# Patient Record
Sex: MALE | Race: WHITE | NOT HISPANIC OR LATINO | Employment: UNEMPLOYED | URBAN - METROPOLITAN AREA
[De-identification: names, ages, dates, MRNs, and addresses within clinical notes are randomized per-mention and may not be internally consistent; named-entity substitution may affect disease eponyms.]

---

## 2021-01-06 ENCOUNTER — TRANSFERRED RECORDS (OUTPATIENT)
Dept: HEALTH INFORMATION MANAGEMENT | Facility: CLINIC | Age: 46
End: 2021-01-06

## 2021-01-12 ENCOUNTER — TRANSFERRED RECORDS (OUTPATIENT)
Dept: HEALTH INFORMATION MANAGEMENT | Facility: CLINIC | Age: 46
End: 2021-01-12

## 2021-01-19 ENCOUNTER — TRANSFERRED RECORDS (OUTPATIENT)
Dept: HEALTH INFORMATION MANAGEMENT | Facility: CLINIC | Age: 46
End: 2021-01-19

## 2021-01-20 ENCOUNTER — TRANSFERRED RECORDS (OUTPATIENT)
Dept: HEALTH INFORMATION MANAGEMENT | Facility: CLINIC | Age: 46
End: 2021-01-20

## 2021-02-01 ENCOUNTER — TRANSFERRED RECORDS (OUTPATIENT)
Dept: HEALTH INFORMATION MANAGEMENT | Facility: CLINIC | Age: 46
End: 2021-02-01

## 2021-05-18 ENCOUNTER — TRANSFERRED RECORDS (OUTPATIENT)
Dept: HEALTH INFORMATION MANAGEMENT | Facility: CLINIC | Age: 46
End: 2021-05-18

## 2021-06-16 ENCOUNTER — TRANSFERRED RECORDS (OUTPATIENT)
Dept: HEALTH INFORMATION MANAGEMENT | Facility: CLINIC | Age: 46
End: 2021-06-16

## 2021-10-04 ENCOUNTER — TRANSFERRED RECORDS (OUTPATIENT)
Dept: HEALTH INFORMATION MANAGEMENT | Facility: CLINIC | Age: 46
End: 2021-10-04

## 2021-11-05 ENCOUNTER — TRANSCRIBE ORDERS (OUTPATIENT)
Dept: OTHER | Age: 46
End: 2021-11-05

## 2021-11-05 ENCOUNTER — PRE VISIT (OUTPATIENT)
Dept: ONCOLOGY | Facility: CLINIC | Age: 46
End: 2021-11-05

## 2021-11-05 DIAGNOSIS — C20 RECTAL CANCER (H): Primary | ICD-10-CM

## 2021-11-05 NOTE — TELEPHONE ENCOUNTER
Action 11/05/21 MMT   Action Taken  1:00PM CSS faxed record requests to [facility] recs and film room.   2:37PM  CSS called patient and obtained verbal consent to received records from [facility].   2:35PM spoke with Mitch at [facility] who will fax path and imaging reports and push images to PACS.   2:45PM CSS resolved all images in PACS.

## 2021-11-23 NOTE — TELEPHONE ENCOUNTER
RECORDS STATUS - ALL OTHER DIAGNOSIS      RECORDS RECEIVED FROM: [facility]   DATE RECEIVED: 11/30   NOTES STATUS DETAILS   OFFICE NOTE from referring provider PAULINO Alcantar   OFFICE NOTE from medical oncologist CE - [facility] 11/2/21   DISCHARGE SUMMARY from hospital CE - [facility] 6/16/21, 2/1/21   DISCHARGE REPORT from the ER NA    OPERATIVE REPORT CE - [facility] 2/1/21: Colostomy  1/19/21: CT Lung Bx Right  1/12/21: Colonoscopy   MEDICATION LIST [facility]   CLINICAL TRIAL TREATMENTS TO DATE     LABS     PATHOLOGY REPORTS [facility], Reports in CE, Slides received 11/30 1/19/21: [id number]   ANYTHING RELATED TO DIAGNOSIS Epic/CE 11/2/21   GENONOMIC TESTING     TYPE:     IMAGING (NEED IMAGES & REPORT)     XR PACS 6/23/21: [facility]   CT SCANS PACS 10/4/21, 5/18/21, 1/19/21, 1/12/21, 4/8/21: [facility]   MRI PACS 1/20/21: [facility]   MAMMO     ULTRASOUND PACS 6/17/21, 6/16/21: [facility]   PET

## 2021-11-29 ENCOUNTER — ONCOLOGY VISIT (OUTPATIENT)
Dept: ONCOLOGY | Facility: CLINIC | Age: 46
End: 2021-11-29
Attending: INTERNAL MEDICINE
Payer: COMMERCIAL

## 2021-11-29 ENCOUNTER — PRE VISIT (OUTPATIENT)
Dept: ONCOLOGY | Facility: CLINIC | Age: 46
End: 2021-11-29
Payer: COMMERCIAL

## 2021-11-29 VITALS
SYSTOLIC BLOOD PRESSURE: 115 MMHG | RESPIRATION RATE: 18 BRPM | TEMPERATURE: 98.2 F | OXYGEN SATURATION: 97 % | HEART RATE: 89 BPM | HEIGHT: 71 IN | DIASTOLIC BLOOD PRESSURE: 74 MMHG | WEIGHT: 212.9 LBS | BODY MASS INDEX: 29.81 KG/M2

## 2021-11-29 DIAGNOSIS — C18.9 COLON CANCER (H): Primary | ICD-10-CM

## 2021-11-29 PROCEDURE — 99205 OFFICE O/P NEW HI 60 MIN: CPT | Performed by: INTERNAL MEDICINE

## 2021-11-29 PROCEDURE — 99417 PROLNG OP E/M EACH 15 MIN: CPT | Performed by: INTERNAL MEDICINE

## 2021-11-29 PROCEDURE — G0463 HOSPITAL OUTPT CLINIC VISIT: HCPCS

## 2021-11-29 RX ORDER — LIDOCAINE/PRILOCAINE 2.5 %-2.5%
CREAM (GRAM) TOPICAL
COMMUNITY
Start: 2021-02-23 | End: 2023-02-03

## 2021-11-29 RX ORDER — ONDANSETRON 4 MG/1
TABLET, ORALLY DISINTEGRATING ORAL
COMMUNITY
Start: 2021-01-21 | End: 2023-02-03 | Stop reason: DRUGHIGH

## 2021-11-29 RX ORDER — NEOMYCIN SULFATE 500 MG/1
TABLET ORAL
COMMUNITY
Start: 2021-01-21 | End: 2023-02-03

## 2021-11-29 ASSESSMENT — MIFFLIN-ST. JEOR: SCORE: 1861.96

## 2021-11-29 ASSESSMENT — PAIN SCALES - GENERAL: PAINLEVEL: NO PAIN (0)
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
## 2021-12-02 ENCOUNTER — LAB (OUTPATIENT)
Dept: LAB | Facility: CLINIC | Age: 46
End: 2021-12-02
Payer: COMMERCIAL

## 2021-12-02 DIAGNOSIS — C34.11 PRIMARY ADENOCARCINOMA OF UPPER LOBE OF RIGHT LUNG (H): Primary | ICD-10-CM

## 2021-12-02 DIAGNOSIS — C34.11 PRIMARY ADENOCARCINOMA OF UPPER LOBE OF RIGHT LUNG (H): ICD-10-CM

## 2021-12-02 PROCEDURE — 88321 CONSLTJ&REPRT SLD PREP ELSWR: CPT | Performed by: PATHOLOGY

## 2021-12-05 LAB
PATH REPORT.COMMENTS IMP SPEC: NORMAL
PATH REPORT.FINAL DX SPEC: NORMAL
PATH REPORT.GROSS SPEC: NORMAL
PATH REPORT.MICROSCOPIC SPEC OTHER STN: NORMAL
PATH REPORT.RELEVANT HX SPEC: NORMAL
PATH REPORT.RELEVANT HX SPEC: NORMAL
PATH REPORT.SITE OF ORIGIN SPEC: NORMAL

## 2022-01-02 ENCOUNTER — HEALTH MAINTENANCE LETTER (OUTPATIENT)
Age: 47
End: 2022-01-02

## 2022-06-08 ENCOUNTER — TELEPHONE (OUTPATIENT)
Dept: INFECTIOUS DISEASES | Facility: CLINIC | Age: 47
End: 2022-06-08
Payer: COMMERCIAL

## 2022-06-08 NOTE — TELEPHONE ENCOUNTER
"Called patient back to let him know that we need a referral prior to scheduling for this concern. Patient became very upset and said he has \"fucking things crawling out of his legs and foot\". Patient reports his primary care provider unable to provide referral as they \"dont see what he sees\" and are not concerned for parasites. Patient going to [facility] tomorrow to see dermatology and informed him he can request an infectious disease referral from them. Patient stated this was a waste of time and he needed to be seen here sooner. Informed patient that we do not have appointments available til July so we would not be able to see him this week even if he gets a referral. Patient ended call by saying \"When I fucking show up there with things crawling out of me, someone is gonna see me\" and then hung up.  "

## 2022-06-08 NOTE — TELEPHONE ENCOUNTER
[facility] Call Center    Phone Message    May a detailed message be left on voicemail: yes     Reason for Call: Other: Patient called stating he believes he has parasite as something came out in between his toes. Per pt did reach out to primary care and was told to go to dermatology. Patient wanting to know if he can send a picture to infectious disease department to confirm it is a parasite and then set up an appointment. Please advise. Thank you     Action Taken: Message routed to:  Other: ID    Travel Screening: Not Applicable

## 2022-06-12 VITALS
WEIGHT: 185 LBS | BODY MASS INDEX: 25.61 KG/M2 | OXYGEN SATURATION: 98 % | TEMPERATURE: 98.3 F | SYSTOLIC BLOOD PRESSURE: 114 MMHG | HEART RATE: 95 BPM | DIASTOLIC BLOOD PRESSURE: 81 MMHG | RESPIRATION RATE: 16 BRPM

## 2022-06-12 PROCEDURE — 99282 EMERGENCY DEPT VISIT SF MDM: CPT | Performed by: EMERGENCY MEDICINE

## 2022-06-12 PROCEDURE — 99281 EMR DPT VST MAYX REQ PHY/QHP: CPT

## 2022-06-13 ENCOUNTER — HOSPITAL ENCOUNTER (EMERGENCY)
Facility: CLINIC | Age: 47
Discharge: HOME | End: 2022-06-13
Attending: EMERGENCY MEDICINE | Admitting: EMERGENCY MEDICINE
Payer: COMMERCIAL

## 2022-06-13 ENCOUNTER — HOSPITAL ENCOUNTER (EMERGENCY)
Facility: CLINIC | Age: 47
End: 2022-06-13
Payer: COMMERCIAL

## 2022-06-13 ENCOUNTER — HOSPITAL ENCOUNTER (EMERGENCY)
Facility: CLINIC | Age: 47
Discharge: LEFT BEFORE DISCHARGE | End: 2022-06-13
Attending: EMERGENCY MEDICINE | Admitting: EMERGENCY MEDICINE
Payer: COMMERCIAL

## 2022-06-13 VITALS
HEART RATE: 87 BPM | DIASTOLIC BLOOD PRESSURE: 89 MMHG | HEIGHT: 71 IN | OXYGEN SATURATION: 100 % | WEIGHT: 185 LBS | SYSTOLIC BLOOD PRESSURE: 134 MMHG | RESPIRATION RATE: 16 BRPM | BODY MASS INDEX: 25.9 KG/M2

## 2022-06-13 DIAGNOSIS — Z03.89 ENCOUNTER FOR OBSERVATION FOR OTHER SUSPECTED DISEASES AND CONDITIONS RULED OUT: ICD-10-CM

## 2022-06-13 DIAGNOSIS — F40.218: ICD-10-CM

## 2022-06-13 LAB
ANION GAP SERPL CALCULATED.3IONS-SCNC: 5 MMOL/L (ref 3–14)
BASOPHILS # BLD AUTO: 0 10E3/UL (ref 0–0.2)
BASOPHILS NFR BLD AUTO: 1 %
BUN SERPL-MCNC: 14 MG/DL (ref 7–30)
CALCIUM SERPL-MCNC: 8.8 MG/DL (ref 8.5–10.1)
CHLORIDE BLD-SCNC: 107 MMOL/L (ref 94–109)
CO2 SERPL-SCNC: 27 MMOL/L (ref 20–32)
CREAT SERPL-MCNC: 0.96 MG/DL (ref 0.66–1.25)
EOSINOPHIL # BLD AUTO: 0.2 10E3/UL (ref 0–0.7)
EOSINOPHIL NFR BLD AUTO: 4 %
ERYTHROCYTE [DISTWIDTH] IN BLOOD BY AUTOMATED COUNT: 15.7 % (ref 10–15)
GFR SERPL CREATININE-BSD FRML MDRD: >90 ML/MIN/1.73M2
GLUCOSE BLD-MCNC: 161 MG/DL (ref 70–99)
HCT VFR BLD AUTO: 41.6 % (ref 40–53)
HGB BLD-MCNC: 13.8 G/DL (ref 13.3–17.7)
HOLD SPECIMEN: NORMAL
HOLD SPECIMEN: NORMAL
IMM GRANULOCYTES # BLD: 0 10E3/UL
IMM GRANULOCYTES NFR BLD: 0 %
LYMPHOCYTES # BLD AUTO: 1.8 10E3/UL (ref 0.8–5.3)
LYMPHOCYTES NFR BLD AUTO: 30 %
MCH RBC QN AUTO: 29.4 PG (ref 26.5–33)
MCHC RBC AUTO-ENTMCNC: 33.2 G/DL (ref 31.5–36.5)
MCV RBC AUTO: 89 FL (ref 78–100)
MONOCYTES # BLD AUTO: 1 10E3/UL (ref 0–1.3)
MONOCYTES NFR BLD AUTO: 17 %
NEUTROPHILS # BLD AUTO: 2.9 10E3/UL (ref 1.6–8.3)
NEUTROPHILS NFR BLD AUTO: 48 %
NRBC # BLD AUTO: 0 10E3/UL
NRBC BLD AUTO-RTO: 0 /100
PLATELET # BLD AUTO: 263 10E3/UL (ref 150–450)
POTASSIUM BLD-SCNC: 3.6 MMOL/L (ref 3.4–5.3)
RBC # BLD AUTO: 4.69 10E6/UL (ref 4.4–5.9)
SODIUM SERPL-SCNC: 139 MMOL/L (ref 133–144)
WBC # BLD AUTO: 5.9 10E3/UL (ref 4–11)

## 2022-06-13 PROCEDURE — 85014 HEMATOCRIT: CPT | Performed by: EMERGENCY MEDICINE

## 2022-06-13 PROCEDURE — 99282 EMERGENCY DEPT VISIT SF MDM: CPT | Performed by: EMERGENCY MEDICINE

## 2022-06-13 PROCEDURE — 80048 BASIC METABOLIC PNL TOTAL CA: CPT | Performed by: EMERGENCY MEDICINE

## 2022-06-13 PROCEDURE — 999N000104 HC STATISTIC NO CHARGE

## 2022-06-13 PROCEDURE — 99283 EMERGENCY DEPT VISIT LOW MDM: CPT | Performed by: EMERGENCY MEDICINE

## 2022-06-13 PROCEDURE — 36415 COLL VENOUS BLD VENIPUNCTURE: CPT | Performed by: EMERGENCY MEDICINE

## 2022-06-13 NOTE — ED TRIAGE NOTES
"Pt reports that for the last 2 years he's felt \"something is living inside of him\" Has been dismissed per report from other hospitals. But states in April a worm came out of his leg wound, and he's \"shooting worms out of his stomach\" He then states the [facility] called him to treat him for a food borne illness. Comes in tonight as there are worms and \"wiggly\" things living in him.       "

## 2022-06-13 NOTE — ED PROVIDER NOTES
"ED Provider Note  [facility]      History     Chief Complaint   Patient presents with     GI Problem     Patient reports he has stage 4 colorectal cancer, reports he has coughed up worms and seen worms coming out of stoma, has mentioned issues to other doctors who have not taken him seriously, admits to meth use     HPI  Roman Escalante is a 47 year old male with a PMH notable for stage IV rectal cancer with metastases to lung and liver and methamphetamine abuse who presents to the ED for evaluation of a parasite concern.  Patient states for the past 2 years and 2 months he has had several parasites coming out all parts of his body.  He states that in the last month he has had \"stuff\" coming out of his ears.  He states on Wednesday he had a 3 to 4 inch long green worm come out of his nose and on Monday he had a worm come out of his leg.  Patient denies any recent travel.  States that he last used meth \"weeks ago.\"  Denies SI.  Patient states he was recently given a prescription of albendazole by [facility].    Per chart review patient was seen by GI at [facility] clinic on 6/2/2022 and due to the patient's multiple stool tests and labs that have come back negative for parasites patient was referred to dermatology and infectious disease in the next couple weeks.    Past Medical History  History reviewed. No pertinent past medical history.  History reviewed. No pertinent surgical history.  lidocaine-prilocaine (EMLA) 2.5-2.5 % external cream  neomycin (MYCIFRADIN) 500 MG tablet  ondansetron (ZOFRAN-ODT) 4 MG ODT tab      Allergies   Allergen Reactions     Oxaliplatin Shortness Of Breath, Nausea and Vomiting and Other (See Comments)     Patient had HSR to Oxaliplatin on cycle 8 of FOLFOX chemotherapy. Sent to ER for continued monitoring.  Patient had HSR to Oxaliplatin on cycle 8 of FOLFOX chemotherapy. Sent to ER for continued monitoring.       Blood-Group Specific Substance Other (See Comments)    "  Patient has reactivity Suggestive of a Warm auto antibody. Blood products may be delayed. Draw patient 24 hours prior to transfusion. Draw one red top and two purple top tubes for all type and screen orders.  Patient has reactivity Suggestive of a Warm auto antibody. Blood products may be delayed. Draw patient 24 hours prior to transfusion. Draw one red top and two purple top tubes for all type and screen orders.       Family History  History reviewed. No pertinent family history.  Social History   Social History     Tobacco Use     Smoking status: Current Every Day Smoker     Packs/day: 0.50     Types: Cigarettes     Smokeless tobacco: Never Used   Substance Use Topics     Alcohol use: Not Currently     Drug use: Yes     Types: Methamphetamines      Past medical history, past surgical history, medications, allergies, family history, and social history were reviewed with the patient. No additional pertinent items.       Review of Systems  A complete review of systems was performed with pertinent positives and negatives noted in the HPI, and all other systems negative.    Physical Exam   BP: 114/81  Pulse: 95  Temp: 98.3  F (36.8  C)  Resp: 16  Weight: 83.9 kg (185 lb)  SpO2: 98 %  Physical Exam  Physical Exam   Constitutional: oriented to person, place, and time. appears well-developed and well-nourished.   HENT:   Head: Normocephalic and atraumatic. tympanic membrane's normal bilaterally  Neck: Normal range of motion.   Pulmonary/Chest: Effort normal. No respiratory distress.   Cardiac: No murmurs, rubs, gallops. RRR.  Abdominal: Abdomen soft, nontender, nondistended. No rebound tenderness.  MSK: Long bones without deformity or evidence of trauma.  Varicose veins and chronic stasis changes his lower extremities.  Neurological: alert and oriented to person, place, and time.   Skin: Skin is warm and dry.   Psychiatric:  normal mood and affect.  behavior is normal. Thought content normal.     ED Course       Procedures            No results found for any visits on 06/13/22.  Medications - No data to display     Assessments & Plan (with Medical Decision Making)   MDM  Patient presenting with concerns for worms coming out of his legs.  There is no evidence of any worms on examination today.  He does have varicose veins and chronic stasis changes of his legs which he is pointing to.  He has pictures of green specks from his Vomit in addition to other pictures that I do not see evidence of parasites or worms.  I cannot find any physical evidence of any complaints he is having at this point.  He does have infectious disease appointment in 2 weeks.  His symptoms been going on for over 2 years.  I do not feel an urgent need to run any test today as I do not have any specific samples and he otherwise is having nonspecific symptoms and he is got stable vital signs.  Patient is becoming quite upset and verbally aggressive, discussed he will need to follow-up with infectious disease.  Patient reluctantly understands.  Did discuss getting a mental health evaluation for possible placement in an emergency shelter as he does not feel comfortable going home however he denied this.  Denies thoughts of harm himself or others.  Denies recent drug use or other issues.  He has had extensive work-up at outside hospitals with negative labs, recent dermatology visit without any results and upcoming infectious disease appointment.    I have reviewed the nursing notes. I have reviewed the findings, diagnosis, plan and need for follow up with the patient.    New Prescriptions    No medications on file       Final diagnoses:   Parasitophobia       --  I, Usha Tavarez, am serving as a trained medical scribe to document services personally performed by Walt Villalta MD, based on the provider's statements to me.     IWalt MD, was physically present and have reviewed and verified the accuracy of this note documented by Usha  Corby.    Walt Villalta MD  [facility] EMERGENCY DEPARTMENT  6/12/2022     Walt Villalta MD  06/13/22 0137

## 2022-06-13 NOTE — ED PROVIDER NOTES
ED Provider Note  [facility]      History     Chief Complaint   Patient presents with     Other     HPI  Roman Escalante is a 47 year old male with a PMH notable for stage IV rectal cancer with metastases to lung and liver and methamphetamine abuse who presents to the ED for evaluation of a parasite concern. I evaluated the patient in triage. Patient reports today concerned that there are worms coming out of his legs as well as his nose, his ear, and in his stool. Patient reports symptoms have been ongoing for 2 years.  Patient follows with infectious disease, GI, and dermatology.  Patient is upset because he was at [facility] prior to events and they did not do any testing. Patient denies any recent travel, fever, chills, no other complaints.  Patient last used methamphetamine a few days ago.  Patient denies any suicidal ideation homicidal ideation, or intent to self-harm.    Per chart review patient was [place] [facility] [place] and due to the patient's multiple stool tests and labs that have come back negative for parasites patient was referred to dermatology and infectious disease in the next couple weeks    Past Medical History  No past medical history on file.  No past surgical history on file.  lidocaine-prilocaine (EMLA) 2.5-2.5 % external cream  neomycin (MYCIFRADIN) 500 MG tablet  ondansetron (ZOFRAN-ODT) 4 MG ODT tab      Allergies   Allergen Reactions     Oxaliplatin Shortness Of Breath, Nausea and Vomiting and Other (See Comments)     Patient had HSR to Oxaliplatin on cycle 8 of FOLFOX chemotherapy. Sent to ER for continued monitoring.  Patient had HSR to Oxaliplatin on cycle 8 of FOLFOX chemotherapy. Sent to ER for continued monitoring.       Blood-Group Specific Substance Other (See Comments)     Patient has reactivity Suggestive of a Warm auto antibody. Blood products may be delayed. Draw patient 24 hours prior to transfusion. Draw one red top and two purple  "top tubes for all type and screen orders.  Patient has reactivity Suggestive of a Warm auto antibody. Blood products may be delayed. Draw patient 24 hours prior to transfusion. Draw one red top and two purple top tubes for all type and screen orders.       Family History  No family history on file.  Social History   Social History     Tobacco Use     Smoking status: Current Every Day Smoker     Packs/day: 0.50     Types: Cigarettes     Smokeless tobacco: Never Used   Substance Use Topics     Alcohol use: Not Currently     Drug use: Yes     Types: Methamphetamines      Past medical history, past surgical history, medications, allergies, family history, and social history were reviewed with the patient. No additional pertinent items.       Review of Systems  A complete review of systems was performed with pertinent positives and negatives noted in the HPI, and all other systems negative.    Physical Exam   BP: 134/89  Pulse: 87  Resp: 16  Height: 180.3 cm (5' 11\")  Weight: 83.9 kg (185 lb)  SpO2: 100 %  Physical Exam  .General: Afebrile, no acute distress   HEENT: Normocephalic, atraumatic, conjunctivae normal. MMM  Neck: non-tender, supple  Cardio: regular rate. regular rhythm   Resp: Normal work of breathing, no respiratory distress, lungs clear bilaterally, no wheezing, rhonchi, rales  Chest/Back: no visual signs of trauma, no CVA tenderness   Abdomen: soft, non distension, no tenderness, no peritoneal signs   Neuro: alert and fully oriented. CN II-XII grossly intact. Grossly normal strength and sensation in all extremities.   MSK: no deformities. Normal range of motion  Integumentary/Skin:  no evidence of worms on his body or coming out of skin/body, no rash visualized, normal color  Psych: anxious, paranoid     ED Course      Procedures       Results for orders placed or performed during the hospital encounter of 06/13/22   [place] Draw     Status: None    Narrative    The following orders were created for panel " order [place] Draw.  Procedure                               Abnormality         Status                     ---------                               -----------         ------                     Extra Green Top (Lithium...[[id number]]                      Final result               Extra Purple Top Tube[[id number]]                            Final result                 Please view results for these tests on the individual orders.   Extra Green Top (Lithium Heparin) Tube     Status: None   Result Value Ref Range    Hold Specimen Done    Extra Purple Top Tube     Status: None   Result Value Ref Range    Hold Specimen [facility]    Basic metabolic panel     Status: Abnormal   Result Value Ref Range    Sodium 139 133 - 144 mmol/L    Potassium 3.6 3.4 - 5.3 mmol/L    Chloride 107 94 - 109 mmol/L    Carbon Dioxide (CO2) 27 20 - 32 mmol/L    Anion Gap 5 3 - 14 mmol/L    Urea Nitrogen 14 7 - 30 mg/dL    Creatinine 0.96 0.66 - 1.25 mg/dL    Calcium 8.8 8.5 - 10.1 mg/dL    Glucose 161 (H) 70 - 99 mg/dL    GFR Estimate >90 >60 mL/min/1.73m2   CBC with platelets and differential     Status: Abnormal   Result Value Ref Range    WBC Count 5.9 4.0 - 11.0 10e3/uL    RBC Count 4.69 4.40 - 5.90 10e6/uL    Hemoglobin 13.8 13.3 - 17.7 g/dL    Hematocrit 41.6 40.0 - 53.0 %    MCV 89 78 - 100 fL    MCH 29.4 26.5 - 33.0 pg    MCHC 33.2 31.5 - 36.5 g/dL    RDW 15.7 (H) 10.0 - 15.0 %    Platelet Count 263 150 - 450 10e3/uL    % Neutrophils 48 %    % Lymphocytes 30 %    % Monocytes 17 %    % Eosinophils 4 %    % Basophils 1 %    % Immature Granulocytes 0 %    NRBCs per 100 WBC 0 <1 /100    Absolute Neutrophils 2.9 1.6 - 8.3 10e3/uL    Absolute Lymphocytes 1.8 0.8 - 5.3 10e3/uL    Absolute Monocytes 1.0 0.0 - 1.3 10e3/uL    Absolute Eosinophils 0.2 0.0 - 0.7 10e3/uL    Absolute Basophils 0.0 0.0 - 0.2 10e3/uL    Absolute Immature Granulocytes 0.0 <=0.4 10e3/uL    Absolute NRBCs 0.0 10e3/uL   CBC with platelets differential     Status: Abnormal     Narrative    The following orders were created for panel order CBC with platelets differential.  Procedure                               Abnormality         Status                     ---------                               -----------         ------                     CBC with platelets and d...[[id number]]  Abnormal            Final result                 Please view results for these tests on the individual orders.     Medications - No data to display     Assessments & Plan (with Medical Decision Making)   Roman Escalante is a 47 year old male with a PMH notable for stage IV rectal cancer with metastases to lung and liver and methamphetamine abuse who presents to the ED for evaluation of a parasite concern and concern that worms are coming out of his legs, his nose, and in his stool.  Patient reports he last used methamphetamine a few days ago. On examination there are no evidence of worms coming out of his legs, nose, ears, etc.  Patient was seen at [facility] ED earlier this evening  for similar symptoms.   Patient is upset that he did not have any testing at [facility] so we discussed that we will do laboratory testing however would have him follow-up closely with his outpatient providers (dermatology, gastroenterology, primary care provider, and infectious disease).  Patient has follow-up with infectious disease in 2 weeks and recently had a dermatology visit on 6/9/22 (for delusions of parasitosis). Patient had labs drawn in triage and was placed back into the waiting room until bed is available.    Unfortunately patient left the emergency department after triage and prior to my reevaluation and disposition. (LWBS after triage/eloped).    I have reviewed the nursing notes. I have reviewed the findings, diagnosis, plan and need for follow up with the patient.    Discharge Medication List as of 6/13/2022  5:52 AM          Final diagnoses:   None       Magaly Burns MD  [facility] EMERGENCY  DEPARTMENT  6/13/2022          Magaly Burns MD  06/13/22 [id number]

## 2022-06-13 NOTE — DISCHARGE INSTRUCTIONS
Follow-up with infectious disease as you have planned.       no bruit/no masses palpable/no distention/soft

## 2022-06-13 NOTE — ED TRIAGE NOTES
"Pt was here this morning and then left, now is sitting in the lobby stating, \"I have been here since 2:15am, is anybody going to see me?\" Charge RN states pt was seen this AM, talked to Dr. Bursn, and left. Pt tells triage nurse at this time, \"I blew a 6-inch worm out of my nose yesterday and so did my cat.\"       "

## 2022-06-13 NOTE — ED TRIAGE NOTES
Triage Assessment     Row Name 06/12/22 5828       Triage Assessment (Adult)    Airway WDL WDL       Respiratory WDL    Respiratory WDL WDL       Cardiac WDL    Cardiac WDL WDL

## 2022-06-13 NOTE — ED NOTES
"Pt left ED WR and states per witness \"I'm leaving and drinking bleach until these worms come out of my butt so someone takes me seriously.\"  made aware  "

## 2022-11-19 ENCOUNTER — HEALTH MAINTENANCE LETTER (OUTPATIENT)
Age: 47
End: 2022-11-19

## 2022-11-29 ENCOUNTER — TRANSFERRED RECORDS (OUTPATIENT)
Dept: HEALTH INFORMATION MANAGEMENT | Facility: CLINIC | Age: 47
End: 2022-11-29

## 2023-01-30 ENCOUNTER — TRANSCRIBE ORDERS (OUTPATIENT)
Dept: OTHER | Age: 48
End: 2023-01-30

## 2023-01-30 DIAGNOSIS — C20 RECTAL ADENOCARCINOMA METASTATIC TO LUNG (H): Primary | ICD-10-CM

## 2023-01-30 DIAGNOSIS — C78.00 RECTAL ADENOCARCINOMA METASTATIC TO LUNG (H): Primary | ICD-10-CM

## 2023-02-03 ENCOUNTER — TELEPHONE (OUTPATIENT)
Dept: SURGERY | Facility: CLINIC | Age: 48
End: 2023-02-03

## 2023-02-03 ENCOUNTER — ONCOLOGY VISIT (OUTPATIENT)
Dept: ONCOLOGY | Facility: CLINIC | Age: 48
End: 2023-02-03
Attending: INTERNAL MEDICINE
Payer: COMMERCIAL

## 2023-02-03 ENCOUNTER — PATIENT OUTREACH (OUTPATIENT)
Dept: ONCOLOGY | Facility: CLINIC | Age: 48
End: 2023-02-03

## 2023-02-03 VITALS
TEMPERATURE: 98 F | RESPIRATION RATE: 16 BRPM | WEIGHT: 210 LBS | SYSTOLIC BLOOD PRESSURE: 131 MMHG | HEART RATE: 71 BPM | DIASTOLIC BLOOD PRESSURE: 81 MMHG | OXYGEN SATURATION: 98 % | BODY MASS INDEX: 29.29 KG/M2

## 2023-02-03 DIAGNOSIS — L08.9 SOFT TISSUE INFECTION: Primary | ICD-10-CM

## 2023-02-03 DIAGNOSIS — C78.00 RECTAL ADENOCARCINOMA METASTATIC TO LUNG (H): ICD-10-CM

## 2023-02-03 DIAGNOSIS — C20 RECTAL ADENOCARCINOMA METASTATIC TO LUNG (H): ICD-10-CM

## 2023-02-03 LAB
ALBUMIN SERPL BCG-MCNC: 4.5 G/DL (ref 3.5–5.2)
ALP SERPL-CCNC: 83 U/L (ref 40–129)
ALT SERPL W P-5'-P-CCNC: 14 U/L (ref 10–50)
ANION GAP SERPL CALCULATED.3IONS-SCNC: 10 MMOL/L (ref 7–15)
AST SERPL W P-5'-P-CCNC: 20 U/L (ref 10–50)
BASOPHILS # BLD AUTO: 0 10E3/UL (ref 0–0.2)
BASOPHILS NFR BLD AUTO: 0 %
BILIRUB SERPL-MCNC: 0.4 MG/DL
BUN SERPL-MCNC: 13.9 MG/DL (ref 6–20)
CALCIUM SERPL-MCNC: 9.3 MG/DL (ref 8.6–10)
CEA SERPL-MCNC: 121 NG/ML
CHLORIDE SERPL-SCNC: 104 MMOL/L (ref 98–107)
CREAT SERPL-MCNC: 0.9 MG/DL (ref 0.67–1.17)
DEPRECATED HCO3 PLAS-SCNC: 24 MMOL/L (ref 22–29)
EOSINOPHIL # BLD AUTO: 0.2 10E3/UL (ref 0–0.7)
EOSINOPHIL NFR BLD AUTO: 4 %
ERYTHROCYTE [DISTWIDTH] IN BLOOD BY AUTOMATED COUNT: 17.7 % (ref 10–15)
GFR SERPL CREATININE-BSD FRML MDRD: >90 ML/MIN/1.73M2
GLUCOSE SERPL-MCNC: 102 MG/DL (ref 70–99)
HCT VFR BLD AUTO: 40.2 % (ref 40–53)
HGB BLD-MCNC: 13.2 G/DL (ref 13.3–17.7)
IMM GRANULOCYTES # BLD: 0 10E3/UL
IMM GRANULOCYTES NFR BLD: 0 %
INR PPP: 0.99 (ref 0.85–1.15)
LYMPHOCYTES # BLD AUTO: 1.2 10E3/UL (ref 0.8–5.3)
LYMPHOCYTES NFR BLD AUTO: 29 %
MCH RBC QN AUTO: 28.5 PG (ref 26.5–33)
MCHC RBC AUTO-ENTMCNC: 32.8 G/DL (ref 31.5–36.5)
MCV RBC AUTO: 87 FL (ref 78–100)
MONOCYTES # BLD AUTO: 0.5 10E3/UL (ref 0–1.3)
MONOCYTES NFR BLD AUTO: 12 %
NEUTROPHILS # BLD AUTO: 2.3 10E3/UL (ref 1.6–8.3)
NEUTROPHILS NFR BLD AUTO: 55 %
NRBC # BLD AUTO: 0 10E3/UL
NRBC BLD AUTO-RTO: 0 /100
PLATELET # BLD AUTO: 251 10E3/UL (ref 150–450)
POTASSIUM SERPL-SCNC: 4.4 MMOL/L (ref 3.4–5.3)
PROT SERPL-MCNC: 7.4 G/DL (ref 6.4–8.3)
RBC # BLD AUTO: 4.63 10E6/UL (ref 4.4–5.9)
SODIUM SERPL-SCNC: 138 MMOL/L (ref 136–145)
WBC # BLD AUTO: 4.2 10E3/UL (ref 4–11)

## 2023-02-03 PROCEDURE — 80053 COMPREHEN METABOLIC PANEL: CPT | Performed by: STUDENT IN AN ORGANIZED HEALTH CARE EDUCATION/TRAINING PROGRAM

## 2023-02-03 PROCEDURE — G0463 HOSPITAL OUTPT CLINIC VISIT: HCPCS

## 2023-02-03 PROCEDURE — 85025 COMPLETE CBC W/AUTO DIFF WBC: CPT | Performed by: STUDENT IN AN ORGANIZED HEALTH CARE EDUCATION/TRAINING PROGRAM

## 2023-02-03 PROCEDURE — 36415 COLL VENOUS BLD VENIPUNCTURE: CPT | Performed by: STUDENT IN AN ORGANIZED HEALTH CARE EDUCATION/TRAINING PROGRAM

## 2023-02-03 PROCEDURE — 85610 PROTHROMBIN TIME: CPT | Performed by: STUDENT IN AN ORGANIZED HEALTH CARE EDUCATION/TRAINING PROGRAM

## 2023-02-03 PROCEDURE — 99205 OFFICE O/P NEW HI 60 MIN: CPT | Performed by: STUDENT IN AN ORGANIZED HEALTH CARE EDUCATION/TRAINING PROGRAM

## 2023-02-03 PROCEDURE — 82378 CARCINOEMBRYONIC ANTIGEN: CPT | Performed by: STUDENT IN AN ORGANIZED HEALTH CARE EDUCATION/TRAINING PROGRAM

## 2023-02-03 PROCEDURE — G0463 HOSPITAL OUTPT CLINIC VISIT: HCPCS | Performed by: STUDENT IN AN ORGANIZED HEALTH CARE EDUCATION/TRAINING PROGRAM

## 2023-02-03 ASSESSMENT — PAIN SCALES - GENERAL: PAINLEVEL: NO PAIN (0)

## 2023-02-03 NOTE — PROGRESS NOTES
[facility]: Cancer Care                                                                                          RN met with pt, alex Owens. Introduced self and role of RN Care Coordinator at [facility]. Provided my contact information, [facility] phone number (which has options to talk with a Nurse available 24/7 - triage and RNCC via this option during business hours).     Discussed use of MyChart including to not use it for symptoms unless is an update on status from working with another provider about symptoms. Informed pt Mycharts will typically be answered same day, voicemail messages within 4-6 hours or the next business day if calling after 3 pm. Anything requiring urgent call back pt should ask to speak with triage to leave a message so provider and or care coordinator can be paged.    Pt signed Auth to Share Protected Health Information allowing cousin Tanya Benítez and girlfriend Chelsy Uriostegui access to all information, sent to scanning.    Discussed that the oral chemo team will each out to do chemo teach along with information on where to fill medication and how to get refills. Will schedule labs per protocol. [facility] Oncology chemo education handout given to pt and reviewed possible side effects of high blood pressure, hand foot syndrome, liver function abnormalities. Advised calling triage with any symptoms while on treatment, will try to have pt treated as needed in clinic (labs, imaging, infusions) and seen by provider if appropriate. May send to closest ER for emergent symptoms (sob, chest pain, bleeding, acute pain not manageable at home). Pt confirmed not taking any medications at this time.    Pt interested in  assistance for disability paperwork, stated he has been unemployed and not receiving any benefits for the past 2 years. In basket sent to .    Pt verbalized understanding.    Signature:  Virgen Nieto RN

## 2023-02-03 NOTE — NURSING NOTE
"Oncology Rooming Note    February 3, 2023 10:08 AM   Roman Escalante is a 47 year old male who presents for:    Chief Complaint   Patient presents with     Oncology Clinic Visit     Metastatic rectal cancer     Initial Vitals: /81   Pulse 71   Temp 98  F (36.7  C) (Oral)   Resp 16   Wt 95.3 kg (210 lb)   SpO2 98%   BMI 29.29 kg/m   Estimated body mass index is 29.29 kg/m  as calculated from the following:    Height as of 6/13/22: 1.803 m (5' 11\").    Weight as of this encounter: 95.3 kg (210 lb). Body surface area is 2.18 meters squared.  No Pain (0) Comment: Data Unavailable   No LMP for male patient.  Allergies reviewed: Yes  Medications reviewed: Yes    Medications: Medication refills not needed today.  Pharmacy name entered into [employer] [facility] [address]    Clinical concerns: rectal bleeding past 8 months       Danae Jackson CMA            "

## 2023-02-03 NOTE — LETTER
2/3/2023         RE: Roman Escalante  [address]  [place] [address]        Dear Colleague,    Thank you for referring your patient, Roman Escalante, to the [facility]. Please see a copy of my visit note below.    [facility] - Medical Oncology New Outpatient Consult Note  2/3/2023    Patient Identifiers     Name: Roman Escalante  Preferred Address: [place]  Preferred Language: English  : 1975  Gender: male    Assessment and Plan     Mr. Roman Escalante is a 47 year old regular smoker (3-5cigs/day) male with prior history of potential chronic parasitic infection who presents with extensively treated metastatic rectal cancer s/p progression on TAS-102.    Unclear history of potential parasitic disease and no clear evidence of treatment. Minimally concerned at this time. However, will have ID weigh in to determine whether additional workup is indicated, especially given ongoing chemotherapeutic treatment.    We will plan for initiation of regorafenib therapy per standard of care. We have placed DTC referral, with consideration of potential cellular therapy trial if possible. Continue monthly clinical monitoring and review trial options throughout regorafenib therapy    Plan:  Rectal CA  - START Stivarga treatment; HOLD immunotherapy given consideration of clinical trial  - labs today  - monitor fatigue, cytopenias, neuropathy  - clinical monitoring monthly during treatment  - Referral to Colorectal Surgery to discuss pouch bleeding  - ID referral to consider further management of prior potential parasitic infection    The patient and family asked numerous excellent questions which I answered to the best of my abilities. At the completion of our consultation, the patient and accompanying caregivers had a strong understanding of the plan. They have our contact information for any further questions or concerns, and know to reach out for any urgent matters.  Patient and family aware that they should call 911 for emergencies.       60 minutes spent on the date of the encounter doing chart review, review of test results, interpretation of tests, patient visit and documentation       Polo Snow MD, PhD   of Medicine  Division of Hematology, Oncology and Transplantation  [facility]    -----------------------------------    Oncology Summary and HPI      Cancer Staging   No matching staging information was found for the patient.    Oncology History    No history exists.       Subjective/Interval Events     - pt had been discussing rectal bleeding for 8 months, with pt finally receiving approval in November.   - 8 months prior to cancer diagnosis, pt presented to ER w/ CT scans. Pt scheduled to follow-up with [facility], but delayed due to COVID-19 infection    - no family history of cancer    Review of Systems: 14 point ROS negative other than the symptoms noted above in the HPI.    Physical Exam     Vital signs: /81   Pulse 71   Temp 98  F (36.7  C) (Oral)   Resp 16   Wt 95.3 kg (210 lb)   SpO2 98%   BMI 29.29 kg/m      ECOG performance status:  0  Vascular access:  R chest port    GENERAL: Well-nourished healthy-appearing man, sitting in chair, no acute distress.   HEENT: No icterus, no pallor. Moist mucous membranes.   LUNGS: Clear to ausculation bilaterally, normal work of breathing.   CARDIOVASCULAR: Regular rate and rhythm, no murmurs, gallops or rubs.   ABDOMEN: Soft, nontender and nondistended.  EXTREMITIES: No cyanosis, no clubbing, no edema.   NEUROLOGIC: No focal deficits, alert/ oriented  LYMPH NODE EXAM: No palpable adenopathy.    Objective Data     Lab data:  I have personally reviewed the lab data, notable for:    - no notables    Radiology data:  I have personally reviewed the radiology data, notable for:  01/23/2023 CT C/A/P  IMPRESSION:   1.  New and enlarging metastatic pulmonary nodules.   2.  Enlarging segment 8/4a  hepatic metastasis.   3.  Slightly perirectal nodularity compared to October 2022.   4.  Similar rectal wall thickening.   5.  Stable subcentimeter thickening along the lateral limb of the   left adrenal gland, nonspecific.   6.  No new metastases in the abdomen and pelvis.      Pathology and other data:  I have personally reviewed and interpreted the pathology data, notable for:    - no new data    Medical/Surgical History     Past medical history:  Active Ambulatory Problems     Diagnosis Date Noted     No Active Ambulatory Problems     Resolved Ambulatory Problems     Diagnosis Date Noted     No Resolved Ambulatory Problems     No Additional Past Medical History       Past surgical history:  No past surgical history on file.     Social history:   Social History     Tobacco Use     Smoking status: Some Days     Packs/day: 0.50     Types: Cigarettes     Smokeless tobacco: Never   Substance Use Topics     Alcohol use: Yes     Comment: social     Drug use: Yes     Types: Methamphetamines       Family history:  No family history on file.    Allergies:   Allergies   Allergen Reactions     Oxaliplatin Shortness Of Breath, Nausea and Vomiting and Other (See Comments)     Patient had HSR to Oxaliplatin on cycle 8 of FOLFOX chemotherapy. Sent to ER for continued monitoring.  Patient had HSR to Oxaliplatin on cycle 8 of FOLFOX chemotherapy. Sent to ER for continued monitoring.       Blood-Group Specific Substance Other (See Comments)     Patient has reactivity Suggestive of a Warm auto antibody. Blood products may be delayed. Draw patient 24 hours prior to transfusion. Draw one red top and two purple top tubes for all type and screen orders.  Patient has reactivity Suggestive of a Warm auto antibody. Blood products may be delayed. Draw patient 24 hours prior to transfusion. Draw one red top and two purple top tubes for all type and screen orders.         Outpatient medications:   No current outpatient medications on  file.    Sincerely,        Polo Snow MD

## 2023-02-03 NOTE — TELEPHONE ENCOUNTER
[facility] Call Center    Phone Message    May a detailed message be left on voicemail: yes     Reason for Call: Appointment Intake      Referring Provider Name: Polo Snow MD in  ONCOLOGY ADULT    Diagnosis and/or Symptoms: Rectal bleeding - patient currently has stage 4 rectal cancer - being followed at [facility], records are in [place]     Sending for review - thank you!     Action Taken: Message routed to:  [facility] ([facility] Colorectal     Travel Screening: Not Applicable

## 2023-02-03 NOTE — PROGRESS NOTES
[facility] - Medical Oncology New Outpatient Consult Note  2/3/2023    Patient Identifiers     Name: Roman Escalante  Preferred Address: [place]  Preferred Language: English  : 1975  Gender: male    Assessment and Plan     Mr. [place] Escalante is a 47 year old regular smoker (3-5cigs/day) male with prior history of potential chronic parasitic infection who presents with extensively treated metastatic rectal cancer s/p progression on TAS-102.    Unclear history of potential parasitic disease and no clear evidence of treatment. Minimally concerned at this time. However, will have ID weigh in to determine whether additional workup is indicated, especially given ongoing chemotherapeutic treatment.    We will plan for initiation of regorafenib therapy per standard of care. We have placed DTC referral, with consideration of potential cellular therapy trial if possible. Continue monthly clinical monitoring and review trial options throughout regorafenib therapy    Plan:  Rectal CA  - START Stivarga treatment; HOLD immunotherapy given consideration of clinical trial  - labs today  - monitor fatigue, cytopenias, neuropathy  - clinical monitoring monthly during treatment  - Referral to Colorectal Surgery to discuss pouch bleeding  - ID referral to consider further management of prior potential parasitic infection    The patient and family asked numerous excellent questions which I answered to the best of my abilities. At the completion of our consultation, the patient and accompanying caregivers had a strong understanding of the plan. They have our contact information for any further questions or concerns, and know to reach out for any urgent matters. Patient and family aware that they should call 911 for emergencies.       60 minutes spent on the date of the encounter doing chart review, review of test results, interpretation of tests, patient visit and documentation       Polo Snow MD, PhD  Assistant  Professor of Medicine  Division of Hematology, Oncology and Transplantation  [facility]    -----------------------------------    Oncology Summary and HPI      Cancer Staging   No matching staging information was found for the patient.    Oncology History    No history exists.       Subjective/Interval Events     - pt had been discussing rectal bleeding for 8 months, with pt finally receiving approval in November.   - 8 months prior to cancer diagnosis, pt presented to ER w/ CT scans. Pt scheduled to follow-up with [facility], but delayed due to COVID-19 infection    - no family history of cancer    Review of Systems: 14 point ROS negative other than the symptoms noted above in the HPI.    Physical Exam     Vital signs: /81   Pulse 71   Temp 98  F (36.7  C) (Oral)   Resp 16   Wt 95.3 kg (210 lb)   SpO2 98%   BMI 29.29 kg/m      ECOG performance status:  0  Vascular access:  R chest port    GENERAL: Well-nourished healthy-appearing man, sitting in chair, no acute distress.   HEENT: No icterus, no pallor. Moist mucous membranes.   LUNGS: Clear to ausculation bilaterally, normal work of breathing.   CARDIOVASCULAR: Regular rate and rhythm, no murmurs, gallops or rubs.   ABDOMEN: Soft, nontender and nondistended.  EXTREMITIES: No cyanosis, no clubbing, no edema.   NEUROLOGIC: No focal deficits, alert/ oriented  LYMPH NODE EXAM: No palpable adenopathy.    Objective Data     Lab data:  I have personally reviewed the lab data, notable for:    - no notables    Radiology data:  I have personally reviewed the radiology data, notable for:  01/23/2023 CT C/A/P  IMPRESSION:   1.  New and enlarging metastatic pulmonary nodules.   2.  Enlarging segment 8/4a hepatic metastasis.   3.  Slightly perirectal nodularity compared to October 2022.   4.  Similar rectal wall thickening.   5.  Stable subcentimeter thickening along the lateral limb of the   left adrenal gland, nonspecific.   6.  No new metastases in the  abdomen and pelvis.      Pathology and other data:  I have personally reviewed and interpreted the pathology data, notable for:    - no new data    Medical/Surgical History     Past medical history:  Active Ambulatory Problems     Diagnosis Date Noted     No Active Ambulatory Problems     Resolved Ambulatory Problems     Diagnosis Date Noted     No Resolved Ambulatory Problems     No Additional Past Medical History       Past surgical history:  No past surgical history on file.     Social history:   Social History     Tobacco Use     Smoking status: Some Days     Packs/day: 0.50     Types: Cigarettes     Smokeless tobacco: Never   Substance Use Topics     Alcohol use: Yes     Comment: social     Drug use: Yes     Types: Methamphetamines       Family history:  No family history on file.    Allergies:   Allergies   Allergen Reactions     Oxaliplatin Shortness Of Breath, Nausea and Vomiting and Other (See Comments)     Patient had HSR to Oxaliplatin on cycle 8 of FOLFOX chemotherapy. Sent to ER for continued monitoring.  Patient had HSR to Oxaliplatin on cycle 8 of FOLFOX chemotherapy. Sent to ER for continued monitoring.       Blood-Group Specific Substance Other (See Comments)     Patient has reactivity Suggestive of a Warm auto antibody. Blood products may be delayed. Draw patient 24 hours prior to transfusion. Draw one red top and two purple top tubes for all type and screen orders.  Patient has reactivity Suggestive of a Warm auto antibody. Blood products may be delayed. Draw patient 24 hours prior to transfusion. Draw one red top and two purple top tubes for all type and screen orders.         Outpatient medications:   No current outpatient medications on file.

## 2023-02-03 NOTE — NURSING NOTE
Patient labs drawn in clinic via venipuncture. Patient tolerated well and was discharged. Specimen(s) sent to first floor lab for processing. See flowsheet for details.      Rebeca Tate CMA on 2/3/2023 at 11:15 AM

## 2023-02-04 NOTE — TELEPHONE ENCOUNTER
RECORDS RECEIVED FROM: Internal   DATE RECEIVED: 2.27.23   NOTES (Gather within 2 years) STATUS DETAILS   OFFICE NOTE from referring provider   Internal 2.3.23  [facility]  Oncology   LABS (any labs) Internal    MEDICATION LIST Internal

## 2023-02-08 NOTE — TELEPHONE ENCOUNTER
Roman has stage 4 rectal cancer. He is diverted with a colostomy. He has been having blood clots, mucous, and pain per rectum which have been getting progressively worse. The pain is worse when laying down. There is no blood in his colostomy but he notes his stool has been darker. He says he has been eating more oreos. Denies RADIATION.     Diversion colitis?    Discussed with  and Alana Ames NP. Plan for patient to see . Scheduled appt.

## 2023-02-08 NOTE — TELEPHONE ENCOUNTER
Diagnosis, Referred by & from: Possible Diverticulitis Colitis   Appt date: 2/27/2023   NOTES STATUS DETAILS   OFFICE NOTE from referring provider N/A    OFFICE NOTE from other specialist Received / Care Everywhere / Internal [facility]:  2/3/23 - ONC OV with Dr. Snow  11/29/21 - ONC OV with Dr. Norman    [facility]:  1/3/23 - ONC OV with Dr. Haile  12/12/22 - Infusion    [facility]:  6/16/22 - GI OV with YADIEL Davis    [facility]:  11/29/22 - GI OV with Cathy Sánchez NP  1/6/21 - GI OV with Dr. Moya   DISCHARGE SUMMARY from hospital Care Everywhere [facility]:  6/16/21 - Admission with Dr. Pace  2/1/21 - Admission with Dr. Sorto   DISCHARGE REPORT from the ER Care Everywhere [facility]:  4/15/20 - ED OV with YADIEL Kerr  4/8/20 - ED OV with Dr. Rivera   OPERATIVE REPORT Care Everywhere [facility]:  2/1/21 - OP Note for LAPAROSCOPIC DIVERTING COLOSTOMY with Dr. Sorto   MEDICATION LIST Internal    LABS     ANAL PAP/CEA Internal [facility]:  2/3/23 - CEA   BIOPSIES/PATHOLOGY RELATED TO DIAGNOSIS Received [facility]:  1/19/21 - Liver Biopsy (Case: [id number]) - Consulted by us 12/2/21    [facility]:  1/12/21 - Colon Biopsy (Case: [id number]-)   DIAGNOSTIC PROCEDURES     COLONOSCOPY Received [facility]:  112/21 - Colonoscopy   IMAGING (DISC & REPORT)      CT Received [facility]:  1/23/23 - CT Chest/Abd/Pelvis  10/6/22 - CT Chest/Abd/Pelvis  7/13/22 - CT Chest/Abd/Pelvis  5/2/22 - CT Chest/Abd/Pelvis  3/9/22 - CT Chest/Abd/Pelvis  12/13/21 - CT Chest/Abd/Pelvis  10/4/21 - CT Chest/Abd/Pelvis  5/18/21 - CT Chest/Abd/Pelvis  1/19/21 - CT Lung  1/12/21 - CT Chest/Abd/Pelvis   ULTRASOUND  (ENDOANAL/ENDORECTAL) Received [facility]:  6/17/21 - US Renal/Bladder  6/16/21 - US Abdomen     Records Requested  02/08/23    Facility  [facility]  Fax: [phone]   Outcome * 2/8/23 2:22 PM Faxed urg req to [facility] for recs to be faxed to the clinic. - Sarah    * 2/13/23 1:28 PM Records received from [facility] and sent to HIM to be scanned into the chart. - Sarah

## 2023-02-13 ENCOUNTER — PATIENT OUTREACH (OUTPATIENT)
Dept: CARE COORDINATION | Facility: CLINIC | Age: 48
End: 2023-02-13
Payer: COMMERCIAL

## 2023-02-13 NOTE — PROGRESS NOTES
Social Work Intervention  [facility]    Data/Intervention:    Patient Name:  Roman Escalante  /Age:  1975 (47 year old)    Visit Type: telephone  Referral Source: [facility]  Reason for Referral:  Disability Resources    Collaborated With:    -Patient    Psychosocial Information/Concerns:  Patient is interested in disability resources.     Intervention/Education/Resources Provided:  SW called patient, introduced self and explained the reason for calling. Per patient they would like to apply for disability. SW and patient talked briefly about disability benefits and the different ways to apply. Patient voiced that his sister will be able to help in completing an application.    Assessment/Plan:  Patient was provided with SW contact information for additional questions or concerns. SW will continue to remain available. Provided patient/family with contact information and availability.    GUILHERME Yepez,DREA  Hematology/Oncology Social Worker  Phone:[phone] Pager: [phone]

## 2023-02-22 ENCOUNTER — TELEPHONE (OUTPATIENT)
Dept: ONCOLOGY | Facility: CLINIC | Age: 48
End: 2023-02-22
Payer: COMMERCIAL

## 2023-02-22 ENCOUNTER — PATIENT OUTREACH (OUTPATIENT)
Dept: ONCOLOGY | Facility: CLINIC | Age: 48
End: 2023-02-22
Payer: COMMERCIAL

## 2023-02-22 ENCOUNTER — TELEPHONE (OUTPATIENT)
Dept: SURGERY | Facility: CLINIC | Age: 48
End: 2023-02-22
Payer: COMMERCIAL

## 2023-02-22 DIAGNOSIS — C78.00 RECTAL ADENOCARCINOMA METASTATIC TO LUNG (H): Primary | ICD-10-CM

## 2023-02-22 DIAGNOSIS — G47.00 INSOMNIA: ICD-10-CM

## 2023-02-22 DIAGNOSIS — C20 RECTAL ADENOCARCINOMA METASTATIC TO LUNG (H): Primary | ICD-10-CM

## 2023-02-22 RX ORDER — IBUPROFEN 200 MG
600 TABLET ORAL EVERY 8 HOURS PRN
Status: ON HOLD | COMMUNITY
Start: 2023-02-22 | End: 2023-07-30

## 2023-02-22 RX ORDER — OXYCODONE HYDROCHLORIDE 5 MG/1
5 TABLET ORAL EVERY 6 HOURS PRN
Qty: 30 TABLET | Refills: 0 | Status: SHIPPED | OUTPATIENT
Start: 2023-02-22 | End: 2023-07-14

## 2023-02-22 RX ORDER — DIAZEPAM 5 MG
5 TABLET ORAL
COMMUNITY
Start: 2023-02-22 | End: 2023-06-07

## 2023-02-22 NOTE — TELEPHONE ENCOUNTER
Per Dr. Snow: will prescribe Oxy to local [facility] and see pt in clinic Monday 2/27 when he is here seeing ID as well. Informed pt he will be hearing from oral chemo team regarding Stivarga teach and where to  Rx. Pt scheduled for 2:45 pm in person with Dr. Snow 2/27. Informed him to read his mycharts as well, writer's direct extension is always on signature and he can reach team by mychart or phone, pt verbalized understanding to call triage with worsening symptoms, sob, fatigue, etc.

## 2023-02-22 NOTE — TELEPHONE ENCOUNTER
PA Initiation    Medication: Stivarga PA   Insurance Company: [employer] - Phone [phone] Fax [phone]  Pharmacy Filling the Rx:    Filling Pharmacy Phone:    Filling Pharmacy Fax:    Start Date: 2/22/2023    [id number]    Thank you,    Katherine Sharma  Oncology Pharmacy Liaison JASON [email or web address]  Phone: [phone]  Fax: [phone]

## 2023-02-22 NOTE — TELEPHONE ENCOUNTER
Called pt regarding symptoms reported to [facility] this morning, referral for consult was declined due to pt's rectal bleeding from rectal tumor, he has never had radiation. He was treated with Lonsurf one month ago by [facility] and has been off therapy waiting to start on Stivarga. He has an appointment with Infectious disease on Monday 2/27 to manage prior parasitic infection.    Per pt he has been passing clots via rectum several times a day, as large as 1-2 inches in diameter. State he is unable to urinate until he has passed 2-3 clots then will have sudden urge and flow of urine then dribbling. He is on the toilet 7-15 minutes multiple times a day with this process and unable to sit comfortably at any length. He was last given Oxy 5 mgy by [facility] in January and has been trying to stretch this out, last dose was one week ago and has 1 tab left. Rates pain as 8-9 when passing clots. He does take OTC ibuprofen 600 mg twice a day and started taking valium 5 mg (not prescribed to him) in order to be able to sleep through the night.    He has not heard from oral chemo team regarding starting Stivarga and state he is willing to go anywhere to  prescription, unless insurance limits him to a mail order pharmacy he'd rather pick this up locally. Due to pain and continued bleeding, he is anxious to get better pain control and start oral chemo.

## 2023-02-22 NOTE — CONFIDENTIAL NOTE
Per Dr. Mendenhall and Alana Ames NP, patient's rectal bleeding is from his rectal tumor, he has never had radiation so this is not radiation proctitis.     Patient has a diverting colostomy. Rectal tumor still there. Only has 3 cm of rectum. Per Dr. Mendenhall and Alana Amse NP we will cancel his appointment on 2/27/23. Updated Dr. Snow .    Patient agrees with plan.     Rebeca Landin RN BSN  RNCC Colon Rectal Surgery  [phone]

## 2023-02-24 NOTE — TELEPHONE ENCOUNTER
Medication Appeal Initiation    We have initiated an appeal for the requested medication:  Medication: Stivarga PA Denied- Appeal pending  Appeal Start Date:  2/23/2023  Insurance Company: [facility] - Phone [phone] Fax [phone]  Comments:       Appeal faxed to [facility] [phone] 2/23    Thank you,    Katherine Sharma  Oncology Pharmacy Liaison II  [email or web address]  Phone: [phone]  Fax: [phone]

## 2023-02-24 NOTE — TELEPHONE ENCOUNTER
PRIOR AUTHORIZATION DENIED    Medication: Stivarga PA Denied    Denial Date: 2/24/2023    Denial Rational: criteria not met- tumor originated in rectum and has not tried Erbitux or Vecitibix        Appeal Information:         Thank you,    Katherine Sharma  Oncology Pharmacy Liaison JASON [email or web address]  Phone: [phone]  Fax: [phone]

## 2023-02-24 NOTE — TELEPHONE ENCOUNTER
Rossy from [facility] to verify the patient has not tried Erbitux or Vectibix. May take another day to review.     Thank you,    Ashley Sharma  Oncology Pharmacy Liaison II  [email or web address]  Phone: [phone]  Fax: [phone]

## 2023-02-27 ENCOUNTER — PRE VISIT (OUTPATIENT)
Dept: INFECTIOUS DISEASES | Facility: CLINIC | Age: 48
End: 2023-02-27
Payer: COMMERCIAL

## 2023-02-27 ENCOUNTER — PRE VISIT (OUTPATIENT)
Dept: SURGERY | Facility: CLINIC | Age: 48
End: 2023-02-27

## 2023-02-27 ENCOUNTER — ONCOLOGY VISIT (OUTPATIENT)
Dept: ONCOLOGY | Facility: CLINIC | Age: 48
End: 2023-02-27
Attending: STUDENT IN AN ORGANIZED HEALTH CARE EDUCATION/TRAINING PROGRAM
Payer: COMMERCIAL

## 2023-02-27 ENCOUNTER — OFFICE VISIT (OUTPATIENT)
Dept: INFECTIOUS DISEASES | Facility: CLINIC | Age: 48
End: 2023-02-27
Attending: STUDENT IN AN ORGANIZED HEALTH CARE EDUCATION/TRAINING PROGRAM
Payer: COMMERCIAL

## 2023-02-27 ENCOUNTER — TELEPHONE (OUTPATIENT)
Dept: ONCOLOGY | Facility: CLINIC | Age: 48
End: 2023-02-27

## 2023-02-27 VITALS
OXYGEN SATURATION: 97 % | TEMPERATURE: 97.8 F | DIASTOLIC BLOOD PRESSURE: 73 MMHG | HEART RATE: 75 BPM | WEIGHT: 222.8 LBS | BODY MASS INDEX: 31.07 KG/M2 | SYSTOLIC BLOOD PRESSURE: 114 MMHG | RESPIRATION RATE: 16 BRPM

## 2023-02-27 DIAGNOSIS — B89 PARASITOSIS: ICD-10-CM

## 2023-02-27 DIAGNOSIS — C78.00 RECTAL ADENOCARCINOMA METASTATIC TO LUNG (H): Primary | ICD-10-CM

## 2023-02-27 DIAGNOSIS — C20 RECTAL ADENOCARCINOMA METASTATIC TO LUNG (H): Primary | ICD-10-CM

## 2023-02-27 DIAGNOSIS — Z71.6 ENCOUNTER FOR SMOKING CESSATION COUNSELING: Primary | ICD-10-CM

## 2023-02-27 PROCEDURE — 99204 OFFICE O/P NEW MOD 45 MIN: CPT | Performed by: STUDENT IN AN ORGANIZED HEALTH CARE EDUCATION/TRAINING PROGRAM

## 2023-02-27 PROCEDURE — 99215 OFFICE O/P EST HI 40 MIN: CPT | Performed by: STUDENT IN AN ORGANIZED HEALTH CARE EDUCATION/TRAINING PROGRAM

## 2023-02-27 PROCEDURE — G0463 HOSPITAL OUTPT CLINIC VISIT: HCPCS | Performed by: STUDENT IN AN ORGANIZED HEALTH CARE EDUCATION/TRAINING PROGRAM

## 2023-02-27 RX ORDER — BUPROPION HYDROCHLORIDE 150 MG/1
150 TABLET ORAL EVERY MORNING
Qty: 30 TABLET | Refills: 3 | Status: SHIPPED | OUTPATIENT
Start: 2023-02-27 | End: 2023-03-08

## 2023-02-27 RX ORDER — NICOTINE 21 MG/24HR
1 PATCH, TRANSDERMAL 24 HOURS TRANSDERMAL EVERY 24 HOURS
Qty: 28 PATCH | Refills: 1 | Status: SHIPPED | OUTPATIENT
Start: 2023-02-27 | End: 2023-03-08

## 2023-02-27 ASSESSMENT — PAIN SCALES - GENERAL: PAINLEVEL: MILD PAIN (3)

## 2023-02-27 NOTE — LETTER
2023         RE: Roman Escalante  [address]  [place] [address]        Dear Colleague,    Thank you for referring your patient, Roman Escalante, to the [facility]. Please see a copy of my visit note below.    [facility] - Medical Oncology Follow-Up Consult Note  2023    Patient Identifiers     Name: Roman Escalante  Preferred Address: [place]  Preferred Language: English  : 1975  Gender: male    Assessment and Plan     Mr. Roman Escalante is a 47 year old male smoker (3-5 cigs/day) with a history of potential chronic parasitic infection who returns to clinic for metastatic rectal cancer beginning regorafenib therapy.    No significant changes in treatment plan. Starting regorafenib when pills are delivered, with close eye on ongoing cancer-related symptoms and neuropathy.     Will follow-up ID recommendations from clinic visit today.     Plan:  Metastatic rectal cancer  - START regorafenib treatment  - local labs if ID not drawing today; else labs today with ID  - RTC monthly for treatment monitoring with labs  - plan for imaging re-evaluation in 3mo  - f/u DTC referral for [id number] clinical trial    ?infection history  - f/u ID rec's from today    Smoking cessation  - START wellbutrin 150mgXL daily  - ADD nicotene patch; 14mg/day given < 1ppd smoking    The patient and family asked numerous excellent questions which I answered to the best of my abilities. At the completion of our consultation, the patient and accompanying caregivers had a strong understanding of the plan. They have our contact information for any further questions or concerns, and know to reach out for any urgent matters. Patient and family aware that they should call 911 for emergencies.       45 minutes spent on the date of the encounter doing chart review, review of test results, interpretation of tests, patient visit and documentation       Polo Snow MD,  PhD   of Medicine  Division of Hematology, Oncology and Transplantation  [facility]    -----------------------------------    Oncology Summary     Cancer Staging   No matching staging information was found for the patient.    Oncology History   Rectal adenocarcinoma metastatic to lung (H)   2/3/2023 Initial Diagnosis    Rectal adenocarcinoma metastatic to lung (H)     2/3/2023 -  Chemotherapy    Oral ONC Colorectal Cancer - Regorafenib  Plan Provider: Polo Snow MD  Treatment goal: Palliative  Line of treatment: [No plan line of treatment]         Subjective/Interval Events     - fighting to hold clot in during BM's. Notes improvement in urinatino followin clot passage  - no nausea    - LUQ pain, worst in the late afternoon or evening. Taking 1 oxycodone per day around that time to help with symptoms and sleep.    - no ileus style symptoms in the last  3 weeks  - some burning daniel in RLQ especially in early afternoon; drinking tomato juice, coffee, orange juice for breakfast    Review of Systems: 14 point ROS negative other than the symptoms noted above in the HPI.    Physical Exam     Vital signs: /73   Pulse 75   Temp 97.8  F (36.6  C) (Oral)   Resp 16   Wt 101.1 kg (222 lb 12.8 oz)   SpO2 97%   BMI 31.07 kg/m      ECOG performance status:  0  Vascular access:  none    GENERAL: Well-nourished healthy-appearing man, sitting in chair, no acute distress.   HEENT: No icterus, no pallor. Moist mucous membranes.   LUNGS: Clear to ausculation bilaterally, normal work of breathing.   CARDIOVASCULAR: Regular rate and rhythm, no murmurs, gallops or rubs.   ABDOMEN: Soft, nontender and nondistended.  EXTREMITIES: No cyanosis, no clubbing, no edema.   NEUROLOGIC: No focal deficits, alert/ oriented  LYMPH NODE EXAM: No palpable adenopathy.    Objective Data     Lab data:  I have personally reviewed the lab data, notable for:    - hgb 13.2    Radiology data:  I have personally  reviewed the radiology data, notable for:  - no new data    Pathology and other data:  I have personally reviewed and interpreted the pathology data, notable for:    - no new data        Polo Snow MD

## 2023-02-27 NOTE — TELEPHONE ENCOUNTER
MEDICATION APPEAL APPROVED    Medication: Stivarga PA Denied- Appeal Approved  Authorization Effective Date: 1/24/2023  Authorization Expiration Date: 5/24/2024  Approved Dose/Quantity:   Reference #: [id number]   Insurance Company: [employer] - Phone [phone] Fax [phone]  Expected CoPay:       CoPay Card Available:      Foundation Assistance Needed:    Which Pharmacy is filling the prescription (Not needed for infusion/clinic administered):            Thank you,    Katherine Sharma  Oncology Pharmacy Liaison II  [email or web address]  Phone: [phone]  Fax: [phone]

## 2023-02-27 NOTE — PROGRESS NOTES
[facility]  Infectious Disease Consultation note  Today's Date: 03/05/2023    Assessment and Recommendations:  Roman Escalante is a 47 year old with PMH of Encephalitis at 5 years of age, unknown etiology, rectal cancer diagnosed in Jan 2021 with mets to lung, s/p colostomy in feb 2021 and chemo. Recently chemo has been stopped due to poor response, plan is to switch to another chemo regimen.    Parasitosis: we discussed that most likely he does not have a parasitic infection. We discussed several reasons for it like symptomatology does not fit with known human pathogen in the [place] , pictures are not convincing.   We decided that if anything bothers him in the future, to submit a sample (sterile containers provided) within a few hours so we may look at it under the microscope. We discussed that in my experience, I see many cases like this but they dont turn out to be parasites/worms. He said he will stop thinking about it.     Thank you for involving Infectious Disease in the care of this patient.     Juliet Blanco  , [facility]  Pager - [phone]    Attestation: Face to face time 32 mins. Total time including chart review, care-coordination and documentation time on the date of encounter - 45 mins    -------------------------------------------------------------------------------------------------------------------    Reason for consult / Chief complaint:   Consulted by Dr. Snow for parasitosis    He presents today with Chelsy, girlfriend who is a nurse  History of presenting illness:  Roman Escalante is a 47 year old with PMH of Encephalitis at 5 years of age, unknown etiology, rectal cancer diagnosed in Jan 2021 with mets to lung, s/p colostomy in feb 2021 and chemo. Recently chemo has been stopped due to poor response, plan is to switch to another chemo regimen. He reports liver issues and Bacterial infections while on chemo.     April 2020 at  [facility], he reports shrimp appeared on steak all of a sudden   April 2022 he noticed a colostomy worm. He showed a video of this with a tiny worm like structure moving  In June 2022 - he reports being diagnosed with campylobacter at [facility], soon after that he developed a leg ulcer with something burrowing through it which healed in just minutes. He showed a picture of this with some tissue/mucus one side which he thinks is very weird like a parasite.   His right medial eye has a swelling that was diagnosed as ruptured lacrimal duct. He wonders if the duct can come out through his nose because he thinks he saw it.   He has not been able to see his back but he can feel it , something bizzare like a worm is poking out   His cat has episodes of vomitng - the vomitus looks like it has worms in it    He said that if I say that he does not have worms/parasites he will stop thinking about it.     He has been seen by Dr. Mora and Dr. 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     He has Not been using meth for many years     Microbiology:  6/6/22 stool GI panel - campylobacter+  6/30/22 ,, - negative     Ova and parasite stool 5/22/22, 6/6/22, 6/7/22, 6/30/22 - negative     Social Hx:  Social History     Tobacco Use     Smoking status: Some Days     Packs/day: 0.50     Types: Cigarettes     Smokeless tobacco: Never   Substance Use Topics     Alcohol use: Yes     Comment: social     Drug use: Yes     Types: Methamphetamines         Immunizations:  Immunization History   Administered Date(s) Administered     COVID-19 Vaccine 12+ (Pfizer) 04/22/2021, 05/13/2021         Allergies:   Allergies   Allergen Reactions     Oxaliplatin Shortness Of Breath, Nausea and Vomiting and Other (See Comments)     Patient had HSR to Oxaliplatin on cycle 8 of FOLFOX chemotherapy. Sent to ER for continued monitoring.  Patient had HSR to Oxaliplatin on cycle 8 of FOLFOX chemotherapy. Sent to ER for continued  monitoring.       Blood-Group Specific Substance Other (See Comments)     Patient has reactivity Suggestive of a Warm auto antibody. Blood products may be delayed. Draw patient 24 hours prior to transfusion. Draw one red top and two purple top tubes for all type and screen orders.  Patient has reactivity Suggestive of a Warm auto antibody. Blood products may be delayed. Draw patient 24 hours prior to transfusion. Draw one red top and two purple top tubes for all type and screen orders.           Medications:  Current Outpatient Medications   Medication Sig Dispense Refill     buPROPion (WELLBUTRIN XL) 150 MG 24 hr tablet Take 1 tablet (150 mg) by mouth every morning 30 tablet 3     diazepam (VALIUM) 5 MG tablet Take 1 tablet (5 mg) by mouth nightly as needed for anxiety       ibuprofen (ADVIL/MOTRIN) 200 MG tablet Take 3 tablets (600 mg) by mouth every 8 hours as needed for pain (Patient not taking: Reported on 2/27/2023)       nicotine (NICODERM CQ) 14 MG/24HR 24 hr patch Place 1 patch onto the skin every 24 hours 28 patch 1     oxyCODONE (ROXICODONE) 5 MG tablet Take 1 tablet (5 mg) by mouth every 6 hours as needed for pain 30 tablet 0     regorafenib (STIVARGA) 40 MG tablet Take 2 tablets (80 mg) by mouth daily for 7 days, THEN 3 tablets (120 mg) daily for 7 days, THEN 4 tablets (160 mg) daily for 7 days. Take on Days 1 thru 21 w/ low-fat bkfst. Store in orig bottle. Discard 7 weeks after opening. 63 tablet 0       Examination:  Vital signs:   There were no vitals taken for this visit.    Constitutional: Patient in no distress  Eyes: not pale, not jaundiced, right medial cathus soft swelling+  CVS: no added sounds  RS: clear  Abdomen: soft, BS+, colostomy+  Skin: acne on the back present  Bilateral feet   Extremities: bilateral varicose veins present with skin changes and hyperpigmented healed skin wounds  Psych: Alert and oriented x 3      Laboratory:  Hematology:  Recent Labs   Lab Test 03/03/23  1536  02/03/23  1105 06/13/22  0234   WBC 9.0 4.2 5.9   HGB 13.7 13.2* 13.8   HCT 40.4 40.2 41.6    251 263       Chemistry:  Recent Labs   Lab Test 03/03/23  1536 02/03/23  1105 06/13/22  0234    138 139   POTASSIUM 4.1 4.4 3.6   CHLORIDE 105 104 107   CO2 27 24 27   ANIONGAP 5 10 5   BUN 17 13.9 14   CR 0.96 0.90 0.96   GFRESTIMATED >90 >90 >90   * 102* 161*   JEFERSON 9.4 9.3 8.8       Liver Function Studies:  Recent Labs   Lab Test 03/03/23  1536 02/03/23  1105   BILITOTAL 0.6 0.4   ALKPHOS 86 83   ALBUMIN 4.0 4.5   AST 19 20   ALT 18 14

## 2023-02-27 NOTE — PROGRESS NOTES
[facility] - Medical Oncology Follow-Up Consult Note  2023    Patient Identifiers     Name: Roman Escalante  Preferred Address: [place]  Preferred Language: English  : 1975  Gender: male    Assessment and Plan     Mr. Roman Escalante is a 47 year old male smoker (3-5 cigs/day) with a history of potential chronic parasitic infection who returns to clinic for metastatic rectal cancer beginning regorafenib therapy.    No significant changes in treatment plan. Starting regorafenib when pills are delivered, with close eye on ongoing cancer-related symptoms and neuropathy.     Will follow-up ID recommendations from clinic visit today.     Plan:  Metastatic rectal cancer  - START regorafenib treatment  - local labs if ID not drawing today; else labs today with ID  - RTC monthly for treatment monitoring with labs  - plan for imaging re-evaluation in 3mo  - f/u DTC referral for [id number] clinical trial    ?infection history  - f/u ID rec's from today    Smoking cessation  - START wellbutrin 150mgXL daily  - ADD nicotene patch; 14mg/day given < 1ppd smoking    The patient and family asked numerous excellent questions which I answered to the best of my abilities. At the completion of our consultation, the patient and accompanying caregivers had a strong understanding of the plan. They have our contact information for any further questions or concerns, and know to reach out for any urgent matters. Patient and family aware that they should call 911 for emergencies.       45 minutes spent on the date of the encounter doing chart review, review of test results, interpretation of tests, patient visit and documentation       Polo Sonw MD, PhD   of Medicine  Division of Hematology, Oncology and Transplantation  [facility]    -----------------------------------    Oncology Summary     Cancer Staging   No matching staging information was found for the patient.    Oncology History    Rectal adenocarcinoma metastatic to lung (H)   2/3/2023 Initial Diagnosis    Rectal adenocarcinoma metastatic to lung (H)     2/3/2023 -  Chemotherapy    Oral ONC Colorectal Cancer - Regorafenib  Plan Provider: Polo Snow MD  Treatment goal: Palliative  Line of treatment: [No plan line of treatment]         Subjective/Interval Events     - fighting to hold clot in during BM's. Notes improvement in urinatino followin clot passage  - no nausea    - LUQ pain, worst in the late afternoon or evening. Taking 1 oxycodone per day around that time to help with symptoms and sleep.    - no ileus style symptoms in the last  3 weeks  - some burning daniel in RLQ especially in early afternoon; drinking tomato juice, coffee, orange juice for breakfast    Review of Systems: 14 point ROS negative other than the symptoms noted above in the HPI.    Physical Exam     Vital signs: /73   Pulse 75   Temp 97.8  F (36.6  C) (Oral)   Resp 16   Wt 101.1 kg (222 lb 12.8 oz)   SpO2 97%   BMI 31.07 kg/m      ECOG performance status:  0  Vascular access:  none    GENERAL: Well-nourished healthy-appearing man, sitting in chair, no acute distress.   HEENT: No icterus, no pallor. Moist mucous membranes.   LUNGS: Clear to ausculation bilaterally, normal work of breathing.   CARDIOVASCULAR: Regular rate and rhythm, no murmurs, gallops or rubs.   ABDOMEN: Soft, nontender and nondistended.  EXTREMITIES: No cyanosis, no clubbing, no edema.   NEUROLOGIC: No focal deficits, alert/ oriented  LYMPH NODE EXAM: No palpable adenopathy.    Objective Data     Lab data:  I have personally reviewed the lab data, notable for:    - hgb 13.2    Radiology data:  I have personally reviewed the radiology data, notable for:  - no new data    Pathology and other data:  I have personally reviewed and interpreted the pathology data, notable for:    - no new data

## 2023-02-27 NOTE — NURSING NOTE
"Oncology Rooming Note    February 27, 2023 2:57 PM   Roman Escalante is a 47 year old male who presents for:    Chief Complaint   Patient presents with     Oncology Clinic Visit     Colorectal cancer     Initial Vitals: /73   Pulse 75   Temp 97.8  F (36.6  C) (Oral)   Resp 16   Wt 101.1 kg (222 lb 12.8 oz)   SpO2 97%   BMI 31.07 kg/m   Estimated body mass index is 31.07 kg/m  as calculated from the following:    Height as of 6/13/22: 1.803 m (5' 11\").    Weight as of this encounter: 101.1 kg (222 lb 12.8 oz). Body surface area is 2.25 meters squared.  Mild Pain (3) Comment: Data Unavailable   No LMP for male patient.  Allergies reviewed: Yes  Medications reviewed: Yes    Medications: Medication refills not needed today.  Pharmacy name entered into EPIC:    [facility] - [place], [place] - [address] [place] [address] - [address] [place] [address] [phone]    Clinical concerns:No new clinical concerns other than reason for visit today.      Shahida Spivey, EMT            "

## 2023-02-27 NOTE — ORAL ONC MGMT
"Oral Chemotherapy Monitoring Program    Lab Monitoring Plan  CBC and CMP monthly  Subjective/Objective:  Roman Escalante is a 47 year old male contacted by phone for an initial visit for oral chemotherapy education.    ORAL CHEMOTHERAPY 2/22/2023 2/27/2023   Assessment Type Initial Work up New Teach;Refill   Diagnosis Code Colon Cancer Colon Cancer   Providers Dr. Edy Snow   [facility]   Drug Name Stivarga (regorafenib) Stivarga (regorafenib)   Dose 160 mg 80 mg   Current Schedule Daily Daily   Cycle Details 3 weeks on, 1 week off Other   Any new drug interactions? - No   Is the dose as ordered appropriate for the patient? - Yes       Last PHQ-2 Score on record: No flowsheet data found.    Vitals:  BP:   BP Readings from Last 1 Encounters:   02/03/23 131/81     Wt Readings from Last 1 Encounters:   02/03/23 95.3 kg (210 lb)     Estimated body surface area is 2.18 meters squared as calculated from the following:    Height as of 6/13/22: 1.803 m (5' 11\").    Weight as of 2/3/23: 95.3 kg (210 lb).    Labs:  _  Result Component Current Result Ref Range   Sodium 138 (2/3/2023) 136 - 145 mmol/L     _  Result Component Current Result Ref Range   Potassium 4.4 (2/3/2023) 3.4 - 5.3 mmol/L     _  Result Component Current Result Ref Range   Calcium 9.3 (2/3/2023) 8.6 - 10.0 mg/dL     No results found for Mag within last 30 days.     No results found for Phos within last 30 days.     _  Result Component Current Result Ref Range   Albumin 4.5 (2/3/2023) 3.5 - 5.2 g/dL     _  Result Component Current Result Ref Range   Urea Nitrogen 13.9 (2/3/2023) 6.0 - 20.0 mg/dL     _  Result Component Current Result Ref Range   Creatinine 0.90 (2/3/2023) 0.67 - 1.17 mg/dL     _  Result Component Current Result Ref Range   AST 20 (2/3/2023) 10 - 50 U/L     _  Result Component Current Result Ref Range   ALT 14 (2/3/2023) 10 - 50 U/L     _  Result Component Current Result Ref Range   Bilirubin Total 0.4 " (2/3/2023) <=1.2 mg/dL     _  Result Component Current Result Ref Range   WBC Count 4.2 (2/3/2023) 4.0 - 11.0 10e3/uL     _  Result Component Current Result Ref Range   Hemoglobin 13.2 (L) (2/3/2023) 13.3 - 17.7 g/dL     _  Result Component Current Result Ref Range   Platelet Count 251 (2/3/2023) 150 - 450 10e3/uL     No results found for ANC within last 30 days.     _  Result Component Current Result Ref Range   Absolute Neutrophils 2.3 (2/3/2023) 1.6 - 8.3 10e3/uL        Assessment:  Patient is appropriate to start therapy.    Plan:  Basic chemotherapy teaching was reviewed with the patient including indication, start date of therapy, dose, administration, adverse effects, missed doses, food and drug interactions, monitoring, side effect management, office contact information, and safe handling. Written materials were mailed and all questions answered to Roman's stated satisfaction.    Follow-Up:  About one week after start of Stivarga.     Ash Wyman PharmD  [facility]  [phone]  February 27, 2023

## 2023-02-27 NOTE — LETTER
2/27/2023       RE: Roman Escalante  [address]  [place] [facility] [address]     Dear Colleague,    Thank you for referring your patient, Roman Escalante, to the [facility] INFECTIOUS DISEASE CLINIC [place] at [facility]. Please see a copy of my visit note below.          [facility]  Infectious Disease Consultation note  Today's Date: 03/05/2023    Assessment and Recommendations:  Roman Escalante is a 47 year old with PMH of Encephalitis at 5 years of age, unknown etiology, rectal cancer diagnosed in Jan 2021 with mets to lung, s/p colostomy in feb 2021 and chemo. Recently chemo has been stopped due to poor response, plan is to switch to another chemo regimen.    Parasitosis: we discussed that most likely he does not have a parasitic infection. We discussed several reasons for it like symptomatology does not fit with known human pathogen in the [place] , pictures are not convincing.   We decided that if anything bothers him in the future, to submit a sample (sterile containers provided) within a few hours so we may look at it under the microscope. We discussed that in my experience, I see many cases like this but they dont turn out to be parasites/worms. He said he will stop thinking about it.     Thank you for involving Infectious Disease in the care of this patient.     Juliet Blanco  , [facility]  Pager - [phone]    Attestation: Face to face time 32 mins. Total time including chart review, care-coordination and documentation time on the date of encounter - 45 mins    -------------------------------------------------------------------------------------------------------------------    Reason for consult / Chief complaint:   Consulted by Dr. Snow for parasitosis    He presents today with Chelsy, girlfriend who is a nurse  History of presenting illness:  Roman Escalante is a 47 year old  with PMH of Encephalitis at 5 years of age, unknown etiology, rectal cancer diagnosed in Jan 2021 with mets to lung, s/p colostomy in feb 2021 and chemo. Recently chemo has been stopped due to poor response, plan is to switch to another chemo regimen. He reports liver issues and Bacterial infections while on chemo.     April 2020 at [facility], he reports shrimp appeared on steak all of a sudden   April 2022 he noticed a colostomy worm. He showed a video of this with a tiny worm like structure moving  In June 2022 - he reports being diagnosed with campylobacter at [facility], soon after that he developed a leg ulcer with something burrowing through it which healed in just minutes. He showed a picture of this with some tissue/mucus one side which he thinks is very weird like a parasite.   His right medial eye has a swelling that was diagnosed as ruptured lacrimal duct. He wonders if the duct can come out through his nose because he thinks he saw it.   He has not been able to see his back but he can feel it , something bizzare like a worm is poking out   His cat has episodes of vomitng - the vomitus looks like it has worms in it    He said that if I say that he does not have worms/parasites he will stop thinking about it.     He has been seen by Dr. Mora and Dr. 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     He has Not been using meth for many years     Microbiology:  6/6/22 stool GI panel - campylobacter+  6/30/22 ,, - negative     Ova and parasite stool 5/22/22, 6/6/22, 6/7/22, 6/30/22 - negative     Social Hx:  Social History     Tobacco Use     Smoking status: Some Days     Packs/day: 0.50     Types: Cigarettes     Smokeless tobacco: Never   Substance Use Topics     Alcohol use: Yes     Comment: social     Drug use: Yes     Types: Methamphetamines         Immunizations:  Immunization History   Administered Date(s) Administered     COVID-19 Vaccine 12+ (Pfizer) 04/22/2021, 05/13/2021          Allergies:   Allergies   Allergen Reactions     Oxaliplatin Shortness Of Breath, Nausea and Vomiting and Other (See Comments)     Patient had HSR to Oxaliplatin on cycle 8 of FOLFOX chemotherapy. Sent to ER for continued monitoring.  Patient had HSR to Oxaliplatin on cycle 8 of FOLFOX chemotherapy. Sent to ER for continued monitoring.       Blood-Group Specific Substance Other (See Comments)     Patient has reactivity Suggestive of a Warm auto antibody. Blood products may be delayed. Draw patient 24 hours prior to transfusion. Draw one red top and two purple top tubes for all type and screen orders.  Patient has reactivity Suggestive of a Warm auto antibody. Blood products may be delayed. Draw patient 24 hours prior to transfusion. Draw one red top and two purple top tubes for all type and screen orders.           Medications:  Current Outpatient Medications   Medication Sig Dispense Refill     buPROPion (WELLBUTRIN XL) 150 MG 24 hr tablet Take 1 tablet (150 mg) by mouth every morning 30 tablet 3     diazepam (VALIUM) 5 MG tablet Take 1 tablet (5 mg) by mouth nightly as needed for anxiety       ibuprofen (ADVIL/MOTRIN) 200 MG tablet Take 3 tablets (600 mg) by mouth every 8 hours as needed for pain (Patient not taking: Reported on 2/27/2023)       nicotine (NICODERM CQ) 14 MG/24HR 24 hr patch Place 1 patch onto the skin every 24 hours 28 patch 1     oxyCODONE (ROXICODONE) 5 MG tablet Take 1 tablet (5 mg) by mouth every 6 hours as needed for pain 30 tablet 0     regorafenib (STIVARGA) 40 MG tablet Take 2 tablets (80 mg) by mouth daily for 7 days, THEN 3 tablets (120 mg) daily for 7 days, THEN 4 tablets (160 mg) daily for 7 days. Take on Days 1 thru 21 w/ low-fat bkfst. Store in orig bottle. Discard 7 weeks after opening. 63 tablet 0       Examination:  Vital signs:   There were no vitals taken for this visit.    Constitutional: Patient in no distress  Eyes: not pale, not jaundiced, right medial cathus soft  swelling+  CVS: no added sounds  RS: clear  Abdomen: soft, BS+, colostomy+  Skin: acne on the back present  Bilateral feet   Extremities: bilateral varicose veins present with skin changes and hyperpigmented healed skin wounds  Psych: Alert and oriented x 3      Laboratory:  Hematology:  Recent Labs   Lab Test 03/03/23  1536 02/03/23  1105 06/13/22  0234   WBC 9.0 4.2 5.9   HGB 13.7 13.2* 13.8   HCT 40.4 40.2 41.6    251 263       Chemistry:  Recent Labs   Lab Test 03/03/23  1536 02/03/23  1105 06/13/22  0234    138 139   POTASSIUM 4.1 4.4 3.6   CHLORIDE 105 104 107   CO2 27 24 27   ANIONGAP 5 10 5   BUN 17 13.9 14   CR 0.96 0.90 0.96   GFRESTIMATED >90 >90 >90   * 102* 161*   JEFERSON 9.4 9.3 8.8       Liver Function Studies:  Recent Labs   Lab Test 03/03/23  1536 02/03/23  1105   BILITOTAL 0.6 0.4   ALKPHOS 86 83   ALBUMIN 4.0 4.5   AST 19 20   ALT 18 14         Juliet Blanco MD

## 2023-03-03 ENCOUNTER — LAB (OUTPATIENT)
Dept: LAB | Facility: CLINIC | Age: 48
End: 2023-03-03
Payer: COMMERCIAL

## 2023-03-03 DIAGNOSIS — C78.00 RECTAL ADENOCARCINOMA METASTATIC TO LUNG (H): ICD-10-CM

## 2023-03-03 DIAGNOSIS — C20 RECTAL ADENOCARCINOMA METASTATIC TO LUNG (H): ICD-10-CM

## 2023-03-03 LAB
ALBUMIN SERPL-MCNC: 4 G/DL (ref 3.4–5)
ALP SERPL-CCNC: 86 U/L (ref 40–150)
ALT SERPL W P-5'-P-CCNC: 18 U/L (ref 0–70)
ANION GAP SERPL CALCULATED.3IONS-SCNC: 5 MMOL/L (ref 3–14)
AST SERPL W P-5'-P-CCNC: 19 U/L (ref 0–45)
BASOPHILS # BLD AUTO: 0 10E3/UL (ref 0–0.2)
BASOPHILS NFR BLD AUTO: 0 %
BILIRUB SERPL-MCNC: 0.6 MG/DL (ref 0.2–1.3)
BUN SERPL-MCNC: 17 MG/DL (ref 7–30)
CALCIUM SERPL-MCNC: 9.4 MG/DL (ref 8.5–10.1)
CEA SERPL-MCNC: 107 NG/ML
CHLORIDE BLD-SCNC: 105 MMOL/L (ref 94–109)
CO2 SERPL-SCNC: 27 MMOL/L (ref 20–32)
CREAT SERPL-MCNC: 0.96 MG/DL (ref 0.66–1.25)
EOSINOPHIL # BLD AUTO: 0.3 10E3/UL (ref 0–0.7)
EOSINOPHIL NFR BLD AUTO: 4 %
ERYTHROCYTE [DISTWIDTH] IN BLOOD BY AUTOMATED COUNT: 17.3 % (ref 10–15)
GFR SERPL CREATININE-BSD FRML MDRD: >90 ML/MIN/1.73M2
GLUCOSE BLD-MCNC: 104 MG/DL (ref 70–99)
HCT VFR BLD AUTO: 40.4 % (ref 40–53)
HGB BLD-MCNC: 13.7 G/DL (ref 13.3–17.7)
LYMPHOCYTES # BLD AUTO: 1.3 10E3/UL (ref 0.8–5.3)
LYMPHOCYTES NFR BLD AUTO: 14 %
MCH RBC QN AUTO: 29.9 PG (ref 26.5–33)
MCHC RBC AUTO-ENTMCNC: 33.9 G/DL (ref 31.5–36.5)
MCV RBC AUTO: 88 FL (ref 78–100)
MONOCYTES # BLD AUTO: 0.8 10E3/UL (ref 0–1.3)
MONOCYTES NFR BLD AUTO: 9 %
NEUTROPHILS # BLD AUTO: 6.5 10E3/UL (ref 1.6–8.3)
NEUTROPHILS NFR BLD AUTO: 73 %
PLATELET # BLD AUTO: 286 10E3/UL (ref 150–450)
POTASSIUM BLD-SCNC: 4.1 MMOL/L (ref 3.4–5.3)
PROT SERPL-MCNC: 7.5 G/DL (ref 6.8–8.8)
RBC # BLD AUTO: 4.58 10E6/UL (ref 4.4–5.9)
SODIUM SERPL-SCNC: 137 MMOL/L (ref 133–144)
WBC # BLD AUTO: 9 10E3/UL (ref 4–11)

## 2023-03-03 PROCEDURE — 36415 COLL VENOUS BLD VENIPUNCTURE: CPT

## 2023-03-03 PROCEDURE — 82378 CARCINOEMBRYONIC ANTIGEN: CPT

## 2023-03-03 PROCEDURE — 80053 COMPREHEN METABOLIC PANEL: CPT

## 2023-03-03 PROCEDURE — 85025 COMPLETE CBC W/AUTO DIFF WBC: CPT

## 2023-03-07 ENCOUNTER — PATIENT OUTREACH (OUTPATIENT)
Dept: ONCOLOGY | Facility: CLINIC | Age: 48
End: 2023-03-07
Payer: COMMERCIAL

## 2023-03-07 ASSESSMENT — ANXIETY QUESTIONNAIRES
GAD7 TOTAL SCORE: 12
7. FEELING AFRAID AS IF SOMETHING AWFUL MIGHT HAPPEN: NOT AT ALL
2. NOT BEING ABLE TO STOP OR CONTROL WORRYING: NEARLY EVERY DAY
3. WORRYING TOO MUCH ABOUT DIFFERENT THINGS: NEARLY EVERY DAY
GAD7 TOTAL SCORE: 12
IF YOU CHECKED OFF ANY PROBLEMS ON THIS QUESTIONNAIRE, HOW DIFFICULT HAVE THESE PROBLEMS MADE IT FOR YOU TO DO YOUR WORK, TAKE CARE OF THINGS AT HOME, OR GET ALONG WITH OTHER PEOPLE: NOT DIFFICULT AT ALL
5. BEING SO RESTLESS THAT IT IS HARD TO SIT STILL: NOT AT ALL
1. FEELING NERVOUS, ANXIOUS, OR ON EDGE: NEARLY EVERY DAY
6. BECOMING EASILY ANNOYED OR IRRITABLE: NEARLY EVERY DAY

## 2023-03-07 ASSESSMENT — PATIENT HEALTH QUESTIONNAIRE - PHQ9
5. POOR APPETITE OR OVEREATING: NOT AT ALL
SUM OF ALL RESPONSES TO PHQ QUESTIONS 1-9: 2

## 2023-03-07 NOTE — TELEPHONE ENCOUNTER
"Pt no showed 3/6 appointment with Tanya Denton LILLY, stated he did not know about appointment and does not regularly check Mychart. Rescheduled to 3/8. Confirmed with pt he picked up and started his Stivarga 3/2, he stated he had a good chemo teach phone conversation with oral pharmacist, denied any questions at this time. Has only had to take nausea medication once but did notice constipation right away. Taking dulcolax 2-3 tabs twice a day. Tried just once a day with miralax without results.     Main concerns continue to be financial, food and housing stability. Stated he has not any income for the past 2 years \"no pension, food stamps, anything from the government\". Stated he did not qualify for covid house payment assistance last year so he sold his house to a friend and is living there for free, also friend is helping out with food and gas.Received one bbo from [employer] last year for $700 which only paid his electric and a small part of his mortgage. His truck broke down yesterday and he won't have money to fix it for awhile but his friend will give him rides to the clinic. He does have [employer] [facility] and writer informed him [employer] provides transportation to medical appointments but he needs to set these up ahead of time, otherwise if rides are needed urgently (such as same day appointments) he should call writer or SW at clinic to help arrange. Pt stated he doesn't even have his insurance card and will have to call for one. Stated he has gone through 3 caseworkers through the [facility] and declines to ask them for help anymore. Writer asked for a phone number to the last  he had and pt stated he would try to find the info to either call back with, or bring to his appt with Tanya tomorrow.     Writer did do a PHQ-9 and MIGUEL assessment on pt while he expressed extreme anger at the \"government\" and stated several times \"I'm going to die, since my diagnosis I'm just waiting to die\". He adamantly denied " any suicidal ideations or even depression. Offered palliative care and mental health referrals to which pt declined as well. Writer enforced to pt that his home security is very important for a successful journey at treating his cancer, will discuss with SW and the rest of care team how to assist pt with finances, grants and/or other resources.

## 2023-03-08 ENCOUNTER — ONCOLOGY VISIT (OUTPATIENT)
Dept: ONCOLOGY | Facility: CLINIC | Age: 48
End: 2023-03-08
Attending: PHYSICIAN ASSISTANT
Payer: COMMERCIAL

## 2023-03-08 VITALS
OXYGEN SATURATION: 98 % | HEART RATE: 67 BPM | WEIGHT: 210.6 LBS | BODY MASS INDEX: 29.37 KG/M2 | TEMPERATURE: 98.1 F | RESPIRATION RATE: 18 BRPM | SYSTOLIC BLOOD PRESSURE: 123 MMHG | DIASTOLIC BLOOD PRESSURE: 76 MMHG

## 2023-03-08 DIAGNOSIS — R35.0 URINARY FREQUENCY: Primary | ICD-10-CM

## 2023-03-08 PROCEDURE — G0463 HOSPITAL OUTPT CLINIC VISIT: HCPCS | Performed by: PHYSICIAN ASSISTANT

## 2023-03-08 PROCEDURE — 99213 OFFICE O/P EST LOW 20 MIN: CPT | Performed by: PHYSICIAN ASSISTANT

## 2023-03-08 RX ORDER — BISACODYL 5 MG/1
5 TABLET, DELAYED RELEASE ORAL DAILY PRN
Status: ON HOLD | COMMUNITY
End: 2023-08-29

## 2023-03-08 ASSESSMENT — PAIN SCALES - GENERAL: PAINLEVEL: NO PAIN (0)

## 2023-03-08 NOTE — PROGRESS NOTES
[facility] - Medical Oncology Follow Up Note  03/08/2023      HPI:   Patient developed rectal bleeding in 2020.  In January 2021 CT showed focal wall thickening in the upper rectum, multiple pulmonary nodules bilaterally suspicious for metastatic disease.  Underwent FNA of the right upper lobe lesion which was consistent with adenocarcinoma of the colon.  He was treated with FOLFOX from 2/20/2021 through 5/20/2021.  Avastin was added.  Had an infusion reaction to oxaliplatin 6/2021.  7/2021- 12/2021 was on 5-FU alone.  Developed progression.  12/2021- 9/2022 was on FOLFIRI.  11/2021 started Lonsurf. All of this was done with outside oncologist.     Met with Dr. Snow on 2/3/2022 to establish care. Recommended regorafenib.     There have been questions of a parasitic infection. [facility]. Was seen by ID at the  on 2/27, no concerns for parasitic infection    Started regorafenib on 3/2/2023      Interval History:   Today, he states that he has been tolerating the chemo very well. He has had no concerns. No mouth sores. No cracking, blistering redness of hands or feet. Denies any new bleeding. He has had rectal clot discharge for the last 12 weeks which is not changed. No fevers or chills. Has been having a weaker urinary stream for two months.  No burning with urination, blood in the urine, abdominal or back pain. He does have baseline rectal pain that is controlled with one oxycodone at night.       ROS  Patient denies the following except if noted above: fevers, body aches, chills, headaches, vision changes, dizziness, chest pain, shortness of breath, nausea, vomiting, diarrhea, constipation, abdominal pain, rashes, bruising/bleeding, mouth sores, swelling or pain in the legs.     No past medical history on file.         Allergies   Allergen Reactions     Oxaliplatin Shortness Of Breath, Nausea and Vomiting and Other (See Comments)     Patient  had HSR to Oxaliplatin on cycle 8 of FOLFOX chemotherapy. Sent to ER for continued monitoring.  Patient had HSR to Oxaliplatin on cycle 8 of FOLFOX chemotherapy. Sent to ER for continued monitoring.       Blood-Group Specific Substance Other (See Comments)     Patient has reactivity Suggestive of a Warm auto antibody. Blood products may be delayed. Draw patient 24 hours prior to transfusion. Draw one red top and two purple top tubes for all type and screen orders.  Patient has reactivity Suggestive of a Warm auto antibody. Blood products may be delayed. Draw patient 24 hours prior to transfusion. Draw one red top and two purple top tubes for all type and screen orders.             PHYSICAL EXAM   /76 (BP Location: Right arm, Patient Position: Sitting, Cuff Size: Adult Regular)   Pulse 67   Temp 98.1  F (36.7  C) (Oral)   Resp 18   Wt 95.5 kg (210 lb 9.6 oz)   SpO2 98%   BMI 29.37 kg/m      Wt Readings from Last 4 Encounters:   03/08/23 95.5 kg (210 lb 9.6 oz)   02/27/23 101.1 kg (222 lb 12.8 oz)   02/03/23 95.3 kg (210 lb)   06/13/22 83.9 kg (185 lb)         General: Resting comfortably in no acute distress  Head: Normocephalic, atraumatic   Heart: Regular rate and rhythm, no murmurs  Lung: Normal respiratory effort. Clear to auscultation bilaterally. No wheezes, rhonchi, or crackles   Neuro: AAO x3. Gait is steady. Moving all extremities   Extremities: No lower extremity edema  Skin: Warm, dry, intact. No rashes, no suspicious lesions. No petechia or bruising noted         LABS  Most Recent 3 CBC's:  Recent Labs   Lab Test 03/03/23  1536 02/03/23  1105 06/13/22  0234   WBC 9.0 4.2 5.9   HGB 13.7 13.2* 13.8   MCV 88 87 89    251 263    Most Recent 3 BMP's:  Recent Labs   Lab Test 03/03/23  1536 02/03/23  1105 06/13/22  0234    138 139   POTASSIUM 4.1 4.4 3.6   CHLORIDE 105 104 107   CO2 27 24 27   BUN 17 13.9 14   CR 0.96 0.90 0.96   ANIONGAP 5 10 5   JEFERSON 9.4 9.3 8.8   * 102* 161*     Most Recent 2 LFT's:  Recent Labs   Lab Test 03/03/23  1536 02/03/23  1105   AST 19 20   ALT 18 14   ALKPHOS 86 83   BILITOTAL 0.6 0.4      I reviewed the above labs today.      IMAGING  No new imaging     ASSESSMENT AND PLAN   Metastatic rectal cancer  - Continue regorafenib treatment, tolerating well  - RTC monthly for treatment monitoring with labs as scheduled   - plan for imaging re-evaluation in 3mo  - f/u DTC referral for [id number] clinical trial    Infectious disease  - Please see infectious disease notes. No concern for parasitosis     Smoking cessation  - Stopped cold turkey, congratulated him     Rectal pain  -- Takes oxycodone at night to help     Urinary symptoms  - UA reviewed with no concerns  - Renal US orders placed    Message to SW to follow up on social concerns     Patient will call in the interim with any questions or concerns. They voice understanding and agree with the above plan.       Tanya Denton PA-C

## 2023-03-13 ENCOUNTER — PATIENT OUTREACH (OUTPATIENT)
Dept: CARE COORDINATION | Facility: CLINIC | Age: 48
End: 2023-03-13
Payer: COMMERCIAL

## 2023-03-13 NOTE — PROGRESS NOTES
Social Work Intervention  [facility]    Data/Intervention:    Patient Name:  Roman Escalante  /Age:  1975 (47 year old)    Visit Type: telephone  Referral Source: [facility]  Reason for Referral:  Check In, Resources    Collaborated With:    -Patient    Psychosocial Information/Concerns:  Patient expressed to care team about financial barriers, requested SW to reach out.    Intervention/Education/Resources Provided:  SW called patient, introduced self and explained the reason for calling. Patient reported some concerns with finances, specifically with rent payment. SW also did discuss SSDI with patient who reported that they are looking into this. Patients rent payment is approximately $460/month.     Patient has already utilized [employer] [facility] grants in the past and would not be able to reapply at this time. Patient has not yet applied for [facility] bob and SW encouraged patient to apply. SW explained what this bob can assist with and how to start the application by calling the program ([phone]). SW also informed patient about another potential bob through [employer] that can assist with rent or utility bill payments.     Assessment/Plan:  Patient was receptive to the two additional programs through [employer] and [employer]. Additionally patient requested to meet with SW during next provider visit on 2023 to talk more about these programs and any additional resources. SW will continue to remain available as needed. Provided patient/family with contact information and availability.    GUILHERME Yepez,[facility]  Hematology/Oncology Social Worker  Phone:[phone] Pager: [phone]

## 2023-03-16 ENCOUNTER — LAB (OUTPATIENT)
Dept: LAB | Facility: CLINIC | Age: 48
End: 2023-03-16
Attending: STUDENT IN AN ORGANIZED HEALTH CARE EDUCATION/TRAINING PROGRAM
Payer: COMMERCIAL

## 2023-03-16 DIAGNOSIS — C78.00 RECTAL ADENOCARCINOMA METASTATIC TO LUNG (H): ICD-10-CM

## 2023-03-16 DIAGNOSIS — C20 RECTAL ADENOCARCINOMA METASTATIC TO LUNG (H): ICD-10-CM

## 2023-03-16 LAB
ALBUMIN SERPL-MCNC: 3.6 G/DL (ref 3.4–5)
ALP SERPL-CCNC: 104 U/L (ref 40–150)
ALT SERPL W P-5'-P-CCNC: 31 U/L (ref 0–70)
ANION GAP SERPL CALCULATED.3IONS-SCNC: 6 MMOL/L (ref 3–14)
AST SERPL W P-5'-P-CCNC: 26 U/L (ref 0–45)
BASOPHILS # BLD AUTO: 0 10E3/UL (ref 0–0.2)
BASOPHILS NFR BLD AUTO: 0 %
BILIRUB SERPL-MCNC: 0.8 MG/DL (ref 0.2–1.3)
BUN SERPL-MCNC: 13 MG/DL (ref 7–30)
CALCIUM SERPL-MCNC: 8.5 MG/DL (ref 8.5–10.1)
CEA SERPL-MCNC: 77.6 NG/ML
CHLORIDE BLD-SCNC: 100 MMOL/L (ref 94–109)
CO2 SERPL-SCNC: 27 MMOL/L (ref 20–32)
CREAT SERPL-MCNC: 0.88 MG/DL (ref 0.66–1.25)
EOSINOPHIL # BLD AUTO: 0.9 10E3/UL (ref 0–0.7)
EOSINOPHIL NFR BLD AUTO: 9 %
ERYTHROCYTE [DISTWIDTH] IN BLOOD BY AUTOMATED COUNT: 15.2 % (ref 10–15)
GFR SERPL CREATININE-BSD FRML MDRD: >90 ML/MIN/1.73M2
GLUCOSE BLD-MCNC: 151 MG/DL (ref 70–99)
HCT VFR BLD AUTO: 39.4 % (ref 40–53)
HGB BLD-MCNC: 13.3 G/DL (ref 13.3–17.7)
LYMPHOCYTES # BLD AUTO: 1.9 10E3/UL (ref 0.8–5.3)
LYMPHOCYTES NFR BLD AUTO: 20 %
MCH RBC QN AUTO: 29.2 PG (ref 26.5–33)
MCHC RBC AUTO-ENTMCNC: 33.8 G/DL (ref 31.5–36.5)
MCV RBC AUTO: 87 FL (ref 78–100)
MONOCYTES # BLD AUTO: 0.8 10E3/UL (ref 0–1.3)
MONOCYTES NFR BLD AUTO: 9 %
NEUTROPHILS # BLD AUTO: 5.6 10E3/UL (ref 1.6–8.3)
NEUTROPHILS NFR BLD AUTO: 61 %
PLATELET # BLD AUTO: 319 10E3/UL (ref 150–450)
POTASSIUM BLD-SCNC: 4 MMOL/L (ref 3.4–5.3)
PROT SERPL-MCNC: 7.4 G/DL (ref 6.8–8.8)
RBC # BLD AUTO: 4.55 10E6/UL (ref 4.4–5.9)
SODIUM SERPL-SCNC: 133 MMOL/L (ref 133–144)
WBC # BLD AUTO: 9.1 10E3/UL (ref 4–11)

## 2023-03-16 PROCEDURE — 85025 COMPLETE CBC W/AUTO DIFF WBC: CPT

## 2023-03-16 PROCEDURE — 36415 COLL VENOUS BLD VENIPUNCTURE: CPT

## 2023-03-16 PROCEDURE — 80053 COMPREHEN METABOLIC PANEL: CPT

## 2023-03-16 PROCEDURE — 82378 CARCINOEMBRYONIC ANTIGEN: CPT

## 2023-03-17 ENCOUNTER — MYC MEDICAL ADVICE (OUTPATIENT)
Dept: ONCOLOGY | Facility: CLINIC | Age: 48
End: 2023-03-17
Payer: COMMERCIAL

## 2023-03-17 ENCOUNTER — TELEPHONE (OUTPATIENT)
Dept: ONCOLOGY | Facility: CLINIC | Age: 48
End: 2023-03-17
Payer: COMMERCIAL

## 2023-03-17 DIAGNOSIS — R19.8 RECTAL DISCHARGE: Primary | ICD-10-CM

## 2023-03-17 NOTE — TELEPHONE ENCOUNTER
Oral Chemotherapy Monitoring Program  Lab Follow Up    Placed call to Roman Escalante in follow up of lab work completed on 3/16 for Stivarga oral chemotherapy. Roman confirms taking the appropriate dose of Stivarga 160mg (4x 40mg tablets) daily. He shares that he started at this increased dose on 3/16 and has the remaining 5 days to take this dose before he has 1 week off. His dose is to continue as Stivarga 160mg (4x 40mg tablet) daily for 3 weeks on, 1 week off. He shares that the main side effect he has noticed is nausea. He takes prochlorperazine and ondansetron as needed for this and finds that it is helpful. He denies any missed doses, medication changes or recent hospital or ED visits.     ORAL CHEMOTHERAPY 2/22/2023 2/27/2023 3/17/2023   Assessment Type Initial Work up New Teach;Refill Left Voicemail;Lab Monitoring   Diagnosis Code Colon Cancer Colon Cancer Colon Cancer   Providers Dr. Edy Snow   Clinic Name/Location [facility]   Drug Name Stivarga (regorafenib) Stivarga (regorafenib) Stivarga (regorafenib)   Dose 160 mg 80 mg 80 mg   Current Schedule Daily Daily Daily   Cycle Details 3 weeks on, 1 week off Other Other   Any new drug interactions? - No -   Is the dose as ordered appropriate for the patient? - Yes -       Labs:  _  Result Component Current Result Ref Range   Sodium 133 (3/16/2023) 133 - 144 mmol/L     _  Result Component Current Result Ref Range   Potassium 4.0 (3/16/2023) 3.4 - 5.3 mmol/L     _  Result Component Current Result Ref Range   Calcium 8.5 (3/16/2023) 8.5 - 10.1 mg/dL     No results found for Mag within last 30 days.     No results found for Phos within last 30 days.     _  Result Component Current Result Ref Range   Albumin 3.6 (3/16/2023) 3.4 - 5.0 g/dL     _  Result Component Current Result Ref Range   Urea Nitrogen 13 (3/16/2023) 7 - 30 mg/dL     _  Result Component Current Result Ref Range   Creatinine 0.88 (3/16/2023) 0.66 - 1.25 mg/dL      _  Result Component Current Result Ref Range   AST 26 (3/16/2023) 0 - 45 U/L     _  Result Component Current Result Ref Range   ALT 31 (3/16/2023) 0 - 70 U/L     _  Result Component Current Result Ref Range   Bilirubin Total 0.8 (3/16/2023) 0.2 - 1.3 mg/dL     _  Result Component Current Result Ref Range   WBC Count 9.1 (3/16/2023) 4.0 - 11.0 10e3/uL     _  Result Component Current Result Ref Range   Hemoglobin 13.3 (3/16/2023) 13.3 - 17.7 g/dL     _  Result Component Current Result Ref Range   Platelet Count 319 (3/16/2023) 150 - 450 10e3/uL     No results found for ANC within last 30 days.     _  Result Component Current Result Ref Range   Absolute Neutrophils 5.6 (3/16/2023) 1.6 - 8.3 10e3/uL        Assessment & Plan:  Results show no concerning abnormalities. [employer] message sent to the patient on the results.     Follow-Up:  3/24: lab review  3/27: appt with Dr. Edy Corrales, PharmD, BCACP  Hematology/Oncology Clinical Pharmacist  Oral Chemotherapy Monitoring Program  [facility]  [phone]

## 2023-03-20 ENCOUNTER — MYC MEDICAL ADVICE (OUTPATIENT)
Dept: ONCOLOGY | Facility: CLINIC | Age: 48
End: 2023-03-20
Payer: COMMERCIAL

## 2023-03-20 DIAGNOSIS — C20 RECTAL ADENOCARCINOMA METASTATIC TO LUNG (H): Primary | ICD-10-CM

## 2023-03-20 DIAGNOSIS — C78.00 RECTAL ADENOCARCINOMA METASTATIC TO LUNG (H): Primary | ICD-10-CM

## 2023-03-20 NOTE — TELEPHONE ENCOUNTER
"Bad odor coming from rectal area.   Patient states that per Dr Snow only 3.2 cm of rectum was left.     Has colostomy. Taking prune juice and taking miralax as needed. Stool from colostomy is semi formed, to formed, and can be brown and soft. On Saturday stool was liquid and then Sunday morning it was back to normal.   When gave himself enema per rectum in December, he had massive bleeding per rectum, since that day had large amounts of blood per rectum and then turned to clots.   Since that has happened he will have to start and stop urination stream and will have clots of brownish/maroon discharge per rectum, Odor smells like bad spoiled meat. Clots have gotten large like the size of a \"female thumb\".   Feels like he has flap of skin hanging from buttocks and can no longer wipe but has to dab after using bathroom.     Will also get episodes of groin pain/ rash in groin area that is using \"jock Itch\" cream on and wonders if he has developed a yeast infection. Area is bright red and bumpy in groin, it looks better since using cream.     Has US scheduled on Tuesday at 2:45 to check urethra per patient to check urination due to having stop/start stream.     Having neuropathy in feet started last Wednesday, Makes it hard to walk and feels like skin is being pulled off heels when he puts on his shoes.     Has stopped smoking 2 weeks ago. Not utilizing any stop smoking aids says he just quit.     Pharmacy is [facility] whatever is on file is [place]; [place].     Per Tanya [facility] order for CBC to make sure that his blood counts have not changed. Follow up with Dr Snow next week for neuropathy and rash.     Call rima Owens he would like to do lab when in for US tomorrow.Will have them schedule lab for after US on Tuesday 3/21/23.   "

## 2023-03-21 ENCOUNTER — ANCILLARY PROCEDURE (OUTPATIENT)
Dept: ULTRASOUND IMAGING | Facility: CLINIC | Age: 48
End: 2023-03-21
Attending: PHYSICIAN ASSISTANT
Payer: COMMERCIAL

## 2023-03-21 ENCOUNTER — LAB (OUTPATIENT)
Dept: LAB | Facility: CLINIC | Age: 48
End: 2023-03-21
Payer: COMMERCIAL

## 2023-03-21 DIAGNOSIS — R19.8 RECTAL DISCHARGE: ICD-10-CM

## 2023-03-21 DIAGNOSIS — R35.0 URINARY FREQUENCY: ICD-10-CM

## 2023-03-21 LAB
BASOPHILS # BLD AUTO: 0.1 10E3/UL (ref 0–0.2)
BASOPHILS NFR BLD AUTO: 1 %
EOSINOPHIL # BLD AUTO: 1.3 10E3/UL (ref 0–0.7)
EOSINOPHIL NFR BLD AUTO: 19 %
ERYTHROCYTE [DISTWIDTH] IN BLOOD BY AUTOMATED COUNT: 14.6 % (ref 10–15)
HCT VFR BLD AUTO: 39.5 % (ref 40–53)
HGB BLD-MCNC: 13.1 G/DL (ref 13.3–17.7)
IMM GRANULOCYTES # BLD: 0 10E3/UL
IMM GRANULOCYTES NFR BLD: 0 %
LYMPHOCYTES # BLD AUTO: 2 10E3/UL (ref 0.8–5.3)
LYMPHOCYTES NFR BLD AUTO: 28 %
MCH RBC QN AUTO: 28.8 PG (ref 26.5–33)
MCHC RBC AUTO-ENTMCNC: 33.2 G/DL (ref 31.5–36.5)
MCV RBC AUTO: 87 FL (ref 78–100)
MONOCYTES # BLD AUTO: 0.6 10E3/UL (ref 0–1.3)
MONOCYTES NFR BLD AUTO: 9 %
NEUTROPHILS # BLD AUTO: 3.1 10E3/UL (ref 1.6–8.3)
NEUTROPHILS NFR BLD AUTO: 43 %
NRBC # BLD AUTO: 0 10E3/UL
NRBC BLD AUTO-RTO: 0 /100
PLATELET # BLD AUTO: 287 10E3/UL (ref 150–450)
RBC # BLD AUTO: 4.55 10E6/UL (ref 4.4–5.9)
WBC # BLD AUTO: 7.1 10E3/UL (ref 4–11)

## 2023-03-21 PROCEDURE — 36415 COLL VENOUS BLD VENIPUNCTURE: CPT | Performed by: PATHOLOGY

## 2023-03-21 PROCEDURE — 76770 US EXAM ABDO BACK WALL COMP: CPT | Performed by: RADIOLOGY

## 2023-03-21 PROCEDURE — 85025 COMPLETE CBC W/AUTO DIFF WBC: CPT | Performed by: PATHOLOGY

## 2023-03-21 NOTE — TELEPHONE ENCOUNTER
"Oncology Nurse Triage  Situation:   Roman reporting the following symptoms: bleeding from skin next to stoma and when urinating    Background:   Treating Provider: Dr. Snow/Corrie    Date of last office visit: 3/8/23 w/ Tanya Denton    Recent Treatments:Stivarga    Assessment:     Onset: bleeding has been ongoing, reports it is worsening since December    Pt reports bleeding after every time he urinates. He reports a \"figure 8\" after the stoma was placed at [facility] a few years ago, reports area is indented and is harder to keep clean, said a few clots have come from this site and said area has opened in the past.     Pt reports he is showering q36h d/t odor from rectum (from previous note from 3/17/23- Has colostomy. Taking prune juice and taking miralax as needed. Stool from colostomy is semi formed, to formed, and can be brown and soft. On Saturday stool was liquid and then Sunday morning it was back to normal.   When gave himself enema per rectum in December, he had massive bleeding per rectum, since that day had large amounts of blood per rectum and then turned to clots.   Since that has happened he will have to start and stop urination stream and will have clots of brownish/maroon discharge per rectum, Odor smells like bad spoiled meat. Clots have gotten large like the size of a \"female thumb\".   Feels like he has flap of skin hanging from buttocks and can no longer wipe but has to dab after using bathroom.\"    Pt expressed frustration with previous doctors and medical system, hopes that providers will look at what is going on and hopefully give answers.     Recommendations:   Pt has ultrasound and labs this afternoon. Writer informed will reach out to Tanya Denton for recommendations, informed may want to wait to see what labs look like this afternoon. Pt voiced understanding.     Message sent to Tanya Denton at [phone], who will review pictures.   Per Tanya, referral for colorectal team placed to assess " the stoma and rectal discharge.

## 2023-03-23 ENCOUNTER — LAB (OUTPATIENT)
Dept: LAB | Facility: CLINIC | Age: 48
End: 2023-03-23
Payer: COMMERCIAL

## 2023-03-23 DIAGNOSIS — C20 RECTAL ADENOCARCINOMA METASTATIC TO LUNG (H): ICD-10-CM

## 2023-03-23 DIAGNOSIS — C78.00 RECTAL ADENOCARCINOMA METASTATIC TO LUNG (H): ICD-10-CM

## 2023-03-23 LAB
BASOPHILS # BLD AUTO: 0 10E3/UL (ref 0–0.2)
BASOPHILS NFR BLD AUTO: 0 %
EOSINOPHIL # BLD AUTO: 0.6 10E3/UL (ref 0–0.7)
EOSINOPHIL NFR BLD AUTO: 7 %
ERYTHROCYTE [DISTWIDTH] IN BLOOD BY AUTOMATED COUNT: 15.3 % (ref 10–15)
HCT VFR BLD AUTO: 42.5 % (ref 40–53)
HGB BLD-MCNC: 14.1 G/DL (ref 13.3–17.7)
LYMPHOCYTES # BLD AUTO: 1.7 10E3/UL (ref 0.8–5.3)
LYMPHOCYTES NFR BLD AUTO: 21 %
MCH RBC QN AUTO: 28.5 PG (ref 26.5–33)
MCHC RBC AUTO-ENTMCNC: 33.2 G/DL (ref 31.5–36.5)
MCV RBC AUTO: 86 FL (ref 78–100)
MONOCYTES # BLD AUTO: 0.9 10E3/UL (ref 0–1.3)
MONOCYTES NFR BLD AUTO: 11 %
NEUTROPHILS # BLD AUTO: 4.7 10E3/UL (ref 1.6–8.3)
NEUTROPHILS NFR BLD AUTO: 60 %
PLATELET # BLD AUTO: 304 10E3/UL (ref 150–450)
RBC # BLD AUTO: 4.94 10E6/UL (ref 4.4–5.9)
WBC # BLD AUTO: 7.8 10E3/UL (ref 4–11)

## 2023-03-23 PROCEDURE — 36415 COLL VENOUS BLD VENIPUNCTURE: CPT

## 2023-03-23 PROCEDURE — 85025 COMPLETE CBC W/AUTO DIFF WBC: CPT

## 2023-03-27 ENCOUNTER — ONCOLOGY VISIT (OUTPATIENT)
Dept: ONCOLOGY | Facility: CLINIC | Age: 48
End: 2023-03-27
Attending: STUDENT IN AN ORGANIZED HEALTH CARE EDUCATION/TRAINING PROGRAM
Payer: COMMERCIAL

## 2023-03-27 ENCOUNTER — PATIENT OUTREACH (OUTPATIENT)
Dept: CARE COORDINATION | Facility: CLINIC | Age: 48
End: 2023-03-27

## 2023-03-27 VITALS
DIASTOLIC BLOOD PRESSURE: 76 MMHG | HEART RATE: 83 BPM | TEMPERATURE: 98.4 F | SYSTOLIC BLOOD PRESSURE: 126 MMHG | BODY MASS INDEX: 29.29 KG/M2 | OXYGEN SATURATION: 99 % | WEIGHT: 210 LBS

## 2023-03-27 DIAGNOSIS — C20 RECTAL ADENOCARCINOMA METASTATIC TO LUNG (H): Primary | ICD-10-CM

## 2023-03-27 DIAGNOSIS — C78.00 RECTAL ADENOCARCINOMA METASTATIC TO LUNG (H): Primary | ICD-10-CM

## 2023-03-27 PROCEDURE — G0463 HOSPITAL OUTPT CLINIC VISIT: HCPCS | Performed by: STUDENT IN AN ORGANIZED HEALTH CARE EDUCATION/TRAINING PROGRAM

## 2023-03-27 PROCEDURE — 99215 OFFICE O/P EST HI 40 MIN: CPT | Performed by: STUDENT IN AN ORGANIZED HEALTH CARE EDUCATION/TRAINING PROGRAM

## 2023-03-27 ASSESSMENT — PAIN SCALES - GENERAL: PAINLEVEL: MILD PAIN (2)

## 2023-03-27 NOTE — PROGRESS NOTES
[facility] - Medical Oncology Follow-Up Consult Note  3/27/2023    Patient Identifiers     Name: Roman Escalante  Preferred Address: [place]  Preferred Language: English  : 1975  Gender: male    Assessment and Plan     Mr. Roman Escalante is a 47 year old male smoker (3-5 cigs/day) with a history of metastatic rectal cancer on regorafenib treatment who returns to clinic for new symptom evaluation.    Etiology of patient's symptoms are unclear.  Patient's reports of clot formation have no correlate within the labs, as his hemoglobin has been extremely stable.  This may represent mucoid excretion or infectious process.  In order to characterize both was occurring with in rectal pouch and around stoma site we are ordering new CT scans, followed by colorectal surgery evaluation.    Patient will continue follow-up with us as prior for treatment monitoring.  We will continue Regorafenib at current dosing of 160 mg for days 1 through 21 of 28-day cycle.    Plan:  Metastatic rectal CA  - cont Regorafenib 160mg days 1-21  - trend CBC, CMP, CEA  - monitor cytopenias, neuropathy  - RTC monthly for lab check and clinical evaluation  - CT Chest/Abd/Pelvis every 3 months for treatment evaluation    ?stoma hernia and rectal drainage  - CT abdomen/pelvis ASAP, followed by Colorectal Surgery evaluation    The patient and family asked numerous excellent questions which I answered to the best of my abilities. At the completion of our consultation, the patient and accompanying caregivers had a strong understanding of the plan. They have our contact information for any further questions or concerns, and know to reach out for any urgent matters. Patient and family aware that they should call 911 for emergencies.       45 minutes spent on the date of the encounter doing chart review, review of test results, interpretation of tests, patient visit and documentation       Polo Snow MD, PhD   of  Medicine  Division of Hematology, Oncology and Transplantation  [facility]    -----------------------------------    Oncology Summary      Cancer Staging   Rectal adenocarcinoma metastatic to lung (H)  Staging form: Colon and Rectum, AJCC 8th Edition  - Clinical: Stage IVB (cTX, cNX, pM1b) - Signed by Polo Snow MD on 3/30/2023      Oncology History   Rectal adenocarcinoma metastatic to lung (H)   2/3/2023 Initial Diagnosis    Rectal adenocarcinoma metastatic to lung (H)     2/3/2023 -  Chemotherapy    Oral ONC Colorectal Cancer - Regorafenib  Plan Provider: Polo Snow MD  Treatment goal: Palliative  Line of treatment: [No plan line of treatment]         Subjective/Interval Events     Addressing the following patient issues today:  - clots: no drop in hgb is reassuring; we can consider alternate etiologies   - stoma site: some herniation at the site; consider CRS evaluate  - foul smelling anal output: likely represents mucoid secretion; CT scans ASAP followed by CRS evaluation  - sores on hands/feet: begin ~1 week ago. Waxing and waning. Last noted 3 days ago, shortly after last dose of regorafenib.     Review of Systems: 14 point ROS negative other than the symptoms noted above in the HPI.    Physical Exam     Vital signs: /76 (BP Location: Right arm, Patient Position: Sitting, Cuff Size: Adult Regular)   Pulse 83   Temp 98.4  F (36.9  C) (Oral)   Wt 95.3 kg (210 lb)   SpO2 99%   BMI 29.29 kg/m      ECOG performance status:  1  Vascular access:  none    GENERAL: Well-nourished healthy-appearing man, sitting in chair, no acute distress.   HEENT: No icterus, no pallor. Moist mucous membranes.   LUNGS: Clear to ausculation bilaterally, normal work of breathing.   CARDIOVASCULAR: Regular rate and rhythm, no murmurs, gallops or rubs.   ABDOMEN: Soft, nontender and nondistended.  EXTREMITIES: No cyanosis, no clubbing, no edema.   NEUROLOGIC: No focal deficits, alert/ oriented  LYMPH NODE  EXAM: No palpable adenopathy.    Objective Data     Lab data:  I have personally reviewed the lab data, notable for:    - hgb 14.1      Radiology data:  I have personally reviewed the radiology data, notable for:  - pending    Pathology and other data:  I have personally reviewed and interpreted the pathology data, notable for:    - no new data

## 2023-03-27 NOTE — LETTER
3/27/2023         RE: Roman Escalante  [address]  [place] [address]        Dear Colleague,    Thank you for referring your patient, Roman Escalante, to the [facility]. Please see a copy of my visit note below.    [facility] - Medical Oncology Follow-Up Consult Note  3/27/2023    Patient Identifiers     Name: Roman Escalante  Preferred Address: [place]  Preferred Language: English  : 1975  Gender: male    Assessment and Plan     Mr. Roman Escalante is a 47 year old male smoker (3-5 cigs/day) with a history of metastatic rectal cancer on regorafenib treatment who returns to clinic for new symptom evaluation.    Etiology of patient's symptoms are unclear.  Patient's reports of clot formation have no correlate within the labs, as his hemoglobin has been extremely stable.  This may represent mucoid excretion or infectious process.  In order to characterize both was occurring with in rectal pouch and around stoma site we are ordering new CT scans, followed by colorectal surgery evaluation.    Patient will continue follow-up with us as prior for treatment monitoring.  We will continue Regorafenib at current dosing of 160 mg for days 1 through 21 of 28-day cycle.    Plan:  Metastatic rectal CA  - cont Regorafenib 160mg days 1-21  - trend CBC, CMP, CEA  - monitor cytopenias, neuropathy  - RTC monthly for lab check and clinical evaluation  - CT Chest/Abd/Pelvis every 3 months for treatment evaluation    ?stoma hernia and rectal drainage  - CT abdomen/pelvis ASAP, followed by Colorectal Surgery evaluation    The patient and family asked numerous excellent questions which I answered to the best of my abilities. At the completion of our consultation, the patient and accompanying caregivers had a strong understanding of the plan. They have our contact information for any further questions or concerns, and know to reach out for any urgent matters. Patient and  family aware that they should call 911 for emergencies.       45 minutes spent on the date of the encounter doing chart review, review of test results, interpretation of tests, patient visit and documentation       Polo Snow MD, PhD   of Medicine  Division of Hematology, Oncology and Transplantation  [facility]    -----------------------------------    Oncology Summary      Cancer Staging   Rectal adenocarcinoma metastatic to lung (H)  Staging form: Colon and Rectum, AJCC 8th Edition  - Clinical: Stage IVB (cTX, cNX, pM1b) - Signed by Polo Snow MD on 3/30/2023      Oncology History   Rectal adenocarcinoma metastatic to lung (H)   2/3/2023 Initial Diagnosis    Rectal adenocarcinoma metastatic to lung (H)     2/3/2023 -  Chemotherapy    Oral ONC Colorectal Cancer - Regorafenib  Plan Provider: Polo Snow MD  Treatment goal: Palliative  Line of treatment: [No plan line of treatment]         Subjective/Interval Events     Addressing the following patient issues today:  - clots: no drop in hgb is reassuring; we can consider alternate etiologies   - stoma site: some herniation at the site; consider CRS evaluate  - foul smelling anal output: likely represents mucoid secretion; CT scans ASAP followed by CRS evaluation  - sores on hands/feet: begin ~1 week ago. Waxing and waning. Last noted 3 days ago, shortly after last dose of regorafenib.     Review of Systems: 14 point ROS negative other than the symptoms noted above in the HPI.    Physical Exam     Vital signs: /76 (BP Location: Right arm, Patient Position: Sitting, Cuff Size: Adult Regular)   Pulse 83   Temp 98.4  F (36.9  C) (Oral)   Wt 95.3 kg (210 lb)   SpO2 99%   BMI 29.29 kg/m      ECOG performance status:  1  Vascular access:  none    GENERAL: Well-nourished healthy-appearing man, sitting in chair, no acute distress.   HEENT: No icterus, no pallor. Moist mucous membranes.   LUNGS: Clear to ausculation  bilaterally, normal work of breathing.   CARDIOVASCULAR: Regular rate and rhythm, no murmurs, gallops or rubs.   ABDOMEN: Soft, nontender and nondistended.  EXTREMITIES: No cyanosis, no clubbing, no edema.   NEUROLOGIC: No focal deficits, alert/ oriented  LYMPH NODE EXAM: No palpable adenopathy.    Objective Data     Lab data:  I have personally reviewed the lab data, notable for:    - hgb 14.1      Radiology data:  I have personally reviewed the radiology data, notable for:  - pending    Pathology and other data:  I have personally reviewed and interpreted the pathology data, notable for:    - no new data          Polo Snow MD

## 2023-03-27 NOTE — NURSING NOTE
"Oncology Rooming Note    March 27, 2023 3:21 PM   Roman Escalante is a 47 year old male who presents for:    Chief Complaint   Patient presents with     Oncology Clinic Visit     Rectal adenocarcinoma metastatic to lung      Initial Vitals: /76 (BP Location: Right arm, Patient Position: Sitting, Cuff Size: Adult Regular)   Pulse 83   Temp 98.4  F (36.9  C) (Oral)   Wt 95.3 kg (210 lb)   SpO2 99%   BMI 29.29 kg/m   Estimated body mass index is 29.29 kg/m  as calculated from the following:    Height as of 6/13/22: 1.803 m (5' 11\").    Weight as of this encounter: 95.3 kg (210 lb). Body surface area is 2.18 meters squared.  Mild Pain (2) Comment: Data Unavailable   No LMP for male patient.  Allergies reviewed: Yes  Medications reviewed: Yes    Medications: Medication refills not needed today.  Pharmacy name entered into EPIC:    [facility] - [place], [place] - [address] [place] [address] PHARMACY [place], [place] - [address]    Clinical concerns: none.       Michael Pulliam"

## 2023-03-28 ENCOUNTER — MYC REFILL (OUTPATIENT)
Dept: ONCOLOGY | Facility: CLINIC | Age: 48
End: 2023-03-28
Payer: COMMERCIAL

## 2023-03-28 DIAGNOSIS — C20 RECTAL ADENOCARCINOMA METASTATIC TO LUNG (H): ICD-10-CM

## 2023-03-28 DIAGNOSIS — C78.00 RECTAL ADENOCARCINOMA METASTATIC TO LUNG (H): ICD-10-CM

## 2023-03-29 DIAGNOSIS — C78.00 RECTAL ADENOCARCINOMA METASTATIC TO LUNG (H): Primary | ICD-10-CM

## 2023-03-29 DIAGNOSIS — C20 RECTAL ADENOCARCINOMA METASTATIC TO LUNG (H): Primary | ICD-10-CM

## 2023-03-29 NOTE — PROGRESS NOTES
Social Work Intervention  [facility] [[email or web address]]    Data/Intervention:    Patient Name:  Roman Escalante  /Age:  1975 (47 year old)    Visit Type: in person  Referral Source: N/A   Reason for Referral:  Financial Concerns    Collaborated With:    -Patient    Psychosocial Information/Concerns:  SW connected with patient on 23 and briefly discussed financial concerns and assistance available. Patient requested to meet with SW during their in person appointment on 23    Intervention/Education/Resources Provided:  SW met with patient after their appointment with Dr. Snow. Patient also had significant other present during visit. Patients biggest financial concern was rent payment. Patient reported to have been using pension to pay rent and is getting low on this. Patient also reported having an interview with Social Security for Disability on 23 and requesting a letter of support written by provider speaking on the medical diagnosis and how it limits patient from being able to work. SW and patient did discuss that after an application has been submitted it can take the Social Security Administration a couple months to make a decision.     SW talked with patient about potential assistance available in the mean time. [employer] as a bob ([employer]) that patients can apply to if they are not eligible for any other grants, must be in a financial crisis and there is typically a wait time. Patient will connect with Cancer [facility] to see what is available through their funding. SW also discussed the [facility] Fund which if approved patient can receive up to 3 months of rent assistance. And SW can also tap into Oncology grants for rent assistance. Patient verbalized understanding.    Assessment/Plan:  Patient will send SW information on where the check for rent should go to. SW will also request care team to draft supporting letter for patients Social Security application. SW will continue to  remain available as needed. Provided patient/family with contact information and availability.    GUILHERME Yepez,[facility]  Hematology/Oncology Social Worker  Phone:[phone] Pager: [phone]

## 2023-03-30 ENCOUNTER — TELEPHONE (OUTPATIENT)
Dept: ONCOLOGY | Facility: CLINIC | Age: 48
End: 2023-03-30
Payer: COMMERCIAL

## 2023-03-30 NOTE — TELEPHONE ENCOUNTER
Liaison called to confirm the dosage of Stivarga that Roman was taking with his new RX he just picked up 3/29. There were 2 RXs released to [facility] on 3/29- one with the ramp-up directions and one with the maintenance directions. His bottles have the correct directions of 4 tabs daily. He saw the ramp-up directions on MyChart and assumed the directions changed. He took 2 tabs this morning with breakfast. He was told to take 2 more tabs now (per Ash Wyman, [facility]) and starting tomorrow take 4 tabs daily nd thereafter.    Thank you,    Ashley Sharma  Oncology Pharmacy Liaison II  [email or web address]  Phone: [phone]  Fax: [phone]

## 2023-04-09 ENCOUNTER — HEALTH MAINTENANCE LETTER (OUTPATIENT)
Age: 48
End: 2023-04-09

## 2023-04-10 ENCOUNTER — OFFICE VISIT (OUTPATIENT)
Dept: WOUND CARE | Facility: CLINIC | Age: 48
End: 2023-04-10
Attending: STUDENT IN AN ORGANIZED HEALTH CARE EDUCATION/TRAINING PROGRAM
Payer: COMMERCIAL

## 2023-04-10 DIAGNOSIS — C20 RECTAL ADENOCARCINOMA METASTATIC TO LUNG (H): ICD-10-CM

## 2023-04-10 DIAGNOSIS — K43.5 PARASTOMAL HERNIA: Primary | ICD-10-CM

## 2023-04-10 DIAGNOSIS — C78.00 RECTAL ADENOCARCINOMA METASTATIC TO LUNG (H): ICD-10-CM

## 2023-04-10 DIAGNOSIS — Z43.3 ENCOUNTER FOR ATTENTION TO COLOSTOMY (H): ICD-10-CM

## 2023-04-10 PROCEDURE — 99024 POSTOP FOLLOW-UP VISIT: CPT

## 2023-04-10 NOTE — PROGRESS NOTES
"[facility] Ostomy Assessment  Patient comes to clinic for consultation regarding ostomy issues.    Procedure: Colostomy  Dx related to ostomy:rectal cancer  Consulted per Dr Florecita Snow    Subjective:Ostomy care is provided by self   Patient's reason for visit: \"worried about pain when removing pouch\"     The quantity of ostomy supplies needed by a beneficiary is determined primarily by the type of ostomy, its location, its construction, and the condition of the skin surface surrounding the stoma.    Objective:  Type: Colostomy since 02/01/2021  Stoma: 35 mm  loop viable, pink-red, round and slightly protruberant  Location: right upper quadrant     Complications: none   Peristomal skin: intact and slighterythema  and barrier is showing some signs of leakage 6-9 o'clock  Output: , semi-formed    Frequency of pouch change: twice weekly. This is  scheduled.   Hernia:  YES mass easily reducible.  Hernia Belt measurement done: Yes:   Objective: Girth standing position:  38  \"  Girth reclined position:  38  \"  Type of pouch: [place] [address]  Ring measurement:  V \"  Width of belt:  4 \"  Belt number: [id number]    Current pouch system/supplies: Emmy  one piece  32391, cut to fit, ostomy paste and skin prep     Assessment: Doing very well and unsure why it hurts to remove his pouch. Skin is completley intact and he seems to have a good fit with very minimal leaking underneatht the barrier since Saturday    Intervention/Plan: Measured for hernia belt which might add some comfort. He does describe his stool being firm and miralax was recommended. Patient  has consented to receive samples from the industry and will request Coloplast samples to try. Hernia belt order emailed to [facility]    Return to clinic prn .      Azalea COTTO was available for supervision of care if needed or if questions should arise and regarding plan of care.  Emmy Alfonso, RN  RN CWON      "

## 2023-04-19 ENCOUNTER — LAB (OUTPATIENT)
Dept: LAB | Facility: CLINIC | Age: 48
End: 2023-04-19
Payer: COMMERCIAL

## 2023-04-19 DIAGNOSIS — C20 RECTAL ADENOCARCINOMA METASTATIC TO LUNG (H): ICD-10-CM

## 2023-04-19 DIAGNOSIS — C78.00 RECTAL ADENOCARCINOMA METASTATIC TO LUNG (H): ICD-10-CM

## 2023-04-19 LAB
BASOPHILS # BLD AUTO: 0 10E3/UL (ref 0–0.2)
BASOPHILS NFR BLD AUTO: 0 %
CEA SERPL-MCNC: 30.7 NG/ML
EOSINOPHIL # BLD AUTO: 0.9 10E3/UL (ref 0–0.7)
EOSINOPHIL NFR BLD AUTO: 11 %
ERYTHROCYTE [DISTWIDTH] IN BLOOD BY AUTOMATED COUNT: 14.6 % (ref 10–15)
HCT VFR BLD AUTO: 46.3 % (ref 40–53)
HGB BLD-MCNC: 15.7 G/DL (ref 13.3–17.7)
LYMPHOCYTES # BLD AUTO: 1.9 10E3/UL (ref 0.8–5.3)
LYMPHOCYTES NFR BLD AUTO: 23 %
MCH RBC QN AUTO: 28.4 PG (ref 26.5–33)
MCHC RBC AUTO-ENTMCNC: 33.9 G/DL (ref 31.5–36.5)
MCV RBC AUTO: 84 FL (ref 78–100)
MONOCYTES # BLD AUTO: 0.6 10E3/UL (ref 0–1.3)
MONOCYTES NFR BLD AUTO: 8 %
NEUTROPHILS # BLD AUTO: 4.9 10E3/UL (ref 1.6–8.3)
NEUTROPHILS NFR BLD AUTO: 59 %
PLATELET # BLD AUTO: 268 10E3/UL (ref 150–450)
RBC # BLD AUTO: 5.53 10E6/UL (ref 4.4–5.9)
WBC # BLD AUTO: 8.3 10E3/UL (ref 4–11)

## 2023-04-19 PROCEDURE — 36415 COLL VENOUS BLD VENIPUNCTURE: CPT

## 2023-04-19 PROCEDURE — 82378 CARCINOEMBRYONIC ANTIGEN: CPT

## 2023-04-19 PROCEDURE — 80053 COMPREHEN METABOLIC PANEL: CPT

## 2023-04-19 PROCEDURE — 85025 COMPLETE CBC W/AUTO DIFF WBC: CPT

## 2023-04-20 LAB
ALBUMIN SERPL-MCNC: 3.9 G/DL (ref 3.4–5)
ALP SERPL-CCNC: 91 U/L (ref 40–150)
ALT SERPL W P-5'-P-CCNC: 28 U/L (ref 0–70)
ANION GAP SERPL CALCULATED.3IONS-SCNC: 2 MMOL/L (ref 3–14)
AST SERPL W P-5'-P-CCNC: 27 U/L (ref 0–45)
BILIRUB SERPL-MCNC: 0.5 MG/DL (ref 0.2–1.3)
BUN SERPL-MCNC: 11 MG/DL (ref 7–30)
CALCIUM SERPL-MCNC: 9 MG/DL (ref 8.5–10.1)
CHLORIDE BLD-SCNC: 105 MMOL/L (ref 94–109)
CO2 SERPL-SCNC: 29 MMOL/L (ref 20–32)
CREAT SERPL-MCNC: 0.76 MG/DL (ref 0.66–1.25)
GFR SERPL CREATININE-BSD FRML MDRD: >90 ML/MIN/1.73M2
GLUCOSE BLD-MCNC: 68 MG/DL (ref 70–99)
POTASSIUM BLD-SCNC: 4 MMOL/L (ref 3.4–5.3)
PROT SERPL-MCNC: 7.9 G/DL (ref 6.8–8.8)
SODIUM SERPL-SCNC: 136 MMOL/L (ref 133–144)

## 2023-04-21 ENCOUNTER — ONCOLOGY VISIT (OUTPATIENT)
Dept: ONCOLOGY | Facility: CLINIC | Age: 48
End: 2023-04-21
Attending: STUDENT IN AN ORGANIZED HEALTH CARE EDUCATION/TRAINING PROGRAM
Payer: COMMERCIAL

## 2023-04-21 VITALS
TEMPERATURE: 97.8 F | WEIGHT: 196.4 LBS | RESPIRATION RATE: 12 BRPM | OXYGEN SATURATION: 99 % | DIASTOLIC BLOOD PRESSURE: 85 MMHG | BODY MASS INDEX: 27.39 KG/M2 | SYSTOLIC BLOOD PRESSURE: 117 MMHG | HEART RATE: 74 BPM

## 2023-04-21 DIAGNOSIS — C20 RECTAL ADENOCARCINOMA METASTATIC TO LUNG (H): Primary | ICD-10-CM

## 2023-04-21 DIAGNOSIS — C78.00 RECTAL ADENOCARCINOMA METASTATIC TO LUNG (H): Primary | ICD-10-CM

## 2023-04-21 PROCEDURE — 99215 OFFICE O/P EST HI 40 MIN: CPT | Performed by: STUDENT IN AN ORGANIZED HEALTH CARE EDUCATION/TRAINING PROGRAM

## 2023-04-21 PROCEDURE — G0463 HOSPITAL OUTPT CLINIC VISIT: HCPCS | Performed by: STUDENT IN AN ORGANIZED HEALTH CARE EDUCATION/TRAINING PROGRAM

## 2023-04-21 RX ORDER — PROCHLORPERAZINE MALEATE 5 MG
5 TABLET ORAL EVERY 6 HOURS PRN
Qty: 30 TABLET | Refills: 3 | Status: SHIPPED | OUTPATIENT
Start: 2023-04-21

## 2023-04-21 ASSESSMENT — PAIN SCALES - GENERAL: PAINLEVEL: MILD PAIN (2)

## 2023-04-21 NOTE — PROGRESS NOTES
[facility] - Medical Oncology Follow-Up Consult Note  2023    Patient Identifiers     Name: Roman Escalante  Preferred Address: [place]  Preferred Language: English  : 1975  Gender: male    Assessment and Plan     Mr. Roman Escalante is a 47 year old male smoker (3-5 cigs/day) with a history of metastatic rectal cancer on regorafenib treatment who returns to clinic for symptom follow-up.    Patient's prior abdominal pain likely caused by extrusion of pouch remnant granulation tissue, which has now been fully expressed and is of no further concern.  We will continue to monitor his symptoms, and if he has any further pain or discomfort, we will we will proceed with CT imaging as previously planned.  Regardless, patient has restaging imaging scheduled in the near future during which we will be able to evaluate patient's rectal pouch.    Given patient's symptoms have largely resolved, and he is feeling well, we will maintain restaging imaging at its current date and continue regular after neb treatment.  Patient will undergo simultaneous screening for TCR2 clinical trial which has just recently opened, and we hope to be able to consent the patient during subsequent follow-up.    Plan:  Rectal cancer  - cont regorafenib 160mg daily  - trend CBC, CMP, CEA  - monitor cytopenias, GI tox  - clinical visits monthly throughout treatment  - RTC w/ imaging every 3 months    The patient and family asked numerous excellent questions which I answered to the best of my abilities. At the completion of our consultation, the patient and accompanying caregivers had a strong understanding of the plan. They have our contact information for any further questions or concerns, and know to reach out for any urgent matters. Patient and family aware that they should call 911 for emergencies.       45 minutes spent on the date of the encounter doing chart review, review of test results, interpretation of tests, patient visit  and documentation       Polo Snow MD, PhD   of Medicine  Division of Hematology, Oncology and Transplantation  [facility]    -----------------------------------    Oncology Summary     Cancer Staging   Rectal adenocarcinoma metastatic to lung (H)  Staging form: Colon and Rectum, AJCC 8th Edition  - Clinical: Stage IVB (cTX, cNX, pM1b) - Signed by Polo Snow MD on 3/30/2023      Oncology History   Rectal adenocarcinoma metastatic to lung (H)   2/3/2023 Initial Diagnosis    Rectal adenocarcinoma metastatic to lung (H)     2/3/2023 -  Chemotherapy    Oral ONC Colorectal Cancer - Regorafenib  Plan Provider: Polo Snow MD  Treatment goal: Palliative  Line of treatment: [No plan line of treatment]     3/30/2023 -  Cancer Staged    Staging form: Colon and Rectum, AJCC 8th Edition  - Clinical: Stage IVB (cTX, cNX, pM1b)         Subjective/Interval Events     - pt noted large tissue mass from his rectal pouch on the Saturday before Easter. Has not had further pain or discomfort since this expulsion. Has had no further clot formation or other concerns since that time. Does not feel the need for further workup at this time.    Review of Systems: 14 point ROS negative other than the symptoms noted above in the HPI.    Physical Exam     Vital signs: /85   Pulse 74   Temp 97.8  F (36.6  C) (Oral)   Resp 12   Wt 89.1 kg (196 lb 6.4 oz)   SpO2 99%   BMI 27.39 kg/m      ECOG performance status:  0  Vascular access:  none    GENERAL: Well-nourished healthy-appearing man, sitting in chair, no acute distress.   HEENT: No icterus, no pallor. Moist mucous membranes.   LUNGS: Clear to ausculation bilaterally, normal work of breathing.   CARDIOVASCULAR: Regular rate and rhythm, no murmurs, gallops or rubs.   ABDOMEN: Soft, nontender and nondistended.  EXTREMITIES: No cyanosis, no clubbing, no edema.   NEUROLOGIC: No focal deficits, alert/ oriented  LYMPH NODE EXAM: No palpable  adenopathy.    Objective Data     Lab data:  I have personally reviewed the lab data, notable for:    - no notables    Radiology data:  I have personally reviewed the radiology data, notable for:  - no new data    Pathology and other data:  I have personally reviewed and interpreted the pathology data, notable for:    - no new data

## 2023-04-21 NOTE — NURSING NOTE
"Oncology Rooming Note    April 21, 2023 3:05 PM   Roman Escalante is a 47 year old male who presents for:    Chief Complaint   Patient presents with     Oncology Clinic Visit     Colorectal ca     Initial Vitals: /85   Pulse 74   Temp 97.8  F (36.6  C) (Oral)   Resp 12   Wt 89.1 kg (196 lb 6.4 oz)   SpO2 99%   BMI 27.39 kg/m   Estimated body mass index is 27.39 kg/m  as calculated from the following:    Height as of 6/13/22: 1.803 m (5' 11\").    Weight as of this encounter: 89.1 kg (196 lb 6.4 oz). Body surface area is 2.11 meters squared.  Mild Pain (2) Comment: Data Unavailable   No LMP for male patient.  Allergies reviewed: Yes  Medications reviewed: Yes    Medications: Refill stivarga  Pharmacy name entered into EPIC:    [facility] - [place], [place] [facility] [address] [facility] [place], [place] - [address] [facility] [phone]  [facility] - [place], [place] - [address]     Clinical concerns: None      Alana Acevedo            "

## 2023-04-21 NOTE — LETTER
2023         RE: Roman Escalante  [address]  [place] [address]        Dear Colleague,    Thank you for referring your patient, Roman Escalante, to the [facility]. Please see a copy of my visit note below.    [facility] - Medical Oncology Follow-Up Consult Note  2023    Patient Identifiers     Name: Roman Escalante  Preferred Address: [place]  Preferred Language: English  : 1975  Gender: male    Assessment and Plan     Mr. Roman Escalante is a 47 year old male smoker (3-5 cigs/day) with a history of metastatic rectal cancer on regorafenib treatment who returns to clinic for symptom follow-up.    Patient's prior abdominal pain likely caused by extrusion of pouch remnant granulation tissue, which has now been fully expressed and is of no further concern.  We will continue to monitor his symptoms, and if he has any further pain or discomfort, we will we will proceed with CT imaging as previously planned.  Regardless, patient has restaging imaging scheduled in the near future during which we will be able to evaluate patient's rectal pouch.    Given patient's symptoms have largely resolved, and he is feeling well, we will maintain restaging imaging at its current date and continue regular after neb treatment.  Patient will undergo simultaneous screening for TCR2 clinical trial which has just recently opened, and we hope to be able to consent the patient during subsequent follow-up.    Plan:  Rectal cancer  - cont regorafenib 160mg daily  - trend CBC, CMP, CEA  - monitor cytopenias, GI tox  - clinical visits monthly throughout treatment  - RTC w/ imaging every 3 months    The patient and family asked numerous excellent questions which I answered to the best of my abilities. At the completion of our consultation, the patient and accompanying caregivers had a strong understanding of the plan. They have our contact information for any further  questions or concerns, and know to reach out for any urgent matters. Patient and family aware that they should call 911 for emergencies.       45 minutes spent on the date of the encounter doing chart review, review of test results, interpretation of tests, patient visit and documentation       Polo Snow MD, PhD   of Medicine  Division of Hematology, Oncology and Transplantation  [facility]    -----------------------------------    Oncology Summary     Cancer Staging   Rectal adenocarcinoma metastatic to lung (H)  Staging form: Colon and Rectum, AJCC 8th Edition  - Clinical: Stage IVB (cTX, cNX, pM1b) - Signed by Polo Snow MD on 3/30/2023      Oncology History   Rectal adenocarcinoma metastatic to lung (H)   2/3/2023 Initial Diagnosis    Rectal adenocarcinoma metastatic to lung (H)     2/3/2023 -  Chemotherapy    Oral ONC Colorectal Cancer - Regorafenib  Plan Provider: Polo Snow MD  Treatment goal: Palliative  Line of treatment: [No plan line of treatment]     3/30/2023 -  Cancer Staged    Staging form: Colon and Rectum, AJCC 8th Edition  - Clinical: Stage IVB (cTX, cNX, pM1b)         Subjective/Interval Events     - pt noted large tissue mass from his rectal pouch on the Saturday before Easter. Has not had further pain or discomfort since this expulsion. Has had no further clot formation or other concerns since that time. Does not feel the need for further workup at this time.    Review of Systems: 14 point ROS negative other than the symptoms noted above in the HPI.    Physical Exam     Vital signs: /85   Pulse 74   Temp 97.8  F (36.6  C) (Oral)   Resp 12   Wt 89.1 kg (196 lb 6.4 oz)   SpO2 99%   BMI 27.39 kg/m      ECOG performance status:  0  Vascular access:  none    GENERAL: Well-nourished healthy-appearing man, sitting in chair, no acute distress.   HEENT: No icterus, no pallor. Moist mucous membranes.   LUNGS: Clear to ausculation bilaterally, normal  work of breathing.   CARDIOVASCULAR: Regular rate and rhythm, no murmurs, gallops or rubs.   ABDOMEN: Soft, nontender and nondistended.  EXTREMITIES: No cyanosis, no clubbing, no edema.   NEUROLOGIC: No focal deficits, alert/ oriented  LYMPH NODE EXAM: No palpable adenopathy.    Objective Data     Lab data:  I have personally reviewed the lab data, notable for:    - no notables    Radiology data:  I have personally reviewed the radiology data, notable for:  - no new data    Pathology and other data:  I have personally reviewed and interpreted the pathology data, notable for:    - no new data

## 2023-04-24 DIAGNOSIS — C78.00 RECTAL ADENOCARCINOMA METASTATIC TO LUNG (H): Primary | ICD-10-CM

## 2023-04-24 DIAGNOSIS — C20 RECTAL ADENOCARCINOMA METASTATIC TO LUNG (H): Primary | ICD-10-CM

## 2023-04-26 ENCOUNTER — PATIENT OUTREACH (OUTPATIENT)
Dept: CARE COORDINATION | Facility: CLINIC | Age: 48
End: 2023-04-26
Payer: COMMERCIAL

## 2023-04-26 NOTE — PROGRESS NOTES
Social Work Intervention  [facility]    Data/Intervention:    Patient Name:  Roman Escalante  /Age:  1975 (47 year old)    Visit Type: telephone  Referral Source: [facility]  Reason for Referral:  [place] [employer]    Collaborated With:    -Patient    Psychosocial Information/Concerns:  SW and patient previously worked together to complete [place] [employer] to get some assistance with rent payment. Patient turned in completed application to Clinic which was handed off to SW colleague.    Intervention/Education/Resources Provided:  SW connected with patient via phone call. SW informed patient that the application was received, but it was missing the vendors information for where the check should go to rent payments.    Patient later called SW back and provided the vendor information. SW reminded patient that the application is due by May 27th and SW will turn this in for patient. Patient will hear back about this bob sometime in July. Patient verbalized understanding.    Assessment/Plan:  Patient denied any additional questions/concerns at this time. SW will complete application and turn it in. SW will continue to remain available as needed. Provided patient/family with contact information and availability.    GUILHERME Yepez,[facility]  Hematology/Oncology Social Worker  Phone:[phone] Pager: [phone]

## 2023-05-12 ENCOUNTER — ANCILLARY PROCEDURE (OUTPATIENT)
Dept: CT IMAGING | Facility: CLINIC | Age: 48
End: 2023-05-12
Attending: STUDENT IN AN ORGANIZED HEALTH CARE EDUCATION/TRAINING PROGRAM
Payer: COMMERCIAL

## 2023-05-12 ENCOUNTER — LAB (OUTPATIENT)
Dept: LAB | Facility: CLINIC | Age: 48
End: 2023-05-12
Payer: COMMERCIAL

## 2023-05-12 ENCOUNTER — TELEPHONE (OUTPATIENT)
Dept: ONCOLOGY | Facility: CLINIC | Age: 48
End: 2023-05-12

## 2023-05-12 DIAGNOSIS — C78.00 RECTAL ADENOCARCINOMA METASTATIC TO LUNG (H): Primary | ICD-10-CM

## 2023-05-12 DIAGNOSIS — C20 RECTAL ADENOCARCINOMA METASTATIC TO LUNG (H): ICD-10-CM

## 2023-05-12 DIAGNOSIS — C78.00 RECTAL ADENOCARCINOMA METASTATIC TO LUNG (H): ICD-10-CM

## 2023-05-12 DIAGNOSIS — C20 RECTAL ADENOCARCINOMA METASTATIC TO LUNG (H): Primary | ICD-10-CM

## 2023-05-12 LAB
ALBUMIN SERPL BCG-MCNC: 4.3 G/DL (ref 3.5–5.2)
ALP SERPL-CCNC: 87 U/L (ref 40–129)
ALT SERPL W P-5'-P-CCNC: 25 U/L (ref 10–50)
ANION GAP SERPL CALCULATED.3IONS-SCNC: 10 MMOL/L (ref 7–15)
AST SERPL W P-5'-P-CCNC: 30 U/L (ref 10–50)
BASOPHILS # BLD AUTO: 0.1 10E3/UL (ref 0–0.2)
BASOPHILS NFR BLD AUTO: 1 %
BILIRUB SERPL-MCNC: 0.4 MG/DL
BUN SERPL-MCNC: 12.1 MG/DL (ref 6–20)
CALCIUM SERPL-MCNC: 9.1 MG/DL (ref 8.6–10)
CEA SERPL-MCNC: 30.8 NG/ML
CHLORIDE SERPL-SCNC: 105 MMOL/L (ref 98–107)
CREAT SERPL-MCNC: 0.76 MG/DL (ref 0.67–1.17)
DEPRECATED HCO3 PLAS-SCNC: 23 MMOL/L (ref 22–29)
EOSINOPHIL # BLD AUTO: 1.7 10E3/UL (ref 0–0.7)
EOSINOPHIL NFR BLD AUTO: 18 %
ERYTHROCYTE [DISTWIDTH] IN BLOOD BY AUTOMATED COUNT: 14.3 % (ref 10–15)
GFR SERPL CREATININE-BSD FRML MDRD: >90 ML/MIN/1.73M2
GLUCOSE SERPL-MCNC: 115 MG/DL (ref 70–99)
HCT VFR BLD AUTO: 47 % (ref 40–53)
HGB BLD-MCNC: 15.8 G/DL (ref 13.3–17.7)
IMM GRANULOCYTES # BLD: 0 10E3/UL
IMM GRANULOCYTES NFR BLD: 0 %
LYMPHOCYTES # BLD AUTO: 1.7 10E3/UL (ref 0.8–5.3)
LYMPHOCYTES NFR BLD AUTO: 18 %
MCH RBC QN AUTO: 27.9 PG (ref 26.5–33)
MCHC RBC AUTO-ENTMCNC: 33.6 G/DL (ref 31.5–36.5)
MCV RBC AUTO: 83 FL (ref 78–100)
MONOCYTES # BLD AUTO: 0.6 10E3/UL (ref 0–1.3)
MONOCYTES NFR BLD AUTO: 6 %
NEUTROPHILS # BLD AUTO: 5.4 10E3/UL (ref 1.6–8.3)
NEUTROPHILS NFR BLD AUTO: 57 %
NRBC # BLD AUTO: 0 10E3/UL
NRBC BLD AUTO-RTO: 0 /100
PLATELET # BLD AUTO: 334 10E3/UL (ref 150–450)
POTASSIUM SERPL-SCNC: 4.1 MMOL/L (ref 3.4–5.3)
PROT SERPL-MCNC: 7.5 G/DL (ref 6.4–8.3)
RBC # BLD AUTO: 5.67 10E6/UL (ref 4.4–5.9)
SODIUM SERPL-SCNC: 138 MMOL/L (ref 136–145)
WBC # BLD AUTO: 9.5 10E3/UL (ref 4–11)

## 2023-05-12 PROCEDURE — 74177 CT ABD & PELVIS W/CONTRAST: CPT | Mod: GC | Performed by: STUDENT IN AN ORGANIZED HEALTH CARE EDUCATION/TRAINING PROGRAM

## 2023-05-12 PROCEDURE — 36415 COLL VENOUS BLD VENIPUNCTURE: CPT | Performed by: PATHOLOGY

## 2023-05-12 PROCEDURE — 80053 COMPREHEN METABOLIC PANEL: CPT | Performed by: PATHOLOGY

## 2023-05-12 PROCEDURE — 71260 CT THORAX DX C+: CPT | Mod: GC | Performed by: STUDENT IN AN ORGANIZED HEALTH CARE EDUCATION/TRAINING PROGRAM

## 2023-05-12 PROCEDURE — 85025 COMPLETE CBC W/AUTO DIFF WBC: CPT | Performed by: PATHOLOGY

## 2023-05-12 PROCEDURE — 99000 SPECIMEN HANDLING OFFICE-LAB: CPT | Performed by: PATHOLOGY

## 2023-05-12 PROCEDURE — 82378 CARCINOEMBRYONIC ANTIGEN: CPT | Mod: 90 | Performed by: PATHOLOGY

## 2023-05-12 RX ORDER — IOPAMIDOL 755 MG/ML
108 INJECTION, SOLUTION INTRAVASCULAR ONCE
Status: COMPLETED | OUTPATIENT
Start: 2023-05-12 | End: 2023-05-12

## 2023-05-12 RX ORDER — HEPARIN SODIUM (PORCINE) LOCK FLUSH IV SOLN 100 UNIT/ML 100 UNIT/ML
500 SOLUTION INTRAVENOUS ONCE
Status: COMPLETED | OUTPATIENT
Start: 2023-05-12 | End: 2023-05-12

## 2023-05-12 RX ORDER — ONDANSETRON 8 MG/1
8 TABLET, ORALLY DISINTEGRATING ORAL EVERY 8 HOURS PRN
Qty: 60 TABLET | Refills: 2 | Status: ON HOLD | OUTPATIENT
Start: 2023-05-12 | End: 2023-07-30

## 2023-05-12 RX ADMIN — HEPARIN SODIUM (PORCINE) LOCK FLUSH IV SOLN 100 UNIT/ML 500 UNITS: 100 SOLUTION at 07:44

## 2023-05-12 RX ADMIN — IOPAMIDOL 108 ML: 755 INJECTION, SOLUTION INTRAVASCULAR at 07:34

## 2023-05-12 NOTE — TELEPHONE ENCOUNTER
"Oral Chemotherapy Monitoring Program    Subjective/Objective:  Roman Escalante is a 48 year old male contacted by phone for a follow-up visit for oral chemotherapy. Roman confirmed taking regorafenib 160 mg daily days 1-21 of a 28 day cycle. He denies any missed doses in the last 2 weeks. Patient reports some redness, peeling, and mild pain in hands and feet during week 3 of his cycle that resolves during his \"off\" week. Peeling is mostly localized on callous portion of feet. He is unaware of which cream he is using, but he believes it contains urea. He feels this cream has been effective. He reports nausea that requires an antiemetic dose a few times per week. He feels it is generally controlled but notes that ondansetron seems to be more effective for him than prochlorperazine. He is wondering if this is an option for him. Denies nausea but notes constipation which he treats with bisacodyl and magnesium citrate. He has a bowel movement at least once per day and only using laxatives if he feels bowel is not completely evacuated. Patient feels this regimen has been effective. Patient reports he needs his regorafenib refill prior to leaving for vacation on 5/19. Patient had no questions regarding lab results that were reviewed.        2/27/2023    11:00 AM 3/17/2023    11:00 AM 3/17/2023     2:00 PM 3/29/2023     8:00 AM 3/30/2023     3:00 PM 4/24/2023     2:00 PM 5/12/2023     9:00 AM   ORAL CHEMOTHERAPY   Assessment Type New Teach;Refill Left Voicemail;Lab Monitoring Initial Follow up Refill Other Refill Lab Monitoring;Monthly Follow up   Diagnosis Code Colon Cancer Colon Cancer Colon Cancer Colon Cancer Colon Cancer Colon Cancer Colon Cancer   Providers Dr. Edy Snow   [facility]   Drug Name Stivarga (regorafenib) Stivarga (regorafenib) Stivarga (regorafenib) Stivarga " "(regorafenib) Stivarga (regorafenib) Stivarga (regorafenib) Stivarga (regorafenib)   Dose 80 mg 80 mg 160 mg  160 mg 160 mg 160 mg   Current Schedule Daily Daily Daily  Daily Daily Daily   Cycle Details Other Other 3 weeks on, 1 week off  3 weeks on, 1 week off 3 weeks on, 1 week off 3 weeks on, 1 week off   Start Date of Last Cycle   3/2/2023  3/30/2023  4/27/2023   Planned next cycle start date   3/30/2023  4/27/2023  5/25/2023   Doses missed in last 2 weeks   0    0   Adherence Assessment   Adherent  Adherent  Adherent   Adverse Effects   Nausea    Palmar-plantar Erythrodysethesia Syndrome   Nausea   Grade 2    Grade 1   Pharmacist Intervention(nausea)   No    Yes   Intervention(s)       Referral to oncology provider;Rx medication recommendation   Palmar-plantar Erythrodysethesia syndrome[hand-foot syndrome]       Grade 1   Pharmacist Intervention(Palmar-plantar)       Yes   Intervention(s)       Patient education   Any new drug interactions? No  No       Is the dose as ordered appropriate for the patient? Yes  Yes       Since the last time we talked, have you been hospitalized or used the emergency room?   No           Last PHQ-2 Score on record:        View : No data to display.                Vitals:  BP:   BP Readings from Last 1 Encounters:   04/21/23 117/85     Wt Readings from Last 1 Encounters:   04/21/23 89.1 kg (196 lb 6.4 oz)     Estimated body surface area is 2.11 meters squared as calculated from the following:    Height as of 6/13/22: 1.803 m (5' 11\").    Weight as of 4/21/23: 89.1 kg (196 lb 6.4 oz).    Labs:  _  Result Component Current Result Ref Range   Sodium 138 (5/12/2023) 136 - 145 mmol/L     _  Result Component Current Result Ref Range   Potassium 4.1 (5/12/2023) 3.4 - 5.3 mmol/L     _  Result Component Current Result Ref Range   Calcium 9.1 (5/12/2023) 8.6 - 10.0 mg/dL     No results found for Mag within last 30 days.     No results found for Phos within last 30 days.     _  Result " Component Current Result Ref Range   Albumin 4.3 (5/12/2023) 3.5 - 5.2 g/dL     _  Result Component Current Result Ref Range   Urea Nitrogen 12.1 (5/12/2023) 6.0 - 20.0 mg/dL     _  Result Component Current Result Ref Range   Creatinine 0.76 (5/12/2023) 0.67 - 1.17 mg/dL     _  Result Component Current Result Ref Range   AST 30 (5/12/2023) 10 - 50 U/L     _  Result Component Current Result Ref Range   ALT 25 (5/12/2023) 10 - 50 U/L     _  Result Component Current Result Ref Range   Bilirubin Total 0.4 (5/12/2023) <=1.2 mg/dL     _  Result Component Current Result Ref Range   WBC Count 9.5 (5/12/2023) 4.0 - 11.0 10e3/uL     _  Result Component Current Result Ref Range   Hemoglobin 15.8 (5/12/2023) 13.3 - 17.7 g/dL     _  Result Component Current Result Ref Range   Platelet Count 334 (5/12/2023) 150 - 450 10e3/uL     No results found for ANC within last 30 days.     _  Result Component Current Result Ref Range   Absolute Neutrophils 5.4 (5/12/2023) 1.6 - 8.3 10e3/uL          Assessment/Plan:  Patient is tolerating therapy well. Educated to protect feet by wearing socks, avoiding sandals, wearing well-fitting foot wear, applying emollient BID-TID prn, avoiding soaking in water (ie. doing dishes with hot water). Continue to monitor HFS. It is reasonable to consider ondansetron for nausea control as patient feels it is more effective and there are no DDIs/contraindications to use. Sending message to Dr. Snow regarding ondansetron prescription. Patient's refill is scheduled to be released early so he has supply-on-hand during his trip.    Lab results showed no concerning abnormalities.    Continue therapy as planned.    Follow-Up:  -Review appointment with Dr. Snow.  -Send MyChart to follow-up on ondansetron prescription and refill timeline    Refill Due:  5/19/2023 (Starting cycle 5/25/2023 but leaving for [place] on 5/19)    Santiago Thomas, Pharmacy Intern  Oral Chemotherapy Monitoring  [facility]  [phone]

## 2023-05-16 DIAGNOSIS — C20 RECTAL ADENOCARCINOMA METASTATIC TO LUNG (H): Primary | ICD-10-CM

## 2023-05-16 DIAGNOSIS — C78.00 RECTAL ADENOCARCINOMA METASTATIC TO LUNG (H): Primary | ICD-10-CM

## 2023-05-17 LAB — RADIOLOGIST FLAGS: NORMAL

## 2023-05-19 ENCOUNTER — ONCOLOGY VISIT (OUTPATIENT)
Dept: ONCOLOGY | Facility: CLINIC | Age: 48
End: 2023-05-19
Attending: STUDENT IN AN ORGANIZED HEALTH CARE EDUCATION/TRAINING PROGRAM
Payer: COMMERCIAL

## 2023-05-19 VITALS
WEIGHT: 190.1 LBS | BODY MASS INDEX: 26.51 KG/M2 | OXYGEN SATURATION: 97 % | HEART RATE: 68 BPM | TEMPERATURE: 99.1 F | SYSTOLIC BLOOD PRESSURE: 104 MMHG | RESPIRATION RATE: 16 BRPM | DIASTOLIC BLOOD PRESSURE: 76 MMHG

## 2023-05-19 DIAGNOSIS — C78.00 RECTAL ADENOCARCINOMA METASTATIC TO LUNG (H): Primary | ICD-10-CM

## 2023-05-19 DIAGNOSIS — C20 RECTAL ADENOCARCINOMA METASTATIC TO LUNG (H): Primary | ICD-10-CM

## 2023-05-19 PROCEDURE — G0463 HOSPITAL OUTPT CLINIC VISIT: HCPCS | Performed by: STUDENT IN AN ORGANIZED HEALTH CARE EDUCATION/TRAINING PROGRAM

## 2023-05-19 PROCEDURE — 99215 OFFICE O/P EST HI 40 MIN: CPT | Performed by: STUDENT IN AN ORGANIZED HEALTH CARE EDUCATION/TRAINING PROGRAM

## 2023-05-19 RX ORDER — MEPERIDINE HYDROCHLORIDE 25 MG/ML
25 INJECTION INTRAMUSCULAR; INTRAVENOUS; SUBCUTANEOUS EVERY 30 MIN PRN
Status: CANCELLED | OUTPATIENT
Start: 2023-05-19

## 2023-05-19 RX ORDER — LORAZEPAM 2 MG/ML
0.5 INJECTION INTRAMUSCULAR EVERY 4 HOURS PRN
Status: CANCELLED | OUTPATIENT
Start: 2023-05-19

## 2023-05-19 RX ORDER — EPINEPHRINE 1 MG/ML
0.3 INJECTION, SOLUTION INTRAMUSCULAR; SUBCUTANEOUS EVERY 5 MIN PRN
Status: CANCELLED | OUTPATIENT
Start: 2023-05-19

## 2023-05-19 RX ORDER — HEPARIN SODIUM (PORCINE) LOCK FLUSH IV SOLN 100 UNIT/ML 100 UNIT/ML
5 SOLUTION INTRAVENOUS
Status: CANCELLED | OUTPATIENT
Start: 2023-05-19

## 2023-05-19 RX ORDER — METHYLPREDNISOLONE SODIUM SUCCINATE 125 MG/2ML
125 INJECTION, POWDER, LYOPHILIZED, FOR SOLUTION INTRAMUSCULAR; INTRAVENOUS
Status: CANCELLED
Start: 2023-05-19

## 2023-05-19 RX ORDER — HEPARIN SODIUM (PORCINE) LOCK FLUSH IV SOLN 100 UNIT/ML 100 UNIT/ML
5 SOLUTION INTRAVENOUS
Status: CANCELLED | OUTPATIENT
Start: 2023-06-09

## 2023-05-19 RX ORDER — FLUOROURACIL 50 MG/ML
400 INJECTION, SOLUTION INTRAVENOUS ONCE
Status: CANCELLED | OUTPATIENT
Start: 2023-05-19

## 2023-05-19 RX ORDER — HEPARIN SODIUM,PORCINE 10 UNIT/ML
5 VIAL (ML) INTRAVENOUS
Status: CANCELLED | OUTPATIENT
Start: 2023-05-19

## 2023-05-19 RX ORDER — ALBUTEROL SULFATE 90 UG/1
1-2 AEROSOL, METERED RESPIRATORY (INHALATION)
Status: CANCELLED
Start: 2023-05-19

## 2023-05-19 RX ORDER — DIPHENHYDRAMINE HYDROCHLORIDE 50 MG/ML
50 INJECTION INTRAMUSCULAR; INTRAVENOUS
Status: CANCELLED
Start: 2023-05-19

## 2023-05-19 RX ORDER — ALBUTEROL SULFATE 0.83 MG/ML
2.5 SOLUTION RESPIRATORY (INHALATION)
Status: CANCELLED | OUTPATIENT
Start: 2023-05-19

## 2023-05-19 RX ORDER — ONDANSETRON 2 MG/ML
8 INJECTION INTRAMUSCULAR; INTRAVENOUS ONCE
Status: CANCELLED | OUTPATIENT
Start: 2023-05-19

## 2023-05-19 RX ORDER — HEPARIN SODIUM,PORCINE 10 UNIT/ML
5 VIAL (ML) INTRAVENOUS
Status: CANCELLED | OUTPATIENT
Start: 2023-06-09

## 2023-05-19 ASSESSMENT — PAIN SCALES - GENERAL: PAINLEVEL: MODERATE PAIN (5)

## 2023-05-19 NOTE — NURSING NOTE
"Oncology Rooming Note    May 19, 2023 10:34 AM   Roman Escalante is a 48 year old male who presents for:    Chief Complaint   Patient presents with     Oncology Clinic Visit     Return- colorectal cancer     Initial Vitals: /76 (BP Location: Left arm, Patient Position: Sitting, Cuff Size: Adult Regular)   Pulse 68   Temp 99.1  F (37.3  C) (Tympanic)   Resp 16   Wt 86.2 kg (190 lb 1.6 oz)   SpO2 97%   BMI 26.51 kg/m   Estimated body mass index is 26.51 kg/m  as calculated from the following:    Height as of 6/13/22: 1.803 m (5' 11\").    Weight as of this encounter: 86.2 kg (190 lb 1.6 oz). Body surface area is 2.08 meters squared.  Moderate Pain (5) Comment: Data Unavailable   No LMP for male patient.  Allergies reviewed: Yes  Medications reviewed: Yes    Medications: Medication refills not needed today.  Pharmacy name entered into EPIC:    [facility] - [place], [place] - [address] [place] [address] [address] [facility] - [place], [place] - [address] [place] [address] [phone]  [facility] - [place], [place] - [address] [place] [address]    Clinical concerns: No new clinical concerns other than reason for visit today.      Nancy Sabillon            "

## 2023-05-19 NOTE — PROGRESS NOTES
[facility] - Medical Oncology Follow-Up Consult Note  2023    Patient Identifiers     Name: Roman Escalante  Preferred Address: [place]  Preferred Language: English  : 1975  Gender: male    Assessment and Plan     Mr. Roman Escalante is a 48 year old male active smoker (3-5 cigs/day) with a history of metastatic rectal cancer on regorafenib who returns to clinic for symptom follow-up.    NGS: KRAS G12N  Immuno: TJ    Patient presents for treatment response evaluation for metastatic colorectal cancer.  Imaging unfortunately demonstrates progression of multiple lesions.  Following extended review of patient's treatment history, it may be reasonable to retrial prior chemotherapeutic agents.  Of note patient without documented progression on FOLFOX/Avastin.  Instead, patient noted only progression on 5-FU/Avastin.  Therefore we will retrial this initial therapy in hopes of improved tumor control.      Regarding future therapy: Patient is not a candidate for cetuximab or panitumumab therapy given KRAS mutation.  However further NGS and immuno tumor characterization has not been performed.  We will send archived tissue for Caris testing and consider serologic guardant testing.    Patient will require LILLY visits every 2 weeks with each infusion.  We will plan for CT scans and treatment response reevaluation following 4 cycles of treatment.  We will simultaneously evaluate for HCW and external clinical trials.  Evaluation for TCR2 will also proceed, though we would prefer patient be stable on existing therapy prior to consideration of leukapheresis.    Plan:  Metastatic CRC  - START FOLFOX + chelsea (vs nicolette)  - trend CBC, CMP, CEA  - Caris testing ASAP  - RTC every 2 weeks while on treatment  - HCW clinical trial eval ASAP    The patient and family asked numerous excellent questions which I answered to the best of my abilities. At the completion of our consultation, the patient and accompanying caregivers  had a strong understanding of the plan. They have our contact information for any further questions or concerns, and know to reach out for any urgent matters. Patient and family aware that they should call 911 for emergencies.       45 minutes spent on the date of the encounter doing chart review, review of test results, interpretation of tests, patient visit and documentation       Polo Snow MD, PhD   of Medicine  Division of Hematology, Oncology and Transplantation  [facility]    -----------------------------------    Oncology Summary     Cancer Staging   Rectal adenocarcinoma metastatic to lung (H)  Staging form: Colon and Rectum, AJCC 8th Edition  - Clinical: Stage IVB (cTX, cNX, pM1b) - Signed by Polo Snow MD on 3/30/2023      Oncology History   Rectal adenocarcinoma metastatic to lung (H)   2/3/2023 Initial Diagnosis    Rectal adenocarcinoma metastatic to lung (H)     2/3/2023 -  Chemotherapy    Oral ONC Colorectal Cancer - Regorafenib  Plan Provider: Polo Snow MD  Treatment goal: Palliative  Line of treatment: [No plan line of treatment]     3/30/2023 -  Cancer Staged    Staging form: Colon and Rectum, AJCC 8th Edition  - Clinical: Stage IVB (cTX, cNX, pM1b)         Subjective/Interval Events     - pt's abdominal pain has remained resolved  - minimal nausea, well controlled with anti-emetics  - no sleep issues  - good appetite. Cutting back on red meat. 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    Physical Exam     Vital signs: /76 (BP Location: Left arm, Patient Position: Sitting, Cuff Size: Adult Regular)   Pulse 68   Temp 99.1  F (37.3  C) (Tympanic)   Resp 16   Wt 86.2 kg (190 lb 1.6 oz)   SpO2 97%   BMI 26.51 kg/m      ECOG performance status:  1  Vascular access:  R  chest port    GENERAL: Well-nourished healthy-appearing man, sitting in chair, no acute distress.   HEENT: No icterus, no pallor. Moist mucous membranes.   LUNGS: Clear to ausculation bilaterally, normal work of breathing.   CARDIOVASCULAR: Regular rate and rhythm, no murmurs, gallops or rubs.   ABDOMEN: Soft, nontender and nondistended.  EXTREMITIES: No cyanosis, no clubbing, no edema.   NEUROLOGIC: No focal deficits, alert/ oriented  LYMPH NODE EXAM: No palpable adenopathy.    Objective Data     Lab data:  I have personally reviewed the lab data, notable for:    - no notables    Radiology data:  I have personally reviewed the radiology data, notable for:  05/12/2023 CT C/A/P  IMPRESSION: In this patient with history of rectal adenocarcinoma  metastatic to the lung, the current scan compared to prior scan from  1/23/2023 shows:  1. Increase in size of some of the  pulmonary nodules, with some of  the nodules showing cavitation.  2. Increased size of hepatic hypoattenuating lesions  3. Mild gallbladder wall thickening/edema, may consider ultrasound for  further evaluation especially if concern for cholecystitis.  4. Slight increase in size of mesenteric nodularity now extending into  the parastomal hernia, concerning for deposit, consider attention on  follow-up  5. Similar to slight increase in the size of rectal thickening and  left pararectal nodule  6. Slight increased size of retroperitoneal and left common iliac  lymph nodes.    Pathology and other data:  I have personally reviewed and interpreted the pathology data, notable for:    - no new data

## 2023-05-19 NOTE — LETTER
2023         RE: Roman Escalante  [address]  [facility] [address]        Dear Colleague,    Thank you for referring your patient, Roman Escalante, to the [facility]. Please see a copy of my visit note below.    [facility] - Medical Oncology Follow-Up Consult Note  2023    Patient Identifiers     Name: Roman Escalante  Preferred Address: [place]  Preferred Language: English  : 1975  Gender: male    Assessment and Plan     Mr. Roman Escalante is a 48 year old male active smoker (3-5 cigs/day) with a history of metastatic rectal cancer on regorafenib who returns to clinic for symptom follow-up.    NGS: KRAS G12N  Immuno: TJ    Patient presents for treatment response evaluation for metastatic colorectal cancer.  Imaging unfortunately demonstrates progression of multiple lesions.  Following extended review of patient's treatment history, it may be reasonable to retrial prior chemotherapeutic agents.  Of note patient without documented progression on FOLFOX/Avastin.  Instead, patient noted only progression on 5-FU/Avastin.  Therefore we will retrial this initial therapy in hopes of improved tumor control.      Regarding future therapy: Patient is not a candidate for cetuximab or panitumumab therapy given KRAS mutation.  However further NGS and immuno tumor characterization has not been performed.  We will send archived tissue for Caris testing and consider serologic guardant testing.    Patient will require LILLY visits every 2 weeks with each infusion.  We will plan for CT scans and treatment response reevaluation following 4 cycles of treatment.  We will simultaneously evaluate for HCW and external clinical trials.  Evaluation for TCR2 will also proceed, though we would prefer patient be stable on existing therapy prior to consideration of leukapheresis.    Plan:  Metastatic CRC  - START FOLFOX + chelsea (vs nicolette)  - trend CBC, CMP, CEA  - Caris  testing ASAP  - RTC every 2 weeks while on treatment  - HCW clinical trial eval ASAP    The patient and family asked numerous excellent questions which I answered to the best of my abilities. At the completion of our consultation, the patient and accompanying caregivers had a strong understanding of the plan. They have our contact information for any further questions or concerns, and know to reach out for any urgent matters. Patient and family aware that they should call 911 for emergencies.       45 minutes spent on the date of the encounter doing chart review, review of test results, interpretation of tests, patient visit and documentation       Polo Snow MD, PhD   of Medicine  Division of Hematology, Oncology and Transplantation  [facility]    -----------------------------------    Oncology Summary     Cancer Staging   Rectal adenocarcinoma metastatic to lung (H)  Staging form: Colon and Rectum, AJCC 8th Edition  - Clinical: Stage IVB (cTX, cNX, pM1b) - Signed by Polo Snow MD on 3/30/2023      Oncology History   Rectal adenocarcinoma metastatic to lung (H)   2/3/2023 Initial Diagnosis    Rectal adenocarcinoma metastatic to lung (H)     2/3/2023 -  Chemotherapy    Oral ONC Colorectal Cancer - Regorafenib  Plan Provider: Polo Snow MD  Treatment goal: Palliative  Line of treatment: [No plan line of treatment]     3/30/2023 -  Cancer Staged    Staging form: Colon and Rectum, AJCC 8th Edition  - Clinical: Stage IVB (cTX, cNX, pM1b)         Subjective/Interval Events     - pt's abdominal pain has remained resolved  - minimal nausea, well controlled with anti-emetics  - no sleep issues  - good appetite. Cutting back on red meat. 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    Physical Exam      Vital signs: /76 (BP Location: Left arm, Patient Position: Sitting, Cuff Size: Adult Regular)   Pulse 68   Temp 99.1  F (37.3  C) (Tympanic)   Resp 16   Wt 86.2 kg (190 lb 1.6 oz)   SpO2 97%   BMI 26.51 kg/m      ECOG performance status:  1  Vascular access:  R chest port    GENERAL: Well-nourished healthy-appearing man, sitting in chair, no acute distress.   HEENT: No icterus, no pallor. Moist mucous membranes.   LUNGS: Clear to ausculation bilaterally, normal work of breathing.   CARDIOVASCULAR: Regular rate and rhythm, no murmurs, gallops or rubs.   ABDOMEN: Soft, nontender and nondistended.  EXTREMITIES: No cyanosis, no clubbing, no edema.   NEUROLOGIC: No focal deficits, alert/ oriented  LYMPH NODE EXAM: No palpable adenopathy.    Objective Data     Lab data:  I have personally reviewed the lab data, notable for:    - no notables    Radiology data:  I have personally reviewed the radiology data, notable for:  05/12/2023 CT C/A/P  IMPRESSION: In this patient with history of rectal adenocarcinoma  metastatic to the lung, the current scan compared to prior scan from  1/23/2023 shows:  1. Increase in size of some of the  pulmonary nodules, with some of  the nodules showing cavitation.  2. Increased size of hepatic hypoattenuating lesions  3. Mild gallbladder wall thickening/edema, may consider ultrasound for  further evaluation especially if concern for cholecystitis.  4. Slight increase in size of mesenteric nodularity now extending into  the parastomal hernia, concerning for deposit, consider attention on  follow-up  5. Similar to slight increase in the size of rectal thickening and  left pararectal nodule  6. Slight increased size of retroperitoneal and left common iliac  lymph nodes.    Pathology and other data:  I have personally reviewed and interpreted the pathology data, notable for:    - no new data      Polo Snow MD

## 2023-05-26 ENCOUNTER — TELEPHONE (OUTPATIENT)
Dept: WOUND CARE | Facility: CLINIC | Age: 48
End: 2023-05-26
Payer: COMMERCIAL

## 2023-05-31 DIAGNOSIS — C20 RECTAL ADENOCARCINOMA METASTATIC TO LUNG (H): Primary | ICD-10-CM

## 2023-05-31 DIAGNOSIS — C78.00 RECTAL ADENOCARCINOMA METASTATIC TO LUNG (H): Primary | ICD-10-CM

## 2023-06-03 ENCOUNTER — DOCUMENTATION ONLY (OUTPATIENT)
Dept: PHARMACY | Facility: CLINIC | Age: 48
End: 2023-06-03
Payer: COMMERCIAL

## 2023-06-03 NOTE — PROGRESS NOTES
[facility] Oral Chemotherapy Monitoring Program    Thank you for the opportunity to be a part in the care of this patient's oral chemotherapy. The oncology pharmacy will no longer be following this patient for oral chemotherapy. If there are any questions or the plan changes, feel free to contact us.        3/17/2023     2:00 PM 3/29/2023     8:00 AM 3/30/2023     3:00 PM 4/24/2023     2:00 PM 5/12/2023     9:00 AM 5/16/2023     2:00 PM 6/3/2023    11:00 AM   ORAL CHEMOTHERAPY   Assessment Type Initial Follow up Refill Other Refill Lab Monitoring;Monthly Follow up Refill Discontinuation   Stop Date       5/19/2023   Reason for Discontinuation       Disease progression   Diagnosis Code Colon Cancer Colon Cancer Colon Cancer Colon Cancer Colon Cancer Colon Cancer Colon Cancer   Providers Dr. Edy Snow   [facility]   Drug Name Stivarga (regorafenib) Stivarga (regorafenib) Stivarga (regorafenib) Stivarga (regorafenib) Stivarga (regorafenib) Stivarga (regorafenib) Stivarga (regorafenib)   Dose 160 mg  160 mg 160 mg 160 mg 160 mg 160 mg   Current Schedule Daily  Daily Daily Daily Daily Daily   Cycle Details 3 weeks on, 1 week off  3 weeks on, 1 week off 3 weeks on, 1 week off 3 weeks on, 1 week off 3 weeks on, 1 week off 3 weeks on, 1 week off   Start Date of Last Cycle 3/2/2023  3/30/2023  4/27/2023 4/27/2023    Planned next cycle start date 3/30/2023  4/27/2023  5/25/2023 5/25/2023    Doses missed in last 2 weeks 0    0     Adherence Assessment Adherent  Adherent  Adherent     Adverse Effects Nausea    Palmar-plantar Erythrodysethesia Syndrome     Nausea Grade 2    Grade 1     Pharmacist Intervention(nausea) No    Yes     Intervention(s)     Referral to oncology provider;Rx medication recommendation     Palmar-plantar Erythrodysethesia  syndrome[hand-foot syndrome]     Grade 1     Pharmacist Intervention(Palmar-plantar)     Yes     Intervention(s)     Patient education     Any new drug interactions? No         Is the dose as ordered appropriate for the patient? Yes         Since the last time we talked, have you been hospitalized or used the emergency room? No             Maco Mitchell  Pharmacy Intern  Oral Chemotherapy Monitoring Program  [facility]  [phone]

## 2023-06-06 ENCOUNTER — PATIENT OUTREACH (OUTPATIENT)
Dept: ONCOLOGY | Facility: CLINIC | Age: 48
End: 2023-06-06
Payer: COMMERCIAL

## 2023-06-06 RX ORDER — PROCHLORPERAZINE MALEATE 10 MG
10 TABLET ORAL EVERY 6 HOURS PRN
Qty: 30 TABLET | Refills: 2 | Status: SHIPPED | OUTPATIENT
Start: 2023-06-06

## 2023-06-06 RX ORDER — ONDANSETRON 8 MG/1
8 TABLET, FILM COATED ORAL EVERY 8 HOURS PRN
Qty: 30 TABLET | Refills: 2 | Status: ON HOLD | OUTPATIENT
Start: 2023-06-06 | End: 2023-07-29

## 2023-06-06 NOTE — TELEPHONE ENCOUNTER
[place] requisition submitted using specimen Case: [id number], read 12/2/2021 from Consult [employer]ide, [id number] retrieved 1/19/2021 at [facility].

## 2023-06-07 ENCOUNTER — APPOINTMENT (OUTPATIENT)
Dept: LAB | Facility: CLINIC | Age: 48
End: 2023-06-07
Attending: STUDENT IN AN ORGANIZED HEALTH CARE EDUCATION/TRAINING PROGRAM
Payer: COMMERCIAL

## 2023-06-07 ENCOUNTER — INFUSION THERAPY VISIT (OUTPATIENT)
Dept: ONCOLOGY | Facility: CLINIC | Age: 48
End: 2023-06-07
Attending: STUDENT IN AN ORGANIZED HEALTH CARE EDUCATION/TRAINING PROGRAM
Payer: COMMERCIAL

## 2023-06-07 VITALS
HEIGHT: 71 IN | SYSTOLIC BLOOD PRESSURE: 110 MMHG | OXYGEN SATURATION: 98 % | BODY MASS INDEX: 27.03 KG/M2 | TEMPERATURE: 98.1 F | DIASTOLIC BLOOD PRESSURE: 70 MMHG | RESPIRATION RATE: 16 BRPM | WEIGHT: 193.1 LBS | HEART RATE: 75 BPM

## 2023-06-07 DIAGNOSIS — C78.00 RECTAL ADENOCARCINOMA METASTATIC TO LUNG (H): Primary | ICD-10-CM

## 2023-06-07 DIAGNOSIS — C20 RECTAL ADENOCARCINOMA METASTATIC TO LUNG (H): Primary | ICD-10-CM

## 2023-06-07 LAB
ALBUMIN SERPL BCG-MCNC: 4.2 G/DL (ref 3.5–5.2)
ALBUMIN UR-MCNC: 200 MG/DL
ALP SERPL-CCNC: 81 U/L (ref 40–129)
ALT SERPL W P-5'-P-CCNC: 12 U/L (ref 10–50)
ANION GAP SERPL CALCULATED.3IONS-SCNC: 8 MMOL/L (ref 7–15)
AST SERPL W P-5'-P-CCNC: 20 U/L (ref 10–50)
BASOPHILS # BLD AUTO: 0 10E3/UL (ref 0–0.2)
BASOPHILS NFR BLD AUTO: 1 %
BILIRUB SERPL-MCNC: 0.5 MG/DL
BUN SERPL-MCNC: 9.8 MG/DL (ref 6–20)
CALCIUM SERPL-MCNC: 9.1 MG/DL (ref 8.6–10)
CHLORIDE SERPL-SCNC: 106 MMOL/L (ref 98–107)
CREAT SERPL-MCNC: 0.79 MG/DL (ref 0.67–1.17)
DEPRECATED HCO3 PLAS-SCNC: 26 MMOL/L (ref 22–29)
EOSINOPHIL # BLD AUTO: 0.6 10E3/UL (ref 0–0.7)
EOSINOPHIL NFR BLD AUTO: 7 %
ERYTHROCYTE [DISTWIDTH] IN BLOOD BY AUTOMATED COUNT: 16.2 % (ref 10–15)
GFR SERPL CREATININE-BSD FRML MDRD: >90 ML/MIN/1.73M2
GLUCOSE SERPL-MCNC: 117 MG/DL (ref 70–99)
HCT VFR BLD AUTO: 43.5 % (ref 40–53)
HGB BLD-MCNC: 14.4 G/DL (ref 13.3–17.7)
IMM GRANULOCYTES # BLD: 0 10E3/UL
IMM GRANULOCYTES NFR BLD: 0 %
LYMPHOCYTES # BLD AUTO: 1.5 10E3/UL (ref 0.8–5.3)
LYMPHOCYTES NFR BLD AUTO: 19 %
MCH RBC QN AUTO: 27.9 PG (ref 26.5–33)
MCHC RBC AUTO-ENTMCNC: 33.1 G/DL (ref 31.5–36.5)
MCV RBC AUTO: 84 FL (ref 78–100)
MONOCYTES # BLD AUTO: 1.1 10E3/UL (ref 0–1.3)
MONOCYTES NFR BLD AUTO: 14 %
NEUTROPHILS # BLD AUTO: 4.9 10E3/UL (ref 1.6–8.3)
NEUTROPHILS NFR BLD AUTO: 59 %
NRBC # BLD AUTO: 0 10E3/UL
NRBC BLD AUTO-RTO: 0 /100
PLATELET # BLD AUTO: 321 10E3/UL (ref 150–450)
POTASSIUM SERPL-SCNC: 4 MMOL/L (ref 3.4–5.3)
PROT SERPL-MCNC: 7.3 G/DL (ref 6.4–8.3)
RBC # BLD AUTO: 5.16 10E6/UL (ref 4.4–5.9)
SODIUM SERPL-SCNC: 140 MMOL/L (ref 136–145)
WBC # BLD AUTO: 8.2 10E3/UL (ref 4–11)

## 2023-06-07 PROCEDURE — 96413 CHEMO IV INFUSION 1 HR: CPT

## 2023-06-07 PROCEDURE — 96411 CHEMO IV PUSH ADDL DRUG: CPT

## 2023-06-07 PROCEDURE — 36591 DRAW BLOOD OFF VENOUS DEVICE: CPT | Performed by: STUDENT IN AN ORGANIZED HEALTH CARE EDUCATION/TRAINING PROGRAM

## 2023-06-07 PROCEDURE — 80053 COMPREHEN METABOLIC PANEL: CPT | Performed by: STUDENT IN AN ORGANIZED HEALTH CARE EDUCATION/TRAINING PROGRAM

## 2023-06-07 PROCEDURE — 258N000003 HC RX IP 258 OP 636: Performed by: STUDENT IN AN ORGANIZED HEALTH CARE EDUCATION/TRAINING PROGRAM

## 2023-06-07 PROCEDURE — 96376 TX/PRO/DX INJ SAME DRUG ADON: CPT

## 2023-06-07 PROCEDURE — 96415 CHEMO IV INFUSION ADDL HR: CPT

## 2023-06-07 PROCEDURE — G0498 CHEMO EXTEND IV INFUS W/PUMP: HCPCS

## 2023-06-07 PROCEDURE — 96375 TX/PRO/DX INJ NEW DRUG ADDON: CPT

## 2023-06-07 PROCEDURE — 250N000011 HC RX IP 250 OP 636: Performed by: STUDENT IN AN ORGANIZED HEALTH CARE EDUCATION/TRAINING PROGRAM

## 2023-06-07 PROCEDURE — 81003 URINALYSIS AUTO W/O SCOPE: CPT | Performed by: STUDENT IN AN ORGANIZED HEALTH CARE EDUCATION/TRAINING PROGRAM

## 2023-06-07 PROCEDURE — 96367 TX/PROPH/DG ADDL SEQ IV INF: CPT

## 2023-06-07 PROCEDURE — 85025 COMPLETE CBC W/AUTO DIFF WBC: CPT | Performed by: STUDENT IN AN ORGANIZED HEALTH CARE EDUCATION/TRAINING PROGRAM

## 2023-06-07 PROCEDURE — 96368 THER/DIAG CONCURRENT INF: CPT

## 2023-06-07 RX ORDER — FLUOROURACIL 50 MG/ML
400 INJECTION, SOLUTION INTRAVENOUS ONCE
Status: CANCELLED | OUTPATIENT
Start: 2023-06-07

## 2023-06-07 RX ORDER — HEPARIN SODIUM (PORCINE) LOCK FLUSH IV SOLN 100 UNIT/ML 100 UNIT/ML
5 SOLUTION INTRAVENOUS
Status: COMPLETED | OUTPATIENT
Start: 2023-06-07 | End: 2023-06-07

## 2023-06-07 RX ORDER — FLUOROURACIL 50 MG/ML
400 INJECTION, SOLUTION INTRAVENOUS ONCE
Status: COMPLETED | OUTPATIENT
Start: 2023-06-07 | End: 2023-06-07

## 2023-06-07 RX ORDER — ONDANSETRON 2 MG/ML
8 INJECTION INTRAMUSCULAR; INTRAVENOUS ONCE
Status: COMPLETED | OUTPATIENT
Start: 2023-06-07 | End: 2023-06-07

## 2023-06-07 RX ADMIN — OXALIPLATIN 0.02 MG: 5 INJECTION, SOLUTION INTRAVENOUS at 09:43

## 2023-06-07 RX ADMIN — FLUOROURACIL 830 MG: 50 INJECTION, SOLUTION INTRAVENOUS at 16:06

## 2023-06-07 RX ADMIN — Medication 5 ML: at 06:58

## 2023-06-07 RX ADMIN — OXALIPLATIN 160 MG: 5 INJECTION, SOLUTION INTRAVENOUS at 14:05

## 2023-06-07 RX ADMIN — FAMOTIDINE 20 MG: 10 INJECTION INTRAVENOUS at 08:27

## 2023-06-07 RX ADMIN — OXALIPLATIN 18 MG: 5 INJECTION, SOLUTION INTRAVENOUS at 12:57

## 2023-06-07 RX ADMIN — FOSAPREPITANT: 150 INJECTION, POWDER, LYOPHILIZED, FOR SOLUTION INTRAVENOUS at 08:58

## 2023-06-07 RX ADMIN — ONDANSETRON 8 MG: 2 INJECTION INTRAMUSCULAR; INTRAVENOUS at 08:29

## 2023-06-07 RX ADMIN — SODIUM CHLORIDE 250 ML: 9 INJECTION, SOLUTION INTRAVENOUS at 08:27

## 2023-06-07 RX ADMIN — OXALIPLATIN 1.8 MG: 5 INJECTION, SOLUTION INTRAVENOUS at 11:52

## 2023-06-07 RX ADMIN — OXALIPLATIN 0.18 MG: 5 INJECTION, SOLUTION INTRAVENOUS at 10:47

## 2023-06-07 RX ADMIN — DEXAMETHASONE SODIUM PHOSPHATE 10 MG: 10 INJECTION, SOLUTION INTRAMUSCULAR; INTRAVENOUS at 13:20

## 2023-06-07 RX ADMIN — DIPHENHYDRAMINE HYDROCHLORIDE 50 MG: 50 INJECTION, SOLUTION INTRAMUSCULAR; INTRAVENOUS at 08:32

## 2023-06-07 RX ADMIN — DEXTROSE MONOHYDRATE 250 ML: 50 INJECTION, SOLUTION INTRAVENOUS at 09:43

## 2023-06-07 RX ADMIN — DIPHENHYDRAMINE HYDROCHLORIDE 25 MG: 50 INJECTION, SOLUTION INTRAMUSCULAR; INTRAVENOUS at 12:58

## 2023-06-07 RX ADMIN — LEUCOVORIN CALCIUM 750 MG: 500 INJECTION, POWDER, LYOPHILIZED, FOR SOLUTION INTRAMUSCULAR; INTRAVENOUS at 14:05

## 2023-06-07 NOTE — PROGRESS NOTES
Infusion Nursing Note:  Roman Escalante presents today for Day 1 Cycle 1 Oxaliplatin desensitization, Leucovorin, 5FU IVP, 5FU home pump disconnect. (Zirabev held for increased urine protein)  Patient seen by provider today: No   present during visit today: Not Applicable.    Note: Patient presents to infusion feeling well. Patient denies acute discomfort and states no acute complaints or concerns needing to be addressed today. Specifically, pt denies s/s of infection such as fever, sore throat, cough, chest pain, shortness of breath, body aches, chills, headache, increased nasal congestion, or changes in taste/smell. Mechanism of action, possible side effects, and infusion schedule reviewed throughout the entire infusion with pt voicing understanding. Pt consents to above plan and states no questions or concerns for Dr. Snow. Pt has home infusion coverage, however pt requests to have disconnect at [facility] given first cycle.    Urine protein 200. Creatinine WNL.   TORB. 6/7/2023. 0755. Dr. Snow. Hold Zirabev per protocol for urine protein of 200. Have patient obtain 24 hour urine prior to next cycle.    24 hour urine supplies given to patient and reviewed specific instructions to drop 24 hour urine off the day before infusion to allow processing time    Intravenous Access:  Implanted Port.    Treatment Conditions:  Lab Results   Component Value Date    HGB 14.4 06/07/2023    WBC 8.2 06/07/2023    ANEUTAUTO 4.9 06/07/2023     06/07/2023      Lab Results   Component Value Date     06/07/2023    POTASSIUM 4.0 06/07/2023    CR 0.79 06/07/2023    JEFERSON 9.1 06/07/2023    BILITOTAL 0.5 06/07/2023    ALBUMIN 4.2 06/07/2023    ALT 12 06/07/2023    AST 20 06/07/2023     Results reviewed, labs MET treatment parameters, ok to proceed with treatment.  Urine Protein: 200.      Post Infusion Assessment:  Patient tolerated infusion without incident.  Blood return noted pre and post infusion.  Blood return  "noted during administration every 2 cc.  Site patent and intact, free from redness, edema or discomfort.  No evidence of extravasations.  Access discontinued per protocol.     Prior to discharge: Port is secured in place with tegaderm and flushed with 10cc NS with positive blood return noted.  Continuous home infusion  C-series connected.    All connectors secured in place, clamps taped open,  heat sensor secured with tegaderm all verified with Monica SARMIENTO  Pump started, \"running\" noted on display (CADD):  N/A  Patient instructed to call our clinic or [facility] with any questions or concerns at home.  Patient verbalized understanding.    Patient set up for pump disconnect at [facility] clinic 1400 on 6/9.  Appointment verified by IB from [facility] scheduling  C-series maintenance education reviewed: Keep heat sensor secured to abdomen with tegaderm, maintain consistent body temperature (decrease shivering/cold exposure and decrease sweating/heat exposure) to ensure appropriate infusion rate, assess tubing for any kinks, and notify [facility] of any leaks.      Discharge Plan:   Prescription refills given for Compazine, Zofran.  Discharge instructions reviewed with: Patient.  Patient and/or family verbalized understanding of discharge instructions and all questions answered.  Copy of AVS reviewed with patient and/or family.  Patient will return 6/22 for next appointment.  Patient discharged in stable condition accompanied by: self.  Departure Mode: Ambulatory.  Face to Face time: 0 minutes.      Cisco Perez RN      "

## 2023-06-07 NOTE — NURSING NOTE
"Chief Complaint   Patient presents with     Port Draw     Port accessed with 20 gauge 3/4\" Power needle and labs drawn by rn.  Port flushed with NS and heparin.  Pt tolerated well.  VS taken.  Pt checked in for next appt.    Skylar Olivo RN      "

## 2023-06-07 NOTE — PATIENT INSTRUCTIONS
DROP OFF 24 HOUR URINE THE AFTERNOON OF 6/21 SO IT HAS TIME TO PROCESS AND GET RESULTS ON 6/22. Therefore, start 24 hour urine 6/20 afternon    Prior to discharge: Port is secured in place with tegaderm and flushed with 10cc NS with positive blood return noted.  Continuous home infusion C-series connected.    All connectors secured in place, clamps taped open, heat sensor secured with tegaderm all verified with 2nd RN  Patient instructed to call our clinic with any questions or concerns at home.  Patient verbalized understanding.    Patient set up for pump disconnect at clinic on 6/9/2023 at 1400.  Appointment verified by IB message from scheduling   C-series maintenance education reviewed: Keep heat sensor secured to abdomen with tegaderm, maintain consistent body temperature (decrease shivering/cold exposure and decrease sweating/heat exposure) to ensure appropriate infusion rate, assess tubing for any kinks, and notify [facility] of any leaks.        [facility]) Triage and after hours / weekends / holidays:  [phone]    Please call the triage or after hours line if you experience a temperature greater than or equal to 100.4, shaking chills, have uncontrolled nausea, vomiting and/or diarrhea, dizziness, shortness of breath, chest pain, bleeding, unexplained bruising, or if you have any other new/concerning symptoms, questions or concerns.      If you are having any concerning symptoms or wish to speak to a provider before your next infusion visit, please call triage to notify them so we can adequately serve you.     If you need a refill on a narcotic prescription or other medication, please call before your infusion appointment.                 June 2023 Sunday Monday Tuesday Wednesday Thursday Friday Saturday                       1     2     3       4     5     6     7    LAB CENTRAL   6:30 AM   (15 min.)    [facility] LAB DRAW   [facility]    ONC INFUSION 6 HR (360  MIN)   7:00 AM   (360 min.)   UC ONC INFUSION NURSE   [facility] 8     9     10       11     12    NEW - ED/HOSP/UC FOLLOW UP   9:40 AM   (30 min.)   Sarah Macias PA-C   [facility] 13     14     15     16     17       18     19     20     21     22    LAB CENTRAL  10:00 AM   (15 min.)    [facility] LAB DRAW   [facility]    RETURN ACTIVE TREATMENT  10:15 AM   (45 min.)   Usha Mitchell PA-C   [facility]    ONC INFUSION 4 HR (240 MIN)  12:00 PM   (240 min.)   UC ONC INFUSION NURSE   [facility] 23     24       25     26     27     28     29     30                        July 2023 Sunday Monday Tuesday Wednesday Thursday Friday Saturday                                 1       2     3     4     5     6    LAB CENTRAL  10:30 AM   (15 min.)   [facility] D[facility]   [facility]    RETURN ACTIVE TREATMENT  10:45 AM   (45 min.)   Radha Chapman PA-C   [facility]    ONC INFUSION 4 HR (240 MIN)  12:00 PM   (240 min.)   UC ONC INFUSION NURSE   [facility] 7     8       9     10     11     12     13     14     15       16     17     18     19     20    LAB CENTRAL  10:00 AM   (15 min.)   [facility] D[facility]   [facility]    RETURN ACTIVE TREATMENT  10:15 AM   (45 min.)   Usha Mitchell PA-C   [facility]    ONC INFUSION 4 HR (240 MIN)  12:00 PM   (240 min.)   UC ONC INFUSION NURSE   [facility] 21     22       23     24     25     26     27     28     29       30     31    CT CHEST/ABDOMEN/PELVIS W  10:50 AM   (20 min.)   [address] [facility] [place]                                            Lab Results:  Recent Results (from the past 12 hour(s))   Comprehensive  metabolic panel    Collection Time: 06/07/23  7:07 AM   Result Value Ref Range    Sodium 140 136 - 145 mmol/L    Potassium 4.0 3.4 - 5.3 mmol/L    Chloride 106 98 - 107 mmol/L    Carbon Dioxide (CO2) 26 22 - 29 mmol/L    Anion Gap 8 7 - 15 mmol/L    Urea Nitrogen 9.8 6.0 - 20.0 mg/dL    Creatinine 0.79 0.67 - 1.17 mg/dL    Calcium 9.1 8.6 - 10.0 mg/dL    Glucose 117 (H) 70 - 99 mg/dL    Alkaline Phosphatase 81 40 - 129 U/L    AST 20 10 - 50 U/L    ALT 12 10 - 50 U/L    Protein Total 7.3 6.4 - 8.3 g/dL    Albumin 4.2 3.5 - 5.2 g/dL    Bilirubin Total 0.5 <=1.2 mg/dL    GFR Estimate >90 >60 mL/min/1.73m2   CBC with platelets and differential    Collection Time: 06/07/23  7:07 AM   Result Value Ref Range    WBC Count 8.2 4.0 - 11.0 10e3/uL    RBC Count 5.16 4.40 - 5.90 10e6/uL    Hemoglobin 14.4 13.3 - 17.7 g/dL    Hematocrit 43.5 40.0 - 53.0 %    MCV 84 78 - 100 fL    MCH 27.9 26.5 - 33.0 pg    MCHC 33.1 31.5 - 36.5 g/dL    RDW 16.2 (H) 10.0 - 15.0 %    Platelet Count 321 150 - 450 10e3/uL    % Neutrophils 59 %    % Lymphocytes 19 %    % Monocytes 14 %    % Eosinophils 7 %    % Basophils 1 %    % Immature Granulocytes 0 %    NRBCs per 100 WBC 0 <1 /100    Absolute Neutrophils 4.9 1.6 - 8.3 10e3/uL    Absolute Lymphocytes 1.5 0.8 - 5.3 10e3/uL    Absolute Monocytes 1.1 0.0 - 1.3 10e3/uL    Absolute Eosinophils 0.6 0.0 - 0.7 10e3/uL    Absolute Basophils 0.0 0.0 - 0.2 10e3/uL    Absolute Immature Granulocytes 0.0 <=0.4 10e3/uL    Absolute NRBCs 0.0 10e3/uL   Protein qualitative urine    Collection Time: 06/07/23  7:16 AM   Result Value Ref Range    Protein Albumin Urine 200 (A) Negative mg/dL

## 2023-06-09 ENCOUNTER — INFUSION THERAPY VISIT (OUTPATIENT)
Dept: ONCOLOGY | Facility: CLINIC | Age: 48
End: 2023-06-09
Attending: STUDENT IN AN ORGANIZED HEALTH CARE EDUCATION/TRAINING PROGRAM
Payer: COMMERCIAL

## 2023-06-09 VITALS
HEART RATE: 71 BPM | OXYGEN SATURATION: 99 % | TEMPERATURE: 98 F | DIASTOLIC BLOOD PRESSURE: 81 MMHG | SYSTOLIC BLOOD PRESSURE: 128 MMHG | RESPIRATION RATE: 16 BRPM

## 2023-06-09 DIAGNOSIS — C78.00 RECTAL ADENOCARCINOMA METASTATIC TO LUNG (H): Primary | ICD-10-CM

## 2023-06-09 DIAGNOSIS — C20 RECTAL ADENOCARCINOMA METASTATIC TO LUNG (H): Primary | ICD-10-CM

## 2023-06-09 PROCEDURE — 250N000011 HC RX IP 250 OP 636: Performed by: STUDENT IN AN ORGANIZED HEALTH CARE EDUCATION/TRAINING PROGRAM

## 2023-06-09 PROCEDURE — 36591 DRAW BLOOD OFF VENOUS DEVICE: CPT

## 2023-06-09 RX ORDER — HEPARIN SODIUM (PORCINE) LOCK FLUSH IV SOLN 100 UNIT/ML 100 UNIT/ML
5 SOLUTION INTRAVENOUS
Status: DISCONTINUED | OUTPATIENT
Start: 2023-06-09 | End: 2023-06-09 | Stop reason: HOSPADM

## 2023-06-09 RX ADMIN — Medication 5 ML: at 14:10

## 2023-06-09 NOTE — PATIENT INSTRUCTIONS
Contact Numbers  [facility]: [phone] (for symptom and scheduling needs)    Please call the [facility] Triage line if you experience a temperature greater than or equal to 100.4, shaking chills, have uncontrolled nausea, vomiting and/or diarrhea, dizziness, shortness of breath, chest pain, bleeding, unexplained bruising, or if you have any other new/concerning symptoms, questions or concerns.     If you are having any concerning symptoms or wish to speak to a provider before your next infusion visit, please call your care coordinator or triage to notify them so we can adequately serve you.     If you need a refill on a narcotic prescription or other medication, please call triage before your infusion appointment.           June 2023 Sunday Monday Tuesday Wednesday Thursday Friday Saturday                       1     2     3       4     5     6     7    LAB CENTRAL   6:30 AM   (15 min.)   [facility] LAB DRAW   [facility]    ONC INFUSION 6 HR (360 MIN)   7:00 AM   (360 min.)    ONC INFUSION NURSE   [facility] 8     9    ONC INFUSION 0.5 HR (30 MIN)   2:00 PM   (30 min.)    ONC INFUSION NURSE   [facility] 10       11     12    NEW - ED/HOSP/UC FOLLOW UP   9:40 AM   (30 min.)   Sarah Macias PA-C   [facility] 13     14     15     16     17       18     19     20     21     22    LAB CENTRAL  10:00 AM   (15 min.)   [facility]   [facility]    RETURN ACTIVE TREATMENT  10:15 AM   (45 min.)   Usha Mitchell PA-C   [facility]    ONC INFUSION 4 HR (240 MIN)  12:00 PM   (240 min.)    ONC INFUSION NURSE   [facility] 23     24       25     26     27     28     29     30                        July 2023 Sunday Monday Tuesday Wednesday Thursday Friday Saturday 1        2     3     4     5     6    LAB CENTRAL  10:30 AM   (15 min.)   [facility] DRAW   [facility]    RETURN ACTIVE TREATMENT  10:45 AM   (45 min.)   Radha Chapman PA-C   [facility]    ONC INFUSION 4 HR (240 MIN)  12:00 PM   (240 min.)    ONC INFUSION NURSE   [facility] 7     8       9     10     11     12     13     14     15       16     17     18     19     20    LAB CENTRAL  10:00 AM   (15 min.)   [facility] DRAW   [facility]    RETURN ACTIVE TREATMENT  10:15 AM   (45 min.)   Usha Mitchell PA-C   [facility]    ONC INFUSION 4 HR (240 MIN)  12:00 PM   (240 min.)    ONC INFUSION NURSE   [facility] 21     22       23     24     25     26     27     28     29       30     31    CT CHEST/ABDOMEN/PELVIS W  10:50 AM   (20 min.)   [id number]   [facility] [place]                                            Lab Results:  No results found for this or any previous visit (from the past 12 hour(s)).

## 2023-06-09 NOTE — PROGRESS NOTES
Infusion Nursing Note:  Roman Escalante presents today for Fluorouracil Pump Disconnect.    Patient seen by provider today: No   present during visit today: Not Applicable.    Note: Patient presents to infusion today well. Reports feeling nauseated until about 10pm on the day of his infusion. Otherwise, has not had to take any nausea medication until this morning. No fevers, chills, SOB, chest pains or cough. Offers no new issues or concerns at this time.    Fluorouracil pump completely infused at time of disconnect.     Intravenous Access:  Implanted Port.    Treatment Conditions:  Not Applicable.    Post Infusion Assessment:  Patient tolerated infusion without incident.  Blood return noted pre and post infusion.  Site patent and intact, free from redness, edema or discomfort.  No evidence of extravasations.  Access discontinued per protocol.     Discharge Plan:   Patient declined prescription refills.  Discharge instructions reviewed with: Patient.  Patient and/or family verbalized understanding of discharge instructions and all questions answered.  AVS to patient via [employer].  Patient will return 6/22 for next appointment.   Patient discharged in stable condition accompanied by: wife.  Departure Mode: Ambulatory.      Shayla Bhat RN

## 2023-06-20 NOTE — PROGRESS NOTES
Virtual Visit Details    Type of service:  Video Visit   Video Start Time: 12:37 PM  Video End Time:12:53 PM    Originating Location (pt. Location): Home    Distant Location (provider location):  Off-site  Platform used for Video Visit: [facility] - Medical Oncology Follow Up Note  06/20/2023      HPI:   Patient developed rectal bleeding in 2020.  In January 2021 CT showed focal wall thickening in the upper rectum, multiple pulmonary nodules bilaterally suspicious for metastatic disease.  Underwent FNA of the right upper lobe lesion which was consistent with adenocarcinoma of the colon.  He was treated with FOLFOX from 2/20/2021 through 5/20/2021.  Avastin was added.  Had an infusion reaction to oxaliplatin 6/2021.  7/2021- 12/2021 was on 5-FU alone.  Developed progression.  12/2021- 9/2022 was on FOLFIRI.  11/2021 started Lonsurf. All of this was done with outside oncologist.     Met with Dr. Snow on 2/3/2022 to establish care. Recommended regorafenib.     There have been questions of a parasitic infection. Please see previous notes from [facility] oncologist for further details. Was seen by ID at the  on 2/27, no concerns for parasitic infection    Started regorafenib on 3/2/2023. Progression noted 5/19/23. Plan to start FOLFOX/Avastin and send out CARIS testing to evaluate for other treatment options. Cycle 1 was given with oxaliplatin desensitization, 5FU, Avastin held due to increased urine protein.       Interval History: Roman is feeling well. He tolerated FOLFOX well a few weeks ago. The 5FU bolus push felt fast to him It was pushed over 4-5 minutes. In the past, he reacted during a fast 5FU bolus--ultimately the reaction was thought to be from oxaliplatin. This time he did have nausea fairly quickly after bolus push. No nausea once pump was off. Used zofran with relief of nausea.     Had 3-4 days of cold sensitivity, completely resolved. No mucositis or HFS.     Bowels were okay  with exception of noticing more discharge the past 4-5 days which has been clear. Hydrating well. No diarrhea.      No fevers/chills or infectious concerns.     He has not collected 24 hour urine yet.       No past medical history on file.         Allergies   Allergen Reactions     Oxaliplatin Shortness Of Breath, Nausea and Vomiting and Other (See Comments)     Patient had HSR to Oxaliplatin on cycle 8 of FOLFOX chemotherapy. Sent to ER for continued monitoring.  Patient had HSR to Oxaliplatin on cycle 8 of FOLFOX chemotherapy. Sent to ER for continued monitoring.       Blood-Group Specific Substance Other (See Comments)     Patient has reactivity Suggestive of a Warm auto antibody. Blood products may be delayed. Draw patient 24 hours prior to transfusion. Draw one red top and two purple top tubes for all type and screen orders.  Patient has reactivity Suggestive of a Warm auto antibody. Blood products may be delayed. Draw patient 24 hours prior to transfusion. Draw one red top and two purple top tubes for all type and screen orders.             PHYSICAL EXAM   There were no vitals taken for this visit.    Wt Readings from Last 4 Encounters:   06/07/23 87.6 kg (193 lb 1.6 oz)   05/19/23 86.2 kg (190 lb 1.6 oz)   04/21/23 89.1 kg (196 lb 6.4 oz)   03/27/23 95.3 kg (210 lb)     Video physical exam  General: Patient appears well in no acute distress.   Skin: No visualized rash or lesions on visualized skin  Eyes: EOMI, no erythema, sclera icterus or discharge noted  Resp: Appears to be breathing comfortably without accessory muscle usage, speaking in full sentences, no cough  MSK: Appears to have normal range of motion based on visualized movements  Neurologic: No apparent tremors, facial movements symmetric  Psych: affect bright, alert and oriented           LABS  Most Recent 3 CBC's:  Recent Labs   Lab Test 06/07/23  0707 05/12/23  0707 04/19/23  1422   WBC 8.2 9.5 8.3   HGB 14.4 15.8 15.7   MCV 84 83 84     334 268    Most Recent 3 BMP's:  Recent Labs   Lab Test 06/07/23  0707 05/12/23  0707 04/19/23  1422    138 136   POTASSIUM 4.0 4.1 4.0   CHLORIDE 106 105 105   CO2 26 23 29   BUN 9.8 12.1 11   CR 0.79 0.76 0.76   ANIONGAP 8 10 2*   JEFERSON 9.1 9.1 9.0   * 115* 68*    Most Recent 2 LFT's:  Recent Labs   Lab Test 06/07/23  0707 05/12/23  0707   AST 20 30   ALT 12 25   ALKPHOS 81 87   BILITOTAL 0.5 0.4      I reviewed the above labs today.      IMAGING  CT CAP 5/12/23  IMPRESSION: In this patient with history of rectal adenocarcinoma  metastatic to the lung, the current scan compared to prior scan from  1/23/2023 shows:     1. Increase in size of some of the  pulmonary nodules, with some of  the nodules showing cavitation.  2. Increased size of hepatic hypoattenuating lesions  3. Mild gallbladder wall thickening/edema, may consider ultrasound for  further evaluation especially if concern for cholecystitis.  4. Slight increase in size of mesenteric nodularity now extending into  the parastomal hernia, concerning for deposit, consider attention on  follow-up  5. Similar to slight increase in the size of rectal thickening and  left pararectal nodule  6. Slight increased size of retroperitoneal and left common iliac  lymph nodes.      ASSESSMENT AND PLAN   Metastatic rectal cancer  - Recent progression on regorafenib. Started FOLFOX/Avastin 2 weeks ago. Avastin held due to elevated urine protein  -24 hour urine protein has not been collected yet. Will rely in random urine protein tomorrow. If above 100 will hold Avastin again. He will plan to collect urine in about a week when chemo has metabolized.   -Otherwise tolerated well with minimal side effects. Will request 5FU bolus is done over 5-10 mins   -Okay to proceed with cycle 2 tomorrow pending acceptable labs   - plan for imaging re-evaluation in 2 months     LIZ  -Well controlled with premeds and zofran PRN     40 minutes spent on the date of the encounter  doing chart review, review of test results, interpretation of tests, patient visit and documentation     Usha Mitchell PA-C

## 2023-06-21 ENCOUNTER — VIRTUAL VISIT (OUTPATIENT)
Dept: ONCOLOGY | Facility: CLINIC | Age: 48
End: 2023-06-21
Attending: STUDENT IN AN ORGANIZED HEALTH CARE EDUCATION/TRAINING PROGRAM
Payer: COMMERCIAL

## 2023-06-21 DIAGNOSIS — C78.00 RECTAL ADENOCARCINOMA METASTATIC TO LUNG (H): Primary | ICD-10-CM

## 2023-06-21 DIAGNOSIS — C20 RECTAL ADENOCARCINOMA METASTATIC TO LUNG (H): Primary | ICD-10-CM

## 2023-06-21 PROCEDURE — 99215 OFFICE O/P EST HI 40 MIN: CPT | Mod: VID | Performed by: PHYSICIAN ASSISTANT

## 2023-06-21 RX ORDER — FLUOROURACIL 50 MG/ML
400 INJECTION, SOLUTION INTRAVENOUS ONCE
Status: CANCELLED | OUTPATIENT
Start: 2023-06-21

## 2023-06-21 RX ORDER — LORAZEPAM 2 MG/ML
0.5 INJECTION INTRAMUSCULAR EVERY 4 HOURS PRN
Status: CANCELLED | OUTPATIENT
Start: 2023-06-21

## 2023-06-21 RX ORDER — HEPARIN SODIUM (PORCINE) LOCK FLUSH IV SOLN 100 UNIT/ML 100 UNIT/ML
5 SOLUTION INTRAVENOUS
Status: CANCELLED | OUTPATIENT
Start: 2023-06-21

## 2023-06-21 RX ORDER — MEPERIDINE HYDROCHLORIDE 25 MG/ML
25 INJECTION INTRAMUSCULAR; INTRAVENOUS; SUBCUTANEOUS EVERY 30 MIN PRN
Status: CANCELLED | OUTPATIENT
Start: 2023-06-21

## 2023-06-21 RX ORDER — FLUOROURACIL 50 MG/ML
INJECTION, SOLUTION INTRAVENOUS
COMMUNITY
Start: 2023-06-09 | End: 2024-03-14

## 2023-06-21 RX ORDER — HEPARIN SODIUM,PORCINE 10 UNIT/ML
5 VIAL (ML) INTRAVENOUS
Status: CANCELLED | OUTPATIENT
Start: 2023-07-01

## 2023-06-21 RX ORDER — ALBUTEROL SULFATE 90 UG/1
1-2 AEROSOL, METERED RESPIRATORY (INHALATION)
Status: CANCELLED
Start: 2023-06-21

## 2023-06-21 RX ORDER — ALBUTEROL SULFATE 0.83 MG/ML
2.5 SOLUTION RESPIRATORY (INHALATION)
Status: CANCELLED | OUTPATIENT
Start: 2023-06-21

## 2023-06-21 RX ORDER — HEPARIN SODIUM,PORCINE 10 UNIT/ML
5 VIAL (ML) INTRAVENOUS
Status: CANCELLED | OUTPATIENT
Start: 2023-06-21

## 2023-06-21 RX ORDER — DIPHENHYDRAMINE HYDROCHLORIDE 50 MG/ML
50 INJECTION INTRAMUSCULAR; INTRAVENOUS
Status: CANCELLED
Start: 2023-06-21

## 2023-06-21 RX ORDER — ONDANSETRON 2 MG/ML
8 INJECTION INTRAMUSCULAR; INTRAVENOUS ONCE
Status: CANCELLED | OUTPATIENT
Start: 2023-06-21

## 2023-06-21 RX ORDER — METHYLPREDNISOLONE SODIUM SUCCINATE 125 MG/2ML
125 INJECTION, POWDER, LYOPHILIZED, FOR SOLUTION INTRAMUSCULAR; INTRAVENOUS
Status: CANCELLED
Start: 2023-06-21

## 2023-06-21 RX ORDER — EPINEPHRINE 1 MG/ML
0.3 INJECTION, SOLUTION INTRAMUSCULAR; SUBCUTANEOUS EVERY 5 MIN PRN
Status: CANCELLED | OUTPATIENT
Start: 2023-06-21

## 2023-06-21 RX ORDER — HEPARIN SODIUM (PORCINE) LOCK FLUSH IV SOLN 100 UNIT/ML 100 UNIT/ML
5 SOLUTION INTRAVENOUS
Status: CANCELLED | OUTPATIENT
Start: 2023-07-01

## 2023-06-21 NOTE — NURSING NOTE
Is the patient currently in the state of [place]? YES    Visit mode:VIDEO    If the visit is dropped, the patient can be reconnected by: VIDEO VISIT: Text to cell phone: [phone]    Will anyone else be joining the visit? NO      How would you like to obtain your AVS? MyChart    Are changes needed to the allergy or medication list? NO    Reason for visit: VIDAL Dunne VF

## 2023-06-21 NOTE — LETTER
6/21/2023         RE: Roman Escalante  [address]  [place] [address]        Dear Colleague,    Thank you for referring your patient, Roman Escalante, to the [facility]. Please see a copy of my visit note below.    Virtual Visit Details    Type of service:  Video Visit   Video Start Time: 12:37 PM  Video End Time:12:53 PM    Originating Location (pt. Location): Home    Distant Location (provider location):  Off-site  Platform used for Video Visit: [facility] - Medical Oncology Follow Up Note  06/20/2023      HPI:   Patient developed rectal bleeding in 2020.  In January 2021 CT showed focal wall thickening in the upper rectum, multiple pulmonary nodules bilaterally suspicious for metastatic disease.  Underwent FNA of the right upper lobe lesion which was consistent with adenocarcinoma of the colon.  He was treated with FOLFOX from 2/20/2021 through 5/20/2021.  Avastin was added.  Had an infusion reaction to oxaliplatin 6/2021.  7/2021- 12/2021 was on 5-FU alone.  Developed progression.  12/2021- 9/2022 was on FOLFIRI.  11/2021 started Lonsurf. All of this was done with outside oncologist.     Met with Dr. Snow on 2/3/2022 to establish care. Recommended regorafenib.     There have been questions of a parasitic infection. [facility] details. Was seen by ID at the  on 2/27, no concerns for parasitic infection    Started regorafenib on 3/2/2023. Progression noted 5/19/23. Plan to start FOLFOX/Avastin and send out CARIS testing to evaluate for other treatment options. Cycle 1 was given with oxaliplatin desensitization, 5FU, Avastin held due to increased urine protein.       Interval History: Roman is feeling well. He tolerated FOLFOX well a few weeks ago. The 5FU bolus push felt fast to him It was pushed over 4-5 minutes. In the past, he reacted during a fast 5FU bolus--ultimately the reaction  was thought to be from oxaliplatin. This time he did have nausea fairly quickly after bolus push. No nausea once pump was off. Used zofran with relief of nausea.     Had 3-4 days of cold sensitivity, completely resolved. No mucositis or HFS.     Bowels were okay with exception of noticing more discharge the past 4-5 days which has been clear. Hydrating well. No diarrhea.      No fevers/chills or infectious concerns.     He has not collected 24 hour urine yet.       No past medical history on file.         Allergies   Allergen Reactions    Oxaliplatin Shortness Of Breath, Nausea and Vomiting and Other (See Comments)     Patient had HSR to Oxaliplatin on cycle 8 of FOLFOX chemotherapy. Sent to ER for continued monitoring.  Patient had HSR to Oxaliplatin on cycle 8 of FOLFOX chemotherapy. Sent to ER for continued monitoring.      Blood-Group Specific Substance Other (See Comments)     Patient has reactivity Suggestive of a Warm auto antibody. Blood products may be delayed. Draw patient 24 hours prior to transfusion. Draw one red top and two purple top tubes for all type and screen orders.  Patient has reactivity Suggestive of a Warm auto antibody. Blood products may be delayed. Draw patient 24 hours prior to transfusion. Draw one red top and two purple top tubes for all type and screen orders.             PHYSICAL EXAM   There were no vitals taken for this visit.    Wt Readings from Last 4 Encounters:   06/07/23 87.6 kg (193 lb 1.6 oz)   05/19/23 86.2 kg (190 lb 1.6 oz)   04/21/23 89.1 kg (196 lb 6.4 oz)   03/27/23 95.3 kg (210 lb)     Video physical exam  General: Patient appears well in no acute distress.   Skin: No visualized rash or lesions on visualized skin  Eyes: EOMI, no erythema, sclera icterus or discharge noted  Resp: Appears to be breathing comfortably without accessory muscle usage, speaking in full sentences, no cough  MSK: Appears to have normal range of motion based on visualized movements  Neurologic:  No apparent tremors, facial movements symmetric  Psych: affect bright, alert and oriented           LABS  Most Recent 3 CBC's:  Recent Labs   Lab Test 06/07/23  0707 05/12/23  0707 04/19/23  1422   WBC 8.2 9.5 8.3   HGB 14.4 15.8 15.7   MCV 84 83 84    334 268    Most Recent 3 BMP's:  Recent Labs   Lab Test 06/07/23  0707 05/12/23  0707 04/19/23  1422    138 136   POTASSIUM 4.0 4.1 4.0   CHLORIDE 106 105 105   CO2 26 23 29   BUN 9.8 12.1 11   CR 0.79 0.76 0.76   ANIONGAP 8 10 2*   JEFERSON 9.1 9.1 9.0   * 115* 68*    Most Recent 2 LFT's:  Recent Labs   Lab Test 06/07/23  0707 05/12/23  0707   AST 20 30   ALT 12 25   ALKPHOS 81 87   BILITOTAL 0.5 0.4      I reviewed the above labs today.      IMAGING  CT CAP 5/12/23  IMPRESSION: In this patient with history of rectal adenocarcinoma  metastatic to the lung, the current scan compared to prior scan from  1/23/2023 shows:     1. Increase in size of some of the  pulmonary nodules, with some of  the nodules showing cavitation.  2. Increased size of hepatic hypoattenuating lesions  3. Mild gallbladder wall thickening/edema, may consider ultrasound for  further evaluation especially if concern for cholecystitis.  4. Slight increase in size of mesenteric nodularity now extending into  the parastomal hernia, concerning for deposit, consider attention on  follow-up  5. Similar to slight increase in the size of rectal thickening and  left pararectal nodule  6. Slight increased size of retroperitoneal and left common iliac  lymph nodes.      ASSESSMENT AND PLAN   Metastatic rectal cancer  - Recent progression on regorafenib. Started FOLFOX/Avastin 2 weeks ago. Avastin held due to elevated urine protein  -24 hour urine protein has not been collected yet. Will rely in random urine protein tomorrow. If above 100 will hold Avastin again. He will plan to collect urine in about a week when chemo has metabolized.   -Otherwise tolerated well with minimal side effects. Will  request 5FU bolus is done over 5-10 mins   -Okay to proceed with cycle 2 tomorrow pending acceptable labs   - plan for imaging re-evaluation in 2 months     LIZ  -Well controlled with premeds and zofran PRN     40 minutes spent on the date of the encounter doing chart review, review of test results, interpretation of tests, patient visit and documentation     Usha Mitchell PA-C

## 2023-06-22 ENCOUNTER — INFUSION THERAPY VISIT (OUTPATIENT)
Dept: ONCOLOGY | Facility: CLINIC | Age: 48
End: 2023-06-22
Attending: STUDENT IN AN ORGANIZED HEALTH CARE EDUCATION/TRAINING PROGRAM
Payer: COMMERCIAL

## 2023-06-22 ENCOUNTER — APPOINTMENT (OUTPATIENT)
Dept: LAB | Facility: CLINIC | Age: 48
End: 2023-06-22
Attending: STUDENT IN AN ORGANIZED HEALTH CARE EDUCATION/TRAINING PROGRAM
Payer: COMMERCIAL

## 2023-06-22 ENCOUNTER — PATIENT OUTREACH (OUTPATIENT)
Dept: ONCOLOGY | Facility: CLINIC | Age: 48
End: 2023-06-22

## 2023-06-22 VITALS
OXYGEN SATURATION: 98 % | TEMPERATURE: 97.9 F | SYSTOLIC BLOOD PRESSURE: 108 MMHG | HEART RATE: 70 BPM | DIASTOLIC BLOOD PRESSURE: 77 MMHG | RESPIRATION RATE: 16 BRPM | BODY MASS INDEX: 27.56 KG/M2 | WEIGHT: 197.6 LBS

## 2023-06-22 DIAGNOSIS — D70.1 CHEMOTHERAPY-INDUCED NEUTROPENIA (H): Primary | ICD-10-CM

## 2023-06-22 DIAGNOSIS — T45.1X5A CHEMOTHERAPY-INDUCED NEUTROPENIA (H): Primary | ICD-10-CM

## 2023-06-22 DIAGNOSIS — C20 RECTAL ADENOCARCINOMA METASTATIC TO LUNG (H): Primary | ICD-10-CM

## 2023-06-22 DIAGNOSIS — C78.00 RECTAL ADENOCARCINOMA METASTATIC TO LUNG (H): Primary | ICD-10-CM

## 2023-06-22 LAB
ALBUMIN SERPL BCG-MCNC: 4 G/DL (ref 3.5–5.2)
ALBUMIN UR-MCNC: NEGATIVE MG/DL
ALP SERPL-CCNC: 77 U/L (ref 40–129)
ALT SERPL W P-5'-P-CCNC: 12 U/L (ref 0–70)
ANION GAP SERPL CALCULATED.3IONS-SCNC: 8 MMOL/L (ref 7–15)
AST SERPL W P-5'-P-CCNC: 18 U/L (ref 0–45)
BASOPHILS # BLD MANUAL: 0 10E3/UL (ref 0–0.2)
BASOPHILS NFR BLD MANUAL: 0 %
BILIRUB SERPL-MCNC: 0.3 MG/DL
BUN SERPL-MCNC: 8.5 MG/DL (ref 6–20)
CALCIUM SERPL-MCNC: 9.1 MG/DL (ref 8.6–10)
CEA SERPL-MCNC: 32.2 NG/ML
CHLORIDE SERPL-SCNC: 103 MMOL/L (ref 98–107)
CREAT SERPL-MCNC: 0.76 MG/DL (ref 0.67–1.17)
DEPRECATED HCO3 PLAS-SCNC: 25 MMOL/L (ref 22–29)
EOSINOPHIL # BLD MANUAL: 0.3 10E3/UL (ref 0–0.7)
EOSINOPHIL NFR BLD MANUAL: 12 %
ERYTHROCYTE [DISTWIDTH] IN BLOOD BY AUTOMATED COUNT: 16.6 % (ref 10–15)
GFR SERPL CREATININE-BSD FRML MDRD: >90 ML/MIN/1.73M2
GLUCOSE SERPL-MCNC: 118 MG/DL (ref 70–99)
HCT VFR BLD AUTO: 39.2 % (ref 40–53)
HGB BLD-MCNC: 13.1 G/DL (ref 13.3–17.7)
LYMPHOCYTES # BLD MANUAL: 1.2 10E3/UL (ref 0.8–5.3)
LYMPHOCYTES NFR BLD MANUAL: 53 %
MCH RBC QN AUTO: 27.6 PG (ref 26.5–33)
MCHC RBC AUTO-ENTMCNC: 33.4 G/DL (ref 31.5–36.5)
MCV RBC AUTO: 83 FL (ref 78–100)
MONOCYTES # BLD MANUAL: 0.7 10E3/UL (ref 0–1.3)
MONOCYTES NFR BLD MANUAL: 31 %
NEUTROPHILS # BLD MANUAL: 0.1 10E3/UL (ref 1.6–8.3)
NEUTROPHILS NFR BLD MANUAL: 4 %
PLAT MORPH BLD: ABNORMAL
PLATELET # BLD AUTO: 199 10E3/UL (ref 150–450)
POTASSIUM SERPL-SCNC: 4.2 MMOL/L (ref 3.4–5.3)
PROT SERPL-MCNC: 7 G/DL (ref 6.4–8.3)
RBC # BLD AUTO: 4.75 10E6/UL (ref 4.4–5.9)
RBC MORPH BLD: ABNORMAL
SODIUM SERPL-SCNC: 136 MMOL/L (ref 136–145)
WBC # BLD AUTO: 2.3 10E3/UL (ref 4–11)

## 2023-06-22 PROCEDURE — 82378 CARCINOEMBRYONIC ANTIGEN: CPT

## 2023-06-22 PROCEDURE — 36591 DRAW BLOOD OFF VENOUS DEVICE: CPT | Performed by: PHYSICIAN ASSISTANT

## 2023-06-22 PROCEDURE — 85027 COMPLETE CBC AUTOMATED: CPT | Performed by: PHYSICIAN ASSISTANT

## 2023-06-22 PROCEDURE — 80053 COMPREHEN METABOLIC PANEL: CPT | Performed by: PHYSICIAN ASSISTANT

## 2023-06-22 PROCEDURE — 85007 BL SMEAR W/DIFF WBC COUNT: CPT | Performed by: PHYSICIAN ASSISTANT

## 2023-06-22 PROCEDURE — 81003 URINALYSIS AUTO W/O SCOPE: CPT | Performed by: PHYSICIAN ASSISTANT

## 2023-06-22 PROCEDURE — 250N000011 HC RX IP 250 OP 636: Mod: JZ | Performed by: STUDENT IN AN ORGANIZED HEALTH CARE EDUCATION/TRAINING PROGRAM

## 2023-06-22 RX ORDER — HEPARIN SODIUM (PORCINE) LOCK FLUSH IV SOLN 100 UNIT/ML 100 UNIT/ML
5 SOLUTION INTRAVENOUS ONCE
Status: COMPLETED | OUTPATIENT
Start: 2023-06-22 | End: 2023-06-22

## 2023-06-22 RX ADMIN — Medication 5 ML: at 07:08

## 2023-06-22 ASSESSMENT — PAIN SCALES - GENERAL: PAINLEVEL: NO PAIN (0)

## 2023-06-22 NOTE — TELEPHONE ENCOUNTER
[facility]: Cancer Care                                                                                          Writer was copied on scheduling request by infusion nurse; per chart pt's ANC was 0.1 today and Dr. Snow ordered filgrastim x5 days, rescheduled chemo to next week. Writer called [facility] to make sure this was covered and pt picked it up, pharmacist Jeny did confirm pt had a $0 copay and picked up 5 syringes. Will monitor for infusion date next week, currently on waiting list.    Signature:  Virgen Nieto RN

## 2023-06-22 NOTE — PROGRESS NOTES
Infusion Nursing Note:  Roman Escalante presents today for Cycle 2 Day 1 bevacizumab-bvzr, oxaliplatin desensitization, fluorouracil bolus + home pump connect - DEFERRED.    Patient seen by provider today: No, virtual visit with Usha Mitchell PA-C yesterday   present during visit today: Not Applicable.    Note: Roman presents today feeling well. Denies pain or nausea/vomiting. Offers no changes or concerns since visit with Usha Mitchell PA-C yesterday.    Per written communication: Dr. Snow/Parisa Lovell, RN [id number] (see note below from Dr. Snow)  Hold treatment today and add G-CSF x5 days for neutropenia   Reschedule chemo for next week  Will add peg-filgrastim to future cycles of chemo    Roman elected to do G-CSF injections at home, and his significant other will assist him with this (she is an RN). Discussed side effects and use of claritin as needed for prevention/relief of bone pain. Roman is in agreement with the plan.    Per Usha Mitchell, Roman does not need to complete the 24-hour urine collection since his sample was negative for protein today.      Intravenous Access:  Implanted Port.    Treatment Conditions:     Latest Reference Range & Units 06/22/23 07:18   Sodium 136 - 145 mmol/L 136   Potassium 3.4 - 5.3 mmol/L 4.2   Chloride 98 - 107 mmol/L 103   Carbon Dioxide (CO2) 22 - 29 mmol/L 25   Urea Nitrogen 6.0 - 20.0 mg/dL 8.5   Creatinine 0.67 - 1.17 mg/dL 0.76   GFR Estimate >60 mL/min/1.73m2 >90   Calcium 8.6 - 10.0 mg/dL 9.1   Anion Gap 7 - 15 mmol/L 8   Albumin 3.5 - 5.2 g/dL 4.0   Protein Total 6.4 - 8.3 g/dL 7.0   Alkaline Phosphatase 40 - 129 U/L 77   ALT 0 - 70 U/L 12   AST 0 - 45 U/L 18   Bilirubin Total <=1.2 mg/dL 0.3   Glucose 70 - 99 mg/dL 118 (H)   WBC 4.0 - 11.0 10e3/uL 2.3 (L)   Hemoglobin 13.3 - 17.7 g/dL 13.1 (L)   Hematocrit 40.0 - 53.0 % 39.2 (L)   Platelet Count 150 - 450 10e3/uL 199   RBC Count 4.40 - 5.90 10e6/uL 4.75   MCV 78 - 100 fL 83   MCH 26.5 - 33.0  pg 27.6   MCHC 31.5 - 36.5 g/dL 33.4   RDW 10.0 - 15.0 % 16.6 (H)   % Neutrophils % 4   % Lymphocytes % 53   % Monocytes % 31   % Eosinophils % 12   % Basophils % 0   Absolute Basophils 0.0 - 0.2 10e3/uL 0.0   Absolute Neutrophil 1.6 - 8.3 10e3/uL 0.1 (LL)   Absolute Lymphocytes 0.8 - 5.3 10e3/uL 1.2   Absolute Monocytes 0.0 - 1.3 10e3/uL 0.7   Absolute Eosinophils 0.0 - 0.7 10e3/uL 0.3   RBC Morphology  Confirmed RBC Indices   Platelet Morphology Automated Count Confirmed. Platelet morphology is normal.  Automated Count Confirmed. Platelet morphology is normal.      Latest Reference Range & Units 06/22/23 07:21   Protein Albumin Urine Negative mg/dL Negative     Results reviewed, labs did NOT meet treatment parameters: ANC <1200.      Post Infusion Assessment:  Site patent and intact, free from redness, edema or discomfort.  No evidence of extravasations.  Access discontinued per protocol.       Discharge Plan:   Prescription refills given for filgrastim.  Discharge instructions reviewed with: Patient.  Patient and/or family verbalized understanding of discharge instructions and all questions answered.  AVS to patient via [employer].  Patient will return in ~1 week for next infusion appointment. Scheduling requested.  Patient discharged in stable condition accompanied by: self.  Departure Mode: Ambulatory.      Parisa Lovell RN

## 2023-06-22 NOTE — PATIENT INSTRUCTIONS
[facility] Triage and after hours / weekends / holidays:  [phone]    Please call the triage or after hours line if you experience a temperature greater than or equal to 100.4, shaking chills, have uncontrolled nausea, vomiting and/or diarrhea, dizziness, shortness of breath, chest pain, bleeding, unexplained bruising, or if you have any other new/concerning symptoms, questions or concerns.      If you are having any concerning symptoms or wish to speak to a provider before your next infusion visit, please call your care coordinator or triage to notify them so we can adequately serve you.     If you need a refill on a narcotic prescription or other medication, please call before your infusion appointment.

## 2023-06-22 NOTE — NURSING NOTE
Chief Complaint   Patient presents with     Port Draw     Labs drawn via port by rn in lab. VS taken.     Port accessed with 20g 3/4 inch power needle by RN, labs collected, line flushed with saline and heparin.  Vitals taken. Pt checked in for appointment(s).    Hans Rivera, RN

## 2023-06-22 NOTE — PROGRESS NOTES
[facility] - Medical Oncology Update/Event Note  6/22/2023    CC: Called for ANC 0.1 on C2D1 of FOLFOX/Reyna with oxaliplatin desensitization    Assessment/Recommendations:  Patient found to have ANC of 0.1 on presentation for chemotherapy. We will hold treatment for today and provide daily filgrastim 480mcg x5 days. We will request treatment reschedule for next week (necessitating adjustment of all future treatment dates). We will also plan to add peg-filgrastim to subsequent cycles.    Patient informed of plan by nursing and is in agreement.     Polo Snow MD, PhD   of Medicine  Division of Hematology, Oncology and Transplantation  [facility]

## 2023-06-27 DIAGNOSIS — C78.00 RECTAL ADENOCARCINOMA METASTATIC TO LUNG (H): Primary | ICD-10-CM

## 2023-06-27 DIAGNOSIS — C20 RECTAL ADENOCARCINOMA METASTATIC TO LUNG (H): Primary | ICD-10-CM

## 2023-06-27 RX ORDER — FLUOROURACIL 50 MG/ML
400 INJECTION, SOLUTION INTRAVENOUS ONCE
Status: CANCELLED | OUTPATIENT
Start: 2023-06-29

## 2023-06-27 RX ORDER — ALBUTEROL SULFATE 90 UG/1
1-2 AEROSOL, METERED RESPIRATORY (INHALATION)
Status: CANCELLED
Start: 2023-06-29

## 2023-06-27 RX ORDER — METHYLPREDNISOLONE SODIUM SUCCINATE 125 MG/2ML
125 INJECTION, POWDER, LYOPHILIZED, FOR SOLUTION INTRAMUSCULAR; INTRAVENOUS
Status: CANCELLED
Start: 2023-06-29

## 2023-06-27 RX ORDER — ALBUTEROL SULFATE 0.83 MG/ML
2.5 SOLUTION RESPIRATORY (INHALATION)
Status: CANCELLED | OUTPATIENT
Start: 2023-06-29

## 2023-06-27 RX ORDER — MEPERIDINE HYDROCHLORIDE 25 MG/ML
25 INJECTION INTRAMUSCULAR; INTRAVENOUS; SUBCUTANEOUS EVERY 30 MIN PRN
Status: CANCELLED | OUTPATIENT
Start: 2023-06-29

## 2023-06-27 RX ORDER — ONDANSETRON 2 MG/ML
8 INJECTION INTRAMUSCULAR; INTRAVENOUS ONCE
Status: CANCELLED | OUTPATIENT
Start: 2023-06-29

## 2023-06-27 RX ORDER — DIPHENHYDRAMINE HYDROCHLORIDE 50 MG/ML
50 INJECTION INTRAMUSCULAR; INTRAVENOUS
Status: CANCELLED
Start: 2023-06-29

## 2023-06-27 RX ORDER — LORAZEPAM 2 MG/ML
0.5 INJECTION INTRAMUSCULAR EVERY 4 HOURS PRN
Status: CANCELLED | OUTPATIENT
Start: 2023-06-29

## 2023-06-27 RX ORDER — EPINEPHRINE 1 MG/ML
0.3 INJECTION, SOLUTION INTRAMUSCULAR; SUBCUTANEOUS EVERY 5 MIN PRN
Status: CANCELLED | OUTPATIENT
Start: 2023-06-29

## 2023-06-27 RX ORDER — HEPARIN SODIUM,PORCINE 10 UNIT/ML
5 VIAL (ML) INTRAVENOUS
Status: CANCELLED | OUTPATIENT
Start: 2023-06-29

## 2023-06-27 RX ORDER — HEPARIN SODIUM (PORCINE) LOCK FLUSH IV SOLN 100 UNIT/ML 100 UNIT/ML
5 SOLUTION INTRAVENOUS
Status: CANCELLED | OUTPATIENT
Start: 2023-06-29

## 2023-06-28 ENCOUNTER — MEDICAL CORRESPONDENCE (OUTPATIENT)
Dept: HEALTH INFORMATION MANAGEMENT | Facility: CLINIC | Age: 48
End: 2023-06-28
Payer: COMMERCIAL

## 2023-06-29 ENCOUNTER — INFUSION THERAPY VISIT (OUTPATIENT)
Dept: ONCOLOGY | Facility: CLINIC | Age: 48
End: 2023-06-29
Attending: STUDENT IN AN ORGANIZED HEALTH CARE EDUCATION/TRAINING PROGRAM
Payer: COMMERCIAL

## 2023-06-29 ENCOUNTER — LAB (OUTPATIENT)
Dept: LAB | Facility: CLINIC | Age: 48
End: 2023-06-29
Attending: STUDENT IN AN ORGANIZED HEALTH CARE EDUCATION/TRAINING PROGRAM
Payer: COMMERCIAL

## 2023-06-29 VITALS
HEART RATE: 66 BPM | OXYGEN SATURATION: 99 % | TEMPERATURE: 98.4 F | BODY MASS INDEX: 27.29 KG/M2 | SYSTOLIC BLOOD PRESSURE: 138 MMHG | WEIGHT: 195.7 LBS | RESPIRATION RATE: 16 BRPM | DIASTOLIC BLOOD PRESSURE: 86 MMHG

## 2023-06-29 DIAGNOSIS — C78.00 RECTAL ADENOCARCINOMA METASTATIC TO LUNG (H): Primary | ICD-10-CM

## 2023-06-29 DIAGNOSIS — C20 RECTAL ADENOCARCINOMA METASTATIC TO LUNG (H): Primary | ICD-10-CM

## 2023-06-29 DIAGNOSIS — C20 RECTAL ADENOCARCINOMA METASTATIC TO LUNG (H): ICD-10-CM

## 2023-06-29 DIAGNOSIS — C78.00 RECTAL ADENOCARCINOMA METASTATIC TO LUNG (H): ICD-10-CM

## 2023-06-29 LAB
ALBUMIN MFR UR ELPH: <6 MG/DL
ALBUMIN SERPL BCG-MCNC: 4.2 G/DL (ref 3.5–5.2)
ALP SERPL-CCNC: 101 U/L (ref 40–129)
ALT SERPL W P-5'-P-CCNC: 14 U/L (ref 0–70)
ANION GAP SERPL CALCULATED.3IONS-SCNC: 10 MMOL/L (ref 7–15)
AST SERPL W P-5'-P-CCNC: 22 U/L (ref 0–45)
BASOPHILS # BLD MANUAL: 0.1 10E3/UL (ref 0–0.2)
BASOPHILS NFR BLD MANUAL: 2 %
BILIRUB SERPL-MCNC: 0.3 MG/DL
BUN SERPL-MCNC: 10 MG/DL (ref 6–20)
CALCIUM SERPL-MCNC: 9 MG/DL (ref 8.6–10)
CHLORIDE SERPL-SCNC: 102 MMOL/L (ref 98–107)
COLLECT DURATION TIME UR: 20 H
CREAT SERPL-MCNC: 0.84 MG/DL (ref 0.67–1.17)
DEPRECATED HCO3 PLAS-SCNC: 24 MMOL/L (ref 22–29)
EOSINOPHIL # BLD MANUAL: 0.1 10E3/UL (ref 0–0.7)
EOSINOPHIL NFR BLD MANUAL: 1 %
ERYTHROCYTE [DISTWIDTH] IN BLOOD BY AUTOMATED COUNT: 16.7 % (ref 10–15)
GFR SERPL CREATININE-BSD FRML MDRD: >90 ML/MIN/1.73M2
GLUCOSE SERPL-MCNC: 119 MG/DL (ref 70–99)
HCT VFR BLD AUTO: 41.3 % (ref 40–53)
HGB BLD-MCNC: 13.7 G/DL (ref 13.3–17.7)
LYMPHOCYTES # BLD MANUAL: 1.8 10E3/UL (ref 0.8–5.3)
LYMPHOCYTES NFR BLD MANUAL: 35 %
MCH RBC QN AUTO: 27.1 PG (ref 26.5–33)
MCHC RBC AUTO-ENTMCNC: 33.2 G/DL (ref 31.5–36.5)
MCV RBC AUTO: 82 FL (ref 78–100)
METAMYELOCYTES # BLD MANUAL: 0.1 10E3/UL
METAMYELOCYTES NFR BLD MANUAL: 1 %
MONOCYTES # BLD MANUAL: 1.1 10E3/UL (ref 0–1.3)
MONOCYTES NFR BLD MANUAL: 22 %
NEUTROPHILS # BLD MANUAL: 2 10E3/UL (ref 1.6–8.3)
NEUTROPHILS NFR BLD MANUAL: 39 %
PLAT MORPH BLD: ABNORMAL
PLATELET # BLD AUTO: 208 10E3/UL (ref 150–450)
POTASSIUM SERPL-SCNC: 4 MMOL/L (ref 3.4–5.3)
PROT 24H UR-MRATE: NORMAL G/(24.H)
PROT SERPL-MCNC: 7.2 G/DL (ref 6.4–8.3)
RBC # BLD AUTO: 5.06 10E6/UL (ref 4.4–5.9)
RBC MORPH BLD: ABNORMAL
SODIUM SERPL-SCNC: 136 MMOL/L (ref 136–145)
SPECIMEN VOL UR: 2182 ML
WBC # BLD AUTO: 5 10E3/UL (ref 4–11)

## 2023-06-29 PROCEDURE — 96376 TX/PRO/DX INJ SAME DRUG ADON: CPT

## 2023-06-29 PROCEDURE — 84156 ASSAY OF PROTEIN URINE: CPT

## 2023-06-29 PROCEDURE — 85014 HEMATOCRIT: CPT | Performed by: PHYSICIAN ASSISTANT

## 2023-06-29 PROCEDURE — 250N000011 HC RX IP 250 OP 636

## 2023-06-29 PROCEDURE — 96368 THER/DIAG CONCURRENT INF: CPT

## 2023-06-29 PROCEDURE — G0463 HOSPITAL OUTPT CLINIC VISIT: HCPCS | Mod: 25

## 2023-06-29 PROCEDURE — 36591 DRAW BLOOD OFF VENOUS DEVICE: CPT | Performed by: PHYSICIAN ASSISTANT

## 2023-06-29 PROCEDURE — 250N000011 HC RX IP 250 OP 636: Mod: JZ | Performed by: PHYSICIAN ASSISTANT

## 2023-06-29 PROCEDURE — 250N000011 HC RX IP 250 OP 636: Mod: JZ | Performed by: STUDENT IN AN ORGANIZED HEALTH CARE EDUCATION/TRAINING PROGRAM

## 2023-06-29 PROCEDURE — 96417 CHEMO IV INFUS EACH ADDL SEQ: CPT

## 2023-06-29 PROCEDURE — G0498 CHEMO EXTEND IV INFUS W/PUMP: HCPCS

## 2023-06-29 PROCEDURE — 96367 TX/PROPH/DG ADDL SEQ IV INF: CPT

## 2023-06-29 PROCEDURE — 81050 URINALYSIS VOLUME MEASURE: CPT

## 2023-06-29 PROCEDURE — 96411 CHEMO IV PUSH ADDL DRUG: CPT

## 2023-06-29 PROCEDURE — 258N000003 HC RX IP 258 OP 636: Performed by: PHYSICIAN ASSISTANT

## 2023-06-29 PROCEDURE — 96413 CHEMO IV INFUSION 1 HR: CPT

## 2023-06-29 PROCEDURE — 80053 COMPREHEN METABOLIC PANEL: CPT | Performed by: PHYSICIAN ASSISTANT

## 2023-06-29 PROCEDURE — 85007 BL SMEAR W/DIFF WBC COUNT: CPT | Performed by: PHYSICIAN ASSISTANT

## 2023-06-29 PROCEDURE — 96375 TX/PRO/DX INJ NEW DRUG ADDON: CPT

## 2023-06-29 PROCEDURE — 96415 CHEMO IV INFUSION ADDL HR: CPT

## 2023-06-29 PROCEDURE — 258N000003 HC RX IP 258 OP 636

## 2023-06-29 RX ORDER — ONDANSETRON 2 MG/ML
8 INJECTION INTRAMUSCULAR; INTRAVENOUS ONCE
Status: COMPLETED | OUTPATIENT
Start: 2023-06-29 | End: 2023-06-29

## 2023-06-29 RX ORDER — FLUOROURACIL 50 MG/ML
400 INJECTION, SOLUTION INTRAVENOUS ONCE
Status: COMPLETED | OUTPATIENT
Start: 2023-06-29 | End: 2023-06-29

## 2023-06-29 RX ORDER — HEPARIN SODIUM (PORCINE) LOCK FLUSH IV SOLN 100 UNIT/ML 100 UNIT/ML
5 SOLUTION INTRAVENOUS ONCE
Status: COMPLETED | OUTPATIENT
Start: 2023-06-29 | End: 2023-06-29

## 2023-06-29 RX ADMIN — DIPHENHYDRAMINE HYDROCHLORIDE 50 MG: 50 INJECTION, SOLUTION INTRAMUSCULAR; INTRAVENOUS at 08:30

## 2023-06-29 RX ADMIN — OXALIPLATIN 1.8 MG: 5 INJECTION, SOLUTION INTRAVENOUS at 11:37

## 2023-06-29 RX ADMIN — OXALIPLATIN 0.02 MG: 5 INJECTION, SOLUTION INTRAVENOUS at 09:34

## 2023-06-29 RX ADMIN — Medication 5 ML: at 06:34

## 2023-06-29 RX ADMIN — DIPHENHYDRAMINE HYDROCHLORIDE 25 MG: 50 INJECTION, SOLUTION INTRAMUSCULAR; INTRAVENOUS at 13:06

## 2023-06-29 RX ADMIN — OXALIPLATIN 160 MG: 5 INJECTION, SOLUTION INTRAVENOUS at 13:57

## 2023-06-29 RX ADMIN — ONDANSETRON 8 MG: 2 INJECTION INTRAMUSCULAR; INTRAVENOUS at 08:07

## 2023-06-29 RX ADMIN — DEXAMETHASONE SODIUM PHOSPHATE: 10 INJECTION, SOLUTION INTRAMUSCULAR; INTRAVENOUS at 08:05

## 2023-06-29 RX ADMIN — DEXTROSE MONOHYDRATE 250 ML: 50 INJECTION, SOLUTION INTRAVENOUS at 09:34

## 2023-06-29 RX ADMIN — OXALIPLATIN 0.18 MG: 5 INJECTION, SOLUTION INTRAVENOUS at 10:36

## 2023-06-29 RX ADMIN — SODIUM CHLORIDE 250 ML: 9 INJECTION, SOLUTION INTRAVENOUS at 08:05

## 2023-06-29 RX ADMIN — DEXAMETHASONE SODIUM PHOSPHATE 10 MG: 10 INJECTION, SOLUTION INTRAMUSCULAR; INTRAVENOUS at 12:48

## 2023-06-29 RX ADMIN — LEUCOVORIN CALCIUM 750 MG: 350 INJECTION, POWDER, LYOPHILIZED, FOR SOLUTION INTRAMUSCULAR; INTRAVENOUS at 13:55

## 2023-06-29 RX ADMIN — SODIUM CHLORIDE 400 MG: 9 INJECTION, SOLUTION INTRAVENOUS at 08:54

## 2023-06-29 RX ADMIN — FAMOTIDINE 20 MG: 10 INJECTION INTRAVENOUS at 15:52

## 2023-06-29 RX ADMIN — FAMOTIDINE 20 MG: 10 INJECTION INTRAVENOUS at 08:10

## 2023-06-29 RX ADMIN — FLUOROURACIL 830 MG: 50 INJECTION, SOLUTION INTRAVENOUS at 16:01

## 2023-06-29 RX ADMIN — OXALIPLATIN 18 MG: 5 INJECTION, SOLUTION INTRAVENOUS at 12:39

## 2023-06-29 ASSESSMENT — PAIN SCALES - GENERAL: PAINLEVEL: NO PAIN (0)

## 2023-06-29 NOTE — PROGRESS NOTES
"Infusion Nursing Note:  Roman Escalante presents today for Cycle 2 Day 1 bevacizumab-bvzr, oxaliplatin desensitization, leucovorin, fluorouracil bolus + home pump connect.    Patient seen by provider today: No   present during visit today: Not Applicable.    Note: Roman presents today feeling well. Denies pain or nausea/vomiting. Denies fevers/chills. Denies SOB, cough, chest pain, or dizziness/lightheadedness. Denies constipation/diarrhea. Denies urinary issues. Endorses unchanged intermittent neuropathy. Offers no new concerns at this appointment.    With approximately 10 minutes remaining in his oxaliplatin infusion, Roman felt chilled and was visibly shaking slightly. Warm blankets and lanette hugger provided with improvement in chills. Roman denied feeling itchy, having difficulty breathing or swallowing, and no tingling/numbness in his mouth/lips/tongue. He reported that he felt like the air conditioning had kicked on and started to make him feel cold. He also reported an upset stomach. Pepcid given with relief of stomach symptoms. Vital signs stable throughout. Roman completed the remainder of the oxaliplatin infusion without incident. Fluorouracil bolus administered over 10 minutes per Roman's preference, with no associated nausea. Vitals remained stable prior to discharge and Roman noted that the chills had resolved and he felt no other symptoms or concerns.     Prior to discharge: Port is secured in place with tegaderm and flushed with 10cc NS with positive blood return noted.    Continuous home infusion C-series pump connected.    All connectors secured in place and clamps taped open.    Pump started, \"running\" noted on display (CADD): Not Applicable.   Capillary element taped to pt's skin per protocol.  Pump Connection double checked with Sosa Castano RN.  Patient instructed to call our clinic or [facility] with any questions or concerns at home.  Patient verbalized understanding. "    Patient set up for neulasta onpro + pump disconnect at home with [facility] on 07/01 at 1600.  IB sent to [facility] Coordinator with date/time information. Coordinated pump disconnect and neulasta onpro in [place] with RISSA Hernández RN.       Intravenous Access:  Implanted Port.    Treatment Conditions:     Latest Reference Range & Units 06/29/23 06:45   Sodium 136 - 145 mmol/L 136   Potassium 3.4 - 5.3 mmol/L 4.0   Chloride 98 - 107 mmol/L 102   Carbon Dioxide (CO2) 22 - 29 mmol/L 24   Urea Nitrogen 6.0 - 20.0 mg/dL 10.0   Creatinine 0.67 - 1.17 mg/dL 0.84   GFR Estimate >60 mL/min/1.73m2 >90   Calcium 8.6 - 10.0 mg/dL 9.0   Anion Gap 7 - 15 mmol/L 10   Albumin 3.5 - 5.2 g/dL 4.2   Protein Total 6.4 - 8.3 g/dL 7.2   Alkaline Phosphatase 40 - 129 U/L 101   ALT 0 - 70 U/L 14   AST 0 - 45 U/L 22   Bilirubin Total <=1.2 mg/dL 0.3   Glucose 70 - 99 mg/dL 119 (H)   WBC 4.0 - 11.0 10e3/uL 5.0   Hemoglobin 13.3 - 17.7 g/dL 13.7   Hematocrit 40.0 - 53.0 % 41.3   Platelet Count 150 - 450 10e3/uL 208   RBC Count 4.40 - 5.90 10e6/uL 5.06   MCV 78 - 100 fL 82   MCH 26.5 - 33.0 pg 27.1   MCHC 31.5 - 36.5 g/dL 33.2   RDW 10.0 - 15.0 % 16.7 (H)   % Neutrophils % 39   % Lymphocytes % 35   % Monocytes % 22   % Eosinophils % 1   % Basophils % 2   % Metamyelocytes % 1   Absolute Basophils 0.0 - 0.2 10e3/uL 0.1   Absolute Neutrophil 1.6 - 8.3 10e3/uL 2.0   Absolute Lymphocytes 0.8 - 5.3 10e3/uL 1.8   Absolute Monocytes 0.0 - 1.3 10e3/uL 1.1   Absolute Eosinophils 0.0 - 0.7 10e3/uL 0.1   Absolute Metamyelocytes <=0.0 10e3/uL 0.1 (H)   RBC Morphology  Confirmed RBC Indices   Platelet Morphology Automated Count Confirmed. Platelet morphology is normal.  Automated Count Confirmed. Platelet morphology is normal.      Latest Reference Range & Units 06/29/23 07:31   Duration in hours h 20.0   Total Protein Urine mg/dL mg/dL <6.0   Volume in mL mL 2,182     Results reviewed, labs MET treatment parameters, ok to proceed with  treatment.  Blood pressure 97/69.      Post Infusion Assessment:  Patient tolerated infusion without incident.  Blood return noted pre and post infusion.  Site patent and intact, free from redness, edema or discomfort.  No evidence of extravasations.  Access discontinued per protocol.       Discharge Plan:   Patient declined prescription refills.  Discharge instructions reviewed with: Patient.  Patient and/or family verbalized understanding of discharge instructions and all questions answered.  AVS to patient via [employer].  Patient will return 07/14 for next infusion appointment.   Patient discharged in stable condition accompanied by: self.  Departure Mode: Ambulatory.      Parisa Lovell RN

## 2023-06-29 NOTE — PATIENT INSTRUCTIONS
[facility] Triage and after hours / weekends / holidays:  [phone]    Please call the triage or after hours line if you experience a temperature greater than or equal to 100.4, shaking chills, have uncontrolled nausea, vomiting and/or diarrhea, dizziness, shortness of breath, chest pain, bleeding, unexplained bruising, or if you have any other new/concerning symptoms, questions or concerns.      If you are having any concerning symptoms or wish to speak to a provider before your next infusion visit, please call your care coordinator or triage to notify them so we can adequately serve you.     If you need a refill on a narcotic prescription or other medication, please call before your infusion appointment.

## 2023-06-29 NOTE — NURSING NOTE
"Chief Complaint   Patient presents with     Port Draw     Labs drawn from PORT by RN. VS taken.     Port accessed with 20 gauge, 3/4\" power needle by RN, labs collected, line flushed with saline and heparin.  Vitals taken. Pt checked in for appointment(s).    Sunni Urbano RN    "

## 2023-06-30 ENCOUNTER — OFFICE VISIT (OUTPATIENT)
Dept: FAMILY MEDICINE | Facility: CLINIC | Age: 48
End: 2023-06-30
Payer: COMMERCIAL

## 2023-06-30 VITALS
SYSTOLIC BLOOD PRESSURE: 108 MMHG | BODY MASS INDEX: 27.5 KG/M2 | DIASTOLIC BLOOD PRESSURE: 72 MMHG | HEART RATE: 62 BPM | TEMPERATURE: 98.1 F | WEIGHT: 197.2 LBS | OXYGEN SATURATION: 97 %

## 2023-06-30 DIAGNOSIS — Z00.00 ENCOUNTER FOR MEDICAL EXAMINATION TO ESTABLISH CARE: Primary | ICD-10-CM

## 2023-06-30 DIAGNOSIS — C78.00 RECTAL ADENOCARCINOMA METASTATIC TO LUNG (H): ICD-10-CM

## 2023-06-30 DIAGNOSIS — C20 RECTAL ADENOCARCINOMA METASTATIC TO LUNG (H): ICD-10-CM

## 2023-06-30 PROBLEM — R20.2 NUMBNESS AND TINGLING: Status: ACTIVE | Noted: 2021-06-17

## 2023-06-30 PROBLEM — N25.81 SECONDARY HYPERPARATHYROIDISM OF RENAL ORIGIN (H): Status: ACTIVE | Noted: 2022-05-19

## 2023-06-30 PROBLEM — R11.2 CHEMOTHERAPY-INDUCED NAUSEA AND VOMITING: Status: ACTIVE | Noted: 2021-06-17

## 2023-06-30 PROBLEM — D69.6 THROMBOCYTOPENIA (H): Status: ACTIVE | Noted: 2021-06-17

## 2023-06-30 PROBLEM — K92.2 GASTROINTESTINAL HEMORRHAGE: Status: ACTIVE | Noted: 2023-06-30

## 2023-06-30 PROBLEM — N17.0 ACUTE KIDNEY FAILURE WITH TUBULAR NECROSIS (H): Status: ACTIVE | Noted: 2021-08-03

## 2023-06-30 PROBLEM — I10 ESSENTIAL (PRIMARY) HYPERTENSION: Status: ACTIVE | Noted: 2021-08-03

## 2023-06-30 PROBLEM — K62.5 RECTAL HEMORRHAGE: Status: ACTIVE | Noted: 2021-01-06

## 2023-06-30 PROBLEM — T45.1X5A CHEMOTHERAPY-INDUCED NAUSEA AND VOMITING: Status: ACTIVE | Noted: 2021-06-17

## 2023-06-30 PROBLEM — D68.9 COAGULATION DEFECT, UNSPECIFIED (H): Status: ACTIVE | Noted: 2022-05-19

## 2023-06-30 PROBLEM — E80.6 HYPERBILIRUBINEMIA: Status: ACTIVE | Noted: 2021-06-17

## 2023-06-30 PROBLEM — R82.998 BROWN-COLORED URINE: Status: ACTIVE | Noted: 2021-06-17

## 2023-06-30 PROBLEM — H53.8 BLURRING OF VISUAL IMAGE: Status: ACTIVE | Noted: 2021-08-03

## 2023-06-30 PROBLEM — R10.30 LOWER ABDOMINAL PAIN: Status: ACTIVE | Noted: 2023-06-30

## 2023-06-30 PROBLEM — C18.9 PRIMARY ADENOCARCINOMA OF COLON (H): Status: ACTIVE | Noted: 2021-08-03

## 2023-06-30 PROBLEM — R20.0 NUMBNESS AND TINGLING: Status: ACTIVE | Noted: 2021-06-17

## 2023-06-30 PROBLEM — R74.01 ELEVATED AST (SGOT): Status: ACTIVE | Noted: 2021-06-17

## 2023-06-30 PROBLEM — K94.19 ALTERED BOWEL ELIMINATION DUE TO INTESTINAL OSTOMY (H): Status: ACTIVE | Noted: 2022-05-19

## 2023-06-30 PROBLEM — R19.8 DIGESTIVE SYMPTOM: Status: ACTIVE | Noted: 2021-01-06

## 2023-06-30 PROBLEM — K63.5 POLYP OF COLON: Status: ACTIVE | Noted: 2021-01-12

## 2023-06-30 PROBLEM — T45.1X5A LEUKOPENIA DUE TO ANTINEOPLASTIC CHEMOTHERAPY (H): Status: ACTIVE | Noted: 2021-06-17

## 2023-06-30 PROBLEM — D70.1 LEUKOPENIA DUE TO ANTINEOPLASTIC CHEMOTHERAPY (H): Status: ACTIVE | Noted: 2021-06-17

## 2023-06-30 PROBLEM — N17.9 ACUTE KIDNEY INJURY (H): Status: ACTIVE | Noted: 2021-06-21

## 2023-06-30 PROBLEM — F22 DELUSIONS OF PARASITOSIS (H): Status: ACTIVE | Noted: 2022-06-30

## 2023-06-30 PROCEDURE — 99203 OFFICE O/P NEW LOW 30 MIN: CPT | Performed by: FAMILY MEDICINE

## 2023-06-30 NOTE — PROGRESS NOTES
"  Assessment & Plan       ICD-10-CM    1. Encounter for medical examination to establish care  Z00.00       2. Rectal adenocarcinoma metastatic to lung (H)  C20     C78.00           A total of 30 minutes spent face-to-face with greater than 50% of the time spent in counseling and coordinating cares of the issues above   Review of external notes as documented elsewhere in note       MED REC REQUIRED  Post Medication Reconciliation Status:       BMI:   Estimated body mass index is 27.5 kg/m  as calculated from the following:    Height as of 6/7/23: 1.803 m (5' 11\").    Weight as of this encounter: 89.4 kg (197 lb 3.2 oz).   Weight management plan: Discussed healthy diet and exercise guidelines    There are no Patient Instructions on file for this visit.    Buddy Smith MD  [facility] BEV Owens is a 48 year old, presenting for the following health issues:  Establish Care        6/30/2023    10:09 AM   Additional Questions   Roomed by Peri   Accompanied by none     HPI       Patient presents to establish care  History reviewed  Rectal adenocarcinoma stage 4, metastasis to lung  Chemo complications - MICHELLE  Heme/onc  - [facility]              Review of Systems   Constitutional, HEENT, cardiovascular, pulmonary, gi and gu systems are negative, except as otherwise noted.      Objective    /72   Pulse 62   Temp 98.1  F (36.7  C) (Oral)   Wt 89.4 kg (197 lb 3.2 oz)   SpO2 97%   BMI 27.50 kg/m    Body mass index is 27.5 kg/m .  Physical Exam  Constitutional:       General: He is not in acute distress.     Appearance: Normal appearance. He is well-developed. He is not ill-appearing.   HENT:      Head: Normocephalic and atraumatic.      Right Ear: External ear normal.      Left Ear: External ear normal.      Nose: Nose normal.   Eyes:      General: No scleral icterus.     Extraocular Movements: Extraocular movements intact.      Conjunctiva/sclera: Conjunctivae " normal.   Cardiovascular:      Rate and Rhythm: Normal rate.   Pulmonary:      Effort: Pulmonary effort is normal.   Musculoskeletal:      Cervical back: Normal range of motion and neck supple.   Skin:     General: Skin is warm and dry.   Neurological:      Mental Status: He is alert and oriented to person, place, and time.   Psychiatric:         Behavior: Behavior normal.         Thought Content: Thought content normal.         Judgment: Judgment normal.

## 2023-07-03 ENCOUNTER — MYC MEDICAL ADVICE (OUTPATIENT)
Dept: ONCOLOGY | Facility: CLINIC | Age: 48
End: 2023-07-03
Payer: COMMERCIAL

## 2023-07-13 ENCOUNTER — PATIENT OUTREACH (OUTPATIENT)
Dept: ONCOLOGY | Facility: CLINIC | Age: 48
End: 2023-07-13
Payer: COMMERCIAL

## 2023-07-13 NOTE — PROGRESS NOTES
[facility] - Medical Oncology Follow Up Note  07/14/2023    HPI:   Patient developed rectal bleeding in 2020.  In January 2021 CT showed focal wall thickening in the upper rectum, multiple pulmonary nodules bilaterally suspicious for metastatic disease.  Underwent FNA of the right upper lobe lesion which was consistent with adenocarcinoma of the colon.  He was treated with FOLFOX from 2/20/2021 through 5/20/2021.  Avastin was added.  Had an infusion reaction to oxaliplatin 6/2021.  7/2021- 12/2021 was on 5-FU alone.  Developed progression.  12/2021- 9/2022 was on FOLFIRI.  11/2021 started Lonsurf. All of this was done with outside oncologist.     Met with Dr. Snow on 2/3/2022 to establish care. Recommended regorafenib.     There have been questions of a parasitic infection. Please see previous notes from [facility] oncologist for further details. Was seen by ID at the  on 2/27, no concerns for parasitic infection    Started regorafenib on 3/2/2023. Progression noted 5/19/23. Plan to start FOLFOX/Avastin and send out CARIS testing to evaluate for other treatment options. Cycle 1 was given with oxaliplatin desensitization, 5FU, Avastin held due to increased urine protein.       Interval History:   Roman presents to clinic for follow-up prior to C3.  He has noticed more rectal pressure the past 3-4 days and has taken Advil for the pain.  He has taken 3 tablets of Advil Wednesday and yesterday which does relieve the pain for about a half hour or will take 2 hours to take effect.  The pain is most common in the evening and is affecting the quality of sleep he is getting.  He has cold sensitivity during the infusion and 2-3 days after.  Has neuropathy in bilateral feet but does not impact his walking or have trouble on uneven surfaces.  He is eating and drinking well.   Energy levels have been good. Denies N/V/D/C, fevers/chills, SOB/cough, chest pain, mouth sores, HA, vision changes.    No past medical history  on file.     Allergies   Allergen Reactions     Oxaliplatin Shortness Of Breath, Nausea and Vomiting and Other (See Comments)     Patient had HSR to Oxaliplatin on cycle 8 of FOLFOX chemotherapy. Sent to ER for continued monitoring.  Patient had HSR to Oxaliplatin on cycle 8 of FOLFOX chemotherapy. Sent to ER for continued monitoring.       Blood-Group Specific Substance Other (See Comments)     Patient has reactivity Suggestive of a Warm auto antibody. Blood products may be delayed. Draw patient 24 hours prior to transfusion. Draw one red top and two purple top tubes for all type and screen orders.  Patient has reactivity Suggestive of a Warm auto antibody. Blood products may be delayed. Draw patient 24 hours prior to transfusion. Draw one red top and two purple top tubes for all type and screen orders.       PHYSICAL EXAM   /68   Pulse 82   Temp 98.1  F (36.7  C) (Oral)   Resp 18   Wt 87.6 kg (193 lb 3.2 oz)   SpO2 98%   BMI 26.95 kg/m      Wt Readings from Last 4 Encounters:   07/14/23 87.6 kg (193 lb 3.2 oz)   06/30/23 89.4 kg (197 lb 3.2 oz)   06/29/23 88.8 kg (195 lb 11.2 oz)   06/22/23 89.6 kg (197 lb 9.6 oz)     General: No acute distress  HEENT: Sclera anicteric. Oral mucosa pink and moist.  No mucositis or thrush  Heart: Regular, rate, and rhythm  Lungs: Clear to ascultation bilaterally  Abdomen: Positive bowel sounds. Soft, non-distended, non-tender. No organomegaly or mass. No redness around the stoma  Extremities: no lower extremity edema  Neuro: Cranial nerves grossly intact  Rash: none on exposed skin  Vascular access: port    LABS  Most Recent 3 CBC's:  Recent Labs   Lab Test 07/14/23  0710 06/29/23  0645 06/22/23  0718   WBC 10.3 5.0 2.3*   HGB 12.4* 13.7 13.1*   MCV 85 82 83    208 199    Most Recent 3 BMP's:  Recent Labs   Lab Test 07/14/23  0710 06/29/23  0645 06/22/23  0718    136 136   POTASSIUM 4.0 4.0 4.2   CHLORIDE 103 102 103   CO2 24 24 25   BUN 10.5 10.0 8.5   CR  0.85 0.84 0.76   ANIONGAP 10 10 8   JEFERSON 9.1 9.0 9.1   * 119* 118*    Most Recent 2 LFT's:  Recent Labs   Lab Test 07/14/23  0710 06/29/23  0645   AST 17 22   ALT 14 14   ALKPHOS 103 101   BILITOTAL 0.2 0.3       Latest Reference Range & Units 07/14/23 07:52   Protein Albumin Urine Negative mg/dL Negative     I reviewed the above labs today.    ASSESSMENT AND PLAN   Metastatic rectal cancer  - Progression on regorafenib. Started FOLFOX/Avastin 6/7/23. Avastin held due to elevated urine protein  -24 hour urine protein has not been collected yet. Will rely in random urine protein.  If above 100 will hold Avastin again.   -S/p C2 and tolerated treatment well with minimal side effects.  He has nausea and cold sensitivity while the 5FU pump is connected and 2-3 days after but he managed with anti-emetics.  Overall he is tolerating treatment well.   - Proceed with C3 today  - Re-imaging CT CAP 8/7/23    LIZ  -Well controlled with premeds and zofran PRN     Rectal pain/pressure  Patient has had pain and pressure in his rectum that is intermittent over the past couple days.  He managed with Advil for the past 2 days but experienced minimal relief.  Pain is mostly in the evenings when he is trying to go to sleep.  He has taken oxycodone in the past without issue and is requesting a refill to take as needed and to help alleviate the pain so he can sleep.  - Oxycodone 5 mg PO script sent  - He is due for repeat imaging 8/7/23  - Discussed with YADIEL Hines who will see patient in 2 weeks and will follow-up on pain.    Pump disconnect location:  [address] , [place] [address]    30 minutes spent on the date of the encounter doing chart review, review of test results, interpretation of tests, patient visit and documentation     FLORA Roland CNP

## 2023-07-13 NOTE — TELEPHONE ENCOUNTER
[facility]: Cancer Care                                                                                          Pt scheduled for C3 folfox/Oxali desensitization tomorrow 7/14, called to follow-up on how he is feeling. He was started on filgrastim x5 after C1.    Signature:  Virgen Nieto RN

## 2023-07-14 ENCOUNTER — ONCOLOGY VISIT (OUTPATIENT)
Dept: ONCOLOGY | Facility: CLINIC | Age: 48
End: 2023-07-14
Attending: PHYSICIAN ASSISTANT
Payer: COMMERCIAL

## 2023-07-14 ENCOUNTER — INFUSION THERAPY VISIT (OUTPATIENT)
Dept: ONCOLOGY | Facility: CLINIC | Age: 48
End: 2023-07-14
Attending: STUDENT IN AN ORGANIZED HEALTH CARE EDUCATION/TRAINING PROGRAM
Payer: COMMERCIAL

## 2023-07-14 ENCOUNTER — APPOINTMENT (OUTPATIENT)
Dept: LAB | Facility: CLINIC | Age: 48
End: 2023-07-14
Attending: STUDENT IN AN ORGANIZED HEALTH CARE EDUCATION/TRAINING PROGRAM
Payer: COMMERCIAL

## 2023-07-14 VITALS
HEART RATE: 82 BPM | RESPIRATION RATE: 18 BRPM | BODY MASS INDEX: 26.95 KG/M2 | TEMPERATURE: 98.1 F | WEIGHT: 193.2 LBS | OXYGEN SATURATION: 98 % | SYSTOLIC BLOOD PRESSURE: 108 MMHG | DIASTOLIC BLOOD PRESSURE: 68 MMHG

## 2023-07-14 DIAGNOSIS — C20 RECTAL ADENOCARCINOMA METASTATIC TO LUNG (H): Primary | ICD-10-CM

## 2023-07-14 DIAGNOSIS — C78.00 RECTAL ADENOCARCINOMA METASTATIC TO LUNG (H): Primary | ICD-10-CM

## 2023-07-14 DIAGNOSIS — K62.89 RECTAL PAIN: ICD-10-CM

## 2023-07-14 LAB
ALBUMIN SERPL BCG-MCNC: 4 G/DL (ref 3.5–5.2)
ALBUMIN UR-MCNC: NEGATIVE MG/DL
ALP SERPL-CCNC: 103 U/L (ref 40–129)
ALT SERPL W P-5'-P-CCNC: 14 U/L (ref 0–70)
ANION GAP SERPL CALCULATED.3IONS-SCNC: 10 MMOL/L (ref 7–15)
AST SERPL W P-5'-P-CCNC: 17 U/L (ref 0–45)
BASOPHILS # BLD AUTO: 0.1 10E3/UL (ref 0–0.2)
BASOPHILS NFR BLD AUTO: 1 %
BILIRUB SERPL-MCNC: 0.2 MG/DL
BUN SERPL-MCNC: 10.5 MG/DL (ref 6–20)
CALCIUM SERPL-MCNC: 9.1 MG/DL (ref 8.6–10)
CEA SERPL-MCNC: 41.1 NG/ML
CHLORIDE SERPL-SCNC: 103 MMOL/L (ref 98–107)
CREAT SERPL-MCNC: 0.85 MG/DL (ref 0.67–1.17)
DEPRECATED HCO3 PLAS-SCNC: 24 MMOL/L (ref 22–29)
EOSINOPHIL # BLD AUTO: 0.2 10E3/UL (ref 0–0.7)
EOSINOPHIL NFR BLD AUTO: 2 %
ERYTHROCYTE [DISTWIDTH] IN BLOOD BY AUTOMATED COUNT: 18 % (ref 10–15)
GFR SERPL CREATININE-BSD FRML MDRD: >90 ML/MIN/1.73M2
GLUCOSE SERPL-MCNC: 144 MG/DL (ref 70–99)
HCT VFR BLD AUTO: 38.1 % (ref 40–53)
HGB BLD-MCNC: 12.4 G/DL (ref 13.3–17.7)
IMM GRANULOCYTES # BLD: 0.4 10E3/UL
IMM GRANULOCYTES NFR BLD: 4 %
LYMPHOCYTES # BLD AUTO: 1.3 10E3/UL (ref 0.8–5.3)
LYMPHOCYTES NFR BLD AUTO: 13 %
MCH RBC QN AUTO: 27.5 PG (ref 26.5–33)
MCHC RBC AUTO-ENTMCNC: 32.5 G/DL (ref 31.5–36.5)
MCV RBC AUTO: 85 FL (ref 78–100)
MONOCYTES # BLD AUTO: 1.2 10E3/UL (ref 0–1.3)
MONOCYTES NFR BLD AUTO: 12 %
NEUTROPHILS # BLD AUTO: 7.1 10E3/UL (ref 1.6–8.3)
NEUTROPHILS NFR BLD AUTO: 68 %
NRBC # BLD AUTO: 0 10E3/UL
NRBC BLD AUTO-RTO: 0 /100
PLATELET # BLD AUTO: 237 10E3/UL (ref 150–450)
POTASSIUM SERPL-SCNC: 4 MMOL/L (ref 3.4–5.3)
PROT SERPL-MCNC: 7.1 G/DL (ref 6.4–8.3)
RBC # BLD AUTO: 4.51 10E6/UL (ref 4.4–5.9)
SODIUM SERPL-SCNC: 137 MMOL/L (ref 136–145)
WBC # BLD AUTO: 10.3 10E3/UL (ref 4–11)

## 2023-07-14 PROCEDURE — 96411 CHEMO IV PUSH ADDL DRUG: CPT

## 2023-07-14 PROCEDURE — 258N000003 HC RX IP 258 OP 636: Performed by: PHYSICIAN ASSISTANT

## 2023-07-14 PROCEDURE — 82378 CARCINOEMBRYONIC ANTIGEN: CPT

## 2023-07-14 PROCEDURE — 96368 THER/DIAG CONCURRENT INF: CPT

## 2023-07-14 PROCEDURE — 96413 CHEMO IV INFUSION 1 HR: CPT

## 2023-07-14 PROCEDURE — 81003 URINALYSIS AUTO W/O SCOPE: CPT | Performed by: STUDENT IN AN ORGANIZED HEALTH CARE EDUCATION/TRAINING PROGRAM

## 2023-07-14 PROCEDURE — 96376 TX/PRO/DX INJ SAME DRUG ADON: CPT

## 2023-07-14 PROCEDURE — 85025 COMPLETE CBC W/AUTO DIFF WBC: CPT

## 2023-07-14 PROCEDURE — G0498 CHEMO EXTEND IV INFUS W/PUMP: HCPCS

## 2023-07-14 PROCEDURE — 96367 TX/PROPH/DG ADDL SEQ IV INF: CPT

## 2023-07-14 PROCEDURE — 96415 CHEMO IV INFUSION ADDL HR: CPT

## 2023-07-14 PROCEDURE — G0463 HOSPITAL OUTPT CLINIC VISIT: HCPCS

## 2023-07-14 PROCEDURE — 96417 CHEMO IV INFUS EACH ADDL SEQ: CPT

## 2023-07-14 PROCEDURE — 80053 COMPREHEN METABOLIC PANEL: CPT

## 2023-07-14 PROCEDURE — 99214 OFFICE O/P EST MOD 30 MIN: CPT

## 2023-07-14 PROCEDURE — 250N000011 HC RX IP 250 OP 636: Mod: JZ | Performed by: PHYSICIAN ASSISTANT

## 2023-07-14 PROCEDURE — 96375 TX/PRO/DX INJ NEW DRUG ADDON: CPT

## 2023-07-14 PROCEDURE — 36415 COLL VENOUS BLD VENIPUNCTURE: CPT

## 2023-07-14 RX ORDER — FLUOROURACIL 50 MG/ML
400 INJECTION, SOLUTION INTRAVENOUS ONCE
Status: CANCELLED | OUTPATIENT
Start: 2023-07-14

## 2023-07-14 RX ORDER — FLUOROURACIL 50 MG/ML
400 INJECTION, SOLUTION INTRAVENOUS ONCE
Status: COMPLETED | OUTPATIENT
Start: 2023-07-14 | End: 2023-07-14

## 2023-07-14 RX ORDER — HEPARIN SODIUM (PORCINE) LOCK FLUSH IV SOLN 100 UNIT/ML 100 UNIT/ML
5 SOLUTION INTRAVENOUS
Status: CANCELLED | OUTPATIENT
Start: 2023-07-14

## 2023-07-14 RX ORDER — LORAZEPAM 2 MG/ML
0.5 INJECTION INTRAMUSCULAR EVERY 4 HOURS PRN
Status: DISCONTINUED | OUTPATIENT
Start: 2023-07-14 | End: 2023-07-14 | Stop reason: HOSPADM

## 2023-07-14 RX ORDER — ALBUTEROL SULFATE 90 UG/1
1-2 AEROSOL, METERED RESPIRATORY (INHALATION)
Status: CANCELLED
Start: 2023-07-14

## 2023-07-14 RX ORDER — EPINEPHRINE 1 MG/ML
0.3 INJECTION, SOLUTION INTRAMUSCULAR; SUBCUTANEOUS EVERY 5 MIN PRN
Status: CANCELLED | OUTPATIENT
Start: 2023-07-14

## 2023-07-14 RX ORDER — METHYLPREDNISOLONE SODIUM SUCCINATE 125 MG/2ML
125 INJECTION, POWDER, LYOPHILIZED, FOR SOLUTION INTRAMUSCULAR; INTRAVENOUS
Status: CANCELLED
Start: 2023-07-14

## 2023-07-14 RX ORDER — HEPARIN SODIUM,PORCINE 10 UNIT/ML
5 VIAL (ML) INTRAVENOUS
Status: CANCELLED | OUTPATIENT
Start: 2023-07-16

## 2023-07-14 RX ORDER — HEPARIN SODIUM,PORCINE 10 UNIT/ML
5 VIAL (ML) INTRAVENOUS
Status: CANCELLED | OUTPATIENT
Start: 2023-07-14

## 2023-07-14 RX ORDER — HEPARIN SODIUM (PORCINE) LOCK FLUSH IV SOLN 100 UNIT/ML 100 UNIT/ML
5 SOLUTION INTRAVENOUS
Status: CANCELLED | OUTPATIENT
Start: 2023-07-16

## 2023-07-14 RX ORDER — ONDANSETRON 2 MG/ML
8 INJECTION INTRAMUSCULAR; INTRAVENOUS ONCE
Status: CANCELLED | OUTPATIENT
Start: 2023-07-14

## 2023-07-14 RX ORDER — ONDANSETRON 2 MG/ML
8 INJECTION INTRAMUSCULAR; INTRAVENOUS ONCE
Status: COMPLETED | OUTPATIENT
Start: 2023-07-14 | End: 2023-07-14

## 2023-07-14 RX ORDER — DIPHENHYDRAMINE HYDROCHLORIDE 50 MG/ML
50 INJECTION INTRAMUSCULAR; INTRAVENOUS
Status: CANCELLED
Start: 2023-07-14

## 2023-07-14 RX ORDER — LORAZEPAM 2 MG/ML
0.5 INJECTION INTRAMUSCULAR EVERY 4 HOURS PRN
Status: CANCELLED | OUTPATIENT
Start: 2023-07-14

## 2023-07-14 RX ORDER — OXYCODONE HYDROCHLORIDE 5 MG/1
5 TABLET ORAL EVERY 6 HOURS PRN
Qty: 30 TABLET | Refills: 0 | Status: SHIPPED | OUTPATIENT
Start: 2023-07-14 | End: 2024-02-20

## 2023-07-14 RX ORDER — MEPERIDINE HYDROCHLORIDE 25 MG/ML
25 INJECTION INTRAMUSCULAR; INTRAVENOUS; SUBCUTANEOUS EVERY 30 MIN PRN
Status: CANCELLED | OUTPATIENT
Start: 2023-07-14

## 2023-07-14 RX ORDER — ALBUTEROL SULFATE 0.83 MG/ML
2.5 SOLUTION RESPIRATORY (INHALATION)
Status: CANCELLED | OUTPATIENT
Start: 2023-07-14

## 2023-07-14 RX ADMIN — FAMOTIDINE 20 MG: 10 INJECTION INTRAVENOUS at 08:44

## 2023-07-14 RX ADMIN — DIPHENHYDRAMINE HYDROCHLORIDE 25 MG: 50 INJECTION, SOLUTION INTRAMUSCULAR; INTRAVENOUS at 14:35

## 2023-07-14 RX ADMIN — DEXAMETHASONE SODIUM PHOSPHATE: 10 INJECTION, SOLUTION INTRAMUSCULAR; INTRAVENOUS at 08:50

## 2023-07-14 RX ADMIN — OXALIPLATIN 1.8 MG: 5 INJECTION, SOLUTION INTRAVENOUS at 12:21

## 2023-07-14 RX ADMIN — OXALIPLATIN 0.02 MG: 5 INJECTION, SOLUTION INTRAVENOUS at 10:09

## 2023-07-14 RX ADMIN — DEXTROSE MONOHYDRATE 250 ML: 50 INJECTION, SOLUTION INTRAVENOUS at 10:06

## 2023-07-14 RX ADMIN — FLUOROURACIL 830 MG: 50 INJECTION, SOLUTION INTRAVENOUS at 17:13

## 2023-07-14 RX ADMIN — SODIUM CHLORIDE 250 ML: 9 INJECTION, SOLUTION INTRAVENOUS at 08:43

## 2023-07-14 RX ADMIN — ONDANSETRON 8 MG: 2 INJECTION INTRAMUSCULAR; INTRAVENOUS at 08:43

## 2023-07-14 RX ADMIN — SODIUM CHLORIDE 400 MG: 9 INJECTION, SOLUTION INTRAVENOUS at 09:31

## 2023-07-14 RX ADMIN — OXALIPLATIN 160 MG: 5 INJECTION, SOLUTION INTRAVENOUS at 15:09

## 2023-07-14 RX ADMIN — DIPHENHYDRAMINE HYDROCHLORIDE 50 MG: 50 INJECTION INTRAMUSCULAR; INTRAVENOUS at 08:43

## 2023-07-14 RX ADMIN — LORAZEPAM 0.5 MG: 2 INJECTION INTRAMUSCULAR; INTRAVENOUS at 14:53

## 2023-07-14 RX ADMIN — DEXAMETHASONE SODIUM PHOSPHATE 10 MG: 10 INJECTION, SOLUTION INTRAMUSCULAR; INTRAVENOUS at 14:14

## 2023-07-14 RX ADMIN — OXALIPLATIN 0.18 MG: 5 INJECTION, SOLUTION INTRAVENOUS at 11:18

## 2023-07-14 RX ADMIN — LEUCOVORIN CALCIUM 750 MG: 350 INJECTION, POWDER, LYOPHILIZED, FOR SOLUTION INTRAMUSCULAR; INTRAVENOUS at 15:11

## 2023-07-14 RX ADMIN — OXALIPLATIN 18 MG: 5 INJECTION, SOLUTION INTRAVENOUS at 13:39

## 2023-07-14 NOTE — LETTER
7/14/2023         RE: Roman Escalante  [address]  [place] [address]        Dear Colleague,    Thank you for referring your patient, Roman Escalante, to the [facility]. Please see a copy of my visit note below.    [facility] - Medical Oncology Follow Up Note  07/14/2023    HPI:   Patient developed rectal bleeding in 2020.  In January 2021 CT showed focal wall thickening in the upper rectum, multiple pulmonary nodules bilaterally suspicious for metastatic disease.  Underwent FNA of the right upper lobe lesion which was consistent with adenocarcinoma of the colon.  He was treated with FOLFOX from 2/20/2021 through 5/20/2021.  Avastin was added.  Had an infusion reaction to oxaliplatin 6/2021.  7/2021- 12/2021 was on 5-FU alone.  Developed progression.  12/2021- 9/2022 was on FOLFIRI.  11/2021 started Lonsurf. All of this was done with outside oncologist.     Met with Dr. Snow on 2/3/2022 to establish care. Recommended regorafenib.     There have been questions of a parasitic infection. Please see previous notes from [facility] oncologist for further details. Was seen by ID at the  on 2/27, no concerns for parasitic infection    Started regorafenib on 3/2/2023. Progression noted 5/19/23. Plan to start FOLFOX/Avastin and send out CARIS testing to evaluate for other treatment options. Cycle 1 was given with oxaliplatin desensitization, 5FU, Avastin held due to increased urine protein.       Interval History:   Roman presents to clinic for follow-up prior to C3.  He has noticed more rectal pressure the past 3-4 days and has taken Advil for the pain.  He has taken 3 tablets of Advil Wednesday and yesterday which does relieve the pain for about a half hour or will take 2 hours to take effect.  The pain is most common in the evening and is affecting the quality of sleep he is getting.  He has cold sensitivity during the infusion and 2-3 days after.  Has  neuropathy in bilateral feet but does not impact his walking or have trouble on uneven surfaces.  He is eating and drinking well.   Energy levels have been good. Denies N/V/D/C, fevers/chills, SOB/cough, chest pain, mouth sores, HA, vision changes.    No past medical history on file.     Allergies   Allergen Reactions    Oxaliplatin Shortness Of Breath, Nausea and Vomiting and Other (See Comments)     Patient had HSR to Oxaliplatin on cycle 8 of FOLFOX chemotherapy. Sent to ER for continued monitoring.  Patient had HSR to Oxaliplatin on cycle 8 of FOLFOX chemotherapy. Sent to ER for continued monitoring.      Blood-Group Specific Substance Other (See Comments)     Patient has reactivity Suggestive of a Warm auto antibody. Blood products may be delayed. Draw patient 24 hours prior to transfusion. Draw one red top and two purple top tubes for all type and screen orders.  Patient has reactivity Suggestive of a Warm auto antibody. Blood products may be delayed. Draw patient 24 hours prior to transfusion. Draw one red top and two purple top tubes for all type and screen orders.       PHYSICAL EXAM   /68   Pulse 82   Temp 98.1  F (36.7  C) (Oral)   Resp 18   Wt 87.6 kg (193 lb 3.2 oz)   SpO2 98%   BMI 26.95 kg/m      Wt Readings from Last 4 Encounters:   07/14/23 87.6 kg (193 lb 3.2 oz)   06/30/23 89.4 kg (197 lb 3.2 oz)   06/29/23 88.8 kg (195 lb 11.2 oz)   06/22/23 89.6 kg (197 lb 9.6 oz)     General: No acute distress  HEENT: Sclera anicteric. Oral mucosa pink and moist.  No mucositis or thrush  Heart: Regular, rate, and rhythm  Lungs: Clear to ascultation bilaterally  Abdomen: Positive bowel sounds. Soft, non-distended, non-tender. No organomegaly or mass. No redness around the stoma  Extremities: no lower extremity edema  Neuro: Cranial nerves grossly intact  Rash: none on exposed skin  Vascular access: port    LABS  Most Recent 3 CBC's:  Recent Labs   Lab Test 07/14/23  0710 06/29/23  0645 06/22/23  0718    WBC 10.3 5.0 2.3*   HGB 12.4* 13.7 13.1*   MCV 85 82 83    208 199    Most Recent 3 BMP's:  Recent Labs   Lab Test 07/14/23  0710 06/29/23  0645 06/22/23  0718    136 136   POTASSIUM 4.0 4.0 4.2   CHLORIDE 103 102 103   CO2 24 24 25   BUN 10.5 10.0 8.5   CR 0.85 0.84 0.76   ANIONGAP 10 10 8   JEFERSON 9.1 9.0 9.1   * 119* 118*    Most Recent 2 LFT's:  Recent Labs   Lab Test 07/14/23  0710 06/29/23  0645   AST 17 22   ALT 14 14   ALKPHOS 103 101   BILITOTAL 0.2 0.3       Latest Reference Range & Units 07/14/23 07:52   Protein Albumin Urine Negative mg/dL Negative     I reviewed the above labs today.    ASSESSMENT AND PLAN   Metastatic rectal cancer  - Progression on regorafenib. Started FOLFOX/Avastin 6/7/23. Avastin held due to elevated urine protein  -24 hour urine protein has not been collected yet. Will rely in random urine protein.  If above 100 will hold Avastin again.   -S/p C2 and tolerated treatment well with minimal side effects.  He has nausea and cold sensitivity while the 5FU pump is connected and 2-3 days after but he managed with anti-emetics.  Overall he is tolerating treatment well.   - Proceed with C3 today  - Re-imaging CT CAP 8/7/23    LIZ  -Well controlled with premeds and zofran PRN     Rectal pain/pressure  Patient has had pain and pressure in his rectum that is intermittent over the past couple days.  He managed with Advil for the past 2 days but experienced minimal relief.  Pain is mostly in the evenings when he is trying to go to sleep.  He has taken oxycodone in the past without issue and is requesting a refill to take as needed and to help alleviate the pain so he can sleep.  - Oxycodone 5 mg PO script sent  - He is due for repeat imaging 8/7/23  - Discussed with YADIEL Hines who will see patient in 2 weeks and will follow-up on pain.    Pump disconnect location:  [address] [facility], [place] [address]    30 minutes spent on the date of the encounter doing chart  review, review of test results, interpretation of tests, patient visit and documentation     Brea Jauregui, FLORA CNP

## 2023-07-14 NOTE — NURSING NOTE
Chief Complaint   Patient presents with     Port Draw     Labs drawn via port accessed by RN. VS taken.     Port accessed with 20 g 3/4 inch power needle by RN, labs collected, line flushed with saline and heparin.  Vitals taken. Pt checked in for appointment(s).    Joe Syed RN

## 2023-07-14 NOTE — PROGRESS NOTES
"Infusion Nursing Note:  Roman Escalante presents today for Cycle 3 Day 1 bevacizumab-bvzr, oxaliplatin desensitization, leucovorin calcium, fluorouracil IVP and home infusion pump connect.  Patient seen by provider today: Yes: Brea Jauregui, ADELINA   present during visit today: Not Applicable.    Note: Roman comes into the clinic today doing well overall and has no concerns to offer following his provider visit. He has no pain but does attest to some \"pressure\" in his rectum. He has been taking Advil on and off but unsure if it is helping. He says that bedtime is the hardest and he feels very restless with the discomfort. He otherwise is well and denies s/s of infection.    Patient requested IV ativan today due to trouble managing nausea once he got home the day of pump connect and chemo last cycle.    Intravenous Access:  Implanted Port.    Treatment Conditions:  Lab Results   Component Value Date    HGB 12.4 (L) 07/14/2023    WBC 10.3 07/14/2023    ANEU 2.0 06/29/2023    ANEUTAUTO 7.1 07/14/2023     07/14/2023      Lab Results   Component Value Date     07/14/2023    POTASSIUM 4.0 07/14/2023    CR 0.85 07/14/2023    JEFERSON 9.1 07/14/2023    BILITOTAL 0.2 07/14/2023    ALBUMIN 4.0 07/14/2023    ALT 14 07/14/2023    AST 17 07/14/2023     Results reviewed, labs MET treatment parameters, ok to proceed with treatment.  Urine Protein: Negative.  BP: 108/68.      Post Infusion Assessment:  Patient tolerated infusion without incident.  Blood return noted pre and post infusion.  Blood return noted during administration every 3 ml of 5FU IVP.  Site patent and intact, free from redness, edema or discomfort.  No evidence of extravasations.     Prior to discharge: Port is secured in place with tegaderm and flushed with 10cc NS with positive blood return noted.    Continuous home infusion Dosi-Fuser pump connected.    All connectors secured in place and clamps taped open.    Capillary element taped to pt's skin " per protocol.  Pump Connection double checked with GUILLERMINA Mejia.  Patient instructed to call our clinic or [facility] with any questions or concerns at home.  Patient verbalized understanding.    Patient set up for pump disconnect at home with [facility] on 7/16 at 1400.        Discharge Plan:   Prescription refills given for Oxycodone.  Discharge instructions reviewed with: Patient.  Patient and/or family verbalized understanding of discharge instructions and all questions answered.  AVS to patient via [employer].  Patient will return   7/27 for next appointment.   Patient discharged in stable condition accompanied by: self.  Departure Mode: Ambulatory.      Lynda Purcell RN

## 2023-07-14 NOTE — PATIENT INSTRUCTIONS
[facility] Triage and after hours / weekends / holidays:  [phone]    Please call the triage or after hours line if you experience a temperature greater than or equal to 100.4, shaking chills, have uncontrolled nausea, vomiting and/or diarrhea, dizziness, shortness of breath, chest pain, bleeding, unexplained bruising, or if you have any other new/concerning symptoms, questions or concerns.      If you are having any concerning symptoms or wish to speak to a provider before your next infusion visit, please call triage to notify them so we can adequately serve you.     If you need a refill on a narcotic prescription or other medication, please call before your infusion appointment.

## 2023-07-25 NOTE — PROGRESS NOTES
Virtual Visit Details    Type of service:  Video Visit   Video Start Time: 12:14 PM  Video End Time:12:30 PM    Originating Location (pt. Location): Home    Distant Location (provider location):  Off-site  Platform used for Video Visit: [facility] - Medical Oncology Follow Up Note  07/25/2023    HPI:   Patient developed rectal bleeding in 2020.  In January 2021 CT showed focal wall thickening in the upper rectum, multiple pulmonary nodules bilaterally suspicious for metastatic disease.  Underwent FNA of the right upper lobe lesion which was consistent with adenocarcinoma of the colon.  He was treated with FOLFOX from 2/20/2021 through 5/20/2021.  Avastin was added.  Had an infusion reaction to oxaliplatin 6/2021.  7/2021- 12/2021 was on 5-FU alone.  Developed progression.  12/2021- 9/2022 was on FOLFIRI.  11/2021 started Lonsurf. All of this was done with outside oncologist.     Met with Dr. Snow on 2/3/2022 to establish care. Recommended regorafenib.     There have been questions of a parasitic infection. Please see previous notes from [facility] oncologist for further details. Was seen by ID at the  on 2/27, no concerns for parasitic infection    Started regorafenib on 3/2/2023. Progression noted 5/19/23. Plan to start FOLFOX/Avastin and send out CARIS testing to evaluate for other treatment options. Cycle 1 was given with oxaliplatin desensitization, 5FU, Avastin held due to increased urine protein.       Interval History: Roman is doing well. He has been doing well since last seen. He did not have many side effects after the last infusion. He received PRN IV ativan in the middle of the infusion last time and noticed this controlled his nausea really well. He did not take any home antiemetics.     His pump was disconnected early which has already been reported.     His rectal pain has completely disappeared. He has not had any significant pain since the last infusion. He is having regular  BMs. No diarrhea.     Appetite is good. He is more tired and sleeping about 12 hours at night but then tolerating other activity the rest of the day.     Cold sensitivity dissipates in 2 days. Experiences it in both hands and throat. Has a feeling of a bunched up sock in the bottom of his feet--this is tolerable.     No true fevers but he tends to have a low grade temp in night after he is unhooked. Has not had any infectious symptoms.     No mouth sores but he does have some mouth sensitivity to spicy foods.       No past medical history on file.     Allergies   Allergen Reactions    Oxaliplatin Shortness Of Breath, Nausea and Vomiting and Other (See Comments)     Patient had HSR to Oxaliplatin on cycle 8 of FOLFOX chemotherapy. Sent to ER for continued monitoring.  Patient had HSR to Oxaliplatin on cycle 8 of FOLFOX chemotherapy. Sent to ER for continued monitoring.      Blood-Group Specific Substance Other (See Comments)     Patient has reactivity Suggestive of a Warm auto antibody. Blood products may be delayed. Draw patient 24 hours prior to transfusion. Draw one red top and two purple top tubes for all type and screen orders.  Patient has reactivity Suggestive of a Warm auto antibody. Blood products may be delayed. Draw patient 24 hours prior to transfusion. Draw one red top and two purple top tubes for all type and screen orders.       PHYSICAL EXAM   There were no vitals taken for this visit.    Wt Readings from Last 4 Encounters:   07/14/23 87.6 kg (193 lb 3.2 oz)   06/30/23 89.4 kg (197 lb 3.2 oz)   06/29/23 88.8 kg (195 lb 11.2 oz)   06/22/23 89.6 kg (197 lb 9.6 oz)   Video physical exam  General: Patient appears well in no acute distress.   Skin: No visualized rash or lesions on visualized skin  Eyes: EOMI, no erythema, sclera icterus or discharge noted  Resp: Appears to be breathing comfortably without accessory muscle usage, speaking in full sentences, no cough  MSK: Appears to have normal range of  motion based on visualized movements  Neurologic: No apparent tremors, facial movements symmetric  Psych: affect and mood congruent, alert and oriented      LABS  Most Recent 3 CBC's:  Recent Labs   Lab Test 07/14/23  0710 06/29/23  0645 06/22/23  0718   WBC 10.3 5.0 2.3*   HGB 12.4* 13.7 13.1*   MCV 85 82 83    208 199    Most Recent 3 BMP's:  Recent Labs   Lab Test 07/14/23  0710 06/29/23  0645 06/22/23  0718    136 136   POTASSIUM 4.0 4.0 4.2   CHLORIDE 103 102 103   CO2 24 24 25   BUN 10.5 10.0 8.5   CR 0.85 0.84 0.76   ANIONGAP 10 10 8   JEFERSON 9.1 9.0 9.1   * 119* 118*    Most Recent 2 LFT's:  Recent Labs   Lab Test 07/14/23  0710 06/29/23  0645   AST 17 22   ALT 14 14   ALKPHOS 103 101   BILITOTAL 0.2 0.3       Latest Reference Range & Units 07/14/23 07:52   Protein Albumin Urine Negative mg/dL Negative     I reviewed the above labs today.    ASSESSMENT AND PLAN   Metastatic rectal cancer  - Progression on regorafenib. Started FOLFOX/Avastin 6/7/23. Avastin held due to elevated urine protein. Was able to receive with C2.   -24 hour urine protein has not been collected yet. Will rely in random urine protein.  If above 100 will hold Avastin again and collect 24 hour urine.   -S/p C3 and tolerated treatment well with minimal side effects.  - Proceed with C4 tomorrow pending labs.   - Re-imaging CT CAP 8/7/23    LIZ  -Well controlled with premeds+ IV PRN Ativan dose and zofran PRN     Rectal pain/pressure  Much improved now.     30 minutes spent on the date of the encounter doing chart review, review of test results, interpretation of tests, patient visit, and documentation    Usha Thompson)TAMI

## 2023-07-26 ENCOUNTER — VIRTUAL VISIT (OUTPATIENT)
Dept: ONCOLOGY | Facility: CLINIC | Age: 48
End: 2023-07-26
Attending: STUDENT IN AN ORGANIZED HEALTH CARE EDUCATION/TRAINING PROGRAM
Payer: COMMERCIAL

## 2023-07-26 VITALS — HEIGHT: 71 IN | BODY MASS INDEX: 26.95 KG/M2

## 2023-07-26 DIAGNOSIS — C20 RECTAL ADENOCARCINOMA METASTATIC TO LUNG (H): Primary | ICD-10-CM

## 2023-07-26 DIAGNOSIS — C78.00 RECTAL ADENOCARCINOMA METASTATIC TO LUNG (H): Primary | ICD-10-CM

## 2023-07-26 DIAGNOSIS — K62.89 RECTAL PAIN: ICD-10-CM

## 2023-07-26 PROCEDURE — 99214 OFFICE O/P EST MOD 30 MIN: CPT | Mod: VID | Performed by: PHYSICIAN ASSISTANT

## 2023-07-26 RX ORDER — HEPARIN SODIUM (PORCINE) LOCK FLUSH IV SOLN 100 UNIT/ML 100 UNIT/ML
5 SOLUTION INTRAVENOUS
Status: CANCELLED | OUTPATIENT
Start: 2023-07-30

## 2023-07-26 RX ORDER — DIPHENHYDRAMINE HYDROCHLORIDE 50 MG/ML
50 INJECTION INTRAMUSCULAR; INTRAVENOUS
Status: CANCELLED
Start: 2023-07-28

## 2023-07-26 RX ORDER — ALBUTEROL SULFATE 90 UG/1
1-2 AEROSOL, METERED RESPIRATORY (INHALATION)
Status: CANCELLED
Start: 2023-07-28

## 2023-07-26 RX ORDER — METHYLPREDNISOLONE SODIUM SUCCINATE 125 MG/2ML
125 INJECTION, POWDER, LYOPHILIZED, FOR SOLUTION INTRAMUSCULAR; INTRAVENOUS
Status: CANCELLED
Start: 2023-07-28

## 2023-07-26 RX ORDER — HEPARIN SODIUM,PORCINE 10 UNIT/ML
5 VIAL (ML) INTRAVENOUS
Status: CANCELLED | OUTPATIENT
Start: 2023-07-28

## 2023-07-26 RX ORDER — EPINEPHRINE 1 MG/ML
0.3 INJECTION, SOLUTION INTRAMUSCULAR; SUBCUTANEOUS EVERY 5 MIN PRN
Status: CANCELLED | OUTPATIENT
Start: 2023-07-28

## 2023-07-26 RX ORDER — ONDANSETRON 2 MG/ML
8 INJECTION INTRAMUSCULAR; INTRAVENOUS ONCE
Status: CANCELLED | OUTPATIENT
Start: 2023-07-28

## 2023-07-26 RX ORDER — HEPARIN SODIUM,PORCINE 10 UNIT/ML
5 VIAL (ML) INTRAVENOUS
Status: CANCELLED | OUTPATIENT
Start: 2023-07-30

## 2023-07-26 RX ORDER — FLUOROURACIL 50 MG/ML
400 INJECTION, SOLUTION INTRAVENOUS ONCE
Status: CANCELLED | OUTPATIENT
Start: 2023-07-28

## 2023-07-26 RX ORDER — MEPERIDINE HYDROCHLORIDE 25 MG/ML
25 INJECTION INTRAMUSCULAR; INTRAVENOUS; SUBCUTANEOUS EVERY 30 MIN PRN
Status: CANCELLED | OUTPATIENT
Start: 2023-07-28

## 2023-07-26 RX ORDER — HEPARIN SODIUM (PORCINE) LOCK FLUSH IV SOLN 100 UNIT/ML 100 UNIT/ML
5 SOLUTION INTRAVENOUS
Status: CANCELLED | OUTPATIENT
Start: 2023-07-28

## 2023-07-26 RX ORDER — ALBUTEROL SULFATE 0.83 MG/ML
2.5 SOLUTION RESPIRATORY (INHALATION)
Status: CANCELLED | OUTPATIENT
Start: 2023-07-28

## 2023-07-26 RX ORDER — LORAZEPAM 2 MG/ML
0.5 INJECTION INTRAMUSCULAR EVERY 4 HOURS PRN
Status: CANCELLED | OUTPATIENT
Start: 2023-07-28

## 2023-07-26 ASSESSMENT — PAIN SCALES - GENERAL: PAINLEVEL: NO PAIN (0)

## 2023-07-26 NOTE — LETTER
7/26/2023         RE: Roman Escalante  [address]  [facility] [id number]        Dear Colleague,    Thank you for referring your patient, Roman Escalante, to the [facility]. Please see a copy of my visit note below.      Virtual Visit Details    Type of service:  Video Visit   Video Start Time: 12:14 PM  Video End Time:12:30 PM    Originating Location (pt. Location): Home    Distant Location (provider location):  Off-site  Platform used for Video Visit: [facility] - Medical Oncology Follow Up Note  07/25/2023    HPI:   Patient developed rectal bleeding in 2020.  In January 2021 CT showed focal wall thickening in the upper rectum, multiple pulmonary nodules bilaterally suspicious for metastatic disease.  Underwent FNA of the right upper lobe lesion which was consistent with adenocarcinoma of the colon.  He was treated with FOLFOX from 2/20/2021 through 5/20/2021.  Avastin was added.  Had an infusion reaction to oxaliplatin 6/2021.  7/2021- 12/2021 was on 5-FU alone.  Developed progression.  12/2021- 9/2022 was on FOLFIRI.  11/2021 started Lonsurf. All of this was done with outside oncologist.     Met with Dr. Snow on 2/3/2022 to establish care. Recommended regorafenib.     There have been questions of a parasitic infection. [facility]. Was seen by ID at the  on 2/27, no concerns for parasitic infection    Started regorafenib on 3/2/2023. Progression noted 5/19/23. Plan to start FOLFOX/Avastin and send out CARIS testing to evaluate for other treatment options. Cycle 1 was given with oxaliplatin desensitization, 5FU, Avastin held due to increased urine protein.       Interval History: Roman is doing well. He has been doing well since last seen. He did not have many side effects after the last infusion. He received PRN IV ativan in the middle of the infusion last time and noticed this  controlled his nausea really well. He did not take any home antiemetics.     His pump was disconnected early which has already been reported.     His rectal pain has completely disappeared. He has not had any significant pain since the last infusion. He is having regular BMs. No diarrhea.     Appetite is good. He is more tired and sleeping about 12 hours at night but then tolerating other activity the rest of the day.     Cold sensitivity dissipates in 2 days. Experiences it in both hands and throat. Has a feeling of a bunched up sock in the bottom of his feet--this is tolerable.     No true fevers but he tends to have a low grade temp in night after he is unhooked. Has not had any infectious symptoms.     No mouth sores but he does have some mouth sensitivity to spicy foods.       No past medical history on file.     Allergies   Allergen Reactions    Oxaliplatin Shortness Of Breath, Nausea and Vomiting and Other (See Comments)     Patient had HSR to Oxaliplatin on cycle 8 of FOLFOX chemotherapy. Sent to ER for continued monitoring.  Patient had HSR to Oxaliplatin on cycle 8 of FOLFOX chemotherapy. Sent to ER for continued monitoring.      Blood-Group Specific Substance Other (See Comments)     Patient has reactivity Suggestive of a Warm auto antibody. Blood products may be delayed. Draw patient 24 hours prior to transfusion. Draw one red top and two purple top tubes for all type and screen orders.  Patient has reactivity Suggestive of a Warm auto antibody. Blood products may be delayed. Draw patient 24 hours prior to transfusion. Draw one red top and two purple top tubes for all type and screen orders.       PHYSICAL EXAM   There were no vitals taken for this visit.    Wt Readings from Last 4 Encounters:   07/14/23 87.6 kg (193 lb 3.2 oz)   06/30/23 89.4 kg (197 lb 3.2 oz)   06/29/23 88.8 kg (195 lb 11.2 oz)   06/22/23 89.6 kg (197 lb 9.6 oz)   Video physical exam  General: Patient appears well in no acute  distress.   Skin: No visualized rash or lesions on visualized skin  Eyes: EOMI, no erythema, sclera icterus or discharge noted  Resp: Appears to be breathing comfortably without accessory muscle usage, speaking in full sentences, no cough  MSK: Appears to have normal range of motion based on visualized movements  Neurologic: No apparent tremors, facial movements symmetric  Psych: affect and mood congruent, alert and oriented      LABS  Most Recent 3 CBC's:  Recent Labs   Lab Test 07/14/23  0710 06/29/23  0645 06/22/23  0718   WBC 10.3 5.0 2.3*   HGB 12.4* 13.7 13.1*   MCV 85 82 83    208 199    Most Recent 3 BMP's:  Recent Labs   Lab Test 07/14/23  0710 06/29/23  0645 06/22/23  0718    136 136   POTASSIUM 4.0 4.0 4.2   CHLORIDE 103 102 103   CO2 24 24 25   BUN 10.5 10.0 8.5   CR 0.85 0.84 0.76   ANIONGAP 10 10 8   JEFERSON 9.1 9.0 9.1   * 119* 118*    Most Recent 2 LFT's:  Recent Labs   Lab Test 07/14/23  0710 06/29/23  0645   AST 17 22   ALT 14 14   ALKPHOS 103 101   BILITOTAL 0.2 0.3       Latest Reference Range & Units 07/14/23 07:52   Protein Albumin Urine Negative mg/dL Negative     I reviewed the above labs today.    ASSESSMENT AND PLAN   Metastatic rectal cancer  - Progression on regorafenib. Started FOLFOX/Avastin 6/7/23. Avastin held due to elevated urine protein. Was able to receive with C2.   -24 hour urine protein has not been collected yet. Will rely in random urine protein.  If above 100 will hold Avastin again and collect 24 hour urine.   -S/p C3 and tolerated treatment well with minimal side effects.  - Proceed with C4 tomorrow pending labs.   - Re-imaging CT CAP 8/7/23    LIZ  -Well controlled with premeds+ IV PRN Ativan dose and zofran PRN     Rectal pain/pressure  Much improved now.     30 minutes spent on the date of the encounter doing chart review, review of test results, interpretation of tests, patient visit, and documentation    Usha Thompson)TAMI

## 2023-07-26 NOTE — NURSING NOTE
Is the patient currently in the state of [place]? YES    Visit mode:VIDEO    If the visit is dropped, the patient can be reconnected by: VIDEO VISIT: Text to cell phone: [phone]    Will anyone else be joining the visit? NO      How would you like to obtain your AVS? MyChart    Are changes needed to the allergy or medication list? NO    Reason for visit: VIDAL Hirsch, Virtual Facilitator

## 2023-07-27 ENCOUNTER — APPOINTMENT (OUTPATIENT)
Dept: LAB | Facility: CLINIC | Age: 48
End: 2023-07-27
Attending: STUDENT IN AN ORGANIZED HEALTH CARE EDUCATION/TRAINING PROGRAM
Payer: COMMERCIAL

## 2023-07-27 ENCOUNTER — NURSE TRIAGE (OUTPATIENT)
Dept: NURSING | Facility: CLINIC | Age: 48
End: 2023-07-27

## 2023-07-27 ENCOUNTER — DOCUMENTATION ONLY (OUTPATIENT)
Dept: ONCOLOGY | Facility: CLINIC | Age: 48
End: 2023-07-27

## 2023-07-27 ENCOUNTER — HOSPITAL ENCOUNTER (INPATIENT)
Facility: CLINIC | Age: 48
LOS: 3 days | Discharge: HOME | End: 2023-07-30
Attending: EMERGENCY MEDICINE | Admitting: INTERNAL MEDICINE
Payer: COMMERCIAL

## 2023-07-27 ENCOUNTER — INFUSION THERAPY VISIT (OUTPATIENT)
Dept: ONCOLOGY | Facility: CLINIC | Age: 48
End: 2023-07-27
Attending: STUDENT IN AN ORGANIZED HEALTH CARE EDUCATION/TRAINING PROGRAM
Payer: COMMERCIAL

## 2023-07-27 VITALS
OXYGEN SATURATION: 97 % | HEART RATE: 71 BPM | RESPIRATION RATE: 18 BRPM | SYSTOLIC BLOOD PRESSURE: 133 MMHG | DIASTOLIC BLOOD PRESSURE: 88 MMHG | TEMPERATURE: 98.8 F | BODY MASS INDEX: 27.2 KG/M2 | WEIGHT: 195 LBS

## 2023-07-27 DIAGNOSIS — L08.9 SOFT TISSUE INFECTION: Primary | ICD-10-CM

## 2023-07-27 DIAGNOSIS — N17.9 AKI (ACUTE KIDNEY INJURY) (H): ICD-10-CM

## 2023-07-27 DIAGNOSIS — C78.00 RECTAL ADENOCARCINOMA METASTATIC TO LUNG (H): Primary | ICD-10-CM

## 2023-07-27 DIAGNOSIS — R10.30 LOWER ABDOMINAL PAIN: ICD-10-CM

## 2023-07-27 DIAGNOSIS — C20 RECTAL ADENOCARCINOMA METASTATIC TO LUNG (H): Primary | ICD-10-CM

## 2023-07-27 LAB
ALBUMIN SERPL BCG-MCNC: 4.3 G/DL (ref 3.5–5.2)
ALBUMIN SERPL BCG-MCNC: 4.3 G/DL (ref 3.5–5.2)
ALBUMIN UR-MCNC: 300 MG/DL
ALBUMIN UR-MCNC: NEGATIVE MG/DL
ALP SERPL-CCNC: 134 U/L (ref 40–129)
ALP SERPL-CCNC: 279 U/L (ref 40–129)
ALT SERPL W P-5'-P-CCNC: 26 U/L (ref 0–70)
ALT SERPL W P-5'-P-CCNC: 31 U/L (ref 0–70)
ANION GAP SERPL CALCULATED.3IONS-SCNC: 14 MMOL/L (ref 7–15)
ANION GAP SERPL CALCULATED.3IONS-SCNC: 9 MMOL/L (ref 7–15)
APPEARANCE UR: ABNORMAL
APTT PPP: 28 SECONDS (ref 22–38)
AST SERPL W P-5'-P-CCNC: 118 U/L (ref 0–45)
AST SERPL W P-5'-P-CCNC: 28 U/L (ref 0–45)
BACTERIA #/AREA URNS HPF: ABNORMAL /HPF
BASOPHILS # BLD AUTO: 0.1 10E3/UL (ref 0–0.2)
BASOPHILS # BLD AUTO: 0.2 10E3/UL (ref 0–0.2)
BASOPHILS NFR BLD AUTO: 0 %
BASOPHILS NFR BLD AUTO: 1 %
BILIRUB SERPL-MCNC: 0.3 MG/DL
BILIRUB SERPL-MCNC: 1.9 MG/DL
BILIRUB UR QL STRIP: NEGATIVE
BUN SERPL-MCNC: 12.1 MG/DL (ref 6–20)
BUN SERPL-MCNC: 19.6 MG/DL (ref 6–20)
CALCIUM SERPL-MCNC: 9.1 MG/DL (ref 8.6–10)
CALCIUM SERPL-MCNC: 9.1 MG/DL (ref 8.6–10)
CHLORIDE SERPL-SCNC: 102 MMOL/L (ref 98–107)
CHLORIDE SERPL-SCNC: 99 MMOL/L (ref 98–107)
COLOR UR AUTO: ABNORMAL
CREAT SERPL-MCNC: 0.88 MG/DL (ref 0.67–1.17)
CREAT SERPL-MCNC: 1.22 MG/DL (ref 0.67–1.17)
DEPRECATED HCO3 PLAS-SCNC: 19 MMOL/L (ref 22–29)
DEPRECATED HCO3 PLAS-SCNC: 25 MMOL/L (ref 22–29)
EOSINOPHIL # BLD AUTO: 0 10E3/UL (ref 0–0.7)
EOSINOPHIL # BLD AUTO: 0.1 10E3/UL (ref 0–0.7)
EOSINOPHIL NFR BLD AUTO: 0 %
EOSINOPHIL NFR BLD AUTO: 1 %
ERYTHROCYTE [DISTWIDTH] IN BLOOD BY AUTOMATED COUNT: 19.2 % (ref 10–15)
ERYTHROCYTE [DISTWIDTH] IN BLOOD BY AUTOMATED COUNT: 19.3 % (ref 10–15)
GFR SERPL CREATININE-BSD FRML MDRD: 72 ML/MIN/1.73M2
GFR SERPL CREATININE-BSD FRML MDRD: >90 ML/MIN/1.73M2
GLUCOSE SERPL-MCNC: 117 MG/DL (ref 70–99)
GLUCOSE SERPL-MCNC: 134 MG/DL (ref 70–99)
GLUCOSE UR STRIP-MCNC: 70 MG/DL
HCT VFR BLD AUTO: 39 % (ref 40–53)
HCT VFR BLD AUTO: 39.3 % (ref 40–53)
HGB BLD-MCNC: 12.8 G/DL (ref 13.3–17.7)
HGB BLD-MCNC: 12.9 G/DL (ref 13.3–17.7)
HGB UR QL STRIP: ABNORMAL
IMM GRANULOCYTES # BLD: 0.3 10E3/UL
IMM GRANULOCYTES # BLD: 2.1 10E3/UL
IMM GRANULOCYTES NFR BLD: 3 %
IMM GRANULOCYTES NFR BLD: 4 %
INR PPP: 1.31 (ref 0.85–1.15)
KETONES UR STRIP-MCNC: ABNORMAL MG/DL
LEUKOCYTE ESTERASE UR QL STRIP: NEGATIVE
LYMPHOCYTES # BLD AUTO: 0.3 10E3/UL (ref 0.8–5.3)
LYMPHOCYTES # BLD AUTO: 1.2 10E3/UL (ref 0.8–5.3)
LYMPHOCYTES NFR BLD AUTO: 1 %
LYMPHOCYTES NFR BLD AUTO: 12 %
MCH RBC QN AUTO: 27.6 PG (ref 26.5–33)
MCH RBC QN AUTO: 28.4 PG (ref 26.5–33)
MCHC RBC AUTO-ENTMCNC: 32.6 G/DL (ref 31.5–36.5)
MCHC RBC AUTO-ENTMCNC: 33.1 G/DL (ref 31.5–36.5)
MCV RBC AUTO: 84 FL (ref 78–100)
MCV RBC AUTO: 87 FL (ref 78–100)
MONOCYTES # BLD AUTO: 1.1 10E3/UL (ref 0–1.3)
MONOCYTES # BLD AUTO: 1.2 10E3/UL (ref 0–1.3)
MONOCYTES NFR BLD AUTO: 11 %
MONOCYTES NFR BLD AUTO: 3 %
NEUTROPHILS # BLD AUTO: 45.1 10E3/UL (ref 1.6–8.3)
NEUTROPHILS # BLD AUTO: 6.8 10E3/UL (ref 1.6–8.3)
NEUTROPHILS NFR BLD AUTO: 72 %
NEUTROPHILS NFR BLD AUTO: 92 %
NITRATE UR QL: NEGATIVE
NRBC # BLD AUTO: 0 10E3/UL
NRBC # BLD AUTO: 0.2 10E3/UL
NRBC BLD AUTO-RTO: 0 /100
NRBC BLD AUTO-RTO: 0 /100
PH UR STRIP: 6 [PH] (ref 5–7)
PLATELET # BLD AUTO: 177 10E3/UL (ref 150–450)
PLATELET # BLD AUTO: 252 10E3/UL (ref 150–450)
POTASSIUM SERPL-SCNC: 3.9 MMOL/L (ref 3.4–5.3)
POTASSIUM SERPL-SCNC: 4.4 MMOL/L (ref 3.4–5.3)
PROT SERPL-MCNC: 7.3 G/DL (ref 6.4–8.3)
PROT SERPL-MCNC: 7.3 G/DL (ref 6.4–8.3)
RBC # BLD AUTO: 4.51 10E6/UL (ref 4.4–5.9)
RBC # BLD AUTO: 4.67 10E6/UL (ref 4.4–5.9)
RBC URINE: 2 /HPF
SODIUM SERPL-SCNC: 132 MMOL/L (ref 136–145)
SODIUM SERPL-SCNC: 136 MMOL/L (ref 136–145)
SP GR UR STRIP: 1.01 (ref 1–1.03)
UROBILINOGEN UR STRIP-MCNC: NORMAL MG/DL
WBC # BLD AUTO: 48.9 10E3/UL (ref 4–11)
WBC # BLD AUTO: 9.5 10E3/UL (ref 4–11)
WBC URINE: 15 /HPF

## 2023-07-27 PROCEDURE — 96376 TX/PRO/DX INJ SAME DRUG ADON: CPT

## 2023-07-27 PROCEDURE — 96375 TX/PRO/DX INJ NEW DRUG ADDON: CPT

## 2023-07-27 PROCEDURE — 84300 ASSAY OF URINE SODIUM: CPT | Performed by: EMERGENCY MEDICINE

## 2023-07-27 PROCEDURE — 258N000003 HC RX IP 258 OP 636: Performed by: EMERGENCY MEDICINE

## 2023-07-27 PROCEDURE — 83874 ASSAY OF MYOGLOBIN: CPT | Performed by: EMERGENCY MEDICINE

## 2023-07-27 PROCEDURE — 85730 THROMBOPLASTIN TIME PARTIAL: CPT | Performed by: EMERGENCY MEDICINE

## 2023-07-27 PROCEDURE — 81003 URINALYSIS AUTO W/O SCOPE: CPT | Performed by: PHYSICIAN ASSISTANT

## 2023-07-27 PROCEDURE — 85025 COMPLETE CBC W/AUTO DIFF WBC: CPT | Performed by: EMERGENCY MEDICINE

## 2023-07-27 PROCEDURE — 83935 ASSAY OF URINE OSMOLALITY: CPT | Performed by: EMERGENCY MEDICINE

## 2023-07-27 PROCEDURE — 87040 BLOOD CULTURE FOR BACTERIA: CPT | Performed by: EMERGENCY MEDICINE

## 2023-07-27 PROCEDURE — 96413 CHEMO IV INFUSION 1 HR: CPT

## 2023-07-27 PROCEDURE — 85384 FIBRINOGEN ACTIVITY: CPT | Performed by: EMERGENCY MEDICINE

## 2023-07-27 PROCEDURE — 96417 CHEMO IV INFUS EACH ADDL SEQ: CPT

## 2023-07-27 PROCEDURE — 36415 COLL VENOUS BLD VENIPUNCTURE: CPT | Performed by: PHYSICIAN ASSISTANT

## 2023-07-27 PROCEDURE — 120N000002 HC R&B MED SURG/OB UMMC

## 2023-07-27 PROCEDURE — 85610 PROTHROMBIN TIME: CPT | Performed by: EMERGENCY MEDICINE

## 2023-07-27 PROCEDURE — G0498 CHEMO EXTEND IV INFUS W/PUMP: HCPCS

## 2023-07-27 PROCEDURE — 99291 CRITICAL CARE FIRST HOUR: CPT | Performed by: EMERGENCY MEDICINE

## 2023-07-27 PROCEDURE — 258N000003 HC RX IP 258 OP 636: Performed by: PHYSICIAN ASSISTANT

## 2023-07-27 PROCEDURE — 82248 BILIRUBIN DIRECT: CPT | Performed by: PHYSICIAN ASSISTANT

## 2023-07-27 PROCEDURE — 84450 TRANSFERASE (AST) (SGOT): CPT | Performed by: EMERGENCY MEDICINE

## 2023-07-27 PROCEDURE — 96415 CHEMO IV INFUSION ADDL HR: CPT

## 2023-07-27 PROCEDURE — 81001 URINALYSIS AUTO W/SCOPE: CPT | Performed by: EMERGENCY MEDICINE

## 2023-07-27 PROCEDURE — 250N000011 HC RX IP 250 OP 636: Mod: JZ | Performed by: PHYSICIAN ASSISTANT

## 2023-07-27 PROCEDURE — 85025 COMPLETE CBC W/AUTO DIFF WBC: CPT | Performed by: PHYSICIAN ASSISTANT

## 2023-07-27 PROCEDURE — 82550 ASSAY OF CK (CPK): CPT | Performed by: EMERGENCY MEDICINE

## 2023-07-27 PROCEDURE — 99291 CRITICAL CARE FIRST HOUR: CPT | Mod: 25 | Performed by: EMERGENCY MEDICINE

## 2023-07-27 PROCEDURE — 250N000011 HC RX IP 250 OP 636: Mod: JZ | Performed by: EMERGENCY MEDICINE

## 2023-07-27 PROCEDURE — 96367 TX/PROPH/DG ADDL SEQ IV INF: CPT

## 2023-07-27 PROCEDURE — 87086 URINE CULTURE/COLONY COUNT: CPT | Performed by: EMERGENCY MEDICINE

## 2023-07-27 PROCEDURE — 96368 THER/DIAG CONCURRENT INF: CPT

## 2023-07-27 PROCEDURE — 83615 LACTATE (LD) (LDH) ENZYME: CPT | Performed by: EMERGENCY MEDICINE

## 2023-07-27 PROCEDURE — 80053 COMPREHEN METABOLIC PANEL: CPT | Performed by: PHYSICIAN ASSISTANT

## 2023-07-27 PROCEDURE — 36415 COLL VENOUS BLD VENIPUNCTURE: CPT | Performed by: EMERGENCY MEDICINE

## 2023-07-27 PROCEDURE — 96411 CHEMO IV PUSH ADDL DRUG: CPT

## 2023-07-27 RX ORDER — FLUOROURACIL 50 MG/ML
400 INJECTION, SOLUTION INTRAVENOUS ONCE
Status: COMPLETED | OUTPATIENT
Start: 2023-07-27 | End: 2023-07-27

## 2023-07-27 RX ORDER — PIPERACILLIN SODIUM, TAZOBACTAM SODIUM 4; .5 G/20ML; G/20ML
4.5 INJECTION, POWDER, LYOPHILIZED, FOR SOLUTION INTRAVENOUS ONCE
Status: COMPLETED | OUTPATIENT
Start: 2023-07-27 | End: 2023-07-28

## 2023-07-27 RX ORDER — LORAZEPAM 2 MG/ML
0.5 INJECTION INTRAMUSCULAR EVERY 4 HOURS PRN
Status: DISCONTINUED | OUTPATIENT
Start: 2023-07-27 | End: 2023-07-27 | Stop reason: HOSPADM

## 2023-07-27 RX ORDER — ONDANSETRON 2 MG/ML
8 INJECTION INTRAMUSCULAR; INTRAVENOUS ONCE
Status: COMPLETED | OUTPATIENT
Start: 2023-07-27 | End: 2023-07-27

## 2023-07-27 RX ADMIN — FLUOROURACIL 830 MG: 50 INJECTION, SOLUTION INTRAVENOUS at 15:40

## 2023-07-27 RX ADMIN — SODIUM CHLORIDE 250 ML: 9 INJECTION, SOLUTION INTRAVENOUS at 07:47

## 2023-07-27 RX ADMIN — SODIUM CHLORIDE 400 MG: 9 INJECTION, SOLUTION INTRAVENOUS at 08:24

## 2023-07-27 RX ADMIN — LORAZEPAM 0.5 MG: 2 INJECTION INTRAMUSCULAR; INTRAVENOUS at 15:37

## 2023-07-27 RX ADMIN — FAMOTIDINE 20 MG: 10 INJECTION INTRAVENOUS at 07:51

## 2023-07-27 RX ADMIN — ONDANSETRON 8 MG: 2 INJECTION INTRAMUSCULAR; INTRAVENOUS at 07:49

## 2023-07-27 RX ADMIN — OXALIPLATIN 1.8 MG: 5 INJECTION, SOLUTION INTRAVENOUS at 11:12

## 2023-07-27 RX ADMIN — DIPHENHYDRAMINE HYDROCHLORIDE 50 MG: 50 INJECTION, SOLUTION INTRAMUSCULAR; INTRAVENOUS at 08:10

## 2023-07-27 RX ADMIN — SODIUM CHLORIDE 1000 ML: 9 INJECTION, SOLUTION INTRAVENOUS at 23:36

## 2023-07-27 RX ADMIN — OXALIPLATIN 160 MG: 5 INJECTION, SOLUTION INTRAVENOUS at 13:33

## 2023-07-27 RX ADMIN — DIPHENHYDRAMINE HYDROCHLORIDE 25 MG: 50 INJECTION INTRAMUSCULAR; INTRAVENOUS at 12:47

## 2023-07-27 RX ADMIN — OXALIPLATIN 0.18 MG: 5 INJECTION, SOLUTION INTRAVENOUS at 10:05

## 2023-07-27 RX ADMIN — DEXAMETHASONE SODIUM PHOSPHATE: 10 INJECTION, SOLUTION INTRAMUSCULAR; INTRAVENOUS at 07:47

## 2023-07-27 RX ADMIN — DEXAMETHASONE SODIUM PHOSPHATE 10 MG: 10 INJECTION, SOLUTION INTRAMUSCULAR; INTRAVENOUS at 12:32

## 2023-07-27 RX ADMIN — PIPERACILLIN SODIUM AND TAZOBACTAM SODIUM 4.5 G: 4; .5 INJECTION, POWDER, LYOPHILIZED, FOR SOLUTION INTRAVENOUS at 23:36

## 2023-07-27 RX ADMIN — LEUCOVORIN CALCIUM 750 MG: 350 INJECTION, POWDER, LYOPHILIZED, FOR SOLUTION INTRAMUSCULAR; INTRAVENOUS at 13:31

## 2023-07-27 RX ADMIN — OXALIPLATIN 0.02 MG: 5 INJECTION, SOLUTION INTRAVENOUS at 09:01

## 2023-07-27 RX ADMIN — OXALIPLATIN 18 MG: 5 INJECTION, SOLUTION INTRAVENOUS at 12:18

## 2023-07-27 RX ADMIN — DEXTROSE MONOHYDRATE 250 ML: 50 INJECTION, SOLUTION INTRAVENOUS at 09:00

## 2023-07-27 ASSESSMENT — ACTIVITIES OF DAILY LIVING (ADL): ADLS_ACUITY_SCORE: 35

## 2023-07-27 NOTE — PATIENT INSTRUCTIONS
[facility] Triage and after hours / weekends / holidays:  [phone]    Please call the triage or after hours line if you experience a temperature greater than or equal to 100.4, shaking chills, have uncontrolled nausea, vomiting and/or diarrhea, dizziness, shortness of breath, chest pain, bleeding, unexplained bruising, or if you have any other new/concerning symptoms, questions or concerns.      If you are having any concerning symptoms or wish to speak to a provider before your next infusion visit, please call your care coordinator or triage to notify them so we can adequately serve you.     If you need a refill on a narcotic prescription or other medication, please call before your infusion appointment.

## 2023-07-27 NOTE — NURSING NOTE
Chief Complaint   Patient presents with    Port Draw     Pt had labs drawn via port accessed by RN.VS taken.     Port accessed with 20 g 3/4 inch power needle by RN, labs collected, line flushed with saline and heparin.  Vitals taken. Pt checked in for appointment(s).     Joe Syed RN

## 2023-07-28 ENCOUNTER — APPOINTMENT (OUTPATIENT)
Dept: ULTRASOUND IMAGING | Facility: CLINIC | Age: 48
End: 2023-07-28
Attending: EMERGENCY MEDICINE
Payer: COMMERCIAL

## 2023-07-28 ENCOUNTER — APPOINTMENT (OUTPATIENT)
Dept: CT IMAGING | Facility: CLINIC | Age: 48
End: 2023-07-28
Payer: COMMERCIAL

## 2023-07-28 LAB
ALBUMIN SERPL BCG-MCNC: 3.7 G/DL (ref 3.5–5.2)
ALBUMIN SERPL BCG-MCNC: 3.9 G/DL (ref 3.5–5.2)
ALP SERPL-CCNC: 123 U/L (ref 40–129)
ALP SERPL-CCNC: 160 U/L (ref 40–129)
ALT SERPL W P-5'-P-CCNC: 25 U/L (ref 0–70)
ALT SERPL W P-5'-P-CCNC: 28 U/L (ref 0–70)
ANION GAP SERPL CALCULATED.3IONS-SCNC: 13 MMOL/L (ref 7–15)
ANION GAP SERPL CALCULATED.3IONS-SCNC: 15 MMOL/L (ref 7–15)
ANION GAP SERPL CALCULATED.3IONS-SCNC: 15 MMOL/L (ref 7–15)
AST SERPL W P-5'-P-CCNC: 109 U/L (ref 0–45)
AST SERPL W P-5'-P-CCNC: 97 U/L (ref 0–45)
BASOPHILS # BLD AUTO: 0.1 10E3/UL (ref 0–0.2)
BASOPHILS # BLD MANUAL: 0 10E3/UL (ref 0–0.2)
BASOPHILS NFR BLD AUTO: 0 %
BASOPHILS NFR BLD MANUAL: 0 %
BILIRUB DIRECT SERPL-MCNC: 0.21 MG/DL (ref 0–0.3)
BILIRUB DIRECT SERPL-MCNC: 0.55 MG/DL (ref 0–0.3)
BILIRUB SERPL-MCNC: 0.9 MG/DL
BILIRUB SERPL-MCNC: 0.9 MG/DL
BILIRUB SERPL-MCNC: 1.4 MG/DL
BUN SERPL-MCNC: 20 MG/DL (ref 6–20)
BUN SERPL-MCNC: 22 MG/DL (ref 6–20)
BUN SERPL-MCNC: 25.5 MG/DL (ref 6–20)
BURR CELLS BLD QL SMEAR: ABNORMAL
CALCIUM SERPL-MCNC: 8.1 MG/DL (ref 8.6–10)
CALCIUM SERPL-MCNC: 8.3 MG/DL (ref 8.6–10)
CALCIUM SERPL-MCNC: 8.3 MG/DL (ref 8.6–10)
CHLORIDE SERPL-SCNC: 100 MMOL/L (ref 98–107)
CHLORIDE SERPL-SCNC: 100 MMOL/L (ref 98–107)
CHLORIDE SERPL-SCNC: 101 MMOL/L (ref 98–107)
CK SERPL-CCNC: 81 U/L (ref 39–308)
CK SERPL-CCNC: 83 U/L (ref 39–308)
CREAT SERPL-MCNC: 1.04 MG/DL (ref 0.67–1.17)
CREAT SERPL-MCNC: 1.17 MG/DL (ref 0.67–1.17)
CREAT SERPL-MCNC: 1.26 MG/DL (ref 0.67–1.17)
CREAT UR-MCNC: 131 MG/DL
CRP SERPL-MCNC: 143 MG/L
DEPRECATED HCO3 PLAS-SCNC: 17 MMOL/L (ref 22–29)
DEPRECATED HCO3 PLAS-SCNC: 20 MMOL/L (ref 22–29)
DEPRECATED HCO3 PLAS-SCNC: 21 MMOL/L (ref 22–29)
EOSINOPHIL # BLD AUTO: 0 10E3/UL (ref 0–0.7)
EOSINOPHIL # BLD MANUAL: 0 10E3/UL (ref 0–0.7)
EOSINOPHIL NFR BLD AUTO: 0 %
EOSINOPHIL NFR BLD MANUAL: 0 %
ERYTHROCYTE [DISTWIDTH] IN BLOOD BY AUTOMATED COUNT: 19.1 % (ref 10–15)
ERYTHROCYTE [DISTWIDTH] IN BLOOD BY AUTOMATED COUNT: 19.6 % (ref 10–15)
FIBRINOGEN PPP-MCNC: 272 MG/DL (ref 170–490)
FLUAV RNA SPEC QL NAA+PROBE: NEGATIVE
FLUBV RNA RESP QL NAA+PROBE: NEGATIVE
FRACT EXCRET NA UR+SERPL-RTO: 0.3 %
GFR SERPL CREATININE-BSD FRML MDRD: 69 ML/MIN/1.73M2
GFR SERPL CREATININE-BSD FRML MDRD: 76 ML/MIN/1.73M2
GFR SERPL CREATININE-BSD FRML MDRD: 87 ML/MIN/1.73M2
GLUCOSE SERPL-MCNC: 134 MG/DL (ref 70–99)
GLUCOSE SERPL-MCNC: 145 MG/DL (ref 70–99)
GLUCOSE SERPL-MCNC: 155 MG/DL (ref 70–99)
HAPTOGLOB SERPL-MCNC: 3 MG/DL (ref 32–197)
HCT VFR BLD AUTO: 35 % (ref 40–53)
HCT VFR BLD AUTO: 36 % (ref 40–53)
HGB BLD-MCNC: 11.3 G/DL (ref 13.3–17.7)
HGB BLD-MCNC: 11.5 G/DL (ref 13.3–17.7)
IMM GRANULOCYTES # BLD: 1 10E3/UL
IMM GRANULOCYTES NFR BLD: 3 %
LACTATE SERPL-SCNC: 2 MMOL/L (ref 0.7–2)
LDH SERPL L TO P-CCNC: 767 U/L (ref 0–250)
LDH SERPL L TO P-CCNC: NORMAL U/L
LYMPHOCYTES # BLD AUTO: 0.2 10E3/UL (ref 0.8–5.3)
LYMPHOCYTES # BLD MANUAL: 0.5 10E3/UL (ref 0.8–5.3)
LYMPHOCYTES NFR BLD AUTO: 1 %
LYMPHOCYTES NFR BLD MANUAL: 1 %
MCH RBC QN AUTO: 27.4 PG (ref 26.5–33)
MCH RBC QN AUTO: 28.3 PG (ref 26.5–33)
MCHC RBC AUTO-ENTMCNC: 31.9 G/DL (ref 31.5–36.5)
MCHC RBC AUTO-ENTMCNC: 32.3 G/DL (ref 31.5–36.5)
MCV RBC AUTO: 86 FL (ref 78–100)
MCV RBC AUTO: 88 FL (ref 78–100)
METAMYELOCYTES # BLD MANUAL: 0.9 10E3/UL
METAMYELOCYTES NFR BLD MANUAL: 2 %
MONOCYTES # BLD AUTO: 1.2 10E3/UL (ref 0–1.3)
MONOCYTES # BLD MANUAL: 0 10E3/UL (ref 0–1.3)
MONOCYTES NFR BLD AUTO: 3 %
MONOCYTES NFR BLD MANUAL: 0 %
MRSA DNA SPEC QL NAA+PROBE: NEGATIVE
NEUTROPHILS # BLD AUTO: 37.8 10E3/UL (ref 1.6–8.3)
NEUTROPHILS # BLD MANUAL: 45.1 10E3/UL (ref 1.6–8.3)
NEUTROPHILS NFR BLD AUTO: 93 %
NEUTROPHILS NFR BLD MANUAL: 97 %
NRBC # BLD AUTO: 0.1 10E3/UL
NRBC # BLD AUTO: 0.5 10E3/UL
NRBC BLD AUTO-RTO: 0 /100
NRBC BLD MANUAL-RTO: 1 %
OSMOLALITY SERPL: 282 MMOL/KG (ref 275–295)
OSMOLALITY UR: 362 MMOL/KG (ref 100–1200)
PATH REPORT.COMMENTS IMP SPEC: NORMAL
PATH REPORT.COMMENTS IMP SPEC: NORMAL
PATH REPORT.FINAL DX SPEC: NORMAL
PATH REPORT.MICROSCOPIC SPEC OTHER STN: NORMAL
PATH REPORT.MICROSCOPIC SPEC OTHER STN: NORMAL
PATH REPORT.RELEVANT HX SPEC: NORMAL
PLAT MORPH BLD: ABNORMAL
PLATELET # BLD AUTO: 142 10E3/UL (ref 150–450)
PLATELET # BLD AUTO: 148 10E3/UL (ref 150–450)
POTASSIUM SERPL-SCNC: 4.2 MMOL/L (ref 3.4–5.3)
POTASSIUM SERPL-SCNC: 4.4 MMOL/L (ref 3.4–5.3)
POTASSIUM SERPL-SCNC: 4.5 MMOL/L (ref 3.4–5.3)
PROT SERPL-MCNC: 6.3 G/DL (ref 6.4–8.3)
PROT SERPL-MCNC: 6.7 G/DL (ref 6.4–8.3)
RBC # BLD AUTO: 4 10E6/UL (ref 4.4–5.9)
RBC # BLD AUTO: 4.19 10E6/UL (ref 4.4–5.9)
RBC MORPH BLD: ABNORMAL
RETICS # AUTO: 0.18 10E6/UL (ref 0.03–0.1)
RETICS/RBC NFR AUTO: 4.6 % (ref 0.5–2)
RSV RNA SPEC NAA+PROBE: NEGATIVE
SA TARGET DNA: NEGATIVE
SARS-COV-2 RNA RESP QL NAA+PROBE: NEGATIVE
SODIUM SERPL-SCNC: 133 MMOL/L (ref 136–145)
SODIUM SERPL-SCNC: 133 MMOL/L (ref 136–145)
SODIUM SERPL-SCNC: 136 MMOL/L (ref 136–145)
SODIUM UR-SCNC: 47 MMOL/L
SODIUM UR-SCNC: 47 MMOL/L
WBC # BLD AUTO: 40.3 10E3/UL (ref 4–11)
WBC # BLD AUTO: 46.5 10E3/UL (ref 4–11)

## 2023-07-28 PROCEDURE — 36415 COLL VENOUS BLD VENIPUNCTURE: CPT

## 2023-07-28 PROCEDURE — 99207 BLOOD MORPHOLOGY PATHOLOGIST REVIEW: CPT | Performed by: STUDENT IN AN ORGANIZED HEALTH CARE EDUCATION/TRAINING PROGRAM

## 2023-07-28 PROCEDURE — 80048 BASIC METABOLIC PNL TOTAL CA: CPT

## 2023-07-28 PROCEDURE — 36415 COLL VENOUS BLD VENIPUNCTURE: CPT | Performed by: EMERGENCY MEDICINE

## 2023-07-28 PROCEDURE — 99223 1ST HOSP IP/OBS HIGH 75: CPT | Mod: GC | Performed by: INTERNAL MEDICINE

## 2023-07-28 PROCEDURE — 87637 SARSCOV2&INF A&B&RSV AMP PRB: CPT

## 2023-07-28 PROCEDURE — 250N000009 HC RX 250: Performed by: EMERGENCY MEDICINE

## 2023-07-28 PROCEDURE — 250N000011 HC RX IP 250 OP 636: Mod: JZ

## 2023-07-28 PROCEDURE — 74176 CT ABD & PELVIS W/O CONTRAST: CPT

## 2023-07-28 PROCEDURE — 120N000002 HC R&B MED SURG/OB UMMC

## 2023-07-28 PROCEDURE — 85027 COMPLETE CBC AUTOMATED: CPT | Performed by: EMERGENCY MEDICINE

## 2023-07-28 PROCEDURE — 82550 ASSAY OF CK (CPK): CPT | Performed by: INTERNAL MEDICINE

## 2023-07-28 PROCEDURE — 250N000011 HC RX IP 250 OP 636: Performed by: EMERGENCY MEDICINE

## 2023-07-28 PROCEDURE — 93975 VASCULAR STUDY: CPT | Mod: 26 | Performed by: RADIOLOGY

## 2023-07-28 PROCEDURE — 85027 COMPLETE CBC AUTOMATED: CPT

## 2023-07-28 PROCEDURE — 87641 MR-STAPH DNA AMP PROBE: CPT

## 2023-07-28 PROCEDURE — 84450 TRANSFERASE (AST) (SGOT): CPT

## 2023-07-28 PROCEDURE — 85045 AUTOMATED RETICULOCYTE COUNT: CPT | Performed by: EMERGENCY MEDICINE

## 2023-07-28 PROCEDURE — 99222 1ST HOSP IP/OBS MODERATE 55: CPT | Performed by: STUDENT IN AN ORGANIZED HEALTH CARE EDUCATION/TRAINING PROGRAM

## 2023-07-28 PROCEDURE — 83930 ASSAY OF BLOOD OSMOLALITY: CPT | Performed by: EMERGENCY MEDICINE

## 2023-07-28 PROCEDURE — 83615 LACTATE (LD) (LDH) ENZYME: CPT

## 2023-07-28 PROCEDURE — 76770 US EXAM ABDO BACK WALL COMP: CPT

## 2023-07-28 PROCEDURE — 85007 BL SMEAR W/DIFF WBC COUNT: CPT | Performed by: EMERGENCY MEDICINE

## 2023-07-28 PROCEDURE — 76770 US EXAM ABDO BACK WALL COMP: CPT | Mod: 26 | Performed by: RADIOLOGY

## 2023-07-28 PROCEDURE — 83605 ASSAY OF LACTIC ACID: CPT | Performed by: EMERGENCY MEDICINE

## 2023-07-28 PROCEDURE — 83010 ASSAY OF HAPTOGLOBIN QUANT: CPT

## 2023-07-28 PROCEDURE — 84145 PROCALCITONIN (PCT): CPT | Performed by: INTERNAL MEDICINE

## 2023-07-28 PROCEDURE — 86140 C-REACTIVE PROTEIN: CPT | Performed by: INTERNAL MEDICINE

## 2023-07-28 PROCEDURE — 74176 CT ABD & PELVIS W/O CONTRAST: CPT | Mod: 26 | Performed by: STUDENT IN AN ORGANIZED HEALTH CARE EDUCATION/TRAINING PROGRAM

## 2023-07-28 PROCEDURE — 83010 ASSAY OF HAPTOGLOBIN QUANT: CPT | Performed by: EMERGENCY MEDICINE

## 2023-07-28 PROCEDURE — 258N000003 HC RX IP 258 OP 636: Performed by: EMERGENCY MEDICINE

## 2023-07-28 RX ORDER — AMPICILLIN AND SULBACTAM 2; 1 G/1; G/1
3 INJECTION, POWDER, FOR SOLUTION INTRAMUSCULAR; INTRAVENOUS EVERY 6 HOURS
Status: DISCONTINUED | OUTPATIENT
Start: 2023-07-28 | End: 2023-07-30 | Stop reason: HOSPADM

## 2023-07-28 RX ORDER — PIPERACILLIN SODIUM, TAZOBACTAM SODIUM 4; .5 G/20ML; G/20ML
4.5 INJECTION, POWDER, LYOPHILIZED, FOR SOLUTION INTRAVENOUS EVERY 6 HOURS
Status: DISCONTINUED | OUTPATIENT
Start: 2023-07-28 | End: 2023-07-28

## 2023-07-28 RX ORDER — LIDOCAINE 40 MG/G
CREAM TOPICAL
Status: DISCONTINUED | OUTPATIENT
Start: 2023-07-28 | End: 2023-07-30 | Stop reason: HOSPADM

## 2023-07-28 RX ADMIN — VANCOMYCIN HYDROCHLORIDE 2250 MG: 1 INJECTION, POWDER, LYOPHILIZED, FOR SOLUTION INTRAVENOUS at 00:22

## 2023-07-28 RX ADMIN — PIPERACILLIN SODIUM AND TAZOBACTAM SODIUM 4.5 G: 4; .5 INJECTION, POWDER, LYOPHILIZED, FOR SOLUTION INTRAVENOUS at 16:50

## 2023-07-28 RX ADMIN — PIPERACILLIN SODIUM AND TAZOBACTAM SODIUM 4.5 G: 4; .5 INJECTION, POWDER, LYOPHILIZED, FOR SOLUTION INTRAVENOUS at 12:56

## 2023-07-28 RX ADMIN — PIPERACILLIN SODIUM AND TAZOBACTAM SODIUM 4.5 G: 4; .5 INJECTION, POWDER, LYOPHILIZED, FOR SOLUTION INTRAVENOUS at 06:11

## 2023-07-28 RX ADMIN — SODIUM BICARBONATE: 84 INJECTION, SOLUTION INTRAVENOUS at 11:22

## 2023-07-28 RX ADMIN — SODIUM BICARBONATE: 84 INJECTION, SOLUTION INTRAVENOUS at 01:23

## 2023-07-28 ASSESSMENT — ACTIVITIES OF DAILY LIVING (ADL)
ADLS_ACUITY_SCORE: 35
WEAR_GLASSES_OR_BLIND: NO
ADLS_ACUITY_SCORE: 35
FALL_HISTORY_WITHIN_LAST_SIX_MONTHS: NO
ADLS_ACUITY_SCORE: 35
TOILETING_ISSUES: NO
ADLS_ACUITY_SCORE: 35
ADLS_ACUITY_SCORE: 35
DIFFICULTY_EATING/SWALLOWING: NO
ADLS_ACUITY_SCORE: 35
CONCENTRATING,_REMEMBERING_OR_MAKING_DECISIONS_DIFFICULTY: NO
DOING_ERRANDS_INDEPENDENTLY_DIFFICULTY: NO
DRESSING/BATHING_DIFFICULTY: NO
ADLS_ACUITY_SCORE: 18
ADLS_ACUITY_SCORE: 35
CHANGE_IN_FUNCTIONAL_STATUS_SINCE_ONSET_OF_CURRENT_ILLNESS/INJURY: NO
ADLS_ACUITY_SCORE: 35
WALKING_OR_CLIMBING_STAIRS_DIFFICULTY: NO
ADLS_ACUITY_SCORE: 35

## 2023-07-28 NOTE — UTILIZATION REVIEW
Admission Status; Secondary Review Determination     Admission Date: 7/27/2023  9:38 PM       Under the authority of the Utilization Management Committee, the utilization review process indicated a secondary review on the above patient.  The review outcome is based on review of the medical records, discussions with staff, and applying clinical experience noted on the date of the review.        (x)      Inpatient Status Appropriate - This patient's medical care is consistent with medical management for inpatient care and reasonable inpatient medical practice.       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 MICHELLE 2/2 ATN ( infusion reaction  resulting in hemolysis /hemoglobinuria )previously requiring short period of dialysis in 6/2021.Now on FOLFOX + Avastin with oxaloplatin desensitization.Given this background history ,having now large blood on urine dipstick and absence significant RBC on urine microscopy ( only 2 RBC/HPF)  speaks for pigmenturia ,though it could be either hemoglobin and myoglobin) ,checking CK and haptoglobin. Plan to continue with IV hydration, additional labs, continued hospitalization. Also with Metabolic Acidosis likely due to kidney dysfunction .CO2 was 17 , now raised to 20 with improving kidney dysfunction. Plan to Continue hydration .              At the time of admission with the information available to the attending physician, more than 2 nights hospital complex care was anticipated.  Also, there was a risk of adverse outcome if patient was treated outpatient or observation. High intensity of services anticipated. Inpatient admission appropriate based on Medicare guidelines.       The information on this document is developed by the utilization review team in order for the business office to ensure compliance.  This only denotes the appropriateness of proper admission status and does not reflect the quality of care rendered.         The definitions of Inpatient Status and Observation Status used in making the determination above are those provided in the CMS Coverage Manual, Chapter 1 and Chapter 6, section 70.4.      Sincerely,      Nadege Ordonez MD   Utilization Review/ Case Management  [facility].

## 2023-07-28 NOTE — PHARMACY-VANCOMYCIN DOSING SERVICE
"Pharmacy Vancomycin Initial Note  Date of Service 2023  Patient's  1975  48 year old, male    Indication: Sepsis    Current estimated CrCl = Estimated Creatinine Clearance: 90.7 mL/min (A) (based on SCr of 1.22 mg/dL (H)).    Creatinine for last 3 days  2023:  6:51 AM Creatinine 0.88 mg/dL;  9:52 PM Creatinine 1.22 mg/dL    Recent Vancomycin Level(s) for last 3 days  No results found for requested labs within last 3 days.      Vancomycin IV Administrations (past 72 hours)        No vancomycin orders with administrations in past 72 hours.                    Nephrotoxins and other renal medications (From now, onward)      Start     Dose/Rate Route Frequency Ordered Stop    23 2330  vancomycin (VANCOCIN) 2,250 mg in sodium chloride 0.9 % 500 mL intermittent infusion         2,250 mg  over 120 Minutes Intravenous ONCE 23 2305      23 2305  vancomycin place balderas - receiving intermittent dosing         1 each Intravenous SEE ADMIN INSTRUCTIONS 23 2306      23 2300  piperacillin-tazobactam (ZOSYN) 4.5 g vial to attach to  mL bag        Note to Pharmacy: For [facility], [facility] and [facility]: For Zosyn-naive patients, use the \"Zosyn initial dose + extended infusion\" order panel.    4.5 g  over 30 Minutes Intravenous ONCE 23 2255              Contrast Orders - past 72 hours (72h ago, onward)      None               Plan:  Give vancomycin 2250mg IV x1 now. Will dose intermittently based on levels for now given concern for MICHELLE on admit  Vancomycin monitoring method: Trough (Method 2 = manual dose calculation)  Vancomycin therapeutic monitoring goal: 15-20 mg/L  Pharmacy will check vancomycin levels as appropriate in 1-3 Days.    Serum creatinine levels will be ordered daily for the first week of therapy and at least twice weekly for subsequent weeks.      Zoltan Garay, PharmD, BCPS    "

## 2023-07-28 NOTE — CONSULTS
Solid Tumor Oncology  Consult Note   Date of Service: 07/28/2023    Patient: Roman Escalante  MRN: [id number]  Admission Date: 7/27/2023  Hospital Day # 1  Cancer Diagnosis: Metastatic colorectal cancer (lung, liver, LN metastases)    Primary Outpatient Oncologist: Dr. Polo Snow  Current Treatment Plan: FOLFOX + Bevacizumab (with oxaliplatin desensitization)    Reason for Consult: Reaction to oxaliplatin with possible hemolysis      History of Present Illness:    Roman Escalante is a 48 year old year male with history of metastatic colorectal cancer with diverting loop colostomy and history of hypersensitivity to oxaliplatin who presented following a possible reaction to chemotherapy.     Initially diagnosed in 2020. He had an unusual reaction to oxaliplatin in 06/2021 while receiving FOLFOX + bevacizumab. He was admitted to [facility] following development of dyspnea, chest pressure, N/V, and numbness/tingling of the extremities occurring 20 minutes after infusion. His course was c/b acute renal failure (initial Cr 0.81 --> peak of 8.41), along with evidence of hemolysis. He needed dialysis for a brief period. Subsequent therapy was changed to 5FU alone. See complete oncologic history below.    He states that since starting FOLFOX again with Dr. Snow with desensitization protocol, he always has some hematuria on the day following oxaliplatin infusion. This usually clears up quite rapidly. Yesterday, he passed some clots and the hematuria was more severe than prior. Given his prior hospitalization and worries about significant pathology, he presented for further workup. He has no additional complaints and feels fine today, with resolution of at least macroscopic hematuria. He is urinating a normal amount.      Oncologic History:    Patient developed rectal bleeding in 2020.  In January 2021 CT showed focal wall thickening in the upper rectum, multiple pulmonary nodules bilaterally suspicious for metastatic disease.   Underwent FNA of the right upper lobe lesion which was consistent with adenocarcinoma of the colon.  He was treated with FOLFOX from 2/20/2021 through 5/20/2021.  Avastin was added.  Had an infusion reaction to oxaliplatin 6/2021.  7/2021- 12/2021 was on 5-FU alone.  Developed progression.  12/2021- 9/2022 was on FOLFIRI.  11/2021 started Lonsurf. All of this was done with outside oncologist.      Met with Dr. Snow on 2/3/2022 to establish care. Recommended regorafenib.      There have been questions of a parasitic infection. Please see previous notes from [facility] oncologist for further details. Was seen by ID at the  on 2/27, no concerns for parasitic infection     Started regorafenib on 3/2/2023. Progression noted 5/19/23. Plan to start FOLFOX/Avastin and send out CARIS testing to evaluate for other treatment options.     Cycle 1 was given with oxaliplatin desensitization, 5FU, Avastin held due to increased urine protein. Cycle 2 was delievered started on 6/21/23. Cycle 3 was delivered on 7/13/23. Cycle 4 was started on 7/27/23.      Assessment & Plan:   Roman Escalante is a 48 year old year male with history of metastatic colorectal cancer with diverting loop colostomy and history of hypersensitivity to oxaliplatin who presented following a possible reaction to chemotherapy.     # Metastatic colorectal cancer  # MICHELLE, improving  # Hematuria, improving  # Hemolysis, suspect improving  # Oxaliplatin hypersensitivity    Seems to have a mild reaction with each cycle using desensitization. This episode may or may not have been worse with his prior three cycles, but it's really impossible to tell since we weren't really following labs in the days following each chemotherapy administration.     Given improving lab findings, and reassuring sxs (resolution of his hematuria), I think he can safely go home (pending nephrology recs). Don't think he needs close lab monitoring -- he knows the warning signs of his  chemotherapy reactions and will return for further evaluation if anything worsens. He will need to get his 5FU pump disconnected tomorrow at 2PM. He has home nursing usually take care of this. If he stays through tomorrow, we can help arrange for pump disconnect before he goes, or he can return home for his planned 2PM disconnect at home.    Recommendations:   - Feel he can safely discharge from oncology perspective  - Continue to hydrate, return if any recurrent symptoms  - Has 5FU pump disconnect scheduled for 2PM, need to coordinate any discharge with this if he stays through tomorrow (or longer)    Oncology will continue to follow this patient. Please do not hesitate to page with any questions or concerns.    Patient was seen and recommendations discussed with attending physician, Dr. Gee.    Young Blackman MD PhD  Hematology/Oncology Fellow  Pgr: [phone]      ---------------------------------------      Review of Systems:  A comprehensive ROS was performed and found to be negative or non-contributory with the exception of that noted in the HPI above.    Past Medical History:  No past medical history on file.    Past Surgical History:  Past Surgical History:   Procedure Laterality Date    IR LUNG BIOPSY MEDIASTINUM RIGHT  1/19/2021       Social History:  Social History     Socioeconomic History    Marital status: Single   Tobacco Use    Smoking status: Some Days     Packs/day: 0.50     Types: Cigarettes    Smokeless tobacco: Never   Substance and Sexual Activity    Alcohol use: Yes     Comment: social    Drug use: Yes     Types: Methamphetamines     Social Determinants of Health     Intimate Partner Violence: Not At Risk (2/3/2023)    Humiliation, Afraid, Rape, and Kick questionnaire     Fear of Current or Ex-Partner: No     Emotionally Abused: No     Physically Abused: No     Sexually Abused: No        Family History:  No family history on file.    Outpatient Medications:  [COMPLETED] dexamethasone  (DECADRON) 10 mg in D5W 56 mL intermittent infusion  [COMPLETED] dextrose 5% BOLUS  [COMPLETED] diphenhydrAMINE (BENADRYL) 25 mg in D5W 55.5 mL intermittent infusion  [COMPLETED] fluorouracil (ADRUCIL) injection 830 mg  [COMPLETED] leucovorin calcium 750 mg in D5W 147.5 mL infusion  [COMPLETED] oxaliplatin (ELOXATIN) 0.018 mg in D5W 110.18 mL desensitization bag #1  [COMPLETED] oxaliplatin (ELOXATIN) 0.18 mg in D5W 111.8 mL desensitization bag #2  [COMPLETED] oxaliplatin (ELOXATIN) 1.8 mg in D5W 110.36 mL desensitization bag #3  [COMPLETED] oxaliplatin (ELOXATIN) 160 mg in D5W 582 mL desensitization bag #5  [COMPLETED] oxaliplatin (ELOXATIN) 18 mg in D5W 113.6 mL desensitization bag #4    bisacodyl (DULCOLAX) 5 MG EC tablet, Take 5 mg by mouth daily as needed for constipation  Fluorouracil (ADURCIL) 5 GM/100ML SOLN injection,   ibuprofen (ADVIL/MOTRIN) 200 MG tablet, Take 600 mg by mouth every 8 hours as needed for pain  ondansetron (ZOFRAN ODT) 8 MG ODT tab, Take 1 tablet (8 mg) by mouth every 8 hours as needed for nausea  ondansetron (ZOFRAN) 8 MG tablet, Take 1 tablet (8 mg) by mouth every 8 hours as needed for nausea (vomiting) (Patient not taking: Reported on 6/7/2023)  Ostomy Supplies MISC, 1 each daily  oxyCODONE (ROXICODONE) 5 MG tablet, Take 1 tablet (5 mg) by mouth every 6 hours as needed for pain  prochlorperazine (COMPAZINE) 10 MG tablet, Take 1 tablet (10 mg) by mouth every 6 hours as needed for nausea or vomiting  prochlorperazine (COMPAZINE) 5 MG tablet, Take 1 tablet (5 mg) by mouth every 6 hours as needed for nausea or vomiting  [DISCONTINUED] regorafenib (STIVARGA) 40 MG tablet, Take 4 tablets (160 mg) by mouth daily Take on Days 1 thru 21 w/ low-fat bkfst. Store in orig bottle. Discard 7 weeks after opening.         Physical Exam:    Blood pressure 115/76, pulse 67, temperature 97.5  F (36.4  C), temperature source Oral, resp. rate 16, SpO2 99 %.  Well-appearing, walking around without any  difficulty. No acute distress. Breathing comfortably on RA. Urinal has yellow urine -- no apparent redness.    Labs & Studies: I personally reviewed the following studies:  ROUTINE LABS (Last four results):  CMP  Recent Labs   Lab 07/28/23  0558 07/28/23  0016 07/27/23 2152 07/27/23  0651   * 133* 132* 136   POTASSIUM 4.5 4.4 4.4 3.9   CHLORIDE 100 101 99 102   CO2 20* 17* 19* 25   ANIONGAP 13 15 14 9   * 145* 134* 117*   BUN 22.0* 20.0 19.6 12.1   CR 1.17 1.26* 1.22* 0.88   GFRESTIMATED 76 69 72 >90   JEFERSON 8.3* 8.1* 9.1 9.1   PROTTOTAL 6.7 6.3* 7.3 7.3   ALBUMIN 3.9 3.7 4.3 4.3   BILITOTAL 0.9 1.4* 1.9* 0.3   ALKPHOS 123 160* 279* 134*   AST 97* 109* 118* 28   ALT 25 28 31 26     CBC  Recent Labs   Lab 07/28/23  0558 07/28/23  0016 07/27/23 2152 07/27/23  0651   WBC 40.3* 46.5* 48.9* 9.5   RBC 4.19* 4.00* 4.51 4.67   HGB 11.5* 11.3* 12.8* 12.9*   HCT 36.0* 35.0* 39.3* 39.0*   MCV 86 88 87 84   MCH 27.4 28.3 28.4 27.6   MCHC 31.9 32.3 32.6 33.1   RDW 19.6* 19.1* 19.2* 19.3*   * 148* 177 252     INR  Recent Labs   Lab 07/27/23 2152   INR 1.31*

## 2023-07-28 NOTE — PROGRESS NOTES
"Brief Oncology Consult note:    Primary oncologist : Dr. Polo Snow    47 YO M with metatstatic colon adenocarcinoma progressed on multiple lines of treatment currently on FOLFOX+ Reyna (oxaliplatin desensitization) s/p C3 D1 today.     His labs this am were unremarkable, including negative protein in urine. Received Avastin, desensitized oxaliplatin and 5 FU bolus f/by infusion.     His noted red urine and pelvic pain after he went home post infusion,  came back to ER. In the ER he is afebrile and hemodynamically stable.  Labs notable for WBC 48.9 (a.m. 9.5) hemoglobin and platelet stable.  Creatinine uptrending to 1.22 from 0.88 with rise in ALT to 79 from 34,  from 88, ALT normal at 31, T. bili 1.9 from 0.3.  UA is orange and cloudy, large blood but with 2 RBC, protein albumin 300 which was negative this morning, nitrite negative leuk esterase negative moderate bacteria. He is currently making urine and getting 5FU infusion.     Prior prior records from 6/2021 from [facility]:    \"about 20 minutes into the Oxaliplatin infusion, the patient had onset of shortness of breath, chest pressure, nausea, vomiting, numbness/tingling in bilateral arms and legs. He was given benadryl 25 mg IV, solumedrol 120 mg intravenous, and pepcid 20 mg intravenous with resolution of all symptoms except the numbness. About 1 hour later he had onset of chills and rigors and was given Demerol 12.5 mg. He was then referred to the ED for further evaluation.     In the ED, vital signs were unremarkable. Found new unconjungated hyperbilirubinemia (bili 3) and mildly elevated AST, which hadn't been present on labs earlier that same day prior to chemo. RUQ US without obvious source. He then developed brown urine which urinalysis revealed to be gross hematuria and was admitted for further eval. Following hospitalization, patient found to have acute thrombocytopenia, acute hemolytic process (haptoglobin <9, LDH 1200), and marked acute " kidney injury, creatinine which had been 0.81 on admission peaked at 8.41 on 6/20/21. Hematology and Nephrology consulted. It was felt that patient likely had acute adverse reaction to oxaliplatin which caused above symptoms. STAT peripheral smear did not reveal schistocytes so not felt to be TTP, but likely represents lower level acute hemolytic process with associated acute nephrotoxicity. Unfortunately, his creatinine continued to rise and on 6/20/21 was 8.41. Therefore a catheter was placed by interventional radiology and dialysis was initiated on 6/20/21. Patient was dialyzed on 3 consecutive days (6/20/21 - 6/22/21). Patient complained of shortness of breath on 6/23/2021 and a chest x-ray was obtained which showed diffuse interstitial infiltrates and small bilateral pleural effusion suggestive of hypervolumia.patient dialyzed on 6/24/2021 with plans to dialyze again on 6/25/2021. Outpatient dialysis has been set up to begin on Monday 6/28/21.      On evening of 6/23/2021, patient spiked fever to 101.2. Blood cultures obtained. Given recent chemotherapy, patient was empirically started on vancomycin and cefepime but since patient was not neutropenic per 6/24/2021 CBC with differential, opted to discontinue antibiotics. However, delayed patient plan to discharge which has been scheduled for 6/24/2021 and additional day to allow potential culprit bug to grow on blood culture. Blood cultures negative as of 6/25/2021. Urine culture also negative.     Per Oncology, will NOT re-challenge with oxaliplatin in the future (listed on allergy list). After the patient recovers from this event, Dr Alcantar plans to treat with maintenance 5-FU and Avastin without oxaliplatin.     Patient had additional run of hemodialysis on 6/24/2021 and 6/25/2021. Roman continued to improve and was stable for discharge on 6/25/2021. He was instructed to follow up with PCP in 1-5 days and with oncology for further treatment of cancer. Patient  "to start MWF dialysis on 6/28/2021. Medication changes as detailed below.   \"    Assessment and Plan:    Unclear cause of acute changes in the labs.  Concerning for a recurrent episode of reaction to oxaliplatin, however this time pt has been receiving oxaliplatin desensitization. Albuminuria could be related to Avastin as well      #MICHELLE: dehydration vs oxaliplatin related. Continue iVF hydration and MICHELLE w/u with renal US. Agree with nephrology consult  #Hyperbilirubinemia:  #LFT abdnormalities: elevated AST with normal ALT, elevated ALP and t bili 1.3: would check indirect bilirubin, CK lvl, liver US, lDH, haptoglobin, peripheral smear (hgb and plt stable),DIC panel  #Leukocytosis: likely related to steroid pre med vs UTI  #Albuminuria: Will need holding further avastin  #Metastatic colon cancer: Ok to continue 5FU for now (given no adjustments recommended for renal dysfunction).     Patient will be admitted to hospitalist service. Inpatient oncology pt will evaluate patient in the AM. Plan briefly discussed with Dr. Ramirez.    Lisa Armstrong MD  Hematology and Medical Oncology Fellow  [facility]  "

## 2023-07-28 NOTE — ED TRIAGE NOTES
"Presents to the ED with complaints of bloody urine. Pt currently has Fluorourcil chemo infusing through port. Patient reports that he previously was treated with same drug two years ago and \"my kidneys shut down.\" Around 7:45 noted bright red blood in toilet after urinating.      Triage Assessment       Row Name 07/27/23 4601       Triage Assessment (Adult)    Airway WDL WDL       Respiratory WDL    Respiratory WDL WDL       Skin Circulation/Temperature WDL    Skin Circulation/Temperature WDL WDL       Cardiac WDL    Cardiac WDL WDL       Peripheral/Neurovascular WDL    Peripheral Neurovascular WDL WDL       Cognitive/Neuro/Behavioral WDL    Cognitive/Neuro/Behavioral WDL WDL                    "

## 2023-07-28 NOTE — PROVIDER NOTIFICATION
Dr Gonzales notified at [phone]:pt supposed to have IV vanco last night the bag is hanging on IV pole not infused.Do you want to order new bag?

## 2023-07-28 NOTE — TELEPHONE ENCOUNTER
Patient calling because he has blood in his urine following a chemotherapy treatment. Patient declines triage and just wants to go in to the ED. Discussed the different locations with him. No additional questions.    Martin Pena RN on 7/27/2023 at 8:31 PM    Reason for Disposition    General information question, no triage required and triager able to answer question    Protocols used: Information Only Call - No Triage-A-

## 2023-07-28 NOTE — MEDICATION SCRIBE - ADMISSION MEDICATION HISTORY
Medication Scribe Admission Medication History    Admission medication history is complete. The information provided in this note is only as accurate as the sources available at the time of the update.    Medication reconciliation/reorder completed by provider prior to medication history? No    Information Source(s): Patient via in-person    Pertinent Information: He has not taken medications in 4 weeks but he would like them left on the list in case he needs them in the future. He reports he is allergic to his chemotherapy but they are still using it as he has no other options.    Changes made to PTA medication list:  Added: None  Deleted: None  Changed: None    Medication Affordability:       Allergies reviewed with patient and updates made in EHR: yes    Medication History Completed By: Tommie Morfin MD 7/28/2023 4:04 PM    Prior to Admission medications    Medication Sig Last Dose Taking? Auth Provider Long Term End Date   bisacodyl (DULCOLAX) 5 MG EC tablet Take 5 mg by mouth daily as needed for constipation More than a month at unknown Yes Reported, Patient     Fluorouracil (ADURCIL) 5 GM/100ML SOLN injection  More than a month at unknown Yes Reported, Patient     ibuprofen (ADVIL/MOTRIN) 200 MG tablet Take 600 mg by mouth every 8 hours as needed for pain More than a month at unknown Yes Polo Snow MD     ondansetron (ZOFRAN ODT) 8 MG ODT tab Take 1 tablet (8 mg) by mouth every 8 hours as needed for nausea More than a month at unknown Yes Polo Snow MD     ondansetron (ZOFRAN) 8 MG tablet Take 1 tablet (8 mg) by mouth every 8 hours as needed for nausea (vomiting) More than a month at unknown Yes Polo Snow MD     Ostomy Supplies MISC 1 each daily Unknown at unknown Yes Hanna Tavera PA-C     oxyCODONE (ROXICODONE) 5 MG tablet Take 1 tablet (5 mg) by mouth every 6 hours as needed for pain More than a month at unknown Yes Brea Jauregui APRN CNP     prochlorperazine (COMPAZINE) 10 MG tablet  Take 1 tablet (10 mg) by mouth every 6 hours as needed for nausea or vomiting More than a month at unknown Yes Polo Snow MD     prochlorperazine (COMPAZINE) 5 MG tablet Take 1 tablet (5 mg) by mouth every 6 hours as needed for nausea or vomiting More than a month at unknown Yes Polo Snow MD     regorafenib (STIVARGA) 40 MG tablet Take 4 tablets (160 mg) by mouth daily Take on Days 1 thru 21 w/ low-fat bkfst. Store in orig bottle. Discard 7 weeks after opening.   Polo Snow MD Yes 5/19/23

## 2023-07-28 NOTE — PLAN OF CARE
Blood pressure 117/75, pulse 59, temperature 98  F (36.7  C), temperature source Oral, resp. rate 16, SpO2 97 %.    Goal Outcome Evaluation:A&Ox 4,up independently,denied pain and nausea,regular diet  good appetite,LS clear,BS+,colostomy present had small amount of soft stool,voided large amount ,urine benny clear color.Sodium bicarbonate infusing at 100 ml/hr ,Zosyn administered as ordered.CT  abdomen without contrast ordered.Will continue to monitor.

## 2023-07-28 NOTE — H&P
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 Heme onc and nephrology involved currently being treated for medication adverse effect versus less likely infection.     # Hematuria   # MICHELLE (bs 0.7-0.8) admission 1.22  # Proteinuria   # Metabolic acidosis   06/2022 - Similar presentation after infusion of oxaliplatin requiring brief hemodialysis. Patient received his 4th infusion of FOLFOX + Bevacizumab with oxaliplatin desensitization with 5FU bolus. He was able to tolerate 3 piror infusions without side effects. Unclear if C4 has varying concentrations of oxaliplatin. CK normal. UA sig for hema/protein/pyuria. Renal ultrasound prelim read not c/f hydronephrosis or renal vein thrombus. Chemotherapy related injury high on the differential. Also considered thrombosis, nephrolithiasis, infection related renal injury which seem less likely. Nephrology saw patient in ED and recommended additional bicarb replacement given MICHELLE.   - Follow up renal ultrasound formal read   - Aggressive fluid resuscitation ( s/p 1 L IVF) encouraging PO intake   - UA reflux to cx ordered - follow U Cx  - Track I/O - GOAL UO of 2 L in 24 hrs.   - Continue to monitor bicarb and replace as needed.   - Nephrology consulted at ED, appreciate recs.     # Leukocytosis ( neutrophil predominant)  # Cholestatic LFT   VSS but with marked leukocytosis to 48.9. Received Vanc/zosyn in the ED. Infectious versus stress related de margination. Patient does not have post infusion leukocytosis per chart  review. Infectious sources could be blood stream in the setting of recent port access. Considered pulmonary but no infectious stigmata on interview and exam. Hepatobiliary infection or obstruction considered given diffuse tender abdomen and cholestatic LFT. But LFT also explained by chemo side effect. Recent CT 5/23 without gall stones. No dysuric symptoms and UA neg for infection.   - blood cultures pending   - Abx given so R port blood draw not given.   - RUQ ultrasound     # Hyperbilirubinemia   # Thombocytopenia   # Anemia   Mild anemia and thrombocytopenia in the setting of hyperbilirubinemia   - LDH and hapto ordered.  - Consider peripheral smear     # Metastatic Colon adenocarcinoma - FOLFOX/Avastin C 4  # s/p diverting loop colostomy (2/2021)   # KRAS mt  2021 - rectal bleeding and change in bowel habits. Near-obstructing mass at the rectosigmoid junction with biopsy positive for moderately differentiated adenocarcinoma. Staging work up revealed concerning lung lesions that were ultimately biopsied and confirmed bilateral lung involvement of rectal cancer.   Refer to 7/26/2023 for illness hx and current treatment course.   - Heme onc consulted by ED, appreciate recs.   - 5FU infusion ongoing.        Diet:  ADAT  DVT Prophylaxis: Pneumatic Compression Devices  Puri Catheter: Not present  Fluids: Ongoing   Lines: PRESENT             Cardiac Monitoring: None  Code Status:  Full code     Clinically Significant Risk Factors Present on Admission               # Coagulation Defect: INR = 1.31 (Ref range: 0.85 - 1.15) and/or PTT = 28 Seconds (Ref range: 22 - 38 Seconds), will monitor for bleeding   # Acute Kidney Injury, unspecified: based on a >150% or 0.3 mg/dL increase in last creatinine compared to past 90 day average, will monitor renal function  # Hypertension: Noted on problem list      # Overweight: Estimated body mass index is 27.2 kg/m  as calculated from the following:    Height as of 7/26/23: 1.803 m  "(5' 11\").    Weight as of an earlier encounter on 7/27/23: 88.5 kg (195 lb).            Disposition Plan      Expected Discharge Date: 07/29/2023                The patient's care to be discussed with the day team attending.       Tiffany Edwards MD  Medicine Service, [facility]  Securely message with [employer] (more info)  Text page via [employer] Paging/Directory   See signed in provider for up to date coverage information  ______________________________________________________________________    Chief Complaint     Red urine after cycle 4 chemotherapy    History of Present Illness   Roman Escalante is a 48 year old male who presents with cancer hx currently undergoing chemotherapy presents 24 hrs after C4 infusion after noting red urine. Reports tolerating the first 3 infusions without symptoms. Usually gets dark concentrated urine which also resolves in 1-2 days.  Reports health at baseline prior to cycle 4. Reports during infusion felt fine went home and slept. Following morning used the restroom noted red urine. At that point reached out to primary onc Dr. Snow and come down to ED.    Reports noting new acid reflux symptoms as of the past 24 hrs. Reports nausea but no vomiting. Reports no BM since the 4th cycle on 7/28. Reports this is consistent with piror cycles where he is usually constipated with minimal to no ostomy output. Reports  mild chills while infusion with medication but not after.     Denies fever, chills, night sweats, joint or muscle pain, rigors, or noting new rashes after the infusion.      Family hx - great grand father with colon cancer.   Soc hx - neg smoking and alcohol use hx. Uses medical [facility].  Lives with sig other who is a hospice nurse and also the POA.      Reports patient is on no daily medications other than antiemetics which he takes very rarely.   See 7/27 onc note for hx of similar presentation after oxaliplatin " infusion 2021    ED Course : Vanc zosyn started. Renal and hem onc consulted.   -- Bicarb gtt start. Patient is still receiving 5FU infusion per oncology.   Nephro recommended aggressive IVF with monitoring UO.         Past Medical History    No past medical history on file.    Past Surgical History   Past Surgical History:   Procedure Laterality Date    IR LUNG BIOPSY MEDIASTINUM RIGHT  2021       Prior to Admission Medications   Prior to Admission Medications   Prescriptions Last Dose Informant Patient Reported? Taking?   Fluorouracil (ADURCIL) 5 GM/100ML SOLN injection   Yes No   Ostomy Supplies MISC   No No   Si each daily   bisacodyl (DULCOLAX) 5 MG EC tablet   Yes No   Sig: Take 5 mg by mouth daily as needed for constipation   ibuprofen (ADVIL/MOTRIN) 200 MG tablet   Yes No   Sig: Take 600 mg by mouth every 8 hours as needed for pain   ondansetron (ZOFRAN ODT) 8 MG ODT tab   No No   Sig: Take 1 tablet (8 mg) by mouth every 8 hours as needed for nausea   ondansetron (ZOFRAN) 8 MG tablet   No No   Sig: Take 1 tablet (8 mg) by mouth every 8 hours as needed for nausea (vomiting)   Patient not taking: Reported on 2023   oxyCODONE (ROXICODONE) 5 MG tablet   No No   Sig: Take 1 tablet (5 mg) by mouth every 6 hours as needed for pain   prochlorperazine (COMPAZINE) 10 MG tablet   No No   Sig: Take 1 tablet (10 mg) by mouth every 6 hours as needed for nausea or vomiting   prochlorperazine (COMPAZINE) 5 MG tablet   No No   Sig: Take 1 tablet (5 mg) by mouth every 6 hours as needed for nausea or vomiting      Facility-Administered Medications: None        Review of Systems    The 10 point Review of Systems is negative other than noted in the HPI or here.      Physical Exam   Vital Signs: Temp: 98.2  F (36.8  C) Temp src: Oral BP: 119/77 Pulse: 67   Resp: 16 SpO2: 98 % O2 Device: None (Room air)    Weight: 0 lbs 0 oz    Overall : Well appearing man laying in bed without any acute distress   HEENT: Anicteric  sclera with intact ROM in eyes and neck.   Card: RRR  Pulm: vesicular breath sounds bilaterally   GI: Non distended and mildly tender abdomen to deep palpation. LLQ ostomy bag present no output noted.   Extremities: No edema or skin changes notable.     Medical Decision Making       Please see A&P for additional details of medical decision making.      Data     I have personally reviewed the following data over the past 24 hrs:    46.5 (H)  \   11.3 (L)   / 148 (L)     133 (L) 101 20.0 /  145 (H)   4.4 17 (L) 1.26 (H) \     ALT: 28 AST: 109 (H) AP: 160 (H) TBILI: 1.4 (H)   ALB: 3.7 TOT PROTEIN: 6.3 (L) LIPASE: N/A     Procal: N/A CRP: N/A Lactic Acid: 2.0       INR:  1.31 (H) PTT:  28   D-dimer:  N/A Fibrinogen:  272     Ferritin:  N/A % Retic:  4.6 (H) LDH:  N/A       Imaging results reviewed over the past 24 hrs:   Recent Results (from the past 24 hour(s))   US Renal Complete w Arterial Duplex    Impression    RESIDENT PRELIMINARY INTERPRETATION  IMPRESSION:  Normal grayscale and Doppler evaluation of both kidneys.

## 2023-07-28 NOTE — ED PROVIDER NOTES
[place] DE[facility]MENT ([facility])    7/27/23       History     Chief Complaint   Patient presents with    Hematuria     The history is provided by the patient, medical records and a significant other.     Roman Escalante is a 48 year old male who with PMH of stage IV KRAS mutated colorectal adenocarcinoma, h/o ATN from chemotherapy requiring temporary dialysis in 2021 who presents to the ED with hematuria which was the first symptom he developed with prior episode of ATN. Patient reports that he had dark colored urine starting today at 7:45 pm. Patient reports that he is receiving chemotherapy for his colorectal cancer.  He is receiving aloxaplatin and fluorocil for chemotherapy today. The Fluorocil is running currently on a portable pump. He reports that he had the same dark colored urine 2 years ago while he was receiving chemotherapy and that his kidneys had shut down during that time. Patient sees Dr. Snow for his oncology treatment. Denies diarrhea, headache, fever. Has mild lower abdominal pain and bilateral flank pain. Not on blood thinners.       Past Medical History  No past medical history on file.  Past Surgical History:   Procedure Laterality Date    IR LUNG BIOPSY MEDIASTINUM RIGHT  1/19/2021     bisacodyl (DULCOLAX) 5 MG EC tablet  Fluorouracil (ADURCIL) 5 GM/100ML SOLN injection  ibuprofen (ADVIL/MOTRIN) 200 MG tablet  ondansetron (ZOFRAN ODT) 8 MG ODT tab  ondansetron (ZOFRAN) 8 MG tablet  Ostomy Supplies MISC  oxyCODONE (ROXICODONE) 5 MG tablet  prochlorperazine (COMPAZINE) 10 MG tablet  prochlorperazine (COMPAZINE) 5 MG tablet      Allergies   Allergen Reactions    Oxaliplatin Shortness Of Breath, Nausea and Vomiting and Other (See Comments)     Patient had HSR to Oxaliplatin on cycle 8 of FOLFOX chemotherapy. Sent to ER for continued monitoring.  Patient had HSR to Oxaliplatin on cycle 8 of FOLFOX chemotherapy. Sent to ER for continued monitoring.      Blood-Group Specific  Substance Other (See Comments)     Patient has reactivity Suggestive of a Warm auto antibody. Blood products may be delayed. Draw patient 24 hours prior to transfusion. Draw one red top and two purple top tubes for all type and screen orders.  Patient has reactivity Suggestive of a Warm auto antibody. Blood products may be delayed. Draw patient 24 hours prior to transfusion. Draw one red top and two purple top tubes for all type and screen orders.       Family History  No family history on file.  Social History   Social History     Tobacco Use    Smoking status: Some Days     Packs/day: 0.50     Types: Cigarettes    Smokeless tobacco: Never   Substance Use Topics    Alcohol use: Yes     Comment: social    Drug use: Yes     Types: Methamphetamines      Past medical history, past surgical history, medications, allergies, family history, and social history were reviewed with the patient. No additional pertinent items.      A complete review of systems was performed with pertinent positives and negatives noted in the HPI, and all other systems negative.    Physical Exam   BP: 120/81  Pulse: 104  Temp: 98.2  F (36.8  C)  Resp: 15  SpO2: 95 %  Physical Exam  Vitals and nursing note reviewed.   Constitutional:       General: He is not in acute distress.     Appearance: Normal appearance. He is well-developed. He is not diaphoretic.   HENT:      Head: Normocephalic and atraumatic.      Nose: Nose normal.      Mouth/Throat:      Mouth: Mucous membranes are dry.   Eyes:      General: No scleral icterus.     Conjunctiva/sclera: Conjunctivae normal.   Cardiovascular:      Rate and Rhythm: Normal rate.   Pulmonary:      Effort: Pulmonary effort is normal. No respiratory distress.      Breath sounds: No stridor.   Abdominal:      General: There is no distension.      Palpations: Abdomen is soft.      Tenderness: There is abdominal tenderness in the suprapubic area. There is right CVA tenderness and left CVA tenderness. There is  no guarding or rebound.      Comments: Ostomy in place in LLQ   Musculoskeletal:         General: No deformity or signs of injury. Normal range of motion.      Cervical back: Normal range of motion. No rigidity.   Skin:     General: Skin is warm and dry.      Coloration: Skin is not jaundiced or pale.      Findings: No rash.   Neurological:      General: No focal deficit present.      Mental Status: He is alert and oriented to person, place, and time.   Psychiatric:         Mood and Affect: Mood normal.         Behavior: Behavior normal.         Thought Content: Thought content normal.           ED Course, Procedures, & Data    10:36 PM  The patient was seen and examined by Bere Romero MD. In the waiting room.   Procedures               Results for orders placed or performed during the hospital encounter of 07/27/23   Comprehensive metabolic panel     Status: Abnormal   Result Value Ref Range    Sodium 132 (L) 136 - 145 mmol/L    Potassium 4.4 3.4 - 5.3 mmol/L    Chloride 99 98 - 107 mmol/L    Carbon Dioxide (CO2) 19 (L) 22 - 29 mmol/L    Anion Gap 14 7 - 15 mmol/L    Urea Nitrogen 19.6 6.0 - 20.0 mg/dL    Creatinine 1.22 (H) 0.67 - 1.17 mg/dL    Calcium 9.1 8.6 - 10.0 mg/dL    Glucose 134 (H) 70 - 99 mg/dL    Alkaline Phosphatase 279 (H) 40 - 129 U/L     (H) 0 - 45 U/L    ALT 31 0 - 70 U/L    Protein Total 7.3 6.4 - 8.3 g/dL    Albumin 4.3 3.5 - 5.2 g/dL    Bilirubin Total 1.9 (H) <=1.2 mg/dL    GFR Estimate 72 >60 mL/min/1.73m2   INR     Status: Abnormal   Result Value Ref Range    INR 1.31 (H) 0.85 - 1.15   Partial thromboplastin time     Status: Normal   Result Value Ref Range    aPTT 28 22 - 38 Seconds   UA with Microscopic reflex to Culture     Status: Abnormal    Specimen: Urine, Midstream   Result Value Ref Range    Color Urine Orange (A) Colorless, Straw, Light Yellow, Yellow    Appearance Urine Cloudy (A) Clear    Glucose Urine 70 (A) Negative mg/dL    Bilirubin Urine Negative Negative     Ketones Urine Trace (A) Negative mg/dL    Specific Gravity Urine 1.015 1.003 - 1.035    Blood Urine Large (A) Negative    pH Urine 6.0 5.0 - 7.0    Protein Albumin Urine 300 (A) Negative mg/dL    Urobilinogen Urine Normal Normal, 2.0 mg/dL    Nitrite Urine Negative Negative    Leukocyte Esterase Urine Negative Negative    Bacteria Urine Moderate (A) None Seen /HPF    RBC Urine 2 <=2 /HPF    WBC Urine 15 (H) <=5 /HPF    Narrative    Urine Culture ordered based on laboratory criteria   CBC with platelets and differential     Status: Abnormal   Result Value Ref Range    WBC Count 48.9 (H) 4.0 - 11.0 10e3/uL    RBC Count 4.51 4.40 - 5.90 10e6/uL    Hemoglobin 12.8 (L) 13.3 - 17.7 g/dL    Hematocrit 39.3 (L) 40.0 - 53.0 %    MCV 87 78 - 100 fL    MCH 28.4 26.5 - 33.0 pg    MCHC 32.6 31.5 - 36.5 g/dL    RDW 19.2 (H) 10.0 - 15.0 %    Platelet Count 177 150 - 450 10e3/uL    % Neutrophils 92 %    % Lymphocytes 1 %    % Monocytes 3 %    % Eosinophils 0 %    % Basophils 0 %    % Immature Granulocytes 4 %    NRBCs per 100 WBC 0 <1 /100    Absolute Neutrophils 45.1 (H) 1.6 - 8.3 10e3/uL    Absolute Lymphocytes 0.3 (L) 0.8 - 5.3 10e3/uL    Absolute Monocytes 1.2 0.0 - 1.3 10e3/uL    Absolute Eosinophils 0.0 0.0 - 0.7 10e3/uL    Absolute Basophils 0.2 0.0 - 0.2 10e3/uL    Absolute Immature Granulocytes 2.1 (H) <=0.4 10e3/uL    Absolute NRBCs 0.2 10e3/uL   CBC with platelets differential     Status: Abnormal    Narrative    The following orders were created for panel order CBC with platelets differential.  Procedure                               Abnormality         Status                     ---------                               -----------         ------                     CBC with platelets and d...[[id number]]  Abnormal            Final result                 Please view results for these tests on the individual orders.   Results for orders placed or performed in visit on 07/27/23   Comprehensive metabolic panel     Status:  Abnormal   Result Value Ref Range    Sodium 136 136 - 145 mmol/L    Potassium 3.9 3.4 - 5.3 mmol/L    Chloride 102 98 - 107 mmol/L    Carbon Dioxide (CO2) 25 22 - 29 mmol/L    Anion Gap 9 7 - 15 mmol/L    Urea Nitrogen 12.1 6.0 - 20.0 mg/dL    Creatinine 0.88 0.67 - 1.17 mg/dL    Calcium 9.1 8.6 - 10.0 mg/dL    Glucose 117 (H) 70 - 99 mg/dL    Alkaline Phosphatase 134 (H) 40 - 129 U/L    AST 28 0 - 45 U/L    ALT 26 0 - 70 U/L    Protein Total 7.3 6.4 - 8.3 g/dL    Albumin 4.3 3.5 - 5.2 g/dL    Bilirubin Total 0.3 <=1.2 mg/dL    GFR Estimate >90 >60 mL/min/1.73m2   Protein qualitative urine     Status: Normal   Result Value Ref Range    Protein Albumin Urine Negative Negative mg/dL   CBC with platelets and differential     Status: Abnormal   Result Value Ref Range    WBC Count 9.5 4.0 - 11.0 10e3/uL    RBC Count 4.67 4.40 - 5.90 10e6/uL    Hemoglobin 12.9 (L) 13.3 - 17.7 g/dL    Hematocrit 39.0 (L) 40.0 - 53.0 %    MCV 84 78 - 100 fL    MCH 27.6 26.5 - 33.0 pg    MCHC 33.1 31.5 - 36.5 g/dL    RDW 19.3 (H) 10.0 - 15.0 %    Platelet Count 252 150 - 450 10e3/uL    % Neutrophils 72 %    % Lymphocytes 12 %    % Monocytes 11 %    % Eosinophils 1 %    % Basophils 1 %    % Immature Granulocytes 3 %    NRBCs per 100 WBC 0 <1 /100    Absolute Neutrophils 6.8 1.6 - 8.3 10e3/uL    Absolute Lymphocytes 1.2 0.8 - 5.3 10e3/uL    Absolute Monocytes 1.1 0.0 - 1.3 10e3/uL    Absolute Eosinophils 0.1 0.0 - 0.7 10e3/uL    Absolute Basophils 0.1 0.0 - 0.2 10e3/uL    Absolute Immature Granulocytes 0.3 <=0.4 10e3/uL    Absolute NRBCs 0.0 10e3/uL   CBC with platelets differential     Status: Abnormal    Narrative    The following orders were created for panel order CBC with platelets differential.  Procedure                               Abnormality         Status                     ---------                               -----------         ------                     CBC with platelets and d...[[id number]]  Abnormal            Final result                  Please view results for these tests on the individual orders.     Medications   0.9% sodium chloride BOLUS (1,000 mLs Intravenous $New Bag 7/27/23 2336)   piperacillin-tazobactam (ZOSYN) 4.5 g vial to attach to  mL bag (4.5 g Intravenous $New Bag 7/27/23 2336)   pharmacy alert - intermittent dosing (has no administration in time range)   vancomycin (VANCOCIN) 2,250 mg in sodium chloride 0.9 % 500 mL intermittent infusion (has no administration in time range)   vancomycin place balderas - receiving intermittent dosing (has no administration in time range)   sodium bicarbonate 150 mEq in D5W 1,000 mL infusion (has no administration in time range)     Labs Ordered and Resulted from Time of ED Arrival to Time of ED Departure   COMPREHENSIVE METABOLIC PANEL - Abnormal       Result Value    Sodium 132 (*)     Potassium 4.4      Chloride 99      Carbon Dioxide (CO2) 19 (*)     Anion Gap 14      Urea Nitrogen 19.6      Creatinine 1.22 (*)     Calcium 9.1      Glucose 134 (*)     Alkaline Phosphatase 279 (*)      (*)     ALT 31      Protein Total 7.3      Albumin 4.3      Bilirubin Total 1.9 (*)     GFR Estimate 72     INR - Abnormal    INR 1.31 (*)    ROUTINE UA WITH MICROSCOPIC REFLEX TO CULTURE - Abnormal    Color Urine Orange (*)     Appearance Urine Cloudy (*)     Glucose Urine 70 (*)     Bilirubin Urine Negative      Ketones Urine Trace (*)     Specific Gravity Urine 1.015      Blood Urine Large (*)     pH Urine 6.0      Protein Albumin Urine 300 (*)     Urobilinogen Urine Normal      Nitrite Urine Negative      Leukocyte Esterase Urine Negative      Bacteria Urine Moderate (*)     RBC Urine 2      WBC Urine 15 (*)    CBC WITH PLATELETS AND DIFFERENTIAL - Abnormal    WBC Count 48.9 (*)     RBC Count 4.51      Hemoglobin 12.8 (*)     Hematocrit 39.3 (*)     MCV 87      MCH 28.4      MCHC 32.6      RDW 19.2 (*)     Platelet Count 177      % Neutrophils 92      % Lymphocytes 1      % Monocytes 3       % Eosinophils 0      % Basophils 0      % Immature Granulocytes 4      NRBCs per 100 WBC 0      Absolute Neutrophils 45.1 (*)     Absolute Lymphocytes 0.3 (*)     Absolute Monocytes 1.2      Absolute Eosinophils 0.0      Absolute Basophils 0.2      Absolute Immature Granulocytes 2.1 (*)     Absolute NRBCs 0.2     PARTIAL THROMBOPLASTIN TIME - Normal    aPTT 28     LACTIC ACID WHOLE BLOOD   MYOGLOBIN QUANTITATIVE URINE   CK TOTAL   BILIRUBIN DIRECT   HAPTOGLOBIN   LACTATE DEHYDROGENASE   FIBRINOGEN ACTIVITY   SODIUM RANDOM URINE   OSMOLALITY, RANDOM URINE   OSMOLALITY   URINE CULTURE   BLOOD CULTURE   BLOOD CULTURE   LAB BLOOD MORPHOLOGY PATHOLOGIST REVIEW   FRACTIONAL EXCRETION OF SODIUM     US Renal Complete w Arterial Duplex    (Results Pending)          Critical care was performed.   Critical Care Addendum  My initial assessment, based on my review of nursing observations, review of vital signs, focused history, and physical exam, established a high suspicion that Roman Escalante has sepsis with indication for early goal-directed therapy and acute renal failure/ATN with hemolysis and thrombocytopenia , which requires immediate intervention, and therefore he is critically ill.     After the initial assessment, the care team initiated multiple lab tests, initiated IV fluid administration, initiated medication therapy with bicarb, BS ABX, and consulted with oncology, nephrology  to provide stabilization care. Due to the critical nature of this patient, I reassessed nursing observations, vital signs, physical exam, mental status, and respiratory status multiple times prior to his disposition.     Time also spent performing documentation, discussion with family to obtain medical information for decision making, reviewing test results, discussion with consultants, and coordination of care.     Critical care time (excluding teaching time and procedures): 45 minutes.     Assessment & Plan    Roman Escalante is a 48  year old male who with PMH of stage IV KRAS mutated colorectal adenocarcinoma, h/o ATN from chemotherapy requiring temporary dialysis in 2021 who presents to the ED with hematuria which was the first symptom he developed with prior episode of ATN.     Ddx: ATN from oxaliplatin, rhabdomyolysis, pyelonephritis, sepsis, thrombocytopenia, DIC, hemolysis    Patient presents with gross hematuria.  He shows me a picture of the urine from this evening which looks dark and parisa colored with a thick sediment settling to the bottom of the toilet.  Urinalysis resulted with large blood but only 2 RBCs concerning for myoglobinuria.  Added on myoglobin quantitative to the urine and a CK level.  Patient is worried about nephrotoxicity and kidney failure as a result of one of his chemotherapy agents.  He scented with similar symptoms in 2021 after being treated with oxaliplatin.  He says that he received oxaliplatin today as well.  Patient's labs notable for acute elevation of creatinine from 0.88 at 6:51 AM this morning to 1.22 tonight.  Sodium 132.  Bicarb 19.  AST acutely elevated to 118 from 28 this morning.  Bilirubin 1.9 from 0.3.  Added on a direct bilirubin.  INR 1.31.  Patient is not anticoagulated.  White blood cell count 48.9 from 9.5 this morning.  Hemoglobin stable at 12.8.  Platelets 177 down from 252 this morning.  There is a left shift and lymphocytopenia as well as 2.1 immature granulocytes.  Covered empirically for UTI/sepsis with vancomycin and Zosyn.  Blood cultures and urine culture in process.  Per review of discharge summary from June 2021, patient had a similar presentation with acute elevation in AST, unconjugated hyperbilirubinemia, severe MICHELLE, thrombocytopenia, and eventually required temporary dialysis.  He had a hemolytic process but peripheral smear did not reveal schistocytes at that time so they did not feel he had TTP.  Discussed with oncology who states we can continue the fluorouracil which is  "currently running as this is not illuminated through the kidney.  Also consulted nephrology who recommends a bicarb drip for alkalinization of the urine.  Both of these consultants recommended aggressive fluid hydration.  Unfortunately, there is no other interventions available and we will just have to have to trend patient's labs and urine output to determine if he will eventually require hemodialysis.  Additional hemolysis labs are in process.  Patient admitted to medicine.    Patient signed out to Dr. Jeffrey at shift change. Please see addendum for results of work up and remaining management.            Discharge summary 6/25/2021  \"On the day of admission, the patient was receiving his chemotherapy infusions as scheduled. He was given Avastin without difficulty. Then, about 20 minutes into the Oxaliplatin infusion, the patient had onset of shortness of breath, chest pressure, nausea, vomiting, numbness/tingling in bilateral arms and legs. He was given benadryl 25 mg IV, solumedrol 120 mg intravenous, and pepcid 20 mg intravenous with resolution of all symptoms except the numbness. About 1 hour later he had onset of chills and rigors and was given Demerol 12.5 mg. He was then referred to the ED for further evaluation.    In the ED, vital signs were unremarkable. Found new unconjungated hyperbilirubinemia (bili 3) and mildly elevated AST, which hadn't been present on labs earlier that same day prior to chemo. RUQ US without obvious source. He then developed brown urine which urinalysis revealed to be gross hematuria and was admitted for further evaluation. Following admission, patient found to have acute thrombocytopenia, acute hemolytic process (haptoglobin <9, LDH 1200), and marked acute kidney injury. Creatinine which had been 0.81 on admission peaked at 8.41 on 6/20/21. Hematology and Nephrology consulted. It was felt that patient likely had acute adverse reaction to oxaliplatin which caused above symptoms. " Peripheral smear did not reveal schistocytes so not felt to be TTP, but likely represents low level acute hemolytic process with associated acute nephrotoxicity. Unfortunately, his creatinine continued to rise and on 6/20/21 was 8.41. Therefore a tunneled dialysis catheter was placed by interventional radiology and dialysis was initiated on 6/20/21. Patient was dialyzed on 3 consecutive days (6/20/21 - 6/22/21). Patient complained of shortness of breath on 6/23/2021 and a chest x-ray was obtained which showed diffuse interstitial infiltrates and small bilateral pleural effusion suggestive of hypervolumia. Patient dialyzed on 6/24/2021 and 6/25/2021 to dialyze off excess volume. Outpatient dialysis has been set up to begin on Monday 6/28/21 at [facility], [address] [place] [address] [address], [facility].     On evening of 6/23/2021, patient spiked fever to 101.2. Blood cultures and urine obtained. Given recent chemotherapy, patient was empirically started on vancomycin and cefepime but since patient was not neutropenic per 6/24/2021 CBC with differential, opted to discontinue antibiotics. However, delayed patient plan to discharge which has been scheduled for 6/24/2021 and kept him inpatient an additional day to allow potential culprit bug to grow on blood culture. Blood cultures negative as of 6/25/2021. Urine culture also negative.     Per Oncology, will NOT re-challenge with oxaliplatin in the future (listed on allergy list). After the patient recovers from this event, Dr Alcantar plans to treat with maintenance 5-FU and Avastin without oxaliplatin.    Patient who does not have any history of hypertension was noted to have markedly elevated blood pressure this hospitalization that was presumed secondary to his acute kidney injury. Patient was started on amlodipine 10 mg daily q. evening and he will discharge on that. He should monitor his blood pressure at home and take log of blood  "pressure readings to his PCP follow-up appointment. He may need further titration of his antihypertensive regimen.    Patient clinically improved and was discharged to home on 6/25/2021. He was instructed to follow up with PCP in 1-5 days and with oncology for further treatment of cancer. Patient to start MWF dialysis on 6/28/2021. Medication changes as detailed below. Note that patient is not yet committed to dialysis long-term as there is still a chance of renal recovery. Therefore, avoid nephrotoxins such as contrast dye, NSAIDs as well as ACE inhibitor and ARB.\"       I have reviewed the nursing notes. I have reviewed the findings, diagnosis, plan and need for follow up with the patient.    New Prescriptions    No medications on file       Final diagnoses:   MICHELLE (acute kidney injury) (H)   IAMOS, am serving as a trained medical scribe to document services personally performed by Bree Romero MD, based on the provider's statements to me.     Bree NGUYEN MD, was physically present and have reviewed and verified the accuracy of this note documented by AMOS KEE.     Bree Romero MD.     [facility] EMERGENCY DEPARTMENT  7/27/2023     Bree Romero MD  07/27/23 8373    "

## 2023-07-28 NOTE — PROGRESS NOTES
Please see H&P for today's formal note.    Patient reporting he is back to baseline. Given MICHELLE, elevated white blood cell count to 40 and risk of decompensation we recommend at least an additional night for ongoing monitoring and inpatient antibiotic therapy. Additionally, patient reported abdominal discomfort with radiation to right thigh and felt a new bump in his abdomen.     General: Alert and oriented without distress  HEENT: Normocephalic/atraumatic  Respiratory: CTAB without increased respiratory effort or accessory muscle use  Cardiovascular: RRR without murmurs, rubs or gallop. S1, S2 intact.  Abdomen: Bowel sounds present. Soft, non-distended. Some tenderness to palpation in right part of abdomen without rebound.   Skin: No overt lesions, bruises, rashes or bleeding on exposed skin surfaces.  Neuro: CN II-XII grossly intact.       Today:  - CT abdomen/pelvis without contrast  - Continue sodium bicarbonate drip  - Switched to ampicillin/sulbactam  - BMP in evening, switch to LR maintenance fluids once bicarbonate is near 24    Patient seen and case discussed with Dr. David who agrees with the above assessment and plan.     Hans Goff, DO  PGY-3, Internal Medicine  [facility]

## 2023-07-28 NOTE — CONSULTS
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  Reason for nephrology consult is hematuria ,proteinuria and slight rise in creatinine .    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    This patient had MICHELLE 2/2 ATN ( infusion reaction  resulting in hemolysis /hemoglobinuria )previously requiring short period of dialysis in 6/2021.Now on FOLFOX + Avastin with oxaloplatin desensitization.Given this background history ,having now large blood on urine dipstick and absence significant RBC on urine microscopy ( only 2 RBC/HPF)  speaks for pigmenturia ,though it could be either hemoglobin and myoglobin) ,checking CK and haptoglobin will help us out .Besides milder rise in creatinine ( 0.88-1.26) which normalized with just fluid also might be in favour of  pigmenturia ,get flushed out with diuresis  hence this may be concerning for milder reaction .    - Continue hydration    - Obtain CK ,Haptoglobulin .   - Suggest closer follow up with two -three days on subsequent Chemo .    # Metabolic Acidosis likely due to kidney dysfunction .  CO2 was 17 , now raised to 20 with improving kidney dysfunction.       - Continue hydration .          Recommendations were communicated to primary team via verbal/notes     Seen and discussed with Dr. Trinity Vaughan MD  Division of Renal Disease and Hypertension  [facility]  [employer]  Vocera Web Console        REASON FOR CONSULT: Mild rise in creatinine after chemotherapy.    HISTORY OF PRESENT  ILLNESS:  Admitting provider and nursing notes reviewed  47 YO M with metatstatic colon adenocarcinoma progressed on multiple lines of treatment currently on FOLFOX+ Reyna (oxaliplatin desensitization) s/p C3 D2 today. He noted red urine and pelvic pain after he went home post infusion,  came back to ER. In the ER he is afebrile and hemodynamically stable.  Labs notable for WBC 48.9 (a.m. 9.5) hemoglobin and platelet stable.  Creatinine uptrending to 1.22 from 0.88. UA is orange and cloudy, large blood but with 2 RBC, protein albumin 300 .   He had MICHELLE following Oxaloplatin requiring dialysis in 6/2021 ,when he was presented with similar picture .He has good urine ouput No other significant past medical history or family history.No nausea or vomiting .       PAST MEDICAL HISTORY:  Reviewed with patient on 07/28/2023     No past medical history on file.    Past Surgical History:   Procedure Laterality Date    IR LUNG BIOPSY MEDIASTINUM RIGHT  1/19/2021        MEDICATIONS:  PTA Meds  Prior to Admission medications    Medication Sig Last Dose Taking? Auth Provider Long Term End Date   bisacodyl (DULCOLAX) 5 MG EC tablet Take 5 mg by mouth daily as needed for constipation   Reported, Patient     Fluorouracil (ADURCIL) 5 GM/100ML SOLN injection    Reported, Patient     ibuprofen (ADVIL/MOTRIN) 200 MG tablet Take 600 mg by mouth every 8 hours as needed for pain   Polo Snow MD     ondansetron (ZOFRAN ODT) 8 MG ODT tab Take 1 tablet (8 mg) by mouth every 8 hours as needed for nausea   Polo Snow MD     ondansetron (ZOFRAN) 8 MG tablet Take 1 tablet (8 mg) by mouth every 8 hours as needed for nausea (vomiting)  Patient not taking: Reported on 6/7/2023   Polo Snow MD     Ostomy Supplies MISC 1 each daily   Hanna Tavera PA-C     oxyCODONE (ROXICODONE) 5 MG tablet Take 1 tablet (5 mg) by mouth every 6 hours as needed for pain   Brea Jauregui APRN CNP     prochlorperazine (COMPAZINE) 10 MG tablet Take 1 tablet (10  mg) by mouth every 6 hours as needed for nausea or vomiting   Polo Snow MD     prochlorperazine (COMPAZINE) 5 MG tablet Take 1 tablet (5 mg) by mouth every 6 hours as needed for nausea or vomiting   Polo Snow MD     regorafenib (STIVARGA) 40 MG tablet Take 4 tablets (160 mg) by mouth daily Take on Days 1 thru 21 w/ low-fat bkfst. Store in orig bottle. Discard 7 weeks after opening.   Polo Snow MD Yes 5/19/23      Current Meds   piperacillin-tazobactam  4.5 g Intravenous Q6H    sodium chloride (PF)  3 mL Intracatheter Q8H     Infusion Meds   sodium bicarbonate 150 mEq in D5W 1,000 mL infusion 100 mL/hr at 07/28/23 1122       ALLERGIES:    Allergies   Allergen Reactions    Oxaliplatin Shortness Of Breath, Nausea and Vomiting and Other (See Comments)     Patient had HSR to Oxaliplatin on cycle 8 of FOLFOX chemotherapy. Sent to ER for continued monitoring.  Patient had HSR to Oxaliplatin on cycle 8 of FOLFOX chemotherapy. Sent to ER for continued monitoring.      Blood-Group Specific Substance Other (See Comments)     Patient has reactivity Suggestive of a Warm auto antibody. Blood products may be delayed. Draw patient 24 hours prior to transfusion. Draw one red top and two purple top tubes for all type and screen orders.  Patient has reactivity Suggestive of a Warm auto antibody. Blood products may be delayed. Draw patient 24 hours prior to transfusion. Draw one red top and two purple top tubes for all type and screen orders.         REVIEW OF SYSTEMS:  A comprehensive of systems was negative except as noted above.    SOCIAL HISTORY:   Social History     Socioeconomic History    Marital status: Single     Spouse name: Not on file    Number of children: Not on file    Years of education: Not on file    Highest education level: Not on file   Occupational History    Not on file   Tobacco Use    Smoking status: Some Days     Packs/day: 0.50     Types: Cigarettes    Smokeless tobacco: Never   Substance and  Sexual Activity    Alcohol use: Yes     Comment: social    Drug use: Yes     Types: Methamphetamines    Sexual activity: Not on file   Other Topics Concern    Not on file   Social History Narrative    Not on file     Social Determinants of Health     Financial Resource Strain: Not on file   Food Insecurity: Not on file   Transportation Needs: Not on file   Physical Activity: Not on file   Stress: Not on file   Social Connections: Not on file   Intimate Partner Violence: Not At Risk (2/3/2023)    Humiliation, Afraid, Rape, and Kick questionnaire     Fear of Current or Ex-Partner: No     Emotionally Abused: No     Physically Abused: No     Sexually Abused: No   Housing Stability: Not on file     Reviewed with patient       FAMILY MEDICAL HISTORY:   No family history on file.  Reviewed with patient     PHYSICAL EXAM:   Temp  Av.1  F (36.7  C)  Min: 97.5  F (36.4  C)  Max: 98.8  F (37.1  C)      Pulse  Av.3  Min: 59  Max: 104 Resp  Av.5  Min: 15  Max: 18  SpO2  Av.5 %  Min: 95 %  Max: 99 %       /75 (BP Location: Left arm)   Pulse 59   Temp 98  F (36.7  C) (Oral)   Resp 16   SpO2 97%    Date 23 07 - 23 0659   Shift 9234-1991 0326-7366 6651-8153 24 Hour Total   INTAKE   P.O. 960   960   Shift Total 960   960   OUTPUT   Urine 1980   Shift Total 1980   Weight (kg)          Admit       GENERAL APPEARANCE: not distress,  awake  EYES: no scleral icterus, pupils equal  Endo: no goiter,   Lymphatics: no cervical or supraclavicular LAD  Pulmonary: lungs clear to auscultation with equal breath sounds bilaterally,   CV: regular rhythm, normal rate, no rub   - JVD -ve   - Edema -ve   GI: soft, nontender, normal bowel sounds  MS: no evidence of inflammation in joints, no muscle tenderness  : no dawson  SKIN: no rash, warm, dry, no cyanosis  NEURO: face symmetric, no asterixis     LABS:   CMP  Recent Labs   Lab 23  0558 23  0016 23  2152 23  0651   NA  133* 133* 132* 136   POTASSIUM 4.5 4.4 4.4 3.9   CHLORIDE 100 101 99 102   CO2 20* 17* 19* 25   ANIONGAP 13 15 14 9   * 145* 134* 117*   BUN 22.0* 20.0 19.6 12.1   CR 1.17 1.26* 1.22* 0.88   GFRESTIMATED 76 69 72 >90   JEFERSON 8.3* 8.1* 9.1 9.1   PROTTOTAL 6.7 6.3* 7.3 7.3   ALBUMIN 3.9 3.7 4.3 4.3   BILITOTAL 0.9  0.9 1.4* 1.9* 0.3   ALKPHOS 123 160* 279* 134*   AST 97* 109* 118* 28   ALT 25 28 31 26     CBC  Recent Labs   Lab 07/28/23  0558 07/28/23  0016 07/27/23 2152 07/27/23  0651   HGB 11.5* 11.3* 12.8* 12.9*   WBC 40.3* 46.5* 48.9* 9.5   RBC 4.19* 4.00* 4.51 4.67   HCT 36.0* 35.0* 39.3* 39.0*   MCV 86 88 87 84   MCH 27.4 28.3 28.4 27.6   MCHC 31.9 32.3 32.6 33.1   RDW 19.6* 19.1* 19.2* 19.3*   * 148* 177 252     INR  Recent Labs   Lab 07/27/23 2152   INR 1.31*   PTT 28     ABGNo lab results found in last 7 days.   URINE STUDIES  Recent Labs   Lab Test 07/27/23 2158 07/27/23  0651 07/14/23  0752 06/22/23  0721   COLOR Orange*  --   --   --    APPEARANCE Cloudy*  --   --   --    URINEGLC 70*  --   --   --    URINEBILI Negative  --   --   --    URINEKETONE Trace*  --   --   --    SG 1.015  --   --   --    UBLD Large*  --   --   --    URINEPH 6.0  --   --   --    PROTEIN 300* Negative Negative Negative   NITRITE Negative  --   --   --    LEUKEST Negative  --   --   --    RBCU 2  --   --   --    WBCU 15*  --   --   --      No lab results found.  PTH  No lab results found.  IRON STUDIES  No lab results found.    IMAGING:  All imaging studies reviewed by me.     Fito Vaughan MD

## 2023-07-29 ENCOUNTER — APPOINTMENT (OUTPATIENT)
Dept: GENERAL RADIOLOGY | Facility: CLINIC | Age: 48
End: 2023-07-29
Attending: INTERNAL MEDICINE
Payer: COMMERCIAL

## 2023-07-29 ENCOUNTER — APPOINTMENT (OUTPATIENT)
Dept: ULTRASOUND IMAGING | Facility: CLINIC | Age: 48
End: 2023-07-29
Attending: INTERNAL MEDICINE
Payer: COMMERCIAL

## 2023-07-29 LAB
ADV 40+41 DNA STL QL NAA+NON-PROBE: NEGATIVE
ALBUMIN SERPL BCG-MCNC: 3.5 G/DL (ref 3.5–5.2)
ALBUMIN UR-MCNC: 10 MG/DL
ALP SERPL-CCNC: 111 U/L (ref 40–129)
ALT SERPL W P-5'-P-CCNC: 20 U/L (ref 0–70)
ANION GAP SERPL CALCULATED.3IONS-SCNC: 10 MMOL/L (ref 7–15)
ANION GAP SERPL CALCULATED.3IONS-SCNC: 10 MMOL/L (ref 7–15)
APPEARANCE UR: CLEAR
AST SERPL W P-5'-P-CCNC: 41 U/L (ref 0–45)
ASTRO TYP 1-8 RNA STL QL NAA+NON-PROBE: NEGATIVE
BACTERIA UR CULT: NO GROWTH
BASOPHILS # BLD AUTO: 0 10E3/UL (ref 0–0.2)
BASOPHILS NFR BLD AUTO: 0 %
BILIRUB SERPL-MCNC: 0.5 MG/DL
BILIRUB UR QL STRIP: NEGATIVE
BUN SERPL-MCNC: 20.5 MG/DL (ref 6–20)
BUN SERPL-MCNC: 25.2 MG/DL (ref 6–20)
C CAYETANENSIS DNA STL QL NAA+NON-PROBE: NEGATIVE
C DIFF TOX B STL QL: NEGATIVE
CALCIUM SERPL-MCNC: 8.2 MG/DL (ref 8.6–10)
CALCIUM SERPL-MCNC: 8.3 MG/DL (ref 8.6–10)
CAMPYLOBACTER DNA SPEC NAA+PROBE: NEGATIVE
CHLORIDE SERPL-SCNC: 103 MMOL/L (ref 98–107)
CHLORIDE SERPL-SCNC: 99 MMOL/L (ref 98–107)
COLOR UR AUTO: ABNORMAL
CREAT SERPL-MCNC: 0.91 MG/DL (ref 0.67–1.17)
CREAT SERPL-MCNC: 1.02 MG/DL (ref 0.67–1.17)
CRP SERPL-MCNC: 84.7 MG/L
CRYPTOSP DNA STL QL NAA+NON-PROBE: NEGATIVE
DEPRECATED HCO3 PLAS-SCNC: 23 MMOL/L (ref 22–29)
DEPRECATED HCO3 PLAS-SCNC: 24 MMOL/L (ref 22–29)
E COLI O157 DNA STL QL NAA+NON-PROBE: NORMAL
E HISTOLYT DNA STL QL NAA+NON-PROBE: NEGATIVE
EAEC ASTA GENE ISLT QL NAA+PROBE: NEGATIVE
EC STX1+STX2 GENES STL QL NAA+NON-PROBE: NEGATIVE
EOSINOPHIL # BLD AUTO: 0 10E3/UL (ref 0–0.7)
EOSINOPHIL NFR BLD AUTO: 0 %
EPEC EAE GENE STL QL NAA+NON-PROBE: NEGATIVE
ERYTHROCYTE [DISTWIDTH] IN BLOOD BY AUTOMATED COUNT: 19.4 % (ref 10–15)
ETEC LTA+ST1A+ST1B TOX ST NAA+NON-PROBE: NEGATIVE
G LAMBLIA DNA STL QL NAA+NON-PROBE: NEGATIVE
GFR SERPL CREATININE-BSD FRML MDRD: 90 ML/MIN/1.73M2
GFR SERPL CREATININE-BSD FRML MDRD: >90 ML/MIN/1.73M2
GLUCOSE SERPL-MCNC: 126 MG/DL (ref 70–99)
GLUCOSE SERPL-MCNC: 126 MG/DL (ref 70–99)
GLUCOSE UR STRIP-MCNC: NEGATIVE MG/DL
HCT VFR BLD AUTO: 32.9 % (ref 40–53)
HGB BLD-MCNC: 10.5 G/DL (ref 13.3–17.7)
HGB UR QL STRIP: NEGATIVE
IMM GRANULOCYTES # BLD: 0.2 10E3/UL
IMM GRANULOCYTES NFR BLD: 1 %
KETONES UR STRIP-MCNC: NEGATIVE MG/DL
LACTATE SERPL-SCNC: 1 MMOL/L (ref 0.7–2)
LACTATE SERPL-SCNC: 2.6 MMOL/L (ref 0.7–2)
LEUKOCYTE ESTERASE UR QL STRIP: NEGATIVE
LYMPHOCYTES # BLD AUTO: 0.4 10E3/UL (ref 0.8–5.3)
LYMPHOCYTES NFR BLD AUTO: 2 %
MAGNESIUM SERPL-MCNC: 1.9 MG/DL (ref 1.7–2.3)
MCH RBC QN AUTO: 27.7 PG (ref 26.5–33)
MCHC RBC AUTO-ENTMCNC: 31.9 G/DL (ref 31.5–36.5)
MCV RBC AUTO: 87 FL (ref 78–100)
MONOCYTES # BLD AUTO: 0.5 10E3/UL (ref 0–1.3)
MONOCYTES NFR BLD AUTO: 3 %
MYOGLOBIN UR-MCNC: 1 MG/L
NEUTROPHILS # BLD AUTO: 19.2 10E3/UL (ref 1.6–8.3)
NEUTROPHILS NFR BLD AUTO: 94 %
NITRATE UR QL: NEGATIVE
NOROVIRUS GI+II RNA STL QL NAA+NON-PROBE: NEGATIVE
NRBC # BLD AUTO: 0 10E3/UL
NRBC BLD AUTO-RTO: 0 /100
P SHIGELLOIDES DNA STL QL NAA+NON-PROBE: NEGATIVE
PH UR STRIP: 7.5 [PH] (ref 5–7)
PHOSPHATE SERPL-MCNC: 2.9 MG/DL (ref 2.5–4.5)
PLAT MORPH BLD: ABNORMAL
PLATELET # BLD AUTO: 68 10E3/UL (ref 150–450)
POLYCHROMASIA BLD QL SMEAR: SLIGHT
POTASSIUM SERPL-SCNC: 4 MMOL/L (ref 3.4–5.3)
POTASSIUM SERPL-SCNC: 4.1 MMOL/L (ref 3.4–5.3)
PROCALCITONIN SERPL IA-MCNC: 134.3 NG/ML
PROCALCITONIN SERPL IA-MCNC: 99.97 NG/ML
PROT SERPL-MCNC: 6.1 G/DL (ref 6.4–8.3)
RBC # BLD AUTO: 3.79 10E6/UL (ref 4.4–5.9)
RBC MORPH BLD: ABNORMAL
RBC URINE: <1 /HPF
RVA RNA STL QL NAA+NON-PROBE: NEGATIVE
SALMONELLA SP RPOD STL QL NAA+PROBE: NEGATIVE
SAPO I+II+IV+V RNA STL QL NAA+NON-PROBE: NEGATIVE
SHIGELLA SP+EIEC IPAH ST NAA+NON-PROBE: NEGATIVE
SODIUM SERPL-SCNC: 133 MMOL/L (ref 136–145)
SODIUM SERPL-SCNC: 136 MMOL/L (ref 136–145)
SP GR UR STRIP: 1.02 (ref 1–1.03)
UROBILINOGEN UR STRIP-MCNC: NORMAL MG/DL
V CHOLERAE DNA SPEC QL NAA+PROBE: NEGATIVE
VIBRIO DNA SPEC NAA+PROBE: NEGATIVE
WBC # BLD AUTO: 20.3 10E3/UL (ref 4–11)
WBC URINE: 1 /HPF
Y ENTEROCOL DNA STL QL NAA+PROBE: NEGATIVE

## 2023-07-29 PROCEDURE — 99207 PR APP CREDIT; MD BILLING SHARED VISIT: CPT | Performed by: PHYSICIAN ASSISTANT

## 2023-07-29 PROCEDURE — 87507 IADNA-DNA/RNA PROBE TQ 12-25: CPT | Performed by: INTERNAL MEDICINE

## 2023-07-29 PROCEDURE — 71046 X-RAY EXAM CHEST 2 VIEWS: CPT | Mod: 26 | Performed by: STUDENT IN AN ORGANIZED HEALTH CARE EDUCATION/TRAINING PROGRAM

## 2023-07-29 PROCEDURE — 99233 SBSQ HOSP IP/OBS HIGH 50: CPT | Mod: GC | Performed by: INTERNAL MEDICINE

## 2023-07-29 PROCEDURE — 81001 URINALYSIS AUTO W/SCOPE: CPT | Performed by: INTERNAL MEDICINE

## 2023-07-29 PROCEDURE — 250N000011 HC RX IP 250 OP 636

## 2023-07-29 PROCEDURE — 250N000013 HC RX MED GY IP 250 OP 250 PS 637: Performed by: INTERNAL MEDICINE

## 2023-07-29 PROCEDURE — 76705 ECHO EXAM OF ABDOMEN: CPT

## 2023-07-29 PROCEDURE — 84145 PROCALCITONIN (PCT): CPT | Performed by: INTERNAL MEDICINE

## 2023-07-29 PROCEDURE — 258N000003 HC RX IP 258 OP 636: Performed by: STUDENT IN AN ORGANIZED HEALTH CARE EDUCATION/TRAINING PROGRAM

## 2023-07-29 PROCEDURE — 99232 SBSQ HOSP IP/OBS MODERATE 35: CPT | Performed by: INTERNAL MEDICINE

## 2023-07-29 PROCEDURE — 36415 COLL VENOUS BLD VENIPUNCTURE: CPT | Performed by: INTERNAL MEDICINE

## 2023-07-29 PROCEDURE — 120N000002 HC R&B MED SURG/OB UMMC

## 2023-07-29 PROCEDURE — 86140 C-REACTIVE PROTEIN: CPT | Performed by: INTERNAL MEDICINE

## 2023-07-29 PROCEDURE — 87040 BLOOD CULTURE FOR BACTERIA: CPT | Performed by: INTERNAL MEDICINE

## 2023-07-29 PROCEDURE — 85025 COMPLETE CBC W/AUTO DIFF WBC: CPT

## 2023-07-29 PROCEDURE — 87493 C DIFF AMPLIFIED PROBE: CPT | Performed by: INTERNAL MEDICINE

## 2023-07-29 PROCEDURE — 250N000011 HC RX IP 250 OP 636: Mod: JZ | Performed by: INTERNAL MEDICINE

## 2023-07-29 PROCEDURE — 83605 ASSAY OF LACTIC ACID: CPT | Performed by: INTERNAL MEDICINE

## 2023-07-29 PROCEDURE — 83735 ASSAY OF MAGNESIUM: CPT

## 2023-07-29 PROCEDURE — 84100 ASSAY OF PHOSPHORUS: CPT

## 2023-07-29 PROCEDURE — 80053 COMPREHEN METABOLIC PANEL: CPT

## 2023-07-29 PROCEDURE — 76705 ECHO EXAM OF ABDOMEN: CPT | Mod: 26 | Performed by: RADIOLOGY

## 2023-07-29 PROCEDURE — 999N000128 HC STATISTIC PERIPHERAL IV START W/O US GUIDANCE

## 2023-07-29 PROCEDURE — 71046 X-RAY EXAM CHEST 2 VIEWS: CPT

## 2023-07-29 PROCEDURE — 258N000003 HC RX IP 258 OP 636: Performed by: INTERNAL MEDICINE

## 2023-07-29 RX ORDER — NALOXONE HYDROCHLORIDE 0.4 MG/ML
0.2 INJECTION, SOLUTION INTRAMUSCULAR; INTRAVENOUS; SUBCUTANEOUS
Status: DISCONTINUED | OUTPATIENT
Start: 2023-07-29 | End: 2023-07-30 | Stop reason: HOSPADM

## 2023-07-29 RX ORDER — HEPARIN SODIUM,PORCINE 10 UNIT/ML
5-10 VIAL (ML) INTRAVENOUS EVERY 24 HOURS
Status: DISCONTINUED | OUTPATIENT
Start: 2023-07-29 | End: 2023-07-30 | Stop reason: HOSPADM

## 2023-07-29 RX ORDER — HEPARIN SODIUM,PORCINE 10 UNIT/ML
5-10 VIAL (ML) INTRAVENOUS
Status: DISCONTINUED | OUTPATIENT
Start: 2023-07-29 | End: 2023-07-30 | Stop reason: HOSPADM

## 2023-07-29 RX ORDER — OXYCODONE HYDROCHLORIDE 5 MG/1
5 TABLET ORAL EVERY 6 HOURS PRN
Status: DISCONTINUED | OUTPATIENT
Start: 2023-07-29 | End: 2023-07-30 | Stop reason: HOSPADM

## 2023-07-29 RX ORDER — SODIUM CHLORIDE, SODIUM LACTATE, POTASSIUM CHLORIDE, CALCIUM CHLORIDE 600; 310; 30; 20 MG/100ML; MG/100ML; MG/100ML; MG/100ML
INJECTION, SOLUTION INTRAVENOUS CONTINUOUS
Status: DISCONTINUED | OUTPATIENT
Start: 2023-07-29 | End: 2023-07-30 | Stop reason: HOSPADM

## 2023-07-29 RX ORDER — HEPARIN SODIUM (PORCINE) LOCK FLUSH IV SOLN 100 UNIT/ML 100 UNIT/ML
5-10 SOLUTION INTRAVENOUS
Status: DISCONTINUED | OUTPATIENT
Start: 2023-07-29 | End: 2023-07-30 | Stop reason: HOSPADM

## 2023-07-29 RX ORDER — PROCHLORPERAZINE MALEATE 5 MG
10 TABLET ORAL EVERY 6 HOURS PRN
Status: DISCONTINUED | OUTPATIENT
Start: 2023-07-29 | End: 2023-07-30 | Stop reason: HOSPADM

## 2023-07-29 RX ORDER — NALOXONE HYDROCHLORIDE 0.4 MG/ML
0.4 INJECTION, SOLUTION INTRAMUSCULAR; INTRAVENOUS; SUBCUTANEOUS
Status: DISCONTINUED | OUTPATIENT
Start: 2023-07-29 | End: 2023-07-30 | Stop reason: HOSPADM

## 2023-07-29 RX ORDER — BISACODYL 5 MG
5 TABLET, DELAYED RELEASE (ENTERIC COATED) ORAL DAILY PRN
Status: DISCONTINUED | OUTPATIENT
Start: 2023-07-29 | End: 2023-07-30 | Stop reason: HOSPADM

## 2023-07-29 RX ADMIN — SODIUM CHLORIDE, POTASSIUM CHLORIDE, SODIUM LACTATE AND CALCIUM CHLORIDE: 600; 310; 30; 20 INJECTION, SOLUTION INTRAVENOUS at 02:08

## 2023-07-29 RX ADMIN — Medication 1 MG: at 22:43

## 2023-07-29 RX ADMIN — AMPICILLIN SODIUM AND SULBACTAM SODIUM 3 G: 2; 1 INJECTION, POWDER, FOR SOLUTION INTRAMUSCULAR; INTRAVENOUS at 00:04

## 2023-07-29 RX ADMIN — AMPICILLIN SODIUM AND SULBACTAM SODIUM 3 G: 2; 1 INJECTION, POWDER, FOR SOLUTION INTRAMUSCULAR; INTRAVENOUS at 17:32

## 2023-07-29 RX ADMIN — Medication 5 ML: at 17:32

## 2023-07-29 RX ADMIN — SODIUM CHLORIDE, POTASSIUM CHLORIDE, SODIUM LACTATE AND CALCIUM CHLORIDE 500 ML: 600; 310; 30; 20 INJECTION, SOLUTION INTRAVENOUS at 11:12

## 2023-07-29 RX ADMIN — AMPICILLIN SODIUM AND SULBACTAM SODIUM 3 G: 2; 1 INJECTION, POWDER, FOR SOLUTION INTRAMUSCULAR; INTRAVENOUS at 11:39

## 2023-07-29 RX ADMIN — AMPICILLIN SODIUM AND SULBACTAM SODIUM 3 G: 2; 1 INJECTION, POWDER, FOR SOLUTION INTRAMUSCULAR; INTRAVENOUS at 22:43

## 2023-07-29 RX ADMIN — AMPICILLIN SODIUM AND SULBACTAM SODIUM 3 G: 2; 1 INJECTION, POWDER, FOR SOLUTION INTRAMUSCULAR; INTRAVENOUS at 05:29

## 2023-07-29 ASSESSMENT — ACTIVITIES OF DAILY LIVING (ADL)
ADLS_ACUITY_SCORE: 18

## 2023-07-29 NOTE — PLAN OF CARE
Goal Outcome Evaluation:    0700 - 1930:  /80 (BP Location: Right arm)   Pulse 51   Temp 98.1  F (36.7  C) (Oral)   Resp 16   Wt 88.9 kg (195 lb 14.4 oz)   SpO2 99%   BMI 27.32 kg/m          Critical procalcitonin 134 this morning, provider notified.  Pt later trigerred SIR, LA 0.6, called RRT & provider notified. Pt stable with VSS, denies pain/nausea/sob on RA.  Gave 500 ml LR bolus over 4 hour, LA recheck is 1.0, on infectious workup & ongoing iv antibiotic.  Home infusion chemo finished, unhooked.  PIV with MIVF LR 75 ml/hr.  UAL, voiding adequately, pt self manage colostomy bag & empties it.  Pt staying tonight to be closely monitored.  Continue with poc....

## 2023-07-29 NOTE — PROGRESS NOTES
/80 (BP Location: Right arm)   Pulse 54   Temp 98.1  F (36.7  C) (Oral)   Resp 18   Wt 88.5 kg (195 lb 1.7 oz)   SpO2 100%   BMI 27.21 kg/m      Reason For Admission: presents to ED 24 hrs after C4 infusion with hematuria, leukocytosis, cholestatic LFT, hematuria and proteinuria.     Activity: UAL, ambulated to bathroom several times overnight  Neuros:  A&O x4.   Cardiac:  WDL.   Respiratory:  WDL on RA. Denies SOB.  GI/:  Voiding spontaneously. +BS, passing flatus, BMx2  Diet: Regular  Skin: Intact  Lines: Left PIV infusing LR at 100 continuous, Port infusing home chemo managed by home infusion   Incisions/Drains: Colostomy with scant output, pt self-managed  Labs: WBC 40.3, , BUN 25.3, AM lab results pending  Pain/nausea:  Denies.  New changes this shift: No new chnages  Plan:  Unasyn x2, pending WBC counts, will discharge today

## 2023-07-29 NOTE — PLAN OF CARE
Problem: Plan of Care - These are the overarching goals to be used throughout the patient stay.    Goal: Plan of Care Review  Description: The Plan of Care Review/Shift note should be completed every shift.  The Outcome Evaluation is a brief statement about your assessment that the patient is improving, declining, or no change.  This information will be displayed automatically on your shift note.  Flowsheets (Taken 7/29/2023 0048)  Plan of Care Reviewed With: patient  Overall Patient Progress: no change   Goal Outcome Evaluation:      Plan of Care Reviewed With: patient    Overall Patient Progress: no changeOverall Patient Progress: no change

## 2023-07-29 NOTE — PROVIDER NOTIFICATION
07/29/23 1300   Call Information   Date of Call 07/29/23   Time of Call 1033   Name of person requesting the team Stanislav   Title of person requesting team RN   RRT Arrival time 1035   Time RRT ended 1045   Reason for call   Type of RRT Adult   Primary reason for call Sepsis suspected   Sepsis Suspected Elevated Lactate level   Was patient transferred from the ED, ICU, or PACU within last 24 hours prior to RRT call? No   SBAR   Situation LA 2.6   Background Cancer   Notable History/Conditions Cancer   Assessment A+Ox4, NAD, Up in chair.   Interventions CXR;IV fluids;Meds   Patient Outcome   Patient Outcome Stabilized on unit   RRT Team   Attending/Primary/Covering Physician Vi 5   Date Attending Physician notified 07/29/23   Time Attending Physician notified 1033   Physician(s) Parehs Morillo PA-C   Lead RN Elaina Dave   Post RRT Intervention Assessment   Post RRT Assessment Stable/Improved   Date Follow Up Done 07/29/23   Time Follow Up Done 1327

## 2023-07-29 NOTE — PROGRESS NOTES
Oncology Brief Progress Note:    Patient not seen, chart reviewed. Remains ok to discharge from oncology perspective. Do not feel he needs ongoing abx but defer to primary team. If he is unable to get to his home before 2PM today, we will need to ask one of our chemotherapy certified nurses to disconnect his 5FU pump. I notified Dr. David by page regarding this detail to his potential discharge.    Young Blackman MD PhD  Hematology/Oncology Fellow  Pgr: [phone]

## 2023-07-29 NOTE — PHARMACY-CONSULT NOTE
A pharmacist has evaluated this patient medication list for medications that can cause Thrombocytopenia     Medications with the potential to cause thrombocytopenia that the patient is on or recently on include:  Zosyn, vancomycin, and Unasyn.     The patient also recently received FOLFOX, which commonly causes thrombocytopenia.    Thank you for the consult,  Young Valles [facility] on 7/29/2023 at 12:11 PM

## 2023-07-29 NOTE — CODE/RAPID RESPONSE
Rapid Response Team Note    Assessment   In assessment a rapid response was called on Roman Escalante due to SIRS/Sepsis trigger and lactic acidosis. This presentation is likely due to known metastatic colorectal adenocarcinoma s/p recent chemotherapy, as well as possible hypovolemia component.      Plan   -  500 ml IVF bolus with repeat LA later today. Infectious workup and ongoing abx's per primary team.  -  The Internal Medicine primary team was bedside.  -  Disposition: The patient will remain on the current unit. We will continue to monitor this patient closely.  -  Reassessment and plan follow-up will be performed by the primary team    Walt Narvaez PA-C  [facility] RRT [facility] Job Code Contact #[phone]  [facility] Paging/Directory    Hospital Course   Brief Summary of events leading to rapid response:   Pt triggered sepsis BPA, of which subsequently ordered LA>2.     Admission Diagnosis:   MICHELLE (acute kidney injury) (H) [N17.9]    Physical Exam   Temp: 97.5  F (36.4  C) Temp  Min: 97.5  F (36.4  C)  Max: 98.2  F (36.8  C)  Resp: 16 Resp  Min: 16  Max: 18  SpO2: 99 % SpO2  Min: 97 %  Max: 100 %  Pulse: 58 Pulse  Min: 53  Max: 71    No data recorded  BP: 114/76 Systolic (24hrs), Av , Min:114 , Max:135   Diastolic (24hrs), Av, Min:67, Max:80     I/Os: I/O last 3 completed shifts:  In: 1960 [P.O.:960; I.V.:1000]  Out: 4620 [Urine:4340; Stool:280]     Exam:   General: in no acute distress  Mental Status: baseline mental status.      Significant Results and Procedures   Lactic Acid:   Recent Labs   Lab Test 23  1007 23  0016   LACT 2.6* 2.0     CBC:   Recent Labs   Lab Test 23  0610 23  0558 23  0016   WBC 20.3* 40.3* 46.5*   HGB 10.5* 11.5* 11.3*   HCT 32.9* 36.0* 35.0*   PLT 68* 142* 148*        Sepsis Evaluation   The patient is being worked up as to have an infection.  NO EVIDENCE OF SEPSIS at this time.  Vital sign, physical exam, and lab findings are due to known  metastatic colorectal adenocarcinoma s/p recent chemotherapy, as well as possible dehydration.

## 2023-07-29 NOTE — PROGRESS NOTES
Admitted/transferred from: ED  2 RN full   skin assessment completed by Fidel Echevarria RN and Zack NEWBERRY  Skin assessment finding: skin intact, no problems   Interventions/actions: other No issues found, encouraged to ambulated and shift wieght      Will continue to monitor.

## 2023-07-30 VITALS
WEIGHT: 196.6 LBS | RESPIRATION RATE: 16 BRPM | BODY MASS INDEX: 27.42 KG/M2 | DIASTOLIC BLOOD PRESSURE: 76 MMHG | OXYGEN SATURATION: 96 % | SYSTOLIC BLOOD PRESSURE: 120 MMHG | HEART RATE: 70 BPM | TEMPERATURE: 98.5 F

## 2023-07-30 LAB
ALBUMIN SERPL BCG-MCNC: 3.8 G/DL (ref 3.5–5.2)
ALP SERPL-CCNC: 90 U/L (ref 40–129)
ALT SERPL W P-5'-P-CCNC: 22 U/L (ref 0–70)
ANION GAP SERPL CALCULATED.3IONS-SCNC: 8 MMOL/L (ref 7–15)
AST SERPL W P-5'-P-CCNC: 28 U/L (ref 0–45)
BASOPHILS # BLD AUTO: 0 10E3/UL (ref 0–0.2)
BASOPHILS NFR BLD AUTO: 0 %
BILIRUB SERPL-MCNC: 0.4 MG/DL
BUN SERPL-MCNC: 17.5 MG/DL (ref 6–20)
CALCIUM SERPL-MCNC: 8.6 MG/DL (ref 8.6–10)
CHLORIDE SERPL-SCNC: 104 MMOL/L (ref 98–107)
CREAT SERPL-MCNC: 0.96 MG/DL (ref 0.67–1.17)
CRP SERPL-MCNC: 42.6 MG/L
DEPRECATED HCO3 PLAS-SCNC: 26 MMOL/L (ref 22–29)
EOSINOPHIL # BLD AUTO: 0.1 10E3/UL (ref 0–0.7)
EOSINOPHIL NFR BLD AUTO: 1 %
ERYTHROCYTE [DISTWIDTH] IN BLOOD BY AUTOMATED COUNT: 19.1 % (ref 10–15)
GFR SERPL CREATININE-BSD FRML MDRD: >90 ML/MIN/1.73M2
GLUCOSE SERPL-MCNC: 103 MG/DL (ref 70–99)
HCT VFR BLD AUTO: 34.2 % (ref 40–53)
HGB BLD-MCNC: 11 G/DL (ref 13.3–17.7)
IMM GRANULOCYTES # BLD: 0.1 10E3/UL
IMM GRANULOCYTES NFR BLD: 1 %
LYMPHOCYTES # BLD AUTO: 0.5 10E3/UL (ref 0.8–5.3)
LYMPHOCYTES NFR BLD AUTO: 6 %
MAGNESIUM SERPL-MCNC: 1.8 MG/DL (ref 1.7–2.3)
MCH RBC QN AUTO: 27.7 PG (ref 26.5–33)
MCHC RBC AUTO-ENTMCNC: 32.2 G/DL (ref 31.5–36.5)
MCV RBC AUTO: 86 FL (ref 78–100)
MONOCYTES # BLD AUTO: 0.1 10E3/UL (ref 0–1.3)
MONOCYTES NFR BLD AUTO: 1 %
NEUTROPHILS # BLD AUTO: 7.9 10E3/UL (ref 1.6–8.3)
NEUTROPHILS NFR BLD AUTO: 91 %
NRBC # BLD AUTO: 0 10E3/UL
NRBC BLD AUTO-RTO: 0 /100
PHOSPHATE SERPL-MCNC: 4 MG/DL (ref 2.5–4.5)
PLATELET # BLD AUTO: 78 10E3/UL (ref 150–450)
POTASSIUM SERPL-SCNC: 4.5 MMOL/L (ref 3.4–5.3)
PROCALCITONIN SERPL IA-MCNC: 59.6 NG/ML
PROT SERPL-MCNC: 6.4 G/DL (ref 6.4–8.3)
RBC # BLD AUTO: 3.97 10E6/UL (ref 4.4–5.9)
SODIUM SERPL-SCNC: 138 MMOL/L (ref 136–145)
WBC # BLD AUTO: 8.7 10E3/UL (ref 4–11)

## 2023-07-30 PROCEDURE — 84100 ASSAY OF PHOSPHORUS: CPT | Performed by: INTERNAL MEDICINE

## 2023-07-30 PROCEDURE — 250N000011 HC RX IP 250 OP 636: Mod: JZ | Performed by: INTERNAL MEDICINE

## 2023-07-30 PROCEDURE — 258N000003 HC RX IP 258 OP 636: Performed by: INTERNAL MEDICINE

## 2023-07-30 PROCEDURE — 84145 PROCALCITONIN (PCT): CPT | Performed by: INTERNAL MEDICINE

## 2023-07-30 PROCEDURE — 99239 HOSP IP/OBS DSCHRG MGMT >30: CPT | Mod: GC | Performed by: INTERNAL MEDICINE

## 2023-07-30 PROCEDURE — 86140 C-REACTIVE PROTEIN: CPT | Performed by: INTERNAL MEDICINE

## 2023-07-30 PROCEDURE — 80053 COMPREHEN METABOLIC PANEL: CPT | Performed by: INTERNAL MEDICINE

## 2023-07-30 PROCEDURE — 85025 COMPLETE CBC W/AUTO DIFF WBC: CPT | Performed by: INTERNAL MEDICINE

## 2023-07-30 PROCEDURE — 83735 ASSAY OF MAGNESIUM: CPT | Performed by: INTERNAL MEDICINE

## 2023-07-30 PROCEDURE — 250N000011 HC RX IP 250 OP 636

## 2023-07-30 RX ADMIN — Medication 5 ML: at 15:53

## 2023-07-30 RX ADMIN — Medication 5 ML: at 05:59

## 2023-07-30 RX ADMIN — AMPICILLIN SODIUM AND SULBACTAM SODIUM 3 G: 2; 1 INJECTION, POWDER, FOR SOLUTION INTRAMUSCULAR; INTRAVENOUS at 11:34

## 2023-07-30 RX ADMIN — AMPICILLIN SODIUM AND SULBACTAM SODIUM 3 G: 2; 1 INJECTION, POWDER, FOR SOLUTION INTRAMUSCULAR; INTRAVENOUS at 05:05

## 2023-07-30 RX ADMIN — SODIUM CHLORIDE, POTASSIUM CHLORIDE, SODIUM LACTATE AND CALCIUM CHLORIDE: 600; 310; 30; 20 INJECTION, SOLUTION INTRAVENOUS at 00:45

## 2023-07-30 ASSESSMENT — ACTIVITIES OF DAILY LIVING (ADL)
ADLS_ACUITY_SCORE: 18

## 2023-07-30 NOTE — PROGRESS NOTES
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  Reason for nephrology consult is hematuria ,proteinuria and slight rise in creatinine .    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 ) with raised creatinine ( Improved )    This patient had MICHELLE 2/2 ATN ( infusion reaction  resulting in hemolysis /hemoglobinuria )previously requiring short period of dialysis in 6/2021.Now on FOLFOX + Avastin with oxaloplatin desensitization.Given this background history ,having now large blood on urine dipstick and absence significant RBC on urine microscopy ( only 2 RBC/HPF)  speaks for pigmenturia ,though it could be either hemoglobin and myoglobin) ,checking CK and haptoglobin will help us out .Besides milder rise in creatinine ( 0.88-1.26) which normalized with just fluid also might be in favour of  pigmenturia ,get flushed out with diuresis  hence this may be concerning for milder reaction .Haptoglobulin is low at 3 ,LDH is high at 767 and HB slightly reduced ,suggest milder forms of reaction with some hemolysis and hemoglobinuria .   - Suggest closer follow up with two -three days on subsequent Chemo .  -  # Metabolic Acidosis likely due to kidney dysfunction ( Improved)  CO2 was 17 , now raised to 27 with improving kidney dysfunction.        -We will sign off    Recommendations were communicated to primary team via verbal/notes     Seen and discussed with Dr. Alexis Vaughan MD  Division of Renal Disease and Hypertension  [facility]  myairmail  Vocera Web  Console        Interval history   Roman Escalantefeeing much better  over night ,vital  signs were stable ,urine is now clear .    PAST MEDICAL HISTORY:  Reviewed with patient on 07/29/2023     No past medical history on file.    Past Surgical History:   Procedure Laterality Date     IR LUNG BIOPSY MEDIASTINUM RIGHT  1/19/2021        MEDICATIONS:  PTA Meds  Prior to Admission medications    Medication Sig Last Dose Taking? Auth Provider Long Term End Date   bisacodyl (DULCOLAX) 5 MG EC tablet Take 5 mg by mouth daily as needed for constipation   Reported, Patient     Fluorouracil (ADURCIL) 5 GM/100ML SOLN injection    Reported, Patient     ibuprofen (ADVIL/MOTRIN) 200 MG tablet Take 600 mg by mouth every 8 hours as needed for pain   Polo Snow MD     ondansetron (ZOFRAN ODT) 8 MG ODT tab Take 1 tablet (8 mg) by mouth every 8 hours as needed for nausea   Polo Snow MD     ondansetron (ZOFRAN) 8 MG tablet Take 1 tablet (8 mg) by mouth every 8 hours as needed for nausea (vomiting)  Patient not taking: Reported on 6/7/2023   Polo Snow MD     Ostomy Supplies MISC 1 each daily   Hanna Tavera PA-C     oxyCODONE (ROXICODONE) 5 MG tablet Take 1 tablet (5 mg) by mouth every 6 hours as needed for pain   Brea Jauregui APRN CNP     prochlorperazine (COMPAZINE) 10 MG tablet Take 1 tablet (10 mg) by mouth every 6 hours as needed for nausea or vomiting   Polo Snow MD     prochlorperazine (COMPAZINE) 5 MG tablet Take 1 tablet (5 mg) by mouth every 6 hours as needed for nausea or vomiting   Polo Snow MD     regorafenib (STIVARGA) 40 MG tablet Take 4 tablets (160 mg) by mouth daily Take on Days 1 thru 21 w/ low-fat bkfst. Store in orig bottle. Discard 7 weeks after opening.   Polo Snow MD Yes 5/19/23      Current Meds    ampicillin-sulbactam  3 g Intravenous Q6H     heparin  5-10 mL Intracatheter Q28 Days     heparin lock flush  5-10 mL Intracatheter Q24H     melatonin  1 mg Oral At Bedtime     sodium  chloride (PF)  10-20 mL Intracatheter Q28 Days     sodium chloride (PF)  3 mL Intracatheter Q8H     Infusion Meds    lactated ringers 75 mL/hr at 07/29/23 1111       ALLERGIES:    Allergies   Allergen Reactions     Oxaliplatin Shortness Of Breath, Nausea and Vomiting and Other (See Comments)     Patient had HSR to Oxaliplatin on cycle 8 of FOLFOX chemotherapy. Sent to ER for continued monitoring.  Patient had HSR to Oxaliplatin on cycle 8 of FOLFOX chemotherapy. Sent to ER for continued monitoring.       Blood-Group Specific Substance Other (See Comments)     Patient has reactivity Suggestive of a Warm auto antibody. Blood products may be delayed. Draw patient 24 hours prior to transfusion. Draw one red top and two purple top tubes for all type and screen orders.  Patient has reactivity Suggestive of a Warm auto antibody. Blood products may be delayed. Draw patient 24 hours prior to transfusion. Draw one red top and two purple top tubes for all type and screen orders.         REVIEW OF SYSTEMS:  A comprehensive of systems was negative except as noted above.    SOCIAL HISTORY:   Social History     Socioeconomic History     Marital status: Single     Spouse name: Not on file     Number of children: Not on file     Years of education: Not on file     Highest education level: Not on file   Occupational History     Not on file   Tobacco Use     Smoking status: Some Days     Packs/day: 0.50     Types: Cigarettes     Smokeless tobacco: Never   Substance and Sexual Activity     Alcohol use: Yes     Comment: social     Drug use: Yes     Types: Methamphetamines     Sexual activity: Not on file   Other Topics Concern     Not on file   Social History Narrative     Not on file     Social Determinants of Health     Financial Resource Strain: Not on file   Food Insecurity: Not on file   Transportation Needs: Not on file   Physical Activity: Not on file   Stress: Not on file   Social Connections: Not on file   Intimate Partner  Violence: Not At Risk (2/3/2023)    Humiliation, Afraid, Rape, and Kick questionnaire      Fear of Current or Ex-Partner: No      Emotionally Abused: No      Physically Abused: No      Sexually Abused: No   Housing Stability: Not on file     Reviewed with patient       FAMILY MEDICAL HISTORY:   No family history on file.  Reviewed with patient     PHYSICAL EXAM:   Temp  Av.1  F (36.7  C)  Min: 97.5  F (36.4  C)  Max: 98.8  F (37.1  C)      Pulse  Av.3  Min: 59  Max: 104 Resp  Av.5  Min: 15  Max: 18  SpO2  Av.5 %  Min: 95 %  Max: 99 %       /59 (BP Location: Left arm)   Pulse 64   Temp 98.3  F (36.8  C) (Oral)   Resp 16   Wt 88.9 kg (195 lb 14.4 oz)   SpO2 97%   BMI 27.32 kg/m     Date 23 07 - 23 0659   Shift 3757-8717 3461-0349 7889-5091 24 Hour Total   INTAKE   P.O. 960   960   Shift Total 960   960   OUTPUT   Urine 1980   Shift Total 1980   Weight (kg)          Admit       GENERAL APPEARANCE: not distress,  awake  EYES: no scleral icterus, pupils equal  Endo: no goiter,   Lymphatics: no cervical or supraclavicular LAD  Pulmonary: lungs clear to auscultation with equal breath sounds bilaterally,   CV: regular rhythm, normal rate, no rub   - JVD -ve   - Edema -ve   GI: soft, nontender, normal bowel sounds  MS: no evidence of inflammation in joints, no muscle tenderness  : no dawson  SKIN: no rash, warm, dry, no cyanosis  NEURO: face symmetric, no asterixis     LABS:   CMP  Recent Labs   Lab 23  0610 23  2322 23  1501 23  0558 23  0016 23  2152    133* 136 133* 133* 132*   POTASSIUM 4.0 4.1 4.2 4.5 4.4 4.4   CHLORIDE 103 99 100 100 101 99   CO2 23 24 21* 20* 17* 19*   ANIONGAP 10 10 15 13 15 14   * 126* 134* 155* 145* 134*   BUN 20.5* 25.2* 25.5* 22.0* 20.0 19.6   CR 0.91 1.02 1.04 1.17 1.26* 1.22*   GFRESTIMATED >90 90 87 76 69 72   JEFERSON 8.2* 8.3* 8.3* 8.3* 8.1* 9.1   MAG 1.9  --   --   --   --   --    PHOS  2.9  --   --   --   --   --    PROTTOTAL 6.1*  --   --  6.7 6.3* 7.3   ALBUMIN 3.5  --   --  3.9 3.7 4.3   BILITOTAL 0.5  --   --  0.9  0.9 1.4* 1.9*   ALKPHOS 111  --   --  123 160* 279*   AST 41  --   --  97* 109* 118*   ALT 20  --   --  25 28 31     CBC  Recent Labs   Lab 07/29/23  0610 07/28/23  0558 07/28/23  0016 07/27/23 2152   HGB 10.5* 11.5* 11.3* 12.8*   WBC 20.3* 40.3* 46.5* 48.9*   RBC 3.79* 4.19* 4.00* 4.51   HCT 32.9* 36.0* 35.0* 39.3*   MCV 87 86 88 87   MCH 27.7 27.4 28.3 28.4   MCHC 31.9 31.9 32.3 32.6   RDW 19.4* 19.6* 19.1* 19.2*   PLT 68* 142* 148* 177     INR  Recent Labs   Lab 07/27/23 2152   INR 1.31*   PTT 28     ABGNo lab results found in last 7 days.   URINE STUDIES  Recent Labs   Lab Test 07/29/23  1044 07/27/23  2158 07/27/23  0651 07/14/23  0752   COLOR Light Yellow Orange*  --   --    APPEARANCE Clear Cloudy*  --   --    URINEGLC Negative 70*  --   --    URINEBILI Negative Negative  --   --    URINEKETONE Negative Trace*  --   --    SG 1.017 1.015  --   --    UBLD Negative Large*  --   --    URINEPH 7.5* 6.0  --   --    PROTEIN 10* 300* Negative Negative   NITRITE Negative Negative  --   --    LEUKEST Negative Negative  --   --    RBCU <1 2  --   --    WBCU 1 15*  --   --      No lab results found.  PTH  No lab results found.  IRON STUDIES  No lab results found.    IMAGING:  All imaging studies reviewed by me.     Fito Vaughan MD     Attending's attestation:   I was the supervising physician in the delivery of the service. I verified the history of present illness and the patient's clinical course. I personally evaluated and interviewed the patient on the date of the encounter.       I reviewed the relevant labs, previous notes and imaging studies, and participated in the formulation of a diagnostic and treatment plan.  I have reviewed and discussed with the fellow their history, physical exam and plan. This note reflects my direct involvement in the patient's care.       Isaias BOSS  MD Alexis   Physicians  , [facility]  Division of Nephrology  Department of Internal Medicine

## 2023-07-30 NOTE — DISCHARGE SUMMARY
"[facility]  Discharge Summary - Medicine & Pediatrics       Date of Admission:  7/27/2023  Date of Discharge:  7/30/2023  4:08 PM  Discharging Provider: Dr. David  Discharge Service: Medicine Service, ROSSANA TEAM 5    Discharge Diagnoses     1. Hematuria secondary to hematuria associated with acute kidney injury AKIN stage I complicated by metabolic acidosis likely secondary to chemotherapy, all are POA  2.  Leukocytosis and lactic acidosis secondary to skin and soft tissue infection of the abdominal wall at the site of the diverting loop colostomy, all are present on admission  3.  Pancytopenia with thrombocytopenia and normocytic anemia secondary to malignancy, chemotherapy, and hematuria, all are present on admission  4.  Metastatic colorectal cancer with diverting loop colostomy and history of hypersensitivity to oxaliplatin, all are present on admission    Clinically Significant Risk Factors     # Overweight: Estimated body mass index is 27.42 kg/m  as calculated from the following:    Height as of 7/26/23: 1.803 m (5' 11\").    Weight as of this encounter: 89.2 kg (196 lb 9.6 oz).       Follow-ups Needed After Discharge   Follow-up Appointments     Adult [facility] Follow-up and recommended labs and tests      Follow up with primary care provider, Buddy Smith, within 7 days   for hospital follow- up.  The following labs/tests are recommended: CBC,   CMP.      Appointments on [place] and/or [facility] (with [facility] or [facility]   provider or service). Call [phone] if you haven't heard regarding   these appointments within 7 days of discharge.            Follow up blood culture and haptoglobin results    Unresulted Labs Ordered in the Past 30 Days of this Admission     Date and Time Order Name Status Description    7/29/2023  9:52 AM Blood Culture Hand, Right Preliminary     7/29/2023  9:52 AM Blood Culture Hand, Left Preliminary     7/28/2023  4:41 AM " Haptoglobin In process     7/27/2023 11:27 PM Blood Culture Peripheral Blood Preliminary     7/27/2023 11:27 PM Blood Culture Peripheral Blood Preliminary       These results will be followed up by the patient's primary care provider and oncology team    Discharge Disposition   Discharged to home  Condition at discharge: Stable    Hospital Course      48 year old male with metastatic adenocarcinoma of the colon on FOLFOX 5FU C4, history of oxaliplatin related acute kidney failure with brief dialysis requirement, status post diverting loop colostomy (2021) presented to the ED 24 hrs after C4 infusion with hematuria found to have marked leukocytosis, cholestatic LFT, hematuria and proteinuria.     1. Hematuria secondary to hematuria associated with acute kidney injury AKIN stage I complicated by metabolic acidosis likely secondary to chemotherapy, all are POA  This is the main problem that led to this hospitalization.  The patient kidney function and pigamenturia resolved with intravenous fluids.  His haptoglobin was low and his LDH was high with slight reduction of hemoglobin that is likely secondary to some degree of hemolysis and hemoglobinuria.  Patient was ruled out for obstructive physiology with CT abdomen and pelvis.  The patient received isotonic solution with bicarb at 150 mill equivalent in D5 water that was switched to LR after resolution of metabolic acidosis.  The patient kidney function normalized 48 hours prior to discharge.  The patient was seen with nephrology service while hospitalized.  There is no indication for outpatient follow-up for nephrology.  - Will need close follow up within two-three days after subsequent chemotherapy courses     2.  Leukocytosis and lactic acidosis secondary to skin and soft tissue infection of the abdominal wall at the site of the diverting loop colostomy, all are present on admission  This is a second problem that led to this hospitalization.  Sepsis was ruled out  since the patient did not qualify for 2 out of 4 SIRS criteria.  The patient leukocytosis with a white blood cell count of 48.9 normalized to 8.7 with 4 days of intravenous antibiotics and intravenous fluids.  The patient's lactic acidosis was high as high as 2.6 but normalized to 1.0 again with intravenous fluids and intravenous antibiotics.  The main source of infection was likely skin and soft tissue infection of his stoma as evident by discomfort, erythema on examination that was noted on admission and normalized by the day of discharge.     In terms of work-up, the patient had CT abdomen and pelvis that showed mild fat streakiness at the site of the colostomy.  In terms of differential, there is evidence of mild pericholecystic fatty streakiness with right upper quadrant as such consideration for cholecystitis was made.  Abdominal ultrasound without evidence of cholecystitis.  The patient also had significant elevation in his inflammatory markers with CRP being elevated to as high as 143 down to 42.6 and his procalcitonin was elevated to as high as 134 down to 59.6.     There was also concern for urinary tract infection with nonspecific abdominal pain and bilateral perinephric stickiness with streakiness however urine analysis was without any significant pyuria and the urine culture was negative.     The differential diagnoses also included C. difficile and enteric infections, both are ruled out, and pneumonia, which was ruled out with 2 view chest x-ray without infiltrates.     The patient received 24 hours of vancomycin and Zosyn.  This was normalized to Unasyn within 24 hours of admission.  The patient completed a total of 4 days of antibiotic therapy during this hospitalization and was discharged to complete 3 more days of amoxicillin/clavulonate for total course of 7 days of antibiotic therapy.     - Follow up with primary care provider  - Recommend repeating CBC, CMP and following up pending hospital lab  work up as above.    3.  Pancytopenia with thrombocytopenia and normocytic anemia secondary to malignancy, chemotherapy, and hematuria, all are present on admission  The patient hemoglobin was decreased from 13 to become 11.0.  However, this was stable during this hospitalization.  The patient will need follow-up laboratory work-up as deemed necessary by his oncologist.     4.  Metastatic colorectal cancer with diverting loop colostomy and history of hypersensitivity to oxaliplatin, all are present on admission  The patient continued to receive his 5-fluorouracil pump during this hospitalization was discontinued in a timely manner.  The patient was seen in consultation with oncology service.  The patient will follow-up with oncology as outpatient.    Consultations This Hospital Stay   ONCOLOGY ADULT IP CONSULT  PHARMACY TO DOSE VANCO  NEPHROLOGY GENERAL ADULT IP CONSULT  NURSING TO CONSULT FOR VASCULAR ACCESS CARE IP CONSULT  PHARMACY IP CONSULT    Code Status   Full Code       The patient was discussed with Dr. Frankie Goff, DO  PGY-3, Internal Medicine  [facility] 5B ONCOLOGY  [address] [address]  Phone: [phone]  ______________________________________________________________________    Physical Exam   Vital Signs: Temp: 98.5  F (36.9  C) Temp src: Oral BP: 120/76 Pulse: 70   Resp: 16 SpO2: 96 % O2 Device: None (Room air)    Weight: 196 lbs 9.6 oz      General: Alert and oriented without distress  HEENT: Normocephalic/atraumatic  Respiratory: CTAB without increased respiratory effort or accessory muscle use  Cardiovascular: RRR without murmurs, rubs or gallop. S1, S2 intact.  Abdomen: Bowel sounds present. Soft, non-distended without tenderness to palpation or rebound.   Skin: No overt lesions, bruises, rashes or bleeding on exposed skin surfaces.  Neuro: CN II-XII grossly intact.       Primary Care Physician   Buddy  Luis    Discharge Orders      Reason for your hospital stay    You were admitted for a post chemotherapy reaction found to have a kidney injury and concern for sepsis. Your symptoms improved with appropriate treatment and we believe you are safe to discharge home with ongoing antimicrobial therapy and close outpatient follow up. Please continue to take your medications as prescribed and reach out to us with any questions regarding your hospitalization.     Activity    Your activity upon discharge: activity as tolerated     Adult [facility] Follow-up and recommended labs and tests    Follow up with primary care provider, Buddy Smith, within 7 days for hospital follow- up.  The following labs/tests are recommended: CBC, CMP.      Appointments on [place] and/or [facility] (with [facility] or [facility] provider or service). Call [phone] if you haven't heard regarding these appointments within 7 days of discharge.     Diet    Follow this diet upon discharge: Orders Placed This Encounter      Regular Diet Adult       Significant Results and Procedures   Most Recent 3 CBC's:Recent Labs   Lab Test 07/30/23  0555 07/29/23  0610 07/28/23  0558   WBC 8.7 20.3* 40.3*   HGB 11.0* 10.5* 11.5*   MCV 86 87 86   PLT 78* 68* 142*     Most Recent 3 BMP's:Recent Labs   Lab Test 07/30/23  0555 07/29/23  0610 07/28/23  2322    136 133*   POTASSIUM 4.5 4.0 4.1   CHLORIDE 104 103 99   CO2 26 23 24   BUN 17.5 20.5* 25.2*   CR 0.96 0.91 1.02   ANIONGAP 8 10 10   JEFERSON 8.6 8.2* 8.3*   * 126* 126*     Most Recent 2 LFT's:Recent Labs   Lab Test 07/30/23  0555 07/29/23  0610   AST 28 41   ALT 22 20   ALKPHOS 90 111   BILITOTAL 0.4 0.5     Most Recent 3 Troponin's:No lab results found.  Most Recent Urinalysis:Recent Labs   Lab Test 07/29/23  1044   COLOR Light Yellow   APPEARANCE Clear   URINEGLC Negative   URINEBILI Negative   URINEKETONE Negative   SG 1.017   UBLD Negative   URINEPH 7.5*   PROTEIN 10*   NITRITE Negative    LEUKEST Negative   RBCU <1   WBCU 1     Most Recent ESR & CRP:Recent Labs   Lab Test 07/30/23  0555   CRPI 42.60*   ,   Results for orders placed or performed during the hospital encounter of 07/27/23   US Renal Complete w Arterial Duplex    Narrative    EXAMINATION: US RENAL COMPLETE WITH ARTERIAL DUPLEX  7/28/2023 2:15 AM       CLINICAL HISTORY: ATN    COMPARISON: None    FINDINGS:  Right kidney:  Right renal length: 10.3 cm.  This is within normal limits for age.  Previous length: 9.9 cm.    The right kidney is normal in position and echogenicity. There is no  evident calculus or renal scarring. There is no significant urinary  tract dilation.     The right renal vein is patent. Doppler evaluation in the right renal  artery demonstrates normal arterial waveforms. 61 cm/sec at the  origin, 59 cm/sec in the mid artery, and 25 cm/sec at the hilum.  Resistive indices in the arcuate arteries vary between 0.64-0.66,  within normal limits.    Left kidney:  Left renal length: 10.9 cm.  This is within normal limits for age.  Previous length: 10.3 cm.    The left kidney is normal in position and echogenicity. There is no  evident calculus or renal scarring. There is no significant urinary  tract dilation.     The left renal vein is patent. Doppler evaluation in the left renal  artery demonstrates normal arterial waveforms. 36 cm/sec at the  origin, 50 cm/sec in the mid artery, and 53 cm/sec at the hilum.  Resistive indices in the arcuate arteries vary between 0.60-0.65,  within normal limits.    Visualized portions of the aorta are normal, with normal velocities  and waveforms. Visualized portions of the IVC are normal.    The urinary bladder is well distended and normal in morphology. The  bladder wall is normal.          Impression    IMPRESSION:  Normal grayscale and Doppler evaluation of both kidneys.    I have personally reviewed the examination and initial interpretation  and I agree with the findings.    CHEYANNE  MD DIOR         SYSTEM ID:  [id number]   CT Abdomen Pelvis w/o Contrast    Narrative    EXAM: CT abdomen and pelvis without intravenous contrast. 7/28/2023  2:40 PM    HISTORY: Abdominal discomfort in setting of WBC to 40 and metastatic  colon cancer       TECHNIQUE: Helical acquisition of image data was performed for the  abdomen and pelvis without intravenous contrast.    COMPARISON: CT chest abdomen pelvis 5/12/2023    FINDINGS:      Lower thorax: Partially visualized density in the posterior right  middle lobe appears to be the inferior margin of a pulmonary  metastasis. Multiple pulmonary metastases visualized:  -Stable solid pulmonary nodule in the posterior-lateral right lower  lobe is unchanged compared to prior measuring 7 x 5 mm (series 5 image  14), previously measured the same.  -Slightly increased size of right basilar pulmonary metastasis  measuring 3.4 x 3.1 cm (image 42), previously measured 3.3 x 3.0 cm.  -Slightly decreased size of right basilar subpleural pulmonary  metastasis.  -Decreased size of medial left basilar pulmonary metastasis now  measuring 1.4 x 1.8 cm (image 37), previously measured 1.9 x 2.1 cm.,  with intralesional cavitation  -Anterior left lower lobe metastasis similar in size.    No pleural effusions. No pericardial effusion. The esophagus appears  unremarkable.    Limited evaluation of abdominal parenchymal organs due to noncontrast  technique    Liver: Redemonstration of large hypoattenuating hepatic mass in  segment 4/8, ill-defined, similar to slightly decreased in size,  measuring about 4.2 x 4.4 compared to 4.4 x 4.8 cm when measured  similarly (series 3, image 35)     Gallbladder/biliary tree: The common bile duct is not dilated. Fatty  streakiness around the proximal gallbladder (series 5, image 82)  Pancreas: The pancreatic duct is not dilated.    Spleen: Calcified splenic granulomas.    Adrenal glands: No adrenal nodules.    Kidneys/ureters: No hydronephrosis. No  renal calculi. Nonspecific mild  perinephric streakiness    Bladder/pelvic organs: Distended urinary bladder. Nonspecific mild  fatty streakiness, posterior perivesical location (series 5, image  188).    Bowel/mesentery: No dilated loops of small bowel or colon. Left lower  quadrant diverting loop colostomy. Fat-containing parastomal hernia.  Similar upper rectum exophytic mass measuring 2.3 x 1.9 cm (series 5  image 179), previously measured 2.3 x 1.7 cm. Mesenteric density  adjacent to the stoma measures 13 mm compared to 12 (series 5, image  121), with increased lesional density . Mild mesenteric fatty  streakiness. Slight increased omental fat streakiness (series 5, image  94)    Peritoneum/retroperitoneum: No extraluminal bowel gas. No free fluid  in the abdomen or pelvis.    Lymph nodes: Left perirectal lymph node is slightly decreased in size  measuring 14 mm compared to 16 mm when measured similarly (series 5  image 183), left para-aortic retroperitoneal node measures 7 mm  compared to 8 mm (series 5, image 104)    Major vessels: Minimal atherosclerotic calcifications.    Bones/soft tissues: Degenerative changes of the spine. No acute or  suspicious osseous abnormality. Old posterior right 11th rib fracture.        Impression    IMPRESSION: In this patient with history of metastatic colon cancer,  the current scan compared to CT 5/12/2023 shows:    1. No intra-abdominal collection.  2. Nonspecific bilateral perinephric streakiness and perivesical  streakiness, consider urinalysis to evaluate for urinary tract  infection.  3. No calcified gallstone, mild pericholecystic fatty streakiness,  consider ultrasound gallbladder to rule out cholecystitis as  clinically desired.  4. Redemonstration of metastatic pulmonary and hepatic lesions, as  described above, with similar to slight decrease in size of hepatic  metastatic lesion, stable or decreased size of most visualized  pulmonary lesion with increased in size  of one of the lesion.  Increased omental streakiness and similar to increased density of  mesenteric nodule.  5. Postsurgical changes of  diverting loop colostomy, peristomal fat  herniation with mild fat streakiness. No high-grade bowel obstruction,  pneumatosis or  pneumoperitoneum.  6. Similar appearing upper rectal mass with slight decrease in size of  retroperitoneal and left perirectal lymph node.          I have personally reviewed the examination and initial interpretation  and I agree with the findings.    RAMEZ DELEON MD         SYSTEM ID:  [id number]   XR Chest 2 Views    Narrative    Exam: XR CHEST 2 VIEWS, 7/29/2023 10:56 AM    Indication: SOB, leukocytosis,concerns for obstructive pneumonia in  the setting of metastatic malignancy    Comparison: CT chest, abdomen and pelvis 5/12/2023    Findings:     Frontal and lateral projection radiograph of the chest. Multiple focal  nodular opacities seen in the bilateral mid and lower zone, likely  corresponding to nodular densities seen on CT from 5/12/2023 including  2.1 cm opacity in the right mid zone and about 5.4 cm, moderate  opacity in the right lower zone with 1.5 cm opacity in the paracardiac  left lower zone; these opacities appear similar to the topogram image  seen from prior CT. No new focal consolidation identified. Prominent  cardiomediastinal silhouette. No pleural effusion. No acute osseous  abnormality. Visualized upper abdomen is within normal limits. Similar  right suprahilar opacity and nodular left hilar density      Impression    Impression: Multifocal nodular and patchy opacities likely  corresponding to densities seen on CT from 5/12/2023, favor metastatic  disease. No definite new confluent consolidation identified.    RAMEZ DELEON MD         SYSTEM ID:  [id number]   US Abdomen Limited    Narrative    EXAMINATION: Limited Abdominal Ultrasound, 7/29/2023 11:24 AM     COMPARISON: CT 7/28/2023    HISTORY:    sepsis of unclear source,  mild pericholecystic fatty  streakiness    FINDINGS:   Fluid: No evidence of ascites or pleural effusions.    Liver: The liver demonstrates normal echotexture, measuring 15.5 cm in  craniocaudal dimension. Known hyperechoic mass in the right hepatic  dome measuring up to 4.5 cm.     Gallbladder: The gallbladder is decompressed without findings  suggestive of acute cholecystitis.    Bile Ducts: Both the intra- and extrahepatic biliary system are of  normal caliber.  The common bile duct measures 4 mm in diameter.    Pancreas: Visualized portions of the head and body of the pancreas are  unremarkable.     Kidney: The right kidney measures 10.3 cm long. There is no  hydronephrosis or hydroureter, no shadowing renal calculi, cystic  lesion or mass.       Impression    IMPRESSION:   1.  No evidence of acute cholecystitis.  2.  Known right hepatic lobe mass measuring up to 4.5 cm.    I have personally reviewed the examination and initial interpretation  and I agree with the findings.    JOANNE BECKETT MD         SYSTEM ID:  [id number]       Discharge Medications   Discharge Medication List as of 7/30/2023  3:20 PM      START taking these medications    Details   amoxicillin-clavulanate (AUGMENTIN) 875-125 MG tablet Take 1 tablet by mouth 3 times daily, Disp-11 tablet, R-0, E-PrescribeFor skin/soft tissue infection around ostomy         CONTINUE these medications which have NOT CHANGED    Details   bisacodyl (DULCOLAX) 5 MG EC tablet Take 5 mg by mouth daily as needed for constipation, Historical      Fluorouracil (ADURCIL) 5 GM/100ML SOLN injection Historical      Ostomy Supplies MISC 1 each daily, Disp-1 each, R-11, Local [facility]  [id number]      oxyCODONE (ROXICODONE) 5 MG tablet Take 1 tablet (5 mg) by mouth every 6 hours as needed for pain, Disp-30 tablet, R-0, E-Prescribe      !! prochlorperazine (COMPAZINE) 10 MG tablet Take 1 tablet (10 mg) by mouth every 6 hours as needed for nausea or vomiting, Disp-30  tablet, R-2, E-Prescribe      !! prochlorperazine (COMPAZINE) 5 MG tablet Take 1 tablet (5 mg) by mouth every 6 hours as needed for nausea or vomiting, Disp-30 tablet, R-3, E-Prescribe       !! - Potential duplicate medications found. Please discuss with provider.      STOP taking these medications       ibuprofen (ADVIL/MOTRIN) 200 MG tablet Comments:   Reason for Stopping:         ondansetron (ZOFRAN ODT) 8 MG ODT tab Comments:   Reason for Stopping:             Allergies   Allergies   Allergen Reactions     Oxaliplatin Shortness Of Breath, Nausea and Vomiting and Other (See Comments)     Patient had HSR to Oxaliplatin on cycle 8 of FOLFOX chemotherapy. Sent to ER for continued monitoring.  Patient had HSR to Oxaliplatin on cycle 8 of FOLFOX chemotherapy. Sent to ER for continued monitoring.       Blood-Group Specific Substance Other (See Comments)     Patient has reactivity Suggestive of a Warm auto antibody. Blood products may be delayed. Draw patient 24 hours prior to transfusion. Draw one red top and two purple top tubes for all type and screen orders.  Patient has reactivity Suggestive of a Warm auto antibody. Blood products may be delayed. Draw patient 24 hours prior to transfusion. Draw one red top and two purple top tubes for all type and screen orders.

## 2023-07-30 NOTE — PROGRESS NOTES
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    Hematuria secondary to hematuria associated with acute kidney injury AKIN stage I complicated by metabolic acidosis likely secondary to chemotherapy, all are POA  This is the main problem that led to this hospitalization.  The patient kidney function and pigamenturia resolved with intravenous fluids.  His haptoglobin was low and his LDH was high with slight reduction of hemoglobin that is likely secondary to some degree of hemolysis and hemoglobinuria.  Patient was ruled out for obstructive physiology with CT abdomen and pelvis.  The patient received isotonic solution with bicarb at 150 mill equivalent in D5 water that was switched to LR after resolution of metabolic acidosis.  The patient kidney function normalized 48 hours prior to discharge.  The patient was seen with nephrology service while hospitalized.  There is no indication for outpatient follow-up for nephrology.    2.  Leukocytosis and lactic acidosis secondary to skin and soft tissue infection of the abdominal wall at the site of the diverting loop colostomy, all are present on admission  This is a second problem that led to this hospitalization.  Sepsis was ruled out since the patient did not qualify for 2 out of 4 SIRS criteria.  The patient leukocytosis with a white blood cell count of 48.9 normalized to 8.7 with 4 days of intravenous antibiotics and intravenous fluids.  The patient's lactic acidosis as high as as high as 2.6 normalized to 1.0 again with intravenous  fluids and intravenous antibiotics.  The main source of infection was likely skin and soft tissue infection of his stoma as evident by discomfort, erythema on examination that was noted on admission that normalized on the day of discharge.    In terms of work-up, the patient had CT abdomen and pelvis that showed mild fat streakiness at the site of the colostomy.  In terms of differential, there is evidence of mild pericholecystic fatty streakiness with right upper quadrant as such consideration for cholecystitis was made.  Abdominal ultrasound without evidence of cholecystitis.  The patient also had significant elevation in his inflammatory markers with CRP being elevated to as high as 143 to become 84 and his procalcitonin was elevated to as high as 134 to become 99.    There is also concerns for urinary tract infection with nonspecific abdominal pain and bilateral perinephric stickiness with streakiness however urine analysis without any significant pyuria.  The urine culture was negative.    The differential diagnoses also include C. difficile and enteric infections, both are ruled out, pneumonia, ruled out with 2 view chest x-ray without infiltrates.    The patient received 24 hours of vancomycin and Zosyn.  This was normalized to Unasyn within 24 hours of admission.  The patient completed a total of 4 days of antibiotic therapy during this hospitalization and was discharged to complete 3 more days for total course of 7 days of antibiotic therapy.    3.  Pancytopenia with thrombocytopenia and normocytic anemia secondary to malignancy, chemotherapy, and hematuria, all are present on admission  The patient hemoglobin was decreased from 13 to become 11.0.  However, this was stable during this hospitalization.  The patient will need follow-up laboratory work-up as deemed necessary by his oncologist.    4.  Metastatic colorectal cancer with diverting loop colostomy and history of hypersensitivity to oxaliplatin, all  "are present on admission  The patient continued to receive his 5-fluorouracil pump during this hospitalization was discontinued in a timely manner.  The patient was seen in consultation with oncology service.  The patient will follow-up with oncology as outpatient.         Diet: Regular Diet Adult    DVT Prophylaxis: Pneumatic Compression Devices  Puri Catheter: Not present  Lines: PRESENT      Port a Cath 01/19/21 Single Lumen Right Chest wall-Site Assessment: WDL      Cardiac Monitoring: None  Code Status: Full Code      Clinically Significant Risk Factors          # Hypocalcemia: Lowest Ca = 8.2 mg/dL in last 2 days, will monitor and replace as appropriate       # Thrombocytopenia: Lowest platelets = 68 in last 2 days, will monitor for bleeding   # Hypertension: Noted on problem list        # Overweight: Estimated body mass index is 27.42 kg/m  as calculated from the following:    Height as of 7/26/23: 1.803 m (5' 11\").    Weight as of this encounter: 89.2 kg (196 lb 9.6 oz)., PRESENT ON ADMISSION          Disposition Plan      Expected Discharge Date: 07/30/2023                  Keenan David MD  Medicine Service, [address]  Securely message with This Week In (more info)  Text page via [facility] Paging/Directory   See signed in provider for up to date coverage information  ______________________________________________________________________    Interval History   The patient reported right upper quadrant abdominal pain and more fluid stool from his stoma.  No subjective fever or chills.    Physical Exam   Vital Signs: Temp: 98.4  F (36.9  C) Temp src: Oral BP: (!) 142/92 Pulse: 57   Resp: 18 SpO2: 98 % O2 Device: None (Room air)    Weight: 196 lbs 9.6 oz    Constitutional: Awake, alert, cooperative, no apparent distress.  Eyes: Conjunctiva and pupils examined and normal.  HEENT: Moist mucous membranes, normal dentition.  Respiratory: Clear to auscultation " bilaterally, no crackles or wheezing.  Cardiovascular: Regular rate and rhythm, normal S1 and S2, and no murmur noted.  GI: Erythema around the stoma that is mild is noted.  Lymph/Hematologic: No anterior cervical or supraclavicular adenopathy.  Skin: No rashes, no cyanosis, no edema.  Musculoskeletal: No joint swelling, erythema or tenderness.  Neurologic: Cranial nerves 2-12 intact, normal strength and sensation.  Psychiatric: Alert, oriented to person, place and time, no obvious anxiety or depression.     Medical Decision Making       55 MINUTES SPENT BY ME on the date of service doing chart review, history, exam, documentation & further activities per the note.      Data     I have personally reviewed the following data over the past 24 hrs:    8.7  \   11.0 (L)   / 78 (L)     138 104 17.5 /  103 (H)   4.5 26 0.96 \     ALT: 22 AST: 28 AP: 90 TBILI: 0.4   ALB: 3.8 TOT PROTEIN: 6.4 LIPASE: N/A     Procal: 59.60 (HH) CRP: 42.60 (H) Lactic Acid: N/A         Imaging results reviewed over the past 24 hrs:   No results found for this or any previous visit (from the past 24 hour(s)).

## 2023-07-30 NOTE — PROGRESS NOTES
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  Reason for nephrology consult is hematuria ,proteinuria and slight rise in creatinine .    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    This patient had MICHELLE 2/2 ATN ( infusion reaction  resulting in hemolysis /hemoglobinuria )previously requiring short period of dialysis in 6/2021.Now on FOLFOX + Avastin with oxaloplatin desensitization.Given this background history ,having now large blood on urine dipstick and absence significant RBC on urine microscopy ( only 2 RBC/HPF)  speaks for pigmenturia ,though it could be either hemoglobin and myoglobin) ,checking CK and haptoglobin will help us out .Besides milder rise in creatinine ( 0.88-1.26) which normalized with just fluid also might be in favour of  pigmenturia ,get flushed out with diuresis  hence this may be concerning for milder reaction .    - Continue hydration    - Obtain CK ,Haptoglobulin .   - Suggest closer follow up with two -three days on subsequent Chemo .    # Metabolic Acidosis likely due to kidney dysfunction .  CO2 was 17 , now raised to 20 with improving kidney dysfunction.       - Continue hydration .          Recommendations were communicated to primary team via verbal/notes     Seen and discussed with Dr. Trinity LINDER MD  Division of Renal Disease and Hypertension  [facility]  [email or web address]  Vocera Web Console        REASON FOR CONSULT: Mild rise in creatinine after chemotherapy.    HISTORY OF PRESENT  ILLNESS:  Admitting provider and nursing notes reviewed  47 YO M with metatstatic colon adenocarcinoma progressed on multiple lines of treatment currently on FOLFOX+ Reyna (oxaliplatin desensitization) s/p C3 D2 today. He noted red urine and pelvic pain after he went home post infusion,  came back to ER. In the ER he is afebrile and hemodynamically stable.  Labs notable for WBC 48.9 (a.m. 9.5) hemoglobin and platelet stable.  Creatinine uptrending to 1.22 from 0.88. UA is orange and cloudy, large blood but with 2 RBC, protein albumin 300 .   He had MICHELLE following Oxaloplatin requiring dialysis in 6/2021 ,when he was presented with similar picture .He has good urine ouput No other significant past medical history or family history.No nausea or vomiting .       PAST MEDICAL HISTORY:  Reviewed with patient on 07/29/2023     No past medical history on file.    Past Surgical History:   Procedure Laterality Date     IR LUNG BIOPSY MEDIASTINUM RIGHT  1/19/2021        MEDICATIONS:  PTA Meds  Prior to Admission medications    Medication Sig Last Dose Taking? Auth Provider Long Term End Date   bisacodyl (DULCOLAX) 5 MG EC tablet Take 5 mg by mouth daily as needed for constipation   Reported, Patient     Fluorouracil (ADURCIL) 5 GM/100ML SOLN injection    Reported, Patient     ibuprofen (ADVIL/MOTRIN) 200 MG tablet Take 600 mg by mouth every 8 hours as needed for pain   Polo Snow MD     ondansetron (ZOFRAN ODT) 8 MG ODT tab Take 1 tablet (8 mg) by mouth every 8 hours as needed for nausea   Polo Snow MD     ondansetron (ZOFRAN) 8 MG tablet Take 1 tablet (8 mg) by mouth every 8 hours as needed for nausea (vomiting)  Patient not taking: Reported on 6/7/2023   Polo Snow MD     Ostomy Supplies MISC 1 each daily   Hanna Tavera PA-C     oxyCODONE (ROXICODONE) 5 MG tablet Take 1 tablet (5 mg) by mouth every 6 hours as needed for pain   Brea Jauregui APRN CNP     prochlorperazine (COMPAZINE) 10 MG tablet Take 1 tablet (10  mg) by mouth every 6 hours as needed for nausea or vomiting   Polo Snow MD     prochlorperazine (COMPAZINE) 5 MG tablet Take 1 tablet (5 mg) by mouth every 6 hours as needed for nausea or vomiting   Polo Snow MD     regorafenib (STIVARGA) 40 MG tablet Take 4 tablets (160 mg) by mouth daily Take on Days 1 thru 21 w/ low-fat bkfst. Store in orig bottle. Discard 7 weeks after opening.   Polo Snow MD Yes 5/19/23      Current Meds    ampicillin-sulbactam  3 g Intravenous Q6H     heparin  5-10 mL Intracatheter Q28 Days     heparin lock flush  5-10 mL Intracatheter Q24H     melatonin  1 mg Oral At Bedtime     sodium chloride (PF)  10-20 mL Intracatheter Q28 Days     sodium chloride (PF)  3 mL Intracatheter Q8H     Infusion Meds    lactated ringers 75 mL/hr at 07/29/23 1111       ALLERGIES:    Allergies   Allergen Reactions     Oxaliplatin Shortness Of Breath, Nausea and Vomiting and Other (See Comments)     Patient had HSR to Oxaliplatin on cycle 8 of FOLFOX chemotherapy. Sent to ER for continued monitoring.  Patient had HSR to Oxaliplatin on cycle 8 of FOLFOX chemotherapy. Sent to ER for continued monitoring.       Blood-Group Specific Substance Other (See Comments)     Patient has reactivity Suggestive of a Warm auto antibody. Blood products may be delayed. Draw patient 24 hours prior to transfusion. Draw one red top and two purple top tubes for all type and screen orders.  Patient has reactivity Suggestive of a Warm auto antibody. Blood products may be delayed. Draw patient 24 hours prior to transfusion. Draw one red top and two purple top tubes for all type and screen orders.         REVIEW OF SYSTEMS:  A comprehensive of systems was negative except as noted above.    SOCIAL HISTORY:   Social History     Socioeconomic History     Marital status: Single     Spouse name: Not on file     Number of children: Not on file     Years of education: Not on file     Highest education level: Not on file    Occupational History     Not on file   Tobacco Use     Smoking status: Some Days     Packs/day: 0.50     Types: Cigarettes     Smokeless tobacco: Never   Substance and Sexual Activity     Alcohol use: Yes     Comment: social     Drug use: Yes     Types: Methamphetamines     Sexual activity: Not on file   Other Topics Concern     Not on file   Social History Narrative     Not on file     Social Determinants of Health     Financial Resource Strain: Not on file   Food Insecurity: Not on file   Transportation Needs: Not on file   Physical Activity: Not on file   Stress: Not on file   Social Connections: Not on file   Intimate Partner Violence: Not At Risk (2/3/2023)    Humiliation, Afraid, Rape, and Kick questionnaire      Fear of Current or Ex-Partner: No      Emotionally Abused: No      Physically Abused: No      Sexually Abused: No   Housing Stability: Not on file     Reviewed with patient       FAMILY MEDICAL HISTORY:   No family history on file.  Reviewed with patient     PHYSICAL EXAM:   Temp  Av.1  F (36.7  C)  Min: 97.5  F (36.4  C)  Max: 98.8  F (37.1  C)      Pulse  Av.3  Min: 59  Max: 104 Resp  Av.5  Min: 15  Max: 18  SpO2  Av.5 %  Min: 95 %  Max: 99 %       /59 (BP Location: Left arm)   Pulse 64   Temp 98.3  F (36.8  C) (Oral)   Resp 16   Wt 88.9 kg (195 lb 14.4 oz)   SpO2 97%   BMI 27.32 kg/m     Date 23 0700 - 23 0659   Shift 8212-3141 8729-6666 6500-6035 24 Hour Total   INTAKE   P.O. 960   960   Shift Total 960   960   OUTPUT   Urine 1980   Shift Total 1980   Weight (kg)          Admit       GENERAL APPEARANCE: not distress,  awake  EYES: no scleral icterus, pupils equal  Endo: no goiter,   Lymphatics: no cervical or supraclavicular LAD  Pulmonary: lungs clear to auscultation with equal breath sounds bilaterally,   CV: regular rhythm, normal rate, no rub   - JVD -ve   - Edema -ve   GI: soft, nontender, normal bowel sounds  MS: no evidence of  inflammation in joints, no muscle tenderness  : no dawson  SKIN: no rash, warm, dry, no cyanosis  NEURO: face symmetric, no asterixis     LABS:   CMP  Recent Labs   Lab 07/29/23  0610 07/28/23  2322 07/28/23  1501 07/28/23  0558 07/28/23  0016 07/27/23  2152    133* 136 133* 133* 132*   POTASSIUM 4.0 4.1 4.2 4.5 4.4 4.4   CHLORIDE 103 99 100 100 101 99   CO2 23 24 21* 20* 17* 19*   ANIONGAP 10 10 15 13 15 14   * 126* 134* 155* 145* 134*   BUN 20.5* 25.2* 25.5* 22.0* 20.0 19.6   CR 0.91 1.02 1.04 1.17 1.26* 1.22*   GFRESTIMATED >90 90 87 76 69 72   JEFERSON 8.2* 8.3* 8.3* 8.3* 8.1* 9.1   MAG 1.9  --   --   --   --   --    PHOS 2.9  --   --   --   --   --    PROTTOTAL 6.1*  --   --  6.7 6.3* 7.3   ALBUMIN 3.5  --   --  3.9 3.7 4.3   BILITOTAL 0.5  --   --  0.9  0.9 1.4* 1.9*   ALKPHOS 111  --   --  123 160* 279*   AST 41  --   --  97* 109* 118*   ALT 20  --   --  25 28 31     CBC  Recent Labs   Lab 07/29/23  0610 07/28/23  0558 07/28/23  0016 07/27/23  2152   HGB 10.5* 11.5* 11.3* 12.8*   WBC 20.3* 40.3* 46.5* 48.9*   RBC 3.79* 4.19* 4.00* 4.51   HCT 32.9* 36.0* 35.0* 39.3*   MCV 87 86 88 87   MCH 27.7 27.4 28.3 28.4   MCHC 31.9 31.9 32.3 32.6   RDW 19.4* 19.6* 19.1* 19.2*   PLT 68* 142* 148* 177     INR  Recent Labs   Lab 07/27/23 2152   INR 1.31*   PTT 28     ABGNo lab results found in last 7 days.   URINE STUDIES  Recent Labs   Lab Test 07/29/23  1044 07/27/23  2158 07/27/23  0651 07/14/23  0752   COLOR Light Yellow Orange*  --   --    APPEARANCE Clear Cloudy*  --   --    URINEGLC Negative 70*  --   --    URINEBILI Negative Negative  --   --    URINEKETONE Negative Trace*  --   --    SG 1.017 1.015  --   --    UBLD Negative Large*  --   --    URINEPH 7.5* 6.0  --   --    PROTEIN 10* 300* Negative Negative   NITRITE Negative Negative  --   --    LEUKEST Negative Negative  --   --    RBCU <1 2  --   --    WBCU 1 15*  --   --      No lab results found.  PTH  No lab results found.  IRON STUDIES  No lab  results found.    IMAGING:  All imaging studies reviewed by me.     CARL LINDER MD

## 2023-07-30 NOTE — PLAN OF CARE
Goal Outcome Evaluation:    0700 - 1600:   /76 (BP Location: Right arm)   Pulse 70   Temp 98.5  F (36.9  C) (Oral)   Resp 16   Wt 89.2 kg (196 lb 9.6 oz)   SpO2 96%   BMI 27.42 kg/m        A&O x 4, AVSS ex BP mildly elevated, came down to 120/76.  Denies pain/nausea/abdominal discomfort.  Gave Unasyn this morning & then switching to oral for discharge.  Abdominal US with showed no acute cholecystitis.  Plan is to discharge at evening.      Discharge  D: Orders for discharge and outpatient medications written.  I: Home medications and return to clinic schedule reviewed with patient. Discharge instructions and parameters for calling Health Care Provider reviewed. Patient left at 1600 accompanied by self down to lobby where his gf will be picking him up. Port de-accessed, PIV removed.   Discharge instruction given & educated.  A: Patient/family verbalized understanding and was ready for discharge.   P: Patient instructed to  medications in Pharmacy. Follow up as scheduled 08/07 per discharge note.

## 2023-07-30 NOTE — PLAN OF CARE
/59 (BP Location: Left arm)   Pulse 64   Temp 98.3  F (36.8  C) (Oral)   Resp 16   Wt 88.9 kg (195 lb 14.4 oz)   SpO2 97%   BMI 27.32 kg/m      Shift: 4224-7237  Isolation Status: N/A  VS: VSS, afebrile.  Neuro: Aox4  Behaviors: calm cooperative and pleasant.   BG: none  Labs: WBC 20.3  Respiratory: RA  Cardiac: WDL  Pain/Nausea: Denied  Diet: Regular   IV Access: PIV infusing. Port A cath heparin locked.   Infusion(s): Continuous LR @ 75 mL/hr   Lines/Drains: none   GI/: Voiding. Colostomy with BM output.   Skin: WDL  Mobility: UAL  Plan: Continue with POC and notify team of any changes.

## 2023-07-31 ENCOUNTER — PATIENT OUTREACH (OUTPATIENT)
Dept: ONCOLOGY | Facility: CLINIC | Age: 48
End: 2023-07-31

## 2023-07-31 ENCOUNTER — PATIENT OUTREACH (OUTPATIENT)
Dept: CARE COORDINATION | Facility: CLINIC | Age: 48
End: 2023-07-31

## 2023-07-31 LAB — HAPTOGLOB SERPL-MCNC: <3 MG/DL (ref 32–197)

## 2023-07-31 NOTE — PROGRESS NOTES
[facility]    Background: Transitional Care Management program identified per system criteria and reviewed by [facility] team for possible outreach.    Assessment: Upon chart review, CCR Team member will not proceed with patient outreach related to this episode of Transitional Care Management program due to reason below:    Patient has active communication with a nurse, provider or care team for reason of post-hospital follow up plan.  Outreach call by CCR team not indicated to minimize duplicative efforts.     Plan: Transitional Care Management episode addressed appropriately per reason noted above.      Julieta Fisher RN  [facility] Resource [facility]    *Connected Care Resource Team does NOT follow patient ongoing. Referrals are identified based on internal discharge reports and the outreach is to ensure patient has an understanding of their discharge instructions.

## 2023-07-31 NOTE — TELEPHONE ENCOUNTER
Discharge Diagnoses  1. Hematuria secondary to hematuria associated with acute kidney injury AKIN stage I complicated by metabolic acidosis likely secondary to chemotherapy, all are POA  2.  Leukocytosis and lactic acidosis secondary to skin and soft tissue infection of the abdominal wall at the site of the diverting loop colostomy, all are present on admission  3.  Pancytopenia with thrombocytopenia and normocytic anemia secondary to malignancy, chemotherapy, and hematuria, all are present on admission  4.  Metastatic colorectal cancer with diverting loop colostomy and history of hypersensitivity to oxaliplatin, all are present on admission    The main source of infection was likely skin and soft tissue infection of his stoma as evident by discomfort, erythema on examination that was noted on admission and normalized by the day of discharge.     The patient received 24 hours of vancomycin and Zosyn.  This was normalized to Unasyn within 24 hours of admission.  The patient completed a total of 4 days of antibiotic therapy during this hospitalization and was discharged to complete 3 more days of amoxicillin/clavulonate for total course of 7 days of antibiotic therapy.     LILIANA 8:42 AM

## 2023-08-01 LAB
BACTERIA BLD CULT: NO GROWTH
BACTERIA BLD CULT: NO GROWTH

## 2023-08-03 LAB
BACTERIA BLD CULT: NO GROWTH
BACTERIA BLD CULT: NO GROWTH

## 2023-08-07 ENCOUNTER — ANCILLARY PROCEDURE (OUTPATIENT)
Dept: CT IMAGING | Facility: CLINIC | Age: 48
End: 2023-08-07
Attending: STUDENT IN AN ORGANIZED HEALTH CARE EDUCATION/TRAINING PROGRAM
Payer: COMMERCIAL

## 2023-08-07 DIAGNOSIS — C78.00 RECTAL ADENOCARCINOMA METASTATIC TO LUNG (H): ICD-10-CM

## 2023-08-07 DIAGNOSIS — C20 RECTAL ADENOCARCINOMA METASTATIC TO LUNG (H): ICD-10-CM

## 2023-08-07 PROCEDURE — 71260 CT THORAX DX C+: CPT | Performed by: STUDENT IN AN ORGANIZED HEALTH CARE EDUCATION/TRAINING PROGRAM

## 2023-08-07 PROCEDURE — 74177 CT ABD & PELVIS W/CONTRAST: CPT | Performed by: STUDENT IN AN ORGANIZED HEALTH CARE EDUCATION/TRAINING PROGRAM

## 2023-08-07 RX ORDER — IOPAMIDOL 755 MG/ML
96 INJECTION, SOLUTION INTRAVASCULAR ONCE
Status: COMPLETED | OUTPATIENT
Start: 2023-08-07 | End: 2023-08-07

## 2023-08-07 RX ORDER — HEPARIN SODIUM (PORCINE) LOCK FLUSH IV SOLN 100 UNIT/ML 100 UNIT/ML
500 SOLUTION INTRAVENOUS ONCE
Status: COMPLETED | OUTPATIENT
Start: 2023-08-07 | End: 2023-08-07

## 2023-08-07 RX ADMIN — IOPAMIDOL 96 ML: 755 INJECTION, SOLUTION INTRAVASCULAR at 10:25

## 2023-08-07 RX ADMIN — HEPARIN SODIUM (PORCINE) LOCK FLUSH IV SOLN 100 UNIT/ML 500 UNITS: 100 SOLUTION at 10:30

## 2023-08-07 NOTE — DISCHARGE INSTRUCTIONS

## 2023-08-11 ENCOUNTER — ONCOLOGY VISIT (OUTPATIENT)
Dept: ONCOLOGY | Facility: CLINIC | Age: 48
End: 2023-08-11
Attending: STUDENT IN AN ORGANIZED HEALTH CARE EDUCATION/TRAINING PROGRAM
Payer: COMMERCIAL

## 2023-08-11 ENCOUNTER — APPOINTMENT (OUTPATIENT)
Dept: LAB | Facility: CLINIC | Age: 48
End: 2023-08-11
Attending: STUDENT IN AN ORGANIZED HEALTH CARE EDUCATION/TRAINING PROGRAM
Payer: COMMERCIAL

## 2023-08-11 VITALS
WEIGHT: 198.6 LBS | DIASTOLIC BLOOD PRESSURE: 88 MMHG | RESPIRATION RATE: 16 BRPM | TEMPERATURE: 98.3 F | BODY MASS INDEX: 27.7 KG/M2 | SYSTOLIC BLOOD PRESSURE: 147 MMHG | HEART RATE: 68 BPM | OXYGEN SATURATION: 99 %

## 2023-08-11 DIAGNOSIS — C78.00 RECTAL ADENOCARCINOMA METASTATIC TO LUNG (H): Primary | ICD-10-CM

## 2023-08-11 DIAGNOSIS — C20 RECTAL ADENOCARCINOMA METASTATIC TO LUNG (H): Primary | ICD-10-CM

## 2023-08-11 LAB
ALBUMIN SERPL BCG-MCNC: 4.2 G/DL (ref 3.5–5.2)
ALBUMIN UR-MCNC: NEGATIVE MG/DL
ALP SERPL-CCNC: 69 U/L (ref 40–129)
ALT SERPL W P-5'-P-CCNC: 11 U/L (ref 0–70)
ANION GAP SERPL CALCULATED.3IONS-SCNC: 10 MMOL/L (ref 7–15)
AST SERPL W P-5'-P-CCNC: 25 U/L (ref 0–45)
BASOPHILS # BLD AUTO: 0.1 10E3/UL (ref 0–0.2)
BASOPHILS NFR BLD AUTO: 1 %
BILIRUB SERPL-MCNC: 0.2 MG/DL
BUN SERPL-MCNC: 20.2 MG/DL (ref 6–20)
CALCIUM SERPL-MCNC: 9.1 MG/DL (ref 8.6–10)
CEA SERPL-MCNC: 59.3 NG/ML
CHLORIDE SERPL-SCNC: 106 MMOL/L (ref 98–107)
CREAT SERPL-MCNC: 0.87 MG/DL (ref 0.67–1.17)
DEPRECATED HCO3 PLAS-SCNC: 23 MMOL/L (ref 22–29)
EOSINOPHIL # BLD AUTO: 0.3 10E3/UL (ref 0–0.7)
EOSINOPHIL NFR BLD AUTO: 4 %
ERYTHROCYTE [DISTWIDTH] IN BLOOD BY AUTOMATED COUNT: 18.6 % (ref 10–15)
GFR SERPL CREATININE-BSD FRML MDRD: >90 ML/MIN/1.73M2
GLUCOSE SERPL-MCNC: 110 MG/DL (ref 70–99)
HCT VFR BLD AUTO: 35.9 % (ref 40–53)
HGB BLD-MCNC: 11.6 G/DL (ref 13.3–17.7)
IMM GRANULOCYTES # BLD: 0 10E3/UL
IMM GRANULOCYTES NFR BLD: 0 %
LYMPHOCYTES # BLD AUTO: 0.7 10E3/UL (ref 0.8–5.3)
LYMPHOCYTES NFR BLD AUTO: 12 %
MCH RBC QN AUTO: 28.4 PG (ref 26.5–33)
MCHC RBC AUTO-ENTMCNC: 32.3 G/DL (ref 31.5–36.5)
MCV RBC AUTO: 88 FL (ref 78–100)
MONOCYTES # BLD AUTO: 0.7 10E3/UL (ref 0–1.3)
MONOCYTES NFR BLD AUTO: 12 %
NEUTROPHILS # BLD AUTO: 4.4 10E3/UL (ref 1.6–8.3)
NEUTROPHILS NFR BLD AUTO: 71 %
NRBC # BLD AUTO: 0 10E3/UL
NRBC BLD AUTO-RTO: 0 /100
PLATELET # BLD AUTO: 285 10E3/UL (ref 150–450)
POTASSIUM SERPL-SCNC: 4.3 MMOL/L (ref 3.4–5.3)
PROT SERPL-MCNC: 7 G/DL (ref 6.4–8.3)
RBC # BLD AUTO: 4.08 10E6/UL (ref 4.4–5.9)
SODIUM SERPL-SCNC: 139 MMOL/L (ref 136–145)
WBC # BLD AUTO: 6.2 10E3/UL (ref 4–11)

## 2023-08-11 PROCEDURE — 96411 CHEMO IV PUSH ADDL DRUG: CPT

## 2023-08-11 PROCEDURE — 250N000011 HC RX IP 250 OP 636: Mod: JZ | Performed by: STUDENT IN AN ORGANIZED HEALTH CARE EDUCATION/TRAINING PROGRAM

## 2023-08-11 PROCEDURE — 82378 CARCINOEMBRYONIC ANTIGEN: CPT

## 2023-08-11 PROCEDURE — G0498 CHEMO EXTEND IV INFUS W/PUMP: HCPCS

## 2023-08-11 PROCEDURE — 96413 CHEMO IV INFUSION 1 HR: CPT

## 2023-08-11 PROCEDURE — 99215 OFFICE O/P EST HI 40 MIN: CPT | Performed by: STUDENT IN AN ORGANIZED HEALTH CARE EDUCATION/TRAINING PROGRAM

## 2023-08-11 PROCEDURE — 258N000003 HC RX IP 258 OP 636: Performed by: STUDENT IN AN ORGANIZED HEALTH CARE EDUCATION/TRAINING PROGRAM

## 2023-08-11 PROCEDURE — 96367 TX/PROPH/DG ADDL SEQ IV INF: CPT

## 2023-08-11 PROCEDURE — 80053 COMPREHEN METABOLIC PANEL: CPT

## 2023-08-11 PROCEDURE — 96375 TX/PRO/DX INJ NEW DRUG ADDON: CPT

## 2023-08-11 PROCEDURE — 36591 DRAW BLOOD OFF VENOUS DEVICE: CPT

## 2023-08-11 PROCEDURE — G0463 HOSPITAL OUTPT CLINIC VISIT: HCPCS | Performed by: STUDENT IN AN ORGANIZED HEALTH CARE EDUCATION/TRAINING PROGRAM

## 2023-08-11 PROCEDURE — 85025 COMPLETE CBC W/AUTO DIFF WBC: CPT

## 2023-08-11 PROCEDURE — 81003 URINALYSIS AUTO W/O SCOPE: CPT | Performed by: STUDENT IN AN ORGANIZED HEALTH CARE EDUCATION/TRAINING PROGRAM

## 2023-08-11 RX ORDER — HEPARIN SODIUM (PORCINE) LOCK FLUSH IV SOLN 100 UNIT/ML 100 UNIT/ML
5 SOLUTION INTRAVENOUS
Status: CANCELLED | OUTPATIENT
Start: 2023-08-12

## 2023-08-11 RX ORDER — ALBUTEROL SULFATE 90 UG/1
1-2 AEROSOL, METERED RESPIRATORY (INHALATION)
Status: CANCELLED
Start: 2023-08-12

## 2023-08-11 RX ORDER — ONDANSETRON 2 MG/ML
8 INJECTION INTRAMUSCULAR; INTRAVENOUS ONCE
Status: COMPLETED | OUTPATIENT
Start: 2023-08-11 | End: 2023-08-11

## 2023-08-11 RX ORDER — HEPARIN SODIUM (PORCINE) LOCK FLUSH IV SOLN 100 UNIT/ML 100 UNIT/ML
5 SOLUTION INTRAVENOUS
Status: COMPLETED | OUTPATIENT
Start: 2023-08-11 | End: 2023-08-11

## 2023-08-11 RX ORDER — DIPHENHYDRAMINE HYDROCHLORIDE 50 MG/ML
50 INJECTION INTRAMUSCULAR; INTRAVENOUS
Status: CANCELLED
Start: 2023-08-12

## 2023-08-11 RX ORDER — ONDANSETRON 2 MG/ML
8 INJECTION INTRAMUSCULAR; INTRAVENOUS ONCE
Status: CANCELLED | OUTPATIENT
Start: 2023-08-12

## 2023-08-11 RX ORDER — HEPARIN SODIUM (PORCINE) LOCK FLUSH IV SOLN 100 UNIT/ML 100 UNIT/ML
5 SOLUTION INTRAVENOUS
Status: CANCELLED | OUTPATIENT
Start: 2023-08-14

## 2023-08-11 RX ORDER — HEPARIN SODIUM,PORCINE 10 UNIT/ML
5 VIAL (ML) INTRAVENOUS
Status: CANCELLED | OUTPATIENT
Start: 2023-08-14

## 2023-08-11 RX ORDER — EPINEPHRINE 1 MG/ML
0.3 INJECTION, SOLUTION INTRAMUSCULAR; SUBCUTANEOUS EVERY 5 MIN PRN
Status: CANCELLED | OUTPATIENT
Start: 2023-08-12

## 2023-08-11 RX ORDER — LORAZEPAM 2 MG/ML
0.5 INJECTION INTRAMUSCULAR EVERY 4 HOURS PRN
Status: CANCELLED | OUTPATIENT
Start: 2023-08-12

## 2023-08-11 RX ORDER — METHYLPREDNISOLONE SODIUM SUCCINATE 125 MG/2ML
125 INJECTION, POWDER, LYOPHILIZED, FOR SOLUTION INTRAMUSCULAR; INTRAVENOUS
Status: CANCELLED
Start: 2023-08-12

## 2023-08-11 RX ORDER — ALBUTEROL SULFATE 0.83 MG/ML
2.5 SOLUTION RESPIRATORY (INHALATION)
Status: CANCELLED | OUTPATIENT
Start: 2023-08-12

## 2023-08-11 RX ORDER — FLUOROURACIL 50 MG/ML
400 INJECTION, SOLUTION INTRAVENOUS ONCE
Status: COMPLETED | OUTPATIENT
Start: 2023-08-11 | End: 2023-08-11

## 2023-08-11 RX ORDER — HEPARIN SODIUM,PORCINE 10 UNIT/ML
5 VIAL (ML) INTRAVENOUS
Status: CANCELLED | OUTPATIENT
Start: 2023-08-12

## 2023-08-11 RX ORDER — MEPERIDINE HYDROCHLORIDE 25 MG/ML
25 INJECTION INTRAMUSCULAR; INTRAVENOUS; SUBCUTANEOUS EVERY 30 MIN PRN
Status: CANCELLED | OUTPATIENT
Start: 2023-08-12

## 2023-08-11 RX ORDER — FLUOROURACIL 50 MG/ML
400 INJECTION, SOLUTION INTRAVENOUS ONCE
Status: CANCELLED | OUTPATIENT
Start: 2023-08-12

## 2023-08-11 RX ADMIN — FLUOROURACIL 830 MG: 50 INJECTION, SOLUTION INTRAVENOUS at 11:09

## 2023-08-11 RX ADMIN — ONDANSETRON 8 MG: 2 INJECTION INTRAMUSCULAR; INTRAVENOUS at 09:25

## 2023-08-11 RX ADMIN — Medication 5 ML: at 06:52

## 2023-08-11 RX ADMIN — DEXAMETHASONE SODIUM PHOSPHATE: 10 INJECTION, SOLUTION INTRAMUSCULAR; INTRAVENOUS at 09:29

## 2023-08-11 RX ADMIN — LEUCOVORIN CALCIUM 750 MG: 500 INJECTION, POWDER, LYOPHILIZED, FOR SOLUTION INTRAMUSCULAR; INTRAVENOUS at 10:41

## 2023-08-11 RX ADMIN — SODIUM CHLORIDE 250 ML: 9 INJECTION, SOLUTION INTRAVENOUS at 08:42

## 2023-08-11 RX ADMIN — SODIUM CHLORIDE 400 MG: 9 INJECTION, SOLUTION INTRAVENOUS at 09:58

## 2023-08-11 ASSESSMENT — PAIN SCALES - GENERAL: PAINLEVEL: NO PAIN (0)

## 2023-08-11 NOTE — NURSING NOTE
"Chief Complaint   Patient presents with    Port Draw     Labs drawn from port by rn.  VS taken.     Port accessed with 20 gauge 3/4\" Power needle and labs drawn by rn.  Port flushed with NS and heparin.  Pt tolerated well.  VS taken.  Pt checked in for next appt.    Skylar Olivo RN      " Breath sounds clear and equal bilaterally.

## 2023-08-11 NOTE — PROGRESS NOTES
[facility] - Medical Oncology Follow-Up Consult Note  2023    Patient Identifiers     Name: Roman Escalante  Preferred Address: Roman  Preferred Language: English  : 1975  Gender: male    Assessment and Plan     Mr. Roman Escalante is a 48 year old male active smoker (3-5 cigs/day) with a history of extensively pre-treated metastatic colorectal cancer on fifth-line FOLFOX/Reyna who returns to clinic for treatment monitoring and post-discharge follow-up.    NGS: KRAS G12N  Immuno: TJ  Clinical Trial: TCR2, HCW    Mr. Escalante returns to clinic for postdischarge follow-up of hemolysis and kidney injury in the setting of oxaliplatin therapy, and for imaging treatment response monitoring.  Patient's imaging overall demonstrates response except for a little proximal aggressive large hepatic metastasis.  We will plan to continue chemotherapy while pursuing locoregional treatment for that hepatic dome lesion.  However, given the recent hospitalization we are hoping to transition to clinical trial sooner rather than later.    Regarding chemotherapy treatment: We will discontinue oxaliplatin at this time due to recent renal issues.  Oxaliplatin issues likely dose related as it took several months with both courses for issues to arise.  If oxaliplatin were to be used in future, we would recommend posttreatment labs weekly.  We may reconsider if patient shows progression on subsequent imaging scans on 5-FU/Reyna alone.    Patient to continue treating every 2 weeks with LILLY visits.  Plan for return with MD in 2 months with imaging.  Expedited imaging due to concern for continued progression in the setting of treatment change.  45 minutes spent on the date of the encounter doing chart review, review of test results, interpretation of tests, patient visit, and documentation     Polo Snow MD, PhD   of Medicine  Division of Hematology, Oncology and Transplantation  [facility]  [place]    -----------------------------------    Oncology Summary     Cancer Staging   Rectal adenocarcinoma metastatic to lung (H)  Staging form: Colon and Rectum, AJCC 8th Edition  - Clinical: Stage IVB (cTX, cNX, pM1b) - Signed by Polo Snow MD on 3/30/2023    Prior to establishment at [facility]:  2/24/2021 - 6/16/2021: FOLFOX/Reyna. Admitted for ARF and thrombocytopenia thought to be related to oxaliplatin  7/19/21 - 12/8/21: 5FU alone  12/21/21 - 9/28/22: FOLFIRI  11/7/2022 - 2/3/2023: TAS-102  Oncology History   Rectal adenocarcinoma metastatic to lung (H)   2/3/2023 Initial Diagnosis    Rectal adenocarcinoma metastatic to lung (H)     2/3/2023 - 3/31/2023 Chemotherapy    Oral ONC Colorectal Cancer - Regorafenib  Plan Provider: Polo Snow MD  Treatment goal: Palliative  Line of treatment: [No plan line of treatment]     3/30/2023 -  Cancer Staged    Staging form: Colon and Rectum, AJCC 8th Edition  - Clinical: Stage IVB (cTX, cNX, pM1b)     6/7/2023 -  Chemotherapy    OP ONC Colorectal Cancer - Modified FOLFOX-6 / Bevacizumab  Plan Provider: Polo Snow MD  Treatment goal: Palliative  Line of treatment: [No plan line of treatment]         Subjective/Interval Events     - feels good; is not having any pain  - finally is able to have good ostomy output without laxative therapy  - peeing well  - no redness/swelling around ostomy      Physical Exam     Vital signs: BP (!) 147/88 (BP Location: Right arm, Patient Position: Sitting, Cuff Size: Adult Regular)   Pulse 68   Temp 98.3  F (36.8  C) (Oral)   Resp 16   Wt 90.1 kg (198 lb 9.6 oz)   SpO2 99%   BMI 27.70 kg/m      ECOG performance status:  0  Vascular access:  R chest port    Physical Exam:  Exam performed, notable for:  - no notables on exam    Objective Data     Lab data:  I have personally reviewed the lab data, notable for:    - no notables    Radiology data:  I have personally reviewed the radiology data, notable for:  08/07/2023 CT  C/A/P  IMPRESSION:  1.  Compared to most recent contrast-enhanced exam from May 2023, there is mild interval increase in size of the dominant hepatic dome lesion.  2.  Solid pulmonary nodules with spiculated or lobular margins appear stable to mildly decreased in size and with decreased degree of cavitation.  3.  Upper rectal thickening, corresponding to the known malignancy, is similar.  4.  Slight decrease in size of the mesorectal lymphadenopathy.  5.  Stable approximately 9 mm left adrenal nodule.    Pathology and other data:  I have personally reviewed and interpreted the pathology data, notable for:    - no notables

## 2023-08-11 NOTE — NURSING NOTE
"Oncology Rooming Note    August 11, 2023 7:15 AM   Roman Escalante is a 48 year old male who presents for:    Chief Complaint   Patient presents with    Port Draw     Labs drawn from port by rn.  VS taken.    Oncology Clinic Visit     Colorectal cancer- 3 month followup      Initial Vitals: BP (!) 147/88 (BP Location: Right arm, Patient Position: Sitting, Cuff Size: Adult Regular)   Pulse 68   Temp 98.3  F (36.8  C) (Oral)   Resp 16   Wt 90.1 kg (198 lb 9.6 oz)   SpO2 99%   BMI 27.70 kg/m   Estimated body mass index is 27.7 kg/m  as calculated from the following:    Height as of 7/26/23: 1.803 m (5' 11\").    Weight as of this encounter: 90.1 kg (198 lb 9.6 oz). Body surface area is 2.12 meters squared.  No Pain (0) Comment: Data Unavailable   No LMP for male patient.  Allergies reviewed: Yes  Medications reviewed: Yes    Medications: Medication refills not needed today.  Pharmacy name entered into EPIC:    [facility] - [place], [place] - [address] [place] [address] [address] [facility] [place], [place] - [address] [facility] [id number]  [facility] - [place], [place] - [address] [facility] - [place], [place] - [address] [facility]    Clinical concerns: None.      Savanna Pinto [facility]              "

## 2023-08-11 NOTE — PROGRESS NOTES
Infusion Nursing Note:  Roman Escalante presents today for Cycle 5 Day 1 Zirabev, Leucovorin, and Fluorouracil bolus and pump connect.    Patient seen by provider today: Yes: Dr. Snow.   present during visit today: Not Applicable.    Note: Patient denies any signs or symptoms of infection. He feels a little tired today but overall feel ready for treatment. No complaints or concerns since his visit with Dr. Snow this morning.    TORB: 08/11/23 @ 8:30am: Dr. Snow/Maria Esther Saucedo RN: No Oxaliplatin today. Ok to proceed with treatment.    Intravenous Access:  Implanted Port accessed in lab.    Treatment Conditions:  Lab Results   Component Value Date    HGB 11.6 (L) 08/11/2023    WBC 6.2 08/11/2023    ANEU 45.1 (H) 07/28/2023    ANEUTAUTO 4.4 08/11/2023     08/11/2023        Lab Results   Component Value Date     08/11/2023    POTASSIUM 4.3 08/11/2023    MAG 1.8 07/30/2023    CR 0.87 08/11/2023    JEFERSON 9.1 08/11/2023    BILITOTAL 0.2 08/11/2023    ALBUMIN 4.2 08/11/2023    ALT 11 08/11/2023    AST 25 08/11/2023     /88.  Urine protein testing negative.    Results reviewed, labs MET treatment parameters, ok to proceed with treatment.    Post Infusion Assessment:  Patient tolerated infusion without incident.  Blood return noted pre and post infusion.  Blood return noted during administration every 2-3 ml during Fluorouracil bolus.  Site patent and intact, free from redness, edema or discomfort.  No evidence of extravasations.     Prior to discharge: Port is secured in place with tegaderm and flushed with 10cc NS with positive blood return noted.    Continuous home infusion CADD pump connected.    All connectors secured in place and clamps taped open.    Capillary element taped to pt's skin per protocol.  Pump Connection double checked with Shayla Bhat RN.  Infusing at 5.2mL/hour for 46 hours.   Patient instructed to call our clinic or [facility] with any questions or  concerns at home.  Patient verbalized understanding.    Patient set up for pump disconnect and Neulasta OnPro at home with [facility] on August 13th at 9:30am.       Discharge Plan:   Patient declined prescription refills.  Patient and/or family verbalized understanding of discharge instructions and all questions answered.  AVS to patient via [employer].  Patient will return 8/24/23 for next appointment.   Patient discharged in stable condition accompanied by: self.  Departure Mode: Ambulatory.      Maria Esther Saucedo RN

## 2023-08-11 NOTE — LETTER
2023         RE: Roman Escalante  [address]  [facility] [place] [facility] [address]        Dear Colleague,    Thank you for referring your patient, Roman Escalante, to the [facility]. Please see a copy of my visit note below.    [facility] - Medical Oncology Follow-Up Consult Note  2023    Patient Identifiers     Name: Roman Escalante  Preferred Address: Roman  Preferred Language: English  : 1975  Gender: male    Assessment and Plan     Mr. Roman Escalante is a 48 year old male active smoker (3-5 cigs/day) with a history of extensively pre-treated metastatic colorectal cancer on fifth-line FOLFOX/Reyna who returns to clinic for treatment monitoring and post-discharge follow-up.    NGS: KRAS G12N  Immuno: [facility]  Clinical Trial: TCR2, HCW    Mr. Escalante returns to clinic for postdischarge follow-up of hemolysis and kidney injury in the setting of oxaliplatin therapy, and for imaging treatment response monitoring.  Patient's imaging overall demonstrates response except for a little proximal aggressive large hepatic metastasis.  We will plan to continue chemotherapy while pursuing locoregional treatment for that hepatic dome lesion.  However, given the recent hospitalization we are hoping to transition to clinical trial sooner rather than later.    Regarding chemotherapy treatment: We will discontinue oxaliplatin at this time due to recent renal issues.  Oxaliplatin issues likely dose related as it took several months with both courses for issues to arise.  If oxaliplatin were to be used in future, we would recommend posttreatment labs weekly.  We may reconsider if patient shows progression on subsequent imaging scans on 5-FU/Reyna alone.    Patient to continue treating every 2 weeks with LILLY visits.  Plan for return with MD in 2 months with imaging.  Expedited imaging due to concern for continued progression in the setting of treatment change.  45 minutes spent on  the date of the encounter doing chart review, review of test results, interpretation of tests, patient visit, and documentation     Polo Snow MD, PhD   of Medicine  Division of Hematology, Oncology and Transplantation  [facility]    -----------------------------------    Oncology Summary     Cancer Staging   Rectal adenocarcinoma metastatic to lung (H)  Staging form: Colon and Rectum, AJCC 8th Edition  - Clinical: Stage IVB (cTX, cNX, pM1b) - Signed by Polo Snow MD on 3/30/2023    Prior to establishment at [facility]:  2/24/2021 - 6/16/2021: FOLFOX/Reyna. Admitted for ARF and thrombocytopenia thought to be related to oxaliplatin  7/19/21 - 12/8/21: 5FU alone  12/21/21 - 9/28/22: FOLFIRI  11/7/2022 - 2/3/2023: TAS-102  Oncology History   Rectal adenocarcinoma metastatic to lung (H)   2/3/2023 Initial Diagnosis    Rectal adenocarcinoma metastatic to lung (H)     2/3/2023 - 3/31/2023 Chemotherapy    Oral ONC Colorectal Cancer - Regorafenib  Plan Provider: Polo Snow MD  Treatment goal: Palliative  Line of treatment: [No plan line of treatment]     3/30/2023 -  Cancer Staged    Staging form: Colon and Rectum, AJCC 8th Edition  - Clinical: Stage IVB (cTX, cNX, pM1b)     6/7/2023 -  Chemotherapy    OP ONC Colorectal Cancer - Modified FOLFOX-6 / Bevacizumab  Plan Provider: Polo Snow MD  Treatment goal: Palliative  Line of treatment: [No plan line of treatment]         Subjective/Interval Events     - feels good; is not having any pain  - finally is able to have good ostomy output without laxative therapy  - peeing well  - no redness/swelling around ostomy      Physical Exam     Vital signs: BP (!) 147/88 (BP Location: Right arm, Patient Position: Sitting, Cuff Size: Adult Regular)   Pulse 68   Temp 98.3  F (36.8  C) (Oral)   Resp 16   Wt 90.1 kg (198 lb 9.6 oz)   SpO2 99%   BMI 27.70 kg/m      ECOG performance status:  0  Vascular access:  R chest port    Physical Exam:  Exam  performed, notable for:  - no notables on exam    Objective Data     Lab data:  I have personally reviewed the lab data, notable for:    - no notables    Radiology data:  I have personally reviewed the radiology data, notable for:  08/07/2023 CT C/A/P  IMPRESSION:  1.  Compared to most recent contrast-enhanced exam from May 2023, there is mild interval increase in size of the dominant hepatic dome lesion.  2.  Solid pulmonary nodules with spiculated or lobular margins appear stable to mildly decreased in size and with decreased degree of cavitation.  3.  Upper rectal thickening, corresponding to the known malignancy, is similar.  4.  Slight decrease in size of the mesorectal lymphadenopathy.  5.  Stable approximately 9 mm left adrenal nodule.    Pathology and other data:  I have personally reviewed and interpreted the pathology data, notable for:    - no notables

## 2023-08-14 ENCOUNTER — OFFICE VISIT (OUTPATIENT)
Dept: VASCULAR SURGERY | Facility: CLINIC | Age: 48
End: 2023-08-14
Payer: COMMERCIAL

## 2023-08-14 DIAGNOSIS — C18.9 COLON CANCER METASTASIZED TO LIVER (H): ICD-10-CM

## 2023-08-14 DIAGNOSIS — C78.7 COLON CANCER METASTASIZED TO LIVER (H): ICD-10-CM

## 2023-08-14 DIAGNOSIS — C18.9 PRIMARY ADENOCARCINOMA OF COLON (H): Primary | ICD-10-CM

## 2023-08-14 DIAGNOSIS — C78.00 RECTAL ADENOCARCINOMA METASTATIC TO LUNG (H): ICD-10-CM

## 2023-08-14 DIAGNOSIS — C20 RECTAL ADENOCARCINOMA METASTATIC TO LUNG (H): ICD-10-CM

## 2023-08-14 PROCEDURE — 99204 OFFICE O/P NEW MOD 45 MIN: CPT | Performed by: STUDENT IN AN ORGANIZED HEALTH CARE EDUCATION/TRAINING PROGRAM

## 2023-08-14 NOTE — PROGRESS NOTES
INTERVENTIONAL RADIOLOGY CONSULTATION    Name: Roman Escalante  Age: 48 year old   Referring Physician: Dr. Snow   REASON FOR REFERRAL: Liver directed therapy for metastatic colorectal cancer     HPI: Roman Escalante is a 48 year old male with a complex oncologic history, full review of his history can be found in Dr. Snow's note from 8/11/2023.  He presents today after his most recent follow up imaging showed response to treatment at all sites other than hepatic metastatic lesion which has slightly grown.      He reports that he is able to perform his ADLs without assistance, however has recently moved in with his girlfriend and sold his house to simplify his life.  She works as a hospice nurse.    He is in his usual state of health and states he feels well.  Has never had fluid drained from his abdomen or yellowing of the skin/eyes.    PAST MEDICAL HISTORY:   No past medical history on file.    PAST SURGICAL HISTORY:   Past Surgical History:   Procedure Laterality Date     IR LUNG BIOPSY MEDIASTINUM RIGHT  1/19/2021       FAMILY HISTORY:   No family history on file.    SOCIAL HISTORY:   Social History     Tobacco Use     Smoking status: Some Days     Packs/day: 0.50     Types: Cigarettes     Smokeless tobacco: Never   Substance Use Topics     Alcohol use: Yes     Comment: social       PROBLEM LIST:   Patient Active Problem List    Diagnosis Date Noted     MICHELLE (acute kidney injury) (H) 07/27/2023     Priority: Medium     Gastrointestinal hemorrhage 06/30/2023     Priority: Medium     Lower abdominal pain 06/30/2023     Priority: Medium     Rectal adenocarcinoma metastatic to lung (H) 02/03/2023     Priority: Medium     Delusions of parasitosis (H) 06/30/2022     Priority: Medium     Altered bowel elimination due to intestinal ostomy (H) 05/19/2022     Priority: Medium     Coagulation defect, unspecified (H) 05/19/2022     Priority: Medium     Secondary hyperparathyroidism of renal origin (H) 05/19/2022      Priority: Medium     Acute kidney failure with tubular necrosis (H) 2021     Priority: Medium     Blurring of visual image 2021     Priority: Medium     Essential (primary) hypertension 2021     Priority: Medium     Primary adenocarcinoma of colon (H) 2021     Priority: Medium     Acute kidney injury (H) 2021     Priority: Medium     Chemotherapy-induced nausea and vomiting 2021     Priority: Medium     Brown-colored urine 2021     Priority: Medium     Elevated AST (SGOT) 2021     Priority: Medium     Leukopenia due to antineoplastic chemotherapy (H) 2021     Priority: Medium     Hyperbilirubinemia 2021     Priority: Medium     Numbness and tingling 2021     Priority: Medium     Thrombocytopenia (H) 2021     Priority: Medium     Malignant tumor of rectum (H) 2021     Priority: Medium     Polyp of colon 2021     Priority: Medium     Digestive symptom 2021     Priority: Medium     Rectal hemorrhage 2021     Priority: Medium       MEDICATIONS:   Prescription Medications as of 2023         Rx Number Disp Refills Start End Last Dispensed Date Next Fill Date Owning Pharmacy    amoxicillin-clavulanate (AUGMENTIN) 875-125 MG tablet  11 tablet 0 2023    [place] [address] [facility] [address]    Sig: Take 1 tablet by mouth 3 times daily    Class: E-Prescribe    Notes to Pharmacy: For skin/soft tissue infection around ostomy    Route: Oral    bisacodyl (DULCOLAX) 5 MG EC tablet            Sig: Take 5 mg by mouth daily as needed for constipation    Class: Historical    Route: Oral    Fluorouracil (ADURCIL) 5 GM/100ML SOLN injection    2023        Class: Historical    Ostomy Supplies MISC  1 each 11 2023        Si each daily    Class: Local Print    Notes to Pharmacy: [facility] [address]    Route: Does not apply    oxyCODONE (ROXICODONE) 5 MG tablet  30 tablet 0 2023     [place], [place] - [address] [facility] [phone]    Sig: Take 1 tablet (5 mg) by mouth every 6 hours as needed for pain    Class: E-Prescribe    Earliest Fill Date: 7/14/2023    Route: Oral    prochlorperazine (COMPAZINE) 10 MG tablet  30 tablet 2 6/6/2023    [place], [place] - [address] [facility] [address]    Sig: Take 1 tablet (10 mg) by mouth every 6 hours as needed for nausea or vomiting    Class: E-Prescribe    Route: Oral    Non-formulary Exception Code: Specific indication for non-formulary alternative    prochlorperazine (COMPAZINE) 5 MG tablet  30 tablet 3 4/21/2023    [facility] [place] [place] [facility] [address]    Sig: Take 1 tablet (5 mg) by mouth every 6 hours as needed for nausea or vomiting    Class: E-Prescribe    Route: Oral            ALLERGIES:   Oxaliplatin and Blood-group specific substance    ROS:  Constitutional, HEENT, cardiovascular, pulmonary, gi and gu systems are negative, except as otherwise noted.    Physical Examination:   VITALS:   There were no vitals taken for this visit.  GENERAL: Healthy, alert and no distress  SKIN: Visible skin clear. No significant rash, abnormal pigmentation or lesions.  PSYCH: Mentation appears normal, affect normal/bright, judgement and insight intact, normal speech and appearance well-groomed.  EYES: Eyes grossly normal to inspection. No discharge or erythema, or obvious scleral/conjunctival abnormalities.  RESP: No audible wheeze, cough, or visible cyanosis. No visible retractions or increased work of breathing.   NEURO: Cranial nerves grossly intact. Mentation and speech appropriate for age.    Labs:    BMP RESULTS:  Lab Results   Component Value Date     08/11/2023    POTASSIUM 4.3 08/11/2023    POTASSIUM 4.0 04/19/2023    CHLORIDE 106 08/11/2023    CHLORIDE 105 04/19/2023    CO2 23 08/11/2023    CO2 29 04/19/2023    ANIONGAP 10 08/11/2023    ANIONGAP 2  (L) 04/19/2023     (H) 08/11/2023    GLC 68 (L) 04/19/2023    BUN 20.2 (H) 08/11/2023    BUN 11 04/19/2023    CR 0.87 08/11/2023    GFRESTIMATED >90 08/11/2023    JEFERSON 9.1 08/11/2023        CBC RESULTS:  Lab Results   Component Value Date    WBC 6.2 08/11/2023    RBC 4.08 (L) 08/11/2023    HGB 11.6 (L) 08/11/2023    HCT 35.9 (L) 08/11/2023    MCV 88 08/11/2023    MCH 28.4 08/11/2023    MCHC 32.3 08/11/2023    RDW 18.6 (H) 08/11/2023     08/11/2023       INR/PTT:  Lab Results   Component Value Date    INR 1.31 (H) 07/27/2023    PTT 28 07/27/2023       Diagnostic studies:   CT Chest/Abdomen/Pelvis 8/7/2023 personally reviewed by me.  Shows 5.5 cm segment 4A/8 lesion which has increased in size.  1.4 cm segment 6/7 lesion has likely also increased in size slightly.  Other lesions outside of the liver have improved in size or are stable from the most recent comparison.    Assessment Roman Escalante is a 48 year old male with metastatic colorectal carcinoma on fifth line systemic treatment who presents for evaluation for Y90 of growing hepatic segment 4A/8 lesion.  Lesions outside of the liver are improving on current therapy.  He is ECOG 0.  He is willing to undergo locoregional therapy for his growing hepatic lesion.  Plan   Unresectable metastatic colorectal cancer to the liver - Will perform mapping/Y90 to the segment 4A/8 lesion.  Will continue to observe the segment 6/7 lesion.       Review of prior external note(s) from - Dr. Snow dated 8/11/2023  Review of the result(s) of each unique test - CT C/A/P 8/7/2023  Independent interpretation of a test performed by another physician/other qualified health care professional (not separately reported) - CT C/A/P 8/7/2023 as described above    40 minutes spent by me on the date of the encounter doing chart review, review of test results, interpretation of tests, patient visit and documentation       CC  Patient Care Team:  Buddy Hart,  MD as PCP - General (Family Medicine)  Virgen Nieto, RN as Specialty Care Coordinator (Hematology & Oncology)  Lesly Snow MD as MD (Medical Oncology)  Juliet Blanco MD as MD (Infectious Diseases)  Lesly Snow MD as Assigned Cancer Care Provider  Juliet Blanco MD as Assigned Infectious Disease Provider  Emmy Alfonso, RN as Registered Nurse (Wound Care)  Buddy Hart MD as Assigned PCP  LESLY SNOW

## 2023-08-14 NOTE — PATIENT INSTRUCTIONS
Roman you have had your consult today with Dr Pawan HOLGUIN/Vascular for discussion of treatment for you liver tumor.     Plan:    We will submit for insurance prior authorization for Y90 and call you to schedule.       For your procedures you will check into the Gold Waiting room, located on the main level, at the [facility], located at [address] [facility] [address] [facility] [address], [place] [address]     ____________________________________________________________________________     Reminders prior to each procedure:     -No solids or milk products 8 hours prior to check in  time.     -Clear liquids are ok up to 2 hours prior to check in  time.     -You may take your morning medications the day of each procedure.     -Plan to spend around 8 hours at the hospital each day, from your check-in time.     -You will receive sedation for each procedure so plan to have a ride home.     ________________________________________________________________________________     -Post-procedure follow up: I will call you after the mapping procedure to confirm we are moving forward with the delivery. I will also call you 2-3 days after your delivery procedure to follow-up.       *Please keep in mind that you may experience a Post Embolization Syndrome after your delivery.       This includes:     -high fevers 101-102, which may last for a couple of days. We recommend using over the counter medications such as Tylenol or Ibuprofen.     -Nausea     -Decreased appetite: Try snacking throughout the day and focus on drinking fluids and staying hydrated.     -Fatigue: this is normal and should only last a few days.     *Abnormal symptoms that require follow-up with IR or your nearest emergency room.   1. Confusion   2. Swelling of the lower extremities beyond your norm   3. Severe abdominal pain that doesn't go away with pain medications.   4. High fevers that do not come down with over the counter medications.     If you have any concerns  regarding the above symptoms, please call our hospital at [phone] and ask for the Interventional Radiologist On-call (after hours) or you can contact me (during business hours) 7- 3:30 pm.     Please feel free to call me with any questions or concerns.     Thank you,     Lilli Ren RN, BSN   Interventional Radiology Care Coordinator   Office: [phone]

## 2023-08-14 NOTE — LETTER
8/14/2023       RE: Roman Escalante  [address]  [facility] [address]     Dear Colleague,    Thank you for referring your patient, Roman Escalante, to the [facility] [place] [facility]. Please see a copy of my visit note below.        INTERVENTIONAL RADIOLOGY CONSULTATION    Name: Roman Escalante  Age: 48 year old   Referring Physician: Dr. Snow   REASON FOR REFERRAL: Liver directed therapy for metastatic colorectal cancer     HPI: Roman Escalante is a 48 year old male with a complex oncologic history, full review of his history can be found in Dr. Snow's note from 8/11/2023.  He presents today after his most recent follow up imaging showed response to treatment at all sites other than hepatic metastatic lesion which has slightly grown.      He reports that he is able to perform his ADLs without assistance, however has recently moved in with his girlfriend and sold his house to simplify his life.  She works as a hospice nurse.    He is in his usual state of health and states he feels well.  Has never had fluid drained from his abdomen or yellowing of the skin/eyes.    PAST MEDICAL HISTORY:   No past medical history on file.    PAST SURGICAL HISTORY:   Past Surgical History:   Procedure Laterality Date    IR LUNG BIOPSY MEDIASTINUM RIGHT  1/19/2021       FAMILY HISTORY:   No family history on file.    SOCIAL HISTORY:   Social History     Tobacco Use    Smoking status: Some Days     Packs/day: 0.50     Types: Cigarettes    Smokeless tobacco: Never   Substance Use Topics    Alcohol use: Yes     Comment: social       PROBLEM LIST:   Patient Active Problem List    Diagnosis Date Noted    MICHELLE (acute kidney injury) (H) 07/27/2023     Priority: Medium    Gastrointestinal hemorrhage 06/30/2023     Priority: Medium    Lower abdominal pain 06/30/2023     Priority: Medium    Rectal adenocarcinoma metastatic to lung (H) 02/03/2023      Priority: Medium    Delusions of parasitosis (H) 06/30/2022     Priority: Medium    Altered bowel elimination due to intestinal ostomy (H) 05/19/2022     Priority: Medium    Coagulation defect, unspecified (H) 05/19/2022     Priority: Medium    Secondary hyperparathyroidism of renal origin (H) 05/19/2022     Priority: Medium    Acute kidney failure with tubular necrosis (H) 08/03/2021     Priority: Medium    Blurring of visual image 08/03/2021     Priority: Medium    Essential (primary) hypertension 08/03/2021     Priority: Medium    Primary adenocarcinoma of colon (H) 08/03/2021     Priority: Medium    Acute kidney injury (H) 06/21/2021     Priority: Medium    Chemotherapy-induced nausea and vomiting 06/17/2021     Priority: Medium    Brown-colored urine 06/17/2021     Priority: Medium    Elevated AST (SGOT) 06/17/2021     Priority: Medium    Leukopenia due to antineoplastic chemotherapy (H) 06/17/2021     Priority: Medium    Hyperbilirubinemia 06/17/2021     Priority: Medium    Numbness and tingling 06/17/2021     Priority: Medium    Thrombocytopenia (H) 06/17/2021     Priority: Medium    Malignant tumor of rectum (H) 01/12/2021     Priority: Medium    Polyp of colon 01/12/2021     Priority: Medium    Digestive symptom 01/06/2021     Priority: Medium    Rectal hemorrhage 01/06/2021     Priority: Medium       MEDICATIONS:   Prescription Medications as of 8/14/2023         Rx Number Disp Refills Start End Last Dispensed Date Next Fill Date Owning Pharmacy    amoxicillin-clavulanate (AUGMENTIN) 875-125 MG tablet  11 tablet 0 7/30/2023    [address]    Sig: Take 1 tablet by mouth 3 times daily    Class: E-Prescribe    Notes to Pharmacy: For skin/soft tissue infection around ostomy    Route: Oral    bisacodyl (DULCOLAX) 5 MG EC tablet            Sig: Take 5 mg by mouth daily as needed for constipation    Class: Historical    Route: Oral    Fluorouracil (ADURCIL)  5 GM/100ML SOLN injection    2023        Class: Historical    Ostomy Supplies MISC  1 each 11 2023        Si each daily    Class: Local Print    Notes to Pharmacy: [facility]  [address]    Route: Does not apply    oxyCODONE (ROXICODONE) 5 MG tablet  30 tablet 0 2023    [address]    Sig: Take 1 tablet (5 mg) by mouth every 6 hours as needed for pain    Class: E-Prescribe    Earliest Fill Date: 2023    Route: Oral    prochlorperazine (COMPAZINE) 10 MG tablet  30 tablet 2 2023    [address] 0[address]    Sig: Take 1 tablet (10 mg) by mouth every 6 hours as needed for nausea or vomiting    Class: E-Prescribe    Route: Oral    Non-formulary Exception Code: Specific indication for non-formulary alternative    prochlorperazine (COMPAZINE) 5 MG tablet  30 tablet 3 2023    [facility] [place], [place] - [address] [place] [address]    Sig: Take 1 tablet (5 mg) by mouth every 6 hours as needed for nausea or vomiting    Class: E-Prescribe    Route: Oral            ALLERGIES:   Oxaliplatin and Blood-group specific substance    ROS:  Constitutional, HEENT, cardiovascular, pulmonary, gi and gu systems are negative, except as otherwise noted.    Physical Examination:   VITALS:   There were no vitals taken for this visit.  GENERAL: Healthy, alert and no distress  SKIN: Visible skin clear. No significant rash, abnormal pigmentation or lesions.  PSYCH: Mentation appears normal, affect normal/bright, judgement and insight intact, normal speech and appearance well-groomed.  EYES: Eyes grossly normal to inspection. No discharge or erythema, or obvious scleral/conjunctival abnormalities.  RESP: No audible wheeze, cough, or visible cyanosis. No visible retractions or increased work of breathing.   NEURO: Cranial nerves grossly intact. Mentation and speech  appropriate for age.    Labs:    BMP RESULTS:  Lab Results   Component Value Date     08/11/2023    POTASSIUM 4.3 08/11/2023    POTASSIUM 4.0 04/19/2023    CHLORIDE 106 08/11/2023    CHLORIDE 105 04/19/2023    CO2 23 08/11/2023    CO2 29 04/19/2023    ANIONGAP 10 08/11/2023    ANIONGAP 2 (L) 04/19/2023     (H) 08/11/2023    GLC 68 (L) 04/19/2023    BUN 20.2 (H) 08/11/2023    BUN 11 04/19/2023    CR 0.87 08/11/2023    GFRESTIMATED >90 08/11/2023    JEFERSON 9.1 08/11/2023        CBC RESULTS:  Lab Results   Component Value Date    WBC 6.2 08/11/2023    RBC 4.08 (L) 08/11/2023    HGB 11.6 (L) 08/11/2023    HCT 35.9 (L) 08/11/2023    MCV 88 08/11/2023    MCH 28.4 08/11/2023    MCHC 32.3 08/11/2023    RDW 18.6 (H) 08/11/2023     08/11/2023       INR/PTT:  Lab Results   Component Value Date    INR 1.31 (H) 07/27/2023    PTT 28 07/27/2023       Diagnostic studies:   CT Chest/Abdomen/Pelvis 8/7/2023 personally reviewed by me.  Shows 5.5 cm segment 4A/8 lesion which has increased in size.  1.4 cm segment 6/7 lesion has likely also increased in size slightly.  Other lesions outside of the liver have improved in size or are stable from the most recent comparison.    Assessment Roman Escalante is a 48 year old male with metastatic colorectal carcinoma on fifth line systemic treatment who presents for evaluation for Y90 of growing hepatic segment 4A/8 lesion.  Lesions outside of the liver are improving on current therapy.  He is ECOG 0.  He is willing to undergo locoregional therapy for his growing hepatic lesion.  Plan   Unresectable metastatic colorectal cancer to the liver - Will perform mapping/Y90 to the segment 4A/8 lesion.  Will continue to observe the segment 6/7 lesion.       Review of prior external note(s) from - Dr. Snow dated 8/11/2023  Review of the result(s) of each unique test - CT C/A/P 8/7/2023  Independent interpretation of a test performed by another physician/other qualified health care  professional (not separately reported) - CT C/A/P 8/7/2023 as described above    40 minutes spent by me on the date of the encounter doing chart review, review of test results, interpretation of tests, patient visit and documentation            Again, thank you for allowing me to participate in the care of your patient.      Sincerely,    Fab Villalta MD

## 2023-08-14 NOTE — NURSING NOTE
Medications and allergies were reconciled.    KIAH Ren RN, BSN  Interventional Radiology Nurse Coordinator   Phone:  [phone]

## 2023-08-15 ENCOUNTER — TELEPHONE (OUTPATIENT)
Dept: VASCULAR SURGERY | Facility: CLINIC | Age: 48
End: 2023-08-15
Payer: COMMERCIAL

## 2023-08-15 ENCOUNTER — MYC MEDICAL ADVICE (OUTPATIENT)
Dept: FAMILY MEDICINE | Facility: CLINIC | Age: 48
End: 2023-08-15
Payer: COMMERCIAL

## 2023-08-22 ENCOUNTER — OFFICE VISIT (OUTPATIENT)
Dept: FAMILY MEDICINE | Facility: CLINIC | Age: 48
End: 2023-08-22
Payer: COMMERCIAL

## 2023-08-22 VITALS
HEIGHT: 71 IN | BODY MASS INDEX: 27.27 KG/M2 | SYSTOLIC BLOOD PRESSURE: 113 MMHG | WEIGHT: 194.8 LBS | HEART RATE: 71 BPM | TEMPERATURE: 97.6 F | DIASTOLIC BLOOD PRESSURE: 80 MMHG | OXYGEN SATURATION: 98 %

## 2023-08-22 DIAGNOSIS — Z13.220 ENCOUNTER FOR LIPID SCREENING FOR CARDIOVASCULAR DISEASE: ICD-10-CM

## 2023-08-22 DIAGNOSIS — Z00.00 ROUTINE GENERAL MEDICAL EXAMINATION AT A HEALTH CARE FACILITY: Primary | ICD-10-CM

## 2023-08-22 DIAGNOSIS — Z13.6 ENCOUNTER FOR LIPID SCREENING FOR CARDIOVASCULAR DISEASE: ICD-10-CM

## 2023-08-22 LAB
CHOLEST SERPL-MCNC: 181 MG/DL
HDLC SERPL-MCNC: 39 MG/DL
LDLC SERPL CALC-MCNC: 104 MG/DL
NONHDLC SERPL-MCNC: 142 MG/DL
TRIGL SERPL-MCNC: 188 MG/DL

## 2023-08-22 PROCEDURE — 99396 PREV VISIT EST AGE 40-64: CPT | Performed by: FAMILY MEDICINE

## 2023-08-22 PROCEDURE — 80061 LIPID PANEL: CPT | Performed by: FAMILY MEDICINE

## 2023-08-22 PROCEDURE — 36415 COLL VENOUS BLD VENIPUNCTURE: CPT | Performed by: FAMILY MEDICINE

## 2023-08-22 NOTE — PROGRESS NOTES
SUBJECTIVE:   CC: Roman is an 48 year old who presents for preventative health visit.       2023     7:25 AM   Additional Questions   Roomed by Peri   Accompanied by christian         2023     7:25 AM   Patient Reported Additional Medications   Patient reports taking the following new medications none       Healthy Habits:     Getting at least 3 servings of Calcium per day:  Yes    Bi-annual eye exam:  Yes    Dental care twice a year:  NO    Sleep apnea or symptoms of sleep apnea:  None    Diet:  Regular (no restrictions)    Frequency of exercise:  4-5 days/week    Duration of exercise:  15-30 minutes    Taking medications regularly:  Yes    Medication side effects:  None    Additional concerns today:  Yes (Dislodged tear duct (R), port concerns)    Tear duct issue  Right side            Have you ever done Advance Care Planning? (For example, a Health Directive, POLST, or a discussion with a medical provider or your loved ones about your wishes): Yes, advance care planning is on file.    Social History     Tobacco Use    Smoking status: Former     Packs/day: 0.50     Years: 10.00     Pack years: 5.00     Types: Cigarettes, Other     Start date: 2010     Quit date: 2023     Years since quittin.0    Smokeless tobacco: Never   Substance Use Topics    Alcohol use: Not Currently     Comment: social             2023     7:22 AM   Alcohol Use   Prescreen: >3 drinks/day or >7 drinks/week? No          No data to display                Last PSA: No results found for: PSA    Reviewed orders with patient. Reviewed health maintenance and updated orders accordingly - Yes  BP Readings from Last 3 Encounters:   23 (!) 128/91   23 113/80   23 (!) 147/88    Wt Readings from Last 3 Encounters:   23 90.4 kg (199 lb 4.7 oz)   23 88.4 kg (194 lb 12.8 oz)   23 90.1 kg (198 lb 9.6 oz)                    Reviewed and updated as needed this visit by clinical staff    Allergies   "Meds              Reviewed and updated as needed this visit by Provider                     Review of Systems   Genitourinary:  Negative for impotence and penile discharge.         OBJECTIVE:   /80   Pulse 71   Temp 97.6  F (36.4  C) (Oral)   Ht 1.803 m (5' 10.98\")   Wt 88.4 kg (194 lb 12.8 oz)   SpO2 98%   BMI 27.18 kg/m      Physical Exam  Vitals reviewed.   Constitutional:       Appearance: Normal appearance. He is not ill-appearing.   HENT:      Head: Normocephalic.      Right Ear: Tympanic membrane and ear canal normal.      Left Ear: Tympanic membrane and ear canal normal.      Nose: Nose normal.   Eyes:      Extraocular Movements: Extraocular movements intact.   Cardiovascular:      Rate and Rhythm: Normal rate and regular rhythm.      Heart sounds: Normal heart sounds. No murmur heard.  Pulmonary:      Effort: Pulmonary effort is normal. No respiratory distress.      Breath sounds: Normal breath sounds. No wheezing or rales.   Abdominal:      Palpations: Abdomen is soft.   Musculoskeletal:         General: Normal range of motion.      Cervical back: Normal range of motion and neck supple.   Skin:     General: Skin is warm and dry.      Findings: No lesion.   Neurological:      Mental Status: He is alert and oriented to person, place, and time.   Psychiatric:         Mood and Affect: Mood normal.         Behavior: Behavior normal.         Thought Content: Thought content normal.         Judgment: Judgment normal.               ASSESSMENT/PLAN:       ICD-10-CM    1. Routine general medical examination at a health care facility  Z00.00       2. Encounter for lipid screening for cardiovascular disease  Z13.220 Lipid panel reflex to direct LDL Non-fasting    Z13.6 Lipid panel reflex to direct LDL Non-fasting          Patient has been advised of split billing requirements and indicates understanding: Yes      COUNSELING:   Reviewed preventive health counseling, as reflected in patient instructions     "   Regular exercise       Healthy diet/nutrition        He reports that he quit smoking about 2 weeks ago. His smoking use included cigarettes and other. He started smoking about 13 years ago. He has a 5.00 pack-year smoking history. He has never used smokeless tobacco.            Buddy Smith MD  [facility]

## 2023-08-29 ENCOUNTER — APPOINTMENT (OUTPATIENT)
Dept: MEDSURG UNIT | Facility: CLINIC | Age: 48
End: 2023-08-29
Attending: STUDENT IN AN ORGANIZED HEALTH CARE EDUCATION/TRAINING PROGRAM
Payer: COMMERCIAL

## 2023-08-29 ENCOUNTER — HOSPITAL ENCOUNTER (OUTPATIENT)
Dept: NUCLEAR MEDICINE | Facility: CLINIC | Age: 48
Setting detail: NUCLEAR MEDICINE
Discharge: HOME | End: 2023-08-29
Attending: STUDENT IN AN ORGANIZED HEALTH CARE EDUCATION/TRAINING PROGRAM | Admitting: STUDENT IN AN ORGANIZED HEALTH CARE EDUCATION/TRAINING PROGRAM
Payer: COMMERCIAL

## 2023-08-29 ENCOUNTER — APPOINTMENT (OUTPATIENT)
Dept: INTERVENTIONAL RADIOLOGY/VASCULAR | Facility: CLINIC | Age: 48
End: 2023-08-29
Attending: STUDENT IN AN ORGANIZED HEALTH CARE EDUCATION/TRAINING PROGRAM
Payer: COMMERCIAL

## 2023-08-29 ENCOUNTER — HOSPITAL ENCOUNTER (OUTPATIENT)
Facility: CLINIC | Age: 48
Discharge: HOME | End: 2023-08-29
Attending: STUDENT IN AN ORGANIZED HEALTH CARE EDUCATION/TRAINING PROGRAM | Admitting: STUDENT IN AN ORGANIZED HEALTH CARE EDUCATION/TRAINING PROGRAM
Payer: COMMERCIAL

## 2023-08-29 VITALS
BODY MASS INDEX: 27.9 KG/M2 | HEIGHT: 71 IN | SYSTOLIC BLOOD PRESSURE: 128 MMHG | DIASTOLIC BLOOD PRESSURE: 91 MMHG | WEIGHT: 199.3 LBS | HEART RATE: 75 BPM | TEMPERATURE: 97.7 F | OXYGEN SATURATION: 98 % | RESPIRATION RATE: 20 BRPM

## 2023-08-29 DIAGNOSIS — C78.7 COLON CANCER METASTASIZED TO LIVER (H): ICD-10-CM

## 2023-08-29 DIAGNOSIS — C18.9 PRIMARY ADENOCARCINOMA OF COLON (H): ICD-10-CM

## 2023-08-29 DIAGNOSIS — C18.9 COLON CANCER METASTASIZED TO LIVER (H): ICD-10-CM

## 2023-08-29 LAB
ALBUMIN SERPL BCG-MCNC: 4.1 G/DL (ref 3.5–5.2)
ALP SERPL-CCNC: 86 U/L (ref 40–129)
ALT SERPL W P-5'-P-CCNC: 13 U/L (ref 0–70)
ANION GAP SERPL CALCULATED.3IONS-SCNC: 9 MMOL/L (ref 7–15)
AST SERPL W P-5'-P-CCNC: 21 U/L (ref 0–45)
ATRIAL RATE - MUSE: 63 BPM
BILIRUB DIRECT SERPL-MCNC: <0.2 MG/DL (ref 0–0.3)
BILIRUB SERPL-MCNC: 0.3 MG/DL
BUN SERPL-MCNC: 14.5 MG/DL (ref 6–20)
CALCIUM SERPL-MCNC: 8.9 MG/DL (ref 8.6–10)
CHLORIDE SERPL-SCNC: 103 MMOL/L (ref 98–107)
CREAT SERPL-MCNC: 0.8 MG/DL (ref 0.67–1.17)
DEPRECATED HCO3 PLAS-SCNC: 25 MMOL/L (ref 22–29)
DIASTOLIC BLOOD PRESSURE - MUSE: NORMAL MMHG
ERYTHROCYTE [DISTWIDTH] IN BLOOD BY AUTOMATED COUNT: 19.2 % (ref 10–15)
GFR SERPL CREATININE-BSD FRML MDRD: >90 ML/MIN/1.73M2
GLUCOSE SERPL-MCNC: 110 MG/DL (ref 70–99)
HCT VFR BLD AUTO: 40.1 % (ref 40–53)
HGB BLD-MCNC: 12.9 G/DL (ref 13.3–17.7)
INR PPP: 0.92 (ref 0.85–1.15)
INTERPRETATION ECG - MUSE: NORMAL
MCH RBC QN AUTO: 29.5 PG (ref 26.5–33)
MCHC RBC AUTO-ENTMCNC: 32.2 G/DL (ref 31.5–36.5)
MCV RBC AUTO: 92 FL (ref 78–100)
P AXIS - MUSE: -8 DEGREES
PLATELET # BLD AUTO: 213 10E3/UL (ref 150–450)
POTASSIUM SERPL-SCNC: 4.4 MMOL/L (ref 3.4–5.3)
PR INTERVAL - MUSE: 144 MS
PROT SERPL-MCNC: 7.1 G/DL (ref 6.4–8.3)
QRS DURATION - MUSE: 86 MS
QT - MUSE: 394 MS
QTC - MUSE: 403 MS
R AXIS - MUSE: 43 DEGREES
RBC # BLD AUTO: 4.38 10E6/UL (ref 4.4–5.9)
SODIUM SERPL-SCNC: 137 MMOL/L (ref 136–145)
SYSTOLIC BLOOD PRESSURE - MUSE: NORMAL MMHG
T AXIS - MUSE: 50 DEGREES
VENTRICULAR RATE- MUSE: 63 BPM
WBC # BLD AUTO: 8.2 10E3/UL (ref 4–11)

## 2023-08-29 PROCEDURE — 343N000001 HC RX 343: Performed by: STUDENT IN AN ORGANIZED HEALTH CARE EDUCATION/TRAINING PROGRAM

## 2023-08-29 PROCEDURE — 75774 ARTERY X-RAY EACH VESSEL: CPT | Mod: 26 | Performed by: STUDENT IN AN ORGANIZED HEALTH CARE EDUCATION/TRAINING PROGRAM

## 2023-08-29 PROCEDURE — C1769 GUIDE WIRE: HCPCS

## 2023-08-29 PROCEDURE — 255N000002 HC RX 255 OP 636: Performed by: STUDENT IN AN ORGANIZED HEALTH CARE EDUCATION/TRAINING PROGRAM

## 2023-08-29 PROCEDURE — A9540 TC99M MAA: HCPCS | Performed by: STUDENT IN AN ORGANIZED HEALTH CARE EDUCATION/TRAINING PROGRAM

## 2023-08-29 PROCEDURE — 78830 RP LOCLZJ TUM SPECT W/CT 1: CPT | Mod: 26 | Performed by: RADIOLOGY

## 2023-08-29 PROCEDURE — 77300 RADIATION THERAPY DOSE PLAN: CPT | Mod: 26 | Performed by: STUDENT IN AN ORGANIZED HEALTH CARE EDUCATION/TRAINING PROGRAM

## 2023-08-29 PROCEDURE — 77263 THER RADIOLOGY TX PLNG CPLX: CPT | Mod: GC | Performed by: STUDENT IN AN ORGANIZED HEALTH CARE EDUCATION/TRAINING PROGRAM

## 2023-08-29 PROCEDURE — 78830 RP LOCLZJ TUM SPECT W/CT 1: CPT

## 2023-08-29 PROCEDURE — 999N000054 HC STATISTIC EKG NON-CHARGEABLE

## 2023-08-29 PROCEDURE — C1760 CLOSURE DEV, VASC: HCPCS

## 2023-08-29 PROCEDURE — 250N000011 HC RX IP 250 OP 636: Mod: JZ | Performed by: NURSE PRACTITIONER

## 2023-08-29 PROCEDURE — 75726 ARTERY X-RAYS ABDOMEN: CPT | Mod: 26 | Performed by: STUDENT IN AN ORGANIZED HEALTH CARE EDUCATION/TRAINING PROGRAM

## 2023-08-29 PROCEDURE — 250N000011 HC RX IP 250 OP 636: Performed by: STUDENT IN AN ORGANIZED HEALTH CARE EDUCATION/TRAINING PROGRAM

## 2023-08-29 PROCEDURE — 76937 US GUIDE VASCULAR ACCESS: CPT | Mod: 26 | Performed by: STUDENT IN AN ORGANIZED HEALTH CARE EDUCATION/TRAINING PROGRAM

## 2023-08-29 PROCEDURE — C1887 CATHETER, GUIDING: HCPCS

## 2023-08-29 PROCEDURE — 76377 3D RENDER W/INTRP POSTPROCES: CPT | Mod: 26 | Performed by: STUDENT IN AN ORGANIZED HEALTH CARE EDUCATION/TRAINING PROGRAM

## 2023-08-29 PROCEDURE — 272N000566 HC SHEATH CR3

## 2023-08-29 PROCEDURE — 85027 COMPLETE CBC AUTOMATED: CPT | Performed by: NURSE PRACTITIONER

## 2023-08-29 PROCEDURE — 99152 MOD SED SAME PHYS/QHP 5/>YRS: CPT

## 2023-08-29 PROCEDURE — 76937 US GUIDE VASCULAR ACCESS: CPT

## 2023-08-29 PROCEDURE — 258N000003 HC RX IP 258 OP 636: Performed by: NURSE PRACTITIONER

## 2023-08-29 PROCEDURE — 36247 INS CATH ABD/L-EXT ART 3RD: CPT

## 2023-08-29 PROCEDURE — 36247 INS CATH ABD/L-EXT ART 3RD: CPT | Mod: GC | Performed by: STUDENT IN AN ORGANIZED HEALTH CARE EDUCATION/TRAINING PROGRAM

## 2023-08-29 PROCEDURE — 74175 CTA ABDOMEN W/CONTRAST: CPT

## 2023-08-29 PROCEDURE — 85610 PROTHROMBIN TIME: CPT | Performed by: NURSE PRACTITIONER

## 2023-08-29 PROCEDURE — 36248 INS CATH ABD/L-EXT ART ADDL: CPT | Mod: GC | Performed by: STUDENT IN AN ORGANIZED HEALTH CARE EDUCATION/TRAINING PROGRAM

## 2023-08-29 PROCEDURE — 272N000504 HC NEEDLE CR4

## 2023-08-29 PROCEDURE — 250N000009 HC RX 250: Performed by: STUDENT IN AN ORGANIZED HEALTH CARE EDUCATION/TRAINING PROGRAM

## 2023-08-29 PROCEDURE — 999N000134 HC STATISTIC PP CARE STAGE 3

## 2023-08-29 PROCEDURE — 82248 BILIRUBIN DIRECT: CPT | Performed by: NURSE PRACTITIONER

## 2023-08-29 PROCEDURE — 36591 DRAW BLOOD OFF VENOUS DEVICE: CPT | Performed by: NURSE PRACTITIONER

## 2023-08-29 PROCEDURE — 93005 ELECTROCARDIOGRAM TRACING: CPT

## 2023-08-29 PROCEDURE — 272N000143 HC KIT CR3

## 2023-08-29 PROCEDURE — 999N000142 HC STATISTIC PROCEDURE PREP ONLY

## 2023-08-29 RX ORDER — HEPARIN SODIUM 200 [USP'U]/100ML
1 INJECTION, SOLUTION INTRAVENOUS CONTINUOUS PRN
Status: DISCONTINUED | OUTPATIENT
Start: 2023-08-29 | End: 2023-08-29 | Stop reason: HOSPADM

## 2023-08-29 RX ORDER — NALOXONE HYDROCHLORIDE 0.4 MG/ML
0.2 INJECTION, SOLUTION INTRAMUSCULAR; INTRAVENOUS; SUBCUTANEOUS
Status: DISCONTINUED | OUTPATIENT
Start: 2023-08-29 | End: 2023-08-29 | Stop reason: HOSPADM

## 2023-08-29 RX ORDER — LIDOCAINE 40 MG/G
CREAM TOPICAL
Status: DISCONTINUED | OUTPATIENT
Start: 2023-08-29 | End: 2023-08-29 | Stop reason: HOSPADM

## 2023-08-29 RX ORDER — ACETAMINOPHEN 325 MG/1
650 TABLET ORAL
Status: DISCONTINUED | OUTPATIENT
Start: 2023-08-29 | End: 2023-08-29 | Stop reason: HOSPADM

## 2023-08-29 RX ORDER — FENTANYL CITRATE 50 UG/ML
25-50 INJECTION, SOLUTION INTRAMUSCULAR; INTRAVENOUS EVERY 5 MIN PRN
Status: DISCONTINUED | OUTPATIENT
Start: 2023-08-29 | End: 2023-08-29 | Stop reason: HOSPADM

## 2023-08-29 RX ORDER — NALOXONE HYDROCHLORIDE 0.4 MG/ML
0.4 INJECTION, SOLUTION INTRAMUSCULAR; INTRAVENOUS; SUBCUTANEOUS
Status: DISCONTINUED | OUTPATIENT
Start: 2023-08-29 | End: 2023-08-29 | Stop reason: HOSPADM

## 2023-08-29 RX ORDER — FLUMAZENIL 0.1 MG/ML
0.2 INJECTION, SOLUTION INTRAVENOUS
Status: DISCONTINUED | OUTPATIENT
Start: 2023-08-29 | End: 2023-08-29 | Stop reason: HOSPADM

## 2023-08-29 RX ORDER — IODIXANOL 320 MG/ML
100 INJECTION, SOLUTION INTRAVASCULAR ONCE
Status: COMPLETED | OUTPATIENT
Start: 2023-08-29 | End: 2023-08-29

## 2023-08-29 RX ORDER — HEPARIN SODIUM (PORCINE) LOCK FLUSH IV SOLN 100 UNIT/ML 100 UNIT/ML
500 SOLUTION INTRAVENOUS ONCE
Status: COMPLETED | OUTPATIENT
Start: 2023-08-29 | End: 2023-08-29

## 2023-08-29 RX ORDER — ONDANSETRON 2 MG/ML
4 INJECTION INTRAMUSCULAR; INTRAVENOUS
Status: DISCONTINUED | OUTPATIENT
Start: 2023-08-29 | End: 2023-08-29 | Stop reason: HOSPADM

## 2023-08-29 RX ORDER — LIDOCAINE HYDROCHLORIDE 10 MG/ML
1-30 INJECTION, SOLUTION EPIDURAL; INFILTRATION; INTRACAUDAL; PERINEURAL
Status: COMPLETED | OUTPATIENT
Start: 2023-08-29 | End: 2023-08-29

## 2023-08-29 RX ORDER — SODIUM CHLORIDE 9 MG/ML
INJECTION, SOLUTION INTRAVENOUS CONTINUOUS
Status: DISCONTINUED | OUTPATIENT
Start: 2023-08-29 | End: 2023-08-29 | Stop reason: HOSPADM

## 2023-08-29 RX ADMIN — MIDAZOLAM 0.5 MG: 1 INJECTION INTRAMUSCULAR; INTRAVENOUS at 11:52

## 2023-08-29 RX ADMIN — KIT FOR THE PREPARATION OF TECHNETIUM TC 99M ALBUMIN AGGREGATED 4.1 MILLICURIE: 2.5 INJECTION, POWDER, FOR SOLUTION INTRAVENOUS at 10:08

## 2023-08-29 RX ADMIN — SODIUM CHLORIDE, PRESERVATIVE FREE 500 UNITS: 5 INJECTION INTRAVENOUS at 17:54

## 2023-08-29 RX ADMIN — FENTANYL CITRATE 25 MCG: 50 INJECTION, SOLUTION INTRAMUSCULAR; INTRAVENOUS at 10:28

## 2023-08-29 RX ADMIN — FENTANYL CITRATE 25 MCG: 50 INJECTION, SOLUTION INTRAMUSCULAR; INTRAVENOUS at 11:52

## 2023-08-29 RX ADMIN — FENTANYL CITRATE 25 MCG: 50 INJECTION, SOLUTION INTRAMUSCULAR; INTRAVENOUS at 09:51

## 2023-08-29 RX ADMIN — SODIUM CHLORIDE: 9 INJECTION, SOLUTION INTRAVENOUS at 08:16

## 2023-08-29 RX ADMIN — FENTANYL CITRATE 25 MCG: 50 INJECTION, SOLUTION INTRAMUSCULAR; INTRAVENOUS at 09:46

## 2023-08-29 RX ADMIN — MIDAZOLAM 0.5 MG: 1 INJECTION INTRAMUSCULAR; INTRAVENOUS at 10:29

## 2023-08-29 RX ADMIN — HEPARIN SODIUM 2 BAG: 200 INJECTION, SOLUTION INTRAVENOUS at 09:38

## 2023-08-29 RX ADMIN — MIDAZOLAM 0.5 MG: 1 INJECTION INTRAMUSCULAR; INTRAVENOUS at 09:51

## 2023-08-29 RX ADMIN — FENTANYL CITRATE 25 MCG: 50 INJECTION, SOLUTION INTRAMUSCULAR; INTRAVENOUS at 10:51

## 2023-08-29 RX ADMIN — FENTANYL CITRATE 50 MCG: 50 INJECTION, SOLUTION INTRAMUSCULAR; INTRAVENOUS at 12:20

## 2023-08-29 RX ADMIN — MIDAZOLAM 0.5 MG: 1 INJECTION INTRAMUSCULAR; INTRAVENOUS at 09:46

## 2023-08-29 RX ADMIN — LIDOCAINE HYDROCHLORIDE 6 ML: 10 INJECTION, SOLUTION EPIDURAL; INFILTRATION; INTRACAUDAL; PERINEURAL at 09:51

## 2023-08-29 RX ADMIN — MIDAZOLAM 0.5 MG: 1 INJECTION INTRAMUSCULAR; INTRAVENOUS at 10:51

## 2023-08-29 RX ADMIN — IODIXANOL 250 ML: 320 INJECTION, SOLUTION INTRAVASCULAR at 12:37

## 2023-08-29 ASSESSMENT — ACTIVITIES OF DAILY LIVING (ADL)
ADLS_ACUITY_SCORE: 35

## 2023-08-29 NOTE — DISCHARGE INSTRUCTIONS
[facility]   Interventional Radiology  Discharge Instructions Post Angiography for Y90 Mapping  AFTER YOU GO HOME          Relax and take it easy for 24 hours.       Drink plenty of fluids.       Resume your regular diet, unless otherwise instructed by your Primary Physician.       DO NOT smoke for at least 24 hours, if you were given any sedation.       DO NOT drink alcoholic beverages the day of your procedure.       DO NOT drive or operate machinery at home or at work for 24 hours.          DO NOT make any important or legal decisions for 24 hours following your procedure.       DO NOT take a shower for at least 12 hours following your procedure. No tub bath, hot tubs, or swimming for 5 days        Care of groin site  It is normal to have a small bruise or lump at the site.  For the first 2 days, when you cough, sneeze or move your bowels, hold your hand over the puncture site and press gently.  Do NOT lift more than 10 pounds or do any strenuous exercise for at least 3 to 5 days.  Do not use lotion or powder near the puncture site for 3 days.  If you start bleeding from the site in your groin: lie down flat and press firmly on the site. Call your doctor as soon as you can.   Call 911 right away if you have: Heavy bleeding, bleeding that does not stop.    CALL THE PHYSICIAN IF:       - You start bleeding from the procedure site. A small lump or bruise is common at the puncture site. Your physician will tell you if you need to return to the hospital.      - You develop numbness, coolness or a change in color of the leg that was punctured.      - You experience increased pain or redness at the puncture site.      - You develop hives or a rash or unexplained itching.      - You develop a temperature of 101 degrees F or greater.    Additional Information:         Follow the Discharge Instructions for the Liver Brachytherapy; Contrast Instructions and Instructions for the [employer]arm[employer].      Closure Device:   Mynx            [facility] INTERVENTIONAL RADIOLOGY DEPARTMENT         Procedure Physician:  Dr Villalta                            Date of Procedure:  August 29, 2023       Telephone Numbers:   [phone]      Monday-Friday 7:30 am to 4:00 pm                                        [phone]     After 4:00 pm Monday-Friday, Weekend and Holidays.                                    Ask for the Interventional Radiologist on call. Someone is on call 24 hrs/day.

## 2023-08-29 NOTE — PROGRESS NOTES
Discharge criteria met. Reviewed discharge instructions with pt. Pt able to ambulated without difficulty. Reviewed discharge instructions with pt. PORT hep locked and de accessed. Pushed to front door in wheelchair, ride waiting at front.

## 2023-08-29 NOTE — PROGRESS NOTES
Prep and teaching complete for Y90 Mapping; pt awake and alert, denies pain. Has ride post procedure. Groin prep completed. Awaiting consent and lab results.

## 2023-08-29 NOTE — IR NOTE
Patient Name: Roman Escalante  Medical Record Number: [id number]  Today's Date: 8/29/2023    Procedure: Visceral angiogram, Y-90 mapping  Proceduralist: Dr. Jones, Dr. Behzadi    Procedure Start: 0946  Procedure end: 1230  Sedation medications administered: 175 mcg fentanyl, 2.5 mg versed     Report given to:  RN 2A  : JIM    Other Notes: Pt arrived to IR room 1 from . Consent reviewed. Pt denies any questions or concerns regarding procedure. Pt positioned supine and monitored per protocol. Pt tolerated procedure without any noted complications. Pt transferred back to .    Mynx  closure device deployed to right groin at 1226. Flat bedrest x 6 hours until 1826.

## 2023-08-29 NOTE — PROCEDURES
[facility]    Procedure: IR Procedure Note    Date/Time: 8/29/2023 1:02 PM    Performed by: Behzadi, Heshmatzadeh, MD  Authorized by: Fab Villalta MD  IR Fellow Physician: Ashkan behzadi      UNIVERSAL PROTOCOL   Site Marked: NA  Prior Images Obtained and Reviewed:  Yes  Required items: Required blood products, implants, devices and special equipment available    Patient identity confirmed:  Verbally with patient, arm band, provided demographic data and hospital-assigned identification number  Patient was reevaluated immediately before administering moderate or deep sedation or anesthesia  Confirmation Checklist:  Patient's identity using two indicators, relevant allergies, procedure was appropriate and matched the consent or emergent situation and correct equipment/implants were available  Time out: Immediately prior to the procedure a time out was called    Universal Protocol: the Joint Commission Universal Protocol was followed    Preparation: Patient was prepped and draped in usual sterile fashion       ANESTHESIA    Anesthesia:  Local infiltration  Local Anesthetic:  Lidocaine 1% without epinephrine      SEDATION  Patient Sedated: Yes    Sedation:  Fentanyl and midazolam  Vital signs: Vital signs monitored during sedation    See dictated procedure note for full details.  Findings: 1. Ultrasound guidance for vascular access.  2. Selective catheterization of the celiac axis, common hepatic, proper hepatic, right hepatic and left hepatic arteries.   3. Angiography of the above vessels.  4. MAA Mapping of the left  and right hepatic artery.   5. Mynx  closure device to the right common femoral  artery.      Specimens: none    Complications: None    Condition: Stable    Plan: Bed rest for 6 hours.       PROCEDURE    Patient Tolerance:  Patient tolerated the procedure well with no immediate complications  Length of time physician/provider present for 1:1  monitoring during sedation: 210

## 2023-08-29 NOTE — PRE-PROCEDURE
GENERAL PRE-PROCEDURE:   Procedure:  Y-90 mapping/angiogram  Date/Time:  8/29/2023 9:11 AM    Risks and benefits: Risks, benefits and alternatives were discussed    Consent given by:  Patient  Patient states understanding of procedure being performed: Yes    Patient's understanding of procedure matches consent: Yes    Procedure consent matches procedure scheduled: Yes    Appropriately NPO:  Yes  Mallampati  :  Grade 1- soft palate, uvula, tonsillar pillars, and posterior pharyngeal wall visible  Lungs:  Lungs clear with good breath sounds bilaterally  Heart:  Normal heart sounds and rate  History & Physical reviewed:  History and physical reviewed and no updates needed  Statement of review:  I have reviewed the lab findings, diagnostic data, medications, and the plan for sedation

## 2023-08-30 ENCOUNTER — VIRTUAL VISIT (OUTPATIENT)
Dept: ONCOLOGY | Facility: CLINIC | Age: 48
End: 2023-08-30
Payer: COMMERCIAL

## 2023-08-30 ENCOUNTER — MYC MEDICAL ADVICE (OUTPATIENT)
Dept: FAMILY MEDICINE | Facility: CLINIC | Age: 48
End: 2023-08-30

## 2023-08-30 DIAGNOSIS — C78.00 RECTAL ADENOCARCINOMA METASTATIC TO LUNG (H): Primary | ICD-10-CM

## 2023-08-30 DIAGNOSIS — T45.1X5A CHEMOTHERAPY-INDUCED NEUTROPENIA (H): ICD-10-CM

## 2023-08-30 DIAGNOSIS — D70.1 CHEMOTHERAPY-INDUCED NEUTROPENIA (H): ICD-10-CM

## 2023-08-30 DIAGNOSIS — R11.0 NAUSEA: ICD-10-CM

## 2023-08-30 DIAGNOSIS — C20 RECTAL ADENOCARCINOMA METASTATIC TO LUNG (H): Primary | ICD-10-CM

## 2023-08-30 PROCEDURE — 99215 OFFICE O/P EST HI 40 MIN: CPT | Mod: 95

## 2023-08-30 RX ORDER — DIPHENHYDRAMINE HYDROCHLORIDE 50 MG/ML
50 INJECTION INTRAMUSCULAR; INTRAVENOUS
Status: CANCELLED
Start: 2023-08-30

## 2023-08-30 RX ORDER — METHYLPREDNISOLONE SODIUM SUCCINATE 125 MG/2ML
125 INJECTION, POWDER, LYOPHILIZED, FOR SOLUTION INTRAMUSCULAR; INTRAVENOUS
Status: CANCELLED
Start: 2023-08-30

## 2023-08-30 RX ORDER — ALBUTEROL SULFATE 0.83 MG/ML
2.5 SOLUTION RESPIRATORY (INHALATION)
Status: CANCELLED | OUTPATIENT
Start: 2023-08-30

## 2023-08-30 RX ORDER — LORAZEPAM 2 MG/ML
0.5 INJECTION INTRAMUSCULAR EVERY 4 HOURS PRN
Status: CANCELLED | OUTPATIENT
Start: 2023-08-30

## 2023-08-30 RX ORDER — EPINEPHRINE 1 MG/ML
0.3 INJECTION, SOLUTION INTRAMUSCULAR; SUBCUTANEOUS EVERY 5 MIN PRN
Status: CANCELLED | OUTPATIENT
Start: 2023-08-30

## 2023-08-30 RX ORDER — HEPARIN SODIUM,PORCINE 10 UNIT/ML
5 VIAL (ML) INTRAVENOUS
Status: CANCELLED | OUTPATIENT
Start: 2023-08-30

## 2023-08-30 RX ORDER — ALBUTEROL SULFATE 90 UG/1
1-2 AEROSOL, METERED RESPIRATORY (INHALATION)
Status: CANCELLED
Start: 2023-08-30

## 2023-08-30 RX ORDER — HEPARIN SODIUM (PORCINE) LOCK FLUSH IV SOLN 100 UNIT/ML 100 UNIT/ML
5 SOLUTION INTRAVENOUS
Status: CANCELLED | OUTPATIENT
Start: 2023-08-30

## 2023-08-30 RX ORDER — ONDANSETRON 2 MG/ML
8 INJECTION INTRAMUSCULAR; INTRAVENOUS ONCE
Status: CANCELLED | OUTPATIENT
Start: 2023-08-30

## 2023-08-30 RX ORDER — FLUOROURACIL 50 MG/ML
400 INJECTION, SOLUTION INTRAVENOUS ONCE
Status: CANCELLED | OUTPATIENT
Start: 2023-08-30

## 2023-08-30 RX ORDER — HEPARIN SODIUM (PORCINE) LOCK FLUSH IV SOLN 100 UNIT/ML 100 UNIT/ML
5 SOLUTION INTRAVENOUS
Status: CANCELLED | OUTPATIENT
Start: 2023-09-01

## 2023-08-30 RX ORDER — MEPERIDINE HYDROCHLORIDE 25 MG/ML
25 INJECTION INTRAMUSCULAR; INTRAVENOUS; SUBCUTANEOUS EVERY 30 MIN PRN
Status: CANCELLED | OUTPATIENT
Start: 2023-08-30

## 2023-08-30 RX ORDER — HEPARIN SODIUM,PORCINE 10 UNIT/ML
5 VIAL (ML) INTRAVENOUS
Status: CANCELLED | OUTPATIENT
Start: 2023-09-01

## 2023-08-30 ASSESSMENT — PAIN SCALES - GENERAL: PAINLEVEL: NO PAIN (0)

## 2023-08-30 NOTE — NURSING NOTE
Patient denies any changes since echeck-in regarding medication and allergies and states all information entered during echeck-in remains accurate.    Is the patient currently in the state of [place]? YES    Visit mode:VIDEO    If the visit is dropped, the patient can be reconnected by: VIDEO VISIT: Text to cell phone:   Telephone Information:   Mobile [phone]       Will anyone else be joining the visit? NO  (If patient encounters technical issues they should call [phone]    How would you like to obtain your AVS? MyChart    Are changes needed to the allergy or medication list? No, Pt stated no changes to allergies, and Pt stated no med changes    Reason for visit: Follow Up (2 week follow up, pt states he had a catheter put in for radiation next week. Pt stated he has some fungal infection he needs to start medication for. Medications/allergies reviewed by pt via Mychart, no changes per pt. No pt reported vitals today per pt. )    El Sabillon, Visit Facilitator/MA.

## 2023-08-30 NOTE — PROGRESS NOTES
Virtual Visit Details    Type of service:  Video Visit   Video Start Time: 1350  Video End Time:1407     Originating Location (pt. Location): Home   Distant Location (provider location):  On-site   Platform used for Video Visit: Negra, patient had technical issues within [facility] - Medical Oncology Follow Up Note  08/29/2023    HPI:   Patient developed rectal bleeding in 2020.  In January 2021 CT showed focal wall thickening in the upper rectum, multiple pulmonary nodules bilaterally suspicious for metastatic disease.  Underwent FNA of the right upper lobe lesion which was consistent with adenocarcinoma of the colon.  He was treated with FOLFOX from 2/20/2021 through 5/20/2021.  Avastin was added.  Had an infusion reaction to oxaliplatin 6/2021.  7/2021- 12/2021 was on 5-FU alone.  Developed progression.  12/2021- 9/2022 was on FOLFIRI.  11/2021 started Lonsurf. All of this was done with outside oncologist.     Met with Dr. Snow on 2/3/2022 to establish care. Recommended regorafenib.     There have been questions of a parasitic infection. Please see previous notes from [facility] oncologist for further details. Was seen by ID at the  on 2/27, no concerns for parasitic infection    Started regorafenib on 3/2/2023. Progression noted 5/19/23. Plan to start FOLFOX/Avastin and send out CARIS testing to evaluate for other treatment options. Cycle 1 was given with oxaliplatin desensitization, 5FU, Avastin held due to increased urine protein.     Following cycle 4, patient was admitted with hematuria and acute kidney injury.    Oxaliplatin was dropped with Cycle 5.     Completed Y-90 radioembolization mapping 8/29.     Interval History:     Feeling well. No issues following his last chemo. Has mild neuropathy in his feet, this is unchanged and not bothersome. Denies fatigue, remains active. No fever, chills, or concerns for infection. No bleeding concerns. Has not had any hematuria since  hospital admission in July. Denies shortness of breath, cough, chest pain, or swelling. No rashes or skin concerns.     Appetite is good, denies nausea or vomiting. He has constipation while chemo is infusing, but resolves after without intervention.     Had mapping completed yesterday for Y-90. Site is sore but denies any other symptoms or concerns.       Past Medical History:   Diagnosis Date    Cancer (H) 1/12/21    Colorectal cacer mast, to lungs, and liver        Allergies   Allergen Reactions    Oxaliplatin Shortness Of Breath, Nausea and Vomiting and Other (See Comments)     Patient had HSR to Oxaliplatin on cycle 8 of FOLFOX chemotherapy. Sent to ER for continued monitoring.  Patient had HSR to Oxaliplatin on cycle 8 of FOLFOX chemotherapy. Sent to ER for continued monitoring.      Blood-Group Specific Substance Other (See Comments)     Patient has reactivity Suggestive of a Warm auto antibody. Blood products may be delayed. Draw patient 24 hours prior to transfusion. Draw one red top and two purple top tubes for all type and screen orders.  Patient has reactivity Suggestive of a Warm auto antibody. Blood products may be delayed. Draw patient 24 hours prior to transfusion. Draw one red top and two purple top tubes for all type and screen orders.       PHYSICAL EXAM   There were no vitals taken for this visit.    Wt Readings from Last 4 Encounters:   08/29/23 90.4 kg (199 lb 4.7 oz)   08/22/23 88.4 kg (194 lb 12.8 oz)   08/11/23 90.1 kg (198 lb 9.6 oz)   07/30/23 89.2 kg (196 lb 9.6 oz)   Video physical exam  General: Patient appears well in no acute distress.   Skin: No visualized rash or lesions on visualized skin  Eyes: EOMI, no erythema, sclera icterus or discharge noted  Resp: Appears to be breathing comfortably without accessory muscle usage, speaking in full sentences, no cough  MSK: Appears to have normal range of motion based on visualized movements  Neurologic: No apparent tremors, facial movements  symmetric  Psych: affect and mood congruent, alert and oriented      LABS  Most Recent 3 CBC's:  Recent Labs   Lab Test 08/29/23  0809 08/11/23  0701 07/30/23  0555   WBC 8.2 6.2 8.7   HGB 12.9* 11.6* 11.0*   MCV 92 88 86    285 78*    Most Recent 3 BMP's:  Recent Labs   Lab Test 08/29/23  0809 08/11/23  0701 07/30/23  0555    139 138   POTASSIUM 4.4 4.3 4.5   CHLORIDE 103 106 104   CO2 25 23 26   BUN 14.5 20.2* 17.5   CR 0.80 0.87 0.96   ANIONGAP 9 10 8   JEFERSON 8.9 9.1 8.6   * 110* 103*    Most Recent 2 LFT's:  Recent Labs   Lab Test 08/29/23  0809 08/11/23  0701   AST 21 25   ALT 13 11   ALKPHOS 86 69   BILITOTAL 0.3 0.2       Latest Reference Range & Units 07/14/23 07:52   Protein Albumin Urine Negative mg/dL Negative     I reviewed the above labs today.      ASSESSMENT AND PLAN   Metastatic rectal cancer  - Progression on regorafenib. Started FOLFOX/Avastin 6/7/23. Oxaliplatin removed with cycle 5 due to acute kidney injury and hematuria following cycle 4.    -Will proceed with cycle 6 tomorrow, pending labs are WNL.   -Patient is traveling to [place] for the weekend, would like to have his wife disconnect his chemo pump. Discussed the risks of infection and chemotherapy exposure, precautions and safe handling of chemotherapy, and meticulous hygiene/clean technique when handling the port. Patient understands risks. [facility] will complete education on disconnecting pump, flushing and deaccessing port. [facility] coordinating his Neulasta injection.   -Plan for Y-90 radioembolization, mapping completed 8/29 with procedure scheduled for 9/7. Bevacizumab on hold for Y-90.     LIZ  -Well controlled with premeds,  IV PRN Ativan dose and zofran PRN.     Rectal pain/pressure  Resolved. Constipation during chemotherapy, resolves without intervention.       40 minutes spent on the date of the encounter doing chart review, review of test results,  patient visit, and documentation     Rebeca  FLORA Killian CNP

## 2023-08-30 NOTE — LETTER
8/30/2023         RE: Roman Escalante  [address]  [place] [facility] [address]        Dear Colleague,    Thank you for referring your patient, Roman Escalante, to the [facility]. Please see a copy of my visit note below.      Virtual Visit Details    Type of service:  Video Visit   Video Start Time: 1350  Video End Time:1407     Originating Location (pt. Location): Home   Distant Location (provider location):  On-site   Platform used for Video Visit: Negra, patient had technical issues within [facility] - Medical Oncology Follow Up Note  08/29/2023    HPI:   Patient developed rectal bleeding in 2020.  In January 2021 CT showed focal wall thickening in the upper rectum, multiple pulmonary nodules bilaterally suspicious for metastatic disease.  Underwent FNA of the right upper lobe lesion which was consistent with adenocarcinoma of the colon.  He was treated with FOLFOX from 2/20/2021 through 5/20/2021.  Avastin was added.  Had an infusion reaction to oxaliplatin 6/2021.  7/2021- 12/2021 was on 5-FU alone.  Developed progression.  12/2021- 9/2022 was on FOLFIRI.  11/2021 started Lonsurf. All of this was done with outside oncologist.     Met with Dr. Snow on 2/3/2022 to establish care. Recommended regorafenib.     There have been questions of a parasitic infection. Please see previous notes from [facility] oncologist for further details. Was seen by ID at the  on 2/27, no concerns for parasitic infection    Started regorafenib on 3/2/2023. Progression noted 5/19/23. Plan to start FOLFOX/Avastin and send out CARIS testing to evaluate for other treatment options. Cycle 1 was given with oxaliplatin desensitization, 5FU, Avastin held due to increased urine protein.     Following cycle 4, patient was admitted with hematuria and acute kidney injury.    Oxaliplatin was dropped with Cycle 5.     Completed Y-90 radioembolization mapping 8/29.     Interval  History:     Feeling well. No issues following his last chemo. Has mild neuropathy in his feet, this is unchanged and not bothersome. Denies fatigue, remains active. No fever, chills, or concerns for infection. No bleeding concerns. Has not had any hematuria since hospital admission in July. Denies shortness of breath, cough, chest pain, or swelling. No rashes or skin concerns.     Appetite is good, denies nausea or vomiting. He has constipation while chemo is infusing, but resolves after without intervention.     Had mapping completed yesterday for Y-90. Site is sore but denies any other symptoms or concerns.       Past Medical History:   Diagnosis Date    Cancer (H) 1/12/21    Colorectal cacer mast, to lungs, and liver        Allergies   Allergen Reactions    Oxaliplatin Shortness Of Breath, Nausea and Vomiting and Other (See Comments)     Patient had HSR to Oxaliplatin on cycle 8 of FOLFOX chemotherapy. Sent to ER for continued monitoring.  Patient had HSR to Oxaliplatin on cycle 8 of FOLFOX chemotherapy. Sent to ER for continued monitoring.      Blood-Group Specific Substance Other (See Comments)     Patient has reactivity Suggestive of a Warm auto antibody. Blood products may be delayed. Draw patient 24 hours prior to transfusion. Draw one red top and two purple top tubes for all type and screen orders.  Patient has reactivity Suggestive of a Warm auto antibody. Blood products may be delayed. Draw patient 24 hours prior to transfusion. Draw one red top and two purple top tubes for all type and screen orders.       PHYSICAL EXAM   There were no vitals taken for this visit.    Wt Readings from Last 4 Encounters:   08/29/23 90.4 kg (199 lb 4.7 oz)   08/22/23 88.4 kg (194 lb 12.8 oz)   08/11/23 90.1 kg (198 lb 9.6 oz)   07/30/23 89.2 kg (196 lb 9.6 oz)   Video physical exam  General: Patient appears well in no acute distress.   Skin: No visualized rash or lesions on visualized skin  Eyes: EOMI, no erythema, sclera  icterus or discharge noted  Resp: Appears to be breathing comfortably without accessory muscle usage, speaking in full sentences, no cough  MSK: Appears to have normal range of motion based on visualized movements  Neurologic: No apparent tremors, facial movements symmetric  Psych: affect and mood congruent, alert and oriented      LABS  Most Recent 3 CBC's:  Recent Labs   Lab Test 08/29/23  0809 08/11/23  0701 07/30/23  0555   WBC 8.2 6.2 8.7   HGB 12.9* 11.6* 11.0*   MCV 92 88 86    285 78*    Most Recent 3 BMP's:  Recent Labs   Lab Test 08/29/23  0809 08/11/23  0701 07/30/23  0555    139 138   POTASSIUM 4.4 4.3 4.5   CHLORIDE 103 106 104   CO2 25 23 26   BUN 14.5 20.2* 17.5   CR 0.80 0.87 0.96   ANIONGAP 9 10 8   JEFERSON 8.9 9.1 8.6   * 110* 103*    Most Recent 2 LFT's:  Recent Labs   Lab Test 08/29/23  0809 08/11/23  0701   AST 21 25   ALT 13 11   ALKPHOS 86 69   BILITOTAL 0.3 0.2       Latest Reference Range & Units 07/14/23 07:52   Protein Albumin Urine Negative mg/dL Negative     I reviewed the above labs today.      ASSESSMENT AND PLAN   Metastatic rectal cancer  - Progression on regorafenib. Started FOLFOX/Avastin 6/7/23. Oxaliplatin removed with cycle 5 due to acute kidney injury and hematuria following cycle 4.    -Will proceed with cycle 6 tomorrow, pending labs are WNL.   -Patient is traveling to [place] for the weekend, would like to have his wife disconnect his chemo pump. Discussed the risks of infection and chemotherapy exposure, precautions and safe handling of chemotherapy, and meticulous hygiene/clean technique when handling the port. Patient understands risks. [facility] will complete education on disconnecting pump, flushing and deaccessing port. [facility] coordinating his Neulasta injection.   -Plan for Y-90 radioembolization, mapping completed 8/29 with procedure scheduled for 9/7. Bevacizumab on hold for Y-90.     LIZ  -Well controlled with premeds,  IV PRN Ativan  dose and zofran PRN.     Rectal pain/pressure  Resolved. Constipation during chemotherapy, resolves without intervention.       40 minutes spent on the date of the encounter doing chart review, review of test results,  patient visit, and documentation     FLORA Albright CNP

## 2023-08-31 ENCOUNTER — APPOINTMENT (OUTPATIENT)
Dept: LAB | Facility: CLINIC | Age: 48
End: 2023-08-31
Attending: STUDENT IN AN ORGANIZED HEALTH CARE EDUCATION/TRAINING PROGRAM
Payer: COMMERCIAL

## 2023-08-31 ENCOUNTER — TELEPHONE (OUTPATIENT)
Dept: VASCULAR SURGERY | Facility: CLINIC | Age: 48
End: 2023-08-31

## 2023-08-31 ENCOUNTER — INFUSION THERAPY VISIT (OUTPATIENT)
Dept: ONCOLOGY | Facility: CLINIC | Age: 48
End: 2023-08-31
Attending: STUDENT IN AN ORGANIZED HEALTH CARE EDUCATION/TRAINING PROGRAM
Payer: COMMERCIAL

## 2023-08-31 VITALS
WEIGHT: 198.9 LBS | DIASTOLIC BLOOD PRESSURE: 82 MMHG | TEMPERATURE: 97.9 F | RESPIRATION RATE: 16 BRPM | SYSTOLIC BLOOD PRESSURE: 124 MMHG | HEART RATE: 78 BPM | BODY MASS INDEX: 27.74 KG/M2 | OXYGEN SATURATION: 99 %

## 2023-08-31 DIAGNOSIS — C78.00 RECTAL ADENOCARCINOMA METASTATIC TO LUNG (H): Primary | ICD-10-CM

## 2023-08-31 DIAGNOSIS — C20 RECTAL ADENOCARCINOMA METASTATIC TO LUNG (H): Primary | ICD-10-CM

## 2023-08-31 LAB
ALBUMIN SERPL BCG-MCNC: 4.3 G/DL (ref 3.5–5.2)
ALP SERPL-CCNC: 89 U/L (ref 40–129)
ALT SERPL W P-5'-P-CCNC: 10 U/L (ref 0–70)
ANION GAP SERPL CALCULATED.3IONS-SCNC: 11 MMOL/L (ref 7–15)
AST SERPL W P-5'-P-CCNC: 22 U/L (ref 0–45)
BASOPHILS # BLD AUTO: 0.1 10E3/UL (ref 0–0.2)
BASOPHILS NFR BLD AUTO: 1 %
BILIRUB SERPL-MCNC: 0.3 MG/DL
BUN SERPL-MCNC: 17.9 MG/DL (ref 6–20)
CALCIUM SERPL-MCNC: 9.1 MG/DL (ref 8.6–10)
CHLORIDE SERPL-SCNC: 101 MMOL/L (ref 98–107)
CREAT SERPL-MCNC: 0.8 MG/DL (ref 0.67–1.17)
DEPRECATED HCO3 PLAS-SCNC: 25 MMOL/L (ref 22–29)
EOSINOPHIL # BLD AUTO: 0.2 10E3/UL (ref 0–0.7)
EOSINOPHIL NFR BLD AUTO: 2 %
ERYTHROCYTE [DISTWIDTH] IN BLOOD BY AUTOMATED COUNT: 18.3 % (ref 10–15)
GFR SERPL CREATININE-BSD FRML MDRD: >90 ML/MIN/1.73M2
GLUCOSE SERPL-MCNC: 128 MG/DL (ref 70–99)
HCT VFR BLD AUTO: 38.1 % (ref 40–53)
HGB BLD-MCNC: 12.7 G/DL (ref 13.3–17.7)
IMM GRANULOCYTES # BLD: 0 10E3/UL
IMM GRANULOCYTES NFR BLD: 0 %
LYMPHOCYTES # BLD AUTO: 0.7 10E3/UL (ref 0.8–5.3)
LYMPHOCYTES NFR BLD AUTO: 9 %
MCH RBC QN AUTO: 29.4 PG (ref 26.5–33)
MCHC RBC AUTO-ENTMCNC: 33.3 G/DL (ref 31.5–36.5)
MCV RBC AUTO: 88 FL (ref 78–100)
MONOCYTES # BLD AUTO: 0.8 10E3/UL (ref 0–1.3)
MONOCYTES NFR BLD AUTO: 10 %
NEUTROPHILS # BLD AUTO: 6.4 10E3/UL (ref 1.6–8.3)
NEUTROPHILS NFR BLD AUTO: 78 %
NRBC # BLD AUTO: 0 10E3/UL
NRBC BLD AUTO-RTO: 0 /100
PLATELET # BLD AUTO: 247 10E3/UL (ref 150–450)
POTASSIUM SERPL-SCNC: 4.3 MMOL/L (ref 3.4–5.3)
PROT SERPL-MCNC: 7.4 G/DL (ref 6.4–8.3)
RBC # BLD AUTO: 4.32 10E6/UL (ref 4.4–5.9)
SODIUM SERPL-SCNC: 137 MMOL/L (ref 136–145)
WBC # BLD AUTO: 8.2 10E3/UL (ref 4–11)

## 2023-08-31 PROCEDURE — 36415 COLL VENOUS BLD VENIPUNCTURE: CPT

## 2023-08-31 PROCEDURE — 80053 COMPREHEN METABOLIC PANEL: CPT

## 2023-08-31 PROCEDURE — 258N000003 HC RX IP 258 OP 636

## 2023-08-31 PROCEDURE — 250N000011 HC RX IP 250 OP 636: Mod: JZ | Performed by: PHYSICIAN ASSISTANT

## 2023-08-31 PROCEDURE — 258N000003 HC RX IP 258 OP 636: Performed by: PHYSICIAN ASSISTANT

## 2023-08-31 PROCEDURE — 85004 AUTOMATED DIFF WBC COUNT: CPT

## 2023-08-31 PROCEDURE — 96409 CHEMO IV PUSH SNGL DRUG: CPT

## 2023-08-31 PROCEDURE — 96367 TX/PROPH/DG ADDL SEQ IV INF: CPT

## 2023-08-31 PROCEDURE — 250N000011 HC RX IP 250 OP 636: Mod: JZ

## 2023-08-31 PROCEDURE — 96375 TX/PRO/DX INJ NEW DRUG ADDON: CPT

## 2023-08-31 PROCEDURE — G0498 CHEMO EXTEND IV INFUS W/PUMP: HCPCS

## 2023-08-31 PROCEDURE — 250N000011 HC RX IP 250 OP 636: Mod: JZ | Performed by: STUDENT IN AN ORGANIZED HEALTH CARE EDUCATION/TRAINING PROGRAM

## 2023-08-31 RX ORDER — ONDANSETRON 2 MG/ML
8 INJECTION INTRAMUSCULAR; INTRAVENOUS ONCE
Status: COMPLETED | OUTPATIENT
Start: 2023-08-31 | End: 2023-08-31

## 2023-08-31 RX ORDER — HEPARIN SODIUM (PORCINE) LOCK FLUSH IV SOLN 100 UNIT/ML 100 UNIT/ML
5 SOLUTION INTRAVENOUS ONCE
Status: COMPLETED | OUTPATIENT
Start: 2023-08-31 | End: 2023-08-31

## 2023-08-31 RX ORDER — FLUOROURACIL 50 MG/ML
400 INJECTION, SOLUTION INTRAVENOUS ONCE
Status: COMPLETED | OUTPATIENT
Start: 2023-08-31 | End: 2023-08-31

## 2023-08-31 RX ADMIN — LEUCOVORIN CALCIUM 750 MG: 200 INJECTION, POWDER, LYOPHILIZED, FOR SOLUTION INTRAMUSCULAR; INTRAVENOUS at 15:23

## 2023-08-31 RX ADMIN — SODIUM CHLORIDE 250 ML: 9 INJECTION, SOLUTION INTRAVENOUS at 14:44

## 2023-08-31 RX ADMIN — Medication 5 ML: at 13:48

## 2023-08-31 RX ADMIN — FAMOTIDINE 20 MG: 10 INJECTION, SOLUTION INTRAVENOUS at 14:46

## 2023-08-31 RX ADMIN — ONDANSETRON 8 MG: 2 INJECTION INTRAMUSCULAR; INTRAVENOUS at 14:49

## 2023-08-31 RX ADMIN — DEXAMETHASONE SODIUM PHOSPHATE: 10 INJECTION, SOLUTION INTRAMUSCULAR; INTRAVENOUS at 14:55

## 2023-08-31 RX ADMIN — FLUOROURACIL 830 MG: 50 INJECTION, SOLUTION INTRAVENOUS at 16:15

## 2023-08-31 ASSESSMENT — PAIN SCALES - GENERAL: PAINLEVEL: NO PAIN (0)

## 2023-08-31 NOTE — NURSING NOTE
Chief Complaint   Patient presents with    Blood Draw     Port blood draw with heparin flush by lab RN. Vitals taken and appointment arrived     Radha Wagner RN

## 2023-08-31 NOTE — PATIENT INSTRUCTIONS
[facility] Triage and after hours / weekends / holidays:  [phone]    Please call the triage or after hours line if you experience a temperature greater than or equal to 100.4, shaking chills, have uncontrolled nausea, vomiting and/or diarrhea, dizziness, shortness of breath, chest pain, bleeding, unexplained bruising, or if you have any other new/concerning symptoms, questions or concerns.      If you are having any concerning symptoms or wish to speak to a provider before your next infusion visit, please call triage to notify them so we can adequately serve you.     If you need a refill on a narcotic prescription or other medication, please call before your infusion appointment.

## 2023-08-31 NOTE — PROGRESS NOTES
Infusion Nursing Note:  Roman Escalante presents today for Cycle 6 Day 1 Leucovorin, Fluorouracil bolus and pump connect.    Patient seen by provider today: No; seen by Rebeca Killian CNP yesterday.   present during visit today: Not Applicable.    Note: Patient denies any signs or symptoms of infection. Patient denies any changes or concerns since his visit with Rebeca Killian CNP yesterday.    Intravenous Access:  Implanted Port.    Treatment Conditions:  Lab Results   Component Value Date    HGB 12.7 (L) 08/31/2023    WBC 8.2 08/31/2023    ANEU 45.1 (H) 07/28/2023    ANEUTAUTO 6.4 08/31/2023     08/31/2023        Lab Results   Component Value Date     08/31/2023    POTASSIUM 4.3 08/31/2023    MAG 1.8 07/30/2023    CR 0.80 08/31/2023    JEFERSON 9.1 08/31/2023    BILITOTAL 0.3 08/31/2023    ALBUMIN 4.3 08/31/2023    ALT 10 08/31/2023    AST 22 08/31/2023     Results reviewed, labs MET treatment parameters, ok to proceed with treatment.    /82    Post Infusion Assessment:  Patient tolerated infusion without incident.  Blood return noted pre and post infusion.  Blood return noted during Fluorouracil bolus administration every 2-3 cc.  Site patent and intact, free from redness, edema or discomfort.  No evidence of extravasations.     Prior to discharge: Port is secured in place with tegaderm and flushed with 10cc NS with positive blood return noted.    Continuous home infusion Dosi-Fuser pump connected.    All connectors secured in place and clamps taped open.    Capillary element taped to pt's skin per protocol.  Infusing at 5.2mL/hour  Pump Connection double checked with Emily Meese RN.  Patient instructed to call our clinic or [facility] with any questions or concerns at home.  Patient verbalized understanding.    Patient set up for pump disconnect by wife with [facility] on video conference on 09/02/23 at 2:30pm. [facility] coordinating with patient regarding Neualsta injection. See note  from Rebeca Killian CNP regarding these changes.       Discharge Plan:   Patient declined prescription refills.  Patient and/or family verbalized understanding of discharge instructions and all questions answered.  AVS to patient via [employer].  Patient will return 10/02/23 for next appointment.   Patient discharged in stable condition accompanied by: self.  Departure Mode: Ambulatory.      Maria Esther Saucedo RN

## 2023-09-07 ENCOUNTER — APPOINTMENT (OUTPATIENT)
Dept: INTERVENTIONAL RADIOLOGY/VASCULAR | Facility: CLINIC | Age: 48
End: 2023-09-07
Attending: STUDENT IN AN ORGANIZED HEALTH CARE EDUCATION/TRAINING PROGRAM
Payer: COMMERCIAL

## 2023-09-07 ENCOUNTER — HOSPITAL ENCOUNTER (OUTPATIENT)
Dept: NUCLEAR MEDICINE | Facility: CLINIC | Age: 48
Setting detail: NUCLEAR MEDICINE
Discharge: HOME | End: 2023-09-07
Attending: STUDENT IN AN ORGANIZED HEALTH CARE EDUCATION/TRAINING PROGRAM | Admitting: STUDENT IN AN ORGANIZED HEALTH CARE EDUCATION/TRAINING PROGRAM
Payer: COMMERCIAL

## 2023-09-07 ENCOUNTER — APPOINTMENT (OUTPATIENT)
Dept: MEDSURG UNIT | Facility: CLINIC | Age: 48
End: 2023-09-07
Attending: STUDENT IN AN ORGANIZED HEALTH CARE EDUCATION/TRAINING PROGRAM
Payer: COMMERCIAL

## 2023-09-07 ENCOUNTER — HOSPITAL ENCOUNTER (OUTPATIENT)
Facility: CLINIC | Age: 48
Discharge: HOME | End: 2023-09-07
Attending: STUDENT IN AN ORGANIZED HEALTH CARE EDUCATION/TRAINING PROGRAM | Admitting: STUDENT IN AN ORGANIZED HEALTH CARE EDUCATION/TRAINING PROGRAM
Payer: COMMERCIAL

## 2023-09-07 VITALS
WEIGHT: 198.63 LBS | BODY MASS INDEX: 27.7 KG/M2 | HEART RATE: 71 BPM | SYSTOLIC BLOOD PRESSURE: 125 MMHG | TEMPERATURE: 98.4 F | RESPIRATION RATE: 16 BRPM | DIASTOLIC BLOOD PRESSURE: 88 MMHG | OXYGEN SATURATION: 100 %

## 2023-09-07 DIAGNOSIS — C18.9 PRIMARY ADENOCARCINOMA OF COLON (H): ICD-10-CM

## 2023-09-07 DIAGNOSIS — C78.7 COLON CANCER METASTASIZED TO LIVER (H): ICD-10-CM

## 2023-09-07 DIAGNOSIS — C18.9 COLON CANCER METASTASIZED TO LIVER (H): ICD-10-CM

## 2023-09-07 LAB
ALBUMIN SERPL BCG-MCNC: 4.2 G/DL (ref 3.5–5.2)
ALP SERPL-CCNC: 151 U/L (ref 40–129)
ALT SERPL W P-5'-P-CCNC: 9 U/L (ref 0–70)
AST SERPL W P-5'-P-CCNC: 19 U/L (ref 0–45)
BILIRUB DIRECT SERPL-MCNC: <0.2 MG/DL (ref 0–0.3)
BILIRUB SERPL-MCNC: 0.2 MG/DL
PROT SERPL-MCNC: 7.1 G/DL (ref 6.4–8.3)

## 2023-09-07 PROCEDURE — C1887 CATHETER, GUIDING: HCPCS

## 2023-09-07 PROCEDURE — C9113 INJ PANTOPRAZOLE SODIUM, VIA: HCPCS | Mod: JZ | Performed by: NURSE PRACTITIONER

## 2023-09-07 PROCEDURE — 272N000504 HC NEEDLE CR4

## 2023-09-07 PROCEDURE — 37243 VASC EMBOLIZE/OCCLUDE ORGAN: CPT

## 2023-09-07 PROCEDURE — 250N000011 HC RX IP 250 OP 636: Performed by: NURSE PRACTITIONER

## 2023-09-07 PROCEDURE — 99152 MOD SED SAME PHYS/QHP 5/>YRS: CPT | Mod: GC | Performed by: STUDENT IN AN ORGANIZED HEALTH CARE EDUCATION/TRAINING PROGRAM

## 2023-09-07 PROCEDURE — 78800 RP LOCLZJ TUM 1 AREA 1 D IMG: CPT | Mod: 26 | Performed by: RADIOLOGY

## 2023-09-07 PROCEDURE — C1769 GUIDE WIRE: HCPCS

## 2023-09-07 PROCEDURE — 37243 VASC EMBOLIZE/OCCLUDE ORGAN: CPT | Mod: GC | Performed by: STUDENT IN AN ORGANIZED HEALTH CARE EDUCATION/TRAINING PROGRAM

## 2023-09-07 PROCEDURE — 36248 INS CATH ABD/L-EXT ART ADDL: CPT | Mod: GC | Performed by: STUDENT IN AN ORGANIZED HEALTH CARE EDUCATION/TRAINING PROGRAM

## 2023-09-07 PROCEDURE — 76937 US GUIDE VASCULAR ACCESS: CPT | Mod: 26 | Performed by: STUDENT IN AN ORGANIZED HEALTH CARE EDUCATION/TRAINING PROGRAM

## 2023-09-07 PROCEDURE — 36247 INS CATH ABD/L-EXT ART 3RD: CPT

## 2023-09-07 PROCEDURE — 75774 ARTERY X-RAY EACH VESSEL: CPT | Mod: XU

## 2023-09-07 PROCEDURE — C1760 CLOSURE DEV, VASC: HCPCS

## 2023-09-07 PROCEDURE — 79445 NUCLEAR RX INTRA-ARTERIAL: CPT

## 2023-09-07 PROCEDURE — 999N000142 HC STATISTIC PROCEDURE PREP ONLY

## 2023-09-07 PROCEDURE — 36247 INS CATH ABD/L-EXT ART 3RD: CPT | Mod: GC | Performed by: STUDENT IN AN ORGANIZED HEALTH CARE EDUCATION/TRAINING PROGRAM

## 2023-09-07 PROCEDURE — 255N000002 HC RX 255 OP 636: Performed by: STUDENT IN AN ORGANIZED HEALTH CARE EDUCATION/TRAINING PROGRAM

## 2023-09-07 PROCEDURE — 250N000011 HC RX IP 250 OP 636: Performed by: STUDENT IN AN ORGANIZED HEALTH CARE EDUCATION/TRAINING PROGRAM

## 2023-09-07 PROCEDURE — 250N000009 HC RX 250: Performed by: STUDENT IN AN ORGANIZED HEALTH CARE EDUCATION/TRAINING PROGRAM

## 2023-09-07 PROCEDURE — 79445 NUCLEAR RX INTRA-ARTERIAL: CPT | Mod: 26 | Performed by: STUDENT IN AN ORGANIZED HEALTH CARE EDUCATION/TRAINING PROGRAM

## 2023-09-07 PROCEDURE — 36591 DRAW BLOOD OFF VENOUS DEVICE: CPT | Mod: 59 | Performed by: NURSE PRACTITIONER

## 2023-09-07 PROCEDURE — 999N000134 HC STATISTIC PP CARE STAGE 3

## 2023-09-07 PROCEDURE — 258N000003 HC RX IP 258 OP 636: Performed by: NURSE PRACTITIONER

## 2023-09-07 PROCEDURE — C2616 BRACHYTX, NON-STR,YTTRIUM-90: HCPCS | Performed by: STUDENT IN AN ORGANIZED HEALTH CARE EDUCATION/TRAINING PROGRAM

## 2023-09-07 PROCEDURE — 75726 ARTERY X-RAYS ABDOMEN: CPT | Mod: XU

## 2023-09-07 PROCEDURE — 272N000143 HC KIT CR3

## 2023-09-07 PROCEDURE — 76377 3D RENDER W/INTRP POSTPROCES: CPT | Mod: 26 | Performed by: STUDENT IN AN ORGANIZED HEALTH CARE EDUCATION/TRAINING PROGRAM

## 2023-09-07 PROCEDURE — 272N000192 HC ACCESSORY CR2

## 2023-09-07 PROCEDURE — 80076 HEPATIC FUNCTION PANEL: CPT | Performed by: NURSE PRACTITIONER

## 2023-09-07 PROCEDURE — 76937 US GUIDE VASCULAR ACCESS: CPT

## 2023-09-07 PROCEDURE — 278N000001 HC RX 278: Performed by: STUDENT IN AN ORGANIZED HEALTH CARE EDUCATION/TRAINING PROGRAM

## 2023-09-07 PROCEDURE — 272N000566 HC SHEATH CR3

## 2023-09-07 PROCEDURE — 99152 MOD SED SAME PHYS/QHP 5/>YRS: CPT

## 2023-09-07 RX ORDER — OXYCODONE HYDROCHLORIDE 5 MG/1
5-10 TABLET ORAL EVERY 6 HOURS PRN
Qty: 10 TABLET | Refills: 0 | Status: SHIPPED | OUTPATIENT
Start: 2023-09-07 | End: 2024-01-29

## 2023-09-07 RX ORDER — METHYLPREDNISOLONE 4 MG
TABLET, DOSE PACK ORAL
Qty: 21 TABLET | Refills: 0 | Status: SHIPPED | OUTPATIENT
Start: 2023-09-08 | End: 2024-03-14

## 2023-09-07 RX ORDER — FENTANYL CITRATE 50 UG/ML
25-50 INJECTION, SOLUTION INTRAMUSCULAR; INTRAVENOUS EVERY 5 MIN PRN
Status: DISCONTINUED | OUTPATIENT
Start: 2023-09-07 | End: 2023-09-07 | Stop reason: HOSPADM

## 2023-09-07 RX ORDER — NALOXONE HYDROCHLORIDE 0.4 MG/ML
0.2 INJECTION, SOLUTION INTRAMUSCULAR; INTRAVENOUS; SUBCUTANEOUS
Status: DISCONTINUED | OUTPATIENT
Start: 2023-09-07 | End: 2023-09-07 | Stop reason: HOSPADM

## 2023-09-07 RX ORDER — HEPARIN SODIUM (PORCINE) LOCK FLUSH IV SOLN 100 UNIT/ML 100 UNIT/ML
5 SOLUTION INTRAVENOUS ONCE
Status: COMPLETED | OUTPATIENT
Start: 2023-09-07 | End: 2023-09-07

## 2023-09-07 RX ORDER — NALOXONE HYDROCHLORIDE 0.4 MG/ML
0.4 INJECTION, SOLUTION INTRAMUSCULAR; INTRAVENOUS; SUBCUTANEOUS
Status: DISCONTINUED | OUTPATIENT
Start: 2023-09-07 | End: 2023-09-07 | Stop reason: HOSPADM

## 2023-09-07 RX ORDER — IODIXANOL 320 MG/ML
100 INJECTION, SOLUTION INTRAVASCULAR ONCE
Status: COMPLETED | OUTPATIENT
Start: 2023-09-07 | End: 2023-09-07

## 2023-09-07 RX ORDER — ONDANSETRON 4 MG/1
4-8 TABLET, FILM COATED ORAL EVERY 6 HOURS PRN
Qty: 40 TABLET | Refills: 0 | Status: SHIPPED | OUTPATIENT
Start: 2023-09-07 | End: 2024-08-30 | Stop reason: DRUGHIGH

## 2023-09-07 RX ORDER — ONDANSETRON 2 MG/ML
4 INJECTION INTRAMUSCULAR; INTRAVENOUS ONCE
Status: COMPLETED | OUTPATIENT
Start: 2023-09-07 | End: 2023-09-07

## 2023-09-07 RX ORDER — HYDROMORPHONE HYDROCHLORIDE 2 MG/1
4 TABLET ORAL EVERY 4 HOURS PRN
Status: DISCONTINUED | OUTPATIENT
Start: 2023-09-07 | End: 2023-09-07 | Stop reason: HOSPADM

## 2023-09-07 RX ORDER — SODIUM CHLORIDE 9 MG/ML
INJECTION, SOLUTION INTRAVENOUS CONTINUOUS
Status: DISCONTINUED | OUTPATIENT
Start: 2023-09-07 | End: 2023-09-07 | Stop reason: HOSPADM

## 2023-09-07 RX ORDER — LIDOCAINE 40 MG/G
CREAM TOPICAL
Status: DISCONTINUED | OUTPATIENT
Start: 2023-09-07 | End: 2023-09-07 | Stop reason: HOSPADM

## 2023-09-07 RX ORDER — FLUMAZENIL 0.1 MG/ML
0.2 INJECTION, SOLUTION INTRAVENOUS
Status: DISCONTINUED | OUTPATIENT
Start: 2023-09-07 | End: 2023-09-07 | Stop reason: HOSPADM

## 2023-09-07 RX ORDER — ONDANSETRON 2 MG/ML
4 INJECTION INTRAMUSCULAR; INTRAVENOUS
Status: DISCONTINUED | OUTPATIENT
Start: 2023-09-07 | End: 2023-09-07 | Stop reason: HOSPADM

## 2023-09-07 RX ORDER — ACETAMINOPHEN 325 MG/1
650 TABLET ORAL EVERY 4 HOURS PRN
Status: DISCONTINUED | OUTPATIENT
Start: 2023-09-07 | End: 2023-09-07 | Stop reason: HOSPADM

## 2023-09-07 RX ORDER — HYDROMORPHONE HYDROCHLORIDE 2 MG/1
2 TABLET ORAL EVERY 4 HOURS PRN
Status: DISCONTINUED | OUTPATIENT
Start: 2023-09-07 | End: 2023-09-07 | Stop reason: HOSPADM

## 2023-09-07 RX ORDER — AMPICILLIN AND SULBACTAM 2; 1 G/1; G/1
3 INJECTION, POWDER, FOR SOLUTION INTRAMUSCULAR; INTRAVENOUS
Status: COMPLETED | OUTPATIENT
Start: 2023-09-07 | End: 2023-09-07

## 2023-09-07 RX ADMIN — LIDOCAINE HYDROCHLORIDE 15 ML: 10 INJECTION, SOLUTION EPIDURAL; INFILTRATION; INTRACAUDAL; PERINEURAL at 09:28

## 2023-09-07 RX ADMIN — MIDAZOLAM 0.5 MG: 1 INJECTION INTRAMUSCULAR; INTRAVENOUS at 10:22

## 2023-09-07 RX ADMIN — FENTANYL CITRATE 25 MCG: 50 INJECTION, SOLUTION INTRAMUSCULAR; INTRAVENOUS at 11:03

## 2023-09-07 RX ADMIN — ONDANSETRON 4 MG: 2 INJECTION INTRAMUSCULAR; INTRAVENOUS at 09:27

## 2023-09-07 RX ADMIN — Medication 5 ML: at 12:59

## 2023-09-07 RX ADMIN — FENTANYL CITRATE 25 MCG: 50 INJECTION, SOLUTION INTRAMUSCULAR; INTRAVENOUS at 10:22

## 2023-09-07 RX ADMIN — Medication 8.6 MILLICURIE: at 14:45

## 2023-09-07 RX ADMIN — AMPICILLIN SODIUM AND SULBACTAM SODIUM 3 G: 2; 1 INJECTION, POWDER, FOR SOLUTION INTRAMUSCULAR; INTRAVENOUS at 07:31

## 2023-09-07 RX ADMIN — MIDAZOLAM 1 MG: 1 INJECTION INTRAMUSCULAR; INTRAVENOUS at 09:01

## 2023-09-07 RX ADMIN — MIDAZOLAM 0.5 MG: 1 INJECTION INTRAMUSCULAR; INTRAVENOUS at 09:48

## 2023-09-07 RX ADMIN — IODIXANOL 100 ML: 320 INJECTION, SOLUTION INTRAVASCULAR at 11:49

## 2023-09-07 RX ADMIN — MIDAZOLAM 0.5 MG: 1 INJECTION INTRAMUSCULAR; INTRAVENOUS at 09:27

## 2023-09-07 RX ADMIN — PANTOPRAZOLE SODIUM 40 MG: 40 INJECTION, POWDER, FOR SOLUTION INTRAVENOUS at 07:27

## 2023-09-07 RX ADMIN — FENTANYL CITRATE 25 MCG: 50 INJECTION, SOLUTION INTRAMUSCULAR; INTRAVENOUS at 09:48

## 2023-09-07 RX ADMIN — FENTANYL CITRATE 50 MCG: 50 INJECTION, SOLUTION INTRAMUSCULAR; INTRAVENOUS at 09:01

## 2023-09-07 RX ADMIN — FENTANYL CITRATE 25 MCG: 50 INJECTION, SOLUTION INTRAMUSCULAR; INTRAVENOUS at 09:27

## 2023-09-07 RX ADMIN — FENTANYL CITRATE 25 MCG: 50 INJECTION, SOLUTION INTRAMUSCULAR; INTRAVENOUS at 11:29

## 2023-09-07 RX ADMIN — HYDROCORTISONE SODIUM SUCCINATE 100 MG: 100 INJECTION, POWDER, FOR SOLUTION INTRAMUSCULAR; INTRAVENOUS at 07:27

## 2023-09-07 RX ADMIN — SODIUM CHLORIDE: 9 INJECTION, SOLUTION INTRAVENOUS at 07:34

## 2023-09-07 RX ADMIN — MIDAZOLAM 0.5 MG: 1 INJECTION INTRAMUSCULAR; INTRAVENOUS at 11:03

## 2023-09-07 ASSESSMENT — ACTIVITIES OF DAILY LIVING (ADL)
ADLS_ACUITY_SCORE: 35

## 2023-09-07 NOTE — PROGRESS NOTES
Discharge criteria met. Voiding, ambulating and eating without difficulty. Right groin site intact. Reviewed discharge with pt. Pt verbalized understanding and signed papers. PORT de accessed. Pushed to discharge pharm and front door in w/c

## 2023-09-07 NOTE — PROGRESS NOTES
2A prep for Y 90 Delivery. Pt alert and oriented. Denies pain. VSS. Appropriately NPO. PORT accessed. Labs in process. Groin clipped. Right side groin bruised with small hematoma vs Mynx. Pedal pulses marked +3. Pt SO Chelsy will provide transportation.

## 2023-09-07 NOTE — PRE-PROCEDURE
GENERAL PRE-PROCEDURE:   Date/Time:  9/7/2023 7:56 AM    Risks and benefits: Risks, benefits and alternatives were discussed    Consent given by:  Patient  Patient states understanding of procedure being performed: Yes    Patient's understanding of procedure matches consent: Yes    Procedure consent matches procedure scheduled: Yes    Appropriately NPO:  Yes  Mallampati  :  Grade 2- soft palate, base of uvula, tonsillar pillars, and portion of posterior pharyngeal wall visible  Lungs:  Lungs clear with good breath sounds bilaterally  Heart:  Normal heart sounds and rate  History & Physical reviewed:  History and physical reviewed and no updates needed  Statement of review:  I have reviewed the lab findings, diagnostic data, medications, and the plan for sedation

## 2023-09-07 NOTE — PROGRESS NOTES
Hepatic panel processing. Per IR staff- don't need the Hepatic labs before procedure, will Prep Complete pt.

## 2023-09-07 NOTE — DISCHARGE INSTRUCTIONS
[facility]   Interventional Radiology  Discharge Instructions Post Angiography for Insertion of   Radioactive Microspheres to the Liver    AFTER YOU GO HOME          Relax and take it easy for 24 hours.       Drink plenty of fluids.       Resume your regular diet, unless otherwise instructed by your Primary Physician.       DO NOT smoke for at least 24 hours, if you were given any sedation.       DO NOT drink alcoholic beverages the day of your procedure.       DO NOT drive or operate machinery at home or at work for 24 hours.          DO NOT make any important or legal decisions for 24 hours following your procedure.       DO NOT take a shower for at least 12 hours following your procedure. No tub bath, hot tubs, or swimming for 5 days        Care of groin site  It is normal to have a small bruise or lump at the site.  For the first 2 days, when you cough, sneeze or move your bowels, hold your hand over the puncture site and press gently.  Do NOT lift more than 10 pounds or do any strenuous exercise for at least 3 to 5 days.  Do not use lotion or powder near the puncture site for 3 days.  If you start bleeding from the site in your groin: lie down flat and press firmly on the site. Call your doctor as soon as you can.   Call 911 right away if you have: Heavy bleeding, bleeding that does not stop.      CALL THE PHYSICIAN IF:       - You start bleeding from the procedure site. A small lump or bruise is common at the puncture site. Your physician will tell you if you need to return to the hospital.      - You develop numbness, coolness or a change in color of the leg that was punctured.      - You experience increased pain or redness at the puncture site.      - You develop hives or a rash or unexplained itching.      - You develop a temperature of 101 degrees F or greater.    Additional Information:         Follow the Discharge Instructions for the Liver Brachytherapy; Contrast Instructions and  Instructions for the [employer]. (Separate papers)    Closure Device: ANGIO-SEAL             [facility] INTERVENTIONAL RADIOLOGY DEPARTMENT         Procedure Physician: Dr. Behzadi              Date of Procedure:September 7, 2023       Telephone Numbers:   [phone]      Monday-Friday 7:30 am to 4:00 pm                                        [phone]     After 4:00 pm Monday-Friday, Weekend and Holidays. Ask for the Interventional Radiologist on call. Someone is on call 24 hrs/day.

## 2023-09-07 NOTE — PROCEDURES
[facility]    Procedure: Image guided Y-90 delivery    Date/Time: 9/7/2023 11:49 AM    Performed by: Behzadi, Heshmatzadeh, MD  Authorized by: Fab Villalta MD      UNIVERSAL PROTOCOL   Site Marked: NA  Prior Images Obtained and Reviewed:  Yes  Required items: Required blood products, implants, devices and special equipment available    Patient identity confirmed:  Verbally with patient, arm band, provided demographic data and hospital-assigned identification number  Patient was reevaluated immediately before administering moderate or deep sedation or anesthesia  Confirmation Checklist:  Patient's identity using two indicators, relevant allergies, procedure was appropriate and matched the consent or emergent situation and correct equipment/implants were available  Time out: Immediately prior to the procedure a time out was called    Universal Protocol: the Joint Commission Universal Protocol was followed    Preparation: Patient was prepped and draped in usual sterile fashion       ANESTHESIA    Anesthesia:  Local infiltration  Local Anesthetic:  Lidocaine 1% without epinephrine      SEDATION  Patient Sedated: Yes    Sedation:  Fentanyl and midazolam  Vital signs: Vital signs monitored during sedation    See dictated procedure note for full details.  Findings: -    Specimens: none    Complications: None    Condition: Stable    Plan: Bed rest for 2 hours.   IR clinic follow up in 4 weeks.       PROCEDURE  Describe Procedure: Successful yttrium-90 radioembolization of the right and left  hepatic artery under fluoroscopic guidance.     Length of time physician/provider present for 1:1 monitoring during sedation: 150

## 2023-09-07 NOTE — IR NOTE
Patient Name: Roman Escalante  Medical Record Number: [id number]  Today's Date: 9/7/2023    Procedure: Y-90 delivery  Proceduralist: Dr. Jones, Dr. Behzadi    Procedure Start: 0858  Procedure end: 1131  Sedation medications administered: 175 mcg fentanyl, 3 mg versed   Sedation time: 150 minutes    Report given to: Maria Esther SARMIENTO 2A   : JIM    Other Notes: Pt arrived to IR room 1 from . Consent reviewed. Pt denies any questions or concerns regarding procedure. Pt positioned supine and monitored per protocol. Pt tolerated procedure without any noted complications. Pt transferred back to .    Angioseal closure device deployed to right groin access site at 1130. Flat bedrest x 2 hours until 1330.

## 2023-09-08 ENCOUNTER — TELEPHONE (OUTPATIENT)
Dept: VASCULAR SURGERY | Facility: CLINIC | Age: 48
End: 2023-09-08
Payer: COMMERCIAL

## 2023-09-08 DIAGNOSIS — C78.7 COLON CANCER METASTASIZED TO LIVER (H): Primary | ICD-10-CM

## 2023-09-08 DIAGNOSIS — C18.9 COLON CANCER METASTASIZED TO LIVER (H): Primary | ICD-10-CM

## 2023-09-08 NOTE — TELEPHONE ENCOUNTER
I called and left a voicemail including my call back number.  2nd attempt.     I called and left a voicemail including my call back number.  I called to see how he was doing post Y90 by Dr Villalta.    KIAH Ren, RN, BSN  Interventional Radiology Nurse Coordinator   Phone:  [phone]

## 2023-09-14 ENCOUNTER — OFFICE VISIT (OUTPATIENT)
Dept: FAMILY MEDICINE | Facility: CLINIC | Age: 48
End: 2023-09-14
Payer: COMMERCIAL

## 2023-09-14 VITALS
SYSTOLIC BLOOD PRESSURE: 113 MMHG | BODY MASS INDEX: 27.75 KG/M2 | HEART RATE: 70 BPM | WEIGHT: 199 LBS | TEMPERATURE: 98.3 F | OXYGEN SATURATION: 96 % | DIASTOLIC BLOOD PRESSURE: 78 MMHG | RESPIRATION RATE: 18 BRPM

## 2023-09-14 DIAGNOSIS — B07.0 PLANTAR WART: ICD-10-CM

## 2023-09-14 DIAGNOSIS — K94.19 ALTERED BOWEL ELIMINATION DUE TO INTESTINAL OSTOMY (H): Primary | ICD-10-CM

## 2023-09-14 PROCEDURE — 17110 DESTRUCTION B9 LES UP TO 14: CPT | Performed by: FAMILY MEDICINE

## 2023-09-14 PROCEDURE — 90750 HZV VACC RECOMBINANT IM: CPT | Performed by: FAMILY MEDICINE

## 2023-09-14 PROCEDURE — 90686 IIV4 VACC NO PRSV 0.5 ML IM: CPT | Performed by: FAMILY MEDICINE

## 2023-09-14 PROCEDURE — 90471 IMMUNIZATION ADMIN: CPT | Performed by: FAMILY MEDICINE

## 2023-09-14 PROCEDURE — 90677 PCV20 VACCINE IM: CPT | Performed by: FAMILY MEDICINE

## 2023-09-14 PROCEDURE — 90472 IMMUNIZATION ADMIN EACH ADD: CPT | Performed by: FAMILY MEDICINE

## 2023-09-14 NOTE — PROGRESS NOTES
Assessment & Plan   Problem List Items Addressed This Visit          Other    Altered bowel elimination due to intestinal ostomy (H) - Primary    Relevant Orders    Ostomy Care Referral     Other Visit Diagnoses       Plantar wart        Relevant Orders    DESTRUCT BENIGN LESION, UP TO 14            All lesions are frozen with LN2 x3. Patient tolerated procedure well.       WART CARE DISCUSSED. USE OF OTC PRODUCT STARTING IN FEW DAYS. GENTLE ABRASION WITH PUMICE STONE OR EMERY BOARD WITH GOOD HANDWASHING AFTER. RETURN IN TWO-THREE WEEKS FOR REFREEZING UNTIL RESOLVED.          Nicotine/Tobacco Cessation:  He reports that he has been smoking cigarettes and other. He started smoking about 13 years ago. He has a 5.00 pack-year smoking history. He has never used smokeless tobacco.  Nicotine/Tobacco Cessation Plan:           KATHARINA MAURER DO  [facility] BEV Owens is a 48 year old, presenting for the following health issues:  Wart (Bottom of right foot)        9/14/2023     8:36 AM   Additional Questions   Roomed by Lizzy MATIAS CMA       History of Present Illness       Reason for visit:  Pain in my foot, planters wart?+ possibly a pressure sore near the affected area.  Symptom onset:  3-4 weeks ago  Symptoms include:  Hard callus on bottom of right foot  Symptom intensity:  Moderate  Symptom progression:  Staying the same  Had these symptoms before:  Yes  Has tried/received treatment for these symptoms:  No  What makes it worse:  Possible pressure sore, neuropathy  What makes it better:  Staying off My feet    He eats 2-3 servings of fruits and vegetables daily.He consumes 1 sweetened beverage(s) daily.He exercises with enough effort to increase his heart rate 30 to 60 minutes per day.  He exercises with enough effort to increase his heart rate 6 days per week.   He is taking medications regularly.     He also needs ostomy cares.        Review of Systems         Objective    BP  113/78 (BP Location: Right arm, Patient Position: Chair, Cuff Size: Adult Large)   Pulse 70   Temp 98.3  F (36.8  C) (Oral)   Resp 18   Wt 90.3 kg (199 lb)   SpO2 96%   BMI 27.75 kg/m    Body mass index is 27.75 kg/m .  Physical Exam   GENERAL: healthy, alert and no distress     Media Information    Document Information    Other: Photograph   Right foot   09/14/2023 8:46 AM   Attached To:   Office Visit on 9/14/23 with Ron Haley DO   Source Information    Ron Haley DO  [facility]

## 2023-09-15 ENCOUNTER — TELEPHONE (OUTPATIENT)
Dept: WOUND CARE | Facility: CLINIC | Age: 48
End: 2023-09-15
Payer: COMMERCIAL

## 2023-09-15 NOTE — TELEPHONE ENCOUNTER
[facility]    Received referral from timothy, reviewed, called patient and spoke via phone, gave [facility] contact info and offered apt, patient reports living in [facility] and [facility] location is not most desired per patient, states has used [facility] location in past an that is closer than [facility] for patient.   Gave contact info, denies further needs at this time from [facility] location.       Carol CASH   1st choice: Securely message with Clinical Data ([facility] [employer])   (2nd option: [facility] Office Phone [phone], messages checked periodically Mon-Fri 8a-4p)

## 2023-10-02 ENCOUNTER — APPOINTMENT (OUTPATIENT)
Dept: LAB | Facility: CLINIC | Age: 48
End: 2023-10-02
Attending: STUDENT IN AN ORGANIZED HEALTH CARE EDUCATION/TRAINING PROGRAM
Payer: COMMERCIAL

## 2023-10-02 ENCOUNTER — ONCOLOGY VISIT (OUTPATIENT)
Dept: ONCOLOGY | Facility: CLINIC | Age: 48
End: 2023-10-02
Attending: STUDENT IN AN ORGANIZED HEALTH CARE EDUCATION/TRAINING PROGRAM
Payer: COMMERCIAL

## 2023-10-02 ENCOUNTER — OFFICE VISIT (OUTPATIENT)
Dept: WOUND CARE | Facility: CLINIC | Age: 48
End: 2023-10-02
Payer: COMMERCIAL

## 2023-10-02 VITALS
BODY MASS INDEX: 28.77 KG/M2 | SYSTOLIC BLOOD PRESSURE: 125 MMHG | OXYGEN SATURATION: 99 % | HEART RATE: 79 BPM | TEMPERATURE: 98.7 F | WEIGHT: 206.3 LBS | DIASTOLIC BLOOD PRESSURE: 81 MMHG | RESPIRATION RATE: 16 BRPM

## 2023-10-02 DIAGNOSIS — R11.0 NAUSEA: ICD-10-CM

## 2023-10-02 DIAGNOSIS — C20 RECTAL ADENOCARCINOMA METASTATIC TO LUNG (H): Primary | ICD-10-CM

## 2023-10-02 DIAGNOSIS — N17.9 AKI (ACUTE KIDNEY INJURY) (H): ICD-10-CM

## 2023-10-02 DIAGNOSIS — C78.00 RECTAL ADENOCARCINOMA METASTATIC TO LUNG (H): Primary | ICD-10-CM

## 2023-10-02 DIAGNOSIS — Z43.3 ENCOUNTER FOR ATTENTION TO COLOSTOMY (H): Primary | ICD-10-CM

## 2023-10-02 LAB
ALBUMIN SERPL BCG-MCNC: 4.2 G/DL (ref 3.5–5.2)
ALBUMIN UR-MCNC: NEGATIVE MG/DL
ALP SERPL-CCNC: 74 U/L (ref 40–129)
ALT SERPL W P-5'-P-CCNC: 11 U/L (ref 0–70)
ANION GAP SERPL CALCULATED.3IONS-SCNC: 12 MMOL/L (ref 7–15)
AST SERPL W P-5'-P-CCNC: 22 U/L (ref 0–45)
BASO+EOS+MONOS # BLD AUTO: ABNORMAL 10*3/UL
BASO+EOS+MONOS NFR BLD AUTO: ABNORMAL %
BASOPHILS # BLD AUTO: 0 10E3/UL (ref 0–0.2)
BASOPHILS NFR BLD AUTO: 0 %
BILIRUB SERPL-MCNC: 0.3 MG/DL
BUN SERPL-MCNC: 10.2 MG/DL (ref 6–20)
CALCIUM SERPL-MCNC: 9 MG/DL (ref 8.6–10)
CHLORIDE SERPL-SCNC: 102 MMOL/L (ref 98–107)
CREAT SERPL-MCNC: 0.79 MG/DL (ref 0.67–1.17)
DEPRECATED HCO3 PLAS-SCNC: 24 MMOL/L (ref 22–29)
EGFRCR SERPLBLD CKD-EPI 2021: >90 ML/MIN/1.73M2
EOSINOPHIL # BLD AUTO: 0.2 10E3/UL (ref 0–0.7)
EOSINOPHIL NFR BLD AUTO: 3 %
ERYTHROCYTE [DISTWIDTH] IN BLOOD BY AUTOMATED COUNT: 15.3 % (ref 10–15)
GLUCOSE SERPL-MCNC: 101 MG/DL (ref 70–99)
HCT VFR BLD AUTO: 41.3 % (ref 40–53)
HGB BLD-MCNC: 13.5 G/DL (ref 13.3–17.7)
IMM GRANULOCYTES # BLD: 0 10E3/UL
IMM GRANULOCYTES NFR BLD: 0 %
LYMPHOCYTES # BLD AUTO: 0.6 10E3/UL (ref 0.8–5.3)
LYMPHOCYTES NFR BLD AUTO: 7 %
MCH RBC QN AUTO: 29.4 PG (ref 26.5–33)
MCHC RBC AUTO-ENTMCNC: 32.7 G/DL (ref 31.5–36.5)
MCV RBC AUTO: 90 FL (ref 78–100)
MONOCYTES # BLD AUTO: 0.8 10E3/UL (ref 0–1.3)
MONOCYTES NFR BLD AUTO: 10 %
NEUTROPHILS # BLD AUTO: 6.8 10E3/UL (ref 1.6–8.3)
NEUTROPHILS NFR BLD AUTO: 80 %
NRBC # BLD AUTO: 0 10E3/UL
NRBC BLD AUTO-RTO: 0 /100
PLATELET # BLD AUTO: 228 10E3/UL (ref 150–450)
POTASSIUM SERPL-SCNC: 4.1 MMOL/L (ref 3.4–5.3)
PROT SERPL-MCNC: 7.4 G/DL (ref 6.4–8.3)
RBC # BLD AUTO: 4.59 10E6/UL (ref 4.4–5.9)
SODIUM SERPL-SCNC: 138 MMOL/L (ref 135–145)
WBC # BLD AUTO: 8.4 10E3/UL (ref 4–11)

## 2023-10-02 PROCEDURE — 96367 TX/PROPH/DG ADDL SEQ IV INF: CPT

## 2023-10-02 PROCEDURE — 96375 TX/PRO/DX INJ NEW DRUG ADDON: CPT

## 2023-10-02 PROCEDURE — 2894A VOIDCORRECT: CPT

## 2023-10-02 PROCEDURE — 99214 OFFICE O/P EST MOD 30 MIN: CPT

## 2023-10-02 PROCEDURE — 90480 ADMN SARSCOV2 VAC 1/ONLY CMP: CPT

## 2023-10-02 PROCEDURE — 81003 URINALYSIS AUTO W/O SCOPE: CPT | Performed by: STUDENT IN AN ORGANIZED HEALTH CARE EDUCATION/TRAINING PROGRAM

## 2023-10-02 PROCEDURE — 250N000011 HC RX IP 250 OP 636: Mod: JZ

## 2023-10-02 PROCEDURE — 85025 COMPLETE CBC W/AUTO DIFF WBC: CPT

## 2023-10-02 PROCEDURE — 250N000011 HC RX IP 250 OP 636

## 2023-10-02 PROCEDURE — 96409 CHEMO IV PUSH SNGL DRUG: CPT

## 2023-10-02 PROCEDURE — G0498 CHEMO EXTEND IV INFUS W/PUMP: HCPCS

## 2023-10-02 PROCEDURE — G0463 HOSPITAL OUTPT CLINIC VISIT: HCPCS

## 2023-10-02 PROCEDURE — 80053 COMPREHEN METABOLIC PANEL: CPT

## 2023-10-02 PROCEDURE — 258N000003 HC RX IP 258 OP 636: Performed by: STUDENT IN AN ORGANIZED HEALTH CARE EDUCATION/TRAINING PROGRAM

## 2023-10-02 PROCEDURE — 91320 SARSCV2 VAC 30MCG TRS-SUC IM: CPT

## 2023-10-02 PROCEDURE — 36591 DRAW BLOOD OFF VENOUS DEVICE: CPT

## 2023-10-02 PROCEDURE — 250N000011 HC RX IP 250 OP 636: Performed by: STUDENT IN AN ORGANIZED HEALTH CARE EDUCATION/TRAINING PROGRAM

## 2023-10-02 RX ORDER — FLUOROURACIL 50 MG/ML
400 INJECTION, SOLUTION INTRAVENOUS ONCE
Status: CANCELLED | OUTPATIENT
Start: 2023-10-02

## 2023-10-02 RX ORDER — MEPERIDINE HYDROCHLORIDE 25 MG/ML
25 INJECTION INTRAMUSCULAR; INTRAVENOUS; SUBCUTANEOUS EVERY 30 MIN PRN
Status: CANCELLED | OUTPATIENT
Start: 2023-10-02

## 2023-10-02 RX ORDER — ONDANSETRON 2 MG/ML
8 INJECTION INTRAMUSCULAR; INTRAVENOUS ONCE
Status: COMPLETED | OUTPATIENT
Start: 2023-10-02 | End: 2023-10-02

## 2023-10-02 RX ORDER — HEPARIN SODIUM (PORCINE) LOCK FLUSH IV SOLN 100 UNIT/ML 100 UNIT/ML
5 SOLUTION INTRAVENOUS EVERY 8 HOURS
Status: DISCONTINUED | OUTPATIENT
Start: 2023-10-02 | End: 2023-10-02 | Stop reason: HOSPADM

## 2023-10-02 RX ORDER — HEPARIN SODIUM (PORCINE) LOCK FLUSH IV SOLN 100 UNIT/ML 100 UNIT/ML
5 SOLUTION INTRAVENOUS
Status: CANCELLED | OUTPATIENT
Start: 2023-10-02

## 2023-10-02 RX ORDER — DIPHENHYDRAMINE HYDROCHLORIDE 50 MG/ML
50 INJECTION INTRAMUSCULAR; INTRAVENOUS
Status: DISCONTINUED | OUTPATIENT
Start: 2023-10-02 | End: 2023-10-02

## 2023-10-02 RX ORDER — METHYLPREDNISOLONE SODIUM SUCCINATE 125 MG/2ML
125 INJECTION, POWDER, LYOPHILIZED, FOR SOLUTION INTRAMUSCULAR; INTRAVENOUS
Status: CANCELLED
Start: 2023-10-02

## 2023-10-02 RX ORDER — DIPHENHYDRAMINE HCL 25 MG
50 CAPSULE ORAL
Status: DISCONTINUED | OUTPATIENT
Start: 2023-10-02 | End: 2023-10-02

## 2023-10-02 RX ORDER — ONDANSETRON 2 MG/ML
8 INJECTION INTRAMUSCULAR; INTRAVENOUS ONCE
Status: CANCELLED | OUTPATIENT
Start: 2023-10-02

## 2023-10-02 RX ORDER — HEPARIN SODIUM,PORCINE 10 UNIT/ML
5 VIAL (ML) INTRAVENOUS
Status: CANCELLED | OUTPATIENT
Start: 2023-10-02

## 2023-10-02 RX ORDER — ALBUTEROL SULFATE 0.83 MG/ML
2.5 SOLUTION RESPIRATORY (INHALATION)
Status: CANCELLED | OUTPATIENT
Start: 2023-10-02

## 2023-10-02 RX ORDER — HEPARIN SODIUM,PORCINE 10 UNIT/ML
5 VIAL (ML) INTRAVENOUS
Status: CANCELLED | OUTPATIENT
Start: 2023-10-03

## 2023-10-02 RX ORDER — FLUOROURACIL 50 MG/ML
400 INJECTION, SOLUTION INTRAVENOUS ONCE
Status: COMPLETED | OUTPATIENT
Start: 2023-10-02 | End: 2023-10-02

## 2023-10-02 RX ORDER — EPINEPHRINE 1 MG/ML
0.3 INJECTION, SOLUTION INTRAMUSCULAR; SUBCUTANEOUS EVERY 5 MIN PRN
Status: DISCONTINUED | OUTPATIENT
Start: 2023-10-02 | End: 2023-10-02

## 2023-10-02 RX ORDER — ALBUTEROL SULFATE 90 UG/1
1-2 AEROSOL, METERED RESPIRATORY (INHALATION)
Status: CANCELLED
Start: 2023-10-02

## 2023-10-02 RX ORDER — EPINEPHRINE 1 MG/ML
0.3 INJECTION, SOLUTION INTRAMUSCULAR; SUBCUTANEOUS EVERY 5 MIN PRN
Status: CANCELLED | OUTPATIENT
Start: 2023-10-02

## 2023-10-02 RX ORDER — DIPHENHYDRAMINE HYDROCHLORIDE 50 MG/ML
50 INJECTION INTRAMUSCULAR; INTRAVENOUS
Status: CANCELLED
Start: 2023-10-02

## 2023-10-02 RX ORDER — LORAZEPAM 2 MG/ML
0.5 INJECTION INTRAMUSCULAR EVERY 4 HOURS PRN
Status: CANCELLED | OUTPATIENT
Start: 2023-10-02

## 2023-10-02 RX ORDER — HEPARIN SODIUM (PORCINE) LOCK FLUSH IV SOLN 100 UNIT/ML 100 UNIT/ML
5 SOLUTION INTRAVENOUS
Status: CANCELLED | OUTPATIENT
Start: 2023-10-03

## 2023-10-02 RX ADMIN — DEXAMETHASONE SODIUM PHOSPHATE: 10 INJECTION, SOLUTION INTRAMUSCULAR; INTRAVENOUS at 14:30

## 2023-10-02 RX ADMIN — ONDANSETRON 8 MG: 2 INJECTION INTRAMUSCULAR; INTRAVENOUS at 14:26

## 2023-10-02 RX ADMIN — FLUOROURACIL 830 MG: 50 INJECTION, SOLUTION INTRAVENOUS at 15:23

## 2023-10-02 RX ADMIN — LEUCOVORIN CALCIUM 750 MG: 350 INJECTION, POWDER, LYOPHILIZED, FOR SOLUTION INTRAMUSCULAR; INTRAVENOUS at 14:59

## 2023-10-02 RX ADMIN — Medication 5 ML: at 13:05

## 2023-10-02 RX ADMIN — SODIUM CHLORIDE 250 ML: 9 INJECTION, SOLUTION INTRAVENOUS at 14:25

## 2023-10-02 RX ADMIN — COVID-19 VACCINE, MRNA 30 MCG: 0.05 INJECTION, SUSPENSION INTRAMUSCULAR at 14:20

## 2023-10-02 RX ADMIN — FAMOTIDINE 20 MG: 10 INJECTION, SOLUTION INTRAVENOUS at 14:26

## 2023-10-02 ASSESSMENT — PAIN SCALES - GENERAL: PAINLEVEL: NO PAIN (0)

## 2023-10-02 NOTE — PROGRESS NOTES
Infusion Nursing Note:  Roman Escalante presents today for Cycle 7 Day 1 Leucovorin, Fluorouracil push and pump connect.    Patient seen by provider today: Yes: Rebeca Killian NP   present during visit today: Not Applicable.    Note: Pt presents to infusion feeling well. He offers no new concerns following his provider appointment today.    TORB: Rebeca Killian NP/Sosa Castano RN on 10/2/23 at 1400:  - ok to proceed with treatment today  - ok to delete take-home Dex from plan, no longer needed  - Bevacizumab is on hold following his Y-90 procedure  - please give COVID booster      Intravenous Access:  Implanted Port.    Treatment Conditions:  Lab Results   Component Value Date    HGB 13.5 10/02/2023    WBC 8.4 10/02/2023    ANEU 45.1 (H) 07/28/2023    ANEUTAUTO 6.8 10/02/2023     10/02/2023        Lab Results   Component Value Date     10/02/2023    POTASSIUM 4.1 10/02/2023    MAG 1.8 07/30/2023    CR 0.79 10/02/2023    JEFERSON 9.0 10/02/2023    BILITOTAL 0.3 10/02/2023    ALBUMIN 4.2 10/02/2023    ALT 11 10/02/2023    AST 22 10/02/2023     Results reviewed, labs MET treatment parameters, ok to proceed with treatment.      Post Infusion Assessment:  Patient tolerated infusion without incident.  Patient tolerated COVID-19 Booster injection to Left Deltoid without incident.  Patient observed for 15 minutes post-vaccine per protocol.  Blood return noted pre and post infusion.  Blood return noted during administration every 2-3 ml during Fluorouracil push.  Site patent and intact, free from redness, edema or discomfort.  No evidence of extravasations.     Prior to discharge: Port is secured in place with tegaderm and flushed with 10cc NS with positive blood return noted.  Continuous home infusion Fluorouracil pump connected.    All connectors secured in place and clamps taped open. Double checked with Valentine Dupont RN.     Patient instructed to call  [facility] with any questions  or concerns at home with the pump.  Patient verbalized understanding.    Patient and [facility] aware of set up for pump disconnect + Neulasta at home with [facility] on 10/4/23 at 1330.       Discharge Plan:   Patient declined prescription refills.  Discharge instructions reviewed with: Patient.  Patient and/or family verbalized understanding of discharge instructions and all questions answered.  AVS to patient via [employer]. Patient will return 10/27/23 for next appointment.   Patient discharged in stable condition accompanied by: self.  Departure Mode: Ambulatory.      Sosa Castano RN

## 2023-10-02 NOTE — LETTER
10/2/2023         RE: Roman Escalante  [address]  [place] [address]        Dear Colleague,    Thank you for referring your patient, Roman Escalante, to the [facility]. Please see a copy of my visit note below.      [facility] - Medical Oncology Follow Up Note  10/02/2023    HPI:   Patient developed rectal bleeding in 2020.  In January 2021 CT showed focal wall thickening in the upper rectum, multiple pulmonary nodules bilaterally suspicious for metastatic disease.  Underwent FNA of the right upper lobe lesion which was consistent with adenocarcinoma of the colon.  He was treated with FOLFOX from 2/20/2021 through 5/20/2021.  Avastin was added.  Had an infusion reaction to oxaliplatin 6/2021.  7/2021- 12/2021 was on 5-FU alone.  Developed progression.  12/2021- 9/2022 was on FOLFIRI.  11/2021 started Lonsurf. All of this was done with outside oncologist.     Met with Dr. Snow on 2/3/2022 to establish care. Recommended regorafenib.     There have been questions of a parasitic infection. Please see previous notes from [facility] oncologist for further details. Was seen by ID at the  on 2/27, no concerns for parasitic infection    Started regorafenib on 3/2/2023. Progression noted 5/19/23. Plan to start FOLFOX/Avastin and send out CARIS testing to evaluate for other treatment options. Cycle 1 was given with oxaliplatin desensitization, 5FU, Avastin held due to increased urine protein.     Following cycle 4, patient was admitted with hematuria and acute kidney injury.    Oxaliplatin was dropped with Cycle 5.     Y-90 radioembolization 9/7.    Presents today for evaluation prior to resuming treatment with 5FU.      Interval History:   Feels well today, has no concerns.     Following Y-90 procedure, had a few days of fatigue and mild pain. Appetite has been good, able to gain a few pounds recently. No nausea or vomiting. Denies any bowel or bladder concerns.  Was evaluated by wound care RN today due to concerns of mucous drainage from ostomy.     Denies fever, chills, or concerns for infection. No shortness of breath, chest pain or cough.     No bleeding concerns. No rashes or skin concerns.       Past Medical History:   Diagnosis Date    Cancer (H) 1/12/21    Colorectal cacer mast, to lungs, and liver        Allergies   Allergen Reactions    Oxaliplatin Shortness Of Breath, Nausea and Vomiting and Other (See Comments)     Patient had HSR to Oxaliplatin on cycle 8 of FOLFOX chemotherapy. Sent to ER for continued monitoring.  Patient had HSR to Oxaliplatin on cycle 8 of FOLFOX chemotherapy. Sent to ER for continued monitoring.      Blood-Group Specific Substance Other (See Comments)     Patient has reactivity Suggestive of a Warm auto antibody. Blood products may be delayed. Draw patient 24 hours prior to transfusion. Draw one red top and two purple top tubes for all type and screen orders.  Patient has reactivity Suggestive of a Warm auto antibody. Blood products may be delayed. Draw patient 24 hours prior to transfusion. Draw one red top and two purple top tubes for all type and screen orders.       Physical Exam:  /81   Pulse 79   Temp 98.7  F (37.1  C)   Resp 16   Wt 93.6 kg (206 lb 4.8 oz)   SpO2 99%   BMI 28.77 kg/m    Wt Readings from Last 10 Encounters:   10/02/23 93.6 kg (206 lb 4.8 oz)   09/14/23 90.3 kg (199 lb)   09/07/23 90.1 kg (198 lb 10.2 oz)   08/31/23 90.2 kg (198 lb 14.4 oz)   08/29/23 90.4 kg (199 lb 4.7 oz)   08/22/23 88.4 kg (194 lb 12.8 oz)   08/11/23 90.1 kg (198 lb 9.6 oz)   07/30/23 89.2 kg (196 lb 9.6 oz)   07/27/23 88.5 kg (195 lb)   07/14/23 87.6 kg (193 lb 3.2 oz)   General: The patient is a pleasant male in no acute distress.  HEENT: EOMI. Sclerae are anicteric.   Lymph: Neck is supple with no lymphadenopathy in the cervical or supraclavicular areas.   Heart: Regular rate and rhythm.   Lungs: Clear to auscultation bilaterally.    Abdomen: Bowel sounds present, soft, nontender with no palpable hepatosplenomegaly or masses. Ostomy present in left lower abdomen, stoma not visualized due to appliance.   Extremities: No lower extremity edema noted bilaterally.   Neuro: Cranial nerves II through XII are grossly intact.  Skin: No rashes, petechiae, or bruising noted on exposed skin.        LABS  Most Recent 3 CBC's:  Recent Labs   Lab Test 10/02/23  1303 08/31/23  1346 08/29/23  0809   WBC 8.4 8.2 8.2   HGB 13.5 12.7* 12.9*   MCV 90 88 92    247 213    Most Recent 3 BMP's:  Recent Labs   Lab Test 08/31/23  1346 08/29/23  0809 08/11/23  0701    137 139   POTASSIUM 4.3 4.4 4.3   CHLORIDE 101 103 106   CO2 25 25 23   BUN 17.9 14.5 20.2*   CR 0.80 0.80 0.87   ANIONGAP 11 9 10   JEFESRON 9.1 8.9 9.1   * 110* 110*    Most Recent 2 LFT's:  Recent Labs   Lab Test 09/07/23  0722 08/31/23  1346   AST 19 22   ALT 9 10   ALKPHOS 151* 89   BILITOTAL 0.2 0.3       Latest Reference Range & Units 07/14/23 07:52   Protein Albumin Urine Negative mg/dL Negative     I reviewed the above labs today.      ASSESSMENT AND PLAN   Metastatic rectal cancer  - Progression on regorafenib. Started FOLFOX/Avastin 6/7/23. Oxaliplatin removed with cycle 5 due to acute kidney injury and hematuria following cycle 4. Y-90 radioembolization completed 9/7. Tolerating treatment well, no concerns today.     -Proceed with cycle 7 today. Bevacizumab still on hold due to Y-90 procedure.  -Requested Covid-19 vaccine today, will give with infusion.   -Patient going out of town next week, will return for CT scan 10/24 and follow up with Dr. Snow on 10/27 with Cycle 8.     LIZ  -Well controlled with premeds,  IV PRN Ativan dose and zofran PRN.     Rectal pain/pressure  Resolved. Constipation during chemotherapy, resolves without intervention.       30 minutes spent on the date of the encounter doing chart review, review of test results, interpretation of tests, patient  visit, and documentation       FLORA Albright CNP

## 2023-10-02 NOTE — NURSING NOTE
"Oncology Rooming Note    October 2, 2023 1:14 PM   Roman Escalante is a 48 year old male who presents for:    Chief Complaint   Patient presents with    Port Draw     Power needle. Heparin locked,vitals checked    Colorectal Cancer     Rectal adenocarcinoma metastatic to lung      Initial Vitals: /81   Pulse 79   Temp 98.7  F (37.1  C)   Resp 16   Wt 93.6 kg (206 lb 4.8 oz)   SpO2 99%   BMI 28.77 kg/m   Estimated body mass index is 28.77 kg/m  as calculated from the following:    Height as of 8/29/23: 1.803 m (5' 11\").    Weight as of this encounter: 93.6 kg (206 lb 4.8 oz). Body surface area is 2.17 meters squared.  No Pain (0) Comment: Data Unavailable   No LMP for male patient.  Allergies reviewed: Yes  Medications reviewed: Yes    Medications: Medication refills not needed today.  Pharmacy name entered into EPIC:    [facility] - [place], [place] - [address] [place] [address] [phone]  [facility] - [place], [place] - [address] [place] [address] DRUG STORE #[id number] [address]    Clinical concerns: none       Niyah Birch              "

## 2023-10-02 NOTE — PROGRESS NOTES
[facility] - Medical Oncology Follow Up Note  10/02/2023    HPI:   Patient developed rectal bleeding in 2020.  In January 2021 CT showed focal wall thickening in the upper rectum, multiple pulmonary nodules bilaterally suspicious for metastatic disease.  Underwent FNA of the right upper lobe lesion which was consistent with adenocarcinoma of the colon.  He was treated with FOLFOX from 2/20/2021 through 5/20/2021.  Avastin was added.  Had an infusion reaction to oxaliplatin 6/2021.  7/2021- 12/2021 was on 5-FU alone.  Developed progression.  12/2021- 9/2022 was on FOLFIRI.  11/2021 started Lonsurf. All of this was done with outside oncologist.     Met with Dr. Snow on 2/3/2022 to establish care. Recommended regorafenib.     There have been questions of a parasitic infection. Please see previous notes from [facility] oncologist for further details. Was seen by ID at the  on 2/27, no concerns for parasitic infection    Started regorafenib on 3/2/2023. Progression noted 5/19/23. Plan to start FOLFOX/Avastin and send out CARIS testing to evaluate for other treatment options. Cycle 1 was given with oxaliplatin desensitization, 5FU, Avastin held due to increased urine protein.     Following cycle 4, patient was admitted with hematuria and acute kidney injury.    Oxaliplatin was dropped with Cycle 5.     Y-90 radioembolization 9/7.    Presents today for evaluation prior to resuming treatment with 5FU.      Interval History:   Feels well today, has no concerns.     Following Y-90 procedure, had a few days of fatigue and mild pain. Appetite has been good, able to gain a few pounds recently. No nausea or vomiting. Denies any bowel or bladder concerns. Was evaluated by wound care RN today due to concerns of mucous drainage from ostomy.     Denies fever, chills, or concerns for infection. No shortness of breath, chest pain or cough.     No bleeding concerns. No rashes or skin concerns.       Past Medical History:    Diagnosis Date    Cancer (H) 1/12/21    Colorectal cacer mast, to lungs, and liver        Allergies   Allergen Reactions    Oxaliplatin Shortness Of Breath, Nausea and Vomiting and Other (See Comments)     Patient had HSR to Oxaliplatin on cycle 8 of FOLFOX chemotherapy. Sent to ER for continued monitoring.  Patient had HSR to Oxaliplatin on cycle 8 of FOLFOX chemotherapy. Sent to ER for continued monitoring.      Blood-Group Specific Substance Other (See Comments)     Patient has reactivity Suggestive of a Warm auto antibody. Blood products may be delayed. Draw patient 24 hours prior to transfusion. Draw one red top and two purple top tubes for all type and screen orders.  Patient has reactivity Suggestive of a Warm auto antibody. Blood products may be delayed. Draw patient 24 hours prior to transfusion. Draw one red top and two purple top tubes for all type and screen orders.       Physical Exam:  /81   Pulse 79   Temp 98.7  F (37.1  C)   Resp 16   Wt 93.6 kg (206 lb 4.8 oz)   SpO2 99%   BMI 28.77 kg/m    Wt Readings from Last 10 Encounters:   10/02/23 93.6 kg (206 lb 4.8 oz)   09/14/23 90.3 kg (199 lb)   09/07/23 90.1 kg (198 lb 10.2 oz)   08/31/23 90.2 kg (198 lb 14.4 oz)   08/29/23 90.4 kg (199 lb 4.7 oz)   08/22/23 88.4 kg (194 lb 12.8 oz)   08/11/23 90.1 kg (198 lb 9.6 oz)   07/30/23 89.2 kg (196 lb 9.6 oz)   07/27/23 88.5 kg (195 lb)   07/14/23 87.6 kg (193 lb 3.2 oz)   General: The patient is a pleasant male in no acute distress.  HEENT: EOMI. Sclerae are anicteric.   Lymph: Neck is supple with no lymphadenopathy in the cervical or supraclavicular areas.   Heart: Regular rate and rhythm.   Lungs: Clear to auscultation bilaterally.   Abdomen: Bowel sounds present, soft, nontender with no palpable hepatosplenomegaly or masses. Ostomy present in left lower abdomen, stoma not visualized due to appliance.   Extremities: No lower extremity edema noted bilaterally.   Neuro: Cranial nerves II through  XII are grossly intact.  Skin: No rashes, petechiae, or bruising noted on exposed skin.        LABS  Most Recent 3 CBC's:  Recent Labs   Lab Test 10/02/23  1303 08/31/23  1346 08/29/23  0809   WBC 8.4 8.2 8.2   HGB 13.5 12.7* 12.9*   MCV 90 88 92    247 213    Most Recent 3 BMP's:  Recent Labs   Lab Test 08/31/23  1346 08/29/23  0809 08/11/23  0701    137 139   POTASSIUM 4.3 4.4 4.3   CHLORIDE 101 103 106   CO2 25 25 23   BUN 17.9 14.5 20.2*   CR 0.80 0.80 0.87   ANIONGAP 11 9 10   JEFERSON 9.1 8.9 9.1   * 110* 110*    Most Recent 2 LFT's:  Recent Labs   Lab Test 09/07/23  0722 08/31/23  1346   AST 19 22   ALT 9 10   ALKPHOS 151* 89   BILITOTAL 0.2 0.3       Latest Reference Range & Units 07/14/23 07:52   Protein Albumin Urine Negative mg/dL Negative     I reviewed the above labs today.      ASSESSMENT AND PLAN   Metastatic rectal cancer  - Progression on regorafenib. Started FOLFOX/Avastin 6/7/23. Oxaliplatin removed with cycle 5 due to acute kidney injury and hematuria following cycle 4. Y-90 radioembolization completed 9/7. Tolerating treatment well, no concerns today.     -Proceed with cycle 7 today. Bevacizumab still on hold due to Y-90 procedure.  -Requested Covid-19 vaccine today, will give with infusion.   -Patient going out of town next week, will return for CT scan 10/24 and follow up with Dr. Snow on 10/27 with Cycle 8.     LIZ  -Well controlled with premeds,  IV PRN Ativan dose and zofran PRN.     Rectal pain/pressure  Resolved. Constipation during chemotherapy, resolves without intervention.       30 minutes spent on the date of the encounter doing chart review, review of test results, interpretation of tests, patient visit, and documentation       FLORA Albright CNP

## 2023-10-02 NOTE — PATIENT INSTRUCTIONS
[facility] Triage and after hours / weekends / holidays:  [phone]    Please call the triage or after hours line if you experience a temperature greater than or equal to 100.4, shaking chills, have uncontrolled nausea, vomiting and/or diarrhea, dizziness, shortness of breath, chest pain, bleeding, unexplained bruising, or if you have any other new/concerning symptoms, questions or concerns.      If you are having any concerning symptoms or wish to speak to a provider before your next infusion visit, please call triage to notify them so we can adequately serve you.     If you need a refill on a narcotic prescription or other medication, please call before your infusion appointment.                October 2023 Sunday Monday Tuesday Wednesday Thursday Friday Saturday   1     2    [facility] RETURN OSTOMY NURSE  12:15 PM   (60 min.)   Emmy Alfonso, RN   [facility] [place]    LAB CENTRAL  12:45 PM   (15 min.)   [facility] LAB DRAW   [facility]    RETURN CCSL   1:00 PM   (45 min.)   Rebeca Killian APRN CNP   [facility]    ONC INFUSION 2 HR (120 MIN)   2:00 PM   (120 min.)    ONC INFUSION NURSE   [facility] 3     4     5     6     7       8     9     10     11     12     13     14       15     16     17     18     19     20     21       22     23     24    LAB CENTRAL  12:00 PM   (15 min.)   [facility] LAB DRAW   [facility]    CT CHEST/ABDOMEN/PELVIS W  12:30 PM   (20 min.)   [id number]   [facility] [place] 25     26     27    LAB CENTRAL  12:15 PM   (15 min.)   [facility] LAB DRAW   [facility]    RETURN CCSL  12:45 PM   (30 min.)   Polo Snow MD   [facility]    ONC INFUSION 2 HR (120 MIN)   2:00 PM   (120 min.)    ONC INFUSION NURSE   [facility]  28       29     30    RETURN CCSL   7:15 AM   (30 min.)   Fab Villalta MD   [facility] 31 November 2023 Sunday Monday Tuesday Wednesday Thursday Friday Saturday                  1     2     3     4       5     6     7     8     9     10     11       12     13     14    LAB CENTRAL  12:30 PM   (15 min.)    [facility] DRAW   [facility]    RETURN CCSL  12:45 PM   (45 min.)   Rebeca Killian APRN CNP   [facility]    ONC INFUSION 2 HR (120 MIN)   2:00 PM   (120 min.)   [facility] ONC INFUSION NURSE   [facility] 15     16     17     18       19     20     21     22     23     24     25       26     27     28    RETURN CCSL  12:45 PM   (45 min.)   Rebeca Killian APRN CNP   [facility]    LAB CENTRAL  12:45 PM   (15 min.)   [facility] LAB DRAW   [facility]    ONC INFUSION 2 HR (120 MIN)   2:00 PM   (120 min.)    ONC INFUSION NURSE   [facility] 29     30                               Lab Results:  Recent Results (from the past 12 hour(s))   Protein qualitative urine    Collection Time: 10/02/23  1:03 PM   Result Value Ref Range    Protein Albumin Urine Negative Negative mg/dL   Comprehensive metabolic panel    Collection Time: 10/02/23  1:03 PM   Result Value Ref Range    Sodium 138 135 - 145 mmol/L    Potassium 4.1 3.4 - 5.3 mmol/L    Carbon Dioxide (CO2) 24 22 - 29 mmol/L    Anion Gap 12 7 - 15 mmol/L    Urea Nitrogen 10.2 6.0 - 20.0 mg/dL    Creatinine 0.79 0.67 - 1.17 mg/dL    GFR Estimate >90 >60 mL/min/1.73m2    Calcium 9.0 8.6 - 10.0 mg/dL    Chloride 102 98 - 107 mmol/L    Glucose 101 (H) 70 - 99 mg/dL    Alkaline Phosphatase 74 40 - 129 U/L    AST 22 0 - 45 U/L    ALT 11 0 - 70 U/L    Protein Total 7.4 6.4 - 8.3 g/dL    Albumin 4.2 3.5 - 5.2 g/dL    Bilirubin Total 0.3  <=1.2 mg/dL   CBC with platelets and differential    Collection Time: 10/02/23  1:03 PM   Result Value Ref Range    WBC Count 8.4 4.0 - 11.0 10e3/uL    RBC Count 4.59 4.40 - 5.90 10e6/uL    Hemoglobin 13.5 13.3 - 17.7 g/dL    Hematocrit 41.3 40.0 - 53.0 %    MCV 90 78 - 100 fL    MCH 29.4 26.5 - 33.0 pg    MCHC 32.7 31.5 - 36.5 g/dL    RDW 15.3 (H) 10.0 - 15.0 %    Platelet Count 228 150 - 450 10e3/uL    % Neutrophils 80 %    % Lymphocytes 7 %    % Monocytes 10 %    Mids % (Monos, Eos, Basos)      % Eosinophils 3 %    % Basophils 0 %    % Immature Granulocytes 0 %    NRBCs per 100 WBC 0 <1 /100    Absolute Neutrophils 6.8 1.6 - 8.3 10e3/uL    Absolute Lymphocytes 0.6 (L) 0.8 - 5.3 10e3/uL    Absolute Monocytes 0.8 0.0 - 1.3 10e3/uL    Mids Abs (Monos, Eos, Basos)      Absolute Eosinophils 0.2 0.0 - 0.7 10e3/uL    Absolute Basophils 0.0 0.0 - 0.2 10e3/uL    Absolute Immature Granulocytes 0.0 <=0.4 10e3/uL    Absolute NRBCs 0.0 10e3/uL

## 2023-10-02 NOTE — NURSING NOTE
Chief Complaint   Patient presents with    Port Draw     Power needle. Heparin locked,vitals checked     Millie Ocampo RN on 10/2/2023 at 1:06 PM

## 2023-10-24 ENCOUNTER — LAB (OUTPATIENT)
Dept: LAB | Facility: CLINIC | Age: 48
End: 2023-10-24
Attending: STUDENT IN AN ORGANIZED HEALTH CARE EDUCATION/TRAINING PROGRAM
Payer: COMMERCIAL

## 2023-10-24 ENCOUNTER — ANCILLARY PROCEDURE (OUTPATIENT)
Dept: CT IMAGING | Facility: CLINIC | Age: 48
End: 2023-10-24
Attending: STUDENT IN AN ORGANIZED HEALTH CARE EDUCATION/TRAINING PROGRAM
Payer: COMMERCIAL

## 2023-10-24 DIAGNOSIS — C78.00 RECTAL ADENOCARCINOMA METASTATIC TO LUNG (H): ICD-10-CM

## 2023-10-24 DIAGNOSIS — C18.9 PRIMARY ADENOCARCINOMA OF COLON (H): ICD-10-CM

## 2023-10-24 DIAGNOSIS — C20 RECTAL ADENOCARCINOMA METASTATIC TO LUNG (H): ICD-10-CM

## 2023-10-24 DIAGNOSIS — C78.7 COLON CANCER METASTASIZED TO LIVER (H): ICD-10-CM

## 2023-10-24 DIAGNOSIS — C20 RECTAL ADENOCARCINOMA METASTATIC TO LUNG (H): Primary | ICD-10-CM

## 2023-10-24 DIAGNOSIS — C18.9 COLON CANCER METASTASIZED TO LIVER (H): ICD-10-CM

## 2023-10-24 DIAGNOSIS — C78.00 RECTAL ADENOCARCINOMA METASTATIC TO LUNG (H): Primary | ICD-10-CM

## 2023-10-24 LAB
ALBUMIN SERPL BCG-MCNC: 4.4 G/DL (ref 3.5–5.2)
ALP SERPL-CCNC: 103 U/L (ref 40–129)
ALT SERPL W P-5'-P-CCNC: 8 U/L (ref 0–70)
ANION GAP SERPL CALCULATED.3IONS-SCNC: 13 MMOL/L (ref 7–15)
AST SERPL W P-5'-P-CCNC: 31 U/L (ref 0–45)
BASOPHILS # BLD AUTO: 0.1 10E3/UL (ref 0–0.2)
BASOPHILS NFR BLD AUTO: 0 %
BILIRUB DIRECT SERPL-MCNC: NORMAL MG/DL
BILIRUB SERPL-MCNC: 0.5 MG/DL
BUN SERPL-MCNC: 13.6 MG/DL (ref 6–20)
CALCIUM SERPL-MCNC: 9.4 MG/DL (ref 8.6–10)
CHLORIDE SERPL-SCNC: 99 MMOL/L (ref 98–107)
CREAT SERPL-MCNC: 0.81 MG/DL (ref 0.67–1.17)
DEPRECATED HCO3 PLAS-SCNC: 22 MMOL/L (ref 22–29)
EGFRCR SERPLBLD CKD-EPI 2021: >90 ML/MIN/1.73M2
EOSINOPHIL # BLD AUTO: 0.1 10E3/UL (ref 0–0.7)
EOSINOPHIL NFR BLD AUTO: 1 %
ERYTHROCYTE [DISTWIDTH] IN BLOOD BY AUTOMATED COUNT: 15.5 % (ref 10–15)
GLUCOSE SERPL-MCNC: 101 MG/DL (ref 70–99)
HCT VFR BLD AUTO: 45.9 % (ref 40–53)
HGB BLD-MCNC: 15.1 G/DL (ref 13.3–17.7)
IMM GRANULOCYTES # BLD: 0.1 10E3/UL
IMM GRANULOCYTES NFR BLD: 0 %
INR PPP: 1.11 (ref 0.85–1.15)
LYMPHOCYTES # BLD AUTO: 0.6 10E3/UL (ref 0.8–5.3)
LYMPHOCYTES NFR BLD AUTO: 6 %
MCH RBC QN AUTO: 28.7 PG (ref 26.5–33)
MCHC RBC AUTO-ENTMCNC: 32.9 G/DL (ref 31.5–36.5)
MCV RBC AUTO: 87 FL (ref 78–100)
MONOCYTES # BLD AUTO: 1 10E3/UL (ref 0–1.3)
MONOCYTES NFR BLD AUTO: 9 %
NEUTROPHILS # BLD AUTO: 9.3 10E3/UL (ref 1.6–8.3)
NEUTROPHILS NFR BLD AUTO: 84 %
NRBC # BLD AUTO: 0 10E3/UL
NRBC BLD AUTO-RTO: 0 /100
PLATELET # BLD AUTO: 286 10E3/UL (ref 150–450)
POTASSIUM SERPL-SCNC: 4.8 MMOL/L (ref 3.4–5.3)
PROT SERPL-MCNC: 8.1 G/DL (ref 6.4–8.3)
RBC # BLD AUTO: 5.26 10E6/UL (ref 4.4–5.9)
SODIUM SERPL-SCNC: 134 MMOL/L (ref 135–145)
WBC # BLD AUTO: 11.1 10E3/UL (ref 4–11)

## 2023-10-24 PROCEDURE — 85610 PROTHROMBIN TIME: CPT

## 2023-10-24 PROCEDURE — 80053 COMPREHEN METABOLIC PANEL: CPT

## 2023-10-24 PROCEDURE — 36591 DRAW BLOOD OFF VENOUS DEVICE: CPT

## 2023-10-24 PROCEDURE — 82248 BILIRUBIN DIRECT: CPT

## 2023-10-24 PROCEDURE — 74177 CT ABD & PELVIS W/CONTRAST: CPT | Performed by: RADIOLOGY

## 2023-10-24 PROCEDURE — 85004 AUTOMATED DIFF WBC COUNT: CPT

## 2023-10-24 PROCEDURE — 71260 CT THORAX DX C+: CPT | Performed by: RADIOLOGY

## 2023-10-24 RX ORDER — IOPAMIDOL 755 MG/ML
101 INJECTION, SOLUTION INTRAVASCULAR ONCE
Status: COMPLETED | OUTPATIENT
Start: 2023-10-24 | End: 2023-10-24

## 2023-10-24 RX ORDER — HEPARIN SODIUM (PORCINE) LOCK FLUSH IV SOLN 100 UNIT/ML 100 UNIT/ML
500 SOLUTION INTRAVENOUS ONCE
Status: COMPLETED | OUTPATIENT
Start: 2023-10-24 | End: 2023-10-24

## 2023-10-24 RX ADMIN — HEPARIN SODIUM (PORCINE) LOCK FLUSH IV SOLN 100 UNIT/ML 500 UNITS: 100 SOLUTION at 12:45

## 2023-10-24 RX ADMIN — IOPAMIDOL 101 ML: 755 INJECTION, SOLUTION INTRAVASCULAR at 12:37

## 2023-10-24 NOTE — NURSING NOTE
Chief Complaint   Patient presents with    Port Draw    Labs Only     Port accessed by RN, labs collected, line flushed with saline and heparin.       Amarilys Luis, RN

## 2023-10-25 ENCOUNTER — MYC MEDICAL ADVICE (OUTPATIENT)
Dept: ONCOLOGY | Facility: CLINIC | Age: 48
End: 2023-10-25
Payer: COMMERCIAL

## 2023-10-25 NOTE — TELEPHONE ENCOUNTER
"Oncology Nurse Triage - Reporting Symptoms  Situation:   Roman reporting the following symptoms: \"issues clearing out my bowels.\"    Background:   Treating Provider: Dr. Snow    Date of last office visit: 10/2/23 with Rebeca Killian    Recent treatments: Yes: 10/2/23: Cycle 7 Day 1 Leucovorin, Fluorouracil push and pump connect.       Assessment   Roman reports that he was constipated last week, but took ducolax and liquid magnesium and \"got cleaned out.\" Pt reports he also reduced oral intake/food to try to completely clean out. He is drinking a lot of water.    This week, he has noticed pressure and pain that worsens as the day goes on. He wakes up feeling pretty good but towards evening feels this pressure and pain in his rectum.   Pt reports that he had this 6 mo ago and then it ended.     Typically has a daily BM in a.m.   Still having daily BM of varying amounts.  Continues to take stool softener.  Stools are very soft since last week. Stool is brown and sometimes darker. Last week, he had blood in the mucus when passing stool. This has improved and now the mucus is clear and white with occasional red spots.   Pt does not think the pain/pressure are associated w/stool passing through.  Feels a restlessness when lays around tool long.  Once in a while he gets a sharp pain in abd that goes straight up from his stoma or straight across his belly button from stoma about 6 inches.  Passing a lot of gas.    Pt states that he feels better once he has his chemotherapy.  Takes 1-2 oxycodone/day, mostly at night so he can sleep.    Pt states he has an apt on 10/27 and would like to discuss this with Dr. Snow at the time. He is not looking for any specific advice at this time.    Recommendations:   Advised pt to continue to monitor sx and call back with worsening sx especially black tarry stools or increased bleeding.  Pt voiced understanding  Message routed to Dr. Snow who will be seeing pt next week, Rebeca " ADELINA Killian and Nilda Nieto RNCC

## 2023-10-27 ENCOUNTER — APPOINTMENT (OUTPATIENT)
Dept: LAB | Facility: CLINIC | Age: 48
End: 2023-10-27
Attending: STUDENT IN AN ORGANIZED HEALTH CARE EDUCATION/TRAINING PROGRAM
Payer: COMMERCIAL

## 2023-10-27 ENCOUNTER — TELEPHONE (OUTPATIENT)
Dept: PHARMACY | Facility: CLINIC | Age: 48
End: 2023-10-27

## 2023-10-27 ENCOUNTER — INFUSION THERAPY VISIT (OUTPATIENT)
Dept: ONCOLOGY | Facility: CLINIC | Age: 48
End: 2023-10-27
Attending: STUDENT IN AN ORGANIZED HEALTH CARE EDUCATION/TRAINING PROGRAM
Payer: COMMERCIAL

## 2023-10-27 ENCOUNTER — TELEPHONE (OUTPATIENT)
Dept: ONCOLOGY | Facility: CLINIC | Age: 48
End: 2023-10-27

## 2023-10-27 VITALS
RESPIRATION RATE: 16 BRPM | DIASTOLIC BLOOD PRESSURE: 76 MMHG | TEMPERATURE: 97.8 F | OXYGEN SATURATION: 98 % | SYSTOLIC BLOOD PRESSURE: 114 MMHG | WEIGHT: 207.2 LBS | BODY MASS INDEX: 28.9 KG/M2 | HEART RATE: 73 BPM

## 2023-10-27 DIAGNOSIS — C78.00 RECTAL ADENOCARCINOMA METASTATIC TO LUNG (H): Primary | ICD-10-CM

## 2023-10-27 DIAGNOSIS — C20 RECTAL ADENOCARCINOMA METASTATIC TO LUNG (H): Primary | ICD-10-CM

## 2023-10-27 DIAGNOSIS — C18.9 PRIMARY ADENOCARCINOMA OF COLON (H): ICD-10-CM

## 2023-10-27 LAB
ALBUMIN SERPL BCG-MCNC: 4.3 G/DL (ref 3.5–5.2)
ALBUMIN UR-MCNC: NEGATIVE MG/DL
ALP SERPL-CCNC: 91 U/L (ref 40–129)
ALT SERPL W P-5'-P-CCNC: 9 U/L (ref 0–70)
ANION GAP SERPL CALCULATED.3IONS-SCNC: 10 MMOL/L (ref 7–15)
AST SERPL W P-5'-P-CCNC: 29 U/L (ref 0–45)
BASOPHILS # BLD AUTO: 0.1 10E3/UL (ref 0–0.2)
BASOPHILS NFR BLD AUTO: 1 %
BILIRUB DIRECT SERPL-MCNC: <0.2 MG/DL (ref 0–0.3)
BILIRUB SERPL-MCNC: 0.5 MG/DL
BILIRUB SERPL-MCNC: 0.5 MG/DL
BUN SERPL-MCNC: 15.1 MG/DL (ref 6–20)
CALCIUM SERPL-MCNC: 9.2 MG/DL (ref 8.6–10)
CHLORIDE SERPL-SCNC: 103 MMOL/L (ref 98–107)
CREAT SERPL-MCNC: 0.82 MG/DL (ref 0.67–1.17)
DEPRECATED HCO3 PLAS-SCNC: 24 MMOL/L (ref 22–29)
EGFRCR SERPLBLD CKD-EPI 2021: >90 ML/MIN/1.73M2
EOSINOPHIL # BLD AUTO: 0.3 10E3/UL (ref 0–0.7)
EOSINOPHIL NFR BLD AUTO: 3 %
ERYTHROCYTE [DISTWIDTH] IN BLOOD BY AUTOMATED COUNT: 15.3 % (ref 10–15)
GLUCOSE SERPL-MCNC: 113 MG/DL (ref 70–99)
HCT VFR BLD AUTO: 43.7 % (ref 40–53)
HGB BLD-MCNC: 14.6 G/DL (ref 13.3–17.7)
IMM GRANULOCYTES # BLD: 0.1 10E3/UL
IMM GRANULOCYTES NFR BLD: 0 %
LYMPHOCYTES # BLD AUTO: 0.6 10E3/UL (ref 0.8–5.3)
LYMPHOCYTES NFR BLD AUTO: 6 %
MCH RBC QN AUTO: 29 PG (ref 26.5–33)
MCHC RBC AUTO-ENTMCNC: 33.4 G/DL (ref 31.5–36.5)
MCV RBC AUTO: 87 FL (ref 78–100)
MONOCYTES # BLD AUTO: 1.1 10E3/UL (ref 0–1.3)
MONOCYTES NFR BLD AUTO: 9 %
NEUTROPHILS # BLD AUTO: 9.2 10E3/UL (ref 1.6–8.3)
NEUTROPHILS NFR BLD AUTO: 81 %
NRBC # BLD AUTO: 0 10E3/UL
NRBC BLD AUTO-RTO: 0 /100
PLATELET # BLD AUTO: 289 10E3/UL (ref 150–450)
POTASSIUM SERPL-SCNC: 5 MMOL/L (ref 3.4–5.3)
PROT SERPL-MCNC: 7.7 G/DL (ref 6.4–8.3)
RBC # BLD AUTO: 5.04 10E6/UL (ref 4.4–5.9)
SODIUM SERPL-SCNC: 137 MMOL/L (ref 135–145)
WBC # BLD AUTO: 11.3 10E3/UL (ref 4–11)

## 2023-10-27 PROCEDURE — 82248 BILIRUBIN DIRECT: CPT | Performed by: STUDENT IN AN ORGANIZED HEALTH CARE EDUCATION/TRAINING PROGRAM

## 2023-10-27 PROCEDURE — 36415 COLL VENOUS BLD VENIPUNCTURE: CPT | Performed by: STUDENT IN AN ORGANIZED HEALTH CARE EDUCATION/TRAINING PROGRAM

## 2023-10-27 PROCEDURE — 96413 CHEMO IV INFUSION 1 HR: CPT

## 2023-10-27 PROCEDURE — 80053 COMPREHEN METABOLIC PANEL: CPT | Performed by: STUDENT IN AN ORGANIZED HEALTH CARE EDUCATION/TRAINING PROGRAM

## 2023-10-27 PROCEDURE — 250N000011 HC RX IP 250 OP 636: Mod: JZ | Performed by: STUDENT IN AN ORGANIZED HEALTH CARE EDUCATION/TRAINING PROGRAM

## 2023-10-27 PROCEDURE — 258N000003 HC RX IP 258 OP 636: Performed by: STUDENT IN AN ORGANIZED HEALTH CARE EDUCATION/TRAINING PROGRAM

## 2023-10-27 PROCEDURE — 81003 URINALYSIS AUTO W/O SCOPE: CPT | Performed by: STUDENT IN AN ORGANIZED HEALTH CARE EDUCATION/TRAINING PROGRAM

## 2023-10-27 PROCEDURE — 99215 OFFICE O/P EST HI 40 MIN: CPT | Performed by: STUDENT IN AN ORGANIZED HEALTH CARE EDUCATION/TRAINING PROGRAM

## 2023-10-27 PROCEDURE — G0463 HOSPITAL OUTPT CLINIC VISIT: HCPCS | Performed by: STUDENT IN AN ORGANIZED HEALTH CARE EDUCATION/TRAINING PROGRAM

## 2023-10-27 PROCEDURE — 85004 AUTOMATED DIFF WBC COUNT: CPT | Performed by: STUDENT IN AN ORGANIZED HEALTH CARE EDUCATION/TRAINING PROGRAM

## 2023-10-27 RX ORDER — HEPARIN SODIUM (PORCINE) LOCK FLUSH IV SOLN 100 UNIT/ML 100 UNIT/ML
5 SOLUTION INTRAVENOUS
Status: CANCELLED | OUTPATIENT
Start: 2023-11-28

## 2023-10-27 RX ORDER — ALBUTEROL SULFATE 0.83 MG/ML
2.5 SOLUTION RESPIRATORY (INHALATION)
Status: CANCELLED | OUTPATIENT
Start: 2023-11-14

## 2023-10-27 RX ORDER — HEPARIN SODIUM (PORCINE) LOCK FLUSH IV SOLN 100 UNIT/ML 100 UNIT/ML
5 SOLUTION INTRAVENOUS
Status: DISCONTINUED | OUTPATIENT
Start: 2023-10-27 | End: 2023-10-27 | Stop reason: HOSPADM

## 2023-10-27 RX ORDER — HEPARIN SODIUM,PORCINE 10 UNIT/ML
5-20 VIAL (ML) INTRAVENOUS DAILY PRN
Status: CANCELLED | OUTPATIENT
Start: 2023-11-14

## 2023-10-27 RX ORDER — METHYLPREDNISOLONE SODIUM SUCCINATE 125 MG/2ML
125 INJECTION, POWDER, LYOPHILIZED, FOR SOLUTION INTRAMUSCULAR; INTRAVENOUS
Status: CANCELLED
Start: 2023-11-14

## 2023-10-27 RX ORDER — IBUPROFEN 200 MG
200 TABLET ORAL EVERY 6 HOURS PRN
COMMUNITY
End: 2024-02-05

## 2023-10-27 RX ORDER — HEPARIN SODIUM (PORCINE) LOCK FLUSH IV SOLN 100 UNIT/ML 100 UNIT/ML
5 SOLUTION INTRAVENOUS
Status: CANCELLED | OUTPATIENT
Start: 2023-10-31

## 2023-10-27 RX ORDER — LORAZEPAM 2 MG/ML
0.5 INJECTION INTRAMUSCULAR EVERY 4 HOURS PRN
Status: CANCELLED | OUTPATIENT
Start: 2023-11-14

## 2023-10-27 RX ORDER — HEPARIN SODIUM,PORCINE 10 UNIT/ML
5-20 VIAL (ML) INTRAVENOUS DAILY PRN
Status: CANCELLED | OUTPATIENT
Start: 2023-11-28

## 2023-10-27 RX ORDER — EPINEPHRINE 1 MG/ML
0.3 INJECTION, SOLUTION INTRAMUSCULAR; SUBCUTANEOUS EVERY 5 MIN PRN
Status: CANCELLED | OUTPATIENT
Start: 2023-11-14

## 2023-10-27 RX ORDER — MEPERIDINE HYDROCHLORIDE 25 MG/ML
25 INJECTION INTRAMUSCULAR; INTRAVENOUS; SUBCUTANEOUS EVERY 30 MIN PRN
Status: CANCELLED | OUTPATIENT
Start: 2023-11-14

## 2023-10-27 RX ORDER — METHYLPREDNISOLONE SODIUM SUCCINATE 125 MG/2ML
125 INJECTION, POWDER, LYOPHILIZED, FOR SOLUTION INTRAMUSCULAR; INTRAVENOUS
Status: CANCELLED
Start: 2023-11-28

## 2023-10-27 RX ORDER — ALBUTEROL SULFATE 90 UG/1
1-2 AEROSOL, METERED RESPIRATORY (INHALATION)
Status: CANCELLED
Start: 2023-11-28

## 2023-10-27 RX ORDER — DIPHENHYDRAMINE HYDROCHLORIDE 50 MG/ML
50 INJECTION INTRAMUSCULAR; INTRAVENOUS
Status: CANCELLED
Start: 2023-10-31

## 2023-10-27 RX ORDER — HEPARIN SODIUM (PORCINE) LOCK FLUSH IV SOLN 100 UNIT/ML 100 UNIT/ML
5 SOLUTION INTRAVENOUS
Status: CANCELLED | OUTPATIENT
Start: 2023-11-14

## 2023-10-27 RX ORDER — TRIFLURIDINE AND TIPIRACIL 15; 6.14 MG/1; MG/1
35 TABLET, FILM COATED ORAL 2 TIMES DAILY
Qty: 100 TABLET | Refills: 0 | Status: SHIPPED | OUTPATIENT
Start: 2023-10-27 | End: 2023-11-06

## 2023-10-27 RX ORDER — HEPARIN SODIUM,PORCINE 10 UNIT/ML
5-20 VIAL (ML) INTRAVENOUS DAILY PRN
Status: CANCELLED | OUTPATIENT
Start: 2023-10-31

## 2023-10-27 RX ORDER — DIPHENHYDRAMINE HYDROCHLORIDE 50 MG/ML
50 INJECTION INTRAMUSCULAR; INTRAVENOUS
Status: CANCELLED
Start: 2023-11-14

## 2023-10-27 RX ORDER — EPINEPHRINE 1 MG/ML
0.3 INJECTION, SOLUTION INTRAMUSCULAR; SUBCUTANEOUS EVERY 5 MIN PRN
Status: CANCELLED | OUTPATIENT
Start: 2023-11-28

## 2023-10-27 RX ORDER — METHYLPREDNISOLONE SODIUM SUCCINATE 125 MG/2ML
125 INJECTION, POWDER, LYOPHILIZED, FOR SOLUTION INTRAMUSCULAR; INTRAVENOUS
Status: CANCELLED
Start: 2023-10-31

## 2023-10-27 RX ORDER — MEPERIDINE HYDROCHLORIDE 25 MG/ML
25 INJECTION INTRAMUSCULAR; INTRAVENOUS; SUBCUTANEOUS EVERY 30 MIN PRN
Status: CANCELLED | OUTPATIENT
Start: 2023-10-31

## 2023-10-27 RX ORDER — MEPERIDINE HYDROCHLORIDE 25 MG/ML
25 INJECTION INTRAMUSCULAR; INTRAVENOUS; SUBCUTANEOUS EVERY 30 MIN PRN
Status: CANCELLED | OUTPATIENT
Start: 2023-11-28

## 2023-10-27 RX ORDER — ALBUTEROL SULFATE 0.83 MG/ML
2.5 SOLUTION RESPIRATORY (INHALATION)
Status: CANCELLED | OUTPATIENT
Start: 2023-11-28

## 2023-10-27 RX ORDER — HEPARIN SODIUM (PORCINE) LOCK FLUSH IV SOLN 100 UNIT/ML 100 UNIT/ML
5 SOLUTION INTRAVENOUS DAILY PRN
Status: DISCONTINUED | OUTPATIENT
Start: 2023-10-27 | End: 2023-10-27 | Stop reason: HOSPADM

## 2023-10-27 RX ORDER — DIPHENHYDRAMINE HYDROCHLORIDE 50 MG/ML
50 INJECTION INTRAMUSCULAR; INTRAVENOUS
Status: CANCELLED
Start: 2023-11-28

## 2023-10-27 RX ORDER — ALBUTEROL SULFATE 90 UG/1
1-2 AEROSOL, METERED RESPIRATORY (INHALATION)
Status: CANCELLED
Start: 2023-10-31

## 2023-10-27 RX ORDER — EPINEPHRINE 1 MG/ML
0.3 INJECTION, SOLUTION INTRAMUSCULAR; SUBCUTANEOUS EVERY 5 MIN PRN
Status: CANCELLED | OUTPATIENT
Start: 2023-10-31

## 2023-10-27 RX ORDER — ALBUTEROL SULFATE 0.83 MG/ML
2.5 SOLUTION RESPIRATORY (INHALATION)
Status: CANCELLED | OUTPATIENT
Start: 2023-10-31

## 2023-10-27 RX ORDER — ALBUTEROL SULFATE 90 UG/1
1-2 AEROSOL, METERED RESPIRATORY (INHALATION)
Status: CANCELLED
Start: 2023-11-14

## 2023-10-27 RX ADMIN — Medication 5 ML: at 15:23

## 2023-10-27 RX ADMIN — Medication 5 ML: at 12:22

## 2023-10-27 RX ADMIN — SODIUM CHLORIDE 250 ML: 9 INJECTION, SOLUTION INTRAVENOUS at 14:43

## 2023-10-27 RX ADMIN — SODIUM CHLORIDE 500 MG: 9 INJECTION, SOLUTION INTRAVENOUS at 14:46

## 2023-10-27 ASSESSMENT — PAIN SCALES - GENERAL: PAINLEVEL: NO PAIN (0)

## 2023-10-27 NOTE — NURSING NOTE
Chief Complaint   Patient presents with    Port Draw     Labs drawn via port by RN in lab. VS taken.      Labs drawn via Port accessed using 20g flat needle. Line flushed and Heparin locked. Vital signs taken. Checked into next appointment.       Jalyn Torres RN

## 2023-10-27 NOTE — ORAL ONC MGMT
Oral Chemotherapy Monitoring Program    Lab Monitoring Plan  Day 1 of each cycle per treatment plan  Subjective/Objective:  Roman Escalante is a 48 year old male seen in clinic for an initial visit for oral chemotherapy education.          3/30/2023     3:00 PM 4/24/2023     2:00 PM 5/12/2023     9:00 AM 5/16/2023     2:00 PM 6/3/2023    11:00 AM 10/27/2023     3:00 PM 10/27/2023     3:16 PM   ORAL CHEMOTHERAPY   Assessment Type Other Refill Lab Monitoring;Monthly Follow up Refill Discontinuation Initial Work up New Teach   Stop Date     5/19/2023     Reason for Discontinuation     Disease progression     Diagnosis Code Colon Cancer Colon Cancer Colon Cancer Colon Cancer Colon Cancer Colon Cancer Colon Cancer   Providers Dr. Edy Snow   [facility]   Drug Name Stivarga (regorafenib) Stivarga (regorafenib) Stivarga (regorafenib) Stivarga (regorafenib) Stivarga (regorafenib) Lonsurf (trifluridine/Tipiracil) Lonsurf (trifluridine/Tipiracil)   Dose 160 mg 160 mg 160 mg 160 mg 160 mg 75 mg 75 mg   Current Schedule Daily Daily Daily Daily Daily BID BID   Cycle Details 3 weeks on, 1 week off 3 weeks on, 1 week off 3 weeks on, 1 week off 3 weeks on, 1 week off 3 weeks on, 1 week off Days 1-5, then Days 8-12 Days 1-5, then Days 8-12   Start Date of Last Cycle 3/30/2023  4/27/2023 4/27/2023      Planned next cycle start date 4/27/2023  5/25/2023 5/25/2023  10/27/2023 10/27/2023   Doses missed in last 2 weeks   0       Adherence Assessment Adherent  Adherent       Adverse Effects   Palmar-plantar Erythrodysethesia Syndrome       Nausea   Grade 1       Pharmacist Intervention(nausea)   Yes       Intervention(s)   Referral to oncology provider;Rx medication recommendation       Palmar-plantar Erythrodysethesia syndrome[hand-foot syndrome]   Grade 1       Pharmacist Intervention(Palmar-plantar)    "Yes       Intervention(s)   Patient education         Vitals:  BP:   BP Readings from Last 1 Encounters:   10/27/23 114/76     Wt Readings from Last 1 Encounters:   10/27/23 94 kg (207 lb 3.2 oz)     Estimated body surface area is 2.17 meters squared as calculated from the following:    Height as of 8/29/23: 1.803 m (5' 11\").    Weight as of an earlier encounter on 10/27/23: 94 kg (207 lb 3.2 oz).    Labs:  Result Component Current Result Ref Range   Sodium 137 (10/27/2023) 135 - 145 mmol/L     Result Component Current Result Ref Range   Potassium 5.0 (10/27/2023) 3.4 - 5.3 mmol/L     Result Component Current Result Ref Range   Calcium 9.2 (10/27/2023) 8.6 - 10.0 mg/dL     Result Component Current Result Ref Range   Albumin 4.3 (10/27/2023) 3.5 - 5.2 g/dL     Result Component Current Result Ref Range   Urea Nitrogen 15.1 (10/27/2023) 6.0 - 20.0 mg/dL     Result Component Current Result Ref Range   Creatinine 0.82 (10/27/2023) 0.67 - 1.17 mg/dL     Result Component Current Result Ref Range   AST 29 (10/27/2023) 0 - 45 U/L     Result Component Current Result Ref Range   ALT 9 (10/27/2023) 0 - 70 U/L     Result Component Current Result Ref Range   Bilirubin Total 0.5 (10/27/2023) <=1.2 mg/dL    0.5 (10/27/2023) <=1.2 mg/dL     Result Component Current Result Ref Range   WBC Count 11.3 (H) (10/27/2023) 4.0 - 11.0 10e3/uL     Result Component Current Result Ref Range   Hemoglobin 14.6 (10/27/2023) 13.3 - 17.7 g/dL     Result Component Current Result Ref Range   Platelet Count 289 (10/27/2023) 150 - 450 10e3/uL     Result Component Current Result Ref Range   Absolute Neutrophils 9.2 (H) (10/27/2023) 1.6 - 8.3 10e3/uL        Assessment:  Patient is appropriate to start therapy.    Plan:  Basic chemotherapy teaching was reviewed with the patient including indication, start date of therapy, dose, administration, adverse effects, missed doses, food and drug interactions, monitoring, side effect management, office contact " information, and safe handling. Written materials were provided and all questions answered.    Follow-Up:  Initial assessment 7-10 days after starting therapy.     Otis Arguello, PharmD.  Oral Chemotherapy Monitoring Program  [phone]

## 2023-10-27 NOTE — TELEPHONE ENCOUNTER
Prior Authorization Approval    Medication: LONSURF 15-6.14 MG PO TABS  Authorization Effective Date: 9/27/2023  Authorization Expiration Date: 10/26/2024  Approved Dose/Quantity: 100 per 38 DS  Reference #: [id number]   Insurance Company: [employer] - Phone [phone] Fax [phone]  Expected CoPay: $ 0  CoPay Card Available:      Financial Assistance Needed: no  Which Pharmacy is filling the prescription: [facility] [address] 4[address]  Pharmacy Notified: yes  Patient Notified: yes          Thank you,    Ashley Sharma  Oncology Pharmacy Liaison II  [email or web address]  Phone: [phone]  Fax: [phone]

## 2023-10-27 NOTE — PATIENT INSTRUCTIONS
[facility] Triage and after hours / weekends / holidays:  [phone]    Please call the triage or after hours line if you experience a temperature greater than or equal to 100.5, shaking chills, have uncontrolled nausea, vomiting and/or diarrhea, dizziness, shortness of breath, chest pain, bleeding, unexplained bruising, or if you have any other new/concerning symptoms, questions or concerns.      If you are having any concerning symptoms or wish to speak to a provider before your next infusion visit, please call your care coordinator or triage to notify them so we can adequately serve you.     If you need a refill on a narcotic prescription or other medication, please call before your infusion appointment.

## 2023-10-27 NOTE — TELEPHONE ENCOUNTER
PA Initiation    Medication: LONSURF 15-6.14 MG PO TABS  Insurance Company: [employer] - Phone [phone] Fax [phone]  Pharmacy Filling the Rx:    Filling Pharmacy Phone:    Filling Pharmacy Fax:    Start Date: 10/27/2023            Thank you,    Ashley Sharma  Oncology Pharmacy Liaison II  [email or web address]  Phone: [phone]  Fax: [phone]

## 2023-10-27 NOTE — PROGRESS NOTES
[facility] - Medical Oncology Follow-Up Consult Note  10/27/2023    Patient Identifiers     Name: Roman Escalante  Preferred Address: [place]  Preferred Language: English  : 1975  Gender: male    Assessment and Plan     Mr. Roman Escalante is a 48 year old male active smoker (3-5 cigs/day) with a history of extensively pre-treated metastatic colorectal cancer progressing on fifth-line FOLFOX/Reyna who returns to clinic for treatment response evaluation.     NGS: KRAS G12N  Immuno: TJ  Clinical Trial: MAIKEL-280, TORJEEVAN    Mr. Escalante returns to clinic for imaging response evaluation for colorectal cancer on fifth line FOLFOX/Reyna.  Unfortunately, patient's imaging shows azucena progression with notable growth in all pulmonary lesions.  This progression is particularly concerning as it could rapidly lead to symptom intensity and clinical decompensation.  Fortunately, patient remains in good health and is tolerating treatment reasonably well.  Thus, he remains an excellent candidate for clinical trial and we will attempt to temporize him with sixth line treatment with Lonsurf/Reyna.    Regarding patient's symptoms, patient's anorectal pain has returned.  He notes that, as prior, it occurs both with prolonged standing and with prolonged sitting.  It is not well relieved by his current opioid regimen, though he is avoiding opioids as possible due to constipatory symptoms.  Given these issues, we recommend consultation with interventional pain clinic for potential local block which would lead to symptom relief.    As noted above, patient's excellent functional status along with progressive disease make him a good trial candidate.  There appear to be unfortunate limitations on prior lines of therapy for some of our existing trials.  We will review available trials and determine for which ones he may be reasonably a candidate.  We will also reach out to [facility] to consider advance her to a trial, along with [facility]  for KRAS mutant trial.  The overall goal of current treatment is enrollment in clinical trial.    Plan for clinical visits every 2 weeks with LILLY monitoring.  Return to clinic with MD visit in 2 months with imaging prior.  These follow-ups may be altered as necessary to accommodate clinical trial.    45 minutes spent on the date of the encounter doing chart review, review of test results, interpretation of tests, patient visit, documentation, and discussion with other provider(s)     Polo Sonw MD, PhD   of Medicine  Division of Hematology, Oncology and Transplantation  [facility]    -----------------------------------    Oncology Summary     Cancer Staging   Rectal adenocarcinoma metastatic to lung (H)  Staging form: Colon and Rectum, AJCC 8th Edition  - Clinical: Stage IVB (cTX, cNX, pM1b) - Signed by Polo Snow MD on 3/30/2023      Oncology History   Rectal adenocarcinoma metastatic to lung (H)   2/3/2023 Initial Diagnosis    Rectal adenocarcinoma metastatic to lung (H)     2/3/2023 - 3/31/2023 Chemotherapy    Oral ONC Colorectal Cancer - Regorafenib  Plan Provider: Polo Snow MD  Treatment goal: Palliative  Line of treatment: [No plan line of treatment]     3/30/2023 -  Cancer Staged    Staging form: Colon and Rectum, AJCC 8th Edition  - Clinical: Stage IVB (cTX, cNX, pM1b)     6/7/2023 -  Chemotherapy    OP ONC Colorectal Cancer - Modified FOLFOX-6 / Bevacizumab  Plan Provider: Polo Snow MD  Treatment goal: Palliative  Line of treatment: [No plan line of treatment]         Subjective/Interval Events     - pt's rectoanal pain has returned. As prior, it occurs with prolonged sitting or standing. No significant relieving actions. He is taking only tylenol for pain management as he wishes to avoid opioid therapy  - no nausea, vomiting, diarrhea    Physical Exam     Vital signs: /76   Pulse 73   Temp 97.8  F (36.6  C) (Oral)   Resp 16   Wt 94 kg (207 lb 3.2 oz)    SpO2 98%   BMI 28.90 kg/m      ECOG performance status:  1  Vascular access:  R chest port    Physical Exam:  Exam performed, notable for:  - ostomy site c/d/i; bag empty    Objective Data     Lab data:  I have personally reviewed the lab data, notable for:    - WBC 11.3  - no other notables    Radiology data:  I have personally reviewed the radiology data, notable for:  10/24/2023 CT C/A/P  IMPRESSION:  1.  Interval increase in the sizes of bilateral pulmonary nodules and masses.  2.  Stable to slightly enlarged thoracic adenopathy.  3.  Essentially stable 4.9 cm low-density area in the right hepatic lobe after radio-embolization.  4.  Slight increase in the size of a 1.5 cm posterior right hepatic lobe lesion.  5.  Stable 9 mm indeterminate left adrenal nodule.  6.  New probable sheldon metastatic disease in the herniated fat at a left lower quadrant colostomy.  7.  Stable retroperitoneal and pelvic adenopathy as described above.  8.  Again noted is the primary rectal tumor, in a patient with a history of metastatic rectal adenocarcinoma.    Pathology and other data:  I have personally reviewed and interpreted the pathology data, notable for:    - no new data

## 2023-10-27 NOTE — LETTER
10/27/2023         RE: Roman Escalante  [address]  [place] [facility] [facility] [place] [facility] [address]        Dear Colleague,    Thank you for referring your patient, Roman Escalante, to the [facility]. Please see a copy of my visit note below.    [facility] - Medical Oncology Follow-Up Consult Note  10/27/2023    Patient Identifiers     Name: Roman Escalante  Preferred Address: [place]  Preferred Language: English  : 1975  Gender: male    Assessment and Plan     Mr. Roman Escalante is a 48 year old male active smoker (3-5 cigs/day) with a history of extensively pre-treated metastatic colorectal cancer progressing on fifth-line FOLFOX/Reyna who returns to clinic for treatment response evaluation.     NGS: KRAS G12N  Immuno: TJ  Clinical Trial: MAIKEL-280, TORJEEVAN    Mr. Escalante returns to clinic for imaging response evaluation for colorectal cancer on fifth line FOLFOX/Reyna.  Unfortunately, patient's imaging shows azucena progression with notable growth in all pulmonary lesions.  This progression is particularly concerning as it could rapidly lead to symptom intensity and clinical decompensation.  Fortunately, patient remains in good health and is tolerating treatment reasonably well.  Thus, he remains an excellent candidate for clinical trial and we will attempt to temporize him with sixth line treatment with Lonsurf/Reyna.    Regarding patient's symptoms, patient's anorectal pain has returned.  He notes that, as prior, it occurs both with prolonged standing and with prolonged sitting.  It is not well relieved by his current opioid regimen, though he is avoiding opioids as possible due to constipatory symptoms.  Given these issues, we recommend consultation with interventional pain clinic for potential local block which would lead to symptom relief.    As noted above, patient's excellent functional status along with progressive disease make him a good trial candidate.  There appear to be  unfortunate limitations on prior lines of therapy for some of our existing trials.  We will review available trials and determine for which ones he may be reasonably a candidate.  We will also reach out to [facility] to consider advance her to a trial, along with [facility] for KRAS mutant trial.  The overall goal of current treatment is enrollment in clinical trial.    Plan for clinical visits every 2 weeks with LILLY monitoring.  Return to clinic with MD visit in 2 months with imaging prior.  These follow-ups may be altered as necessary to accommodate clinical trial.    45 minutes spent on the date of the encounter doing chart review, review of test results, interpretation of tests, patient visit, documentation, and discussion with other provider(s)     Polo Snow MD, PhD   of Medicine  Division of Hematology, Oncology and Transplantation  [facility]    -----------------------------------    Oncology Summary     Cancer Staging   Rectal adenocarcinoma metastatic to lung (H)  Staging form: Colon and Rectum, AJCC 8th Edition  - Clinical: Stage IVB (cTX, cNX, pM1b) - Signed by Polo Snow MD on 3/30/2023      Oncology History   Rectal adenocarcinoma metastatic to lung (H)   2/3/2023 Initial Diagnosis    Rectal adenocarcinoma metastatic to lung (H)     2/3/2023 - 3/31/2023 Chemotherapy    Oral ONC Colorectal Cancer - Regorafenib  Plan Provider: Polo Snow MD  Treatment goal: Palliative  Line of treatment: [No plan line of treatment]     3/30/2023 -  Cancer Staged    Staging form: Colon and Rectum, AJCC 8th Edition  - Clinical: Stage IVB (cTX, cNX, pM1b)     6/7/2023 -  Chemotherapy    OP ONC Colorectal Cancer - Modified FOLFOX-6 / Bevacizumab  Plan Provider: Polo Snow MD  Treatment goal: Palliative  Line of treatment: [No plan line of treatment]         Subjective/Interval Events     - pt's rectoanal pain has returned. As prior, it occurs with prolonged sitting or standing. No  significant relieving actions. He is taking only tylenol for pain management as he wishes to avoid opioid therapy  - no nausea, vomiting, diarrhea    Physical Exam     Vital signs: /76   Pulse 73   Temp 97.8  F (36.6  C) (Oral)   Resp 16   Wt 94 kg (207 lb 3.2 oz)   SpO2 98%   BMI 28.90 kg/m      ECOG performance status:  1  Vascular access:  R chest port    Physical Exam:  Exam performed, notable for:  - ostomy site c/d/i; bag empty    Objective Data     Lab data:  I have personally reviewed the lab data, notable for:    - WBC 11.3  - no other notables    Radiology data:  I have personally reviewed the radiology data, notable for:  10/24/2023 CT C/A/P  IMPRESSION:  1.  Interval increase in the sizes of bilateral pulmonary nodules and masses.  2.  Stable to slightly enlarged thoracic adenopathy.  3.  Essentially stable 4.9 cm low-density area in the right hepatic lobe after radio-embolization.  4.  Slight increase in the size of a 1.5 cm posterior right hepatic lobe lesion.  5.  Stable 9 mm indeterminate left adrenal nodule.  6.  New probable sheldon metastatic disease in the herniated fat at a left lower quadrant colostomy.  7.  Stable retroperitoneal and pelvic adenopathy as described above.  8.  Again noted is the primary rectal tumor, in a patient with a history of metastatic rectal adenocarcinoma.    Pathology and other data:  I have personally reviewed and interpreted the pathology data, notable for:    - no new data        Polo Snow MD

## 2023-10-27 NOTE — NURSING NOTE
"Oncology Rooming Note    October 27, 2023 12:47 PM   Roman Escalante is a 48 year old male who presents for:    Chief Complaint   Patient presents with    Port Draw     Labs drawn via port by RN in lab. VS taken.     Colorectal Cancer     Initial Vitals: /76   Pulse 73   Temp 97.8  F (36.6  C) (Oral)   Resp 16   Wt 94 kg (207 lb 3.2 oz)   SpO2 98%   BMI 28.90 kg/m   Estimated body mass index is 28.9 kg/m  as calculated from the following:    Height as of 8/29/23: 1.803 m (5' 11\").    Weight as of this encounter: 94 kg (207 lb 3.2 oz). Body surface area is 2.17 meters squared.  No Pain (0) Comment: Data Unavailable   No LMP for male patient.  Allergies reviewed: Yes  Medications reviewed: Yes    Medications: Medication refills not needed today.  Pharmacy name entered into EPIC:    [facility] [place], [place] - [address] [place] [address] [phone]  [facility] - [place], [place] - [address] [place] [address] DRUG [facility] #[id number] [address]    Clinical concerns:  Patient states there are no new concerns to discuss with provider.  Dr Snow was not notified.        Paula Booth CMA              "

## 2023-10-27 NOTE — PROGRESS NOTES
Infusion Nursing Note:  Roman Escalante presents today for cycle 1 day 1 bevacizumab-bvzr.    Patient seen by provider today: Yes: Dr. Snow   present during visit today: Not Applicable.    Note:   Per secure chat with Dr. Snow/Li Parr RN at 1340 on 10/27/23: Reyna only today, then Lonsurf with Reyna starting day 15. Then 28 day cycles for every other treatment.    Patient has previously been on bevacizumab. Patient declined printed education, reviewed schedule and side effects with patient.    Intravenous Access:  Implanted Port.    Treatment Conditions:  Lab Results   Component Value Date    HGB 14.6 10/27/2023    WBC 11.3 (H) 10/27/2023    ANEU 45.1 (H) 07/28/2023    ANEUTAUTO 9.2 (H) 10/27/2023     10/27/2023        Lab Results   Component Value Date     10/27/2023    POTASSIUM 5.0 10/27/2023    MAG 1.8 07/30/2023    CR 0.82 10/27/2023    JEFERSON 9.2 10/27/2023    BILITOTAL 0.5 10/27/2023    BILITOTAL 0.5 10/27/2023    ALBUMIN 4.3 10/27/2023    ALT 9 10/27/2023    AST 29 10/27/2023     Results reviewed, labs MET treatment parameters, ok to proceed with treatment.  Urine protein negative.  BP WNL.      Post Infusion Assessment:  Patient tolerated infusion without incident.  Blood return noted pre and post infusion.  Site patent and intact, free from redness, edema or discomfort.  No evidence of extravasations.  Access discontinued per protocol.       Discharge Plan:   Patient declined prescription refills.  Discharge instructions reviewed with: Patient.  Patient and/or family verbalized understanding of discharge instructions and all questions answered.  AVS to patient via [employer].  Patient will return 11/14 for next appointment.   Patient discharged in stable condition accompanied by: self.  Departure Mode: Ambulatory.      Li Parr RN

## 2023-10-30 ENCOUNTER — VIRTUAL VISIT (OUTPATIENT)
Dept: RADIOLOGY | Facility: CLINIC | Age: 48
End: 2023-10-30
Attending: PHYSICIAN ASSISTANT
Payer: COMMERCIAL

## 2023-10-30 VITALS — HEIGHT: 71 IN | WEIGHT: 207 LBS | BODY MASS INDEX: 28.98 KG/M2

## 2023-10-30 DIAGNOSIS — C20 RECTAL ADENOCARCINOMA METASTATIC TO LIVER (H): Primary | ICD-10-CM

## 2023-10-30 DIAGNOSIS — C78.7 RECTAL ADENOCARCINOMA METASTATIC TO LIVER (H): Primary | ICD-10-CM

## 2023-10-30 PROCEDURE — 99213 OFFICE O/P EST LOW 20 MIN: CPT | Mod: 95 | Performed by: STUDENT IN AN ORGANIZED HEALTH CARE EDUCATION/TRAINING PROGRAM

## 2023-10-30 ASSESSMENT — PAIN SCALES - GENERAL: PAINLEVEL: NO PAIN (0)

## 2023-10-30 NOTE — NURSING NOTE
Is the patient currently in the state of [place]? YES    Visit mode:VIDEO    If the visit is dropped, the patient can be reconnected by: VIDEO VISIT: Text to cell phone:   Telephone Information:   Mobile [phone]       Will anyone else be joining the visit? NO  (If patient encounters technical issues they should call [phone]    How would you like to obtain your AVS? MyChart    Are changes needed to the allergy or medication list? No    Reason for visit: VIDAL RODRIGUEZ

## 2023-10-30 NOTE — LETTER
10/30/2023         RE: Roman Escalante  [address]  [facility] [address]        Dear Colleague,    Thank you for referring your patient, Roman Escalante, to the [facility]. Please see a copy of my visit note below.        INTERVENTIONAL RADIOLOGY CONSULTATION    Name: Roman Escalante  Age: 48 year old   Referring Physician: Dr. Snow  REASON FOR REFERRAL: F/U Yttrium-90 Radioembolization of the hepatic segment IV and VIII arteries .     HPI:    Roman Escalante is a 48 year old male with metastatic colorectal carcinoma on fifth line systemic treatment who referred to us for Y90 of growing hepatic segment 4A/8 lesion.   Patient underwent mapping of the right and left hepatic arteries (segment 8 and 4A) on 8/29/2023 and successful yttrium-90 Radioembolization of the hepatic segment 8 and 4A arteries under fluoroscopic/CTA guidance .    Based on the most recent postprocedural CT findings (10/24/2023) the treated lesion appears unchanged, and slight light increase in size of  an untreated 1.5 cm posterior right hepatic lobe lesion, Interval worsening of bilateral pulmonary nodules and masses in enlarged thoracic adenopathy and evidence of new metastatic disease in herniated fat at left lower quadrant colostomy.      Patient is overall doing well and had no complication after or procedure other than mild irritation in the right groin side.  Patient denies any bruising or mass at the right groin.  Changed systemic therapy regimen on Friday (10/27) and tolerated the new treatment well.    ROS:  Negative unless otherwise stated in HPI.    Labs:    BMP RESULTS:  Lab Results   Component Value Date     10/27/2023    POTASSIUM 5.0 10/27/2023    POTASSIUM 4.0 04/19/2023    CHLORIDE 103 10/27/2023    CHLORIDE 105 04/19/2023    CO2 24 10/27/2023    CO2 29 04/19/2023    ANIONGAP 10 10/27/2023    ANIONGAP 2 (L) 04/19/2023     (H) 10/27/2023    GLC 68 (L) 04/19/2023    BUN  15.1 10/27/2023    BUN 11 04/19/2023    CR 0.82 10/27/2023    GFRESTIMATED >90 10/27/2023    JEFERSON 9.2 10/27/2023        CBC RESULTS:  Lab Results   Component Value Date    WBC 11.3 (H) 10/27/2023    RBC 5.04 10/27/2023    HGB 14.6 10/27/2023    HCT 43.7 10/27/2023    MCV 87 10/27/2023    MCH 29.0 10/27/2023    MCHC 33.4 10/27/2023    RDW 15.3 (H) 10/27/2023     10/27/2023       INR/PTT:  Lab Results   Component Value Date    INR 1.11 10/24/2023    PTT 28 07/27/2023       Diagnostic studies:       Status post segment IV and VIII Y-90 radio-embolization. In the anterior segment right hepatic lobe, there is an irregular low-density area measuring 4.9 x 4.5 x 4.2 cm series 3, image 263. This previously measured 4.9 x 4.3 x 4.2 cm.     Assessment/plan:     Roman Escalante is a 48 year old male with metastatic colorectal carcinoma on fifth line systemic treatment who referred to us earlier  for Y90 of growing hepatic segment 4A/8 lesion.   Patient underwent successful yttrium-90 Radioembolization of the hepatic segment 8/ 4A arteries under fluoroscopic/CTA guidance on 9/7/2023.     Based on the most recent CT findings (10/24/2023) the treated lesion appears stable, however, there has been interval systemic progression of metastatic disease.  He underwent a change in systemic treatment on Friday (10/27) and tolerated the first treatment well.    Discussion was made with Dr. Snow  (oncologist), and plan future plan is to continue the current systemic regimen, with no further liver directed therapy herapy at this point.  We will re-assess after the next staging exam.    CC  Patient Care Team:  Buddy Hart MD as PCP - General (Family Medicine)  Virgen Nieto RN as Specialty Care Coordinator (Hematology & Oncology)  Polo Snow MD as MD (Medical Oncology)  Juliet Blanco MD as MD (Infectious Diseases)  Polo Snow MD as Assigned Cancer Care Provider  Juliet Blanco MD as  Assigned Infectious Disease Provider  Emmy Alfonso RN as Registered Nurse (Wound Care)  Buddy Hart MD as Assigned PCP      Physician Attestation  I, Fab Villalta MD, saw this patient and agree with the findings and plan of care as documented in the note.      Items personally reviewed/procedural attestation: labs, imaging and agree with the interpretation documented in the note, and I was present for the entire visit.    Fab Villalta MD        Review of the result(s) of each unique test - CT chest/abdomen/pelvis 10/24/2023  Discussion of management or test interpretation with external physician/other qualified healthcare professional/appropriate source - Dr. Snow     15 minutes spent by me on the date of the encounter doing chart review, review of test results, interpretation of tests, patient visit, documentation, and discussion with other provider(s)     Virtual Visit Details    Type of service:  Video Visit   Video Start Time: 7:55  Video End Time:8:01 AM    Originating Location (pt. Location): Home    Distant Location (provider location):  On-site  Platform used for Video Visit: Huey

## 2023-10-30 NOTE — PROGRESS NOTES
INTERVENTIONAL RADIOLOGY CONSULTATION    Name: Roman Escalante  Age: 48 year old   Referring Physician: Dr. Snow  REASON FOR REFERRAL: F/U Yttrium-90 Radioembolization of the hepatic segment IV and VIII arteries .     HPI:    Roman Escalante is a 48 year old male with metastatic colorectal carcinoma on fifth line systemic treatment who referred to us for Y90 of growing hepatic segment 4A/8 lesion.   Patient underwent mapping of the right and left hepatic arteries (segment 8 and 4A) on 8/29/2023 and successful yttrium-90 Radioembolization of the hepatic segment 8 and 4A arteries under fluoroscopic/CTA guidance .    Based on the most recent postprocedural CT findings (10/24/2023) the treated lesion appears unchanged, and slight light increase in size of  an untreated 1.5 cm posterior right hepatic lobe lesion, Interval worsening of bilateral pulmonary nodules and masses in enlarged thoracic adenopathy and evidence of new metastatic disease in herniated fat at left lower quadrant colostomy.      Patient is overall doing well and had no complication after or procedure other than mild irritation in the right groin side.  Patient denies any bruising or mass at the right groin.  Changed systemic therapy regimen on Friday (10/27) and tolerated the new treatment well.    ROS:  Negative unless otherwise stated in HPI.    Labs:    BMP RESULTS:  Lab Results   Component Value Date     10/27/2023    POTASSIUM 5.0 10/27/2023    POTASSIUM 4.0 04/19/2023    CHLORIDE 103 10/27/2023    CHLORIDE 105 04/19/2023    CO2 24 10/27/2023    CO2 29 04/19/2023    ANIONGAP 10 10/27/2023    ANIONGAP 2 (L) 04/19/2023     (H) 10/27/2023    GLC 68 (L) 04/19/2023    BUN 15.1 10/27/2023    BUN 11 04/19/2023    CR 0.82 10/27/2023    GFRESTIMATED >90 10/27/2023    JEFERSON 9.2 10/27/2023        CBC RESULTS:  Lab Results   Component Value Date    WBC 11.3 (H) 10/27/2023    RBC 5.04 10/27/2023    HGB 14.6 10/27/2023    HCT 43.7 10/27/2023     MCV 87 10/27/2023    MCH 29.0 10/27/2023    MCHC 33.4 10/27/2023    RDW 15.3 (H) 10/27/2023     10/27/2023       INR/PTT:  Lab Results   Component Value Date    INR 1.11 10/24/2023    PTT 28 07/27/2023       Diagnostic studies:       Status post segment IV and VIII Y-90 radio-embolization. In the anterior segment right hepatic lobe, there is an irregular low-density area measuring 4.9 x 4.5 x 4.2 cm series 3, image 263. This previously measured 4.9 x 4.3 x 4.2 cm.     Assessment/plan:     Roman Escalante is a 48 year old male with metastatic colorectal carcinoma on fifth line systemic treatment who referred to us earlier  for Y90 of growing hepatic segment 4A/8 lesion.   Patient underwent successful yttrium-90 Radioembolization of the hepatic segment 8/ 4A arteries under fluoroscopic/CTA guidance on 9/7/2023.     Based on the most recent CT findings (10/24/2023) the treated lesion appears stable, however, there has been interval systemic progression of metastatic disease.  He underwent a change in systemic treatment on Friday (10/27) and tolerated the first treatment well.    Discussion was made with Dr. Sonw  (oncologist), and plan future plan is to continue the current systemic regimen, with no further liver directed therapy herapy at this point.  We will re-assess after the next staging exam.    CC  Patient Care Team:  Buddy Hart MD as PCP - General (Family Medicine)  Virgen Nieto RN as Specialty Care Coordinator (Hematology & Oncology)  Polo Snow MD as MD (Medical Oncology)  Juliet Blanco MD as MD (Infectious Diseases)  Polo Snow MD as Assigned Cancer Care Provider  Juliet Blanco MD as Assigned Infectious Disease Provider  Emmy Alfonso RN as Registered Nurse (Wound Care)  Buddy Hart MD as Assigned PCP      Physician Attestation   I, Fab Villalta MD, saw this patient and agree with the findings and plan of care as  documented in the note.      Items personally reviewed/procedural attestation: labs, imaging and agree with the interpretation documented in the note, and I was present for the entire visit.    Fab Villalta MD        Review of the result(s) of each unique test - CT chest/abdomen/pelvis 10/24/2023  Discussion of management or test interpretation with external physician/other qualified healthcare professional/appropriate source - Dr. Snow     15 minutes spent by me on the date of the encounter doing chart review, review of test results, interpretation of tests, patient visit, documentation, and discussion with other provider(s)     Virtual Visit Details    Type of service:  Video Visit   Video Start Time: 7:55  Video End Time:8:01 AM    Originating Location (pt. Location): Home    Distant Location (provider location):  On-site  Platform used for Video Visit: Huey

## 2023-10-31 ENCOUNTER — PATIENT OUTREACH (OUTPATIENT)
Dept: ONCOLOGY | Facility: CLINIC | Age: 48
End: 2023-10-31
Payer: COMMERCIAL

## 2023-10-31 NOTE — TELEPHONE ENCOUNTER
[facility]: Cancer Care                                                                                          [facility] referral made via email. Last 2 progress notes, 2 imaging reports, facesheet and pathology report emailed.    Signature:  Virgen Nieto RN

## 2023-11-01 NOTE — TELEPHONE ENCOUNTER
The basic eligibility requirements for our trials include:    Patient needs to be at the point where current therapy has failed them and before a new treatment has begun.  We can screen quickly if we have the CD images of recent and the prior 1 or 2 scans.  Patient is able to be off all therapy for a 30 day washout prior to resection of a 1 -2 cm lesion or prior to Apheresis and a 6 week washout if the patient has had Avastin (bevacizumab)  Patient is not on steroids  Patient has no significant carcinomatosis, ascites, or pleural effusions  Patient has no brain metastases or has had metastases which were 3 or fewer, all 1 cm or less, and successfully treated over 1 year prior to referral  Patient has good cardiac, liver, pulmonary, renal function  Patient has normal lab results (including WBC >3000 and ANC>1000 - w/o filgrastim); platelets > 80,000  Patient is Hepatitis B & C Neg and HIV Neg  Patient has an ECOG 0-1 (no exceptions)  Patient is between 18 - 72 years of age  Patients who smoke MUST quit    To facilitate the screening process, we will need the information listed below FAXED to [phone].  Comprehensive records are needed for us to be able to thoroughly screen your patient.  If the records are incomplete, it will slow the referral process.     1. The 2 most recent progress notes    2. The 2 most recent sets of lab work    3. The reports from the last 2 sets of scans    4.  ALL pathology and genetic testing reports    5. Chemo and Radiation Therapy summary notes    6.  Patient demographics    7.  Please send CDs of the 2 most recent sets of scans. These can be sent to the address below, using our FED EX charge # [id number] or you can upload the scans electronically by following the directions in the link  [email or web address]/[email or web address]    Calli miller)    Patient Care Coordinator    [facility] [place]    Surgery Branch, Immunotherapy    [address]    [facility] [address]    [place]  [address]    [phone] Pager# [phone]    CD imaging of scans from 10/24/23 and 8/7/23 requested and will mail to above address. All other records already submitted via email. [facility] notes indicated Foundation One was done, Caris was sent 6/6/23 but cancelled due to [facility]ology stated the specimen [id number] is insufficient for testing 6/16/23 via email.

## 2023-11-03 ENCOUNTER — PATIENT OUTREACH (OUTPATIENT)
Dept: ONCOLOGY | Facility: CLINIC | Age: 48
End: 2023-11-03
Payer: COMMERCIAL

## 2023-11-03 ENCOUNTER — TELEPHONE (OUTPATIENT)
Dept: ONCOLOGY | Facility: CLINIC | Age: 48
End: 2023-11-03
Payer: COMMERCIAL

## 2023-11-03 DIAGNOSIS — C20 RECTAL ADENOCARCINOMA METASTATIC TO LUNG (H): Primary | ICD-10-CM

## 2023-11-03 DIAGNOSIS — C78.00 RECTAL ADENOCARCINOMA METASTATIC TO LUNG (H): Primary | ICD-10-CM

## 2023-11-03 NOTE — TELEPHONE ENCOUNTER
Oral Chemotherapy Monitoring Program    Subjective/Objective:  Roman Escalante is a 48 year old male contacted by phone for a follow-up visit for oral chemotherapy.  Pt confirms taking the appropriate dose of Lonsurf, 75mg (5x15mg) twice daily on days 1-5 and 8-12 of 28 day cycle.  C1D1 10/27 with the PM dose. Denies missed doses, and recent hospital or ED visits. Patient has not had any recent medication changes. Pt states his only noticeable side effect so far is fatigue relieved by rest.  He had some pain prior to starting therapy, but this has since resolved on therapy.          3/30/2023     3:00 PM 4/24/2023     2:00 PM 5/12/2023     9:00 AM 5/16/2023     2:00 PM 6/3/2023    11:00 AM 10/27/2023     3:00 PM 10/27/2023     3:16 PM   ORAL CHEMOTHERAPY   Assessment Type Other Refill Lab Monitoring;Monthly Follow up Refill Discontinuation Initial Work up New Teach   Stop Date     5/19/2023     Reason for Discontinuation     Disease progression     Diagnosis Code Colon Cancer Colon Cancer Colon Cancer Colon Cancer Colon Cancer Colon Cancer Colon Cancer   Providers Dr. Edy Snow   [facility]   Drug Name Stivarga (regorafenib) Stivarga (regorafenib) Stivarga (regorafenib) Stivarga (regorafenib) Stivarga (regorafenib) Lonsurf (trifluridine/Tipiracil) Lonsurf (trifluridine/Tipiracil)   Dose 160 mg 160 mg 160 mg 160 mg 160 mg 75 mg 75 mg   Current Schedule Daily Daily Daily Daily Daily BID BID   Cycle Details 3 weeks on, 1 week off 3 weeks on, 1 week off 3 weeks on, 1 week off 3 weeks on, 1 week off 3 weeks on, 1 week off Days 1-5, then Days 8-12 Days 1-5, then Days 8-12   Start Date of Last Cycle 3/30/2023  4/27/2023 4/27/2023      Planned next cycle start date 4/27/2023  5/25/2023 5/25/2023  10/27/2023 10/27/2023   Doses missed in last 2 weeks   0       Adherence Assessment Adherent   "Adherent       Adverse Effects   Palmar-plantar Erythrodysethesia Syndrome       Nausea   Grade 1       Pharmacist Intervention(nausea)   Yes       Intervention(s)   Referral to oncology provider;Rx medication recommendation       Palmar-plantar Erythrodysethesia syndrome[hand-foot syndrome]   Grade 1       Pharmacist Intervention(Palmar-plantar)   Yes       Intervention(s)   Patient education           Last PHQ-2 Score on record:       8/30/2023     1:16 PM 6/30/2023     9:54 AM   PHQ-2 ( 1999 [employer])   Q1: Little interest or pleasure in doing things 0 1   Q2: Feeling down, depressed or hopeless 0 0   PHQ-2 Score 0 1   Q1: Little interest or pleasure in doing things  Several days   Q2: Feeling down, depressed or hopeless  Not at all   PHQ-2 Score  1       Vitals:  BP:   BP Readings from Last 1 Encounters:   10/27/23 114/76     Wt Readings from Last 1 Encounters:   10/30/23 93.9 kg (207 lb)     Estimated body surface area is 2.17 meters squared as calculated from the following:    Height as of 10/30/23: 1.803 m (5' 11\").    Weight as of 10/30/23: 93.9 kg (207 lb).    Labs:  _  Result Component Current Result Ref Range   Sodium 137 (10/27/2023) 135 - 145 mmol/L     _  Result Component Current Result Ref Range   Potassium 5.0 (10/27/2023) 3.4 - 5.3 mmol/L     _  Result Component Current Result Ref Range   Calcium 9.2 (10/27/2023) 8.6 - 10.0 mg/dL     No results found for Mag within last 30 days.     No results found for Phos within last 30 days.     _  Result Component Current Result Ref Range   Albumin 4.3 (10/27/2023) 3.5 - 5.2 g/dL     _  Result Component Current Result Ref Range   Urea Nitrogen 15.1 (10/27/2023) 6.0 - 20.0 mg/dL     _  Result Component Current Result Ref Range   Creatinine 0.82 (10/27/2023) 0.67 - 1.17 mg/dL     _  Result Component Current Result Ref Range   AST 29 (10/27/2023) 0 - 45 U/L     _  Result Component Current Result Ref Range   ALT 9 (10/27/2023) 0 - 70 U/L     _  Result Component " Current Result Ref Range   Bilirubin Total 0.5 (10/27/2023) <=1.2 mg/dL    0.5 (10/27/2023) <=1.2 mg/dL     _  Result Component Current Result Ref Range   WBC Count 11.3 (H) (10/27/2023) 4.0 - 11.0 10e3/uL     _  Result Component Current Result Ref Range   Hemoglobin 14.6 (10/27/2023) 13.3 - 17.7 g/dL     _  Result Component Current Result Ref Range   Platelet Count 289 (10/27/2023) 150 - 450 10e3/uL     No results found for ANC within last 30 days.     _  Result Component Current Result Ref Range   Absolute Neutrophils 9.2 (H) (10/27/2023) 1.6 - 8.3 10e3/uL          Assessment/Plan:  Pt is tolerating Lonsurf well so far with some grade 1 fatigue. Continue current therapy as planned.    Follow-Up:  11/14: LILLY appt and labs    Refill Due:  11/24    Grace Myhre, RandallD  Hematology/Oncology Pharmacist  [facility]  [phone]

## 2023-11-03 NOTE — TELEPHONE ENCOUNTER
[facility]: Cancer Care                                                                                          Guardant 360 requisition started for patient. Called to let pt know he can wait for his next infusion on 11/14 to have this drawn or schedule a lab only appointment on the 2nd floor. Pt stated he can come in Monday 11/6, scheduling messaged.    He did mention he had genetic testing done through [facility], per Care Everywhere documetation found in 6/16/2021 note by Dr. Villa Alcantar: -FoundationOne sent, not enought for tissue NGS but peripheral blood was sent for circulating tumor DNA and this was negative for any actionable mutations.    CD of 2 most recent scans sent via Fed-Ex to:    Calli Land ([place])     Patient Care Coordinator     [facility] [place]     Surgery Branch, Immunotherapy     [address] [facility]     [address], [address]     [place]  [address]    Signature:  Virgen Nieto RN

## 2023-11-06 ENCOUNTER — LAB (OUTPATIENT)
Dept: LAB | Facility: CLINIC | Age: 48
End: 2023-11-06
Attending: STUDENT IN AN ORGANIZED HEALTH CARE EDUCATION/TRAINING PROGRAM
Payer: COMMERCIAL

## 2023-11-06 DIAGNOSIS — C20 RECTAL ADENOCARCINOMA METASTATIC TO LUNG (H): ICD-10-CM

## 2023-11-06 DIAGNOSIS — C78.00 RECTAL ADENOCARCINOMA METASTATIC TO LUNG (H): ICD-10-CM

## 2023-11-06 PROCEDURE — 36415 COLL VENOUS BLD VENIPUNCTURE: CPT

## 2023-11-06 RX ORDER — HEPARIN SODIUM (PORCINE) LOCK FLUSH IV SOLN 100 UNIT/ML 100 UNIT/ML
5 SOLUTION INTRAVENOUS ONCE
Status: DISCONTINUED | OUTPATIENT
Start: 2023-11-06 | End: 2023-11-12 | Stop reason: HOSPADM

## 2023-11-06 NOTE — NURSING NOTE
Chief Complaint   Patient presents with    Lab Only     Labs ([id number] kit) drawn via  by RN.      Bhavana Li RN

## 2023-11-06 NOTE — TELEPHONE ENCOUNTER
Response from [facility] noted that if tissue was insufficient for in house IHC testing, then also insufficient for [facility]. Will go ahead with planned Guardant 360 blood draw scheduled today.

## 2023-11-07 ENCOUNTER — PATIENT OUTREACH (OUTPATIENT)
Dept: ONCOLOGY | Facility: CLINIC | Age: 48
End: 2023-11-07
Payer: COMMERCIAL

## 2023-11-07 DIAGNOSIS — C78.00 RECTAL ADENOCARCINOMA METASTATIC TO LUNG (H): Primary | ICD-10-CM

## 2023-11-07 DIAGNOSIS — C20 RECTAL ADENOCARCINOMA METASTATIC TO LUNG (H): Primary | ICD-10-CM

## 2023-11-07 NOTE — TELEPHONE ENCOUNTER
Per NIH requirements: Patient is Hepatitis B & C Neg and HIV Neg     Unable to find any results from outside [facility] and need Authorization from pt to access [facility] in Care Everywhere. Left message for pt to call back if he recalls having these labs checked, if not then will order labs to be drawn at next infusion 11/14.

## 2023-11-07 NOTE — TELEPHONE ENCOUNTER
Pt called back and stated he may have had HIV screening at [facility] but does not recall date nor that he's had Hep B and C checked. He's agreeable to labs on 11/14, Dr. Snow messaged to enter orders.

## 2023-11-14 ENCOUNTER — ONCOLOGY VISIT (OUTPATIENT)
Dept: ONCOLOGY | Facility: CLINIC | Age: 48
End: 2023-11-14
Attending: STUDENT IN AN ORGANIZED HEALTH CARE EDUCATION/TRAINING PROGRAM
Payer: COMMERCIAL

## 2023-11-14 ENCOUNTER — APPOINTMENT (OUTPATIENT)
Dept: LAB | Facility: CLINIC | Age: 48
End: 2023-11-14
Attending: STUDENT IN AN ORGANIZED HEALTH CARE EDUCATION/TRAINING PROGRAM
Payer: COMMERCIAL

## 2023-11-14 VITALS
RESPIRATION RATE: 16 BRPM | DIASTOLIC BLOOD PRESSURE: 55 MMHG | SYSTOLIC BLOOD PRESSURE: 103 MMHG | TEMPERATURE: 97.5 F | HEART RATE: 69 BPM | OXYGEN SATURATION: 100 %

## 2023-11-14 VITALS
TEMPERATURE: 98.7 F | WEIGHT: 206.4 LBS | RESPIRATION RATE: 16 BRPM | DIASTOLIC BLOOD PRESSURE: 72 MMHG | BODY MASS INDEX: 28.79 KG/M2 | SYSTOLIC BLOOD PRESSURE: 114 MMHG | HEART RATE: 88 BPM | OXYGEN SATURATION: 98 %

## 2023-11-14 DIAGNOSIS — R11.0 NAUSEA: ICD-10-CM

## 2023-11-14 DIAGNOSIS — C20 RECTAL ADENOCARCINOMA METASTATIC TO LUNG (H): Primary | ICD-10-CM

## 2023-11-14 DIAGNOSIS — C78.00 RECTAL ADENOCARCINOMA METASTATIC TO LUNG (H): Primary | ICD-10-CM

## 2023-11-14 DIAGNOSIS — K62.89 RECTAL PAIN: ICD-10-CM

## 2023-11-14 LAB
ALBUMIN SERPL BCG-MCNC: 4.3 G/DL (ref 3.5–5.2)
ALBUMIN UR-MCNC: 10 MG/DL
ALP SERPL-CCNC: 83 U/L (ref 40–150)
ALT SERPL W P-5'-P-CCNC: 7 U/L (ref 0–70)
ANION GAP SERPL CALCULATED.3IONS-SCNC: 10 MMOL/L (ref 7–15)
AST SERPL W P-5'-P-CCNC: 22 U/L (ref 0–45)
BASOPHILS # BLD AUTO: 0 10E3/UL (ref 0–0.2)
BASOPHILS NFR BLD AUTO: 1 %
BILIRUB SERPL-MCNC: 0.4 MG/DL
BUN SERPL-MCNC: 15 MG/DL (ref 6–20)
CALCIUM SERPL-MCNC: 9.1 MG/DL (ref 8.6–10)
CHLORIDE SERPL-SCNC: 104 MMOL/L (ref 98–107)
CREAT SERPL-MCNC: 0.75 MG/DL (ref 0.67–1.17)
DEPRECATED HCO3 PLAS-SCNC: 25 MMOL/L (ref 22–29)
EGFRCR SERPLBLD CKD-EPI 2021: >90 ML/MIN/1.73M2
EOSINOPHIL # BLD AUTO: 0.5 10E3/UL (ref 0–0.7)
EOSINOPHIL NFR BLD AUTO: 7 %
ERYTHROCYTE [DISTWIDTH] IN BLOOD BY AUTOMATED COUNT: 15.6 % (ref 10–15)
GLUCOSE SERPL-MCNC: 91 MG/DL (ref 70–99)
HCT VFR BLD AUTO: 42.9 % (ref 40–53)
HGB BLD-MCNC: 14.1 G/DL (ref 13.3–17.7)
IMM GRANULOCYTES # BLD: 0 10E3/UL
IMM GRANULOCYTES NFR BLD: 0 %
LYMPHOCYTES # BLD AUTO: 0.7 10E3/UL (ref 0.8–5.3)
LYMPHOCYTES NFR BLD AUTO: 12 %
MCH RBC QN AUTO: 28.3 PG (ref 26.5–33)
MCHC RBC AUTO-ENTMCNC: 32.9 G/DL (ref 31.5–36.5)
MCV RBC AUTO: 86 FL (ref 78–100)
MONOCYTES # BLD AUTO: 0.5 10E3/UL (ref 0–1.3)
MONOCYTES NFR BLD AUTO: 8 %
NEUTROPHILS # BLD AUTO: 4.6 10E3/UL (ref 1.6–8.3)
NEUTROPHILS NFR BLD AUTO: 72 %
NRBC # BLD AUTO: 0 10E3/UL
NRBC BLD AUTO-RTO: 0 /100
PLATELET # BLD AUTO: 188 10E3/UL (ref 150–450)
POTASSIUM SERPL-SCNC: 4.4 MMOL/L (ref 3.4–5.3)
PROT SERPL-MCNC: 7.6 G/DL (ref 6.4–8.3)
RBC # BLD AUTO: 4.99 10E6/UL (ref 4.4–5.9)
SODIUM SERPL-SCNC: 139 MMOL/L (ref 135–145)
WBC # BLD AUTO: 6.4 10E3/UL (ref 4–11)

## 2023-11-14 PROCEDURE — 80053 COMPREHEN METABOLIC PANEL: CPT

## 2023-11-14 PROCEDURE — 250N000011 HC RX IP 250 OP 636: Mod: JZ | Performed by: STUDENT IN AN ORGANIZED HEALTH CARE EDUCATION/TRAINING PROGRAM

## 2023-11-14 PROCEDURE — 250N000011 HC RX IP 250 OP 636: Mod: JZ

## 2023-11-14 PROCEDURE — 99214 OFFICE O/P EST MOD 30 MIN: CPT

## 2023-11-14 PROCEDURE — 96413 CHEMO IV INFUSION 1 HR: CPT

## 2023-11-14 PROCEDURE — 81003 URINALYSIS AUTO W/O SCOPE: CPT | Performed by: STUDENT IN AN ORGANIZED HEALTH CARE EDUCATION/TRAINING PROGRAM

## 2023-11-14 PROCEDURE — 86704 HEP B CORE ANTIBODY TOTAL: CPT

## 2023-11-14 PROCEDURE — 87521 HEPATITIS C PROBE&RVRS TRNSC: CPT

## 2023-11-14 PROCEDURE — 85014 HEMATOCRIT: CPT

## 2023-11-14 PROCEDURE — 258N000003 HC RX IP 258 OP 636: Performed by: STUDENT IN AN ORGANIZED HEALTH CARE EDUCATION/TRAINING PROGRAM

## 2023-11-14 PROCEDURE — G0463 HOSPITAL OUTPT CLINIC VISIT: HCPCS | Mod: 25

## 2023-11-14 PROCEDURE — 36591 DRAW BLOOD OFF VENOUS DEVICE: CPT

## 2023-11-14 PROCEDURE — 87522 HEPATITIS C REVRS TRNSCRPJ: CPT

## 2023-11-14 RX ORDER — HEPARIN SODIUM (PORCINE) LOCK FLUSH IV SOLN 100 UNIT/ML 100 UNIT/ML
5 SOLUTION INTRAVENOUS ONCE
Status: COMPLETED | OUTPATIENT
Start: 2023-11-14 | End: 2023-11-14

## 2023-11-14 RX ORDER — HEPARIN SODIUM (PORCINE) LOCK FLUSH IV SOLN 100 UNIT/ML 100 UNIT/ML
5 SOLUTION INTRAVENOUS
Status: DISCONTINUED | OUTPATIENT
Start: 2023-11-14 | End: 2023-11-14 | Stop reason: HOSPADM

## 2023-11-14 RX ADMIN — Medication 5 ML: at 15:34

## 2023-11-14 RX ADMIN — SODIUM CHLORIDE 250 ML: 9 INJECTION, SOLUTION INTRAVENOUS at 14:58

## 2023-11-14 RX ADMIN — Medication 5 ML: at 13:00

## 2023-11-14 RX ADMIN — SODIUM CHLORIDE 500 MG: 9 INJECTION, SOLUTION INTRAVENOUS at 14:58

## 2023-11-14 ASSESSMENT — PAIN SCALES - GENERAL: PAINLEVEL: NO PAIN (0)

## 2023-11-14 NOTE — NURSING NOTE
"Oncology Rooming Note    November 14, 2023 1:16 PM   Roman Escalante is a 48 year old male who presents for:    Chief Complaint   Patient presents with    Port Draw     Labs drawn via port by rn in lab. VS taken.    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    Weight as of this encounter: 93.6 kg (206 lb 6.4 oz). Body surface area is 2.17 meters squared.  No Pain (0) Comment: Data Unavailable   No LMP for male patient.  Allergies reviewed: Yes  Medications reviewed: Yes    Medications: Medication refills not needed today.  Pharmacy name entered into EPIC:    [facility] - [place], [place] - [address] [place] [address] [phone]  [facility] - [place], [place] - [address] [place] [address] DRUG STORE #[id number] [address]    Clinical concerns: none       Niyah Birch              "

## 2023-11-14 NOTE — PROGRESS NOTES
Infusion Nursing Note:  Roman Escalante presents today for Cycle 1 Day 15 Avastin  Patient seen by provider today: Yes: Rebeca Killian, ADELINA   present during visit today: Not Applicable.    Note: Roman comes into the clinic today doing well overall and has no concerns to offer following his provider visit. He has no pain and denies s/s of infection.      Intravenous Access:  Implanted Port.    Treatment Conditions:  Lab Results   Component Value Date    HGB 14.1 11/14/2023    WBC 6.4 11/14/2023    ANEU 45.1 (H) 07/28/2023    ANEUTAUTO 4.6 11/14/2023     11/14/2023        Lab Results   Component Value Date     11/14/2023    POTASSIUM 4.4 11/14/2023    MAG 1.8 07/30/2023    CR 0.75 11/14/2023    JEFERSON 9.1 11/14/2023    BILITOTAL 0.4 11/14/2023    ALBUMIN 4.3 11/14/2023    ALT 7 11/14/2023    AST 22 11/14/2023       Results reviewed, labs MET treatment parameters, ok to proceed with treatment.  Urine protein: 10!   BP: 114/72    Post Infusion Assessment:  Patient tolerated infusion without incident.  Blood return noted pre and post infusion.  Site patent and intact, free from redness, edema or discomfort.  No evidence of extravasations.  Access discontinued per protocol.       Discharge Plan:   Patient declined prescription refills.  Discharge instructions reviewed with: Patient.  Patient and/or family verbalized understanding of discharge instructions and all questions answered.  AVS to patient via [employer].  Patient will return 11/28 for next appointment.   Patient discharged in stable condition accompanied by: self.  Departure Mode: Ambulatory.      Lynda Purcell RN

## 2023-11-14 NOTE — PROGRESS NOTES
[facility] - Medical Oncology Follow Up Note  11/16/2023    HPI:   Patient developed rectal bleeding in 2020.  In January 2021 CT showed focal wall thickening in the upper rectum, multiple pulmonary nodules bilaterally suspicious for metastatic disease.  Underwent FNA of the right upper lobe lesion which was consistent with adenocarcinoma of the colon.  He was treated with FOLFOX from 2/20/2021 through 5/20/2021.  Avastin was added.  Had an infusion reaction to oxaliplatin 6/2021.  7/2021- 12/2021 was on 5-FU alone.  Developed progression.  12/2021- 9/2022 was on FOLFIRI.  11/2021 started Lonsurf. All of this was done with outside oncologist.     Met with Dr. Snow on 2/3/2022 to establish care. Recommended regorafenib.     There have been questions of a parasitic infection. Please see previous notes from [facility] oncologist for further details. Was seen by ID at the  on 2/27, no concerns for parasitic infection    Started regorafenib on 3/2/2023. Progression noted 5/19/23. Plan to start FOLFOX/Avastin and send out CARIS testing to evaluate for other treatment options. Cycle 1 was given with oxaliplatin desensitization, 5FU, Avastin held due to increased urine protein.     Following cycle 4, patient was admitted with hematuria and acute kidney injury.    Oxaliplatin was dropped with Cycle 5.     Y-90 radioembolization 9/7.    10/24/23 CT CAP with disease progression with notable growth in all pulmonary lesions. Lonsurf + bevacizumab started 10/27/23 with plans to bridge to clinical trial.     Interval History:      Has been feeling well overall. Mild nausea is controlled with PRN antiemetics, not frequent. Appetite is good. Bowel movements have returned to baseline. No bowel or bladder concerns. Rectal pain has improved.      Hands are dry but no cracking or peeling. Using Gold Bond lotion. No sensitivity or neuropathy to hands. No skin concerns to feet. Baseline neuropathy has slightly increased in  the bottom of feet since starting Lonsurf.      No bleeding concerns. Denies fever, chills or concerns for infection. No respiratory concerns.     Plans to go to [place] in December to go fishing    Review of Systems:  Patient denies any of the following except if noted above: fevers, chills, difficulty with energy, vision or hearing changes, chest pain, dyspnea, abdominal pain, nausea, vomiting, diarrhea, constipation, urinary concerns, headaches, numbness, tingling, issues with sleep or mood. Also denies lumps, bumps, rashes or skin lesions, bleeding or bruising issues.      Past Medical History:   Diagnosis Date    Cancer (H) 1/12/21    Colorectal cacer mast, to lungs, and liver        Allergies   Allergen Reactions    Oxaliplatin Shortness Of Breath, Nausea and Vomiting and Other (See Comments)     Patient had HSR to Oxaliplatin on cycle 8 of FOLFOX chemotherapy. Sent to ER for continued monitoring.  Patient had HSR to Oxaliplatin on cycle 8 of FOLFOX chemotherapy. Sent to ER for continued monitoring.      Blood-Group Specific Substance Other (See Comments)     Patient has reactivity Suggestive of a Warm auto antibody. Blood products may be delayed. Draw patient 24 hours prior to transfusion. Draw one red top and two purple top tubes for all type and screen orders.  Patient has reactivity Suggestive of a Warm auto antibody. Blood products may be delayed. Draw patient 24 hours prior to transfusion. Draw one red top and two purple top tubes for all type and screen orders.       Physical Exam:  /72   Pulse 88   Temp 98.7  F (37.1  C)   Resp 16   Wt 93.6 kg (206 lb 6.4 oz)   SpO2 98%   BMI 28.79 kg/m    Wt Readings from Last 10 Encounters:   11/14/23 93.6 kg (206 lb 6.4 oz)   10/30/23 93.9 kg (207 lb)   10/27/23 94 kg (207 lb 3.2 oz)   10/02/23 93.6 kg (206 lb 4.8 oz)   09/14/23 90.3 kg (199 lb)   09/07/23 90.1 kg (198 lb 10.2 oz)   08/31/23 90.2 kg (198 lb 14.4 oz)   08/29/23 90.4 kg (199 lb 4.7 oz)    08/22/23 88.4 kg (194 lb 12.8 oz)   08/11/23 90.1 kg (198 lb 9.6 oz)   General: The patient is a pleasant male in no acute distress.  HEENT: EOMI. Sclerae are anicteric.   Lymph: Neck is supple with no lymphadenopathy in the cervical or supraclavicular areas.   Heart: Regular rate and rhythm.   Lungs: Clear to auscultation bilaterally.   Abdomen: Bowel sounds present, soft, nontender with no palpable hepatosplenomegaly or masses. Ostomy present in left lower abdomen, stoma not visualized due to appliance.   Extremities: No lower extremity edema noted bilaterally.   Neuro: Cranial nerves II through XII are grossly intact.  Skin: No rashes, petechiae, or bruising noted on exposed skin.        LABS  Most Recent 3 CBC's:  Recent Labs   Lab Test 11/14/23  1309 10/27/23  1227 10/24/23  1223   WBC 6.4 11.3* 11.1*   HGB 14.1 14.6 15.1   MCV 86 87 87    289 286    Most Recent 3 BMP's:  Recent Labs   Lab Test 11/14/23  1309 10/27/23  1227 10/24/23  1223    137 134*   POTASSIUM 4.4 5.0 4.8   CHLORIDE 104 103 99   CO2 25 24 22   BUN 15.0 15.1 13.6   CR 0.75 0.82 0.81   ANIONGAP 10 10 13   JEFERSON 9.1 9.2 9.4   GLC 91 113* 101*    Most Recent 2 LFT's:  Recent Labs   Lab Test 11/14/23  1309 10/27/23  1227   AST 22 29   ALT 7 9   ALKPHOS 83 91   BILITOTAL 0.4 0.5  0.5        I reviewed the above labs today.      ASSESSMENT AND PLAN   Metastatic rectal cancer  - Progression on regorafenib. Started FOLFOX/Avastin 6/7/23. Oxaliplatin removed with cycle 5 due to acute kidney injury and hematuria following cycle 4. Y-90 radioembolization completed 9/7, then resumed 5FU. CT CAP 10/2023 with disease progression. Started Lonsurf/bevacizumab 10/27/23 with plans to bridge to move forward with clinical trials. Tolerating cycle 1 well with mild neuropathy, nausea, and decreased pain.   -Labs reviewed today, proceed with bevacizumab today.   -Return to clinic in 2 weeks for cycle 2 with labs + LILLY prior.     Nausea  Had a few  episodes of mild nausea, well-controlled with PRN oral antiemetics. Appetite is good, adequate oral intake.       Rectal pain  -Improved over the last few weeks. Has consult with pain management team 12/1.       30 minutes spent on the date of the encounter doing chart review, review of test results, interpretation of tests, patient visit, and documentation     FLORA Albright CNP

## 2023-11-14 NOTE — NURSING NOTE
Chief Complaint   Patient presents with    Port Draw     Labs drawn via port by rn in lab. VS taken.     Port accessed with 20g 3/4 inch gripper needle by RN, labs collected, line flushed with saline and heparin.  Vitals taken. Pt checked in for appointment(s).    Hans Rivera, RN

## 2023-11-14 NOTE — LETTER
11/14/2023         RE: Roman Escalante  [address]  [facility] [address]        Dear Colleague,    Thank you for referring your patient, Roman Escalante, to the [facility]. Please see a copy of my visit note below.      [facility] - Medical Oncology Follow Up Note  11/16/2023    HPI:   Patient developed rectal bleeding in 2020.  In January 2021 CT showed focal wall thickening in the upper rectum, multiple pulmonary nodules bilaterally suspicious for metastatic disease.  Underwent FNA of the right upper lobe lesion which was consistent with adenocarcinoma of the colon.  He was treated with FOLFOX from 2/20/2021 through 5/20/2021.  Avastin was added.  Had an infusion reaction to oxaliplatin 6/2021.  7/2021- 12/2021 was on 5-FU alone.  Developed progression.  12/2021- 9/2022 was on FOLFIRI.  11/2021 started Lonsurf. All of this was done with outside oncologist.     Met with Dr. Snow on 2/3/2022 to establish care. Recommended regorafenib.     There have been questions of a parasitic infection. Please see previous notes from [facility] oncologist for further details. Was seen by ID at the  on 2/27, no concerns for parasitic infection    Started regorafenib on 3/2/2023. Progression noted 5/19/23. Plan to start FOLFOX/Avastin and send out CARIS testing to evaluate for other treatment options. Cycle 1 was given with oxaliplatin desensitization, 5FU, Avastin held due to increased urine protein.     Following cycle 4, patient was admitted with hematuria and acute kidney injury.    Oxaliplatin was dropped with Cycle 5.     Y-90 radioembolization 9/7.    10/24/23 CT CAP with disease progression with notable growth in all pulmonary lesions. Lonsurf + bevacizumab started 10/27/23 with plans to bridge to clinical trial.     Interval History:      Has been feeling well overall. Mild nausea is controlled with PRN antiemetics, not frequent. Appetite is good. Bowel movements  have returned to baseline. No bowel or bladder concerns. Rectal pain has improved.      Hands are dry but no cracking or peeling. Using Gold Bond lotion. No sensitivity or neuropathy to hands. No skin concerns to feet. Baseline neuropathy has slightly increased in the bottom of feet since starting Lonsurf.      No bleeding concerns. Denies fever, chills or concerns for infection. No respiratory concerns.     Plans to go to [place] in December to go fishing    Review of Systems:  Patient denies any of the following except if noted above: fevers, chills, difficulty with energy, vision or hearing changes, chest pain, dyspnea, abdominal pain, nausea, vomiting, diarrhea, constipation, urinary concerns, headaches, numbness, tingling, issues with sleep or mood. Also denies lumps, bumps, rashes or skin lesions, bleeding or bruising issues.      Past Medical History:   Diagnosis Date    Cancer (H) 1/12/21    Colorectal cacer mast, to lungs, and liver        Allergies   Allergen Reactions    Oxaliplatin Shortness Of Breath, Nausea and Vomiting and Other (See Comments)     Patient had HSR to Oxaliplatin on cycle 8 of FOLFOX chemotherapy. Sent to ER for continued monitoring.  Patient had HSR to Oxaliplatin on cycle 8 of FOLFOX chemotherapy. Sent to ER for continued monitoring.      Blood-Group Specific Substance Other (See Comments)     Patient has reactivity Suggestive of a Warm auto antibody. Blood products may be delayed. Draw patient 24 hours prior to transfusion. Draw one red top and two purple top tubes for all type and screen orders.  Patient has reactivity Suggestive of a Warm auto antibody. Blood products may be delayed. Draw patient 24 hours prior to transfusion. Draw one red top and two purple top tubes for all type and screen orders.       Physical Exam:  /72   Pulse 88   Temp 98.7  F (37.1  C)   Resp 16   Wt 93.6 kg (206 lb 6.4 oz)   SpO2 98%   BMI 28.79 kg/m    Wt Readings from Last 10 Encounters:    11/14/23 93.6 kg (206 lb 6.4 oz)   10/30/23 93.9 kg (207 lb)   10/27/23 94 kg (207 lb 3.2 oz)   10/02/23 93.6 kg (206 lb 4.8 oz)   09/14/23 90.3 kg (199 lb)   09/07/23 90.1 kg (198 lb 10.2 oz)   08/31/23 90.2 kg (198 lb 14.4 oz)   08/29/23 90.4 kg (199 lb 4.7 oz)   08/22/23 88.4 kg (194 lb 12.8 oz)   08/11/23 90.1 kg (198 lb 9.6 oz)   General: The patient is a pleasant male in no acute distress.  HEENT: EOMI. Sclerae are anicteric.   Lymph: Neck is supple with no lymphadenopathy in the cervical or supraclavicular areas.   Heart: Regular rate and rhythm.   Lungs: Clear to auscultation bilaterally.   Abdomen: Bowel sounds present, soft, nontender with no palpable hepatosplenomegaly or masses. Ostomy present in left lower abdomen, stoma not visualized due to appliance.   Extremities: No lower extremity edema noted bilaterally.   Neuro: Cranial nerves II through XII are grossly intact.  Skin: No rashes, petechiae, or bruising noted on exposed skin.        LABS  Most Recent 3 CBC's:  Recent Labs   Lab Test 11/14/23  1309 10/27/23  1227 10/24/23  1223   WBC 6.4 11.3* 11.1*   HGB 14.1 14.6 15.1   MCV 86 87 87    289 286    Most Recent 3 BMP's:  Recent Labs   Lab Test 11/14/23  1309 10/27/23  1227 10/24/23  1223    137 134*   POTASSIUM 4.4 5.0 4.8   CHLORIDE 104 103 99   CO2 25 24 22   BUN 15.0 15.1 13.6   CR 0.75 0.82 0.81   ANIONGAP 10 10 13   JEFERSON 9.1 9.2 9.4   GLC 91 113* 101*    Most Recent 2 LFT's:  Recent Labs   Lab Test 11/14/23  1309 10/27/23  1227   AST 22 29   ALT 7 9   ALKPHOS 83 91   BILITOTAL 0.4 0.5  0.5        I reviewed the above labs today.      ASSESSMENT AND PLAN   Metastatic rectal cancer  - Progression on regorafenib. Started FOLFOX/Avastin 6/7/23. Oxaliplatin removed with cycle 5 due to acute kidney injury and hematuria following cycle 4. Y-90 radioembolization completed 9/7, then resumed 5FU. CT CAP 10/2023 with disease progression. Started Lonsurf/bevacizumab 10/27/23 with plans to  bridge to move forward with clinical trials. Tolerating cycle 1 well with mild neuropathy, nausea, and decreased pain.   -Labs reviewed today, proceed with bevacizumab today.   -Return to clinic in 2 weeks for cycle 2 with labs + LILLY prior.     Nausea  Had a few episodes of mild nausea, well-controlled with PRN oral antiemetics. Appetite is good, adequate oral intake.       Rectal pain  -Improved over the last few weeks. Has consult with pain management team 12/1.       30 minutes spent on the date of the encounter doing chart review, review of test results, interpretation of tests, patient visit, and documentation     FLORA Albright CNP

## 2023-11-15 ENCOUNTER — PATIENT OUTREACH (OUTPATIENT)
Dept: CARE COORDINATION | Facility: CLINIC | Age: 48
End: 2023-11-15
Payer: COMMERCIAL

## 2023-11-15 LAB — HBV CORE AB SERPL QL IA: NONREACTIVE

## 2023-11-15 NOTE — PROGRESS NOTES
Social Work - Telephone/MyChart message  [facility]  Data:   Patient Name: Roman Escalante  Goes By: Roman GRAY/Age: 1975 (48 year old)      Referral Source: FLORA Albright CNP   Reason for Referral: [facility] Assistance Paperwork    Intervention: Left voice message for patient on 11/15/2023 .   Plan:  will await patient's return phone call/message and provide assistance at that time.      GUILHERME Yepez,SW  Hematology/Oncology Social Worker  Phone:[phone] Pager: [phone]

## 2023-11-16 LAB
HBV DNA SERPL QL NAA+PROBE: NORMAL
HCV RNA SERPL NAA+PROBE-ACNC: NOT DETECTED IU/ML
HCV RNA SERPL QL NAA+PROBE: NORMAL
HIV1+2 RNA SERPL QL NAA+PROBE: NORMAL
SCANNED LAB RESULT: NORMAL

## 2023-11-17 DIAGNOSIS — C78.00 RECTAL ADENOCARCINOMA METASTATIC TO LUNG (H): Primary | ICD-10-CM

## 2023-11-17 DIAGNOSIS — C20 RECTAL ADENOCARCINOMA METASTATIC TO LUNG (H): Primary | ICD-10-CM

## 2023-11-17 RX ORDER — TRIFLURIDINE AND TIPIRACIL 15; 6.14 MG/1; MG/1
35 TABLET, FILM COATED ORAL 2 TIMES DAILY
Qty: 100 TABLET | Refills: 0 | OUTPATIENT
Start: 2023-11-17 | End: 2023-11-17

## 2023-11-20 ENCOUNTER — ANCILLARY PROCEDURE (OUTPATIENT)
Dept: CT IMAGING | Facility: CLINIC | Age: 48
End: 2023-11-20
Attending: STUDENT IN AN ORGANIZED HEALTH CARE EDUCATION/TRAINING PROGRAM
Payer: COMMERCIAL

## 2023-11-20 DIAGNOSIS — C78.00 RECTAL ADENOCARCINOMA METASTATIC TO LUNG (H): ICD-10-CM

## 2023-11-20 DIAGNOSIS — C20 RECTAL ADENOCARCINOMA METASTATIC TO LUNG (H): ICD-10-CM

## 2023-11-20 PROCEDURE — 71260 CT THORAX DX C+: CPT | Mod: GC | Performed by: RADIOLOGY

## 2023-11-20 PROCEDURE — 74177 CT ABD & PELVIS W/CONTRAST: CPT | Mod: GC | Performed by: RADIOLOGY

## 2023-11-20 RX ORDER — IOPAMIDOL 755 MG/ML
101 INJECTION, SOLUTION INTRAVASCULAR ONCE
Status: COMPLETED | OUTPATIENT
Start: 2023-11-20 | End: 2023-11-20

## 2023-11-20 RX ORDER — HEPARIN SODIUM (PORCINE) LOCK FLUSH IV SOLN 100 UNIT/ML 100 UNIT/ML
500 SOLUTION INTRAVENOUS ONCE
Status: COMPLETED | OUTPATIENT
Start: 2023-11-20 | End: 2023-11-20

## 2023-11-20 RX ADMIN — IOPAMIDOL 101 ML: 755 INJECTION, SOLUTION INTRAVASCULAR at 08:49

## 2023-11-20 RX ADMIN — HEPARIN SODIUM (PORCINE) LOCK FLUSH IV SOLN 100 UNIT/ML 500 UNITS: 100 SOLUTION at 08:46

## 2023-11-20 NOTE — DISCHARGE INSTRUCTIONS

## 2023-12-01 ENCOUNTER — PATIENT OUTREACH (OUTPATIENT)
Dept: ONCOLOGY | Facility: CLINIC | Age: 48
End: 2023-12-01

## 2023-12-01 ENCOUNTER — OFFICE VISIT (OUTPATIENT)
Dept: PALLIATIVE MEDICINE | Facility: CLINIC | Age: 48
End: 2023-12-01
Attending: STUDENT IN AN ORGANIZED HEALTH CARE EDUCATION/TRAINING PROGRAM
Payer: COMMERCIAL

## 2023-12-01 ENCOUNTER — DOCUMENTATION ONLY (OUTPATIENT)
Dept: ONCOLOGY | Facility: CLINIC | Age: 48
End: 2023-12-01

## 2023-12-01 VITALS — DIASTOLIC BLOOD PRESSURE: 96 MMHG | SYSTOLIC BLOOD PRESSURE: 136 MMHG | HEART RATE: 102 BPM

## 2023-12-01 DIAGNOSIS — C20 RECTAL ADENOCARCINOMA METASTATIC TO LUNG (H): ICD-10-CM

## 2023-12-01 DIAGNOSIS — C78.00 RECTAL ADENOCARCINOMA METASTATIC TO LUNG (H): ICD-10-CM

## 2023-12-01 DIAGNOSIS — T45.1X5A NEUROPATHY DUE TO CHEMOTHERAPEUTIC DRUG (H): Primary | ICD-10-CM

## 2023-12-01 DIAGNOSIS — Z51.81 ENCOUNTER FOR THERAPEUTIC DRUG MONITORING: ICD-10-CM

## 2023-12-01 DIAGNOSIS — G62.0 NEUROPATHY DUE TO CHEMOTHERAPEUTIC DRUG (H): Primary | ICD-10-CM

## 2023-12-01 DIAGNOSIS — G89.3 CANCER ASSOCIATED PAIN: ICD-10-CM

## 2023-12-01 PROCEDURE — 99205 OFFICE O/P NEW HI 60 MIN: CPT | Performed by: NURSE PRACTITIONER

## 2023-12-01 RX ORDER — CYCLOBENZAPRINE HCL 5 MG
TABLET ORAL
Qty: 20 TABLET | Refills: 0 | Status: SHIPPED | OUTPATIENT
Start: 2023-12-01 | End: 2024-02-05

## 2023-12-01 RX ORDER — GABAPENTIN 100 MG/1
CAPSULE ORAL
Qty: 90 CAPSULE | Refills: 0 | Status: SHIPPED | OUTPATIENT
Start: 2023-12-01

## 2023-12-01 ASSESSMENT — PAIN SCALES - GENERAL: PAINLEVEL: MILD PAIN (2)

## 2023-12-01 NOTE — PATIENT INSTRUCTIONS
PATIENT INSTRUCTIONS:   The following recommendations were given to the patient. Diagnosis, treatment options, risks, benefits, and alternatives were discussed, and all questions were answered. Self-care instructions given. The patient expressed understanding of the plan for management. I am recommending a multidisciplinary treatment plan to help this patient better manage his pain.   Remember to request ALL medication refills 5 days before you run out.  Continue Current Treatment Plan with:   - Existing medications, as prescribed by Oncology    New Orders:   - Urine drug screen     Medication Management:   - START taking Gabapentin 100mg as follows:  Gabapentin 100mg - Take 100mg by mouth at bedtime for 3 days, then Take 100mg by mouth in AM and 100mg at bedtime for 3 days, then Take 100mg by mouth three times per day   - START: Cyclobenzaprine 5mg - take one tab (5mg) by mouth every 6-8 hours for spasms   Return to Clinic:   -  30-minute In-Person Appointment with Nahomi King DNP, FLORA, RICKIE in 2-3 weeks, or sooner if needed           ----------------------------------------------------------------  Clinic Number:  [phone]   Call with any questions about your care and for scheduling assistance.   Calls are returned Monday through Friday between 8 AM and 4:30 PM. We usually get back to you within 2 business days depending on the issue/request.    If we are prescribing your medications:  For opioid medication refills, call the clinic or send a [employer] message 7 days in advance.  Please include:  Name of requested medication  Name of the pharmacy.  For non-opioid medications, call your pharmacy directly to request a refill. Please allow 3-4 days to be processed.   Per [place] State Law:  All controlled substance prescriptions must be filled within 30 days of being written.    For those controlled substances allowing refills, pickup must occur within 30 days of last fill.      We believe regular attendance is  key to your success in our program!    Any time you are unable to keep your appointment we ask that you call us at least 24 hours in advance to cancel.This will allow us to offer the appointment time to another patient.   Multiple missed appointments may lead to dismissal from the clinic.

## 2023-12-01 NOTE — PROGRESS NOTES
12/01/23 0953   PEG: A Thee-Item Scale Assessing Pain Intensity and Interference        0 = No pain / No interference    10 = Pain as bad as you can imagine / Completely interferes   What number best describes your pain on average in the past week? 3   What number best describes how, during the past week, pain has interfered with your enjoyment of life? 2   What number best describes how, during the past week, pain has interfered with your general activity? 4   PEG Total Score 3

## 2023-12-01 NOTE — PROGRESS NOTES
"[facility] Management     Date of Visit: 12/1/2023    CHIEF COMPLAINT:    Chief Complaint   Patient presents with    Pain            REASON FOR VISIT:    Roman Escalante is a 48 year old male who is seen in consultation today at the request of Polo Snow MD (Oncology) for evaluation of his pain issues and recommendations for management; with specific emphasis on:      Rectal adenocarcinoma metastatic to lung (H) [C20, C78.00]  - Primary          Referring Provider Comments:  \"Regarding patient's symptoms, patient's anorectal pain has returned.  He notes that, as prior, it occurs both with prolonged standing and with prolonged sitting.  It is not well relieved by his current opioid regimen, though he is avoiding opioids as possible due to constipatory symptoms.  Given these issues, we recommend consultation with interventional pain clinic for potential local block which would lead to symptom relief. \"    Question Answer   Reason for Referral: Cancer-Related Pain   Provider, please review opioid agreement in the process instructions above. Do you agree to these terms? Yes   Scheduling Instructions: [employer] will call you to coordinate care as prescribed your provider. If you don t hear from a representative within 2 business days, please call [phone].   Additional Information: Patient with persistent/focal rectal pain not well managed on oral opioid regimen due to waxing/waning feature. Intermittently disabling.       Current controlled substance medications, if any, are being prescribed by: Oncology Team - several providers   Primary Care Provider is Buddy Hart      Review of [place] Prescription Monitoring Program ():  reviewed on 12/01/23. No concern for abuse or misuse of controlled medications based on this report. (oxycodone)          Subjective    HISTORY OF PRESENT ILLNESS:  Onset/Progression:   Roman Escalante is a 48 year old male with history of " " chemotherapy-related neuropathy    He has stage 4 colorectal cancer with episodes of severe perirectal pain. He has sensations of \"xenia in my shoes\"; described as neuropathy. He describes as \"pain in my butt\". Pain most occurred approximately one month ago to severe level. Cannot attribute pain to a cause. Pain is cramping, dull, aching. He has a large mass on rectal sigmoid. Pain started after performing a self-administered enema for a rectal procedure in December 2022.   Neuropathy is most prevalent in bilateral feet; started April 2021 after starting chemotherapy. Neuropathy is always present, low-grade, but increases with periods of standing. Pain is worse into evening and improves with laying down/resting. When laying down, sensations of numbness vs pain are noted. Denies skin color or temperature changes.       Pain quality: Aching, Burning, Sharp, and Throbbing   Pain timing: Intermittent    Pain score today: Mild Pain (2)   Pain rating: intensity ranges from 0/10 to 10/10, and averages 5/10 on a 0-10 scale.  Aggravating factors include: standing   Relieving factors include: removing pressure from feet         Past Pain Treatments:   Pain Clinic:   No    Physical therapy: No    Psychologist: No   Chiropractor: Yes several years ago   Acupuncture: Yes - April 2021; when into \"acu-shock\"   Pharmacotherapy:    Opioids: Yes     Non-opioids:  Yes  TENs Unit/electric stim: No   Heat/Cold: No       Injections/clinic procedures: No       Surgeries related to pain: No       Current Pain Relevant Medications:    - Oxycodone 5mg - one tab (5mg) PRN, taking 2x/week average over past month   - Acetaminophen 1000mg PRN; not daily   - THC herb and edibles are helpful         Previous Pain Relevant Medications: (H--helped; HI--Helped initially; SWH--Somewhat helpful; NH--No help; W--worse; SE--side effects; ?--Unsure if helpful)     Opioids: oxycodone (H), hydrocodone (SE-somnolence), codeine (SE-somnolence)   NSAIDS: " Avoiding 2/2 chemotherapy medications - Ibuprofen (H)  Muscle Relaxants: cyclobenzaprine (?? H)  Neuroleptics: none   Pain Antidepressants/ Anxiolytics: none   Sleep Aids: none    Migraine medications:    Topical: none   Adjuvant medications: THC (H), Acetaminophen (H)       Past Medical History   He  has a past medical history of Cancer (H) (21).   Past Surgical History   He  has a past surgical history that includes IR Lung Biopsy Mediastinum Right (2021); Abdomen surgery; biopsy (21); GI surgery (Ostomy placementnt 21); IR SIRT (Selective Internal Radio Therapy) (2023); IR Visceral Angiogram (2023); and IR Visceral Embolization (2023).   Social History   Social History     Tobacco Use    Smoking status: Some Days     Packs/day: 0.50     Years: 10.00     Additional pack years: 0.00     Total pack years: 5.00     Types: Cigarettes, Other     Start date: 2010     Last attempt to quit: 2023     Years since quittin.3     Passive exposure: Current    Smokeless tobacco: Never   Vaping Use    Vaping Use: Never used   Substance Use Topics    Alcohol use: Not Currently     Comment: social    Drug use: Yes     Types: Marijuana     Comment: Keeps up my appetite, sleep, and help with neuropathy      Social History     Social History Narrative    Not on file        Current Outpatient Medications   Medication    cyclobenzaprine (FLEXERIL) 5 MG tablet    gabapentin (NEURONTIN) 100 MG capsule    ondansetron (ZOFRAN) 4 MG tablet    oxyCODONE (ROXICODONE) 5 MG tablet    Fluorouracil (ADURCIL) 5 GM/100ML SOLN injection    ibuprofen (ADVIL/MOTRIN) 200 MG tablet    methylPREDNISolone (MEDROL DOSEPAK) 4 MG tablet therapy pack    Ostomy Supplies MISC    oxyCODONE (ROXICODONE) 5 MG tablet    prochlorperazine (COMPAZINE) 10 MG tablet    prochlorperazine (COMPAZINE) 5 MG tablet     No current facility-administered medications for this visit.     Allergies   Allergen Reactions    Oxaliplatin  Shortness Of Breath, Nausea and Vomiting and Other (See Comments)     Patient had HSR to Oxaliplatin on cycle 8 of FOLFOX chemotherapy. Sent to ER for continued monitoring.  Patient had HSR to Oxaliplatin on cycle 8 of FOLFOX chemotherapy. Sent to ER for continued monitoring.      Blood-Group Specific Substance Other (See Comments)     Patient has reactivity Suggestive of a Warm auto antibody. Blood products may be delayed. Draw patient 24 hours prior to transfusion. Draw one red top and two purple top tubes for all type and screen orders.  Patient has reactivity Suggestive of a Warm auto antibody. Blood products may be delayed. Draw patient 24 hours prior to transfusion. Draw one red top and two purple top tubes for all type and screen orders.          Medications and Allergies reviewed. Yes         REVIEW OF SYSTEMS:   ROS: 10 point ROS neg other than the symptoms noted above in the HPI.     The patient otherwise denies red flag symptoms, such as: thunderclap headache, bowel or bladder incontinence, parasthesias, weakness, saddle anesthesia, unintentional weight loss, or fever/chills/sweats.     Objective   OBJECTIVE    Vitals:    12/01/23 0950   BP: (!) 136/96   Pulse: 102         Appearance:   A&O. Patient is appropriate. Patient is in NAD.      HHENT:  Normocephalic, atraumatic. Eyes without conjunctival injection or jaundice. Neck supple.     Pulmonary:  Normal effort; no cough or audible wheeze.       Skin: No obvious rash, lesions, or petechiae of exposed skin.    Psych:  Alert, without lethargy or stupor. Speech fluent. Appropriate affect. Mood normal. Able to follow commands without difficulty. Normal mood, judgement and behavior   Tearful or anxious affect: NO   Suicidal or homicidal ideation: NO        Gait pattern:   Patient has an antalgic gait pattern:NO  Gait favors the neither side.  Mobility and/or assistive devices? NO        Strength:   Knee ext: R: 5/5  L: 5/5  Knee flex: R: 5/5  L:  5/5  Dorsiflexion: R: 5/5  L: 5/5  Plantarflexion: R: 5/5  L: 5/5  Great toe ext:  R: 5/5  L: 5/5       Neurological:   Deep Tendon Reflex exam:   Patella:  R:  2/4   L: 2/4       Sensory exam:  (lower extremities)   Light touch: normal    Allodynia: absent    Dysethesia: absent    Hyperalgesia: absent     Cervical spine:  Tenderness in the cervical spine at midline. No  Tenderness in the cervical paraspinal muscles. No    Thoracic spine:   Kyphosis. No   Tenderness in the thoracic spine at midline. No  Tenderness in the thoracic paraspinal muscles. No    Lumbar/Sacral spine:  Range of motion by visual estimation   Forward Flexion:  70 degrees, pain free  Ext: 15 degrees, pain free  Rotation/ext to right: pain free  Rotation/ext to left: pain free  Lordosis. Yes  Tenderness in the lumbar spine at midline. Yes  Tenderness in the lumbar paraspinal muscles.Yes  Straight leg exam:  Right: negative    Left:  negative  Neural Slump test:  Right: negative    Left:  negative  Antwan/Galo's test:     Right: negative    Left:  negative  Passive internal rotation:   Right: negative    Left: negative   Tenderness over SI joint:     Right: negative    Left:  negative  Tenderness over Trochanteric Bursa:    Right: negative    Left: negative        Diagnostic Testing:  Labs:      Creatinine   Date Value Ref Range Status   11/14/2023 0.75 0.67 - 1.17 mg/dL Final     GFR Estimate   Date Value Ref Range Status   11/14/2023 >90 >60 mL/min/1.73m2 Final     Alkaline Phosphatase   Date Value Ref Range Status   11/14/2023 83 40 - 150 U/L Final     Comment:     Reference intervals for this test were updated on 11/14/2023 to more accurately reflect our healthy population. There may be differences in the flagging of prior results with similar values performed with this method. Interpretation of those prior results can be made in the context of the updated reference intervals.     AST   Date Value Ref Range Status   11/14/2023 22 0 - 45 U/L  Final     Comment:     Reference intervals for this test were updated on 6/12/2023 to more accurately reflect our healthy population. There may be differences in the flagging of prior results with similar values performed with this method. Interpretation of those prior results can be made in the context of the updated reference intervals.     ALT   Date Value Ref Range Status   11/14/2023 7 0 - 70 U/L Final     Comment:     Reference intervals for this test were updated on 6/12/2023 to more accurately reflect our healthy population. There may be differences in the flagging of prior results with similar values performed with this method. Interpretation of those prior results can be made in the context of the updated reference intervals.                Assessment & Plan    ASSESSMENT AND PLAN:      Number of complexity of problems addressed.   Medication Management , Neoplasm-related pain  , and Peripheral Neuropathy    Roman Escalante is a 48 year old male with a past medical history significant for Stage 4 Rectal Adenocarcinoma w/ metastasis to lung and chemotherapy-induced peripheral neuropathy who presents with complaints of intermittent severe rectal pain and persistent neuropathy in both feet.      (G62.0,  T45.1X5A) Neuropathy due to chemotherapeutic drug   (primary encounter diagnosis)  At this time, neuropathy is most persistent pain problem. He notes pain increases throughout day and improves when laying down at night. Reviewed renal function, eGFR > 90. Recommending slow upward taper of Gabapentin; starting with 100mg TID. Pain goal to improve daily neuropathy with potential relief or improvement in severity of rectal pain attacks. Reviewed risks, benefits and side effects; patient verbalizes understanding and agreement.   Plan: Adult Pain Clinic Follow-Up Order, gabapentin         (NEURONTIN) 100 MG capsule          (G89.3) Cancer associated pain  We discussed longer-term pain management strategy. At this  point, pain is intermittent and PRN oxycodone is appropriate. Trigger for rectal pain is unclear, neuropathic vs spasm vs soft tissue changes from tumor. Offered trial of Cyclobenzaprine 5mg to take at onset of rectal pain; if no relief then advance to Oxycodone 5mg as needed. Ordered UDS today; at next visit will complete CSA for future prescribing. Would consider buprenorphine patch if and when pain becomes more constant. In the meantime, patient advised to follow up with oncology for opioid refills until CSA is established with Pain Management.   Plan: Adult Pain Clinic Follow-Up Order, gabapentin         (NEURONTIN) 100 MG capsule          (Z51.81) Encounter for therapeutic drug monitoring  Plan: Drug Confirmation Panel Urine with Creatinine              PATIENT INSTRUCTIONS:     The following recommendations were given to the patient. Diagnosis, treatment options, risks, benefits, and alternatives were discussed, and all questions were answered. Self-care instructions given. The patient expressed understanding of the plan for management. I am recommending a multidisciplinary treatment plan to help this patient better manage his pain.     Remember to request ALL medication refills 5 days before you run out.      Continue Current Treatment Plan with:   - Existing medications, as prescribed by Oncology      New Orders:   - Urine drug screen       Medication Management:   - START taking Gabapentin 100mg as follows:  Gabapentin 100mg - Take 100mg by mouth at bedtime for 3 days, then Take 100mg by mouth in AM and 100mg at bedtime for 3 days, then Take 100mg by mouth three times per day   - START: Cyclobenzaprine 5mg - take one tab (5mg) by mouth every 6-8 hours for spasms     Return to Clinic:   -  30-minute In-Person Appointment with Nahomi King, CARL, APRN, FNP-C in 2-3 weeks, or sooner if needed           Review of Electronic Chart, Relevant Records/History: Today I have also reviewed available medical  information in the patient's medical record at [facility] (EPIC) and Care Everywhere (if available), including relevant provider notes, laboratory work, and imaging. Please see the [facility] [place] health questionnaire which the patient completed and reviewed with me in detail.       Nahomi King, FLORA, DNP, FNP-C   [facility]    ___________________________________________________________________    (Chart documentation was completed in part with Dragon voice-recognition software. Even though reviewed, some grammatical, spelling, and word errors may remain.)      BILLING TIME DOCUMENTATION:   TOTAL TIME includes:   Time spent preparing to see the patient: 10 minutes (reviewing records and tests)  Time spend face to face with the patient: 54 minutes  Time spent ordering tests, medications, procedures and referrals: 0 minutes  Time spent Referring and communicating with other healthcare professionals: 0 minutes  Documenting clinical information in Epic: 10 minutes    The total TIME spent on this patient on the day of the appointment was 74 minutes.

## 2023-12-01 NOTE — PROGRESS NOTES
Per [facility] email:  Thank you for sending the most recent progress note. Generally, we screen patients at the point of disease progression, while they are off treatment and prior to the start of initiating a new treatment. Our physicians defer to the primary oncologist whether it is clinically appropriate to hold therapy for screening purposes. The screening process first involves gathering the required records and scans. Once all items have been received, a preliminary review will be completed by one of the referral nurses. If the patient does not meet any initial ineligibility criteria, the patient s file will be submitted to our senior physician for review at which time a detailed review of the labs and scans will be done. If the senior physician thinks the patient may be a candidate for one of our trials, an invitation to the [facility] will be extended to the patient for a full screening visit to determine eligibility. If the patient is found to be eligible, they will proceed with the next step which involves the cell harvest (via surgery and apheresis or just apheresis, depending on the trial the patient qualifies for). Surgery is typically scheduled in about 3-4 weeks from determining eligibility. The cell harvest requires a 30 day washout from chemotherapy and/or steroids. Avastin requires a 6 week washout.    Discussed with Dr. Snow: hold next 2 treatments (12/1 and 12/12) in anticipation of [facility] consult. Pt informed and agreeable to plan. Disc of most recent imaging fed-ex'd to [facility] and report faxed.

## 2023-12-14 NOTE — PROGRESS NOTES
Email received from [facility] that intake will reach out to patient to schedule a consult.

## 2023-12-18 ENCOUNTER — PATIENT OUTREACH (OUTPATIENT)
Dept: ONCOLOGY | Facility: CLINIC | Age: 48
End: 2023-12-18
Payer: COMMERCIAL

## 2023-12-18 NOTE — TELEPHONE ENCOUNTER
[facility]: Cancer Care                                                                                          Called [facility] and spoke with Padmini; she confirmed the following:    Screening visit is 12/26-12/28, brain MRI, CT CAP, PFT for eligibility  Tentative surgery 1/13, cell harvest requests 6 week wash out. [facility] defer to primary oncologist if pt is stable enough to hold treatment.     At this time did inform Padmini Snow is ok with pt holding Reyna/Lonsurf until after his screening and eligibility results.    Signature:  Virgen Nieto RN

## 2023-12-22 ENCOUNTER — ONCOLOGY VISIT (OUTPATIENT)
Dept: ONCOLOGY | Facility: CLINIC | Age: 48
End: 2023-12-22
Attending: STUDENT IN AN ORGANIZED HEALTH CARE EDUCATION/TRAINING PROGRAM
Payer: COMMERCIAL

## 2023-12-22 VITALS
RESPIRATION RATE: 16 BRPM | WEIGHT: 207.7 LBS | SYSTOLIC BLOOD PRESSURE: 132 MMHG | TEMPERATURE: 98.1 F | DIASTOLIC BLOOD PRESSURE: 93 MMHG | OXYGEN SATURATION: 98 % | HEART RATE: 72 BPM | BODY MASS INDEX: 28.97 KG/M2

## 2023-12-22 DIAGNOSIS — C78.00 RECTAL ADENOCARCINOMA METASTATIC TO LUNG (H): Primary | ICD-10-CM

## 2023-12-22 DIAGNOSIS — C20 RECTAL ADENOCARCINOMA METASTATIC TO LUNG (H): Primary | ICD-10-CM

## 2023-12-22 PROCEDURE — G0463 HOSPITAL OUTPT CLINIC VISIT: HCPCS | Performed by: STUDENT IN AN ORGANIZED HEALTH CARE EDUCATION/TRAINING PROGRAM

## 2023-12-22 PROCEDURE — 99215 OFFICE O/P EST HI 40 MIN: CPT | Performed by: STUDENT IN AN ORGANIZED HEALTH CARE EDUCATION/TRAINING PROGRAM

## 2023-12-22 ASSESSMENT — PAIN SCALES - GENERAL: PAINLEVEL: NO PAIN (0)

## 2023-12-22 NOTE — NURSING NOTE
"Oncology Rooming Note    December 22, 2023 2:27 PM   Roman Escalante is a 48 year old male who presents for:    Chief Complaint   Patient presents with    Oncology Clinic Visit     Colorectal cancer     Initial Vitals: BP (!) 132/93 (BP Location: Right arm, Patient Position: Sitting, Cuff Size: Adult Large)   Pulse 72   Temp 98.1  F (36.7  C) (Oral)   Resp 16   Wt 94.2 kg (207 lb 11.2 oz)   SpO2 98%   BMI 28.97 kg/m   Estimated body mass index is 28.97 kg/m  as calculated from the following:    Height as of 10/30/23: 1.803 m (5' 11\").    Weight as of this encounter: 94.2 kg (207 lb 11.2 oz). Body surface area is 2.17 meters squared.  No Pain (0) Comment: Data Unavailable   No LMP for male patient.  Allergies reviewed: Yes  Medications reviewed: Yes    Medications: Medication refills not needed today.  Pharmacy name entered into [id number]:    [facility] [place], [place] - [address] [place] [address] [phone]  [facility] - [place], [place] - [address] [place] [address] DRUG STORE #[id number] [address]    Frailty Screening:   Is the patient here for a new oncology consult visit in cancer care? 2. No      Clinical concerns: none       Niyah Birch              "

## 2023-12-22 NOTE — LETTER
2023         RE: Roman Escalante  [address]  [place] [facility] [place] [address]        Dear Colleague,    Thank you for referring your patient, Roman Escalante, to the [facility]. Please see a copy of my visit note below.    [facility] - Medical Oncology Follow-Up Consult Note  2023    Patient Identifiers     Name: Roman Escalante  Preferred Address: [place]  Preferred Language: English  : 1975  Gender: male    Assessment and Plan     Mr. Roman Escalante is a 48 year old male active smoker (3-5 cigs/day) with a history of extensively pre-treated metastatic colorectal cancer potentially progressing on sixth-line Lonsurf/Reyna who returns to clinic for treatment response evaluation.    NGS: [id number]  Immuno: [facility]  Clinical Trial: [id number], TORL    Mr. Escalante returns to clinic for treatment response evaluation of colorectal cancer on sixth line Lonsurf/Reyna.  Unfortunately, patient's imaging again demonstrates progression on sixth line treatment, with increased size of mediastinal lymph nodes and new pulmonary nodules.  Likely, patient has already been established with clinical trial staff at the [facility], and is scheduled to undergo screening with them next week.  We are highly supportive of transition to clinical trial as patient has exhausted most standard therapies.  Depending on trial on which he is enrolled, we will need to arrange for bridging therapy between trial induction and treatment.  This therapy would likely be retreatment with regorafenib with the addition of nivolumab.  This regimen has an increased response rate in patients without hepatic metastases, so we are hopeful that it will be an effective bridge to definitive trial therapy.    Plan for patient to return to clinic in 1 month after trial induction.  Will plan for initiation of bridging therapy based on clinical symptoms and trial status at that time.    45 minutes spent on the date of the  encounter doing chart review, review of test results, interpretation of tests, patient visit, and documentation     Polo Snow MD, PhD   of Medicine  Division of Hematology, Oncology and Transplantation  [facility]    -----------------------------------    Oncology Summary     Cancer Staging   Rectal adenocarcinoma metastatic to lung (H)  Staging form: Colon and Rectum, AJCC 8th Edition  - Clinical: Stage IVB (cTX, cNX, pM1b) - Signed by Polo Snow MD on 3/30/2023      Oncology History   Rectal adenocarcinoma metastatic to lung (H)   2/3/2023 Initial Diagnosis    Rectal adenocarcinoma metastatic to lung (H)     2/3/2023 - 3/31/2023 Chemotherapy    Oral ONC Colorectal Cancer - Regorafenib  Plan Provider: Polo Snow MD  Treatment goal: Palliative  Line of treatment: [No plan line of treatment]     3/30/2023 -  Cancer Staged    Staging form: Colon and Rectum, AJCC 8th Edition  - Clinical: Stage IVB (cTX, cNX, pM1b)     6/7/2023 - 10/3/2023 Chemotherapy    OP ONC Colorectal Cancer - Modified FOLFOX-6 / Bevacizumab  Plan Provider: Polo Snow MD  Treatment goal: Palliative  Line of treatment: [No plan line of treatment]     10/27/2023 -  Chemotherapy    OP ONC Colorectal Cancer - Bevacizumab + Trifluridine/Tipiracil (Lonsurf)  Plan Provider: Polo Snow MD  Treatment goal: Palliative  Line of treatment: [No plan line of treatment]         Subjective/Interval Events     - leaving on Tuesday for the [facility] to enroll in clinical trial    Physical Exam     Vital signs: BP (!) 132/93 (BP Location: Right arm, Patient Position: Sitting, Cuff Size: Adult Large)   Pulse 72   Temp 98.1  F (36.7  C) (Oral)   Resp 16   Wt 94.2 kg (207 lb 11.2 oz)   SpO2 98%   BMI 28.97 kg/m      ECOG performance status:  1  Vascular access:  R chest port    Physical Exam:  Exam performed, notable for:  - no notables    Objective Data     Lab data:  I have personally reviewed the lab data, notable for:     - no notables    Radiology data:  I have personally reviewed the radiology data, notable for:  11/20/2023 CT C/A/P  IMPRESSION:   1. Interval increased size of several metastatic mediastinal nodes.  2. New right middle lobe pulmonary lesion with surrounding groundglass  opacification. Differential includes infection/inflammatory process  versus metastasis.  3. Slight increased size of hepatic segment 6/7 subcapsular lesion  concerning for metastasis.  4. Interval decreased size of hepatic segment 4 treatment zone.  5. Stable pulmonary metastases, abdominal/pelvic lymphadenopathy, and  indeterminate left adrenal nodule.    Pathology and other data:  I have personally reviewed and interpreted the pathology data, notable for:    - no new data    Polo Snow MD

## 2023-12-22 NOTE — PROGRESS NOTES
[facility] - Medical Oncology Follow-Up Consult Note  2023    Patient Identifiers     Name: Roman Escalante  Preferred Address: [place]  Preferred Language: English  : 1975  Gender: male    Assessment and Plan     Mr. Roman Escalante is a 48 year old male active smoker (3-5 cigs/day) with a history of extensively pre-treated metastatic colorectal cancer potentially progressing on sixth-line Lonsurf/Reyna who returns to clinic for treatment response evaluation.    NGS: KRAS G12N  Immuno: [facility]  Clinical Trial: [id number], TORL    Mr. Escalante returns to clinic for treatment response evaluation of colorectal cancer on sixth line Lonsurf/Reyna.  Unfortunately, patient's imaging again demonstrates progression on sixth line treatment, with increased size of mediastinal lymph nodes and new pulmonary nodules.  Likely, patient has already been established with clinical trial staff at the [facility], and is scheduled to undergo screening with them next week.  We are highly supportive of transition to clinical trial as patient has exhausted most standard therapies.  Depending on trial on which he is enrolled, we will need to arrange for bridging therapy between trial induction and treatment.  This therapy would likely be retreatment with regorafenib with the addition of nivolumab.  This regimen has an increased response rate in patients without hepatic metastases, so we are hopeful that it will be an effective bridge to definitive trial therapy.    Plan for patient to return to clinic in 1 month after trial induction.  Will plan for initiation of bridging therapy based on clinical symptoms and trial status at that time.    45 minutes spent on the date of the encounter doing chart review, review of test results, interpretation of tests, patient visit, and documentation     Polo Snow MD, PhD   of Medicine  Division of Hematology, Oncology and Transplantation  [facility]  [place]    -----------------------------------    Oncology Summary     Cancer Staging   Rectal adenocarcinoma metastatic to lung (H)  Staging form: Colon and Rectum, AJCC 8th Edition  - Clinical: Stage IVB (cTX, cNX, pM1b) - Signed by Polo Snow MD on 3/30/2023      Oncology History   Rectal adenocarcinoma metastatic to lung (H)   2/3/2023 Initial Diagnosis    Rectal adenocarcinoma metastatic to lung (H)     2/3/2023 - 3/31/2023 Chemotherapy    Oral ONC Colorectal Cancer - Regorafenib  Plan Provider: Polo Snow MD  Treatment goal: Palliative  Line of treatment: [No plan line of treatment]     3/30/2023 -  Cancer Staged    Staging form: Colon and Rectum, AJCC 8th Edition  - Clinical: Stage IVB (cTX, cNX, pM1b)     6/7/2023 - 10/3/2023 Chemotherapy    OP ONC Colorectal Cancer - Modified FOLFOX-6 / Bevacizumab  Plan Provider: Polo Snow MD  Treatment goal: Palliative  Line of treatment: [No plan line of treatment]     10/27/2023 -  Chemotherapy    OP ONC Colorectal Cancer - Bevacizumab + Trifluridine/Tipiracil (Lonsurf)  Plan Provider: Polo Snow MD  Treatment goal: Palliative  Line of treatment: [No plan line of treatment]         Subjective/Interval Events     - leaving on Tuesday for the [facility] to enroll in clinical trial    Physical Exam     Vital signs: BP (!) 132/93 (BP Location: Right arm, Patient Position: Sitting, Cuff Size: Adult Large)   Pulse 72   Temp 98.1  F (36.7  C) (Oral)   Resp 16   Wt 94.2 kg (207 lb 11.2 oz)   SpO2 98%   BMI 28.97 kg/m      ECOG performance status:  1  Vascular access:  R chest port    Physical Exam:  Exam performed, notable for:  - no notables    Objective Data     Lab data:  I have personally reviewed the lab data, notable for:    - no notables    Radiology data:  I have personally reviewed the radiology data, notable for:  11/20/2023 CT C/A/P  IMPRESSION:   1. Interval increased size of several metastatic mediastinal nodes.  2. New right middle lobe pulmonary  lesion with surrounding groundglass  opacification. Differential includes infection/inflammatory process  versus metastasis.  3. Slight increased size of hepatic segment 6/7 subcapsular lesion  concerning for metastasis.  4. Interval decreased size of hepatic segment 4 treatment zone.  5. Stable pulmonary metastases, abdominal/pelvic lymphadenopathy, and  indeterminate left adrenal nodule.    Pathology and other data:  I have personally reviewed and interpreted the pathology data, notable for:    - no new data

## 2023-12-27 ENCOUNTER — TRANSFERRED RECORDS (OUTPATIENT)
Dept: HEALTH INFORMATION MANAGEMENT | Facility: CLINIC | Age: 48
End: 2023-12-27

## 2024-01-01 ENCOUNTER — TRANSFERRED RECORDS (OUTPATIENT)
Dept: HEALTH INFORMATION MANAGEMENT | Facility: CLINIC | Age: 49
End: 2024-01-01

## 2024-01-22 ENCOUNTER — TRANSFERRED RECORDS (OUTPATIENT)
Dept: HEALTH INFORMATION MANAGEMENT | Facility: CLINIC | Age: 49
End: 2024-01-22
Payer: COMMERCIAL

## 2024-01-23 ENCOUNTER — TRANSFERRED RECORDS (OUTPATIENT)
Dept: HEALTH INFORMATION MANAGEMENT | Facility: CLINIC | Age: 49
End: 2024-01-23
Payer: COMMERCIAL

## 2024-01-23 ENCOUNTER — PATIENT OUTREACH (OUTPATIENT)
Dept: ONCOLOGY | Facility: CLINIC | Age: 49
End: 2024-01-23
Payer: COMMERCIAL

## 2024-01-23 NOTE — TELEPHONE ENCOUNTER
[facility]: Cancer Care                                                                                           for pt to call back regarding visits to [facility].     Called [facility] Intake and inquired if visit notes could be faxed to [facility]. Given [facility] number to call and Adonay requested email request so she can forward to medical records department.    Signature:  Virgen Nieto RN

## 2024-01-26 ENCOUNTER — TRANSFERRED RECORDS (OUTPATIENT)
Dept: HEALTH INFORMATION MANAGEMENT | Facility: CLINIC | Age: 49
End: 2024-01-26
Payer: COMMERCIAL

## 2024-01-27 ENCOUNTER — TRANSFERRED RECORDS (OUTPATIENT)
Dept: HEALTH INFORMATION MANAGEMENT | Facility: CLINIC | Age: 49
End: 2024-01-27
Payer: COMMERCIAL

## 2024-01-28 ENCOUNTER — HOSPITAL ENCOUNTER (EMERGENCY)
Facility: CLINIC | Age: 49
Discharge: HOME | End: 2024-01-29
Attending: EMERGENCY MEDICINE | Admitting: EMERGENCY MEDICINE
Payer: COMMERCIAL

## 2024-01-28 ENCOUNTER — APPOINTMENT (OUTPATIENT)
Dept: CT IMAGING | Facility: CLINIC | Age: 49
End: 2024-01-28
Attending: EMERGENCY MEDICINE
Payer: COMMERCIAL

## 2024-01-28 DIAGNOSIS — M54.50 MIDLINE LOW BACK PAIN, UNSPECIFIED CHRONICITY, UNSPECIFIED WHETHER SCIATICA PRESENT: ICD-10-CM

## 2024-01-28 LAB
ALBUMIN SERPL BCG-MCNC: 3.4 G/DL (ref 3.5–5.2)
ALP SERPL-CCNC: 74 U/L (ref 40–150)
ALT SERPL W P-5'-P-CCNC: 12 U/L (ref 0–70)
ANION GAP SERPL CALCULATED.3IONS-SCNC: 10 MMOL/L (ref 7–15)
ANION GAP SERPL CALCULATED.3IONS-SCNC: 10 MMOL/L (ref 7–15)
APTT PPP: 36 SECONDS (ref 22–38)
AST SERPL W P-5'-P-CCNC: 27 U/L (ref 0–45)
BASOPHILS # BLD AUTO: 0 10E3/UL (ref 0–0.2)
BASOPHILS NFR BLD AUTO: 0 %
BILIRUB SERPL-MCNC: 0.3 MG/DL
BUN SERPL-MCNC: 18 MG/DL (ref 6–20)
BUN SERPL-MCNC: 18 MG/DL (ref 6–20)
CALCIUM SERPL-MCNC: 8.3 MG/DL (ref 8.6–10)
CALCIUM SERPL-MCNC: 8.3 MG/DL (ref 8.6–10)
CHLORIDE SERPL-SCNC: 99 MMOL/L (ref 98–107)
CHLORIDE SERPL-SCNC: 99 MMOL/L (ref 98–107)
CREAT SERPL-MCNC: 1.06 MG/DL (ref 0.67–1.17)
CREAT SERPL-MCNC: 1.06 MG/DL (ref 0.67–1.17)
CRP SERPL-MCNC: 155 MG/L
DEPRECATED HCO3 PLAS-SCNC: 26 MMOL/L (ref 22–29)
DEPRECATED HCO3 PLAS-SCNC: 26 MMOL/L (ref 22–29)
EGFRCR SERPLBLD CKD-EPI 2021: 85 ML/MIN/1.73M2
EGFRCR SERPLBLD CKD-EPI 2021: 85 ML/MIN/1.73M2
EOSINOPHIL # BLD AUTO: 0.5 10E3/UL (ref 0–0.7)
EOSINOPHIL NFR BLD AUTO: 5 %
ERYTHROCYTE [DISTWIDTH] IN BLOOD BY AUTOMATED COUNT: 14.5 % (ref 10–15)
ERYTHROCYTE [SEDIMENTATION RATE] IN BLOOD BY WESTERGREN METHOD: 38 MM/HR (ref 0–20)
FIBRINOGEN PPP-MCNC: 661 MG/DL (ref 170–490)
GLUCOSE SERPL-MCNC: 154 MG/DL (ref 70–99)
GLUCOSE SERPL-MCNC: 154 MG/DL (ref 70–99)
HCT VFR BLD AUTO: 34.3 % (ref 40–53)
HGB BLD-MCNC: 10.9 G/DL (ref 13.3–17.7)
HOLD SPECIMEN: NORMAL
IMM GRANULOCYTES # BLD: 0 10E3/UL
IMM GRANULOCYTES NFR BLD: 0 %
INR PPP: 1.04 (ref 0.85–1.15)
INR PPP: 1.3 (ref 0.85–1.15)
LYMPHOCYTES # BLD AUTO: 0.5 10E3/UL (ref 0.8–5.3)
LYMPHOCYTES NFR BLD AUTO: 6 %
MAGNESIUM SERPL-MCNC: 1.7 MG/DL (ref 1.7–2.3)
MCH RBC QN AUTO: 25.7 PG (ref 26.5–33)
MCHC RBC AUTO-ENTMCNC: 31.8 G/DL (ref 31.5–36.5)
MCV RBC AUTO: 81 FL (ref 78–100)
MONOCYTES # BLD AUTO: 1 10E3/UL (ref 0–1.3)
MONOCYTES NFR BLD AUTO: 11 %
NEUTROPHILS # BLD AUTO: 7.5 10E3/UL (ref 1.6–8.3)
NEUTROPHILS NFR BLD AUTO: 78 %
NRBC # BLD AUTO: 0 10E3/UL
NRBC BLD AUTO-RTO: 0 /100
PLATELET # BLD AUTO: 254 10E3/UL (ref 150–450)
POTASSIUM SERPL-SCNC: 4.3 MMOL/L (ref 3.4–5.3)
POTASSIUM SERPL-SCNC: 4.3 MMOL/L (ref 3.4–5.3)
PROCALCITONIN SERPL IA-MCNC: 0.18 NG/ML
PROT SERPL-MCNC: 6.7 G/DL (ref 6.4–8.3)
RBC # BLD AUTO: 4.24 10E6/UL (ref 4.4–5.9)
SODIUM SERPL-SCNC: 135 MMOL/L (ref 135–145)
SODIUM SERPL-SCNC: 135 MMOL/L (ref 135–145)
WBC # BLD AUTO: 9.6 10E3/UL (ref 4–11)

## 2024-01-28 PROCEDURE — 250N000011 HC RX IP 250 OP 636: Performed by: EMERGENCY MEDICINE

## 2024-01-28 PROCEDURE — 99285 EMERGENCY DEPT VISIT HI MDM: CPT | Performed by: EMERGENCY MEDICINE

## 2024-01-28 PROCEDURE — 74177 CT ABD & PELVIS W/CONTRAST: CPT | Mod: 26 | Performed by: RADIOLOGY

## 2024-01-28 PROCEDURE — 85652 RBC SED RATE AUTOMATED: CPT | Performed by: EMERGENCY MEDICINE

## 2024-01-28 PROCEDURE — 96374 THER/PROPH/DIAG INJ IV PUSH: CPT | Mod: 59 | Performed by: EMERGENCY MEDICINE

## 2024-01-28 PROCEDURE — 85730 THROMBOPLASTIN TIME PARTIAL: CPT | Performed by: EMERGENCY MEDICINE

## 2024-01-28 PROCEDURE — 85384 FIBRINOGEN ACTIVITY: CPT | Performed by: EMERGENCY MEDICINE

## 2024-01-28 PROCEDURE — 84145 PROCALCITONIN (PCT): CPT | Performed by: EMERGENCY MEDICINE

## 2024-01-28 PROCEDURE — 85610 PROTHROMBIN TIME: CPT | Performed by: EMERGENCY MEDICINE

## 2024-01-28 PROCEDURE — 86140 C-REACTIVE PROTEIN: CPT | Performed by: EMERGENCY MEDICINE

## 2024-01-28 PROCEDURE — 80053 COMPREHEN METABOLIC PANEL: CPT | Performed by: EMERGENCY MEDICINE

## 2024-01-28 PROCEDURE — 96376 TX/PRO/DX INJ SAME DRUG ADON: CPT | Performed by: EMERGENCY MEDICINE

## 2024-01-28 PROCEDURE — 74177 CT ABD & PELVIS W/CONTRAST: CPT

## 2024-01-28 PROCEDURE — 999N000104 CT LUMBAR SPINE RECONSTRUCTED

## 2024-01-28 PROCEDURE — 99285 EMERGENCY DEPT VISIT HI MDM: CPT | Mod: 25 | Performed by: EMERGENCY MEDICINE

## 2024-01-28 PROCEDURE — 85025 COMPLETE CBC W/AUTO DIFF WBC: CPT | Performed by: EMERGENCY MEDICINE

## 2024-01-28 PROCEDURE — 72131 CT LUMBAR SPINE W/O DYE: CPT | Mod: 26 | Performed by: RADIOLOGY

## 2024-01-28 PROCEDURE — 36415 COLL VENOUS BLD VENIPUNCTURE: CPT | Performed by: EMERGENCY MEDICINE

## 2024-01-28 PROCEDURE — 83735 ASSAY OF MAGNESIUM: CPT | Performed by: EMERGENCY MEDICINE

## 2024-01-28 RX ORDER — IOPAMIDOL 755 MG/ML
127 INJECTION, SOLUTION INTRAVASCULAR ONCE
Status: COMPLETED | OUTPATIENT
Start: 2024-01-28 | End: 2024-01-28

## 2024-01-28 RX ADMIN — IOPAMIDOL 127 ML: 755 INJECTION, SOLUTION INTRAVENOUS at 21:11

## 2024-01-28 RX ADMIN — HYDROMORPHONE HYDROCHLORIDE 1 MG: 1 INJECTION, SOLUTION INTRAMUSCULAR; INTRAVENOUS; SUBCUTANEOUS at 18:59

## 2024-01-28 ASSESSMENT — ACTIVITIES OF DAILY LIVING (ADL)
ADLS_ACUITY_SCORE: 35

## 2024-01-28 NOTE — ED TRIAGE NOTES
"C/o severe pain in rectum, bilateral hips, lower back, rates pain 10/10, unable to sit in triage d/t pain  Had portion of lung removed 5 days ago  2 days ago \"they harvested WBCs out of me for 5-6 hrs\"  Took flexeril and oxycodone at home (3pm), did not relieve pain    Hx rectal adenocarcinoma w mets to lung          "

## 2024-01-29 ENCOUNTER — VIRTUAL VISIT (OUTPATIENT)
Dept: ONCOLOGY | Facility: CLINIC | Age: 49
End: 2024-01-29
Attending: STUDENT IN AN ORGANIZED HEALTH CARE EDUCATION/TRAINING PROGRAM
Payer: COMMERCIAL

## 2024-01-29 ENCOUNTER — APPOINTMENT (OUTPATIENT)
Dept: MRI IMAGING | Facility: CLINIC | Age: 49
End: 2024-01-29
Attending: EMERGENCY MEDICINE
Payer: COMMERCIAL

## 2024-01-29 VITALS
OXYGEN SATURATION: 98 % | TEMPERATURE: 97.6 F | RESPIRATION RATE: 16 BRPM | DIASTOLIC BLOOD PRESSURE: 80 MMHG | SYSTOLIC BLOOD PRESSURE: 124 MMHG | HEART RATE: 66 BPM

## 2024-01-29 VITALS — WEIGHT: 205 LBS | BODY MASS INDEX: 28.7 KG/M2 | HEIGHT: 71 IN

## 2024-01-29 DIAGNOSIS — C18.9 COLON CANCER METASTASIZED TO LIVER (H): ICD-10-CM

## 2024-01-29 DIAGNOSIS — C78.7 COLON CANCER METASTASIZED TO LIVER (H): ICD-10-CM

## 2024-01-29 PROCEDURE — 72158 MRI LUMBAR SPINE W/O & W/DYE: CPT | Mod: 26 | Performed by: RADIOLOGY

## 2024-01-29 PROCEDURE — A9585 GADOBUTROL INJECTION: HCPCS | Performed by: EMERGENCY MEDICINE

## 2024-01-29 PROCEDURE — 250N000011 HC RX IP 250 OP 636: Performed by: EMERGENCY MEDICINE

## 2024-01-29 PROCEDURE — 72158 MRI LUMBAR SPINE W/O & W/DYE: CPT

## 2024-01-29 PROCEDURE — 255N000002 HC RX 255 OP 636: Performed by: EMERGENCY MEDICINE

## 2024-01-29 PROCEDURE — 99215 OFFICE O/P EST HI 40 MIN: CPT | Mod: 95 | Performed by: STUDENT IN AN ORGANIZED HEALTH CARE EDUCATION/TRAINING PROGRAM

## 2024-01-29 RX ORDER — OXYCODONE HYDROCHLORIDE 5 MG/1
10 TABLET ORAL EVERY 6 HOURS PRN
Qty: 90 TABLET | Refills: 0 | Status: SHIPPED | OUTPATIENT
Start: 2024-01-29 | End: 2024-02-15

## 2024-01-29 RX ORDER — HYDROMORPHONE HYDROCHLORIDE 1 MG/ML
0.5 INJECTION, SOLUTION INTRAMUSCULAR; INTRAVENOUS; SUBCUTANEOUS ONCE
Status: COMPLETED | OUTPATIENT
Start: 2024-01-29 | End: 2024-01-29

## 2024-01-29 RX ORDER — GADOBUTROL 604.72 MG/ML
9 INJECTION INTRAVENOUS ONCE
Status: COMPLETED | OUTPATIENT
Start: 2024-01-29 | End: 2024-01-29

## 2024-01-29 RX ORDER — HEPARIN SODIUM (PORCINE) LOCK FLUSH IV SOLN 100 UNIT/ML 100 UNIT/ML
5-10 SOLUTION INTRAVENOUS
Status: DISCONTINUED | OUTPATIENT
Start: 2024-01-29 | End: 2024-01-29 | Stop reason: HOSPADM

## 2024-01-29 RX ADMIN — GADOBUTROL 9 ML: 604.72 INJECTION INTRAVENOUS at 02:02

## 2024-01-29 RX ADMIN — HYDROMORPHONE HYDROCHLORIDE 1 MG: 1 INJECTION, SOLUTION INTRAMUSCULAR; INTRAVENOUS; SUBCUTANEOUS at 01:04

## 2024-01-29 RX ADMIN — Medication 5 ML: at 09:19

## 2024-01-29 RX ADMIN — HYDROMORPHONE HYDROCHLORIDE 0.5 MG: 1 INJECTION, SOLUTION INTRAMUSCULAR; INTRAVENOUS; SUBCUTANEOUS at 08:47

## 2024-01-29 ASSESSMENT — ACTIVITIES OF DAILY LIVING (ADL)
ADLS_ACUITY_SCORE: 35

## 2024-01-29 ASSESSMENT — PAIN SCALES - GENERAL: PAINLEVEL: MODERATE PAIN (4)

## 2024-01-29 NOTE — ED PROVIDER NOTES
Patient signed out to me by prior attending  Briefly 48-year-old male who presents with back hip and rectal pain.  He has a history of metastatic lung cancer.  He is awaiting an MRI of the lumbar spine to evaluate for cord compression at time of signout    If the MRI is negative, plan is to discharge      MRI lumbar spine completed and no evidence of cord compression.    Patient's exam shows:  EHL, FHL, TA 5/5 and symmetric, SILT.  He feels improved.  Will discharge as per prior plan with outpatient follow-up and SERP    /80   Pulse 66   Temp 97.6  F (36.4  C) (Oral)   Resp 16   SpO2 98%     - Patient agrees to our plan and is ready and eager for discharge. Care plan, follow up plan, and reasons to return immediately to the ED were dicussed in detail and summarized as noted in the discharge instructions.         Cameron Westbrook MD  02/24/24 1491

## 2024-01-29 NOTE — NURSING NOTE
Is the patient currently in the state of [place]? YES    Visit mode:VIDEO    If the visit is dropped, the patient can be reconnected by: VIDEO VISIT: Text to cell phone:   Telephone Information:   Mobile [phone]       Will anyone else be joining the visit? NO  (If patient encounters technical issues they should call [phone])    How would you like to obtain your AVS? MyChart    Are changes needed to the allergy or medication list? Pt declined med review    Reason for visit: VIDAL RODRIGUEZ

## 2024-01-29 NOTE — TELEPHONE ENCOUNTER
Writer called [facility] to follow-up on medical records request from 1/23, she provided number to Medical Records Dept [phone]. She will also check into this tomorrow when she is back in office as writer informed her writer had spoken with Adonay last week and emailed request to her. Called Medical Records and they don't have any request on file, if requesting records by fax then advised writer send fax cover sheet to [phone] for medical records and imaging will come by fed-ex. Fax request sent.

## 2024-01-29 NOTE — LETTER
2024         RE: Roman Escalante  [address]  [place] [facility] [address]        Dear Colleague,    Thank you for referring your patient, Roman Escalante, to the [facility]. Please see a copy of my visit note below.    [facility] - Medical Oncology TeleHealth Consult Note  2024    Patient Identifiers     Name: Roman Escalante  Preferred Address: [place]  Preferred Language: English  : 1975  Gender: male    Assessment and Plan     Mr. [place] Escalante is a 89 year old male active smoker (3-5 cigs/day) with a history of extensively pre-treated metastatic colorectal cancer currently enrolled in [facility] CAR-T Trial who returns to clinic for bridging systemic treatment.    NGS: KRAS G12N  Immuno: [facility]  Clinical Trial: [id number]    Mr. Escalante returns by virtual visit for bridging therapy following cellular extraction for clinical trial being run at the [facility].  Patient has recovered well from tumor resection for creation of cellular product.  Based on the discussion that he had with the interventional pulmonology group at [facility], he can safely resume systemic chemotherapy 2 weeks from that procedure, which occurred last Tuesday.  Choice of regimen is challenging as patient is on sixth/seventh line systemic therapy.  However, following extended review of patient's pretrial treatment, he notes that his persistent symptoms of rectal pain were largely absent on his prior treatment regimen.  Therefore, we will plan to resume his Lonsurf/Avastin therapy with consideration of potential fruquintinib instead of Avastin if progression is noted.    Regarding follow-up of his tumor resection, and subsequent chest tube placement, patient will require suture removal and chest x-rays for monitoring.  We will help to make arrangements for those today.  While patient's pain has worsened, he feels that his is under reasonable control.  We will defer to palliative care for  further management, but are willing to provide any bridging pain therapy that patient requires.    Plan for oral therapy with infusion visits every 4 weeks, with LILLY monitoring throughout.  Plan for MD visit in 2 months with CT scans prior for treatment response evaluation.    45 minutes spent on the date of the encounter doing chart review, review of test results, interpretation of tests, patient visit, and documentation     Polo Snow MD, PhD   of Medicine  Division of Hematology, Oncology and Transplantation  [facility]    -----------------------------------    Oncology Summary     Cancer Staging   Rectal adenocarcinoma metastatic to lung (H)  Staging form: Colon and Rectum, AJCC 8th Edition  - Clinical: Stage IVB (cTX, cNX, pM1b) - Signed by Polo Snow MD on 3/30/2023      Oncology History   Rectal adenocarcinoma metastatic to lung (H)   2/3/2023 Initial Diagnosis    Rectal adenocarcinoma metastatic to lung (H)     2/3/2023 - 3/31/2023 Chemotherapy    Oral ONC Colorectal Cancer - Regorafenib  Plan Provider: Polo Snow MD  Treatment goal: Palliative  Line of treatment: [No plan line of treatment]     3/30/2023 -  Cancer Staged    Staging form: Colon and Rectum, AJCC 8th Edition  - Clinical: Stage IVB (cTX, cNX, pM1b)     6/7/2023 - 10/3/2023 Chemotherapy    OP ONC Colorectal Cancer - Modified FOLFOX-6 / Bevacizumab  Plan Provider: Polo Snow MD  Treatment goal: Palliative  Line of treatment: [No plan line of treatment]     10/27/2023 -  Chemotherapy    OP ONC Colorectal Cancer - Bevacizumab + Trifluridine/Tipiracil (Lonsurf)  Plan Provider: Polo Snow MD  Treatment goal: Palliative  Line of treatment: [No plan line of treatment]         Subjective/Interval Events       - [facility] CAR-T cell trial. Completed cell harvest last week.     - Had the worst pain he ever experienced in his life last night in his rectum. Similar to his prior experience last summer. Took his last  dilaudid shot ~9:15AM and it is wearing off as of now.    Objective Data     Lab data:  I have personally reviewed the lab data, notable for:    -     Radiology data:  I have personally reviewed the radiology data, notable for:  01/28/2024 CT Abd/Pelvis  IMPRESSION:   1.  Mild to moderate interval increase in size of pulmonary nodules in the lower lungs.  2.  Increasing size of low attenuation mass in the right lobe of the liver after embolization procedure.  3.  Mild interval increase in size in the lesion in the right posterior lateral liver.  4.  Rectosigmoid colon mass and adjacent adenopathy appears stable.  5.  Stable probable metastatic nodule in the herniated fat in the left lower quadrant colostomy.  6.  Mild spondylotic changes. No other concerning osseous abnormalities.   7.  Additional chronic findings as described above    01/29/2024 MR Lumbar Spine  IMPRESSION:  1.  No suspicious osseous lesions.  2.  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 Moderate canal stenosis at L5-S1 and mild to moderate stenosis at L4-L5.  3.  Bilateral neural foraminal narrowing is greatest at L5-S1, where there is moderate to severe stenosis. Findings may impinge upon the exiting L5 nerve roots.    Pathology and other data:  I have personally reviewed and interpreted the pathology data, notable for:    - no new data    Billing Stuff     Virtual Visit Details    Type of service:  Video Visit   Video Start Time:  3:54 PM  Video End Time: 4:18 PM    Originating Location (pt. Location): Home    Distant Location (provider location):  On-site  Platform used for Video Visit: Huey Snow MD

## 2024-01-29 NOTE — ED PROVIDER NOTES
ED Provider Note  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 Pain began around 3 pm, worsened rapidly, and has been constant. Severity is 10/10. Worst in the rectal and deep gluteal region, radiates to his lumbar spine from about the level of the iliac crest as well as his hips, with a burning sensation in his legs down to the level of the mid-thigh. Pain did not improve with home flexeril or oxycodone.     He has had several recent procedures. He had a portion of his left lung resected due to metastasis on 1/22/2024 at the [facility] ([facility] 1/26/2024 he had bone marrow harvested for CART cell therapy.     No dizziness, lightheadedness. No numbness/radiation past his knees. No confusion or altered mental status. Bowel incontinence unclear (ostomy), no bladder incontinence, but has been unable to urinate since this morning (~7 hours). No abnormal ostomy output. No fevers/chills. No cough/shortness of breath.     No current facility-administered medications for this encounter.     Current Outpatient Medications   Medication    cyclobenzaprine (FLEXERIL) 5 MG tablet    Fluorouracil (ADURCIL) 5 GM/100ML SOLN injection    gabapentin (NEURONTIN) 100 MG capsule    ibuprofen (ADVIL/MOTRIN) 200 MG tablet    methylPREDNISolone (MEDROL DOSEPAK) 4 MG tablet therapy pack    ondansetron (ZOFRAN) 4 MG tablet    Ostomy Supplies MISC    oxyCODONE (ROXICODONE) 5 MG tablet    oxyCODONE (ROXICODONE) 5 MG tablet    prochlorperazine (COMPAZINE) 10 MG tablet    prochlorperazine (COMPAZINE) 5 MG tablet       Past Medical History  Past Medical History:   Diagnosis Date    Cancer (H) 1/12/21    Colorectal cacer mast, to lungs, and liver     Past Surgical History:   Procedure Laterality Date     ABDOMEN SURGERY      BIOPSY  21    Lung biopsy. Positive colorectal cancer in lungs    GI SURGERY  Ostomy placementnt 21    Stoma is an outie, bowel excretion only    IR LUNG BIOPSY MEDIASTINUM RIGHT  2021    IR SIRT (SELECTIVE INTERNAL RADIO THERAPY)  2023    IR VISCERAL ANGIOGRAM  2023    IR VISCERAL EMBOLIZATION  2023     cyclobenzaprine (FLEXERIL) 5 MG tablet  Fluorouracil (ADURCIL) 5 GM/100ML SOLN injection  gabapentin (NEURONTIN) 100 MG capsule  ibuprofen (ADVIL/MOTRIN) 200 MG tablet  methylPREDNISolone (MEDROL DOSEPAK) 4 MG tablet therapy pack  ondansetron (ZOFRAN) 4 MG tablet  Ostomy Supplies MISC  oxyCODONE (ROXICODONE) 5 MG tablet  oxyCODONE (ROXICODONE) 5 MG tablet  prochlorperazine (COMPAZINE) 10 MG tablet  prochlorperazine (COMPAZINE) 5 MG tablet      Allergies   Allergen Reactions    Oxaliplatin Shortness Of Breath, Nausea and Vomiting and Other (See Comments)     Patient had HSR to Oxaliplatin on cycle 8 of FOLFOX chemotherapy. Sent to ER for continued monitoring.  Patient had HSR to Oxaliplatin on cycle 8 of FOLFOX chemotherapy. Sent to ER for continued monitoring.      Blood-Group Specific Substance Other (See Comments)     Patient has reactivity Suggestive of a Warm auto antibody. Blood products may be delayed. Draw patient 24 hours prior to transfusion. Draw one red top and two purple top tubes for all type and screen orders.  Patient has reactivity Suggestive of a Warm auto antibody. Blood products may be delayed. Draw patient 24 hours prior to transfusion. Draw one red top and two purple top tubes for all type and screen orders.       Family History  No family history on file.  Social History   Social History     Tobacco Use    Smoking status: Some Days     Packs/day: 0.50     Years: 10.00     Additional pack years: 0.00     Total pack years: 5.00     Types: Cigarettes, Other     Start date: 2010     Last attempt to quit: 2023     Years since quittin.4      Passive exposure: Current    Smokeless tobacco: Never   Vaping Use    Vaping Use: Never used   Substance Use Topics    Alcohol use: Not Currently     Comment: social    Drug use: Yes     Types: Marijuana     Comment: Keeps up my appetite, sleep, and help with neuropathy      Past medical history, past surgical history, medications, allergies, family history, and social history were reviewed with the patient. No additional pertinent items.      A medically appropriate review of systems was performed with pertinent positives and negatives noted in the HPI, and all other systems negative.    Physical Exam   BP: (!) 80/52  Pulse: 98  Temp: 97.3  F (36.3  C)  Resp: 16  SpO2: 97 %  Physical Exam  Constitutional:       General: He is in acute distress.   HENT:      Head: Normocephalic and atraumatic.      Nose: Nose normal.   Cardiovascular:      Rate and Rhythm: Normal rate and regular rhythm.      Pulses: Normal pulses.      Heart sounds: Normal heart sounds.   Pulmonary:      Effort: Pulmonary effort is normal.   Abdominal:      General: The ostomy site is clean.      Palpations: There is no mass.      Tenderness: There is no abdominal tenderness.   Skin:     General: Skin is warm and dry.   Neurological:      Mental Status: He is alert.           ED Course, Procedures, & Data      Procedures               Results for orders placed or performed during the hospital encounter of 01/28/24   CT Lumbar Spine Reconstructed     Status: None    Narrative    EXAM: CT LUMBAR SPINE RECONSTRUCTED  LOCATION: [facility]  DATE/TIME: 1/28/2024 9:30 PM CST    INDICATION: Severe lower back pain in the context of metastatic adenocarcinoma.  COMPARISON: None.  TECHNIQUE: Reformatted CT Lumbar Spine from CT abdomen and pelvis with intravenous contrast dataset. Multiplanar reformats. Dose reduction techniques were used.    FINDINGS:   Nomenclature based on 5 lumbar type vertebral bodies.    Normal coronal and sagittal alignment.  Normal vertebral body heights.   Multiple bilateral lung masses consistent with known history of adenocarcinoma.   Unremarkable visualized bony pelvis.    T12-L1: Normal height of disc without herniation. Normal facet joints. No central spinal stenosis, no right foramen stenosis, no left foramen stenosis.    L1-L2: Normal height of disc without herniation. Normal facet joints. No central spinal stenosis, no right foramen stenosis, no left foramen stenosis.    L2-L3: Normal height of disc without herniation. Normal facet joints. No central spinal stenosis, no right foramen stenosis, no left foramen stenosis.    L3-L4: Normal height of disc mild generalized disc bulge. Mild facet arthrosis. Mild central spinal stenosis, mild right foramen stenosis, mild left foramen stenosis.    L4-L5: Slight loss of disc height mild generalized disc bulge. Mild facet arthrosis. Mild central spinal stenosis, mild right foramen stenosis, mild left foramen stenosis.    L5-S1: Advanced loss of height with vacuum phenomenon with prominent disc bulge. Mild to moderate bilateral facet hypertrophy. Moderate central spinal stenosis, severe right foramen stenosis, severe left foramen stenosis.      Impression    IMPRESSION:  1.  No acute findings.  2.  No evidence of vertebral metastatic disease.  3.   At L5-S1, moderate central stenosis and severe bilateral foraminal stenosis.  4.  Bilateral lung masses.       CT Abdomen Pelvis w Contrast     Status: None    Narrative    EXAM: CT ABDOMEN PELVIS W CONTRAST  LOCATION: [facility]  DATE: 1/28/2024    INDICATION: h o rectal CA with new severe rectal and back pain  COMPARISON: 11/20/2023  TECHNIQUE: CT scan of the abdomen and pelvis was performed following injection of IV contrast. Multiplanar reformats were obtained. Dose reduction techniques were used.  CONTRAST: 127ml Isovue 370    FINDINGS:   LOWER CHEST:  Interval mild to moderate increase in size of multiple pulmonary masses. Interval development of tiny left pleural effusion. Left basilar atelectasis.    HEPATOBILIARY:Again noted, in the anterior segment right hepatic lobe, there is an irregular low-density area measuring 6.1 x 4.8 x 5.1 cm. Previously this measured 4.9 x 4.5 x 4.2 cm. In segment VI/VII, there is a 2.4 x 1.5 1.5 cm low-density nodule   along the posterior margin, previously measuring 1.3 x 1.2 cm. Liver and gallbladder otherwise unremarkable.    PANCREAS: No significant mass, duct dilatation, or inflammatory change.    SPLEEN: Splenic granulomas.    ADRENAL GLANDS: Normal.    KIDNEYS/BLADDER: Stable left adrenal nodularity. Right adrenal gland unremarkable.    BOWEL: Stomach and small bowel unremarkable. No obstruction. Normal appendix. There is a left lower quadrant diverting colostomy with herniated mesenteric fat. There is a stable lobulated nodule measuring 1.3 cm . In the rectosigmoid colon, there is a   stable lobulated enhancing mass. This can be better assessed by rectal/pelvic MR if clinically indicated.    LYMPH NODES: Stable rectal or perirectal adenopathy measuring 1.4 cm. Stable subcentimeter retroperitoneal and mesenteric lymph nodes. No other adenopathy noted.    VASCULATURE: No abdominal aortic aneurysm.    PELVIC ORGANS: Prostatectomy. Bilateral hydroceles.    MUSCULOSKELETAL: Mild spondylotic changes. No other concerning osseous abnormalities.      Impression    IMPRESSION:   1.  Mild to moderate interval increase in size of pulmonary nodules in the lower lungs.    2.  Increasing size of low attenuation mass in the right lobe of the liver after embolization procedure.    3.  Mild interval increase in size in the lesion in the right posterior lateral liver.    4.  Rectosigmoid colon mass and adjacent adenopathy appears stable.    5.  Stable probable metastatic nodule in the herniated fat in the left lower quadrant colostomy.    6.   Mild spondylotic changes. No other concerning osseous abnormalities.     7.  Additional chronic findings as described above     [place] Draw     Status: None    Narrative    The following orders were created for panel order [place] Draw.  Procedure                               Abnormality         Status                     ---------                               -----------         ------                     Extra Blue Top Tube[[id number]]                              Final result               Extra Red Top Tube[[id number]]                               Final result               Extra Green Top (Lithium...[[id number]]                      Final result               Extra Purple Top Tube[[id number]]                            Final result                 Please view results for these tests on the individual orders.   Extra Blue Top Tube     Status: None   Result Value Ref Range    Hold Specimen JIC    Extra Red Top Tube     Status: None   Result Value Ref Range    Hold Specimen JIC    Extra Green Top (Lithium Heparin) Tube     Status: None   Result Value Ref Range    Hold Specimen JIC    Extra Purple Top Tube     Status: None   Result Value Ref Range    Hold Specimen JIC    Basic metabolic panel     Status: Abnormal   Result Value Ref Range    Sodium 135 135 - 145 mmol/L    Potassium 4.3 3.4 - 5.3 mmol/L    Chloride 99 98 - 107 mmol/L    Carbon Dioxide (CO2) 26 22 - 29 mmol/L    Anion Gap 10 7 - 15 mmol/L    Urea Nitrogen 18.0 6.0 - 20.0 mg/dL    Creatinine 1.06 0.67 - 1.17 mg/dL    GFR Estimate 85 >60 mL/min/1.73m2    Calcium 8.3 (L) 8.6 - 10.0 mg/dL    Glucose 154 (H) 70 - 99 mg/dL   Comprehensive metabolic panel     Status: Abnormal   Result Value Ref Range    Sodium 135 135 - 145 mmol/L    Potassium 4.3 3.4 - 5.3 mmol/L    Carbon Dioxide (CO2) 26 22 - 29 mmol/L    Anion Gap 10 7 - 15 mmol/L    Urea Nitrogen 18.0 6.0 - 20.0 mg/dL    Creatinine 1.06 0.67 - 1.17 mg/dL    GFR Estimate 85 >60 mL/min/1.73m2    Calcium  8.3 (L) 8.6 - 10.0 mg/dL    Chloride 99 98 - 107 mmol/L    Glucose 154 (H) 70 - 99 mg/dL    Alkaline Phosphatase 74 40 - 150 U/L    AST 27 0 - 45 U/L    ALT 12 0 - 70 U/L    Protein Total 6.7 6.4 - 8.3 g/dL    Albumin 3.4 (L) 3.5 - 5.2 g/dL    Bilirubin Total 0.3 <=1.2 mg/dL   CRP inflammation     Status: Abnormal   Result Value Ref Range    CRP Inflammation 155.00 (H) <5.00 mg/L   Erythrocyte sedimentation rate auto     Status: Abnormal   Result Value Ref Range    Erythrocyte Sedimentation Rate 38 (H) 0 - 20 mm/hr   Magnesium     Status: Normal   Result Value Ref Range    Magnesium 1.7 1.7 - 2.3 mg/dL   Procalcitonin     Status: Normal   Result Value Ref Range    Procalcitonin 0.18 <0.50 ng/mL   INR     Status: Normal   Result Value Ref Range    INR 1.04 0.85 - 1.15   INR     Status: Abnormal   Result Value Ref Range    INR 1.30 (H) 0.85 - 1.15   Partial thromboplastin time     Status: Normal   Result Value Ref Range    aPTT 36 22 - 38 Seconds   Fibrinogen activity     Status: Abnormal   Result Value Ref Range    Fibrinogen Activity 661 (H) 170 - 490 mg/dL   CBC with platelets and differential     Status: Abnormal   Result Value Ref Range    WBC Count 9.6 4.0 - 11.0 10e3/uL    RBC Count 4.24 (L) 4.40 - 5.90 10e6/uL    Hemoglobin 10.9 (L) 13.3 - 17.7 g/dL    Hematocrit 34.3 (L) 40.0 - 53.0 %    MCV 81 78 - 100 fL    MCH 25.7 (L) 26.5 - 33.0 pg    MCHC 31.8 31.5 - 36.5 g/dL    RDW 14.5 10.0 - 15.0 %    Platelet Count 254 150 - 450 10e3/uL    % Neutrophils 78 %    % Lymphocytes 6 %    % Monocytes 11 %    % Eosinophils 5 %    % Basophils 0 %    % Immature Granulocytes 0 %    NRBCs per 100 WBC 0 <1 /100    Absolute Neutrophils 7.5 1.6 - 8.3 10e3/uL    Absolute Lymphocytes 0.5 (L) 0.8 - 5.3 10e3/uL    Absolute Monocytes 1.0 0.0 - 1.3 10e3/uL    Absolute Eosinophils 0.5 0.0 - 0.7 10e3/uL    Absolute Basophils 0.0 0.0 - 0.2 10e3/uL    Absolute Immature Granulocytes 0.0 <=0.4 10e3/uL    Absolute NRBCs 0.0 10e3/uL   CBC with  platelets differential     Status: Abnormal    Narrative    The following orders were created for panel order CBC with platelets differential.  Procedure                               Abnormality         Status                     ---------                               -----------         ------                     CBC with platelets and d...[[id number]]  Abnormal            Final result                 Please view results for these tests on the individual orders.     Medications   iohexol (OMNIPAQUE) 140 MG/ML solution for oral use 25 mL (25 mLs Oral Not Given 1/28/24 9492)   HYDROmorphone (DILAUDID) injection 1 mg (1 mg Intravenous $Given 1/28/24 1859)   iopamidol (ISOVUE-370) solution 127 mL (127 mLs Intravenous $Given 1/28/24 2111)   sodium chloride (PF) 0.9% PF flush 82 mL (82 mLs Intravenous $Given 1/28/24 2111)     Labs Ordered and Resulted from Time of ED Arrival to Time of ED Departure   BASIC METABOLIC PANEL - Abnormal       Result Value    Sodium 135      Potassium 4.3      Chloride 99      Carbon Dioxide (CO2) 26      Anion Gap 10      Urea Nitrogen 18.0      Creatinine 1.06      GFR Estimate 85      Calcium 8.3 (*)     Glucose 154 (*)    COMPREHENSIVE METABOLIC PANEL - Abnormal    Sodium 135      Potassium 4.3      Carbon Dioxide (CO2) 26      Anion Gap 10      Urea Nitrogen 18.0      Creatinine 1.06      GFR Estimate 85      Calcium 8.3 (*)     Chloride 99      Glucose 154 (*)     Alkaline Phosphatase 74      AST 27      ALT 12      Protein Total 6.7      Albumin 3.4 (*)     Bilirubin Total 0.3     CRP INFLAMMATION - Abnormal    CRP Inflammation 155.00 (*)    ERYTHROCYTE SEDIMENTATION RATE AUTO - Abnormal    Erythrocyte Sedimentation Rate 38 (*)    INR - Abnormal    INR 1.30 (*)    FIBRINOGEN ACTIVITY - Abnormal    Fibrinogen Activity 661 (*)    CBC WITH PLATELETS AND DIFFERENTIAL - Abnormal    WBC Count 9.6      RBC Count 4.24 (*)     Hemoglobin 10.9 (*)     Hematocrit 34.3 (*)     MCV 81      MCH  25.7 (*)     MCHC 31.8      RDW 14.5      Platelet Count 254      % Neutrophils 78      % Lymphocytes 6      % Monocytes 11      % Eosinophils 5      % Basophils 0      % Immature Granulocytes 0      NRBCs per 100 WBC 0      Absolute Neutrophils 7.5      Absolute Lymphocytes 0.5 (*)     Absolute Monocytes 1.0      Absolute Eosinophils 0.5      Absolute Basophils 0.0      Absolute Immature Granulocytes 0.0      Absolute NRBCs 0.0     MAGNESIUM - Normal    Magnesium 1.7     PROCALCITONIN - Normal    Procalcitonin 0.18     INR - Normal    INR 1.04     PARTIAL THROMBOPLASTIN TIME - Normal    aPTT 36       CT Abdomen Pelvis w Contrast   Final Result   IMPRESSION:    1.  Mild to moderate interval increase in size of pulmonary nodules in the lower lungs.      2.  Increasing size of low attenuation mass in the right lobe of the liver after embolization procedure.      3.  Mild interval increase in size in the lesion in the right posterior lateral liver.      4.  Rectosigmoid colon mass and adjacent adenopathy appears stable.      5.  Stable probable metastatic nodule in the herniated fat in the left lower quadrant colostomy.      6.  Mild spondylotic changes. No other concerning osseous abnormalities.       7.  Additional chronic findings as described above         CT Lumbar Spine Reconstructed   Final Result   IMPRESSION:   1.  No acute findings.   2.  No evidence of vertebral metastatic disease.   3.   At L5-S1, moderate central stenosis and severe bilateral foraminal stenosis.   4.  Bilateral lung masses.            MR Lumbar Spine w/o & w Contrast    (Results Pending)          Critical care was not performed.     Medical Decision Making  The patient's presentation was of high complexity (an acute health issue posing potential threat to life or bodily function).    The patient's evaluation involved:  ordering and/or review of 3+ test(s) in this encounter (see separate area of note for details)    The patient's management  necessitated high risk (a parenteral controlled substance) and high risk (a decision regarding hospitalization).    Assessment & Plan    Roman Escalante is a 48 year old male with relevant history of metastatic rectal adenocarcinoma who presents with severe rectal, hip, back pain. At presentation he was hypotensive to 80s/50s. Of note, he is enrolled in clinical trial at [facility] traveled to [facility] last week for a lung resection and bone marrow harvest for CART-cell therapy.    Given his history of metastatic adenocarcinoma, his presentation is most concerning for spinal metastasis, possibly complicated by spinal compression/cauda equina/conus medullaris. Recent CAR T-cell therapy raises concern for spinal epidural abscess, spinal osteomyelitis, or other infectious process, although he is afebrile at presentation and denies subjective fevers/chills. Coagulopathy in the context of neoplasia and/or CAR T-cell therapy raises additional concern for bowel ischemia.    Laboratory/Imaging Workup  CBC No leukocytosis; mild anemia with RBC 4.24, Hgb 10.9, hematocrit 34.3, normal MCV. May represent anemia of chronic disease  CMP Ca slightly low at 8.3, albumin decreased at 3.4 otherwise benign  Mg normal  EKG showing normal sinus rhythm, no signs of acute ischemic change  CRP markedly elevated at 155  Sed rate elevated at 38  INR elevated to 1.30, Fibrinogen elevated to 661, PTT normal at 36  Procalcitonin normal  CTAP w/ Lumbar Reconstruction    Elevated CRP/ESR increase likelihood of infectious/inflammatory process.     Plan:  Lumbar/Back/Rectal Pain  Dilaudid 1 mg for pain control  CT abd/pelvis shows:  1.  Mild to moderate interval increase in size of pulmonary nodules in the lower lungs.  2.  Increasing size of low attenuation mass in the right lobe of the liver after embolization procedure.  3.  Mild interval increase in size in the lesion in the right posterior lateral liver.  4.  Rectosigmoid colon mass and adjacent  adenopathy appears stable.  5.  Stable probable metastatic nodule in the herniated fat in the left lower quadrant colostomy.  6.  Mild spondylotic changes. No other concerning osseous abnormalities.   7.  Additional chronic findings as described above  CT lumbar spine shows:  1.  No acute findings.  2.  No evidence of vertebral metastatic disease.  3.   At L5-S1, moderate central stenosis and severe bilateral foraminal stenosis.  4.  Bilateral lung masses.    I have concern for cord compression so a lumbar spine MRI was ordered. He will be signed out to the night team pending MRI and disposition planning.      I have reviewed the nursing notes. I have reviewed the findings, diagnosis, plan and need for follow up with the patient.  New Prescriptions    No medications on file       Final diagnoses:   None   Andrew Aldana, MS4  January 28, 2024 6:44 PM    Evangelista Rosario MD  [facility] EMERGENCY DEPARTMENT  1/28/2024     Evangelista Rosario MD  01/28/24 [id number]

## 2024-01-29 NOTE — DISCHARGE INSTRUCTIONS
Please keep your 9 AM appointment with your oncologist as we discussed.    Return immediately if you develop any worsening pain, numbness or tingling in your legs, difficulty walking, unstable gait, difficulty having a bowel movement or passing urine, fever, or any other concerns for worsening.    Continue taking all your other regular medications.

## 2024-01-29 NOTE — PROGRESS NOTES
[facility] - Medical Oncology TeleHealth Consult Note  2024    Patient Identifiers     Name: Roman Escalante  Preferred Address: [place]  Preferred Language: English  : 1975  Gender: male    Assessment and Plan     Mr. Roman Escalante is a 89 year old male active smoker (3-5 cigs/day) with a history of extensively pre-treated metastatic colorectal cancer currently enrolled in [facility] CAR-T Trial who returns to clinic for bridging systemic treatment.    NGS: [id number]  [place] [facility] [place]: [id number], TORL    Mr. Escalante returns by virtual visit for bridging therapy following cellular extraction for clinical trial being run at the [facility].  Patient has recovered well from tumor resection for creation of cellular product.  Based on the discussion that he had with the interventional pulmonology group at [facility], he can safely resume systemic chemotherapy 2 weeks from that procedure, which occurred last Tuesday.  Choice of regimen is challenging as patient is on sixth/seventh line systemic therapy.  However, following extended review of patient's pretrial treatment, he notes that his persistent symptoms of rectal pain were largely absent on his prior treatment regimen.  Therefore, we will plan to resume his Lonsurf/Avastin therapy with consideration of potential fruquintinib instead of Avastin if progression is noted.    Regarding follow-up of his tumor resection, and subsequent chest tube placement, patient will require suture removal and chest x-rays for monitoring.  We will help to make arrangements for those today.  While patient's pain has worsened, he feels that his is under reasonable control.  We will defer to palliative care for further management, but are willing to provide any bridging pain therapy that patient requires.    Plan for oral therapy with infusion visits every 4 weeks, with LILLY monitoring throughout.  Plan for MD visit in 2 months with CT scans prior for treatment response  evaluation.    45 minutes spent on the date of the encounter doing chart review, review of test results, interpretation of tests, patient visit, and documentation     Polo Snow MD, PhD   of Medicine  Division of Hematology, Oncology and Transplantation  [facility]    -----------------------------------    Oncology Summary     Cancer Staging   Rectal adenocarcinoma metastatic to lung (H)  Staging form: Colon and Rectum, AJCC 8th Edition  - Clinical: Stage IVB (cTX, cNX, pM1b) - Signed by Polo Snow MD on 3/30/2023      Oncology History   Rectal adenocarcinoma metastatic to lung (H)   2/3/2023 Initial Diagnosis    Rectal adenocarcinoma metastatic to lung (H)     2/3/2023 - 3/31/2023 Chemotherapy    Oral ONC Colorectal Cancer - Regorafenib  Plan Provider: Polo Snow MD  Treatment goal: Palliative  Line of treatment: [No plan line of treatment]     3/30/2023 -  Cancer Staged    Staging form: Colon and Rectum, AJCC 8th Edition  - Clinical: Stage IVB (cTX, cNX, pM1b)     6/7/2023 - 10/3/2023 Chemotherapy    OP ONC Colorectal Cancer - Modified FOLFOX-6 / Bevacizumab  Plan Provider: Polo Snow MD  Treatment goal: Palliative  Line of treatment: [No plan line of treatment]     10/27/2023 -  Chemotherapy    OP ONC Colorectal Cancer - Bevacizumab + Trifluridine/Tipiracil (Lonsurf)  Plan Provider: Polo Snow MD  Treatment goal: Palliative  Line of treatment: [No plan line of treatment]         Subjective/Interval Events       - [facility] CAR-T cell trial. Completed cell harvest last week.     - Had the worst pain he ever experienced in his life last night in his rectum. Similar to his prior experience last summer. Took his last dilaudid shot ~9:15AM and it is wearing off as of now.    Objective Data     Lab data:  I have personally reviewed the lab data, notable for:    -     Radiology data:  I have personally reviewed the radiology data, notable for:  01/28/2024 CT  Abd/Pelvis  IMPRESSION:   1.  Mild to moderate interval increase in size of pulmonary nodules in the lower lungs.  2.  Increasing size of low attenuation mass in the right lobe of the liver after embolization procedure.  3.  Mild interval increase in size in the lesion in the right posterior lateral liver.  4.  Rectosigmoid colon mass and adjacent adenopathy appears stable.  5.  Stable probable metastatic nodule in the herniated fat in the left lower quadrant colostomy.  6.  Mild spondylotic changes. No other concerning osseous abnormalities.   7.  Additional chronic findings as described above    01/29/2024 MR Lumbar Spine  IMPRESSION:  1.  No suspicious osseous lesions.  2.  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 Moderate canal stenosis at L5-S1 and mild to moderate stenosis at L4-L5.  3.  Bilateral neural foraminal narrowing is greatest at L5-S1, where there is moderate to severe stenosis. Findings may impinge upon the exiting L5 nerve roots.    Pathology and other data:  I have personally reviewed and interpreted the pathology data, notable for:    - no new data    Billing Stuff     Virtual Visit Details    Type of service:  Video Visit   Video Start Time:  3:54 PM  Video End Time: 4:18 PM    Originating Location (pt. Location): Home    Distant Location (provider location):  On-site  Platform used for Video Visit: Huey

## 2024-02-01 DIAGNOSIS — C20 RECTAL ADENOCARCINOMA METASTATIC TO LUNG (H): ICD-10-CM

## 2024-02-01 DIAGNOSIS — C78.7 COLON CANCER METASTASIZED TO LIVER (H): Primary | ICD-10-CM

## 2024-02-01 DIAGNOSIS — C18.9 COLON CANCER METASTASIZED TO LIVER (H): Primary | ICD-10-CM

## 2024-02-01 DIAGNOSIS — C78.00 RECTAL ADENOCARCINOMA METASTATIC TO LUNG (H): ICD-10-CM

## 2024-02-02 ENCOUNTER — OFFICE VISIT (OUTPATIENT)
Dept: FAMILY MEDICINE | Facility: CLINIC | Age: 49
End: 2024-02-02
Payer: COMMERCIAL

## 2024-02-02 VITALS
DIASTOLIC BLOOD PRESSURE: 80 MMHG | TEMPERATURE: 98.3 F | WEIGHT: 201.5 LBS | BODY MASS INDEX: 28.21 KG/M2 | SYSTOLIC BLOOD PRESSURE: 119 MMHG | HEIGHT: 71 IN | HEART RATE: 80 BPM | RESPIRATION RATE: 16 BRPM | OXYGEN SATURATION: 97 %

## 2024-02-02 DIAGNOSIS — C20 RECTAL ADENOCARCINOMA METASTATIC TO LUNG (H): ICD-10-CM

## 2024-02-02 DIAGNOSIS — Z98.890 HISTORY OF PLACEMENT OF CHEST TUBE: Primary | ICD-10-CM

## 2024-02-02 DIAGNOSIS — C78.00 RECTAL ADENOCARCINOMA METASTATIC TO LUNG (H): ICD-10-CM

## 2024-02-02 PROCEDURE — 99213 OFFICE O/P EST LOW 20 MIN: CPT | Performed by: FAMILY MEDICINE

## 2024-02-02 ASSESSMENT — PAIN SCALES - GENERAL: PAINLEVEL: NO PAIN (0)

## 2024-02-02 NOTE — PATIENT INSTRUCTIONS
Could replace steri strips as needed    Monitor wounds    Call/ follow up as needed based on symptoms

## 2024-02-02 NOTE — PROGRESS NOTES
"      America Owens is a 48 year old, presenting for the following health issues:  Suture Removal (Put in last Wednesday)        2/2/2024     3:17 PM   Additional Questions   Roomed by Jeane Fransisco   Accompanied by Wife     Suture Removal    History of Present Illness       Reason for visit:  Remove small \"purse string\" sutcher. Its pulled very tight,  both ends, slip the scissors underneath, cut, and pull.    He eats 2-3 servings of fruits and vegetables daily.He consumes 2 sweetened beverage(s) daily.He exercises with enough effort to increase his heart rate 30 to 60 minutes per day.  He exercises with enough effort to increase his heart rate 7 days per week.   He is taking medications regularly.           Was in [place] for cell harvest at [facility] in [place]  Has colorectal cancer    Had lung tissue and blood cells taken    Has stitches from where chest tube came out    A little shortness of breath            Objective    /80 (BP Location: Right arm, Patient Position: Chair, Cuff Size: Adult Regular)   Pulse 80   Temp 98.3  F (36.8  C) (Temporal)   Resp 16   Ht 1.803 m (5' 11\")   Wt 91.4 kg (201 lb 8 oz)   SpO2 97%   BMI 28.10 kg/m    Body mass index is 28.1 kg/m .  Physical Exam      Full physical not done     Mentation and affect are fine    No tremor of speech or extremity    Patient has three wounds on left himithorax    Apparently the lower one on anterior aspect has a stitch or two previously but there are non there now.  We removed the steri strips.  The other two have some remnant suture glue type material present.      All three look good and are closed.  Not infected, no drainage.      Discussed in detail.    We agreed putting steri strips on them would be adviseable.      Cleaned all areas with alcohol pad then used benzoin and steri strips        ASSESSMENT / PLAN:  (Z98.890) History of placement of chest tube  (primary encounter diagnosis)  Comment: patient will replace steri " strips as needed as they fall off.  Overall wounds look good  Plan: follow up prn     (C20,  C78.00) Rectal adenocarcinoma metastatic to lung (H)  Comment: patient under treatment here for this but also doing the special additional treatment at [facility]  Plan: as above       I reviewed the patient's medications, allergies, medical history, family history, and social history.    Davy Guzmán MD              Signed Electronically by: Davy Guzmán MD

## 2024-02-02 NOTE — TELEPHONE ENCOUNTER
Writer notified that [facility] records had arrived via Fed-ex, requested all records be forwarded to HIM for scanning.

## 2024-02-05 ENCOUNTER — PATIENT OUTREACH (OUTPATIENT)
Dept: ONCOLOGY | Facility: CLINIC | Age: 49
End: 2024-02-05
Payer: COMMERCIAL

## 2024-02-05 DIAGNOSIS — G89.3 CANCER ASSOCIATED PAIN: ICD-10-CM

## 2024-02-05 DIAGNOSIS — C78.7 COLON CANCER METASTASIZED TO LIVER (H): Primary | ICD-10-CM

## 2024-02-05 DIAGNOSIS — C18.9 COLON CANCER METASTASIZED TO LIVER (H): Primary | ICD-10-CM

## 2024-02-05 NOTE — TELEPHONE ENCOUNTER
"[facility]: Cancer Care                                                                                          Follow-up call made to patient to discuss symptomatic Mychart and triage assessment:    Rectal pain with hourly toileting, mucus discharge sometimes with blood, poor urinary flow and bladder spasms. Started taking Oxy 10 mg 4 times a day, \"sometimes it helps, sometimes it doesn't\". Tried flexeril 5 mg 2 nights in a row, first night helped him sleep 4 hours, last night did not help at all. Has not tried OTC tylenol or ibuprofen. Denies pain at chest tube drain sites, sutures removed 2/2 by pcp. Denies diarrhea, abd pain, nausea or vomiting.    12/1-Pain clinic prescribed gabapentin and flexeril, pt has both at home. Reviewed note and informed pt gabapentin was prescribed for BLE neuropathy, may help with overall pain put with ramp increase will take 1-2 weeks to be effective.     Discussed adding in tylenol or ibuprofen-pt stated ibuprofen was more effective for him, would like to try 800 mg 3x a day as needed.    1/28-ER visit for exact symptoms, given dilaudid 1 mg x4 spaced 5 hours apart, pt stated this helped the most, he was able to sleep between doses, denied any side effects. Would like to try switching to dilaudid instead of Oxy, will continue Oxy until Dr. Snow weighs in.     Will need refill of flexeril to [facility] on file.    Discussed if pain does not improve with above, pt may need to come into the ER again, pt verbalized understanding. Writer will follow-up.    Scheduling messaged to waitlist for Bevacizumab, oral chemo team messaged to restart Lonsurf.    Signature:  Virgen Nieto RN  "

## 2024-02-06 ENCOUNTER — TELEPHONE (OUTPATIENT)
Dept: ONCOLOGY | Facility: CLINIC | Age: 49
End: 2024-02-06
Payer: COMMERCIAL

## 2024-02-06 DIAGNOSIS — C78.00 RECTAL ADENOCARCINOMA METASTATIC TO LUNG (H): Primary | ICD-10-CM

## 2024-02-06 DIAGNOSIS — C20 RECTAL ADENOCARCINOMA METASTATIC TO LUNG (H): Primary | ICD-10-CM

## 2024-02-06 RX ORDER — ALBUTEROL SULFATE 0.83 MG/ML
2.5 SOLUTION RESPIRATORY (INHALATION)
Status: CANCELLED | OUTPATIENT
Start: 2024-02-06

## 2024-02-06 RX ORDER — HEPARIN SODIUM,PORCINE 10 UNIT/ML
5-20 VIAL (ML) INTRAVENOUS DAILY PRN
Status: CANCELLED | OUTPATIENT
Start: 2024-04-16

## 2024-02-06 RX ORDER — MEPERIDINE HYDROCHLORIDE 25 MG/ML
25 INJECTION INTRAMUSCULAR; INTRAVENOUS; SUBCUTANEOUS EVERY 30 MIN PRN
Status: CANCELLED | OUTPATIENT
Start: 2024-03-19

## 2024-02-06 RX ORDER — ALBUTEROL SULFATE 0.83 MG/ML
2.5 SOLUTION RESPIRATORY (INHALATION)
Status: CANCELLED | OUTPATIENT
Start: 2024-04-16

## 2024-02-06 RX ORDER — MEPERIDINE HYDROCHLORIDE 25 MG/ML
25 INJECTION INTRAMUSCULAR; INTRAVENOUS; SUBCUTANEOUS EVERY 30 MIN PRN
Status: CANCELLED | OUTPATIENT
Start: 2024-02-20

## 2024-02-06 RX ORDER — HEPARIN SODIUM,PORCINE 10 UNIT/ML
5-20 VIAL (ML) INTRAVENOUS DAILY PRN
Status: CANCELLED | OUTPATIENT
Start: 2024-03-19

## 2024-02-06 RX ORDER — DIPHENHYDRAMINE HYDROCHLORIDE 50 MG/ML
50 INJECTION INTRAMUSCULAR; INTRAVENOUS
Status: CANCELLED
Start: 2024-03-05

## 2024-02-06 RX ORDER — LORAZEPAM 2 MG/ML
0.5 INJECTION INTRAMUSCULAR EVERY 4 HOURS PRN
Status: CANCELLED | OUTPATIENT
Start: 2024-03-05

## 2024-02-06 RX ORDER — EPINEPHRINE 1 MG/ML
0.3 INJECTION, SOLUTION INTRAMUSCULAR; SUBCUTANEOUS EVERY 5 MIN PRN
Status: CANCELLED | OUTPATIENT
Start: 2024-03-05

## 2024-02-06 RX ORDER — HEPARIN SODIUM (PORCINE) LOCK FLUSH IV SOLN 100 UNIT/ML 100 UNIT/ML
5 SOLUTION INTRAVENOUS
Status: CANCELLED | OUTPATIENT
Start: 2024-04-16

## 2024-02-06 RX ORDER — DIPHENHYDRAMINE HYDROCHLORIDE 50 MG/ML
50 INJECTION INTRAMUSCULAR; INTRAVENOUS
Status: CANCELLED
Start: 2024-04-02

## 2024-02-06 RX ORDER — LORAZEPAM 2 MG/ML
0.5 INJECTION INTRAMUSCULAR EVERY 4 HOURS PRN
Status: CANCELLED | OUTPATIENT
Start: 2024-02-06

## 2024-02-06 RX ORDER — ALBUTEROL SULFATE 90 UG/1
1-2 AEROSOL, METERED RESPIRATORY (INHALATION)
Status: CANCELLED
Start: 2024-03-05

## 2024-02-06 RX ORDER — DIPHENHYDRAMINE HYDROCHLORIDE 50 MG/ML
50 INJECTION INTRAMUSCULAR; INTRAVENOUS
Status: CANCELLED
Start: 2024-02-06

## 2024-02-06 RX ORDER — ALBUTEROL SULFATE 90 UG/1
1-2 AEROSOL, METERED RESPIRATORY (INHALATION)
Status: CANCELLED
Start: 2024-02-06

## 2024-02-06 RX ORDER — DIPHENHYDRAMINE HYDROCHLORIDE 50 MG/ML
50 INJECTION INTRAMUSCULAR; INTRAVENOUS
Status: CANCELLED
Start: 2024-02-20

## 2024-02-06 RX ORDER — MEPERIDINE HYDROCHLORIDE 25 MG/ML
25 INJECTION INTRAMUSCULAR; INTRAVENOUS; SUBCUTANEOUS EVERY 30 MIN PRN
Status: CANCELLED | OUTPATIENT
Start: 2024-04-16

## 2024-02-06 RX ORDER — EPINEPHRINE 1 MG/ML
0.3 INJECTION, SOLUTION INTRAMUSCULAR; SUBCUTANEOUS EVERY 5 MIN PRN
Status: CANCELLED | OUTPATIENT
Start: 2024-02-06

## 2024-02-06 RX ORDER — HEPARIN SODIUM (PORCINE) LOCK FLUSH IV SOLN 100 UNIT/ML 100 UNIT/ML
5 SOLUTION INTRAVENOUS
Status: CANCELLED | OUTPATIENT
Start: 2024-02-20

## 2024-02-06 RX ORDER — ALBUTEROL SULFATE 90 UG/1
1-2 AEROSOL, METERED RESPIRATORY (INHALATION)
Status: CANCELLED
Start: 2024-04-16

## 2024-02-06 RX ORDER — ALBUTEROL SULFATE 0.83 MG/ML
2.5 SOLUTION RESPIRATORY (INHALATION)
Status: CANCELLED | OUTPATIENT
Start: 2024-03-05

## 2024-02-06 RX ORDER — HEPARIN SODIUM (PORCINE) LOCK FLUSH IV SOLN 100 UNIT/ML 100 UNIT/ML
5 SOLUTION INTRAVENOUS
Status: CANCELLED | OUTPATIENT
Start: 2024-02-06

## 2024-02-06 RX ORDER — MEPERIDINE HYDROCHLORIDE 25 MG/ML
25 INJECTION INTRAMUSCULAR; INTRAVENOUS; SUBCUTANEOUS EVERY 30 MIN PRN
Status: CANCELLED | OUTPATIENT
Start: 2024-02-06

## 2024-02-06 RX ORDER — EPINEPHRINE 1 MG/ML
0.3 INJECTION, SOLUTION INTRAMUSCULAR; SUBCUTANEOUS EVERY 5 MIN PRN
Status: CANCELLED | OUTPATIENT
Start: 2024-03-19

## 2024-02-06 RX ORDER — CYCLOBENZAPRINE HCL 5 MG
TABLET ORAL
Qty: 45 TABLET | Refills: 2 | Status: SHIPPED | OUTPATIENT
Start: 2024-02-06

## 2024-02-06 RX ORDER — HEPARIN SODIUM (PORCINE) LOCK FLUSH IV SOLN 100 UNIT/ML 100 UNIT/ML
5 SOLUTION INTRAVENOUS
Status: CANCELLED | OUTPATIENT
Start: 2024-03-19

## 2024-02-06 RX ORDER — HEPARIN SODIUM (PORCINE) LOCK FLUSH IV SOLN 100 UNIT/ML 100 UNIT/ML
5 SOLUTION INTRAVENOUS
Status: CANCELLED | OUTPATIENT
Start: 2024-03-05

## 2024-02-06 RX ORDER — ALBUTEROL SULFATE 0.83 MG/ML
2.5 SOLUTION RESPIRATORY (INHALATION)
Status: CANCELLED | OUTPATIENT
Start: 2024-03-19

## 2024-02-06 RX ORDER — ALBUTEROL SULFATE 0.83 MG/ML
2.5 SOLUTION RESPIRATORY (INHALATION)
Status: CANCELLED | OUTPATIENT
Start: 2024-02-20

## 2024-02-06 RX ORDER — HEPARIN SODIUM,PORCINE 10 UNIT/ML
5-20 VIAL (ML) INTRAVENOUS DAILY PRN
Status: CANCELLED | OUTPATIENT
Start: 2024-03-05

## 2024-02-06 RX ORDER — METHYLPREDNISOLONE SODIUM SUCCINATE 125 MG/2ML
125 INJECTION, POWDER, LYOPHILIZED, FOR SOLUTION INTRAMUSCULAR; INTRAVENOUS
Status: CANCELLED
Start: 2024-02-06

## 2024-02-06 RX ORDER — EPINEPHRINE 1 MG/ML
0.3 INJECTION, SOLUTION INTRAMUSCULAR; SUBCUTANEOUS EVERY 5 MIN PRN
Status: CANCELLED | OUTPATIENT
Start: 2024-04-16

## 2024-02-06 RX ORDER — ALBUTEROL SULFATE 90 UG/1
1-2 AEROSOL, METERED RESPIRATORY (INHALATION)
Status: CANCELLED
Start: 2024-04-02

## 2024-02-06 RX ORDER — ALBUTEROL SULFATE 90 UG/1
1-2 AEROSOL, METERED RESPIRATORY (INHALATION)
Status: CANCELLED
Start: 2024-03-19

## 2024-02-06 RX ORDER — METHYLPREDNISOLONE SODIUM SUCCINATE 125 MG/2ML
125 INJECTION, POWDER, LYOPHILIZED, FOR SOLUTION INTRAMUSCULAR; INTRAVENOUS
Status: CANCELLED
Start: 2024-04-16

## 2024-02-06 RX ORDER — EPINEPHRINE 1 MG/ML
0.3 INJECTION, SOLUTION INTRAMUSCULAR; SUBCUTANEOUS EVERY 5 MIN PRN
Status: CANCELLED | OUTPATIENT
Start: 2024-04-02

## 2024-02-06 RX ORDER — METHYLPREDNISOLONE SODIUM SUCCINATE 125 MG/2ML
125 INJECTION, POWDER, LYOPHILIZED, FOR SOLUTION INTRAMUSCULAR; INTRAVENOUS
Status: CANCELLED
Start: 2024-03-05

## 2024-02-06 RX ORDER — HEPARIN SODIUM,PORCINE 10 UNIT/ML
5-20 VIAL (ML) INTRAVENOUS DAILY PRN
Status: CANCELLED | OUTPATIENT
Start: 2024-04-02

## 2024-02-06 RX ORDER — METHYLPREDNISOLONE SODIUM SUCCINATE 125 MG/2ML
125 INJECTION, POWDER, LYOPHILIZED, FOR SOLUTION INTRAMUSCULAR; INTRAVENOUS
Status: CANCELLED
Start: 2024-02-20

## 2024-02-06 RX ORDER — IBUPROFEN 200 MG
600 TABLET ORAL EVERY 8 HOURS PRN
Qty: 100 TABLET | Refills: 0 | Status: SHIPPED | OUTPATIENT
Start: 2024-02-06

## 2024-02-06 RX ORDER — MEPERIDINE HYDROCHLORIDE 25 MG/ML
25 INJECTION INTRAMUSCULAR; INTRAVENOUS; SUBCUTANEOUS EVERY 30 MIN PRN
Status: CANCELLED | OUTPATIENT
Start: 2024-04-02

## 2024-02-06 RX ORDER — ALBUTEROL SULFATE 0.83 MG/ML
2.5 SOLUTION RESPIRATORY (INHALATION)
Status: CANCELLED | OUTPATIENT
Start: 2024-04-02

## 2024-02-06 RX ORDER — METHYLPREDNISOLONE SODIUM SUCCINATE 125 MG/2ML
125 INJECTION, POWDER, LYOPHILIZED, FOR SOLUTION INTRAMUSCULAR; INTRAVENOUS
Status: CANCELLED
Start: 2024-03-19

## 2024-02-06 RX ORDER — HEPARIN SODIUM (PORCINE) LOCK FLUSH IV SOLN 100 UNIT/ML 100 UNIT/ML
5 SOLUTION INTRAVENOUS
Status: CANCELLED | OUTPATIENT
Start: 2024-04-02

## 2024-02-06 RX ORDER — HEPARIN SODIUM,PORCINE 10 UNIT/ML
5-20 VIAL (ML) INTRAVENOUS DAILY PRN
Status: CANCELLED | OUTPATIENT
Start: 2024-02-06

## 2024-02-06 RX ORDER — LORAZEPAM 2 MG/ML
0.5 INJECTION INTRAMUSCULAR EVERY 4 HOURS PRN
Status: CANCELLED | OUTPATIENT
Start: 2024-04-02

## 2024-02-06 RX ORDER — HEPARIN SODIUM,PORCINE 10 UNIT/ML
5-20 VIAL (ML) INTRAVENOUS DAILY PRN
Status: CANCELLED | OUTPATIENT
Start: 2024-02-20

## 2024-02-06 RX ORDER — TRIFLURIDINE AND TIPIRACIL 15; 6.14 MG/1; MG/1
35 TABLET, FILM COATED ORAL 2 TIMES DAILY
Qty: 100 TABLET | Refills: 0 | Status: SHIPPED | OUTPATIENT
Start: 2024-02-06 | End: 2024-02-16

## 2024-02-06 RX ORDER — MEPERIDINE HYDROCHLORIDE 25 MG/ML
25 INJECTION INTRAMUSCULAR; INTRAVENOUS; SUBCUTANEOUS EVERY 30 MIN PRN
Status: CANCELLED | OUTPATIENT
Start: 2024-03-05

## 2024-02-06 RX ORDER — DIPHENHYDRAMINE HYDROCHLORIDE 50 MG/ML
50 INJECTION INTRAMUSCULAR; INTRAVENOUS
Status: CANCELLED
Start: 2024-04-16

## 2024-02-06 RX ORDER — ALBUTEROL SULFATE 90 UG/1
1-2 AEROSOL, METERED RESPIRATORY (INHALATION)
Status: CANCELLED
Start: 2024-02-20

## 2024-02-06 RX ORDER — HYDROMORPHONE HYDROCHLORIDE 2 MG/1
2 TABLET ORAL EVERY 6 HOURS PRN
Qty: 45 TABLET | Refills: 0 | Status: SHIPPED | OUTPATIENT
Start: 2024-02-06 | End: 2024-02-08

## 2024-02-06 RX ORDER — EPINEPHRINE 1 MG/ML
0.3 INJECTION, SOLUTION INTRAMUSCULAR; SUBCUTANEOUS EVERY 5 MIN PRN
Status: CANCELLED | OUTPATIENT
Start: 2024-02-20

## 2024-02-06 RX ORDER — METHYLPREDNISOLONE SODIUM SUCCINATE 125 MG/2ML
125 INJECTION, POWDER, LYOPHILIZED, FOR SOLUTION INTRAMUSCULAR; INTRAVENOUS
Status: CANCELLED
Start: 2024-04-02

## 2024-02-06 RX ORDER — DIPHENHYDRAMINE HYDROCHLORIDE 50 MG/ML
50 INJECTION INTRAMUSCULAR; INTRAVENOUS
Status: CANCELLED
Start: 2024-03-19

## 2024-02-06 NOTE — ORAL ONC MGMT
"Oral Chemotherapy Monitoring Program     Roman Mathewgonzalez called in follow up of Lonsurf oral chemotherapy.     We were notified by PHILIPPE Munguia yesterday that \"please resume lonsurf. Scheduling messaged to wait list pt for Bevacizumab, try to get him in this week if possible.\"    I spoke with Roman today and he has no Lonsurf on hand. I sent refill to pharmacy. Roman states he will likely  when in for infusion.     Pt verbalized understanding and agrees to plan. No further questions or concerns at this time, but encouraged pt to call if any were to arise.    Allison Demarco, PharmD  Oral Chemotherapy Monitoring Program  [facility]  [phone]  February 6, 2024  "

## 2024-02-07 NOTE — TELEPHONE ENCOUNTER
[facility]: Cancer Care                                                                                          Follow-up call made to pt to inform him Avastin infusion is scheduled for 2/12 with labs starting at 6:45 am. He reported taking Ibuprofen and Flexeril scheduled has helped his pain immensely and he has not started the Hydromorphone yet. Advised him to start tapering down on the Ibuprofen to reduce risk of thrombocytopenia with Avastin, hopefully to discontinue by this Friday. Pt verbalized understanding. Scheduling messaged to add every 2 weeks infusions and monthly LILLY visit starting 2/26.    Signature:  Virgen Nieto RN

## 2024-02-08 ENCOUNTER — MYC MEDICAL ADVICE (OUTPATIENT)
Dept: ONCOLOGY | Facility: CLINIC | Age: 49
End: 2024-02-08
Payer: COMMERCIAL

## 2024-02-08 ENCOUNTER — PATIENT OUTREACH (OUTPATIENT)
Dept: ONCOLOGY | Facility: CLINIC | Age: 49
End: 2024-02-08
Payer: COMMERCIAL

## 2024-02-08 DIAGNOSIS — C78.7 COLON CANCER METASTASIZED TO LIVER (H): ICD-10-CM

## 2024-02-08 DIAGNOSIS — C18.9 COLON CANCER METASTASIZED TO LIVER (H): ICD-10-CM

## 2024-02-08 DIAGNOSIS — G89.3 CANCER ASSOCIATED PAIN: ICD-10-CM

## 2024-02-08 RX ORDER — HYDROMORPHONE HYDROCHLORIDE 4 MG/1
4 TABLET ORAL EVERY 6 HOURS PRN
Qty: 60 TABLET | Refills: 0 | Status: SHIPPED | OUTPATIENT
Start: 2024-02-08 | End: 2024-02-20

## 2024-02-08 NOTE — TELEPHONE ENCOUNTER
"[facility]: Cancer Care                                                                                          Called pt to discuss symptomatic mychart. He stated since he knew he was to be off the Ibuprofen by this weekend (getting Avastin infusion on Monday) he switched to dilaudid 2 mg this morning at 7 am, took flexeril 5 mg and decreased Ibuprofen to 600 mg every 8 hours. Stated the dilaudid helped \"just a little\" and did not last 6 hours, by noon his pain was in the 7-8 range. At 1 pm he took another dilaudid 2 mg and flexeril 5 mg. At 3 pm took Ibuprofen 600 mg and pain leveled out at 5/10. Stated rectal bleeding continues every time he has a cramp or feels he has to have a bowel movement, he'll sit on the toilet and only see \"oozing about 1 Tbsp\" blood come out, sometimes with mucus. State blood is darker. Denied any blood in urine, new nosebleeds, bleeding from gums or elsewhere. State pain is the same as always and contributes to cancer pain, no new sites of pain.    Discussed with Dr. Snow and dilaudid increased to 4 mg every 6 hours, pt may increase flexeril up to a max of 30 mg/24 hours, explained to pt if he takes 2 tabs (10mg) then stretch it out to every 8 hours and to add Tylenol 1000 mg every 8 hours. Continue to decrease dose of Ibuprofen so he can stop it this weekend. Pt to seek care in ER if pain or rectal bleed increases, pt verbalized understanding.     Signature:  Virgen Nieto RN  "

## 2024-02-12 ENCOUNTER — INFUSION THERAPY VISIT (OUTPATIENT)
Dept: ONCOLOGY | Facility: CLINIC | Age: 49
End: 2024-02-12
Attending: STUDENT IN AN ORGANIZED HEALTH CARE EDUCATION/TRAINING PROGRAM
Payer: COMMERCIAL

## 2024-02-12 ENCOUNTER — ANCILLARY PROCEDURE (OUTPATIENT)
Dept: GENERAL RADIOLOGY | Facility: CLINIC | Age: 49
End: 2024-02-12
Attending: STUDENT IN AN ORGANIZED HEALTH CARE EDUCATION/TRAINING PROGRAM
Payer: COMMERCIAL

## 2024-02-12 ENCOUNTER — APPOINTMENT (OUTPATIENT)
Dept: LAB | Facility: CLINIC | Age: 49
End: 2024-02-12
Attending: STUDENT IN AN ORGANIZED HEALTH CARE EDUCATION/TRAINING PROGRAM
Payer: COMMERCIAL

## 2024-02-12 ENCOUNTER — DOCUMENTATION ONLY (OUTPATIENT)
Dept: ONCOLOGY | Facility: CLINIC | Age: 49
End: 2024-02-12

## 2024-02-12 VITALS
HEART RATE: 86 BPM | WEIGHT: 200.7 LBS | OXYGEN SATURATION: 96 % | BODY MASS INDEX: 27.99 KG/M2 | DIASTOLIC BLOOD PRESSURE: 81 MMHG | SYSTOLIC BLOOD PRESSURE: 120 MMHG | TEMPERATURE: 97.7 F | RESPIRATION RATE: 16 BRPM

## 2024-02-12 DIAGNOSIS — C20 RECTAL ADENOCARCINOMA METASTATIC TO LUNG (H): Primary | ICD-10-CM

## 2024-02-12 DIAGNOSIS — C78.00 RECTAL ADENOCARCINOMA METASTATIC TO LUNG (H): Primary | ICD-10-CM

## 2024-02-12 DIAGNOSIS — C18.9 COLON CANCER METASTASIZED TO LIVER (H): ICD-10-CM

## 2024-02-12 DIAGNOSIS — C78.7 COLON CANCER METASTASIZED TO LIVER (H): ICD-10-CM

## 2024-02-12 LAB
ALBUMIN SERPL BCG-MCNC: 4 G/DL (ref 3.5–5.2)
ALBUMIN UR-MCNC: NEGATIVE MG/DL
ALP SERPL-CCNC: 95 U/L (ref 40–150)
ALT SERPL W P-5'-P-CCNC: 13 U/L (ref 0–70)
ANION GAP SERPL CALCULATED.3IONS-SCNC: 12 MMOL/L (ref 7–15)
AST SERPL W P-5'-P-CCNC: 25 U/L (ref 0–45)
BASOPHILS # BLD AUTO: 0.1 10E3/UL (ref 0–0.2)
BASOPHILS NFR BLD AUTO: 1 %
BILIRUB SERPL-MCNC: 0.3 MG/DL
BUN SERPL-MCNC: 9.2 MG/DL (ref 6–20)
CALCIUM SERPL-MCNC: 9.2 MG/DL (ref 8.6–10)
CHLORIDE SERPL-SCNC: 99 MMOL/L (ref 98–107)
CREAT SERPL-MCNC: 0.79 MG/DL (ref 0.67–1.17)
DEPRECATED HCO3 PLAS-SCNC: 24 MMOL/L (ref 22–29)
EGFRCR SERPLBLD CKD-EPI 2021: >90 ML/MIN/1.73M2
EOSINOPHIL # BLD AUTO: 0.7 10E3/UL (ref 0–0.7)
EOSINOPHIL NFR BLD AUTO: 7 %
ERYTHROCYTE [DISTWIDTH] IN BLOOD BY AUTOMATED COUNT: 13.7 % (ref 10–15)
GLUCOSE SERPL-MCNC: 120 MG/DL (ref 70–99)
HCT VFR BLD AUTO: 37 % (ref 40–53)
HGB BLD-MCNC: 11.8 G/DL (ref 13.3–17.7)
IMM GRANULOCYTES # BLD: 0 10E3/UL
IMM GRANULOCYTES NFR BLD: 0 %
LYMPHOCYTES # BLD AUTO: 0.8 10E3/UL (ref 0.8–5.3)
LYMPHOCYTES NFR BLD AUTO: 9 %
MCH RBC QN AUTO: 24.8 PG (ref 26.5–33)
MCHC RBC AUTO-ENTMCNC: 31.9 G/DL (ref 31.5–36.5)
MCV RBC AUTO: 78 FL (ref 78–100)
MONOCYTES # BLD AUTO: 1 10E3/UL (ref 0–1.3)
MONOCYTES NFR BLD AUTO: 11 %
NEUTROPHILS # BLD AUTO: 6.7 10E3/UL (ref 1.6–8.3)
NEUTROPHILS NFR BLD AUTO: 72 %
NRBC # BLD AUTO: 0 10E3/UL
NRBC BLD AUTO-RTO: 0 /100
PLATELET # BLD AUTO: 391 10E3/UL (ref 150–450)
POTASSIUM SERPL-SCNC: 4.5 MMOL/L (ref 3.4–5.3)
PROT SERPL-MCNC: 7.6 G/DL (ref 6.4–8.3)
RBC # BLD AUTO: 4.76 10E6/UL (ref 4.4–5.9)
SODIUM SERPL-SCNC: 135 MMOL/L (ref 135–145)
WBC # BLD AUTO: 9.2 10E3/UL (ref 4–11)

## 2024-02-12 PROCEDURE — 36591 DRAW BLOOD OFF VENOUS DEVICE: CPT

## 2024-02-12 PROCEDURE — 250N000011 HC RX IP 250 OP 636: Mod: JZ | Performed by: STUDENT IN AN ORGANIZED HEALTH CARE EDUCATION/TRAINING PROGRAM

## 2024-02-12 PROCEDURE — 85025 COMPLETE CBC W/AUTO DIFF WBC: CPT

## 2024-02-12 PROCEDURE — 80053 COMPREHEN METABOLIC PANEL: CPT

## 2024-02-12 PROCEDURE — 71046 X-RAY EXAM CHEST 2 VIEWS: CPT | Performed by: RADIOLOGY

## 2024-02-12 PROCEDURE — 96413 CHEMO IV INFUSION 1 HR: CPT

## 2024-02-12 PROCEDURE — 81003 URINALYSIS AUTO W/O SCOPE: CPT | Performed by: STUDENT IN AN ORGANIZED HEALTH CARE EDUCATION/TRAINING PROGRAM

## 2024-02-12 PROCEDURE — 258N000003 HC RX IP 258 OP 636: Performed by: STUDENT IN AN ORGANIZED HEALTH CARE EDUCATION/TRAINING PROGRAM

## 2024-02-12 RX ORDER — HEPARIN SODIUM (PORCINE) LOCK FLUSH IV SOLN 100 UNIT/ML 100 UNIT/ML
5 SOLUTION INTRAVENOUS DAILY PRN
Status: DISCONTINUED | OUTPATIENT
Start: 2024-02-12 | End: 2024-02-12 | Stop reason: HOSPADM

## 2024-02-12 RX ORDER — HEPARIN SODIUM (PORCINE) LOCK FLUSH IV SOLN 100 UNIT/ML 100 UNIT/ML
5 SOLUTION INTRAVENOUS
Status: DISCONTINUED | OUTPATIENT
Start: 2024-02-12 | End: 2024-02-12 | Stop reason: HOSPADM

## 2024-02-12 RX ADMIN — SODIUM CHLORIDE 250 ML: 9 INJECTION, SOLUTION INTRAVENOUS at 08:20

## 2024-02-12 RX ADMIN — Medication 5 ML: at 06:26

## 2024-02-12 RX ADMIN — SODIUM CHLORIDE 500 MG: 9 INJECTION, SOLUTION INTRAVENOUS at 08:39

## 2024-02-12 RX ADMIN — Medication 5 ML: at 09:16

## 2024-02-12 ASSESSMENT — PAIN SCALES - GENERAL: PAINLEVEL: MILD PAIN (2)

## 2024-02-12 NOTE — PROGRESS NOTES
Oral Chemotherapy Monitoring Program  Lab Follow Up    Reviewed lab results from 2/12.        5/16/2023     2:00 PM 6/3/2023    11:00 AM 10/27/2023     3:00 PM 10/27/2023     3:16 PM 12/1/2023    11:00 AM 2/6/2024    10:00 AM 2/12/2024     4:00 PM   ORAL CHEMOTHERAPY   Assessment Type Refill Discontinuation Initial Work up New Teach Chart Review Other Lab Monitoring   Stop Date  5/19/2023        Reason for Discontinuation  Disease progression        Diagnosis Code Colon Cancer Colon Cancer Colon Cancer Colon Cancer Colon Cancer Colon Cancer Colon Cancer   Providers Dr. Edy Snow   [facility]   Drug Name Stivarga (regorafenib) Stivarga (regorafenib) Lonsurf (trifluridine/Tipiracil) Lonsurf (trifluridine/Tipiracil) Lonsurf (trifluridine/Tipiracil) Lonsurf (trifluridine/Tipiracil) Lonsurf (trifluridine/Tipiracil)   Dose 160 mg 160 mg 75 mg 75 mg 75 mg 75 mg 75 mg   Current Schedule Daily Daily BID BID BID BID BID   Cycle Details 3 weeks on, 1 week off 3 weeks on, 1 week off Days 1-5, then Days 8-12 Days 1-5, then Days 8-12 Drug on Hold Days 1-5, then Days 8-12 Days 1-5, then Days 8-12   Start Date of Last Cycle 4/27/2023 2/12/2024   Planned next cycle start date 5/25/2023  10/27/2023 10/27/2023          Labs:  _  Result Component Current Result Ref Range   Sodium 135 (2/12/2024) 135 - 145 mmol/L          _  Result Component Current Result Ref Range   Potassium 4.5 (2/12/2024) 3.4 - 5.3 mmol/L          _  Result Component Current Result Ref Range   Calcium 9.2 (2/12/2024) 8.6 - 10.0 mg/dL          _  Result Component Current Result Ref Range   Magnesium 1.7 (1/28/2024) 1.7 - 2.3 mg/dL     No results found for Phos within last 30 days.     _  Result Component Current Result Ref Range   Albumin 4.0 (2/12/2024) 3.5 - 5.2 g/dL     _  Result Component Current Result Ref Range   Urea  Nitrogen 9.2 (2/12/2024) 6.0 - 20.0 mg/dL          _  Result Component Current Result Ref Range   Creatinine 0.79 (2/12/2024) 0.67 - 1.17 mg/dL          _  Result Component Current Result Ref Range   AST 25 (2/12/2024) 0 - 45 U/L     _  Result Component Current Result Ref Range   ALT 13 (2/12/2024) 0 - 70 U/L     _  Result Component Current Result Ref Range   Bilirubin Total 0.3 (2/12/2024) <=1.2 mg/dL     _  Result Component Current Result Ref Range   WBC Count 9.2 (2/12/2024) 4.0 - 11.0 10e3/uL     _  Result Component Current Result Ref Range   Hemoglobin 11.8 (L) (2/12/2024) 13.3 - 17.7 g/dL     _  Result Component Current Result Ref Range   Platelet Count 391 (2/12/2024) 150 - 450 10e3/uL     No results found for ANC within last 30 days.     _  Result Component Current Result Ref Range   Absolute Neutrophils 6.7 (2/12/2024) 1.6 - 8.3 10e3/uL        Assessment & Plan:  No concerning abnormalities. Mr. Escalante will resume Lonsurf today as planned. He was not contacted as he was seen in infusion today.    Follow-Up:  Routine assessment in ~1 week    Linda Rodriguez, PharmD, BCOP  Oral Chemotherapy Monitoring Program  [facility]  [phone]

## 2024-02-12 NOTE — PROGRESS NOTES
Infusion Nursing Note:  Roman Esclaante presents today for Cycle 2 Day 1 Bevacizumab-bvzr.    Patient seen by provider today: No   present during visit today: Not Applicable.    Note: Patient denies any fevers or chills at home. Patient reports SOB post lung resection surgery. Patient endorses 2/10 rectal pain that can increase for up to 10/10 at home. Patient declined the need for additional intervention today in infusion. Patient took 10mg of Oxycodone from his home medications. RN offered reaching out to the team regarding palliative care. Patient declined the need for palliative care at this time.    Patient states he has been taking laxatives daily (Miralax, suppositories, and Ducolax tablets) since Christmas and only producing stool every couple of days. Output has been soft per patient report. He also noted blood from his rectum. He states this has happened prior when his treatment was on hold. Team notified.    TORB 02/12/24 @ 7:50am: Dr. Snow/Maria Esther Saucedo RN: Ok to proceed with treatment today with the bowel changes patient is reporting.  - Plan for patient to follow-up with LILLY prior to next infusion.    Intravenous Access:  Implanted Port.    Treatment Conditions:  Lab Results   Component Value Date    HGB 11.8 (L) 02/12/2024    WBC 9.2 02/12/2024    ANEU 45.1 (H) 07/28/2023    ANEUTAUTO 6.7 02/12/2024     02/12/2024        Lab Results   Component Value Date     (L) 02/12/2024    POTASSIUM 4.3 01/28/2024    POTASSIUM 4.3 01/28/2024    MAG 1.7 01/28/2024    CR 0.79 02/12/2024    JEFERSON 9.2 02/12/2024    BILITOTAL 0.3 02/12/2024    ALBUMIN 4.0 02/12/2024    ALT 13 02/12/2024    AST 25 02/12/2024     /81.  Urine protein negative.   Results reviewed, labs MET treatment parameters, ok to proceed with treatment.    Post Infusion Assessment:  Patient tolerated infusion without incident.  Blood return noted pre and post infusion.  Site patent and intact, free from redness, edema  or discomfort.  No evidence of extravasations.  Access discontinued per protocol.     Discharge Plan:   Prescription refills given for Lonsurf.  Discharge instructions reviewed with: Patient.  Patient and/or family verbalized understanding of discharge instructions and all questions answered.  AVS to patient via Turned [employer] Digital[employer].  Patient will return 02/26/24 for next appointment.   Patient discharged in stable condition accompanied by: self.  Departure Mode: Ambulatory.      Maria Esther Saucedo RN

## 2024-02-12 NOTE — PATIENT INSTRUCTIONS
[facility] Triage and after hours / weekends / holidays:  [phone]    Please call the triage or after hours line if you experience a temperature greater than or equal to 100.4, shaking chills, have uncontrolled nausea, vomiting and/or diarrhea, dizziness, shortness of breath, chest pain, bleeding, unexplained bruising, or if you have any other new/concerning symptoms, questions or concerns.      If you are having any concerning symptoms or wish to speak to a provider before your next infusion visit, please call triage to notify them so we can adequately serve you.     If you need a refill on a narcotic prescription or other medication, please call before your infusion appointment.

## 2024-02-15 DIAGNOSIS — C18.9 COLON CANCER METASTASIZED TO LIVER (H): ICD-10-CM

## 2024-02-15 DIAGNOSIS — C78.7 COLON CANCER METASTASIZED TO LIVER (H): ICD-10-CM

## 2024-02-15 RX ORDER — OXYCODONE HYDROCHLORIDE 10 MG/1
10 TABLET ORAL EVERY 6 HOURS PRN
Qty: 90 TABLET | Refills: 0 | Status: SHIPPED | OUTPATIENT
Start: 2024-02-15 | End: 2024-02-20

## 2024-02-16 ENCOUNTER — PATIENT OUTREACH (OUTPATIENT)
Dept: GASTROENTEROLOGY | Facility: CLINIC | Age: 49
End: 2024-02-16
Payer: COMMERCIAL

## 2024-02-19 NOTE — PROGRESS NOTES
Palliative Care Clinic Consult Note    Patient Name: Roman Escalante  Primary Provider: Buddy Hart    Chief Complaint/Patient ID: Roman Escalante is a 48 year old male with PMHx of metastatic rectal cancer (mets to lung), currently enrolled in [facility] CAR-T trial (completed cell harvest 1/23/2024, tentatively planning administration of CAR-T in April).  He has been on multiple lines of systemic therapy, currently on Lonsurf/Avastin.     Reviewed: Yes    History of Present Illness:  Roman Escalante is a 48 year old male who is seen for a new consult via Video visit today with Palliative Care.      States that he in the past has always had a worsening of his symptoms, particularly pain, when he was off chemotherapy.  Pain stretches from his tailbone to his scrotum and sometimes fans out into other areas of his buttocks/groin area.  He was off chemotherapy for about 3 months, and in particular over the last month, pain significantly worsened.  He saw the pain clinic on 12/1, and was prescribed gabapentin and Flexeril.  He states he did not start these until about [place] time and was not using to maximum dose.  He just started taking gabapentin 100 mg 3 times daily about last week.    He had received a prescription for Dilaudid, however he did not feel like 2 mg every 6 hours was fully effective, so he has gone back to oxycodone.  Oncology sent a prescription for 10 mg of oxycodone every 6 hours as needed.  He states he is actually been taking 20 mg per dose since Friday, however this has actually brought his pain to a tolerable level.  Sometimes he is needing another dose in 6 hours but other times he is able to stretch doses to 8 or 9 hours apart.  He is averaging 3 doses per day.    He does have a separate referral to Dr. Mendoza from interventional pain and is scheduled for a visit with her 3/7.    He also brought up some questions about the lumbar MRI from January showing some foraminal  "narrowing in his L4-S1 area.    In addition to the struggles with pain, he has been dealing with difficulty with his bowels.  He said for a while, he was having trouble urinating in addition to this.  He said he would sit on the toilet and would alternate between urinating, passing a bloody mucus \"plug\" through his rectum, and sometimes \"hopefully\" passing stool through his ostomy.  During this period, he would have a lot of pain and pressure in his pelvic area.  He states these symptoms have improved.  He is now able to urinate while standing and can hold in the mucus/blood plug, however his bowels are still not working well.  He has tried liquid Dulcolax, MiraLAX, Dulcolax suppositories in his ostomy, as well as green machine juices.  He is also occasionally use magnesium citrate.  He states all of these are hit or miss on whether they work or not.  He has never tried senna but is aware of it as his girlfriend is a hospice nurse.    He says it has been an emotional roller coaster as he was told for 3 years \"you are going to die\" and now there are other options.  He is grateful to have the CAR-T trial, but says it has been an adjustment.    He has good social support from his girlfriend and some good friends.  He also has kids and grandkids in [place].     Social History:  Has a couple of kids and a couple grandkids who lives in [place].  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 Has a girlfriend-she is a hospice RN.  Social History     Tobacco Use    Smoking status: Former     Packs/day: 0.50     Years: 10.00     Additional pack years: 0.00     Total pack years: 5.00     Types: Cigarettes, Other     Start date: 2010     Quit date: 2023     Years since quittin.5     Passive exposure: Current    Smokeless tobacco: Never   Vaping Use    Vaping Use: Never used   Substance Use Topics    Alcohol use: Not Currently     Comment: social    Drug use: Yes     Types: Marijuana     Comment: Keeps up " my appetite, sleep, and help with neuropathy     Advanced Care Planning: Has not completed. Girlfriend would be his HCA.    Family History- Reviewed in Epic.     Medications- Reviewed in Epic.    Past Medical History- Reviewed in [facility].    Past Surgical History- Reviewed in Epic.    Physical Exam:   Constitutional: Alert, pleasant, no apparent distress. Sitting up in chair.  Eyes: Sclera non-icteric, no eye discharge.  ENT: No nasal discharge. Ears grossly normal.  Respiratory: Unlabored respirations. Speaking in full sentences.  Musculoskeletal: Extremities appear normal- no gross deformities noted. No edema noted on upper body.   Skin: No suspicious lesions or rashes on visible skin.  Neurologic: Clear speech, no aphasia. No facial droop.  Psychiatric: Mentation appears normal, appropriate attention. Affect normal/bright. Does not appear anxious or depressed.    Key Data Reviewed:  LABS: 2/12/2024- Cr 0.79, GFR >90, Albumin 4, LFTs WNL. WBC 9.2, Hgb 11.8, Plts 391.     IMAGING: CT Abd/Pelvis 1/28/24- IMPRESSION:   1.  Mild to moderate interval increase in size of pulmonary nodules in the lower lungs.  2.  Increasing size of low attenuation mass in the right lobe of the liver after embolization procedure.  3.  Mild interval increase in size in the lesion in the right posterior lateral liver.  4.  Rectosigmoid colon mass and adjacent adenopathy appears stable.  5.  Stable probable metastatic nodule in the herniated fat in the left lower quadrant colostomy.  6.  Mild spondylotic changes. No other concerning osseous abnormalities.     MRI Lumbar Spine 1/29/24- IMPRESSION:  1.  No suspicious osseous lesions.  2.  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 Moderate canal stenosis at L5-S1 and mild to moderate stenosis at L4-L5.  3.  Bilateral neural foraminal narrowing is greatest at L5-S1, where there is moderate to severe stenosis. Findings may impinge upon the exiting L5 nerve  roots.    Opioid Risk Tool (ORT):    Family History of Substance Abuse:        Alcohol = 1 pt (yes, female)       Illegal Drugs = 0 pt (no)       Prescription Drugs = 0 pt (no)      Personal History of Substance Abuse:       Alcohol = 0 pt (no)       Illegal Drugs = 0 pt (no)       Prescription Drugs = 0 pt (no)        Age: 0 pt (age < 16; age > 45)      History of Pre-adolescent Sexual Abuse: 0 pt (no)      Psychological Disease: 0 pt (none)      ORT Score = 1 for history of alcohol abuse and his mother.       0-3: Low risk for opioid abuse       4-7: Moderate risk for opioid abuse       >/= 8: High risk for opioid abuse        Impression & Recommendations & Counseling:  Roman Escalante is a 48 year old male with history of metastatic rectal cancer.    Introduced the role of palliative care as an interdisciplinary team that cares for patients with serious illness to help support symptom management, communication, coping for patients and their families as well as support with medical decision making.    Pain - Sounds like there may be several different factors contributing to the pain in addition to the rectosigmoid colon mass.  He is having better pain control since being on the higher dose oxycodone.  We did have a conversation about how he needs to talk with his medical team before increasing any opiates (and honestly any other medications) in the future, and he expressed understanding.  -Palliative care will take over prescribing opiate medications for now.  I updated his prescription to reflect 10 to 20 mg of oxycodone every 6 hours as needed.  ORT score of 1 based on family history.  -He has an appointment with Dr. Mendoza scheduled for 3/7.  Depending on outcome and any possible interventions that might be offered/available, discussed that we could look at utility of a long-acting opioid if needed.  -He is not sure if the gabapentin is doing much at this point in time.  I stated 1 option would be to try 300 mg  at bedtime, since he is still having some difficulty getting comfortable with sleeping.    -Advised him not to take Flexeril, oxycodone, and higher dose gabapentin at the same time.  He has not been using the Flexeril the last few days.    Bowels - Ongoing constipation type issues.  -Discussed bowel regimen, including dosing of miralax and senna.  Advised incorporating senna for stimulant laxative effect, as it sounds like his stools are mostly soft enough but he is having trouble actually moving stool along.    ACP - Has never completed an HCD.  States his girlfriend would be his HCA.  Provided [place] [employer] information in AVS today.    Follow up: About 6 weeks    Video-Visit Details  Video Start Time: 10:02 AM  Video End Time: 11:05 AM    Originating Location (pt. Location): Home     Distant Location (provider location):  Offsite- Personal Home      Platform used for Video Visit: [email or web address]     Total time spent on day of encounter is 81 mins, including reviewing record, review of above studies, above visit with patient, symptomatic discussion as above, including medication adjustments/prescription management, and documentation.     Isidra Prater,   Palliative Medicine   [id number]    Some chart documentation performed using Dragon Voice recognition Software. Although reviewed after completion, some words and grammatical errors may remain.

## 2024-02-20 ENCOUNTER — VIRTUAL VISIT (OUTPATIENT)
Dept: PALLIATIVE CARE | Facility: CLINIC | Age: 49
End: 2024-02-20
Attending: STUDENT IN AN ORGANIZED HEALTH CARE EDUCATION/TRAINING PROGRAM
Payer: COMMERCIAL

## 2024-02-20 VITALS — HEIGHT: 71 IN | WEIGHT: 200 LBS | BODY MASS INDEX: 28 KG/M2

## 2024-02-20 DIAGNOSIS — C18.9 COLON CANCER METASTASIZED TO LIVER (H): Primary | ICD-10-CM

## 2024-02-20 DIAGNOSIS — R10.30 LOWER ABDOMINAL PAIN: ICD-10-CM

## 2024-02-20 DIAGNOSIS — K59.03 DRUG INDUCED CONSTIPATION: ICD-10-CM

## 2024-02-20 DIAGNOSIS — L08.9 SOFT TISSUE INFECTION: ICD-10-CM

## 2024-02-20 DIAGNOSIS — G89.3 CANCER RELATED PAIN: ICD-10-CM

## 2024-02-20 DIAGNOSIS — C78.7 COLON CANCER METASTASIZED TO LIVER (H): Primary | ICD-10-CM

## 2024-02-20 PROCEDURE — 99205 OFFICE O/P NEW HI 60 MIN: CPT | Mod: 95 | Performed by: STUDENT IN AN ORGANIZED HEALTH CARE EDUCATION/TRAINING PROGRAM

## 2024-02-20 RX ORDER — OXYCODONE HYDROCHLORIDE 10 MG/1
10-20 TABLET ORAL EVERY 6 HOURS PRN
COMMUNITY
Start: 2024-02-20 | End: 2024-03-01

## 2024-02-20 ASSESSMENT — PAIN SCALES - GENERAL: PAINLEVEL: MILD PAIN (2)

## 2024-02-20 NOTE — PATIENT INSTRUCTIONS
"Recommendations:  -Okay to continue the oxycodone 1 to 2 tablets every 6 hours as needed for pain.  When you need a refill on this, please let me know.  We also discussed not adjusting/increasing your opioid prescriptions without having a conversation with our team first.  -We discussed that depending on Dr. Mendoza's recommendations and the possibility of any interventional options for your pain, there are a longer acting pain medications that we could use.  The oxycodone that you are taking is considered a shorter acting opioid and on average lasts between 4 to 6 hours for many people.  -If you are not sure that the gabapentin is doing very much for you, you could stop taking 1 pill 3 times a day and could consider taking 2 or 3 pills at bedtime only.  If you do feel like the medication might be doing something but the dosing is not sufficient, there is absolutely room to increase it as well.  -I would not recommend taking the Flexeril, oxycodone, and gabapentin at the same time due to the risk of significant sedation.  -There are 2 different types of laxatives: 1) \"Pushers\" such as senna, which are stimulant laxatives and tell your body and needs to have a bowel movement and 2) \"Mushers\" such as MiraLAX, which is an osmotic laxative bringing water into the colon and making it easier to go to the bathroom.  In general people need a combination of both types.    -For the senna, recommend starting with 1 to 2 tablets twice a day. May increase up to 4 tablets twice a day if needed for regular bowel movement and may need to decrease if your bowels become loose.  -For MiraLAX, can increase to 2-3 doses daily as needed.  You also can do partial doses if an additional full dose is too much (such as one half capful later on in the day).    -We talked about starting to look at completing a healthcare directive.  I recommend you check out- [email or web address]/patients-[email or web address]-choices   " "  They have several tools and some advice about getting started.  Under the \"How do I document my choices\" section, I am a fan of the full length healthcare directive (for adults with serious illness) because I think it has a good combination of the medical questions that are important as well as the social and personal questions that allow healthcare providers to get to know you as a person if you are unable to speak for yourself.  You do not have to complete every question on the document.  I generally recommend patients start with 3 or 4 questions on the goals page and work from there.    There is also a Short Form, which primarily serves to designate health care agents. These are people who can speak on your behalf about your healthcare wishes/goals if you are not able to speak for yourself.      Follow up: About 6 weeks      Reasons to Call    If you are having worsening/uncontrolled symptoms we want you to call!    You or your other physicians make any changes to medications we have prescribed.  -Please call for refills 4-5 days before you will run out of medication.    Important Phone Numbers, including: Refills, scheduling, and general questions     Palliative Care RN: Rebeca Urbina - [phone]  *After hours or on weekends. Will connect you with on call MD. [phone]    "

## 2024-02-20 NOTE — LETTER
2/20/2024       RE: Roman Escalante  [address]  [place] [address]     Dear Colleague,    Thank you for referring your patient, Roman Escalante, to the [facility] [place] [facility] at [facility]. Please see a copy of my visit note below.    Palliative Care Clinic Consult Note    Patient Name: Roman Escalante  Primary Provider: Buddy Hart    Chief Complaint/Patient ID: Roman Escalante is a 48 year old male with PMHx of metastatic rectal cancer (mets to lung), currently enrolled in [facility] CAR-T trial (completed cell harvest 1/23/2024, tentatively planning administration of CAR-T in April).  He has been on multiple lines of systemic therapy, currently on Lonsurf/Avastin.     Reviewed: Yes    History of Present Illness:  Roman Escalante is a 48 year old male who is seen for a new consult via Video visit today with Palliative Care.      States that he in the past has always had a worsening of his symptoms, particularly pain, when he was off chemotherapy.  Pain stretches from his tailbone to his scrotum and sometimes fans out into other areas of his buttocks/groin area.  He was off chemotherapy for about 3 months, and in particular over the last month, pain significantly worsened.  He saw the pain clinic on 12/1, and was prescribed gabapentin and Flexeril.  He states he did not start these until about [place] time and was not using to maximum dose.  He just started taking gabapentin 100 mg 3 times daily about last week.    He had received a prescription for Dilaudid, however he did not feel like 2 mg every 6 hours was fully effective, so he has gone back to oxycodone.  Oncology sent a prescription for 10 mg of oxycodone every 6 hours as needed.  He states he is actually been taking 20 mg per dose since Friday, however this has actually brought his pain to a tolerable level.  Sometimes he is needing another dose in 6 hours  "but other times he is able to stretch doses to 8 or 9 hours apart.  He is averaging 3 doses per day.    He does have a separate referral to Dr. Mendoza from interventional pain and is scheduled for a visit with her 3/7.    He also brought up some questions about the lumbar MRI from January showing some foraminal narrowing in his L4-S1 area.    In addition to the struggles with pain, he has been dealing with difficulty with his bowels.  He said for a while, he was having trouble urinating in addition to this.  He said he would sit on the toilet and would alternate between urinating, passing a bloody mucus \"plug\" through his rectum, and sometimes \"hopefully\" passing stool through his ostomy.  During this period, he would have a lot of pain and pressure in his pelvic area.  He states these symptoms have improved.  He is now able to urinate while standing and can hold in the mucus/blood plug, however his bowels are still not working well.  He has tried liquid Dulcolax, MiraLAX, Dulcolax suppositories in his ostomy, as well as green machine juices.  He is also occasionally use magnesium citrate.  He states all of these are hit or miss on whether they work or not.  He has never tried senna but is aware of it as his girlfriend is a hospice nurse.    He says it has been an emotional roller coaster as he was told for 3 years \"you are going to die\" and now there are other options.  He is grateful to have the CAR-T trial, but says it has been an adjustment.    He has good social support from his girlfriend and some good friends.  He also has kids and grandkids in [place].     Social History:  Has a couple of kids and a couple grandkids who lives in [place].  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 Has a girlfriend-she is a hospice RN.  Social History     Tobacco Use    Smoking status: Former     Packs/day: 0.50     Years: 10.00     Additional pack years: 0.00     Total pack years: 5.00     Types: Cigarettes, " Other     Start date: 2010     Quit date: 2023     Years since quittin.5     Passive exposure: Current    Smokeless tobacco: Never   Vaping Use    Vaping Use: Never used   Substance Use Topics    Alcohol use: Not Currently     Comment: social    Drug use: Yes     Types: Marijuana     Comment: Keeps up my appetite, sleep, and help with neuropathy     Advanced Care Planning: Has not completed. Girlfriend would be his HCA.    Family History- Reviewed in Epic.     Medications- Reviewed in Epic.    Past Medical History- Reviewed in [facility].    Past Surgical History- Reviewed in Epic.    Physical Exam:   Constitutional: Alert, pleasant, no apparent distress. Sitting up in chair.  Eyes: Sclera non-icteric, no eye discharge.  ENT: No nasal discharge. Ears grossly normal.  Respiratory: Unlabored respirations. Speaking in full sentences.  Musculoskeletal: Extremities appear normal- no gross deformities noted. No edema noted on upper body.   Skin: No suspicious lesions or rashes on visible skin.  Neurologic: Clear speech, no aphasia. No facial droop.  Psychiatric: Mentation appears normal, appropriate attention. Affect normal/bright. Does not appear anxious or depressed.    Key Data Reviewed:  LABS: 2024- Cr 0.79, GFR >90, Albumin 4, LFTs WNL. WBC 9.2, Hgb 11.8, Plts 391.     IMAGING: CT Abd/Pelvis 24- IMPRESSION:   1.  Mild to moderate interval increase in size of pulmonary nodules in the lower lungs.  2.  Increasing size of low attenuation mass in the right lobe of the liver after embolization procedure.  3.  Mild interval increase in size in the lesion in the right posterior lateral liver.  4.  Rectosigmoid colon mass and adjacent adenopathy appears stable.  5.  Stable probable metastatic nodule in the herniated fat in the left lower quadrant colostomy.  6.  Mild spondylotic changes. No other concerning osseous abnormalities.     MRI Lumbar Spine 24- IMPRESSION:  1.  No suspicious osseous  lesions.  2.  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 Moderate canal stenosis at L5-S1 and mild to moderate stenosis at L4-L5.  3.  Bilateral neural foraminal narrowing is greatest at L5-S1, where there is moderate to severe stenosis. Findings may impinge upon the exiting L5 nerve roots.    Opioid Risk Tool (ORT):    Family History of Substance Abuse:        Alcohol = 1 pt (yes, female)       Illegal Drugs = 0 pt (no)       Prescription Drugs = 0 pt (no)      Personal History of Substance Abuse:       Alcohol = 0 pt (no)       Illegal Drugs = 0 pt (no)       Prescription Drugs = 0 pt (no)        Age: 0 pt (age < 16; age > 45)      History of Pre-adolescent Sexual Abuse: 0 pt (no)      Psychological Disease: 0 pt (none)      ORT Score = 1 for history of alcohol abuse and his mother.       0-3: Low risk for opioid abuse       4-7: Moderate risk for opioid abuse       >/= 8: High risk for opioid abuse        Impression & Recommendations & Counseling:  Roman Escalante is a 48 year old male with history of metastatic rectal cancer.    Introduced the role of palliative care as an interdisciplinary team that cares for patients with serious illness to help support symptom management, communication, coping for patients and their families as well as support with medical decision making.    Pain - Sounds like there may be several different factors contributing to the pain in addition to the rectosigmoid colon mass.  He is having better pain control since being on the higher dose oxycodone.  We did have a conversation about how he needs to talk with his medical team before increasing any opiates (and honestly any other medications) in the future, and he expressed understanding.  -Palliative care will take over prescribing opiate medications for now.  I updated his prescription to reflect 10 to 20 mg of oxycodone every 6 hours as needed.  ORT score of 1 based on family history.  -He has an  appointment with Dr. Mendoza scheduled for 3/7.  Depending on outcome and any possible interventions that might be offered/available, discussed that we could look at utility of a long-acting opioid if needed.  -He is not sure if the gabapentin is doing much at this point in time.  I stated 1 option would be to try 300 mg at bedtime, since he is still having some difficulty getting comfortable with sleeping.    -Advised him not to take Flexeril, oxycodone, and higher dose gabapentin at the same time.  He has not been using the Flexeril the last few days.    Bowels - Ongoing constipation type issues.  -Discussed bowel regimen, including dosing of miralax and senna.  Advised incorporating senna for stimulant laxative effect, as it sounds like his stools are mostly soft enough but he is having trouble actually moving stool along.    ACP - Has never completed an HCD.  States his girlfriend would be his HCA.  Provided [employer] information in AVS today.    Follow up: About 6 weeks     Total time spent on day of encounter is 81 mins, including reviewing record, review of above studies, above visit with patient, symptomatic discussion as above, including medication adjustments/prescription management, and documentation.       Some chart documentation performed using Dragon Voice recognition Software. Although reviewed after completion, some words and grammatical errors may remain.          Again, thank you for allowing me to participate in the care of your patient.      Sincerely,    Isidra Prater, DO

## 2024-02-20 NOTE — NURSING NOTE
"Lonsurf 75 MG twice daily for 5 days then off for 2 then 5 days on. Currently on 9th day of cycle.       Is the patient currently in the state of [place]? YES    Visit mode:VIDEO    If the visit is dropped, the patient can be reconnected by: VIDEO VISIT: Text to cell phone:   Telephone Information:   Mobile [phone]       Will anyone else be joining the visit? NO  (If patient encounters technical issues they should call [id number])    How would you like to obtain your AVS? MyChart    Are changes needed to the allergy or medication list? Lonsurf 75 MG twice daily for 5 days then off for 2 then 5 days on. Currently on 9th day of cycle.     Reason for visit: Consult (Patient said, \" been in pain, March 7th has a pain doctor appt which was the soonest could get in.\")      Savanna Stubbs VVF     "

## 2024-02-21 ENCOUNTER — TELEPHONE (OUTPATIENT)
Dept: ONCOLOGY | Facility: CLINIC | Age: 49
End: 2024-02-21
Payer: COMMERCIAL

## 2024-02-21 NOTE — TELEPHONE ENCOUNTER
"Oral Chemotherapy Monitoring Program    Subjective/Objective:  Roman Escalante is a 48 year old male contacted by phone for a follow-up visit for oral chemotherapy.  He reports that he restarted Lonsurf 5 tablets (75 mg) twice daily on 2/12. He says that it's \"not too bad.\" He has had some nausea, but he's not sure if this is related to the Lonsurf or his ongoing bowel issues. He has had to take a few doses of antiemetics so far. He denies mouth sores. He has been working with his palliative medicine physician to optimize his bowel regimen. He denies any missed doses of Lonsurf so far this cycle.        6/3/2023    11:00 AM 10/27/2023     3:00 PM 10/27/2023     3:16 PM 12/1/2023    11:00 AM 2/6/2024    10:00 AM 2/12/2024     4:00 PM 2/21/2024     2:00 PM   ORAL CHEMOTHERAPY   Assessment Type Discontinuation Initial Work up New Teach Chart Review Other Lab Monitoring Monthly Follow up   Stop Date 5/19/2023         Reason for Discontinuation Disease progression         Diagnosis Code Colon Cancer Colon Cancer Colon Cancer Colon Cancer Colon Cancer Colon Cancer Colon Cancer   Providers Dr. Edy Snow   [facility]   Drug Name Stivarga (regorafenib) Lonsurf (trifluridine/Tipiracil) Lonsurf (trifluridine/Tipiracil) Lonsurf (trifluridine/Tipiracil) Lonsurf (trifluridine/Tipiracil) Lonsurf (trifluridine/Tipiracil) Lonsurf (trifluridine/Tipiracil)   Dose 160 mg 75 mg 75 mg 75 mg 75 mg 75 mg 75 mg   Current Schedule Daily BID BID BID BID BID BID   Cycle Details 3 weeks on, 1 week off Days 1-5, then Days 8-12 Days 1-5, then Days 8-12 Drug on Hold Days 1-5, then Days 8-12 Days 1-5, then Days 8-12 Days 1-5, then Days 8-12   Start Date of Last Cycle      2/12/2024 2/12/2024   Planned next cycle start date  10/27/2023 10/27/2023    3/11/2024   Doses missed in last 2 weeks       0   Adherence " "Assessment       Adherent   Adverse Effects       Nausea       Last PHQ-2 Score on record:       2/2/2024    11:12 AM 8/30/2023     1:16 PM   PHQ-2 ( 1999 Pf[employer])   Q1: Little interest or pleasure in doing things 0 0   Q2: Feeling down, depressed or hopeless 0 0   PHQ-2 Score 0 0   Q1: Little interest or pleasure in doing things Not at all    Q2: Feeling down, depressed or hopeless Not at all    PHQ-2 Score 0        Vitals:  BP:   BP Readings from Last 1 Encounters:   02/12/24 120/81     Wt Readings from Last 1 Encounters:   02/20/24 90.7 kg (200 lb)     Estimated body surface area is 2.13 meters squared as calculated from the following:    Height as of 2/20/24: 1.803 m (5' 11\").    Weight as of 2/20/24: 90.7 kg (200 lb).    Labs:  _  Result Component Current Result Ref Range   Sodium 135 (2/12/2024) 135 - 145 mmol/L          _  Result Component Current Result Ref Range   Potassium 4.5 (2/12/2024) 3.4 - 5.3 mmol/L          _  Result Component Current Result Ref Range   Calcium 9.2 (2/12/2024) 8.6 - 10.0 mg/dL          _  Result Component Current Result Ref Range   Magnesium 1.7 (1/28/2024) 1.7 - 2.3 mg/dL     No results found for Phos within last 30 days.     _  Result Component Current Result Ref Range   Albumin 4.0 (2/12/2024) 3.5 - 5.2 g/dL     _  Result Component Current Result Ref Range   Urea Nitrogen 9.2 (2/12/2024) 6.0 - 20.0 mg/dL          _  Result Component Current Result Ref Range   Creatinine 0.79 (2/12/2024) 0.67 - 1.17 mg/dL          _  Result Component Current Result Ref Range   AST 25 (2/12/2024) 0 - 45 U/L     _  Result Component Current Result Ref Range   ALT 13 (2/12/2024) 0 - 70 U/L     _  Result Component Current Result Ref Range   Bilirubin Total 0.3 (2/12/2024) <=1.2 mg/dL     _  Result Component Current Result Ref Range   WBC Count 9.2 (2/12/2024) 4.0 - 11.0 10e3/uL     _  Result Component Current Result Ref Range   Hemoglobin 11.8 (L) (2/12/2024) 13.3 - 17.7 g/dL     _  Result Component " Current Result Ref Range   Platelet Count 391 (2/12/2024) 150 - 450 10e3/uL     No results found for ANC within last 30 days.     _  Result Component Current Result Ref Range   Absolute Neutrophils 6.7 (2/12/2024) 1.6 - 8.3 10e3/uL        Assessment/Plan:  Mr. Escalante is tolerating restarting Lonsurf/Avastin reasonably well. He will continue with treatment as planned.    Follow-Up:  2/26 labs and office visit    Refill Due:  ~3/4 for next cycle start on 3/11    Linda Rodriguez, PharmD, BCOP  Oral Chemotherapy Monitoring Program  [facility]  [phone]

## 2024-02-26 ENCOUNTER — ONCOLOGY VISIT (OUTPATIENT)
Dept: ONCOLOGY | Facility: CLINIC | Age: 49
End: 2024-02-26
Payer: COMMERCIAL

## 2024-02-26 ENCOUNTER — APPOINTMENT (OUTPATIENT)
Dept: LAB | Facility: CLINIC | Age: 49
End: 2024-02-26
Payer: COMMERCIAL

## 2024-02-26 ENCOUNTER — INFUSION THERAPY VISIT (OUTPATIENT)
Dept: ONCOLOGY | Facility: CLINIC | Age: 49
End: 2024-02-26
Attending: STUDENT IN AN ORGANIZED HEALTH CARE EDUCATION/TRAINING PROGRAM
Payer: COMMERCIAL

## 2024-02-26 ENCOUNTER — PATIENT OUTREACH (OUTPATIENT)
Dept: ONCOLOGY | Facility: CLINIC | Age: 49
End: 2024-02-26

## 2024-02-26 VITALS
BODY MASS INDEX: 27.09 KG/M2 | OXYGEN SATURATION: 98 % | TEMPERATURE: 97.8 F | RESPIRATION RATE: 16 BRPM | SYSTOLIC BLOOD PRESSURE: 134 MMHG | WEIGHT: 194.2 LBS | HEART RATE: 86 BPM | DIASTOLIC BLOOD PRESSURE: 75 MMHG

## 2024-02-26 DIAGNOSIS — K62.89 RECTAL PAIN: ICD-10-CM

## 2024-02-26 DIAGNOSIS — C78.00 RECTAL ADENOCARCINOMA METASTATIC TO LUNG (H): Primary | ICD-10-CM

## 2024-02-26 DIAGNOSIS — C20 RECTAL ADENOCARCINOMA METASTATIC TO LUNG (H): Primary | ICD-10-CM

## 2024-02-26 LAB — ALBUMIN UR-MCNC: NEGATIVE MG/DL

## 2024-02-26 PROCEDURE — 81003 URINALYSIS AUTO W/O SCOPE: CPT | Performed by: STUDENT IN AN ORGANIZED HEALTH CARE EDUCATION/TRAINING PROGRAM

## 2024-02-26 PROCEDURE — G0463 HOSPITAL OUTPT CLINIC VISIT: HCPCS | Mod: 25

## 2024-02-26 PROCEDURE — 250N000011 HC RX IP 250 OP 636

## 2024-02-26 PROCEDURE — 96413 CHEMO IV INFUSION 1 HR: CPT

## 2024-02-26 PROCEDURE — 250N000011 HC RX IP 250 OP 636: Mod: JW | Performed by: STUDENT IN AN ORGANIZED HEALTH CARE EDUCATION/TRAINING PROGRAM

## 2024-02-26 PROCEDURE — 250N000013 HC RX MED GY IP 250 OP 250 PS 637

## 2024-02-26 PROCEDURE — 99214 OFFICE O/P EST MOD 30 MIN: CPT

## 2024-02-26 PROCEDURE — 258N000003 HC RX IP 258 OP 636: Performed by: STUDENT IN AN ORGANIZED HEALTH CARE EDUCATION/TRAINING PROGRAM

## 2024-02-26 RX ORDER — HEPARIN SODIUM (PORCINE) LOCK FLUSH IV SOLN 100 UNIT/ML 100 UNIT/ML
500 SOLUTION INTRAVENOUS ONCE
Status: COMPLETED | OUTPATIENT
Start: 2024-02-26 | End: 2024-02-26

## 2024-02-26 RX ORDER — ACETAMINOPHEN 325 MG/1
650-975 TABLET ORAL EVERY 6 HOURS PRN
Status: DISCONTINUED | OUTPATIENT
Start: 2024-02-26 | End: 2024-02-26 | Stop reason: HOSPADM

## 2024-02-26 RX ORDER — HEPARIN SODIUM (PORCINE) LOCK FLUSH IV SOLN 100 UNIT/ML 100 UNIT/ML
5 SOLUTION INTRAVENOUS
Status: DISCONTINUED | OUTPATIENT
Start: 2024-02-26 | End: 2024-02-26 | Stop reason: HOSPADM

## 2024-02-26 RX ADMIN — Medication 500 UNITS: at 07:20

## 2024-02-26 RX ADMIN — Medication 5 ML: at 10:53

## 2024-02-26 RX ADMIN — SODIUM CHLORIDE 470 MG: 9 INJECTION, SOLUTION INTRAVENOUS at 10:16

## 2024-02-26 RX ADMIN — SODIUM CHLORIDE 250 ML: 9 INJECTION, SOLUTION INTRAVENOUS at 10:05

## 2024-02-26 RX ADMIN — ACETAMINOPHEN 975 MG: 325 TABLET ORAL at 10:02

## 2024-02-26 ASSESSMENT — PAIN SCALES - GENERAL: PAINLEVEL: NO PAIN (0)

## 2024-02-26 NOTE — Clinical Note
2/26/2024         RE: Roman Escalante  [address]  [facility] [address]        Dear Colleague,    Thank you for referring your patient, Roman Escalante, to the [facility]. Please see a copy of my visit note below.      [facility] - Medical Oncology Follow Up Note  02/25/2024    HPI:   Patient developed rectal bleeding in 2020.  In January 2021 CT showed focal wall thickening in the upper rectum, multiple pulmonary nodules bilaterally suspicious for metastatic disease.  Underwent FNA of the right upper lobe lesion which was consistent with adenocarcinoma of the colon.  He was treated with FOLFOX from 2/20/2021 through 5/20/2021.  Avastin was added.  Had an infusion reaction to oxaliplatin 6/2021.  7/2021- 12/2021 was on 5-FU alone.  Developed progression.  12/2021- 9/2022 was on FOLFIRI.  11/2021 started Lonsurf. All of this was done with outside oncologist.     Met with Dr. Snow on 2/3/2022 to establish care. Recommended regorafenib.     There have been questions of a parasitic infection. Please see previous notes from [facility] oncologist for further details. Was seen by ID at the  on 2/27, no concerns for parasitic infection    Started regorafenib on 3/2/2023. Progression noted 5/19/23. Plan to start FOLFOX/Avastin and send out CARIS testing to evaluate for other treatment options. Cycle 1 was given with oxaliplatin desensitization, 5FU, Avastin held due to increased urine protein.     Following cycle 4, patient was admitted with hematuria and acute kidney injury.    Oxaliplatin was dropped with Cycle 5.     Y-90 radioembolization 9/7.    10/24/23 CT CAP with disease progression with notable growth in all pulmonary lesions. Lonsurf + bevacizumab started 10/27/23 with plans to bridge to clinical trial.       [facility]   Chest tube -   ***     Interval History:      Pain  GI   Nutrition   Scared to eat, eating a lot of yogurt,     Hands are dry - Lonsurf.  Feet  are sensitive, tingling pins and needles. Worse sinsce starting again. Primarly bottom of feet.   Blood stools - 1-2 teaspoons     Today is the last day of augmentin.     Bowels are moving better now with stool softeers and laxatives - still moving but not much at a time   Pain regimen was adjusted , but overall pain is reducing now , finally better this weekend   ***    Review of Systems:  Patient denies any of the following except if noted above: fevers, chills, difficulty with energy, vision or hearing changes, chest pain, dyspnea, abdominal pain, nausea, vomiting, diarrhea, constipation, urinary concerns, headaches, numbness, tingling, issues with sleep or mood. Also denies lumps, bumps, rashes or skin lesions, bleeding or bruising issues.      Past Medical History:   Diagnosis Date    Cancer (H) 1/12/21    Colorectal cacer mast, to lungs, and liver    Essential (primary) hypertension 08/03/2021        Allergies   Allergen Reactions    Oxaliplatin Shortness Of Breath, Nausea and Vomiting and Other (See Comments)     Patient had HSR to Oxaliplatin on cycle 8 of FOLFOX chemotherapy. Sent to ER for continued monitoring.  Patient had HSR to Oxaliplatin on cycle 8 of FOLFOX chemotherapy. Sent to ER for continued monitoring.      Blood-Group Specific Substance Other (See Comments)     Patient has reactivity Suggestive of a Warm auto antibody. Blood products may be delayed. Draw patient 24 hours prior to transfusion. Draw one red top and two purple top tubes for all type and screen orders.  Patient has reactivity Suggestive of a Warm auto antibody. Blood products may be delayed. Draw patient 24 hours prior to transfusion. Draw one red top and two purple top tubes for all type and screen orders.       Physical Exam:  There were no vitals taken for this visit.  Wt Readings from Last 10 Encounters:   02/20/24 90.7 kg (200 lb)   02/12/24 91 kg (200 lb 11.2 oz)   02/02/24 91.4 kg (201 lb 8 oz)   01/29/24 93 kg (205 lb)    12/22/23 94.2 kg (207 lb 11.2 oz)   11/14/23 93.6 kg (206 lb 6.4 oz)   10/30/23 93.9 kg (207 lb)   10/27/23 94 kg (207 lb 3.2 oz)   10/02/23 93.6 kg (206 lb 4.8 oz)   09/14/23 90.3 kg (199 lb)   General: The patient is a pleasant male in no acute distress.  HEENT: EOMI. Sclerae are anicteric.   Lymph: Neck is supple with no lymphadenopathy in the cervical or supraclavicular areas.   Heart: Regular rate and rhythm.   Lungs: Clear to auscultation bilaterally.   Abdomen: Bowel sounds present, soft, nontender with no palpable hepatosplenomegaly or masses. Ostomy present in left lower abdomen, stoma not visualized due to appliance.   Extremities: No lower extremity edema noted bilaterally.   Neuro: Cranial nerves II through XII are grossly intact.  Skin: No rashes, petechiae, or bruising noted on exposed skin.        LABS  Most Recent 3 CBC's:  Recent Labs   Lab Test 02/12/24  0631 01/28/24  1814 11/14/23  1309   WBC 9.2 9.6 6.4   HGB 11.8* 10.9* 14.1   MCV 78 81 86    254 188    Most Recent 3 BMP's:  Recent Labs   Lab Test 02/12/24  0631 01/28/24  1814 11/14/23  1309    135  135 139   POTASSIUM 4.5 4.3  4.3 4.4   CHLORIDE 99 99  99 104   CO2 24 26  26 25   BUN 9.2 18.0  18.0 15.0   CR 0.79 1.06  1.06 0.75   ANIONGAP 12 10  10 10   JEFERSON 9.2 8.3*  8.3* 9.1   * 154*  154* 91    Most Recent 2 LFT's:  Recent Labs   Lab Test 02/12/24  0631 01/28/24  1814   AST 25 27   ALT 13 12   ALKPHOS 95 74   BILITOTAL 0.3 0.3        I reviewed the above labs today.      ASSESSMENT AND PLAN   Metastatic rectal cancer  - Progression on regorafenib. Started FOLFOX/Avastin 6/7/23. Oxaliplatin removed with cycle 5 due to acute kidney injury and hematuria following cycle 4. Y-90 radioembolization completed 9/7, then resumed 5FU. CT CAP 10/2023 with disease progression. Started Lonsurf/bevacizumab 10/27/23 with plans to bridge to move forward with clinical trials. ***       Plan for oral therapy with infusion visits  every 4 weeks, with LILLY monitoring throughout.  Plan for MD visit in 2 months with CT scans prior for treatment response evaluation.     Nausea  Had a few episodes of mild nausea, well-controlled with PRN oral antiemetics. Appetite is good, adequate oral intake.       Rectal pain  ***   -consult with palliative ***, and pain ***       FLORA Albright CNP          Again, thank you for allowing me to participate in the care of your patient.        Sincerely,        FLORA Albright CNP

## 2024-02-26 NOTE — NURSING NOTE
"Chief Complaint   Patient presents with    Port Draw     Labs drawn via port by RN in lab.  VS taken       Port accessed with 20 gauge 3/4\" Power needle by RN, labs collected, line flushed with saline and heparin.  Vitals taken. Pt checked in for appointment(s).    Madhavi Montalvo RN    "

## 2024-02-26 NOTE — PROGRESS NOTES
[facility] - Medical Oncology Follow Up Note  03/07/2024    HPI:   Patient developed rectal bleeding in 2020.  In January 2021 CT showed focal wall thickening in the upper rectum, multiple pulmonary nodules bilaterally suspicious for metastatic disease.  Underwent FNA of the right upper lobe lesion which was consistent with adenocarcinoma of the colon.  He was treated with FOLFOX from 2/20/2021 through 5/20/2021.  Avastin was added.  Had an infusion reaction to oxaliplatin 6/2021.  7/2021- 12/2021 was on 5-FU alone.  Developed progression.  12/2021- 9/2022 was on FOLFIRI.  11/2021 started Lonsurf. All of this was done with outside oncologist.     Met with Dr. Snow on 2/3/2022 to establish care. Recommended regorafenib.     There have been questions of a parasitic infection. Please see previous notes from [facility] oncologist for further details. Was seen by ID at the  on 2/27, no concerns for parasitic infection    Started regorafenib on 3/2/2023. Progression noted 5/19/23. Plan to start FOLFOX/Avastin and send out CARIS testing to evaluate for other treatment options. Cycle 1 was given with oxaliplatin desensitization, 5FU, Avastin held due to increased urine protein.     Following cycle 4, patient was admitted with hematuria and acute kidney injury.    Oxaliplatin was dropped with Cycle 5.     Y-90 radioembolization 9/7.    10/24/23 CT CAP with disease progression with notable growth in all pulmonary lesions. Lonsurf + bevacizumab started 10/27/23 with plans to bridge to clinical trial.   11/15/23 - stopped chemo for [facility] study.   Currently enrolled in [facility] CAR-T trial (completed cell harvest 1/23/2024, tentatively planning administration of CAR-T in April 2/12/24 - resumed Lonsurf, bevacizumab    Roman returns to clinic today for follow up and day 15 bevacizumab.       Interval History:   -Roman presents today frustrated with his pain control and delays getting back on treatment.   -Pain in perineum  increased while off treatment. Has been slowly improving since pain medication regimen was adjusted. Finally felt comfortable this weekend.  -bowels were moving slowly. Now improved with stool softeners and laxatives. Continues to have mucus discharge from the rectum, sometimes with blood. Often passes rectal mucus when urinating.   -Mild nausea, controlled with PRN antiemetics. Eating a lot of yogurt and drinking fluids.   -Since restarting Lonsurf, his hands are dry. Feet are sensitive with pins and needles tingling sensation which has gotten worse since restarting treatment.       Review of Systems:  Patient denies any of the following except if noted above: fevers, chills, difficulty with energy, vision or hearing changes, chest pain, dyspnea, abdominal pain, nausea, vomiting, diarrhea, constipation, urinary concerns, headaches, numbness, tingling, issues with sleep or mood. Also denies lumps, bumps, rashes or skin lesions, bleeding or bruising issues.      Past Medical History:   Diagnosis Date    Cancer (H) 1/12/21    Colorectal cacer mast, to lungs, and liver    Essential (primary) hypertension 08/03/2021        Allergies   Allergen Reactions    Oxaliplatin Shortness Of Breath, Nausea and Vomiting and Other (See Comments)     Patient had HSR to Oxaliplatin on cycle 8 of FOLFOX chemotherapy. Sent to ER for continued monitoring.  Patient had HSR to Oxaliplatin on cycle 8 of FOLFOX chemotherapy. Sent to ER for continued monitoring.      Blood-Group Specific Substance Other (See Comments)     Patient has reactivity Suggestive of a Warm auto antibody. Blood products may be delayed. Draw patient 24 hours prior to transfusion. Draw one red top and two purple top tubes for all type and screen orders.  Patient has reactivity Suggestive of a Warm auto antibody. Blood products may be delayed. Draw patient 24 hours prior to transfusion. Draw one red top and two purple top tubes for all type and screen orders.        Physical Exam:  /75   Pulse 86   Temp 97.8  F (36.6  C) (Oral)   Resp 16   Wt 88.1 kg (194 lb 3.2 oz)   SpO2 98%   BMI 27.09 kg/m    Wt Readings from Last 10 Encounters:   02/26/24 88.1 kg (194 lb 3.2 oz)   02/20/24 90.7 kg (200 lb)   02/12/24 91 kg (200 lb 11.2 oz)   02/02/24 91.4 kg (201 lb 8 oz)   01/29/24 93 kg (205 lb)   12/22/23 94.2 kg (207 lb 11.2 oz)   11/14/23 93.6 kg (206 lb 6.4 oz)   10/30/23 93.9 kg (207 lb)   10/27/23 94 kg (207 lb 3.2 oz)   General: The patient is a pleasant male in no acute distress.  HEENT: EOMI. Sclerae are anicteric. Oral mucosa pink and moist without lesions or thrush.   Lymph: Neck is supple with no lymphadenopathy in the cervical or supraclavicular areas.   Heart: Regular rate and rhythm.   Lungs: Clear to auscultation bilaterally.   Abdomen: Bowel sounds present, soft, nontender with no palpable hepatosplenomegaly or masses. Ostomy present in left lower abdomen, stoma not visualized due to appliance.   Extremities: No lower extremity edema noted bilaterally.   Neuro: Cranial nerves II through XII are grossly intact.  Skin: No rashes, petechiae, or bruising noted on exposed skin.        LABS  Most Recent 3 CBC's:  Recent Labs   Lab Test 02/26/24  0719 02/12/24  0631 01/28/24  1814   WBC 5.5 9.2 9.6   HGB 11.5* 11.8* 10.9*   MCV 77* 78 81    391 254    Most Recent 3 BMP's:  Recent Labs   Lab Test 02/26/24  0719 02/12/24  0631 01/28/24  1814   * 135 135  135   POTASSIUM 4.1 4.5 4.3  4.3   CHLORIDE 98 99 99  99   CO2 25 24 26 26   BUN 14.7 9.2 18.0  18.0   CR 0.71 0.79 1.06  1.06   ANIONGAP 11 12 10  10   JEFERSON 9.3 9.2 8.3*  8.3*   * 120* 154*  154*    Most Recent 2 LFT's:  Recent Labs   Lab Test 02/26/24  0719 02/12/24  0631   AST 33 25   ALT 50 13   ALKPHOS 101 95   BILITOTAL 0.6 0.3        I reviewed the above labs today.      ASSESSMENT AND PLAN   Metastatic rectal cancer  - Progression on regorafenib. Started FOLFOX/Avastin 6/7/23.  Oxaliplatin removed with cycle 5 due to acute kidney injury and hematuria following cycle 4. Y-90 radioembolization completed 9/7, then resumed 5FU. CT CAP 10/2023 with disease progression. Started Lonsurf/bevacizumab 10/27/23 with plans to bridge to move forward with clinical trials. Held chemo since 11/15/23.  Currently enrolled in [facility] CAR-T trial. Completed cell harvest 1/23/2024, tentatively planning administration of CAR-T in April. Resumed Lonsurf, bevacizumab on 2/12/24. His pain increased while off treatment and has been slowly improving since resuming along with pain medication adjustments. He presents today for day 15 bevacizumab. He continues tolerating treatment well with mild nausea and constipation.   -Today's labs reviewed, proceed with bevacizumab.   -Return to clinic in 2 weeks for next cycle with labs prior.   -Plan for MD visit in 2 months with CT scans prior for treatment response evaluation.     Nausea  Had a few episodes of mild nausea, well-controlled with PRN oral antiemetics. Appetite is good, adequate oral intake.       Rectal, perineum pain  Increased while off treatment. Working with palliative care team to adjust pain medication regimen. Continue stool softeners and laxatives as needed to control constipation.       FLORA Albright CNP

## 2024-02-26 NOTE — NURSING NOTE
"Oncology Rooming Note    February 26, 2024 7:45 AM   Roman Escalante is a 48 year old male who presents for:    Chief Complaint   Patient presents with    Port Draw     Labs drawn via port by RN in lab.  VS taken    Oncology Clinic Visit     RTN for Colorectal Cancer     Initial Vitals: /75   Pulse 86   Temp 97.8  F (36.6  C) (Oral)   Resp 16   Wt 88.1 kg (194 lb 3.2 oz)   SpO2 98%   BMI 27.09 kg/m   Estimated body mass index is 27.09 kg/m  as calculated from the following:    Height as of 2/20/24: 1.803 m (5' 11\").    Weight as of this encounter: 88.1 kg (194 lb 3.2 oz). Body surface area is 2.1 meters squared.  No Pain (0) Comment: Data Unavailable   No LMP for male patient.  Allergies reviewed: Yes  Medications reviewed: Yes    Medications: Medication refills not needed today.  Pharmacy name entered into [employer]:    [facility] - [place], [place] - [address] [place] [address] [phone]  [facility] - [place], [place] - [address] [place] [address] DRUG STORE #[id number] [address]    Frailty Screening:   Is the patient here for a new oncology consult visit in cancer care? 2. No      Clinical concerns: non       Aubree Melendez MA             "

## 2024-02-26 NOTE — PROGRESS NOTES
Infusion Nursing Note:  Roman Escalante presents today for Cycle 2 Day 15 bevacizumab-bvzr (ZIRABEV).    Patient seen by provider today: Yes: Rebeca Killian NP   present during visit today: Not Applicable.    Note: Patient arrives with 4-5/10 pain.  He says he forgot to bring his PO pain medication.  Patient drove himself to infusion today.  Rebeca Killian NP notified.  PO Tylenol ordered and administered with little relief.  At the end of infusion, patient stated he will be ok until he gets home to take his medications.    Intravenous Access:  Implanted Port.    Treatment Conditions:  Lab Results   Component Value Date    HGB 11.5 (L) 02/26/2024    WBC 5.5 02/26/2024    ANEU 45.1 (H) 07/28/2023    ANEUTAUTO 4.3 02/26/2024     02/26/2024        Lab Results   Component Value Date     (L) 02/26/2024    POTASSIUM 4.1 02/26/2024    MAG 1.7 01/28/2024    CR 0.71 02/26/2024    JEFERSON 9.3 02/26/2024    BILITOTAL 0.6 02/26/2024    ALBUMIN 4.1 02/26/2024    ALT 50 02/26/2024    AST 33 02/26/2024       Results reviewed, labs MET treatment parameters, ok to proceed with treatment.  Urine Protein Negative.  BP meets parameters.    Post Infusion Assessment:  Patient tolerated infusion without incident.  Blood return noted pre and post infusion.  Site patent and intact, free from redness, edema or discomfort.  No evidence of extravasations.  Access discontinued per protocol.     Discharge Plan:   Patient declined prescription refills.  Discharge instructions reviewed with: Patient.  Patient and/or family verbalized understanding of discharge instructions and all questions answered.  AVS to patient via [employer].  Patient will return 3/11/24 for next appointment.   Patient discharged in stable condition accompanied by: self.  Departure Mode: Ambulatory.      GLADYS ROY RN

## 2024-03-01 ENCOUNTER — MYC REFILL (OUTPATIENT)
Dept: FAMILY MEDICINE | Facility: CLINIC | Age: 49
End: 2024-03-01
Payer: COMMERCIAL

## 2024-03-01 DIAGNOSIS — C78.7 COLON CANCER METASTASIZED TO LIVER (H): ICD-10-CM

## 2024-03-01 DIAGNOSIS — C18.9 COLON CANCER METASTASIZED TO LIVER (H): ICD-10-CM

## 2024-03-01 RX ORDER — OXYCODONE HYDROCHLORIDE 10 MG/1
10-20 TABLET ORAL EVERY 6 HOURS PRN
Qty: 200 TABLET | Refills: 0 | Status: SHIPPED | OUTPATIENT
Start: 2024-03-01 | End: 2024-03-08

## 2024-03-01 ASSESSMENT — ANXIETY QUESTIONNAIRES
GAD7 TOTAL SCORE: 0
7. FEELING AFRAID AS IF SOMETHING AWFUL MIGHT HAPPEN: NOT AT ALL
5. BEING SO RESTLESS THAT IT IS HARD TO SIT STILL: NOT AT ALL
2. NOT BEING ABLE TO STOP OR CONTROL WORRYING: NOT AT ALL
GAD7 TOTAL SCORE: 0
3. WORRYING TOO MUCH ABOUT DIFFERENT THINGS: NOT AT ALL
1. FEELING NERVOUS, ANXIOUS, OR ON EDGE: NOT AT ALL
7. FEELING AFRAID AS IF SOMETHING AWFUL MIGHT HAPPEN: NOT AT ALL
4. TROUBLE RELAXING: NOT AT ALL
8. IF YOU CHECKED OFF ANY PROBLEMS, HOW DIFFICULT HAVE THESE MADE IT FOR YOU TO DO YOUR WORK, TAKE CARE OF THINGS AT HOME, OR GET ALONG WITH OTHER PEOPLE?: NOT DIFFICULT AT ALL
IF YOU CHECKED OFF ANY PROBLEMS ON THIS QUESTIONNAIRE, HOW DIFFICULT HAVE THESE PROBLEMS MADE IT FOR YOU TO DO YOUR WORK, TAKE CARE OF THINGS AT HOME, OR GET ALONG WITH OTHER PEOPLE: NOT DIFFICULT AT ALL
6. BECOMING EASILY ANNOYED OR IRRITABLE: NOT AT ALL

## 2024-03-07 ENCOUNTER — TELEPHONE (OUTPATIENT)
Dept: ANESTHESIOLOGY | Facility: CLINIC | Age: 49
End: 2024-03-07
Payer: COMMERCIAL

## 2024-03-07 ENCOUNTER — OFFICE VISIT (OUTPATIENT)
Dept: ANESTHESIOLOGY | Facility: CLINIC | Age: 49
End: 2024-03-07
Attending: ANESTHESIOLOGY
Payer: COMMERCIAL

## 2024-03-07 VITALS
OXYGEN SATURATION: 98 % | HEIGHT: 71 IN | HEART RATE: 69 BPM | TEMPERATURE: 97.8 F | RESPIRATION RATE: 18 BRPM | WEIGHT: 191 LBS | DIASTOLIC BLOOD PRESSURE: 77 MMHG | BODY MASS INDEX: 26.74 KG/M2 | SYSTOLIC BLOOD PRESSURE: 116 MMHG

## 2024-03-07 DIAGNOSIS — C78.7 COLON CANCER METASTASIZED TO LIVER (H): ICD-10-CM

## 2024-03-07 DIAGNOSIS — G58.8 PUDENDAL NEURALGIA: Primary | ICD-10-CM

## 2024-03-07 DIAGNOSIS — C18.9 COLON CANCER METASTASIZED TO LIVER (H): ICD-10-CM

## 2024-03-07 PROCEDURE — 99204 OFFICE O/P NEW MOD 45 MIN: CPT | Performed by: ANESTHESIOLOGY

## 2024-03-07 PROCEDURE — G0463 HOSPITAL OUTPT CLINIC VISIT: HCPCS | Performed by: ANESTHESIOLOGY

## 2024-03-07 ASSESSMENT — PAIN SCALES - GENERAL: PAINLEVEL: MILD PAIN (3)

## 2024-03-07 NOTE — PROGRESS NOTES
Pain Clinic New Patient Consult Note:    Referring Provider: Edy   Primary care provider: Buddy Hart.    Roman Escalante is a 48 year old y.o. old male who presents to the pain clinic with cancer related pain.     HPI:  Patient Supplied Answers To the [facility] Pain Questionnaire      3/7/2024    11:01 AM   [facility] -  Patient Entered Questionnaire/Answers   What number best describes your pain right now:  0 = No pain  to  10 = Worst pain imaginable 2   How would you describe the pain cramping    cutting    dull, aching   Which of the following worsen your pain lying down    sitting   Which of the following improve or reduce your pain standing    walking    medication    urination    bowel movements   What number best describes your average pain for the past week:  0 = No pain  to  10 = Worst pain imaginable 4   What number best describes your LOWEST pain in past 24 hours:  0 = No pain  to  10 = Worst pain imaginable 1   What number best describes your WORST pain in past 24 hours:  0 = No pain  to  10 = Worst pain imaginable 10   When is your pain worst Night   What non-medicine treatments have you already had for your pain pain clinic    exercise     Roman is a 47 yo male presenting to the clinic himself for pain.     Nov 15 2023 stopped chemotherapy for NIH study. The week of christmas he experienced pain, prefers to call it inflammation. He feels that ibuprofen was working however he is not allowed NSAIDs due to the chemotherapy.     He was not able to stand to urinate however he experienced mucus discharge from rectal area with straining. He experiences bloody discharge from rectal  area.     Recently he ran out of oxycodone medications. Per Dr. Paul kessler notes the palliative group had a conversation regarding taking the medications as prescribed. He feels that his pain control is not caught up since that event.     Location: from tail bone to scrotum, burning pain around the groin.  "  He feels that traction of the penis reduces the discomfort in the area.   Pain can spread outwards in the buttock area and posterior thigh.   In the past he had pain relief and reduction in mucus discharge with chemotherapy. He restarted chemotherapy on 2/12/2024. He reports slight improvement in pain and discomfort after restarting chemotherapy.     Oxycodone 20 mg every 6 hours. However he had difficulty with the medication needing to stretch out the duration. He feels that the medications are helpful. He reports passing out due to severe pain. He is not sure if the oxycodone is making it harder for him to have bowel movements.     Occasionally pain is severe and feels that the rectal area \"will explode like a balloon\".     He lives with his girl friend. His daughter and grand kids are located in [place].     He denies any goals and feels fortunate to be able to go through the last 3 years. He would like to go back in time and not taken a break from chemotherapy. He feels that his pain tolerance is high.     Tests/Imaging reviewed with the patient:    MRI Lumbar spine 1/29/2024  T12-L1: Bilateral facet arthropathy. No spinal canal or neural foraminal narrowing.      L1-L2: Bilateral facet arthropathy. No spinal canal or neural foraminal narrowing.     L2-L3: Small symmetric disc bulge and bilateral facet arthropathy. Mild canal stenosis. The subarticular recesses are patent. Mild bilateral neural foraminal narrowing.      L3-L4: Symmetric disc bulge with bilateral facet arthropathy. Mild canal stenosis. Mild narrowing of the subarticular recesses. Mild bilateral neural foraminal narrowing.     L4-L5: Symmetric disc bulge with a central disc protrusion. Bilateral facet arthropathy. Mild to moderate canal stenosis. Mild narrowing of the left subarticular recess. Mild to moderate bilateral neural foraminal narrowing.     L5-S1: Disc osteophyte complex with a far right lateral component. Bilateral facet " arthropathy. Moderate canal stenosis. Moderate to severe bilateral neural foraminal narrowing. Findings may impinge upon the exiting L5 nerve roots.                                                                      IMPRESSION:  1.  No suspicious osseous lesions.  2.  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 Moderate canal stenosis at L5-S1 and mild to moderate stenosis at L4-L5.  3.  Bilateral neural foraminal narrowing is greatest at L5-S1, where there is moderate to severe stenosis. Findings may impinge upon the exiting L5 nerve roots.      CT abdomen and pelvis   1.  Mild to moderate interval increase in size of pulmonary nodules in the lower lungs.     2.  Increasing size of low attenuation mass in the right lobe of the liver after embolization procedure.     3.  Mild interval increase in size in the lesion in the right posterior lateral liver.     4.  Rectosigmoid colon mass and adjacent adenopathy appears stable.     5.  Stable probable metastatic nodule in the herniated fat in the left lower quadrant colostomy.     6.  Mild spondylotic changes. No other concerning osseous abnormalities.      7.  Additional chronic findings as described above     Significant Medical History:   Past Medical History:   Diagnosis Date    Cancer (H) 1/12/21    Colorectal cacer mast, to lungs, and liver    Essential (primary) hypertension 08/03/2021          Past Surgical History:  Past Surgical History:   Procedure Laterality Date    ABDOMEN SURGERY      BIOPSY  1/21/21    Lung biopsy. Positive colorectal cancer in lungs    GI SURGERY  Ostomy placementnt 2/1/21    Stoma is an outie, bowel excretion only    IR LUNG BIOPSY MEDIASTINUM RIGHT  01/19/2021    IR SIRT (SELECTIVE INTERNAL RADIO THERAPY)  8/29/2023    IR VISCERAL ANGIOGRAM  8/29/2023    IR VISCERAL EMBOLIZATION  9/7/2023          Family History:  No family history on file.       Social History:  Social History     Socioeconomic  History    Marital status: Single     Spouse name: Not on file    Number of children: Not on file    Years of education: Not on file    Highest education level: Not on file   Occupational History    Not on file   Tobacco Use    Smoking status: Former     Packs/day: 0.50     Years: 10.00     Additional pack years: 0.00     Total pack years: 5.00     Types: Cigarettes, Other     Start date: 2010     Quit date: 2023     Years since quittin.5     Passive exposure: Current    Smokeless tobacco: Never   Vaping Use    Vaping Use: Never used   Substance and Sexual Activity    Alcohol use: Not Currently     Comment: social    Drug use: Yes     Types: Marijuana     Comment: Keeps up my appetite, sleep, and help with neuropathy    Sexual activity: Yes     Partners: Female     Birth control/protection: Abstinence, Condom, Post-menopausal   Other Topics Concern    Parent/sibling w/ CABG, MI or angioplasty before 65F 55M? No   Social History Narrative    Not on file     Social Determinants of Health     Financial Resource Strain: Low Risk  (2024)    Financial Resource Strain     Within the past 12 months, have you or your family members you live with been unable to get utilities (heat, electricity) when it was really needed?: No   Food Insecurity: Low Risk  (2024)    Food Insecurity     Within the past 12 months, did you worry that your food would run out before you got money to buy more?: No     Within the past 12 months, did the food you bought just not last and you didn t have money to get more?: No   Transportation Needs: Low Risk  (2024)    Transportation Needs     Within the past 12 months, has lack of transportation kept you from medical appointments, getting your medicines, non-medical meetings or appointments, work, or from getting things that you need?: No   Physical Activity: Not on file   Stress: Not on file   Social Connections: Not on file   Interpersonal Safety: Not At Risk (2/3/2023)     Humiliation, Afraid, Rape, and Kick questionnaire     Fear of Current or Ex-Partner: No     Emotionally Abused: No     Physically Abused: No     Sexually Abused: No   Housing Stability: Low Risk  (2/2/2024)    Housing Stability     Do you have housing? : Yes     Are you worried about losing your housing?: No     Social History     Social History Narrative    Not on file          Allergies:  Allergies   Allergen Reactions    Oxaliplatin Shortness Of Breath, Nausea and Vomiting and Other (See Comments)     Patient had HSR to Oxaliplatin on cycle 8 of FOLFOX chemotherapy. Sent to ER for continued monitoring.  Patient had HSR to Oxaliplatin on cycle 8 of FOLFOX chemotherapy. Sent to ER for continued monitoring.      Blood-Group Specific Substance Other (See Comments)     Patient has reactivity Suggestive of a Warm auto antibody. Blood products may be delayed. Draw patient 24 hours prior to transfusion. Draw one red top and two purple top tubes for all type and screen orders.  Patient has reactivity Suggestive of a Warm auto antibody. Blood products may be delayed. Draw patient 24 hours prior to transfusion. Draw one red top and two purple top tubes for all type and screen orders.         Current Medications:   Current Outpatient Medications   Medication Sig Dispense Refill    cyclobenzaprine (FLEXERIL) 5 MG tablet Take one tab (5mg) by mouth over 6-8 hours, as needed for spasms 45 tablet 2    Ostomy Supplies MISC 1 each daily 1 each 11    Fluorouracil (ADURCIL) 5 GM/100ML SOLN injection  (Patient not taking: Reported on 12/1/2023)      gabapentin (NEURONTIN) 100 MG capsule Take 100mg by mouth at bedtime for 3 days, then Take 100mg by mouth in AM and 100mg at bedtime for 3 days, then Take 100mg by mouth three times per day (Patient not taking: Reported on 12/22/2023) 90 capsule 0    ibuprofen (ADVIL/MOTRIN) 200 MG tablet Take 3 tablets (600 mg) by mouth every 8 hours as needed for moderate pain (Patient not taking:  "Reported on 2/20/2024) 100 tablet 0    methylPREDNISolone (MEDROL DOSEPAK) 4 MG tablet therapy pack Take as directed on package. (Patient not taking: Reported on 10/27/2023) 21 tablet 0    morphine (MS CONTIN) 15 MG CR tablet Take 1 tablet (15 mg) by mouth every 12 hours 60 tablet 0    ondansetron (ZOFRAN) 4 MG tablet Take 1-2 tablets (4-8 mg) by mouth every 6 hours as needed for nausea (vomiting) (Patient not taking: Reported on 12/22/2023) 40 tablet 0    oxyCODONE IR (ROXICODONE) 10 MG tablet Take 1-2 tablets (10-20 mg) by mouth every 4 hours as needed for moderate to severe pain 240 tablet 0    prochlorperazine (COMPAZINE) 10 MG tablet Take 1 tablet (10 mg) by mouth every 6 hours as needed for nausea or vomiting (Patient not taking: Reported on 12/1/2023) 30 tablet 2    prochlorperazine (COMPAZINE) 5 MG tablet Take 1 tablet (5 mg) by mouth every 6 hours as needed for nausea or vomiting (Patient not taking: Reported on 10/27/2023) 30 tablet 3          Current Pain Medications:  Medications related to Pain Management (From now, onward)      None             Work History:    Current work status: [employer]. He has stopped working.     Failed gabapentin trial.     Physical Exam:     Vitals:    03/07/24 1013   BP: 116/77   Pulse: 69   Resp: 18   Temp: 97.8  F (36.6  C)   TempSrc: Oral   SpO2: 98%   Weight: 86.6 kg (191 lb)   Height: 1.81 m (5' 11.26\")       General Appearance: No distress, seated comfortably  Mood: Euthymic  HE ENT: Non constricted pupils  Respiratory: Non labored breathing  CVS: Regular rate and rhythm  GI: Soft, non distended, no TTP, left colostomy bag  Skin: No rashes over exposed skin  MS: LE no atrophy  Neuro: intact to light bilateral LE, pigmentation bilateral ankles +  Gait: nonantalgic, ambulates without assistance      Laboratory results:  Recent Labs   Lab Test 02/26/24  0719 02/12/24  0631   * 135   POTASSIUM 4.1 4.5   CHLORIDE 98 99   CO2 25 24   ANIONGAP 11 12   * 120*   BUN " 14.7 9.2   CR 0.71 0.79   JEFERSON 9.3 9.2       CBC RESULTS:   Recent Labs   Lab Test 02/26/24  0719   WBC 5.5   RBC 4.69   HGB 11.5*   HCT 36.3*   MCV 77*   MCH 24.5*   MCHC 31.7   RDW 13.9            Imaging:  No images are attached to the encounter.     ASSESSMENT AND PLAN:     Encounter Diagnosis:    Pudendal neuralgia  Cancer related pain   Opioid dependence  Adenocarcinoma of the colon    Roman Escalante is a 48 year old y.o. old male who presents to the pain clinic with moderate to severe pain in the perinuem with radiation to the lower buttocks and thighs.     I have summarized the patient s past medical history, discussed their clinical findings and the potential differential diagnosis with the patient. Significant past medical history pertinent to the patient s current condition includes recent restarting chemotherapy, his goal is to reduce the oxycodone medications.  The clinical findings reveal skin changes in the ankle area. The differential diagnosis discussed with the patient are listed above. I have discussed anatomy and possible sources of the pain using models and/or pictures (diagrams). I have discussed multi- disciplinary pain management options withthe patient as pertaining to their case as detailed above. The pain management options we discussed included, but were not limited to the recommendations below.  I also discussed with patient the risks, benefits and alternatives to each pain management option.  All of the patient s questions and concerns were answered to the best of my ability.    RECOMMENDATIONS:     1. Medications: The patient will continue medication management as per Dr. Prater    2. Procedure: We are scheduling the patient for bilateral pudendal nerve block with ultrasound in the procedure suite. Risks/benefits/alternatives were discussed.     I also discussed with the patient that the possible risks involved with interventional treatment included, but are not limited  to, no pain control, worsened pain, stroke,seizure, spinal headache, allergic reactions, introduction of infection or bleeding which may lead to emergent spine surgery, nerve damage, paralysis oreven death.    Follow up: 1 month after the nerve blocks.                                                                                               Answers submitted by the patient for this visit:  MIGUEL-7 (Submitted on 3/1/2024)  MIGUEL 7 TOTAL SCORE: 0

## 2024-03-07 NOTE — CONFIDENTIAL NOTE
RN reviewed pre-procedure instructions with patient and sent via [employer] for review as well.     Janene Singh RN

## 2024-03-07 NOTE — PATIENT INSTRUCTIONS
Medications:    Continue medication management as per Dr. Prater      Procedures:    Call to schedule your procedure: [phone] option #2    Bilateral pudendal nerve block with ultrasound     Your pre-procedure instructions are below, please call our clinic if you have any questions.        Recommended Follow up:      Follow up 1 month after the nerve block.       Please call [phone] to schedule your clinic appointment if you don't already have an appointment scheduled.      Procedure Information:     Please call [phone] option #2 to schedule, reschedule, or cancel your procedure appointment.   Phones are answered Monday - Friday from 08:00 - 4:30pm.  Leave a voicemail with your name, birth date, and phone number if no one is available to take your call.        You no longer need to test for COVID- 19 prior to your procedure/surgery, unless your physician specifically requests that you test. If you experience COVID symptoms or have tested positive for COVID-19 within 14 days of your scheduled surgery or procedure, please update our office right away and your procedure may have to be postponed.       The procedure center staff will call you several days before the procedure to review important information that you will need to know for the day of the procedure.     Please contact the clinic if you have further questions about this information [phone].        Information related to Scheduling and Pre-Procedure Instructions:    If you must reschedule your procedure more than two times, you must follow up in clinic before rescheduling again.    Preparing for your procedure    CAUTION - FAILURE TO FOLLOW THESE PRE-PROCEDURE INSTRUCTIONS WILL RESULT IN YOUR PROCEDURE BEING RESCHEDULED.    Your Procedure: Bilateral Pudendal Nerve Block            You must have a  take you home after your procedure. Transportation by taxi or para-transit is okay as long as you have a responsible adult  accompany you. You must provide your 's full name and contact number at time of check in.     Fasting Protocol  Please have nothing to eat or drink 1 hour prior to arrival.   Medications If you take any medications, DO NOT STOP. Take your medications as usual the day of your procedure with a sip of water AT LEAST 2 HOURS PRIOR TO ARRIVAL.    Antibiotics If you are currently taking antibiotics, you must complete the entire dose 7 days prior to your scheduled procedure. You must be clear of any signs or symptoms of infection. If you begin antibiotics, please contact our clinic for instructions.     Fever, Chills, or Rash If you experience a fever of higher than 100 degrees, chills, rash, or open wounds during the one week before your procedure, please call the clinic to see if you may proceed with your procedure.      Medication Hold List  **Patients under Cardiology/Neurology care should consult their provider prior to the pain procedure to verify pre-procedure medication instructions. The information below contains general guidelines.**    Blood Thinners If you are taking daily ASPIRIN, PLAVIX, OR OTHER BLOOD THINNERS SUCH AS COUMADIN/WARFARIN, we will need your prescribing doctor to sign a release permitting you to stop these medications. Once approved by your prescribing doctor - STOP ALL BLOOD THINNERS BASED ON THE TIME TABLE BELOW PRIOR TO YOUR PROCEDURE. If you have been instructed to stop WARFARIN(COUMADIN), you must have an INR lab drawn the day before your procedure. Your INR must be within normal limits before we can perform your injection. MEDICATIONS CAN BE RESTARTED AFTER YOUR PROCEDURE.    24 HOUR HOLD  Lovenox (enoxaparin)  Agrylin (Anagrelide)    3 DAY HOLD  Xarelto (rivaroxaban)    5 DAY HOLD  Coumadin (Warfarin)  Brilinta (ticagrelor) 7 DAY HOLD  Anacin, Bufferin, Ecotrin, Excedrin, Aggrenox (Aspirin)  Pradexa (Dabigatran)  Elmiron (Pentosan)  Plavix (Clopidogrel Bisulfate)  Pletal  (Cilostazol)    10 DAY HOLD  Effient (Prasugel)    14 DAY HOLD  Ticlid (ticlopidine)        Non-steroidal Anti-inflammatories (NSAIDs) DO NOT TAKE any non-steroidal anti-inflammatory medications (NSAIDs) listed on the table below. MEDICATIONS CAN BE RESTARTED AFTER YOUR PROCEDURE. Celebrex is OK to take and does not need to be discontinued.     Medications to stop:  1 DAY HOLD  Advil, Motrin (Ibuprofen)  Voltaren (Diclofenac)  Toradol (Ketorolac)    3 DAY HOLD  Arthrotec (diciofenac sodium/misoprostol)  Clinoril (Sulindac)  Indocin (Indomethacin)  Lodine (Etodolac)  Vicoprofen (Hydrocodone and Ibuprofen)  Apixaban (Eliquis)    4 DAY HOLD  Mobic (Meloxicam)  Naprosyn (Naproxen)   7 DAY HOLD  Aleve (Naproxen sodium)  Darvon compound (contains aspirin)  Norgesic Forte (contains aspirin)  Oruvall (Ketoprofen)  Percodan (contains aspirin)  Relafen (Nabumetone)  Salsalate  Trilisate  Vitamin E (more than 400 mg per day)  Any medication containing aspirin    14 DAY HOLD  Daypro (Oxaprozin)  Feldene (Piroxicam)            To speak with a nurse, schedule/reschedule/cancel a clinic appointment, or request a medication refill call: [phone]    You can also reach us by [employer]: [email or web address]

## 2024-03-07 NOTE — TELEPHONE ENCOUNTER
Patient is scheduled for surgery with Dr. Mendoza    Spoke with: patient    Date of [place]: 3/14/24    [place] [facility] [place]formed patient they will need an adult  yes    Additional comments: Patient is aware of date and time of the procedure.        Mel Mclaughlin MA on 3/7/2024 at 10:55 AM

## 2024-03-07 NOTE — LETTER
3/7/2024       RE: Roman Escalante  [address]  [facility] [address]     Dear Colleague,    Thank you for referring your patient, Roman Escalante, to the [facility] at [facility]. Please see a copy of my visit note below.      Pain Clinic New Patient Consult Note:    Referring Provider: Edy   Primary care provider: Buddy Hart.    Roman Escalante is a 48 year old y.o. old male who presents to the pain clinic with cancer related pain.     HPI:  Patient Supplied Answers To the [facility] Pain Questionnaire      3/7/2024    11:01 AM   [facility] Pain -  Patient Entered Questionnaire/Answers   What number best describes your pain right now:  0 = No pain  to  10 = Worst pain imaginable 2   How would you describe the pain cramping    cutting    dull, aching   Which of the following worsen your pain lying down    sitting   Which of the following improve or reduce your pain standing    walking    medication    urination    bowel movements   What number best describes your average pain for the past week:  0 = No pain  to  10 = Worst pain imaginable 4   What number best describes your LOWEST pain in past 24 hours:  0 = No pain  to  10 = Worst pain imaginable 1   What number best describes your WORST pain in past 24 hours:  0 = No pain  to  10 = Worst pain imaginable 10   When is your pain worst Night   What non-medicine treatments have you already had for your pain pain clinic    exercise     Roman is a 49 yo male presenting to the clinic himself for pain.     Nov 15 2023 stopped chemotherapy for NIH study. The week of christmas he experienced pain, prefers to call it inflammation. He feels that ibuprofen was working however he is not allowed NSAIDs due to the chemotherapy.     He was not able to stand to urinate however he experienced mucus discharge from rectal area with straining. He experiences bloody discharge from rectal  " area.     Recently he ran out of oxycodone medications. Per Dr. Paul kessler notes the palliative group had a conversation regarding taking the medications as prescribed. He feels that his pain control is not caught up since that event.     Location: from tail bone to scrotum, burning pain around the groin.   He feels that traction of the penis reduces the discomfort in the area.   Pain can spread outwards in the buttock area and posterior thigh.   In the past he had pain relief and reduction in mucus discharge with chemotherapy. He restarted chemotherapy on 2/12/2024. He reports slight improvement in pain and discomfort after restarting chemotherapy.     Oxycodone 20 mg every 6 hours. However he had difficulty with the medication needing to stretch out the duration. He feels that the medications are helpful. He reports passing out due to severe pain. He is not sure if the oxycodone is making it harder for him to have bowel movements.     Occasionally pain is severe and feels that the rectal area \"will explode like a balloon\".     He lives with his girl friend. His daughter and grand kids are located in [place].     He denies any goals and feels fortunate to be able to go through the last 3 years. He would like to go back in time and not taken a break from chemotherapy. He feels that his pain tolerance is high.     Tests/Imaging reviewed with the patient:    MRI Lumbar spine 1/29/2024  T12-L1: Bilateral facet arthropathy. No spinal canal or neural foraminal narrowing.      L1-L2: Bilateral facet arthropathy. No spinal canal or neural foraminal narrowing.     L2-L3: Small symmetric disc bulge and bilateral facet arthropathy. Mild canal stenosis. The subarticular recesses are patent. Mild bilateral neural foraminal narrowing.      L3-L4: Symmetric disc bulge with bilateral facet arthropathy. Mild canal stenosis. Mild narrowing of the subarticular recesses. Mild bilateral neural foraminal narrowing.     L4-L5: " Symmetric disc bulge with a central disc protrusion. Bilateral facet arthropathy. Mild to moderate canal stenosis. Mild narrowing of the left subarticular recess. Mild to moderate bilateral neural foraminal narrowing.     L5-S1: Disc osteophyte complex with a far right lateral component. Bilateral facet arthropathy. Moderate canal stenosis. Moderate to severe bilateral neural foraminal narrowing. Findings may impinge upon the exiting L5 nerve roots.                                                                      IMPRESSION:  1.  No suspicious osseous lesions.  2.  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 Moderate canal stenosis at L5-S1 and mild to moderate stenosis at L4-L5.  3.  Bilateral neural foraminal narrowing is greatest at L5-S1, where there is moderate to severe stenosis. Findings may impinge upon the exiting L5 nerve roots.      CT abdomen and pelvis   1.  Mild to moderate interval increase in size of pulmonary nodules in the lower lungs.     2.  Increasing size of low attenuation mass in the right lobe of the liver after embolization procedure.     3.  Mild interval increase in size in the lesion in the right posterior lateral liver.     4.  Rectosigmoid colon mass and adjacent adenopathy appears stable.     5.  Stable probable metastatic nodule in the herniated fat in the left lower quadrant colostomy.     6.  Mild spondylotic changes. No other concerning osseous abnormalities.      7.  Additional chronic findings as described above     Significant Medical History:   Past Medical History:   Diagnosis Date    Cancer (H) 1/12/21    Colorectal cacer mast, to lungs, and liver    Essential (primary) hypertension 08/03/2021          Past Surgical History:  Past Surgical History:   Procedure Laterality Date    ABDOMEN SURGERY      BIOPSY  1/21/21    Lung biopsy. Positive colorectal cancer in lungs    GI SURGERY  Ostomy placementnt 2/1/21    Stoma is an outie, bowel  excretion only    IR LUNG BIOPSY MEDIASTINUM RIGHT  2021    IR SIRT (SELECTIVE INTERNAL RADIO THERAPY)  2023    IR VISCERAL ANGIOGRAM  2023    IR VISCERAL EMBOLIZATION  2023          Family History:  No family history on file.       Social History:  Social History     Socioeconomic History    Marital status: Single     Spouse name: Not on file    Number of children: Not on file    Years of education: Not on file    Highest education level: Not on file   Occupational History    Not on file   Tobacco Use    Smoking status: Former     Packs/day: 0.50     Years: 10.00     Additional pack years: 0.00     Total pack years: 5.00     Types: Cigarettes, Other     Start date: 2010     Quit date: 2023     Years since quittin.5     Passive exposure: Current    Smokeless tobacco: Never   Vaping Use    Vaping Use: Never used   Substance and Sexual Activity    Alcohol use: Not Currently     Comment: social    Drug use: Yes     Types: Marijuana     Comment: Keeps up my appetite, sleep, and help with neuropathy    Sexual activity: Yes     Partners: Female     Birth control/protection: Abstinence, Condom, Post-menopausal   Other Topics Concern    Parent/sibling w/ CABG, MI or angioplasty before 65F 55M? No   Social History Narrative    Not on file     Social Determinants of Health     Financial Resource Strain: Low Risk  (2024)    Financial Resource Strain     Within the past 12 months, have you or your family members you live with been unable to get utilities (heat, electricity) when it was really needed?: No   Food Insecurity: Low Risk  (2024)    Food Insecurity     Within the past 12 months, did you worry that your food would run out before you got money to buy more?: No     Within the past 12 months, did the food you bought just not last and you didn t have money to get more?: No   Transportation Needs: Low Risk  (2024)    Transportation Needs     Within the past 12 months, has lack of  transportation kept you from medical appointments, getting your medicines, non-medical meetings or appointments, work, or from getting things that you need?: No   Physical Activity: Not on file   Stress: Not on file   Social Connections: Not on file   Interpersonal Safety: Not At Risk (2/3/2023)    Humiliation, Afraid, Rape, and Kick questionnaire     Fear of Current or Ex-Partner: No     Emotionally Abused: No     Physically Abused: No     Sexually Abused: No   Housing Stability: Low Risk  (2/2/2024)    Housing Stability     Do you have housing? : Yes     Are you worried about losing your housing?: No     Social History     Social History Narrative    Not on file          Allergies:  Allergies   Allergen Reactions    Oxaliplatin Shortness Of Breath, Nausea and Vomiting and Other (See Comments)     Patient had HSR to Oxaliplatin on cycle 8 of FOLFOX chemotherapy. Sent to ER for continued monitoring.  Patient had HSR to Oxaliplatin on cycle 8 of FOLFOX chemotherapy. Sent to ER for continued monitoring.      Blood-Group Specific Substance Other (See Comments)     Patient has reactivity Suggestive of a Warm auto antibody. Blood products may be delayed. Draw patient 24 hours prior to transfusion. Draw one red top and two purple top tubes for all type and screen orders.  Patient has reactivity Suggestive of a Warm auto antibody. Blood products may be delayed. Draw patient 24 hours prior to transfusion. Draw one red top and two purple top tubes for all type and screen orders.         Current Medications:   Current Outpatient Medications   Medication Sig Dispense Refill    cyclobenzaprine (FLEXERIL) 5 MG tablet Take one tab (5mg) by mouth over 6-8 hours, as needed for spasms 45 tablet 2    Ostomy Supplies MISC 1 each daily 1 each 11    Fluorouracil (ADURCIL) 5 GM/100ML SOLN injection  (Patient not taking: Reported on 12/1/2023)      gabapentin (NEURONTIN) 100 MG capsule Take 100mg by mouth at bedtime for 3 days, then Take  "100mg by mouth in AM and 100mg at bedtime for 3 days, then Take 100mg by mouth three times per day (Patient not taking: Reported on 12/22/2023) 90 capsule 0    ibuprofen (ADVIL/MOTRIN) 200 MG tablet Take 3 tablets (600 mg) by mouth every 8 hours as needed for moderate pain (Patient not taking: Reported on 2/20/2024) 100 tablet 0    methylPREDNISolone (MEDROL DOSEPAK) 4 MG tablet therapy pack Take as directed on package. (Patient not taking: Reported on 10/27/2023) 21 tablet 0    morphine (MS CONTIN) 15 MG CR tablet Take 1 tablet (15 mg) by mouth every 12 hours 60 tablet 0    ondansetron (ZOFRAN) 4 MG tablet Take 1-2 tablets (4-8 mg) by mouth every 6 hours as needed for nausea (vomiting) (Patient not taking: Reported on 12/22/2023) 40 tablet 0    oxyCODONE IR (ROXICODONE) 10 MG tablet Take 1-2 tablets (10-20 mg) by mouth every 4 hours as needed for moderate to severe pain 240 tablet 0    prochlorperazine (COMPAZINE) 10 MG tablet Take 1 tablet (10 mg) by mouth every 6 hours as needed for nausea or vomiting (Patient not taking: Reported on 12/1/2023) 30 tablet 2    prochlorperazine (COMPAZINE) 5 MG tablet Take 1 tablet (5 mg) by mouth every 6 hours as needed for nausea or vomiting (Patient not taking: Reported on 10/27/2023) 30 tablet 3          Current Pain Medications:  Medications related to Pain Management (From now, onward)      None             Work History:    Current work status: [employer]. He has stopped working.     Failed gabapentin trial.     Physical Exam:     Vitals:    03/07/24 1013   BP: 116/77   Pulse: 69   Resp: 18   Temp: 97.8  F (36.6  C)   TempSrc: Oral   SpO2: 98%   Weight: 86.6 kg (191 lb)   Height: 1.81 m (5' 11.26\")       General Appearance: No distress, seated comfortably  Mood: Euthymic  HE ENT: Non constricted pupils  Respiratory: Non labored breathing  CVS: Regular rate and rhythm  GI: Soft, non distended, no TTP, left colostomy bag  Skin: No rashes over exposed skin  MS: LE no " atrophy  Neuro: intact to light bilateral LE, pigmentation bilateral ankles +  Gait: nonantalgic, ambulates without assistance      Laboratory results:  Recent Labs   Lab Test 02/26/24  0719 02/12/24  0631   * 135   POTASSIUM 4.1 4.5   CHLORIDE 98 99   CO2 25 24   ANIONGAP 11 12   * 120*   BUN 14.7 9.2   CR 0.71 0.79   JEFERSON 9.3 9.2       CBC RESULTS:   Recent Labs   Lab Test 02/26/24  0719   WBC 5.5   RBC 4.69   HGB 11.5*   HCT 36.3*   MCV 77*   MCH 24.5*   MCHC 31.7   RDW 13.9            Imaging:  No images are attached to the encounter.     ASSESSMENT AND PLAN:     Encounter Diagnosis:    Pudendal neuralgia  Cancer related pain   Opioid dependence  Adenocarcinoma of the colon    Roman Escalante is a 48 year old y.o. old male who presents to the pain clinic with moderate to severe pain in the perinuem with radiation to the lower buttocks and thighs.     I have summarized the patient s past medical history, discussed their clinical findings and the potential differential diagnosis with the patient. Significant past medical history pertinent to the patient s current condition includes recent restarting chemotherapy, his goal is to reduce the oxycodone medications.  The clinical findings reveal skin changes in the ankle area. The differential diagnosis discussed with the patient are listed above. I have discussed anatomy and possible sources of the pain using models and/or pictures (diagrams). I have discussed multi- disciplinary pain management options withthe patient as pertaining to their case as detailed above. The pain management options we discussed included, but were not limited to the recommendations below.  I also discussed with patient the risks, benefits and alternatives to each pain management option.  All of the patient s questions and concerns were answered to the best of my ability.    RECOMMENDATIONS:     1. Medications: The patient will continue medication management as per   Josi    2. Procedure: We are scheduling the patient for bilateral pudendal nerve block with ultrasound in the procedure suite. Risks/benefits/alternatives were discussed.     I also discussed with the patient that the possible risks involved with interventional treatment included, but are not limited to, no pain control, worsened pain, stroke,seizure, spinal headache, allergic reactions, introduction of infection or bleeding which may lead to emergent spine surgery, nerve damage, paralysis oreven death.    Follow up: 1 month after the nerve blocks.       Answers submitted by the patient for this visit:  MIGUEL-7 (Submitted on 3/1/2024)  MIGUEL 7 TOTAL SCORE: 0      Again, thank you for allowing me to participate in the care of your patient.      Sincerely,    Asha Mendoza MD

## 2024-03-08 ENCOUNTER — TELEPHONE (OUTPATIENT)
Dept: PALLIATIVE CARE | Facility: CLINIC | Age: 49
End: 2024-03-08

## 2024-03-08 NOTE — TELEPHONE ENCOUNTER
Retail Pharmacy Prior Authorization Team   Phone: [phone]    PA Initiation    Medication: MORPHINE SULFATE ER 15 MG PO T12A  Insurance Company: [employer] - Phone [phone] Fax [phone]  [id number] [address]  Filling Pharmacy Phone: [phone]  Filling Pharmacy Fax:    Start Date: 3/8/2024

## 2024-03-10 NOTE — TELEPHONE ENCOUNTER
SHRADDHA shows this was approved on 3/9/24, but I have not received the approval letter. I will call insurance tomorrow to obtain that. I have let pharmacy know of approval and asked them to fill this.

## 2024-03-11 ENCOUNTER — APPOINTMENT (OUTPATIENT)
Dept: LAB | Facility: CLINIC | Age: 49
End: 2024-03-11
Attending: STUDENT IN AN ORGANIZED HEALTH CARE EDUCATION/TRAINING PROGRAM
Payer: COMMERCIAL

## 2024-03-11 ENCOUNTER — INFUSION THERAPY VISIT (OUTPATIENT)
Dept: ONCOLOGY | Facility: CLINIC | Age: 49
End: 2024-03-11
Attending: STUDENT IN AN ORGANIZED HEALTH CARE EDUCATION/TRAINING PROGRAM
Payer: COMMERCIAL

## 2024-03-11 ENCOUNTER — DOCUMENTATION ONLY (OUTPATIENT)
Dept: ONCOLOGY | Facility: CLINIC | Age: 49
End: 2024-03-11

## 2024-03-11 VITALS
DIASTOLIC BLOOD PRESSURE: 56 MMHG | RESPIRATION RATE: 16 BRPM | WEIGHT: 196.1 LBS | OXYGEN SATURATION: 100 % | HEART RATE: 77 BPM | SYSTOLIC BLOOD PRESSURE: 105 MMHG | BODY MASS INDEX: 27.15 KG/M2 | TEMPERATURE: 98.1 F

## 2024-03-11 DIAGNOSIS — C20 RECTAL ADENOCARCINOMA METASTATIC TO LUNG (H): Primary | ICD-10-CM

## 2024-03-11 DIAGNOSIS — C78.00 RECTAL ADENOCARCINOMA METASTATIC TO LUNG (H): Primary | ICD-10-CM

## 2024-03-11 LAB
ACANTHOCYTES BLD QL SMEAR: NORMAL
ALBUMIN SERPL BCG-MCNC: 4 G/DL (ref 3.5–5.2)
ALBUMIN UR-MCNC: NEGATIVE MG/DL
ALP SERPL-CCNC: 99 U/L (ref 40–150)
ALT SERPL W P-5'-P-CCNC: 15 U/L (ref 0–70)
ANION GAP SERPL CALCULATED.3IONS-SCNC: 9 MMOL/L (ref 7–15)
AST SERPL W P-5'-P-CCNC: 26 U/L (ref 0–45)
AUER BODIES BLD QL SMEAR: NORMAL
BASO STIPL BLD QL SMEAR: NORMAL
BASOPHILS # BLD AUTO: 0 10E3/UL (ref 0–0.2)
BASOPHILS NFR BLD AUTO: 0 %
BILIRUB SERPL-MCNC: 0.2 MG/DL
BITE CELLS BLD QL SMEAR: NORMAL
BLISTER CELLS BLD QL SMEAR: NORMAL
BUN SERPL-MCNC: 10.8 MG/DL (ref 6–20)
BURR CELLS BLD QL SMEAR: NORMAL
CALCIUM SERPL-MCNC: 9 MG/DL (ref 8.6–10)
CHLORIDE SERPL-SCNC: 99 MMOL/L (ref 98–107)
CREAT SERPL-MCNC: 0.76 MG/DL (ref 0.67–1.17)
DACRYOCYTES BLD QL SMEAR: NORMAL
DEPRECATED HCO3 PLAS-SCNC: 27 MMOL/L (ref 22–29)
EGFRCR SERPLBLD CKD-EPI 2021: >90 ML/MIN/1.73M2
ELLIPTOCYTES BLD QL SMEAR: NORMAL
EOSINOPHIL # BLD AUTO: 0.2 10E3/UL (ref 0–0.7)
EOSINOPHIL NFR BLD AUTO: 4 %
ERYTHROCYTE [DISTWIDTH] IN BLOOD BY AUTOMATED COUNT: 16.9 % (ref 10–15)
FRAGMENTS BLD QL SMEAR: NORMAL
GLUCOSE SERPL-MCNC: 129 MG/DL (ref 70–99)
HCT VFR BLD AUTO: 37.7 % (ref 40–53)
HGB BLD-MCNC: 11.7 G/DL (ref 13.3–17.7)
HGB C CRYSTALS: NORMAL
HOWELL-JOLLY BOD BLD QL SMEAR: NORMAL
IMM GRANULOCYTES # BLD: 0 10E3/UL
IMM GRANULOCYTES NFR BLD: 1 %
LYMPHOCYTES # BLD AUTO: 0.8 10E3/UL (ref 0.8–5.3)
LYMPHOCYTES NFR BLD AUTO: 21 %
MCH RBC QN AUTO: 24.7 PG (ref 26.5–33)
MCHC RBC AUTO-ENTMCNC: 31 G/DL (ref 31.5–36.5)
MCV RBC AUTO: 80 FL (ref 78–100)
MONOCYTES # BLD AUTO: 0.9 10E3/UL (ref 0–1.3)
MONOCYTES NFR BLD AUTO: 26 %
NEUTROPHILS # BLD AUTO: 1.7 10E3/UL (ref 1.6–8.3)
NEUTROPHILS NFR BLD AUTO: 48 %
NEUTS HYPERSEG BLD QL SMEAR: NORMAL
NRBC # BLD AUTO: 0 10E3/UL
NRBC BLD AUTO-RTO: 0 /100
PLAT MORPH BLD: NORMAL
PLATELET # BLD AUTO: 318 10E3/UL (ref 150–450)
POLYCHROMASIA BLD QL SMEAR: NORMAL
POTASSIUM SERPL-SCNC: 4.6 MMOL/L (ref 3.4–5.3)
PROT SERPL-MCNC: 7.4 G/DL (ref 6.4–8.3)
RBC # BLD AUTO: 4.74 10E6/UL (ref 4.4–5.9)
RBC AGGLUT BLD QL: NORMAL
RBC MORPH BLD: NORMAL
ROULEAUX BLD QL SMEAR: NORMAL
SICKLE CELLS BLD QL SMEAR: NORMAL
SMUDGE CELLS BLD QL SMEAR: NORMAL
SODIUM SERPL-SCNC: 135 MMOL/L (ref 135–145)
SPHEROCYTES BLD QL SMEAR: NORMAL
STOMATOCYTES BLD QL SMEAR: NORMAL
TARGETS BLD QL SMEAR: NORMAL
TOXIC GRANULES BLD QL SMEAR: NORMAL
VARIANT LYMPHS BLD QL SMEAR: NORMAL
WBC # BLD AUTO: 3.6 10E3/UL (ref 4–11)

## 2024-03-11 PROCEDURE — 96413 CHEMO IV INFUSION 1 HR: CPT

## 2024-03-11 PROCEDURE — 85004 AUTOMATED DIFF WBC COUNT: CPT

## 2024-03-11 PROCEDURE — 36415 COLL VENOUS BLD VENIPUNCTURE: CPT

## 2024-03-11 PROCEDURE — 250N000011 HC RX IP 250 OP 636: Performed by: STUDENT IN AN ORGANIZED HEALTH CARE EDUCATION/TRAINING PROGRAM

## 2024-03-11 PROCEDURE — 81003 URINALYSIS AUTO W/O SCOPE: CPT | Performed by: STUDENT IN AN ORGANIZED HEALTH CARE EDUCATION/TRAINING PROGRAM

## 2024-03-11 PROCEDURE — 82565 ASSAY OF CREATININE: CPT

## 2024-03-11 PROCEDURE — 84155 ASSAY OF PROTEIN SERUM: CPT

## 2024-03-11 PROCEDURE — 258N000003 HC RX IP 258 OP 636: Performed by: STUDENT IN AN ORGANIZED HEALTH CARE EDUCATION/TRAINING PROGRAM

## 2024-03-11 RX ORDER — HEPARIN SODIUM (PORCINE) LOCK FLUSH IV SOLN 100 UNIT/ML 100 UNIT/ML
5 SOLUTION INTRAVENOUS
Status: DISCONTINUED | OUTPATIENT
Start: 2024-03-11 | End: 2024-03-11 | Stop reason: HOSPADM

## 2024-03-11 RX ORDER — TRIFLURIDINE AND TIPIRACIL 15; 6.14 MG/1; MG/1
35 TABLET, FILM COATED ORAL 2 TIMES DAILY
Qty: 100 TABLET | Refills: 0 | Status: SHIPPED | OUTPATIENT
Start: 2024-03-11 | End: 2024-03-20

## 2024-03-11 RX ADMIN — SODIUM CHLORIDE 470 MG: 9 INJECTION, SOLUTION INTRAVENOUS at 09:25

## 2024-03-11 RX ADMIN — Medication 5 ML: at 10:06

## 2024-03-11 RX ADMIN — Medication 5 ML: at 08:28

## 2024-03-11 RX ADMIN — SODIUM CHLORIDE 250 ML: 9 INJECTION, SOLUTION INTRAVENOUS at 09:24

## 2024-03-11 ASSESSMENT — PAIN SCALES - GENERAL: PAINLEVEL: MILD PAIN (3)

## 2024-03-11 NOTE — PROGRESS NOTES
Infusion Nursing Note:  Roman Escalante presents today for cycle 3 day 1 bevacizumab-bvzr.    Patient seen by provider today: No   present during visit today: Not Applicable.    Note: Pt comes to infusion today with no questions or concerns. Pt has no pain today and denies any need for intervention at this appointment.  Pt has been afebrile and denies signs and symptoms of infection including: cough, SOB, sore throat, diarrhea, vomiting, rash, or pain with urination. Pt wishes to proceed with today's planned treatment.    Intravenous Access:  Implanted Port.    Treatment Conditions:  Lab Results   Component Value Date    HGB 11.7 (L) 03/11/2024    WBC 3.6 (L) 03/11/2024    ANEU 45.1 (H) 07/28/2023    ANEUTAUTO 1.7 03/11/2024     03/11/2024     Lab Results   Component Value Date     03/11/2024    POTASSIUM 4.6 03/11/2024    MAG 1.7 01/28/2024    CR 0.76 03/11/2024    JEFERSON 9.0 03/11/2024    BILITOTAL 0.2 03/11/2024    ALBUMIN 4.0 03/11/2024    ALT 15 03/11/2024    AST 26 03/11/2024     Results reviewed, labs MET treatment parameters, ok to proceed with treatment.  Urine - negative.  /56.    Post Infusion Assessment:  Patient tolerated infusion without incident.  Blood return noted pre and post infusion.  Site patent and intact, free from redness, edema or discomfort.  No evidence of extravasations.  Access discontinued per protocol.     Discharge Plan:   Prescription refills given for lonsurf.  Discharge instructions reviewed with: Patient.  Patient and/or family verbalized understanding of discharge instructions and all questions answered.  AVS to patient via [employer].  Patient will return 03/25/24 for next appointment.   Patient discharged in stable condition accompanied by: self.  Departure Mode: Ambulatory.      Emily Meese, RN

## 2024-03-11 NOTE — PROGRESS NOTES
Oral Chemotherapy Monitoring Program  Lab Follow Up    Reviewed lab results from 3/11/24.        10/27/2023     3:00 PM 10/27/2023     3:16 PM 12/1/2023    11:00 AM 2/6/2024    10:00 AM 2/12/2024     4:00 PM 2/21/2024     2:00 PM 3/11/2024    10:00 AM   ORAL CHEMOTHERAPY   Assessment Type Initial Work up New Teach Chart Review Other Lab Monitoring Monthly Follow up Lab Monitoring   Diagnosis Code Colon Cancer Colon Cancer Colon Cancer Colon Cancer Colon Cancer Colon Cancer Colon Cancer   Providers Dr. Edy Snow   [facility]   Drug Name Lonsurf (trifluridine/Tipiracil) Lonsurf (trifluridine/Tipiracil) Lonsurf (trifluridine/Tipiracil) Lonsurf (trifluridine/Tipiracil) Lonsurf (trifluridine/Tipiracil) Lonsurf (trifluridine/Tipiracil) Lonsurf (trifluridine/Tipiracil)   Dose 75 mg 75 mg 75 mg 75 mg 75 mg 75 mg 75 mg   Current Schedule BID BID BID BID BID BID BID   Cycle Details Days 1-5, then Days 8-12 Days 1-5, then Days 8-12 Drug on Hold Days 1-5, then Days 8-12 Days 1-5, then Days 8-12 Days 1-5, then Days 8-12 Days 1-5, then Days 8-12   Start Date of Last Cycle     2/12/2024 2/12/2024    Planned next cycle start date 10/27/2023 10/27/2023    3/11/2024    Doses missed in last 2 weeks      0    Adherence Assessment      Adherent    Adverse Effects      Nausea        Labs:  _  Result Component Current Result Ref Range   Sodium 135 (3/11/2024) 135 - 145 mmol/L     _  Result Component Current Result Ref Range   Potassium 4.6 (3/11/2024) 3.4 - 5.3 mmol/L     _  Result Component Current Result Ref Range   Calcium 9.0 (3/11/2024) 8.6 - 10.0 mg/dL     No results found for Mag within last 30 days.     No results found for Phos within last 30 days.     _  Result Component Current Result Ref Range   Albumin 4.0 (3/11/2024) 3.5 - 5.2 g/dL     _  Result Component Current Result Ref Range   Urea  Nitrogen 10.8 (3/11/2024) 6.0 - 20.0 mg/dL     _  Result Component Current Result Ref Range   Creatinine 0.76 (3/11/2024) 0.67 - 1.17 mg/dL     _  Result Component Current Result Ref Range   AST 26 (3/11/2024) 0 - 45 U/L     _  Result Component Current Result Ref Range   ALT 15 (3/11/2024) 0 - 70 U/L     _  Result Component Current Result Ref Range   Bilirubin Total 0.2 (3/11/2024) <=1.2 mg/dL     _  Result Component Current Result Ref Range   WBC Count 3.6 (L) (3/11/2024) 4.0 - 11.0 10e3/uL     _  Result Component Current Result Ref Range   Hemoglobin 11.7 (L) (3/11/2024) 13.3 - 17.7 g/dL     _  Result Component Current Result Ref Range   Platelet Count 318 (3/11/2024) 150 - 450 10e3/uL     No results found for ANC within last 30 days.     _  Result Component Current Result Ref Range   Absolute Neutrophils 1.7 (3/11/2024) 1.6 - 8.3 10e3/uL        Assessment & Plan:  No concerning lab abnormalities.  No changes with Lonsurf needed at this time.    Patient not contacted as patient was seen in infusion today.    Shiela Worthington, PharmD, BCPS, BCOP  Oncology Clinical Pharmacy Specialist  [facility]/ [facility]  [phone]

## 2024-03-11 NOTE — TELEPHONE ENCOUNTER
Prior Authorization Approval    Medication: MORPHINE SULFATE ER 15 MG PO T12A  Authorization Effective Date: 3/9/2024  Authorization Expiration Date: 3/9/2025  Approved Dose/Quantity:   Reference #:     Insurance Company: [employer] - Phone [phone] Fax [phone]  Expected CoPay: $    CoPay Card Available:      Financial Assistance Needed:   Which Pharmacy is filling the prescription: [id number] - [id number] [facility]   Pharmacy Notified: Yes  Patient Notified:

## 2024-03-11 NOTE — NURSING NOTE
Chief Complaint   Patient presents with    Blood Draw     Vitals taken, port accessed, labs drawn, heparin locked, checked into next appt     /56 (BP Location: Left arm, Patient Position: Sitting, Cuff Size: Adult Large)   Pulse 77   Temp 98.1  F (36.7  C) (Oral)   Resp 16   Wt 89 kg (196 lb 1.6 oz)   SpO2 100%   BMI 27.15 kg/m    Zoltan Madrid RN on 3/11/2024 at 8:35 AM

## 2024-03-11 NOTE — PATIENT INSTRUCTIONS
[facility] Triage and after hours / weekends / holidays:  [phone]    Please call the triage or after hours line if you experience a temperature greater than or equal to 100.4, shaking chills, have uncontrolled nausea, vomiting and/or diarrhea, dizziness, shortness of breath, chest pain, bleeding, unexplained bruising, or if you have any other new/concerning symptoms, questions or concerns.      If you are having any concerning symptoms or wish to speak to a provider before your next infusion visit, please call triage to notify them so we can adequately serve you.     If you need a refill on a narcotic prescription or other medication, please call before your infusion appointment.                March 2024 Sunday Monday Tuesday Wednesday Thursday Friday Saturday                            1     2       3     4     5     6     7    NEW ONC PAIN   9:45 AM   (60 min.)   Asha Mendoza MD   [facility] 8     9       10     11    LAB PERIPHERAL   8:15 AM   (15 min.)   [facility] LAB DRAW   [facility]    ONC INFUSION 1 HR (60 MIN)   9:00 AM   (60 min.)    ONC INFUSION NURSE   [facility] 12     13     14    BLOCK, NERVE, PERIPHERAL  11:00 AM   Asha Mendoza MD   [facility] OR    Outpatient Visit  11:00 AM   Asha Mendoza MD   [facility] 15     16       17     18     19     20     21     22     23       24     25    LAB PERIPHERAL   9:00 AM   (15 min.)   [facility] DRAW   [facility]    ONC INFUSION 1 HR (60 MIN)   9:30 AM   (60 min.)    ONC INFUSION NURSE   [facility] 26     27    LAB PERIPHERAL   9:00 AM   (15 min.)   [facility] LAB DRAW   [facility]    CT CHEST/ABDOMEN/PELVIS W   9:10 AM   (20 min.)   [facility]   [facility] [place] 28     29    RETURN CCSL   2:45 PM   (30 min.)    Polo Snow MD   [facility] 30 31 April 2024 Sunday Monday Tuesday Wednesday Thursday Friday Saturday        1     2    RETURN PALLIATIVE   9:05 AM   (40 min.)   Isidra Prater DO   [facility] 3     4     5     6       7     8    LAB PERIPHERAL  10:30 AM   (15 min.)   [facility] DRAW   [facility]    ONC INFUSION 1 HR (60 MIN)  11:00 AM   (60 min.)   [facility] INFUSION NURSE   [facility] 9     10     11     12     13       14     15     16     17     18     19     20       21     22    LAB PERIPHERAL  10:30 AM   (15 min.)   [facility] DRAW   [facility]    ONC INFUSION 1 HR (60 MIN)  11:00 AM   (60 min.)    ONC INFUSION NURSE   [facility] 23     24     25     26     27       28     29     30                                         Lab Results:  Recent Results (from the past 12 hour(s))   Protein qualitative urine    Collection Time: 03/11/24  8:26 AM   Result Value Ref Range    Protein Albumin Urine Negative Negative mg/dL   Comprehensive metabolic panel    Collection Time: 03/11/24  8:26 AM   Result Value Ref Range    Sodium 135 135 - 145 mmol/L    Potassium 4.6 3.4 - 5.3 mmol/L    Carbon Dioxide (CO2) 27 22 - 29 mmol/L    Anion Gap 9 7 - 15 mmol/L    Urea Nitrogen 10.8 6.0 - 20.0 mg/dL    Creatinine 0.76 0.67 - 1.17 mg/dL    GFR Estimate >90 >60 mL/min/1.73m2    Calcium 9.0 8.6 - 10.0 mg/dL    Chloride 99 98 - 107 mmol/L    Glucose 129 (H) 70 - 99 mg/dL    Alkaline Phosphatase 99 40 - 150 U/L    AST 26 0 - 45 U/L    ALT 15 0 - 70 U/L    Protein Total 7.4 6.4 - 8.3 g/dL    Albumin 4.0 3.5 - 5.2 g/dL    Bilirubin Total 0.2 <=1.2 mg/dL   CBC with platelets and differential    Collection Time: 03/11/24  8:26 AM   Result Value Ref Range    WBC Count 3.6 (L) 4.0 - 11.0  10e3/uL    RBC Count 4.74 4.40 - 5.90 10e6/uL    Hemoglobin 11.7 (L) 13.3 - 17.7 g/dL    Hematocrit 37.7 (L) 40.0 - 53.0 %    MCV 80 78 - 100 fL    MCH 24.7 (L) 26.5 - 33.0 pg    MCHC 31.0 (L) 31.5 - 36.5 g/dL    RDW 16.9 (H) 10.0 - 15.0 %    Platelet Count 318 150 - 450 10e3/uL    % Neutrophils 48 %    % Lymphocytes 21 %    % Monocytes 26 %    % Eosinophils 4 %    % Basophils 0 %    % Immature Granulocytes 1 %    NRBCs per 100 WBC 0 <1 /100    Absolute Neutrophils 1.7 1.6 - 8.3 10e3/uL    Absolute Lymphocytes 0.8 0.8 - 5.3 10e3/uL    Absolute Monocytes 0.9 0.0 - 1.3 10e3/uL    Absolute Eosinophils 0.2 0.0 - 0.7 10e3/uL    Absolute Basophils 0.0 0.0 - 0.2 10e3/uL    Absolute Immature Granulocytes 0.0 <=0.4 10e3/uL    Absolute NRBCs 0.0 10e3/uL   RBC and Platelet Morphology    Collection Time: 03/11/24  8:26 AM   Result Value Ref Range    Platelet Assessment  Automated Count Confirmed. Platelet morphology is normal.     Automated Count Confirmed. Platelet morphology is normal.    Acanthocytes      Brandy Rods      Basophilic Stippling      Bite Cells      Blister Cells      Linneus Cells      Elliptocytes      Hgb C Crystals      Kirby-Jolly Bodies      Hypersegmented Neutrophils      Polychromasia      RBC agglutination      RBC Fragments      Reactive Lymphocytes      Rouleaux      Sickle Cells      Smudge Cells      Spherocytes      Stomatocytes      Target Cells      Teardrop Cells      Toxic Neutrophils      RBC Morphology Confirmed RBC Indices

## 2024-03-14 ENCOUNTER — HOSPITAL ENCOUNTER (OUTPATIENT)
Facility: AMBULATORY SURGERY CENTER | Age: 49
Discharge: HOME | End: 2024-03-14
Attending: ANESTHESIOLOGY | Admitting: ANESTHESIOLOGY
Payer: COMMERCIAL

## 2024-03-14 ENCOUNTER — ANCILLARY PROCEDURE (OUTPATIENT)
Dept: RADIOLOGY | Facility: AMBULATORY SURGERY CENTER | Age: 49
End: 2024-03-14
Attending: ANESTHESIOLOGY
Payer: COMMERCIAL

## 2024-03-14 VITALS
HEART RATE: 76 BPM | DIASTOLIC BLOOD PRESSURE: 75 MMHG | TEMPERATURE: 97.7 F | OXYGEN SATURATION: 98 % | RESPIRATION RATE: 15 BRPM | SYSTOLIC BLOOD PRESSURE: 112 MMHG

## 2024-03-14 DIAGNOSIS — R52 PAIN: ICD-10-CM

## 2024-03-14 PROCEDURE — 64450 NJX AA&/STRD OTHER PN/BRANCH: CPT | Mod: LT

## 2024-03-14 RX ORDER — LIDOCAINE HYDROCHLORIDE 10 MG/ML
INJECTION, SOLUTION EPIDURAL; INFILTRATION; INTRACAUDAL; PERINEURAL PRN
Status: DISCONTINUED | OUTPATIENT
Start: 2024-03-14 | End: 2024-03-14 | Stop reason: HOSPADM

## 2024-03-14 RX ORDER — METHYLPREDNISOLONE ACETATE 40 MG/ML
INJECTION, SUSPENSION INTRA-ARTICULAR; INTRALESIONAL; INTRAMUSCULAR; SOFT TISSUE PRN
Status: DISCONTINUED | OUTPATIENT
Start: 2024-03-14 | End: 2024-03-14 | Stop reason: HOSPADM

## 2024-03-14 RX ORDER — BUPIVACAINE HYDROCHLORIDE 5 MG/ML
INJECTION, SOLUTION EPIDURAL; INTRACAUDAL PRN
Status: DISCONTINUED | OUTPATIENT
Start: 2024-03-14 | End: 2024-03-14 | Stop reason: HOSPADM

## 2024-03-14 NOTE — DISCHARGE INSTRUCTIONS
"Home Care Instructions after a Pudendal Nerve Block      Pudendal nerve blocks can help in the diagnosis and treatment of pain located in the lower pelvic area.      Activity  -You may resume most normal activity levels with the exception of strenuous activity. It is important for us to know if your pain with normal activity is relieved after this injection.  -DO NOT shower for 24 hours  -DO NOT remove bandaid for 24 hours    Pain  -You may experience soreness at the injection site for one or two days  -You may use an ice pack for 20 minutes every 2 hours for the first 24 hours  -You may use a heating pad after the first 24 hours  -You may use Tylenol (acetaminophen) every 4 hours or other pain medicines as directed by your physician    You may experience numbness radiating into your legs or arms (depending on the procedure location). This numbness may last several hours. Until sensation returns to normal; please use caution in walking, climbing stairs, and stepping out of your vehicle, etc.      DID YOU RECEIVE STEROIDS TODAY?  {YES / NO:539175::\"Yes\"}    Common side effects of steroids:  Not everyone will experience corticosteroid side effects. If side effects are experienced, they will gradually subside in the 7-10 day period following an injection. Most common side effects include:  -Flushed face and/or chest  -Feeling of warmth, particularly in the face but could be an overall feeling of warmth  -Increased blood sugar in diabetic patients  -Menstrual irregularities my occur. If taking hormone-based birth control an alternate method of birth control is recommended  -Sleep disturbances and/or mood swings are possible  -Leg cramps      PLEASE KEEP TRACK OF YOUR SYMPTOMS AND NOTE YOUR IMPROVEMENT FOR YOUR DOCTOR.     Please contact us if you have:  -Severe pain  -Fever more than 101.5 degrees Fahrenheit  -Signs of infection at the injection site (redness, swelling, or drainage)    FOR PAIN CENTER PATIENTS:  If you " have questions, please contact the Pain Clinic at [phone] Option #1 between the hours of 7:00 am and 3:00 pm Monday through Friday. After office hours you can contact the on call provider by dialing [phone]. If you need immediate attention, we recommend that you go to a hospital emergency room or dial [phone].      FOR PM&R PATIENTS:  For patients seen by the PM and R service, please call [phone]. (Monday through Friday 8a-5p.  After business hours and weekends call [phone] and ask for the PM and R doctor on call). If you need to fax a pain diary to PM&R the fax number is [phone]. If you are unable to fax, uploading to [employer] is encouraged, then send to provider. If you have procedure scheduling questions please call [phone] Option #2.

## 2024-03-14 NOTE — OP NOTE
PAIN MEDICINE AMBULATORY SURGERY CENTER PROCEDURE NOTE    ATTENDING CLINICIAN:    Asha Mendoza MD    PREPROCEDURE DIAGNOSES:  1.  Bilateral pudendal neuralgia  2.  Neuropathic pain  3.  Chronic pain    POSTPROCEDURE DIAGNOSES:  1.  Bilateral pudendal neuralgia  2.  Neuropathic pain  3.  Chronic pain    PROCEDURE(S) PERFORMED:  1.  Bilateral pudendal nerve block  2.  Ultrasound guidance for needle guidance    ANESTHESIA:  Local    COMPLICATIONS:  None    INDICATIONS:  Roman is a 48 years old male presenting with history of chronic pelvic pain likely secondary to myofascial pain of the pudendal neuralgia.  During his last clinic visit we recommended a diagnostic and therapeutic injection of his bilateral pudendal nerve block. The patient stated that he was in her usual state of health and denied recent anticoagulant use or recent infections.  Therefore, the plan is to perform above mentioned procedure.     Procedure Details:  Written informed consent was obtained and saved in the electronic medical record, after the risks, benefits, and alternatives were discussed with the patient.  All of the patient s questions were answered.  The patient was brought to the procedure room, where he was identified by name, medical record number and date of birth.       The patient was placed in the prone position on the procedure room table.  All pressure points were checked and comfortably padded.  Routine monitors were placed.  Vital signs were stable.  A formal time-out procedure was performed, as per protocol, including patient name, title of procedure, and site of procedure, and all in the room concurred.     A chlorhexidine prep was completed followed by sterile draping per standard procedure.     Curvilinear ultrasound guidance was used to identify the inferior border of the right SI joint. I scanned caudally and the pyriformis muscle was identified. Subsequently, the right ischial spine was identified including the  sacrotuberous and sacrospinous ligaments. After 1% lidocaine infiltration using a 22 gauge 1.5 inch needle, a 22G was advanced at the target with ultrasound guidance until it was inbetween the sacrotuberous and sacrospinous ligaments. At this point, a mixture of 40 mg methylprednisolone mixed with 4 ml 0.5% bupivacaine was injected.  After negative aspiration, the above listed injection solution was injected, the needle was flushed with 1% lidocaine and withdrawn.  No paresthesias were noted throughout the duration of the procedure.     A contralateral procedure WAS performed in a similar fashion.     Light pressure was held at the puncture site(s) to prevent ecchymosis and oozing.  The patient's skin was cleansed, and hemostasis was confirmed. Band-aids were applied to the needle injection site(s).       Condition:     The patient remained awake and alert throughout the procedure.  The patient tolerated the procedure well and was monitored for approximately 15 minutes afterward in the post procedure area.  There were no immediate post procedure complications noted.  The patient was then discharged to home as per protocol.        Asha Mendoza MD    Department of Anesthesiology  Pain Management Division

## 2024-03-18 ENCOUNTER — MYC MEDICAL ADVICE (OUTPATIENT)
Dept: PALLIATIVE CARE | Facility: CLINIC | Age: 49
End: 2024-03-18
Payer: COMMERCIAL

## 2024-03-19 ENCOUNTER — MYC MEDICAL ADVICE (OUTPATIENT)
Dept: ONCOLOGY | Facility: CLINIC | Age: 49
End: 2024-03-19
Payer: COMMERCIAL

## 2024-03-19 NOTE — TELEPHONE ENCOUNTER
This RN discussed mychart message with Dr. Snow.  Per Dr. Snow patient should be added on to his schedule tomorrow morning.    Called patient and advised him of MD recommendation.  This is ok with him.    Scheduling requested.

## 2024-03-20 ENCOUNTER — ONCOLOGY VISIT (OUTPATIENT)
Dept: ONCOLOGY | Facility: CLINIC | Age: 49
End: 2024-03-20
Attending: STUDENT IN AN ORGANIZED HEALTH CARE EDUCATION/TRAINING PROGRAM
Payer: COMMERCIAL

## 2024-03-20 ENCOUNTER — TELEPHONE (OUTPATIENT)
Dept: ONCOLOGY | Facility: CLINIC | Age: 49
End: 2024-03-20

## 2024-03-20 VITALS
HEART RATE: 76 BPM | RESPIRATION RATE: 16 BRPM | TEMPERATURE: 97.5 F | SYSTOLIC BLOOD PRESSURE: 115 MMHG | WEIGHT: 194 LBS | OXYGEN SATURATION: 97 % | BODY MASS INDEX: 26.86 KG/M2 | DIASTOLIC BLOOD PRESSURE: 80 MMHG

## 2024-03-20 DIAGNOSIS — C20 RECTAL ADENOCARCINOMA METASTATIC TO LUNG (H): Primary | ICD-10-CM

## 2024-03-20 DIAGNOSIS — C78.00 RECTAL ADENOCARCINOMA METASTATIC TO LUNG (H): Primary | ICD-10-CM

## 2024-03-20 PROCEDURE — 99215 OFFICE O/P EST HI 40 MIN: CPT | Performed by: STUDENT IN AN ORGANIZED HEALTH CARE EDUCATION/TRAINING PROGRAM

## 2024-03-20 PROCEDURE — G0463 HOSPITAL OUTPT CLINIC VISIT: HCPCS | Performed by: STUDENT IN AN ORGANIZED HEALTH CARE EDUCATION/TRAINING PROGRAM

## 2024-03-20 ASSESSMENT — PAIN SCALES - GENERAL: PAINLEVEL: MODERATE PAIN (4)

## 2024-03-20 NOTE — PROGRESS NOTES
[facility] - Medical Oncology Follow-Up Consult Note  3/20/2024    Patient Identifiers     Name: Roman Escalante  Preferred Address: [place]  Preferred Language: English  : 1975  Gender: male    Assessment and Plan     Mr. Roman Escalante is a 48 year old male with a history of active smoker (3-5 cigs/day) with a history of extensively pre-treated metastatic colorectal cancer currently enrolled in [facility] CAR-T Trial who returns to clinic for bridging systemic treatment.     NGS: KRAS G12N  Immuno: [facility]  Clinical Trial: cells in preparation at the [facility]    Roman returns to clinic for symptom evaluation.  Patient complains of significant taste change leading to anorexia and weight loss.  In combination with progressive neuropathy from the Lonsurf, the symptoms are causing him severe distress.  He no longer wishes to continue with the oral chemotherapy as he feels it is significantly impacting his quality of life.  Ultimately, our goal is to bridge Roman to cellular therapy at the [facility], so we wish to continue which ever treatment will best allow for disease control.  However, if we cause acute clinical decompensation because of treatment associated side effects, this equivalently defeats the goal of reaching clinical trial.  Taking these competing interests into consideration, we will trial Fruquintinib oral therapy.  Patient is already scheduled for restaging scans next week, which will allow us to assess the potential efficacy of his prior Lonsurf/Reyna therapy.  If his clinical symptoms remain well-controlled on Fruquintinib therapy, we will plan to continue until washout period for cell infusion.    Plan to follow-up with imaging and MD visit as currently scheduled.  Pharmacy to begin Prior Auth for Fruquintinib today.    45 minutes spent on the date of the encounter doing chart review, review of test results, interpretation of tests, patient visit, and documentation     The longitudinal plan of care for the  diagnosis(es)/condition(s) as documented were addressed during this visit. Due to the added complexity in care, I will continue to support Roman in the subsequent management and with ongoing continuity of care.    Polo Snow MD, PhD   of Medicine  Division of Hematology, Oncology and Transplantation  [facility]    -----------------------------------    Oncology Summary     Cancer Staging   Rectal adenocarcinoma metastatic to lung (H)  Staging form: Colon and Rectum, AJCC 8th Edition  - Clinical: Stage IVB (cTX, cNX, pM1b) - Signed by Polo Snow MD on 3/30/2023      Oncology History   Rectal adenocarcinoma metastatic to lung (H)   2/3/2023 Initial Diagnosis    Rectal adenocarcinoma metastatic to lung (H)     2/3/2023 - 3/31/2023 Chemotherapy    Oral ONC Colorectal Cancer - Regorafenib  Plan Provider: Polo Snow MD  Treatment goal: Palliative  Line of treatment: [No plan line of treatment]     3/30/2023 -  Cancer Staged    Staging form: Colon and Rectum, AJCC 8th Edition  - Clinical: Stage IVB (cTX, cNX, pM1b)     6/7/2023 - 10/3/2023 Chemotherapy    OP ONC Colorectal Cancer - Modified FOLFOX-6 / Bevacizumab  Plan Provider: Polo Snow MD  Treatment goal: Palliative  Line of treatment: [No plan line of treatment]     10/27/2023 - 3/11/2024 Chemotherapy    OP ONC Colorectal Cancer - Bevacizumab + Trifluridine/Tipiracil (Lonsurf)  Plan Provider: Polo Snow MD  Treatment goal: Palliative  Line of treatment: [No plan line of treatment]     5/29/2024 -  Chemotherapy    Oral ONC Colorectal Cancer - Fruquintinib (Fruzaqla)  Plan Provider: Polo Snow MD  Treatment goal: Palliative  Line of treatment: [No plan line of treatment]         Subjective/Interval Events     - food has begun to taste bad since last Wednesday. Since then, he has had real challenge keeping food down. Has lost about 10lbs since January.    - he has been having headaches in both the mornings and evenings,  especially associated with the pills.     - he also notes worsening pain on the balls of his feet which is preventing him from walking well    Physical Exam     Vital signs: /80   Pulse 76   Temp 97.5  F (36.4  C) (Oral)   Resp 16   Wt 88 kg (194 lb)   SpO2 97%   BMI 26.86 kg/m      ECOG performance status:  0  Vascular access:  R chest port    Physical Exam:  Exam performed, notable for:  - no sores on feet bottoms; no pain on palpation of feet  - lungs CTAB  - no BLE swelling    Objective Data     Lab data:  I have personally reviewed the lab data, notable for:    - Wbc 3.6  - Hgb 11.7  - Plt 318  - no notables on CMP    Radiology data:  I have personally reviewed the radiology data, notable for:  - pending repeat CT's next week    Pathology and other data:  I have personally reviewed and interpreted the pathology data, notable for:    - no new data

## 2024-03-20 NOTE — TELEPHONE ENCOUNTER
PA Initiation    Medication: FRUZAQLA 5 MG PO CAPS  Insurance Company: [employer] - Phone [phone] Fax [phone]  Pharmacy Filling the Rx:    Filling Pharmacy Phone:    Filling Pharmacy Fax:    Start Date: 3/20/2024          Thank you,    Katherine Sharma  Oncology Pharmacy Liaison II  [email or web address]  Phone: [phone]  Fax: [phone]

## 2024-03-20 NOTE — LETTER
3/20/2024         RE: Roman Escalante  [address]  [place] [address]        Dear Colleague,    Thank you for referring your patient, Roman Escalante, to the [facility]. Please see a copy of my visit note below.    [facility] - Medical Oncology Follow-Up Consult Note  3/20/2024    Patient Identifiers     Name: Roman Escalante  Preferred Address: [place]  Preferred Language: English  : 1975  Gender: male    Assessment and Plan     Mr. Roman Escalante is a 48 year old male with a history of active smoker (3-5 cigs/day) with a history of extensively pre-treated metastatic colorectal cancer currently enrolled in [facility] CAR-T Trial who returns to clinic for bridging systemic treatment.     NGS: KRAS G12N  Immuno: [facility]  Clinical Trial: cells in preparation at the [facility]    Roman returns to clinic for symptom evaluation.  Patient complains of significant taste change leading to anorexia and weight loss.  In combination with progressive neuropathy from the Lonsurf, the symptoms are causing him severe distress.  He no longer wishes to continue with the oral chemotherapy as he feels it is significantly impacting his quality of life.  Ultimately, our goal is to bridge Roman to cellular therapy at the [facility], so we wish to continue which ever treatment will best allow for disease control.  However, if we cause acute clinical decompensation because of treatment associated side effects, this equivalently defeats the goal of reaching clinical trial.  Taking these competing interests into consideration, we will trial Fruquintinib oral therapy.  Patient is already scheduled for restaging scans next week, which will allow us to assess the potential efficacy of his prior Lonsurf/Reyna therapy.  If his clinical symptoms remain well-controlled on Fruquintinib therapy, we will plan to continue until washout period for cell infusion.    Plan to follow-up with imaging and MD visit as  currently scheduled.  Pharmacy to begin Prior Auth for Fruquintinib today.    45 minutes spent on the date of the encounter doing chart review, review of test results, interpretation of tests, patient visit, and documentation     The longitudinal plan of care for the diagnosis(es)/condition(s) as documented were addressed during this visit. Due to the added complexity in care, I will continue to support Roman in the subsequent management and with ongoing continuity of care.    Polo Snow MD, PhD   of Medicine  Division of Hematology, Oncology and Transplantation  [facility]    -----------------------------------    Oncology Summary     Cancer Staging   Rectal adenocarcinoma metastatic to lung (H)  Staging form: Colon and Rectum, AJCC 8th Edition  - Clinical: Stage IVB (cTX, cNX, pM1b) - Signed by Polo Snow MD on 3/30/2023      Oncology History   Rectal adenocarcinoma metastatic to lung (H)   2/3/2023 Initial Diagnosis    Rectal adenocarcinoma metastatic to lung (H)     2/3/2023 - 3/31/2023 Chemotherapy    Oral ONC Colorectal Cancer - Regorafenib  Plan Provider: Polo Snow MD  Treatment goal: Palliative  Line of treatment: [No plan line of treatment]     3/30/2023 -  Cancer Staged    Staging form: Colon and Rectum, AJCC 8th Edition  - Clinical: Stage IVB (cTX, cNX, pM1b)     6/7/2023 - 10/3/2023 Chemotherapy    OP ONC Colorectal Cancer - Modified FOLFOX-6 / Bevacizumab  Plan Provider: Polo Snow MD  Treatment goal: Palliative  Line of treatment: [No plan line of treatment]     10/27/2023 - 3/11/2024 Chemotherapy    OP ONC Colorectal Cancer - Bevacizumab + Trifluridine/Tipiracil (Lonsurf)  Plan Provider: Polo Snow MD  Treatment goal: Palliative  Line of treatment: [No plan line of treatment]     5/29/2024 -  Chemotherapy    Oral ONC Colorectal Cancer - Fruquintinib (Fruzaqla)  Plan Provider: Polo Snow MD  Treatment goal: Palliative  Line of treatment: [No plan line  of treatment]         Subjective/Interval Events     - food has begun to taste bad since last Wednesday. Since then, he has had real challenge keeping food down. Has lost about 10lbs since January.    - he has been having headaches in both the mornings and evenings, especially associated with the pills.     - he also notes worsening pain on the balls of his feet which is preventing him from walking well    Physical Exam     Vital signs: /80   Pulse 76   Temp 97.5  F (36.4  C) (Oral)   Resp 16   Wt 88 kg (194 lb)   SpO2 97%   BMI 26.86 kg/m      ECOG performance status:  0  Vascular access:  R chest port    Physical Exam:  Exam performed, notable for:  - no sores on feet bottoms; no pain on palpation of feet  - lungs CTAB  - no BLE swelling    Objective Data     Lab data:  I have personally reviewed the lab data, notable for:    - Wbc 3.6  - Hgb 11.7  - Plt 318  - no notables on CMP    Radiology data:  I have personally reviewed the radiology data, notable for:  - pending repeat CT's next week    Pathology and other data:  I have personally reviewed and interpreted the pathology data, notable for:    - no new data        Polo Snow MD

## 2024-03-21 ENCOUNTER — TELEPHONE (OUTPATIENT)
Dept: ONCOLOGY | Facility: CLINIC | Age: 49
End: 2024-03-21
Payer: COMMERCIAL

## 2024-03-21 DIAGNOSIS — Z79.899 ENCOUNTER FOR LONG-TERM (CURRENT) USE OF MEDICATIONS: ICD-10-CM

## 2024-03-21 DIAGNOSIS — C20 RECTAL ADENOCARCINOMA METASTATIC TO LUNG (H): Primary | ICD-10-CM

## 2024-03-21 DIAGNOSIS — C78.00 RECTAL ADENOCARCINOMA METASTATIC TO LUNG (H): Primary | ICD-10-CM

## 2024-03-21 NOTE — TELEPHONE ENCOUNTER
Prior Authorization Approval    Medication: FRUZAQLA 5 MG PO CAPS  Authorization Effective Date: 3/21/2024  Authorization Expiration Date: 3/21/2025  Approved Dose/Quantity: 21 per 28 DS  Reference #: [id number]   Insurance Company: [employer] - Phone [phone] Fax [phone]  Expected CoPay: $ 0  CoPay Card Available: Yes    Financial Assistance Needed: no  Which Pharmacy is filling the prescription: [facility] - [place] [address]   Pharmacy Notified: yes  Patient Notified: yes          Thank you,    Katherine Sharma  Oncology Pharmacy Liaison II  [email or web address]  Phone: [phone]  Fax: [phone]

## 2024-03-22 ENCOUNTER — TELEPHONE (OUTPATIENT)
Dept: ONCOLOGY | Facility: CLINIC | Age: 49
End: 2024-03-22
Payer: COMMERCIAL

## 2024-03-22 DIAGNOSIS — C78.00 RECTAL ADENOCARCINOMA METASTATIC TO LUNG (H): Primary | ICD-10-CM

## 2024-03-22 DIAGNOSIS — C20 RECTAL ADENOCARCINOMA METASTATIC TO LUNG (H): Primary | ICD-10-CM

## 2024-03-22 NOTE — ORAL ONC MGMT
Oral Chemotherapy Monitoring Program    Lab Monitoring Plan  CMP/Mag/Urine protein - Baseline then monthly  BP - baseline, weekly x 4, then monthly  Subjective/Objective:  Roman Escalante is a 48 year old male contacted by phone for an initial visit for oral chemotherapy education.  Fruzaqla Rx going to  [facility] pharmacy. Compazine to [facility]. Checked in re: today's reports GI sx (see mychart/triage note from today). Roman reports he's doing ok just tired. He wants to recover for a few days before starting his new medication, plans to start the Fruzaqla on Wednesday, will update Dr Snow. Discussed we'll need to watch closely once he starts and he should reach out right away if issues.     Assessment:  Patient is appropriate to start therapy.    Plan:  Basic chemotherapy teaching was reviewed with the patient including indication, start date of therapy, dose, administration, adverse effects, missed doses, food and drug interactions, monitoring, side effect management, office contact information, and safe handling. Written materials were provided and all questions answered.    Follow-Up:  3/27 labs/CT and 3/29 Dr Snow [facility]  Oral chemo team will call about a week after starting for initial assessment     Shayla Barajas,PharmD, [facility]  Oral Chemotherapy Monitoring Program  [facility]  [phone]  March 22, 2024 12/1/2023    11:00 AM 2/6/2024    10:00 AM 2/12/2024     4:00 PM 2/21/2024     2:00 PM 3/11/2024    10:00 AM 3/20/2024    12:00 PM 3/22/2024     4:00 PM   ORAL CHEMOTHERAPY   Assessment Type Chart Review Other Lab Monitoring Monthly Follow up Lab Monitoring New Teach New Teach   Diagnosis Code Colon Cancer Colon Cancer Colon Cancer Colon Cancer Colon Cancer Colon Cancer Colon Cancer   Providers Dr. Edy Snow   [facility]   Drug Name Lonsurf  "(trifluridine/Tipiracil) Lonsurf (trifluridine/Tipiracil) Lonsurf (trifluridine/Tipiracil) Lonsurf (trifluridine/Tipiracil) Lonsurf (trifluridine/Tipiracil) Fruzaqla (fruquintinib) Fruzaqla (fruquintinib)   Dose 75 mg 75 mg 75 mg 75 mg 75 mg 5 mg 5 mg   Current Schedule BID BID BID BID BID Daily Daily   Cycle Details Drug on Hold Days 1-5, then Days 8-12 Days 1-5, then Days 8-12 Days 1-5, then Days 8-12 Days 1-5, then Days 8-12 3 weeks on, 1 week off 3 weeks on, 1 week off   Start Date of Last Cycle   2/12/2024 2/12/2024      Planned next cycle start date    3/11/2024      Doses missed in last 2 weeks    0      Adherence Assessment    Adherent      Adverse Effects    Nausea          Last PHQ-2 Score on record:       2/2/2024    11:12 AM 8/30/2023     1:16 PM   PHQ-2 ( 1999 Pf[employer])   Q1: Little interest or pleasure in doing things 0 0   Q2: Feeling down, depressed or hopeless 0 0   PHQ-2 Score 0 0   Q1: Little interest or pleasure in doing things Not at all    Q2: Feeling down, depressed or hopeless Not at all    PHQ-2 Score 0        Vitals:  BP:   BP Readings from Last 1 Encounters:   03/20/24 115/80     Wt Readings from Last 1 Encounters:   03/20/24 88 kg (194 lb)     Estimated body surface area is 2.1 meters squared as calculated from the following:    Height as of 3/7/24: 1.81 m (5' 11.26\").    Weight as of 3/20/24: 88 kg (194 lb).    Labs:  _  Result Component Current Result Ref Range   Sodium 135 (3/11/2024) 135 - 145 mmol/L     _  Result Component Current Result Ref Range   Potassium 4.6 (3/11/2024) 3.4 - 5.3 mmol/L     _  Result Component Current Result Ref Range   Calcium 9.0 (3/11/2024) 8.6 - 10.0 mg/dL     No results found for Mag within last 30 days.     No results found for Phos within last 30 days.     _  Result Component Current Result Ref Range   Albumin 4.0 (3/11/2024) 3.5 - 5.2 g/dL     _  Result Component Current Result Ref Range   Urea Nitrogen 10.8 (3/11/2024) 6.0 - 20.0 mg/dL     _  Result " Component Current Result Ref Range   Creatinine 0.76 (3/11/2024) 0.67 - 1.17 mg/dL     _  Result Component Current Result Ref Range   AST 26 (3/11/2024) 0 - 45 U/L     _  Result Component Current Result Ref Range   ALT 15 (3/11/2024) 0 - 70 U/L     _  Result Component Current Result Ref Range   Bilirubin Total 0.2 (3/11/2024) <=1.2 mg/dL     _  Result Component Current Result Ref Range   WBC Count 3.6 (L) (3/11/2024) 4.0 - 11.0 10e3/uL     _  Result Component Current Result Ref Range   Hemoglobin 11.7 (L) (3/11/2024) 13.3 - 17.7 g/dL     _  Result Component Current Result Ref Range   Platelet Count 318 (3/11/2024) 150 - 450 10e3/uL     No results found for ANC within last 30 days.     _  Result Component Current Result Ref Range   Absolute Neutrophils 1.7 (3/11/2024) 1.6 - 8.3 10e3/uL

## 2024-03-27 ENCOUNTER — LAB (OUTPATIENT)
Dept: LAB | Facility: CLINIC | Age: 49
End: 2024-03-27
Attending: STUDENT IN AN ORGANIZED HEALTH CARE EDUCATION/TRAINING PROGRAM
Payer: COMMERCIAL

## 2024-03-27 ENCOUNTER — TELEPHONE (OUTPATIENT)
Dept: ONCOLOGY | Facility: CLINIC | Age: 49
End: 2024-03-27

## 2024-03-27 ENCOUNTER — ANCILLARY PROCEDURE (OUTPATIENT)
Dept: CT IMAGING | Facility: CLINIC | Age: 49
End: 2024-03-27
Attending: STUDENT IN AN ORGANIZED HEALTH CARE EDUCATION/TRAINING PROGRAM
Payer: COMMERCIAL

## 2024-03-27 DIAGNOSIS — C20 RECTAL ADENOCARCINOMA METASTATIC TO LUNG (H): ICD-10-CM

## 2024-03-27 DIAGNOSIS — C78.00 RECTAL ADENOCARCINOMA METASTATIC TO LUNG (H): ICD-10-CM

## 2024-03-27 LAB
ALBUMIN MFR UR ELPH: 13.2 MG/DL
ALBUMIN SERPL BCG-MCNC: 4.1 G/DL (ref 3.5–5.2)
ALP SERPL-CCNC: 90 U/L (ref 40–150)
ALT SERPL W P-5'-P-CCNC: 15 U/L (ref 0–70)
ANION GAP SERPL CALCULATED.3IONS-SCNC: 10 MMOL/L (ref 7–15)
AST SERPL W P-5'-P-CCNC: 22 U/L (ref 0–45)
BASOPHILS # BLD AUTO: 0 10E3/UL (ref 0–0.2)
BASOPHILS NFR BLD AUTO: 0 %
BILIRUB SERPL-MCNC: 0.3 MG/DL
BUN SERPL-MCNC: 13 MG/DL (ref 6–20)
CALCIUM SERPL-MCNC: 9.1 MG/DL (ref 8.6–10)
CEA SERPL-MCNC: 135 NG/ML
CHLORIDE SERPL-SCNC: 102 MMOL/L (ref 98–107)
CREAT SERPL-MCNC: 1.06 MG/DL (ref 0.67–1.17)
CREAT UR-MCNC: 144 MG/DL
DEPRECATED HCO3 PLAS-SCNC: 26 MMOL/L (ref 22–29)
EGFRCR SERPLBLD CKD-EPI 2021: 85 ML/MIN/1.73M2
EOSINOPHIL # BLD AUTO: 0.1 10E3/UL (ref 0–0.7)
EOSINOPHIL NFR BLD AUTO: 2 %
ERYTHROCYTE [DISTWIDTH] IN BLOOD BY AUTOMATED COUNT: 18.8 % (ref 10–15)
GLUCOSE SERPL-MCNC: 98 MG/DL (ref 70–99)
HCT VFR BLD AUTO: 34.8 % (ref 40–53)
HGB BLD-MCNC: 11.3 G/DL (ref 13.3–17.7)
IMM GRANULOCYTES # BLD: 0.1 10E3/UL
IMM GRANULOCYTES NFR BLD: 1 %
LYMPHOCYTES # BLD AUTO: 0.8 10E3/UL (ref 0.8–5.3)
LYMPHOCYTES NFR BLD AUTO: 11 %
MAGNESIUM SERPL-MCNC: 1.9 MG/DL (ref 1.7–2.3)
MCH RBC QN AUTO: 25.1 PG (ref 26.5–33)
MCHC RBC AUTO-ENTMCNC: 32.5 G/DL (ref 31.5–36.5)
MCV RBC AUTO: 77 FL (ref 78–100)
MONOCYTES # BLD AUTO: 0.4 10E3/UL (ref 0–1.3)
MONOCYTES NFR BLD AUTO: 5 %
NEUTROPHILS # BLD AUTO: 5.6 10E3/UL (ref 1.6–8.3)
NEUTROPHILS NFR BLD AUTO: 81 %
NRBC # BLD AUTO: 0 10E3/UL
NRBC BLD AUTO-RTO: 0 /100
PLATELET # BLD AUTO: 245 10E3/UL (ref 150–450)
POTASSIUM SERPL-SCNC: 4.4 MMOL/L (ref 3.4–5.3)
PROT SERPL-MCNC: 7.4 G/DL (ref 6.4–8.3)
PROT/CREAT 24H UR: 0.09 MG/MG CR (ref 0–0.2)
RBC # BLD AUTO: 4.5 10E6/UL (ref 4.4–5.9)
SODIUM SERPL-SCNC: 138 MMOL/L (ref 135–145)
WBC # BLD AUTO: 6.9 10E3/UL (ref 4–11)

## 2024-03-27 PROCEDURE — 71260 CT THORAX DX C+: CPT | Performed by: RADIOLOGY

## 2024-03-27 PROCEDURE — 85025 COMPLETE CBC W/AUTO DIFF WBC: CPT

## 2024-03-27 PROCEDURE — 83735 ASSAY OF MAGNESIUM: CPT

## 2024-03-27 PROCEDURE — 82378 CARCINOEMBRYONIC ANTIGEN: CPT

## 2024-03-27 PROCEDURE — 250N000011 HC RX IP 250 OP 636: Performed by: STUDENT IN AN ORGANIZED HEALTH CARE EDUCATION/TRAINING PROGRAM

## 2024-03-27 PROCEDURE — 82040 ASSAY OF SERUM ALBUMIN: CPT

## 2024-03-27 PROCEDURE — 84156 ASSAY OF PROTEIN URINE: CPT

## 2024-03-27 PROCEDURE — 74177 CT ABD & PELVIS W/CONTRAST: CPT | Performed by: RADIOLOGY

## 2024-03-27 PROCEDURE — 36415 COLL VENOUS BLD VENIPUNCTURE: CPT

## 2024-03-27 RX ORDER — HEPARIN SODIUM (PORCINE) LOCK FLUSH IV SOLN 100 UNIT/ML 100 UNIT/ML
500 SOLUTION INTRAVENOUS ONCE
Status: COMPLETED | OUTPATIENT
Start: 2024-03-27 | End: 2024-03-27

## 2024-03-27 RX ORDER — IOPAMIDOL 755 MG/ML
95 INJECTION, SOLUTION INTRAVASCULAR ONCE
Status: COMPLETED | OUTPATIENT
Start: 2024-03-27 | End: 2024-03-27

## 2024-03-27 RX ORDER — HEPARIN SODIUM (PORCINE) LOCK FLUSH IV SOLN 100 UNIT/ML 100 UNIT/ML
5 SOLUTION INTRAVENOUS
Status: DISCONTINUED | OUTPATIENT
Start: 2024-03-27 | End: 2024-04-02 | Stop reason: HOSPADM

## 2024-03-27 RX ADMIN — IOPAMIDOL 95 ML: 755 INJECTION, SOLUTION INTRAVASCULAR at 09:03

## 2024-03-27 RX ADMIN — HEPARIN SODIUM (PORCINE) LOCK FLUSH IV SOLN 100 UNIT/ML 500 UNITS: 100 SOLUTION at 09:09

## 2024-03-27 RX ADMIN — Medication 5 ML: at 08:48

## 2024-03-27 NOTE — DISCHARGE INSTRUCTIONS

## 2024-03-27 NOTE — TELEPHONE ENCOUNTER
Oral Chemotherapy Monitoring Program  Lab Follow Up    Reviewed baseline lab results from 3/27.    Assessment & Plan:  CMP, Mg, protein random urine, CBC w/diff reviewed (baseline labs). No concerning abnormalities. Plan to start Fruzaqla today as planned.    Called and spoke to patient who reported that he has not heard from anyone regarding setting up delivery of the Fruzaqla. Provided him the phone number for [facility] to set up delivery or go  since he is nearby. Confirmed with pharmacist at [facility] that this will be rushed out for delivery today.    Follow-Up:  3/29 Visit with Dr. Snow   4/4 Phone call initial assessment one week after starting    Macy Schmidt PharmD  Hematology/Oncology Clinical Pharmacist  [facility]  [phone]        2/6/2024    10:00 AM 2/12/2024     4:00 PM 2/21/2024     2:00 PM 3/11/2024    10:00 AM 3/20/2024    12:00 PM 3/22/2024     4:00 PM 3/27/2024     9:00 AM   ORAL CHEMOTHERAPY   Assessment Type Other Lab Monitoring Monthly Follow up Lab Monitoring New Teach New Teach Lab Monitoring   Diagnosis Code Colon Cancer Colon Cancer Colon Cancer Colon Cancer Colon Cancer Colon Cancer Colon Cancer   Providers Dr. Edy Snow   [facility]   Drug Name Lonsurf (trifluridine/Tipiracil) Lonsurf (trifluridine/Tipiracil) Lonsurf (trifluridine/Tipiracil) Lonsurf (trifluridine/Tipiracil) Fruzaqla (fruquintinib) Fruzaqla (fruquintinib) Fruzaqla (fruquintinib)   Dose 75 mg 75 mg 75 mg 75 mg 5 mg 5 mg 5 mg   Current Schedule BID BID BID BID Daily Daily Daily   Cycle Details Days 1-5, then Days 8-12 Days 1-5, then Days 8-12 Days 1-5, then Days 8-12 Days 1-5, then Days 8-12 3 weeks on, 1 week off 3 weeks on, 1 week off 3 weeks on, 1 week off   Start Date of Last Cycle  2/12/2024 2/12/2024       Planned next cycle start date   3/11/2024        Doses missed in last 2 weeks   0       Adherence Assessment   Adherent       Adverse Effects   Nausea           Labs:  _  Result Component Current Result Ref Range   Sodium 138 (3/27/2024) 135 - 145 mmol/L     _  Result Component Current Result Ref Range   Potassium 4.4 (3/27/2024) 3.4 - 5.3 mmol/L     _  Result Component Current Result Ref Range   Calcium 9.1 (3/27/2024) 8.6 - 10.0 mg/dL     _  Result Component Current Result Ref Range   Magnesium 1.9 (3/27/2024) 1.7 - 2.3 mg/dL     No results found for Phos within last 30 days.     _  Result Component Current Result Ref Range   Albumin 4.1 (3/27/2024) 3.5 - 5.2 g/dL     _  Result Component Current Result Ref Range   Urea Nitrogen 13.0 (3/27/2024) 6.0 - 20.0 mg/dL     _  Result Component Current Result Ref Range   Creatinine 1.06 (3/27/2024) 0.67 - 1.17 mg/dL     _  Result Component Current Result Ref Range   AST 22 (3/27/2024) 0 - 45 U/L     _  Result Component Current Result Ref Range   ALT 15 (3/27/2024) 0 - 70 U/L     _  Result Component Current Result Ref Range   Bilirubin Total 0.3 (3/27/2024) <=1.2 mg/dL     _  Result Component Current Result Ref Range   WBC Count 6.9 (3/27/2024) 4.0 - 11.0 10e3/uL     _  Result Component Current Result Ref Range   Hemoglobin 11.3 (L) (3/27/2024) 13.3 - 17.7 g/dL     _  Result Component Current Result Ref Range   Platelet Count 245 (3/27/2024) 150 - 450 10e3/uL     _  Result Component Current Result Ref Range   Absolute Neutrophils 5.6 (3/27/2024) 1.6 - 8.3 10e3/uL

## 2024-03-27 NOTE — NURSING NOTE
Chief Complaint   Patient presents with    Port Draw     Lab only visit, port accessed, labs drawn, heparin locked, sent to CT with urine cup unable to provide at this time.     There were no vitals taken for this visit.  Zoltan Madrid RN on 3/27/2024 at 8:49 AM

## 2024-03-29 ENCOUNTER — ONCOLOGY VISIT (OUTPATIENT)
Dept: ONCOLOGY | Facility: CLINIC | Age: 49
End: 2024-03-29
Attending: STUDENT IN AN ORGANIZED HEALTH CARE EDUCATION/TRAINING PROGRAM
Payer: COMMERCIAL

## 2024-03-29 VITALS
WEIGHT: 186 LBS | SYSTOLIC BLOOD PRESSURE: 115 MMHG | OXYGEN SATURATION: 100 % | DIASTOLIC BLOOD PRESSURE: 76 MMHG | TEMPERATURE: 98 F | HEART RATE: 61 BPM | BODY MASS INDEX: 25.75 KG/M2 | RESPIRATION RATE: 16 BRPM

## 2024-03-29 DIAGNOSIS — C78.00 RECTAL ADENOCARCINOMA METASTATIC TO LUNG (H): Primary | ICD-10-CM

## 2024-03-29 DIAGNOSIS — C20 RECTAL ADENOCARCINOMA METASTATIC TO LUNG (H): Primary | ICD-10-CM

## 2024-03-29 PROCEDURE — G2211 COMPLEX E/M VISIT ADD ON: HCPCS | Performed by: STUDENT IN AN ORGANIZED HEALTH CARE EDUCATION/TRAINING PROGRAM

## 2024-03-29 PROCEDURE — G0463 HOSPITAL OUTPT CLINIC VISIT: HCPCS | Performed by: STUDENT IN AN ORGANIZED HEALTH CARE EDUCATION/TRAINING PROGRAM

## 2024-03-29 PROCEDURE — 99215 OFFICE O/P EST HI 40 MIN: CPT | Performed by: STUDENT IN AN ORGANIZED HEALTH CARE EDUCATION/TRAINING PROGRAM

## 2024-03-29 ASSESSMENT — PAIN SCALES - GENERAL: PAINLEVEL: MILD PAIN (3)

## 2024-03-29 NOTE — NURSING NOTE
"Oncology Rooming Note    March 29, 2024 2:49 PM   Roman Escalante is a 48 year old male who presents for:    Chief Complaint   Patient presents with    Oncology Clinic Visit     Malignant tumor or rectum      Initial Vitals: /76   Pulse 61   Temp 98  F (36.7  C)   Resp 16   Wt 84.4 kg (186 lb)   SpO2 100%   BMI 25.75 kg/m   Estimated body mass index is 25.75 kg/m  as calculated from the following:    Height as of 3/7/24: 1.81 m (5' 11.26\").    Weight as of this encounter: 84.4 kg (186 lb). Body surface area is 2.06 meters squared.  Mild Pain (3) Comment: Data Unavailable   No LMP for male patient.  Allergies reviewed: Yes  Medications reviewed: Yes    Medications: Medication refills not needed today.  Pharmacy name entered into EPIC:    [facility] - [place], [place] - [address] [place] [address] [phone]  [facility] - [place], [place] - [address] [place] [address] DRUG [id number] [address]    Frailty Screening:   Is the patient here for a new oncology consult visit in cancer care? 2. No      Clinical concerns: no other complaints      Star Navarro"

## 2024-03-29 NOTE — PROGRESS NOTES
[facility] - Medical Oncology Follow-Up Consult Note  3/29/2024    Patient Identifiers     Name: Roman Escalante  Preferred Address: [place]  Preferred Language: English  : 1975  Gender: male    Assessment and Plan     Mr. Roman Escalante is a 48 year old male active smoker (3-5 cigs/day) with a history of extensively pre-treated metastatic colorectal cancer currently enrolled in [facility] CAR-T Trial who returns to clinic for     NGS: KRAS G12N  Immuno: [facility]  Clinical Trial: MAIKEL-280, HUGO    Roman returns to clinic for symptom evaluation on Lonsurf/Reyna.  Patient has become increasingly unable to tolerate further Lonsurf therapy.  He notes debilitating fatigue along with worsening GI distress.  He wishes to consider a treatment transition to the Fruquintinib which I had mentioned late last year, or a potential treatment holiday.  We await final read of restaging imaging, but preliminary evaluation suggests overall stable disease.  While this would argue for continuation of current regimen, we must alter treatment in order to continue bridge therapy to clinical trial.  Of most significance is for patient to reach cellular infusion in overall good health.    Thus, we will transition to Fruquintinib with upcoming cycle of treatment.  Plan for alternating LILLY and MD monitoring throughout treatment to ensure tolerance.  Plan for MD visit in 2 months with CT scans prior for escalated treatment response monitoring.  Patient should proceed with cellular infusion therapy with clinical trial at [facility] whenever available.    45 minutes spent on the date of the encounter doing chart review, review of test results, interpretation of tests, patient visit, and documentation     The longitudinal plan of care for the diagnosis(es)/condition(s) as documented were addressed during this visit. Due to the added complexity in care, I will continue to support Roman in the subsequent management and with ongoing continuity of  care.    Polo Snow MD, PhD   of Medicine  Division of Hematology, Oncology and Transplantation  [facility]    -----------------------------------    Oncology Summary     Cancer Staging   Rectal adenocarcinoma metastatic to lung (H)  Staging form: Colon and Rectum, AJCC 8th Edition  - Clinical: Stage IVB (cTX, cNX, pM1b) - Signed by Polo Snow MD on 3/30/2023      Oncology History   Rectal adenocarcinoma metastatic to lung (H)   2/3/2023 Initial Diagnosis    Rectal adenocarcinoma metastatic to lung (H)     2/3/2023 - 3/31/2023 Chemotherapy    Oral ONC Colorectal Cancer - Regorafenib  Plan Provider: Polo Snow MD  Treatment goal: Palliative  Line of treatment: [No plan line of treatment]     3/30/2023 -  Cancer Staged    Staging form: Colon and Rectum, AJCC 8th Edition  - Clinical: Stage IVB (cTX, cNX, pM1b)     2023 - 10/3/2023 Chemotherapy    OP ONC Colorectal Cancer - Modified FOLFOX-6 / Bevacizumab  Plan Provider: Polo Snow MD  Treatment goal: Palliative  Line of treatment: [No plan line of treatment]     10/27/2023 - 3/11/2024 Chemotherapy    OP ONC Colorectal Cancer - Bevacizumab + Trifluridine/Tipiracil (Lonsurf)  Plan Provider: Polo Snow MD  Treatment goal: Palliative  Line of treatment: [No plan line of treatment]     3/27/2024 -  Chemotherapy    Oral ONC Colorectal Cancer - Fruquintinib (Fruzaqla)  Plan Provider: Polo Snow MD  Treatment goal: Palliative  Line of treatment: [No plan line of treatment]         Subjective/Interval Events     - planned cellular infusion at the end of April. A lot of family is coming in for his grandmother's , so he wishes to be around to see them  - since the nerve block, has had minimal pain/discomfort in the lower back. At night, takes oxycodone x1 to help him sleep. No other pain meds taken during the day.       Physical Exam     Vital signs: /76   Pulse 61   Temp 98  F (36.7  C)   Resp 16   Wt 84.4  kg (186 lb)   SpO2 100%   BMI 25.75 kg/m      ECOG performance status:  0  Vascular access:  R chest port    Physical Exam:  Exam performed, notable for:  - generally well appearing man, with functional status well maintained despite significant symptoms  - no BLE swelling, lungs CTAB  - no palpable masses or abdominal tenderness    Objective Data     Lab data:  I have personally reviewed the lab data, notable for:    -  <- 59.3  - Hgb 11.3  - Plt 245    Radiology data:  I have personally reviewed the radiology data, notable for:  03/7/2024 CT C/A/P  IMPRESSION:  1.  Multiple pulmonary nodules and masses have undergone mixed change compared to 01/22/2024, with several areas of central cavitation new since the previous exam.  2.  Mediastinal adenopathy has increased slightly.  3.  Hepatic metastatic masses are not significantly changed compared to 01/28/2024.  4.  Ill-defined rectosigmoid mass and perirectal lymph nodes are also not significantly changed, and could be better evaluated with MRI.  5.  Two soft tissue nodules within the fat of a left lower quadrant parastomal hernia are unchanged.    Pathology and other data:  I have personally reviewed and interpreted the pathology data, notable for:    - no new data

## 2024-03-29 NOTE — LETTER
3/29/2024         RE: Roman Escalante  [address]  [facility] [address]        Dear Colleague,    Thank you for referring your patient, Roman Escalante, to the [facility]. Please see a copy of my visit note below.    [facility] - Medical Oncology Follow-Up Consult Note  3/29/2024    Patient Identifiers     Name: Roman Escalante  Preferred Address: [place]  Preferred Language: English  : 1975  Gender: male    Assessment and Plan     Mr. Roman Escalante is a 48 year old male active smoker (3-5 cigs/day) with a history of extensively pre-treated metastatic colorectal cancer currently enrolled in [facility] CAR-T Trial who returns to clinic for     NGS: KRAS G12N  Immuno: [facility]  Clinical Trial: MAIKEL-280, HUGO Owens returns to clinic for symptom evaluation on Lonsurf/Reyna.  Patient has become increasingly unable to tolerate further Lonsurf therapy.  He notes debilitating fatigue along with worsening GI distress.  He wishes to consider a treatment transition to the Fruquintinib which I had mentioned late last year, or a potential treatment holiday.  We await final read of restaging imaging, but preliminary evaluation suggests overall stable disease.  While this would argue for continuation of current regimen, we must alter treatment in order to continue bridge therapy to clinical trial.  Of most significance is for patient to reach cellular infusion in overall good health.    Thus, we will transition to Fruquintinib with upcoming cycle of treatment.  Plan for alternating LILLY and MD monitoring throughout treatment to ensure tolerance.  Plan for MD visit in 2 months with CT scans prior for escalated treatment response monitoring.  Patient should proceed with cellular infusion therapy with clinical trial at [facility] whenever available.    45 minutes spent on the date of the encounter doing chart review, review of test results, interpretation of tests, patient visit, and  documentation     The longitudinal plan of care for the diagnosis(es)/condition(s) as documented were addressed during this visit. Due to the added complexity in care, I will continue to support Roman in the subsequent management and with ongoing continuity of care.    Polo Snow MD, PhD   of Medicine  Division of Hematology, Oncology and Transplantation  [facility]    -----------------------------------    Oncology Summary     Cancer Staging   Rectal adenocarcinoma metastatic to lung (H)  Staging form: Colon and Rectum, AJCC 8th Edition  - Clinical: Stage IVB (cTX, cNX, pM1b) - Signed by Polo Snow MD on 3/30/2023      Oncology History   Rectal adenocarcinoma metastatic to lung (H)   2/3/2023 Initial Diagnosis    Rectal adenocarcinoma metastatic to lung (H)     2/3/2023 - 3/31/2023 Chemotherapy    Oral ONC Colorectal Cancer - Regorafenib  Plan Provider: Polo Snow MD  Treatment goal: Palliative  Line of treatment: [No plan line of treatment]     3/30/2023 -  Cancer Staged    Staging form: Colon and Rectum, AJCC 8th Edition  - Clinical: Stage IVB (cTX, cNX, pM1b)     2023 - 10/3/2023 Chemotherapy    OP ONC Colorectal Cancer - Modified FOLFOX-6 / Bevacizumab  Plan Provider: Polo Snow MD  Treatment goal: Palliative  Line of treatment: [No plan line of treatment]     10/27/2023 - 3/11/2024 Chemotherapy    OP ONC Colorectal Cancer - Bevacizumab + Trifluridine/Tipiracil (Lonsurf)  Plan Provider: Polo Snow MD  Treatment goal: Palliative  Line of treatment: [No plan line of treatment]     3/27/2024 -  Chemotherapy    Oral ONC Colorectal Cancer - Fruquintinib (Fruzaqla)  Plan Provider: Polo Snow MD  Treatment goal: Palliative  Line of treatment: [No plan line of treatment]         Subjective/Interval Events     - planned cellular infusion at the end of April. A lot of family is coming in for his grandmother's , so he wishes to be around to see them  - since  the nerve block, has had minimal pain/discomfort in the lower back. At night, takes oxycodone x1 to help him sleep. No other pain meds taken during the day.       Physical Exam     Vital signs: /76   Pulse 61   Temp 98  F (36.7  C)   Resp 16   Wt 84.4 kg (186 lb)   SpO2 100%   BMI 25.75 kg/m      ECOG performance status:  0  Vascular access:  R chest port    Physical Exam:  Exam performed, notable for:  - generally well appearing man, with functional status well maintained despite significant symptoms  - no BLE swelling, lungs CTAB  - no palpable masses or abdominal tenderness    Objective Data     Lab data:  I have personally reviewed the lab data, notable for:    -  <- 59.3  - Hgb 11.3  - Plt 245    Radiology data:  I have personally reviewed the radiology data, notable for:  03/7/2024 CT C/A/P  IMPRESSION:  1.  Multiple pulmonary nodules and masses have undergone mixed change compared to 01/22/2024, with several areas of central cavitation new since the previous exam.  2.  Mediastinal adenopathy has increased slightly.  3.  Hepatic metastatic masses are not significantly changed compared to 01/28/2024.  4.  Ill-defined rectosigmoid mass and perirectal lymph nodes are also not significantly changed, and could be better evaluated with MRI.  5.  Two soft tissue nodules within the fat of a left lower quadrant parastomal hernia are unchanged.    Pathology and other data:  I have personally reviewed and interpreted the pathology data, notable for:    - no new data        Polo Snow MD

## 2024-04-02 ENCOUNTER — APPOINTMENT (OUTPATIENT)
Dept: GENERAL RADIOLOGY | Facility: CLINIC | Age: 49
End: 2024-04-02
Attending: EMERGENCY MEDICINE
Payer: COMMERCIAL

## 2024-04-02 ENCOUNTER — HOSPITAL ENCOUNTER (EMERGENCY)
Facility: CLINIC | Age: 49
Discharge: HOME | End: 2024-04-02
Attending: EMERGENCY MEDICINE | Admitting: EMERGENCY MEDICINE
Payer: COMMERCIAL

## 2024-04-02 ENCOUNTER — VIRTUAL VISIT (OUTPATIENT)
Dept: PALLIATIVE CARE | Facility: CLINIC | Age: 49
End: 2024-04-02
Attending: STUDENT IN AN ORGANIZED HEALTH CARE EDUCATION/TRAINING PROGRAM
Payer: COMMERCIAL

## 2024-04-02 ENCOUNTER — NURSE TRIAGE (OUTPATIENT)
Dept: NURSING | Facility: CLINIC | Age: 49
End: 2024-04-02
Payer: COMMERCIAL

## 2024-04-02 VITALS
DIASTOLIC BLOOD PRESSURE: 92 MMHG | HEART RATE: 105 BPM | OXYGEN SATURATION: 99 % | SYSTOLIC BLOOD PRESSURE: 131 MMHG | RESPIRATION RATE: 14 BRPM | TEMPERATURE: 98 F

## 2024-04-02 VITALS — BODY MASS INDEX: 26.6 KG/M2 | HEIGHT: 71 IN | WEIGHT: 190 LBS

## 2024-04-02 DIAGNOSIS — R53.1 WEAKNESS: ICD-10-CM

## 2024-04-02 DIAGNOSIS — Z71.89 GOALS OF CARE, COUNSELING/DISCUSSION: ICD-10-CM

## 2024-04-02 DIAGNOSIS — G89.3 CANCER RELATED PAIN: ICD-10-CM

## 2024-04-02 DIAGNOSIS — C18.9 COLON CANCER METASTASIZED TO LIVER (H): Primary | ICD-10-CM

## 2024-04-02 DIAGNOSIS — Z51.5 ENCOUNTER FOR PALLIATIVE CARE: ICD-10-CM

## 2024-04-02 DIAGNOSIS — R10.30 LOWER ABDOMINAL PAIN: ICD-10-CM

## 2024-04-02 DIAGNOSIS — C78.7 COLON CANCER METASTASIZED TO LIVER (H): Primary | ICD-10-CM

## 2024-04-02 DIAGNOSIS — E86.0 DEHYDRATION: ICD-10-CM

## 2024-04-02 DIAGNOSIS — K59.03 DRUG INDUCED CONSTIPATION: ICD-10-CM

## 2024-04-02 LAB
ACANTHOCYTES BLD QL SMEAR: NORMAL
ALBUMIN SERPL BCG-MCNC: 4.4 G/DL (ref 3.5–5.2)
ALBUMIN UR-MCNC: NEGATIVE MG/DL
ALP SERPL-CCNC: 102 U/L (ref 40–150)
ALT SERPL W P-5'-P-CCNC: 19 U/L (ref 0–70)
ANION GAP SERPL CALCULATED.3IONS-SCNC: 14 MMOL/L (ref 7–15)
APPEARANCE UR: CLEAR
AST SERPL W P-5'-P-CCNC: 25 U/L (ref 0–45)
AUER BODIES BLD QL SMEAR: NORMAL
BASO STIPL BLD QL SMEAR: NORMAL
BASOPHILS # BLD AUTO: 0 10E3/UL (ref 0–0.2)
BASOPHILS NFR BLD AUTO: 0 %
BILIRUB SERPL-MCNC: 1 MG/DL
BILIRUB UR QL STRIP: NEGATIVE
BITE CELLS BLD QL SMEAR: NORMAL
BLISTER CELLS BLD QL SMEAR: NORMAL
BUN SERPL-MCNC: 10.6 MG/DL (ref 6–20)
BURR CELLS BLD QL SMEAR: NORMAL
CALCIUM SERPL-MCNC: 9.7 MG/DL (ref 8.6–10)
CHLORIDE SERPL-SCNC: 95 MMOL/L (ref 98–107)
COLOR UR AUTO: NORMAL
CREAT SERPL-MCNC: 0.75 MG/DL (ref 0.67–1.17)
DACRYOCYTES BLD QL SMEAR: NORMAL
DEPRECATED HCO3 PLAS-SCNC: 25 MMOL/L (ref 22–29)
EGFRCR SERPLBLD CKD-EPI 2021: >90 ML/MIN/1.73M2
ELLIPTOCYTES BLD QL SMEAR: NORMAL
EOSINOPHIL # BLD AUTO: 0.1 10E3/UL (ref 0–0.7)
EOSINOPHIL NFR BLD AUTO: 3 %
ERYTHROCYTE [DISTWIDTH] IN BLOOD BY AUTOMATED COUNT: 19.8 % (ref 10–15)
FLUAV RNA SPEC QL NAA+PROBE: NEGATIVE
FLUBV RNA RESP QL NAA+PROBE: NEGATIVE
FRAGMENTS BLD QL SMEAR: NORMAL
GLUCOSE SERPL-MCNC: 109 MG/DL (ref 70–99)
GLUCOSE UR STRIP-MCNC: NEGATIVE MG/DL
GROUP A STREP BY PCR: NOT DETECTED
HCT VFR BLD AUTO: 38 % (ref 40–53)
HGB BLD-MCNC: 12.5 G/DL (ref 13.3–17.7)
HGB C CRYSTALS: NORMAL
HGB UR QL STRIP: NEGATIVE
HOWELL-JOLLY BOD BLD QL SMEAR: NORMAL
IMM GRANULOCYTES # BLD: 0 10E3/UL
IMM GRANULOCYTES NFR BLD: 0 %
KETONES UR STRIP-MCNC: NEGATIVE MG/DL
LEUKOCYTE ESTERASE UR QL STRIP: NEGATIVE
LYMPHOCYTES # BLD AUTO: 0.5 10E3/UL (ref 0.8–5.3)
LYMPHOCYTES NFR BLD AUTO: 17 %
MCH RBC QN AUTO: 25.7 PG (ref 26.5–33)
MCHC RBC AUTO-ENTMCNC: 32.9 G/DL (ref 31.5–36.5)
MCV RBC AUTO: 78 FL (ref 78–100)
MONOCYTES # BLD AUTO: 0.9 10E3/UL (ref 0–1.3)
MONOCYTES NFR BLD AUTO: 27 %
NEUTROPHILS # BLD AUTO: 1.7 10E3/UL (ref 1.6–8.3)
NEUTROPHILS NFR BLD AUTO: 53 %
NEUTS HYPERSEG BLD QL SMEAR: NORMAL
NITRATE UR QL: NEGATIVE
NRBC # BLD AUTO: 0 10E3/UL
NRBC BLD AUTO-RTO: 0 /100
PH UR STRIP: 6.5 [PH] (ref 5–7)
PLAT MORPH BLD: NORMAL
PLATELET # BLD AUTO: 345 10E3/UL (ref 150–450)
POLYCHROMASIA BLD QL SMEAR: NORMAL
POTASSIUM SERPL-SCNC: 4.3 MMOL/L (ref 3.4–5.3)
PROT SERPL-MCNC: 8.2 G/DL (ref 6.4–8.3)
RBC # BLD AUTO: 4.87 10E6/UL (ref 4.4–5.9)
RBC AGGLUT BLD QL: NORMAL
RBC MORPH BLD: NORMAL
RBC URINE: <1 /HPF
ROULEAUX BLD QL SMEAR: NORMAL
RSV RNA SPEC NAA+PROBE: NEGATIVE
SARS-COV-2 RNA RESP QL NAA+PROBE: NEGATIVE
SICKLE CELLS BLD QL SMEAR: NORMAL
SMUDGE CELLS BLD QL SMEAR: NORMAL
SODIUM SERPL-SCNC: 134 MMOL/L (ref 135–145)
SP GR UR STRIP: 1 (ref 1–1.03)
SPHEROCYTES BLD QL SMEAR: NORMAL
STOMATOCYTES BLD QL SMEAR: NORMAL
TARGETS BLD QL SMEAR: NORMAL
TOXIC GRANULES BLD QL SMEAR: NORMAL
UROBILINOGEN UR STRIP-MCNC: NORMAL MG/DL
VARIANT LYMPHS BLD QL SMEAR: NORMAL
WBC # BLD AUTO: 3.2 10E3/UL (ref 4–11)
WBC URINE: 1 /HPF

## 2024-04-02 PROCEDURE — 87637 SARSCOV2&INF A&B&RSV AMP PRB: CPT | Performed by: EMERGENCY MEDICINE

## 2024-04-02 PROCEDURE — 85025 COMPLETE CBC W/AUTO DIFF WBC: CPT | Performed by: EMERGENCY MEDICINE

## 2024-04-02 PROCEDURE — 81001 URINALYSIS AUTO W/SCOPE: CPT | Performed by: EMERGENCY MEDICINE

## 2024-04-02 PROCEDURE — 99214 OFFICE O/P EST MOD 30 MIN: CPT | Mod: 95 | Performed by: STUDENT IN AN ORGANIZED HEALTH CARE EDUCATION/TRAINING PROGRAM

## 2024-04-02 PROCEDURE — 250N000011 HC RX IP 250 OP 636: Performed by: EMERGENCY MEDICINE

## 2024-04-02 PROCEDURE — 99284 EMERGENCY DEPT VISIT MOD MDM: CPT | Performed by: EMERGENCY MEDICINE

## 2024-04-02 PROCEDURE — 96361 HYDRATE IV INFUSION ADD-ON: CPT | Performed by: EMERGENCY MEDICINE

## 2024-04-02 PROCEDURE — 87651 STREP A DNA AMP PROBE: CPT | Performed by: EMERGENCY MEDICINE

## 2024-04-02 PROCEDURE — 80053 COMPREHEN METABOLIC PANEL: CPT | Performed by: EMERGENCY MEDICINE

## 2024-04-02 PROCEDURE — 71046 X-RAY EXAM CHEST 2 VIEWS: CPT | Mod: 26 | Performed by: RADIOLOGY

## 2024-04-02 PROCEDURE — 99284 EMERGENCY DEPT VISIT MOD MDM: CPT | Mod: 25 | Performed by: EMERGENCY MEDICINE

## 2024-04-02 PROCEDURE — 71046 X-RAY EXAM CHEST 2 VIEWS: CPT

## 2024-04-02 PROCEDURE — 96374 THER/PROPH/DIAG INJ IV PUSH: CPT | Performed by: EMERGENCY MEDICINE

## 2024-04-02 PROCEDURE — 258N000003 HC RX IP 258 OP 636: Performed by: EMERGENCY MEDICINE

## 2024-04-02 PROCEDURE — 36415 COLL VENOUS BLD VENIPUNCTURE: CPT | Performed by: EMERGENCY MEDICINE

## 2024-04-02 RX ADMIN — SODIUM CHLORIDE 1000 ML: 9 INJECTION, SOLUTION INTRAVENOUS at 04:19

## 2024-04-02 RX ADMIN — PROCHLORPERAZINE EDISYLATE 5 MG: 5 INJECTION INTRAMUSCULAR; INTRAVENOUS at 04:22

## 2024-04-02 ASSESSMENT — ACTIVITIES OF DAILY LIVING (ADL)
ADLS_ACUITY_SCORE: 33

## 2024-04-02 ASSESSMENT — PAIN SCALES - GENERAL: PAINLEVEL: NO PAIN (0)

## 2024-04-02 NOTE — PROGRESS NOTES
"Patient started chemo last week for colorectal ca. States he feels very dehydrated with darker urine. Has had a hx of \"kidney issues\".   "

## 2024-04-02 NOTE — NURSING NOTE
Is the patient currently in the state of [place]? YES    Visit mode:VIDEO    If the visit is dropped, the patient can be reconnected by: VIDEO VISIT: Text to cell phone:   Telephone Information:   Mobile [phone]       Will anyone else be joining the visit? NO  (If patient encounters technical issues they should call [phone]    How would you like to obtain your AVS? MyChart    Are changes needed to the allergy or medication list? Pt stated no changes to allergies and Pt stated no med changes    Are refills needed on medications prescribed by this physician?     Reason for visit: VIDAL RIVERAF

## 2024-04-02 NOTE — LETTER
"4/2/2024       RE: Roman Escalante  [address]  [facility] [address]     Dear Colleague,    Thank you for referring your patient, Roman Escalante, to the [facility] at [facility]. Please see a copy of my visit note below.    Palliative Care Progress Note    Patient Name: Roman Escalante  Primary Provider: Buddy Hart    Chief Complaint/Patient ID: Roman Escalante is a 48 year old male with PMHx of metastatic rectal cancer (mets to lung), currently enrolled in [facility] CAR-T trial (completed cell harvest 1/23/2024, tentatively planning administration of CAR-T in April).  He has been on multiple lines of systemic therapy, currently on Fruquintinib.     Last Palliative care appointment: 2/20/2024 with me.     Reviewed: Yes    ORT Score = 1 for history of alcohol abuse and his mother.     Interim History:  Roman Escalante is a 48 year old male who is seen today for follow up with Palliative Care via billable video visit.      Since last visit, had a treatment change. Started Fruquintinib.  Note from visit on 3/29 not completed yet.    ED visit early this AM for dehydration. Treated with compazine and IVF.    Says that the new chemo pills \"really dried me out\" and he feels very miserable.  He been very constipated, taking 2 senna and 1-2 doses of MiraLAX daily.  He finally was able to pass some blood and mucus from his rectum again yesterday, however after doing that, localized pain has returned.  He is now taking oxycodone 10 mg every 6 hours again (averaging 4 doses per day).    He additionally had been trying to drink a lot of water, however he did not have much else at home.  He recently got paid, so he can look at getting more electrolyte supplements.  His appetite has also been lower again.    Says that overall since [place] of last year \"life is sucked\".  He said he was not ready to start the new " "treatment and wanted to have a longer break, but states \"I am not a doctor\".  He is worried about not being strong enough when he goes for the CAR-T therapy.     Social History:  Has a couple of kids and a couple grandkids who lives in [place].  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 Has a girlfriend-she is a hospice RN.   Social History     Tobacco Use    Smoking status: Former     Packs/day: 0.50     Years: 10.00     Additional pack years: 0.00     Total pack years: 5.00     Types: Cigarettes, Other     Start date: 2010     Quit date: 2023     Years since quittin.6     Passive exposure: Current    Smokeless tobacco: Never   Vaping Use    Vaping Use: Never used   Substance Use Topics    Alcohol use: Not Currently     Comment: social    Drug use: Yes     Types: Marijuana     Comment: Keeps up my appetite, sleep, and help with neuropathy     Advanced Care Planning: Has not completed. Girlfriend would be his HCA.     Family History- Reviewed in Epic.    Medications- Reviewed in Epic.    Past Medical History- Reviewed in [employer].    Past Surgical History- Reviewed in Epic.    Physical Exam:   Constitutional: Alert, pleasant, no apparent distress. Sitting up   Eyes: Sclera non-icteric, no eye discharge.  ENT: No nasal discharge. Ears grossly normal.  Respiratory: Unlabored respirations. Speaking in full sentences.  Musculoskeletal: Extremities appear normal- no gross deformities noted. No edema noted on upper body.   Skin: No suspicious lesions or rashes on visible skin.  Neurologic: Clear speech, no aphasia. No facial droop.  Psychiatric: Mentation appears normal, appropriate attention. Affect normal/bright. Does not appear anxious or depressed.    Key Data Reviewed:  LABS: 24- Cr 0.75, GFR >90, Albumin 4.4, LFTs wnl. WBC 3.2, Hgb 12.5, Plts 345.     IMAGING: 3/27/24- IMPRESSION:  1.  Multiple pulmonary nodules and masses have undergone mixed change compared to 2024, with several " areas of central cavitation new since the previous exam.  2.  Mediastinal adenopathy has increased slightly.  3.  Hepatic metastatic masses are not significantly changed compared to 01/28/2024.  4.  Ill-defined rectosigmoid mass and perirectal lymph nodes are also not significantly changed, and could be better evaluated with MRI.  5.  Two soft tissue nodules within the fat of a left lower quadrant parastomal hernia are unchanged.    Impression & Recommendations & Counseling:  Roman Escalante is a 48 year old male with history of metastatic rectal cancer.     Pain - Had good results from pudendal nerve block initially, however after having episode of constipation and passing mucus and blood yesterday, some pain in his rectal area has returned.  -Okay to continue oxycodone 10 to 20 mg every 4 hours as needed.  He is currently taking 10 mg every 6 hours.  -Discussed constipation as below.    Bowels - Constipation worsened again with treatment change.  -Discussed bowel regimen, including dosing for miralax and senna.  Advised to increase to 4 tablets of senna minimum and 2-3 doses of MiraLAX daily.  -Also suggested incorporating suppositories.  -Discussed prune/pear juice or other fruits.  -Suggested option of standing orders for IV fluids.    Rectal cancer - He knows ongoing treatment is important, however he really feels that these treatment changes are taking a toll on his body, and he was not ready for this new treatment/did not get a break.  He is worried about tolerating CAR-T therapy when it comes.  Advised him to continue having conversations with oncology about timing for treatments.  Given the fact that he had another trip to the emergency room last night, I do think that taking a pause from therapy for a couple of weeks and focusing on hydration might be really helpful for him.      Follow up: 1 month    Total time spent on day of encounter is 37 mins, including reviewing record, review of above studies,  above visit with patient, symptomatic discussion as above, including medication adjustments/prescription management, and documentation.       Some chart documentation performed using Dragon Voice recognition Software. Although reviewed after completion, some words and grammatical errors may remain.      Again, thank you for allowing me to participate in the care of your patient.      Sincerely,    Isidra Prater, DO

## 2024-04-02 NOTE — PROGRESS NOTES
"Palliative Care Progress Note    Patient Name: Roman Escalante  Primary Provider: Buddy Hart    Chief Complaint/Patient ID: Roman Escalante is a 48 year old male with PMHx of metastatic rectal cancer (mets to lung), currently enrolled in [facility] CAR-T trial (completed cell harvest 1/23/2024, tentatively planning administration of CAR-T in April).  He has been on multiple lines of systemic therapy, currently on Fruquintinib.     Last Palliative care appointment: 2/20/2024 with me.     Reviewed: Yes    ORT Score = 1 for history of alcohol abuse and his mother.     Interim History:  Roman Escalante is a 48 year old male who is seen today for follow up with Palliative Care via billable video visit.      Since last visit, had a treatment change. Started Fruquintinib.  Note from visit on 3/29 not completed yet.    ED visit early this AM for dehydration. Treated with compazine and IVF.    Says that the new chemo pills \"really dried me out\" and he feels very miserable.  He been very constipated, taking 2 senna and 1-2 doses of MiraLAX daily.  He finally was able to pass some blood and mucus from his rectum again yesterday, however after doing that, localized pain has returned.  He is now taking oxycodone 10 mg every 6 hours again (averaging 4 doses per day).    He additionally had been trying to drink a lot of water, however he did not have much else at home.  He recently got paid, so he can look at getting more electrolyte supplements.  His appetite has also been lower again.    Says that overall since [place] of last year \"life is sucked\".  He said he was not ready to start the new treatment and wanted to have a longer break, but states \"I am not a doctor\".  He is worried about not being strong enough when he goes for the CAR-T therapy.     Social History:  Has a couple of kids and a couple grandkids who lives in [place].  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 Has a girlfriend-she " is a hospice RN.   Social History     Tobacco Use    Smoking status: Former     Packs/day: 0.50     Years: 10.00     Additional pack years: 0.00     Total pack years: 5.00     Types: Cigarettes, Other     Start date: 2010     Quit date: 2023     Years since quittin.6     Passive exposure: Current    Smokeless tobacco: Never   Vaping Use    Vaping Use: Never used   Substance Use Topics    Alcohol use: Not Currently     Comment: social    Drug use: Yes     Types: Marijuana     Comment: Keeps up my appetite, sleep, and help with neuropathy     Advanced Care Planning: Has not completed. Girlfriend would be his HCA.     Family History- Reviewed in Epic.    Medications- Reviewed in Epic.    Past Medical History- Reviewed in [facility].    Past Surgical History- Reviewed in Epic.    Physical Exam:   Constitutional: Alert, pleasant, no apparent distress. Sitting up   Eyes: Sclera non-icteric, no eye discharge.  ENT: No nasal discharge. Ears grossly normal.  Respiratory: Unlabored respirations. Speaking in full sentences.  Musculoskeletal: Extremities appear normal- no gross deformities noted. No edema noted on upper body.   Skin: No suspicious lesions or rashes on visible skin.  Neurologic: Clear speech, no aphasia. No facial droop.  Psychiatric: Mentation appears normal, appropriate attention. Affect normal/bright. Does not appear anxious or depressed.    Key Data Reviewed:  LABS: 24- Cr 0.75, GFR >90, Albumin 4.4, LFTs wnl. WBC 3.2, Hgb 12.5, Plts 345.     IMAGING: 3/27/24- IMPRESSION:  1.  Multiple pulmonary nodules and masses have undergone mixed change compared to 2024, with several areas of central cavitation new since the previous exam.  2.  Mediastinal adenopathy has increased slightly.  3.  Hepatic metastatic masses are not significantly changed compared to 2024.  4.  Ill-defined rectosigmoid mass and perirectal lymph nodes are also not significantly changed, and could be better evaluated with  MRI.  5.  Two soft tissue nodules within the fat of a left lower quadrant parastomal hernia are unchanged.    Impression & Recommendations & Counseling:  Roman Escalante is a 48 year old male with history of metastatic rectal cancer.     Pain - Had good results from pudendal nerve block initially, however after having episode of constipation and passing mucus and blood yesterday, some pain in his rectal area has returned.  -Okay to continue oxycodone 10 to 20 mg every 4 hours as needed.  He is currently taking 10 mg every 6 hours.  -Discussed constipation as below.    Bowels - Constipation worsened again with treatment change.  -Discussed bowel regimen, including dosing for miralax and senna.  Advised to increase to 4 tablets of senna minimum and 2-3 doses of MiraLAX daily.  -Also suggested incorporating suppositories.  -Discussed prune/pear juice or other fruits.  -Suggested option of standing orders for IV fluids.    Rectal cancer - He knows ongoing treatment is important, however he really feels that these treatment changes are taking a toll on his body, and he was not ready for this new treatment/did not get a break.  He is worried about tolerating CAR-T therapy when it comes.  Advised him to continue having conversations with oncology about timing for treatments.  Given the fact that he had another trip to the emergency room last night, I do think that taking a pause from therapy for a couple of weeks and focusing on hydration might be really helpful for him.      Follow up: 1 month      Video-Visit Details  Video Start Time: 9:26AM  Video End Time: 9:51AM    Originating Location (pt. Location): Home     Distant Location (provider location):  Offsite- Personal Home      Platform used for Video Visit: Huey     Total time spent on day of encounter is 37 mins, including reviewing record, review of above studies, above visit with patient, symptomatic discussion as above, including medication  adjustments/prescription management, and documentation.     Isidra Prater,   Palliative Medicine   [id number]    Some chart documentation performed using Dragon Voice recognition Software. Although reviewed after completion, some words and grammatical errors may remain.

## 2024-04-02 NOTE — ED PROVIDER NOTES
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  he reports that he recently started on new chemotherapy pills this past Thursday.  Patient states that since starting these new pills he feels as if he is very dehydrated and they are sucking all of the water out on exam.  Patient reports generally feeling unwell for the past few days, also noted changes in his urine with a dark color.  Patient reports history of kidney injury in the past so was concern for that.  Patient also complains of bronchitis, hoarseness of the voice, cough, and headache for the past few days.  Patient reports last bowel movement was 3 days ago.  Patient reports some nausea but denies any vomiting.  States that he has been taking Zofran, Compazine to help with his nausea but does not seem to be working.  Patient also reports he was told by his providers to take MiraLAX and senna to help with his bowel movements.  Patient denies any fever, chills, chest pain, shortness of breath.  No other complaints.    Past Medical History  Past Medical History:   Diagnosis Date    Cancer (H) 1/12/21    Colorectal cacer mast, to lungs, and liver    Essential (primary) hypertension 08/03/2021     Past Surgical History:   Procedure Laterality Date    ABDOMEN SURGERY      BIOPSY  1/21/21    Lung biopsy. Positive colorectal cancer in lungs    GI SURGERY  Ostomy placementnt 2/1/21    Stoma is an outie, bowel excretion only    IR LUNG BIOPSY MEDIASTINUM RIGHT  01/19/2021    IR SIRT (SELECTIVE INTERNAL RADIO THERAPY)  8/29/2023    IR VISCERAL ANGIOGRAM  8/29/2023    IR VISCERAL EMBOLIZATION  9/7/2023    NERVE BLOCK PERIPHERAL Bilateral 3/14/2024    Procedure: Bilateral pudendal nerve block  with ultrasound;  Surgeon: Asha Mendoza MD;  [place] [facility] [place]     cyclobenzaprine (FLEXERIL) 5 MG tablet  fruquintinib (FRUZAQLA) 5 MG capsule  gabapentin (NEURONTIN) 100 MG capsule  ibuprofen (ADVIL/MOTRIN) 200 MG tablet  morphine (MS CONTIN) 15 MG CR tablet  ondansetron (ZOFRAN) 4 MG tablet  Ostomy Supplies MISC  oxyCODONE IR (ROXICODONE) 10 MG tablet  prochlorperazine (COMPAZINE) 10 MG tablet  prochlorperazine (COMPAZINE) 5 MG tablet      Allergies   Allergen Reactions    Oxaliplatin Shortness Of Breath, Nausea and Vomiting and Other (See Comments)     Patient had HSR to Oxaliplatin on cycle 8 of FOLFOX chemotherapy. Sent to ER for continued monitoring.  Patient had HSR to Oxaliplatin on cycle 8 of FOLFOX chemotherapy. Sent to ER for continued monitoring.      Blood-Group Specific Substance Other (See Comments)     Patient has reactivity Suggestive of a Warm auto antibody. Blood products may be delayed. Draw patient 24 hours prior to transfusion. Draw one red top and two purple top tubes for all type and screen orders.  Patient has reactivity Suggestive of a Warm auto antibody. Blood products may be delayed. Draw patient 24 hours prior to transfusion. Draw one red top and two purple top tubes for all type and screen orders.       Family History  No family history on file.  Social History   Social History     Tobacco Use    Smoking status: Former     Packs/day: 0.50     Years: 10.00     Additional pack years: 0.00     Total pack years: 5.00     Types: Cigarettes, Other     Start date: 2010     Quit date: 2023     Years since quittin.6     Passive exposure: Current    Smokeless tobacco: Never   Vaping Use    Vaping Use: Never used   Substance Use Topics    Alcohol use: Not Currently     Comment: social    Drug use: Yes     Types: Marijuana     Comment: Keeps up my appetite, sleep, and help with neuropathy      Past medical history, past surgical history, medications, allergies, family history, and  social history were reviewed with the patient. No additional pertinent items.      A medically appropriate review of systems was performed with pertinent positives and negatives noted in the HPI, and all other systems negative.    Physical Exam   BP: (!) 131/92  Pulse: 105  Temp: 98  F (36.7  C)  Resp: 14  SpO2: 99 %  Physical Exam  General: Afebrile, no acute distress   HEENT: Normocephalic, atraumatic, conjunctivae normal. MMM  Neck: non-tender, supple  Cardio: regular rate. regular rhythm   Resp: Port in right upper chest clean, dry, intact, no erythema or drainage.  Normal work of breathing, no respiratory distress, lungs clear bilaterally, no wheezing, rhonchi, rales  Chest/Back: no visual signs of trauma, no CVA tenderness   Abdomen: soft, non distension, no tenderness, no peritoneal signs, ostomy with small amount brown/yellow stool  Neuro: alert and fully oriented. CN II-XII grossly intact. Grossly normal strength and sensation in all extremities.   MSK: no deformities. Normal range of motion  Integumentary/Skin: no rash visualized, normal color  Psych: normal affect, normal behavior      ED Course, Procedures, & Data      Procedures          Results for orders placed or performed during the hospital encounter of 04/02/24   XR Chest 2 Views     Status: None    Narrative    EXAM: XR CHEST 2 VIEWS  LOCATION: [facility]  DATE: 4/2/2024    INDICATION: cough  COMPARISON: 02/12/2024 x-ray, 03/27/2024 CT      Impression    IMPRESSION: No significant change in several lung masses/nodules. No new consolidation. No pleural effusion or pneumothorax. Normal heart size and pulmonary vascularity. Right chest port catheter tip at the superior cavoatrial junction, stable.   Symptomatic Influenza A/B, RSV, & SARS-CoV2 PCR (COVID-19) Nasopharyngeal     Status: Normal    Specimen: Nasopharyngeal; Swab   Result Value Ref Range    Influenza A PCR Negative Negative    Influenza B PCR  Negative Negative    RSV PCR Negative Negative    SARS CoV2 PCR Negative Negative    Narrative    Testing was performed using the Xpert Xpress CoV2/Flu/RSV Assay on the [employer] GeneXpert Instrument. This test should be ordered for the detection of SARS-CoV-2, influenza, and RSV viruses in individuals who meet clinical and/or epidemiological criteria. Test performance is unknown in asymptomatic patients. This test is for in vitro diagnostic use under the FDA EUA for laboratories certified under CLIA to perform high or moderate complexity testing. This test has not been FDA cleared or approved. A negative result does not rule out the presence of PCR inhibitors in the specimen or target RNA in concentration below the limit of detection for the assay. If only one viral target is positive but coinfection with multiple targets is suspected, the sample should be re-tested with another FDA cleared, approved, or authorized test, if coinfection would change clinical management. This test was validated by the [facility]. These laboratories are certified under the Clinical Laboratory Improvement Amendments of 1988 (CLIA-88) as qualified to perform high complexity laboratory testing.   Comprehensive metabolic panel     Status: Abnormal   Result Value Ref Range    Sodium 134 (L) 135 - 145 mmol/L    Potassium 4.3 3.4 - 5.3 mmol/L    Carbon Dioxide (CO2) 25 22 - 29 mmol/L    Anion Gap 14 7 - 15 mmol/L    Urea Nitrogen 10.6 6.0 - 20.0 mg/dL    Creatinine 0.75 0.67 - 1.17 mg/dL    GFR Estimate >90 >60 mL/min/1.73m2    Calcium 9.7 8.6 - 10.0 mg/dL    Chloride 95 (L) 98 - 107 mmol/L    Glucose 109 (H) 70 - 99 mg/dL    Alkaline Phosphatase 102 40 - 150 U/L    AST 25 0 - 45 U/L    ALT 19 0 - 70 U/L    Protein Total 8.2 6.4 - 8.3 g/dL    Albumin 4.4 3.5 - 5.2 g/dL    Bilirubin Total 1.0 <=1.2 mg/dL   UA with Microscopic reflex to Culture     Status: Normal    Specimen: Urine, Midstream   Result Value Ref Range    Color  Urine Light Yellow Colorless, Straw, Light Yellow, Yellow    Appearance Urine Clear Clear    Glucose Urine Negative Negative mg/dL    Bilirubin Urine Negative Negative    Ketones Urine Negative Negative mg/dL    Specific Gravity Urine 1.005 1.003 - 1.035    Blood Urine Negative Negative    pH Urine 6.5 5.0 - 7.0    Protein Albumin Urine Negative Negative mg/dL    Urobilinogen Urine Normal Normal, 2.0 mg/dL    Nitrite Urine Negative Negative    Leukocyte Esterase Urine Negative Negative    RBC Urine <1 <=2 /HPF    WBC Urine 1 <=5 /HPF    Narrative    Urine Culture not indicated   CBC with platelets and differential     Status: Abnormal   Result Value Ref Range    WBC Count 3.2 (L) 4.0 - 11.0 10e3/uL    RBC Count 4.87 4.40 - 5.90 10e6/uL    Hemoglobin 12.5 (L) 13.3 - 17.7 g/dL    Hematocrit 38.0 (L) 40.0 - 53.0 %    MCV 78 78 - 100 fL    MCH 25.7 (L) 26.5 - 33.0 pg    MCHC 32.9 31.5 - 36.5 g/dL    RDW 19.8 (H) 10.0 - 15.0 %    Platelet Count 345 150 - 450 10e3/uL    % Neutrophils 53 %    % Lymphocytes 17 %    % Monocytes 27 %    % Eosinophils 3 %    % Basophils 0 %    % Immature Granulocytes 0 %    NRBCs per 100 WBC 0 <1 /100    Absolute Neutrophils 1.7 1.6 - 8.3 10e3/uL    Absolute Lymphocytes 0.5 (L) 0.8 - 5.3 10e3/uL    Absolute Monocytes 0.9 0.0 - 1.3 10e3/uL    Absolute Eosinophils 0.1 0.0 - 0.7 10e3/uL    Absolute Basophils 0.0 0.0 - 0.2 10e3/uL    Absolute Immature Granulocytes 0.0 <=0.4 10e3/uL    Absolute NRBCs 0.0 10e3/uL   RBC and Platelet Morphology     Status: None   Result Value Ref Range    Platelet Assessment  Automated Count Confirmed. Platelet morphology is normal.     Automated Count Confirmed. Platelet morphology is normal.    Acanthocytes      Brandy Rods      Basophilic Stippling      Bite Cells      Blister Cells      [place] Cells      Elliptocytes      Hgb C Crystals      Kirby-Jolly Bodies      Hypersegmented Neutrophils      Polychromasia      RBC agglutination      RBC Fragments      Reactive  Lymphocytes      Rouleaux      Sickle Cells      Smudge Cells      Spherocytes      Stomatocytes      Target Cells      Teardrop Cells      Toxic Neutrophils      RBC Morphology Confirmed RBC Indices    Group A Streptococcus PCR Throat Swab     Status: Normal    Specimen: Throat; Swab   Result Value Ref Range    Group A strep by PCR Not Detected Not Detected    Narrative    The Xpert Xpress Strep A test, performed on the [employer]ument [employer], is a rapid, qualitative in vitro diagnostic test for the detection of Streptococcus pyogenes (Group A ß-hemolytic Streptococcus, Strep A) in throat swab specimens from patients with signs and symptoms of pharyngitis. The Xpert Xpress Strep A test can be used as an aid in the diagnosis of Group A Streptococcal pharyngitis. The assay is not intended to monitor treatment for Group A Streptococcus infections. The Xpert Xpress Strep A test utilizes an automated real-time polymerase chain reaction (PCR) to detect Streptococcus pyogenes DNA.   CBC with platelets differential     Status: Abnormal    Narrative    The following orders were created for panel order CBC with platelets differential.  Procedure                               Abnormality         Status                     ---------                               -----------         ------                     CBC with platelets and d...[[id number]]  Abnormal            Final result               RBC and Platelet Morphology[[id number]]                      Final result                 Please view results for these tests on the individual orders.     Medications   sodium chloride 0.9% BOLUS 1,000 mL (0 mLs Intravenous Stopped 4/2/24 6903)   prochlorperazine (COMPAZINE) injection 5 mg (5 mg Intravenous $Given 4/2/24 2584)     Labs Ordered and Resulted from Time of ED Arrival to Time of ED Departure   COMPREHENSIVE METABOLIC PANEL - Abnormal       Result Value    Sodium 134 (*)     Potassium 4.3      Carbon Dioxide (CO2) 25       Anion Gap 14      Urea Nitrogen 10.6      Creatinine 0.75      GFR Estimate >90      Calcium 9.7      Chloride 95 (*)     Glucose 109 (*)     Alkaline Phosphatase 102      AST 25      ALT 19      Protein Total 8.2      Albumin 4.4      Bilirubin Total 1.0     CBC WITH PLATELETS AND DIFFERENTIAL - Abnormal    WBC Count 3.2 (*)     RBC Count 4.87      Hemoglobin 12.5 (*)     Hematocrit 38.0 (*)     MCV 78      MCH 25.7 (*)     MCHC 32.9      RDW 19.8 (*)     Platelet Count 345      % Neutrophils 53      % Lymphocytes 17      % Monocytes 27      % Eosinophils 3      % Basophils 0      % Immature Granulocytes 0      NRBCs per 100 WBC 0      Absolute Neutrophils 1.7      Absolute Lymphocytes 0.5 (*)     Absolute Monocytes 0.9      Absolute Eosinophils 0.1      Absolute Basophils 0.0      Absolute Immature Granulocytes 0.0      Absolute NRBCs 0.0     INFLUENZA A/B, RSV, & SARS-COV2 PCR - Normal    Influenza A PCR Negative      Influenza B PCR Negative      RSV PCR Negative      SARS CoV2 PCR Negative     ROUTINE UA WITH MICROSCOPIC REFLEX TO CULTURE - Normal    Color Urine Light Yellow      Appearance Urine Clear      Glucose Urine Negative      Bilirubin Urine Negative      Ketones Urine Negative      Specific Gravity Urine 1.005      Blood Urine Negative      pH Urine 6.5      Protein Albumin Urine Negative      Urobilinogen Urine Normal      Nitrite Urine Negative      Leukocyte Esterase Urine Negative      RBC Urine <1      WBC Urine 1     GROUP A STREPTOCOCCUS PCR THROAT SWAB - Normal    Group A strep by PCR Not Detected     RBC AND PLATELET MORPHOLOGY    Platelet Assessment        Value: Automated Count Confirmed. Platelet morphology is normal.    Acanthocytes        Brandy Rods        Basophilic Stippling        Bite Cells        Blister Cells        Kirby Cells        Elliptocytes        Hgb C Crystals        Kirby-Jolly Bodies        Hypersegmented Neutrophils        Polychromasia        RBC agglutination         RBC Fragments        Reactive Lymphocytes        Rouleaux        Sickle Cells        Smudge Cells        Spherocytes        Stomatocytes        Target Cells        Teardrop Cells        Toxic Neutrophils        RBC Morphology Confirmed RBC Indices       XR Chest 2 Views   Final Result   IMPRESSION: No significant change in several lung masses/nodules. No new consolidation. No pleural effusion or pneumothorax. Normal heart size and pulmonary vascularity. Right chest port catheter tip at the superior cavoatrial junction, stable.             Critical care was not performed.     Medical Decision Making  The patient's presentation was of moderate complexity (a chronic illness mild to moderate exacerbation, progression, or side effect of treatment).    The patient's evaluation involved:  review of external note(s) from 2 sources (recent oncology notes)  review of 3+ test result(s) ordered prior to this encounter (prior labs, urine)  ordering and/or review of 3+ test(s) in this encounter (see below)  independent interpretation of testing performed by another health professional (cxr)    The patient's management necessitated moderate risk (prescription drug management including medications given in the ED) and high risk (a decision regarding hospitalization).    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  Upon arrival patient is nontoxic-appearing, afebrile, moderate distress.  Patient was treated with IV Compazine, 1 L IV fluid bolus, comprehensive labs urinalysis performed.  I reviewed comprehensive labs which are remarkable for white blood cell count of 3.2, hemoglobin 12.5, no acute metabolic or electrolyte abnormality, potassium 4.3, creatinine 0.75 urinalysis with no evidence of acute infection, no blood.  Rapid strep and viral testing  influenza/COVID/RSV negative.  I personally reviewed and interpreted chest x-ray which is unremarkable with no focal infiltrate, pleural effusion, pneumothorax.    On reevaluation patient resting comfortably, reports significant improvement of his symptoms after IV hydration and IV medication.  I did discuss possible further evaluation with imaging however at this time patient declined, patient reports feeling much better after fluids and feels comfortable discharge home.  Encourage close outpatient follow-up with his primary care provider and oncology team.  Strict return precautions discussed.  Patient understands and agrees with the plan.        I have reviewed the nursing notes. I have reviewed the findings, diagnosis, plan and need for follow up with the patient.    Discharge Medication List as of 4/2/2024  6:50 AM          Final diagnoses:   Weakness   Dehydration       Magaly Burns MD  [facility] EMERGENCY DEPARTMENT  4/2/2024     Magaly Burns MD  04/03/24 0039

## 2024-04-02 NOTE — PROGRESS NOTES
Patient ambulatory on discharge. All discharge reviewed with the patient including follow up with primary care. Patient departed alert and responsive. Patient verbalized understanding.

## 2024-04-02 NOTE — PATIENT INSTRUCTIONS
Recommendations:  -You can take 1 to 2 tablets of oxycodone every 4 hours as needed.  -Increase senna to at least 4 tablets daily and MiraLAX to 2-3 doses.  You can take up to 8 tablets of senna.  Also suggest looking into suppositories and pear/prune juice.  -Consider asking about IV fluids at the infusion center in [place].    Follow up: 1 month      Reasons to Call    If you are having worsening/uncontrolled symptoms we want you to call!    You or your other physicians make any changes to medications we have prescribed.  -Please call for refills 4-5 days before you will run out of medication.    Important Phone Numbers, including: Refills, scheduling, and general questions     Palliative Care RN: Rebeca Urbina - [phone]  *After hours or on weekends. Will connect you with on call MD. [phone]

## 2024-04-02 NOTE — TELEPHONE ENCOUNTER
Patient calling. Earlier this evening, he had sent a message to his oncologist. He states that he feels like he has bronchitis. He started a new chemo medication last week, and believes he is dehydration. His urine is dark. He is concerned that he needs IV fluid. He is also having increased pain. He has also been taking prochlorperazine and ondansetron.     Care advice given for patient to be seen in the emergency department. He will go to [facility].     Routed to Dr. Snow, patient's oncologist.     Allison Pham RN  [facility]  April 2, 2024, 1:41 AM      Reason for Disposition   Nursing judgment    Additional Information   Negative: Nursing judgment   Negative: Information only call; adult is not ill or injured    Protocols used: No Guideline or Reference [id number]

## 2024-04-04 ENCOUNTER — DOCUMENTATION ONLY (OUTPATIENT)
Dept: ONCOLOGY | Facility: CLINIC | Age: 49
End: 2024-04-04
Payer: COMMERCIAL

## 2024-04-04 ENCOUNTER — PATIENT OUTREACH (OUTPATIENT)
Dept: ONCOLOGY | Facility: CLINIC | Age: 49
End: 2024-04-04
Payer: COMMERCIAL

## 2024-04-04 NOTE — TELEPHONE ENCOUNTER
"[facility]: Cancer Care                                                                                          Called pt to follow-up on symptomatic Mychart; today he states he is feeling like \"a million bucks\" took an entire bottle of Mg Citrate yesterday AM and \"pooped all day\" able to eat several small meals. Senna and Miralax \"no longer works\" so he is not going to keep taking those. He stopped taking the fruquintinib 2 days ago when he went to the ER.    [place] he did not have \"long enough to recover\" between switching from previous regimen to current treatment. Wonders if Lonsurf and Avastin would have been tolerated better if he was \"offered the IVF\" before, stated this wasn't offered as an option.    Oxy 10 mg last taken at 3 am, feels he can decrease to twice a day. Not having any rectal or abd pain now that he's \"cleaned out\". Color of urine continues to dark, reviewed ER labs with pt and informed him kidney function was good. Might be a side effect of oral chemo.    Pt declined to be seen today or tomorrow with fluids, he wants to keep appointments next week 4/8 labs and 4/10 with Rebeca Killian LILLY, will call triage and/or writer if he feels he needs to be seen or get IVF. State his preference location for fluids would be [facility], did inform him this depends on availability in their infusion center [place] [facility]    Offered empathetic listening as pt vented about previous 6 months and symptoms, pt states he's still \"Upset\" he waited 6 days after returning from the [facility] for fluids, so before he restarts oral chemo, he wants IVF scheduled \"everyday\". Tried to explain to pt that giving IVF frequently is not a solution, but he can speak about this with Rebeca at his visit next week. Pt verbalized understanding. He will hold fruquintinib until he sees Rebeca 4/10.     Signature:  Virgen Nieto RN  "

## 2024-04-08 ENCOUNTER — LAB (OUTPATIENT)
Dept: LAB | Facility: CLINIC | Age: 49
End: 2024-04-08
Payer: COMMERCIAL

## 2024-04-08 DIAGNOSIS — C20 RECTAL ADENOCARCINOMA METASTATIC TO LUNG (H): ICD-10-CM

## 2024-04-08 DIAGNOSIS — C78.00 RECTAL ADENOCARCINOMA METASTATIC TO LUNG (H): ICD-10-CM

## 2024-04-08 DIAGNOSIS — Z79.899 ENCOUNTER FOR LONG-TERM (CURRENT) USE OF MEDICATIONS: ICD-10-CM

## 2024-04-08 LAB
ALBUMIN MFR UR ELPH: 15 MG/DL
ALBUMIN SERPL BCG-MCNC: 4 G/DL (ref 3.5–5.2)
ALP SERPL-CCNC: 99 U/L (ref 40–150)
ALT SERPL W P-5'-P-CCNC: 23 U/L (ref 0–70)
ANION GAP SERPL CALCULATED.3IONS-SCNC: 11 MMOL/L (ref 7–15)
AST SERPL W P-5'-P-CCNC: 28 U/L (ref 0–45)
BILIRUB SERPL-MCNC: 0.4 MG/DL
BUN SERPL-MCNC: 10.1 MG/DL (ref 6–20)
CALCIUM SERPL-MCNC: 9.3 MG/DL (ref 8.6–10)
CHLORIDE SERPL-SCNC: 97 MMOL/L (ref 98–107)
CREAT SERPL-MCNC: 0.81 MG/DL (ref 0.67–1.17)
CREAT UR-MCNC: 117 MG/DL
DEPRECATED HCO3 PLAS-SCNC: 27 MMOL/L (ref 22–29)
EGFRCR SERPLBLD CKD-EPI 2021: >90 ML/MIN/1.73M2
GLUCOSE SERPL-MCNC: 109 MG/DL (ref 70–99)
MAGNESIUM SERPL-MCNC: 1.8 MG/DL (ref 1.7–2.3)
POTASSIUM SERPL-SCNC: 4.8 MMOL/L (ref 3.4–5.3)
PROT SERPL-MCNC: 7.6 G/DL (ref 6.4–8.3)
PROT/CREAT 24H UR: 0.13 MG/MG CR (ref 0–0.2)
SODIUM SERPL-SCNC: 135 MMOL/L (ref 135–145)

## 2024-04-08 PROCEDURE — 83735 ASSAY OF MAGNESIUM: CPT

## 2024-04-08 PROCEDURE — 36415 COLL VENOUS BLD VENIPUNCTURE: CPT

## 2024-04-08 PROCEDURE — 80053 COMPREHEN METABOLIC PANEL: CPT

## 2024-04-08 PROCEDURE — 84156 ASSAY OF PROTEIN URINE: CPT

## 2024-04-08 ASSESSMENT — PAIN SCALES - PAIN ENJOYMENT GENERAL ACTIVITY SCALE (PEG)
INTERFERED_GENERAL_ACTIVITY: 1
PEG_TOTALSCORE: 2
INTERFERED_ENJOYMENT_LIFE: 2
AVG_PAIN_PASTWEEK: 3

## 2024-04-10 ENCOUNTER — VIRTUAL VISIT (OUTPATIENT)
Dept: ONCOLOGY | Facility: CLINIC | Age: 49
End: 2024-04-10
Payer: COMMERCIAL

## 2024-04-10 VITALS — WEIGHT: 190 LBS | BODY MASS INDEX: 26.6 KG/M2 | HEIGHT: 71 IN

## 2024-04-10 DIAGNOSIS — C20 RECTAL ADENOCARCINOMA METASTATIC TO LUNG (H): Primary | ICD-10-CM

## 2024-04-10 DIAGNOSIS — C78.00 RECTAL ADENOCARCINOMA METASTATIC TO LUNG (H): Primary | ICD-10-CM

## 2024-04-10 DIAGNOSIS — K62.89 RECTAL PAIN: ICD-10-CM

## 2024-04-10 PROCEDURE — 99215 OFFICE O/P EST HI 40 MIN: CPT | Mod: 95

## 2024-04-10 PROCEDURE — 99417 PROLNG OP E/M EACH 15 MIN: CPT | Mod: 95

## 2024-04-10 ASSESSMENT — PAIN SCALES - GENERAL: PAINLEVEL: MILD PAIN (3)

## 2024-04-10 NOTE — NURSING NOTE
Is the patient currently in the state of [place]? YES    Visit mode:VIDEO    If the visit is dropped, the patient can be reconnected by: VIDEO VISIT: Text to cell phone:   Telephone Information:   Mobile [phone]       Will anyone else be joining the visit? NO  (If patient encounters technical issues they should call [phone]    How would you like to obtain your AVS? MyChart    Are changes needed to the allergy or medication list? Pt stated no med changes    Are refills needed on medications prescribed by this physician? NO    Reason for visit: RECHECK    No other vitals to report per pt    Nancy RIVERAF

## 2024-04-10 NOTE — PROGRESS NOTES
Virtual Visit Details     Type of service:  Video Visit   Video Start Time: 1238  Video End Time:1300    Originating Location (pt. Location): Home  Distant Location (provider location):  On-site  Platform used for Video Visit: Huey       [facility] - Medical Oncology Follow Up Note  04/10/2024    Oncology History:   Patient developed rectal bleeding in 2020.  In January 2021 CT showed focal wall thickening in the upper rectum, multiple pulmonary nodules bilaterally suspicious for metastatic disease.  Underwent FNA of the right upper lobe lesion which was consistent with adenocarcinoma of the colon.  He was treated with FOLFOX from 2/20/2021 through 5/20/2021.  Avastin was added.  Had an infusion reaction to oxaliplatin 6/2021.  7/2021- 12/2021 was on 5-FU alone.  Developed progression.  12/2021- 9/2022 was on FOLFIRI.  11/2021 started Lonsurf. All of this was done with outside oncologist.     Met with Dr. Snow on 2/3/2023 to establish care. Recommended regorafenib.     Started regorafenib on 3/2/2023. Progression noted 5/19/23. Plan to start FOLFOX/Avastin and send out CARIS testing to evaluate for other treatment options. Cycle 1 was given with oxaliplatin desensitization, 5FU, Avastin held due to increased urine protein.     Following cycle 4, patient was admitted with hematuria and acute kidney injury.    Oxaliplatin was dropped with Cycle 5.     Y-90 radioembolization 9/7.    10/24/23 CT CAP with disease progression with notable growth in all pulmonary lesions. Lonsurf + bevacizumab started 10/27/23 with plans to bridge to clinical trial.   11/15/23 - stopped chemo for [facility] study.   Currently enrolled in [facility] CAR-T trial (completed cell harvest 1/23/2024, tentatively planning administration of CAR-T in April 2/12/24 - resumed Lonsurf, bevacizumab to bridge treatment until CAR-T therapy  3/20/24 - treatment changed to Fruquintinib due to intolerability of Lonsurf/bevacizumab  4/2/24- Presented to  the ED with dehydration.      Roman returns to clinic today for follow up.      Interval History:     On both Lonsurf/bevacizumab and fruquintinib, Roman felt like he couldn't drink enough water (despite an intake of ~90oz per day) and was continuously dehydrated. Since receiving IVFs in the ED and stopping oral chemo, he has recovered from the symptoms and is feeling a lot better. During that time of dehydration, his bowels were also constipated due to lack of water in his body. He also had no appetite and experienced more nausea.     Since stopping the oral chemo, his appetite has improved and he denies any nausea. He continues on Miralax and Senna daily as his bowels are moving slowly via ostomy.     He felt that the fruquintinib targeted his rectal area since the discharge decreased while on that treatment. However, his rectal pain has increased lately. He is taking oxycodone every 6-7 hours. He is scheduled for follow up with Dr. Mendoza next week regarding another pain block procedure.     He was notified 2 days ago that [facility] is planning to have him return at the end of April/beginning of May for repeat imaging and tenative scheduling of cell therapy 2-3 weeks after. He needs to be off chemotherapy for 4 weeks leading up to this, so he is continuing to hold chemo at this time.       Review of Systems:  Patient denies any of the following except if noted above: fevers, chills, difficulty with energy, vision or hearing changes, chest pain, dyspnea, abdominal pain, nausea, vomiting, diarrhea, constipation, urinary concerns, headaches, numbness, tingling, issues with sleep or mood. Also denies lumps, bumps, rashes or skin lesions, bleeding or bruising issues.      Past Medical History:   Diagnosis Date    Cancer (H) 1/12/21    Colorectal cacer mast, to lungs, and liver    Essential (primary) hypertension 08/03/2021        Allergies   Allergen Reactions    Oxaliplatin Shortness Of Breath, Nausea and Vomiting and Other  (See Comments)     Patient had HSR to Oxaliplatin on cycle 8 of FOLFOX chemotherapy. Sent to ER for continued monitoring.  Patient had HSR to Oxaliplatin on cycle 8 of FOLFOX chemotherapy. Sent to ER for continued monitoring.      Blood-Group Specific Substance Other (See Comments)     Patient has reactivity Suggestive of a Warm auto antibody. Blood products may be delayed. Draw patient 24 hours prior to transfusion. Draw one red top and two purple top tubes for all type and screen orders.  Patient has reactivity Suggestive of a Warm auto antibody. Blood products may be delayed. Draw patient 24 hours prior to transfusion. Draw one red top and two purple top tubes for all type and screen orders.       Physical Exam:  GENERAL: alert and no distress  EYES: Eyes grossly normal to inspection.  No discharge or erythema, or obvious scleral/conjunctival abnormalities.  RESP: No audible wheeze, cough, or visible cyanosis.    SKIN: Visible skin clear. No significant rash, abnormal pigmentation or lesions.  NEURO: Cranial nerves grossly intact.  Mentation and speech appropriate for age.  PSYCH: Appropriate affect, tone, and pace of words          LABS  Most Recent 3 CBC's:  Recent Labs   Lab Test 04/02/24 0417 03/27/24  0845 03/11/24  0826   WBC 3.2* 6.9 3.6*   HGB 12.5* 11.3* 11.7*   MCV 78 77* 80    245 318    Most Recent 3 BMP's:  Recent Labs   Lab Test 04/08/24  1147 04/02/24  0417 03/27/24  0845    134* 138   POTASSIUM 4.8 4.3 4.4   CHLORIDE 97* 95* 102   CO2 27 25 26   BUN 10.1 10.6 13.0   CR 0.81 0.75 1.06   ANIONGAP 11 14 10   JEFERSON 9.3 9.7 9.1   * 109* 98    Most Recent 2 LFT's:  Recent Labs   Lab Test 04/08/24  1147 04/02/24  0417   AST 28 25   ALT 23 19   ALKPHOS 99 102   BILITOTAL 0.4 1.0        I reviewed the above labs today.      ASSESSMENT AND PLAN   Metastatic rectal cancer  - Progression on regorafenib. Started FOLFOX/Avastin 6/7/23. Oxaliplatin removed with cycle 5 due to acute kidney  injury and hematuria following cycle 4. Y-90 radioembolization completed 9/7, then resumed 5FU. CT CAP 10/2023 with disease progression. Started Lonsurf/bevacizumab 10/27/23 with plans to bridge to move forward with clinical trials. Held chemo since 11/15/23.  Currently enrolled in [facility] CAR-T trial. Completed cell harvest 1/23/2024, tentatively planning administration of CAR-T in April. Resumed Lonsurf, bevacizumab on 2/12/24. Treatment changed to fruquintinib due to poor tolerance. Self-discontinued fruquintinib in the beginning of April due to dehydration, constipation, nausea and poor appetite. Feeling better since stopping therapy, eating and drinking well again. Returning to [facility] at the end of the month for repeat imaging with potential cell therapy treatment 2-3 weeks later. Continue to hold oral chemotherapy as he needs 4 week treatment break prior to cell therapy. Will follow up with Roman regarding follow up appointments with our team once [facility] schedule is determined.        Nausea  Had a few episodes of mild nausea, well-controlled with PRN oral antiemetics. Nausea resolved once discontinued fruquintinib.  Appetite is good, adequate oral intake.       Rectal, perineum pain  Increased while on fruquintinib. Scheduled with Dr. Mendoza for follow up next week, hoping to get another pain injection.      59 minutes spent on the date of the encounter doing chart review, review of test results, interpretation of tests, patient visit, and documentation       FLORA Albright CNP

## 2024-04-10 NOTE — PROGRESS NOTES
"Virtual Visit Details    Type of service:  Video Visit   Video Start Time: {video visit start/end time for provider to select:241802}  Video End Time:{video visit start/end time for provider to select:801917}    Originating Location (pt. Location): {video visit patient location:241857::\"Home\"}  {PROVIDER LOCATION On-site should be selected for visits conducted from your clinic location or adjoining [facility] hospital, academic office, or other nearby [facility] building. Off-site should be selected for all other provider locations, including home:851073}  Distant Location (provider location):  {virtual location provider:275420}  Platform used for Video Visit: {Virtual Visit Platforms:229426::\"OnShift\"}  "

## 2024-04-10 NOTE — Clinical Note
"    4/10/2024         RE: Roman Escalante  [address]  [place] [facility] [address]        Dear Colleague,    Thank you for referring your patient, Roman Escalante, to the [facility]. Please see a copy of my visit note below.    Virtual Visit Details    Type of service:  Video Visit   Video Start Time: {video visit start/end time for provider to select:497038}  Video End Time:{video visit start/end time for provider to select:336299}    Originating Location (pt. Location): {video visit patient location:393299::\"Home\"}  {PROVIDER LOCATION On-site should be selected for visits conducted from your clinic location or adjoining [facility] hospital, academic office, or other nearby [facility] building. Off-site should be selected for all other provider locations, including home:806051}  Distant Location (provider location):  {virtual location provider:803690}  Platform used for Video Visit: {Virtual Visit Platforms:236457::\"AmWell\"}    Virtual Visit Details     Type of service:  Video Visit   Video Start Time: {video visit start/end time for provider to select:537681}  Video End Time:{video visit start/end time for provider to select:980401}    Originating Location (pt. Location): {video visit patient location:679324::\"Home\"}  {PROVIDER LOCATION On-site should be selected for visits conducted from your clinic location or adjoining [facility] hospital, academic office, or other nearby [facility] building. Off-site should be selected for all other provider locations, including home:768773}  Distant Location (provider location):  {virtual location provider:613819}  Platform used for Video Visit: {Virtual Visit Platforms:500560::\"AmWell\"}       [facility] - Medical Oncology Follow Up Note  04/10/2024    Oncology History:   Patient developed rectal bleeding in 2020.  In January 2021 CT showed focal wall thickening in the upper rectum, multiple pulmonary nodules bilaterally suspicious for metastatic " disease.  Underwent FNA of the right upper lobe lesion which was consistent with adenocarcinoma of the colon.  He was treated with FOLFOX from 2/20/2021 through 5/20/2021.  Avastin was added.  Had an infusion reaction to oxaliplatin 6/2021.  7/2021- 12/2021 was on 5-FU alone.  Developed progression.  12/2021- 9/2022 was on FOLFIRI.  11/2021 started Lonsurf. All of this was done with outside oncologist.     Met with Dr. Snow on 2/3/2022 to establish care. Recommended regorafenib.     There have been questions of a parasitic infection. Please see previous notes from [facility] oncologist for further details. Was seen by ID at the  on 2/27, no concerns for parasitic infection    Started regorafenib on 3/2/2023. Progression noted 5/19/23. Plan to start FOLFOX/Avastin and send out CARIS testing to evaluate for other treatment options. Cycle 1 was given with oxaliplatin desensitization, 5FU, Avastin held due to increased urine protein.     Following cycle 4, patient was admitted with hematuria and acute kidney injury.    Oxaliplatin was dropped with Cycle 5.     Y-90 radioembolization 9/7.    10/24/23 CT CAP with disease progression with notable growth in all pulmonary lesions. Lonsurf + bevacizumab started 10/27/23 with plans to bridge to clinical trial.   11/15/23 - stopped chemo for [facility] study.   Currently enrolled in [facility] CAR-T trial (completed cell harvest 1/23/2024, tentatively planning administration of CAR-T in April 2/12/24 - resumed Lonsurf, bevacizumab  ***   ***    Roman returns to clinic today for follow up***        Interval History:   -Roman presents today frustrated with his pain control and delays getting back on treatment.   -Pain in perineum increased while off treatment. Has been slowly improving since pain medication regimen was adjusted. Finally felt comfortable this weekend.  -bowels were moving slowly. Now improved with stool softeners and laxatives. Continues to have mucus discharge from the  "rectum, sometimes with blood. Often passes rectal mucus when urinating.   -Mild nausea, controlled with PRN antiemetics. Eating a lot of yogurt and drinking fluids.   -Since restarting Lonsurf, his hands are dry. Feet are sensitive with pins and needles tingling sensation which has gotten worse since restarting treatment.       Review of Systems:  Patient denies any of the following except if noted above: fevers, chills, difficulty with energy, vision or hearing changes, chest pain, dyspnea, abdominal pain, nausea, vomiting, diarrhea, constipation, urinary concerns, headaches, numbness, tingling, issues with sleep or mood. Also denies lumps, bumps, rashes or skin lesions, bleeding or bruising issues.      Past Medical History:   Diagnosis Date     Cancer (H) 1/12/21    Colorectal cacer mast, to lungs, and liver     Essential (primary) hypertension 08/03/2021        Allergies   Allergen Reactions     Oxaliplatin Shortness Of Breath, Nausea and Vomiting and Other (See Comments)     Patient had HSR to Oxaliplatin on cycle 8 of FOLFOX chemotherapy. Sent to ER for continued monitoring.  Patient had HSR to Oxaliplatin on cycle 8 of FOLFOX chemotherapy. Sent to ER for continued monitoring.       Blood-Group Specific Substance Other (See Comments)     Patient has reactivity Suggestive of a Warm auto antibody. Blood products may be delayed. Draw patient 24 hours prior to transfusion. Draw one red top and two purple top tubes for all type and screen orders.  Patient has reactivity Suggestive of a Warm auto antibody. Blood products may be delayed. Draw patient 24 hours prior to transfusion. Draw one red top and two purple top tubes for all type and screen orders.       Physical Exam:  Ht 1.803 m (5' 11\")   Wt 86.2 kg (190 lb)   BMI 26.50 kg/m    Wt Readings from Last 10 Encounters:   02/26/24 88.1 kg (194 lb 3.2 oz)   02/20/24 90.7 kg (200 lb)   02/12/24 91 kg (200 lb 11.2 oz)   02/02/24 91.4 kg (201 lb 8 oz)   01/29/24 93 " kg (205 lb)   12/22/23 94.2 kg (207 lb 11.2 oz)   11/14/23 93.6 kg (206 lb 6.4 oz)   10/30/23 93.9 kg (207 lb)   10/27/23 94 kg (207 lb 3.2 oz)   General: The patient is a pleasant male in no acute distress.  HEENT: EOMI. Sclerae are anicteric. Oral mucosa pink and moist without lesions or thrush.   Lymph: Neck is supple with no lymphadenopathy in the cervical or supraclavicular areas.   Heart: Regular rate and rhythm.   Lungs: Clear to auscultation bilaterally.   Abdomen: Bowel sounds present, soft, nontender with no palpable hepatosplenomegaly or masses. Ostomy present in left lower abdomen, stoma not visualized due to appliance.   Extremities: No lower extremity edema noted bilaterally.   Neuro: Cranial nerves II through XII are grossly intact.  Skin: No rashes, petechiae, or bruising noted on exposed skin.        LABS  Most Recent 3 CBC's:  Recent Labs   Lab Test 04/02/24  0417 03/27/24  0845 03/11/24  0826   WBC 3.2* 6.9 3.6*   HGB 12.5* 11.3* 11.7*   MCV 78 77* 80    245 318    Most Recent 3 BMP's:  Recent Labs   Lab Test 04/08/24  1147 04/02/24  0417 03/27/24  0845    134* 138   POTASSIUM 4.8 4.3 4.4   CHLORIDE 97* 95* 102   CO2 27 25 26   BUN 10.1 10.6 13.0   CR 0.81 0.75 1.06   ANIONGAP 11 14 10   JEFERSON 9.3 9.7 9.1   * 109* 98    Most Recent 2 LFT's:  Recent Labs   Lab Test 04/08/24  1147 04/02/24  0417   AST 28 25   ALT 23 19   ALKPHOS 99 102   BILITOTAL 0.4 1.0        I reviewed the above labs today.      ASSESSMENT AND PLAN   Metastatic rectal cancer  - Progression on regorafenib. Started FOLFOX/Avastin 6/7/23. Oxaliplatin removed with cycle 5 due to acute kidney injury and hematuria following cycle 4. Y-90 radioembolization completed 9/7, then resumed 5FU. CT CAP 10/2023 with disease progression. Started Lonsurf/bevacizumab 10/27/23 with plans to bridge to move forward with clinical trials. Held chemo since 11/15/23.  Currently enrolled in [facility] CAR-T trial. Completed cell harvest  1/23/2024, tentatively planning administration of CAR-T in April. Resumed Lonsurf, bevacizumab on 2/12/24. His pain increased while off treatment and has been slowly improving since resuming along with pain medication adjustments.  ***     Nausea  Had a few episodes of mild nausea, well-controlled with PRN oral antiemetics. Appetite is good, adequate oral intake.       Rectal, perineum pain  Increased while off treatment. Working with palliative care team to adjust pain medication regimen. Continue stool softeners and laxatives as needed to control constipation.       FLORA Albright CNP          Again, thank you for allowing me to participate in the care of your patient.        Sincerely,        FLORA Albrihgt CNP

## 2024-04-11 DIAGNOSIS — C18.9 COLON CANCER METASTASIZED TO LIVER (H): ICD-10-CM

## 2024-04-11 DIAGNOSIS — G89.3 CANCER RELATED PAIN: ICD-10-CM

## 2024-04-11 DIAGNOSIS — C78.7 COLON CANCER METASTASIZED TO LIVER (H): ICD-10-CM

## 2024-04-11 RX ORDER — OXYCODONE HYDROCHLORIDE 10 MG/1
10-20 TABLET ORAL EVERY 4 HOURS PRN
Qty: 240 TABLET | Refills: 0 | Status: SHIPPED | OUTPATIENT
Start: 2024-04-11 | End: 2024-05-21

## 2024-04-11 NOTE — TELEPHONE ENCOUNTER
Received message from scheduling that pt mentioned he needs a refill of oxycodone.    Last refill: 3/5/24  Last office visit: 4/2/24  Scheduled for follow up 5/7/24     Will route request to MD for review.     Reviewed [place]  Report.

## 2024-04-15 ENCOUNTER — TELEPHONE (OUTPATIENT)
Dept: ANESTHESIOLOGY | Facility: CLINIC | Age: 49
End: 2024-04-15

## 2024-04-15 ENCOUNTER — OFFICE VISIT (OUTPATIENT)
Dept: ANESTHESIOLOGY | Facility: CLINIC | Age: 49
End: 2024-04-15
Payer: COMMERCIAL

## 2024-04-15 ENCOUNTER — HOSPITAL ENCOUNTER (OUTPATIENT)
Facility: AMBULATORY SURGERY CENTER | Age: 49
End: 2024-04-15
Attending: ANESTHESIOLOGY | Admitting: ANESTHESIOLOGY
Payer: COMMERCIAL

## 2024-04-15 VITALS
DIASTOLIC BLOOD PRESSURE: 67 MMHG | WEIGHT: 186 LBS | HEART RATE: 62 BPM | BODY MASS INDEX: 25.19 KG/M2 | OXYGEN SATURATION: 97 % | SYSTOLIC BLOOD PRESSURE: 104 MMHG | HEIGHT: 72 IN

## 2024-04-15 DIAGNOSIS — G58.8 PUDENDAL NEURALGIA: Primary | ICD-10-CM

## 2024-04-15 PROCEDURE — 99214 OFFICE O/P EST MOD 30 MIN: CPT | Mod: GC | Performed by: ANESTHESIOLOGY

## 2024-04-15 ASSESSMENT — PAIN SCALES - GENERAL: PAINLEVEL: NO PAIN (1)

## 2024-04-15 NOTE — LETTER
4/15/2024       RE: Roman Escalante  [address]  [facility] [address]       Dear Colleague,    Thank you for referring your patient, Roman Escalante, to the [facility] FOR [facility]IVE PAIN [facility]NAG[facility] [place] at [facility]. Please see a copy of my visit note below.      Pain Clinic New Patient Consult Note:    Referring Provider: Edy   Primary care provider: Buddy Hart.    Roman Escalante is a 48 year old y.o. old male who presents to the pain clinic with cancer related pain, s/p bilateral pudendal nerve blocks on 3/14/24.    HPI:  Patient Supplied Answers To the [facility] Pain Questionnaire      4/15/2024    12:21 PM   [facility] Pain -  Patient Entered Questionnaire/Answers   What number best describes your pain right now:  0 = No pain  to  10 = Worst pain imaginable 1   How would you describe the pain dull, aching   Which of the following worsen your pain sitting    walking   Which of the following improve or reduce your pain lying down    medication    relaxation    bowel movements   What number best describes your average pain for the past week:  0 = No pain  to  10 = Worst pain imaginable 3   What number best describes your LOWEST pain in past 24 hours:  0 = No pain  to  10 = Worst pain imaginable 1   What number best describes your WORST pain in past 24 hours:  0 = No pain  to  10 = Worst pain imaginable 8   What non-medicine treatments have you already had for your pain pain clinic    other nerve blocks    exercise     Roman is a 47 yo male presenting to the clinic himself for pain.     He reports that he did get initial improvement (80%) in rectal pain following the pudendal nerve blocks for a couple of weeks. Opiate needs decreased during these two weeks as well. Was very happy with the results. Unfortunately about 2 weeks ago, restarted oral chemotherapy meds and with his first BM this pain returned. He is  wondering if the blocks can be repeated. Overall he feels there is a 50% improvement in pain and reduction in daily OMEs. No longer going to be on oral chemotherapy medications. Will be tentatively restarting NIH cell therapy. Needs to fly out to [place] for this. Tentatively in early to mid May. Hoping he can get a repeat injection before then.     Pain today is primarily around the tailbone/pudendal region. Pain is a constant 2-3/10. Pain can be worse with certain foods.     Currently taking Oxycodone 20 3-4x/day. He feels that the medications are helpful. Has tried decreasing dose to 10mg but then he feels he will lose control of his pain and needs to take a while to catch back up. Prescribed by Dr. Nunes.     Otherwise no other medical updates.     He lives with his girlfriend. His daughter and grand kids are located in [place].     He denies any goals and feels fortunate to be able to go through the last 3 years. He would like to go back in time and not taken a break from chemotherapy. He feels that his pain tolerance is high.     Tests/Imaging reviewed with the patient:    None     Significant Medical History:   Past Medical History:   Diagnosis Date    Cancer (H) 1/12/21    Colorectal cacer mast, to lungs, and liver    Essential (primary) hypertension 08/03/2021      Past Surgical History:  Past Surgical History:   Procedure Laterality Date    ABDOMEN SURGERY      BIOPSY  1/21/21    Lung biopsy. Positive colorectal cancer in lungs    GI SURGERY  Ostomy placementnt 2/1/21    Stoma is an outie, bowel excretion only    IR LUNG BIOPSY MEDIASTINUM RIGHT  01/19/2021    IR SIRT (SELECTIVE INTERNAL RADIO THERAPY)  8/29/2023    IR VISCERAL ANGIOGRAM  8/29/2023    IR VISCERAL EMBOLIZATION  9/7/2023    NERVE BLOCK PERIPHERAL Bilateral 3/14/2024    Procedure: Bilateral pudendal nerve block with ultrasound;  Surgeon: Asha Mendoza MD;  Location: [facility] OR       Family History:  No family history on file.      Social History:  Social History     Socioeconomic History    Marital status: Single     Spouse name: Not on file    Number of children: Not on file    Years of education: Not on file    Highest education level: Not on file   Occupational History    Not on file   Tobacco Use    Smoking status: Former     Current packs/day: 0.00     Average packs/day: 0.5 packs/day for 13.3 years (6.6 ttl pk-yrs)     Types: Cigarettes, Other     Start date: 2010     Quit date: 2023     Years since quittin.6     Passive exposure: Current    Smokeless tobacco: Never   Vaping Use    Vaping status: Never Used   Substance and Sexual Activity    Alcohol use: Not Currently     Comment: social    Drug use: Yes     Types: Marijuana     Comment: Keeps up my appetite, sleep, and help with neuropathy    Sexual activity: Yes     Partners: Female     Birth control/protection: Abstinence, Condom, Post-menopausal   Other Topics Concern    Parent/sibling w/ CABG, MI or angioplasty before 65F 55M? No   Social History Narrative    Not on file     Social Determinants of Health     Financial Resource Strain: Low Risk  (2024)    Financial Resource Strain     Within the past 12 months, have you or your family members you live with been unable to get utilities (heat, electricity) when it was really needed?: No   Food Insecurity: Low Risk  (2024)    Food Insecurity     Within the past 12 months, did you worry that your food would run out before you got money to buy more?: No     Within the past 12 months, did the food you bought just not last and you didn t have money to get more?: No   Transportation Needs: Low Risk  (2024)    Transportation Needs     Within the past 12 months, has lack of transportation kept you from medical appointments, getting your medicines, non-medical meetings or appointments, work, or from getting things that you need?: No   Physical Activity: Not on file   Stress: Not on file   Social Connections: Unknown  (4/19/2023)    Received [facility]    Social Connections     Frequency of Communication with Friends and Family: Not on file   Interpersonal Safety: Not At Risk (2/3/2023)    Humiliation, Afraid, Rape, and Kick questionnaire     Fear of Current or Ex-Partner: No     Emotionally Abused: No     Physically Abused: No     Sexually Abused: No   Housing Stability: Low Risk  (2/2/2024)    Housing Stability     Do you have housing? : Yes     Are you worried about losing your housing?: No     Social History     Social History Narrative    Not on file          Allergies:  Allergies   Allergen Reactions    Oxaliplatin Shortness Of Breath, Nausea and Vomiting and Other (See Comments)     Patient had HSR to Oxaliplatin on cycle 8 of FOLFOX chemotherapy. Sent to ER for continued monitoring.  Patient had HSR to Oxaliplatin on cycle 8 of FOLFOX chemotherapy. Sent to ER for continued monitoring.      Blood-Group Specific Substance Other (See Comments)     Patient has reactivity Suggestive of a Warm auto antibody. Blood products may be delayed. Draw patient 24 hours prior to transfusion. Draw one red top and two purple top tubes for all type and screen orders.  Patient has reactivity Suggestive of a Warm auto antibody. Blood products may be delayed. Draw patient 24 hours prior to transfusion. Draw one red top and two purple top tubes for all type and screen orders.         Current Medications:   Current Outpatient Medications   Medication Sig Dispense Refill    cyclobenzaprine (FLEXERIL) 5 MG tablet Take one tab (5mg) by mouth over 6-8 hours, as needed for spasms 45 tablet 2    fruquintinib (FRUZAQLA) 5 MG capsule Take 1 capsule (5 mg) by mouth daily Take on days 1 through 21, then off for 7 days. 21 capsule 0    gabapentin (NEURONTIN) 100 MG capsule Take 100mg by mouth at bedtime for 3 days, then Take 100mg by mouth in AM and 100mg at bedtime for 3 days, then Take 100mg by mouth three times per  day 90 capsule 0    ibuprofen (ADVIL/MOTRIN) 200 MG tablet Take 3 tablets (600 mg) by mouth every 8 hours as needed for moderate pain 100 tablet 0    morphine (MS CONTIN) 15 MG CR tablet Take 1 tablet (15 mg) by mouth every 12 hours 60 tablet 0    ondansetron (ZOFRAN) 4 MG tablet Take 1-2 tablets (4-8 mg) by mouth every 6 hours as needed for nausea (vomiting) 40 tablet 0    Ostomy Supplies MISC 1 each daily 1 each 11    oxyCODONE IR (ROXICODONE) 10 MG tablet Take 1-2 tablets (10-20 mg) by mouth every 4 hours as needed for moderate to severe pain 240 tablet 0    prochlorperazine (COMPAZINE) 10 MG tablet Take 1 tablet (10 mg) by mouth every 6 hours as needed for nausea or vomiting 30 tablet 2    prochlorperazine (COMPAZINE) 5 MG tablet Take 1 tablet (5 mg) by mouth every 6 hours as needed for nausea or vomiting 30 tablet 3       Work History:    Current work status: [employer]. He has stopped working.     Failed gabapentin trial.     Physical Exam:     Vitals:    04/15/24 1223   BP: 104/67   BP Location: Right arm   Patient Position: Chair   Cuff Size: Adult Regular   Pulse: 62   SpO2: 97%   Weight: 84.4 kg (186 lb)   Height: 1.829 m (6')         General Appearance: No distress, seated comfortably  Mood: Euthymic  HE ENT: Non constricted pupils  Respiratory: Non labored breathing  CVS: Regular rate and rhythm  GI: Soft, non distended, no TTP, left colostomy bag  Skin: No rashes over exposed skin  MS: LE no atrophy  Neuro: intact to light bilateral LE, pigmentation bilateral ankles +  Gait: nonantalgic, ambulates without assistance      Laboratory results:  Recent Labs   Lab Test 02/26/24  0719 02/12/24  0631   * 135   POTASSIUM 4.1 4.5   CHLORIDE 98 99   CO2 25 24   ANIONGAP 11 12   * 120*   BUN 14.7 9.2   CR 0.71 0.79   JEFERSON 9.3 9.2       CBC RESULTS:   Recent Labs   Lab Test 02/26/24  0719   WBC 5.5   RBC 4.69   HGB 11.5*   HCT 36.3*   MCV 77*   MCH 24.5*   MCHC 31.7   RDW 13.9             Imaging:  No images are attached to the encounter.     ASSESSMENT AND PLAN:     Encounter Diagnosis:    Pudendal neuralgia  Cancer related pain   Opioid dependence  Adenocarcinoma of the colon    Roman Escalante is a 48 year old y.o. old male who presents to the pain clinic with moderate to severe pain in the perinuem with radiation to the lower buttocks and thighs. He is s/p bilateral pudendal nerve blocks on 3/14/24. Had 80% improvement in pain for a couple of weeks following these blocks until he restarted his oral chemotherapy and the pain returned. Wondering if he can repeat these blocks again, especially because he will not be restarted this chemotherapy regimen.     I have summarized the patient s past medical history, discussed their clinical findings and the potential differential diagnosis with the patient. Significant past medical history pertinent to the patient s current condition includes recent restarting chemotherapy, his goal is to reduce the oxycodone medications.  The clinical findings reveal skin changes in the ankle area. The differential diagnosis discussed with the patient are listed above. I have discussed anatomy and possible sources of the pain using models and/or pictures (diagrams). I have discussed multi- disciplinary pain management options withthe patient as pertaining to their case as detailed above. The pain management options we discussed included, but were not limited to the recommendations below.  I also discussed with patient the risks, benefits and alternatives to each pain management option.  All of the patient s questions and concerns were answered to the best of my ability.    RECOMMENDATIONS:     1. Medications: The patient will continue medication management as per Dr. Prater    2. Procedure: We are scheduling the patient for bilateral pudendal nerve block with ultrasound in the procedure suite. Risks/benefits/alternatives were discussed.     I also  discussed with the patient that the possible risks involved with interventional treatment included, but are not limited to, no pain control, worsened pain, stroke,seizure, spinal headache, allergic reactions, introduction of infection or bleeding which may lead to emergent spine surgery, nerve damage, paralysis oreven death.    Follow up: 1 month after the nerve blocks.     ATTENDING ATTESTATION  I saw the patient along with the pain medicine fellow Dr. Milton Atkins. Please see his note above for full details. I was involved in gathering history, physical examination and development of the plan of care.         Again, thank you for allowing me to participate in the care of your patient.      Sincerely,    Asha Mendoza MD

## 2024-04-15 NOTE — TELEPHONE ENCOUNTER
Patient is scheduled for surgery with Dr. Mendoza    Spoke with: patient    Date of Surgery: 4/25/24    Location: [facility]    Informed patient they will need an adult  yes    Additional comments: Patient is aware of date and time of the procedure.         Mel Mclaughlin MA on 4/15/2024 at 1:38 PM

## 2024-04-15 NOTE — PROGRESS NOTES
Pain Clinic New Patient Consult Note:    Referring Provider: Edy   Primary care provider: Buddy Hart.    Roman Escalante is a 48 year old y.o. old male who presents to the pain clinic with cancer related pain, s/p bilateral pudendal nerve blocks on 3/14/24.    HPI:  Patient Supplied Answers To the [facility] Pain Questionnaire      4/15/2024    12:21 PM   [facility] Pain -  Patient Entered Questionnaire/Answers   What number best describes your pain right now:  0 = No pain  to  10 = Worst pain imaginable 1   How would you describe the pain dull, aching   Which of the following worsen your pain sitting    walking   Which of the following improve or reduce your pain lying down    medication    relaxation    bowel movements   What number best describes your average pain for the past week:  0 = No pain  to  10 = Worst pain imaginable 3   What number best describes your LOWEST pain in past 24 hours:  0 = No pain  to  10 = Worst pain imaginable 1   What number best describes your WORST pain in past 24 hours:  0 = No pain  to  10 = Worst pain imaginable 8   What non-medicine treatments have you already had for your pain pain clinic    other nerve blocks    exercise     Roman is a 49 yo male presenting to the clinic himself for pain.     He reports that he did get initial improvement (80%) in rectal pain following the pudendal nerve blocks for a couple of weeks. Opiate needs decreased during these two weeks as well. Was very happy with the results. Unfortunately about 2 weeks ago, restarted oral chemotherapy meds and with his first BM this pain returned. He is wondering if the blocks can be repeated. Overall he feels there is a 50% improvement in pain and reduction in daily OMEs. No longer going to be on oral chemotherapy medications. Will be tentatively restarting [facility] cell therapy. Needs to fly out to [place] for this. Tentatively in early to mid May. Hoping he can get a repeat injection before then.     Pain  today is primarily around the tailbone/pudendal region. Pain is a constant 2-3/10. Pain can be worse with certain foods.     Currently taking Oxycodone 20 3-4x/day. He feels that the medications are helpful. Has tried decreasing dose to 10mg but then he feels he will lose control of his pain and needs to take a while to catch back up. Prescribed by Dr. Nunes.     Otherwise no other medical updates.     He lives with his girlfriend. His daughter and grand kids are located in [place].     He denies any goals and feels fortunate to be able to go through the last 3 years. He would like to go back in time and not taken a break from chemotherapy. He feels that his pain tolerance is high.     Tests/Imaging reviewed with the patient:    None     Significant Medical History:   Past Medical History:   Diagnosis Date    Cancer (H) 1/12/21    Colorectal cacer mast, to lungs, and liver    Essential (primary) hypertension 08/03/2021      Past Surgical History:  Past Surgical History:   Procedure Laterality Date    ABDOMEN SURGERY      BIOPSY  1/21/21    Lung biopsy. Positive colorectal cancer in lungs    GI SURGERY  Ostomy placementnt 2/1/21    Stoma is an outie, bowel excretion only    IR LUNG BIOPSY MEDIASTINUM RIGHT  01/19/2021    IR SIRT (SELECTIVE INTERNAL RADIO THERAPY)  8/29/2023    IR VISCERAL ANGIOGRAM  8/29/2023    IR VISCERAL EMBOLIZATION  9/7/2023    NERVE BLOCK PERIPHERAL Bilateral 3/14/2024    Procedure: Bilateral pudendal nerve block with ultrasound;  Surgeon: Asha Mendoza MD;  Location: [facility] OR       Family History:  No family history on file.     Social History:  Social History     Socioeconomic History    Marital status: Single     Spouse name: Not on file    Number of children: Not on file    Years of education: Not on file    Highest education level: Not on file   Occupational History    Not on file   Tobacco Use    Smoking status: Former     Current packs/day: 0.00     Average packs/day: 0.5  packs/day for 13.3 years (6.6 ttl pk-yrs)     Types: Cigarettes, Other     Start date: 2010     Quit date: 2023     Years since quittin.6     Passive exposure: Current    Smokeless tobacco: Never   Vaping Use    Vaping status: Never Used   Substance and Sexual Activity    Alcohol use: Not Currently     Comment: social    Drug use: Yes     Types: Marijuana     Comment: Keeps up my appetite, sleep, and help with neuropathy    Sexual activity: Yes     Partners: Female     Birth control/protection: Abstinence, Condom, Post-menopausal   Other Topics Concern    Parent/sibling w/ CABG, MI or angioplasty before 65F 55M? No   Social History Narrative    Not on file     Social Determinants of Health     Financial Resource Strain: Low Risk  (2024)    Financial Resource Strain     Within the past 12 months, have you or your family members you live with been unable to get utilities (heat, electricity) when it was really needed?: No   Food Insecurity: Low Risk  (2024)    Food Insecurity     Within the past 12 months, did you worry that your food would run out before you got money to buy more?: No     Within the past 12 months, did the food you bought just not last and you didn t have money to get more?: No   Transportation Needs: Low Risk  (2024)    Transportation Needs     Within the past 12 months, has lack of transportation kept you from medical appointments, getting your medicines, non-medical meetings or appointments, work, or from getting things that you need?: No   Physical Activity: Not on file   Stress: Not on file   Social Connections: Unknown (2023)    [facility]    Social Connections     Frequency of Communication with Friends and Family: Not on file   Interpersonal Safety: Not At Risk (2/3/2023)    Humiliation, Afraid, Rape, and Kick questionnaire     Fear of Current or Ex-Partner: No     Emotionally Abused: No     Physically Abused: No      Sexually Abused: No   Housing Stability: Low Risk  (2/2/2024)    Housing Stability     Do you have housing? : Yes     Are you worried about losing your housing?: No     Social History     Social History Narrative    Not on file          Allergies:  Allergies   Allergen Reactions    Oxaliplatin Shortness Of Breath, Nausea and Vomiting and Other (See Comments)     Patient had HSR to Oxaliplatin on cycle 8 of FOLFOX chemotherapy. Sent to ER for continued monitoring.  Patient had HSR to Oxaliplatin on cycle 8 of FOLFOX chemotherapy. Sent to ER for continued monitoring.      Blood-Group Specific Substance Other (See Comments)     Patient has reactivity Suggestive of a Warm auto antibody. Blood products may be delayed. Draw patient 24 hours prior to transfusion. Draw one red top and two purple top tubes for all type and screen orders.  Patient has reactivity Suggestive of a Warm auto antibody. Blood products may be delayed. Draw patient 24 hours prior to transfusion. Draw one red top and two purple top tubes for all type and screen orders.         Current Medications:   Current Outpatient Medications   Medication Sig Dispense Refill    cyclobenzaprine (FLEXERIL) 5 MG tablet Take one tab (5mg) by mouth over 6-8 hours, as needed for spasms 45 tablet 2    fruquintinib (FRUZAQLA) 5 MG capsule Take 1 capsule (5 mg) by mouth daily Take on days 1 through 21, then off for 7 days. 21 capsule 0    gabapentin (NEURONTIN) 100 MG capsule Take 100mg by mouth at bedtime for 3 days, then Take 100mg by mouth in AM and 100mg at bedtime for 3 days, then Take 100mg by mouth three times per day 90 capsule 0    ibuprofen (ADVIL/MOTRIN) 200 MG tablet Take 3 tablets (600 mg) by mouth every 8 hours as needed for moderate pain 100 tablet 0    morphine (MS CONTIN) 15 MG CR tablet Take 1 tablet (15 mg) by mouth every 12 hours 60 tablet 0    ondansetron (ZOFRAN) 4 MG tablet Take 1-2 tablets (4-8 mg) by mouth every 6 hours as needed for nausea  (vomiting) 40 tablet 0    Ostomy Supplies MISC 1 each daily 1 each 11    oxyCODONE IR (ROXICODONE) 10 MG tablet Take 1-2 tablets (10-20 mg) by mouth every 4 hours as needed for moderate to severe pain 240 tablet 0    prochlorperazine (COMPAZINE) 10 MG tablet Take 1 tablet (10 mg) by mouth every 6 hours as needed for nausea or vomiting 30 tablet 2    prochlorperazine (COMPAZINE) 5 MG tablet Take 1 tablet (5 mg) by mouth every 6 hours as needed for nausea or vomiting 30 tablet 3       Work History:    Current work status: [employer]. He has stopped working.     Failed gabapentin trial.     Physical Exam:     Vitals:    04/15/24 1223   BP: 104/67   BP Location: Right arm   Patient Position: Chair   Cuff Size: Adult Regular   Pulse: 62   SpO2: 97%   Weight: 84.4 kg (186 lb)   Height: 1.829 m (6')         General Appearance: No distress, seated comfortably  Mood: Euthymic  HE ENT: Non constricted pupils  Respiratory: Non labored breathing  CVS: Regular rate and rhythm  GI: Soft, non distended, no TTP, left colostomy bag  Skin: No rashes over exposed skin  MS: LE no atrophy  Neuro: intact to light bilateral LE, pigmentation bilateral ankles +  Gait: nonantalgic, ambulates without assistance      Laboratory results:  Recent Labs   Lab Test 02/26/24  0719 02/12/24  0631   * 135   POTASSIUM 4.1 4.5   CHLORIDE 98 99   CO2 25 24   ANIONGAP 11 12   * 120*   BUN 14.7 9.2   CR 0.71 0.79   JEFERSON 9.3 9.2       CBC RESULTS:   Recent Labs   Lab Test 02/26/24  0719   WBC 5.5   RBC 4.69   HGB 11.5*   HCT 36.3*   MCV 77*   MCH 24.5*   MCHC 31.7   RDW 13.9            Imaging:  No images are attached to the encounter.     ASSESSMENT AND PLAN:     Encounter Diagnosis:    Pudendal neuralgia  Cancer related pain   Opioid dependence  Adenocarcinoma of the colon    Roman Escalante is a 48 year old y.o. old male who presents to the pain clinic with moderate to severe pain in the perinuem with radiation to the lower buttocks and  thighs. He is s/p bilateral pudendal nerve blocks on 3/14/24. Had 80% improvement in pain for a couple of weeks following these blocks until he restarted his oral chemotherapy and the pain returned. Wondering if he can repeat these blocks again, especially because he will not be restarted this chemotherapy regimen.     I have summarized the patient s past medical history, discussed their clinical findings and the potential differential diagnosis with the patient. Significant past medical history pertinent to the patient s current condition includes recent restarting chemotherapy, his goal is to reduce the oxycodone medications.  The clinical findings reveal skin changes in the ankle area. The differential diagnosis discussed with the patient are listed above. I have discussed anatomy and possible sources of the pain using models and/or pictures (diagrams). I have discussed multi- disciplinary pain management options withthe patient as pertaining to their case as detailed above. The pain management options we discussed included, but were not limited to the recommendations below.  I also discussed with patient the risks, benefits and alternatives to each pain management option.  All of the patient s questions and concerns were answered to the best of my ability.    RECOMMENDATIONS:     1. Medications: The patient will continue medication management as per Dr. Prater    2. Procedure: We are scheduling the patient for bilateral pudendal nerve block with ultrasound in the procedure suite. Risks/benefits/alternatives were discussed.     I also discussed with the patient that the possible risks involved with interventional treatment included, but are not limited to, no pain control, worsened pain, stroke,seizure, spinal headache, allergic reactions, introduction of infection or bleeding which may lead to emergent spine surgery, nerve damage, paralysis oreven death.    Follow up: 1 month after the nerve blocks.      ATTENDING ATTESTATION  I saw the patient along with the pain medicine fellow Dr. Milton Atkins. Please see his note above for full details. I was involved in gathering history, physical examination and development of the plan of care.

## 2024-04-15 NOTE — PATIENT INSTRUCTIONS
Procedures:    Call to schedule your procedure: [phone] option #2    Bilateral Pudendal Nerve Block    Your pre-procedure instructions are below, please call our clinic if you have any questions.        Recommended Follow up:      Follow up 1 month after the procedure.    Please call [phone] to schedule your clinic appointment if you don't already have an appointment scheduled.        To speak with a nurse, schedule/reschedule/cancel a clinic appointment, or request a medication refill call: [phone]    You can also reach us by [employer]: [email or web address]     Procedure Information:     Please call [phone] option #2 to schedule, reschedule, or cancel your procedure appointment.   Phones are answered Monday - Friday from 08:00 - 4:30pm.  Leave a voicemail with your name, birth date, and phone number if no one is available to take your call.        You no longer need to test for COVID- 19 prior to your procedure/surgery, unless your physician specifically requests that you test. If you experience COVID symptoms or have tested positive for COVID-19 within 14 days of your scheduled surgery or procedure, please update our office right away and your procedure may have to be postponed.       The procedure center staff will call you several days before the procedure to review important information that you will need to know for the day of the procedure.     Please contact the clinic if you have further questions about this information [phone].        Information related to Scheduling and Pre-Procedure Instructions:    If you must reschedule your procedure more than two times, you must follow up in clinic before rescheduling again.    Preparing for your procedure    CAUTION - FAILURE TO FOLLOW THESE PRE-PROCEDURE INSTRUCTIONS WILL RESULT IN YOUR PROCEDURE BEING RESCHEDULED.    Your Procedure: Bilateral Pudendal Nerve Block            You must have a  take you home  after your procedure. Transportation by taxi or para-transit is okay as long as you have a responsible adult accompany you. You must provide your 's full name and contact number at time of check in.     Fasting Protocol Please have nothing to eat or drink 1 hour prior to arrival.   Medications If you take any medications, DO NOT STOP. Take your medications as usual the day of your procedure with a sip of water AT LEAST 2 HOURS PRIOR TO ARRIVAL.    Antibiotics If you are currently taking antibiotics, you must complete the entire dose 7 days prior to your scheduled procedure. You must be clear of any signs or symptoms of infection. If you begin antibiotics, please contact our clinic for instructions.     Fever, Chills, or Rash If you experience a fever of higher than 100 degrees, chills, rash, or open wounds during the one week before your procedure, please call the clinic to see if you may proceed with your procedure.      Medication Hold List  **Patients under Cardiology/Neurology care should consult their provider prior to the pain procedure to verify pre-procedure medication instructions. The information below contains general guidelines.**    Blood Thinners If you are taking daily ASPIRIN, PLAVIX, OR OTHER BLOOD THINNERS SUCH AS COUMADIN/WARFARIN, we will need your prescribing doctor to sign a release permitting you to stop these medications. Once approved by your prescribing doctor - STOP ALL BLOOD THINNERS BASED ON THE TIME TABLE BELOW PRIOR TO YOUR PROCEDURE. If you have been instructed to stop WARFARIN(COUMADIN), you must have an INR lab drawn the day before your procedure. Your INR must be within normal limits before we can perform your injection. MEDICATIONS CAN BE RESTARTED AFTER YOUR PROCEDURE.    24 HOUR HOLD  Lovenox (enoxaparin)  Agrylin (Anagrelide)    3 DAY HOLD  Xarelto (rivaroxaban)    5 DAY HOLD  Coumadin (Warfarin)  Brilinta (ticagrelor) 7 DAY HOLD  Anacin, Bufferin, Ecotrin, Excedrin,  Aggrenox (Aspirin)  Pradexa (Dabigatran)  Elmiron (Pentosan)  Plavix (Clopidogrel Bisulfate)  Pletal (Cilostazol)    10 DAY HOLD  Effient (Prasugel)    14 DAY HOLD  Ticlid (ticlopidine)        Non-steroidal Anti-inflammatories (NSAIDs) DO NOT TAKE any non-steroidal anti-inflammatory medications (NSAIDs) listed on the table below. MEDICATIONS CAN BE RESTARTED AFTER YOUR PROCEDURE. Celebrex is OK to take and does not need to be discontinued.     Medications to stop:  1 DAY HOLD  Advil, Motrin (Ibuprofen)  Voltaren (Diclofenac)  Toradol (Ketorolac)    3 DAY HOLD  Arthrotec (diciofenac sodium/misoprostol)  Clinoril (Sulindac)  Indocin (Indomethacin)  Lodine (Etodolac)  Vicoprofen (Hydrocodone and Ibuprofen)  Apixaban (Eliquis)    4 DAY HOLD  Mobic (Meloxicam)  Naprosyn (Naproxen)   7 DAY HOLD  Aleve (Naproxen sodium)  Darvon compound (contains aspirin)  Norgesic Forte (contains aspirin)  Oruvall (Ketoprofen)  Percodan (contains aspirin)  Relafen (Nabumetone)  Salsalate  Trilisate  Vitamin E (more than 400 mg per day)  Any medication containing aspirin    14 DAY HOLD  Daypro (Oxaprozin)  Feldene (Piroxicam)            To speak with a nurse, schedule/reschedule/cancel a clinic appointment, or request a medication refill call: [phone]    You can also reach us by [facility]: [email or web address]

## 2024-04-15 NOTE — NURSING NOTE
Patient presents with:  Follow Up: Follow-up Pudenal Neuralgia Lower Buttocks-Thigh Pain      No Pain (1)     Pain Medications       Opioid Agonists Refills Start End     morphine (MS CONTIN) 15 MG CR tablet 0 3/8/2024 --    Sig - Route: Take 1 tablet (15 mg) by mouth every 12 hours - Oral    Class: E-Prescribe    Earliest Fill Date: 3/8/2024    Prior authorization: Closed     oxyCODONE IR (ROXICODONE) 10 MG tablet 0 4/11/2024 --    Sig - Route: Take 1-2 tablets (10-20 mg) by mouth every 4 hours as needed for moderate to severe pain - Oral    Class: E-Prescribe    Earliest Fill Date: 4/11/2024            What medications are you using for pain? Oxycodone    (New patients only) Have you been seen by another pain clinic/ provider? no    (Return Patients only) What refills are you needing today? no

## 2024-04-24 ENCOUNTER — LAB (OUTPATIENT)
Dept: LAB | Facility: CLINIC | Age: 49
End: 2024-04-24
Payer: COMMERCIAL

## 2024-04-24 DIAGNOSIS — Z79.899 ENCOUNTER FOR LONG-TERM (CURRENT) USE OF MEDICATIONS: ICD-10-CM

## 2024-04-24 DIAGNOSIS — C78.00 RECTAL ADENOCARCINOMA METASTATIC TO LUNG (H): ICD-10-CM

## 2024-04-24 DIAGNOSIS — C20 RECTAL ADENOCARCINOMA METASTATIC TO LUNG (H): ICD-10-CM

## 2024-04-24 LAB
ALBUMIN MFR UR ELPH: 10.2 MG/DL
ALBUMIN SERPL BCG-MCNC: 3.9 G/DL (ref 3.5–5.2)
ALP SERPL-CCNC: 99 U/L (ref 40–150)
ALT SERPL W P-5'-P-CCNC: 19 U/L (ref 0–70)
ANION GAP SERPL CALCULATED.3IONS-SCNC: 12 MMOL/L (ref 7–15)
AST SERPL W P-5'-P-CCNC: 27 U/L (ref 0–45)
BASOPHILS # BLD AUTO: 0 10E3/UL (ref 0–0.2)
BASOPHILS NFR BLD AUTO: 0 %
BILIRUB SERPL-MCNC: 0.3 MG/DL
BUN SERPL-MCNC: 12.7 MG/DL (ref 6–20)
CALCIUM SERPL-MCNC: 9.5 MG/DL (ref 8.6–10)
CHLORIDE SERPL-SCNC: 99 MMOL/L (ref 98–107)
CREAT SERPL-MCNC: 0.83 MG/DL (ref 0.67–1.17)
CREAT UR-MCNC: 116 MG/DL
DEPRECATED HCO3 PLAS-SCNC: 27 MMOL/L (ref 22–29)
EGFRCR SERPLBLD CKD-EPI 2021: >90 ML/MIN/1.73M2
EOSINOPHIL # BLD AUTO: 0.3 10E3/UL (ref 0–0.7)
EOSINOPHIL NFR BLD AUTO: 3 %
ERYTHROCYTE [DISTWIDTH] IN BLOOD BY AUTOMATED COUNT: 20.6 % (ref 10–15)
GLUCOSE SERPL-MCNC: 123 MG/DL (ref 70–99)
HCT VFR BLD AUTO: 39.5 % (ref 40–53)
HGB BLD-MCNC: 12.3 G/DL (ref 13.3–17.7)
IMM GRANULOCYTES # BLD: 0 10E3/UL
IMM GRANULOCYTES NFR BLD: 0 %
LYMPHOCYTES # BLD AUTO: 0.7 10E3/UL (ref 0.8–5.3)
LYMPHOCYTES NFR BLD AUTO: 8 %
MAGNESIUM SERPL-MCNC: 2 MG/DL (ref 1.7–2.3)
MCH RBC QN AUTO: 25.1 PG (ref 26.5–33)
MCHC RBC AUTO-ENTMCNC: 31.1 G/DL (ref 31.5–36.5)
MCV RBC AUTO: 80 FL (ref 78–100)
MONOCYTES # BLD AUTO: 0.8 10E3/UL (ref 0–1.3)
MONOCYTES NFR BLD AUTO: 9 %
NEUTROPHILS # BLD AUTO: 7.1 10E3/UL (ref 1.6–8.3)
NEUTROPHILS NFR BLD AUTO: 80 %
NRBC # BLD AUTO: 0 10E3/UL
NRBC BLD AUTO-RTO: 0 /100
PLATELET # BLD AUTO: 457 10E3/UL (ref 150–450)
POTASSIUM SERPL-SCNC: 3.9 MMOL/L (ref 3.4–5.3)
PROT SERPL-MCNC: 7.6 G/DL (ref 6.4–8.3)
PROT/CREAT 24H UR: 0.09 MG/MG CR (ref 0–0.2)
RBC # BLD AUTO: 4.91 10E6/UL (ref 4.4–5.9)
SODIUM SERPL-SCNC: 138 MMOL/L (ref 135–145)
WBC # BLD AUTO: 9 10E3/UL (ref 4–11)

## 2024-04-24 PROCEDURE — 80053 COMPREHEN METABOLIC PANEL: CPT | Performed by: STUDENT IN AN ORGANIZED HEALTH CARE EDUCATION/TRAINING PROGRAM

## 2024-04-24 PROCEDURE — 83735 ASSAY OF MAGNESIUM: CPT

## 2024-04-24 PROCEDURE — 85025 COMPLETE CBC W/AUTO DIFF WBC: CPT | Performed by: STUDENT IN AN ORGANIZED HEALTH CARE EDUCATION/TRAINING PROGRAM

## 2024-04-24 PROCEDURE — 84156 ASSAY OF PROTEIN URINE: CPT

## 2024-04-26 ENCOUNTER — ONCOLOGY VISIT (OUTPATIENT)
Dept: ONCOLOGY | Facility: CLINIC | Age: 49
End: 2024-04-26
Attending: STUDENT IN AN ORGANIZED HEALTH CARE EDUCATION/TRAINING PROGRAM
Payer: COMMERCIAL

## 2024-04-26 VITALS
RESPIRATION RATE: 16 BRPM | SYSTOLIC BLOOD PRESSURE: 113 MMHG | BODY MASS INDEX: 25.49 KG/M2 | OXYGEN SATURATION: 98 % | HEIGHT: 72 IN | WEIGHT: 188.2 LBS | DIASTOLIC BLOOD PRESSURE: 81 MMHG | TEMPERATURE: 98.1 F | HEART RATE: 81 BPM

## 2024-04-26 DIAGNOSIS — F41.0 ANXIETY ATTACK: ICD-10-CM

## 2024-04-26 DIAGNOSIS — R30.0 BURNING WITH URINATION: Primary | ICD-10-CM

## 2024-04-26 LAB
ALBUMIN UR-MCNC: NEGATIVE MG/DL
APPEARANCE UR: CLEAR
BILIRUB UR QL STRIP: NEGATIVE
COLOR UR AUTO: YELLOW
GLUCOSE UR STRIP-MCNC: NEGATIVE MG/DL
HGB UR QL STRIP: NEGATIVE
KETONES UR STRIP-MCNC: NEGATIVE MG/DL
LEUKOCYTE ESTERASE UR QL STRIP: NEGATIVE
MUCOUS THREADS #/AREA URNS LPF: PRESENT /LPF
NITRATE UR QL: NEGATIVE
PH UR STRIP: 6.5 [PH] (ref 5–7)
RBC URINE: <1 /HPF
SP GR UR STRIP: 1.01 (ref 1–1.03)
SQUAMOUS EPITHELIAL: <1 /HPF
UROBILINOGEN UR STRIP-MCNC: NORMAL MG/DL
WBC URINE: 0 /HPF

## 2024-04-26 PROCEDURE — 99215 OFFICE O/P EST HI 40 MIN: CPT | Performed by: STUDENT IN AN ORGANIZED HEALTH CARE EDUCATION/TRAINING PROGRAM

## 2024-04-26 PROCEDURE — G0463 HOSPITAL OUTPT CLINIC VISIT: HCPCS | Performed by: STUDENT IN AN ORGANIZED HEALTH CARE EDUCATION/TRAINING PROGRAM

## 2024-04-26 PROCEDURE — G2211 COMPLEX E/M VISIT ADD ON: HCPCS | Performed by: STUDENT IN AN ORGANIZED HEALTH CARE EDUCATION/TRAINING PROGRAM

## 2024-04-26 PROCEDURE — 81003 URINALYSIS AUTO W/O SCOPE: CPT | Performed by: STUDENT IN AN ORGANIZED HEALTH CARE EDUCATION/TRAINING PROGRAM

## 2024-04-26 RX ORDER — LORAZEPAM 0.5 MG/1
0.5 TABLET ORAL EVERY 6 HOURS PRN
Qty: 10 TABLET | Refills: 0 | Status: SHIPPED | OUTPATIENT
Start: 2024-04-26

## 2024-04-26 ASSESSMENT — PAIN SCALES - GENERAL: PAINLEVEL: NO PAIN (0)

## 2024-04-26 NOTE — PROGRESS NOTES
[facility] - Medical Oncology Follow-Up Consult Note  2024    Patient Identifiers     Name: Roman Escalante  Preferred Address: [place]  Preferred Language: English  : 1975  Gender: male    Assessment and Plan     Mr. Roman Escalante is a 48 year old male former smoker (3-5 cigs/day) with a history of extensively pre-treated metastatic colorectal cancer currently enrolled in [facility] CAR-T Trial who returns to clinic for symptom management and counseling.    NGS: KRAS [id number]  [place] [facility] [place]: [id number]    Roman returns to clinic for further discussion of his ongoing rectal pain issues. As previously discussed, his pain was exacerbated by cancer-directed therapy, now being held in anticipation of cellular therapy infusion at the [facility]. Following treatment hold, and adjustment of his pain regimen by Palliative Care, his pain has significantly improved. He is not able to sit comfortably for extended periods and has had no meaningful rectal output. His ostomy output continues to be stable, and his PO intake has increased following pain improvement. He also notes that his emotions have been fluctuating wildly since his recent smoking cessation in anticipation of cellular therapy. He feels this explains why he has been more sensitive to the variety of issues we've dealt with over the past month. We reassured him that our goal is to support his oncologic needs, no matter what they are.    He will travel next week for cellular infusion. If he is infused as planned, he will be monitored for at least 1 month, after which time we will take over clinical surveillance. Our hope is that we are able to defer further systemic therapy indefinitely. We will plan to see him in clinic in 2 months for baseline scans and to review his overall clinical status. We wish him the best of luck in this upcoming trial treatment.    45 minutes spent on the date of the encounter doing chart review, review of test  results, interpretation of tests, patient visit, and documentation     The longitudinal plan of care for the diagnosis(es)/condition(s) as documented were addressed during this visit. Due to the added complexity in care, I will continue to support Roman in the subsequent management and with ongoing continuity of care.    Polo Snow MD, PhD   of Medicine  Division of Hematology, Oncology and Transplantation  [facility]    -----------------------------------    Oncology Summary     Cancer Staging   Rectal adenocarcinoma metastatic to lung (H)  Staging form: Colon and Rectum, AJCC 8th Edition  - Clinical: Stage IVB (cTX, cNX, pM1b) - Signed by Polo Snow MD on 3/30/2023      Oncology History   Rectal adenocarcinoma metastatic to lung (H)   2/3/2023 Initial Diagnosis    Rectal adenocarcinoma metastatic to lung (H)     2/3/2023 - 3/31/2023 Chemotherapy    Oral ONC Colorectal Cancer - Regorafenib  Plan Provider: Polo Snow MD  Treatment goal: Palliative  Line of treatment: [No plan line of treatment]     3/30/2023 -  Cancer Staged    Staging form: Colon and Rectum, AJCC 8th Edition  - Clinical: Stage IVB (cTX, cNX, pM1b)     6/7/2023 - 10/3/2023 Chemotherapy    OP ONC Colorectal Cancer - Modified FOLFOX-6 / Bevacizumab  Plan Provider: Polo Snow MD  Treatment goal: Palliative  Line of treatment: [No plan line of treatment]     10/27/2023 - 3/11/2024 Chemotherapy    OP ONC Colorectal Cancer - Bevacizumab + Trifluridine/Tipiracil (Lonsurf)  Plan Provider: Polo Snow MD  Treatment goal: Palliative  Line of treatment: [No plan line of treatment]     3/27/2024 -  Chemotherapy    Oral ONC Colorectal Cancer - Fruquintinib (Fruzaqla)  Plan Provider: Polo Snow MD  Treatment goal: Palliative  Line of treatment: [No plan line of treatment]         Subjective/Interval Events     - pain is well controlled on oxycodone. Taking 2-4 tablets daily  - some nausea associated with  "constipation    Physical Exam     Vital signs: /81 (BP Location: Right arm, Patient Position: Chair, Cuff Size: Adult Large)   Pulse 81   Temp 98.1  F (36.7  C)   Resp 16   Ht 1.829 m (6' 0.01\")   Wt 85.4 kg (188 lb 3.2 oz)   SpO2 98%   BMI 25.52 kg/m      ECOG performance status:  0  Vascular access:  R chest port    Physical Exam:  Exam performed, notable for:  - no notable findings    Objective Data     Lab data:  I have personally reviewed the lab data, notable for:    - Hgb 12.3  - Plt 457    Radiology data:  I have personally reviewed the radiology data, notable for:  03/27/2024  IMPRESSION:  1.  Multiple pulmonary nodules and masses have undergone mixed change compared to 01/22/2024, with several areas of central cavitation new since the previous exam.  2.  Mediastinal adenopathy has increased slightly.  3.  Hepatic metastatic masses are not significantly changed compared to 01/28/2024.  4.  Ill-defined rectosigmoid mass and perirectal lymph nodes are also not significantly changed, and could be better evaluated with MRI.  5.  Two soft tissue nodules within the fat of a left lower quadrant parastomal hernia are unchanged.    Pathology and other data:  I have personally reviewed and interpreted the pathology data, notable for:    - no new data    "

## 2024-04-26 NOTE — NURSING NOTE
"Oncology Rooming Note    April 26, 2024 3:33 PM   Roman Escalante is a 48 year old male who presents for:    Chief Complaint   Patient presents with    Oncology Clinic Visit     [facility] RETURN - COLORECTAL CANCER     Initial Vitals: /81 (BP Location: Right arm, Patient Position: Chair, Cuff Size: Adult Large)   Pulse 81   Temp 98.1  F (36.7  C)   Resp 16   Ht 1.829 m (6' 0.01\")   Wt 85.4 kg (188 lb 3.2 oz)   SpO2 98%   BMI 25.52 kg/m   Estimated body mass index is 25.52 kg/m  as calculated from the following:    Height as of this encounter: 1.829 m (6' 0.01\").    Weight as of this encounter: 85.4 kg (188 lb 3.2 oz). Body surface area is 2.08 meters squared.  No Pain (0) Comment: Data Unavailable   No LMP for male patient.  Allergies reviewed: Yes  Medications reviewed: Yes    Medications: Medication refills not needed today.  Pharmacy name entered into RFinity:    [facility] - [place], [place] - [address]  [facility] - [place], [place] - [address] [place] [address] DRUG STORE [id number] [place] [address] [address]    Frailty Screening:   Is the patient here for a new oncology consult visit in cancer care? 2. No      Beny Ha LPN              "

## 2024-04-26 NOTE — LETTER
2024         RE: Roman Escalante  [address]  [facility] [address]        Dear Colleague,    Thank you for referring your patient, Roman Escalante, to the [facility] CANCER [facility]IC. Please see a copy of my visit note below.    [facility] - Medical Oncology Follow-Up Consult Note  2024    Patient Identifiers     Name: Roman Escalante  Preferred Address: Roman  Preferred Language: English  : 1975  Gender: male    Assessment and Plan     Mr. Roman Escalante is a 48 year old male former smoker (3-5 cigs/day) with a history of extensively pre-treated metastatic colorectal cancer currently enrolled in [facility] CAR-T Trial who returns to clinic for symptom management and counseling.    NGS: KRAS [id number]  Immuno: [facility]  Clinical Trial: [id number], MARIKAJEEVAN Owens returns to clinic for further discussion of his ongoing rectal pain issues. As previously discussed, his pain was exacerbated by cancer-directed therapy, now being held in anticipation of cellular therapy infusion at the [facility]. Following treatment hold, and adjustment of his pain regimen by Palliative Care, his pain has significantly improved. He is not able to sit comfortably for extended periods and has had no meaningful rectal output. His ostomy output continues to be stable, and his PO intake has increased following pain improvement. He also notes that his emotions have been fluctuating wildly since his recent smoking cessation in anticipation of cellular therapy. He feels this explains why he has been more sensitive to the variety of issues we've dealt with over the past month. We reassured him that our goal is to support his oncologic needs, no matter what they are.    He will travel next week for cellular infusion. If he is infused as planned, he will be monitored for at least 1 month, after which time we will take over clinical surveillance. Our hope is that we are able to defer further systemic therapy  indefinitely. We will plan to see him in clinic in 2 months for baseline scans and to review his overall clinical status. We wish him the best of luck in this upcoming trial treatment.    45 minutes spent on the date of the encounter doing chart review, review of test results, interpretation of tests, patient visit, and documentation     The longitudinal plan of care for the diagnosis(es)/condition(s) as documented were addressed during this visit. Due to the added complexity in care, I will continue to support Roman in the subsequent management and with ongoing continuity of care.    Polo Snow MD, PhD   of Medicine  Division of Hematology, Oncology and Transplantation  [facility]    -----------------------------------    Oncology Summary     Cancer Staging   Rectal adenocarcinoma metastatic to lung (H)  Staging form: Colon and Rectum, AJCC 8th Edition  - Clinical: Stage IVB (cTX, cNX, pM1b) - Signed by Polo Snow MD on 3/30/2023      Oncology History   Rectal adenocarcinoma metastatic to lung (H)   2/3/2023 Initial Diagnosis    Rectal adenocarcinoma metastatic to lung (H)     2/3/2023 - 3/31/2023 Chemotherapy    Oral ONC Colorectal Cancer - Regorafenib  Plan Provider: Polo Snow MD  Treatment goal: Palliative  Line of treatment: [No plan line of treatment]     3/30/2023 -  Cancer Staged    Staging form: Colon and Rectum, AJCC 8th Edition  - Clinical: Stage IVB (cTX, cNX, pM1b)     6/7/2023 - 10/3/2023 Chemotherapy    OP ONC Colorectal Cancer - Modified FOLFOX-6 / Bevacizumab  Plan Provider: Polo Snow MD  Treatment goal: Palliative  Line of treatment: [No plan line of treatment]     10/27/2023 - 3/11/2024 Chemotherapy    OP ONC Colorectal Cancer - Bevacizumab + Trifluridine/Tipiracil (Lonsurf)  Plan Provider: Polo Snow MD  Treatment goal: Palliative  Line of treatment: [No plan line of treatment]     3/27/2024 -  Chemotherapy    Oral ONC Colorectal Cancer -  "Fruquintinib (Fruzaqla)  Plan Provider: Polo Snow MD  Treatment goal: Palliative  Line of treatment: [No plan line of treatment]         Subjective/Interval Events     - pain is well controlled on oxycodone. Taking 2-4 tablets daily  - some nausea associated with constipation    Physical Exam     Vital signs: /81 (BP Location: Right arm, Patient Position: Chair, Cuff Size: Adult Large)   Pulse 81   Temp 98.1  F (36.7  C)   Resp 16   Ht 1.829 m (6' 0.01\")   Wt 85.4 kg (188 lb 3.2 oz)   SpO2 98%   BMI 25.52 kg/m      ECOG performance status:  0  Vascular access:  R chest port    Physical Exam:  Exam performed, notable for:  - no notable findings    Objective Data     Lab data:  I have personally reviewed the lab data, notable for:    - Hgb 12.3  - Plt 457    Radiology data:  I have personally reviewed the radiology data, notable for:  03/27/2024  IMPRESSION:  1.  Multiple pulmonary nodules and masses have undergone mixed change compared to 01/22/2024, with several areas of central cavitation new since the previous exam.  2.  Mediastinal adenopathy has increased slightly.  3.  Hepatic metastatic masses are not significantly changed compared to 01/28/2024.  4.  Ill-defined rectosigmoid mass and perirectal lymph nodes are also not significantly changed, and could be better evaluated with MRI.  5.  Two soft tissue nodules within the fat of a left lower quadrant parastomal hernia are unchanged.    Pathology and other data:  I have personally reviewed and interpreted the pathology data, notable for:    - no new data      Polo Snow MD  "

## 2024-04-29 ENCOUNTER — NURSE TRIAGE (OUTPATIENT)
Dept: ONCOLOGY | Facility: CLINIC | Age: 49
End: 2024-04-29
Payer: COMMERCIAL

## 2024-04-29 NOTE — TELEPHONE ENCOUNTER
Call from pharmacy to notify of drug interactions for Lorazepam and Oxycodone, as prescriptions written by different providers and insurance is flagging for interaction of risk of drowsiness.   Writer informed will send message to both oncology and palliative teams to notify.

## 2024-05-03 ENCOUNTER — TELEPHONE (OUTPATIENT)
Dept: PALLIATIVE CARE | Facility: CLINIC | Age: 49
End: 2024-05-03

## 2024-05-03 NOTE — TELEPHONE ENCOUNTER
Retail Pharmacy Prior Authorization Team   Phone: [phone]    PA Initiation    Medication: OXYCODONE HCL 10 MG PO TABS  Insurance Company: [employer] - Phone [phone] Fax [phone]  [facility] the [id number] [address] [facility]   Filling Pharmacy Phone: [phone]  Filling Pharmacy Fax:    Start Date: 5/3/2024

## 2024-05-07 ENCOUNTER — VIRTUAL VISIT (OUTPATIENT)
Dept: PALLIATIVE CARE | Facility: CLINIC | Age: 49
End: 2024-05-07
Attending: STUDENT IN AN ORGANIZED HEALTH CARE EDUCATION/TRAINING PROGRAM
Payer: COMMERCIAL

## 2024-05-07 VITALS — WEIGHT: 190 LBS | BODY MASS INDEX: 26.6 KG/M2 | HEIGHT: 71 IN

## 2024-05-07 DIAGNOSIS — Z79.891 ENCOUNTER FOR LONG-TERM USE OF OPIATE ANALGESIC: ICD-10-CM

## 2024-05-07 DIAGNOSIS — Z51.5 ENCOUNTER FOR PALLIATIVE CARE: ICD-10-CM

## 2024-05-07 DIAGNOSIS — C78.7 COLON CANCER METASTASIZED TO LIVER (H): Primary | ICD-10-CM

## 2024-05-07 DIAGNOSIS — G89.3 CANCER RELATED PAIN: ICD-10-CM

## 2024-05-07 DIAGNOSIS — R10.30 LOWER ABDOMINAL PAIN: ICD-10-CM

## 2024-05-07 DIAGNOSIS — C18.9 COLON CANCER METASTASIZED TO LIVER (H): Primary | ICD-10-CM

## 2024-05-07 DIAGNOSIS — K59.03 DRUG INDUCED CONSTIPATION: ICD-10-CM

## 2024-05-07 PROCEDURE — 99214 OFFICE O/P EST MOD 30 MIN: CPT | Mod: 95 | Performed by: STUDENT IN AN ORGANIZED HEALTH CARE EDUCATION/TRAINING PROGRAM

## 2024-05-07 ASSESSMENT — PAIN SCALES - GENERAL: PAINLEVEL: MILD PAIN (2)

## 2024-05-07 NOTE — NURSING NOTE
Is the patient currently in the state of [place]? YES    Visit mode:VIDEO    If the visit is dropped, the patient can be reconnected by: VIDEO VISIT: Send to e-mail at: [email or web address]    Will anyone else be joining the visit? NO  (If patient encounters technical issues they should call [phone]    How would you like to obtain your AVS? MyChart    Are changes needed to the allergy or medication list? No      Reason for visit: Video Visit (Follow Up )    Grace RODRIGUEZ

## 2024-05-07 NOTE — PROGRESS NOTES
"Palliative Care Progress Note    Patient Name: Roman Escalante  Primary Provider: Buddy Hart    Chief Complaint/Patient ID: Roman Escalante is a 49 year old male with PMHx of metastatic rectal cancer (mets to lung), currently enrolled in [facility] CAR-T trial (completed cell harvest 1/23/2024, tentatively planning administration of CAR-T in June).  He has been on multiple lines of systemic therapy, currently on Fruquintinib.     Last Palliative care appointment: 4/2/2024 with me.     Reviewed: Yes    ORT Score = 1 for history of alcohol abuse and his mother.     Interim History:  Roman Escalante is a 49 year old male who is seen today for follow up with Palliative Care via billable video visit.      Had planned for another steroid injection to help with pain, however was told that this would not be allowed within the [facility] study, so unfortunately he had to cancel.    States he was recently in [place] for the final set of screenings and has received approval to move forward as a part of the study.  Plan is to return in June and to be admitted there for 3 to 4 weeks.  Says it has been a huge load off to note he is approved and \"I could not be happier\".    Pain was worse over the last couple weeks, he thinks primarily due to travel.  Had been up to 3 or 4 doses of 20 mg oxycodone per day, however over the last week, this is decreased to 1 or 2 doses of 20 mg oxycodone daily.  Finds that position changes as well as eating smaller meals more often to help.  He also feels there is been a significant improvement in pain with a regular bowel regimen.  He is currently taking 2 to 3 tablets of Dulcolax daily and one half doses of MiraLAX.  This is working well for him.  He does not like having to take a laxative to go regularly, however he realizes that it has made a difference.    Busy week with family in town for both his birthday as well as his grandmother celebration of life.     Social History:  Has a " "couple of kids and a couple grandkids who lives in [place].  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 Has a girlfriend-she is a hospice RN.   Social History     Tobacco Use    Smoking status: Former     Current packs/day: 0.00     Average packs/day: 0.5 packs/day for 13.3 years (6.6 ttl pk-yrs)     Types: Cigarettes, Other     Start date: 2010     Quit date: 2023     Years since quittin.7     Passive exposure: Current    Smokeless tobacco: Never   Vaping Use    Vaping status: Never Used   Substance Use Topics    Alcohol use: Not Currently     Comment: social    Drug use: Yes     Types: Marijuana     Comment: Keeps up my appetite, sleep, and help with neuropathy     Advanced Care Planning: Has not completed. Girlfriend would be his HCA.     Family History- Reviewed in Epic.    Medications- Reviewed in Epic.    Past Medical History- Reviewed in [facility].    Past Surgical History- Reviewed in Epic.    Physical Exam:   Constitutional: Alert, pleasant, no apparent distress. Sitting up in chair.  Eyes: Sclera non-icteric, no eye discharge.  ENT: No nasal discharge. Ears grossly normal.  Respiratory: Unlabored respirations. Speaking in full sentences.  Musculoskeletal: Extremities appear normal- no gross deformities noted. No edema noted on upper body.   Skin: No suspicious lesions or rashes on visible skin.  Neurologic: Clear speech, no aphasia. No facial droop.  Psychiatric: Mentation appears normal, appropriate attention. Affect normal/bright. Does not appear anxious or depressed.    Key Data Reviewed:  LABS: 2024- Cr 0.83, GFR >90, Albumin 3.9, LFTs WNL.       Impression & Recommendations & Counseling:  Roman Escalante is a 49 year old male with history of metastatic rectal cancer.     Pain - Flared with recent travel, however has now returned to only 1-2 doses of breakthrough medicine daily.  Finds position changes and a good bowel regimen also significantly improved his pain.  -Okay to " continue oxycodone 10 to 20 mg every 4 hours as needed.  Currently only needing 1-2 doses of 20 mg daily.  -If pain were to worsen to the point that he was taking 3 doses of oxycodone consistently, would consider adding back in MS Contin.    Bowels - Doing much better with a regular bowel regimen.  Finds that Dulcolax and MiraLAX worked better for him, and he is not really using senna right now.  -Continue current regimen of Dulcolax 2 to 3 tablets daily and MiraLAX 1.5 doses daily.  Discussed how to adjust if stools get too loose.    Rectal cancer - Plan is moving forward for CAR-T therapy in June.      Follow up: About 2-1/2 months      Video-Visit Details  Video Start Time: 11:24 AM  Video End Time: 11:46 AM    Originating Location (pt. Location): Home     Distant Location (provider location):  Offsite- Personal Home      Platform used for Video Visit: Huey     Total time spent on day of encounter is 32 mins, including reviewing record, review of above studies, above visit with patient, symptomatic discussion as above, including medication adjustments/prescription management, and documentation.     Isidra Prater DO  Palliative Medicine   AMCOM ID [id number]    Some chart documentation performed using Dragon Voice recognition Software. Although reviewed after completion, some words and grammatical errors may remain.

## 2024-05-07 NOTE — PATIENT INSTRUCTIONS
Recommendations:  -No medication changes recommended today.  Please let me know if you start needing 3 doses of the oxycodone per day on a regular basis, as this would likely be an indicator to look at adding back in a longer acting medication like the morphine.  -Continue your bowel regimen as is.  If things do become too loose, I would back off on the MiraLAX a little bit.    Follow up: We will plan on mid July, however we can adjust this as needed based on the schedule of the trial.      Reasons to Call    If you are having worsening/uncontrolled symptoms we want you to call!    You or your other physicians make any changes to medications we have prescribed.  -Please call for refills 4-5 days before you will run out of medication.    Important Phone Numbers, including: Refills, scheduling, and general questions     Palliative Care RN: Rebeca Urbina - [phone]  *After hours or on weekends. Will connect you with on call MD. [phone]

## 2024-05-21 ENCOUNTER — OFFICE VISIT (OUTPATIENT)
Dept: FAMILY MEDICINE | Facility: CLINIC | Age: 49
End: 2024-05-21
Payer: COMMERCIAL

## 2024-05-21 ENCOUNTER — MYC REFILL (OUTPATIENT)
Dept: PALLIATIVE CARE | Facility: CLINIC | Age: 49
End: 2024-05-21

## 2024-05-21 ENCOUNTER — DOCUMENTATION ONLY (OUTPATIENT)
Dept: ONCOLOGY | Facility: CLINIC | Age: 49
End: 2024-05-21

## 2024-05-21 VITALS
BODY MASS INDEX: 26.04 KG/M2 | WEIGHT: 186 LBS | TEMPERATURE: 97.7 F | DIASTOLIC BLOOD PRESSURE: 74 MMHG | RESPIRATION RATE: 16 BRPM | OXYGEN SATURATION: 96 % | HEART RATE: 84 BPM | HEIGHT: 71 IN | SYSTOLIC BLOOD PRESSURE: 109 MMHG

## 2024-05-21 DIAGNOSIS — C18.9 COLON CANCER METASTASIZED TO LIVER (H): ICD-10-CM

## 2024-05-21 DIAGNOSIS — C78.7 COLON CANCER METASTASIZED TO LIVER (H): ICD-10-CM

## 2024-05-21 DIAGNOSIS — G89.3 CANCER RELATED PAIN: ICD-10-CM

## 2024-05-21 DIAGNOSIS — J02.9 PHARYNGITIS, UNSPECIFIED ETIOLOGY: Primary | ICD-10-CM

## 2024-05-21 DIAGNOSIS — J06.9 UPPER RESPIRATORY TRACT INFECTION, UNSPECIFIED TYPE: ICD-10-CM

## 2024-05-21 LAB
DEPRECATED S PYO AG THROAT QL EIA: NEGATIVE
FLUAV RNA SPEC QL NAA+PROBE: NEGATIVE
FLUBV RNA RESP QL NAA+PROBE: NEGATIVE
GROUP A STREP BY PCR: NOT DETECTED
RSV RNA SPEC NAA+PROBE: NEGATIVE
SARS-COV-2 RNA RESP QL NAA+PROBE: NEGATIVE

## 2024-05-21 PROCEDURE — 87637 SARSCOV2&INF A&B&RSV AMP PRB: CPT | Performed by: FAMILY MEDICINE

## 2024-05-21 PROCEDURE — 99213 OFFICE O/P EST LOW 20 MIN: CPT | Performed by: FAMILY MEDICINE

## 2024-05-21 PROCEDURE — 87651 STREP A DNA AMP PROBE: CPT | Performed by: FAMILY MEDICINE

## 2024-05-21 NOTE — PROGRESS NOTES
[facility] Cancer Care Oral Chemotherapy Monitoring Program    Thank you for the opportunity to be a part in the care of this patient's oral chemotherapy. The oncology pharmacy will no longer be following this patient for oral chemotherapy. If there are any questions or the plan changes, feel free to contact us.        2/21/2024     2:00 PM 3/11/2024    10:00 AM 3/20/2024    12:00 PM 3/22/2024     4:00 PM 3/27/2024     9:00 AM 4/4/2024     9:00 AM 5/21/2024     9:00 AM   ORAL CHEMOTHERAPY   Assessment Type Monthly Follow up Lab Monitoring New Teach New Teach Lab Monitoring Other Discontinuation   Reason for Discontinuation       Patient seeking care elsewhere   Diagnosis Code Colon Cancer Colon Cancer Colon Cancer Colon Cancer Colon Cancer Colon Cancer Colon Cancer   Providers Dr. Edy Snow   [facility]    Drug Name Lonsurf (trifluridine/Tipiracil) Lonsurf (trifluridine/Tipiracil) Fruzaqla (fruquintinib) Fruzaqla (fruquintinib) Fruzaqla (fruquintinib) Fruzaqla (fruquintinib)    Dose 75 mg 75 mg 5 mg 5 mg 5 mg 5 mg    Current Schedule BID BID Daily Daily Daily Daily    Cycle Details Days 1-5, then Days 8-12 Days 1-5, then Days 8-12 3 weeks on, 1 week off 3 weeks on, 1 week off 3 weeks on, 1 week off Drug on Hold    Start Date of Last Cycle 2/12/2024         Planned next cycle start date 3/11/2024         Doses missed in last 2 weeks 0         Adherence Assessment Adherent         Adverse Effects Nausea             Per notes, patient is enrolled in study and will be admitted in June at outside facility.    Cleo Green, PharmD, [facility]  Oral Chemotherapy Monitoring Program  [facility]  [phone]

## 2024-05-21 NOTE — PROGRESS NOTES
"  Assessment & Plan       ICD-10-CM    1. Pharyngitis, unspecified etiology  J02.9 Streptococcus A Rapid Screen w/Reflex to PCR - Clinic Collect     Symptomatic Influenza A/B, RSV, & SARS-CoV2 PCR (COVID-19) Nose     CANCELED: Symptomatic Influenza A/B, RSV, & SARS-CoV2 PCR (COVID-19)      2. Upper respiratory tract infection, unspecified type  J06.9 Symptomatic Influenza A/B, RSV, & SARS-CoV2 PCR (COVID-19) Nose     CANCELED: Symptomatic Influenza A/B, RSV, & SARS-CoV2 PCR (COVID-19)          The longitudinal plan of care for the diagnosis(es)/condition(s) as documented were addressed during this visit. Due to the added complexity in care, I will continue to support Roman in the subsequent management and with ongoing continuity of care.         BMI  Estimated body mass index is 25.94 kg/m  as calculated from the following:    Height as of this encounter: 1.803 m (5' 11\").    Weight as of this encounter: 84.4 kg (186 lb).   Weight management plan: Discussed healthy diet and exercise guidelines      There are no Patient Instructions on file for this visit.    Subjective   Roman is a 49 year old, presenting for the following health issues:  URI      5/21/2024     8:49 AM   Additional Questions   Roomed by Peri   Accompanied by none         5/21/2024     8:49 AM   Patient Reported Additional Medications   Patient reports taking the following new medications none     History of Present Illness       Reason for visit:  Possible strep throut, nasal congestion  Symptom onset:  1-3 days ago  Symptom intensity:  Moderate  Symptom progression:  Staying the same  Had these symptoms before:  No  What makes it worse:  Unknown  What makes it better:  Unknown    He eats 2-3 servings of fruits and vegetables daily.He consumes 1 sweetened beverage(s) daily.He exercises with enough effort to increase his heart rate 10 to 19 minutes per day.  He exercises with enough effort to increase his heart rate 4 days per week.   He is taking " "medications regularly.                 Review of Systems  Constitutional, HEENT, cardiovascular, pulmonary, gi and gu systems are negative, except as otherwise noted.      Objective    /74   Pulse 84   Temp 97.7  F (36.5  C) (Temporal)   Resp 16   Ht 1.803 m (5' 11\")   Wt 84.4 kg (186 lb)   SpO2 96%   BMI 25.94 kg/m    Body mass index is 25.94 kg/m .  Physical Exam  Constitutional:       General: He is not in acute distress.     Appearance: Normal appearance. He is well-developed. He is not ill-appearing.   HENT:      Head: Normocephalic and atraumatic.      Right Ear: External ear normal.      Left Ear: External ear normal.      Nose: Nose normal.   Eyes:      General: No scleral icterus.     Extraocular Movements: Extraocular movements intact.      Conjunctiva/sclera: Conjunctivae normal.   Cardiovascular:      Rate and Rhythm: Normal rate.   Pulmonary:      Effort: Pulmonary effort is normal.   Musculoskeletal:      Cervical back: Normal range of motion and neck supple.   Skin:     General: Skin is warm and dry.   Neurological:      Mental Status: He is alert and oriented to person, place, and time.   Psychiatric:         Behavior: Behavior normal.         Thought Content: Thought content normal.         Judgment: Judgment normal.                    Signed Electronically by: Buddy Smith MD    "

## 2024-05-22 RX ORDER — OXYCODONE HYDROCHLORIDE 10 MG/1
10-20 TABLET ORAL EVERY 4 HOURS PRN
Qty: 240 TABLET | Refills: 0 | Status: SHIPPED | OUTPATIENT
Start: 2024-05-22 | End: 2024-08-02

## 2024-05-22 NOTE — TELEPHONE ENCOUNTER
Received [employer] message from patient requesting refill of oxycodone.     Last refill: 4/16/24  Last office visit: 5/7/24  Scheduled for follow up 7/16/24    Will route request to MD for review.     [place]ed [place]  Report.

## 2024-06-05 ENCOUNTER — TRANSFERRED RECORDS (OUTPATIENT)
Dept: HEALTH INFORMATION MANAGEMENT | Facility: CLINIC | Age: 49
End: 2024-06-05

## 2024-07-09 DIAGNOSIS — Z79.899 ENCOUNTER FOR LONG-TERM (CURRENT) USE OF MEDICATIONS: Primary | ICD-10-CM

## 2024-07-09 DIAGNOSIS — C18.9 PRIMARY ADENOCARCINOMA OF COLON (H): ICD-10-CM

## 2024-07-15 ENCOUNTER — ANCILLARY PROCEDURE (OUTPATIENT)
Dept: GENERAL RADIOLOGY | Facility: CLINIC | Age: 49
End: 2024-07-15
Attending: STUDENT IN AN ORGANIZED HEALTH CARE EDUCATION/TRAINING PROGRAM
Payer: COMMERCIAL

## 2024-07-15 ENCOUNTER — ONCOLOGY VISIT (OUTPATIENT)
Dept: ONCOLOGY | Facility: CLINIC | Age: 49
End: 2024-07-15
Attending: STUDENT IN AN ORGANIZED HEALTH CARE EDUCATION/TRAINING PROGRAM
Payer: COMMERCIAL

## 2024-07-15 VITALS
WEIGHT: 162.5 LBS | RESPIRATION RATE: 16 BRPM | TEMPERATURE: 98 F | HEART RATE: 111 BPM | BODY MASS INDEX: 22.66 KG/M2 | SYSTOLIC BLOOD PRESSURE: 111 MMHG | DIASTOLIC BLOOD PRESSURE: 75 MMHG | OXYGEN SATURATION: 93 %

## 2024-07-15 DIAGNOSIS — R09.02 OXYGEN DESATURATION: ICD-10-CM

## 2024-07-15 DIAGNOSIS — C18.9 PRIMARY ADENOCARCINOMA OF COLON (H): Primary | ICD-10-CM

## 2024-07-15 PROCEDURE — G2211 COMPLEX E/M VISIT ADD ON: HCPCS | Performed by: STUDENT IN AN ORGANIZED HEALTH CARE EDUCATION/TRAINING PROGRAM

## 2024-07-15 PROCEDURE — 71046 X-RAY EXAM CHEST 2 VIEWS: CPT | Mod: GC | Performed by: RADIOLOGY

## 2024-07-15 PROCEDURE — 99215 OFFICE O/P EST HI 40 MIN: CPT | Performed by: STUDENT IN AN ORGANIZED HEALTH CARE EDUCATION/TRAINING PROGRAM

## 2024-07-15 PROCEDURE — G0463 HOSPITAL OUTPT CLINIC VISIT: HCPCS | Performed by: STUDENT IN AN ORGANIZED HEALTH CARE EDUCATION/TRAINING PROGRAM

## 2024-07-15 ASSESSMENT — PAIN SCALES - GENERAL: PAINLEVEL: NO PAIN (0)

## 2024-07-15 NOTE — LETTER
7/15/2024      Roman Escalante  [address]  [facility] [address]      Dear Colleague,    Thank you for referring your patient, Roman Escalante, to the [facility]. Please see a copy of my visit note below.    [facility] - Medical Oncology Follow-Up Consult Note  7/15/2024    Patient Identifiers     Name: Roman Escalante  Preferred Address: [place]  Preferred Language: English  : 1975  Gender: male    Assessment and Plan     Mr. Roman Escalante is a 49 year old male former smoker (3-5 cigs/day) with a history of extensively pre-treated metastatic colorectal cancer s/p trial cellular therapy transplant (2024) who returns to clinic for symptom management and counseling.     NGS: KRAS G12N  [place] [facility] [place] Trial: MAIKEL-280, VSV-GP, TORL    Roman returns to clinic for posttransplant surveillance following clinical trial of cellular therapy for colorectal cancer.  He was monitored in the hospital at the [facility] for 5 weeks following cytoreductive conditioning and cellular transplant.  He notes that his clinical status is improved significantly since his discharge, with appetite returning as of today.  He lost significant weight during his hospitalization with approximately 28 pounds loss per our records.  This has resulted in significant cold sensitivity, fatigue, and dyspnea on exertion.    Given his exertional dyspnea, we feel that he is a good candidate for home PT services.  The dyspnea can be severe enough to desaturate him to 88% on room air.  Both his clinical trial team and we do not feel that he requires oxygen supplementation at this time.  However, we will continue to monitor his saturations at rest and with activity.    We provided some nutrition counseling today, with recommendation for additional fat and carbohydrate intake with each meal.  We encouraged many frequent meals, which she is already utilizing for weight gain.  We will review his  weight on virtual return visit.    Finally, there was some note on imaging and NIH of potential valvular findings in his heart.  Will perform an echo to evaluate.    Plan for workup as outlined above, with return visit in 2 weeks.  Patient to return to [facility] for continued monitoring in 2-3 weeks.    45 minutes spent on the date of the encounter doing chart review, review of test results, interpretation of tests, patient visit, documentation, and discussion with other provider(s)     The longitudinal plan of care for the diagnosis(es)/condition(s) as documented were addressed during this visit. Due to the added complexity in care, I will continue to support Roman in the subsequent management and with ongoing continuity of care.    Polo Snow MD, PhD   of Medicine  Division of Hematology, Oncology and Transplantation  [facility]    -----------------------------------    Oncology Summary     Cancer Staging   Rectal adenocarcinoma metastatic to lung (H)  Staging form: Colon and Rectum, AJCC 8th Edition  - Clinical: Stage IVB (cTX, cNX, pM1b) - Signed by Polo Snow MD on 3/30/2023      Oncology History   Rectal adenocarcinoma metastatic to lung (H)   2/3/2023 Initial Diagnosis    Rectal adenocarcinoma metastatic to lung (H)     2/3/2023 - 3/31/2023 Chemotherapy    Oral ONC Colorectal Cancer - Regorafenib  Plan Provider: Polo Snow MD  Treatment goal: Palliative  Line of treatment: [No plan line of treatment]     3/30/2023 -  Cancer Staged    Staging form: Colon and Rectum, AJCC 8th Edition  - Clinical: Stage IVB (cTX, cNX, pM1b)     6/7/2023 - 10/3/2023 Chemotherapy    OP ONC Colorectal Cancer - Modified FOLFOX-6 / Bevacizumab  Plan Provider: Polo Snow MD  Treatment goal: Palliative  Line of treatment: [No plan line of treatment]     10/27/2023 - 3/11/2024 Chemotherapy    OP ONC Colorectal Cancer - Bevacizumab + Trifluridine/Tipiracil (Lonsurf)  Plan Provider: Polo Snow  MD  Treatment goal: Palliative  Line of treatment: [No plan line of treatment]     3/27/2024 - 3/27/2024 Chemotherapy    Oral ONC Colorectal Cancer - Fruquintinib (Fruzaqla)  Plan Provider: Polo Snow MD  Treatment goal: Palliative  Line of treatment: [No plan line of treatment]     5/2024 -  Chemotherapy    [facility] Cellular Therapy cytoreductive conditioning and cellular transplant  - admitted for 5 weeks following transplant with ~30lbs weight loss         Subjective/Interval Events     - has been noting significant fatigue  - notes that his O2 sat has dropped with exertion  - needs to sit on the toilet due to mucus production following transplant   - appetite has come back today    Physical Exam     Vital signs: /75 (BP Location: Right arm, Patient Position: Sitting, Cuff Size: Adult Regular)   Pulse 111   Temp 98  F (36.7  C) (Oral)   Resp 16   Wt 73.7 kg (162 lb 8 oz)   SpO2 93%   BMI 22.66 kg/m      ECOG performance status:  1  Vascular access:  R chest port    Physical Exam:  Exam performed, notable for:  - significant weight loss from prior (~30 lbs)  - otherwise relatively stable exam  - breathing somewhat intensely on room air    Objective Data     Lab data:  I have personally reviewed the lab data, notable for:    - no new data    Radiology data:  I have personally reviewed the radiology data, notable for:  - no new data    Pathology and other data:  I have personally reviewed and interpreted the pathology data, notable for:    - no new data      Again, thank you for allowing me to participate in the care of your patient.        Sincerely,        Polo Snow MD

## 2024-07-15 NOTE — NURSING NOTE
"Oncology Rooming Note    July 15, 2024 3:03 PM   Roman Escalante is a 49 year old male who presents for:    Chief Complaint   Patient presents with    Oncology Clinic Visit     Primary adenocarcinoma of colon     Initial Vitals: /75 (BP Location: Right arm, Patient Position: Sitting, Cuff Size: Adult Regular)   Pulse 111   Temp 98  F (36.7  C) (Oral)   Resp 16   Wt 73.7 kg (162 lb 8 oz)   SpO2 93%   BMI 22.66 kg/m   Estimated body mass index is 22.66 kg/m  as calculated from the following:    Height as of 5/21/24: 1.803 m (5' 11\").    Weight as of this encounter: 73.7 kg (162 lb 8 oz). Body surface area is 1.92 meters squared.  No Pain (0) Comment: Data Unavailable   No LMP for male patient.  Allergies reviewed: Yes  Medications reviewed: Yes    Medications: Medication refills not needed today.  Pharmacy name entered into [employer]:    [facility] - [place], [place] - [address] [place] [address] [phone]  [facility] - [place], [place] - [address] [place] [address] DRUG STORE #[id number] [address]    Frailty Screening:   Is the patient here for a new oncology consult visit in cancer care? 2. No      Clinical concerns:  Concerned for SpO2 when walking; wants to discuss Pt    Ashanti Tobias              "

## 2024-07-15 NOTE — PROGRESS NOTES
[facility] - Medical Oncology Follow-Up Consult Note  7/15/2024    Patient Identifiers     Name: Roman Escalante  Preferred Address: [place]  Preferred Language: English  : 1975  Gender: male    Assessment and Plan     Mr. Roman Escalante is a 49 year old male former smoker (3-5 cigs/day) with a history of extensively pre-treated metastatic colorectal cancer s/p trial cellular therapy transplant (2024) who returns to clinic for symptom management and counseling.     NGS: KRAS G12N  [place] [facility] [place]  Clinical Trial: MAIKEL-280, VSV-GP, TORL    Roman returns to clinic for posttransplant surveillance following clinical trial of cellular therapy for colorectal cancer.  He was monitored in the hospital at the [facility] for 5 weeks following cytoreductive conditioning and cellular transplant.  He notes that his clinical status is improved significantly since his discharge, with appetite returning as of today.  He lost significant weight during his hospitalization with approximately 28 pounds loss per our records.  This has resulted in significant cold sensitivity, fatigue, and dyspnea on exertion.    Given his exertional dyspnea, we feel that he is a good candidate for home PT services.  The dyspnea can be severe enough to desaturate him to 88% on room air.  Both his clinical trial team and we do not feel that he requires oxygen supplementation at this time.  However, we will continue to monitor his saturations at rest and with activity.    We provided some nutrition counseling today, with recommendation for additional fat and carbohydrate intake with each meal.  We encouraged many frequent meals, which she is already utilizing for weight gain.  We will review his weight on virtual return visit.    Finally, there was some note on imaging and NIH of potential valvular findings in his heart.  Will perform an echo to evaluate.    Plan for workup as outlined above, with return visit in 2 weeks.  Patient to return to [facility]  for continued monitoring in 2-3 weeks.    45 minutes spent on the date of the encounter doing chart review, review of test results, interpretation of tests, patient visit, documentation, and discussion with other provider(s)     The longitudinal plan of care for the diagnosis(es)/condition(s) as documented were addressed during this visit. Due to the added complexity in care, I will continue to support Roman in the subsequent management and with ongoing continuity of care.    Polo Snow MD, PhD   of Medicine  Division of Hematology, Oncology and Transplantation  [facility]    -----------------------------------    Oncology Summary     Cancer Staging   Rectal adenocarcinoma metastatic to lung (H)  Staging form: Colon and Rectum, AJCC 8th Edition  - Clinical: Stage IVB (cTX, cNX, pM1b) - Signed by Polo Snow MD on 3/30/2023      Oncology History   Rectal adenocarcinoma metastatic to lung (H)   2/3/2023 Initial Diagnosis    Rectal adenocarcinoma metastatic to lung (H)     2/3/2023 - 3/31/2023 Chemotherapy    Oral ONC Colorectal Cancer - Regorafenib  Plan Provider: Polo Snow MD  Treatment goal: Palliative  Line of treatment: [No plan line of treatment]     3/30/2023 -  Cancer Staged    Staging form: Colon and Rectum, AJCC 8th Edition  - Clinical: Stage IVB (cTX, cNX, pM1b)     6/7/2023 - 10/3/2023 Chemotherapy    OP ONC Colorectal Cancer - Modified FOLFOX-6 / Bevacizumab  Plan Provider: Polo Snow MD  Treatment goal: Palliative  Line of treatment: [No plan line of treatment]     10/27/2023 - 3/11/2024 Chemotherapy    OP ONC Colorectal Cancer - Bevacizumab + Trifluridine/Tipiracil (Lonsurf)  Plan Provider: Polo Snow MD  Treatment goal: Palliative  Line of treatment: [No plan line of treatment]     3/27/2024 - 3/27/2024 Chemotherapy    Oral ONC Colorectal Cancer - Fruquintinib (Fruzaqla)  Plan Provider: Polo Snow MD  Treatment goal: Palliative  Line of treatment: [No  plan line of treatment]     5/2024 -  Chemotherapy    [facility] Cellular Therapy cytoreductive conditioning and cellular transplant  - admitted for 5 weeks following transplant with ~30lbs weight loss         Subjective/Interval Events     - has been noting significant fatigue  - notes that his O2 sat has dropped with exertion  - needs to sit on the toilet due to mucus production following transplant   - appetite has come back today    Physical Exam     Vital signs: /75 (BP Location: Right arm, Patient Position: Sitting, Cuff Size: Adult Regular)   Pulse 111   Temp 98  F (36.7  C) (Oral)   Resp 16   Wt 73.7 kg (162 lb 8 oz)   SpO2 93%   BMI 22.66 kg/m      ECOG performance status:  1  Vascular access:  R chest port    Physical Exam:  Exam performed, notable for:  - significant weight loss from prior (~30 lbs)  - otherwise relatively stable exam  - breathing somewhat intensely on room air    Objective Data     Lab data:  I have personally reviewed the lab data, notable for:    - no new data    Radiology data:  I have personally reviewed the radiology data, notable for:  - no new data    Pathology and other data:  I have personally reviewed and interpreted the pathology data, notable for:    - no new data

## 2024-07-22 ENCOUNTER — PATIENT OUTREACH (OUTPATIENT)
Dept: ONCOLOGY | Facility: CLINIC | Age: 49
End: 2024-07-22
Payer: COMMERCIAL

## 2024-07-22 NOTE — TELEPHONE ENCOUNTER
[facility]: Cancer Care                                                                                          Homecare referral sent via in-basket to [facility] pools:    [facility]  [facility]  [facility] DECLINED  [facility] ([email or web address])  [facility] ([email or web address])  [facility] (fax [phone])  [place]   [place]  Care Apparent DECLINED  [facility] ([email or web address])  [facility] ([email or web address]) ACCEPTED per [phone]  [facility] (@[email or web address])   [facility] ([email or web address])        Signature:  Virgen Nieto RN

## 2024-07-26 ENCOUNTER — THERAPY VISIT (OUTPATIENT)
Dept: PHYSICAL THERAPY | Facility: CLINIC | Age: 49
End: 2024-07-26
Payer: COMMERCIAL

## 2024-07-26 DIAGNOSIS — C18.9 PRIMARY ADENOCARCINOMA OF COLON (H): ICD-10-CM

## 2024-07-26 PROCEDURE — 97162 PT EVAL MOD COMPLEX 30 MIN: CPT | Mod: GP | Performed by: PHYSICAL THERAPIST

## 2024-07-26 PROCEDURE — 97110 THERAPEUTIC EXERCISES: CPT | Mod: GP | Performed by: PHYSICAL THERAPIST

## 2024-07-26 ASSESSMENT — 6 MINUTE WALK TEST (6MWT)
TOTAL DISTANCE WALKED (METERS): 121.92
OXYGEN DEVICE: YES

## 2024-07-26 NOTE — PROGRESS NOTES
PHYSICAL THERAPY EVALUATION  Type of Visit: Evaluation       Fall Risk Screen:  Fall screen completed by: PT  Have you fallen 2 or more times in the past year?: No  Have you fallen and had an injury in the past year?: No  Is patient a fall risk?: No    Subjective       Presenting condition or subjective complaint: Strength, walking, shortness of breath. If he walks quickly, his O2 sats will drop into the 80s. He is currently walking for exercise. He is able to walk about 3 blocks before needing to take rest, but is really pacing self. Prior to prolonged hospitalization at [facility] he was not using a walker and had no mobility concerns.  Date of onset: 07/23/24    Relevant medical history: Significant weakness; metastatic colorectal cancer, s/p chemo and trial cellular therapy transplant  Dates & types of surgery: Feb 2021, ostomy, port placement. 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 January 2024  lapriscopic remove lower lobe of left lung for Car t therapy. Which recently failed. 2 weeks in a coma, 4 weeks total in icu, total 5 wks HopitLized    Prior diagnostic imaging/testing results: X-ray     Prior therapy history for the same diagnosis, illness or injury: No      Prior Level of Function  Transfers: Independent  Ambulation: Independent  ADL: Independent  IADL: Driving    Living Environment  Social support: With a significant other or spouse   Type of home: 2-story   Stairs to enter the home: Yes 4 Is there a railing: No     Ramp: No   Stairs inside the home: Yes 14     Help at home: None  Equipment owned: Straight Cane; Walker; Walker with wheels; Standard wheelchair    Employment: No    Hobbies/Interests: Boating [employer]    Patient goals for therapy: Walk without assistive device, get breathing under control, safely shower myself    Pain assessment: Pain denied other than some returning rectal pain     Objective   Vitals Signs  Heart Rate: 116  SpO2: 92  Blood Pressure: 103/68    Cognitive  Status Examination  Orientation: Oriented to person, place and time   Level of Consciousness: Alert  Follows Commands and Answers Questions: 100% of the time  Personal Safety and Judgement: Intact  Memory: Intact    OBSERVATION: Patient arrived to appointment independently with 4 wheeled walker. Noticeable dyspnea with activity.   INTEGUMENTARY:  bruising/darkened areas on lower legs  POSTURE:  rounded shoulders, forward head posture  PALPATION: N/T  RANGE OF MOTION: LE ROM WFL  STRENGTH: LE Strength WFL via MMT in sitting    BED MOBILITY: Independent    TRANSFERS: Independent    WHEELCHAIR MOBILITY: NA    GAIT:   Level of Barron: Independent  Assistive Device(s): Walker (four wheeled)  Gait Deviations:  Patient ambulates with decreased ashley, slightly flexed posture  Gait Distance: 400 feet  Stairs: N/T    BALANCE:  Balance was not formally assessed today, but no overt imbalance noted with position changes or with walking    SPECIAL TESTS  Functional Gait Assessment (FGA)      10 Meter Walk Test (Comfortable)     10 Meter Walk Test (Fast)     6 Minute Walk Test (6MWT)   121.92 meters  6 minute walk test - device used?: Yes (4WW)  Did not require standing rest break, Vitals response: O2 86%, /69, 118 bpm, Fatigue/OMNI Effort Scale: 3-4   Alcantar Balance Scale (BBS)     5 Times Sit-to-Stand (5TSTS)  23 sec     Dynamic Gait Index (DGI)     Timed Up and Go (TUG) - sec    Single Leg Stance Right (sec)    Single Leg Stance Left (sec)    Modified CTSIB Conditions (sec) Cond 1:   Cond 2:   Cond 4:   Cond 5 :    Romberg  (sec)    Sharpened Romberg (sec)    30 Second Sit to Stand (reps/height) 7 (O2 sats dropped to low 80s)   Mini-BESTest              SENSATION:  Patient reports decreased sensation in his feet bilaterally secondary to neuropathy    REFLEXES: Not assessed  COORDINATION: WFL with heel to shin test  MUSCLE TONE: WNL    Assessment & Plan   CLINICAL IMPRESSIONS  Medical Diagnosis: Primary  adenocarcinoma of the colon    Treatment Diagnosis: Deconditioning   Impression/Assessment: Patient is a 49 year old male with complaints of fatigue and shortness of breath since prolonged hospitalization for cancer treatment.  The following significant findings have been identified: Impaired sensation, Impaired muscle performance, Decreased activity tolerance, and Impaired posture. These impairments interfere with their ability to perform recreational activities, household chores, household mobility, and community mobility as compared to previous level of function. O2 sats were closely monitored throughout the session secondary to tendency to desaturate as low as the mid 80's with activity. Continued PT is indicated to improve strength and activity tolerance, and to instruct in a safe home program.     Clinical Decision Making (Complexity):  Clinical Presentation: Evolving/Changing  Clinical Presentation Rationale: based on medical and personal factors listed in PT evaluation  Clinical Decision Making (Complexity): Moderate complexity    PLAN OF CARE  Treatment Interventions:  Interventions: Gait Training, Neuromuscular Re-education, Therapeutic Activity, Therapeutic Exercise, Self-Care/Home Management    Long Term Goals     PT Goal 1  Goal Identifier: HEP  Goal Description: Patient will be independent with Home Exercise Program for continued improvements in health and wellness upon d/c from therapy  Target Date: 10/23/24  PT Goal 2  Goal Identifier: FACIT  Goal Description: Patient will report an improvement to 16 on the FACIT as an indication of significant improvement in fatigue level.  Target Date: 10/23/24  PT Goal 3  Goal Identifier: 30 sec sit to stand  Goal Description: Patient will improve 30 sec sit to stand to >10 reps as an indication of improved LE functional strength  Target Date: 10/23/24  PT Goal 4  Goal Identifier: 6 minute walk  Goal Description: Patient will improve to 176 meters on the 6 minute  walk  Rationale: to maximize safety and independence within the community  Target Date: 10/23/24      Frequency of Treatment: 1x/week  Duration of Treatment: up to 90 days    Recommended Referrals to Other Professionals:   Education Assessment:   Learner/Method: Patient  Education Comments: Educated on POC, HEP    Risks and benefits of evaluation/treatment have been explained.   Patient/Family/caregiver agrees with Plan of Care.     Evaluation Time:     PT Eval, Moderate Complexity Minutes (69553): 30       Signing Clinician: Sierra Edwards, PT        JAZMYNE [facility]                                                                                   OUTPATIENT PHYSICAL THERAPY      PLAN OF TREATMENT FOR OUTPATIENT REHABILITATION   Patient's Last Name, First Name, JAZMYNEFlorecitaWENDYFlorecita  BorisRoman  JAZMYNE YOB: 1975   Provider[facility]   [facility]   Medical Record No.  [id number]     Onset Date: 07/23/24  Start of Care Date: 07/26/24     Medical Diagnosis:  Primary adenocarcinoma of the colon      PT Treatment Diagnosis:  Deconditioning Plan of Treatment  Frequency/Duration: 1x/week/ up to 90 days    Certification date from 07/26/24 to 10/23/24         See note for plan of treatment details and functional goals     Sierra Edwards, PT                         I CERTIFY THE NEED FOR THESE SERVICES FURNISHED UNDER        THIS PLAN OF TREATMENT AND WHILE UNDER MY CARE     (Physician attestation of this document indicates review and certification of the therapy plan).              Referring Provider:  Polo Snow    Initial Assessment  See Epic Evaluation- Start of Care Date: 07/26/24

## 2024-07-29 ENCOUNTER — LAB (OUTPATIENT)
Dept: LAB | Facility: CLINIC | Age: 49
End: 2024-07-29
Payer: COMMERCIAL

## 2024-07-29 ENCOUNTER — MYC MEDICAL ADVICE (OUTPATIENT)
Dept: ONCOLOGY | Facility: CLINIC | Age: 49
End: 2024-07-29

## 2024-07-29 DIAGNOSIS — C20 RECTAL ADENOCARCINOMA METASTATIC TO LUNG (H): ICD-10-CM

## 2024-07-29 DIAGNOSIS — C18.9 PRIMARY ADENOCARCINOMA OF COLON (H): ICD-10-CM

## 2024-07-29 DIAGNOSIS — Z79.899 ENCOUNTER FOR LONG-TERM (CURRENT) USE OF MEDICATIONS: ICD-10-CM

## 2024-07-29 DIAGNOSIS — C78.00 RECTAL ADENOCARCINOMA METASTATIC TO LUNG (H): ICD-10-CM

## 2024-07-29 LAB
ALBUMIN SERPL BCG-MCNC: 4.1 G/DL (ref 3.5–5.2)
ALP SERPL-CCNC: 152 U/L (ref 40–150)
ALT SERPL W P-5'-P-CCNC: 20 U/L (ref 0–70)
ANION GAP SERPL CALCULATED.3IONS-SCNC: 12 MMOL/L (ref 7–15)
AST SERPL W P-5'-P-CCNC: 38 U/L (ref 0–45)
BASOPHILS # BLD AUTO: 0 10E3/UL (ref 0–0.2)
BASOPHILS NFR BLD AUTO: 0 %
BILIRUB SERPL-MCNC: 0.4 MG/DL
BUN SERPL-MCNC: 13.5 MG/DL (ref 6–20)
CALCIUM SERPL-MCNC: 9.9 MG/DL (ref 8.8–10.4)
CHLORIDE SERPL-SCNC: 99 MMOL/L (ref 98–107)
CREAT SERPL-MCNC: 0.84 MG/DL (ref 0.67–1.17)
EGFRCR SERPLBLD CKD-EPI 2021: >90 ML/MIN/1.73M2
EOSINOPHIL # BLD AUTO: 0.9 10E3/UL (ref 0–0.7)
EOSINOPHIL NFR BLD AUTO: 8 %
ERYTHROCYTE [DISTWIDTH] IN BLOOD BY AUTOMATED COUNT: 20.6 % (ref 10–15)
GLUCOSE SERPL-MCNC: 112 MG/DL (ref 70–99)
HCO3 SERPL-SCNC: 25 MMOL/L (ref 22–29)
HCT VFR BLD AUTO: 32.7 % (ref 40–53)
HGB BLD-MCNC: 10.2 G/DL (ref 13.3–17.7)
HOLD SPECIMEN: NORMAL
IMM GRANULOCYTES # BLD: 0 10E3/UL
IMM GRANULOCYTES NFR BLD: 0 %
LYMPHOCYTES # BLD AUTO: 1.1 10E3/UL (ref 0.8–5.3)
LYMPHOCYTES NFR BLD AUTO: 10 %
MCH RBC QN AUTO: 25.2 PG (ref 26.5–33)
MCHC RBC AUTO-ENTMCNC: 31.2 G/DL (ref 31.5–36.5)
MCV RBC AUTO: 81 FL (ref 78–100)
MONOCYTES # BLD AUTO: 1.1 10E3/UL (ref 0–1.3)
MONOCYTES NFR BLD AUTO: 10 %
NEUTROPHILS # BLD AUTO: 8.1 10E3/UL (ref 1.6–8.3)
NEUTROPHILS NFR BLD AUTO: 72 %
PLATELET # BLD AUTO: 243 10E3/UL (ref 150–450)
POTASSIUM SERPL-SCNC: 5.1 MMOL/L (ref 3.4–5.3)
PROT SERPL-MCNC: 7.3 G/DL (ref 6.4–8.3)
RBC # BLD AUTO: 4.05 10E6/UL (ref 4.4–5.9)
SODIUM SERPL-SCNC: 136 MMOL/L (ref 135–145)
WBC # BLD AUTO: 11.3 10E3/UL (ref 4–11)

## 2024-07-29 PROCEDURE — 36415 COLL VENOUS BLD VENIPUNCTURE: CPT

## 2024-07-29 PROCEDURE — 85025 COMPLETE CBC W/AUTO DIFF WBC: CPT

## 2024-07-29 PROCEDURE — 80053 COMPREHEN METABOLIC PANEL: CPT

## 2024-07-30 ENCOUNTER — ANCILLARY PROCEDURE (OUTPATIENT)
Dept: CT IMAGING | Facility: CLINIC | Age: 49
End: 2024-07-30
Attending: STUDENT IN AN ORGANIZED HEALTH CARE EDUCATION/TRAINING PROGRAM
Payer: COMMERCIAL

## 2024-07-30 ENCOUNTER — LAB (OUTPATIENT)
Dept: LAB | Facility: CLINIC | Age: 49
End: 2024-07-30
Attending: STUDENT IN AN ORGANIZED HEALTH CARE EDUCATION/TRAINING PROGRAM
Payer: COMMERCIAL

## 2024-07-30 DIAGNOSIS — C18.9 PRIMARY ADENOCARCINOMA OF COLON (H): ICD-10-CM

## 2024-07-30 DIAGNOSIS — C18.9 PRIMARY ADENOCARCINOMA OF COLON (H): Primary | ICD-10-CM

## 2024-07-30 LAB
ALBUMIN SERPL BCG-MCNC: 4 G/DL (ref 3.5–5.2)
ALP SERPL-CCNC: 154 U/L (ref 40–150)
ALT SERPL W P-5'-P-CCNC: 18 U/L (ref 0–70)
ANION GAP SERPL CALCULATED.3IONS-SCNC: 13 MMOL/L (ref 7–15)
AST SERPL W P-5'-P-CCNC: 39 U/L (ref 0–45)
BASOPHILS # BLD AUTO: 0.1 10E3/UL (ref 0–0.2)
BASOPHILS NFR BLD AUTO: 1 %
BILIRUB SERPL-MCNC: 0.3 MG/DL
BUN SERPL-MCNC: 16.9 MG/DL (ref 6–20)
CALCIUM SERPL-MCNC: 9.3 MG/DL (ref 8.8–10.4)
CEA SERPL-MCNC: 179 NG/ML
CHLORIDE SERPL-SCNC: 99 MMOL/L (ref 98–107)
CREAT SERPL-MCNC: 0.91 MG/DL (ref 0.67–1.17)
EGFRCR SERPLBLD CKD-EPI 2021: >90 ML/MIN/1.73M2
EOSINOPHIL # BLD AUTO: 1 10E3/UL (ref 0–0.7)
EOSINOPHIL NFR BLD AUTO: 8 %
ERYTHROCYTE [DISTWIDTH] IN BLOOD BY AUTOMATED COUNT: 20.4 % (ref 10–15)
GLUCOSE SERPL-MCNC: 104 MG/DL (ref 70–99)
HCO3 SERPL-SCNC: 23 MMOL/L (ref 22–29)
HCT VFR BLD AUTO: 31 % (ref 40–53)
HGB BLD-MCNC: 10 G/DL (ref 13.3–17.7)
IMM GRANULOCYTES # BLD: 0.1 10E3/UL
IMM GRANULOCYTES NFR BLD: 1 %
LYMPHOCYTES # BLD AUTO: 1.5 10E3/UL (ref 0.8–5.3)
LYMPHOCYTES NFR BLD AUTO: 12 %
MCH RBC QN AUTO: 25.4 PG (ref 26.5–33)
MCHC RBC AUTO-ENTMCNC: 32.3 G/DL (ref 31.5–36.5)
MCV RBC AUTO: 79 FL (ref 78–100)
MONOCYTES # BLD AUTO: 1.1 10E3/UL (ref 0–1.3)
MONOCYTES NFR BLD AUTO: 9 %
NEUTROPHILS # BLD AUTO: 8.7 10E3/UL (ref 1.6–8.3)
NEUTROPHILS NFR BLD AUTO: 69 %
NRBC # BLD AUTO: 0 10E3/UL
NRBC BLD AUTO-RTO: 0 /100
PLATELET # BLD AUTO: 242 10E3/UL (ref 150–450)
POTASSIUM SERPL-SCNC: 4.4 MMOL/L (ref 3.4–5.3)
PROT SERPL-MCNC: 7.1 G/DL (ref 6.4–8.3)
RBC # BLD AUTO: 3.94 10E6/UL (ref 4.4–5.9)
SODIUM SERPL-SCNC: 135 MMOL/L (ref 135–145)
WBC # BLD AUTO: 12.5 10E3/UL (ref 4–11)

## 2024-07-30 PROCEDURE — 85025 COMPLETE CBC W/AUTO DIFF WBC: CPT | Performed by: PATHOLOGY

## 2024-07-30 PROCEDURE — 71260 CT THORAX DX C+: CPT | Mod: GC | Performed by: RADIOLOGY

## 2024-07-30 PROCEDURE — 82378 CARCINOEMBRYONIC ANTIGEN: CPT | Performed by: STUDENT IN AN ORGANIZED HEALTH CARE EDUCATION/TRAINING PROGRAM

## 2024-07-30 PROCEDURE — 99000 SPECIMEN HANDLING OFFICE-LAB: CPT | Performed by: PATHOLOGY

## 2024-07-30 PROCEDURE — 36415 COLL VENOUS BLD VENIPUNCTURE: CPT | Performed by: PATHOLOGY

## 2024-07-30 PROCEDURE — 80053 COMPREHEN METABOLIC PANEL: CPT | Performed by: PATHOLOGY

## 2024-07-30 PROCEDURE — 74177 CT ABD & PELVIS W/CONTRAST: CPT | Mod: GC | Performed by: RADIOLOGY

## 2024-07-30 RX ORDER — HEPARIN SODIUM (PORCINE) LOCK FLUSH IV SOLN 100 UNIT/ML 100 UNIT/ML
500 SOLUTION INTRAVENOUS ONCE
Status: COMPLETED | OUTPATIENT
Start: 2024-07-30 | End: 2024-07-30

## 2024-07-30 RX ORDER — IOPAMIDOL 755 MG/ML
85 INJECTION, SOLUTION INTRAVASCULAR ONCE
Status: COMPLETED | OUTPATIENT
Start: 2024-07-30 | End: 2024-07-30

## 2024-07-30 RX ADMIN — HEPARIN SODIUM (PORCINE) LOCK FLUSH IV SOLN 100 UNIT/ML 500 UNITS: 100 SOLUTION at 14:46

## 2024-07-30 RX ADMIN — IOPAMIDOL 85 ML: 755 INJECTION, SOLUTION INTRAVASCULAR at 14:52

## 2024-07-30 NOTE — DISCHARGE INSTRUCTIONS

## 2024-07-30 NOTE — TELEPHONE ENCOUNTER
Called patient regarding mychart message.  Spent over 30 minutes talking to him about his communication issues and his symptoms.  Patient was very distressed and angry.  He was also crying Reached out to Dr. Snow to discuss.  Dr. Snow agreed to call patient himself to discuss symptoms.  A stat CT order was placed and labs.  Patient scheduled today and aware of new plan and appointments.  Dr. Snow will see him virtually tomorrow to review.

## 2024-07-31 ENCOUNTER — TELEPHONE (OUTPATIENT)
Dept: ONCOLOGY | Facility: CLINIC | Age: 49
End: 2024-07-31

## 2024-07-31 ENCOUNTER — VIRTUAL VISIT (OUTPATIENT)
Dept: ONCOLOGY | Facility: CLINIC | Age: 49
End: 2024-07-31
Attending: STUDENT IN AN ORGANIZED HEALTH CARE EDUCATION/TRAINING PROGRAM
Payer: COMMERCIAL

## 2024-07-31 VITALS — WEIGHT: 162 LBS | HEIGHT: 71 IN | BODY MASS INDEX: 22.68 KG/M2

## 2024-07-31 DIAGNOSIS — C78.00 RECTAL ADENOCARCINOMA METASTATIC TO LUNG (H): ICD-10-CM

## 2024-07-31 DIAGNOSIS — C18.9 PRIMARY ADENOCARCINOMA OF COLON (H): Primary | ICD-10-CM

## 2024-07-31 DIAGNOSIS — C20 RECTAL ADENOCARCINOMA METASTATIC TO LUNG (H): ICD-10-CM

## 2024-07-31 PROCEDURE — 99215 OFFICE O/P EST HI 40 MIN: CPT | Mod: 95 | Performed by: STUDENT IN AN ORGANIZED HEALTH CARE EDUCATION/TRAINING PROGRAM

## 2024-07-31 PROCEDURE — G2211 COMPLEX E/M VISIT ADD ON: HCPCS | Mod: 95 | Performed by: STUDENT IN AN ORGANIZED HEALTH CARE EDUCATION/TRAINING PROGRAM

## 2024-07-31 RX ORDER — METHYLPREDNISOLONE SODIUM SUCCINATE 125 MG/2ML
125 INJECTION, POWDER, LYOPHILIZED, FOR SOLUTION INTRAMUSCULAR; INTRAVENOUS
Status: CANCELLED
Start: 2024-09-13

## 2024-07-31 RX ORDER — HEPARIN SODIUM,PORCINE 10 UNIT/ML
5-20 VIAL (ML) INTRAVENOUS DAILY PRN
Status: CANCELLED | OUTPATIENT
Start: 2024-09-13

## 2024-07-31 RX ORDER — ATROPINE SULFATE 0.4 MG/ML
0.4 AMPUL (ML) INJECTION
Status: CANCELLED | OUTPATIENT
Start: 2024-08-30

## 2024-07-31 RX ORDER — LORAZEPAM 2 MG/ML
0.5 INJECTION INTRAMUSCULAR EVERY 4 HOURS PRN
Status: CANCELLED | OUTPATIENT
Start: 2024-08-30

## 2024-07-31 RX ORDER — ALBUTEROL SULFATE 0.83 MG/ML
2.5 SOLUTION RESPIRATORY (INHALATION)
Status: CANCELLED | OUTPATIENT
Start: 2024-08-30

## 2024-07-31 RX ORDER — DIPHENHYDRAMINE HYDROCHLORIDE 50 MG/ML
50 INJECTION INTRAMUSCULAR; INTRAVENOUS
Status: CANCELLED
Start: 2024-08-30

## 2024-07-31 RX ORDER — ATROPINE SULFATE 0.4 MG/ML
0.4 AMPUL (ML) INJECTION
Status: CANCELLED | OUTPATIENT
Start: 2024-09-13

## 2024-07-31 RX ORDER — ALBUTEROL SULFATE 90 UG/1
1-2 AEROSOL, METERED RESPIRATORY (INHALATION)
Status: CANCELLED
Start: 2024-08-30

## 2024-07-31 RX ORDER — ALBUTEROL SULFATE 90 UG/1
1-2 AEROSOL, METERED RESPIRATORY (INHALATION)
Status: CANCELLED
Start: 2024-09-13

## 2024-07-31 RX ORDER — HEPARIN SODIUM,PORCINE 10 UNIT/ML
5-20 VIAL (ML) INTRAVENOUS DAILY PRN
Status: CANCELLED | OUTPATIENT
Start: 2024-09-01

## 2024-07-31 RX ORDER — LORAZEPAM 2 MG/ML
0.5 INJECTION INTRAMUSCULAR EVERY 4 HOURS PRN
Status: CANCELLED | OUTPATIENT
Start: 2024-09-13

## 2024-07-31 RX ORDER — EPINEPHRINE 1 MG/ML
0.3 INJECTION, SOLUTION INTRAMUSCULAR; SUBCUTANEOUS EVERY 5 MIN PRN
Status: CANCELLED | OUTPATIENT
Start: 2024-09-13

## 2024-07-31 RX ORDER — FLUOROURACIL 50 MG/ML
400 INJECTION, SOLUTION INTRAVENOUS ONCE
Status: CANCELLED | OUTPATIENT
Start: 2024-09-13

## 2024-07-31 RX ORDER — MEPERIDINE HYDROCHLORIDE 25 MG/ML
25 INJECTION INTRAMUSCULAR; INTRAVENOUS; SUBCUTANEOUS EVERY 30 MIN PRN
Status: CANCELLED | OUTPATIENT
Start: 2024-09-13

## 2024-07-31 RX ORDER — HEPARIN SODIUM (PORCINE) LOCK FLUSH IV SOLN 100 UNIT/ML 100 UNIT/ML
5 SOLUTION INTRAVENOUS
Status: CANCELLED | OUTPATIENT
Start: 2024-08-30

## 2024-07-31 RX ORDER — HEPARIN SODIUM (PORCINE) LOCK FLUSH IV SOLN 100 UNIT/ML 100 UNIT/ML
5 SOLUTION INTRAVENOUS
Status: CANCELLED | OUTPATIENT
Start: 2024-09-01

## 2024-07-31 RX ORDER — ALBUTEROL SULFATE 0.83 MG/ML
2.5 SOLUTION RESPIRATORY (INHALATION)
Status: CANCELLED | OUTPATIENT
Start: 2024-09-13

## 2024-07-31 RX ORDER — MEPERIDINE HYDROCHLORIDE 25 MG/ML
25 INJECTION INTRAMUSCULAR; INTRAVENOUS; SUBCUTANEOUS EVERY 30 MIN PRN
Status: CANCELLED | OUTPATIENT
Start: 2024-08-30

## 2024-07-31 RX ORDER — METHYLPREDNISOLONE SODIUM SUCCINATE 125 MG/2ML
125 INJECTION, POWDER, LYOPHILIZED, FOR SOLUTION INTRAMUSCULAR; INTRAVENOUS
Status: CANCELLED
Start: 2024-08-30

## 2024-07-31 RX ORDER — DIPHENHYDRAMINE HYDROCHLORIDE 50 MG/ML
50 INJECTION INTRAMUSCULAR; INTRAVENOUS
Status: CANCELLED
Start: 2024-09-13

## 2024-07-31 RX ORDER — HEPARIN SODIUM,PORCINE 10 UNIT/ML
5-20 VIAL (ML) INTRAVENOUS DAILY PRN
Status: CANCELLED | OUTPATIENT
Start: 2024-09-15

## 2024-07-31 RX ORDER — HEPARIN SODIUM,PORCINE 10 UNIT/ML
5-20 VIAL (ML) INTRAVENOUS DAILY PRN
Status: CANCELLED | OUTPATIENT
Start: 2024-08-30

## 2024-07-31 RX ORDER — FLUOROURACIL 50 MG/ML
400 INJECTION, SOLUTION INTRAVENOUS ONCE
Status: CANCELLED | OUTPATIENT
Start: 2024-08-30

## 2024-07-31 RX ORDER — HEPARIN SODIUM (PORCINE) LOCK FLUSH IV SOLN 100 UNIT/ML 100 UNIT/ML
5 SOLUTION INTRAVENOUS
Status: CANCELLED | OUTPATIENT
Start: 2024-09-15

## 2024-07-31 RX ORDER — EPINEPHRINE 1 MG/ML
0.3 INJECTION, SOLUTION INTRAMUSCULAR; SUBCUTANEOUS EVERY 5 MIN PRN
Status: CANCELLED | OUTPATIENT
Start: 2024-08-30

## 2024-07-31 RX ORDER — HEPARIN SODIUM (PORCINE) LOCK FLUSH IV SOLN 100 UNIT/ML 100 UNIT/ML
5 SOLUTION INTRAVENOUS
Status: CANCELLED | OUTPATIENT
Start: 2024-09-13

## 2024-07-31 ASSESSMENT — PAIN SCALES - GENERAL: PAINLEVEL: NO PAIN (0)

## 2024-07-31 NOTE — LETTER
2024      Roman Escalante  [address]  [facility] [id number]      Dear Colleague,    Thank you for referring your patient, Roman Escalante, to the [facility]. Please see a copy of my visit note below.    [facility] - Medical Oncology TeleHealth Consult Note  2024    Patient Identifiers     Name: Roman Escalante  Preferred Address: [place]  Preferred Language: English  : 1975  Gender: male    Assessment and Plan     Mr. Roman Escalante is a 49 year old male  former smoker (3-5 cigs/day) with a history of extensively pre-treated metastatic colorectal cancer s/p trial cellular therapy transplant (2024) who returns to clinic for symptom management and counseling.     NGS: KRAS G12N  Immuno: pMMR, TJ  Clinical Trial: likely too deconditioned    Roman returns to clinic for further discussion of symptom management and treatment counseling.  Unfortunately, his clinical trial failed to generate T-cell graft.  His most recent imaging demonstrates marked progression of pulmonary and hepatic lesions.  Independent of these findings, it is obvious that his clinical symptoms have progressed with challenges in diet, weight gain, and fatigue.  Taken together, these observations suggest significant disease progression.  While we may consider further cancer directed therapy and clinical trial, his current clinical status likely precludes the latter and the challenges in treatment response argue against the former.  Overall, his best treatment option is likely palliative care focused.    We discussed recommendation for DNR/DNI status along with completion of POLST.  He is meeting with palliative care on Friday and will continue this discussion with them.  We agree with potential hospice disposition if he and his wife are in agreement.  In the interim, we will arrange for potential treatment restart with FOLFIRI/Reyna if he wishes to consider further cancer directed  treatment.  This would not be our primary recommendation, of which he is aware.    45 minutes spent on the date of the encounter doing chart review, review of test results, interpretation of tests, patient visit, and documentation     The longitudinal plan of care for the diagnosis(es)/condition(s) as documented were addressed during this visit. Due to the added complexity in care, I will continue to support Roman in the subsequent management and with ongoing continuity of care.    Polo Snow MD, PhD   of Medicine  Division of Hematology, Oncology and Transplantation  [facility]    -----------------------------------    Oncology Summary     Cancer Staging   Rectal adenocarcinoma metastatic to lung (H)  Staging form: Colon and Rectum, AJCC 8th Edition  - Clinical: Stage IVB (cTX, cNX, pM1b) - Signed by Polo Snow MD on 3/30/2023      Oncology History   Rectal adenocarcinoma metastatic to lung (H)   2/3/2023 Initial Diagnosis    Rectal adenocarcinoma metastatic to lung (H)     2/3/2023 - 3/31/2023 Chemotherapy    Oral ONC Colorectal Cancer - Regorafenib  Plan Provider: Polo Snow MD  Treatment goal: Palliative  Line of treatment: [No plan line of treatment]     3/30/2023 -  Cancer Staged    Staging form: Colon and Rectum, AJCC 8th Edition  - Clinical: Stage IVB (cTX, cNX, pM1b)     6/7/2023 - 10/3/2023 Chemotherapy    OP ONC Colorectal Cancer - Modified FOLFOX-6 / Bevacizumab  Plan Provider: Polo Snow MD  Treatment goal: Palliative  Line of treatment: [No plan line of treatment]     10/27/2023 - 3/11/2024 Chemotherapy    OP ONC Colorectal Cancer - Bevacizumab + Trifluridine/Tipiracil (Lonsurf)  Plan Provider: Polo Snow MD  Treatment goal: Palliative  Line of treatment: [No plan line of treatment]     3/27/2024 - 3/27/2024 Chemotherapy    Oral ONC Colorectal Cancer - Fruquintinib (Fruzaqla)  Plan Provider: Polo Snow MD  Treatment goal: Palliative  Line of treatment:  [No plan line of treatment]     5/2024 -  Chemotherapy    [facility] Cellular Therapy cytoreductive conditioning and cellular transplant  - admitted for 5 weeks following transplant with ~30lbs weight loss         Subjective/Interval Events     - pain well controlled on tylenol  - waking up every 2 hours to urinate, with bloody mucus from the anus. Then feels extremely fatigued throughout the day    Objective Data     Lab data:  I have personally reviewed the lab data, notable for:    -  <- 135 <- 59.3  - Alk Phos 154  - Wbc 12.5  - Hgb 10.0  - Plt 242    Radiology data:  I have personally reviewed the radiology data, notable for:  07/30/2024 CT C/A/P  IMPRESSION:   1.  Increasing size of innumerable pulmonary and mediastinal  metastases, with the largest in the right lower lobe measuring up to  6.7 cm.  2.  Increasing size and number of hepatic metastases with the largest  measuring up to 9.3 cm.  3.  Increasing size of rectosigmoid ill-defined hyperdense mass which  now involves more proximal aspects of the sigmoid colon. Nodules  adjacent to the left colostomy.  4.  Scattered lymphadenopathy within the abdomen.    Pathology and other data:  I have personally reviewed and interpreted the pathology data, notable for:    - no new data    Billing Stuff     Virtual Visit Details    Type of service:  Video Visit   Video Start Time:  8:00  Video End Time: 8:30    Originating Location (pt. Location): Home    Distant Location (provider location):  On-site  Platform used for Video Visit: Huey      Again, thank you for allowing me to participate in the care of your patient.        Sincerely,        Polo Snow MD

## 2024-07-31 NOTE — NURSING NOTE
Current patient location:  Pts girlfriends house    Is the patient currently in the state of [place]? YES    Visit mode:VIDEO    If the visit is dropped, the patient can be reconnected by: VIDEO VISIT: Text to cell phone:   Telephone Information:   Mobile [phone]       Will anyone else be joining the visit? NO  (If patient encounters technical issues they should call [phone]    How would you like to obtain your AVS? MyChart    Are changes needed to the allergy or medication list? Yes Pt is also taking flomax .4mg, bactrim 800mg, and valacyclovir 500mg,    Are refills needed on medications prescribed by this physician? NO    Reason for visit: VIDAL RODRIGUEZ      Patient did not show up for his INR check today. Attempted to reach patient voice mailbox is full

## 2024-07-31 NOTE — PROGRESS NOTES
[facility] - Medical Oncology TeleHealth Consult Note  2024    Patient Identifiers     Name: Roman Escalante  Preferred Address: [place]  Preferred Language: English  : 1975  Gender: male    Assessment and Plan     Mr. Roman Escalante is a 49 year old male  former smoker (3-5 cigs/day) with a history of extensively pre-treated metastatic colorectal cancer s/p trial cellular therapy transplant (2024) who returns to clinic for symptom management and counseling.     NGS: KRAS G12N  Immuno: pMMR, TJ  Clinical Trial: likely too deconditioned    Roman returns to clinic for further discussion of symptom management and treatment counseling.  Unfortunately, his clinical trial failed to generate T-cell graft.  His most recent imaging demonstrates marked progression of pulmonary and hepatic lesions.  Independent of these findings, it is obvious that his clinical symptoms have progressed with challenges in diet, weight gain, and fatigue.  Taken together, these observations suggest significant disease progression.  While we may consider further cancer directed therapy and clinical trial, his current clinical status likely precludes the latter and the challenges in treatment response argue against the former.  Overall, his best treatment option is likely palliative care focused.    We discussed recommendation for DNR/DNI status along with completion of POLST.  He is meeting with palliative care on Friday and will continue this discussion with them.  We agree with potential hospice disposition if he and his wife are in agreement.  In the interim, we will arrange for potential treatment restart with FOLFIRI/Reyna if he wishes to consider further cancer directed treatment.  This would not be our primary recommendation, of which he is aware.    45 minutes spent on the date of the encounter doing chart review, review of test results, interpretation of tests, patient visit, and documentation     The longitudinal  plan of care for the diagnosis(es)/condition(s) as documented were addressed during this visit. Due to the added complexity in care, I will continue to support Roman in the subsequent management and with ongoing continuity of care.    Polo Snow MD, PhD   of Medicine  Division of Hematology, Oncology and Transplantation  [facility]    -----------------------------------    Oncology Summary     Cancer Staging   Rectal adenocarcinoma metastatic to lung (H)  Staging form: Colon and Rectum, AJCC 8th Edition  - Clinical: Stage IVB (cTX, cNX, pM1b) - Signed by Polo Snow MD on 3/30/2023      Oncology History   Rectal adenocarcinoma metastatic to lung (H)   2/3/2023 Initial Diagnosis    Rectal adenocarcinoma metastatic to lung (H)     2/3/2023 - 3/31/2023 Chemotherapy    Oral ONC Colorectal Cancer - Regorafenib  Plan Provider: Polo Snow MD  Treatment goal: Palliative  Line of treatment: [No plan line of treatment]     3/30/2023 -  Cancer Staged    Staging form: Colon and Rectum, AJCC 8th Edition  - Clinical: Stage IVB (cTX, cNX, pM1b)     6/7/2023 - 10/3/2023 Chemotherapy    OP ONC Colorectal Cancer - Modified FOLFOX-6 / Bevacizumab  Plan Provider: Polo Snow MD  Treatment goal: Palliative  Line of treatment: [No plan line of treatment]     10/27/2023 - 3/11/2024 Chemotherapy    OP ONC Colorectal Cancer - Bevacizumab + Trifluridine/Tipiracil (Lonsurf)  Plan Provider: Polo Snow MD  Treatment goal: Palliative  Line of treatment: [No plan line of treatment]     3/27/2024 - 3/27/2024 Chemotherapy    Oral ONC Colorectal Cancer - Fruquintinib (Fruzaqla)  Plan Provider: Polo Snow MD  Treatment goal: Palliative  Line of treatment: [No plan line of treatment]     5/2024 -  Chemotherapy    [facility] Cellular Therapy cytoreductive conditioning and cellular transplant  - admitted for 5 weeks following transplant with ~30lbs weight loss         Subjective/Interval Events     - pain well  controlled on tylenol  - waking up every 2 hours to urinate, with bloody mucus from the anus. Then feels extremely fatigued throughout the day    Objective Data     Lab data:  I have personally reviewed the lab data, notable for:    -  <- 135 <- 59.3  - Alk Phos 154  - Wbc 12.5  - Hgb 10.0  - Plt 242    Radiology data:  I have personally reviewed the radiology data, notable for:  07/30/2024 CT C/A/P  IMPRESSION:   1.  Increasing size of innumerable pulmonary and mediastinal  metastases, with the largest in the right lower lobe measuring up to  6.7 cm.  2.  Increasing size and number of hepatic metastases with the largest  measuring up to 9.3 cm.  3.  Increasing size of rectosigmoid ill-defined hyperdense mass which  now involves more proximal aspects of the sigmoid colon. Nodules  adjacent to the left colostomy.  4.  Scattered lymphadenopathy within the abdomen.    Pathology and other data:  I have personally reviewed and interpreted the pathology data, notable for:    - no new data    Billing Stuff     Virtual Visit Details    Type of service:  Video Visit   Video Start Time:  8:00  Video End Time: 8:30    Originating Location (pt. Location): Home    Distant Location (provider location):  On-site  Platform used for Video Visit: Huey

## 2024-08-02 ENCOUNTER — VIRTUAL VISIT (OUTPATIENT)
Dept: ONCOLOGY | Facility: CLINIC | Age: 49
End: 2024-08-02
Attending: STUDENT IN AN ORGANIZED HEALTH CARE EDUCATION/TRAINING PROGRAM
Payer: COMMERCIAL

## 2024-08-02 VITALS — BODY MASS INDEX: 22.4 KG/M2 | HEIGHT: 71 IN | WEIGHT: 160 LBS

## 2024-08-02 DIAGNOSIS — Z51.5 ENCOUNTER FOR PALLIATIVE CARE: ICD-10-CM

## 2024-08-02 DIAGNOSIS — Z71.89 ADVANCED CARE PLANNING/COUNSELING DISCUSSION: ICD-10-CM

## 2024-08-02 DIAGNOSIS — C78.7 COLON CANCER METASTASIZED TO LIVER (H): Primary | ICD-10-CM

## 2024-08-02 DIAGNOSIS — Z71.89 GOALS OF CARE, COUNSELING/DISCUSSION: ICD-10-CM

## 2024-08-02 DIAGNOSIS — C18.9 COLON CANCER METASTASIZED TO LIVER (H): Primary | ICD-10-CM

## 2024-08-02 DIAGNOSIS — K62.89 RECTAL PAIN: ICD-10-CM

## 2024-08-02 DIAGNOSIS — G89.3 CANCER RELATED PAIN: ICD-10-CM

## 2024-08-02 PROCEDURE — 99215 OFFICE O/P EST HI 40 MIN: CPT | Mod: 95 | Performed by: STUDENT IN AN ORGANIZED HEALTH CARE EDUCATION/TRAINING PROGRAM

## 2024-08-02 RX ORDER — OXYCODONE HYDROCHLORIDE 5 MG/1
5-10 TABLET ORAL EVERY 4 HOURS PRN
Qty: 120 TABLET | Refills: 0 | Status: SHIPPED | OUTPATIENT
Start: 2024-08-02 | End: 2024-09-06

## 2024-08-02 ASSESSMENT — PAIN SCALES - GENERAL: PAINLEVEL: MODERATE PAIN (5)
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
## 2024-08-02 NOTE — NURSING NOTE
Current patient location:  [place] [address], [place].     Is the patient currently in the state of [place]? YES    Visit mode:VIDEO    If the visit is dropped, the patient can be reconnected by: VIDEO VISIT: Text to cell phone:   Telephone Information:   Mobile [phone]       Will anyone else be joining the visit? NO  (If patient encounters technical issues they should call [phone]    How would you like to obtain your AVS? MyChart    Are changes needed to the allergy or medication list? Yes Pt states taking Tamsulosin 0.4MG capsule daily, Valtrex 500MG tablet once daily and Sulfamethoxazole Trimethoprim 800- 160MG tablet- pt taking 1 tablet in the morning on Monday, Wednesday and Friday.     Are refills needed on medications prescribed by this physician? NO    Rooming Documentation:  Assigned questionnaire(s) completed      Reason for visit: VIDAL RODRIGUEZ
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
## 2024-08-02 NOTE — PATIENT INSTRUCTIONS
"Recommendations:  -I sent a new prescription for oxycodone 5 to 10 mg every 4 hours as needed.  Please keep me updated on how this is working for you.  -Recommended that you compose a message to oncology clearly outlining the questions that you feel are unanswered.  Introduced the topic of CPR as a medical decision that is generally considered important, particularly with patients with prior health conditions.  Discussed how CPR is a very aggressive process and does not have the same outcomes in real life as it does on TV.  Discussed the varying outcomes that can result when someone has gone through CPR and they are \"brought back\".  Discussed that often times patients are \"less than\" what they were prior to going through cardiac arrest (in terms of their physical and mental functionality).  We talked about how everybody has different criteria for what they consider acceptable in their lives, and this varies from person to person. Encouraged you to think about what you consider important and if there are any situations you would deem not acceptable and to use this information to help inform how you make decisions about your health care, namely what sort of interventions or procedures you would want to go through.      Follow up: About 1 month      Reasons to Call    If you are having worsening/uncontrolled symptoms we want you to call!    You or your other physicians make any changes to medications we have prescribed.  -Please call for refills 4-5 days before you will run out of medication.    Important Phone Numbers, including: Refills, scheduling, and general questions     Palliative Care RN: Rebeca Urbina : [phone]  *For scheduling needs/follow up visits : [phone]  *After hours or on weekends- Will connect you with on call MD : [phone].    "
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
## 2024-08-09 ENCOUNTER — ONCOLOGY VISIT (OUTPATIENT)
Dept: ONCOLOGY | Facility: CLINIC | Age: 49
End: 2024-08-09
Attending: STUDENT IN AN ORGANIZED HEALTH CARE EDUCATION/TRAINING PROGRAM
Payer: COMMERCIAL

## 2024-08-09 ENCOUNTER — LAB (OUTPATIENT)
Dept: LAB | Facility: CLINIC | Age: 49
End: 2024-08-09
Payer: COMMERCIAL

## 2024-08-09 VITALS
BODY MASS INDEX: 23 KG/M2 | DIASTOLIC BLOOD PRESSURE: 60 MMHG | RESPIRATION RATE: 16 BRPM | TEMPERATURE: 98.1 F | HEART RATE: 109 BPM | SYSTOLIC BLOOD PRESSURE: 91 MMHG | OXYGEN SATURATION: 93 % | WEIGHT: 164.9 LBS

## 2024-08-09 DIAGNOSIS — C18.9 COLON CANCER METASTASIZED TO LIVER (H): ICD-10-CM

## 2024-08-09 DIAGNOSIS — C78.7 COLON CANCER METASTASIZED TO LIVER (H): Primary | ICD-10-CM

## 2024-08-09 DIAGNOSIS — C18.9 COLON CANCER METASTASIZED TO LIVER (H): Primary | ICD-10-CM

## 2024-08-09 DIAGNOSIS — C78.7 COLON CANCER METASTASIZED TO LIVER (H): ICD-10-CM

## 2024-08-09 PROCEDURE — G2211 COMPLEX E/M VISIT ADD ON: HCPCS | Performed by: STUDENT IN AN ORGANIZED HEALTH CARE EDUCATION/TRAINING PROGRAM

## 2024-08-09 PROCEDURE — 87040 BLOOD CULTURE FOR BACTERIA: CPT | Performed by: STUDENT IN AN ORGANIZED HEALTH CARE EDUCATION/TRAINING PROGRAM

## 2024-08-09 PROCEDURE — 99215 OFFICE O/P EST HI 40 MIN: CPT | Performed by: STUDENT IN AN ORGANIZED HEALTH CARE EDUCATION/TRAINING PROGRAM

## 2024-08-09 PROCEDURE — 36415 COLL VENOUS BLD VENIPUNCTURE: CPT | Performed by: PATHOLOGY

## 2024-08-09 PROCEDURE — 99000 SPECIMEN HANDLING OFFICE-LAB: CPT | Performed by: PATHOLOGY

## 2024-08-09 PROCEDURE — G0463 HOSPITAL OUTPT CLINIC VISIT: HCPCS | Performed by: STUDENT IN AN ORGANIZED HEALTH CARE EDUCATION/TRAINING PROGRAM

## 2024-08-09 RX ORDER — TAMSULOSIN HYDROCHLORIDE 0.4 MG/1
CAPSULE ORAL
COMMUNITY
Start: 2024-07-01

## 2024-08-09 RX ORDER — SULFAMETHOXAZOLE/TRIMETHOPRIM 800-160 MG
TABLET ORAL
COMMUNITY
Start: 2024-07-01

## 2024-08-09 RX ORDER — VALACYCLOVIR HYDROCHLORIDE 500 MG/1
TABLET, FILM COATED ORAL
COMMUNITY
Start: 2024-07-01

## 2024-08-09 ASSESSMENT — PAIN SCALES - GENERAL: PAINLEVEL: MILD PAIN (3)

## 2024-08-09 NOTE — LETTER
2024      Roman Escalante  [address]  [facility] [address]      Dear Colleague,    Thank you for referring your patient, Roman Escalante, to the [facility]. Please see a copy of my visit note below.    [facility] - Medical Oncology Follow-Up Consult Note  2024    Patient Identifiers     Name: Roman Escalante  Preferred Address: [place]  Preferred Language: English  : 1975  Gender: male    Assessment and Plan     Mr. Roman Escalante is a 49 year old male with a history of former smoker (3-5 cigs/day) with a history of extensively pre-treated metastatic colorectal cancer s/p trial cellular therapy transplant (2024) on treatment holiday who returns to clinic for treatment resumption counseling.    NGS: KRAS [id number]  Immuno: pMMR, [facility]  Clinical Trial: likely too deconditioned    Roman returns to clinic to follow-up on our prior discussion regarding further chemotherapy versus hospice. Following an extended discussion with palliative care, and with his loved ones, he feels he is not ready for hospice. He wishes to proceed with our recommendation for treatment. Of note, he did not tolerate fruquintinib well, so we will not consider it for any further treatment. Prior to the fruquintinib, he had some reasonable disease control on Lonsurf/Avastin, so we will reuse the latter with resumption of aggressive chemotherapy in an attempt to induce disease response.     We will re-evaluate disease status in 2 months. If we do not observe significant treatment response, or note further clinical decline, we will likely not offer further systemic treatment. He expressed an understanding of these recommendations and is open to hospice referral in future.    Given his elevated WBC count today along with tachycardia, he meets SIRS criteria and we will perform blood cultures to ensure no occult bacteremia. Our overall suspicion for infection is low so we will defer  sepsis management.     Plan for FOLFIRI/Reyna chemotherapy every 2 weeks with LILLY monitoring. Plan for MD visit in 2 months with CT scans prior.    45 minutes spent on the date of the encounter doing chart review, review of test results, interpretation of tests, patient visit, and documentation     The longitudinal plan of care for the diagnosis(es)/condition(s) as documented were addressed during this visit. Due to the added complexity in care, I will continue to support Roman in the subsequent management and with ongoing continuity of care.    Polo Snow MD, PhD   of Medicine  Division of Hematology, Oncology and Transplantation  [facility]    -----------------------------------    Oncology Summary     Cancer Staging   Rectal adenocarcinoma metastatic to lung (H)  Staging form: Colon and Rectum, AJCC 8th Edition  - Clinical: Stage IVB (cTX, cNX, pM1b) - Signed by Polo Snow MD on 3/30/2023      Oncology History   Rectal adenocarcinoma metastatic to lung (H)   2/3/2023 Initial Diagnosis    Rectal adenocarcinoma metastatic to lung (H)     2/3/2023 - 3/31/2023 Chemotherapy    Oral ONC Colorectal Cancer - Regorafenib  Plan Provider: Polo Snow MD  Treatment goal: Palliative  Line of treatment: [No plan line of treatment]     3/30/2023 -  Cancer Staged    Staging form: Colon and Rectum, AJCC 8th Edition  - Clinical: Stage IVB (cTX, cNX, pM1b)     6/7/2023 - 10/3/2023 Chemotherapy    OP ONC Colorectal Cancer - Modified FOLFOX-6 / Bevacizumab  Plan Provider: Polo Snow MD  Treatment goal: Palliative  Line of treatment: [No plan line of treatment]     10/27/2023 - 3/11/2024 Chemotherapy    OP ONC Colorectal Cancer - Bevacizumab + Trifluridine/Tipiracil (Lonsurf)  Plan Provider: Polo Snow MD  Treatment goal: Palliative  Line of treatment: [No plan line of treatment]     3/27/2024 - 3/27/2024 Chemotherapy    Oral ONC Colorectal Cancer - Fruquintinib (Fruzaqla)  Plan Provider:  Polo Snow MD  Treatment goal: Palliative  Line of treatment: [No plan line of treatment]     5/2024 -  Chemotherapy    [facility] Cellular Therapy cytoreductive conditioning and cellular transplant  - admitted for 5 weeks following transplant with ~30lbs weight loss     8/3/2024 -  Chemotherapy    OP ONC Colorectal Cancer - FOLFIRI / Bevacizumab  Plan Provider: Polo Snow MD  Treatment goal: Palliative  Line of treatment: [No plan line of treatment]         Subjective/Interval Events     - he looks much better today than on his prior video visit; also notes that he has increased energy  - will work on continued weight maintenance/gain which he has managed since discharge from cell transplant  - note that he continues to have tachycardia    Physical Exam     Vital signs: BP 91/60 (BP Location: Right arm, Patient Position: Right side, Cuff Size: Adult Regular)   Pulse 109   Temp 98.1  F (36.7  C) (Oral)   Resp 16   Wt 74.8 kg (164 lb 14.4 oz)   SpO2 93%   BMI 23.00 kg/m      ECOG performance status:  2  Vascular access:  R chest port    Physical Exam:  Exam performed, notable for:  - improved weight over the past few weeks    Objective Data     Lab data:  I have personally reviewed the lab data, notable for:    - Wbc 12.5  - Hgb 10.0  - Plt 242    Radiology data:  I have personally reviewed the radiology data, notable for:  07/30/2024 CT C/A/P  IMPRESSION:   1.  Increasing size of innumerable pulmonary and mediastinal  metastases, with the largest in the right lower lobe measuring up to  6.7 cm.  2.  Increasing size and number of hepatic metastases with the largest  measuring up to 9.3 cm.  3.  Increasing size of rectosigmoid ill-defined hyperdense mass which  now involves more proximal aspects of the sigmoid colon. Nodules  adjacent to the left colostomy.  4.  Scattered lymphadenopathy within the abdomen.    Pathology and other data:  I have personally reviewed and interpreted the pathology data, notable for:     - no new data      Again, thank you for allowing me to participate in the care of your patient.        Sincerely,        Polo Snow MD

## 2024-08-09 NOTE — PROGRESS NOTES
[facility] - Medical Oncology Follow-Up Consult Note  2024    Patient Identifiers     Name: Roman Escalante  Preferred Address: Roman  Preferred Language: English  : 1975  Gender: male    Assessment and Plan     Mr. Roman Escalante is a 49 year old male with a history of former smoker (3-5 cigs/day) with a history of extensively pre-treated metastatic colorectal cancer s/p trial cellular therapy transplant (2024) on treatment holiday who returns to clinic for treatment resumption counseling.    NGS: KRAS [id number]  Immuno: pMMR, [facility]  Clinical Trial: likely too deconditioned    Roman returns to clinic to follow-up on our prior discussion regarding further chemotherapy versus hospice. Following an extended discussion with palliative care, and with his loved ones, he feels he is not ready for hospice. He wishes to proceed with our recommendation for treatment. Of note, he did not tolerate fruquintinib well, so we will not consider it for any further treatment. Prior to the fruquintinib, he had some reasonable disease control on Lonsurf/Avastin, so we will reuse the latter with resumption of aggressive chemotherapy in an attempt to induce disease response.     We will re-evaluate disease status in 2 months. If we do not observe significant treatment response, or note further clinical decline, we will likely not offer further systemic treatment. He expressed an understanding of these recommendations and is open to hospice referral in future.    Given his elevated WBC count today along with tachycardia, he meets SIRS criteria and we will perform blood cultures to ensure no occult bacteremia. Our overall suspicion for infection is low so we will defer sepsis management.     Plan for FOLFIRI/Reyna chemotherapy every 2 weeks with LILLY monitoring. Plan for MD visit in 2 months with CT scans prior.    45 minutes spent on the date of the encounter doing chart review, review of test results, interpretation of  tests, patient visit, and documentation     The longitudinal plan of care for the diagnosis(es)/condition(s) as documented were addressed during this visit. Due to the added complexity in care, I will continue to support Roman in the subsequent management and with ongoing continuity of care.    Polo Snow MD, PhD   of Medicine  Division of Hematology, Oncology and Transplantation  [facility]    -----------------------------------    Oncology Summary     Cancer Staging   Rectal adenocarcinoma metastatic to lung (H)  Staging form: Colon and Rectum, AJCC 8th Edition  - Clinical: Stage IVB (cTX, cNX, pM1b) - Signed by Polo Snow MD on 3/30/2023      Oncology History   Rectal adenocarcinoma metastatic to lung (H)   2/3/2023 Initial Diagnosis    Rectal adenocarcinoma metastatic to lung (H)     2/3/2023 - 3/31/2023 Chemotherapy    Oral ONC Colorectal Cancer - Regorafenib  Plan Provider: Polo Snow MD  Treatment goal: Palliative  Line of treatment: [No plan line of treatment]     3/30/2023 -  Cancer Staged    Staging form: Colon and Rectum, AJCC 8th Edition  - Clinical: Stage IVB (cTX, cNX, pM1b)     6/7/2023 - 10/3/2023 Chemotherapy    OP ONC Colorectal Cancer - Modified FOLFOX-6 / Bevacizumab  Plan Provider: Polo Snow MD  Treatment goal: Palliative  Line of treatment: [No plan line of treatment]     10/27/2023 - 3/11/2024 Chemotherapy    OP ONC Colorectal Cancer - Bevacizumab + Trifluridine/Tipiracil (Lonsurf)  Plan Provider: Polo Snow MD  Treatment goal: Palliative  Line of treatment: [No plan line of treatment]     3/27/2024 - 3/27/2024 Chemotherapy    Oral ONC Colorectal Cancer - Fruquintinib (Fruzaqla)  Plan Provider: Polo Snow MD  Treatment goal: Palliative  Line of treatment: [No plan line of treatment]     5/2024 -  Chemotherapy    [facility] Cellular Therapy cytoreductive conditioning and cellular transplant  - admitted for 5 weeks following transplant with ~30lbs  weight loss     8/3/2024 -  Chemotherapy    OP ONC Colorectal Cancer - FOLFIRI / Bevacizumab  Plan Provider: Polo Snow MD  Treatment goal: Palliative  Line of treatment: [No plan line of treatment]         Subjective/Interval Events     - he looks much better today than on his prior video visit; also notes that he has increased energy  - will work on continued weight maintenance/gain which he has managed since discharge from cell transplant  - note that he continues to have tachycardia    Physical Exam     Vital signs: BP 91/60 (BP Location: Right arm, Patient Position: Right side, Cuff Size: Adult Regular)   Pulse 109   Temp 98.1  F (36.7  C) (Oral)   Resp 16   Wt 74.8 kg (164 lb 14.4 oz)   SpO2 93%   BMI 23.00 kg/m      ECOG performance status:  2  Vascular access:  R chest port    Physical Exam:  Exam performed, notable for:  - improved weight over the past few weeks    Objective Data     Lab data:  I have personally reviewed the lab data, notable for:    - Wbc 12.5  - Hgb 10.0  - Plt 242    Radiology data:  I have personally reviewed the radiology data, notable for:  07/30/2024 CT C/A/P  IMPRESSION:   1.  Increasing size of innumerable pulmonary and mediastinal  metastases, with the largest in the right lower lobe measuring up to  6.7 cm.  2.  Increasing size and number of hepatic metastases with the largest  measuring up to 9.3 cm.  3.  Increasing size of rectosigmoid ill-defined hyperdense mass which  now involves more proximal aspects of the sigmoid colon. Nodules  adjacent to the left colostomy.  4.  Scattered lymphadenopathy within the abdomen.    Pathology and other data:  I have personally reviewed and interpreted the pathology data, notable for:    - no new data

## 2024-08-09 NOTE — NURSING NOTE
"Oncology Rooming Note    August 9, 2024 3:29 PM   Roman Escalante is a 49 year old male who presents for:    Chief Complaint   Patient presents with    Oncology Clinic Visit     RTN for Post Palliative Care for     Initial Vitals: BP 91/60 (BP Location: Right arm, Patient Position: Right side, Cuff Size: Adult Regular)   Pulse 109   Temp 98.1  F (36.7  C) (Oral)   Resp 16   Wt 74.8 kg (164 lb 14.4 oz)   SpO2 93%   BMI 23.00 kg/m   Estimated body mass index is 23 kg/m  as calculated from the following:    Height as of 8/2/24: 1.803 m (5' 11\").    Weight as of this encounter: 74.8 kg (164 lb 14.4 oz). Body surface area is 1.94 meters squared.  Mild Pain (3) Comment: Data Unavailable   No LMP for male patient.  Allergies reviewed: Yes  Medications reviewed: Yes    Medications: Medication refills not needed today.  Pharmacy name entered into [employer]:    [facility] - [place], [place] - [address] [place] [address] [phone]  [facility] - [place], [place] - [address] [place] [address] DRUG STORE #[id number] [address]    Frailty Screening:   Is the patient here for a new oncology consult visit in cancer care? 2. No      Clinical concerns: none       Aubree Melendez MA             "

## 2024-08-14 LAB — BACTERIA BLD CULT: NO GROWTH

## 2024-08-28 DIAGNOSIS — C20 RECTAL ADENOCARCINOMA METASTATIC TO LUNG (H): Primary | ICD-10-CM

## 2024-08-28 DIAGNOSIS — C78.00 RECTAL ADENOCARCINOMA METASTATIC TO LUNG (H): Primary | ICD-10-CM

## 2024-08-28 RX ORDER — HEPARIN SODIUM (PORCINE) LOCK FLUSH IV SOLN 100 UNIT/ML 100 UNIT/ML
5 SOLUTION INTRAVENOUS
OUTPATIENT
Start: 2024-10-13

## 2024-08-28 RX ORDER — MEPERIDINE HYDROCHLORIDE 25 MG/ML
25 INJECTION INTRAMUSCULAR; INTRAVENOUS; SUBCUTANEOUS EVERY 30 MIN PRN
Status: CANCELLED | OUTPATIENT
Start: 2024-09-27

## 2024-08-28 RX ORDER — ATROPINE SULFATE 0.4 MG/ML
0.4 AMPUL (ML) INJECTION
OUTPATIENT
Start: 2024-10-11

## 2024-08-28 RX ORDER — MEPERIDINE HYDROCHLORIDE 25 MG/ML
25 INJECTION INTRAMUSCULAR; INTRAVENOUS; SUBCUTANEOUS EVERY 30 MIN PRN
OUTPATIENT
Start: 2024-10-11

## 2024-08-28 RX ORDER — HEPARIN SODIUM,PORCINE 10 UNIT/ML
5-20 VIAL (ML) INTRAVENOUS DAILY PRN
OUTPATIENT
Start: 2024-10-13

## 2024-08-28 RX ORDER — FLUOROURACIL 50 MG/ML
400 INJECTION, SOLUTION INTRAVENOUS ONCE
Status: CANCELLED | OUTPATIENT
Start: 2024-09-27

## 2024-08-28 RX ORDER — ALBUTEROL SULFATE 0.83 MG/ML
2.5 SOLUTION RESPIRATORY (INHALATION)
OUTPATIENT
Start: 2024-10-11

## 2024-08-28 RX ORDER — HEPARIN SODIUM (PORCINE) LOCK FLUSH IV SOLN 100 UNIT/ML 100 UNIT/ML
5 SOLUTION INTRAVENOUS
Status: CANCELLED | OUTPATIENT
Start: 2024-09-27

## 2024-08-28 RX ORDER — HEPARIN SODIUM (PORCINE) LOCK FLUSH IV SOLN 100 UNIT/ML 100 UNIT/ML
5 SOLUTION INTRAVENOUS
OUTPATIENT
Start: 2024-09-29

## 2024-08-28 RX ORDER — LORAZEPAM 2 MG/ML
0.5 INJECTION INTRAMUSCULAR EVERY 4 HOURS PRN
OUTPATIENT
Start: 2024-10-11

## 2024-08-28 RX ORDER — HEPARIN SODIUM,PORCINE 10 UNIT/ML
5-20 VIAL (ML) INTRAVENOUS DAILY PRN
OUTPATIENT
Start: 2024-09-29

## 2024-08-28 RX ORDER — ALBUTEROL SULFATE 90 UG/1
1-2 AEROSOL, METERED RESPIRATORY (INHALATION)
Status: CANCELLED
Start: 2024-09-27

## 2024-08-28 RX ORDER — HEPARIN SODIUM (PORCINE) LOCK FLUSH IV SOLN 100 UNIT/ML 100 UNIT/ML
5 SOLUTION INTRAVENOUS
OUTPATIENT
Start: 2024-10-11

## 2024-08-28 RX ORDER — LORAZEPAM 2 MG/ML
0.5 INJECTION INTRAMUSCULAR EVERY 4 HOURS PRN
Status: CANCELLED | OUTPATIENT
Start: 2024-09-27

## 2024-08-28 RX ORDER — ALBUTEROL SULFATE 90 UG/1
1-2 AEROSOL, METERED RESPIRATORY (INHALATION)
Start: 2024-10-11

## 2024-08-28 RX ORDER — FLUOROURACIL 50 MG/ML
400 INJECTION, SOLUTION INTRAVENOUS ONCE
Status: CANCELLED | OUTPATIENT
Start: 2024-10-11

## 2024-08-28 RX ORDER — EPINEPHRINE 1 MG/ML
0.3 INJECTION, SOLUTION, CONCENTRATE INTRAVENOUS EVERY 5 MIN PRN
OUTPATIENT
Start: 2024-10-11

## 2024-08-28 RX ORDER — DIPHENHYDRAMINE HYDROCHLORIDE 50 MG/ML
50 INJECTION INTRAMUSCULAR; INTRAVENOUS
Start: 2024-10-11

## 2024-08-28 RX ORDER — ATROPINE SULFATE 0.4 MG/ML
0.4 AMPUL (ML) INJECTION
Status: CANCELLED | OUTPATIENT
Start: 2024-09-27

## 2024-08-28 RX ORDER — HEPARIN SODIUM,PORCINE 10 UNIT/ML
5-20 VIAL (ML) INTRAVENOUS DAILY PRN
OUTPATIENT
Start: 2024-10-11

## 2024-08-28 RX ORDER — HEPARIN SODIUM,PORCINE 10 UNIT/ML
5-20 VIAL (ML) INTRAVENOUS DAILY PRN
Status: CANCELLED | OUTPATIENT
Start: 2024-09-27

## 2024-08-28 RX ORDER — ALBUTEROL SULFATE 0.83 MG/ML
2.5 SOLUTION RESPIRATORY (INHALATION)
Status: CANCELLED | OUTPATIENT
Start: 2024-09-27

## 2024-08-28 RX ORDER — DIPHENHYDRAMINE HYDROCHLORIDE 50 MG/ML
50 INJECTION INTRAMUSCULAR; INTRAVENOUS
Status: CANCELLED
Start: 2024-09-27

## 2024-08-28 RX ORDER — EPINEPHRINE 1 MG/ML
0.3 INJECTION, SOLUTION, CONCENTRATE INTRAVENOUS EVERY 5 MIN PRN
Status: CANCELLED | OUTPATIENT
Start: 2024-09-27

## 2024-08-29 RX ORDER — LOPERAMIDE HCL 2 MG
CAPSULE ORAL
Qty: 30 CAPSULE | Refills: 0 | Status: SHIPPED | OUTPATIENT
Start: 2024-08-29

## 2024-08-29 RX ORDER — PROCHLORPERAZINE MALEATE 10 MG
5 TABLET ORAL EVERY 6 HOURS PRN
Qty: 30 TABLET | Refills: 2 | Status: SHIPPED | OUTPATIENT
Start: 2024-08-29

## 2024-08-30 ENCOUNTER — INFUSION THERAPY VISIT (OUTPATIENT)
Dept: ONCOLOGY | Facility: CLINIC | Age: 49
End: 2024-08-30
Attending: STUDENT IN AN ORGANIZED HEALTH CARE EDUCATION/TRAINING PROGRAM
Payer: COMMERCIAL

## 2024-08-30 ENCOUNTER — APPOINTMENT (OUTPATIENT)
Dept: LAB | Facility: CLINIC | Age: 49
End: 2024-08-30
Attending: STUDENT IN AN ORGANIZED HEALTH CARE EDUCATION/TRAINING PROGRAM
Payer: COMMERCIAL

## 2024-08-30 VITALS
WEIGHT: 160.4 LBS | OXYGEN SATURATION: 92 % | BODY MASS INDEX: 22.37 KG/M2 | TEMPERATURE: 97.3 F | SYSTOLIC BLOOD PRESSURE: 96 MMHG | DIASTOLIC BLOOD PRESSURE: 60 MMHG | HEART RATE: 107 BPM | RESPIRATION RATE: 16 BRPM

## 2024-08-30 DIAGNOSIS — R09.02 OXYGEN DESATURATION: ICD-10-CM

## 2024-08-30 DIAGNOSIS — C20 RECTAL ADENOCARCINOMA METASTATIC TO LUNG (H): Primary | ICD-10-CM

## 2024-08-30 DIAGNOSIS — C78.00 MALIGNANT NEOPLASM METASTATIC TO LUNG, UNSPECIFIED LATERALITY (H): ICD-10-CM

## 2024-08-30 DIAGNOSIS — C78.00 RECTAL ADENOCARCINOMA METASTATIC TO LUNG (H): Primary | ICD-10-CM

## 2024-08-30 DIAGNOSIS — R11.0 NAUSEA: ICD-10-CM

## 2024-08-30 LAB
ALBUMIN SERPL BCG-MCNC: 3.6 G/DL (ref 3.5–5.2)
ALBUMIN UR-MCNC: 10 MG/DL
ALP SERPL-CCNC: 186 U/L (ref 40–150)
ALT SERPL W P-5'-P-CCNC: 20 U/L (ref 0–70)
ANION GAP SERPL CALCULATED.3IONS-SCNC: 12 MMOL/L (ref 7–15)
AST SERPL W P-5'-P-CCNC: 61 U/L (ref 0–45)
BASOPHILS # BLD AUTO: 0 10E3/UL (ref 0–0.2)
BASOPHILS NFR BLD AUTO: 0 %
BILIRUB SERPL-MCNC: 0.6 MG/DL
BUN SERPL-MCNC: 16.2 MG/DL (ref 6–20)
CALCIUM SERPL-MCNC: 8.8 MG/DL (ref 8.8–10.4)
CHLORIDE SERPL-SCNC: 97 MMOL/L (ref 98–107)
CREAT SERPL-MCNC: 1.01 MG/DL (ref 0.67–1.17)
EGFRCR SERPLBLD CKD-EPI 2021: 90 ML/MIN/1.73M2
EOSINOPHIL # BLD AUTO: 0.1 10E3/UL (ref 0–0.7)
EOSINOPHIL NFR BLD AUTO: 1 %
ERYTHROCYTE [DISTWIDTH] IN BLOOD BY AUTOMATED COUNT: 18.8 % (ref 10–15)
GLUCOSE SERPL-MCNC: 99 MG/DL (ref 70–99)
HCO3 SERPL-SCNC: 23 MMOL/L (ref 22–29)
HCT VFR BLD AUTO: 29.8 % (ref 40–53)
HGB BLD-MCNC: 9.4 G/DL (ref 13.3–17.7)
IMM GRANULOCYTES # BLD: 0.1 10E3/UL
IMM GRANULOCYTES NFR BLD: 1 %
LYMPHOCYTES # BLD AUTO: 0.7 10E3/UL (ref 0.8–5.3)
LYMPHOCYTES NFR BLD AUTO: 7 %
MCH RBC QN AUTO: 23.5 PG (ref 26.5–33)
MCHC RBC AUTO-ENTMCNC: 31.5 G/DL (ref 31.5–36.5)
MCV RBC AUTO: 75 FL (ref 78–100)
MONOCYTES # BLD AUTO: 1.1 10E3/UL (ref 0–1.3)
MONOCYTES NFR BLD AUTO: 11 %
NEUTROPHILS # BLD AUTO: 8 10E3/UL (ref 1.6–8.3)
NEUTROPHILS NFR BLD AUTO: 80 %
NRBC # BLD AUTO: 0 10E3/UL
NRBC BLD AUTO-RTO: 0 /100
PLATELET # BLD AUTO: 265 10E3/UL (ref 150–450)
POTASSIUM SERPL-SCNC: 4.2 MMOL/L (ref 3.4–5.3)
PROT SERPL-MCNC: 6.7 G/DL (ref 6.4–8.3)
RBC # BLD AUTO: 4 10E6/UL (ref 4.4–5.9)
SODIUM SERPL-SCNC: 132 MMOL/L (ref 135–145)
WBC # BLD AUTO: 10 10E3/UL (ref 4–11)

## 2024-08-30 PROCEDURE — 96417 CHEMO IV INFUS EACH ADDL SEQ: CPT

## 2024-08-30 PROCEDURE — 85004 AUTOMATED DIFF WBC COUNT: CPT | Performed by: STUDENT IN AN ORGANIZED HEALTH CARE EDUCATION/TRAINING PROGRAM

## 2024-08-30 PROCEDURE — 96415 CHEMO IV INFUSION ADDL HR: CPT

## 2024-08-30 PROCEDURE — 96413 CHEMO IV INFUSION 1 HR: CPT

## 2024-08-30 PROCEDURE — 96368 THER/DIAG CONCURRENT INF: CPT

## 2024-08-30 PROCEDURE — G0498 CHEMO EXTEND IV INFUS W/PUMP: HCPCS

## 2024-08-30 PROCEDURE — 81003 URINALYSIS AUTO W/O SCOPE: CPT | Performed by: STUDENT IN AN ORGANIZED HEALTH CARE EDUCATION/TRAINING PROGRAM

## 2024-08-30 PROCEDURE — 99215 OFFICE O/P EST HI 40 MIN: CPT

## 2024-08-30 PROCEDURE — 36591 DRAW BLOOD OFF VENOUS DEVICE: CPT | Performed by: STUDENT IN AN ORGANIZED HEALTH CARE EDUCATION/TRAINING PROGRAM

## 2024-08-30 PROCEDURE — 250N000011 HC RX IP 250 OP 636

## 2024-08-30 PROCEDURE — 250N000011 HC RX IP 250 OP 636: Performed by: STUDENT IN AN ORGANIZED HEALTH CARE EDUCATION/TRAINING PROGRAM

## 2024-08-30 PROCEDURE — 258N000003 HC RX IP 258 OP 636: Performed by: STUDENT IN AN ORGANIZED HEALTH CARE EDUCATION/TRAINING PROGRAM

## 2024-08-30 PROCEDURE — G0463 HOSPITAL OUTPT CLINIC VISIT: HCPCS | Mod: 25

## 2024-08-30 PROCEDURE — 96375 TX/PRO/DX INJ NEW DRUG ADDON: CPT

## 2024-08-30 PROCEDURE — 82247 BILIRUBIN TOTAL: CPT

## 2024-08-30 PROCEDURE — 96411 CHEMO IV PUSH ADDL DRUG: CPT

## 2024-08-30 PROCEDURE — G2211 COMPLEX E/M VISIT ADD ON: HCPCS

## 2024-08-30 RX ORDER — FLUOROURACIL 50 MG/ML
400 INJECTION, SOLUTION INTRAVENOUS ONCE
Status: COMPLETED | OUTPATIENT
Start: 2024-08-30 | End: 2024-08-30

## 2024-08-30 RX ORDER — ONDANSETRON 4 MG/1
4 TABLET, ORALLY DISINTEGRATING ORAL EVERY 6 HOURS PRN
Qty: 30 TABLET | Refills: 3 | Status: SHIPPED | OUTPATIENT
Start: 2024-08-30

## 2024-08-30 RX ORDER — HEPARIN SODIUM (PORCINE) LOCK FLUSH IV SOLN 100 UNIT/ML 100 UNIT/ML
5 SOLUTION INTRAVENOUS
Status: COMPLETED | OUTPATIENT
Start: 2024-08-30 | End: 2024-08-30

## 2024-08-30 RX ADMIN — Medication 5 ML: at 10:21

## 2024-08-30 RX ADMIN — DEXAMETHASONE SODIUM PHOSPHATE: 10 INJECTION, SOLUTION INTRAMUSCULAR; INTRAVENOUS at 12:23

## 2024-08-30 RX ADMIN — FLUOROURACIL 770 MG: 50 INJECTION, SOLUTION INTRAVENOUS at 14:55

## 2024-08-30 RX ADMIN — LEUCOVORIN CALCIUM 800 MG: 500 INJECTION, POWDER, LYOPHILIZED, FOR SOLUTION INTRAMUSCULAR; INTRAVENOUS at 13:18

## 2024-08-30 RX ADMIN — SODIUM CHLORIDE 400 MG: 9 INJECTION, SOLUTION INTRAVENOUS at 12:38

## 2024-08-30 RX ADMIN — SODIUM CHLORIDE 250 ML: 9 INJECTION, SOLUTION INTRAVENOUS at 12:17

## 2024-08-30 RX ADMIN — IRINOTECAN HYDROCHLORIDE 340 MG: 20 INJECTION, SOLUTION INTRAVENOUS at 13:20

## 2024-08-30 ASSESSMENT — PAIN SCALES - GENERAL: PAINLEVEL: NO PAIN (0)

## 2024-08-30 NOTE — PROGRESS NOTES
"Prior to discharge: Port is secured in place with tegaderm and flushed with 10cc NS with positive blood return noted.  Continuous home infusion C-series connected.    All connectors secured in place, clamps taped open, heat sensor secured with tegaderm all verified with Ernestine Jordan RN  Pump started, \"running\" noted on display (CADD): N/A  Patient instructed to call our clinic or [facility] with any questions or concerns at home.  Patient verbalized understanding.    Patient set up for pump disconnect at home with [facility] on 9/1 at 1pm.  Appointment verified by scheduling from [facility].  C-series maintenance education reviewed: Keep heat sensor secured to abdomen with tegaderm, maintain consistent body temperature (decrease shivering/cold exposure and decrease sweating/heat exposure) to ensure appropriate infusion rate, assess tubing for any kinks, and notify [facility] of any leaks.    "

## 2024-08-30 NOTE — PROGRESS NOTES
[facility] - Medical Oncology Follow Up Note  08/30/2024    Oncology History:   Patient developed rectal bleeding in 2020.  In January 2021 CT showed focal wall thickening in the upper rectum, multiple pulmonary nodules bilaterally suspicious for metastatic disease.  Underwent FNA of the right upper lobe lesion which was consistent with adenocarcinoma of the colon.  He was treated with FOLFOX from 2/20/2021 through 5/20/2021.  Avastin was added.  Had an infusion reaction to oxaliplatin 6/2021.  7/2021- 12/2021 was on 5-FU alone.  Developed progression.  12/2021- 9/2022 was on FOLFIRI.  11/2021 started Lonsurf. All of this was done with outside oncologist.     Met with Dr. Snow on 2/3/2023 to establish care. Recommended regorafenib.     Started regorafenib on 3/2/2023. Progression noted 5/19/23. Plan to start FOLFOX/Avastin and send out CARIS testing to evaluate for other treatment options. Cycle 1 was given with oxaliplatin desensitization, 5FU, Avastin held due to increased urine protein.     Following cycle 4, patient was admitted with hematuria and acute kidney injury.    Oxaliplatin was dropped with Cycle 5.     Y-90 radioembolization 9/7.    10/24/23 CT CAP with disease progression with notable growth in all pulmonary lesions. Lonsurf + bevacizumab started 10/27/23 with plans to bridge to clinical trial.   11/15/23 - stopped chemo for [facility] study.   Currently enrolled in [facility] CAR-T trial (completed cell harvest 1/23/2024, tentatively planning administration of CAR-T in April 2/12/24 - resumed Lonsurf, bevacizumab to bridge treatment until CAR-T therapy  3/20/24 - treatment changed to Fruquintinib due to intolerability of Lonsurf/bevacizumab  4/2/24- Presented to the ED with dehydration. Stopped Fruquintnib.   6/2024- cellular therapy transplant at [facility].   7/30/24- CT imaging demonstrating marked progression of pulmonary and hepatic lesions, clinical trial failed to generate T-cell graft. Multiple  discussions regarding plan of care, hospice vs additional lines of treatment. Patient decided he is not ready for hospice, plan to resume treatment with FOLFIRI + bevacizumab.       Roman returns to clinic today for follow up prior to initiating therapy with FOLFIRI + bevacizumab.      Interval History:       -has been more tired this week, has fluctuating energy levels like this. Denies any fevers or signs/symptoms of infection.   -appetite is stable, eating smaller portions but is trying to eat more often. Taste is off, everything tastes very salty.   -trying to push hydration. Drinking water, Gatorade Zero, Propel water.   -has a bowel movement every 2-3 days which is normal for him. No abdominal pain bloating or cramping.   -rectal pain is unchanged, working with palliative care, recently changed to morphine instead of oxycodone   -has shortness of breath with activity. Has an oximeter at home and drops into the 80's with activity. Coughs often and breathing is wheezy/raspy when he is short of breath with activity. Denies chest pressure but feels like his lungs aren't as open as they should be.   -no longer having issues with urination. Not waking up every 2 hours during the night to urinate, sleeping at least 6 hours at a time now. Not taking Flomax.       Review of Systems:  Patient denies any of the following except if noted above: fevers, chills, difficulty with energy, vision or hearing changes, chest pain, dyspnea, abdominal pain, nausea, vomiting, diarrhea, constipation, urinary concerns, headaches, numbness, tingling, issues with sleep or mood. Also denies lumps, bumps, rashes or skin lesions, bleeding or bruising issues.      Past Medical History:   Diagnosis Date    Cancer (H) 1/12/21    Colorectal cacer mast, to lungs, and liver    Essential (primary) hypertension 08/03/2021        Allergies   Allergen Reactions    Oxaliplatin Shortness Of Breath, Nausea and Vomiting and Other (See Comments)      Patient had HSR to Oxaliplatin on cycle 8 of FOLFOX chemotherapy. Sent to ER for continued monitoring.  Patient had HSR to Oxaliplatin on cycle 8 of FOLFOX chemotherapy. Sent to ER for continued monitoring.      Blood-Group Specific Substance Other (See Comments)     Patient has reactivity Suggestive of a Warm auto antibody. Blood products may be delayed. Draw patient 24 hours prior to transfusion. Draw one red top and two purple top tubes for all type and screen orders.  Patient has reactivity Suggestive of a Warm auto antibody. Blood products may be delayed. Draw patient 24 hours prior to transfusion. Draw one red top and two purple top tubes for all type and screen orders.       Physical Exam:  General: The patient is a pleasant male in no acute distress.  BP 96/60 (BP Location: Right arm, Patient Position: Sitting, Cuff Size: Adult Regular)   Pulse 107   Temp 97.3  F (36.3  C) (Oral)   Resp 16   Wt 72.8 kg (160 lb 6.4 oz)   SpO2 92%   BMI 22.37 kg/m    Wt Readings from Last 10 Encounters:   08/30/24 72.8 kg (160 lb 6.4 oz)   08/09/24 74.8 kg (164 lb 14.4 oz)   08/02/24 72.6 kg (160 lb)   07/31/24 73.5 kg (162 lb)   07/15/24 73.7 kg (162 lb 8 oz)   05/21/24 84.4 kg (186 lb)   05/07/24 86.2 kg (190 lb)   04/26/24 85.4 kg (188 lb 3.2 oz)   04/15/24 84.4 kg (186 lb)   04/10/24 86.2 kg (190 lb)   HEENT: EOMI. Sclerae are anicteric. Oral mucosa pink and moist without lesions or thrush.   Lymph: Neck is supple with no lymphadenopathy in the cervical or supraclavicular areas.   Heart: Regular rate and rhythm.   Lungs: Clear to auscultation bilaterally.   Abdomen: Bowel sounds present, soft, nontender with no palpable hepatosplenomegaly or masses.   Extremities: No lower extremity edema noted bilaterally.   Neuro: Cranial nerves II through XII are grossly intact.  Skin: No rashes, petechiae, or bruising noted on exposed skin.    Oxygen Documentation  I certify that this patient, Roman Escalante has been under my  care (or a nurse practitioner or physican's assistant working with me). This is the face-to-face encounter for oxygen medical necessity.      At the time of this encounter, I have reviewed the qualifying testing and have determined that supplemental oxygen is reasonable and necessary and is expected to improve the patient's condition in a home setting.         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    If portability is ordered, is the patient mobile within the home? yes    Was this visit performed as a telehealth visit: No      LABS  Most Recent 3 CBC's:  Recent Labs   Lab Test 08/30/24  1030 07/30/24  1431 07/29/24  0758   WBC 10.0 12.5* 11.3*   HGB 9.4* 10.0* 10.2*   MCV 75* 79 81    242 243    Most Recent 3 BMP's:  Recent Labs   Lab Test 08/30/24  1030 07/30/24  1431 07/29/24  0731   * 135 136   POTASSIUM 4.2 4.4 5.1   CHLORIDE 97* 99 99   CO2 23 23 25   BUN 16.2 16.9 13.5   CR 1.01 0.91 0.84   ANIONGAP 12 13 12   JEFERSON 8.8 9.3 9.9   GLC 99 104* 112*    Most Recent 2 LFT's:  Recent Labs   Lab Test 08/30/24  1030 07/30/24  1431   AST 61* 39   ALT 20 18   ALKPHOS 186* 154*   BILITOTAL 0.6 0.3        I reviewed the above labs today.      ASSESSMENT AND PLAN   Metastatic rectal cancer  - Most recently progressed after CAR-T clinical trial. Presents today prior to treatment with FOLFIRI + bevacizumab. Reviewed medications, administration, potential side effects and management. He denies any questions and agrees to proceed with treatment today.   -he is going out of town this weekend and would like to have his girlfriend who is an RN disconnect his pump. Discussed the risks of infection and chemotherapy exposure, precautions and safe handling of chemotherapy, and meticulous hygiene/clean technique when handling the port. Patient understands risks. [facility] will complete education on disconnecting pump, flushing and deaccessing  steffen. [facility] and pharmacy coordinating his Neulasta injection.   -proceed with FOLFIRI + bevacizumab today.   -Return to clinic in 2 weeks with LILLY follow up prior to next cycle.        Oxygen, fluid in lungs    -shortness of breath with activity, walking oxygen test demonstrating oxygen saturations in the 80's on room air. Will order home oxygen with activity.   -scheduled for ECHO on 9/5    Nausea, reduced appetite.   -continue to push hydration  -continue small, freuqent meals focusing on high-calorie foods  -no nausea currently, has PRN antiemetics if needed  -can try Miracle Mendez for taste alterations       Rectal, perineum pain  Following with palliative care. Recently changed from oxycodone to morphine.       The longitudinal plan of care for the diagnosis(es)/condition(s) as documented were addressed during this visit. Due to the added complexity in care, I will continue to support Roman in the subsequent management and with ongoing continuity of care.      47 minutes spent on the date of the encounter doing chart review, review of test results, interpretation of tests, patient visit, and documentation       FLORA Albright CNP

## 2024-08-30 NOTE — PROGRESS NOTES
Infusion Nursing Note:  Roman Escalante presents today for C1D1 Bevacizumab-bvzr/Irinotecan/Leucovorin/Fluorouracil Bolus/Fluorouracil Home Pump Connect.    Patient seen by provider today: Yes: Rebeca Killian CNP prior to infusion   present during visit today: Not Applicable.    Note: Denied any questions or concerns following provider visit prior to infusion.    Patient believes he has received irinotecan previously. He declined dose of atropine prior to infusion.    Intravenous Access:  Implanted Port.    Treatment Conditions:   Latest Reference Range & Units 08/30/24 10:30   Sodium 135 - 145 mmol/L 132 (L)   Potassium 3.4 - 5.3 mmol/L 4.2   Chloride 98 - 107 mmol/L 97 (L)   Carbon Dioxide (CO2) 22 - 29 mmol/L 23   Urea Nitrogen 6.0 - 20.0 mg/dL 16.2   Creatinine 0.67 - 1.17 mg/dL 1.01   GFR Estimate >60 mL/min/1.73m2 90   Calcium 8.8 - 10.4 mg/dL 8.8   Anion Gap 7 - 15 mmol/L 12   Albumin 3.5 - 5.2 g/dL 3.6   Protein Total 6.4 - 8.3 g/dL 6.7   Alkaline Phosphatase 40 - 150 U/L 186 (H)   ALT 0 - 70 U/L 20   AST 0 - 45 U/L 61 (H)   Bilirubin Total <=1.2 mg/dL 0.6   Glucose 70 - 99 mg/dL 99   WBC 4.0 - 11.0 10e3/uL 10.0   Hemoglobin 13.3 - 17.7 g/dL 9.4 (L)   Hematocrit 40.0 - 53.0 % 29.8 (L)   Platelet Count 150 - 450 10e3/uL 265   RBC Count 4.40 - 5.90 10e6/uL 4.00 (L)   MCV 78 - 100 fL 75 (L)   MCH 26.5 - 33.0 pg 23.5 (L)   MCHC 31.5 - 36.5 g/dL 31.5   RDW 10.0 - 15.0 % 18.8 (H)   % Neutrophils % 80   % Lymphocytes % 7   % Monocytes % 11   % Eosinophils % 1   % Basophils % 0   Absolute Basophils 0.0 - 0.2 10e3/uL 0.0   Absolute Eosinophils 0.0 - 0.7 10e3/uL 0.1   Absolute Immature Granulocytes <=0.4 10e3/uL 0.1   Absolute Lymphocytes 0.8 - 5.3 10e3/uL 0.7 (L)   Absolute Monocytes 0.0 - 1.3 10e3/uL 1.1   % Immature Granulocytes % 1   Absolute Neutrophils 1.6 - 8.3 10e3/uL 8.0   Absolute NRBCs 10e3/uL 0.0   NRBCs per 100 WBC <1 /100 0     Results reviewed, labs MET treatment parameters, ok to proceed  with treatment.    BP 96/60    Post Infusion Assessment:  Patient tolerated infusion without incident.  Blood return noted pre and post infusion.  Site patent and intact, free from redness, edema or discomfort.  No evidence of extravasations.       Discharge Plan:   Prescription refills given for imodium, zofran, and compazine.  Discharge instructions reviewed with: Patient.  Patient and/or family verbalized understanding of discharge instructions and all questions answered.  AVS to patient via [employer].  Patient will return 9/13 for next appointment.   Patient discharged in stable condition accompanied by: self.  Departure Mode: Ambulatory.      Alana Espinal RN

## 2024-08-30 NOTE — NURSING NOTE
"Oncology Rooming Note    August 30, 2024 10:52 AM   Roman Escalante is a 49 year old male who presents for:    Chief Complaint   Patient presents with    Port Draw     Labs drawn from port by rn.  VS taken.    Oncology Clinic Visit     RTN for Colon Cancer     Initial Vitals: BP 96/60 (BP Location: Right arm, Patient Position: Sitting, Cuff Size: Adult Regular)   Pulse 107   Temp 97.3  F (36.3  C) (Oral)   Resp 16   Wt 72.8 kg (160 lb 6.4 oz)   SpO2 92%   BMI 22.37 kg/m   Estimated body mass index is 22.37 kg/m  as calculated from the following:    Height as of 8/2/24: 1.803 m (5' 11\").    Weight as of this encounter: 72.8 kg (160 lb 6.4 oz). Body surface area is 1.91 meters squared.  No Pain (0) Comment: Data Unavailable   No LMP for male patient.  Allergies reviewed: Yes  Medications reviewed: Yes    Medications: Medication refills not needed today.  Pharmacy name entered into [facility]:    [facility] - [place], [place] - [phone]  [facility] - [place], [place] - [address] [place] [address] DRUG STORE [id number] [place] [address] [address]    Frailty Screening:   Is the patient here for a new oncology consult visit in cancer care? 2. No      Clinical concerns: none       Aubree Melendez MA             "

## 2024-08-30 NOTE — Clinical Note
8/30/2024      Roman Escalante  [address]  [facility] [address]      Dear Colleague,    Thank you for referring your patient, Roman Escalante, to the [facility]. Please see a copy of my visit note below.      [facility] - Medical Oncology Follow Up Note  08/29/2024    Oncology History:   Patient developed rectal bleeding in 2020.  In January 2021 CT showed focal wall thickening in the upper rectum, multiple pulmonary nodules bilaterally suspicious for metastatic disease.  Underwent FNA of the right upper lobe lesion which was consistent with adenocarcinoma of the colon.  He was treated with FOLFOX from 2/20/2021 through 5/20/2021.  Avastin was added.  Had an infusion reaction to oxaliplatin 6/2021.  7/2021- 12/2021 was on 5-FU alone.  Developed progression.  12/2021- 9/2022 was on FOLFIRI.  11/2021 started Lonsurf. All of this was done with outside oncologist.     Met with Dr. Snow on 2/3/2023 to establish care. Recommended regorafenib.     Started regorafenib on 3/2/2023. Progression noted 5/19/23. Plan to start FOLFOX/Avastin and send out CARIS testing to evaluate for other treatment options. Cycle 1 was given with oxaliplatin desensitization, 5FU, Avastin held due to increased urine protein.     Following cycle 4, patient was admitted with hematuria and acute kidney injury.    Oxaliplatin was dropped with Cycle 5.     Y-90 radioembolization 9/7.    10/24/23 CT CAP with disease progression with notable growth in all pulmonary lesions. Lonsurf + bevacizumab started 10/27/23 with plans to bridge to clinical trial.   11/15/23 - stopped chemo for [facility] study.   Currently enrolled in [facility] CAR-T trial (completed cell harvest 1/23/2024, tentatively planning administration of CAR-T in April 2/12/24 - resumed Lonsurf, bevacizumab to bridge treatment until CAR-T therapy  3/20/24 - treatment changed to Fruquintinib due to intolerability of  Lonsurf/bevacizumab  4/2/24- Presented to the ED with dehydration. Stopped Fruquintnib.   6/2024- cellular therapy transplant at [facility].   *** CT imaging demonstrating marked progression of pulmonary and hepatic lesions, clinical trial failed to generate T-cell graft.   ***        Valvular changes on imaging - ECHO - ***       Roman returns to clinic today for follow up prior to initiating therapy with FOLFIRI + bevacizumab.      Interval History:     Taste is really salty, eating small portions more often  Trying to push fluids   Bowels move every 2-3 days     Tired this week     Short of breath with walking , checks oxygen sats at home and drops to 80's     Clear phlegm, gets raspy. No chest pressure, feels like his lungs aren't as open. Restarted bactrim from [facility].       ***   On both Lonsurf/bevacizumab and fruquintinib, Roman felt like he couldn't drink enough water (despite an intake of ~90oz per day) and was continuously dehydrated. Since receiving IVFs in the ED and stopping oral chemo, he has recovered from the symptoms and is feeling a lot better. During that time of dehydration, his bowels were also constipated due to lack of water in his body. He also had no appetite and experienced more nausea.     Since stopping the oral chemo, his appetite has improved and he denies any nausea. He continues on Miralax and Senna daily as his bowels are moving slowly via ostomy.     He felt that the fruquintinib targeted his rectal area since the discharge decreased while on that treatment. However, his rectal pain has increased lately. He is taking oxycodone every 6-7 hours. He is scheduled for follow up with Dr. Mendoza next week regarding another pain block procedure.     He was notified 2 days ago that [facility] is planning to have him return at the end of April/beginning of May for repeat imaging and tenative scheduling of cell therapy 2-3 weeks after. He needs to be off chemotherapy for 4 weeks leading up to this, so he is  continuing to hold chemo at this time.       Review of Systems:  Patient denies any of the following except if noted above: fevers, chills, difficulty with energy, vision or hearing changes, chest pain, dyspnea, abdominal pain, nausea, vomiting, diarrhea, constipation, urinary concerns, headaches, numbness, tingling, issues with sleep or mood. Also denies lumps, bumps, rashes or skin lesions, bleeding or bruising issues.      Past Medical History:   Diagnosis Date    Cancer (H) 1/12/21    Colorectal cacer mast, to lungs, and liver    Essential (primary) hypertension 08/03/2021        Allergies   Allergen Reactions    Oxaliplatin Shortness Of Breath, Nausea and Vomiting and Other (See Comments)     Patient had HSR to Oxaliplatin on cycle 8 of FOLFOX chemotherapy. Sent to ER for continued monitoring.  Patient had HSR to Oxaliplatin on cycle 8 of FOLFOX chemotherapy. Sent to ER for continued monitoring.      Blood-Group Specific Substance Other (See Comments)     Patient has reactivity Suggestive of a Warm auto antibody. Blood products may be delayed. Draw patient 24 hours prior to transfusion. Draw one red top and two purple top tubes for all type and screen orders.  Patient has reactivity Suggestive of a Warm auto antibody. Blood products may be delayed. Draw patient 24 hours prior to transfusion. Draw one red top and two purple top tubes for all type and screen orders.       Physical Exam:  General: The patient is a pleasant male in no acute distress.  BP 96/60 (BP Location: Right arm, Patient Position: Sitting, Cuff Size: Adult Regular)   Pulse 107   Temp 97.3  F (36.3  C) (Oral)   Resp 16   Wt 72.8 kg (160 lb 6.4 oz)   SpO2 92%   BMI 22.37 kg/m    Wt Readings from Last 10 Encounters:   08/30/24 72.8 kg (160 lb 6.4 oz)   08/09/24 74.8 kg (164 lb 14.4 oz)   08/02/24 72.6 kg (160 lb)   07/31/24 73.5 kg (162 lb)   07/15/24 73.7 kg (162 lb 8 oz)   05/21/24 84.4 kg (186 lb)   05/07/24 86.2 kg (190 lb)   04/26/24  85.4 kg (188 lb 3.2 oz)   04/15/24 84.4 kg (186 lb)   04/10/24 86.2 kg (190 lb)   HEENT: EOMI. Sclerae are anicteric.   Lymph: Neck is supple with no lymphadenopathy in the cervical or supraclavicular areas.   Heart: Regular rate and rhythm.   Lungs: Clear to auscultation bilaterally.   Abdomen: Bowel sounds present, soft, nontender with no palpable hepatosplenomegaly or masses.   Extremities: No lower extremity edema noted bilaterally.   Neuro: Cranial nerves II through XII are grossly intact.  Skin: No rashes, petechiae, or bruising noted on exposed skin.    Oxygen Documentation  I certify that this patient, Roman Escalante has been under my care (or a nurse practitioner or physican's assistant working with me). This is the face-to-face encounter for oxygen medical necessity.      At the time of this encounter, I have reviewed the qualifying testing and have determined that supplemental oxygen is reasonable and necessary and is expected to improve the patient's condition in a home setting.         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    If portability is ordered, is the patient mobile within the home? yes    Was this visit performed as a telehealth visit: No                LABS  Most Recent 3 CBC's:  Recent Labs   Lab Test 07/30/24  1431 07/29/24  0758 04/24/24  1040   WBC 12.5* 11.3* 9.0   HGB 10.0* 10.2* 12.3*   MCV 79 81 80    243 457*    Most Recent 3 BMP's:  Recent Labs   Lab Test 07/30/24  1431 07/29/24  0731 04/24/24  1040    136 138   POTASSIUM 4.4 5.1 3.9   CHLORIDE 99 99 99   CO2 23 25 27   BUN 16.9 13.5 12.7   CR 0.91 0.84 0.83   ANIONGAP 13 12 12   JEFERSON 9.3 9.9 9.5   * 112* 123*    Most Recent 2 LFT's:  Recent Labs   Lab Test 07/30/24  1431 07/29/24  0731   AST 39 38   ALT 18 20   ALKPHOS 154* 152*   BILITOTAL 0.3 0.4        I reviewed the above labs today.      ASSESSMENT AND PLAN   Metastatic  rectal cancer  - Progression on regorafenib. Started FOLFOX/Avastin 6/7/23. Oxaliplatin removed with cycle 5 due to acute kidney injury and hematuria following cycle 4. Y-90 radioembolization completed 9/7, then resumed 5FU. CT CAP 10/2023 with disease progression. Started Lonsurf/bevacizumab 10/27/23 with plans to bridge to move forward with clinical trials. Held chemo since 11/15/23.  Currently enrolled in [facility] CAR-T trial. Completed cell harvest 1/23/2024, tentatively planning administration of CAR-T in April. Resumed Lonsurf, bevacizumab on 2/12/24. Treatment changed to fruquintinib due to poor tolerance. Self-discontinued fruquintinib in the beginning of April due to dehydration, constipation, nausea and poor appetite. Feeling better since stopping therapy, eating and drinking well again. Returning to [facility] at the end of the month for repeat imaging with potential cell therapy treatment 2-3 weeks later. Continue to hold oral chemotherapy as he needs 4 week treatment break prior to cell therapy. Will follow up with Roman regarding follow up appointments with our team once [facility] schedule is determined.      Oxygen, fluid in lungs ***       Nausea  Had a few episodes of mild nausea, well-controlled with PRN oral antiemetics. Nausea resolved once discontinued fruquintinib.  Appetite is good, adequate oral intake.       Rectal, perineum pain  Increased while on fruquintinib. Scheduled with Dr. Mendoza for follow up next week, hoping to get another pain injection.      The longitudinal plan of care for the diagnosis(es)/condition(s) as documented were addressed during this visit. Due to the added complexity in care, I will continue to support Roman in the subsequent management and with ongoing continuity of care.      *** minutes spent on the date of the encounter doing chart review, review of test results, interpretation of tests, patient visit, and documentation       FLORA Albright CNP          Again, thank you for  allowing me to participate in the care of your patient.        Sincerely,        FLORA Albright CNP

## 2024-08-30 NOTE — NURSING NOTE
"Chief Complaint   Patient presents with    Port Draw     Labs drawn from port by rn.  VS taken.     Port accessed with 20 gauge 3/4\" Power needle and labs drawn by rn.  Port flushed with NS and heparin.  Pt tolerated well.  VS taken.  Pt checked in for next appt.    Skylar Olivo RN      "

## 2024-08-30 NOTE — PATIENT INSTRUCTIONS
[facility] Triage and after hours / weekends / holidays:  [phone] option 5, option 2    Please call the triage or after hours line if you experience a temperature greater than or equal to 100.4, shaking chills, have uncontrolled nausea, vomiting and/or diarrhea, dizziness, shortness of breath, chest pain, bleeding, unexplained bruising, or if you have any other new/concerning symptoms, questions or concerns.      If you are having any concerning symptoms or wish to speak to a provider before your next infusion visit, please call triage to notify your care team so we can adequately serve you.     If you need a refill on a narcotic prescription or other medication, please call before your infusion appointment.

## 2024-08-30 NOTE — NURSING NOTE
Patient's resting oxygen saturation is 93 %. Upon walking the patient's oxygen saturation was lowest at 82% and highest at 84%.   Patient had Shortness of breath symptoms.  Patient tolerated well.    Walking oxygen test on 8/30/2024 at 11:38 AM    Hans Rivera RN

## 2024-09-05 ENCOUNTER — HOSPITAL ENCOUNTER (OUTPATIENT)
Dept: CARDIOLOGY | Facility: CLINIC | Age: 49
Discharge: HOME | End: 2024-09-05
Attending: STUDENT IN AN ORGANIZED HEALTH CARE EDUCATION/TRAINING PROGRAM | Admitting: STUDENT IN AN ORGANIZED HEALTH CARE EDUCATION/TRAINING PROGRAM
Payer: COMMERCIAL

## 2024-09-05 ENCOUNTER — ENROLLMENT (OUTPATIENT)
Dept: HOME HEALTH SERVICES | Facility: HOME HEALTH | Age: 49
End: 2024-09-05
Payer: COMMERCIAL

## 2024-09-05 DIAGNOSIS — C18.9 PRIMARY ADENOCARCINOMA OF COLON (H): ICD-10-CM

## 2024-09-05 LAB — LVEF ECHO: NORMAL

## 2024-09-05 PROCEDURE — 93306 TTE W/DOPPLER COMPLETE: CPT

## 2024-09-05 PROCEDURE — 93306 TTE W/DOPPLER COMPLETE: CPT | Mod: 26 | Performed by: INTERNAL MEDICINE

## 2024-09-06 ENCOUNTER — VIRTUAL VISIT (OUTPATIENT)
Dept: ONCOLOGY | Facility: CLINIC | Age: 49
End: 2024-09-06
Attending: STUDENT IN AN ORGANIZED HEALTH CARE EDUCATION/TRAINING PROGRAM
Payer: COMMERCIAL

## 2024-09-06 ENCOUNTER — DOCUMENTATION ONLY (OUTPATIENT)
Dept: OTHER | Facility: CLINIC | Age: 49
End: 2024-09-06

## 2024-09-06 VITALS — HEIGHT: 72 IN | BODY MASS INDEX: 21.67 KG/M2 | WEIGHT: 160 LBS

## 2024-09-06 DIAGNOSIS — K59.03 THERAPEUTIC OPIOID INDUCED CONSTIPATION: ICD-10-CM

## 2024-09-06 DIAGNOSIS — G89.3 CANCER RELATED PAIN: ICD-10-CM

## 2024-09-06 DIAGNOSIS — T40.2X5A THERAPEUTIC OPIOID INDUCED CONSTIPATION: ICD-10-CM

## 2024-09-06 DIAGNOSIS — C78.7 COLON CANCER METASTASIZED TO LIVER (H): Primary | ICD-10-CM

## 2024-09-06 DIAGNOSIS — C18.9 COLON CANCER METASTASIZED TO LIVER (H): Primary | ICD-10-CM

## 2024-09-06 DIAGNOSIS — Z51.5 ENCOUNTER FOR PALLIATIVE CARE: ICD-10-CM

## 2024-09-06 DIAGNOSIS — Z71.89 ADVANCED CARE PLANNING/COUNSELING DISCUSSION: ICD-10-CM

## 2024-09-06 PROCEDURE — 99214 OFFICE O/P EST MOD 30 MIN: CPT | Mod: 95 | Performed by: STUDENT IN AN ORGANIZED HEALTH CARE EDUCATION/TRAINING PROGRAM

## 2024-09-06 RX ORDER — OXYCODONE HYDROCHLORIDE 10 MG/1
10-20 TABLET ORAL EVERY 4 HOURS PRN
Qty: 150 TABLET | Refills: 0 | Status: SHIPPED | OUTPATIENT
Start: 2024-09-06

## 2024-09-06 RX ORDER — MORPHINE SULFATE 15 MG/1
15 TABLET, FILM COATED, EXTENDED RELEASE ORAL EVERY 12 HOURS
Qty: 60 TABLET | Refills: 0 | Status: CANCELLED | OUTPATIENT
Start: 2024-09-06

## 2024-09-06 ASSESSMENT — PAIN SCALES - GENERAL: PAINLEVEL: NO PAIN (0)

## 2024-09-06 NOTE — NURSING NOTE
Patient reviewed medications and allergies in Mychart during e-check in and said everything looked correct.        Current patient location: [facility] [facility] [place] [address]     Is the patient currently in the state of [place]? YES    Visit mode:VIDEO    If the visit is dropped, the patient can be reconnected by: VIDEO VISIT: Text to cell phone:   Telephone Information:   Mobile [phone]    and VIDEO VISIT: Send to e-mail at: [email or web address]    Will anyone else be joining the visit? NO  (If patient encounters technical issues they should call [phone] :[id number])    How would you like to obtain your AVS? MyChart    Are changes needed to the allergy or medication list? Pt declined med review and Pt stated no med changes    Are refills needed on medications prescribed by this physician? YES    Rooming Documentation:  Not applicable      Reason for visit: VIDAL RODRIGUEZ

## 2024-09-06 NOTE — LETTER
"9/6/2024      Roman Escalante  [address]  [facility] [address]      Dear Colleague,    Thank you for referring your patient, Roman Escalante, to the [facility]. Please see a copy of my visit note below.    Palliative Care Progress Note    Patient Name: Roman Escalante  Primary Provider: Buddy Hart    Chief Complaint/Patient ID: Roman Escalante is a 49 year old male with PMHx of metastatic rectal cancer (mets to lung), currently enrolled in [facility] CAR-T trial (completed cell harvest 1/23/2024, tentatively planning administration of CAR-T in June).  He has been on multiple lines of systemic therapy, currently on Fruquintinib.     Last Palliative care appointment: 8/2/2024 with me. Long goals of care conversation.     Reviewed: Yes    ORT Score = 1 for history of alcohol abuse and his mother.     Social History: Has a couple of kids and a couple grandkids who lives in [place].  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 Has a girlfriend-she is a hospice RN.     Advanced Care Planning: Has not completed. Girlfriend would be his HCA.     Interim History:  Roman Escalante is a 49 year old male who is seen today for follow up with Palliative Care via billable video visit.      Reviewed oncology note from 8/30. Generally tolerating chemo OK. Planning to continue with FOLFIRI/Avastin.    He states today that he is doing  pretty good . He had chemo last week, and feels that it made  a world of difference . He says it s helped with his breathing, as well as his pain. He has taken a couple of days worth of the MS Contin 15 mg tablets. Has not used any oxycodone.     Bowels are doing OK. He did overeat a few days ago, but other than that, he s not really needing much for his bowels right now. He did take a dose of MiraLAX and Dulcolax the day prior and the day of his chemo infusions. Chela started working the day of his pump disconnect. "     Sleeping 12 hours per night and occasionally. States he s eating fairly well and has been maintaining weight. Thinking he might even be able to gain a few pounds coming up.     States that he did agree to sign DNR order with the infusion team. No record of this in his chart right now.       Physical Exam:   Constitutional: Alert, pleasant, no apparent distress. Sitting up in chair.  Eyes: Sclera non-icteric, no eye discharge.  ENT: No nasal discharge. Ears grossly normal.  Respiratory: Unlabored respirations. Speaking in full sentences.  Musculoskeletal: Extremities appear normal- no gross deformities noted. No edema noted on upper body.   Skin: No suspicious lesions or rashes on visible skin.  Neurologic: Clear speech, no aphasia. No facial droop.  Psychiatric: Mentation appears normal, appropriate attention. Affect normal/bright. Does not appear anxious or depressed.    Key Data Reviewed:  LABS:   Lab Results   Component Value Date    WBC 10.0 08/30/2024    HGB 9.4 (L) 08/30/2024    HCT 29.8 (L) 08/30/2024     08/30/2024     (L) 08/30/2024    POTASSIUM 4.2 08/30/2024    CHLORIDE 97 (L) 08/30/2024    CO2 23 08/30/2024    BUN 16.2 08/30/2024    CR 1.01 08/30/2024    GLC 99 08/30/2024    SED 38 (H) 01/28/2024    AST 61 (H) 08/30/2024    ALT 20 08/30/2024    ALKPHOS 186 (H) 08/30/2024    BILITOTAL 0.6 08/30/2024    INR 1.30 (H) 01/28/2024     IMAGING: CT CAP 7/30/24- IMPRESSION:   1.  Increasing size of innumerable pulmonary and mediastinal metastases, with the largest in the right lower lobe measuring up to 6.7 cm.  2.  Increasing size and number of hepatic metastases with the largest measuring up to 9.3 cm.  3.  Increasing size of rectosigmoid ill-defined hyperdense mass which now involves more proximal aspects of the sigmoid colon. Nodules adjacent to the left colostomy.  4.  Scattered lymphadenopathy within the abdomen.    Impression & Recommendations & Counseling:  Roman Escalante is a 49 year  old male with history of metastatic rectal cancer.     Thankfully, he seems to be having some symptom improvement from chemotherapy. Reviewed our conversation from last time, and he is open to formally signing up post form today. He would not want to go through CPR, however, he thinks right now he would be open to a trial of intubation. Would not want to go through tracheostomy placement. I completed this post form with him today and we will send it to him Through my chart and likely in the mail.    Regarding pain, this has been improved. Discussed MS Contin is intended to be taken on a daily basis, so if he s not really needing it regularly, my recommendation would be to stop this and use the oxycodone for breakthrough pain. Refill sent today for 10 mg tablets of oxycodone, as he is taken it, he has consistently needed 10 mg.     Recommendations:  -Increase oxycodone to 10 to 20 mg every four hours as needed. New prescription sent today.  -Stop the MS Contin for now. Discussed when we might add this back in, for example, if oxycodone is being needed on a daily basis for pain control.  -Suggested Nas the day prior in the day of chemo to probably work better than Dulcolax on preventing constipation. Continue MiraLAX on these these days as well.  -Completed a polst form stating DNR/full treatment. Also updated code status in the chart.      Follow up: One month      Video-Visit Details  Video Start Time: 10:09AM  Video End Time: 10:30AM    Originating Location (pt. Location): Home     Distant Location (provider location):  Offsite- Personal Home      Platform used for Video Visit: Huey     Total time spent on day of encounter is 31 mins, including reviewing record, review of above studies, above visit with patient, symptomatic discussion of pain and bowels, including medication adjustments/prescription management, competion of POLST form, and documentation.         Isidra Prater,   Palliative Medicine    AMCOM ID [id number]    Some chart documentation performed using Dragon Voice recognition Software. Although reviewed after completion, some words and grammatical errors may remain.      Again, thank you for allowing me to participate in the care of your patient.        Sincerely,        Isidra Prater, DO

## 2024-09-06 NOTE — LETTER
"9/6/2024      Roman Escalante  [address]  [facility] [address]      Dear Colleague,    Thank you for referring your patient, Roman Escalante, to the [facility]. Please see a copy of my visit note below.    Palliative Care Progress Note    Patient Name: Roman Escalante  Primary Provider: Buddy Hart    Chief Complaint/Patient ID: Roman Escalante is a 49 year old male with PMHx of metastatic rectal cancer (mets to lung), currently enrolled in [facility] CAR-T trial (completed cell harvest 1/23/2024, tentatively planning administration of CAR-T in June).  He has been on multiple lines of systemic therapy, currently on Fruquintinib.     Last Palliative care appointment: 8/2/2024 with me. Long goals of care conversation.     Reviewed: Yes    ORT Score = 1 for history of alcohol abuse and his mother.     Social History: Has a couple of kids and a couple grandkids who lives in [place].  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 Has a girlfriend-she is a hospice RN.     Advanced Care Planning: Has not completed. Girlfriend would be his HCA.     Interim History:  Roman Escalante is a 49 year old male who is seen today for follow up with Palliative Care via billable video visit.      Reviewed oncology note from 8/30. Generally tolerating chemo OK. Planning to continue with FOLFIRI/Avastin.    He states today that he is doing  pretty good . He had chemo last week, and feels that it made  a world of difference . He says it s helped with his breathing, as well as his pain. He has taken a couple of days worth of the MS Contin 15 mg tablets. Has not used any oxycodone.     Bowels are doing OK. He did overeat a few days ago, but other than that, he s not really needing much for his bowels right now. He did take a dose of MiraLAX and Dulcolax the day prior and the day of his chemo infusions. Chela started working the day of his pump disconnect. "     Sleeping 12 hours per night and occasionally. States he s eating fairly well and has been maintaining weight. Thinking he might even be able to gain a few pounds coming up.     States that he did agree to sign DNR order with the infusion team. No record of this in his chart right now.       Physical Exam:   Constitutional: Alert, pleasant, no apparent distress. Sitting up in chair.  Eyes: Sclera non-icteric, no eye discharge.  ENT: No nasal discharge. Ears grossly normal.  Respiratory: Unlabored respirations. Speaking in full sentences.  Musculoskeletal: Extremities appear normal- no gross deformities noted. No edema noted on upper body.   Skin: No suspicious lesions or rashes on visible skin.  Neurologic: Clear speech, no aphasia. No facial droop.  Psychiatric: Mentation appears normal, appropriate attention. Affect normal/bright. Does not appear anxious or depressed.    Key Data Reviewed:  LABS:   Lab Results   Component Value Date    WBC 10.0 08/30/2024    HGB 9.4 (L) 08/30/2024    HCT 29.8 (L) 08/30/2024     08/30/2024     (L) 08/30/2024    POTASSIUM 4.2 08/30/2024    CHLORIDE 97 (L) 08/30/2024    CO2 23 08/30/2024    BUN 16.2 08/30/2024    CR 1.01 08/30/2024    GLC 99 08/30/2024    SED 38 (H) 01/28/2024    AST 61 (H) 08/30/2024    ALT 20 08/30/2024    ALKPHOS 186 (H) 08/30/2024    BILITOTAL 0.6 08/30/2024    INR 1.30 (H) 01/28/2024     IMAGING: CT CAP 7/30/24- IMPRESSION:   1.  Increasing size of innumerable pulmonary and mediastinal metastases, with the largest in the right lower lobe measuring up to 6.7 cm.  2.  Increasing size and number of hepatic metastases with the largest measuring up to 9.3 cm.  3.  Increasing size of rectosigmoid ill-defined hyperdense mass which now involves more proximal aspects of the sigmoid colon. Nodules adjacent to the left colostomy.  4.  Scattered lymphadenopathy within the abdomen.    Impression & Recommendations & Counseling:  Roman Escalante is a 49 year  old male with history of metastatic rectal cancer.     Thankfully, he seems to be having some symptom improvement from chemotherapy. Reviewed our conversation from last time, and he is open to formally signing up post form today. He would not want to go through CPR, however, he thinks right now he would be open to a trial of intubation. Would not want to go through tracheostomy placement. I completed this post form with him today and we will send it to him Through my chart and likely in the mail.    Regarding pain, this has been improved. Discussed MS Contin is intended to be taken on a daily basis, so if he s not really needing it regularly, my recommendation would be to stop this and use the oxycodone for breakthrough pain. Refill sent today for 10 mg tablets of oxycodone, as he is taken it, he has consistently needed 10 mg.     Recommendations:  -Increase oxycodone to 10 to 20 mg every four hours as needed. New prescription sent today.  -Stop the MS Contin for now. Discussed when we might add this back in, for example, if oxycodone is being needed on a daily basis for pain control.  -Suggested Nas the day prior in the day of chemo to probably work better than Dulcolax on preventing constipation. Continue MiraLAX on these these days as well.  -Completed a polst form stating DNR/full treatment. Also updated code status in the chart.      Follow up: One month      Video-Visit Details  Video Start Time: 10:09AM  Video End Time: 10:30AM    Originating Location (pt. Location): Home     Distant Location (provider location):  Offsite- Personal Home      Platform used for Video Visit: Huey     Total time spent on day of encounter is 31 mins, including reviewing record, review of above studies, above visit with patient, symptomatic discussion of pain and bowels, including medication adjustments/prescription management, competion of POLST form, and documentation.         Isidra Prater,   Palliative Medicine    [facility] ID [id number]    Some chart documentation performed using Dragon Voice recognition Software. Although reviewed after completion, some words and grammatical errors may remain.      Again, thank you for allowing me to participate in the care of your patient.        Sincerely,        Isidra Prater, DO

## 2024-09-06 NOTE — PATIENT INSTRUCTIONS
Recommendations:  -Increase oxycodone to 10 to 20 mg every four hours as needed. New prescription sent today.  -Stop the MS Contin for now. Discussed when we might add this back in, for example, if oxycodone is being needed on a daily basis for pain control.  -Suggested Nas the day prior in the day of chemo to probably work better than Dulcolax on preventing constipation. Continue MiraLAX on these these days as well.  -Completed a polst form stating DNR/full treatment. Also updated code status in the chart.    Follow up: One month      Reasons to Call    If you are having worsening/uncontrolled symptoms we want you to call!    You or your other physicians make any changes to medications we have prescribed.  -Please call for refills 4-5 days before you will run out of medication.    Important Phone Numbers, including: Refills, scheduling, and general questions     Palliative Care RN: Rebeca Urbina : [phone]  *For scheduling needs/follow up visits : [phone]  *After hours or on weekends- Will connect you with on call MD : [phone].

## 2024-09-06 NOTE — PROGRESS NOTES
"Palliative Care Progress Note    Patient Name: Roman Escalante  Primary Provider: Buddy Hart    Chief Complaint/Patient ID: Roman Escalante is a 49 year old male with PMHx of metastatic rectal cancer (mets to lung), currently enrolled in [email or web address] CAR-T trial (completed cell harvest 1/23/2024, tentatively planning administration of CAR-T in June).  He has been on multiple lines of systemic therapy, currently on Fruquintinib.     Last Palliative care appointment: 8/2/2024 with me. Long goals of care conversation.     Reviewed: Yes    ORT Score = 1 for history of alcohol abuse and his mother.     Social History: Has a couple of kids and a couple grandkids who lives in [place].  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 Has a girlfriend-she is a hospice RN.     Advanced Care Planning: Has not completed. Girlfriend would be his HCA.     Interim History:  Roman Escalante is a 49 year old male who is seen today for follow up with Palliative Care via billable video visit.      Reviewed oncology note from 8/30. Generally tolerating chemo OK. Planning to continue with FOLFIRI/Avastin.    He states today that he is doing  pretty good . He had chemo last week, and feels that it made  a world of difference . He says it s helped with his breathing, as well as his pain. He has taken a couple of days worth of the MS Contin 15 mg tablets. Has not used any oxycodone.     Bowels are doing OK. He did overeat a few days ago, but other than that, he s not really needing much for his bowels right now. He did take a dose of MiraLAX and Dulcolax the day prior and the day of his chemo infusions. Chela started working the day of his pump disconnect.     Sleeping 12 hours per night and occasionally. States he s eating fairly well and has been maintaining weight. Thinking he might even be able to gain a few pounds coming up.     States that he did agree to sign DNR order with the infusion team. No record of this " in his chart right now.       Physical Exam:   Constitutional: Alert, pleasant, no apparent distress. Sitting up in chair.  Eyes: Sclera non-icteric, no eye discharge.  ENT: No nasal discharge. Ears grossly normal.  Respiratory: Unlabored respirations. Speaking in full sentences.  Musculoskeletal: Extremities appear normal- no gross deformities noted. No edema noted on upper body.   Skin: No suspicious lesions or rashes on visible skin.  Neurologic: Clear speech, no aphasia. No facial droop.  Psychiatric: Mentation appears normal, appropriate attention. Affect normal/bright. Does not appear anxious or depressed.    Key Data Reviewed:  LABS:   Lab Results   Component Value Date    WBC 10.0 08/30/2024    HGB 9.4 (L) 08/30/2024    HCT 29.8 (L) 08/30/2024     08/30/2024     (L) 08/30/2024    POTASSIUM 4.2 08/30/2024    CHLORIDE 97 (L) 08/30/2024    CO2 23 08/30/2024    BUN 16.2 08/30/2024    CR 1.01 08/30/2024    GLC 99 08/30/2024    SED 38 (H) 01/28/2024    AST 61 (H) 08/30/2024    ALT 20 08/30/2024    ALKPHOS 186 (H) 08/30/2024    BILITOTAL 0.6 08/30/2024    INR 1.30 (H) 01/28/2024     IMAGING: CT CAP 7/30/24- IMPRESSION:   1.  Increasing size of innumerable pulmonary and mediastinal metastases, with the largest in the right lower lobe measuring up to 6.7 cm.  2.  Increasing size and number of hepatic metastases with the largest measuring up to 9.3 cm.  3.  Increasing size of rectosigmoid ill-defined hyperdense mass which now involves more proximal aspects of the sigmoid colon. Nodules adjacent to the left colostomy.  4.  Scattered lymphadenopathy within the abdomen.    Impression & Recommendations & Counseling:  Roman Escalante is a 49 year old male with history of metastatic rectal cancer.     Thankfully, he seems to be having some symptom improvement from chemotherapy. Reviewed our conversation from last time, and he is open to formally signing up post form today. He would not want to go through CPR,  however, he thinks right now he would be open to a trial of intubation. Would not want to go through tracheostomy placement. I completed this post form with him today and we will send it to him Through my chart and likely in the mail.    Regarding pain, this has been improved. Discussed MS Contin is intended to be taken on a daily basis, so if he s not really needing it regularly, my recommendation would be to stop this and use the oxycodone for breakthrough pain. Refill sent today for 10 mg tablets of oxycodone, as he is taken it, he has consistently needed 10 mg.     Recommendations:  -Increase oxycodone to 10 to 20 mg every four hours as needed. New prescription sent today.  -Stop the MS Contin for now. Discussed when we might add this back in, for example, if oxycodone is being needed on a daily basis for pain control.  -Suggested Nas the day prior in the day of chemo to probably work better than Dulcolax on preventing constipation. Continue MiraLAX on these these days as well.  -Completed a polst form stating DNR/full treatment. Also updated code status in the chart.      Follow up: One month      Video-Visit Details  Video Start Time: 10:09AM  Video End Time: 10:30AM    Originating Location (pt. Location): Home     Distant Location (provider location):  Offsite- Personal Home      Platform used for Video Visit: Rivalfox     Total time spent on day of encounter is 35 mins, including reviewing record, review of above studies, above visit with patient, symptomatic discussion of pain and bowels, including medication adjustments/prescription management, competion of POLST form, and documentation.         Isidra Prater DO  Palliative Medicine   [id number]    Some chart documentation performed using Dragon Voice recognition Software. Although reviewed after completion, some words and grammatical errors may remain.

## 2024-09-13 ENCOUNTER — LAB (OUTPATIENT)
Dept: LAB | Facility: CLINIC | Age: 49
End: 2024-09-13
Payer: COMMERCIAL

## 2024-09-13 ENCOUNTER — APPOINTMENT (OUTPATIENT)
Dept: LAB | Facility: CLINIC | Age: 49
End: 2024-09-13
Attending: STUDENT IN AN ORGANIZED HEALTH CARE EDUCATION/TRAINING PROGRAM
Payer: COMMERCIAL

## 2024-09-13 ENCOUNTER — ONCOLOGY VISIT (OUTPATIENT)
Dept: ONCOLOGY | Facility: CLINIC | Age: 49
End: 2024-09-13
Attending: STUDENT IN AN ORGANIZED HEALTH CARE EDUCATION/TRAINING PROGRAM
Payer: COMMERCIAL

## 2024-09-13 ENCOUNTER — PATIENT OUTREACH (OUTPATIENT)
Dept: ONCOLOGY | Facility: CLINIC | Age: 49
End: 2024-09-13

## 2024-09-13 VITALS
BODY MASS INDEX: 21.18 KG/M2 | OXYGEN SATURATION: 98 % | DIASTOLIC BLOOD PRESSURE: 66 MMHG | RESPIRATION RATE: 16 BRPM | SYSTOLIC BLOOD PRESSURE: 91 MMHG | WEIGHT: 154 LBS | TEMPERATURE: 97.4 F | HEART RATE: 101 BPM

## 2024-09-13 DIAGNOSIS — D50.9 MICROCYTIC ANEMIA: ICD-10-CM

## 2024-09-13 DIAGNOSIS — C78.00 RECTAL ADENOCARCINOMA METASTATIC TO LUNG (H): Primary | ICD-10-CM

## 2024-09-13 DIAGNOSIS — C18.9 PRIMARY ADENOCARCINOMA OF COLON (H): ICD-10-CM

## 2024-09-13 DIAGNOSIS — C20 RECTAL ADENOCARCINOMA METASTATIC TO LUNG (H): Primary | ICD-10-CM

## 2024-09-13 DIAGNOSIS — R09.02 OXYGEN DESATURATION: ICD-10-CM

## 2024-09-13 DIAGNOSIS — D50.0 IRON DEFICIENCY ANEMIA DUE TO CHRONIC BLOOD LOSS: ICD-10-CM

## 2024-09-13 DIAGNOSIS — R63.4 WEIGHT LOSS: ICD-10-CM

## 2024-09-13 DIAGNOSIS — Z79.899 ENCOUNTER FOR LONG-TERM (CURRENT) USE OF MEDICATIONS: ICD-10-CM

## 2024-09-13 LAB
ALBUMIN SERPL BCG-MCNC: 4.1 G/DL (ref 3.5–5.2)
ALBUMIN UR-MCNC: 50 MG/DL
ALP SERPL-CCNC: 174 U/L (ref 40–150)
ALT SERPL W P-5'-P-CCNC: 28 U/L (ref 0–70)
ANION GAP SERPL CALCULATED.3IONS-SCNC: 14 MMOL/L (ref 7–15)
AST SERPL W P-5'-P-CCNC: 31 U/L (ref 0–45)
BASOPHILS # BLD AUTO: 0 10E3/UL (ref 0–0.2)
BASOPHILS NFR BLD AUTO: 0 %
BILIRUB SERPL-MCNC: 0.3 MG/DL
BUN SERPL-MCNC: 12.8 MG/DL (ref 6–20)
CALCIUM SERPL-MCNC: 9.7 MG/DL (ref 8.8–10.4)
CHLORIDE SERPL-SCNC: 101 MMOL/L (ref 98–107)
CREAT SERPL-MCNC: 1.11 MG/DL (ref 0.67–1.17)
EGFRCR SERPLBLD CKD-EPI 2021: 80 ML/MIN/1.73M2
EOSINOPHIL # BLD AUTO: 0.2 10E3/UL (ref 0–0.7)
EOSINOPHIL NFR BLD AUTO: 2 %
ERYTHROCYTE [DISTWIDTH] IN BLOOD BY AUTOMATED COUNT: 19.9 % (ref 10–15)
FERRITIN SERPL-MCNC: 1283 NG/ML (ref 31–409)
GLUCOSE SERPL-MCNC: 130 MG/DL (ref 70–99)
HCO3 SERPL-SCNC: 23 MMOL/L (ref 22–29)
HCT VFR BLD AUTO: 35.8 % (ref 40–53)
HGB BLD-MCNC: 11.1 G/DL (ref 13.3–17.7)
IMM GRANULOCYTES # BLD: 0.1 10E3/UL
IMM GRANULOCYTES NFR BLD: 1 %
IRON BINDING CAPACITY (ROCHE): 245 UG/DL (ref 240–430)
IRON SATN MFR SERPL: 16 % (ref 15–46)
IRON SERPL-MCNC: 39 UG/DL (ref 61–157)
LYMPHOCYTES # BLD AUTO: 0.5 10E3/UL (ref 0.8–5.3)
LYMPHOCYTES NFR BLD AUTO: 7 %
MCH RBC QN AUTO: 23.6 PG (ref 26.5–33)
MCHC RBC AUTO-ENTMCNC: 31 G/DL (ref 31.5–36.5)
MCV RBC AUTO: 76 FL (ref 78–100)
MONOCYTES # BLD AUTO: 0.8 10E3/UL (ref 0–1.3)
MONOCYTES NFR BLD AUTO: 11 %
NEUTROPHILS # BLD AUTO: 6.1 10E3/UL (ref 1.6–8.3)
NEUTROPHILS NFR BLD AUTO: 79 %
NRBC # BLD AUTO: 0 10E3/UL
NRBC BLD AUTO-RTO: 0 /100
PLATELET # BLD AUTO: 200 10E3/UL (ref 150–450)
POTASSIUM SERPL-SCNC: 3.7 MMOL/L (ref 3.4–5.3)
PROT SERPL-MCNC: 7.5 G/DL (ref 6.4–8.3)
RBC # BLD AUTO: 4.7 10E6/UL (ref 4.4–5.9)
SODIUM SERPL-SCNC: 138 MMOL/L (ref 135–145)
WBC # BLD AUTO: 7.7 10E3/UL (ref 4–11)

## 2024-09-13 PROCEDURE — 96415 CHEMO IV INFUSION ADDL HR: CPT

## 2024-09-13 PROCEDURE — 36591 DRAW BLOOD OFF VENOUS DEVICE: CPT

## 2024-09-13 PROCEDURE — 250N000011 HC RX IP 250 OP 636: Performed by: STUDENT IN AN ORGANIZED HEALTH CARE EDUCATION/TRAINING PROGRAM

## 2024-09-13 PROCEDURE — 250N000011 HC RX IP 250 OP 636: Performed by: PHYSICIAN ASSISTANT

## 2024-09-13 PROCEDURE — G0463 HOSPITAL OUTPT CLINIC VISIT: HCPCS | Mod: 25 | Performed by: PHYSICIAN ASSISTANT

## 2024-09-13 PROCEDURE — 85025 COMPLETE CBC W/AUTO DIFF WBC: CPT | Performed by: STUDENT IN AN ORGANIZED HEALTH CARE EDUCATION/TRAINING PROGRAM

## 2024-09-13 PROCEDURE — G0498 CHEMO EXTEND IV INFUS W/PUMP: HCPCS

## 2024-09-13 PROCEDURE — 83540 ASSAY OF IRON: CPT | Performed by: PHYSICIAN ASSISTANT

## 2024-09-13 PROCEDURE — 96411 CHEMO IV PUSH ADDL DRUG: CPT

## 2024-09-13 PROCEDURE — 96417 CHEMO IV INFUS EACH ADDL SEQ: CPT

## 2024-09-13 PROCEDURE — 258N000003 HC RX IP 258 OP 636: Performed by: STUDENT IN AN ORGANIZED HEALTH CARE EDUCATION/TRAINING PROGRAM

## 2024-09-13 PROCEDURE — G2211 COMPLEX E/M VISIT ADD ON: HCPCS | Performed by: PHYSICIAN ASSISTANT

## 2024-09-13 PROCEDURE — 83550 IRON BINDING TEST: CPT | Performed by: PHYSICIAN ASSISTANT

## 2024-09-13 PROCEDURE — 99214 OFFICE O/P EST MOD 30 MIN: CPT | Performed by: PHYSICIAN ASSISTANT

## 2024-09-13 PROCEDURE — 96368 THER/DIAG CONCURRENT INF: CPT

## 2024-09-13 PROCEDURE — 82040 ASSAY OF SERUM ALBUMIN: CPT

## 2024-09-13 PROCEDURE — 258N000003 HC RX IP 258 OP 636: Performed by: PHYSICIAN ASSISTANT

## 2024-09-13 PROCEDURE — 81003 URINALYSIS AUTO W/O SCOPE: CPT | Performed by: STUDENT IN AN ORGANIZED HEALTH CARE EDUCATION/TRAINING PROGRAM

## 2024-09-13 PROCEDURE — 84238 ASSAY NONENDOCRINE RECEPTOR: CPT | Performed by: PHYSICIAN ASSISTANT

## 2024-09-13 PROCEDURE — 96413 CHEMO IV INFUSION 1 HR: CPT

## 2024-09-13 PROCEDURE — 82728 ASSAY OF FERRITIN: CPT | Performed by: PHYSICIAN ASSISTANT

## 2024-09-13 PROCEDURE — 96375 TX/PRO/DX INJ NEW DRUG ADDON: CPT

## 2024-09-13 RX ORDER — HEPARIN SODIUM (PORCINE) LOCK FLUSH IV SOLN 100 UNIT/ML 100 UNIT/ML
5 SOLUTION INTRAVENOUS ONCE
Status: COMPLETED | OUTPATIENT
Start: 2024-09-13 | End: 2024-09-13

## 2024-09-13 RX ORDER — ATROPINE SULFATE 0.4 MG/ML
0.4 AMPUL (ML) INJECTION
Status: DISCONTINUED | OUTPATIENT
Start: 2024-09-13 | End: 2024-09-13 | Stop reason: HOSPADM

## 2024-09-13 RX ORDER — FLUOROURACIL 50 MG/ML
400 INJECTION, SOLUTION INTRAVENOUS ONCE
Status: CANCELLED | OUTPATIENT
Start: 2024-09-13

## 2024-09-13 RX ORDER — FLUOROURACIL 50 MG/ML
400 INJECTION, SOLUTION INTRAVENOUS ONCE
Status: COMPLETED | OUTPATIENT
Start: 2024-09-13 | End: 2024-09-13

## 2024-09-13 RX ADMIN — FLUOROURACIL 750 MG: 50 INJECTION, SOLUTION INTRAVENOUS at 13:31

## 2024-09-13 RX ADMIN — IRINOTECAN HYDROCHLORIDE 340 MG: 20 INJECTION, SOLUTION INTRAVENOUS at 11:06

## 2024-09-13 RX ADMIN — SODIUM CHLORIDE 350 MG: 9 INJECTION, SOLUTION INTRAVENOUS at 12:54

## 2024-09-13 RX ADMIN — ATROPINE SULFATE 0.4 MG: 0.4 INJECTION, SOLUTION INTRAVENOUS at 12:50

## 2024-09-13 RX ADMIN — DEXAMETHASONE SODIUM PHOSPHATE: 10 INJECTION, SOLUTION INTRAMUSCULAR; INTRAVENOUS at 10:38

## 2024-09-13 RX ADMIN — Medication 5 ML: at 09:25

## 2024-09-13 RX ADMIN — SODIUM CHLORIDE 250 ML: 9 INJECTION, SOLUTION INTRAVENOUS at 10:38

## 2024-09-13 RX ADMIN — LEUCOVORIN CALCIUM 750 MG: 50 INJECTION, POWDER, LYOPHILIZED, FOR SOLUTION INTRAMUSCULAR; INTRAVENOUS at 11:07

## 2024-09-13 ASSESSMENT — PAIN SCALES - GENERAL: PAINLEVEL: MODERATE PAIN (4)

## 2024-09-13 NOTE — PATIENT INSTRUCTIONS
[facility] Triage and after hours / weekends / holidays:  [phone]    Please call the triage or after hours line if you experience a temperature greater than or equal to 100.4, shaking chills, have uncontrolled nausea, vomiting and/or diarrhea, dizziness, shortness of breath, chest pain, bleeding, unexplained bruising, or if you have any other new/concerning symptoms, questions or concerns.      If you are having any concerning symptoms or wish to speak to a provider before your next infusion visit, please call triage to notify them so we can adequately serve you.     If you need a refill on a narcotic prescription or other medication, please call before your infusion appointment.                September 2024 Sunday Monday Tuesday Wednesday Thursday Friday Saturday   1     2     3     4     5    ECHO COMPLETE  10:45 AM   (60 min.)   [id number]   [facility] 6    RETURN CCSL  10:00 AM   (40 min.)   Isidra Prater DO   [facility] 7       8     9     10     11     12     13    LAB CENTRAL   9:15 AM   (15 min.)   [facility] LAB DRAW   [facility]    RETURN CCSL   9:45 AM   (45 min.)   Radha Chapman PA-C   [facility]    ONC INFUSION 4 HR (240 MIN)  11:00 AM   (240 min.)    ONC INFUSION NURSE   [facility]    LAB   2:15 PM   (15 min.)    LAB   [facility] [place] 14       15     16     17     18     19     20     21       22     23     24     25    LAB CENTRAL   6:30 AM   (15 min.)   [facility] LAB DRAW   [facility]    RETURN CCSL   6:45 AM   (45 min.)   Rebeca Killian APRN CNP   [facility]    ONC INFUSION 4 HR (240 MIN)   8:30 AM   (240 min.)    ONC INFUSION NURSE   [facility] 26     27     28       29     30                                           October 2024 Sunday Monday Tuesday Wednesday Thursday Friday Saturday             1     2     3     4     5       6     7     8    CT CHEST/ABDOMEN/PELVIS W   9:10 AM   (20 min.)   [id number]   [facility] [place] 9    LAB CENTRAL   6:30 AM   (15 min.)   [facility] LAB DRAW   [facility]    RETURN CCSL   6:45 AM   (45 min.)   Rebeca Killian APRN CNP   [facility]    ONC INFUSION 4 HR (240 MIN)   8:30 AM   (240 min.)   [facility] ONC INFUSION NURSE   [facility]    RETURN CCSL   3:50 PM   (40 min.)   Isidra Prater DO   [facility] 10     11    RETURN CCSL   3:15 PM   (30 min.)   Polo Snow MD   [facility] 12       13     14     15     16     17     18     19       20     21     22     23    LAB CENTRAL   7:30 AM   (15 min.)   [facility] DRAW   [facility]    RETURN CCSL   7:45 AM   (45 min.)   Rebeca Killian APRN CNP   [facility]    ONC INFUSION 4 HR (240 MIN)   9:00 AM   (240 min.)   [facility] ONC INFUSION NURSE   [facility] 24     25     26       27     28     29     30     31                               Lab Results:  Recent Results (from the past 12 hour(s))   Comprehensive metabolic panel    Collection Time: 09/13/24  9:24 AM   Result Value Ref Range    Sodium 138 135 - 145 mmol/L    Potassium 3.7 3.4 - 5.3 mmol/L    Carbon Dioxide (CO2) 23 22 - 29 mmol/L    Anion Gap 14 7 - 15 mmol/L    Urea Nitrogen 12.8 6.0 - 20.0 mg/dL    Creatinine 1.11 0.67 - 1.17 mg/dL    GFR Estimate 80 >60 mL/min/1.73m2    Calcium 9.7 8.8 - 10.4 mg/dL    Chloride 101 98 - 107 mmol/L    Glucose 130 (H) 70 - 99 mg/dL    Alkaline Phosphatase 174 (H) 40 - 150 U/L    AST 31 0 - 45 U/L    ALT 28 0 - 70 U/L    Protein Total 7.5 6.4 - 8.3 g/dL     Albumin 4.1 3.5 - 5.2 g/dL    Bilirubin Total 0.3 <=1.2 mg/dL   CBC with platelets and differential    Collection Time: 09/13/24  9:24 AM   Result Value Ref Range    WBC Count 7.7 4.0 - 11.0 10e3/uL    RBC Count 4.70 4.40 - 5.90 10e6/uL    Hemoglobin 11.1 (L) 13.3 - 17.7 g/dL    Hematocrit 35.8 (L) 40.0 - 53.0 %    MCV 76 (L) 78 - 100 fL    MCH 23.6 (L) 26.5 - 33.0 pg    MCHC 31.0 (L) 31.5 - 36.5 g/dL    RDW 19.9 (H) 10.0 - 15.0 %    Platelet Count 200 150 - 450 10e3/uL    % Neutrophils 79 %    % Lymphocytes 7 %    % Monocytes 11 %    % Eosinophils 2 %    % Basophils 0 %    % Immature Granulocytes 1 %    NRBCs per 100 WBC 0 <1 /100    Absolute Neutrophils 6.1 1.6 - 8.3 10e3/uL    Absolute Lymphocytes 0.5 (L) 0.8 - 5.3 10e3/uL    Absolute Monocytes 0.8 0.0 - 1.3 10e3/uL    Absolute Eosinophils 0.2 0.0 - 0.7 10e3/uL    Absolute Basophils 0.0 0.0 - 0.2 10e3/uL    Absolute Immature Granulocytes 0.1 <=0.4 10e3/uL    Absolute NRBCs 0.0 10e3/uL   Ferritin    Collection Time: 09/13/24  9:24 AM   Result Value Ref Range    Ferritin 1,283 (H) 31 - 409 ng/mL   Iron and iron binding capacity    Collection Time: 09/13/24  9:24 AM   Result Value Ref Range    Iron 39 (L) 61 - 157 ug/dL    Iron Binding Capacity 245 240 - 430 ug/dL    Iron Sat Index 16 15 - 46 %   Protein qualitative urine    Collection Time: 09/13/24 11:05 AM   Result Value Ref Range    Protein Albumin Urine 50 (A) Negative mg/dL

## 2024-09-13 NOTE — PROGRESS NOTES
[facility] - Medical Oncology Follow Up Note  09/13/2024    Oncology History:   Patient developed rectal bleeding in 2020.  In January 2021 CT showed focal wall thickening in the upper rectum, multiple pulmonary nodules bilaterally suspicious for metastatic disease.  Underwent FNA of the right upper lobe lesion which was consistent with adenocarcinoma of the colon.  He was treated with FOLFOX from 2/20/2021 through 5/20/2021.  Avastin was added.  Had an infusion reaction to oxaliplatin 6/2021.  7/2021- 12/2021 was on 5-FU alone.  Developed progression.  12/2021- 9/2022 was on FOLFIRI.  11/2021 started Lonsurf. All of this was done with outside oncologist.     Met with Dr. Snow on 2/3/2023 to establish care. Recommended regorafenib.     Started regorafenib on 3/2/2023. Progression noted 5/19/23. Plan to start FOLFOX/Avastin and send out CARIS testing to evaluate for other treatment options. Cycle 1 was given with oxaliplatin desensitization, 5FU, Avastin held due to increased urine protein.     Following cycle 4, patient was admitted with hematuria and acute kidney injury.    Oxaliplatin was dropped with Cycle 5.     Y-90 radioembolization 9/7.    10/24/23 CT CAP with disease progression with notable growth in all pulmonary lesions. Lonsurf + bevacizumab started 10/27/23 with plans to bridge to clinical trial.   11/15/23 - stopped chemo for [facility] study.   Currently enrolled in [facility] CAR-T trial (completed cell harvest 1/23/2024, tentatively planning administration of CAR-T in April 2/12/24 - resumed Lonsurf, bevacizumab to bridge treatment until CAR-T therapy  3/20/24 - treatment changed to Fruquintinib due to intolerability of Lonsurf/bevacizumab  4/2/24- Presented to the ED with dehydration. Stopped Fruquintnib.   6/2024- cellular therapy transplant at [facility].   7/30/24- CT imaging demonstrating marked progression of pulmonary and hepatic lesions, clinical trial failed to generate T-cell graft. Multiple  discussions regarding plan of care, hospice vs additional lines of treatment. Patient decided he is not ready for hospice. He resumed treatment with FOLFIRI + bevacizumab on 8/30/24.     Interval History:   Patient reports that he feels much better since resuming chemotherapy.  He notes his pulse was in the 100s and now is down to in the 80s.  He notes less shortness of breath.  He reports eating and drinking okay.  He is managing his, but constipation with either MiraLAX, Dulcolax, or senna as needed.  He also finds that drinking cold milk seems to help.  He did not need any pain medication for 1 week after receiving chemotherapy.  He did resume his oxycodone last week taking 10 mg 2-3 times per day.  He has not felt the need to take any MS Contin.  He reports his taste is doing better.  He notes his energy remains fair and he is napping 1 to 2 hours/day and sleeping about 12 to 14 hours at night.  He notes his resting home oxygen has increased to 95 to 96%.  He is looking into getting a PCA at home.  He denies other concerns.    Current Outpatient Medications   Medication Sig Dispense Refill    cyclobenzaprine (FLEXERIL) 5 MG tablet Take one tab (5mg) by mouth over 6-8 hours, as needed for spasms 45 tablet 2    gabapentin (NEURONTIN) 100 MG capsule Take 100mg by mouth at bedtime for 3 days, then Take 100mg by mouth in AM and 100mg at bedtime for 3 days, then Take 100mg by mouth three times per day (Patient not taking: Reported on 4/26/2024) 90 capsule 0    ibuprofen (ADVIL/MOTRIN) 200 MG tablet Take 3 tablets (600 mg) by mouth every 8 hours as needed for moderate pain (Patient not taking: Reported on 4/26/2024) 100 tablet 0    loperamide (IMODIUM) 2 MG capsule 2 caps at 1st sign of diarrhea & 1 cap every 2hrs until 12hrs diarrhea free. During night, 2 caps at bedtime & 2 caps every 4hrs until AM 30 capsule 0    LORazepam (ATIVAN) 0.5 MG tablet Take 1 tablet (0.5 mg) by mouth every 6 hours as needed for anxiety 10  tablet 0    morphine (MS CONTIN) 15 MG CR tablet Take 1 tablet (15 mg) by mouth every 12 hours 60 tablet 0    ondansetron (ZOFRAN ODT) 4 MG ODT tab Take 1 tablet (4 mg) by mouth every 6 hours as needed for nausea. 30 tablet 3    Ostomy Supplies MISC 1 each daily 1 each 11    oxyCODONE (ROXICODONE) 10 MG tablet Take 1-2 tablets (10-20 mg) by mouth every 4 hours as needed for moderate to severe pain. 150 tablet 0    prochlorperazine (COMPAZINE) 10 MG tablet Take 0.5 tablets (5 mg) by mouth every 6 hours as needed for nausea or vomiting. 30 tablet 2    prochlorperazine (COMPAZINE) 10 MG tablet Take 1 tablet (10 mg) by mouth every 6 hours as needed for nausea or vomiting (Patient not taking: Reported on 4/26/2024) 30 tablet 2    prochlorperazine (COMPAZINE) 5 MG tablet Take 1 tablet (5 mg) by mouth every 6 hours as needed for nausea or vomiting (Patient not taking: Reported on 4/26/2024) 30 tablet 3    sulfamethoxazole-trimethoprim (BACTRIM DS) 800-160 MG tablet       tamsulosin (FLOMAX) 0.4 MG capsule       valACYclovir (VALTREX) 500 MG tablet        Physical Exam:  General: The patient is a pleasant male in no acute distress.  BP 91/66   Pulse 101   Temp 97.4  F (36.3  C)   Resp 16   Wt 69.9 kg (154 lb)   SpO2 98%   BMI 21.18 kg/m    Wt Readings from Last 10 Encounters:   09/13/24 69.9 kg (154 lb)   09/06/24 72.6 kg (160 lb)   08/30/24 72.8 kg (160 lb 6.4 oz)   08/09/24 74.8 kg (164 lb 14.4 oz)   08/02/24 72.6 kg (160 lb)   07/31/24 73.5 kg (162 lb)   07/15/24 73.7 kg (162 lb 8 oz)   05/21/24 84.4 kg (186 lb)   05/07/24 86.2 kg (190 lb)   04/26/24 85.4 kg (188 lb 3.2 oz)   HEENT: EOMI. Sclerae are anicteric.  Lymph: Neck is supple with no lymphadenopathy in the cervical or supraclavicular areas.   Heart: Mildly tachycardic with regular rhythm.   Lungs: Fine crackles in the bases bilaterally, otherwise clear to auscultation bilaterally.   Abdomen: Bowel sounds present, soft, nontender with no palpable  hepatosplenomegaly or masses. Ostomy is in place in left abdomen with minimal amount of formed brown stool.   Extremities: No lower extremity edema noted bilaterally.   Neuro: Cranial nerves II through XII are grossly intact.  Skin: No rashes, petechiae, or bruising noted on exposed skin.    LABS  Most Recent 3 CBC's:  Recent Labs   Lab Test 09/13/24  0924 08/30/24  1030 07/30/24  1431   WBC 7.7 10.0 12.5*   HGB 11.1* 9.4* 10.0*   MCV 76* 75* 79    265 242    Most Recent 3 BMP's:  Recent Labs   Lab Test 09/13/24  0924 08/30/24  1030 07/30/24  1431    132* 135   POTASSIUM 3.7 4.2 4.4   CHLORIDE 101 97* 99   CO2 23 23 23   BUN 12.8 16.2 16.9   CR 1.11 1.01 0.91   ANIONGAP 14 12 13   JEFERSON 9.7 8.8 9.3   * 99 104*    Most Recent 2 LFT's:  Recent Labs   Lab Test 09/13/24  0924 08/30/24  1030   AST 31 61*   ALT 28 20   ALKPHOS 174* 186*   BILITOTAL 0.3 0.6   I reviewed the above labs today.    ASSESSMENT AND PLAN   Metastatic rectal cancer  -Most recently progressed after CAR-T clinical trial. He started on treatment with FOLFIRI + bevacizumab on 8/30/24. He tolerated cycle 1 very well and feels his tachycardia and dyspnea are better than prior. He denies any significant side effects from treatment. He will continue with cycle 2 FOLFIRI/chelsea today. He will follow-up with us prior to each cycle of treatment. Will repeat imaging after 4 cycles of treatment.     Oxygen, fluid in lungs    -shortness of breath with activity, walking oxygen test demonstrating oxygen saturations in the 80's on room air. Previously ordered home oxygen with activity. Will check on the status of this. Echo on 9/5/24 showed a normal EF.     Weight loss  Patient feels he is eating well, but his weight has declined. Will refer him to see our dietician    Rectal, perineum pain  Following with palliative care. No longer needing MS Contin, but remains on oxycodone prn, currently 10 mg 2-3 times per day.     Anemia  Patient has microcytic  anemia. Hemoglobin is better than the last check. Iron is mildly low with a normal TIBC. Ferritin is elevated, likely due to inflammation from cancer. Will check a soluble transferrin receptor to determine if anemia of chronic disease versus combined anemia of chronic disease with iron deficient anemia.     Radha Chapman PA-C  [facility]  [address] [place] [address]  [phone]    30 minutes spent on the date of the encounter doing chart review, review of test results, interpretation of tests, patient visit, and documentation     The longitudinal plan of care for the diagnosis(es)/condition(s) as documented were addressed during this visit. Due to the added complexity in care, I will continue to support Roman Escalante in the subsequent management and with ongoing continuity of care.      Addendum: Soluble transferrin is elevated. Will order IV iron for him to start with his next infusion.

## 2024-09-13 NOTE — LETTER
9/13/2024      Roman Escalante  [address]  [facility] [address]      Dear Colleague,    Thank you for referring your patient, Roman Escalante, to the [facility]. Please see a copy of my visit note below.      [facility] - Medical Oncology Follow Up Note  09/13/2024    Oncology History:   Patient developed rectal bleeding in 2020.  In January 2021 CT showed focal wall thickening in the upper rectum, multiple pulmonary nodules bilaterally suspicious for metastatic disease.  Underwent FNA of the right upper lobe lesion which was consistent with adenocarcinoma of the colon.  He was treated with FOLFOX from 2/20/2021 through 5/20/2021.  Avastin was added.  Had an infusion reaction to oxaliplatin 6/2021.  7/2021- 12/2021 was on 5-FU alone.  Developed progression.  12/2021- 9/2022 was on FOLFIRI.  11/2021 started Lonsurf. All of this was done with outside oncologist.     Met with Dr. Snow on 2/3/2023 to establish care. Recommended regorafenib.     Started regorafenib on 3/2/2023. Progression noted 5/19/23. Plan to start FOLFOX/Avastin and send out CARIS testing to evaluate for other treatment options. Cycle 1 was given with oxaliplatin desensitization, 5FU, Avastin held due to increased urine protein.     Following cycle 4, patient was admitted with hematuria and acute kidney injury.    Oxaliplatin was dropped with Cycle 5.     Y-90 radioembolization 9/7.    10/24/23 CT CAP with disease progression with notable growth in all pulmonary lesions. Lonsurf + bevacizumab started 10/27/23 with plans to bridge to clinical trial.   11/15/23 - stopped chemo for [facility] study.   Currently enrolled in [facility] CAR-T trial (completed cell harvest 1/23/2024, tentatively planning administration of CAR-T in April 2/12/24 - resumed Lonsurf, bevacizumab to bridge treatment until CAR-T therapy  3/20/24 - treatment changed to Fruquintinib due to intolerability of  Lonsurf/bevacizumab  4/2/24- Presented to the ED with dehydration. Stopped Fruquintnib.   6/2024- cellular therapy transplant at [facility].   7/30/24- CT imaging demonstrating marked progression of pulmonary and hepatic lesions, clinical trial failed to generate T-cell graft. Multiple discussions regarding plan of care, hospice vs additional lines of treatment. Patient decided he is not ready for hospice. He resumed treatment with FOLFIRI + bevacizumab on 8/30/24.     Interval History:   Patient reports that he feels much better since resuming chemotherapy.  He notes his pulse was in the 100s and now is down to in the 80s.  He notes less shortness of breath.  He reports eating and drinking okay.  He is managing his, but constipation with either MiraLAX, Dulcolax, or senna as needed.  He also finds that drinking cold milk seems to help.  He did not need any pain medication for 1 week after receiving chemotherapy.  He did resume his oxycodone last week taking 10 mg 2-3 times per day.  He has not felt the need to take any MS Contin.  He reports his taste is doing better.  He notes his energy remains fair and he is napping 1 to 2 hours/day and sleeping about 12 to 14 hours at night.  He notes his resting home oxygen has increased to 95 to 96%.  He is looking into getting a PCA at home.  He denies other concerns.    Current Outpatient Medications   Medication Sig Dispense Refill     cyclobenzaprine (FLEXERIL) 5 MG tablet Take one tab (5mg) by mouth over 6-8 hours, as needed for spasms 45 tablet 2     gabapentin (NEURONTIN) 100 MG capsule Take 100mg by mouth at bedtime for 3 days, then Take 100mg by mouth in AM and 100mg at bedtime for 3 days, then Take 100mg by mouth three times per day (Patient not taking: Reported on 4/26/2024) 90 capsule 0     ibuprofen (ADVIL/MOTRIN) 200 MG tablet Take 3 tablets (600 mg) by mouth every 8 hours as needed for moderate pain (Patient not taking: Reported on 4/26/2024) 100 tablet 0      loperamide (IMODIUM) 2 MG capsule 2 caps at 1st sign of diarrhea & 1 cap every 2hrs until 12hrs diarrhea free. During night, 2 caps at bedtime & 2 caps every 4hrs until AM 30 capsule 0     LORazepam (ATIVAN) 0.5 MG tablet Take 1 tablet (0.5 mg) by mouth every 6 hours as needed for anxiety 10 tablet 0     morphine (MS CONTIN) 15 MG CR tablet Take 1 tablet (15 mg) by mouth every 12 hours 60 tablet 0     ondansetron (ZOFRAN ODT) 4 MG ODT tab Take 1 tablet (4 mg) by mouth every 6 hours as needed for nausea. 30 tablet 3     Ostomy Supplies MISC 1 each daily 1 each 11     oxyCODONE (ROXICODONE) 10 MG tablet Take 1-2 tablets (10-20 mg) by mouth every 4 hours as needed for moderate to severe pain. 150 tablet 0     prochlorperazine (COMPAZINE) 10 MG tablet Take 0.5 tablets (5 mg) by mouth every 6 hours as needed for nausea or vomiting. 30 tablet 2     prochlorperazine (COMPAZINE) 10 MG tablet Take 1 tablet (10 mg) by mouth every 6 hours as needed for nausea or vomiting (Patient not taking: Reported on 4/26/2024) 30 tablet 2     prochlorperazine (COMPAZINE) 5 MG tablet Take 1 tablet (5 mg) by mouth every 6 hours as needed for nausea or vomiting (Patient not taking: Reported on 4/26/2024) 30 tablet 3     sulfamethoxazole-trimethoprim (BACTRIM DS) 800-160 MG tablet        tamsulosin (FLOMAX) 0.4 MG capsule        valACYclovir (VALTREX) 500 MG tablet        Physical Exam:  General: The patient is a pleasant male in no acute distress.  BP 91/66   Pulse 101   Temp 97.4  F (36.3  C)   Resp 16   Wt 69.9 kg (154 lb)   SpO2 98%   BMI 21.18 kg/m    Wt Readings from Last 10 Encounters:   09/13/24 69.9 kg (154 lb)   09/06/24 72.6 kg (160 lb)   08/30/24 72.8 kg (160 lb 6.4 oz)   08/09/24 74.8 kg (164 lb 14.4 oz)   08/02/24 72.6 kg (160 lb)   07/31/24 73.5 kg (162 lb)   07/15/24 73.7 kg (162 lb 8 oz)   05/21/24 84.4 kg (186 lb)   05/07/24 86.2 kg (190 lb)   04/26/24 85.4 kg (188 lb 3.2 oz)   HEENT: EOMI. Sclerae are  anicteric.  Lymph: Neck is supple with no lymphadenopathy in the cervical or supraclavicular areas.   Heart: Mildly tachycardic with regular rhythm.   Lungs: Fine crackles in the bases bilaterally, otherwise clear to auscultation bilaterally.   Abdomen: Bowel sounds present, soft, nontender with no palpable hepatosplenomegaly or masses. Ostomy is in place in left abdomen with minimal amount of formed brown stool.   Extremities: No lower extremity edema noted bilaterally.   Neuro: Cranial nerves II through XII are grossly intact.  Skin: No rashes, petechiae, or bruising noted on exposed skin.    LABS  Most Recent 3 CBC's:  Recent Labs   Lab Test 09/13/24  0924 08/30/24  1030 07/30/24  1431   WBC 7.7 10.0 12.5*   HGB 11.1* 9.4* 10.0*   MCV 76* 75* 79    265 242    Most Recent 3 BMP's:  Recent Labs   Lab Test 09/13/24  0924 08/30/24  1030 07/30/24  1431    132* 135   POTASSIUM 3.7 4.2 4.4   CHLORIDE 101 97* 99   CO2 23 23 23   BUN 12.8 16.2 16.9   CR 1.11 1.01 0.91   ANIONGAP 14 12 13   JEFERSON 9.7 8.8 9.3   * 99 104*    Most Recent 2 LFT's:  Recent Labs   Lab Test 09/13/24  0924 08/30/24  1030   AST 31 61*   ALT 28 20   ALKPHOS 174* 186*   BILITOTAL 0.3 0.6   I reviewed the above labs today.    ASSESSMENT AND PLAN   Metastatic rectal cancer  -Most recently progressed after CAR-T clinical trial. He started on treatment with FOLFIRI + bevacizumab on 8/30/24. He tolerated cycle 1 very well and feels his tachycardia and dyspnea are better than prior. He denies any significant side effects from treatment. He will continue with cycle 2 FOLFIRI/chelsea today. He will follow-up with us prior to each cycle of treatment. Will repeat imaging after 4 cycles of treatment.     Oxygen, fluid in lungs    -shortness of breath with activity, walking oxygen test demonstrating oxygen saturations in the 80's on room air. Previously ordered home oxygen with activity. Will check on the status of this. Echo on 9/5/24 showed a  normal EF.     Weight loss  Patient feels he is eating well, but his weight has declined. Will refer him to see our dietician    Rectal, perineum pain  Following with palliative care. No longer needing MS Contin, but remains on oxycodone prn, currently 10 mg 2-3 times per day.     Anemia  Patient has microcytic anemia. Hemoglobin is better than the last check. Iron is mildly low with a normal TIBC. Ferritin is elevated, likely due to inflammation from cancer. Will check a soluble transferrin receptor to determine if anemia of chronic disease versus combined anemia of chronic disease with iron deficient anemia.     Radha Chapman PA-C  [facility]  [address] [place] [id number]  [phone]    30 minutes spent on the date of the encounter doing chart review, review of test results, interpretation of tests, patient visit, and documentation     The longitudinal plan of care for the diagnosis(es)/condition(s) as documented were addressed during this visit. Due to the added complexity in care, I will continue to support Roman Escalante in the subsequent management and with ongoing continuity of care.            Again, thank you for allowing me to participate in the care of your patient.        Sincerely,        Radha Chapman PA-C

## 2024-09-13 NOTE — TELEPHONE ENCOUNTER
[facility]: Cancer Care                                                                                          Pt inquired about oxygen medical supply; writer had noted on 7/22 that [facility] home care agency accepted pt. Called [facility].    Signature:  Virgen Nieto RN

## 2024-09-13 NOTE — NURSING NOTE
Chief Complaint   Patient presents with    Port Draw     Labs collected from port by RN. Vitals taken. Checked in for appointment(s).      Port accessed with 20 gauge 3/4 inch flat needle by RN, labs collected, line flushed with saline and heparin.  Vitals taken. Pt checked in for appointment(s).    Ellen Roldan RN

## 2024-09-13 NOTE — NURSING NOTE
"Oncology Rooming Note    September 13, 2024 9:44 AM   Roman Escalante is a 49 year old male who presents for:    Chief Complaint   Patient presents with    Port Draw     Labs collected from port by RN. Vitals taken. Checked in for appointment(s).      Initial Vitals: BP 91/66   Pulse 101   Temp 97.4  F (36.3  C)   Resp 16   Wt 69.9 kg (154 lb)   SpO2 98%   BMI 21.18 kg/m   Estimated body mass index is 21.18 kg/m  as calculated from the following:    Height as of 9/6/24: 1.816 m (5' 11.5\").    Weight as of this encounter: 69.9 kg (154 lb). Body surface area is 1.88 meters squared.  Moderate Pain (4) Comment: Data Unavailable   No LMP for male patient.  Allergies reviewed: Yes  Medications reviewed: Yes    Medications: Medication refills not needed today.  Pharmacy name entered into EPIC:    [facility] [place], [place] - [address] [place] [address] [phone]  [facility] - [place], [place] - [address] [place] [address] DRUG [facility] #[id number] [address]    Frailty Screening:   Is the patient here for a new oncology consult visit in cancer care? 2. No      Clinical concerns: none       Ashley Bird            "

## 2024-09-13 NOTE — PROGRESS NOTES
"Infusion Nursing Note:  Roman Escalante presents today for cycle 2 day 1 Bevacizumab, Irinotecan, fluorouracil push and pump connection.    Patient seen by provider today: Yes: YADIEL Avila   present during visit today: Not Applicable.    Note: Patient arrives ready for treatment today.  He denied atropine with last infusion and does not want it today as well.    Patient unable to urinate on arrival, per cally and Radha Chapman, start with irinotecan and then can give bevacizumab after obtaining urine results.    Urine protein 50 today- 24 hour urine protein test ordered. Patient aware to  supplies in lab and return prior to next treatment.    At end of irinotecan, patient complained of rhinorrhea, diaphoresis, and upset stomach. Pt agreed to try the atropine. Atropine given and symptoms resolved.      Intravenous Access:  Implanted Port.    Treatment Conditions:  Lab Results   Component Value Date    HGB 11.1 (L) 09/13/2024    WBC 7.7 09/13/2024    ANEU 45.1 (H) 07/28/2023    ANEUTAUTO 6.1 09/13/2024     09/13/2024        Lab Results   Component Value Date     09/13/2024    POTASSIUM 3.7 09/13/2024    MAG 2.0 04/24/2024    CR 1.11 09/13/2024    JEFERSON 9.7 09/13/2024    BILITOTAL 0.3 09/13/2024    ALBUMIN 4.1 09/13/2024    ALT 28 09/13/2024    AST 31 09/13/2024       Results reviewed, labs MET treatment parameters, ok to proceed with treatment.  Urine 50.      Post Infusion Assessment:  Patient tolerated infusion without incident.  Blood return noted pre and post infusion.  Site patent and intact, free from redness, edema or discomfort.  No evidence of extravasations.  Prior to discharge: Port secured in place with tegaderm. Flushed with 10cc NS, positive blood return noted. Continuous home infusion connected, all connectors secured with tape. All clamps secured open with tape. Pump started, noted \"running\" status on display. Pt instructed to call our clinic or [facility] " Infusion if there are any questions or concerns at home. Pt verbalized understanding. Pt set up for pump disconnect with [facility] on Tobin 9/15 at 1200 (patients request)  .       Discharge Plan:   Patient declined prescription refills.  Discharge instructions reviewed with: Patient.  Patient and/or family verbalized understanding of discharge instructions and all questions answered.  AVS to patient via [employer].  Patient will return 9/25/24 for next appointment.   Patient discharged in stable condition accompanied by: self.  Departure Mode: Ambulatory.      Nancy Bonilla RN

## 2024-09-15 LAB — STFR SERPL-MCNC: 5.5 MG/L

## 2024-09-16 NOTE — TELEPHONE ENCOUNTER
Home care referral signed 7/15/24 for PT only. Resigned with visit date 9/13/24 for skilled nursing and PT.    [facility] referral sent to:    [facility] (DECLINED)  [facility] ([facility])  [facility] (ACCEPTED per epic in basket for SN/PT, pt will have to get oxygen supplies separately)  [facility]  [place]      Pt had not read Mychart from earlier, called and informed him of above. He stated he had not checked voicemails yet, he is agreeable to starting homecare with [facility] and will follow-up if he does not hear from intake by mid-week. Writer called  [facility] to see if they have received home oxygen DME with supporting documentation from 8/30 visit with Rebeca Omars LILLY. Reached vm and lmcb.

## 2024-09-19 ENCOUNTER — PATIENT OUTREACH (OUTPATIENT)
Dept: ONCOLOGY | Facility: CLINIC | Age: 49
End: 2024-09-19
Payer: COMMERCIAL

## 2024-09-19 PROBLEM — D50.0 IRON DEFICIENCY ANEMIA DUE TO CHRONIC BLOOD LOSS: Status: ACTIVE | Noted: 2024-09-19

## 2024-09-19 RX ORDER — HEPARIN SODIUM,PORCINE 10 UNIT/ML
5-20 VIAL (ML) INTRAVENOUS DAILY PRN
Status: CANCELLED | OUTPATIENT
Start: 2024-09-19

## 2024-09-19 RX ORDER — HEPARIN SODIUM (PORCINE) LOCK FLUSH IV SOLN 100 UNIT/ML 100 UNIT/ML
5 SOLUTION INTRAVENOUS
Status: CANCELLED | OUTPATIENT
Start: 2024-09-19

## 2024-09-19 RX ORDER — MEPERIDINE HYDROCHLORIDE 25 MG/ML
25 INJECTION INTRAMUSCULAR; INTRAVENOUS; SUBCUTANEOUS EVERY 30 MIN PRN
Status: CANCELLED | OUTPATIENT
Start: 2024-09-19

## 2024-09-19 RX ORDER — ALBUTEROL SULFATE 0.83 MG/ML
2.5 SOLUTION RESPIRATORY (INHALATION)
Status: CANCELLED | OUTPATIENT
Start: 2024-09-19

## 2024-09-19 RX ORDER — DIPHENHYDRAMINE HYDROCHLORIDE 50 MG/ML
50 INJECTION INTRAMUSCULAR; INTRAVENOUS
Status: CANCELLED
Start: 2024-09-19

## 2024-09-19 RX ORDER — METHYLPREDNISOLONE SODIUM SUCCINATE 125 MG/2ML
125 INJECTION, POWDER, LYOPHILIZED, FOR SOLUTION INTRAMUSCULAR; INTRAVENOUS
Status: CANCELLED
Start: 2024-09-19

## 2024-09-19 RX ORDER — ALBUTEROL SULFATE 90 UG/1
1-2 AEROSOL, METERED RESPIRATORY (INHALATION)
Status: CANCELLED
Start: 2024-09-19

## 2024-09-19 RX ORDER — EPINEPHRINE 1 MG/ML
0.3 INJECTION, SOLUTION INTRAMUSCULAR; SUBCUTANEOUS EVERY 5 MIN PRN
Status: CANCELLED | OUTPATIENT
Start: 2024-09-19

## 2024-09-19 NOTE — TELEPHONE ENCOUNTER
"Called  home medical again to check on status of oxgyen supplies; most recent notes state walk test 8/30/24 is \"not complete\". Will have oxygen rep call writer back.  "

## 2024-09-19 NOTE — TELEPHONE ENCOUNTER
Eligible if pt is tested with recovery on O2.    Patient has been assessed for Home Oxygen needs.     Pulse oximetry (SpO2) and Oxygen flow readings:    SpO2 = @FLOW(4402290955::1)@% on room air at rest while awake.    SpO2 improved to @FLOW(4475040495::1)@% on @FLOW(2590780914::1)@ liters/minute at rest.    SpO2 = @FLOW(2920344768::1)@% on room air during activity/with exercise.    *SpO2 improved to @FLOW(0297584301::1)@% on @FLOW(1714637021::1)@ liters/minute during activity/with exercise.

## 2024-09-19 NOTE — TELEPHONE ENCOUNTER
[facility]: Cancer Care                                                                                          Per Radha COTTO: pt has iron deficient anemia. Ordered 3 doses of Venofer with his next 3 cycles of chemo. Scheduling messaged.    Informed pt and he verbalized understanding. He also stated he heard from [facility] and he does not feel he needs skilled nursing right now. He has the contact number if he should need them in the future.    Signature:  Virgen Nieto RN

## 2024-09-20 NOTE — TELEPHONE ENCOUNTER
LM with pt to check if he received hernia belt . Pt has my number should other issues arise. All questions answered for now.  Emmy Alfonso RN                          US at bedside     Lore Givens RN  09/20/24 0743

## 2024-09-25 ENCOUNTER — APPOINTMENT (OUTPATIENT)
Dept: LAB | Facility: CLINIC | Age: 49
End: 2024-09-25
Attending: STUDENT IN AN ORGANIZED HEALTH CARE EDUCATION/TRAINING PROGRAM
Payer: COMMERCIAL

## 2024-09-25 ENCOUNTER — INFUSION THERAPY VISIT (OUTPATIENT)
Dept: ONCOLOGY | Facility: CLINIC | Age: 49
End: 2024-09-25
Attending: STUDENT IN AN ORGANIZED HEALTH CARE EDUCATION/TRAINING PROGRAM
Payer: COMMERCIAL

## 2024-09-25 ENCOUNTER — DOCUMENTATION ONLY (OUTPATIENT)
Dept: HOME HEALTH SERVICES | Facility: CLINIC | Age: 49
End: 2024-09-25

## 2024-09-25 VITALS
BODY MASS INDEX: 22.09 KG/M2 | RESPIRATION RATE: 16 BRPM | DIASTOLIC BLOOD PRESSURE: 71 MMHG | OXYGEN SATURATION: 94 % | TEMPERATURE: 97.5 F | WEIGHT: 160.6 LBS | SYSTOLIC BLOOD PRESSURE: 107 MMHG | HEART RATE: 97 BPM

## 2024-09-25 DIAGNOSIS — D50.0 IRON DEFICIENCY ANEMIA DUE TO CHRONIC BLOOD LOSS: ICD-10-CM

## 2024-09-25 DIAGNOSIS — C78.00 RECTAL ADENOCARCINOMA METASTATIC TO LUNG (H): Primary | ICD-10-CM

## 2024-09-25 DIAGNOSIS — C20 RECTAL ADENOCARCINOMA METASTATIC TO LUNG (H): Primary | ICD-10-CM

## 2024-09-25 DIAGNOSIS — G89.3 CANCER RELATED PAIN: ICD-10-CM

## 2024-09-25 LAB
ALBUMIN MFR UR ELPH: 10.9 MG/DL
ALBUMIN SERPL BCG-MCNC: 3.9 G/DL (ref 3.5–5.2)
ALBUMIN UR-MCNC: NEGATIVE MG/DL
ALP SERPL-CCNC: 168 U/L (ref 40–150)
ALT SERPL W P-5'-P-CCNC: 10 U/L (ref 0–70)
ANION GAP SERPL CALCULATED.3IONS-SCNC: 11 MMOL/L (ref 7–15)
AST SERPL W P-5'-P-CCNC: 26 U/L (ref 0–45)
BASOPHILS # BLD AUTO: 0 10E3/UL (ref 0–0.2)
BASOPHILS NFR BLD AUTO: 1 %
BILIRUB SERPL-MCNC: 0.2 MG/DL
BUN SERPL-MCNC: 13.3 MG/DL (ref 6–20)
CALCIUM SERPL-MCNC: 9.3 MG/DL (ref 8.8–10.4)
CHLORIDE SERPL-SCNC: 104 MMOL/L (ref 98–107)
COLLECT DURATION TIME UR: 27 H
CREAT SERPL-MCNC: 0.77 MG/DL (ref 0.67–1.17)
EGFRCR SERPLBLD CKD-EPI 2021: >90 ML/MIN/1.73M2
EOSINOPHIL # BLD AUTO: 0.2 10E3/UL (ref 0–0.7)
EOSINOPHIL NFR BLD AUTO: 3 %
ERYTHROCYTE [DISTWIDTH] IN BLOOD BY AUTOMATED COUNT: 22.3 % (ref 10–15)
GLUCOSE SERPL-MCNC: 134 MG/DL (ref 70–99)
HCO3 SERPL-SCNC: 22 MMOL/L (ref 22–29)
HCT VFR BLD AUTO: 35.8 % (ref 40–53)
HGB BLD-MCNC: 11 G/DL (ref 13.3–17.7)
IMM GRANULOCYTES # BLD: 0.1 10E3/UL
IMM GRANULOCYTES NFR BLD: 1 %
LYMPHOCYTES # BLD AUTO: 0.7 10E3/UL (ref 0.8–5.3)
LYMPHOCYTES NFR BLD AUTO: 10 %
MCH RBC QN AUTO: 24.1 PG (ref 26.5–33)
MCHC RBC AUTO-ENTMCNC: 30.7 G/DL (ref 31.5–36.5)
MCV RBC AUTO: 78 FL (ref 78–100)
MONOCYTES # BLD AUTO: 0.7 10E3/UL (ref 0–1.3)
MONOCYTES NFR BLD AUTO: 11 %
NEUTROPHILS # BLD AUTO: 4.9 10E3/UL (ref 1.6–8.3)
NEUTROPHILS NFR BLD AUTO: 74 %
NRBC # BLD AUTO: 0 10E3/UL
NRBC BLD AUTO-RTO: 0 /100
PLATELET # BLD AUTO: 161 10E3/UL (ref 150–450)
POTASSIUM SERPL-SCNC: 3.9 MMOL/L (ref 3.4–5.3)
PROT 24H UR-MRATE: 145.72 MG/SPECIMEN
PROT SERPL-MCNC: 6.7 G/DL (ref 6.4–8.3)
RBC # BLD AUTO: 4.57 10E6/UL (ref 4.4–5.9)
SODIUM SERPL-SCNC: 137 MMOL/L (ref 135–145)
SPECIMEN VOL UR: 1504 ML
WBC # BLD AUTO: 6.6 10E3/UL (ref 4–11)

## 2024-09-25 PROCEDURE — 99214 OFFICE O/P EST MOD 30 MIN: CPT

## 2024-09-25 PROCEDURE — 84156 ASSAY OF PROTEIN URINE: CPT | Performed by: PATHOLOGY

## 2024-09-25 PROCEDURE — G0463 HOSPITAL OUTPT CLINIC VISIT: HCPCS

## 2024-09-25 PROCEDURE — 96413 CHEMO IV INFUSION 1 HR: CPT

## 2024-09-25 PROCEDURE — 85025 COMPLETE CBC W/AUTO DIFF WBC: CPT | Performed by: STUDENT IN AN ORGANIZED HEALTH CARE EDUCATION/TRAINING PROGRAM

## 2024-09-25 PROCEDURE — 81003 URINALYSIS AUTO W/O SCOPE: CPT | Performed by: STUDENT IN AN ORGANIZED HEALTH CARE EDUCATION/TRAINING PROGRAM

## 2024-09-25 PROCEDURE — 258N000003 HC RX IP 258 OP 636: Performed by: STUDENT IN AN ORGANIZED HEALTH CARE EDUCATION/TRAINING PROGRAM

## 2024-09-25 PROCEDURE — G2211 COMPLEX E/M VISIT ADD ON: HCPCS

## 2024-09-25 PROCEDURE — 96366 THER/PROPH/DIAG IV INF ADDON: CPT

## 2024-09-25 PROCEDURE — 250N000011 HC RX IP 250 OP 636: Performed by: STUDENT IN AN ORGANIZED HEALTH CARE EDUCATION/TRAINING PROGRAM

## 2024-09-25 PROCEDURE — 258N000003 HC RX IP 258 OP 636: Performed by: PHYSICIAN ASSISTANT

## 2024-09-25 PROCEDURE — 250N000011 HC RX IP 250 OP 636

## 2024-09-25 PROCEDURE — 81050 URINALYSIS VOLUME MEASURE: CPT | Performed by: PATHOLOGY

## 2024-09-25 PROCEDURE — 250N000011 HC RX IP 250 OP 636: Performed by: PHYSICIAN ASSISTANT

## 2024-09-25 PROCEDURE — 96375 TX/PRO/DX INJ NEW DRUG ADDON: CPT

## 2024-09-25 PROCEDURE — 96368 THER/DIAG CONCURRENT INF: CPT

## 2024-09-25 PROCEDURE — 96415 CHEMO IV INFUSION ADDL HR: CPT

## 2024-09-25 PROCEDURE — 96367 TX/PROPH/DG ADDL SEQ IV INF: CPT

## 2024-09-25 PROCEDURE — 80053 COMPREHEN METABOLIC PANEL: CPT

## 2024-09-25 PROCEDURE — 36591 DRAW BLOOD OFF VENOUS DEVICE: CPT

## 2024-09-25 PROCEDURE — 258N000003 HC RX IP 258 OP 636

## 2024-09-25 PROCEDURE — 96417 CHEMO IV INFUS EACH ADDL SEQ: CPT

## 2024-09-25 PROCEDURE — 96411 CHEMO IV PUSH ADDL DRUG: CPT

## 2024-09-25 PROCEDURE — G0498 CHEMO EXTEND IV INFUS W/PUMP: HCPCS

## 2024-09-25 PROCEDURE — 96376 TX/PRO/DX INJ SAME DRUG ADON: CPT

## 2024-09-25 RX ORDER — ALBUTEROL SULFATE 0.83 MG/ML
2.5 SOLUTION RESPIRATORY (INHALATION)
Status: CANCELLED | OUTPATIENT
Start: 2024-09-27

## 2024-09-25 RX ORDER — FLUOROURACIL 50 MG/ML
400 INJECTION, SOLUTION INTRAVENOUS ONCE
Status: CANCELLED | OUTPATIENT
Start: 2024-09-27

## 2024-09-25 RX ORDER — METHYLPREDNISOLONE SODIUM SUCCINATE 125 MG/2ML
125 INJECTION, POWDER, LYOPHILIZED, FOR SOLUTION INTRAMUSCULAR; INTRAVENOUS
Status: CANCELLED
Start: 2024-09-27

## 2024-09-25 RX ORDER — EPINEPHRINE 1 MG/ML
0.3 INJECTION, SOLUTION, CONCENTRATE INTRAVENOUS EVERY 5 MIN PRN
Status: CANCELLED | OUTPATIENT
Start: 2024-09-27

## 2024-09-25 RX ORDER — FLUOROURACIL 50 MG/ML
400 INJECTION, SOLUTION INTRAVENOUS ONCE
Status: COMPLETED | OUTPATIENT
Start: 2024-09-25 | End: 2024-09-25

## 2024-09-25 RX ORDER — DIPHENHYDRAMINE HYDROCHLORIDE 50 MG/ML
50 INJECTION INTRAMUSCULAR; INTRAVENOUS
Status: CANCELLED
Start: 2024-09-27

## 2024-09-25 RX ORDER — HEPARIN SODIUM,PORCINE 10 UNIT/ML
5-20 VIAL (ML) INTRAVENOUS DAILY PRN
Status: CANCELLED | OUTPATIENT
Start: 2024-09-27

## 2024-09-25 RX ORDER — ATROPINE SULFATE 0.4 MG/ML
0.4 AMPUL (ML) INJECTION
Status: COMPLETED | OUTPATIENT
Start: 2024-09-25 | End: 2024-09-25

## 2024-09-25 RX ORDER — ALBUTEROL SULFATE 90 UG/1
1-2 AEROSOL, METERED RESPIRATORY (INHALATION)
Status: CANCELLED
Start: 2024-09-27

## 2024-09-25 RX ORDER — HEPARIN SODIUM (PORCINE) LOCK FLUSH IV SOLN 100 UNIT/ML 100 UNIT/ML
5 SOLUTION INTRAVENOUS
Status: COMPLETED | OUTPATIENT
Start: 2024-09-25 | End: 2024-09-25

## 2024-09-25 RX ORDER — HEPARIN SODIUM (PORCINE) LOCK FLUSH IV SOLN 100 UNIT/ML 100 UNIT/ML
5 SOLUTION INTRAVENOUS
Status: CANCELLED | OUTPATIENT
Start: 2024-09-27

## 2024-09-25 RX ORDER — MEPERIDINE HYDROCHLORIDE 25 MG/ML
25 INJECTION INTRAMUSCULAR; INTRAVENOUS; SUBCUTANEOUS EVERY 30 MIN PRN
Status: CANCELLED | OUTPATIENT
Start: 2024-09-27

## 2024-09-25 RX ADMIN — FLUOROURACIL 770 MG: 50 INJECTION, SOLUTION INTRAVENOUS at 12:48

## 2024-09-25 RX ADMIN — ATROPINE SULFATE 0.4 MG: 0.4 INJECTION, SOLUTION INTRAVENOUS at 11:03

## 2024-09-25 RX ADMIN — SODIUM CHLORIDE 400 MG: 9 INJECTION, SOLUTION INTRAVENOUS at 10:19

## 2024-09-25 RX ADMIN — LEUCOVORIN CALCIUM 750 MG: 50 INJECTION, POWDER, LYOPHILIZED, FOR SOLUTION INTRAMUSCULAR; INTRAVENOUS at 11:06

## 2024-09-25 RX ADMIN — ATROPINE SULFATE 0.4 MG: 0.4 INJECTION, SOLUTION INTRAVENOUS at 12:16

## 2024-09-25 RX ADMIN — SODIUM CHLORIDE 250 ML: 9 INJECTION, SOLUTION INTRAVENOUS at 08:20

## 2024-09-25 RX ADMIN — Medication 5 ML: at 06:29

## 2024-09-25 RX ADMIN — IRON SUCROSE 300 MG: 20 INJECTION, SOLUTION INTRAVENOUS at 08:20

## 2024-09-25 RX ADMIN — DEXAMETHASONE SODIUM PHOSPHATE: 10 INJECTION, SOLUTION INTRAMUSCULAR; INTRAVENOUS at 09:56

## 2024-09-25 RX ADMIN — IRINOTECAN HYDROCHLORIDE 340 MG: 20 INJECTION, SOLUTION INTRAVENOUS at 11:09

## 2024-09-25 ASSESSMENT — PAIN SCALES - GENERAL: PAINLEVEL: NO PAIN (0)

## 2024-09-25 NOTE — PATIENT INSTRUCTIONS
[facility] Triage and after hours / weekends / holidays:  [phone]    Please call the triage or after hours line if you experience a temperature greater than or equal to 100.4, shaking chills, have uncontrolled nausea, vomiting and/or diarrhea, dizziness, shortness of breath, chest pain, bleeding, unexplained bruising, or if you have any other new/concerning symptoms, questions or concerns.      If you are having any concerning symptoms or wish to speak to a provider before your next infusion visit, please call triage to notify them so we can adequately serve you.     If you need a refill on a narcotic prescription or other medication, please call before your infusion appointment.

## 2024-09-25 NOTE — NURSING NOTE
"Oncology Rooming Note    September 25, 2024 6:58 AM   Roman Escalante is a 49 year old male who presents for:    Chief Complaint   Patient presents with    Port Draw     Labs drawn from port by rn.  VS taken.    Oncology Clinic Visit     RTN for colon Cancer     Initial Vitals: /71 (BP Location: Right arm, Patient Position: Sitting, Cuff Size: Adult Regular)   Pulse 97   Temp 97.5  F (36.4  C) (Oral)   Resp 16   Wt 72.8 kg (160 lb 9.6 oz)   SpO2 94%   BMI 22.09 kg/m   Estimated body mass index is 22.09 kg/m  as calculated from the following:    Height as of 9/6/24: 1.816 m (5' 11.5\").    Weight as of this encounter: 72.8 kg (160 lb 9.6 oz). Body surface area is 1.92 meters squared.  No Pain (0) Comment: Data Unavailable   No LMP for male patient.  Allergies reviewed: Yes  Medications reviewed: Yes    Medications: Medication refills not needed today.  Pharmacy name entered into [facility]:    [facility] - [place], [place] - [address] [place] [address] [phone]  [facility] - [place], [place] - [address] [place] [address] DRUG STORE [id number] [place] [address] [address]    Frailty Screening:   Is the patient here for a new oncology consult visit in cancer care? 2. No      Clinical concerns: none       Aubree Melendez MA             "

## 2024-09-25 NOTE — NURSING NOTE
Patient has been assessed for Home Oxygen needs.  Oxygen readings:   *   RA - at rest  Pulse oximetry SPO2 96 % Pulse:82  *   RA - during activity/with exercise SPO2 91 % Pulse:103  I did a walking test with the pt.    Start @7:21 am:   Sitting- O2% 96  Pulse: 82     7:22 am- 2 minutes into walk  O2%-92  Pulse: 118    7:26 am:  O2%-90  Pulse: 85    Rest: 7:27 am:  O2%-96  Pulse: 84    Pt denied any fatigue or vertigo.   LILLY was informed of results. And said it was not necessary to do test with Oxygen.    Aubree Melendez, NANOA

## 2024-09-25 NOTE — PROGRESS NOTES
[facility] - Medical Oncology Follow Up Note  09/25/2024    Oncology History:   Patient developed rectal bleeding in 2020.  In January 2021 CT showed focal wall thickening in the upper rectum, multiple pulmonary nodules bilaterally suspicious for metastatic disease.  Underwent FNA of the right upper lobe lesion which was consistent with adenocarcinoma of the colon.  He was treated with FOLFOX from 2/20/2021 through 5/20/2021.  Avastin was added.  Had an infusion reaction to oxaliplatin 6/2021.  7/2021- 12/2021 was on 5-FU alone.  Developed progression.  12/2021- 9/2022 was on FOLFIRI.  11/2021 started Lonsurf. All of this was done with outside oncologist.     Met with Dr. Snow on 2/3/2023 to establish care. Recommended regorafenib.     Started regorafenib on 3/2/2023. Progression noted 5/19/23. Plan to start FOLFOX/Avastin and send out CARIS testing to evaluate for other treatment options. Cycle 1 was given with oxaliplatin desensitization, 5FU, Avastin held due to increased urine protein.     Following cycle 4, patient was admitted with hematuria and acute kidney injury.    Oxaliplatin was dropped with Cycle 5.     Y-90 radioembolization 9/7.    10/24/23 CT CAP with disease progression with notable growth in all pulmonary lesions. Lonsurf + bevacizumab started 10/27/23 with plans to bridge to clinical trial.   11/15/23 - stopped chemo for [facility] study.   Currently enrolled in [facility] CAR-T trial (completed cell harvest 1/23/2024, tentatively planning administration of CAR-T in April 2/12/24 - resumed Lonsurf, bevacizumab to bridge treatment until CAR-T therapy  3/20/24 - treatment changed to Fruquintinib due to intolerability of Lonsurf/bevacizumab  4/2/24- Presented to the ED with dehydration. Stopped Fruquintnib.   6/2024- cellular therapy transplant at [facility].   7/30/24- CT imaging demonstrating marked progression of pulmonary and hepatic lesions, clinical trial failed to generate T-cell graft. Multiple  discussions regarding plan of care, hospice vs additional lines of treatment. Patient decided he is not ready for hospice. He resumed treatment with FOLFIRI + bevacizumab on 8/30/24.     Interval History:     Roman presents today for follow up prior to cycle 3 FOLFIRI + bevacizumab.    -reports he is feeling well and offers no new concerns today  -started THC gummies, appetite is improved, gained a couple of pounds  -denies nausea or reflux  -has a bowel movement every couple of days, hasn't been taking any PRN meds   -denies any urinary concerns   -rectal pain is intermittent, unpredictable occurrences but is overall greatly improved. Taking oxycodone as needed  -energy level is improved, continues to increase. Over the last 1-2 weeks, he has been getting out of bed earlier in the morning   -denies any shortness of breath, chest pain/pressure or cough       Review of Systems:  Patient denies any of the following except if noted above: fevers, chills, difficulty with energy, vision or hearing changes, chest pain, dyspnea, abdominal pain, nausea, vomiting, diarrhea, constipation, urinary concerns, headaches, numbness, tingling, issues with sleep or mood. Also denies lumps, bumps, rashes or skin lesions, bleeding or bruising issues.      Current Outpatient Medications   Medication Sig Dispense Refill    cyclobenzaprine (FLEXERIL) 5 MG tablet Take one tab (5mg) by mouth over 6-8 hours, as needed for spasms 45 tablet 2    gabapentin (NEURONTIN) 100 MG capsule Take 100mg by mouth at bedtime for 3 days, then Take 100mg by mouth in AM and 100mg at bedtime for 3 days, then Take 100mg by mouth three times per day (Patient not taking: Reported on 4/26/2024) 90 capsule 0    ibuprofen (ADVIL/MOTRIN) 200 MG tablet Take 3 tablets (600 mg) by mouth every 8 hours as needed for moderate pain (Patient not taking: Reported on 4/26/2024) 100 tablet 0    loperamide (IMODIUM) 2 MG capsule 2 caps at 1st sign of diarrhea & 1 cap every 2hrs  until 12hrs diarrhea free. During night, 2 caps at bedtime & 2 caps every 4hrs until AM 30 capsule 0    LORazepam (ATIVAN) 0.5 MG tablet Take 1 tablet (0.5 mg) by mouth every 6 hours as needed for anxiety 10 tablet 0    morphine (MS CONTIN) 15 MG CR tablet Take 1 tablet (15 mg) by mouth every 12 hours 60 tablet 0    ondansetron (ZOFRAN ODT) 4 MG ODT tab Take 1 tablet (4 mg) by mouth every 6 hours as needed for nausea. 30 tablet 3    Ostomy Supplies MISC 1 each daily 1 each 11    oxyCODONE (ROXICODONE) 10 MG tablet Take 1-2 tablets (10-20 mg) by mouth every 4 hours as needed for moderate to severe pain. 150 tablet 0    prochlorperazine (COMPAZINE) 10 MG tablet Take 0.5 tablets (5 mg) by mouth every 6 hours as needed for nausea or vomiting. 30 tablet 2    prochlorperazine (COMPAZINE) 10 MG tablet Take 1 tablet (10 mg) by mouth every 6 hours as needed for nausea or vomiting (Patient not taking: Reported on 4/26/2024) 30 tablet 2    prochlorperazine (COMPAZINE) 5 MG tablet Take 1 tablet (5 mg) by mouth every 6 hours as needed for nausea or vomiting (Patient not taking: Reported on 4/26/2024) 30 tablet 3    sulfamethoxazole-trimethoprim (BACTRIM DS) 800-160 MG tablet       tamsulosin (FLOMAX) 0.4 MG capsule       valACYclovir (VALTREX) 500 MG tablet        Physical Exam:  General: The patient is a pleasant male in no acute distress.  /71 (BP Location: Right arm, Patient Position: Sitting, Cuff Size: Adult Regular)   Pulse 97   Temp 97.5  F (36.4  C) (Oral)   Resp 16   Wt 72.8 kg (160 lb 9.6 oz)   SpO2 94%   BMI 22.09 kg/m    Wt Readings from Last 10 Encounters:   09/25/24 72.8 kg (160 lb 9.6 oz)   09/13/24 69.9 kg (154 lb)   09/06/24 72.6 kg (160 lb)   08/30/24 72.8 kg (160 lb 6.4 oz)   08/09/24 74.8 kg (164 lb 14.4 oz)   08/02/24 72.6 kg (160 lb)   07/31/24 73.5 kg (162 lb)   07/15/24 73.7 kg (162 lb 8 oz)   05/21/24 84.4 kg (186 lb)   05/07/24 86.2 kg (190 lb)   HEENT: EOMI. Sclerae are anicteric. Oral  mucosa pink and moist without lesions or thrush.   Lymph: Neck is supple with no lymphadenopathy in the cervical or supraclavicular areas.   Heart: Mildly tachycardic with regular rhythm.   Lungs: Clear to auscultation throughout, normal work of breathing.   Abdomen: Bowel sounds present, soft, nontender with no palpable hepatosplenomegaly or masses. Ostomy is in place in left abdomen with minimal amount of formed brown stool.   Extremities: No lower extremity edema noted bilaterally.   Neuro: Cranial nerves II through XII are grossly intact.  Skin: No rashes, petechiae, or bruising noted on exposed skin.    LABS  Most Recent 3 CBC's:  Recent Labs   Lab Test 09/25/24  0642 09/13/24 0924 08/30/24  1030   WBC 6.6 7.7 10.0   HGB 11.0* 11.1* 9.4*   MCV 78 76* 75*    200 265    Most Recent 3 BMP's:  Recent Labs   Lab Test 09/25/24  0642 09/13/24 0924 08/30/24  1030    138 132*   POTASSIUM 3.9 3.7 4.2   CHLORIDE 104 101 97*   CO2 22 23 23   BUN 13.3 12.8 16.2   CR 0.77 1.11 1.01   ANIONGAP 11 14 12   JEFERSON 9.3 9.7 8.8   * 130* 99    Most Recent 2 LFT's:  Recent Labs   Lab Test 09/25/24 0642 09/13/24 0924   AST 26 31   ALT 10 28   ALKPHOS 168* 174*   BILITOTAL 0.2 0.3   I reviewed the above labs today.    ASSESSMENT AND PLAN   Metastatic rectal cancer  -Most recently progressed after CAR-T clinical trial. He started on treatment with FOLFIRI + bevacizumab on 8/30/24. Tolerating treatment well with improvement in energy, pain and breathing/shortness of breath. He denies any significant side effects from treatment. Urine protein elevated, completed 24 hour urine collection with results WNL, continue bevacizumab. He will continue with cycle 3 FOLFIRI/[place] today. He will follow-up with us prior to each cycle of treatment. Repeat imaging and MD follow up scheduled in October.      Oxygen, fluid in lungs    -shortness of breath with activity has improved. Previously, walking oxygen test demonstrating oxygen  saturations in the 80's on room air. Previously ordered home oxygen with activity. Did not complete approval process, but on repeat walking test in clinic today, oxygen saturations were adequate. Will continue to monitor. Echo on 9/5/24 showed a normal EF.     Weight loss  Previous weight loss despite efforts to increase intake. Started THC gummies, appetite and weight have improved. Nutrition referral previously placed.     Rectal, perineum pain  Following with palliative care. No longer needing MS Contin, but remains on oxycodone prn, currently 10 mg 2-3 times per day.     Anemia  Patient has microcytic anemia. Hemoglobin is better than the last check. Iron is mildly low with a normal TIBC. Ferritin is elevated, likely due to inflammation from cancer. -Soluble transferrin  elevated. IV iron ordered to start with today's infusion.       The longitudinal plan of care for the diagnosis(es)/condition(s) as documented were addressed during this visit. Due to the added complexity in care, I will continue to support Roman in the subsequent management and with ongoing continuity of care.      30 minutes spent on the date of the encounter doing chart review, review of test results, interpretation of tests, patient visit, and documentation     FLORA Albright CNP

## 2024-09-25 NOTE — PROGRESS NOTES
Infusion Nursing Note:  Roman Escalante presents today for Cycle 3, Day 1- Bevacizumab-bvzr, Irinotecan, Leucovorin, Fluorouracil Bolus and Fluorouracil Home Infusion Pump with Venofer 1/3.    Patient seen by provider today: Yes: Rebeca Killian, JONATHAN   present during visit today: Not Applicable.    Note: Patient reports feeling well on arrival to infusion suite today. Denies signs of infection. No new questions or concerns following LILLY appointment. Wishes to proceed with treatment today.     Patient is receiving Venofer for the first time. New patient teaching done previously by Nilda Early, GUILLERMINACC. Pt received new pt folder prior to coming to infusion. Writer reinforced teaching/side effects and schedule. Discussed signs and symptoms of hypersensitivity reaction and how to alert staff. Patient verbalized understanding.     Pt instructed to call  triage with chills/temp >=100.4, questions/concerns. Pt stated understanding of plan.     Patient reports previous nausea and abdominal cramping with last dose of Irinotecan. Administered PRN atropine prior to Irinotecan infusion.   Patient called with approximately 20 minutes left of Irinotecan with a queasy feeling, administered 2nd dose of Atropine.       Intravenous Access:  Implanted Port.    Treatment Conditions:  Results reviewed, labs MET treatment parameters, ok to proceed with treatment.   Latest Reference Range & Units 09/25/24 06:42   Sodium 135 - 145 mmol/L 137   Potassium 3.4 - 5.3 mmol/L 3.9   Chloride 98 - 107 mmol/L 104   Carbon Dioxide (CO2) 22 - 29 mmol/L 22   Urea Nitrogen 6.0 - 20.0 mg/dL 13.3   Creatinine 0.67 - 1.17 mg/dL 0.77   GFR Estimate >60 mL/min/1.73m2 >90   Calcium 8.8 - 10.4 mg/dL 9.3   Anion Gap 7 - 15 mmol/L 11   Albumin 3.5 - 5.2 g/dL 3.9   Protein Total 6.4 - 8.3 g/dL 6.7   Alkaline Phosphatase 40 - 150 U/L 168 (H)   ALT 0 - 70 U/L 10   AST 0 - 45 U/L 26   Bilirubin Total <=1.2 mg/dL 0.2   Glucose 70 - 99 mg/dL 134 (H)   WBC 4.0 -  "11.0 10e3/uL 6.6   Hemoglobin 13.3 - 17.7 g/dL 11.0 (L)   Hematocrit 40.0 - 53.0 % 35.8 (L)   Platelet Count 150 - 450 10e3/uL 161   RBC Count 4.40 - 5.90 10e6/uL 4.57   MCV 78 - 100 fL 78   MCH 26.5 - 33.0 pg 24.1 (L)   MCHC 31.5 - 36.5 g/dL 30.7 (L)   RDW 10.0 - 15.0 % 22.3 (H)   % Neutrophils % 74   % Lymphocytes % 10   % Monocytes % 11   % Eosinophils % 3   % Basophils % 1   Absolute Basophils 0.0 - 0.2 10e3/uL 0.0   Absolute Eosinophils 0.0 - 0.7 10e3/uL 0.2   Absolute Immature Granulocytes <=0.4 10e3/uL 0.1   Absolute Lymphocytes 0.8 - 5.3 10e3/uL 0.7 (L)   Absolute Monocytes 0.0 - 1.3 10e3/uL 0.7   % Immature Granulocytes % 1   Absolute Neutrophils 1.6 - 8.3 10e3/uL 4.9   Absolute NRBCs 10e3/uL 0.0   NRBCs per 100 WBC <1 /100 0      Latest Reference Range & Units 09/25/24 07:01   Protein Albumin Urine Negative mg/dL Negative     BP: 107/71      Post Infusion Assessment:  Patient tolerated infusion without incident.  Blood return noted pre and post infusion.  Blood return noted during administration every 2 cc.  Site patent and intact, free from redness, edema or discomfort.  No evidence of extravasations.    Prior to discharge: Port is secured in place with tegaderm and flushed with 10cc NS with positive blood return noted.    Continuous home infusion Dosi-Fuser pump connected.    All connectors secured in place and clamps taped open.    Pump started, \"running\" noted on display (CADD): Not Applicable.   Capillary element taped to pt's skin per protocol.  Pump Connection double checked with Elzbieta MATIAS RN.  Patient instructed to call our clinic or [facility] with any questions or concerns at home.  Patient verbalized understanding.    Patient set up for pump disconnect at home with [facility] on 9/27/24 at 11AM confirmed via in-basket        Discharge Plan:   Patient declined prescription refills.  Discharge instructions reviewed with: Patient.  Patient and/or family verbalized " understanding of discharge instructions and all questions answered.  AVS to patient via [employer].  Patient will return 10/9/24 for next appointment.   Patient discharged in stable condition accompanied by: self.  Departure Mode: Ambulatory.      Linda Mcguire RN

## 2024-09-25 NOTE — PROGRESS NOTES
Received fax requesting to get patient setup on home oxygen. Walk test completed today does not qualify patient for home oxygen per insurance guidelines. Called care coordinator Nilda to inform her of this. Left voicemail with call back number [phone] if she has any further questions.

## 2024-09-25 NOTE — Clinical Note
9/25/2024      Roman Escalante  [address]  [facility] [address]      Dear Colleague,    Thank you for referring your patient, Roman Escalante, to the [facility]. Please see a copy of my visit note below.      Formerly [facility] - Medical Oncology Follow Up Note  09/25/2024    Oncology History:   Patient developed rectal bleeding in 2020.  In January 2021 CT showed focal wall thickening in the upper rectum, multiple pulmonary nodules bilaterally suspicious for metastatic disease.  Underwent FNA of the right upper lobe lesion which was consistent with adenocarcinoma of the colon.  He was treated with FOLFOX from 2/20/2021 through 5/20/2021.  Avastin was added.  Had an infusion reaction to oxaliplatin 6/2021.  7/2021- 12/2021 was on 5-FU alone.  Developed progression.  12/2021- 9/2022 was on FOLFIRI.  11/2021 started Lonsurf. All of this was done with outside oncologist.     Met with Dr. Snow on 2/3/2023 to establish care. Recommended regorafenib.     Started regorafenib on 3/2/2023. Progression noted 5/19/23. Plan to start FOLFOX/Avastin and send out CARIS testing to evaluate for other treatment options. Cycle 1 was given with oxaliplatin desensitization, 5FU, Avastin held due to increased urine protein.     Following cycle 4, patient was admitted with hematuria and acute kidney injury.    Oxaliplatin was dropped with Cycle 5.     Y-90 radioembolization 9/7.    10/24/23 CT CAP with disease progression with notable growth in all pulmonary lesions. Lonsurf + bevacizumab started 10/27/23 with plans to bridge to clinical trial.   11/15/23 - stopped chemo for [facility] study.   Currently enrolled in [facility] CAR-T trial (completed cell harvest 1/23/2024, tentatively planning administration of CAR-T in April 2/12/24 - resumed Lonsurf, bevacizumab to bridge treatment until CAR-T therapy  3/20/24 - treatment changed to Fruquintinib due to intolerability of  Lonsurf/bevacizumab  4/2/24- Presented to the ED with dehydration. Stopped Fruquintnib.   6/2024- cellular therapy transplant at [facility].   7/30/24- CT imaging demonstrating marked progression of pulmonary and hepatic lesions, clinical trial failed to generate T-cell graft. Multiple discussions regarding plan of care, hospice vs additional lines of treatment. Patient decided he is not ready for hospice. He resumed treatment with FOLFIRI + bevacizumab on 8/30/24.     Interval History:     Roman presents today for follow up prior to cycle 3 FOLFIRI + bevacizumab.    Urine protein  Weight/nutrition  Breathing/O2 sats     Started THC gummies, appetite is a lot better, gained a few pounds.     Pain is intermittent but overall improved from previous. Taking oxy as needed.   Bowels move every couple of days, not taking any PRN meds.     Energy is getting better, increasing. Over the last 1-2 weeks, getting out of bed earlier     Shoulder pain   ***     Patient reports that he feels much better since resuming chemotherapy.  He notes his pulse was in the 100s and now is down to in the 80s.  He notes less shortness of breath.  He reports eating and drinking okay.  He is managing his, but constipation with either MiraLAX, Dulcolax, or senna as needed.  He also finds that drinking cold milk seems to help.  He did not need any pain medication for 1 week after receiving chemotherapy.  He did resume his oxycodone last week taking 10 mg 2-3 times per day.  He has not felt the need to take any MS Contin.  He reports his taste is doing better.  He notes his energy remains fair and he is napping 1 to 2 hours/day and sleeping about 12 to 14 hours at night.  He notes his resting home oxygen has increased to 95 to 96%.  He is looking into getting a PCA at home.  He denies other concerns.    Review of Systems:  Patient denies any of the following except if noted above: fevers, chills, difficulty with energy, vision or hearing changes, chest  pain, dyspnea, abdominal pain, nausea, vomiting, diarrhea, constipation, urinary concerns, headaches, numbness, tingling, issues with sleep or mood. Also denies lumps, bumps, rashes or skin lesions, bleeding or bruising issues.      Current Outpatient Medications   Medication Sig Dispense Refill    cyclobenzaprine (FLEXERIL) 5 MG tablet Take one tab (5mg) by mouth over 6-8 hours, as needed for spasms 45 tablet 2    gabapentin (NEURONTIN) 100 MG capsule Take 100mg by mouth at bedtime for 3 days, then Take 100mg by mouth in AM and 100mg at bedtime for 3 days, then Take 100mg by mouth three times per day (Patient not taking: Reported on 4/26/2024) 90 capsule 0    ibuprofen (ADVIL/MOTRIN) 200 MG tablet Take 3 tablets (600 mg) by mouth every 8 hours as needed for moderate pain (Patient not taking: Reported on 4/26/2024) 100 tablet 0    loperamide (IMODIUM) 2 MG capsule 2 caps at 1st sign of diarrhea & 1 cap every 2hrs until 12hrs diarrhea free. During night, 2 caps at bedtime & 2 caps every 4hrs until AM 30 capsule 0    LORazepam (ATIVAN) 0.5 MG tablet Take 1 tablet (0.5 mg) by mouth every 6 hours as needed for anxiety 10 tablet 0    morphine (MS CONTIN) 15 MG CR tablet Take 1 tablet (15 mg) by mouth every 12 hours 60 tablet 0    ondansetron (ZOFRAN ODT) 4 MG ODT tab Take 1 tablet (4 mg) by mouth every 6 hours as needed for nausea. 30 tablet 3    Ostomy Supplies MISC 1 each daily 1 each 11    oxyCODONE (ROXICODONE) 10 MG tablet Take 1-2 tablets (10-20 mg) by mouth every 4 hours as needed for moderate to severe pain. 150 tablet 0    prochlorperazine (COMPAZINE) 10 MG tablet Take 0.5 tablets (5 mg) by mouth every 6 hours as needed for nausea or vomiting. 30 tablet 2    prochlorperazine (COMPAZINE) 10 MG tablet Take 1 tablet (10 mg) by mouth every 6 hours as needed for nausea or vomiting (Patient not taking: Reported on 4/26/2024) 30 tablet 2    prochlorperazine (COMPAZINE) 5 MG tablet Take 1 tablet (5 mg) by mouth every 6  hours as needed for nausea or vomiting (Patient not taking: Reported on 4/26/2024) 30 tablet 3    sulfamethoxazole-trimethoprim (BACTRIM DS) 800-160 MG tablet       tamsulosin (FLOMAX) 0.4 MG capsule       valACYclovir (VALTREX) 500 MG tablet        Physical Exam:  General: The patient is a pleasant male in no acute distress.  /71 (BP Location: Right arm, Patient Position: Sitting, Cuff Size: Adult Regular)   Pulse 97   Temp 97.5  F (36.4  C) (Oral)   Resp 16   Wt 72.8 kg (160 lb 9.6 oz)   SpO2 94%   BMI 22.09 kg/m    Wt Readings from Last 10 Encounters:   09/25/24 72.8 kg (160 lb 9.6 oz)   09/13/24 69.9 kg (154 lb)   09/06/24 72.6 kg (160 lb)   08/30/24 72.8 kg (160 lb 6.4 oz)   08/09/24 74.8 kg (164 lb 14.4 oz)   08/02/24 72.6 kg (160 lb)   07/31/24 73.5 kg (162 lb)   07/15/24 73.7 kg (162 lb 8 oz)   05/21/24 84.4 kg (186 lb)   05/07/24 86.2 kg (190 lb)   HEENT: EOMI. Sclerae are anicteric. Oral mucosa pink and moist without lesions or thrush.   Lymph: Neck is supple with no lymphadenopathy in the cervical or supraclavicular areas.   Heart: Mildly tachycardic with regular rhythm.   Lungs: Fine crackles in the bases bilaterally, otherwise clear to auscultation bilaterally.   Abdomen: Bowel sounds present, soft, nontender with no palpable hepatosplenomegaly or masses. Ostomy is in place in left abdomen with minimal amount of formed brown stool.   Extremities: No lower extremity edema noted bilaterally.   Neuro: Cranial nerves II through XII are grossly intact.  Skin: No rashes, petechiae, or bruising noted on exposed skin.    LABS  Most Recent 3 CBC's:  Recent Labs   Lab Test 09/13/24  0924 08/30/24  1030 07/30/24  1431   WBC 7.7 10.0 12.5*   HGB 11.1* 9.4* 10.0*   MCV 76* 75* 79    265 242    Most Recent 3 BMP's:  Recent Labs   Lab Test 09/13/24  0924 08/30/24  1030 07/30/24  1431    132* 135   POTASSIUM 3.7 4.2 4.4   CHLORIDE 101 97* 99   CO2 23 23 23   BUN 12.8 16.2 16.9   CR 1.11 1.01  0.91   ANIONGAP 14 12 13   JEFERSON 9.7 8.8 9.3   * 99 104*    Most Recent 2 LFT's:  Recent Labs   Lab Test 09/13/24  0924 08/30/24  1030   AST 31 61*   ALT 28 20   ALKPHOS 174* 186*   BILITOTAL 0.3 0.6   I reviewed the above labs today.    ASSESSMENT AND PLAN   Metastatic rectal cancer  -Most recently progressed after CAR-T clinical trial. He started on treatment with FOLFIRI + bevacizumab on 8/30/24. He tolerated cycle 1 very well and feels his tachycardia and dyspnea are better than prior. He denies any significant side effects from treatment. He will continue with cycle 2 FOLFIRI/[place] today. He will follow-up with us prior to each cycle of treatment. Will repeat imaging after 4 cycles of treatment.     Oxygen, fluid in lungs    -shortness of breath with activity, walking oxygen test demonstrating oxygen saturations in the 80's on room air. Previously ordered home oxygen with activity. Will check on the status of this. Echo on 9/5/24 showed a normal EF.     Weight loss  Patient feels he is eating well, but his weight has declined. Will refer him to see our dietician    Rectal, perineum pain  Following with palliative care. No longer needing MS Contin, but remains on oxycodone prn, currently 10 mg 2-3 times per day.     Anemia  Patient has microcytic anemia. Hemoglobin is better than the last check. Iron is mildly low with a normal TIBC. Ferritin is elevated, likely due to inflammation from cancer. Will check a soluble transferrin receptor to determine if anemia of chronic disease versus combined anemia of chronic disease with iron deficient anemia.   -Soluble transferrin is elevated. Will order IV iron for him to start with his next infusion.    The longitudinal plan of care for the diagnosis(es)/condition(s) as documented were addressed during this visit. Due to the added complexity in care, I will continue to support Roman in the subsequent management and with ongoing continuity of care.      *** minutes spent  on the date of the encounter doing chart review, review of test results, interpretation of tests, patient visit, and documentation     FLORA Albright CNP        Again, thank you for allowing me to participate in the care of your patient.        Sincerely,        FLORA Albright CNP

## 2024-10-01 ENCOUNTER — INFUSION THERAPY VISIT (OUTPATIENT)
Dept: INFUSION THERAPY | Facility: CLINIC | Age: 49
End: 2024-10-01
Attending: STUDENT IN AN ORGANIZED HEALTH CARE EDUCATION/TRAINING PROGRAM
Payer: COMMERCIAL

## 2024-10-01 VITALS
DIASTOLIC BLOOD PRESSURE: 71 MMHG | RESPIRATION RATE: 16 BRPM | SYSTOLIC BLOOD PRESSURE: 112 MMHG | HEART RATE: 105 BPM | OXYGEN SATURATION: 96 % | TEMPERATURE: 97.4 F

## 2024-10-01 DIAGNOSIS — D50.0 IRON DEFICIENCY ANEMIA DUE TO CHRONIC BLOOD LOSS: Primary | ICD-10-CM

## 2024-10-01 PROCEDURE — 258N000003 HC RX IP 258 OP 636: Performed by: PHYSICIAN ASSISTANT

## 2024-10-01 PROCEDURE — 250N000011 HC RX IP 250 OP 636: Performed by: PHYSICIAN ASSISTANT

## 2024-10-01 PROCEDURE — 96366 THER/PROPH/DIAG IV INF ADDON: CPT

## 2024-10-01 PROCEDURE — 96365 THER/PROPH/DIAG IV INF INIT: CPT

## 2024-10-01 RX ORDER — METHYLPREDNISOLONE SODIUM SUCCINATE 125 MG/2ML
125 INJECTION, POWDER, LYOPHILIZED, FOR SOLUTION INTRAMUSCULAR; INTRAVENOUS
Status: CANCELLED
Start: 2024-10-03

## 2024-10-01 RX ORDER — HEPARIN SODIUM,PORCINE 10 UNIT/ML
5-20 VIAL (ML) INTRAVENOUS DAILY PRN
Status: CANCELLED | OUTPATIENT
Start: 2024-10-03

## 2024-10-01 RX ORDER — EPINEPHRINE 1 MG/ML
0.3 INJECTION, SOLUTION INTRAMUSCULAR; SUBCUTANEOUS EVERY 5 MIN PRN
Status: CANCELLED | OUTPATIENT
Start: 2024-10-03

## 2024-10-01 RX ORDER — DIPHENHYDRAMINE HYDROCHLORIDE 50 MG/ML
50 INJECTION INTRAMUSCULAR; INTRAVENOUS
Status: CANCELLED
Start: 2024-10-03

## 2024-10-01 RX ORDER — ALBUTEROL SULFATE 0.83 MG/ML
2.5 SOLUTION RESPIRATORY (INHALATION)
Status: CANCELLED | OUTPATIENT
Start: 2024-10-03

## 2024-10-01 RX ORDER — ALBUTEROL SULFATE 90 UG/1
1-2 AEROSOL, METERED RESPIRATORY (INHALATION)
Status: CANCELLED
Start: 2024-10-03

## 2024-10-01 RX ORDER — HEPARIN SODIUM (PORCINE) LOCK FLUSH IV SOLN 100 UNIT/ML 100 UNIT/ML
5 SOLUTION INTRAVENOUS
Status: CANCELLED | OUTPATIENT
Start: 2024-10-03

## 2024-10-01 RX ORDER — HEPARIN SODIUM (PORCINE) LOCK FLUSH IV SOLN 100 UNIT/ML 100 UNIT/ML
5 SOLUTION INTRAVENOUS
Status: DISCONTINUED | OUTPATIENT
Start: 2024-10-01 | End: 2024-10-01 | Stop reason: HOSPADM

## 2024-10-01 RX ORDER — MEPERIDINE HYDROCHLORIDE 25 MG/ML
25 INJECTION INTRAMUSCULAR; INTRAVENOUS; SUBCUTANEOUS EVERY 30 MIN PRN
Status: CANCELLED | OUTPATIENT
Start: 2024-10-03

## 2024-10-01 RX ADMIN — IRON SUCROSE 300 MG: 20 INJECTION, SOLUTION INTRAVENOUS at 14:08

## 2024-10-01 RX ADMIN — Medication 5 ML: at 15:43

## 2024-10-01 NOTE — PROGRESS NOTES
Nursing Note  Roman Escalante presents today to [facility] for:   Chief Complaint   Patient presents with    Infusion     Venofer, 2 of 3     During today's [facility] appointment, orders from Radha Chapman PA-C were completed.  Frequency: today is dose 2 of 3 total    Progress note:  Patient identification verified by name and date of birth.  Assessment completed.  Vitals recorded in Doc Flowsheets.  Patient was provided with education regarding medication/procedure and possible side effects.  Patient verbalized understanding.     present during visit today: Not Applicable.    Treatment Conditions: Non-applicable.  Premedications: were not ordered.  Drug Waste Record: No  Infusion length and rate:  193 ml/hr., over 90 minutes  Labs: were not ordered for this appointment.  Vascular access: port accessed today.  Is the next appt scheduled? Yes    Post Infusion Assessment:  Patient tolerated infusion without incident.  Site patent and intact, free from redness, edema or discomfort.  No evidence of extravasations.  Access discontinued per protocol.     Discharge Plan:   Follow up plan of care with: ordering provider as scheduled.  Discharge instructions were reviewed with patient.  Patient/representative verbalized understanding of discharge instructions and all questions answered.  Patient discharged from [facility] in stable condition.    Taty Germain, GUILLERMINA    Administrations This Visit       iron sucrose (VENOFER) 300 mg in sodium chloride 0.9 % 290 mL intermittent infusion       Admin Date  10/01/2024 Action  $New Bag Dose  300 mg Rate  193.3 mL/hr Route  Intravenous Documented By  Taty Germain, RN                    /68   Pulse 105   Temp 97.4  F (36.3  C) (Oral)   Resp 16   SpO2 96%

## 2024-10-04 ENCOUNTER — INFUSION THERAPY VISIT (OUTPATIENT)
Dept: INFUSION THERAPY | Facility: CLINIC | Age: 49
End: 2024-10-04
Attending: PHYSICIAN ASSISTANT
Payer: COMMERCIAL

## 2024-10-04 VITALS — TEMPERATURE: 97.6 F | SYSTOLIC BLOOD PRESSURE: 114 MMHG | HEART RATE: 83 BPM | DIASTOLIC BLOOD PRESSURE: 81 MMHG

## 2024-10-04 DIAGNOSIS — D50.0 IRON DEFICIENCY ANEMIA DUE TO CHRONIC BLOOD LOSS: Primary | ICD-10-CM

## 2024-10-04 PROCEDURE — 96366 THER/PROPH/DIAG IV INF ADDON: CPT

## 2024-10-04 PROCEDURE — 96365 THER/PROPH/DIAG IV INF INIT: CPT

## 2024-10-04 PROCEDURE — 250N000011 HC RX IP 250 OP 636: Performed by: PHYSICIAN ASSISTANT

## 2024-10-04 PROCEDURE — 258N000003 HC RX IP 258 OP 636: Performed by: PHYSICIAN ASSISTANT

## 2024-10-04 RX ORDER — METHYLPREDNISOLONE SODIUM SUCCINATE 125 MG/2ML
125 INJECTION INTRAMUSCULAR; INTRAVENOUS
Status: CANCELLED
Start: 2024-10-05

## 2024-10-04 RX ORDER — MEPERIDINE HYDROCHLORIDE 25 MG/ML
25 INJECTION INTRAMUSCULAR; INTRAVENOUS; SUBCUTANEOUS EVERY 30 MIN PRN
Status: CANCELLED | OUTPATIENT
Start: 2024-10-05

## 2024-10-04 RX ORDER — HEPARIN SODIUM (PORCINE) LOCK FLUSH IV SOLN 100 UNIT/ML 100 UNIT/ML
5 SOLUTION INTRAVENOUS
Status: DISCONTINUED | OUTPATIENT
Start: 2024-10-04 | End: 2024-10-04

## 2024-10-04 RX ORDER — DIPHENHYDRAMINE HYDROCHLORIDE 50 MG/ML
50 INJECTION INTRAMUSCULAR; INTRAVENOUS
Status: CANCELLED
Start: 2024-10-05

## 2024-10-04 RX ORDER — ALBUTEROL SULFATE 0.83 MG/ML
2.5 SOLUTION RESPIRATORY (INHALATION)
Status: CANCELLED | OUTPATIENT
Start: 2024-10-05

## 2024-10-04 RX ORDER — HEPARIN SODIUM (PORCINE) LOCK FLUSH IV SOLN 100 UNIT/ML 100 UNIT/ML
5 SOLUTION INTRAVENOUS
Status: CANCELLED | OUTPATIENT
Start: 2024-10-05

## 2024-10-04 RX ORDER — ALBUTEROL SULFATE 90 UG/1
1-2 INHALANT RESPIRATORY (INHALATION)
Status: CANCELLED
Start: 2024-10-05

## 2024-10-04 RX ORDER — EPINEPHRINE 1 MG/ML
0.3 INJECTION, SOLUTION, CONCENTRATE INTRAVENOUS EVERY 5 MIN PRN
Status: CANCELLED | OUTPATIENT
Start: 2024-10-05

## 2024-10-04 RX ORDER — HEPARIN SODIUM,PORCINE 10 UNIT/ML
5-20 VIAL (ML) INTRAVENOUS DAILY PRN
Status: CANCELLED | OUTPATIENT
Start: 2024-10-05

## 2024-10-04 RX ADMIN — HEPARIN 5 ML: 100 SYRINGE at 14:58

## 2024-10-04 RX ADMIN — IRON SUCROSE 300 MG: 20 INJECTION, SOLUTION INTRAVENOUS at 13:25

## 2024-10-04 ASSESSMENT — PAIN SCALES - GENERAL: PAINLEVEL: NO PAIN (0)

## 2024-10-04 NOTE — PROGRESS NOTES
Infusion Nursing Note:  Roman Escalante presents today for venofer 300 mg 3/3.    Patient seen by provider today: No   present during visit today: Not Applicable.    Note: N/A.      Intravenous Access:  Implanted Port.    Treatment Conditions:  Not Applicable.      Post Infusion Assessment:  Patient tolerated infusion without incident.  Blood return noted pre and post infusion.  Site patent and intact, free from redness, edema or discomfort.  No evidence of extravasations.  Access discontinued per protocol.       Discharge Plan:   Patient discharged in stable condition accompanied by: self.  Departure Mode: Ambulatory.  Iron sucrose therapy completed.  Pt will start next cycle of chemo 10/9/24 at [facility].      Marianna Mendez

## 2024-10-09 ENCOUNTER — INFUSION THERAPY VISIT (OUTPATIENT)
Dept: ONCOLOGY | Facility: CLINIC | Age: 49
End: 2024-10-09
Attending: STUDENT IN AN ORGANIZED HEALTH CARE EDUCATION/TRAINING PROGRAM
Payer: COMMERCIAL

## 2024-10-09 ENCOUNTER — APPOINTMENT (OUTPATIENT)
Dept: LAB | Facility: CLINIC | Age: 49
End: 2024-10-09
Attending: STUDENT IN AN ORGANIZED HEALTH CARE EDUCATION/TRAINING PROGRAM
Payer: COMMERCIAL

## 2024-10-09 VITALS
BODY MASS INDEX: 23.12 KG/M2 | DIASTOLIC BLOOD PRESSURE: 75 MMHG | TEMPERATURE: 97.7 F | WEIGHT: 168.1 LBS | HEART RATE: 87 BPM | SYSTOLIC BLOOD PRESSURE: 108 MMHG | OXYGEN SATURATION: 95 % | RESPIRATION RATE: 16 BRPM

## 2024-10-09 DIAGNOSIS — C20 RECTAL ADENOCARCINOMA METASTATIC TO LUNG (H): Primary | ICD-10-CM

## 2024-10-09 DIAGNOSIS — R63.4 WEIGHT LOSS: ICD-10-CM

## 2024-10-09 DIAGNOSIS — C78.00 RECTAL ADENOCARCINOMA METASTATIC TO LUNG (H): Primary | ICD-10-CM

## 2024-10-09 DIAGNOSIS — G62.0 NEUROPATHY DUE TO CHEMOTHERAPEUTIC DRUG (H): ICD-10-CM

## 2024-10-09 DIAGNOSIS — K59.03 THERAPEUTIC OPIOID INDUCED CONSTIPATION: ICD-10-CM

## 2024-10-09 DIAGNOSIS — Z51.5 ENCOUNTER FOR PALLIATIVE CARE: ICD-10-CM

## 2024-10-09 DIAGNOSIS — T45.1X5A NEUROPATHY DUE TO CHEMOTHERAPEUTIC DRUG (H): ICD-10-CM

## 2024-10-09 DIAGNOSIS — T40.2X5A THERAPEUTIC OPIOID INDUCED CONSTIPATION: ICD-10-CM

## 2024-10-09 DIAGNOSIS — G89.3 CANCER ASSOCIATED PAIN: ICD-10-CM

## 2024-10-09 LAB
ALBUMIN SERPL BCG-MCNC: 4.1 G/DL (ref 3.5–5.2)
ALBUMIN UR-MCNC: 10 MG/DL
ALP SERPL-CCNC: 150 U/L (ref 40–150)
ALT SERPL W P-5'-P-CCNC: 11 U/L (ref 0–70)
ANION GAP SERPL CALCULATED.3IONS-SCNC: 12 MMOL/L (ref 7–15)
AST SERPL W P-5'-P-CCNC: 20 U/L (ref 0–45)
BASOPHILS # BLD AUTO: 0.1 10E3/UL (ref 0–0.2)
BASOPHILS NFR BLD AUTO: 1 %
BILIRUB SERPL-MCNC: 0.3 MG/DL
BUN SERPL-MCNC: 15.3 MG/DL (ref 6–20)
CALCIUM SERPL-MCNC: 9 MG/DL (ref 8.8–10.4)
CHLORIDE SERPL-SCNC: 103 MMOL/L (ref 98–107)
CREAT SERPL-MCNC: 0.8 MG/DL (ref 0.67–1.17)
EGFRCR SERPLBLD CKD-EPI 2021: >90 ML/MIN/1.73M2
EOSINOPHIL # BLD AUTO: 0.3 10E3/UL (ref 0–0.7)
EOSINOPHIL NFR BLD AUTO: 3 %
ERYTHROCYTE [DISTWIDTH] IN BLOOD BY AUTOMATED COUNT: 26.9 % (ref 10–15)
GLUCOSE SERPL-MCNC: 111 MG/DL (ref 70–99)
HCO3 SERPL-SCNC: 22 MMOL/L (ref 22–29)
HCT VFR BLD AUTO: 36.6 % (ref 40–53)
HGB BLD-MCNC: 11.5 G/DL (ref 13.3–17.7)
IMM GRANULOCYTES # BLD: 0.1 10E3/UL
IMM GRANULOCYTES NFR BLD: 1 %
LYMPHOCYTES # BLD AUTO: 0.7 10E3/UL (ref 0.8–5.3)
LYMPHOCYTES NFR BLD AUTO: 8 %
MCH RBC QN AUTO: 25.6 PG (ref 26.5–33)
MCHC RBC AUTO-ENTMCNC: 31.4 G/DL (ref 31.5–36.5)
MCV RBC AUTO: 82 FL (ref 78–100)
MONOCYTES # BLD AUTO: 1.1 10E3/UL (ref 0–1.3)
MONOCYTES NFR BLD AUTO: 13 %
NEUTROPHILS # BLD AUTO: 6.2 10E3/UL (ref 1.6–8.3)
NEUTROPHILS NFR BLD AUTO: 74 %
NRBC # BLD AUTO: 0 10E3/UL
NRBC BLD AUTO-RTO: 0 /100
PLATELET # BLD AUTO: 164 10E3/UL (ref 150–450)
POTASSIUM SERPL-SCNC: 3.7 MMOL/L (ref 3.4–5.3)
PROT SERPL-MCNC: 6.8 G/DL (ref 6.4–8.3)
RBC # BLD AUTO: 4.49 10E6/UL (ref 4.4–5.9)
SODIUM SERPL-SCNC: 137 MMOL/L (ref 135–145)
WBC # BLD AUTO: 8.3 10E3/UL (ref 4–11)

## 2024-10-09 PROCEDURE — 36591 DRAW BLOOD OFF VENOUS DEVICE: CPT

## 2024-10-09 PROCEDURE — 96413 CHEMO IV INFUSION 1 HR: CPT

## 2024-10-09 PROCEDURE — G0463 HOSPITAL OUTPT CLINIC VISIT: HCPCS | Mod: 25

## 2024-10-09 PROCEDURE — 81003 URINALYSIS AUTO W/O SCOPE: CPT | Performed by: STUDENT IN AN ORGANIZED HEALTH CARE EDUCATION/TRAINING PROGRAM

## 2024-10-09 PROCEDURE — 258N000003 HC RX IP 258 OP 636: Performed by: STUDENT IN AN ORGANIZED HEALTH CARE EDUCATION/TRAINING PROGRAM

## 2024-10-09 PROCEDURE — 99214 OFFICE O/P EST MOD 30 MIN: CPT | Mod: 95 | Performed by: STUDENT IN AN ORGANIZED HEALTH CARE EDUCATION/TRAINING PROGRAM

## 2024-10-09 PROCEDURE — 96375 TX/PRO/DX INJ NEW DRUG ADDON: CPT

## 2024-10-09 PROCEDURE — 96415 CHEMO IV INFUSION ADDL HR: CPT

## 2024-10-09 PROCEDURE — 96417 CHEMO IV INFUS EACH ADDL SEQ: CPT

## 2024-10-09 PROCEDURE — 250N000011 HC RX IP 250 OP 636: Performed by: STUDENT IN AN ORGANIZED HEALTH CARE EDUCATION/TRAINING PROGRAM

## 2024-10-09 PROCEDURE — 258N000003 HC RX IP 258 OP 636

## 2024-10-09 PROCEDURE — 250N000011 HC RX IP 250 OP 636

## 2024-10-09 PROCEDURE — 85025 COMPLETE CBC W/AUTO DIFF WBC: CPT | Performed by: STUDENT IN AN ORGANIZED HEALTH CARE EDUCATION/TRAINING PROGRAM

## 2024-10-09 PROCEDURE — G0498 CHEMO EXTEND IV INFUS W/PUMP: HCPCS

## 2024-10-09 PROCEDURE — 99214 OFFICE O/P EST MOD 30 MIN: CPT

## 2024-10-09 PROCEDURE — 80053 COMPREHEN METABOLIC PANEL: CPT

## 2024-10-09 PROCEDURE — G2211 COMPLEX E/M VISIT ADD ON: HCPCS

## 2024-10-09 PROCEDURE — 96411 CHEMO IV PUSH ADDL DRUG: CPT

## 2024-10-09 PROCEDURE — 96368 THER/DIAG CONCURRENT INF: CPT

## 2024-10-09 PROCEDURE — 96376 TX/PRO/DX INJ SAME DRUG ADON: CPT

## 2024-10-09 RX ORDER — FLUOROURACIL 50 MG/ML
400 INJECTION, SOLUTION INTRAVENOUS ONCE
Status: CANCELLED | OUTPATIENT
Start: 2024-10-11

## 2024-10-09 RX ORDER — DEXAMETHASONE SODIUM PHOSPHATE 4 MG/ML
12 INJECTION, SOLUTION INTRA-ARTICULAR; INTRALESIONAL; INTRAMUSCULAR; INTRAVENOUS; SOFT TISSUE ONCE
Status: COMPLETED | OUTPATIENT
Start: 2024-10-09 | End: 2024-10-09

## 2024-10-09 RX ORDER — NALOXONE HYDROCHLORIDE 4 MG/.1ML
SPRAY NASAL
COMMUNITY
Start: 2024-08-03

## 2024-10-09 RX ORDER — GABAPENTIN 100 MG/1
100-200 CAPSULE ORAL 3 TIMES DAILY
Qty: 180 CAPSULE | Refills: 1 | Status: SHIPPED | OUTPATIENT
Start: 2024-10-09 | End: 2024-10-10

## 2024-10-09 RX ORDER — ONDANSETRON 2 MG/ML
8 INJECTION INTRAMUSCULAR; INTRAVENOUS ONCE
Status: COMPLETED | OUTPATIENT
Start: 2024-10-09 | End: 2024-10-09

## 2024-10-09 RX ORDER — ATROPINE SULFATE 0.4 MG/ML
0.4 AMPUL (ML) INJECTION
Status: COMPLETED | OUTPATIENT
Start: 2024-10-09 | End: 2024-10-09

## 2024-10-09 RX ORDER — HEPARIN SODIUM (PORCINE) LOCK FLUSH IV SOLN 100 UNIT/ML 100 UNIT/ML
500 SOLUTION INTRAVENOUS ONCE
Status: COMPLETED | OUTPATIENT
Start: 2024-10-09 | End: 2024-10-09

## 2024-10-09 RX ORDER — FLUOROURACIL 50 MG/ML
400 INJECTION, SOLUTION INTRAVENOUS ONCE
Status: COMPLETED | OUTPATIENT
Start: 2024-10-09 | End: 2024-10-09

## 2024-10-09 RX ADMIN — SODIUM CHLORIDE 400 MG: 9 INJECTION, SOLUTION INTRAVENOUS at 08:46

## 2024-10-09 RX ADMIN — SODIUM CHLORIDE 100 ML: 9 INJECTION, SOLUTION INTRAVENOUS at 08:47

## 2024-10-09 RX ADMIN — Medication 500 UNITS: at 06:48

## 2024-10-09 RX ADMIN — LEUCOVORIN CALCIUM 750 MG: 500 INJECTION, POWDER, LYOPHILIZED, FOR SOLUTION INTRAMUSCULAR; INTRAVENOUS at 09:29

## 2024-10-09 RX ADMIN — ATROPINE SULFATE 0.4 MG: 0.4 INJECTION, SOLUTION INTRAVENOUS at 09:26

## 2024-10-09 RX ADMIN — FLUOROURACIL 770 MG: 50 INJECTION, SOLUTION INTRAVENOUS at 11:10

## 2024-10-09 RX ADMIN — IRINOTECAN HYDROCHLORIDE 340 MG: 20 INJECTION, SOLUTION INTRAVENOUS at 09:31

## 2024-10-09 RX ADMIN — ATROPINE SULFATE 0.4 MG: 0.4 INJECTION, SOLUTION INTRAVENOUS at 10:17

## 2024-10-09 RX ADMIN — ONDANSETRON 8 MG: 2 INJECTION INTRAMUSCULAR; INTRAVENOUS at 08:27

## 2024-10-09 RX ADMIN — DEXAMETHASONE SODIUM PHOSPHATE 12 MG: 4 INJECTION, SOLUTION INTRA-ARTICULAR; INTRALESIONAL; INTRAMUSCULAR; INTRAVENOUS; SOFT TISSUE at 08:28

## 2024-10-09 ASSESSMENT — PAIN SCALES - GENERAL: PAINLEVEL: NO PAIN (0)

## 2024-10-09 NOTE — NURSING NOTE
"Oncology Rooming Note    October 9, 2024 6:57 AM   Roman Escalante is a 49 year old male who presents for:    Chief Complaint   Patient presents with    Port Draw     Labs drawn via port by RN in lab.  VS taken    Oncology Clinic Visit     RTN CCSL Colon CA     Initial Vitals: /75   Pulse 87   Temp 97.7  F (36.5  C) (Oral)   Resp 16   Wt 76.2 kg (168 lb 1.6 oz)   SpO2 95%   BMI 23.12 kg/m   Estimated body mass index is 23.12 kg/m  as calculated from the following:    Height as of 9/6/24: 1.816 m (5' 11.5\").    Weight as of this encounter: 76.2 kg (168 lb 1.6 oz). Body surface area is 1.96 meters squared.  No Pain (0) Comment: Data Unavailable   No LMP for male patient.  Allergies reviewed: Yes  Medications reviewed: Yes    Medications: Medication refills not needed today.  Pharmacy name entered into EPIC:    [facility] - [place], [place] - [address] [place] [address] [phone]  [facility] - [place], [place] - [address] [place] [address] DRUG STORE #[id number] [address]    Frailty Screening:   Is the patient here for a new oncology consult visit in cancer care? 2. No      Clinical concerns: none      Reineir Mcclendon             "

## 2024-10-09 NOTE — LETTER
"10/9/2024      Roman Escalante  [address]  [facility] [address]      Dear Colleague,    Thank you for referring your patient, Roman Escalante, to the [facility]. Please see a copy of my visit note below.    Palliative Care Progress Note    Patient Name: Roman Escalante  Primary Provider: Buddy Hart    Chief Complaint/Patient ID: Roman Escalante is a 49 year old male with PMHx of metastatic rectal cancer (mets to lung), currently enrolled in [facility] CAR-T trial (completed cell harvest 1/23/2024, tentatively planning administration of CAR-T in June).  He has been on multiple lines of systemic therapy, currently on Fruquintinib.     Last Palliative care appointment: 9/6/2024 with me. Completed a polst form stating DNR/full treatment. Also updated code status in the chart.     Reviewed: Yes    ORT Score = 1 for history of alcohol abuse and his mother.     Social History: Has a couple of kids and a couple grandkids who lives in [place].  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 Has a girlfriend-she is a hospice RN.     Advanced Care Planning: Has not completed. Girlfriend would be his HCA.     Interim History:  Roman Escalante is a 49 year old male who is seen today for follow up with Palliative Care via billable video visit.      Reviewed oncology note from earlier today.  Overall tolerating treatment well with improvement in symptoms.  Planning to repeat imaging later this month and then follow-up with primary oncologist.    Rectal pain has overall improved.  Notes that it is very sporadic.  He takes at most 4 tablets of oxycodone, and that is not consistently happening every day.  He is finding the pain generally happens when he has the bloody mucus discharge from his rectum, so it has helped that he is now able to stand up when he urinates as he then does not have that discharge.    He did have an increase in neuropathy recently and " he is wondering if he should resume the gabapentin.    Bowels are moving daily which is very helpful for pain.  He has not taken any laxatives in a little over a month.    Appetite is good- started THC gummies. Gained another 5 pounds since his last infusion.    Energy level is improved, continues to increase.  Says the iron infusions really made a difference, and he was amazed at how much more he could tolerate them compared to the oral regimen.    Currently in [place], and will be heading to [place] in [place] this next week.    Physical Exam:   Constitutional: Alert, pleasant, no apparent distress. Sitting up in chair.  Eyes: Sclera non-icteric, no eye discharge.  ENT: No nasal discharge. Ears grossly normal.  Respiratory: Unlabored respirations. Speaking in full sentences.  Musculoskeletal: Extremities appear normal- no gross deformities noted. No edema noted on upper body.   Skin: No suspicious lesions or rashes on visible skin.  Neurologic: Clear speech, no aphasia. No facial droop.  Psychiatric: Mentation appears normal, appropriate attention. Affect normal/bright. Does not appear anxious or depressed.    Key Data Reviewed:  LABS:   Lab Results   Component Value Date    WBC 8.3 10/09/2024    HGB 11.5 (L) 10/09/2024    HCT 36.6 (L) 10/09/2024     10/09/2024     10/09/2024    POTASSIUM 3.7 10/09/2024    CHLORIDE 103 10/09/2024    CO2 22 10/09/2024    BUN 15.3 10/09/2024    CR 0.80 10/09/2024     (H) 10/09/2024    SED 38 (H) 01/28/2024    AST 20 10/09/2024    ALT 11 10/09/2024    ALKPHOS 150 10/09/2024    BILITOTAL 0.3 10/09/2024    INR 1.30 (H) 01/28/2024     Impression & Recommendations & Counseling:  Roman Escalante is a 49 year old male with history of metastatic rectal cancer.     Continues to have significant improvement from a symptomatic standpoint, so I am hoping that this is a good indicator that he is responding to the current treatment  regimen.    Recommendations:  -Restart gabapentin 100 mg 3 times daily.  New prescription sent giving him the flexibility to go up to 200 mg 3 times daily.  Discussed there would be further room to increase this if needed.  -Okay to continue oxycodone 10 to 20 mg every four hours as needed.  Currently never needing more than 4 tablets/day.  -Continue senna and MiraLAX as needed.  Has not needed recently.  -Continue THC as needed.  Weight is improving as appetite has improved.      Follow up: 1 month      Video-Visit Details  Video Start Time: 3:51 PM  Video End Time: 4:05 PM    Originating Location (pt. Location): Home     Distant Location (provider location):  Offsite- Personal Home      Platform used for Video Visit: [employer]     Total time spent on day of encounter is 26 mins, including reviewing record, review of above studies, above visit with patient, symptomatic discussion of pain, bowels, neuropathy, and weight, including medication adjustments/prescription management, and documentation.           Isidra Prater DO  Palliative Medicine   AMCOM ID [id number]    Some chart documentation performed using Dragon Voice recognition Software. Although reviewed after completion, some words and grammatical errors may remain.      Again, thank you for allowing me to participate in the care of your patient.        Sincerely,        Isidra Prater DO

## 2024-10-09 NOTE — PROGRESS NOTES
"Palliative Care Progress Note    Patient Name: Roman Escalante  Primary Provider: Buddy Hart    Chief Complaint/Patient ID: Roman Escalante is a 49 year old male with PMHx of metastatic rectal cancer (mets to lung), currently enrolled in [facility] CAR-T trial (completed cell harvest 1/23/2024, tentatively planning administration of CAR-T in June).  He has been on multiple lines of systemic therapy, currently on Fruquintinib.     Last Palliative care appointment: 9/6/2024 with me. Completed a polst form stating DNR/full treatment. Also updated code status in the chart.     Reviewed: Yes    ORT Score = 1 for history of alcohol abuse and his mother.     Social History: Has a couple of kids and a couple grandkids who lives in [place].  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 Has a girlfriend-she is a hospice RN.     Advanced Care Planning: Has not completed. Girlfriend would be his HCA.     Interim History:  Roman Escalante is a 49 year old male who is seen today for follow up with Palliative Care via billable video visit.      Reviewed oncology note from earlier today.  Overall tolerating treatment well with improvement in symptoms.  Planning to repeat imaging later this month and then follow-up with primary oncologist.    Rectal pain has overall improved.  Notes that it is very sporadic.  He takes at most 4 tablets of oxycodone, and that is not consistently happening every day.  He is finding the pain generally happens when he has the bloody mucus discharge from his rectum, so it has helped that he is now able to stand up when he urinates as he then does not have that discharge.    He did have an increase in neuropathy recently and he is wondering if he should resume the gabapentin.    Bowels are moving daily which is very helpful for pain.  He has not taken any laxatives in a little over a month.    Appetite is good- started THC gummies. Gained another 5 pounds since his last " infusion.    Energy level is improved, continues to increase.  Says the iron infusions really made a difference, and he was amazed at how much more he could tolerate them compared to the oral regimen.    Currently in [place], and will be heading to [place] in [place] this next week.    Physical Exam:   Constitutional: Alert, pleasant, no apparent distress. Sitting up in chair.  Eyes: Sclera non-icteric, no eye discharge.  ENT: No nasal discharge. Ears grossly normal.  Respiratory: Unlabored respirations. Speaking in full sentences.  Musculoskeletal: Extremities appear normal- no gross deformities noted. No edema noted on upper body.   Skin: No suspicious lesions or rashes on visible skin.  Neurologic: Clear speech, no aphasia. No facial droop.  Psychiatric: Mentation appears normal, appropriate attention. Affect normal/bright. Does not appear anxious or depressed.    Key Data Reviewed:  LABS:   Lab Results   Component Value Date    WBC 8.3 10/09/2024    HGB 11.5 (L) 10/09/2024    HCT 36.6 (L) 10/09/2024     10/09/2024     10/09/2024    POTASSIUM 3.7 10/09/2024    CHLORIDE 103 10/09/2024    CO2 22 10/09/2024    BUN 15.3 10/09/2024    CR 0.80 10/09/2024     (H) 10/09/2024    SED 38 (H) 01/28/2024    AST 20 10/09/2024    ALT 11 10/09/2024    ALKPHOS 150 10/09/2024    BILITOTAL 0.3 10/09/2024    INR 1.30 (H) 01/28/2024     Impression & Recommendations & Counseling:  Roman Escalante is a 49 year old male with history of metastatic rectal cancer.     Continues to have significant improvement from a symptomatic standpoint, so I am hoping that this is a good indicator that he is responding to the current treatment regimen.    Recommendations:  -Restart gabapentin 100 mg 3 times daily.  New prescription sent giving him the flexibility to go up to 200 mg 3 times daily.  Discussed there would be further room to increase this if needed.  -Okay to continue oxycodone 10 to 20 mg every four hours as  needed.  Currently never needing more than 4 tablets/day.  -Continue senna and MiraLAX as needed.  Has not needed recently.  -Continue THC as needed.  Weight is improving as appetite has improved.      Follow up: 1 month      Video-Visit Details  Video Start Time: 3:51 PM  Video End Time: 4:05 PM    Originating Location (pt. Location): Home     Distant Location (provider location):  Offsite- Personal Home      Platform used for Video Visit: [employer]     Total time spent on day of encounter is 26 mins, including reviewing record, review of above studies, above visit with patient, symptomatic discussion of pain, bowels, neuropathy, and weight, including medication adjustments/prescription management, and documentation.           Isidra Prater,   Palliative Medicine   [id number]    Some chart documentation performed using Dragon Voice recognition Software. Although reviewed after completion, some words and grammatical errors may remain.

## 2024-10-09 NOTE — LETTER
"10/9/2024      Roman Escalante  [address]  [facility] [address]      Dear Colleague,    Thank you for referring your patient, Roman Escalante, to the [facility]. Please see a copy of my visit note below.    Palliative Care Progress Note    Patient Name: Roman Escalante  Primary Provider: Buddy Hart    Chief Complaint/Patient ID: Roman Escalante is a 49 year old male with PMHx of metastatic rectal cancer (mets to lung), currently enrolled in [facility] CAR-T trial (completed cell harvest 1/23/2024, tentatively planning administration of CAR-T in June).  He has been on multiple lines of systemic therapy, currently on Fruquintinib.     Last Palliative care appointment: 9/6/2024 with me. Completed a polst form stating DNR/full treatment. Also updated code status in the chart.     Reviewed: Yes    ORT Score = 1 for history of alcohol abuse and his mother.     Social History: Has a couple of kids and a couple grandkids who lives in [place].  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 Has a girlfriend-she is a hospice RN.     Advanced Care Planning: Has not completed. Girlfriend would be his HCA.     Interim History:  Roman Escalante is a 49 year old male who is seen today for follow up with Palliative Care via billable video visit.      Reviewed oncology note from earlier today.  Overall tolerating treatment well with improvement in symptoms.  Planning to repeat imaging later this month and then follow-up with primary oncologist.    Rectal pain has overall improved.  Notes that it is very sporadic.  He takes at most 4 tablets of oxycodone, and that is not consistently happening every day.  He is finding the pain generally happens when he has the bloody mucus discharge from his rectum, so it has helped that he is now able to stand up when he urinates as he then does not have that discharge.    He did have an increase in neuropathy recently and " he is wondering if he should resume the gabapentin.    Bowels are moving daily which is very helpful for pain.  He has not taken any laxatives in a little over a month.    Appetite is good- started THC gummies. Gained another 5 pounds since his last infusion.    Energy level is improved, continues to increase.  Says the iron infusions really made a difference, and he was amazed at how much more he could tolerate them compared to the oral regimen.    Currently in [place], and will be heading to [place] in [place] this next week.    Physical Exam:   Constitutional: Alert, pleasant, no apparent distress. Sitting up in chair.  Eyes: Sclera non-icteric, no eye discharge.  ENT: No nasal discharge. Ears grossly normal.  Respiratory: Unlabored respirations. Speaking in full sentences.  Musculoskeletal: Extremities appear normal- no gross deformities noted. No edema noted on upper body.   Skin: No suspicious lesions or rashes on visible skin.  Neurologic: Clear speech, no aphasia. No facial droop.  Psychiatric: Mentation appears normal, appropriate attention. Affect normal/bright. Does not appear anxious or depressed.    Key Data Reviewed:  LABS:   Lab Results   Component Value Date    WBC 8.3 10/09/2024    HGB 11.5 (L) 10/09/2024    HCT 36.6 (L) 10/09/2024     10/09/2024     10/09/2024    POTASSIUM 3.7 10/09/2024    CHLORIDE 103 10/09/2024    CO2 22 10/09/2024    BUN 15.3 10/09/2024    CR 0.80 10/09/2024     (H) 10/09/2024    SED 38 (H) 01/28/2024    AST 20 10/09/2024    ALT 11 10/09/2024    ALKPHOS 150 10/09/2024    BILITOTAL 0.3 10/09/2024    INR 1.30 (H) 01/28/2024     Impression & Recommendations & Counseling:  Roman Escalante is a 49 year old male with history of metastatic rectal cancer.     Continues to have significant improvement from a symptomatic standpoint, so I am hoping that this is a good indicator that he is responding to the current treatment  regimen.    Recommendations:  -Restart gabapentin 100 mg 3 times daily.  New prescription sent giving him the flexibility to go up to 200 mg 3 times daily.  Discussed there would be further room to increase this if needed.  -Okay to continue oxycodone 10 to 20 mg every four hours as needed.  Currently never needing more than 4 tablets/day.  -Continue senna and MiraLAX as needed.  Has not needed recently.  -Continue THC as needed.  Weight is improving as appetite has improved.      Follow up: 1 month      Video-Visit Details  Video Start Time: 3:51 PM  Video End Time: 4:05 PM    Originating Location (pt. Location): Home     Distant Location (provider location):  Offsite- Personal Home      Platform used for Video Visit: [employer]     Total time spent on day of encounter is 26 mins, including reviewing record, review of above studies, above visit with patient, symptomatic discussion of pain, bowels, neuropathy, and weight, including medication adjustments/prescription management, and documentation.           Isidra Prater DO  Palliative Medicine   [facility] ID [id number]    Some chart documentation performed using Dragon Voice recognition Software. Although reviewed after completion, some words and grammatical errors may remain.      Again, thank you for allowing me to participate in the care of your patient.        Sincerely,        Isidra Prater DO

## 2024-10-09 NOTE — PATIENT INSTRUCTIONS
Recommendations:  -Restart gabapentin 100 mg 3 times daily.  New prescription sent giving you the flexibility to go up to 200 mg 3 times daily.  Discussed there would be further room to increase this if needed.  -Okay to continue oxycodone 10 to 20 mg every four hours as needed.  Let me know when you need a refill.  -Continue senna and MiraLAX as needed.  -Continue THC as needed.     Follow up: 1 month then we can see about spreading out visits      Reasons to Call    If you are having worsening/uncontrolled symptoms we want you to call!    You or your other physicians make any changes to medications we have prescribed.  -Please call for refills 4-5 days before you will run out of medication.    Important Phone Numbers, including: Refills, scheduling, and general questions     Palliative Care RN: Rebeca Urbina : [phone]  *For scheduling needs/follow up visits : [phone]  *After hours or on weekends- Will connect you with on call MD : [phone].

## 2024-10-09 NOTE — NURSING NOTE
Current patient location:  Pt states he is currently in [place], [place]    Is the patient currently in the state of [place]? YES    Visit mode:VIDEO    If the visit is dropped, the patient can be reconnected by: VIDEO VISIT: Text to cell phone:   Telephone Information:   Mobile [phone]       Will anyone else be joining the visit? NO  (If patient encounters technical issues they should call [phone]    Are changes needed to the allergy or medication list? Pt stated no changes to allergies and Pt stated no med changes    Are refills needed on medications prescribed by this physician? NO    Rooming Documentation:  Questionnaire(s) completed    Reason for visit: RECHECK    Levi RODRIGUEZ

## 2024-10-09 NOTE — Clinical Note
10/9/2024      Roman Escalante  [address]  [facility] [address]      Dear Colleague,    Thank you for referring your patient, Roman Escalante, to the [facility]. Please see a copy of my visit note below.      [facility] - Medical Oncology Follow Up Note  10/09/2024    Oncology History:   Patient developed rectal bleeding in 2020.  In January 2021 CT showed focal wall thickening in the upper rectum, multiple pulmonary nodules bilaterally suspicious for metastatic disease.  Underwent FNA of the right upper lobe lesion which was consistent with adenocarcinoma of the colon.  He was treated with FOLFOX from 2/20/2021 through 5/20/2021.  Avastin was added.  Had an infusion reaction to oxaliplatin 6/2021.  7/2021- 12/2021 was on 5-FU alone.  Developed progression.  12/2021- 9/2022 was on FOLFIRI.  11/2021 started Lonsurf. All of this was done with outside oncologist.     Met with Dr. Snow on 2/3/2023 to establish care. Recommended regorafenib.     Started regorafenib on 3/2/2023. Progression noted 5/19/23. Plan to start FOLFOX/Avastin and send out CARIS testing to evaluate for other treatment options. Cycle 1 was given with oxaliplatin desensitization, 5FU, Avastin held due to increased urine protein.     Following cycle 4, patient was admitted with hematuria and acute kidney injury.    Oxaliplatin was dropped with Cycle 5.     Y-90 radioembolization 9/7.    10/24/23 CT CAP with disease progression with notable growth in all pulmonary lesions. Lonsurf + bevacizumab started 10/27/23 with plans to bridge to clinical trial.   11/15/23 - stopped chemo for [facility] study.   Currently enrolled in [facility] CAR-T trial (completed cell harvest 1/23/2024, tentatively planning administration of CAR-T in April 2/12/24 - resumed Lonsurf, bevacizumab to bridge treatment until CAR-T therapy  3/20/24 - treatment changed to Fruquintinib due to intolerability of  Lonsurf/bevacizumab  4/2/24- Presented to the ED with dehydration. Stopped Fruquintnib.   6/2024- cellular therapy transplant at [facility].   7/30/24- CT imaging demonstrating marked progression of pulmonary and hepatic lesions, clinical trial failed to generate T-cell graft. Multiple discussions regarding plan of care, hospice vs additional lines of treatment. Patient decided he is not ready for hospice. He resumed treatment with FOLFIRI + bevacizumab on 8/30/24.   ***       Interval History:     Bowels are moving daily which is very helpful for pain  Rectal pain has overall improved, comes and goes, taking oxy as needed  Appetite is good, gained weight   Neuropathy in feet is acutely flared due to shoveling  Felt iron infusions were helpful for energy       Roman presents today for follow up prior to cycle 3 FOLFIRI + bevacizumab.    -reports he is feeling well and offers no new concerns today  -started THC gummies, appetite is improved, gained a couple of pounds  -denies nausea or reflux  -has a bowel movement every couple of days, hasn't been taking any PRN meds   -denies any urinary concerns   -rectal pain is intermittent, unpredictable occurrences but is overall greatly improved. Taking oxycodone as needed  -energy level is improved, continues to increase. Over the last 1-2 weeks, he has been getting out of bed earlier in the morning   -denies any shortness of breath, chest pain/pressure or cough       Review of Systems:  Patient denies any of the following except if noted above: fevers, chills, difficulty with energy, vision or hearing changes, chest pain, dyspnea, abdominal pain, nausea, vomiting, diarrhea, constipation, urinary concerns, headaches, numbness, tingling, issues with sleep or mood. Also denies lumps, bumps, rashes or skin lesions, bleeding or bruising issues.      Current Outpatient Medications   Medication Sig Dispense Refill    ondansetron (ZOFRAN ODT) 4 MG ODT tab Take 1 tablet (4 mg) by mouth  every 6 hours as needed for nausea. 30 tablet 3    Ostomy Supplies MISC 1 each daily 1 each 11    oxyCODONE (ROXICODONE) 10 MG tablet Take 1-2 tablets (10-20 mg) by mouth every 4 hours as needed for moderate to severe pain. 150 tablet 0    prochlorperazine (COMPAZINE) 10 MG tablet Take 0.5 tablets (5 mg) by mouth every 6 hours as needed for nausea or vomiting. 30 tablet 2    prochlorperazine (COMPAZINE) 10 MG tablet Take 1 tablet (10 mg) by mouth every 6 hours as needed for nausea or vomiting 30 tablet 2    prochlorperazine (COMPAZINE) 5 MG tablet Take 1 tablet (5 mg) by mouth every 6 hours as needed for nausea or vomiting 30 tablet 3    sulfamethoxazole-trimethoprim (BACTRIM DS) 800-160 MG tablet       cyclobenzaprine (FLEXERIL) 5 MG tablet Take one tab (5mg) by mouth over 6-8 hours, as needed for spasms (Patient not taking: Reported on 10/4/2024) 45 tablet 2    gabapentin (NEURONTIN) 100 MG capsule Take 100mg by mouth at bedtime for 3 days, then Take 100mg by mouth in AM and 100mg at bedtime for 3 days, then Take 100mg by mouth three times per day (Patient not taking: Reported on 10/4/2024) 90 capsule 0    ibuprofen (ADVIL/MOTRIN) 200 MG tablet Take 3 tablets (600 mg) by mouth every 8 hours as needed for moderate pain (Patient not taking: Reported on 10/9/2024) 100 tablet 0    loperamide (IMODIUM) 2 MG capsule 2 caps at 1st sign of diarrhea & 1 cap every 2hrs until 12hrs diarrhea free. During night, 2 caps at bedtime & 2 caps every 4hrs until AM (Patient not taking: Reported on 10/9/2024) 30 capsule 0    LORazepam (ATIVAN) 0.5 MG tablet Take 1 tablet (0.5 mg) by mouth every 6 hours as needed for anxiety (Patient not taking: Reported on 10/4/2024) 10 tablet 0    morphine (MS CONTIN) 15 MG CR tablet Take 1 tablet (15 mg) by mouth every 12 hours (Patient not taking: Reported on 10/4/2024) 60 tablet 0    tamsulosin (FLOMAX) 0.4 MG capsule  (Patient not taking: Reported on 10/4/2024)      valACYclovir (VALTREX) 500 MG  tablet  (Patient not taking: Reported on 10/4/2024)       Physical Exam:  General: The patient is a pleasant male in no acute distress.  /75   Pulse 87   Temp 97.7  F (36.5  C) (Oral)   Resp 16   Wt 76.2 kg (168 lb 1.6 oz)   SpO2 95%   BMI 23.12 kg/m    Wt Readings from Last 10 Encounters:   10/09/24 76.2 kg (168 lb 1.6 oz)   09/25/24 72.8 kg (160 lb 9.6 oz)   09/13/24 69.9 kg (154 lb)   09/06/24 72.6 kg (160 lb)   08/30/24 72.8 kg (160 lb 6.4 oz)   08/09/24 74.8 kg (164 lb 14.4 oz)   08/02/24 72.6 kg (160 lb)   07/31/24 73.5 kg (162 lb)   07/15/24 73.7 kg (162 lb 8 oz)   05/21/24 84.4 kg (186 lb)   HEENT: EOMI. Sclerae are anicteric. Oral mucosa pink and moist without lesions or thrush.   Lymph: Neck is supple with no lymphadenopathy in the cervical or supraclavicular areas.   Heart: Mildly tachycardic with regular rhythm.   Lungs: Clear to auscultation throughout, normal work of breathing.   Abdomen: Bowel sounds present, soft, nontender with no palpable hepatosplenomegaly or masses. Ostomy is in place in left abdomen with minimal amount of formed brown stool.   Extremities: No lower extremity edema noted bilaterally.   Neuro: Cranial nerves II through XII are grossly intact.  Skin: No rashes, petechiae, or bruising noted on exposed skin.    LABS  Most Recent 3 CBC's:  Recent Labs   Lab Test 10/09/24  0646 09/25/24  0642 09/13/24 0924   WBC 8.3 6.6 7.7   HGB 11.5* 11.0* 11.1*   MCV 82 78 76*    161 200    Most Recent 3 BMP's:  Recent Labs   Lab Test 09/25/24  0642 09/13/24  0924 08/30/24  1030    138 132*   POTASSIUM 3.9 3.7 4.2   CHLORIDE 104 101 97*   CO2 22 23 23   BUN 13.3 12.8 16.2   CR 0.77 1.11 1.01   ANIONGAP 11 14 12   JEFERSON 9.3 9.7 8.8   * 130* 99    Most Recent 2 LFT's:  Recent Labs   Lab Test 09/25/24  0642 09/13/24  0924   AST 26 31   ALT 10 28   ALKPHOS 168* 174*   BILITOTAL 0.2 0.3   I reviewed the above labs today.    ASSESSMENT AND PLAN   Metastatic rectal  cancer  -Most recently progressed after CAR-T clinical trial. He started on treatment with FOLFIRI + bevacizumab on 8/30/24. Tolerating treatment well with improvement in energy, pain and breathing/shortness of breath. He denies any significant side effects from treatment. *** Urine protein elevated, completed 24 hour urine collection with results WNL, continue bevacizumab. He will continue with cycle *** FOLFIRI/[place] today. He will follow-up with us prior to each cycle of treatment.***  Repeat imaging and MD follow up scheduled in October.      Wants to get disconnected at home ***    Oxygen, fluid in lungs    -shortness of breath with activity has improved. Previously, walking oxygen test demonstrating oxygen saturations in the 80's on room air. Previously ordered home oxygen with activity. Did not complete approval process, but on repeat walking test in clinic today, oxygen saturations were adequate.*** Will continue to monitor. Echo on 9/5/24 showed a normal EF.     Weight loss  Previous weight loss despite efforts to increase intake. Started THC gummies, appetite and weight have improved. Nutrition referral previously placed.     Rectal, perineum pain  Following with palliative care. No longer needing MS Contin, but remains on oxycodone prn, currently 10 mg 2-3 times per day.     Anemia  Patient has microcytic anemia. Hemoglobin is better than the last check. Iron is mildly low with a normal TIBC. Ferritin is elevated, likely due to inflammation from cancer. -Soluble transferrin  elevated. IV iron ordered to start with today's infusion.       The longitudinal plan of care for the diagnosis(es)/condition(s) as documented were addressed during this visit. Due to the added complexity in care, I will continue to support Roman in the subsequent management and with ongoing continuity of care.      *** minutes spent on the date of the encounter doing chart review, review of test results, interpretation of tests,  patient visit, and documentation     FLORA Albright CNP        Again, thank you for allowing me to participate in the care of your patient.        Sincerely,        FLORA Albright CNP

## 2024-10-09 NOTE — PROGRESS NOTES
Infusion Nursing Note:  Roman Escalante presents today for C4D1 Bevacizumab-bvzr/Irinotecan/Leucovorin/Fluorouracil Bolus/Fluorouracil Pump Connect.    Patient seen by provider today: Yes: Rebeca Killian CNP prior to infusion   present during visit today: Not Applicable.    Note: Denied any questions or concerns following provider visit prior to infusion.      Intravenous Access:  Implanted Port.    Treatment Conditions:   Latest Reference Range & Units 10/09/24 06:46   Sodium 135 - 145 mmol/L 137   Potassium 3.4 - 5.3 mmol/L 3.7   Chloride 98 - 107 mmol/L 103   Carbon Dioxide (CO2) 22 - 29 mmol/L 22   Urea Nitrogen 6.0 - 20.0 mg/dL 15.3   Creatinine 0.67 - 1.17 mg/dL 0.80   GFR Estimate >60 mL/min/1.73m2 >90   Calcium 8.8 - 10.4 mg/dL 9.0   Anion Gap 7 - 15 mmol/L 12   Albumin 3.5 - 5.2 g/dL 4.1   Protein Total 6.4 - 8.3 g/dL 6.8   Alkaline Phosphatase 40 - 150 U/L 150   ALT 0 - 70 U/L 11   AST 0 - 45 U/L 20   Bilirubin Total <=1.2 mg/dL 0.3   Glucose 70 - 99 mg/dL 111 (H)   WBC 4.0 - 11.0 10e3/uL 8.3   Hemoglobin 13.3 - 17.7 g/dL 11.5 (L)   Hematocrit 40.0 - 53.0 % 36.6 (L)   Platelet Count 150 - 450 10e3/uL 164   RBC Count 4.40 - 5.90 10e6/uL 4.49   MCV 78 - 100 fL 82   MCH 26.5 - 33.0 pg 25.6 (L)   MCHC 31.5 - 36.5 g/dL 31.4 (L)   RDW 10.0 - 15.0 % 26.9 (H)   % Neutrophils % 74   % Lymphocytes % 8   % Monocytes % 13   % Eosinophils % 3   % Basophils % 1   Absolute Basophils 0.0 - 0.2 10e3/uL 0.1   Absolute Eosinophils 0.0 - 0.7 10e3/uL 0.3   Absolute Immature Granulocytes <=0.4 10e3/uL 0.1   Absolute Lymphocytes 0.8 - 5.3 10e3/uL 0.7 (L)   Absolute Monocytes 0.0 - 1.3 10e3/uL 1.1   % Immature Granulocytes % 1   Absolute Neutrophils 1.6 - 8.3 10e3/uL 6.2   Absolute NRBCs 10e3/uL 0.0   NRBCs per 100 WBC <1 /100 0     Results reviewed, labs MET treatment parameters, ok to proceed with treatment.    /75    Post Infusion Assessment:  Patient tolerated infusion without incident.  Blood return noted  pre and post infusion.  Blood return noted during 5Fu bolus administration every 3 cc.  Site patent and intact, free from redness, edema or discomfort.  No evidence of extravasations.     Prior to discharge: Port is secured in place with tegaderm and flushed with 10cc NS with positive blood return noted.    Continuous home infusion Dosi-Fuser pump connected.    All connectors secured in place and clamps taped open.      Capillary element taped to pt's skin per protocol.  Pump Connection double checked with Linda LIN RN.  Patient instructed to call our clinic or [facility] with any questions or concerns at home.  Patient verbalized understanding.    Patient set up for pump disconnect at home with [facility] on Friday 10/11 at 8am.        Discharge Plan:   Patient declined prescription refills.  Discharge instructions reviewed with: Patient.  Patient and/or family verbalized understanding of discharge instructions and all questions answered.  AVS to patient via [employer].  Patient will return 10/23 for next appointment.   Patient discharged in stable condition accompanied by: self.  Departure Mode: Ambulatory.      Alana Espinal RN

## 2024-10-09 NOTE — PROGRESS NOTES
[facility] - Medical Oncology Follow Up Note  10/23/2024    Oncology History:   Patient developed rectal bleeding in 2020.  In January 2021 CT showed focal wall thickening in the upper rectum, multiple pulmonary nodules bilaterally suspicious for metastatic disease.  Underwent FNA of the right upper lobe lesion which was consistent with adenocarcinoma of the colon.  He was treated with FOLFOX from 2/20/2021 through 5/20/2021.  Avastin was added.  Had an infusion reaction to oxaliplatin 6/2021.  7/2021- 12/2021 was on 5-FU alone.  Developed progression.  12/2021- 9/2022 was on FOLFIRI.  11/2021 started Lonsurf. All of this was done with outside oncologist.     Met with Dr. Snow on 2/3/2023 to establish care. Recommended regorafenib.     Started regorafenib on 3/2/2023. Progression noted 5/19/23. Plan to start FOLFOX/Avastin and send out CARIS testing to evaluate for other treatment options. Cycle 1 was given with oxaliplatin desensitization, 5FU, Avastin held due to increased urine protein.     Following cycle 4, patient was admitted with hematuria and acute kidney injury.    Oxaliplatin was dropped with Cycle 5.     Y-90 radioembolization 9/7.    10/24/23 CT CAP with disease progression with notable growth in all pulmonary lesions. Lonsurf + bevacizumab started 10/27/23 with plans to bridge to clinical trial.   11/15/23 - stopped chemo for [facility] study.   Currently enrolled in [facility] CAR-T trial (completed cell harvest 1/23/2024, tentatively planning administration of CAR-T in April 2/12/24 - resumed Lonsurf, bevacizumab to bridge treatment until CAR-T therapy  3/20/24 - treatment changed to Fruquintinib due to intolerability of Lonsurf/bevacizumab  4/2/24- Presented to the ED with dehydration. Stopped Fruquintnib.   6/2024- cellular therapy transplant at [facility].   7/30/24- CT imaging demonstrating marked progression of pulmonary and hepatic lesions, clinical trial failed to generate T-cell graft. Multiple  discussions regarding plan of care, hospice vs additional lines of treatment. Patient decided he is not ready for hospice. He resumed treatment with FOLFIRI + bevacizumab on 8/30/24.          Interval History:     Bowels are moving daily which is very helpful for pain  Rectal pain has overall improved, comes and goes, taking oxy as needed  Appetite is good, gained weight, continues to use gummies   Neuropathy in feet is acutely flared due to shoveling  Felt iron infusions were helpful for energy          Review of Systems:  Patient denies any of the following except if noted above: fevers, chills, difficulty with energy, vision or hearing changes, chest pain, dyspnea, abdominal pain, nausea, vomiting, diarrhea, constipation, urinary concerns, headaches, numbness, tingling, issues with sleep or mood. Also denies lumps, bumps, rashes or skin lesions, bleeding or bruising issues.      Current Outpatient Medications   Medication Sig Dispense Refill    NARCAN 4 MG/0.1ML nasal spray CALL 911. SPR CONTENTS OF ONE SPRAYER (0.1ML) INTO ONE NOSTRIL. REPEAT IN 2-3 MIN IF SYMPTOMS OF OPIOID EMERGENCY PERSIST, ALTERNATE NOSTRILS      ondansetron (ZOFRAN ODT) 4 MG ODT tab Take 1 tablet (4 mg) by mouth every 6 hours as needed for nausea. 30 tablet 3    Ostomy Supplies MISC 1 each daily 1 each 11    oxyCODONE (ROXICODONE) 10 MG tablet Take 1-2 tablets (10-20 mg) by mouth every 4 hours as needed for moderate to severe pain. 150 tablet 0    prochlorperazine (COMPAZINE) 10 MG tablet Take 0.5 tablets (5 mg) by mouth every 6 hours as needed for nausea or vomiting. 30 tablet 2    prochlorperazine (COMPAZINE) 10 MG tablet Take 1 tablet (10 mg) by mouth every 6 hours as needed for nausea or vomiting 30 tablet 2    prochlorperazine (COMPAZINE) 5 MG tablet Take 1 tablet (5 mg) by mouth every 6 hours as needed for nausea or vomiting 30 tablet 3    sulfamethoxazole-trimethoprim (BACTRIM DS) 800-160 MG tablet       cyclobenzaprine (FLEXERIL) 5  MG tablet Take one tab (5mg) by mouth over 6-8 hours, as needed for spasms (Patient not taking: Reported on 10/4/2024) 45 tablet 2    gabapentin (NEURONTIN) 100 MG capsule Take 1 capsule (100 mg) by mouth at bedtime for 3 days, THEN 1 capsule (100 mg) 2 times daily for 3 days, THEN 1 capsule (100 mg) 3 times daily for 24 days. 81 capsule 0    ibuprofen (ADVIL/MOTRIN) 200 MG tablet Take 3 tablets (600 mg) by mouth every 8 hours as needed for moderate pain (Patient not taking: Reported on 10/9/2024) 100 tablet 0    loperamide (IMODIUM) 2 MG capsule 2 caps at 1st sign of diarrhea & 1 cap every 2hrs until 12hrs diarrhea free. During night, 2 caps at bedtime & 2 caps every 4hrs until AM (Patient not taking: Reported on 10/9/2024) 30 capsule 0    LORazepam (ATIVAN) 0.5 MG tablet Take 1 tablet (0.5 mg) by mouth every 6 hours as needed for anxiety (Patient not taking: Reported on 10/4/2024) 10 tablet 0    morphine (MS CONTIN) 15 MG CR tablet Take 1 tablet (15 mg) by mouth every 12 hours (Patient not taking: Reported on 10/4/2024) 60 tablet 0    tamsulosin (FLOMAX) 0.4 MG capsule  (Patient not taking: Reported on 10/4/2024)      valACYclovir (VALTREX) 500 MG tablet  (Patient not taking: Reported on 10/4/2024)       Physical Exam:  General: The patient is a pleasant male in no acute distress.  /75   Pulse 87   Temp 97.7  F (36.5  C) (Oral)   Resp 16   Wt 76.2 kg (168 lb 1.6 oz)   SpO2 95%   BMI 23.12 kg/m    Wt Readings from Last 10 Encounters:   10/09/24 76.2 kg (168 lb 1.6 oz)   09/25/24 72.8 kg (160 lb 9.6 oz)   09/13/24 69.9 kg (154 lb)   09/06/24 72.6 kg (160 lb)   08/30/24 72.8 kg (160 lb 6.4 oz)   08/09/24 74.8 kg (164 lb 14.4 oz)   08/02/24 72.6 kg (160 lb)   07/31/24 73.5 kg (162 lb)   07/15/24 73.7 kg (162 lb 8 oz)   05/21/24 84.4 kg (186 lb)   HEENT: EOMI. Sclerae are anicteric. Oral mucosa pink and moist without lesions or thrush.   Lymph: Neck is supple with no lymphadenopathy in the cervical or  supraclavicular areas.   Heart: Mildly tachycardic with regular rhythm.   Lungs: Clear to auscultation throughout, normal work of breathing.   Abdomen: Bowel sounds present, soft, nontender with no palpable hepatosplenomegaly or masses. Ostomy is in place in left abdomen with minimal amount of formed brown stool.   Extremities: No lower extremity edema noted bilaterally.   Neuro: Cranial nerves II through XII are grossly intact.  Skin: No rashes, petechiae, or bruising noted on exposed skin.    LABS  Most Recent 3 CBC's:  Recent Labs   Lab Test 10/09/24  0646 09/25/24  0642 09/13/24 0924   WBC 8.3 6.6 7.7   HGB 11.5* 11.0* 11.1*   MCV 82 78 76*    161 200    Most Recent 3 BMP's:  Recent Labs   Lab Test 10/09/24  0646 09/25/24 0642 09/13/24 0924    137 138   POTASSIUM 3.7 3.9 3.7   CHLORIDE 103 104 101   CO2 22 22 23   BUN 15.3 13.3 12.8   CR 0.80 0.77 1.11   ANIONGAP 12 11 14   JEFERSON 9.0 9.3 9.7   * 134* 130*    Most Recent 2 LFT's:  Recent Labs   Lab Test 10/09/24  0646 09/25/24  0642   AST 20 26   ALT 11 10   ALKPHOS 150 168*   BILITOTAL 0.3 0.2   I reviewed the above labs today.    ASSESSMENT AND PLAN   Metastatic rectal cancer  -Most recently progressed after CAR-T clinical trial. He started on treatment with FOLFIRI + bevacizumab on 8/30/24. Tolerating treatment well with improvement in energy, pain and breathing/shortness of breath. He denies any significant side effects from treatment. Urine protein elevated, completed 24 hour urine collection 9/25/24 with results WNL, continue bevacizumab. He will continue with cycle 4 FOLFIRI/chelsea today. Continue with monthly LILLY follow up, patient to call sooner with any concerns. Repeat imaging and MD follow up scheduled in October.      Oxygen, fluid in lungs    -shortness of breath with activity has improved. Previously, walking oxygen test demonstrating oxygen saturations in the 80's on room air. Previously ordered home oxygen with activity. Did not  get approved through insurance, subsequent walking tests with adequate oxygen saturations. Breathing overall has improved. Will continue to monitor. Echo on 9/5/24 showed a normal EF.     Weight loss  Previous weight loss despite efforts to increase intake. Started THC gummies, appetite and weight have improved. Nutrition referral previously placed.     Rectal, perineum pain  Following with palliative care. No longer needing MS Contin, but remains on oxycodone prn, currently 10 mg 2-3 times per day.     Anemia  Patient has microcytic anemia. Hemoglobin is better than the last check. Iron is mildly low with a normal TIBC. Ferritin is elevated, likely due to inflammation from cancer. -Soluble transferrin  elevated. IV iron ordered to start with today's infusion.       The longitudinal plan of care for the diagnosis(es)/condition(s) as documented were addressed during this visit. Due to the added complexity in care, I will continue to support Roman in the subsequent management and with ongoing continuity of care.      FLORA Albright CNP

## 2024-10-09 NOTE — PATIENT INSTRUCTIONS
[facility] Triage and after hours / weekends / holidays:  [phone] option 5, option 2    Please call the triage or after hours line if you experience a temperature greater than or equal to 100.4, shaking chills, have uncontrolled nausea, vomiting and/or diarrhea, dizziness, shortness of breath, chest pain, bleeding, unexplained bruising, or if you have any other new/concerning symptoms, questions or concerns.      If you are having any concerning symptoms or wish to speak to a provider before your next infusion visit, please call triage to notify your care team so we can adequately serve you.     If you need a refill on a narcotic prescription or other medication, please call before your infusion appointment.

## 2024-10-10 DIAGNOSIS — G89.3 CANCER ASSOCIATED PAIN: ICD-10-CM

## 2024-10-10 DIAGNOSIS — T45.1X5A NEUROPATHY DUE TO CHEMOTHERAPEUTIC DRUG (H): ICD-10-CM

## 2024-10-10 DIAGNOSIS — G62.0 NEUROPATHY DUE TO CHEMOTHERAPEUTIC DRUG (H): ICD-10-CM

## 2024-10-10 RX ORDER — GABAPENTIN 100 MG/1
CAPSULE ORAL
Qty: 81 CAPSULE | Refills: 0 | Status: SHIPPED | OUTPATIENT
Start: 2024-10-10 | End: 2024-11-09

## 2024-10-10 NOTE — TELEPHONE ENCOUNTER
Resending gabapentin prescription to pharmacy with clarified directions.    SHIELA CaseyN, RN  Palliative Care Nurse Clinician    [phone] (Direct)  [phone] (Main)  [phone] (Appointment Scheduling)

## 2024-10-15 ENCOUNTER — HOME INFUSION (OUTPATIENT)
Dept: HOME HEALTH SERVICES | Facility: HOME HEALTH | Age: 49
End: 2024-10-15
Payer: COMMERCIAL

## 2024-10-15 VITALS — BODY MASS INDEX: 23.12 KG/M2 | HEIGHT: 72 IN

## 2024-10-15 DIAGNOSIS — C20 RECTAL ADENOCARCINOMA METASTATIC TO LUNG (H): Primary | ICD-10-CM

## 2024-10-15 DIAGNOSIS — C20 MALIGNANT TUMOR OF RECTUM (H): ICD-10-CM

## 2024-10-15 DIAGNOSIS — C78.00 RECTAL ADENOCARCINOMA METASTATIC TO LUNG (H): Primary | ICD-10-CM

## 2024-10-18 ENCOUNTER — ANCILLARY PROCEDURE (OUTPATIENT)
Dept: CT IMAGING | Facility: CLINIC | Age: 49
End: 2024-10-18
Attending: STUDENT IN AN ORGANIZED HEALTH CARE EDUCATION/TRAINING PROGRAM
Payer: COMMERCIAL

## 2024-10-18 DIAGNOSIS — C78.7 COLON CANCER METASTASIZED TO LIVER (H): ICD-10-CM

## 2024-10-18 DIAGNOSIS — C18.9 COLON CANCER METASTASIZED TO LIVER (H): ICD-10-CM

## 2024-10-18 PROCEDURE — 71260 CT THORAX DX C+: CPT | Performed by: RADIOLOGY

## 2024-10-18 PROCEDURE — 74177 CT ABD & PELVIS W/CONTRAST: CPT | Performed by: RADIOLOGY

## 2024-10-18 RX ORDER — IOPAMIDOL 755 MG/ML
87 INJECTION, SOLUTION INTRAVASCULAR ONCE
Status: COMPLETED | OUTPATIENT
Start: 2024-10-18 | End: 2024-10-18

## 2024-10-18 RX ORDER — HEPARIN SODIUM (PORCINE) LOCK FLUSH IV SOLN 100 UNIT/ML 100 UNIT/ML
500 SOLUTION INTRAVENOUS ONCE
Status: COMPLETED | OUTPATIENT
Start: 2024-10-18 | End: 2024-10-18

## 2024-10-18 RX ADMIN — IOPAMIDOL 87 ML: 755 INJECTION, SOLUTION INTRAVASCULAR at 12:22

## 2024-10-18 RX ADMIN — HEPARIN SODIUM (PORCINE) LOCK FLUSH IV SOLN 100 UNIT/ML 500 UNITS: 100 SOLUTION at 12:33

## 2024-10-18 NOTE — DISCHARGE INSTRUCTIONS

## 2024-10-23 ENCOUNTER — HOME INFUSION (PRE-WILLOW HOME INFUSION) (OUTPATIENT)
Dept: HOME HEALTH SERVICES | Facility: HOME HEALTH | Age: 49
End: 2024-10-23
Payer: COMMERCIAL

## 2024-10-23 ENCOUNTER — APPOINTMENT (OUTPATIENT)
Dept: LAB | Facility: CLINIC | Age: 49
End: 2024-10-23
Attending: STUDENT IN AN ORGANIZED HEALTH CARE EDUCATION/TRAINING PROGRAM
Payer: COMMERCIAL

## 2024-10-23 ENCOUNTER — HOME INFUSION BILLING (OUTPATIENT)
Dept: HOME HEALTH SERVICES | Facility: HOME HEALTH | Age: 49
End: 2024-10-23
Payer: COMMERCIAL

## 2024-10-23 ENCOUNTER — INFUSION THERAPY VISIT (OUTPATIENT)
Dept: ONCOLOGY | Facility: CLINIC | Age: 49
End: 2024-10-23
Attending: STUDENT IN AN ORGANIZED HEALTH CARE EDUCATION/TRAINING PROGRAM
Payer: COMMERCIAL

## 2024-10-23 VITALS
TEMPERATURE: 97.9 F | SYSTOLIC BLOOD PRESSURE: 111 MMHG | OXYGEN SATURATION: 98 % | HEART RATE: 75 BPM | RESPIRATION RATE: 16 BRPM | BODY MASS INDEX: 23.38 KG/M2 | WEIGHT: 170 LBS | DIASTOLIC BLOOD PRESSURE: 71 MMHG

## 2024-10-23 DIAGNOSIS — C20 RECTAL ADENOCARCINOMA METASTATIC TO LUNG (H): Primary | ICD-10-CM

## 2024-10-23 DIAGNOSIS — C18.9 PRIMARY ADENOCARCINOMA OF COLON (H): ICD-10-CM

## 2024-10-23 DIAGNOSIS — C78.00 RECTAL ADENOCARCINOMA METASTATIC TO LUNG (H): Primary | ICD-10-CM

## 2024-10-23 DIAGNOSIS — Z79.899 ENCOUNTER FOR LONG-TERM (CURRENT) USE OF MEDICATIONS: ICD-10-CM

## 2024-10-23 LAB
ALBUMIN SERPL BCG-MCNC: 3.9 G/DL (ref 3.5–5.2)
ALBUMIN UR-MCNC: 20 MG/DL
ALP SERPL-CCNC: 135 U/L (ref 40–150)
ALT SERPL W P-5'-P-CCNC: 9 U/L (ref 0–70)
ANION GAP SERPL CALCULATED.3IONS-SCNC: 9 MMOL/L (ref 7–15)
AST SERPL W P-5'-P-CCNC: 17 U/L (ref 0–45)
BASOPHILS # BLD AUTO: 0 10E3/UL (ref 0–0.2)
BASOPHILS NFR BLD AUTO: 1 %
BILIRUB SERPL-MCNC: 0.2 MG/DL
BUN SERPL-MCNC: 13.1 MG/DL (ref 6–20)
CALCIUM SERPL-MCNC: 8.9 MG/DL (ref 8.8–10.4)
CHLORIDE SERPL-SCNC: 107 MMOL/L (ref 98–107)
CREAT SERPL-MCNC: 0.76 MG/DL (ref 0.67–1.17)
EGFRCR SERPLBLD CKD-EPI 2021: >90 ML/MIN/1.73M2
EOSINOPHIL # BLD AUTO: 0.2 10E3/UL (ref 0–0.7)
EOSINOPHIL NFR BLD AUTO: 3 %
ERYTHROCYTE [DISTWIDTH] IN BLOOD BY AUTOMATED COUNT: 28.6 % (ref 10–15)
GLUCOSE SERPL-MCNC: 91 MG/DL (ref 70–99)
HCO3 SERPL-SCNC: 23 MMOL/L (ref 22–29)
HCT VFR BLD AUTO: 38.6 % (ref 40–53)
HGB BLD-MCNC: 12.2 G/DL (ref 13.3–17.7)
IMM GRANULOCYTES # BLD: 0.1 10E3/UL
IMM GRANULOCYTES NFR BLD: 1 %
LYMPHOCYTES # BLD AUTO: 0.8 10E3/UL (ref 0.8–5.3)
LYMPHOCYTES NFR BLD AUTO: 10 %
MCH RBC QN AUTO: 26.2 PG (ref 26.5–33)
MCHC RBC AUTO-ENTMCNC: 31.6 G/DL (ref 31.5–36.5)
MCV RBC AUTO: 83 FL (ref 78–100)
MONOCYTES # BLD AUTO: 1.1 10E3/UL (ref 0–1.3)
MONOCYTES NFR BLD AUTO: 13 %
NEUTROPHILS # BLD AUTO: 6.3 10E3/UL (ref 1.6–8.3)
NEUTROPHILS NFR BLD AUTO: 74 %
NRBC # BLD AUTO: 0 10E3/UL
NRBC BLD AUTO-RTO: 0 /100
PLATELET # BLD AUTO: 166 10E3/UL (ref 150–450)
POTASSIUM SERPL-SCNC: 4 MMOL/L (ref 3.4–5.3)
PROT SERPL-MCNC: 6.5 G/DL (ref 6.4–8.3)
RBC # BLD AUTO: 4.65 10E6/UL (ref 4.4–5.9)
SODIUM SERPL-SCNC: 139 MMOL/L (ref 135–145)
WBC # BLD AUTO: 8.6 10E3/UL (ref 4–11)

## 2024-10-23 PROCEDURE — 96375 TX/PRO/DX INJ NEW DRUG ADDON: CPT

## 2024-10-23 PROCEDURE — 258N000003 HC RX IP 258 OP 636

## 2024-10-23 PROCEDURE — 250N000011 HC RX IP 250 OP 636

## 2024-10-23 PROCEDURE — 36591 DRAW BLOOD OFF VENOUS DEVICE: CPT

## 2024-10-23 PROCEDURE — 96415 CHEMO IV INFUSION ADDL HR: CPT

## 2024-10-23 PROCEDURE — 96368 THER/DIAG CONCURRENT INF: CPT

## 2024-10-23 PROCEDURE — 96413 CHEMO IV INFUSION 1 HR: CPT

## 2024-10-23 PROCEDURE — G0498 CHEMO EXTEND IV INFUS W/PUMP: HCPCS

## 2024-10-23 PROCEDURE — 96411 CHEMO IV PUSH ADDL DRUG: CPT

## 2024-10-23 PROCEDURE — 80053 COMPREHEN METABOLIC PANEL: CPT

## 2024-10-23 PROCEDURE — 96376 TX/PRO/DX INJ SAME DRUG ADON: CPT

## 2024-10-23 PROCEDURE — 96417 CHEMO IV INFUS EACH ADDL SEQ: CPT

## 2024-10-23 PROCEDURE — 81003 URINALYSIS AUTO W/O SCOPE: CPT

## 2024-10-23 PROCEDURE — 85025 COMPLETE CBC W/AUTO DIFF WBC: CPT

## 2024-10-23 RX ORDER — DIPHENHYDRAMINE HYDROCHLORIDE 50 MG/ML
50 INJECTION INTRAMUSCULAR; INTRAVENOUS
Status: CANCELLED
Start: 2024-10-25

## 2024-10-23 RX ORDER — FLUOROURACIL 50 MG/ML
400 INJECTION, SOLUTION INTRAVENOUS ONCE
Status: COMPLETED | OUTPATIENT
Start: 2024-10-23 | End: 2024-10-23

## 2024-10-23 RX ORDER — ATROPINE SULFATE 0.4 MG/ML
0.4 AMPUL (ML) INJECTION
Status: COMPLETED | OUTPATIENT
Start: 2024-10-23 | End: 2024-10-23

## 2024-10-23 RX ORDER — HEPARIN SODIUM (PORCINE) LOCK FLUSH IV SOLN 100 UNIT/ML 100 UNIT/ML
5 SOLUTION INTRAVENOUS
Status: CANCELLED | OUTPATIENT
Start: 2024-10-25

## 2024-10-23 RX ORDER — HEPARIN SODIUM (PORCINE) LOCK FLUSH IV SOLN 100 UNIT/ML 100 UNIT/ML
5 SOLUTION INTRAVENOUS
Status: CANCELLED | OUTPATIENT
Start: 2024-10-27

## 2024-10-23 RX ORDER — EPINEPHRINE 1 MG/ML
0.3 INJECTION, SOLUTION INTRAMUSCULAR; SUBCUTANEOUS EVERY 5 MIN PRN
Status: CANCELLED | OUTPATIENT
Start: 2024-10-25

## 2024-10-23 RX ORDER — HEPARIN SODIUM,PORCINE 10 UNIT/ML
5-20 VIAL (ML) INTRAVENOUS DAILY PRN
Status: CANCELLED | OUTPATIENT
Start: 2024-10-25

## 2024-10-23 RX ORDER — LORAZEPAM 2 MG/ML
0.5 INJECTION INTRAMUSCULAR EVERY 4 HOURS PRN
Status: CANCELLED | OUTPATIENT
Start: 2024-10-25

## 2024-10-23 RX ORDER — HEPARIN SODIUM,PORCINE 10 UNIT/ML
5-20 VIAL (ML) INTRAVENOUS DAILY PRN
Status: CANCELLED | OUTPATIENT
Start: 2024-10-27

## 2024-10-23 RX ORDER — METHYLPREDNISOLONE SODIUM SUCCINATE 125 MG/2ML
125 INJECTION INTRAMUSCULAR; INTRAVENOUS
Status: CANCELLED
Start: 2024-10-25

## 2024-10-23 RX ORDER — ALBUTEROL SULFATE 0.83 MG/ML
2.5 SOLUTION RESPIRATORY (INHALATION)
Status: CANCELLED | OUTPATIENT
Start: 2024-10-25

## 2024-10-23 RX ORDER — ONDANSETRON 2 MG/ML
8 INJECTION INTRAMUSCULAR; INTRAVENOUS ONCE
Status: COMPLETED | OUTPATIENT
Start: 2024-10-23 | End: 2024-10-23

## 2024-10-23 RX ORDER — ATROPINE SULFATE 0.4 MG/ML
0.4 AMPUL (ML) INJECTION
Status: CANCELLED | OUTPATIENT
Start: 2024-10-25

## 2024-10-23 RX ORDER — DEXAMETHASONE SODIUM PHOSPHATE 4 MG/ML
12 INJECTION, SOLUTION INTRA-ARTICULAR; INTRALESIONAL; INTRAMUSCULAR; INTRAVENOUS; SOFT TISSUE ONCE
Status: CANCELLED
Start: 2024-10-25 | End: 2024-10-25

## 2024-10-23 RX ORDER — DEXAMETHASONE SODIUM PHOSPHATE 4 MG/ML
12 INJECTION, SOLUTION INTRA-ARTICULAR; INTRALESIONAL; INTRAMUSCULAR; INTRAVENOUS; SOFT TISSUE ONCE
Status: COMPLETED | OUTPATIENT
Start: 2024-10-23 | End: 2024-10-23

## 2024-10-23 RX ORDER — FLUOROURACIL 50 MG/ML
400 INJECTION, SOLUTION INTRAVENOUS ONCE
Status: CANCELLED | OUTPATIENT
Start: 2024-10-25

## 2024-10-23 RX ORDER — ALBUTEROL SULFATE 90 UG/1
1-2 INHALANT RESPIRATORY (INHALATION)
Status: CANCELLED
Start: 2024-10-25

## 2024-10-23 RX ORDER — HEPARIN SODIUM (PORCINE) LOCK FLUSH IV SOLN 100 UNIT/ML 100 UNIT/ML
5 SOLUTION INTRAVENOUS ONCE
Status: COMPLETED | OUTPATIENT
Start: 2024-10-23 | End: 2024-10-23

## 2024-10-23 RX ORDER — MEPERIDINE HYDROCHLORIDE 25 MG/ML
25 INJECTION INTRAMUSCULAR; INTRAVENOUS; SUBCUTANEOUS EVERY 30 MIN PRN
Status: CANCELLED | OUTPATIENT
Start: 2024-10-25

## 2024-10-23 RX ADMIN — SODIUM CHLORIDE 100 ML: 9 INJECTION, SOLUTION INTRAVENOUS at 09:32

## 2024-10-23 RX ADMIN — ATROPINE SULFATE 0.4 MG: 0.4 INJECTION, SOLUTION INTRAVENOUS at 10:21

## 2024-10-23 RX ADMIN — SODIUM CHLORIDE 400 MG: 9 INJECTION, SOLUTION INTRAVENOUS at 09:44

## 2024-10-23 RX ADMIN — Medication 5 ML: at 08:52

## 2024-10-23 RX ADMIN — LEUCOVORIN CALCIUM 750 MG: 500 INJECTION, POWDER, LYOPHILIZED, FOR SOLUTION INTRAMUSCULAR; INTRAVENOUS at 10:24

## 2024-10-23 RX ADMIN — FLUOROURACIL 770 MG: 50 INJECTION, SOLUTION INTRAVENOUS at 11:59

## 2024-10-23 RX ADMIN — SODIUM CHLORIDE 340 MG: 9 INJECTION, SOLUTION INTRAVENOUS at 10:26

## 2024-10-23 RX ADMIN — DEXAMETHASONE SODIUM PHOSPHATE 12 MG: 4 INJECTION, SOLUTION INTRA-ARTICULAR; INTRALESIONAL; INTRAMUSCULAR; INTRAVENOUS; SOFT TISSUE at 09:36

## 2024-10-23 RX ADMIN — ATROPINE SULFATE 0.4 MG: 0.4 INJECTION, SOLUTION INTRAVENOUS at 11:17

## 2024-10-23 RX ADMIN — ONDANSETRON 8 MG: 2 INJECTION INTRAMUSCULAR; INTRAVENOUS at 09:34

## 2024-10-23 ASSESSMENT — PAIN SCALES - GENERAL: PAINLEVEL_OUTOF10: NO PAIN (0)

## 2024-10-23 NOTE — PROGRESS NOTES
"Infusion Nursing Note:  Roman Escalante presents today for Cycle 5 Day 1 Zirabev, Irinotecan, Leucovorin, Fluorouracil bolus/pump .    Patient seen by provider today: No   present during visit today: Not Applicable.    Note: Patient is feeling exceptionally well, no concerns to report today.      Intravenous Access:  Implanted Port.    Treatment Conditions:  Lab Results   Component Value Date    HGB 12.2 (L) 10/23/2024    WBC 8.6 10/23/2024    ANEU 45.1 (H) 07/28/2023    ANEUTAUTO 6.3 10/23/2024     10/23/2024        Lab Results   Component Value Date     10/23/2024    POTASSIUM 4.0 10/23/2024    MAG 2.0 04/24/2024    CR 0.76 10/23/2024    JEFERSON 8.9 10/23/2024    BILITOTAL 0.2 10/23/2024    ALBUMIN 3.9 10/23/2024    ALT 9 10/23/2024    AST 17 10/23/2024       Results reviewed, labs MET treatment parameters, ok to proceed with treatment.  Urine protein: 20.  BP: 111/71.      Post Infusion Assessment:  Patient tolerated infusion without incident.  Atropine IV given pre and midway through irinotecan  Blood return noted pre and post infusion.    Prior to discharge: Port is secured in place with tegaderm and flushed with 10cc NS with positive blood return noted.    Continuous home infusion Dosi-Fuser pump connected.    All connectors secured in place and clamps taped open.    Pump started, \"running\" noted on display (CADD): Not Applicable.   Capillary element taped to pt's skin per protocol.  Pump Connection double checked with Zeina CHEW RN.  Patient instructed to call our clinic or [facility] with any questions or concerns at home.  Patient verbalized understanding.    Patient set up for pump disconnect and Neulasta Onpro at home with [facility] on Friday 10/25 at 0900 per pt request.      Discharge Plan:   Patient declined prescription refills.  AVS to patient via [employer].  Patient will return 11/1 for next appointment. IB sent to care team for next infusion appointment. "   Patient discharged in stable condition accompanied by: self.  Departure Mode: Ambulatory.      Dinora Phoenix RN

## 2024-10-23 NOTE — PROGRESS NOTES
[facility] d/c of Fluorouracil continuous infusion over 46 hours via C series pump    Scheduled in EPIC  Will require Neulasta OnPro given/applied at this visit

## 2024-10-24 ENCOUNTER — MYC MEDICAL ADVICE (OUTPATIENT)
Dept: FAMILY MEDICINE | Facility: CLINIC | Age: 49
End: 2024-10-24
Payer: COMMERCIAL

## 2024-10-25 ENCOUNTER — HOME CARE VISIT (OUTPATIENT)
Dept: HOME HEALTH SERVICES | Facility: HOME HEALTH | Age: 49
End: 2024-10-25

## 2024-10-25 VITALS
HEART RATE: 74 BPM | TEMPERATURE: 97.5 F | SYSTOLIC BLOOD PRESSURE: 120 MMHG | OXYGEN SATURATION: 97 % | RESPIRATION RATE: 16 BRPM | DIASTOLIC BLOOD PRESSURE: 84 MMHG

## 2024-10-25 NOTE — PROGRESS NOTES
Nursing Visit Note:  Nurse visit today for chemo disconnect.  for Roman Escalante.     present during visit today: Not Applicable.    C5 Folfiri. Reports s/e s Minimal and manageable. 5fu Civi x 46 hours completed and disconnected. Portacath flushed with 20ml NS and needle deaccessed. Blood return noted.       Madhavi Faith RN 10/25/2024

## 2024-10-25 NOTE — TELEPHONE ENCOUNTER
See other documentation 10/25/24 [employer] message for Polo Snow MD.     Neulasta prescribed by Oncology.    Clementine Ordoñez RN

## 2024-10-26 PROCEDURE — S9537 HT HEM HORM INJ DIEM: HCPCS

## 2024-10-29 ENCOUNTER — MYC REFILL (OUTPATIENT)
Dept: ONCOLOGY | Facility: CLINIC | Age: 49
End: 2024-10-29
Payer: COMMERCIAL

## 2024-10-29 DIAGNOSIS — C78.7 COLON CANCER METASTASIZED TO LIVER (H): ICD-10-CM

## 2024-10-29 DIAGNOSIS — C18.9 COLON CANCER METASTASIZED TO LIVER (H): ICD-10-CM

## 2024-10-29 DIAGNOSIS — G89.3 CANCER RELATED PAIN: ICD-10-CM

## 2024-10-29 NOTE — TELEPHONE ENCOUNTER
Received [employer] message from patient requesting refill of oxycodone.     Last refill: 9/11/24  Last office visit: 10/9/24  Scheduled for follow up pending, msg sent to scheduling.     Will route request to MD/ for review.     Reviewed [place]  Report.

## 2024-10-30 RX ORDER — OXYCODONE HYDROCHLORIDE 10 MG/1
10-20 TABLET ORAL EVERY 4 HOURS PRN
Qty: 150 TABLET | Refills: 0 | Status: SHIPPED | OUTPATIENT
Start: 2024-10-30

## 2024-10-30 NOTE — PATIENT INSTRUCTIONS
[facility] Triage and after hours / weekends / holidays:  [phone]    Please call the triage or after hours line if you experience a temperature greater than or equal to 100.4, shaking chills, have uncontrolled nausea, vomiting and/or diarrhea, dizziness, shortness of breath, chest pain, bleeding, unexplained bruising, or if you have any other new/concerning symptoms, questions or concerns.      If you are having any concerning symptoms or wish to speak to a provider before your next infusion visit, please call triage to notify them so we can adequately serve you.     If you need a refill on a narcotic prescription or other medication, please call before your infusion appointment.      Problem: Pediatric Inpatient Plan of Care  Goal: Plan of Care Review  Outcome: Progressing  Goal: Absence of Hospital-Acquired Illness or Injury  Outcome: Progressing  Goal: Optimal Comfort and Wellbeing  Outcome: Progressing  Goal: Readiness for Transition of Care  Outcome: Progressing     Problem: Wound  Goal: Optimal Coping  Outcome: Progressing  Goal: Optimal Pain Control and Function  Outcome: Progressing  Goal: Skin Health and Integrity  Outcome: Progressing  Goal: Optimal Wound Healing  Outcome: Progressing

## 2024-11-01 ENCOUNTER — ONCOLOGY VISIT (OUTPATIENT)
Dept: ONCOLOGY | Facility: CLINIC | Age: 49
End: 2024-11-01
Attending: STUDENT IN AN ORGANIZED HEALTH CARE EDUCATION/TRAINING PROGRAM
Payer: COMMERCIAL

## 2024-11-01 VITALS
RESPIRATION RATE: 20 BRPM | OXYGEN SATURATION: 97 % | HEART RATE: 101 BPM | SYSTOLIC BLOOD PRESSURE: 123 MMHG | DIASTOLIC BLOOD PRESSURE: 82 MMHG | WEIGHT: 167.6 LBS | TEMPERATURE: 98.3 F | BODY MASS INDEX: 23.05 KG/M2

## 2024-11-01 DIAGNOSIS — C79.51 COLON CANCER METASTASIZED TO BONE (H): ICD-10-CM

## 2024-11-01 DIAGNOSIS — C78.7 COLON CANCER METASTASIZED TO LIVER (H): ICD-10-CM

## 2024-11-01 DIAGNOSIS — C20 RECTAL ADENOCARCINOMA METASTATIC TO LUNG (H): Primary | ICD-10-CM

## 2024-11-01 DIAGNOSIS — C18.9 COLON CANCER METASTASIZED TO LIVER (H): ICD-10-CM

## 2024-11-01 DIAGNOSIS — C78.00 RECTAL ADENOCARCINOMA METASTATIC TO LUNG (H): Primary | ICD-10-CM

## 2024-11-01 DIAGNOSIS — C18.9 COLON CANCER METASTASIZED TO BONE (H): ICD-10-CM

## 2024-11-01 PROCEDURE — G2211 COMPLEX E/M VISIT ADD ON: HCPCS | Performed by: STUDENT IN AN ORGANIZED HEALTH CARE EDUCATION/TRAINING PROGRAM

## 2024-11-01 PROCEDURE — G0463 HOSPITAL OUTPT CLINIC VISIT: HCPCS | Performed by: STUDENT IN AN ORGANIZED HEALTH CARE EDUCATION/TRAINING PROGRAM

## 2024-11-01 PROCEDURE — 99215 OFFICE O/P EST HI 40 MIN: CPT | Performed by: STUDENT IN AN ORGANIZED HEALTH CARE EDUCATION/TRAINING PROGRAM

## 2024-11-01 RX ORDER — ALBUTEROL SULFATE 90 UG/1
1-2 INHALANT RESPIRATORY (INHALATION)
Start: 2024-12-22

## 2024-11-01 RX ORDER — DIPHENHYDRAMINE HYDROCHLORIDE 50 MG/ML
50 INJECTION INTRAMUSCULAR; INTRAVENOUS
Start: 2025-01-03

## 2024-11-01 RX ORDER — ALBUTEROL SULFATE 90 UG/1
1-2 INHALANT RESPIRATORY (INHALATION)
Start: 2024-12-23

## 2024-11-01 RX ORDER — ALBUTEROL SULFATE 90 UG/1
1-2 INHALANT RESPIRATORY (INHALATION)
Start: 2025-02-03

## 2024-11-01 RX ORDER — METHYLPREDNISOLONE SODIUM SUCCINATE 40 MG/ML
40 INJECTION INTRAMUSCULAR; INTRAVENOUS
Start: 2025-01-05

## 2024-11-01 RX ORDER — LORAZEPAM 2 MG/ML
0.5 INJECTION INTRAMUSCULAR EVERY 4 HOURS PRN
OUTPATIENT
Start: 2024-12-20

## 2024-11-01 RX ORDER — ATROPINE SULFATE 0.4 MG/ML
0.4 AMPUL (ML) INJECTION
OUTPATIENT
Start: 2025-01-17

## 2024-11-01 RX ORDER — ALBUTEROL SULFATE 90 UG/1
1-2 INHALANT RESPIRATORY (INHALATION)
Start: 2024-12-08

## 2024-11-01 RX ORDER — METHYLPREDNISOLONE SODIUM SUCCINATE 40 MG/ML
40 INJECTION INTRAMUSCULAR; INTRAVENOUS
Start: 2024-11-10

## 2024-11-01 RX ORDER — ATROPINE SULFATE 0.4 MG/ML
0.4 AMPUL (ML) INJECTION
OUTPATIENT
Start: 2024-12-20

## 2024-11-01 RX ORDER — HEPARIN SODIUM,PORCINE 10 UNIT/ML
5-20 VIAL (ML) INTRAVENOUS DAILY PRN
OUTPATIENT
Start: 2025-01-03

## 2024-11-01 RX ORDER — METHYLPREDNISOLONE SODIUM SUCCINATE 40 MG/ML
40 INJECTION INTRAMUSCULAR; INTRAVENOUS
Start: 2025-01-17

## 2024-11-01 RX ORDER — LORAZEPAM 2 MG/ML
0.5 INJECTION INTRAMUSCULAR EVERY 4 HOURS PRN
OUTPATIENT
Start: 2025-01-03

## 2024-11-01 RX ORDER — FLUOROURACIL 50 MG/ML
400 INJECTION, SOLUTION INTRAVENOUS ONCE
OUTPATIENT
Start: 2025-01-03

## 2024-11-01 RX ORDER — FLUOROURACIL 50 MG/ML
400 INJECTION, SOLUTION INTRAVENOUS ONCE
OUTPATIENT
Start: 2025-01-31

## 2024-11-01 RX ORDER — MEPERIDINE HYDROCHLORIDE 25 MG/ML
25 INJECTION INTRAMUSCULAR; INTRAVENOUS; SUBCUTANEOUS
OUTPATIENT
Start: 2024-12-22

## 2024-11-01 RX ORDER — DIPHENHYDRAMINE HYDROCHLORIDE 50 MG/ML
50 INJECTION INTRAMUSCULAR; INTRAVENOUS
Start: 2024-12-20

## 2024-11-01 RX ORDER — EPINEPHRINE 1 MG/ML
0.3 INJECTION, SOLUTION INTRAMUSCULAR; SUBCUTANEOUS EVERY 5 MIN PRN
OUTPATIENT
Start: 2024-11-22

## 2024-11-01 RX ORDER — MEPERIDINE HYDROCHLORIDE 25 MG/ML
25 INJECTION INTRAMUSCULAR; INTRAVENOUS; SUBCUTANEOUS
OUTPATIENT
Start: 2025-01-19

## 2024-11-01 RX ORDER — DIPHENHYDRAMINE HYDROCHLORIDE 50 MG/ML
25 INJECTION INTRAMUSCULAR; INTRAVENOUS
Start: 2024-12-20

## 2024-11-01 RX ORDER — ALBUTEROL SULFATE 0.83 MG/ML
2.5 SOLUTION RESPIRATORY (INHALATION)
OUTPATIENT
Start: 2024-11-25

## 2024-11-01 RX ORDER — DIPHENHYDRAMINE HYDROCHLORIDE 50 MG/ML
50 INJECTION INTRAMUSCULAR; INTRAVENOUS
Start: 2025-02-02

## 2024-11-01 RX ORDER — ALBUTEROL SULFATE 0.83 MG/ML
2.5 SOLUTION RESPIRATORY (INHALATION)
OUTPATIENT
Start: 2025-02-03

## 2024-11-01 RX ORDER — DEXAMETHASONE SODIUM PHOSPHATE 4 MG/ML
12 INJECTION, SOLUTION INTRA-ARTICULAR; INTRALESIONAL; INTRAMUSCULAR; INTRAVENOUS; SOFT TISSUE ONCE
Status: CANCELLED
Start: 2024-11-08 | End: 2024-11-08

## 2024-11-01 RX ORDER — DIPHENHYDRAMINE HYDROCHLORIDE 50 MG/ML
50 INJECTION INTRAMUSCULAR; INTRAVENOUS
Start: 2024-12-23

## 2024-11-01 RX ORDER — METHYLPREDNISOLONE SODIUM SUCCINATE 40 MG/ML
40 INJECTION INTRAMUSCULAR; INTRAVENOUS
Start: 2024-11-25

## 2024-11-01 RX ORDER — MEPERIDINE HYDROCHLORIDE 25 MG/ML
25 INJECTION INTRAMUSCULAR; INTRAVENOUS; SUBCUTANEOUS
OUTPATIENT
Start: 2025-02-03

## 2024-11-01 RX ORDER — EPINEPHRINE 1 MG/ML
0.3 INJECTION, SOLUTION INTRAMUSCULAR; SUBCUTANEOUS EVERY 5 MIN PRN
OUTPATIENT
Start: 2024-11-24

## 2024-11-01 RX ORDER — ALBUTEROL SULFATE 0.83 MG/ML
2.5 SOLUTION RESPIRATORY (INHALATION)
OUTPATIENT
Start: 2024-11-11

## 2024-11-01 RX ORDER — DIPHENHYDRAMINE HYDROCHLORIDE 50 MG/ML
25 INJECTION INTRAMUSCULAR; INTRAVENOUS
Start: 2025-01-20

## 2024-11-01 RX ORDER — HEPARIN SODIUM,PORCINE 10 UNIT/ML
5-20 VIAL (ML) INTRAVENOUS DAILY PRN
OUTPATIENT
Start: 2024-12-08

## 2024-11-01 RX ORDER — MEPERIDINE HYDROCHLORIDE 25 MG/ML
25 INJECTION INTRAMUSCULAR; INTRAVENOUS; SUBCUTANEOUS
OUTPATIENT
Start: 2024-12-06

## 2024-11-01 RX ORDER — ALBUTEROL SULFATE 0.83 MG/ML
2.5 SOLUTION RESPIRATORY (INHALATION)
OUTPATIENT
Start: 2024-12-22

## 2024-11-01 RX ORDER — DIPHENHYDRAMINE HYDROCHLORIDE 50 MG/ML
25 INJECTION INTRAMUSCULAR; INTRAVENOUS
Start: 2025-01-06

## 2024-11-01 RX ORDER — ATROPINE SULFATE 0.4 MG/ML
0.4 AMPUL (ML) INJECTION
Status: CANCELLED | OUTPATIENT
Start: 2024-11-08

## 2024-11-01 RX ORDER — HEPARIN SODIUM,PORCINE 10 UNIT/ML
5-20 VIAL (ML) INTRAVENOUS DAILY PRN
OUTPATIENT
Start: 2025-01-19

## 2024-11-01 RX ORDER — DIPHENHYDRAMINE HYDROCHLORIDE 50 MG/ML
25 INJECTION INTRAMUSCULAR; INTRAVENOUS
Start: 2025-02-03

## 2024-11-01 RX ORDER — FLUOROURACIL 50 MG/ML
400 INJECTION, SOLUTION INTRAVENOUS ONCE
OUTPATIENT
Start: 2025-01-17

## 2024-11-01 RX ORDER — HEPARIN SODIUM,PORCINE 10 UNIT/ML
5-20 VIAL (ML) INTRAVENOUS DAILY PRN
OUTPATIENT
Start: 2024-12-06

## 2024-11-01 RX ORDER — HEPARIN SODIUM,PORCINE 10 UNIT/ML
5-20 VIAL (ML) INTRAVENOUS DAILY PRN
OUTPATIENT
Start: 2024-12-22

## 2024-11-01 RX ORDER — DIPHENHYDRAMINE HYDROCHLORIDE 50 MG/ML
25 INJECTION INTRAMUSCULAR; INTRAVENOUS
Start: 2025-02-02

## 2024-11-01 RX ORDER — METHYLPREDNISOLONE SODIUM SUCCINATE 40 MG/ML
40 INJECTION INTRAMUSCULAR; INTRAVENOUS
Start: 2025-01-03

## 2024-11-01 RX ORDER — ALBUTEROL SULFATE 0.83 MG/ML
2.5 SOLUTION RESPIRATORY (INHALATION)
OUTPATIENT
Start: 2024-11-10

## 2024-11-01 RX ORDER — DIPHENHYDRAMINE HYDROCHLORIDE 50 MG/ML
25 INJECTION INTRAMUSCULAR; INTRAVENOUS
Start: 2025-01-03

## 2024-11-01 RX ORDER — METHYLPREDNISOLONE SODIUM SUCCINATE 40 MG/ML
40 INJECTION INTRAMUSCULAR; INTRAVENOUS
Start: 2024-12-20

## 2024-11-01 RX ORDER — MEPERIDINE HYDROCHLORIDE 25 MG/ML
25 INJECTION INTRAMUSCULAR; INTRAVENOUS; SUBCUTANEOUS
OUTPATIENT
Start: 2025-01-05

## 2024-11-01 RX ORDER — EPINEPHRINE 1 MG/ML
0.3 INJECTION, SOLUTION INTRAMUSCULAR; SUBCUTANEOUS EVERY 5 MIN PRN
OUTPATIENT
Start: 2024-12-09

## 2024-11-01 RX ORDER — DIPHENHYDRAMINE HYDROCHLORIDE 50 MG/ML
50 INJECTION INTRAMUSCULAR; INTRAVENOUS
Start: 2025-01-06

## 2024-11-01 RX ORDER — EPINEPHRINE 1 MG/ML
0.3 INJECTION, SOLUTION INTRAMUSCULAR; SUBCUTANEOUS EVERY 5 MIN PRN
OUTPATIENT
Start: 2025-02-02

## 2024-11-01 RX ORDER — METHYLPREDNISOLONE SODIUM SUCCINATE 40 MG/ML
40 INJECTION INTRAMUSCULAR; INTRAVENOUS
Start: 2025-01-06

## 2024-11-01 RX ORDER — HEPARIN SODIUM,PORCINE 10 UNIT/ML
5-20 VIAL (ML) INTRAVENOUS DAILY PRN
OUTPATIENT
Start: 2025-01-17

## 2024-11-01 RX ORDER — ALBUTEROL SULFATE 90 UG/1
1-2 INHALANT RESPIRATORY (INHALATION)
Start: 2024-11-22

## 2024-11-01 RX ORDER — ALBUTEROL SULFATE 90 UG/1
1-2 INHALANT RESPIRATORY (INHALATION)
Start: 2025-01-31

## 2024-11-01 RX ORDER — DIPHENHYDRAMINE HYDROCHLORIDE 50 MG/ML
50 INJECTION INTRAMUSCULAR; INTRAVENOUS
Start: 2025-01-20

## 2024-11-01 RX ORDER — ALBUTEROL SULFATE 90 UG/1
1-2 INHALANT RESPIRATORY (INHALATION)
Start: 2024-11-10

## 2024-11-01 RX ORDER — ALBUTEROL SULFATE 0.83 MG/ML
2.5 SOLUTION RESPIRATORY (INHALATION)
Status: CANCELLED | OUTPATIENT
Start: 2024-11-08

## 2024-11-01 RX ORDER — ATROPINE SULFATE 0.4 MG/ML
0.4 AMPUL (ML) INJECTION
OUTPATIENT
Start: 2025-01-03

## 2024-11-01 RX ORDER — METHYLPREDNISOLONE SODIUM SUCCINATE 40 MG/ML
40 INJECTION INTRAMUSCULAR; INTRAVENOUS
Start: 2024-11-11

## 2024-11-01 RX ORDER — ALBUTEROL SULFATE 0.83 MG/ML
2.5 SOLUTION RESPIRATORY (INHALATION)
OUTPATIENT
Start: 2024-12-09

## 2024-11-01 RX ORDER — DIPHENHYDRAMINE HYDROCHLORIDE 50 MG/ML
50 INJECTION INTRAMUSCULAR; INTRAVENOUS
Start: 2025-01-05

## 2024-11-01 RX ORDER — DIPHENHYDRAMINE HYDROCHLORIDE 50 MG/ML
50 INJECTION INTRAMUSCULAR; INTRAVENOUS
Start: 2024-11-22

## 2024-11-01 RX ORDER — LORAZEPAM 2 MG/ML
0.5 INJECTION INTRAMUSCULAR EVERY 4 HOURS PRN
OUTPATIENT
Start: 2025-01-31

## 2024-11-01 RX ORDER — ALBUTEROL SULFATE 0.83 MG/ML
2.5 SOLUTION RESPIRATORY (INHALATION)
OUTPATIENT
Start: 2025-02-02

## 2024-11-01 RX ORDER — FLUOROURACIL 50 MG/ML
400 INJECTION, SOLUTION INTRAVENOUS ONCE
Status: CANCELLED | OUTPATIENT
Start: 2024-11-08

## 2024-11-01 RX ORDER — METHYLPREDNISOLONE SODIUM SUCCINATE 40 MG/ML
40 INJECTION INTRAMUSCULAR; INTRAVENOUS
Start: 2024-11-24

## 2024-11-01 RX ORDER — DIPHENHYDRAMINE HYDROCHLORIDE 50 MG/ML
50 INJECTION INTRAMUSCULAR; INTRAVENOUS
Start: 2025-02-03

## 2024-11-01 RX ORDER — DIPHENHYDRAMINE HYDROCHLORIDE 50 MG/ML
50 INJECTION INTRAMUSCULAR; INTRAVENOUS
Start: 2025-01-17

## 2024-11-01 RX ORDER — DIPHENHYDRAMINE HYDROCHLORIDE 50 MG/ML
25 INJECTION INTRAMUSCULAR; INTRAVENOUS
Start: 2024-11-25

## 2024-11-01 RX ORDER — ATROPINE SULFATE 0.4 MG/ML
0.4 AMPUL (ML) INJECTION
OUTPATIENT
Start: 2025-01-31

## 2024-11-01 RX ORDER — ALBUTEROL SULFATE 0.83 MG/ML
2.5 SOLUTION RESPIRATORY (INHALATION)
OUTPATIENT
Start: 2024-12-06

## 2024-11-01 RX ORDER — ALBUTEROL SULFATE 90 UG/1
1-2 INHALANT RESPIRATORY (INHALATION)
Start: 2025-01-06

## 2024-11-01 RX ORDER — METHYLPREDNISOLONE SODIUM SUCCINATE 40 MG/ML
40 INJECTION INTRAMUSCULAR; INTRAVENOUS
Status: CANCELLED
Start: 2024-11-08

## 2024-11-01 RX ORDER — ALBUTEROL SULFATE 90 UG/1
1-2 INHALANT RESPIRATORY (INHALATION)
Start: 2024-12-20

## 2024-11-01 RX ORDER — EPINEPHRINE 1 MG/ML
0.3 INJECTION, SOLUTION INTRAMUSCULAR; SUBCUTANEOUS EVERY 5 MIN PRN
OUTPATIENT
Start: 2025-01-20

## 2024-11-01 RX ORDER — METHYLPREDNISOLONE SODIUM SUCCINATE 40 MG/ML
40 INJECTION INTRAMUSCULAR; INTRAVENOUS
Start: 2024-12-06

## 2024-11-01 RX ORDER — METHYLPREDNISOLONE SODIUM SUCCINATE 40 MG/ML
40 INJECTION INTRAMUSCULAR; INTRAVENOUS
Start: 2024-12-09

## 2024-11-01 RX ORDER — ALBUTEROL SULFATE 90 UG/1
1-2 INHALANT RESPIRATORY (INHALATION)
Start: 2025-02-02

## 2024-11-01 RX ORDER — ALBUTEROL SULFATE 0.83 MG/ML
2.5 SOLUTION RESPIRATORY (INHALATION)
OUTPATIENT
Start: 2025-01-20

## 2024-11-01 RX ORDER — METHYLPREDNISOLONE SODIUM SUCCINATE 40 MG/ML
40 INJECTION INTRAMUSCULAR; INTRAVENOUS
Start: 2024-12-22

## 2024-11-01 RX ORDER — ALBUTEROL SULFATE 90 UG/1
1-2 INHALANT RESPIRATORY (INHALATION)
Status: CANCELLED
Start: 2024-11-08

## 2024-11-01 RX ORDER — HEPARIN SODIUM,PORCINE 10 UNIT/ML
5-20 VIAL (ML) INTRAVENOUS DAILY PRN
OUTPATIENT
Start: 2024-11-24

## 2024-11-01 RX ORDER — ATROPINE SULFATE 0.4 MG/ML
0.4 AMPUL (ML) INJECTION
OUTPATIENT
Start: 2024-12-06

## 2024-11-01 RX ORDER — ALBUTEROL SULFATE 0.83 MG/ML
2.5 SOLUTION RESPIRATORY (INHALATION)
OUTPATIENT
Start: 2025-01-19

## 2024-11-01 RX ORDER — DIPHENHYDRAMINE HYDROCHLORIDE 50 MG/ML
25 INJECTION INTRAMUSCULAR; INTRAVENOUS
Start: 2024-11-24

## 2024-11-01 RX ORDER — HEPARIN SODIUM,PORCINE 10 UNIT/ML
5-20 VIAL (ML) INTRAVENOUS DAILY PRN
OUTPATIENT
Start: 2024-11-10

## 2024-11-01 RX ORDER — DIPHENHYDRAMINE HYDROCHLORIDE 50 MG/ML
25 INJECTION INTRAMUSCULAR; INTRAVENOUS
Start: 2024-12-22

## 2024-11-01 RX ORDER — DIPHENHYDRAMINE HYDROCHLORIDE 50 MG/ML
25 INJECTION INTRAMUSCULAR; INTRAVENOUS
Start: 2025-01-19

## 2024-11-01 RX ORDER — HEPARIN SODIUM (PORCINE) LOCK FLUSH IV SOLN 100 UNIT/ML 100 UNIT/ML
5 SOLUTION INTRAVENOUS
OUTPATIENT
Start: 2025-01-19

## 2024-11-01 RX ORDER — DIPHENHYDRAMINE HYDROCHLORIDE 50 MG/ML
25 INJECTION INTRAMUSCULAR; INTRAVENOUS
Status: CANCELLED
Start: 2024-11-08

## 2024-11-01 RX ORDER — ALBUTEROL SULFATE 90 UG/1
1-2 INHALANT RESPIRATORY (INHALATION)
Start: 2025-01-17

## 2024-11-01 RX ORDER — ALBUTEROL SULFATE 90 UG/1
1-2 INHALANT RESPIRATORY (INHALATION)
Start: 2024-12-06

## 2024-11-01 RX ORDER — METHYLPREDNISOLONE SODIUM SUCCINATE 40 MG/ML
40 INJECTION INTRAMUSCULAR; INTRAVENOUS
Start: 2024-11-22

## 2024-11-01 RX ORDER — MEPERIDINE HYDROCHLORIDE 25 MG/ML
25 INJECTION INTRAMUSCULAR; INTRAVENOUS; SUBCUTANEOUS
OUTPATIENT
Start: 2024-12-23

## 2024-11-01 RX ORDER — EPINEPHRINE 1 MG/ML
0.3 INJECTION, SOLUTION INTRAMUSCULAR; SUBCUTANEOUS EVERY 5 MIN PRN
OUTPATIENT
Start: 2024-11-25

## 2024-11-01 RX ORDER — HEPARIN SODIUM,PORCINE 10 UNIT/ML
5-20 VIAL (ML) INTRAVENOUS DAILY PRN
OUTPATIENT
Start: 2024-11-22

## 2024-11-01 RX ORDER — ALBUTEROL SULFATE 0.83 MG/ML
2.5 SOLUTION RESPIRATORY (INHALATION)
OUTPATIENT
Start: 2024-11-22

## 2024-11-01 RX ORDER — MEPERIDINE HYDROCHLORIDE 25 MG/ML
25 INJECTION INTRAMUSCULAR; INTRAVENOUS; SUBCUTANEOUS
OUTPATIENT
Start: 2025-01-03

## 2024-11-01 RX ORDER — MEPERIDINE HYDROCHLORIDE 25 MG/ML
25 INJECTION INTRAMUSCULAR; INTRAVENOUS; SUBCUTANEOUS
Status: CANCELLED | OUTPATIENT
Start: 2024-11-08

## 2024-11-01 RX ORDER — EPINEPHRINE 1 MG/ML
0.3 INJECTION, SOLUTION INTRAMUSCULAR; SUBCUTANEOUS EVERY 5 MIN PRN
OUTPATIENT
Start: 2025-02-03

## 2024-11-01 RX ORDER — MEPERIDINE HYDROCHLORIDE 25 MG/ML
25 INJECTION INTRAMUSCULAR; INTRAVENOUS; SUBCUTANEOUS
OUTPATIENT
Start: 2024-11-22

## 2024-11-01 RX ORDER — MEPERIDINE HYDROCHLORIDE 25 MG/ML
25 INJECTION INTRAMUSCULAR; INTRAVENOUS; SUBCUTANEOUS
OUTPATIENT
Start: 2024-11-11

## 2024-11-01 RX ORDER — EPINEPHRINE 1 MG/ML
0.3 INJECTION, SOLUTION INTRAMUSCULAR; SUBCUTANEOUS EVERY 5 MIN PRN
OUTPATIENT
Start: 2024-11-11

## 2024-11-01 RX ORDER — DIPHENHYDRAMINE HYDROCHLORIDE 50 MG/ML
25 INJECTION INTRAMUSCULAR; INTRAVENOUS
Start: 2025-01-05

## 2024-11-01 RX ORDER — DIPHENHYDRAMINE HYDROCHLORIDE 50 MG/ML
50 INJECTION INTRAMUSCULAR; INTRAVENOUS
Start: 2024-12-22

## 2024-11-01 RX ORDER — DIPHENHYDRAMINE HYDROCHLORIDE 50 MG/ML
25 INJECTION INTRAMUSCULAR; INTRAVENOUS
Start: 2024-11-10

## 2024-11-01 RX ORDER — DIPHENHYDRAMINE HYDROCHLORIDE 50 MG/ML
50 INJECTION INTRAMUSCULAR; INTRAVENOUS
Start: 2024-11-24

## 2024-11-01 RX ORDER — EPINEPHRINE 1 MG/ML
0.3 INJECTION, SOLUTION INTRAMUSCULAR; SUBCUTANEOUS EVERY 5 MIN PRN
OUTPATIENT
Start: 2025-01-06

## 2024-11-01 RX ORDER — ALBUTEROL SULFATE 90 UG/1
1-2 INHALANT RESPIRATORY (INHALATION)
Start: 2025-01-20

## 2024-11-01 RX ORDER — EPINEPHRINE 1 MG/ML
0.3 INJECTION, SOLUTION INTRAMUSCULAR; SUBCUTANEOUS EVERY 5 MIN PRN
OUTPATIENT
Start: 2024-12-22

## 2024-11-01 RX ORDER — MEPERIDINE HYDROCHLORIDE 25 MG/ML
25 INJECTION INTRAMUSCULAR; INTRAVENOUS; SUBCUTANEOUS
OUTPATIENT
Start: 2025-01-17

## 2024-11-01 RX ORDER — DIPHENHYDRAMINE HYDROCHLORIDE 50 MG/ML
50 INJECTION INTRAMUSCULAR; INTRAVENOUS
Start: 2024-12-09

## 2024-11-01 RX ORDER — METHYLPREDNISOLONE SODIUM SUCCINATE 40 MG/ML
40 INJECTION INTRAMUSCULAR; INTRAVENOUS
Start: 2024-12-08

## 2024-11-01 RX ORDER — MEPERIDINE HYDROCHLORIDE 25 MG/ML
25 INJECTION INTRAMUSCULAR; INTRAVENOUS; SUBCUTANEOUS
OUTPATIENT
Start: 2024-12-20

## 2024-11-01 RX ORDER — ALBUTEROL SULFATE 0.83 MG/ML
2.5 SOLUTION RESPIRATORY (INHALATION)
OUTPATIENT
Start: 2024-12-20

## 2024-11-01 RX ORDER — HEPARIN SODIUM,PORCINE 10 UNIT/ML
5-20 VIAL (ML) INTRAVENOUS DAILY PRN
OUTPATIENT
Start: 2024-12-20

## 2024-11-01 RX ORDER — EPINEPHRINE 1 MG/ML
0.3 INJECTION, SOLUTION INTRAMUSCULAR; SUBCUTANEOUS EVERY 5 MIN PRN
OUTPATIENT
Start: 2024-12-20

## 2024-11-01 RX ORDER — HEPARIN SODIUM (PORCINE) LOCK FLUSH IV SOLN 100 UNIT/ML 100 UNIT/ML
5 SOLUTION INTRAVENOUS
OUTPATIENT
Start: 2024-12-22

## 2024-11-01 RX ORDER — DEXAMETHASONE SODIUM PHOSPHATE 4 MG/ML
12 INJECTION, SOLUTION INTRA-ARTICULAR; INTRALESIONAL; INTRAMUSCULAR; INTRAVENOUS; SOFT TISSUE ONCE
Start: 2025-01-03 | End: 2025-01-03

## 2024-11-01 RX ORDER — HEPARIN SODIUM (PORCINE) LOCK FLUSH IV SOLN 100 UNIT/ML 100 UNIT/ML
5 SOLUTION INTRAVENOUS
OUTPATIENT
Start: 2024-11-24

## 2024-11-01 RX ORDER — ALBUTEROL SULFATE 90 UG/1
1-2 INHALANT RESPIRATORY (INHALATION)
Start: 2025-01-19

## 2024-11-01 RX ORDER — DEXAMETHASONE SODIUM PHOSPHATE 4 MG/ML
12 INJECTION, SOLUTION INTRA-ARTICULAR; INTRALESIONAL; INTRAMUSCULAR; INTRAVENOUS; SOFT TISSUE ONCE
Start: 2025-01-17 | End: 2025-01-17

## 2024-11-01 RX ORDER — HEPARIN SODIUM (PORCINE) LOCK FLUSH IV SOLN 100 UNIT/ML 100 UNIT/ML
5 SOLUTION INTRAVENOUS
OUTPATIENT
Start: 2024-12-08

## 2024-11-01 RX ORDER — MEPERIDINE HYDROCHLORIDE 25 MG/ML
25 INJECTION INTRAMUSCULAR; INTRAVENOUS; SUBCUTANEOUS
OUTPATIENT
Start: 2024-12-09

## 2024-11-01 RX ORDER — DIPHENHYDRAMINE HYDROCHLORIDE 50 MG/ML
50 INJECTION INTRAMUSCULAR; INTRAVENOUS
Start: 2025-01-31

## 2024-11-01 RX ORDER — METHYLPREDNISOLONE SODIUM SUCCINATE 40 MG/ML
40 INJECTION INTRAMUSCULAR; INTRAVENOUS
Start: 2025-01-31

## 2024-11-01 RX ORDER — ALBUTEROL SULFATE 90 UG/1
1-2 INHALANT RESPIRATORY (INHALATION)
Start: 2025-01-03

## 2024-11-01 RX ORDER — MEPERIDINE HYDROCHLORIDE 25 MG/ML
25 INJECTION INTRAMUSCULAR; INTRAVENOUS; SUBCUTANEOUS
OUTPATIENT
Start: 2025-02-02

## 2024-11-01 RX ORDER — ALBUTEROL SULFATE 90 UG/1
1-2 INHALANT RESPIRATORY (INHALATION)
Start: 2024-11-11

## 2024-11-01 RX ORDER — ALBUTEROL SULFATE 0.83 MG/ML
2.5 SOLUTION RESPIRATORY (INHALATION)
OUTPATIENT
Start: 2025-01-03

## 2024-11-01 RX ORDER — METHYLPREDNISOLONE SODIUM SUCCINATE 40 MG/ML
40 INJECTION INTRAMUSCULAR; INTRAVENOUS
Start: 2025-02-02

## 2024-11-01 RX ORDER — DIPHENHYDRAMINE HYDROCHLORIDE 50 MG/ML
50 INJECTION INTRAMUSCULAR; INTRAVENOUS
Start: 2025-01-19

## 2024-11-01 RX ORDER — DIPHENHYDRAMINE HYDROCHLORIDE 50 MG/ML
25 INJECTION INTRAMUSCULAR; INTRAVENOUS
Start: 2024-11-11

## 2024-11-01 RX ORDER — DIPHENHYDRAMINE HYDROCHLORIDE 50 MG/ML
25 INJECTION INTRAMUSCULAR; INTRAVENOUS
Start: 2025-01-17

## 2024-11-01 RX ORDER — HEPARIN SODIUM (PORCINE) LOCK FLUSH IV SOLN 100 UNIT/ML 100 UNIT/ML
5 SOLUTION INTRAVENOUS
OUTPATIENT
Start: 2024-11-10

## 2024-11-01 RX ORDER — DIPHENHYDRAMINE HYDROCHLORIDE 50 MG/ML
25 INJECTION INTRAMUSCULAR; INTRAVENOUS
Start: 2025-01-31

## 2024-11-01 RX ORDER — METHYLPREDNISOLONE SODIUM SUCCINATE 40 MG/ML
40 INJECTION INTRAMUSCULAR; INTRAVENOUS
Start: 2024-12-23

## 2024-11-01 RX ORDER — HEPARIN SODIUM (PORCINE) LOCK FLUSH IV SOLN 100 UNIT/ML 100 UNIT/ML
5 SOLUTION INTRAVENOUS
OUTPATIENT
Start: 2025-02-02

## 2024-11-01 RX ORDER — HEPARIN SODIUM,PORCINE 10 UNIT/ML
5-20 VIAL (ML) INTRAVENOUS DAILY PRN
OUTPATIENT
Start: 2025-01-05

## 2024-11-01 RX ORDER — DEXAMETHASONE SODIUM PHOSPHATE 4 MG/ML
12 INJECTION, SOLUTION INTRA-ARTICULAR; INTRALESIONAL; INTRAMUSCULAR; INTRAVENOUS; SOFT TISSUE ONCE
Start: 2024-11-22 | End: 2024-11-22

## 2024-11-01 RX ORDER — FLUOROURACIL 50 MG/ML
400 INJECTION, SOLUTION INTRAVENOUS ONCE
OUTPATIENT
Start: 2024-11-22

## 2024-11-01 RX ORDER — HEPARIN SODIUM (PORCINE) LOCK FLUSH IV SOLN 100 UNIT/ML 100 UNIT/ML
5 SOLUTION INTRAVENOUS
OUTPATIENT
Start: 2024-12-06

## 2024-11-01 RX ORDER — EPINEPHRINE 1 MG/ML
0.3 INJECTION, SOLUTION INTRAMUSCULAR; SUBCUTANEOUS EVERY 5 MIN PRN
OUTPATIENT
Start: 2024-12-23

## 2024-11-01 RX ORDER — METHYLPREDNISOLONE SODIUM SUCCINATE 40 MG/ML
40 INJECTION INTRAMUSCULAR; INTRAVENOUS
Start: 2025-01-20

## 2024-11-01 RX ORDER — DEXAMETHASONE SODIUM PHOSPHATE 4 MG/ML
12 INJECTION, SOLUTION INTRA-ARTICULAR; INTRALESIONAL; INTRAMUSCULAR; INTRAVENOUS; SOFT TISSUE ONCE
Start: 2024-12-20 | End: 2024-12-20

## 2024-11-01 RX ORDER — DIPHENHYDRAMINE HYDROCHLORIDE 50 MG/ML
25 INJECTION INTRAMUSCULAR; INTRAVENOUS
Start: 2024-12-09

## 2024-11-01 RX ORDER — HEPARIN SODIUM (PORCINE) LOCK FLUSH IV SOLN 100 UNIT/ML 100 UNIT/ML
5 SOLUTION INTRAVENOUS
OUTPATIENT
Start: 2025-01-05

## 2024-11-01 RX ORDER — HEPARIN SODIUM (PORCINE) LOCK FLUSH IV SOLN 100 UNIT/ML 100 UNIT/ML
5 SOLUTION INTRAVENOUS
Status: CANCELLED | OUTPATIENT
Start: 2024-11-08

## 2024-11-01 RX ORDER — ALBUTEROL SULFATE 0.83 MG/ML
2.5 SOLUTION RESPIRATORY (INHALATION)
OUTPATIENT
Start: 2025-01-06

## 2024-11-01 RX ORDER — METHYLPREDNISOLONE SODIUM SUCCINATE 40 MG/ML
40 INJECTION INTRAMUSCULAR; INTRAVENOUS
Start: 2025-02-03

## 2024-11-01 RX ORDER — EPINEPHRINE 1 MG/ML
0.3 INJECTION, SOLUTION INTRAMUSCULAR; SUBCUTANEOUS EVERY 5 MIN PRN
OUTPATIENT
Start: 2025-01-17

## 2024-11-01 RX ORDER — MEPERIDINE HYDROCHLORIDE 25 MG/ML
25 INJECTION INTRAMUSCULAR; INTRAVENOUS; SUBCUTANEOUS
OUTPATIENT
Start: 2024-12-08

## 2024-11-01 RX ORDER — ALBUTEROL SULFATE 0.83 MG/ML
2.5 SOLUTION RESPIRATORY (INHALATION)
OUTPATIENT
Start: 2024-12-08

## 2024-11-01 RX ORDER — DIPHENHYDRAMINE HYDROCHLORIDE 50 MG/ML
50 INJECTION INTRAMUSCULAR; INTRAVENOUS
Start: 2024-11-10

## 2024-11-01 RX ORDER — DIPHENHYDRAMINE HYDROCHLORIDE 50 MG/ML
25 INJECTION INTRAMUSCULAR; INTRAVENOUS
Start: 2024-12-23

## 2024-11-01 RX ORDER — DIPHENHYDRAMINE HYDROCHLORIDE 50 MG/ML
50 INJECTION INTRAMUSCULAR; INTRAVENOUS
Start: 2024-11-25

## 2024-11-01 RX ORDER — ALBUTEROL SULFATE 0.83 MG/ML
2.5 SOLUTION RESPIRATORY (INHALATION)
OUTPATIENT
Start: 2025-01-17

## 2024-11-01 RX ORDER — DIPHENHYDRAMINE HYDROCHLORIDE 50 MG/ML
25 INJECTION INTRAMUSCULAR; INTRAVENOUS
Start: 2024-11-22

## 2024-11-01 RX ORDER — EPINEPHRINE 1 MG/ML
0.3 INJECTION, SOLUTION INTRAMUSCULAR; SUBCUTANEOUS EVERY 5 MIN PRN
OUTPATIENT
Start: 2025-01-05

## 2024-11-01 RX ORDER — FLUOROURACIL 50 MG/ML
400 INJECTION, SOLUTION INTRAVENOUS ONCE
OUTPATIENT
Start: 2024-12-20

## 2024-11-01 RX ORDER — DEXAMETHASONE SODIUM PHOSPHATE 4 MG/ML
12 INJECTION, SOLUTION INTRA-ARTICULAR; INTRALESIONAL; INTRAMUSCULAR; INTRAVENOUS; SOFT TISSUE ONCE
Start: 2024-12-06 | End: 2024-12-06

## 2024-11-01 RX ORDER — HEPARIN SODIUM,PORCINE 10 UNIT/ML
5-20 VIAL (ML) INTRAVENOUS DAILY PRN
OUTPATIENT
Start: 2025-01-31

## 2024-11-01 RX ORDER — MEPERIDINE HYDROCHLORIDE 25 MG/ML
25 INJECTION INTRAMUSCULAR; INTRAVENOUS; SUBCUTANEOUS
OUTPATIENT
Start: 2024-11-25

## 2024-11-01 RX ORDER — MEPERIDINE HYDROCHLORIDE 25 MG/ML
25 INJECTION INTRAMUSCULAR; INTRAVENOUS; SUBCUTANEOUS
OUTPATIENT
Start: 2024-11-24

## 2024-11-01 RX ORDER — DIPHENHYDRAMINE HYDROCHLORIDE 50 MG/ML
50 INJECTION INTRAMUSCULAR; INTRAVENOUS
Start: 2024-11-11

## 2024-11-01 RX ORDER — ALBUTEROL SULFATE 90 UG/1
1-2 INHALANT RESPIRATORY (INHALATION)
Start: 2024-12-09

## 2024-11-01 RX ORDER — EPINEPHRINE 1 MG/ML
0.3 INJECTION, SOLUTION INTRAMUSCULAR; SUBCUTANEOUS EVERY 5 MIN PRN
OUTPATIENT
Start: 2025-01-19

## 2024-11-01 RX ORDER — HEPARIN SODIUM (PORCINE) LOCK FLUSH IV SOLN 100 UNIT/ML 100 UNIT/ML
5 SOLUTION INTRAVENOUS
OUTPATIENT
Start: 2025-01-03

## 2024-11-01 RX ORDER — ALBUTEROL SULFATE 90 UG/1
1-2 INHALANT RESPIRATORY (INHALATION)
Start: 2024-11-25

## 2024-11-01 RX ORDER — EPINEPHRINE 1 MG/ML
0.3 INJECTION, SOLUTION INTRAMUSCULAR; SUBCUTANEOUS EVERY 5 MIN PRN
OUTPATIENT
Start: 2024-12-08

## 2024-11-01 RX ORDER — HEPARIN SODIUM (PORCINE) LOCK FLUSH IV SOLN 100 UNIT/ML 100 UNIT/ML
5 SOLUTION INTRAVENOUS
OUTPATIENT
Start: 2024-12-20

## 2024-11-01 RX ORDER — ALBUTEROL SULFATE 0.83 MG/ML
2.5 SOLUTION RESPIRATORY (INHALATION)
OUTPATIENT
Start: 2025-01-31

## 2024-11-01 RX ORDER — ALBUTEROL SULFATE 0.83 MG/ML
2.5 SOLUTION RESPIRATORY (INHALATION)
OUTPATIENT
Start: 2024-11-24

## 2024-11-01 RX ORDER — HEPARIN SODIUM,PORCINE 10 UNIT/ML
5-20 VIAL (ML) INTRAVENOUS DAILY PRN
OUTPATIENT
Start: 2025-02-02

## 2024-11-01 RX ORDER — ALBUTEROL SULFATE 0.83 MG/ML
2.5 SOLUTION RESPIRATORY (INHALATION)
OUTPATIENT
Start: 2025-01-05

## 2024-11-01 RX ORDER — HEPARIN SODIUM (PORCINE) LOCK FLUSH IV SOLN 100 UNIT/ML 100 UNIT/ML
5 SOLUTION INTRAVENOUS
OUTPATIENT
Start: 2024-11-22

## 2024-11-01 RX ORDER — ATROPINE SULFATE 0.4 MG/ML
0.4 AMPUL (ML) INJECTION
OUTPATIENT
Start: 2024-11-22

## 2024-11-01 RX ORDER — LORAZEPAM 2 MG/ML
0.5 INJECTION INTRAMUSCULAR EVERY 4 HOURS PRN
OUTPATIENT
Start: 2024-11-22

## 2024-11-01 RX ORDER — HEPARIN SODIUM,PORCINE 10 UNIT/ML
5-20 VIAL (ML) INTRAVENOUS DAILY PRN
Status: CANCELLED | OUTPATIENT
Start: 2024-11-08

## 2024-11-01 RX ORDER — DIPHENHYDRAMINE HYDROCHLORIDE 50 MG/ML
25 INJECTION INTRAMUSCULAR; INTRAVENOUS
Start: 2024-12-06

## 2024-11-01 RX ORDER — FLUOROURACIL 50 MG/ML
400 INJECTION, SOLUTION INTRAVENOUS ONCE
OUTPATIENT
Start: 2024-12-06

## 2024-11-01 RX ORDER — EPINEPHRINE 1 MG/ML
0.3 INJECTION, SOLUTION INTRAMUSCULAR; SUBCUTANEOUS EVERY 5 MIN PRN
Status: CANCELLED | OUTPATIENT
Start: 2024-11-08

## 2024-11-01 RX ORDER — HEPARIN SODIUM (PORCINE) LOCK FLUSH IV SOLN 100 UNIT/ML 100 UNIT/ML
5 SOLUTION INTRAVENOUS
OUTPATIENT
Start: 2025-01-17

## 2024-11-01 RX ORDER — EPINEPHRINE 1 MG/ML
0.3 INJECTION, SOLUTION INTRAMUSCULAR; SUBCUTANEOUS EVERY 5 MIN PRN
OUTPATIENT
Start: 2024-12-06

## 2024-11-01 RX ORDER — EPINEPHRINE 1 MG/ML
0.3 INJECTION, SOLUTION INTRAMUSCULAR; SUBCUTANEOUS EVERY 5 MIN PRN
OUTPATIENT
Start: 2025-01-03

## 2024-11-01 RX ORDER — LORAZEPAM 2 MG/ML
0.5 INJECTION INTRAMUSCULAR EVERY 4 HOURS PRN
OUTPATIENT
Start: 2024-12-06

## 2024-11-01 RX ORDER — ALBUTEROL SULFATE 90 UG/1
1-2 INHALANT RESPIRATORY (INHALATION)
Start: 2024-11-24

## 2024-11-01 RX ORDER — MEPERIDINE HYDROCHLORIDE 25 MG/ML
25 INJECTION INTRAMUSCULAR; INTRAVENOUS; SUBCUTANEOUS
OUTPATIENT
Start: 2025-01-06

## 2024-11-01 RX ORDER — HEPARIN SODIUM (PORCINE) LOCK FLUSH IV SOLN 100 UNIT/ML 100 UNIT/ML
5 SOLUTION INTRAVENOUS
OUTPATIENT
Start: 2025-01-31

## 2024-11-01 RX ORDER — DIPHENHYDRAMINE HYDROCHLORIDE 50 MG/ML
50 INJECTION INTRAMUSCULAR; INTRAVENOUS
Start: 2024-12-06

## 2024-11-01 RX ORDER — DEXAMETHASONE SODIUM PHOSPHATE 4 MG/ML
12 INJECTION, SOLUTION INTRA-ARTICULAR; INTRALESIONAL; INTRAMUSCULAR; INTRAVENOUS; SOFT TISSUE ONCE
Start: 2025-01-31 | End: 2025-01-31

## 2024-11-01 RX ORDER — MEPERIDINE HYDROCHLORIDE 25 MG/ML
25 INJECTION INTRAMUSCULAR; INTRAVENOUS; SUBCUTANEOUS
OUTPATIENT
Start: 2025-01-20

## 2024-11-01 RX ORDER — METHYLPREDNISOLONE SODIUM SUCCINATE 40 MG/ML
40 INJECTION INTRAMUSCULAR; INTRAVENOUS
Start: 2025-01-19

## 2024-11-01 RX ORDER — DIPHENHYDRAMINE HYDROCHLORIDE 50 MG/ML
50 INJECTION INTRAMUSCULAR; INTRAVENOUS
Status: CANCELLED
Start: 2024-11-08

## 2024-11-01 RX ORDER — LORAZEPAM 2 MG/ML
0.5 INJECTION INTRAMUSCULAR EVERY 4 HOURS PRN
OUTPATIENT
Start: 2025-01-17

## 2024-11-01 RX ORDER — ALBUTEROL SULFATE 0.83 MG/ML
2.5 SOLUTION RESPIRATORY (INHALATION)
OUTPATIENT
Start: 2024-12-23

## 2024-11-01 RX ORDER — EPINEPHRINE 1 MG/ML
0.3 INJECTION, SOLUTION INTRAMUSCULAR; SUBCUTANEOUS EVERY 5 MIN PRN
OUTPATIENT
Start: 2024-11-10

## 2024-11-01 RX ORDER — DIPHENHYDRAMINE HYDROCHLORIDE 50 MG/ML
50 INJECTION INTRAMUSCULAR; INTRAVENOUS
Start: 2024-12-08

## 2024-11-01 RX ORDER — MEPERIDINE HYDROCHLORIDE 25 MG/ML
25 INJECTION INTRAMUSCULAR; INTRAVENOUS; SUBCUTANEOUS
OUTPATIENT
Start: 2024-11-10

## 2024-11-01 RX ORDER — DIPHENHYDRAMINE HYDROCHLORIDE 50 MG/ML
25 INJECTION INTRAMUSCULAR; INTRAVENOUS
Start: 2024-12-08

## 2024-11-01 RX ORDER — EPINEPHRINE 1 MG/ML
0.3 INJECTION, SOLUTION INTRAMUSCULAR; SUBCUTANEOUS EVERY 5 MIN PRN
OUTPATIENT
Start: 2025-01-31

## 2024-11-01 RX ORDER — ALBUTEROL SULFATE 90 UG/1
1-2 INHALANT RESPIRATORY (INHALATION)
Start: 2025-01-05

## 2024-11-01 RX ORDER — LORAZEPAM 2 MG/ML
0.5 INJECTION INTRAMUSCULAR EVERY 4 HOURS PRN
Status: CANCELLED | OUTPATIENT
Start: 2024-11-08

## 2024-11-01 RX ORDER — MEPERIDINE HYDROCHLORIDE 25 MG/ML
25 INJECTION INTRAMUSCULAR; INTRAVENOUS; SUBCUTANEOUS
OUTPATIENT
Start: 2025-01-31

## 2024-11-01 ASSESSMENT — PAIN SCALES - GENERAL: PAINLEVEL_OUTOF10: NO PAIN (0)

## 2024-11-01 NOTE — LETTER
2024      Roman Escalante  [address]  [place] [place] [address]      Dear Colleague,    Thank you for referring your patient, Roman Escalante, to the [facility]. Please see a copy of my visit note below.    [facility] - Medical Oncology Follow-Up Consult Note  2024    Patient Identifiers     Name: Roman Escalante  Preferred Address: [place]  Preferred Language: English  : 1975  Gender: male    Assessment and Plan     Mr. Roman Escalante is a 49 year old male former smoker (3-5 cigs/day) with a history of extensively pre-treated metastatic colorectal cancer s/p trial cellular therapy transplant (2024) currently on FOLFIRI/Reyna (24 - present) who returns to clinic for treatment response evaluation.    NGS: KRAS G12N, BRAFwt  Immuno: pMMR  Clinical Trial: , VSG-GP    I discussed Brain's imaging results with him and his wife.  Briefly, his CT scans demonstrated stability or improvement in all sites of disease.  The development of vertebral body skeletal mets on top of the prior improvements are likely the resolution of prior osseous metastases which are now cavitating following treatment response.  We will plan to continue his current infusional regimen, along with the addition of Zometa for management of the skeletal metastases.    Otherwise, it is critical that we have him considered for clinical trial while he is responding to treatment and is otherwise feeling well.  We have 2 clinical trials for which he would be an excellent candidate including FATE 825 and [facility].  We reviewed the mechanisms of both trials along with the risks and benefits with Brain, and he consents to being prescreened for both studies.    45 minutes spent on the date of the encounter doing chart review, review of test results, interpretation of tests, patient visit, and documentation     The longitudinal plan of care for the diagnosis(es)/condition(s) as documented  were addressed during this visit. Due to the added complexity in care, I will continue to support Roman in the subsequent management and with ongoing continuity of care.    Polo Snow MD, PhD   of Medicine  Division of Hematology, Oncology and Transplantation  [facility]    -----------------------------------    Oncology Summary     Cancer Staging   Rectal adenocarcinoma metastatic to lung (H)  Staging form: Colon and Rectum, AJCC 8th Edition  - Clinical: Stage IVB (cTX, cNX, pM1b) - Signed by Polo Snow MD on 3/30/2023    Oncology History   Rectal adenocarcinoma metastatic to lung (H)   2/3/2023 Initial Diagnosis    Rectal adenocarcinoma metastatic to lung (H)     2/3/2023 - 3/31/2023 Chemotherapy    Oral ONC Colorectal Cancer - Regorafenib  Plan Provider: Polo Snow MD  Treatment goal: Palliative  Line of treatment: [No plan line of treatment]     3/30/2023 -  Cancer Staged    Staging form: Colon and Rectum, AJCC 8th Edition  - Clinical: Stage IVB (cTX, cNX, pM1b)     6/7/2023 - 10/3/2023 Chemotherapy    OP ONC Colorectal Cancer - Modified FOLFOX-6 / Bevacizumab  Plan Provider: Polo Snow MD  Treatment goal: Palliative  Line of treatment: [No plan line of treatment]     10/27/2023 - 3/11/2024 Chemotherapy    OP ONC Colorectal Cancer - Bevacizumab + Trifluridine/Tipiracil (Lonsurf)  Plan Provider: Polo Snow MD  Treatment goal: Palliative  Line of treatment: [No plan line of treatment]     3/27/2024 - 3/27/2024 Chemotherapy    Oral ONC Colorectal Cancer - Fruquintinib (Fruzaqla)  Plan Provider: Polo Snow MD  Treatment goal: Palliative  Line of treatment: [No plan line of treatment]     5/2024 -  Chemotherapy    [facility] Cellular Therapy cytoreductive conditioning and cellular transplant  - admitted for 5 weeks following transplant with ~30lbs weight loss     8/30/2024 -  Chemotherapy    OP ONC Colorectal Cancer - FOLFIRI / Bevacizumab  Plan Provider: Polo Snow  MD  Treatment goal: Palliative  Line of treatment: [No plan line of treatment]         Subjective/Interval Events     - he is feeling significantly improved from prior    Physical Exam     Vital signs: /82 (BP Location: Right arm, Patient Position: Sitting, Cuff Size: Adult Regular)   Pulse 101   Temp 98.3  F (36.8  C) (Oral)   Resp 20   Wt 76 kg (167 lb 9.6 oz)   SpO2 97%   BMI 23.05 kg/m      ECOG performance status:  0  Vascular access:  R chest port    Physical Exam:  Exam performed, notable for:  - significantly improved from prior; increasing weight  - lungs CTAB; heart RRR  - abd soft, NT/ND; no issues around stoma  - no BLE swelling; no pain on calf palpation    Objective Data     Lab data:  I have personally reviewed the lab data, notable for:    - Hgb 12.0  - Plt 141  - no other notables    Radiology data:  I have personally reviewed the radiology data, notable for:  10/18/2024 CT C/A/P  IMPRESSION:  1.  Metastatic disease throughout the lungs has improved, with several of the masses smaller and less dense, now with air filled spaces and bronchograms. Mediastinal adenopathy has also slightly improved.  2.  Metastatic liver disease has slightly improved.  3.  Rectosigmoid colon mass and sigmoid colon mass have improved.  4.  Enlarged lymph nodes in the abdomen and pelvis have improved.  5.  Interval development of thoracic and lumbar vertebral body skeletal metastatic disease.    Pathology and other data:  I have personally reviewed and interpreted the pathology data, notable for:    - no new data       Again, thank you for allowing me to participate in the care of your patient.        Sincerely,        Polo Snow MD

## 2024-11-01 NOTE — NURSING NOTE
"Oncology Rooming Note    November 1, 2024 3:21 PM   Roman Escalante is a 49 year old male who presents for:    Chief Complaint   Patient presents with    Oncology Clinic Visit     RTN Colon CA metastasized to liver     Initial Vitals: /82 (BP Location: Right arm, Patient Position: Sitting, Cuff Size: Adult Regular)   Pulse 101   Temp 98.3  F (36.8  C) (Oral)   Resp 20   Wt 76 kg (167 lb 9.6 oz)   SpO2 97%   BMI 23.05 kg/m   Estimated body mass index is 23.05 kg/m  as calculated from the following:    Height as of 10/15/24: 1.816 m (5' 11.5\").    Weight as of this encounter: 76 kg (167 lb 9.6 oz). Body surface area is 1.96 meters squared.  No Pain (0) Comment: Data Unavailable   No LMP for male patient.  Allergies reviewed: Yes  Medications reviewed: Yes    Medications: Medication refills not needed today.  Pharmacy name entered into [employer]:    [facility] - [place], [place] - [address] [place] [address] [phone]  [facility] - [place], [place] - [address] [place] [address] DRUG STORE #[id number] [address]    Frailty Screening:   Is the patient here for a new oncology consult visit in cancer care? 2. No      Clinical concerns: none      Reinier Mcclendon             "

## 2024-11-01 NOTE — PROGRESS NOTES
[facility] - Medical Oncology Follow-Up Consult Note  2024    Patient Identifiers     Name: Roman Escalante  Preferred Address: Roman  Preferred Language: English  : 1975  Gender: male    Assessment and Plan     Mr. Roman Escalante is a 49 year old male former smoker (3-5 cigs/day) with a history of extensively pre-treated metastatic colorectal cancer s/p trial cellular therapy transplant (2024) currently on FOLFIRI/Reyna (24 - present) who returns to clinic for treatment response evaluation.    NGS: KRAS G12N, BRAFwt  Immuno: pMMR  Clinical Trial: , VSG-GP    I discussed Brain's imaging results with him and his wife.  Briefly, his CT scans demonstrated stability or improvement in all sites of disease.  The development of vertebral body skeletal mets on top of the prior improvements are likely the resolution of prior osseous metastases which are now cavitating following treatment response.  We will plan to continue his current infusional regimen, along with the addition of Zometa for management of the skeletal metastases.    Otherwise, it is critical that we have him considered for clinical trial while he is responding to treatment and is otherwise feeling well.  We have 2 clinical trials for which he would be an excellent candidate including FATE 825 and [facility] GP.  We reviewed the mechanisms of both trials along with the risks and benefits with Brain, and he consents to being prescreened for both studies.    45 minutes spent on the date of the encounter doing chart review, review of test results, interpretation of tests, patient visit, and documentation     The longitudinal plan of care for the diagnosis(es)/condition(s) as documented were addressed during this visit. Due to the added complexity in care, I will continue to support Roman in the subsequent management and with ongoing continuity of care.    Polo Snow MD, PhD   of Medicine  Division of Hematology,  Oncology and Transplantation  [facility]    -----------------------------------    Oncology Summary     Cancer Staging   Rectal adenocarcinoma metastatic to lung (H)  Staging form: Colon and Rectum, AJCC 8th Edition  - Clinical: Stage IVB (cTX, cNX, pM1b) - Signed by Polo Snow MD on 3/30/2023    Oncology History   Rectal adenocarcinoma metastatic to lung (H)   2/3/2023 Initial Diagnosis    Rectal adenocarcinoma metastatic to lung (H)     2/3/2023 - 3/31/2023 Chemotherapy    Oral ONC Colorectal Cancer - Regorafenib  Plan Provider: Polo Snow MD  Treatment goal: Palliative  Line of treatment: [No plan line of treatment]     3/30/2023 -  Cancer Staged    Staging form: Colon and Rectum, AJCC 8th Edition  - Clinical: Stage IVB (cTX, cNX, pM1b)     6/7/2023 - 10/3/2023 Chemotherapy    OP ONC Colorectal Cancer - Modified FOLFOX-6 / Bevacizumab  Plan Provider: Polo Snow MD  Treatment goal: Palliative  Line of treatment: [No plan line of treatment]     10/27/2023 - 3/11/2024 Chemotherapy    OP ONC Colorectal Cancer - Bevacizumab + Trifluridine/Tipiracil (Lonsurf)  Plan Provider: Polo Snow MD  Treatment goal: Palliative  Line of treatment: [No plan line of treatment]     3/27/2024 - 3/27/2024 Chemotherapy    Oral ONC Colorectal Cancer - Fruquintinib (Fruzaqla)  Plan Provider: Polo Snow MD  Treatment goal: Palliative  Line of treatment: [No plan line of treatment]     5/2024 -  Chemotherapy    [facility] Cellular Therapy cytoreductive conditioning and cellular transplant  - admitted for 5 weeks following transplant with ~30lbs weight loss     8/30/2024 -  Chemotherapy    OP ONC Colorectal Cancer - FOLFIRI / Bevacizumab  Plan Provider: Polo Snow MD  Treatment goal: Palliative  Line of treatment: [No plan line of treatment]         Subjective/Interval Events     - he is feeling significantly improved from prior    Physical Exam     Vital signs: /82 (BP Location: Right arm, Patient Position:  Sitting, Cuff Size: Adult Regular)   Pulse 101   Temp 98.3  F (36.8  C) (Oral)   Resp 20   Wt 76 kg (167 lb 9.6 oz)   SpO2 97%   BMI 23.05 kg/m      ECOG performance status:  0  Vascular access:  R chest port    Physical Exam:  Exam performed, notable for:  - significantly improved from prior; increasing weight  - lungs CTAB; heart RRR  - abd soft, NT/ND; no issues around stoma  - no BLE swelling; no pain on calf palpation    Objective Data     Lab data:  I have personally reviewed the lab data, notable for:    - Hgb 12.0  - Plt 141  - no other notables    Radiology data:  I have personally reviewed the radiology data, notable for:  10/18/2024 CT C/A/P  IMPRESSION:  1.  Metastatic disease throughout the lungs has improved, with several of the masses smaller and less dense, now with air filled spaces and bronchograms. Mediastinal adenopathy has also slightly improved.  2.  Metastatic liver disease has slightly improved.  3.  Rectosigmoid colon mass and sigmoid colon mass have improved.  4.  Enlarged lymph nodes in the abdomen and pelvis have improved.  5.  Interval development of thoracic and lumbar vertebral body skeletal metastatic disease.    Pathology and other data:  I have personally reviewed and interpreted the pathology data, notable for:    - no new data

## 2024-11-03 ENCOUNTER — HEALTH MAINTENANCE LETTER (OUTPATIENT)
Age: 49
End: 2024-11-03

## 2024-11-04 ENCOUNTER — ENROLLMENT (OUTPATIENT)
Dept: HOME HEALTH SERVICES | Facility: HOME HEALTH | Age: 49
End: 2024-11-04
Payer: COMMERCIAL

## 2024-11-05 ENCOUNTER — PATIENT OUTREACH (OUTPATIENT)
Dept: ONCOLOGY | Facility: CLINIC | Age: 49
End: 2024-11-05
Payer: COMMERCIAL

## 2024-11-05 NOTE — TELEPHONE ENCOUNTER
[facility]: Cancer Care                                                                                          Dr. Guillen called from [email or web address] to update care team that pt will need a deep cleaning and 2 fillings before he will be cleared to get Zometa. Office will schedule these in the next few weeks.    Signature:  Virgen Nieto RN

## 2024-11-06 ENCOUNTER — HOME INFUSION BILLING (OUTPATIENT)
Dept: HOME HEALTH SERVICES | Facility: HOME HEALTH | Age: 49
End: 2024-11-06
Payer: COMMERCIAL

## 2024-11-06 ENCOUNTER — DOCUMENTATION ONLY (OUTPATIENT)
Dept: HOME HEALTH SERVICES | Facility: HOME HEALTH | Age: 49
End: 2024-11-06
Payer: COMMERCIAL

## 2024-11-06 ENCOUNTER — PATIENT OUTREACH (OUTPATIENT)
Dept: ONCOLOGY | Facility: CLINIC | Age: 49
End: 2024-11-06

## 2024-11-06 ENCOUNTER — INFUSION THERAPY VISIT (OUTPATIENT)
Dept: ONCOLOGY | Facility: CLINIC | Age: 49
End: 2024-11-06
Payer: COMMERCIAL

## 2024-11-06 ENCOUNTER — LAB (OUTPATIENT)
Dept: LAB | Facility: CLINIC | Age: 49
End: 2024-11-06
Attending: STUDENT IN AN ORGANIZED HEALTH CARE EDUCATION/TRAINING PROGRAM
Payer: COMMERCIAL

## 2024-11-06 VITALS
BODY MASS INDEX: 23.79 KG/M2 | TEMPERATURE: 98 F | SYSTOLIC BLOOD PRESSURE: 105 MMHG | DIASTOLIC BLOOD PRESSURE: 68 MMHG | WEIGHT: 173 LBS | RESPIRATION RATE: 16 BRPM | HEART RATE: 67 BPM | OXYGEN SATURATION: 98 %

## 2024-11-06 DIAGNOSIS — C20 RECTAL ADENOCARCINOMA METASTATIC TO LUNG (H): Primary | ICD-10-CM

## 2024-11-06 DIAGNOSIS — C78.00 RECTAL ADENOCARCINOMA METASTATIC TO LUNG (H): Primary | ICD-10-CM

## 2024-11-06 LAB
ALBUMIN SERPL BCG-MCNC: 4 G/DL (ref 3.5–5.2)
ALBUMIN UR-MCNC: 10 MG/DL
ALP SERPL-CCNC: 136 U/L (ref 40–150)
ALT SERPL W P-5'-P-CCNC: 9 U/L (ref 0–70)
ANION GAP SERPL CALCULATED.3IONS-SCNC: 10 MMOL/L (ref 7–15)
AST SERPL W P-5'-P-CCNC: 16 U/L (ref 0–45)
BASOPHILS # BLD AUTO: 0 10E3/UL (ref 0–0.2)
BASOPHILS NFR BLD AUTO: 0 %
BILIRUB SERPL-MCNC: 0.2 MG/DL
BUN SERPL-MCNC: 18.8 MG/DL (ref 6–20)
CALCIUM SERPL-MCNC: 9 MG/DL (ref 8.8–10.4)
CHLORIDE SERPL-SCNC: 106 MMOL/L (ref 98–107)
CREAT SERPL-MCNC: 0.82 MG/DL (ref 0.67–1.17)
EGFRCR SERPLBLD CKD-EPI 2021: >90 ML/MIN/1.73M2
EOSINOPHIL # BLD AUTO: 0.2 10E3/UL (ref 0–0.7)
EOSINOPHIL NFR BLD AUTO: 3 %
ERYTHROCYTE [DISTWIDTH] IN BLOOD BY AUTOMATED COUNT: ABNORMAL %
GLUCOSE SERPL-MCNC: 104 MG/DL (ref 70–99)
HCO3 SERPL-SCNC: 25 MMOL/L (ref 22–29)
HCT VFR BLD AUTO: 38.5 % (ref 40–53)
HGB BLD-MCNC: 12 G/DL (ref 13.3–17.7)
IMM GRANULOCYTES # BLD: 0.2 10E3/UL
IMM GRANULOCYTES NFR BLD: 3 %
LYMPHOCYTES # BLD AUTO: 0.6 10E3/UL (ref 0.8–5.3)
LYMPHOCYTES NFR BLD AUTO: 8 %
MCH RBC QN AUTO: 26.6 PG (ref 26.5–33)
MCHC RBC AUTO-ENTMCNC: 31.2 G/DL (ref 31.5–36.5)
MCV RBC AUTO: 85 FL (ref 78–100)
MONOCYTES # BLD AUTO: 0.8 10E3/UL (ref 0–1.3)
MONOCYTES NFR BLD AUTO: 11 %
NEUTROPHILS # BLD AUTO: 5.6 10E3/UL (ref 1.6–8.3)
NEUTROPHILS NFR BLD AUTO: 75 %
NRBC # BLD AUTO: 0 10E3/UL
NRBC BLD AUTO-RTO: 0 /100
PLAT MORPH BLD: NORMAL
PLATELET # BLD AUTO: 141 10E3/UL (ref 150–450)
POTASSIUM SERPL-SCNC: 4 MMOL/L (ref 3.4–5.3)
PROT SERPL-MCNC: 6.7 G/DL (ref 6.4–8.3)
RBC # BLD AUTO: 4.51 10E6/UL (ref 4.4–5.9)
RBC MORPH BLD: NORMAL
SODIUM SERPL-SCNC: 141 MMOL/L (ref 135–145)
WBC # BLD AUTO: 7.5 10E3/UL (ref 4–11)

## 2024-11-06 PROCEDURE — 82040 ASSAY OF SERUM ALBUMIN: CPT

## 2024-11-06 PROCEDURE — 36591 DRAW BLOOD OFF VENOUS DEVICE: CPT

## 2024-11-06 PROCEDURE — 96376 TX/PRO/DX INJ SAME DRUG ADON: CPT

## 2024-11-06 PROCEDURE — 96413 CHEMO IV INFUSION 1 HR: CPT

## 2024-11-06 PROCEDURE — 96375 TX/PRO/DX INJ NEW DRUG ADDON: CPT

## 2024-11-06 PROCEDURE — 250N000011 HC RX IP 250 OP 636: Performed by: STUDENT IN AN ORGANIZED HEALTH CARE EDUCATION/TRAINING PROGRAM

## 2024-11-06 PROCEDURE — 96368 THER/DIAG CONCURRENT INF: CPT

## 2024-11-06 PROCEDURE — 85025 COMPLETE CBC W/AUTO DIFF WBC: CPT | Performed by: STUDENT IN AN ORGANIZED HEALTH CARE EDUCATION/TRAINING PROGRAM

## 2024-11-06 PROCEDURE — G0498 CHEMO EXTEND IV INFUS W/PUMP: HCPCS

## 2024-11-06 PROCEDURE — 96417 CHEMO IV INFUS EACH ADDL SEQ: CPT

## 2024-11-06 PROCEDURE — A4216 STERILE WATER/SALINE, 10 ML: HCPCS

## 2024-11-06 PROCEDURE — 81003 URINALYSIS AUTO W/O SCOPE: CPT | Performed by: STUDENT IN AN ORGANIZED HEALTH CARE EDUCATION/TRAINING PROGRAM

## 2024-11-06 PROCEDURE — 258N000003 HC RX IP 258 OP 636: Performed by: STUDENT IN AN ORGANIZED HEALTH CARE EDUCATION/TRAINING PROGRAM

## 2024-11-06 PROCEDURE — 96411 CHEMO IV PUSH ADDL DRUG: CPT

## 2024-11-06 PROCEDURE — S9330 HIT CONT CHEM DIEM: HCPCS

## 2024-11-06 PROCEDURE — S9537 HT HEM HORM INJ DIEM: HCPCS | Mod: SH

## 2024-11-06 RX ORDER — ONDANSETRON 2 MG/ML
8 INJECTION INTRAMUSCULAR; INTRAVENOUS ONCE
Status: COMPLETED | OUTPATIENT
Start: 2024-11-06 | End: 2024-11-06

## 2024-11-06 RX ORDER — ATROPINE SULFATE 0.4 MG/ML
0.4 AMPUL (ML) INJECTION
Status: COMPLETED | OUTPATIENT
Start: 2024-11-06 | End: 2024-11-06

## 2024-11-06 RX ORDER — FLUOROURACIL 50 MG/ML
400 INJECTION, SOLUTION INTRAVENOUS ONCE
Status: COMPLETED | OUTPATIENT
Start: 2024-11-06 | End: 2024-11-06

## 2024-11-06 RX ORDER — DEXAMETHASONE SODIUM PHOSPHATE 4 MG/ML
12 INJECTION, SOLUTION INTRA-ARTICULAR; INTRALESIONAL; INTRAMUSCULAR; INTRAVENOUS; SOFT TISSUE ONCE
Status: COMPLETED | OUTPATIENT
Start: 2024-11-06 | End: 2024-11-06

## 2024-11-06 RX ORDER — HEPARIN SODIUM (PORCINE) LOCK FLUSH IV SOLN 100 UNIT/ML 100 UNIT/ML
5 SOLUTION INTRAVENOUS ONCE
Status: COMPLETED | OUTPATIENT
Start: 2024-11-06 | End: 2024-11-06

## 2024-11-06 RX ADMIN — DEXAMETHASONE SODIUM PHOSPHATE 12 MG: 4 INJECTION, SOLUTION INTRA-ARTICULAR; INTRALESIONAL; INTRAMUSCULAR; INTRAVENOUS; SOFT TISSUE at 09:16

## 2024-11-06 RX ADMIN — ATROPINE SULFATE 0.4 MG: 0.4 INJECTION, SOLUTION INTRAVENOUS at 11:18

## 2024-11-06 RX ADMIN — ATROPINE SULFATE 0.4 MG: 0.4 INJECTION, SOLUTION INTRAVENOUS at 10:21

## 2024-11-06 RX ADMIN — IRINOTECAN HYDROCHLORIDE 340 MG: 20 INJECTION, SOLUTION INTRAVENOUS at 10:27

## 2024-11-06 RX ADMIN — FLUOROURACIL 770 MG: 50 INJECTION, SOLUTION INTRAVENOUS at 12:04

## 2024-11-06 RX ADMIN — ONDANSETRON 8 MG: 2 INJECTION INTRAMUSCULAR; INTRAVENOUS at 09:15

## 2024-11-06 RX ADMIN — SODIUM CHLORIDE 100 ML: 9 INJECTION, SOLUTION INTRAVENOUS at 09:48

## 2024-11-06 RX ADMIN — Medication 5 ML: at 08:21

## 2024-11-06 RX ADMIN — SODIUM CHLORIDE 400 MG: 9 INJECTION, SOLUTION INTRAVENOUS at 09:49

## 2024-11-06 RX ADMIN — LEUCOVORIN CALCIUM 750 MG: 50 INJECTION, POWDER, LYOPHILIZED, FOR SOLUTION INTRAMUSCULAR; INTRAVENOUS at 10:26

## 2024-11-06 ASSESSMENT — PAIN SCALES - GENERAL: PAINLEVEL_OUTOF10: NO PAIN (0)

## 2024-11-06 NOTE — PROGRESS NOTES
[facility] d/c of Fluorouracil continuous infusion over 46 hours via C series pump.   Scheduled in [facility] for home disconnect on Friday 11/8/24 at 9am per pt request.  Will require Neulasta OnPro applied at this visit    Please schedule pt for mammogram     Thanks, Ty

## 2024-11-06 NOTE — PATIENT INSTRUCTIONS
[facility] Triage and after hours / weekends / holidays:  [phone]    Please call the triage or after hours line if you experience a temperature greater than or equal to 100.4, shaking chills, have uncontrolled nausea, vomiting and/or diarrhea, dizziness, shortness of breath, chest pain, bleeding, unexplained bruising, or if you have any other new/concerning symptoms, questions or concerns.      If you are having any concerning symptoms or wish to speak to a provider before your next infusion visit, please call triage to notify them so we can adequately serve you.     If you need a refill on a narcotic prescription or other medication, please call before your infusion appointment.                November 2024 Sunday Monday Tuesday Wednesday Thursday Friday Saturday                            1    RETURN CCSL   3:15 PM   (30 min.)   Polo Snow MD   [facility] 2       3     4     5     6    LAB CENTRAL   7:45 AM   (15 min.)   [facility] DRAW   [facility]    ONC INFUSION 4 HR (240 MIN)   8:30 AM   (240 min.)   [facility] NURSE   [facility] 7     8     9       10     11     12     13     14     15     16       17     18     19     20    LAB CENTRAL   9:15 AM   (15 min.)   [facility]   [facility]    RETURN ACTIVE TREATMENT   9:45 AM   (45 min.)   Rebeca Killian APRN CNP   [facility]    ONC INFUSION 4 HR (240 MIN)  10:30 AM   (240 min.)   [facility] INFUSION NURSE   [facility] 21     22     23       24     25     26     27     28     29     30                 December 2024 Sunday Monday Tuesday Wednesday Thursday Friday Saturday   1     2     3     4    LAB CENTRAL   7:45 AM   (15 min.)   [facility]   [facility]    ONC INFUSION 4 HR (240 MIN)   8:30 AM   (240 min.)   [facility] ONC INFUSION  NURSE   [facility] 5     6     7       8     9     10     11     12     13     14       15     16     17    RETURN PALLIATIVE   4:05 PM   (40 min.)   Isidra Prater DO   [facility] 18    LAB CENTRAL   9:15 AM   (15 min.)   [facility] DRAW   [facility]    RETURN ACTIVE TREATMENT   9:45 AM   (45 min.)   Rebeca Killian APRN CNP   [facility]    ONC INFUSION 4 HR (240 MIN)  10:30 AM   (240 min.)    ONC INFUSION NURSE   [facility] 19     20     21       22     23     24     25     26     27     28       29     30     31                                        Recent Results (from the past 24 hours)   Protein qualitative urine    Collection Time: 11/06/24  8:16 AM   Result Value Ref Range    Protein Albumin Urine 10 (A) Negative mg/dL   Comprehensive metabolic panel    Collection Time: 11/06/24  8:16 AM   Result Value Ref Range    Sodium 141 135 - 145 mmol/L    Potassium 4.0 3.4 - 5.3 mmol/L    Carbon Dioxide (CO2) 25 22 - 29 mmol/L    Anion Gap 10 7 - 15 mmol/L    Urea Nitrogen 18.8 6.0 - 20.0 mg/dL    Creatinine 0.82 0.67 - 1.17 mg/dL    GFR Estimate >90 >60 mL/min/1.73m2    Calcium 9.0 8.8 - 10.4 mg/dL    Chloride 106 98 - 107 mmol/L    Glucose 104 (H) 70 - 99 mg/dL    Alkaline Phosphatase 136 40 - 150 U/L    AST 16 0 - 45 U/L    ALT 9 0 - 70 U/L    Protein Total 6.7 6.4 - 8.3 g/dL    Albumin 4.0 3.5 - 5.2 g/dL    Bilirubin Total 0.2 <=1.2 mg/dL   CBC with platelets and differential    Collection Time: 11/06/24  8:16 AM   Result Value Ref Range    WBC Count 7.5 4.0 - 11.0 10e3/uL    RBC Count 4.51 4.40 - 5.90 10e6/uL    Hemoglobin 12.0 (L) 13.3 - 17.7 g/dL    Hematocrit 38.5 (L) 40.0 - 53.0 %    MCV 85 78 - 100 fL    MCH 26.6 26.5 - 33.0 pg    MCHC 31.2 (L) 31.5 - 36.5 g/dL    RDW      Platelet Count 141 (L) 150 - 450 10e3/uL    % Neutrophils 75 %    %  Lymphocytes 8 %    % Monocytes 11 %    % Eosinophils 3 %    % Basophils 0 %    % Immature Granulocytes 3 %    NRBCs per 100 WBC 0 <1 /100    Absolute Neutrophils 5.6 1.6 - 8.3 10e3/uL    Absolute Lymphocytes 0.6 (L) 0.8 - 5.3 10e3/uL    Absolute Monocytes 0.8 0.0 - 1.3 10e3/uL    Absolute Eosinophils 0.2 0.0 - 0.7 10e3/uL    Absolute Basophils 0.0 0.0 - 0.2 10e3/uL    Absolute Immature Granulocytes 0.2 <=0.4 10e3/uL    Absolute NRBCs 0.0 10e3/uL   RBC and Platelet Morphology    Collection Time: 11/06/24  8:16 AM   Result Value Ref Range    RBC Morphology Confirmed RBC Indices     Platelet Assessment  Automated Count Confirmed. Platelet morphology is normal.     Automated Count Confirmed. Platelet morphology is normal.

## 2024-11-06 NOTE — PROGRESS NOTES
Infusion Nursing Note:  Roman Escalante presents today for Cycle 6 Day 1 bevacizumab-bvzr, irinotecan, leucovorin, fluorouracil bolus, fluorouracil pump connect.    Patient seen by provider today: No   present during visit today: Not Applicable.    Note: Pt presents to infusion today feeling well. Pt denies having any fevers/chills, chest pain, sob, nausea/vomiting, rash, bladder or bowel concerns.      Intravenous Access:  Implanted Port.    Treatment Conditions:  Lab Results   Component Value Date    HGB 12.0 (L) 11/06/2024    WBC 7.5 11/06/2024    ANEU 45.1 (H) 07/28/2023    ANEUTAUTO 5.6 11/06/2024     (L) 11/06/2024        Lab Results   Component Value Date     11/06/2024    POTASSIUM 4.0 11/06/2024    MAG 2.0 04/24/2024    CR 0.82 11/06/2024    JEFERSON 9.0 11/06/2024    BILITOTAL 0.2 11/06/2024    ALBUMIN 4.0 11/06/2024    ALT 9 11/06/2024    AST 16 11/06/2024       Results reviewed, labs MET treatment parameters, ok to proceed with treatment.  Urine protein: 10.  BP: 105/68.      Post Infusion Assessment:  Patient tolerated infusion without incident.  Blood return noted pre and post infusion.  Blood return noted during fluorouracil bolus administration every 2 cc.  Site patent and intact, free from redness, edema or discomfort.  No evidence of extravasations.    Prior to discharge: Port is secured in place with tegaderm and flushed with 10cc NS with positive blood return noted.    Continuous home infusion c-series connected.    All connectors secured in place and clamps taped open.    Capillary element taped to pt's skin per protocol.  Pump Connection double checked with GUILLERMINA Peck.  Patient instructed to call our clinic or [facility] with any questions or concerns at home.  Patient verbalized understanding.    Patient set up for pump disconnect at home with [facility] on 11/8 @ 0900.        Discharge Plan:   Patient declined prescription refills.  Discharge  instructions reviewed with: Patient.  Patient and/or family verbalized understanding of discharge instructions and all questions answered.  AVS to patient via [employer].  Patient will return 11/20 for next appointment.   Patient discharged in stable condition accompanied by: self and friend.  Departure Mode: Ambulatory.      Ellen Doe RN

## 2024-11-07 PROCEDURE — S9330 HIT CONT CHEM DIEM: HCPCS

## 2024-11-08 ENCOUNTER — HOME CARE VISIT (OUTPATIENT)
Dept: HOME HEALTH SERVICES | Facility: HOME HEALTH | Age: 49
End: 2024-11-08
Payer: COMMERCIAL

## 2024-11-08 VITALS
DIASTOLIC BLOOD PRESSURE: 78 MMHG | SYSTOLIC BLOOD PRESSURE: 128 MMHG | OXYGEN SATURATION: 96 % | HEART RATE: 79 BPM | BODY MASS INDEX: 22.69 KG/M2 | WEIGHT: 165 LBS | TEMPERATURE: 98.4 F | RESPIRATION RATE: 16 BRPM

## 2024-11-08 NOTE — PROGRESS NOTES
Nursing Visit Note:  Nurse visit today for chemodisconnect, GA for Roman Escalante.     present during visit today: Not Applicable.    Pt tolerates chemotherapy. He administers Neulasta SQ 24 hours after disconnect of chemotherapy. Chemo bin tracking [id number]      Kobi Kaba 11/8/2024

## 2024-11-11 PROCEDURE — 88360 TUMOR IMMUNOHISTOCHEM/MANUAL: CPT | Mod: TC | Performed by: INTERNAL MEDICINE

## 2024-11-13 PROBLEM — C18.9 COLON CANCER METASTASIZED TO BONE (H): Status: ACTIVE | Noted: 2024-11-13

## 2024-11-13 PROBLEM — C79.51 COLON CANCER METASTASIZED TO BONE (H): Status: ACTIVE | Noted: 2024-11-13

## 2024-11-13 LAB
PATH REPORT.COMMENTS IMP SPEC: NORMAL
PATH REPORT.FINAL DX SPEC: NORMAL
PATH REPORT.GROSS SPEC: NORMAL
PATH REPORT.MICROSCOPIC SPEC OTHER STN: NORMAL
PATH REPORT.RELEVANT HX SPEC: NORMAL
PATH REPORT.RELEVANT HX SPEC: NORMAL
PATH REPORT.SITE OF ORIGIN SPEC: NORMAL
PATHOLOGY SYNOPTIC REPORT: NORMAL

## 2024-11-13 RX ORDER — ZOLEDRONIC ACID 0.04 MG/ML
4 INJECTION, SOLUTION INTRAVENOUS ONCE
OUTPATIENT
Start: 2024-11-20 | End: 2024-11-20

## 2024-11-13 RX ORDER — ALBUTEROL SULFATE 0.83 MG/ML
2.5 SOLUTION RESPIRATORY (INHALATION)
OUTPATIENT
Start: 2024-11-20

## 2024-11-13 RX ORDER — HEPARIN SODIUM (PORCINE) LOCK FLUSH IV SOLN 100 UNIT/ML 100 UNIT/ML
5 SOLUTION INTRAVENOUS
OUTPATIENT
Start: 2024-11-20

## 2024-11-13 RX ORDER — HEPARIN SODIUM,PORCINE 10 UNIT/ML
5-20 VIAL (ML) INTRAVENOUS DAILY PRN
OUTPATIENT
Start: 2024-11-20

## 2024-11-13 RX ORDER — EPINEPHRINE 1 MG/ML
0.3 INJECTION, SOLUTION INTRAMUSCULAR; SUBCUTANEOUS EVERY 5 MIN PRN
OUTPATIENT
Start: 2024-11-20

## 2024-11-13 RX ORDER — ALBUTEROL SULFATE 90 UG/1
1-2 INHALANT RESPIRATORY (INHALATION)
Start: 2024-11-20

## 2024-11-13 RX ORDER — METHYLPREDNISOLONE SODIUM SUCCINATE 40 MG/ML
40 INJECTION INTRAMUSCULAR; INTRAVENOUS
Start: 2024-11-20

## 2024-11-13 RX ORDER — DIPHENHYDRAMINE HYDROCHLORIDE 50 MG/ML
50 INJECTION INTRAMUSCULAR; INTRAVENOUS
Start: 2024-11-20

## 2024-11-13 RX ORDER — MEPERIDINE HYDROCHLORIDE 25 MG/ML
25 INJECTION INTRAMUSCULAR; INTRAVENOUS; SUBCUTANEOUS
OUTPATIENT
Start: 2024-11-20

## 2024-11-13 RX ORDER — DIPHENHYDRAMINE HYDROCHLORIDE 50 MG/ML
25 INJECTION INTRAMUSCULAR; INTRAVENOUS
Start: 2024-11-20

## 2024-11-15 ENCOUNTER — PATIENT OUTREACH (OUTPATIENT)
Dept: GASTROENTEROLOGY | Facility: CLINIC | Age: 49
End: 2024-11-15
Payer: COMMERCIAL

## 2024-11-15 NOTE — PROGRESS NOTES
"Infusion Nursing Note:  Roman Escalante presents today for Cycle 4 Day 1 bevacizumab-bvzr, oxaliplatin desensitization, fluorouracil bolus + home pump connect.    Patient seen by provider today: No, visit with Usha Mitchell PA-C yesterday   present during visit today: Not Applicable.    Note: Roman presents today feeling well. Denies pain or nausea/vomiting. Denies fevers/chills overnight. Denies changes or concerns since visit with Usha Mitchell yesterday. Roman reports that his port feelings like it is pulling underneath his skin. He reports that it feels this way with or without his needle in place, like maybe the port moved when he was doing an activity at some point. Denies any pain or discomfort at the site, and it does not appear swollen or damaged on the skin surface. Port was accessed without issue per Roman in lab and he reports no discomfort with flushing or infusing medications. Brisk blood return noted throughout infusion appointment.     Prior to discharge: Port is secured in place with tegaderm and flushed with 10cc NS with positive blood return noted.    Continuous home infusion C-series pump connected.    All connectors secured in place and clamps taped open.    Pump started, \"running\" noted on display (CADD): Not Applicable.   Capillary element taped to pt's skin per protocol.  Pump Connection double checked with Jose Burns RN.  Patient instructed to call our clinic or [facility] with any questions or concerns at home.  Patient verbalized understanding.    Patient set up for pump disconnect + neulasta onpro at home with [facility] on 07/29 at 1330. IB sent to [facility] Coordinator with date/time.       Intravenous Access:  Implanted Port.    Treatment Conditions:     Latest Reference Range & Units 07/27/23 06:51   Sodium 136 - 145 mmol/L 136   Potassium 3.4 - 5.3 mmol/L 3.9   Chloride 98 - 107 mmol/L 102   Carbon Dioxide (CO2) 22 - 29 mmol/L 25   Urea " --------------  ADMISSION REVIEW     Payor: [employer] AD[employer] PPO  Subscriber #:  [id number]  Authorization Number: [id number]    Admit date: 11/14/24  Admit time: 11:27 AM         CHIEF COMPLAINT:  Multivessel coronary artery disease, left lower extremity cellulitis, and fluid overload status.        ASSESSMENT:    1.       Multivessel coronary artery disease, to be evaluated by Cardiovascular Surgery for coronary artery bypass graft surgery.  2.       Fluid overload status and possible heart failure.    3.       Left lower extremity cellulitis.  Rule out left fifth toe osteomyelitis.  4.       Insulin-dependent diabetes mellitus type 2.     PLAN:  Patient will be started on IV Lasix.    IV vancomycin.    Obtain MRI scan of the left foot.    Cardiology, cardiovascular surgery, and infectious disease consults.      11/14 [facility]    Pre procedure diagnosis: Abnormal stress test, LV dysfunction  Post procedure diagnosis: Severe triple-vessel disease     Summary of findings: Severe triple-vessel disease with mild to moderate LV dysfunction.  No significant valvular heart disease.  Normal PA pressures with normal pulmonary capillary wedge pressure.      [facility] CONSULT    64-year-old male with multivessel coronary artery disease, left leg cellulitis.     patient will ultimately benefit from surgical vascularization however he is not a candidate for operative intervention in the setting of active cellulitis and possible osteomyelitis.  He will be evaluated by infectious disease, potentially vascular surgery and potentially podiatry.  Will follow closely to see how his hospital course goes regarding his cellulitis and we will plan for surgery appropriately.      ID CONSULT    Reason for Consultation:  LLE cellulitis, R/o fifth toe OM     ASSESSMENT:     Antibiotics: Vancomycin, ceftriaxone     # Acute LLE cellulitis with fifth toe ulceration, R/o OM               -On PO doxycycline and augmentin PTA  # Multivessel CAD                -CV surgery following  # PAD  # Diabetes mellitus     PLAN:  -  Continue on vancomycin. Start ceftriaxone. Awaiting MRI.    11/15    MV CAD  Cellulitis  Ischemic cardiomyopathy with LVEF 45%  Diabetes  Hypertension  Hyperlipidemia         Cont with IV lasix  CT surgery and ID on board. Will need surgical revascularization        MEDICATIONS ADMINISTERED IN LAST 1 DAY:  aspirin DR tab 81 mg       Date Action Dose Route User    11/15/2024 0854 Given 81 mg Oral Imelda Thompson RN          cefTRIAXone (Rocephin) 2 g in sodium chloride 0.9% 100 mL IVPB-ADDV       Date Action Dose Route User    11/15/2024 0910 New Bag 2 g Intravenous Imelda Thompson RN          furosemide (Lasix) 10 mg/mL injection 40 mg       Date Action Dose Route User    11/15/2024 0854 Given 40 mg Intravenous Imelda Thompson RN    11/14/2024 1730 Given 40 mg Intravenous Pastora El RN          heparin (Porcine) 5000 UNIT/ML injection 5,000 Units       Date Action Dose Route User    11/15/2024 0854 Given 5,000 Units Subcutaneous (Left Lower Abdomen) Imelda Thompson RN    11/14/2024 2145 Given 5,000 Units Subcutaneous (Right Lower Abdomen) Rufina Cabezas RN          insulin aspart (NovoLOG) 100 Units/mL FlexPen 1-7 Units       Date Action Dose Route User    11/15/2024 1239 Given 1 Units Subcutaneous (Left Upper Arm) Meredith Bedoya RN    11/14/2024 1734 Given 4 Units Subcutaneous (Left Upper Arm) Pastora El RN          insulin degludec (Tresiba) 100 units/mL flextouch 28 Units       Date Action Dose Route User    11/14/2024 2145 Given 28 Units Subcutaneous (Left Lower Abdomen) Rufina Cabezas RN          latanoprost (Xalatan) 0.005 % ophthalmic solution 1 drop       Date Action Dose Route User    11/14/2024 2145 Given 1 drop Both Eyes Rufina Cabezas RN          vancomycin (Vancocin) 1.5 g in sodium chloride 0.9% 250mL IVPB premix       Date Action Dose Route User    11/14/2024 1538 New Bag 1,500 mg Intravenous Pastora El RN       Nitrogen 6.0 - 20.0 mg/dL 12.1   Creatinine 0.67 - 1.17 mg/dL 0.88   GFR Estimate >60 mL/min/1.73m2 >90   Calcium 8.6 - 10.0 mg/dL 9.1   Anion Gap 7 - 15 mmol/L 9   Albumin 3.5 - 5.2 g/dL 4.3   Protein Total 6.4 - 8.3 g/dL 7.3   Alkaline Phosphatase 40 - 129 U/L 134 (H)   ALT 0 - 70 U/L 26   AST 0 - 45 U/L 28   Bilirubin Total <=1.2 mg/dL 0.3   Glucose 70 - 99 mg/dL 117 (H)   WBC 4.0 - 11.0 10e3/uL 9.5   Hemoglobin 13.3 - 17.7 g/dL 12.9 (L)   Hematocrit 40.0 - 53.0 % 39.0 (L)   Platelet Count 150 - 450 10e3/uL 252   RBC Count 4.40 - 5.90 10e6/uL 4.67   MCV 78 - 100 fL 84   MCH 26.5 - 33.0 pg 27.6   MCHC 31.5 - 36.5 g/dL 33.1   RDW 10.0 - 15.0 % 19.3 (H)   % Neutrophils % 72   % Lymphocytes % 12   % Monocytes % 11   % Eosinophils % 1   % Basophils % 1   Absolute Basophils 0.0 - 0.2 10e3/uL 0.1   Absolute Eosinophils 0.0 - 0.7 10e3/uL 0.1   Absolute Immature Granulocytes <=0.4 10e3/uL 0.3   Absolute Lymphocytes 0.8 - 5.3 10e3/uL 1.2   Absolute Monocytes 0.0 - 1.3 10e3/uL 1.1   % Immature Granulocytes % 3   Absolute Neutrophils 1.6 - 8.3 10e3/uL 6.8   Absolute NRBCs 10e3/uL 0.0   NRBCs per 100 WBC <1 /100 0   Protein Albumin Urine Negative mg/dL Negative     Results reviewed, labs MET treatment parameters, ok to proceed with treatment.  Blood pressure 133/88.      Post Infusion Assessment:  Patient tolerated infusion without incident.  Blood return noted pre and post infusion.  Blood return noted during fluorouracil bolus\ administration every 2 cc.  Site patent and intact, free from redness, edema or discomfort.  No evidence of extravasations.       Discharge Plan:   Patient declined prescription refills.  Discharge instructions reviewed with: Patient.  Patient and/or family verbalized understanding of discharge instructions and all questions answered.  AVS to patient via [employer].  Patient will return 08/11 for next infusion appointment.   Patient discharged in stable condition accompanied by: self.  Departure Mode:        Vitals (last day)       Date/Time Temp Pulse Resp BP SpO2 Weight O2 Device O2 Flow Rate (L/min) [facility]    11/15/24 0852 97.9 °F (36.6 °C) 78 18 121/68 93 % -- None (Room air) -- SH    11/15/24 0502 98.2 °F (36.8 °C) 51 18 123/73 96 % 221 lb 3.2 oz (100.3 kg) None (Room air) -- TB    11/14/24 2143 98 °F (36.7 °C) 62 20 144/72 97 % -- None (Room air) -- TB    11/14/24 1651 98.3 °F (36.8 °C) 62 22 118/59 94 % -- None (Room air) -- NR    11/14/24 1334 -- -- -- -- -- 229 lb (103.9 kg) -- -- NR    11/14/24 1330 -- 60 22 106/57 95 % -- None (Room air) -- NR    11/14/24 1128 -- 62 20 131/70 96 % 229 lb (103.9 kg) None (Room air) -- NR    11/14/24 1126 -- 61 -- -- -- -- -- -- BP    11/14/24 1118 -- 58 17 -- 95 % -- None (Room air) -- TV    11/14/24 1100 -- 59 20 135/74 93 % -- None (Room air) -- TV    11/14/24 1030 -- 58 20 140/75 95 % -- None (Room air) -- TV    11/14/24 1000 -- 63 21 140/76 95 % -- None (Room air) -- TV    11/14/24 0945 -- 62 20 157/80 96 % -- None (Room air) -- TV    11/14/24 0930 -- 66 21 116/71 94 % -- None (Room air) -- TV    11/14/24 0915 -- 64 20 140/75 93 % -- None (Room air) -- TV    11/14/24 0900 -- 67 18 116/71 93 % -- None (Room air) -- TV    11/14/24 0851 -- 67 22 138/72 92 % -- None (Room air) -- TV    11/14/24 0648 96 °F (35.6 °C) 62 18 130/74 95 % -- None (Room air) -- TV          CIWA Scores (since admission)       None                 Ambulatory.      Parisa Lovell RN

## 2024-11-16 PROCEDURE — A9270 NON-COVERED ITEM OR SERVICE: HCPCS

## 2024-11-20 ENCOUNTER — HOME INFUSION BILLING (OUTPATIENT)
Dept: HOME HEALTH SERVICES | Facility: HOME HEALTH | Age: 49
End: 2024-11-20
Payer: COMMERCIAL

## 2024-11-20 ENCOUNTER — ALLIED HEALTH/NURSE VISIT (OUTPATIENT)
Dept: ONCOLOGY | Facility: CLINIC | Age: 49
End: 2024-11-20

## 2024-11-20 ENCOUNTER — ONCOLOGY VISIT (OUTPATIENT)
Dept: ONCOLOGY | Facility: CLINIC | Age: 49
End: 2024-11-20
Attending: STUDENT IN AN ORGANIZED HEALTH CARE EDUCATION/TRAINING PROGRAM
Payer: COMMERCIAL

## 2024-11-20 ENCOUNTER — INFUSION THERAPY VISIT (OUTPATIENT)
Dept: ONCOLOGY | Facility: CLINIC | Age: 49
End: 2024-11-20
Payer: COMMERCIAL

## 2024-11-20 ENCOUNTER — DOCUMENTATION ONLY (OUTPATIENT)
Dept: HOME HEALTH SERVICES | Facility: HOME HEALTH | Age: 49
End: 2024-11-20
Payer: COMMERCIAL

## 2024-11-20 VITALS
OXYGEN SATURATION: 98 % | BODY MASS INDEX: 23.79 KG/M2 | DIASTOLIC BLOOD PRESSURE: 80 MMHG | TEMPERATURE: 98 F | SYSTOLIC BLOOD PRESSURE: 108 MMHG | HEART RATE: 90 BPM | WEIGHT: 173 LBS | RESPIRATION RATE: 16 BRPM

## 2024-11-20 DIAGNOSIS — C20 RECTAL ADENOCARCINOMA METASTATIC TO LUNG (H): Primary | ICD-10-CM

## 2024-11-20 DIAGNOSIS — C78.00 RECTAL ADENOCARCINOMA METASTATIC TO LUNG (H): Primary | ICD-10-CM

## 2024-11-20 DIAGNOSIS — C79.51 COLON CANCER METASTASIZED TO BONE (H): ICD-10-CM

## 2024-11-20 DIAGNOSIS — C18.9 COLON CANCER METASTASIZED TO LIVER (H): ICD-10-CM

## 2024-11-20 DIAGNOSIS — C78.7 COLON CANCER METASTASIZED TO LIVER (H): ICD-10-CM

## 2024-11-20 DIAGNOSIS — C18.9 COLON CANCER METASTASIZED TO BONE (H): ICD-10-CM

## 2024-11-20 LAB
ALBUMIN SERPL BCG-MCNC: 4.1 G/DL (ref 3.5–5.2)
ALBUMIN UR-MCNC: 30 MG/DL
ALP SERPL-CCNC: 150 U/L (ref 40–150)
ALT SERPL W P-5'-P-CCNC: 9 U/L (ref 0–70)
ANION GAP SERPL CALCULATED.3IONS-SCNC: 11 MMOL/L (ref 7–15)
AST SERPL W P-5'-P-CCNC: 19 U/L (ref 0–45)
BASOPHILS # BLD AUTO: 0.1 10E3/UL (ref 0–0.2)
BASOPHILS NFR BLD AUTO: 1 %
BILIRUB SERPL-MCNC: 0.2 MG/DL
BUN SERPL-MCNC: 16.7 MG/DL (ref 6–20)
CALCIUM SERPL-MCNC: 9.3 MG/DL (ref 8.8–10.4)
CHLORIDE SERPL-SCNC: 105 MMOL/L (ref 98–107)
CREAT SERPL-MCNC: 0.87 MG/DL (ref 0.67–1.17)
EGFRCR SERPLBLD CKD-EPI 2021: >90 ML/MIN/1.73M2
ELLIPTOCYTES BLD QL SMEAR: SLIGHT
EOSINOPHIL # BLD AUTO: 0.2 10E3/UL (ref 0–0.7)
EOSINOPHIL NFR BLD AUTO: 2 %
ERYTHROCYTE [DISTWIDTH] IN BLOOD BY AUTOMATED COUNT: ABNORMAL %
GLUCOSE SERPL-MCNC: 90 MG/DL (ref 70–99)
HCO3 SERPL-SCNC: 23 MMOL/L (ref 22–29)
HCT VFR BLD AUTO: 41.3 % (ref 40–53)
HGB BLD-MCNC: 13.4 G/DL (ref 13.3–17.7)
IMM GRANULOCYTES # BLD: 0.2 10E3/UL
IMM GRANULOCYTES NFR BLD: 2 %
LYMPHOCYTES # BLD AUTO: 0.7 10E3/UL (ref 0.8–5.3)
LYMPHOCYTES NFR BLD AUTO: 7 %
MCH RBC QN AUTO: 28 PG (ref 26.5–33)
MCHC RBC AUTO-ENTMCNC: 32.4 G/DL (ref 31.5–36.5)
MCV RBC AUTO: 86 FL (ref 78–100)
MONOCYTES # BLD AUTO: 1.2 10E3/UL (ref 0–1.3)
MONOCYTES NFR BLD AUTO: 12 %
NEUTROPHILS # BLD AUTO: 7.7 10E3/UL (ref 1.6–8.3)
NEUTROPHILS NFR BLD AUTO: 77 %
NRBC # BLD AUTO: 0 10E3/UL
NRBC BLD AUTO-RTO: 0 /100
PLAT MORPH BLD: ABNORMAL
PLATELET # BLD AUTO: 156 10E3/UL (ref 150–450)
POLYCHROMASIA BLD QL SMEAR: SLIGHT
POTASSIUM SERPL-SCNC: 4 MMOL/L (ref 3.4–5.3)
PROT SERPL-MCNC: 7.1 G/DL (ref 6.4–8.3)
RBC # BLD AUTO: 4.79 10E6/UL (ref 4.4–5.9)
RBC MORPH BLD: ABNORMAL
SODIUM SERPL-SCNC: 139 MMOL/L (ref 135–145)
WBC # BLD AUTO: 10 10E3/UL (ref 4–11)

## 2024-11-20 PROCEDURE — G0463 HOSPITAL OUTPT CLINIC VISIT: HCPCS | Mod: 25

## 2024-11-20 PROCEDURE — 96415 CHEMO IV INFUSION ADDL HR: CPT

## 2024-11-20 PROCEDURE — 250N000011 HC RX IP 250 OP 636

## 2024-11-20 PROCEDURE — 250N000011 HC RX IP 250 OP 636: Performed by: STUDENT IN AN ORGANIZED HEALTH CARE EDUCATION/TRAINING PROGRAM

## 2024-11-20 PROCEDURE — 96411 CHEMO IV PUSH ADDL DRUG: CPT

## 2024-11-20 PROCEDURE — S9537 HT HEM HORM INJ DIEM: HCPCS | Mod: SH

## 2024-11-20 PROCEDURE — 81003 URINALYSIS AUTO W/O SCOPE: CPT | Performed by: STUDENT IN AN ORGANIZED HEALTH CARE EDUCATION/TRAINING PROGRAM

## 2024-11-20 PROCEDURE — 96376 TX/PRO/DX INJ SAME DRUG ADON: CPT

## 2024-11-20 PROCEDURE — S9330 HIT CONT CHEM DIEM: HCPCS

## 2024-11-20 PROCEDURE — 96375 TX/PRO/DX INJ NEW DRUG ADDON: CPT

## 2024-11-20 PROCEDURE — 84460 ALANINE AMINO (ALT) (SGPT): CPT

## 2024-11-20 PROCEDURE — 85025 COMPLETE CBC W/AUTO DIFF WBC: CPT | Performed by: STUDENT IN AN ORGANIZED HEALTH CARE EDUCATION/TRAINING PROGRAM

## 2024-11-20 PROCEDURE — 36591 DRAW BLOOD OFF VENOUS DEVICE: CPT | Performed by: STUDENT IN AN ORGANIZED HEALTH CARE EDUCATION/TRAINING PROGRAM

## 2024-11-20 PROCEDURE — 96368 THER/DIAG CONCURRENT INF: CPT

## 2024-11-20 PROCEDURE — 258N000003 HC RX IP 258 OP 636: Performed by: STUDENT IN AN ORGANIZED HEALTH CARE EDUCATION/TRAINING PROGRAM

## 2024-11-20 PROCEDURE — A4216 STERILE WATER/SALINE, 10 ML: HCPCS

## 2024-11-20 PROCEDURE — G0498 CHEMO EXTEND IV INFUS W/PUMP: HCPCS

## 2024-11-20 PROCEDURE — 82310 ASSAY OF CALCIUM: CPT

## 2024-11-20 PROCEDURE — 96413 CHEMO IV INFUSION 1 HR: CPT

## 2024-11-20 PROCEDURE — 96417 CHEMO IV INFUS EACH ADDL SEQ: CPT

## 2024-11-20 RX ORDER — ATROPINE SULFATE 0.4 MG/ML
0.4 AMPUL (ML) INJECTION
Status: COMPLETED | OUTPATIENT
Start: 2024-11-20 | End: 2024-11-20

## 2024-11-20 RX ORDER — ONDANSETRON 2 MG/ML
8 INJECTION INTRAMUSCULAR; INTRAVENOUS ONCE
Status: COMPLETED | OUTPATIENT
Start: 2024-11-20 | End: 2024-11-20

## 2024-11-20 RX ORDER — HEPARIN SODIUM (PORCINE) LOCK FLUSH IV SOLN 100 UNIT/ML 100 UNIT/ML
5 SOLUTION INTRAVENOUS ONCE
Status: COMPLETED | OUTPATIENT
Start: 2024-11-20 | End: 2024-11-20

## 2024-11-20 RX ORDER — FLUOROURACIL 50 MG/ML
400 INJECTION, SOLUTION INTRAVENOUS ONCE
Status: COMPLETED | OUTPATIENT
Start: 2024-11-20 | End: 2024-11-20

## 2024-11-20 RX ORDER — DEXAMETHASONE SODIUM PHOSPHATE 4 MG/ML
12 INJECTION, SOLUTION INTRA-ARTICULAR; INTRALESIONAL; INTRAMUSCULAR; INTRAVENOUS; SOFT TISSUE ONCE
Status: COMPLETED | OUTPATIENT
Start: 2024-11-20 | End: 2024-11-20

## 2024-11-20 RX ADMIN — SODIUM CHLORIDE 400 MG: 9 INJECTION, SOLUTION INTRAVENOUS at 11:10

## 2024-11-20 RX ADMIN — ATROPINE SULFATE 0.4 MG: 0.4 INJECTION, SOLUTION INTRAVENOUS at 12:32

## 2024-11-20 RX ADMIN — ATROPINE SULFATE 0.4 MG: 0.4 INJECTION, SOLUTION INTRAVENOUS at 11:46

## 2024-11-20 RX ADMIN — FLUOROURACIL 770 MG: 50 INJECTION, SOLUTION INTRAVENOUS at 13:39

## 2024-11-20 RX ADMIN — IRINOTECAN HYDROCHLORIDE 340 MG: 20 INJECTION, SOLUTION INTRAVENOUS at 11:50

## 2024-11-20 RX ADMIN — HEPARIN SODIUM (PORCINE) LOCK FLUSH IV SOLN 100 UNIT/ML 5 ML: 100 SOLUTION at 09:35

## 2024-11-20 RX ADMIN — SODIUM CHLORIDE 50 ML: 9 INJECTION, SOLUTION INTRAVENOUS at 10:55

## 2024-11-20 RX ADMIN — DEXTROSE MONOHYDRATE 750 MG: 50 INJECTION, SOLUTION INTRAVENOUS at 11:50

## 2024-11-20 RX ADMIN — DEXAMETHASONE SODIUM PHOSPHATE 12 MG: 4 INJECTION, SOLUTION INTRA-ARTICULAR; INTRALESIONAL; INTRAMUSCULAR; INTRAVENOUS; SOFT TISSUE at 10:54

## 2024-11-20 RX ADMIN — ONDANSETRON 8 MG: 2 INJECTION INTRAMUSCULAR; INTRAVENOUS at 10:54

## 2024-11-20 ASSESSMENT — PAIN SCALES - GENERAL: PAINLEVEL_OUTOF10: NO PAIN (0)

## 2024-11-20 NOTE — PROGRESS NOTES
[facility] - Medical Oncology Follow Up Note  11/20/2024    Oncology History:   Patient developed rectal bleeding in 2020.  In January 2021 CT showed focal wall thickening in the upper rectum, multiple pulmonary nodules bilaterally suspicious for metastatic disease.  Underwent FNA of the right upper lobe lesion which was consistent with adenocarcinoma of the colon.  He was treated with FOLFOX from 2/20/2021 through 5/20/2021.  Avastin was added.  Had an infusion reaction to oxaliplatin 6/2021.  7/2021- 12/2021 was on 5-FU alone.  Developed progression.  12/2021- 9/2022 was on FOLFIRI.  11/2021 started Lonsurf. All of this was done with outside oncologist.     Met with Dr. Snow on 2/3/2023 to establish care. Recommended regorafenib.     Started regorafenib on 3/2/2023. Progression noted 5/19/23. Plan to start FOLFOX/Avastin and send out CARIS testing to evaluate for other treatment options. Cycle 1 was given with oxaliplatin desensitization, 5FU, Avastin held due to increased urine protein.     Following cycle 4, patient was admitted with hematuria and acute kidney injury.    Oxaliplatin was dropped with Cycle 5.     Y-90 radioembolization 9/7.    10/24/23 CT CAP with disease progression with notable growth in all pulmonary lesions. Lonsurf + bevacizumab started 10/27/23 with plans to bridge to clinical trial.   11/15/23 - stopped chemo for [facility] study.   Currently enrolled in [facility] CAR-T trial (completed cell harvest 1/23/2024, tentatively planning administration of CAR-T in April 2/12/24 - resumed Lonsurf, bevacizumab to bridge treatment until CAR-T therapy  3/20/24 - treatment changed to Fruquintinib due to intolerability of Lonsurf/bevacizumab  4/2/24- Presented to the ED with dehydration. Stopped Fruquintnib.   6/2024- cellular therapy transplant at [facility].   7/30/24- CT imaging demonstrating marked progression of pulmonary and hepatic lesions, clinical trial failed to generate T-cell graft. Multiple  discussions regarding plan of care, hospice vs additional lines of treatment. Patient decided he is not ready for hospice. He resumed treatment with FOLFIRI + bevacizumab on 8/30/24.   10/2024 - CT CAP with continued response, plan to continue FOLFIRI + bevacizumab with the addition of Zometa. Per his dentist, needs dental work completed prior to clearance. (Scheduled 1/2 and 1/21).       Interval History:     Roman presents today feeling well and offers no concerns today.   His neuropathy is stable. Fluctuates often due to activity in his feet, none in his hands. Continues on gabapentin but often forgets to take before bedtime.   Appetite is good, denies any GI concerns. His bowel movements have been white-colored the past couple of days which he attributes to food intake changes.   Developed one small mouth sore to his right lower inner cheek near his back molar. Mild pain, no open sores or interference with eating/swallowing. Has dental work scheduled 1/2 and 1/21.   Needs ~12 hours of sleep every night, but otherwise energy is good and is not needing naps throughout the day         Review of Systems:  Patient denies any of the following except if noted above: fevers, chills, difficulty with energy, vision or hearing changes, chest pain, dyspnea, abdominal pain, nausea, vomiting, diarrhea, constipation, urinary concerns, headaches, numbness, tingling, issues with sleep or mood. Also denies lumps, bumps, rashes or skin lesions, bleeding or bruising issues.      Current Outpatient Medications   Medication Sig Dispense Refill    Chemo Kit For RN use only. Do not remove items from bag. Contents: 1  sodium chloride 0.9% flush, 4 medium gloves, 1 chemo gown, 1/4 chemo mat, 1 connector female, 1 chemo bag. [id number] kit 0    cyclobenzaprine (FLEXERIL) 5 MG tablet Take one tab (5mg) by mouth over 6-8 hours, as needed for spasms 45 tablet 2    Emergency Supply Kit, Central, Patient use for emergency only. Contents: 3 sodium  "chloride 0.9% flushes, 1 dressing kit, 1 microclave ext set 14\", 4 nitrile gloves (med), 6 alcohol prep pads, 1 bacitracin, 1 syringe (10 cc 20 G 1\"). Call [phone] to reorder. [id number] kit 0    Fluorouracil (ADRUCIL) 4,610 mg in sodium chloride 0.9 % 241 mL via HOMEPUMP C-Series Infuse 4,610 mg at 5.2 mL/hr over 46 hours into the vein once for 1 dose. [id number] mL 0    gabapentin (NEURONTIN) 100 MG capsule Take 1 capsule (100 mg) by mouth at bedtime for 3 days, THEN 1 capsule (100 mg) 2 times daily for 3 days, THEN 1 capsule (100 mg) 3 times daily for 24 days. 81 capsule 0    ibuprofen (ADVIL/MOTRIN) 200 MG tablet Take 3 tablets (600 mg) by mouth every 8 hours as needed for moderate pain (Patient not taking: Reported on 10/9/2024) 100 tablet 0    loperamide (IMODIUM) 2 MG capsule 2 caps at 1st sign of diarrhea & 1 cap every 2hrs until 12hrs diarrhea free. During night, 2 caps at bedtime & 2 caps every 4hrs until AM (Patient not taking: Reported on 10/9/2024) 30 capsule 0    LORazepam (ATIVAN) 0.5 MG tablet Take 1 tablet (0.5 mg) by mouth every 6 hours as needed for anxiety (Patient not taking: Reported on 11/1/2024) 10 tablet 0    morphine (MS CONTIN) 15 MG CR tablet Take 1 tablet (15 mg) by mouth every 12 hours (Patient not taking: Reported on 10/4/2024) 60 tablet 0    NARCAN 4 MG/0.1ML nasal spray CALL 911. SPR CONTENTS OF ONE SPRAYER (0.1ML) INTO ONE NOSTRIL. REPEAT IN 2-3 MIN IF SYMPTOMS OF OPIOID EMERGENCY PERSIST, ALTERNATE NOSTRILS (Patient not taking: Reported on 11/1/2024)      ondansetron (ZOFRAN ODT) 4 MG ODT tab Take 1 tablet (4 mg) by mouth every 6 hours as needed for nausea. 30 tablet 3    Ostomy Supplies MISC 1 each daily 1 each 11    oxyCODONE IR (ROXICODONE) 10 MG tablet Take 1-2 tablets (10-20 mg) by mouth every 4 hours as needed for moderate to severe pain. 150 tablet 0    pegfilgrastim (NEULASTA) 6 MG/0.6ML injection Inject 0.6 mLs (6 mg) subcutaneously every 14 days. Administer 24 hours after " chemotherapy disconnect 3.6 mL 0    Port Access Kit For nurse use only.  Do not remove items from bag.  Use for port access.  Do not place syringe on sterile field. [id number] kit 0    prochlorperazine (COMPAZINE) 10 MG tablet Take 0.5 tablets (5 mg) by mouth every 6 hours as needed for nausea or vomiting. (Patient not taking: Reported on 11/1/2024) 30 tablet 2    prochlorperazine (COMPAZINE) 10 MG tablet Take 1 tablet (10 mg) by mouth every 6 hours as needed for nausea or vomiting 30 tablet 2    prochlorperazine (COMPAZINE) 5 MG tablet Take 1 tablet (5 mg) by mouth every 6 hours as needed for nausea or vomiting (Patient not taking: Reported on 11/1/2024) 30 tablet 3    sodium chloride, PF, 0.9% PF flush Inject 10 mLs into the vein as needed for line flush. Flush IV before and after med administration as directed and/or at least every 24 hours, or prior to deaccessing for no further use and/or at least every 4 weeks when not accessed. [id number] mL 0    sulfamethoxazole-trimethoprim (BACTRIM DS) 800-160 MG tablet  (Patient not taking: Reported on 11/1/2024)      tamsulosin (FLOMAX) 0.4 MG capsule  (Patient not taking: Reported on 10/4/2024)      valACYclovir (VALTREX) 500 MG tablet  (Patient not taking: Reported on 10/4/2024)       Physical Exam:  General: The patient is a pleasant male in no acute distress.  /80   Pulse 90   Temp 98  F (36.7  C)   Resp 16   Wt 78.5 kg (173 lb)   SpO2 98%   BMI 23.79 kg/m    Wt Readings from Last 10 Encounters:   11/20/24 78.5 kg (173 lb)   11/08/24 74.8 kg (165 lb)   11/06/24 78.5 kg (173 lb)   11/01/24 76 kg (167 lb 9.6 oz)   10/23/24 77.1 kg (170 lb)   10/09/24 76.2 kg (168 lb 1.6 oz)   09/25/24 72.8 kg (160 lb 9.6 oz)   09/13/24 69.9 kg (154 lb)   09/06/24 72.6 kg (160 lb)   08/30/24 72.8 kg (160 lb 6.4 oz)   HEENT: EOMI. Sclerae are anicteric. Oral mucosa pink and moist without lesions or thrush.   Lymph: Neck is supple with no lymphadenopathy in the cervical or  supraclavicular areas.   Heart: Mildly tachycardic with regular rhythm.   Lungs: Clear to auscultation throughout, normal work of breathing.   Abdomen: Bowel sounds present, soft, nontender with no palpable hepatosplenomegaly or masses. Ostomy is in place in left abdomen with minimal amount of formed brown stool.   Extremities: No lower extremity edema noted bilaterally.   Neuro: Cranial nerves II through XII are grossly intact.  Skin: No rashes, petechiae, or bruising noted on exposed skin.    LABS  Most Recent 3 CBC's:  Recent Labs   Lab Test 11/20/24  0932 11/06/24  0816 10/23/24  0845   WBC 10.0 7.5 8.6   HGB 13.4 12.0* 12.2*   MCV 86 85 83    141* 166    Most Recent 3 BMP's:  Recent Labs   Lab Test 11/06/24  0816 10/23/24  0845 10/09/24  0646    139 137   POTASSIUM 4.0 4.0 3.7   CHLORIDE 106 107 103   CO2 25 23 22   BUN 18.8 13.1 15.3   CR 0.82 0.76 0.80   ANIONGAP 10 9 12   JEFERSON 9.0 8.9 9.0   * 91 111*    Most Recent 2 LFT's:  Recent Labs   Lab Test 11/06/24  0816 10/23/24  0845   AST 16 17   ALT 9 9   ALKPHOS 136 135   BILITOTAL 0.2 0.2   I reviewed the above labs today.    ASSESSMENT AND PLAN   Metastatic rectal cancer  Most recently progressed after CAR-T clinical trial. He started on treatment with FOLFIRI + bevacizumab on 8/30/24. Tolerating treatment well with improvement in energy, pain and breathing/shortness of breath. CT imaging 10/24 with continued response. He continues to tolerate FOLFIRI + bevacizumab well and denies any significant side effects from treatment. He will continue with cycle 7 FOLFIRI/chelsea today. Continue with monthly LILLY follow up, patient to call sooner with any concerns.    Oxygen, fluid in lungs    Shortness of breath with activity has resolved. Previously, walking oxygen test demonstrating oxygen saturations in the 80's on room air. Previously ordered home oxygen with activity. Did not get approved through insurance, subsequent walking tests with adequate  oxygen saturations. Breathing overall has improved. Will continue to monitor. Echo on 9/5/24 showed a normal EF.     Weight loss  Previous weight loss despite efforts to increase intake. Started THC gummies, appetite and weight have improved. Nutrition referral previously placed.     Rectal, perineum pain  Following with palliative care. No longer needing MS Contin, but remains on oxycodone prn, currently 10 mg 2-3 times per day. Pain fluctuates throughout chemo cycle, improves a few days after chemo and recurs a few days prior to next dose.     Anemia  Patient has microcytic anemia. Received IV iron and has had significant improvement in symptoms. Hemoglobin continues to improve.       The longitudinal plan of care for the diagnosis(es)/condition(s) as documented were addressed during this visit. Due to the added complexity in care, I will continue to support Roman in the subsequent management and with ongoing continuity of care.    28 minutes spent on the date of the encounter doing chart review, review of test results, interpretation of tests, patient visit, and documentation       FLORA Albright CNP

## 2024-11-20 NOTE — PROGRESS NOTES
[facility]HI d/c of Fluorouracil continuous infusion over 46 hours via C series pump.   Scheduled in [facility] for home disconnect on 11/22/24 at 10:30am.  Will require Neulasta OnPro applied at this visit

## 2024-11-20 NOTE — PROGRESS NOTES
Infusion Nursing Note:  Roman Escalante presents today for Cycle 7 Day 1 Bevacizumab-bvzyr, Irinotecan, Leucovorin and Fluouracil IVP and pump connect.    Patient seen by provider today: Yes: Rebeca Killian CNP   present during visit today: Not Applicable.    Note: Patient presents to the infusion center today after his provider appt.    Patient prefers Atropine prior to and longterm through his Irinotecan infusion.    TORB Rebeca Killian CNP/Nany Pittman RN 11/20/24 1217  -urine protein 30 today; he can hold off on 24 hour urine , in September it was no where near concerning amount in 24 hour urine sample     Patient is having dental work done in Jan 2025, no dental clearance at this time.     Intravenous Access:  Implanted Port.    Treatment Conditions:   Latest Reference Range & Units 11/20/24 09:32   Sodium 135 - 145 mmol/L 139   Potassium 3.4 - 5.3 mmol/L 4.0   Chloride 98 - 107 mmol/L 105   Carbon Dioxide (CO2) 22 - 29 mmol/L 23   Urea Nitrogen 6.0 - 20.0 mg/dL 16.7   Creatinine 0.67 - 1.17 mg/dL 0.87   GFR Estimate >60 mL/min/1.73m2 >90   Calcium 8.8 - 10.4 mg/dL 9.3   Anion Gap 7 - 15 mmol/L 11   Albumin 3.5 - 5.2 g/dL 4.1   Protein Total 6.4 - 8.3 g/dL 7.1   Alkaline Phosphatase 40 - 150 U/L 150   ALT 0 - 70 U/L 9   AST 0 - 45 U/L 19   Bilirubin Total <=1.2 mg/dL 0.2   Glucose 70 - 99 mg/dL 90   WBC 4.0 - 11.0 10e3/uL 10.0   Hemoglobin 13.3 - 17.7 g/dL 13.4   Hematocrit 40.0 - 53.0 % 41.3   Platelet Count 150 - 450 10e3/uL 156   RBC Count 4.40 - 5.90 10e6/uL 4.79   MCV 78 - 100 fL 86   MCH 26.5 - 33.0 pg 28.0   MCHC 31.5 - 36.5 g/dL 32.4   RDW  See Comment   % Neutrophils % 77   % Lymphocytes % 7   % Monocytes % 12   % Eosinophils % 2   % Basophils % 1   Absolute Basophils 0.0 - 0.2 10e3/uL 0.1   Absolute Eosinophils 0.0 - 0.7 10e3/uL 0.2   Absolute Immature Granulocytes <=0.4 10e3/uL 0.2   Absolute Lymphocytes 0.8 - 5.3 10e3/uL 0.7 (L)   Absolute Monocytes 0.0 - 1.3 10e3/uL 1.2   % Immature  "Granulocytes % 2   Absolute Neutrophils 1.6 - 8.3 10e3/uL 7.7   Absolute NRBCs 10e3/uL 0.0   NRBCs per 100 WBC <1 /100 0   RBC Morphology  Confirmed RBC Indices   Platelet Morphology Automated Count Confirmed. Platelet morphology is normal.  Automated Count Confirmed. Platelet morphology is normal.   Polychromasia None Seen  Slight !   Elliptocytes None Seen  Slight !     /80     Latest Reference Range & Units 11/20/24 10:26   Protein Albumin Urine Negative mg/dL 30 !     Results reviewed, labs MET treatment parameters, ok to proceed with treatment.    Post Infusion Assessment:  Patient tolerated infusion without incident.  Blood return noted pre and post infusion.  No evidence of extravasations.    Prior to discharge: Port is secured in place with tegaderm and flushed with 10cc NS with positive blood return noted.    Continuous home infusion Dosi-Fuser pump connected.    All connectors secured in place and clamps taped open.    Pump started, \"running\" noted on display (CADD): Not Applicable.   Capillary element taped to pt's skin per protocol.  Pump Connection double checked with Chey Sabillon RN.  Patient instructed to call our clinic or [facility] with any questions or concerns at home.  Patient verbalized understanding.    Patient set up for pump disconnect at home with [facility] on 11/22 at 1030-per pt request with Neulasta On Pro.     Discharge Plan:   Patient declined prescription refills.  Discharge instructions reviewed with: Patient.  Patient and/or family verbalized understanding of discharge instructions and all questions answered.  AVS to patient via [employer].  Patient will return 12/4 for next appointment.   Patient discharged in stable condition accompanied by: self.  Departure Mode: Ambulatory.      Nany Pittman RN  "

## 2024-11-20 NOTE — PATIENT INSTRUCTIONS
[facility] Triage and after hours / weekends / holidays:  [phone]    Please call the [facility] Triage line if you experience a temperature greater than or equal to 100.4, shaking chills, have uncontrolled nausea, vomiting and/or diarrhea, dizziness, shortness of breath, chest pain, bleeding, unexplained bruising, or if you have any other new/concerning symptoms, questions or concerns.     If you wish to speak to a provider before your next infusion visit, please call your care coordinator to notify them so we can adequately serve you.    If you need a refill on a narcotic prescription or other medication, please call before your infusion appointment.      November 2024 Sunday Monday Tuesday Wednesday Thursday Friday Saturday                            1    RETURN CCSL   3:15 PM   (30 min.)   Polo Snow MD   [facility] 2       3     4     5     6    LAB CENTRAL   7:45 AM   (15 min.)   [facility] LAB DRAW   [facility]    ONC INFUSION 4 HR (240 MIN)   8:30 AM   (240 min.)    ONC INFUSION NURSE   [facility] 7     8    RXHI RN CHEMO DC     (60 min.)   Kobi Kaba   [facility] 9       10     11     12     13     14     15     16       17     18     19     20    LAB CENTRAL   9:15 AM   (15 min.)   [facility] LAB DRAW   [facility]    RETURN ACTIVE TREATMENT   9:45 AM   (45 min.)   Rebeca Killian APRN CNP   [facility]    ONC INFUSION 4 HR (240 MIN)  10:30 AM   (240 min.)    ONC INFUSION NURSE   [facility] 21     22     23       24     25     26     27     28     29     30                 December 2024 Sunday Monday Tuesday Wednesday Thursday Friday Saturday   1     2     3     4    LAB CENTRAL   7:45 AM   (15 min.)   [facility] LAB DRAW   [facility]    ONC INFUSION 4 HR (240 MIN)   8:30  AM   (240 min.)    ONC INFUSION NURSE   [facility] 5     6     7       8     9     10     11     12     13     14       15     16     17    RETURN PALLIATIVE   4:05 PM   (40 min.)   Isidra Prater DO   [facility] 18    LAB CENTRAL   9:15 AM   (15 min.)   [facility] LAB DRAW   [facility]    RETURN ACTIVE TREATMENT   9:45 AM   (45 min.)   Rebeca Killian APRN CNP   [facility]    ONC INFUSION 4 HR (240 MIN)  10:30 AM   (240 min.)    ONC INFUSION NURSE   [facility] 19     20     21       22     23     24     25     26     27     28       29     30     31                                        Recent Results (from the past 24 hours)   Comprehensive metabolic panel    Collection Time: 11/20/24  9:32 AM   Result Value Ref Range    Sodium 139 135 - 145 mmol/L    Potassium 4.0 3.4 - 5.3 mmol/L    Carbon Dioxide (CO2) 23 22 - 29 mmol/L    Anion Gap 11 7 - 15 mmol/L    Urea Nitrogen 16.7 6.0 - 20.0 mg/dL    Creatinine 0.87 0.67 - 1.17 mg/dL    GFR Estimate >90 >60 mL/min/1.73m2    Calcium 9.3 8.8 - 10.4 mg/dL    Chloride 105 98 - 107 mmol/L    Glucose 90 70 - 99 mg/dL    Alkaline Phosphatase 150 40 - 150 U/L    AST 19 0 - 45 U/L    ALT 9 0 - 70 U/L    Protein Total 7.1 6.4 - 8.3 g/dL    Albumin 4.1 3.5 - 5.2 g/dL    Bilirubin Total 0.2 <=1.2 mg/dL   CBC with platelets and differential    Collection Time: 11/20/24  9:32 AM   Result Value Ref Range    WBC Count 10.0 4.0 - 11.0 10e3/uL    RBC Count 4.79 4.40 - 5.90 10e6/uL    Hemoglobin 13.4 13.3 - 17.7 g/dL    Hematocrit 41.3 40.0 - 53.0 %    MCV 86 78 - 100 fL    MCH 28.0 26.5 - 33.0 pg    MCHC 32.4 31.5 - 36.5 g/dL    RDW      Platelet Count 156 150 - 450 10e3/uL    % Neutrophils 77 %    % Lymphocytes 7 %    % Monocytes 12 %    % Eosinophils 2 %    % Basophils 1 %    % Immature Granulocytes 2 %    NRBCs per 100 WBC 0 <1  /100    Absolute Neutrophils 7.7 1.6 - 8.3 10e3/uL    Absolute Lymphocytes 0.7 (L) 0.8 - 5.3 10e3/uL    Absolute Monocytes 1.2 0.0 - 1.3 10e3/uL    Absolute Eosinophils 0.2 0.0 - 0.7 10e3/uL    Absolute Basophils 0.1 0.0 - 0.2 10e3/uL    Absolute Immature Granulocytes 0.2 <=0.4 10e3/uL    Absolute NRBCs 0.0 10e3/uL   RBC and Platelet Morphology    Collection Time: 11/20/24  9:32 AM   Result Value Ref Range    RBC Morphology Confirmed RBC Indices     Platelet Assessment  Automated Count Confirmed. Platelet morphology is normal.     Automated Count Confirmed. Platelet morphology is normal.    Elliptocytes Slight (A) None Seen    Polychromasia Slight (A) None Seen

## 2024-11-20 NOTE — Clinical Note
11/20/2024      Roman Escalante  [address]  [facility] [address]      Dear Colleague,    Thank you for referring your patient, Roman Escalante, to the [facility]. Please see a copy of my visit note below.      [facility] - Medical Oncology Follow Up Note  11/20/2024    Oncology History:   Patient developed rectal bleeding in 2020.  In January 2021 CT showed focal wall thickening in the upper rectum, multiple pulmonary nodules bilaterally suspicious for metastatic disease.  Underwent FNA of the right upper lobe lesion which was consistent with adenocarcinoma of the colon.  He was treated with FOLFOX from 2/20/2021 through 5/20/2021.  Avastin was added.  Had an infusion reaction to oxaliplatin 6/2021.  7/2021- 12/2021 was on 5-FU alone.  Developed progression.  12/2021- 9/2022 was on FOLFIRI.  11/2021 started Lonsurf. All of this was done with outside oncologist.     Met with Dr. Snow on 2/3/2023 to establish care. Recommended regorafenib.     Started regorafenib on 3/2/2023. Progression noted 5/19/23. Plan to start FOLFOX/Avastin and send out CARIS testing to evaluate for other treatment options. Cycle 1 was given with oxaliplatin desensitization, 5FU, Avastin held due to increased urine protein.     Following cycle 4, patient was admitted with hematuria and acute kidney injury.    Oxaliplatin was dropped with Cycle 5.     Y-90 radioembolization 9/7.    10/24/23 CT CAP with disease progression with notable growth in all pulmonary lesions. Lonsurf + bevacizumab started 10/27/23 with plans to bridge to clinical trial.   11/15/23 - stopped chemo for [facility] study.   Currently enrolled in [facility] CAR-T trial (completed cell harvest 1/23/2024, tentatively planning administration of CAR-T in April 2/12/24 - resumed Lonsurf, bevacizumab to bridge treatment until CAR-T therapy  3/20/24 - treatment changed to Fruquintinib due to intolerability of  Lonsurf/bevacizumab  4/2/24- Presented to the ED with dehydration. Stopped Fruquintnib.   6/2024- cellular therapy transplant at [facility].   7/30/24- CT imaging demonstrating marked progression of pulmonary and hepatic lesions, clinical trial failed to generate T-cell graft. Multiple discussions regarding plan of care, hospice vs additional lines of treatment. Patient decided he is not ready for hospice. He resumed treatment with FOLFIRI + bevacizumab on 8/30/24.   ***        Interval History:     Keeps forgetting to take gabapentin, neuropathy is stable/fluctuates - not in hands   Had some white stools but bowels are moving well, no abdominal pain , appetite is good  Small mouth sore in back of mouth - hasn't been doing rinses   Scheduled for dental work for 1/2 and 1/21   Needs 12 hours of sleep bu then doesn't need a nap, energy is good           ***   Bowels are moving daily which is very helpful for pain  Rectal pain has overall improved, comes and goes, taking oxy as needed  Appetite is good, gained weight, continues to use gummies   Neuropathy in feet is acutely flared due to shoveling  Felt iron infusions were helpful for energy          Review of Systems:  Patient denies any of the following except if noted above: fevers, chills, difficulty with energy, vision or hearing changes, chest pain, dyspnea, abdominal pain, nausea, vomiting, diarrhea, constipation, urinary concerns, headaches, numbness, tingling, issues with sleep or mood. Also denies lumps, bumps, rashes or skin lesions, bleeding or bruising issues.      Current Outpatient Medications   Medication Sig Dispense Refill    Chemo Kit For RN use only. Do not remove items from bag. Contents: 1  sodium chloride 0.9% flush, 4 medium gloves, 1 chemo gown, 1/4 chemo mat, 1 connector female, 1 chemo bag. [id number] kit 0    cyclobenzaprine (FLEXERIL) 5 MG tablet Take one tab (5mg) by mouth over 6-8 hours, as needed for spasms 45 tablet 2    Emergency Supply Kit,  "Central, Patient use for emergency only. Contents: 3 sodium chloride 0.9% flushes, 1 dressing kit, 1 microclave ext set 14\", 4 nitrile gloves (med), 6 alcohol prep pads, 1 bacitracin, 1 syringe (10 cc 20 G 1\"). Call [phone] to reorder. [id number] kit 0    Fluorouracil (ADRUCIL) 4,610 mg in sodium chloride 0.9 % 241 mL via HOMEPUMP C-Series Infuse 4,610 mg at 5.2 mL/hr over 46 hours into the vein once for 1 dose. 485085 mL 0    gabapentin (NEURONTIN) 100 MG capsule Take 1 capsule (100 mg) by mouth at bedtime for 3 days, THEN 1 capsule (100 mg) 2 times daily for 3 days, THEN 1 capsule (100 mg) 3 times daily for 24 days. 81 capsule 0    ibuprofen (ADVIL/MOTRIN) 200 MG tablet Take 3 tablets (600 mg) by mouth every 8 hours as needed for moderate pain (Patient not taking: Reported on 10/9/2024) 100 tablet 0    loperamide (IMODIUM) 2 MG capsule 2 caps at 1st sign of diarrhea & 1 cap every 2hrs until 12hrs diarrhea free. During night, 2 caps at bedtime & 2 caps every 4hrs until AM (Patient not taking: Reported on 10/9/2024) 30 capsule 0    LORazepam (ATIVAN) 0.5 MG tablet Take 1 tablet (0.5 mg) by mouth every 6 hours as needed for anxiety (Patient not taking: Reported on 11/1/2024) 10 tablet 0    morphine (MS CONTIN) 15 MG CR tablet Take 1 tablet (15 mg) by mouth every 12 hours (Patient not taking: Reported on 10/4/2024) 60 tablet 0    NARCAN 4 MG/0.1ML nasal spray CALL 911. SPR CONTENTS OF ONE SPRAYER (0.1ML) INTO ONE NOSTRIL. REPEAT IN 2-3 MIN IF SYMPTOMS OF OPIOID EMERGENCY PERSIST, ALTERNATE NOSTRILS (Patient not taking: Reported on 11/1/2024)      ondansetron (ZOFRAN ODT) 4 MG ODT tab Take 1 tablet (4 mg) by mouth every 6 hours as needed for nausea. 30 tablet 3    Ostomy Supplies MISC 1 each daily 1 each 11    oxyCODONE IR (ROXICODONE) 10 MG tablet Take 1-2 tablets (10-20 mg) by mouth every 4 hours as needed for moderate to severe pain. 150 tablet 0    pegfilgrastim (NEULASTA) 6 MG/0.6ML injection Inject 0.6 mLs (6 " mg) subcutaneously every 14 days. Administer 24 hours after chemotherapy disconnect 3.6 mL 0    Port Access Kit For nurse use only.  Do not remove items from bag.  Use for port access.  Do not place syringe on sterile field. [id number] kit 0    prochlorperazine (COMPAZINE) 10 MG tablet Take 0.5 tablets (5 mg) by mouth every 6 hours as needed for nausea or vomiting. (Patient not taking: Reported on 11/1/2024) 30 tablet 2    prochlorperazine (COMPAZINE) 10 MG tablet Take 1 tablet (10 mg) by mouth every 6 hours as needed for nausea or vomiting 30 tablet 2    prochlorperazine (COMPAZINE) 5 MG tablet Take 1 tablet (5 mg) by mouth every 6 hours as needed for nausea or vomiting (Patient not taking: Reported on 11/1/2024) 30 tablet 3    sodium chloride, PF, 0.9% PF flush Inject 10 mLs into the vein as needed for line flush. Flush IV before and after med administration as directed and/or at least every 24 hours, or prior to deaccessing for no further use and/or at least every 4 weeks when not accessed. 228158 mL 0    sulfamethoxazole-trimethoprim (BACTRIM DS) 800-160 MG tablet  (Patient not taking: Reported on 11/1/2024)      tamsulosin (FLOMAX) 0.4 MG capsule  (Patient not taking: Reported on 10/4/2024)      valACYclovir (VALTREX) 500 MG tablet  (Patient not taking: Reported on 10/4/2024)       Physical Exam:  General: The patient is a pleasant male in no acute distress.  /80   Pulse 90   Temp 98  F (36.7  C)   Resp 16   Wt 78.5 kg (173 lb)   SpO2 98%   BMI 23.79 kg/m    Wt Readings from Last 10 Encounters:   11/20/24 78.5 kg (173 lb)   11/08/24 74.8 kg (165 lb)   11/06/24 78.5 kg (173 lb)   11/01/24 76 kg (167 lb 9.6 oz)   10/23/24 77.1 kg (170 lb)   10/09/24 76.2 kg (168 lb 1.6 oz)   09/25/24 72.8 kg (160 lb 9.6 oz)   09/13/24 69.9 kg (154 lb)   09/06/24 72.6 kg (160 lb)   08/30/24 72.8 kg (160 lb 6.4 oz)   HEENT: EOMI. Sclerae are anicteric. Oral mucosa pink and moist without lesions or thrush.   Lymph: Neck is  supple with no lymphadenopathy in the cervical or supraclavicular areas.   Heart: Mildly tachycardic with regular rhythm.   Lungs: Clear to auscultation throughout, normal work of breathing.   Abdomen: Bowel sounds present, soft, nontender with no palpable hepatosplenomegaly or masses. Ostomy is in place in left abdomen with minimal amount of formed brown stool.   Extremities: No lower extremity edema noted bilaterally.   Neuro: Cranial nerves II through XII are grossly intact.  Skin: No rashes, petechiae, or bruising noted on exposed skin.    LABS  Most Recent 3 CBC's:  Recent Labs   Lab Test 11/20/24  0932 11/06/24  0816 10/23/24  0845   WBC 10.0 7.5 8.6   HGB 13.4 12.0* 12.2*   MCV 86 85 83    141* 166    Most Recent 3 BMP's:  Recent Labs   Lab Test 11/06/24  0816 10/23/24  0845 10/09/24  0646    139 137   POTASSIUM 4.0 4.0 3.7   CHLORIDE 106 107 103   CO2 25 23 22   BUN 18.8 13.1 15.3   CR 0.82 0.76 0.80   ANIONGAP 10 9 12   JEFERSON 9.0 8.9 9.0   * 91 111*    Most Recent 2 LFT's:  Recent Labs   Lab Test 11/06/24  0816 10/23/24  0845   AST 16 17   ALT 9 9   ALKPHOS 136 135   BILITOTAL 0.2 0.2   I reviewed the above labs today.    ASSESSMENT AND PLAN   Metastatic rectal cancer  -Most recently progressed after CAR-T clinical trial. He started on treatment with FOLFIRI + bevacizumab on 8/30/24. Tolerating treatment well with improvement in energy, pain and breathing/shortness of breath. He denies any significant side effects from treatment. Urine protein elevated, completed 24 hour urine collection 9/25/24 with results WNL, continue bevacizumab. He will continue with cycle 4 FOLFIRI/chelsea today. Continue with monthly LILLY follow up, patient to call sooner with any concerns. Repeat imaging and MD follow up scheduled in October.      Oxygen, fluid in lungs    -shortness of breath with activity has improved. Previously, walking oxygen test demonstrating oxygen saturations in the 80's on room air.  Previously ordered home oxygen with activity. Did not get approved through insurance, subsequent walking tests with adequate oxygen saturations. Breathing overall has improved. Will continue to monitor. Echo on 9/5/24 showed a normal EF.     Weight loss  Previous weight loss despite efforts to increase intake. Started THC gummies, appetite and weight have improved. Nutrition referral previously placed.     Rectal, perineum pain  Following with palliative care. No longer needing MS Contin, but remains on oxycodone prn, currently 10 mg 2-3 times per day.     Anemia  Patient has microcytic anemia. Hemoglobin is better than the last check. Iron is mildly low with a normal TIBC. Ferritin is elevated, likely due to inflammation from cancer. -Soluble transferrin  elevated. IV iron ordered to start with today's infusion.       The longitudinal plan of care for the diagnosis(es)/condition(s) as documented were addressed during this visit. Due to the added complexity in care, I will continue to support Roman in the subsequent management and with ongoing continuity of care.      FLORA Albright CNP        [facility] - Medical Oncology Follow Up Note  11/20/2024    Oncology History:   Patient developed rectal bleeding in 2020.  In January 2021 CT showed focal wall thickening in the upper rectum, multiple pulmonary nodules bilaterally suspicious for metastatic disease.  Underwent FNA of the right upper lobe lesion which was consistent with adenocarcinoma of the colon.  He was treated with FOLFOX from 2/20/2021 through 5/20/2021.  Avastin was added.  Had an infusion reaction to oxaliplatin 6/2021.  7/2021- 12/2021 was on 5-FU alone.  Developed progression.  12/2021- 9/2022 was on FOLFIRI.  11/2021 started Lonsurf. All of this was done with outside oncologist.     Met with Dr. Snow on 2/3/2023 to establish care. Recommended regorafenib.     Started regorafenib on 3/2/2023. Progression noted 5/19/23. Plan to start  FOLFOX/Avastin and send out CARIS testing to evaluate for other treatment options. Cycle 1 was given with oxaliplatin desensitization, 5FU, Avastin held due to increased urine protein.     Following cycle 4, patient was admitted with hematuria and acute kidney injury.    Oxaliplatin was dropped with Cycle 5.     Y-90 radioembolization 9/7.    10/24/23 CT CAP with disease progression with notable growth in all pulmonary lesions. Lonsurf + bevacizumab started 10/27/23 with plans to bridge to clinical trial.   11/15/23 - stopped chemo for [facility] study.   Currently enrolled in [facility] CAR-T trial (completed cell harvest 1/23/2024, tentatively planning administration of CAR-T in April 2/12/24 - resumed Lonsurf, bevacizumab to bridge treatment until CAR-T therapy  3/20/24 - treatment changed to Fruquintinib due to intolerability of Lonsurf/bevacizumab  4/2/24- Presented to the ED with dehydration. Stopped Fruquintnib.   6/2024- cellular therapy transplant at [facility].   7/30/24- CT imaging demonstrating marked progression of pulmonary and hepatic lesions, clinical trial failed to generate T-cell graft. Multiple discussions regarding plan of care, hospice vs additional lines of treatment. Patient decided he is not ready for hospice. He resumed treatment with FOLFIRI + bevacizumab on 8/30/24.   10/2024 - CT CAP with continued response, plan to continue FOLFIRI + bevacizumab with the addition of Zometa. Per his dentist, needs dental work completed prior to clearance. (Scheduled 1/2 and 1/21).       Interval History:     Roman presents today feeling well and offers no concerns today.   His neuropathy is stable. Fluctuates often due to activity in his feet, none in his hands. Continues on gabapentin but often forgets to take before bedtime.   Appetite is good, denies any GI concerns. His bowel movements have been white-colored the past couple of days which he attributes to food intake changes.   Developed one small mouth sore to his  "right lower inner cheek near his back molar. Mild pain, no open sores or interference with eating/swallowing. Has dental work scheduled 1/2 and 1/21.   Needs ~12 hours of sleep every night, but otherwise energy is good and is not needing naps throughout the day         Review of Systems:  Patient denies any of the following except if noted above: fevers, chills, difficulty with energy, vision or hearing changes, chest pain, dyspnea, abdominal pain, nausea, vomiting, diarrhea, constipation, urinary concerns, headaches, numbness, tingling, issues with sleep or mood. Also denies lumps, bumps, rashes or skin lesions, bleeding or bruising issues.      Current Outpatient Medications   Medication Sig Dispense Refill     Chemo Kit For RN use only. Do not remove items from bag. Contents: 1  sodium chloride 0.9% flush, 4 medium gloves, 1 chemo gown, 1/4 chemo mat, 1 connector female, 1 chemo bag. [id number] kit 0     cyclobenzaprine (FLEXERIL) 5 MG tablet Take one tab (5mg) by mouth over 6-8 hours, as needed for spasms 45 tablet 2     Emergency Supply Kit, Central, Patient use for emergency only. Contents: 3 sodium chloride 0.9% flushes, 1 dressing kit, 1 microclave ext set 14\", 4 nitrile gloves (med), 6 alcohol prep pads, 1 bacitracin, 1 syringe (10 cc 20 G 1\"). Call [phone] to reorder. [id number] kit 0     Fluorouracil (ADRUCIL) 4,610 mg in sodium chloride 0.9 % 241 mL via HOMEPUMP C-Series Infuse 4,610 mg at 5.2 mL/hr over 46 hours into the vein once for 1 dose. 485534 mL 0     gabapentin (NEURONTIN) 100 MG capsule Take 1 capsule (100 mg) by mouth at bedtime for 3 days, THEN 1 capsule (100 mg) 2 times daily for 3 days, THEN 1 capsule (100 mg) 3 times daily for 24 days. 81 capsule 0     ibuprofen (ADVIL/MOTRIN) 200 MG tablet Take 3 tablets (600 mg) by mouth every 8 hours as needed for moderate pain (Patient not taking: Reported on 10/9/2024) 100 tablet 0     loperamide (IMODIUM) 2 MG capsule 2 caps at 1st sign of diarrhea & " 1 cap every 2hrs until 12hrs diarrhea free. During night, 2 caps at bedtime & 2 caps every 4hrs until AM (Patient not taking: Reported on 10/9/2024) 30 capsule 0     LORazepam (ATIVAN) 0.5 MG tablet Take 1 tablet (0.5 mg) by mouth every 6 hours as needed for anxiety (Patient not taking: Reported on 11/1/2024) 10 tablet 0     morphine (MS CONTIN) 15 MG CR tablet Take 1 tablet (15 mg) by mouth every 12 hours (Patient not taking: Reported on 10/4/2024) 60 tablet 0     NARCAN 4 MG/0.1ML nasal spray CALL 911. SPR CONTENTS OF ONE SPRAYER (0.1ML) INTO ONE NOSTRIL. REPEAT IN 2-3 MIN IF SYMPTOMS OF OPIOID EMERGENCY PERSIST, ALTERNATE NOSTRILS (Patient not taking: Reported on 11/1/2024)       ondansetron (ZOFRAN ODT) 4 MG ODT tab Take 1 tablet (4 mg) by mouth every 6 hours as needed for nausea. 30 tablet 3     Ostomy Supplies MISC 1 each daily 1 each 11     oxyCODONE IR (ROXICODONE) 10 MG tablet Take 1-2 tablets (10-20 mg) by mouth every 4 hours as needed for moderate to severe pain. 150 tablet 0     pegfilgrastim (NEULASTA) 6 MG/0.6ML injection Inject 0.6 mLs (6 mg) subcutaneously every 14 days. Administer 24 hours after chemotherapy disconnect 3.6 mL 0     Port Access Kit For nurse use only.  Do not remove items from bag.  Use for port access.  Do not place syringe on sterile field. [id number] kit 0     prochlorperazine (COMPAZINE) 10 MG tablet Take 0.5 tablets (5 mg) by mouth every 6 hours as needed for nausea or vomiting. (Patient not taking: Reported on 11/1/2024) 30 tablet 2     prochlorperazine (COMPAZINE) 10 MG tablet Take 1 tablet (10 mg) by mouth every 6 hours as needed for nausea or vomiting 30 tablet 2     prochlorperazine (COMPAZINE) 5 MG tablet Take 1 tablet (5 mg) by mouth every 6 hours as needed for nausea or vomiting (Patient not taking: Reported on 11/1/2024) 30 tablet 3     sodium chloride, PF, 0.9% PF flush Inject 10 mLs into the vein as needed for line flush. Flush IV before and after med administration as  directed and/or at least every 24 hours, or prior to deaccessing for no further use and/or at least every 4 weeks when not accessed. 997757 mL 0     sulfamethoxazole-trimethoprim (BACTRIM DS) 800-160 MG tablet  (Patient not taking: Reported on 11/1/2024)       tamsulosin (FLOMAX) 0.4 MG capsule  (Patient not taking: Reported on 10/4/2024)       valACYclovir (VALTREX) 500 MG tablet  (Patient not taking: Reported on 10/4/2024)       Physical Exam:  General: The patient is a pleasant male in no acute distress.  /80   Pulse 90   Temp 98  F (36.7  C)   Resp 16   Wt 78.5 kg (173 lb)   SpO2 98%   BMI 23.79 kg/m    Wt Readings from Last 10 Encounters:   11/20/24 78.5 kg (173 lb)   11/08/24 74.8 kg (165 lb)   11/06/24 78.5 kg (173 lb)   11/01/24 76 kg (167 lb 9.6 oz)   10/23/24 77.1 kg (170 lb)   10/09/24 76.2 kg (168 lb 1.6 oz)   09/25/24 72.8 kg (160 lb 9.6 oz)   09/13/24 69.9 kg (154 lb)   09/06/24 72.6 kg (160 lb)   08/30/24 72.8 kg (160 lb 6.4 oz)   HEENT: EOMI. Sclerae are anicteric. Oral mucosa pink and moist without lesions or thrush.   Lymph: Neck is supple with no lymphadenopathy in the cervical or supraclavicular areas.   Heart: Mildly tachycardic with regular rhythm.   Lungs: Clear to auscultation throughout, normal work of breathing.   Abdomen: Bowel sounds present, soft, nontender with no palpable hepatosplenomegaly or masses. Ostomy is in place in left abdomen with minimal amount of formed brown stool.   Extremities: No lower extremity edema noted bilaterally.   Neuro: Cranial nerves II through XII are grossly intact.  Skin: No rashes, petechiae, or bruising noted on exposed skin.    LABS  Most Recent 3 CBC's:  Recent Labs   Lab Test 11/20/24  0932 11/06/24  0816 10/23/24  0845   WBC 10.0 7.5 8.6   HGB 13.4 12.0* 12.2*   MCV 86 85 83    141* 166    Most Recent 3 BMP's:  Recent Labs   Lab Test 11/06/24  0816 10/23/24  0845 10/09/24  0646    139 137   POTASSIUM 4.0 4.0 3.7   CHLORIDE 106  107 103   CO2 25 23 22   BUN 18.8 13.1 15.3   CR 0.82 0.76 0.80   ANIONGAP 10 9 12   JEFERSON 9.0 8.9 9.0   * 91 111*    Most Recent 2 LFT's:  Recent Labs   Lab Test 11/06/24  0816 10/23/24  0845   AST 16 17   ALT 9 9   ALKPHOS 136 135   BILITOTAL 0.2 0.2   I reviewed the above labs today.    ASSESSMENT AND PLAN   Metastatic rectal cancer  Most recently progressed after CAR-T clinical trial. He started on treatment with FOLFIRI + bevacizumab on 8/30/24. Tolerating treatment well with improvement in energy, pain and breathing/shortness of breath. CT imaging 10/24 with continued response. He continues to tolerate FOLFIRI + bevacizumab well and denies any significant side effects from treatment. He will continue with cycle 7 FOLFIRI/chelsea today. Continue with monthly LILLY follow up, patient to call sooner with any concerns.    Oxygen, fluid in lungs    Shortness of breath with activity has resolved. Previously, walking oxygen test demonstrating oxygen saturations in the 80's on room air. Previously ordered home oxygen with activity. Did not get approved through insurance, subsequent walking tests with adequate oxygen saturations. Breathing overall has improved. Will continue to monitor. Echo on 9/5/24 showed a normal EF.     Weight loss  Previous weight loss despite efforts to increase intake. Started THC gummies, appetite and weight have improved. Nutrition referral previously placed.     Rectal, perineum pain  Following with palliative care. No longer needing MS Contin, but remains on oxycodone prn, currently 10 mg 2-3 times per day. Pain fluctuates throughout chemo cycle, improves a few days after chemo and recurs a few days prior to next dose.     Anemia  Patient has microcytic anemia. Received IV iron and has had significant improvement in symptoms. Hemoglobin continues to improve.       The longitudinal plan of care for the diagnosis(es)/condition(s) as documented were addressed during this visit. Due to the added  complexity in care, I will continue to support Roman in the subsequent management and with ongoing continuity of care.      FLORA Albright CNP        Again, thank you for allowing me to participate in the care of your patient.        Sincerely,        FLORA Albright CNP

## 2024-11-20 NOTE — NURSING NOTE
"Oncology Rooming Note    November 20, 2024 9:51 AM   Roman Escalante is a 49 year old male who presents for:    Chief Complaint   Patient presents with    Port Draw     Labs collected from port by RN. Vitals taken. Checked in for appointment(s).     Oncology Clinic Visit     Colon Cancer metastasized to liver     Initial Vitals: /80   Pulse 90   Temp 98  F (36.7  C)   Resp 16   Wt 78.5 kg (173 lb)   SpO2 98%   BMI 23.79 kg/m   Estimated body mass index is 23.79 kg/m  as calculated from the following:    Height as of 10/15/24: 1.816 m (5' 11.5\").    Weight as of this encounter: 78.5 kg (173 lb). Body surface area is 1.99 meters squared.  No Pain (0) Comment: Data Unavailable   No LMP for male patient.  Allergies reviewed: Yes  Medications reviewed: Yes    Medications: Medication refills not needed today.  Pharmacy name entered into EPIC:    [facility] - [place], [place] - [address] [place] [address] [phone]  [facility] - [place], [place] - [address] [place] [address] DRUG STORE #[id number] [address]    Frailty Screening:   Is the patient here for a new oncology consult visit in cancer care? 2. No      Clinical concerns: none       Ashley Bird              "

## 2024-11-21 PROCEDURE — S9330 HIT CONT CHEM DIEM: HCPCS

## 2024-11-21 NOTE — PROGRESS NOTES
[id number] TORL Pre Screening: Informed Consent Note     The consent form, including purpose, risks and benefits, was reviewed with Roman Escalante, and all questions were answered before he signed the consent form. The patient understands that the study involves an active treatment phase as well as a post-treatment follow up phase.     Present during the discussion were Charles.  A copy of the signed form was provided to the patient. No procedures specific to this study were performed prior to the patient signing the consent form.    Consent Version Date: [facility] Version Date 10/17/2023, [facility] IRB Approved Date 11/21/2023  Consent obtained by: Taylor Luis RN    Date: 11/20/2024  HIPAA authorization signed?: yes  HIPAA authorization version date: 04/08/2022    Taylor Luis RN              Race and ethnicity identification note     I discussed with Roman Escalante that the National [employer] [place] (NCI) has asked that information on race and ethnicity be obtained from NCI-sponsored studies' participants whenever possible and that the purpose for this request is to assist the NCI in evaluating whether persons of all races and ethnicity are given the opportunity to participate in new cancer treatment options. It was explained that the information will be kept in a confidential file in the [facility] Clinical Trials Office and will be sent to the NCI in a way in which he cannot be identified. It was also explained that providing this information is voluntary.    Roman Escalante provided the following responses:    Ethnicity: non-  Race: White  Country of birth: [place]  Country of residence: [facility]        Taylor Luis RN

## 2024-11-22 PROCEDURE — S9330 HIT CONT CHEM DIEM: HCPCS

## 2024-11-23 PROCEDURE — S9330 HIT CONT CHEM DIEM: HCPCS

## 2024-11-24 PROCEDURE — S9330 HIT CONT CHEM DIEM: HCPCS

## 2024-11-25 PROCEDURE — S9330 HIT CONT CHEM DIEM: HCPCS

## 2024-11-25 PROCEDURE — A9270 NON-COVERED ITEM OR SERVICE: HCPCS

## 2024-11-26 PROCEDURE — S9330 HIT CONT CHEM DIEM: HCPCS

## 2024-11-27 PROCEDURE — S9330 HIT CONT CHEM DIEM: HCPCS

## 2024-11-28 PROCEDURE — S9330 HIT CONT CHEM DIEM: HCPCS

## 2024-11-29 ENCOUNTER — HOME INFUSION (OUTPATIENT)
Dept: HOME HEALTH SERVICES | Facility: HOME HEALTH | Age: 49
End: 2024-11-29
Payer: COMMERCIAL

## 2024-11-29 DIAGNOSIS — C20 MALIGNANT TUMOR OF RECTUM (H): ICD-10-CM

## 2024-11-29 PROCEDURE — S9330 HIT CONT CHEM DIEM: HCPCS

## 2024-11-30 PROCEDURE — S9330 HIT CONT CHEM DIEM: HCPCS

## 2024-12-01 PROCEDURE — S9330 HIT CONT CHEM DIEM: HCPCS

## 2024-12-02 PROCEDURE — S9330 HIT CONT CHEM DIEM: HCPCS

## 2024-12-03 PROCEDURE — S9330 HIT CONT CHEM DIEM: HCPCS

## 2024-12-04 ENCOUNTER — HOME INFUSION BILLING (OUTPATIENT)
Dept: HOME HEALTH SERVICES | Facility: HOME HEALTH | Age: 49
End: 2024-12-04
Payer: COMMERCIAL

## 2024-12-04 ENCOUNTER — DOCUMENTATION ONLY (OUTPATIENT)
Dept: HOME HEALTH SERVICES | Facility: HOME HEALTH | Age: 49
End: 2024-12-04
Payer: COMMERCIAL

## 2024-12-04 ENCOUNTER — INFUSION THERAPY VISIT (OUTPATIENT)
Dept: ONCOLOGY | Facility: CLINIC | Age: 49
End: 2024-12-04
Payer: COMMERCIAL

## 2024-12-04 VITALS
OXYGEN SATURATION: 97 % | SYSTOLIC BLOOD PRESSURE: 104 MMHG | RESPIRATION RATE: 18 BRPM | TEMPERATURE: 98.3 F | DIASTOLIC BLOOD PRESSURE: 77 MMHG | BODY MASS INDEX: 24 KG/M2 | HEART RATE: 75 BPM | WEIGHT: 174.5 LBS

## 2024-12-04 DIAGNOSIS — C18.9 PRIMARY ADENOCARCINOMA OF COLON (H): ICD-10-CM

## 2024-12-04 DIAGNOSIS — Z79.899 ENCOUNTER FOR LONG-TERM (CURRENT) USE OF MEDICATIONS: ICD-10-CM

## 2024-12-04 DIAGNOSIS — C78.00 RECTAL ADENOCARCINOMA METASTATIC TO LUNG (H): Primary | ICD-10-CM

## 2024-12-04 DIAGNOSIS — C20 RECTAL ADENOCARCINOMA METASTATIC TO LUNG (H): Primary | ICD-10-CM

## 2024-12-04 LAB
ALBUMIN SERPL BCG-MCNC: 3.9 G/DL (ref 3.5–5.2)
ALBUMIN UR-MCNC: NEGATIVE MG/DL
ALP SERPL-CCNC: 125 U/L (ref 40–150)
ALT SERPL W P-5'-P-CCNC: 8 U/L (ref 0–70)
ANION GAP SERPL CALCULATED.3IONS-SCNC: 11 MMOL/L (ref 7–15)
AST SERPL W P-5'-P-CCNC: 18 U/L (ref 0–45)
BASOPHILS # BLD MANUAL: 0.1 10E3/UL (ref 0–0.2)
BASOPHILS NFR BLD MANUAL: 1 %
BILIRUB SERPL-MCNC: 0.2 MG/DL
BUN SERPL-MCNC: 16.4 MG/DL (ref 6–20)
CALCIUM SERPL-MCNC: 8.9 MG/DL (ref 8.8–10.4)
CHLORIDE SERPL-SCNC: 106 MMOL/L (ref 98–107)
CREAT SERPL-MCNC: 0.86 MG/DL (ref 0.67–1.17)
EGFRCR SERPLBLD CKD-EPI 2021: >90 ML/MIN/1.73M2
EOSINOPHIL # BLD MANUAL: 0.3 10E3/UL (ref 0–0.7)
EOSINOPHIL NFR BLD MANUAL: 5 %
ERYTHROCYTE [DISTWIDTH] IN BLOOD BY AUTOMATED COUNT: 24.3 % (ref 10–15)
GLUCOSE SERPL-MCNC: 130 MG/DL (ref 70–99)
HCO3 SERPL-SCNC: 23 MMOL/L (ref 22–29)
HCT VFR BLD AUTO: 40.1 % (ref 40–53)
HGB BLD-MCNC: 13 G/DL (ref 13.3–17.7)
LYMPHOCYTES # BLD MANUAL: 0.7 10E3/UL (ref 0.8–5.3)
LYMPHOCYTES NFR BLD MANUAL: 11 %
MCH RBC QN AUTO: 29.1 PG (ref 26.5–33)
MCHC RBC AUTO-ENTMCNC: 32.4 G/DL (ref 31.5–36.5)
MCV RBC AUTO: 90 FL (ref 78–100)
MONOCYTES # BLD MANUAL: 0.4 10E3/UL (ref 0–1.3)
MONOCYTES NFR BLD MANUAL: 7 %
NEUTROPHILS # BLD MANUAL: 4.7 10E3/UL (ref 1.6–8.3)
NEUTROPHILS NFR BLD MANUAL: 76 %
PLAT MORPH BLD: ABNORMAL
PLATELET # BLD AUTO: 138 10E3/UL (ref 150–450)
POTASSIUM SERPL-SCNC: 3.9 MMOL/L (ref 3.4–5.3)
PROT SERPL-MCNC: 6.7 G/DL (ref 6.4–8.3)
RBC # BLD AUTO: 4.47 10E6/UL (ref 4.4–5.9)
RBC MORPH BLD: ABNORMAL
SODIUM SERPL-SCNC: 140 MMOL/L (ref 135–145)
WBC # BLD AUTO: 6.2 10E3/UL (ref 4–11)

## 2024-12-04 PROCEDURE — 96375 TX/PRO/DX INJ NEW DRUG ADDON: CPT

## 2024-12-04 PROCEDURE — 258N000003 HC RX IP 258 OP 636: Performed by: STUDENT IN AN ORGANIZED HEALTH CARE EDUCATION/TRAINING PROGRAM

## 2024-12-04 PROCEDURE — 96413 CHEMO IV INFUSION 1 HR: CPT

## 2024-12-04 PROCEDURE — A4216 STERILE WATER/SALINE, 10 ML: HCPCS

## 2024-12-04 PROCEDURE — 85027 COMPLETE CBC AUTOMATED: CPT | Performed by: STUDENT IN AN ORGANIZED HEALTH CARE EDUCATION/TRAINING PROGRAM

## 2024-12-04 PROCEDURE — 250N000011 HC RX IP 250 OP 636

## 2024-12-04 PROCEDURE — 250N000011 HC RX IP 250 OP 636: Performed by: STUDENT IN AN ORGANIZED HEALTH CARE EDUCATION/TRAINING PROGRAM

## 2024-12-04 PROCEDURE — 82565 ASSAY OF CREATININE: CPT

## 2024-12-04 PROCEDURE — S9537 HT HEM HORM INJ DIEM: HCPCS | Mod: SH

## 2024-12-04 PROCEDURE — 81003 URINALYSIS AUTO W/O SCOPE: CPT | Performed by: STUDENT IN AN ORGANIZED HEALTH CARE EDUCATION/TRAINING PROGRAM

## 2024-12-04 PROCEDURE — 80051 ELECTROLYTE PANEL: CPT

## 2024-12-04 PROCEDURE — 36591 DRAW BLOOD OFF VENOUS DEVICE: CPT | Performed by: STUDENT IN AN ORGANIZED HEALTH CARE EDUCATION/TRAINING PROGRAM

## 2024-12-04 PROCEDURE — S9330 HIT CONT CHEM DIEM: HCPCS

## 2024-12-04 PROCEDURE — 84155 ASSAY OF PROTEIN SERUM: CPT

## 2024-12-04 PROCEDURE — G0498 CHEMO EXTEND IV INFUS W/PUMP: HCPCS

## 2024-12-04 PROCEDURE — 96411 CHEMO IV PUSH ADDL DRUG: CPT

## 2024-12-04 PROCEDURE — 85007 BL SMEAR W/DIFF WBC COUNT: CPT | Performed by: STUDENT IN AN ORGANIZED HEALTH CARE EDUCATION/TRAINING PROGRAM

## 2024-12-04 PROCEDURE — 96417 CHEMO IV INFUS EACH ADDL SEQ: CPT

## 2024-12-04 PROCEDURE — 96415 CHEMO IV INFUSION ADDL HR: CPT

## 2024-12-04 PROCEDURE — 96368 THER/DIAG CONCURRENT INF: CPT

## 2024-12-04 RX ORDER — ONDANSETRON 2 MG/ML
8 INJECTION INTRAMUSCULAR; INTRAVENOUS ONCE
Status: COMPLETED | OUTPATIENT
Start: 2024-12-04 | End: 2024-12-04

## 2024-12-04 RX ORDER — HEPARIN SODIUM (PORCINE) LOCK FLUSH IV SOLN 100 UNIT/ML 100 UNIT/ML
5 SOLUTION INTRAVENOUS ONCE
Status: COMPLETED | OUTPATIENT
Start: 2024-12-04 | End: 2024-12-04

## 2024-12-04 RX ORDER — DEXAMETHASONE SODIUM PHOSPHATE 4 MG/ML
12 INJECTION, SOLUTION INTRA-ARTICULAR; INTRALESIONAL; INTRAMUSCULAR; INTRAVENOUS; SOFT TISSUE ONCE
Status: COMPLETED | OUTPATIENT
Start: 2024-12-04 | End: 2024-12-04

## 2024-12-04 RX ORDER — FLUOROURACIL 50 MG/ML
400 INJECTION, SOLUTION INTRAVENOUS ONCE
Status: COMPLETED | OUTPATIENT
Start: 2024-12-04 | End: 2024-12-04

## 2024-12-04 RX ORDER — ATROPINE SULFATE 0.4 MG/ML
0.4 AMPUL (ML) INJECTION
Status: COMPLETED | OUTPATIENT
Start: 2024-12-04 | End: 2024-12-04

## 2024-12-04 RX ADMIN — DEXAMETHASONE SODIUM PHOSPHATE 12 MG: 4 INJECTION, SOLUTION INTRAMUSCULAR; INTRAVENOUS at 08:43

## 2024-12-04 RX ADMIN — FLUOROURACIL 770 MG: 50 INJECTION, SOLUTION INTRAVENOUS at 11:26

## 2024-12-04 RX ADMIN — SODIUM CHLORIDE 400 MG: 9 INJECTION, SOLUTION INTRAVENOUS at 09:10

## 2024-12-04 RX ADMIN — ONDANSETRON 8 MG: 2 INJECTION INTRAMUSCULAR; INTRAVENOUS at 08:40

## 2024-12-04 RX ADMIN — ATROPINE SULFATE 0.4 MG: 0.4 INJECTION, SOLUTION INTRAVENOUS at 10:37

## 2024-12-04 RX ADMIN — HEPARIN 5 ML: 100 SYRINGE at 08:05

## 2024-12-04 RX ADMIN — LEUCOVORIN CALCIUM 750 MG: 50 INJECTION, POWDER, LYOPHILIZED, FOR SOLUTION INTRAMUSCULAR; INTRAVENOUS at 09:49

## 2024-12-04 RX ADMIN — ATROPINE SULFATE 0.4 MG: 0.4 INJECTION, SOLUTION INTRAVENOUS at 09:46

## 2024-12-04 RX ADMIN — IRINOTECAN HYDROCHLORIDE 340 MG: 20 INJECTION, SOLUTION INTRAVENOUS at 09:49

## 2024-12-04 RX ADMIN — SODIUM CHLORIDE 100 ML: 9 INJECTION, SOLUTION INTRAVENOUS at 08:40

## 2024-12-04 ASSESSMENT — PAIN SCALES - GENERAL: PAINLEVEL_OUTOF10: NO PAIN (0)

## 2024-12-04 NOTE — PROGRESS NOTES
[facility] d/c of Fluorouracil continuous infusion over 46 hours via C series pump.   Scheduled in [facility] for home disconnect on 12/6/24 at 9 am.  Will require Neulasta OnPro applied at this visit

## 2024-12-04 NOTE — NURSING NOTE
"Chief Complaint   Patient presents with    Port Draw     Labs drawn via port by RN. VS taken.     Port accessed with 20 gauge, 3/4\" power needle by RN, labs collected, line flushed with saline and heparin.  Vitals taken. Pt checked in for appointment(s).     Sunni Urbano RN    "

## 2024-12-04 NOTE — PATIENT INSTRUCTIONS
[facility] Triage and after hours / weekends / holidays:  [phone]    Please call the triage or after hours line if you experience a temperature greater than or equal to 100.4, shaking chills, have uncontrolled nausea, vomiting and/or diarrhea, dizziness, shortness of breath, chest pain, bleeding, unexplained bruising, or if you have any other new/concerning symptoms, questions or concerns.      If you are having any concerning symptoms or wish to speak to a provider before your next infusion visit, please call triage to notify them so we can adequately serve you.     If you need a refill on a narcotic prescription or other medication, please call before your infusion appointment.                December 2024 Sunday Monday Tuesday Wednesday Thursday Friday Saturday   1     2     3     4    LAB CENTRAL   7:45 AM   (15 min.)   [facility] DRAW   [facility]    ONC INFUSION 4 HR (240 MIN)   8:30 AM   (240 min.)    ONC INFUSION NURSE   [facility] 5     6     7       8     9     10     11     12     13     14       15     16     17    RETURN PALLIATIVE   4:05 PM   (40 min.)   Isidra Prater DO   [facility] 18    LAB CENTRAL   9:15 AM   (15 min.)   [facility] DRAW   [facility]    RETURN ACTIVE TREATMENT   9:45 AM   (45 min.)   Rebeca Killian APRN CNP   [facility]    ONC INFUSION 4 HR (240 MIN)  10:30 AM   (240 min.)    ONC INFUSION NURSE   [facility] 19     20     21       22     23     24     25     26     27     28       29     30     31 January 2025 Sunday Monday Tuesday Wednesday Thursday Friday Saturday                  1     2    LAB CENTRAL   8:30 AM   (15 min.)   [facility] DRAW   [facility]    ONC INFUSION 4 HR (240 MIN)   9:00 AM   (240  min.)    ONC INFUSION NURSE   [facility] 3     4       5     6     7     8     9     10     11       12     13     14     15    LAB CENTRAL   9:15 AM   (15 min.)   [facility] DRAW   [facility]    RETURN ACTIVE TREATMENT   9:45 AM   (45 min.)   Rebeca Killian APRN CNP   [facility]    ONC INFUSION 4 HR (240 MIN)  10:30 AM   (240 min.)    ONC INFUSION NURSE   [facility] 16     17     18       19     20     21     22     23     24     25       26     27     28     29    LAB CENTRAL   8:30 AM   (15 min.)   [facility] DRAW   [facility]    ONC INFUSION 4 HR (240 MIN)   9:00 AM   (240 min.)    ONC INFUSION NURSE   [facility] 30     31                          Lab Results:  Recent Results (from the past 12 hours)   Protein qualitative urine    Collection Time: 12/04/24  7:49 AM   Result Value Ref Range    Protein Albumin Urine Negative Negative mg/dL   CBC with platelets and differential    Collection Time: 12/04/24  7:49 AM   Result Value Ref Range    WBC Count 6.2 4.0 - 11.0 10e3/uL    RBC Count 4.47 4.40 - 5.90 10e6/uL    Hemoglobin 13.0 (L) 13.3 - 17.7 g/dL    Hematocrit 40.1 40.0 - 53.0 %    MCV 90 78 - 100 fL    MCH 29.1 26.5 - 33.0 pg    MCHC 32.4 31.5 - 36.5 g/dL    RDW 24.3 (H) 10.0 - 15.0 %    Platelet Count 138 (L) 150 - 450 10e3/uL

## 2024-12-04 NOTE — PROGRESS NOTES
Infusion Nursing Note:  Roman Escalante presents today for Cycle 8 Day 1 Bevacizumab-bvzr, Irinotecan, Leucovorin, Fluorouracil push and pump connect.    Patient seen by provider today: No   present during visit today: Not Applicable.    Note: Pt presents to infusion feeling well. He manages intermittent nausea with Compazine and Zofran and happy to be gaining weight. His neuropathy (feet and fingertips) is unchanged. He denies fevers/chills, cough/congestion, increased SOB, chest pain, vomiting, ostomy changes, bruising/bleeding, pain or further concerns today.    Intravenous Access:  Implanted Port.    Treatment Conditions:  Lab Results   Component Value Date    HGB 13.0 (L) 12/04/2024    WBC 6.2 12/04/2024    ANEU 4.7 12/04/2024    ANEUTAUTO 7.7 11/20/2024     (L) 12/04/2024        Lab Results   Component Value Date     12/04/2024    POTASSIUM 3.9 12/04/2024    MAG 2.0 04/24/2024    CR 0.86 12/04/2024    JEFERSON 8.9 12/04/2024    BILITOTAL 0.2 12/04/2024    ALBUMIN 3.9 12/04/2024    ALT 8 12/04/2024    AST 18 12/04/2024     Results reviewed, labs MET treatment parameters, ok to proceed with treatment.  Urine protein: Negative.  BP < 160/100.      Post Infusion Assessment:  Patient tolerated infusion without incident.  Blood return noted pre and post infusion.  Blood return noted during Fluorouracil administration every 2 cc.  Site patent and intact, free from redness, edema or discomfort.  No evidence of extravasations.     Prior to discharge: Port is secured in place with tegaderm and flushed with 10cc NS with positive blood return noted. Continuous home infusion Fluorouracil pump connected.    All connectors secured in place and clamps taped open. Double checked with Maite Blunt RN.     Patient instructed to call  [facility] with any questions or concerns at home with the pump.  Patient verbalized understanding.    Patient and [facility] aware of set up for pump disconnect at home  with [facility] on 12/6/24 at 0900.     Discharge Plan:   Patient declined prescription refills.  Discharge instructions reviewed with: Patient.  Patient and/or family verbalized understanding of discharge instructions and all questions answered.  AVS to patient via [employer]. Patient will return 12/18/24 for next appointment.   Patient discharged in stable condition accompanied by: self.  Departure Mode: Ambulatory.      Sosa Castano RN

## 2024-12-05 PROCEDURE — S9330 HIT CONT CHEM DIEM: HCPCS

## 2024-12-06 PROCEDURE — S9330 HIT CONT CHEM DIEM: HCPCS

## 2024-12-07 PROCEDURE — S9330 HIT CONT CHEM DIEM: HCPCS

## 2024-12-08 PROCEDURE — S9330 HIT CONT CHEM DIEM: HCPCS

## 2024-12-09 PROCEDURE — S9330 HIT CONT CHEM DIEM: HCPCS

## 2024-12-10 PROCEDURE — S9330 HIT CONT CHEM DIEM: HCPCS

## 2024-12-11 PROCEDURE — S9330 HIT CONT CHEM DIEM: HCPCS

## 2024-12-11 PROCEDURE — A9270 NON-COVERED ITEM OR SERVICE: HCPCS

## 2024-12-12 PROCEDURE — S9330 HIT CONT CHEM DIEM: HCPCS

## 2024-12-13 PROCEDURE — S9330 HIT CONT CHEM DIEM: HCPCS

## 2024-12-14 PROCEDURE — S9330 HIT CONT CHEM DIEM: HCPCS

## 2024-12-15 PROCEDURE — S9330 HIT CONT CHEM DIEM: HCPCS

## 2024-12-16 ENCOUNTER — HOME INFUSION (OUTPATIENT)
Dept: HOME HEALTH SERVICES | Facility: HOME HEALTH | Age: 49
End: 2024-12-16
Payer: COMMERCIAL

## 2024-12-16 DIAGNOSIS — C20 MALIGNANT TUMOR OF RECTUM (H): ICD-10-CM

## 2024-12-16 PROCEDURE — S9330 HIT CONT CHEM DIEM: HCPCS

## 2024-12-17 ENCOUNTER — VIRTUAL VISIT (OUTPATIENT)
Dept: PALLIATIVE CARE | Facility: CLINIC | Age: 49
End: 2024-12-17
Attending: STUDENT IN AN ORGANIZED HEALTH CARE EDUCATION/TRAINING PROGRAM
Payer: COMMERCIAL

## 2024-12-17 VITALS — WEIGHT: 170 LBS | HEIGHT: 71 IN | BODY MASS INDEX: 23.8 KG/M2

## 2024-12-17 DIAGNOSIS — T45.1X5A CHEMOTHERAPY INDUCED NAUSEA AND VOMITING: ICD-10-CM

## 2024-12-17 DIAGNOSIS — G62.0 NEUROPATHY DUE TO CHEMOTHERAPEUTIC DRUG (H): ICD-10-CM

## 2024-12-17 DIAGNOSIS — R11.2 CHEMOTHERAPY INDUCED NAUSEA AND VOMITING: ICD-10-CM

## 2024-12-17 DIAGNOSIS — T45.1X5A NEUROPATHY DUE TO CHEMOTHERAPEUTIC DRUG (H): ICD-10-CM

## 2024-12-17 DIAGNOSIS — C18.9 COLON CANCER METASTASIZED TO LIVER (H): Primary | ICD-10-CM

## 2024-12-17 DIAGNOSIS — G89.3 CANCER ASSOCIATED PAIN: ICD-10-CM

## 2024-12-17 DIAGNOSIS — Z51.5 ENCOUNTER FOR PALLIATIVE CARE: ICD-10-CM

## 2024-12-17 DIAGNOSIS — C78.7 COLON CANCER METASTASIZED TO LIVER (H): Primary | ICD-10-CM

## 2024-12-17 DIAGNOSIS — R63.0 DECREASED APPETITE: ICD-10-CM

## 2024-12-17 PROCEDURE — S9330 HIT CONT CHEM DIEM: HCPCS

## 2024-12-17 RX ORDER — GABAPENTIN 100 MG/1
CAPSULE ORAL
Qty: 210 CAPSULE | Refills: 0 | Status: SHIPPED | OUTPATIENT
Start: 2024-12-17

## 2024-12-17 RX ORDER — OLANZAPINE 2.5 MG/1
2.5 TABLET, FILM COATED ORAL 2 TIMES DAILY
Qty: 60 TABLET | Refills: 1 | Status: SHIPPED | OUTPATIENT
Start: 2024-12-17

## 2024-12-17 ASSESSMENT — PAIN SCALES - GENERAL: PAINLEVEL_OUTOF10: NO PAIN (0)

## 2024-12-17 NOTE — PROGRESS NOTES
"Palliative Care Progress Note    Patient Name: Roman Escalante  Primary Provider: Buddy Hart    Chief Complaint/Patient ID:   He has a PMHx of metastatic rectal cancer (mets to lung), currently enrolled in [facility] CAR-T trial (completed cell harvest 1/23/2024, tentatively planning administration of CAR-T in June).  He has been on multiple lines of systemic therapy, currently on Fruquintinib.     Last Palliative care appointment: 10/9/2024 with me.      Reviewed: Yes    ORT Score = 1 for history of alcohol abuse and his mother.     Social History: Has a couple of kids and a couple grandkids who lives in [place].  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 Has a girlfriend-she is a hospice RN.     Advanced Care Planning: Has not completed. Girlfriend would be his HCA.     Interim History:  Roman is seen today for follow up with Palliative Care via billable video visit.      Reviewed oncology note from 11/20.  Tolerating current treatment okay and planning to continue.    Says he is doing okay but thinking he'll have to postpone next cycle due to feeling so sick.  Has been using compazine and zofran with minimal improvement.  Says most of his time lying still and trying not to move.  Using cannabis for appetite but does not really find it beneficial to nausea.    Neuropathy pain has been significant \"anytime I put my shoes on\".  Currently taking gabapentin 300 mg at night only.    Rectal pain has overall improved. He takes at most 4 tablets of oxycodone about a week on/week off.  Pain is generally worse leading up to his chemo infusion and then a few days after.    Was glad that last scan showed improvement.  Says he has signed consent form for another trial.    Physical Exam:   Constitutional: Alert, pleasant, no apparent distress. Sitting up in chair.  Eyes: Sclera non-icteric, no eye discharge.  ENT: No nasal discharge. Ears grossly normal.  Respiratory: Unlabored respirations. " Speaking in full sentences.  Musculoskeletal: Extremities appear normal- no gross deformities noted. No edema noted on upper body.   Skin: No suspicious lesions or rashes on visible skin.  Neurologic: Clear speech, no aphasia. No facial droop.  Psychiatric: Mentation appears normal, appropriate attention. Affect normal/bright. Does not appear anxious or depressed.    Key Data Reviewed:  LABS:   Lab Results   Component Value Date    WBC 6.2 12/04/2024    HGB 13.0 (L) 12/04/2024    HCT 40.1 12/04/2024     (L) 12/04/2024     12/04/2024    POTASSIUM 3.9 12/04/2024    CHLORIDE 106 12/04/2024    CO2 23 12/04/2024    BUN 16.4 12/04/2024    CR 0.86 12/04/2024     (H) 12/04/2024    SED 38 (H) 01/28/2024    AST 18 12/04/2024    ALT 8 12/04/2024    ALKPHOS 125 12/04/2024    BILITOTAL 0.2 12/04/2024    INR 1.30 (H) 01/28/2024     IMAGING: CT CAP 10/18/24- IMPRESSION:  1.  Metastatic disease throughout the lungs has improved, with several of the masses smaller and less dense, now with air filled spaces and bronchograms. Mediastinal adenopathy has also slightly improved.  2.  Metastatic liver disease has slightly improved.  3.  Rectosigmoid colon mass and sigmoid colon mass have improved.  4.  Enlarged lymph nodes in the abdomen and pelvis have improved.  5.  Interval development of thoracic and lumbar vertebral body skeletal metastatic disease.    Impression & Recommendations & Counseling:  Roman Escalante has a history of metastatic rectal cancer.     Having some issues with nausea and neuropathy this time.  Agreed there are recommendations/adjustments we can make.    Recommendations:  -Increase gabapentin to 100-200 mg twice during the day, and continue 300 mg at bedtime.  New prescription sent.  Discussed there would be further room to increase this if needed.  -Okay to continue oxycodone 10 to 20 mg every four hours as needed.  Currently never needing more than 4 tablets/day.  -Add Zyprexa 2.5 mg twice  daily for nausea.  Discussed if too sedating during the day, can take both tablets (5 mg) at bedtime  -Continue THC as needed.  Weight is improving as appetite has improved.      Follow up: 2 months      Video-Visit Details  Video Start Time: 4:22 PM  Video End Time: 4:36 PM    Originating Location (pt. Location): Home     Distant Location (provider location):  Offsite- Personal Home     Platform used for Video Visit: SensorTran     Total time spent on day of encounter is 24 mins, including reviewing record, review of above studies, above visit with patient, symptomatic discussion of pain, neuropathy, and nausea, including medication adjustments/prescription management, and documentation.             Isidra Prater, DO  Palliative Medicine   [id number]    Some chart documentation performed using Dragon Voice recognition Software. Although reviewed after completion, some words and grammatical errors may remain.

## 2024-12-17 NOTE — LETTER
"12/17/2024       RE: Roman Escalante  [address]  [place] [facility] [place] [address]     Dear Colleague,    Thank you for referring your patient, Roman Escalante, to the [facility] at [facility]. Please see a copy of my visit note below.    Palliative Care Progress Note    Patient Name: Roman Escalante  Primary Provider: Buddy Hart    Chief Complaint/Patient ID:   He has a PMHx of metastatic rectal cancer (mets to lung), currently enrolled in [facility] CAR-T trial (completed cell harvest 1/23/2024, tentatively planning administration of CAR-T in June).  He has been on multiple lines of systemic therapy, currently on Fruquintinib.     Last Palliative care appointment: 10/9/2024 with me.      Reviewed: Yes    ORT Score = 1 for history of alcohol abuse and his mother.     Social History: Has a couple of kids and a couple grandkids who lives in [place].  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 Has a girlfriend-she is a hospice RN.     Advanced Care Planning: Has not completed. Girlfriend would be his HCA.     Interim History:  Roman is seen today for follow up with Palliative Care via billable video visit.      Reviewed oncology note from 11/20.  Tolerating current treatment okay and planning to continue.    Says he is doing okay but thinking he'll have to postpone next cycle due to feeling so sick.  Has been using compazine and zofran with minimal improvement.  Says most of his time lying still and trying not to move.  Using cannabis for appetite but does not really find it beneficial to nausea.    Neuropathy pain has been significant \"anytime I put my shoes on\".  Currently taking gabapentin 300 mg at night only.    Rectal pain has overall improved. He takes at most 4 tablets of oxycodone about a week on/week off.  Pain is generally worse leading up to his chemo infusion and then a few days after.    Was " glad that last scan showed improvement.  Says he has signed consent form for another trial.    Physical Exam:   Constitutional: Alert, pleasant, no apparent distress. Sitting up in chair.  Eyes: Sclera non-icteric, no eye discharge.  ENT: No nasal discharge. Ears grossly normal.  Respiratory: Unlabored respirations. Speaking in full sentences.  Musculoskeletal: Extremities appear normal- no gross deformities noted. No edema noted on upper body.   Skin: No suspicious lesions or rashes on visible skin.  Neurologic: Clear speech, no aphasia. No facial droop.  Psychiatric: Mentation appears normal, appropriate attention. Affect normal/bright. Does not appear anxious or depressed.    Key Data Reviewed:  LABS:   Lab Results   Component Value Date    WBC 6.2 12/04/2024    HGB 13.0 (L) 12/04/2024    HCT 40.1 12/04/2024     (L) 12/04/2024     12/04/2024    POTASSIUM 3.9 12/04/2024    CHLORIDE 106 12/04/2024    CO2 23 12/04/2024    BUN 16.4 12/04/2024    CR 0.86 12/04/2024     (H) 12/04/2024    SED 38 (H) 01/28/2024    AST 18 12/04/2024    ALT 8 12/04/2024    ALKPHOS 125 12/04/2024    BILITOTAL 0.2 12/04/2024    INR 1.30 (H) 01/28/2024     IMAGING: CT CAP 10/18/24- IMPRESSION:  1.  Metastatic disease throughout the lungs has improved, with several of the masses smaller and less dense, now with air filled spaces and bronchograms. Mediastinal adenopathy has also slightly improved.  2.  Metastatic liver disease has slightly improved.  3.  Rectosigmoid colon mass and sigmoid colon mass have improved.  4.  Enlarged lymph nodes in the abdomen and pelvis have improved.  5.  Interval development of thoracic and lumbar vertebral body skeletal metastatic disease.    Impression & Recommendations & Counseling:  Roman Escalante has a history of metastatic rectal cancer.     Having some issues with nausea and neuropathy this time.  Agreed there are recommendations/adjustments we can make.    Recommendations:  -Increase  gabapentin to 100-200 mg twice during the day, and continue 300 mg at bedtime.  New prescription sent.  Discussed there would be further room to increase this if needed.  -Okay to continue oxycodone 10 to 20 mg every four hours as needed.  Currently never needing more than 4 tablets/day.  -Add Zyprexa 2.5 mg twice daily for nausea.  Discussed if too sedating during the day, can take both tablets (5 mg) at bedtime  -Continue THC as needed.  Weight is improving as appetite has improved.      Follow up: 2 months      Video-Visit Details  Video Start Time: 4:22 PM  Video End Time: 4:36 PM    Originating Location (pt. Location): Home     Distant Location (provider location):  Offsite- Personal Home     Platform used for Video Visit: JesseWell     Total time spent on day of encounter is 24 mins, including reviewing record, review of above studies, above visit with patient, symptomatic discussion of pain, neuropathy, and nausea, including medication adjustments/prescription management, and documentation.             Isidra Prater DO  Palliative Medicine   [id number]    Some chart documentation performed using Dragon Voice recognition Software. Although reviewed after completion, some words and grammatical errors may remain.      Again, thank you for allowing me to participate in the care of your patient.      Sincerely,    Isidra Prater DO

## 2024-12-17 NOTE — PATIENT INSTRUCTIONS
Recommendations:  -Increase gabapentin to 100-200 mg (1 to 2 capsules) twice during the day, and continue 300 mg at bedtime.  New prescription sent.  Discussed there would be further room to increase this if needed.  -Okay to continue oxycodone 10 to 20 mg every four hours as needed.  Currently never needing more than 4 tablets/day.  -Add Zyprexa 2.5 mg twice daily for nausea.  Discussed if too sedating during the day, can take both tablets (5 mg) at bedtime  -Continue THC as needed.  Weight is improving as appetite has improved.    Follow up: 2 months      Reasons to Call    If you are having worsening/uncontrolled symptoms we want you to call!    You or your other physicians make any changes to medications we have prescribed.  -Please call for refills 4-5 days before you will run out of medication.    Important Phone Numbers, including: Refills, scheduling, and general questions     Palliative Care RN: Rebeca Urbina : [phone]  *For scheduling needs/follow up visits : [phone]  *After hours or on weekends- Will connect you with on call MD : [phone].

## 2024-12-17 NOTE — NURSING NOTE
Current patient location:  [address] [place], [place] [address], [place]    Is the patient currently in the state of [place]? YES    Visit mode:VIDEO    If the visit is dropped, the patient can be reconnected by:VIDEO VISIT: Text to cell phone:   Telephone Information:   Mobile [phone]       Will anyone else be joining the visit? NO  (If patient encounters technical issues they should call [phone])    Are changes needed to the allergy or medication list? Pt stated no changes to allergies and Pt stated no med changes    Are refills needed on medications prescribed by this physician? NO    Rooming Documentation:  Unable to complete questionnaire(s) due to time    Reason for visit: VIDAL RIVERAF

## 2024-12-18 ENCOUNTER — DOCUMENTATION ONLY (OUTPATIENT)
Dept: HOME HEALTH SERVICES | Facility: HOME HEALTH | Age: 49
End: 2024-12-18
Payer: COMMERCIAL

## 2024-12-18 ENCOUNTER — ONCOLOGY VISIT (OUTPATIENT)
Dept: ONCOLOGY | Facility: CLINIC | Age: 49
End: 2024-12-18
Attending: STUDENT IN AN ORGANIZED HEALTH CARE EDUCATION/TRAINING PROGRAM
Payer: COMMERCIAL

## 2024-12-18 ENCOUNTER — HOME INFUSION BILLING (OUTPATIENT)
Dept: HOME HEALTH SERVICES | Facility: HOME HEALTH | Age: 49
End: 2024-12-18
Payer: COMMERCIAL

## 2024-12-18 VITALS
WEIGHT: 176.7 LBS | BODY MASS INDEX: 24.64 KG/M2 | DIASTOLIC BLOOD PRESSURE: 76 MMHG | RESPIRATION RATE: 18 BRPM | TEMPERATURE: 98.1 F | HEART RATE: 95 BPM | SYSTOLIC BLOOD PRESSURE: 115 MMHG | OXYGEN SATURATION: 95 %

## 2024-12-18 DIAGNOSIS — C78.00 RECTAL ADENOCARCINOMA METASTATIC TO LUNG (H): Primary | ICD-10-CM

## 2024-12-18 DIAGNOSIS — C20 RECTAL ADENOCARCINOMA METASTATIC TO LUNG (H): Primary | ICD-10-CM

## 2024-12-18 DIAGNOSIS — C79.51 COLON CANCER METASTASIZED TO BONE (H): ICD-10-CM

## 2024-12-18 DIAGNOSIS — R11.0 NAUSEA: ICD-10-CM

## 2024-12-18 DIAGNOSIS — C18.9 COLON CANCER METASTASIZED TO BONE (H): ICD-10-CM

## 2024-12-18 LAB
ALBUMIN SERPL BCG-MCNC: 3.8 G/DL (ref 3.5–5.2)
ALBUMIN UR-MCNC: 10 MG/DL
ALP SERPL-CCNC: 118 U/L (ref 40–150)
ALT SERPL W P-5'-P-CCNC: 6 U/L (ref 0–70)
ANION GAP SERPL CALCULATED.3IONS-SCNC: 13 MMOL/L (ref 7–15)
AST SERPL W P-5'-P-CCNC: 21 U/L (ref 0–45)
BASOPHILS # BLD MANUAL: 0 10E3/UL (ref 0–0.2)
BASOPHILS NFR BLD MANUAL: 0 %
BILIRUB SERPL-MCNC: 0.3 MG/DL
BUN SERPL-MCNC: 13.3 MG/DL (ref 6–20)
CALCIUM SERPL-MCNC: 8.8 MG/DL (ref 8.8–10.4)
CHLORIDE SERPL-SCNC: 106 MMOL/L (ref 98–107)
CREAT SERPL-MCNC: 0.85 MG/DL (ref 0.67–1.17)
EGFRCR SERPLBLD CKD-EPI 2021: >90 ML/MIN/1.73M2
ELLIPTOCYTES BLD QL SMEAR: SLIGHT
EOSINOPHIL # BLD MANUAL: 0.2 10E3/UL (ref 0–0.7)
EOSINOPHIL NFR BLD MANUAL: 5 %
ERYTHROCYTE [DISTWIDTH] IN BLOOD BY AUTOMATED COUNT: 18.6 % (ref 10–15)
GLUCOSE SERPL-MCNC: 147 MG/DL (ref 70–99)
HCO3 SERPL-SCNC: 20 MMOL/L (ref 22–29)
HCT VFR BLD AUTO: 41.7 % (ref 40–53)
HGB BLD-MCNC: 13.8 G/DL (ref 13.3–17.7)
LYMPHOCYTES # BLD MANUAL: 0.6 10E3/UL (ref 0.8–5.3)
LYMPHOCYTES NFR BLD MANUAL: 12 %
MCH RBC QN AUTO: 30.1 PG (ref 26.5–33)
MCHC RBC AUTO-ENTMCNC: 33.1 G/DL (ref 31.5–36.5)
MCV RBC AUTO: 91 FL (ref 78–100)
MONOCYTES # BLD MANUAL: 0.9 10E3/UL (ref 0–1.3)
MONOCYTES NFR BLD MANUAL: 19 %
NEUTROPHILS # BLD MANUAL: 3.1 10E3/UL (ref 1.6–8.3)
NEUTROPHILS NFR BLD MANUAL: 64 %
PLAT MORPH BLD: ABNORMAL
PLATELET # BLD AUTO: 132 10E3/UL (ref 150–450)
POTASSIUM SERPL-SCNC: 3.5 MMOL/L (ref 3.4–5.3)
PROT SERPL-MCNC: 6.6 G/DL (ref 6.4–8.3)
RBC # BLD AUTO: 4.59 10E6/UL (ref 4.4–5.9)
RBC MORPH BLD: ABNORMAL
SODIUM SERPL-SCNC: 139 MMOL/L (ref 135–145)
WBC # BLD AUTO: 4.8 10E3/UL (ref 4–11)

## 2024-12-18 PROCEDURE — 96411 CHEMO IV PUSH ADDL DRUG: CPT

## 2024-12-18 PROCEDURE — 96415 CHEMO IV INFUSION ADDL HR: CPT

## 2024-12-18 PROCEDURE — 96417 CHEMO IV INFUS EACH ADDL SEQ: CPT

## 2024-12-18 PROCEDURE — 99215 OFFICE O/P EST HI 40 MIN: CPT

## 2024-12-18 PROCEDURE — 96367 TX/PROPH/DG ADDL SEQ IV INF: CPT

## 2024-12-18 PROCEDURE — 96376 TX/PRO/DX INJ SAME DRUG ADON: CPT

## 2024-12-18 PROCEDURE — G0463 HOSPITAL OUTPT CLINIC VISIT: HCPCS

## 2024-12-18 PROCEDURE — 85007 BL SMEAR W/DIFF WBC COUNT: CPT | Performed by: STUDENT IN AN ORGANIZED HEALTH CARE EDUCATION/TRAINING PROGRAM

## 2024-12-18 PROCEDURE — 250N000011 HC RX IP 250 OP 636

## 2024-12-18 PROCEDURE — 84450 TRANSFERASE (AST) (SGOT): CPT

## 2024-12-18 PROCEDURE — 250N000011 HC RX IP 250 OP 636: Performed by: STUDENT IN AN ORGANIZED HEALTH CARE EDUCATION/TRAINING PROGRAM

## 2024-12-18 PROCEDURE — 85027 COMPLETE CBC AUTOMATED: CPT | Performed by: STUDENT IN AN ORGANIZED HEALTH CARE EDUCATION/TRAINING PROGRAM

## 2024-12-18 PROCEDURE — 96413 CHEMO IV INFUSION 1 HR: CPT

## 2024-12-18 PROCEDURE — 96368 THER/DIAG CONCURRENT INF: CPT

## 2024-12-18 PROCEDURE — 96375 TX/PRO/DX INJ NEW DRUG ADDON: CPT

## 2024-12-18 PROCEDURE — 81003 URINALYSIS AUTO W/O SCOPE: CPT | Performed by: STUDENT IN AN ORGANIZED HEALTH CARE EDUCATION/TRAINING PROGRAM

## 2024-12-18 PROCEDURE — G0498 CHEMO EXTEND IV INFUS W/PUMP: HCPCS

## 2024-12-18 PROCEDURE — 36415 COLL VENOUS BLD VENIPUNCTURE: CPT | Performed by: STUDENT IN AN ORGANIZED HEALTH CARE EDUCATION/TRAINING PROGRAM

## 2024-12-18 PROCEDURE — G2211 COMPLEX E/M VISIT ADD ON: HCPCS

## 2024-12-18 PROCEDURE — 84155 ASSAY OF PROTEIN SERUM: CPT

## 2024-12-18 PROCEDURE — 258N000003 HC RX IP 258 OP 636: Performed by: STUDENT IN AN ORGANIZED HEALTH CARE EDUCATION/TRAINING PROGRAM

## 2024-12-18 PROCEDURE — 258N000003 HC RX IP 258 OP 636

## 2024-12-18 RX ORDER — ONDANSETRON 2 MG/ML
8 INJECTION INTRAMUSCULAR; INTRAVENOUS ONCE
Status: COMPLETED | OUTPATIENT
Start: 2024-12-18 | End: 2024-12-18

## 2024-12-18 RX ORDER — FLUOROURACIL 50 MG/ML
INJECTION, SOLUTION INTRAVENOUS
COMMUNITY
Start: 2024-10-23

## 2024-12-18 RX ORDER — FLUOROURACIL 50 MG/ML
400 INJECTION, SOLUTION INTRAVENOUS ONCE
Status: COMPLETED | OUTPATIENT
Start: 2024-12-18 | End: 2024-12-18

## 2024-12-18 RX ORDER — DEXAMETHASONE SODIUM PHOSPHATE 4 MG/ML
12 INJECTION, SOLUTION INTRA-ARTICULAR; INTRALESIONAL; INTRAMUSCULAR; INTRAVENOUS; SOFT TISSUE ONCE
Status: COMPLETED | OUTPATIENT
Start: 2024-12-18 | End: 2024-12-18

## 2024-12-18 RX ORDER — HEPARIN SODIUM (PORCINE) LOCK FLUSH IV SOLN 100 UNIT/ML 100 UNIT/ML
5 SOLUTION INTRAVENOUS ONCE
Status: COMPLETED | OUTPATIENT
Start: 2024-12-18 | End: 2024-12-18

## 2024-12-18 RX ORDER — ATROPINE SULFATE 0.4 MG/ML
0.4 AMPUL (ML) INJECTION
Status: COMPLETED | OUTPATIENT
Start: 2024-12-18 | End: 2024-12-18

## 2024-12-18 RX ADMIN — HEPARIN 3 ML: 100 SYRINGE at 09:19

## 2024-12-18 RX ADMIN — SODIUM CHLORIDE 100 ML: 9 INJECTION, SOLUTION INTRAVENOUS at 10:47

## 2024-12-18 RX ADMIN — FLUOROURACIL 770 MG: 50 INJECTION, SOLUTION INTRAVENOUS at 13:40

## 2024-12-18 RX ADMIN — ONDANSETRON 8 MG: 2 INJECTION INTRAMUSCULAR; INTRAVENOUS at 10:47

## 2024-12-18 RX ADMIN — ATROPINE SULFATE 0.4 MG: 0.4 INJECTION, SOLUTION INTRAVENOUS at 12:02

## 2024-12-18 RX ADMIN — SODIUM CHLORIDE 400 MG: 9 INJECTION, SOLUTION INTRAVENOUS at 11:25

## 2024-12-18 RX ADMIN — FOSAPREPITANT 150 MG: 150 INJECTION, POWDER, LYOPHILIZED, FOR SOLUTION INTRAVENOUS at 11:00

## 2024-12-18 RX ADMIN — IRINOTECAN HYDROCHLORIDE 340 MG: 20 INJECTION, SOLUTION INTRAVENOUS at 12:05

## 2024-12-18 RX ADMIN — DEXAMETHASONE SODIUM PHOSPHATE 12 MG: 4 INJECTION, SOLUTION INTRA-ARTICULAR; INTRALESIONAL; INTRAMUSCULAR; INTRAVENOUS; SOFT TISSUE at 10:49

## 2024-12-18 RX ADMIN — ATROPINE SULFATE 0.4 MG: 0.4 INJECTION, SOLUTION INTRAVENOUS at 12:49

## 2024-12-18 RX ADMIN — DEXTROSE MONOHYDRATE 750 MG: 50 INJECTION, SOLUTION INTRAVENOUS at 12:03

## 2024-12-18 ASSESSMENT — PAIN SCALES - GENERAL: PAINLEVEL_OUTOF10: NO PAIN (0)

## 2024-12-18 NOTE — PROGRESS NOTES
[facility] d/c of Fluorouracil continuous infusion over 46 hours via C series pump.   Scheduled in [facility] for home disconnect on 12/20/24 at 10am (patient's requested time).  No Neulasta OnPro/IV fluids needed at this visit. Neulasta injection Day 4 self-administers

## 2024-12-18 NOTE — Clinical Note
12/18/2024      Roman Escalante  [address]  [facility] [address]      Dear Colleague,    Thank you for referring your patient, Roman Escalante, to the [facility]. Please see a copy of my visit note below.      [facility] - Medical Oncology Follow Up Note  12/18/2024    Oncology History:   Patient developed rectal bleeding in 2020.  In January 2021 CT showed focal wall thickening in the upper rectum, multiple pulmonary nodules bilaterally suspicious for metastatic disease.  Underwent FNA of the right upper lobe lesion which was consistent with adenocarcinoma of the colon.  He was treated with FOLFOX from 2/20/2021 through 5/20/2021.  Avastin was added.  Had an infusion reaction to oxaliplatin 6/2021.  7/2021- 12/2021 was on 5-FU alone.  Developed progression.  12/2021- 9/2022 was on FOLFIRI.  11/2021 started Lonsurf. All of this was done with outside oncologist.     Met with Dr. Snow on 2/3/2023 to establish care. Recommended regorafenib.     Started regorafenib on 3/2/2023. Progression noted 5/19/23. Plan to start FOLFOX/Avastin and send out CARIS testing to evaluate for other treatment options. Cycle 1 was given with oxaliplatin desensitization, 5FU, Avastin held due to increased urine protein.     Following cycle 4, patient was admitted with hematuria and acute kidney injury.    Oxaliplatin was dropped with Cycle 5.     Y-90 radioembolization 9/7.    10/24/23 CT CAP with disease progression with notable growth in all pulmonary lesions. Lonsurf + bevacizumab started 10/27/23 with plans to bridge to clinical trial.   11/15/23 - stopped chemo for [facility] study.   Currently enrolled in [facility] CAR-T trial (completed cell harvest 1/23/2024, tentatively planning administration of CAR-T in April 2/12/24 - resumed Lonsurf, bevacizumab to bridge treatment until CAR-T therapy  3/20/24 - treatment changed to Fruquintinib due to intolerability of  Lonsurf/bevacizumab  4/2/24- Presented to the ED with dehydration. Stopped Fruquintnib.   6/2024- cellular therapy transplant at [facility].   7/30/24- CT imaging demonstrating marked progression of pulmonary and hepatic lesions, clinical trial failed to generate T-cell graft. Multiple discussions regarding plan of care, hospice vs additional lines of treatment. Patient decided he is not ready for hospice. He resumed treatment with FOLFIRI + bevacizumab on 8/30/24.   10/2024 - CT CAP with continued response, plan to continue FOLFIRI + bevacizumab with the addition of Zometa. Per his dentist, needs dental work completed prior to clearance. (Scheduled 1/2 and 1/21).       Interval History:         -Nausea after last chemo, no energy and laid around a lot   -couple days after through the weekend, took both antiemetics and didn't quite cover it  -appetite is good after nausea resolves  -stared olanzapine with palliative   -pain is well controlled  -bowels are moving well, no issues   -sore feet   -      ***      Roman presents today feeling well and offers no concerns today.   His neuropathy is stable. Fluctuates often due to activity in his feet, none in his hands. Continues on gabapentin but often forgets to take before bedtime.   Appetite is good, denies any GI concerns. His bowel movements have been white-colored the past couple of days which he attributes to food intake changes.   Developed one small mouth sore to his right lower inner cheek near his back molar. Mild pain, no open sores or interference with eating/swallowing. Has dental work scheduled 1/2 and 1/21.   Needs ~12 hours of sleep every night, but otherwise energy is good and is not needing naps throughout the day         Review of Systems:  Patient denies any of the following except if noted above: fevers, chills, difficulty with energy, vision or hearing changes, chest pain, dyspnea, abdominal pain, nausea, vomiting, diarrhea, constipation, urinary concerns,  "headaches, numbness, tingling, issues with sleep or mood. Also denies lumps, bumps, rashes or skin lesions, bleeding or bruising issues.      Current Outpatient Medications   Medication Sig Dispense Refill    Chemo Kit For RN use only. Do not remove items from bag. Contents: 1  sodium chloride 0.9% flush, 4 medium gloves, 1 chemo gown, 1/4 chemo mat, 1 connector female, 1 chemo bag. [id number] kit 0    cyclobenzaprine (FLEXERIL) 5 MG tablet Take one tab (5mg) by mouth over 6-8 hours, as needed for spasms (Patient not taking: Reported on 12/4/2024) 45 tablet 2    Emergency Supply Kit, Central, Patient use for emergency only. Contents: 3 sodium chloride 0.9% flushes, 1 dressing kit, 1 microclave ext set 14\", 4 nitrile gloves (med), 6 alcohol prep pads, 1 bacitracin, 1 syringe (10 cc 20 G 1\"). Call [phone] to reorder. [id number] kit 0    Fluorouracil (ADRUCIL) 4,610 mg in sodium chloride 0.9 % 241 mL via HOMEPUMP C-Series Infuse 4,610 mg at 5.2 mL/hr over 46 hours into the vein once for 1 dose. 609032 mL 0    gabapentin (NEURONTIN) 100 MG capsule Take 100-200mg (1-2 capsules) up to twice during the day and 300mg (3 capsules) at bedtime. 210 capsule 0    ibuprofen (ADVIL/MOTRIN) 200 MG tablet Take 3 tablets (600 mg) by mouth every 8 hours as needed for moderate pain (Patient not taking: Reported on 12/4/2024) 100 tablet 0    loperamide (IMODIUM) 2 MG capsule 2 caps at 1st sign of diarrhea & 1 cap every 2hrs until 12hrs diarrhea free. During night, 2 caps at bedtime & 2 caps every 4hrs until AM (Patient not taking: Reported on 12/4/2024) 30 capsule 0    LORazepam (ATIVAN) 0.5 MG tablet Take 1 tablet (0.5 mg) by mouth every 6 hours as needed for anxiety (Patient not taking: Reported on 10/4/2024) 10 tablet 0    morphine (MS CONTIN) 15 MG CR tablet Take 1 tablet (15 mg) by mouth every 12 hours (Patient not taking: Reported on 12/4/2024) 60 tablet 0    NARCAN 4 MG/0.1ML nasal spray CALL 911. SPR CONTENTS OF ONE SPRAYER " (0.1ML) INTO ONE NOSTRIL. REPEAT IN 2-3 MIN IF SYMPTOMS OF OPIOID EMERGENCY PERSIST, ALTERNATE NOSTRILS (Patient not taking: Reported on 11/1/2024)      OLANZapine (ZYPREXA) 2.5 MG tablet Take 1 tablet (2.5 mg) by mouth 2 times daily. 60 tablet 1    ondansetron (ZOFRAN ODT) 4 MG ODT tab Take 1 tablet (4 mg) by mouth every 6 hours as needed for nausea. (Patient not taking: Reported on 12/4/2024) 30 tablet 3    Ostomy Supplies MISC 1 each daily 1 each 11    oxyCODONE IR (ROXICODONE) 10 MG tablet Take 1-2 tablets (10-20 mg) by mouth every 4 hours as needed for moderate to severe pain. 150 tablet 0    pegfilgrastim (NEULASTA) 6 MG/0.6ML injection Inject 0.6 mLs (6 mg) subcutaneously every 14 days. Administer 24 hours after chemotherapy disconnect 3.6 mL 0    Port Access Kit For nurse use only.  Do not remove items from bag.  Use for port access.  Do not place syringe on sterile field. [id number] kit 0    prochlorperazine (COMPAZINE) 10 MG tablet Take 0.5 tablets (5 mg) by mouth every 6 hours as needed for nausea or vomiting. (Patient not taking: Reported on 11/1/2024) 30 tablet 2    prochlorperazine (COMPAZINE) 10 MG tablet Take 1 tablet (10 mg) by mouth every 6 hours as needed for nausea or vomiting (Patient not taking: Reported on 12/4/2024) 30 tablet 2    prochlorperazine (COMPAZINE) 5 MG tablet Take 1 tablet (5 mg) by mouth every 6 hours as needed for nausea or vomiting (Patient not taking: Reported on 11/1/2024) 30 tablet 3    sodium chloride, PF, 0.9% PF flush Inject 10 mLs into the vein as needed for line flush. Flush IV before and after med administration as directed and/or at least every 24 hours, or prior to deaccessing for no further use and/or at least every 4 weeks when not accessed. [id number] mL 0    sulfamethoxazole-trimethoprim (BACTRIM DS) 800-160 MG tablet  (Patient not taking: Reported on 12/4/2024)      tamsulosin (FLOMAX) 0.4 MG capsule       valACYclovir (VALTREX) 500 MG tablet  (Patient not taking:  Reported on 12/4/2024)       Physical Exam:  General: The patient is a pleasant male in no acute distress.  There were no vitals taken for this visit.  Wt Readings from Last 10 Encounters:   12/17/24 77.1 kg (170 lb)   12/06/24 77.1 kg (170 lb)   12/04/24 79.2 kg (174 lb 8 oz)   11/20/24 78.5 kg (173 lb)   11/08/24 74.8 kg (165 lb)   11/06/24 78.5 kg (173 lb)   11/01/24 76 kg (167 lb 9.6 oz)   10/23/24 77.1 kg (170 lb)   10/09/24 76.2 kg (168 lb 1.6 oz)   09/25/24 72.8 kg (160 lb 9.6 oz)   HEENT: EOMI. Sclerae are anicteric. Oral mucosa pink and moist without lesions or thrush.   Lymph: Neck is supple with no lymphadenopathy in the cervical or supraclavicular areas.   Heart: Mildly tachycardic with regular rhythm.   Lungs: Clear to auscultation throughout, normal work of breathing.   Abdomen: Bowel sounds present, soft, nontender with no palpable hepatosplenomegaly or masses. Ostomy is in place in left abdomen with minimal amount of formed brown stool.   Extremities: No lower extremity edema noted bilaterally.   Neuro: Cranial nerves II through XII are grossly intact.  Skin: No rashes, petechiae, or bruising noted on exposed skin.    LABS  Most Recent 3 CBC's:  Recent Labs   Lab Test 12/04/24  0749 11/20/24  0932 11/06/24  0816   WBC 6.2 10.0 7.5   HGB 13.0* 13.4 12.0*   MCV 90 86 85   * 156 141*    Most Recent 3 BMP's:  Recent Labs   Lab Test 12/04/24  0749 11/20/24  0932 11/06/24  0816    139 141   POTASSIUM 3.9 4.0 4.0   CHLORIDE 106 105 106   CO2 23 23 25   BUN 16.4 16.7 18.8   CR 0.86 0.87 0.82   ANIONGAP 11 11 10   JEFERSON 8.9 9.3 9.0   * 90 104*    Most Recent 2 LFT's:  Recent Labs   Lab Test 12/04/24  0749 11/20/24  0932   AST 18 19   ALT 8 9   ALKPHOS 125 150   BILITOTAL 0.2 0.2   I reviewed the above labs today.        ASSESSMENT AND PLAN   Metastatic rectal cancer  Most recently progressed after CAR-T clinical trial. He started on treatment with FOLFIRI + bevacizumab on 8/30/24. Tolerating  treatment well with improvement in energy, pain and breathing/shortness of breath. CT imaging 10/24 with continued response. He continues to tolerate FOLFIRI + bevacizumab well and denies any significant side effects from treatment. He will continue with cycle 7 FOLFIRI/chelsea today. Continue with monthly LILLY follow up, patient to call sooner with any concerns.    Oxygen, fluid in lungs    Shortness of breath with activity has resolved. Previously, walking oxygen test demonstrating oxygen saturations in the 80's on room air. Previously ordered home oxygen with activity. Did not get approved through insurance, subsequent walking tests with adequate oxygen saturations. Breathing overall has improved. Will continue to monitor. Echo on 9/5/24 showed a normal EF.     Weight loss  Previous weight loss despite efforts to increase intake. Started THC gummies, appetite and weight have improved. Nutrition referral previously placed.     Rectal, perineum pain  Following with palliative care. No longer needing MS Contin, but remains on oxycodone prn, currently 10 mg 2-3 times per day. Pain fluctuates throughout chemo cycle, improves a few days after chemo and recurs a few days prior to next dose.     Anemia  Patient has microcytic anemia. Received IV iron and has had significant improvement in symptoms. Hemoglobin continues to improve.       The longitudinal plan of care for the diagnosis(es)/condition(s) as documented were addressed during this visit. Due to the added complexity in care, I will continue to support Roman in the subsequent management and with ongoing continuity of care.    28 minutes spent on the date of the encounter doing chart review, review of test results, interpretation of tests, patient visit, and documentation       FLORA Albright CNP        Again, thank you for allowing me to participate in the care of your patient.        Sincerely,        FLORA Albright CNP

## 2024-12-18 NOTE — NURSING NOTE
"Oncology Rooming Note    December 18, 2024 9:47 AM   Roman Escalante is a 49 year old male who presents for:    Chief Complaint   Patient presents with    Port Draw     Labs drawn via port by RN.     Oncology Clinic Visit     Colon Cancer metastasized to liver     Initial Vitals: /76   Pulse 95   Temp 98.1  F (36.7  C) (Oral)   Resp 18   Wt 80.2 kg (176 lb 11.2 oz)   SpO2 95%   BMI 24.64 kg/m   Estimated body mass index is 24.64 kg/m  as calculated from the following:    Height as of 12/17/24: 1.803 m (5' 11\").    Weight as of this encounter: 80.2 kg (176 lb 11.2 oz). Body surface area is 2 meters squared.  No Pain (0) Comment: Data Unavailable   No LMP for male patient.  Allergies reviewed: Yes  Medications reviewed: Yes    Medications: Medication refills not needed today.  Pharmacy name entered into EPIC:    [facility] - [place], [place] - [address] [place] [address] [phone]  [facility] - [place], [place] - [address] [place] [address] DRUG STORE #[id number] [address]    Frailty Screening:   Is the patient here for a new oncology consult visit in cancer care? 2. No      Clinical concerns: none      Savanna Chavez, EMT  12/18/2024              "

## 2024-12-18 NOTE — PROGRESS NOTES
"Infusion Nursing Note:  Roman Escalante presents today for cycle 9, day 1 avastin, leucovorin, irinotecan, fluorouracil bolus and pump connect   Patient seen by provider today: Yes: Rebeca Killian CNP   present during visit today: Not Applicable.    Note:     12/18/2024 1028  Written Communication: Rebeca Killian CNP/Sarah Abraham RN   -No zometa today, patient doesn't have dental clearance yet    Atropine given pre and half way through irinotecan infusion     Intravenous Access:  Implanted Port.    Treatment Conditions:  Lab Results   Component Value Date    HGB 13.8 12/18/2024    WBC 4.8 12/18/2024    ANEU 3.1 12/18/2024    ANEUTAUTO 7.7 11/20/2024     (L) 12/18/2024        Lab Results   Component Value Date     12/18/2024    POTASSIUM 3.5 12/18/2024    MAG 2.0 04/24/2024    CR 0.85 12/18/2024    JEFERSON 8.8 12/18/2024    BILITOTAL 0.3 12/18/2024    ALBUMIN 3.8 12/18/2024    ALT 6 12/18/2024    AST 21 12/18/2024       Results reviewed, labs MET treatment parameters, ok to proceed with treatment.  Urine protein: 10.  /76.      Post Infusion Assessment:  Patient tolerated infusion without incident.  Blood return noted pre and post infusion.  Site patent and intact, free from redness, edema or discomfort.  No evidence of extravasations.    Prior to discharge: Port is secured in place with tegaderm and flushed with 10cc NS with positive blood return noted.    Continuous home infusion Dosi-Fuser pump connected.    All connectors secured in place and clamps taped open.    Pump started, \"running\" noted on display (CADD): Not Applicable.   Capillary element taped to pt's skin per protocol.  Pump Connection double checked with Sosa Castano RN.  Patient instructed to call our clinic or [facility] with any questions or concerns at home.  Patient verbalized understanding.    Patient set up for pump disconnect and neulasta at home with [facility] on Friday 12/20 at 1000 (time " per patient preference).   Time confirmed with [facility] RN coordinator.     Discharge Plan:   Prescription refills given for zyprexa and gabapentin.  Discharge instructions reviewed with: Patient.  Patient and/or family verbalized understanding of discharge instructions and all questions answered.  AVS to patient via [employer].  Patient will return 1/2/25 for next appointment.   Patient discharged in stable condition accompanied by: self.  Departure Mode: Ambulatory.      Sarah Abraham RN

## 2024-12-18 NOTE — PROGRESS NOTES
-Nausea after last chemo, no energy and laid around a lot   -couple days after through the weekend, took both antiemetics and didn't quite cover it  -appetite is good after nausea resolves  -stared olanzapine with palliative   -pain is well controlled  -bowels are moving well, no issues   -sore feet   -

## 2024-12-18 NOTE — NURSING NOTE
"Chief Complaint   Patient presents with    Port Draw     Labs drawn via port by RN.      Labs drawn via port by RN. Port accessed with 20G 3/4\" power needle. Flushed with NS and heparin. Pt tolerated well. Vitals taken. Pt checked in for next appointment.    Bhavana Li RN  "

## 2024-12-18 NOTE — PROGRESS NOTES
[facility] - Medical Oncology Follow Up Note  12/18/2024    Oncology History:   Patient developed rectal bleeding in 2020.  In January 2021 CT showed focal wall thickening in the upper rectum, multiple pulmonary nodules bilaterally suspicious for metastatic disease.  Underwent FNA of the right upper lobe lesion which was consistent with adenocarcinoma of the colon.  He was treated with FOLFOX from 2/20/2021 through 5/20/2021.  Avastin was added.  Had an infusion reaction to oxaliplatin 6/2021.  7/2021- 12/2021 was on 5-FU alone.  Developed progression.  12/2021- 9/2022 was on FOLFIRI.  11/2021 started Lonsurf. All of this was done with outside oncologist.     Met with Dr. Snow on 2/3/2023 to establish care. Recommended regorafenib.     Started regorafenib on 3/2/2023. Progression noted 5/19/23. Plan to start FOLFOX/Avastin and send out CARIS testing to evaluate for other treatment options. Cycle 1 was given with oxaliplatin desensitization, 5FU, Avastin held due to increased urine protein.     Following cycle 4, patient was admitted with hematuria and acute kidney injury.    Oxaliplatin was dropped with Cycle 5.     Y-90 radioembolization 9/7.    10/24/23 CT CAP with disease progression with notable growth in all pulmonary lesions. Lonsurf + bevacizumab started 10/27/23 with plans to bridge to clinical trial.   11/15/23 - stopped chemo for [facility] study.   Currently enrolled in [facility] CAR-T trial (completed cell harvest 1/23/2024, tentatively planning administration of CAR-T in April 2/12/24 - resumed Lonsurf, bevacizumab to bridge treatment until CAR-T therapy  3/20/24 - treatment changed to Fruquintinib due to intolerability of Lonsurf/bevacizumab  4/2/24- Presented to the ED with dehydration. Stopped Fruquintnib.   6/2024- cellular therapy transplant at [facility].   7/30/24- CT imaging demonstrating marked progression of pulmonary and hepatic lesions, clinical trial failed to generate T-cell graft. Multiple  "discussions regarding plan of care, hospice vs additional lines of treatment. Patient decided he is not ready for hospice. He resumed treatment with FOLFIRI + bevacizumab on 8/30/24.   10/2024 - CT CAP with continued response, plan to continue FOLFIRI + bevacizumab with the addition of Zometa. Per his dentist, needs dental work completed prior to clearance. (Scheduled 1/2 and 1/21).       Interval History:     Roman presents today for follow up prior to next dose of FOLFIRI + bevacizumab.   -he had increased nausea following last chemo that lasted ~10 days. Took both compazine and Zofran with limited relief. Energy level was very low during that time as well. Once nausea resolved, appetite returned to normal.   -started olanzapine per palliative   -pain is well controlled, no concerns today   -no bowel or bladder concerns  -went to a football game 1-2 weeks ago and walked a lot, feet have been sore since   -would like to take a treatment break in 2 weeks if this cycle is similar to the last one with side effects.       Remainder of 12 point ROS reviewed and negative except as in interval history.         Current Outpatient Medications   Medication Sig Dispense Refill    Chemo Kit For RN use only. Do not remove items from bag. Contents: 1  sodium chloride 0.9% flush, 4 medium gloves, 1 chemo gown, 1/4 chemo mat, 1 connector female, 1 chemo bag. [id number] kit 0    Emergency Supply Kit, Central, Patient use for emergency only. Contents: 3 sodium chloride 0.9% flushes, 1 dressing kit, 1 microclave ext set 14\", 4 nitrile gloves (med), 6 alcohol prep pads, 1 bacitracin, 1 syringe (10 cc 20 G 1\"). Call [phone] to reorder. [id number] kit 0    fluorouracil (ADRUCIL) 2.5 GM/50ML SOLN injection       Fluorouracil (ADRUCIL) 4,610 mg in sodium chloride 0.9 % 241 mL via HOMEPUMP C-Series Infuse 4,610 mg at 5.2 mL/hr over 46 hours into the vein once for 1 dose. 569540 mL 0    gabapentin (NEURONTIN) 100 MG capsule Take 100-200mg " (1-2 capsules) up to twice during the day and 300mg (3 capsules) at bedtime. 210 capsule 0    OLANZapine (ZYPREXA) 2.5 MG tablet Take 1 tablet (2.5 mg) by mouth 2 times daily. 60 tablet 1    oxyCODONE IR (ROXICODONE) 10 MG tablet Take 1-2 tablets (10-20 mg) by mouth every 4 hours as needed for moderate to severe pain. 150 tablet 0    pegfilgrastim (NEULASTA) 6 MG/0.6ML injection Inject 0.6 mLs (6 mg) subcutaneously every 14 days. Administer 24 hours after chemotherapy disconnect 3.6 mL 0    Port Access Kit For nurse use only.  Do not remove items from bag.  Use for port access.  Do not place syringe on sterile field. [id number] kit 0    sodium chloride, PF, 0.9% PF flush Inject 10 mLs into the vein as needed for line flush. Flush IV before and after med administration as directed and/or at least every 24 hours, or prior to deaccessing for no further use and/or at least every 4 weeks when not accessed. [id number] mL 0    tamsulosin (FLOMAX) 0.4 MG capsule       cyclobenzaprine (FLEXERIL) 5 MG tablet Take one tab (5mg) by mouth over 6-8 hours, as needed for spasms (Patient not taking: Reported on 12/18/2024) 45 tablet 2    ibuprofen (ADVIL/MOTRIN) 200 MG tablet Take 3 tablets (600 mg) by mouth every 8 hours as needed for moderate pain (Patient not taking: Reported on 10/9/2024) 100 tablet 0    loperamide (IMODIUM) 2 MG capsule 2 caps at 1st sign of diarrhea & 1 cap every 2hrs until 12hrs diarrhea free. During night, 2 caps at bedtime & 2 caps every 4hrs until AM (Patient not taking: Reported on 10/9/2024) 30 capsule 0    LORazepam (ATIVAN) 0.5 MG tablet Take 1 tablet (0.5 mg) by mouth every 6 hours as needed for anxiety (Patient not taking: Reported on 12/18/2024) 10 tablet 0    morphine (MS CONTIN) 15 MG CR tablet Take 1 tablet (15 mg) by mouth every 12 hours (Patient not taking: Reported on 12/18/2024) 60 tablet 0    NARCAN 4 MG/0.1ML nasal spray CALL 911. SPR CONTENTS OF ONE SPRAYER (0.1ML) INTO ONE NOSTRIL. REPEAT IN 2-3  MIN IF SYMPTOMS OF OPIOID EMERGENCY PERSIST, ALTERNATE NOSTRILS (Patient not taking: Reported on 10/23/2024)      ondansetron (ZOFRAN ODT) 4 MG ODT tab Take 1 tablet (4 mg) by mouth every 6 hours as needed for nausea. (Patient not taking: Reported on 12/18/2024) 30 tablet 3    Ostomy Supplies MISC 1 each daily 1 each 11    prochlorperazine (COMPAZINE) 10 MG tablet Take 0.5 tablets (5 mg) by mouth every 6 hours as needed for nausea or vomiting. (Patient not taking: Reported on 12/18/2024) 30 tablet 2    prochlorperazine (COMPAZINE) 10 MG tablet Take 1 tablet (10 mg) by mouth every 6 hours as needed for nausea or vomiting (Patient not taking: Reported on 12/4/2024) 30 tablet 2    prochlorperazine (COMPAZINE) 5 MG tablet Take 1 tablet (5 mg) by mouth every 6 hours as needed for nausea or vomiting (Patient not taking: Reported on 11/1/2024) 30 tablet 3    sulfamethoxazole-trimethoprim (BACTRIM DS) 800-160 MG tablet  (Patient not taking: Reported on 11/1/2024)      valACYclovir (VALTREX) 500 MG tablet  (Patient not taking: Reported on 10/4/2024)       Physical Exam:  General: The patient is a pleasant male in no acute distress.  /76   Pulse 95   Temp 98.1  F (36.7  C) (Oral)   Resp 18   Wt 80.2 kg (176 lb 11.2 oz)   SpO2 95%   BMI 24.64 kg/m    Wt Readings from Last 10 Encounters:   12/18/24 80.2 kg (176 lb 11.2 oz)   12/17/24 77.1 kg (170 lb)   12/06/24 77.1 kg (170 lb)   12/04/24 79.2 kg (174 lb 8 oz)   11/20/24 78.5 kg (173 lb)   11/08/24 74.8 kg (165 lb)   11/06/24 78.5 kg (173 lb)   11/01/24 76 kg (167 lb 9.6 oz)   10/23/24 77.1 kg (170 lb)   10/09/24 76.2 kg (168 lb 1.6 oz)   HEENT: EOMI. Sclerae are anicteric. Oral mucosa pink and moist without lesions or thrush.   Lymph: Neck is supple with no lymphadenopathy in the cervical or supraclavicular areas.   Heart: Mildly tachycardic with regular rhythm.   Lungs: Clear to auscultation throughout, normal work of breathing.   Abdomen: Bowel sounds present,  soft, nontender with no palpable hepatosplenomegaly or masses. Ostomy is in place in left abdomen.   Extremities: No lower extremity edema noted bilaterally.   Neuro: Cranial nerves II through XII are grossly intact.  Skin: No rashes, petechiae, or bruising noted on exposed skin.      LABS  Most Recent 3 CBC's:  Recent Labs   Lab Test 12/18/24  0927 12/04/24  0749 11/20/24  0932   WBC 4.8 6.2 10.0   HGB 13.8 13.0* 13.4   MCV 91 90 86   * 138* 156    Most Recent 3 BMP's:  Recent Labs   Lab Test 12/18/24  0927 12/04/24  0749 11/20/24  0932    140 139   POTASSIUM 3.5 3.9 4.0   CHLORIDE 106 106 105   CO2 20* 23 23   BUN 13.3 16.4 16.7   CR 0.85 0.86 0.87   ANIONGAP 13 11 11   JEFERSON 8.8 8.9 9.3   * 130* 90    Most Recent 2 LFT's:  Recent Labs   Lab Test 12/18/24  0927 12/04/24  0749   AST 21 18   ALT 6 8   ALKPHOS 118 125   BILITOTAL 0.3 0.2   I reviewed the above labs today.        ASSESSMENT AND PLAN   Metastatic rectal cancer  Most recently progressed after CAR-T clinical trial. He started on treatment with FOLFIRI + bevacizumab on 8/30/24. Tolerating treatment well with improvement in energy, pain and breathing/shortness of breath. CT imaging 10/24 with continued response. He continues to tolerate FOLFIRI + bevacizumab well and denies any significant side effects from treatment. He will continue with cycle 9 FOLFIRI/chelsea today. Continue with monthly LILLY follow up, patient to call sooner with any concerns. Currently working with clinical trials office regarding future trial enrollment. Plan to continue FOLFIRI + bevacizumab as a treatment bridge until clinical trial opportunities are ready.     Nausea.   Minimal relief with Compazine and Zofran. Recently started olanzapine per palliative. Will add IV Emend today. Discussed repeating it on day 4 via [facility]. He will notify triage if nausea is not controlled at home.     Oxygen, fluid in lungs    Shortness of breath with activity has resolved. Previously,  walking oxygen test demonstrating oxygen saturations in the 80's on room air. Previously ordered home oxygen with activity. Did not get approved through insurance, subsequent walking tests with adequate oxygen saturations. Breathing overall has improved. Will continue to monitor. Echo on 9/5/24 showed a normal EF.     Weight loss  Previous weight loss despite efforts to increase intake. Started THC gummies, appetite and weight have improved. Nutrition referral previously placed.     Rectal, perineum pain  Following with palliative care. No longer needing MS Contin, but remains on oxycodone prn, currently 10 mg 2-3 times per day. Pain fluctuates throughout chemo cycle, improves a few days after chemo and recurs a few days prior to next dose.     Anemia  Patient has microcytic anemia. Received IV iron and has had significant improvement in symptoms. Hemoglobin continues to improve.       The longitudinal plan of care for the diagnosis(es)/condition(s) as documented were addressed during this visit. Due to the added complexity in care, I will continue to support Roman in the subsequent management and with ongoing continuity of care.    50 minutes spent on the date of the encounter doing chart review, review of test results, interpretation of tests, patient visit, and documentation       FLORA Albright CNP

## 2024-12-18 NOTE — PATIENT INSTRUCTIONS
[facility] Triage and after hours / weekends / holidays:  [phone]    Please call the triage or after hours line if you experience a temperature greater than or equal to 100.4, shaking chills, have uncontrolled nausea, vomiting and/or diarrhea, dizziness, shortness of breath, chest pain, bleeding, unexplained bruising, or if you have any other new/concerning symptoms, questions or concerns.      If you are having any concerning symptoms or wish to speak to a provider before your next infusion visit, please call triage to notify them so we can adequately serve you.     If you need a refill on a narcotic prescription or other medication, please call before your infusion appointment.             December 2024 Sunday Monday Tuesday Wednesday Thursday Friday Saturday   1     2     3     4    LAB CENTRAL   7:45 AM   (15 min.)   [facility] LAB DRAW   [facility]    ONC INFUSION 4 HR (240 MIN)   8:30 AM   (240 min.)    ONC INFUSION NURSE   [facility] 5     6    RXHI RN CHEMO DC     (60 min.)   Aung Black RN   [facility] 7       8     9     10     11     12     13     14       15     16     17    RETURN PALLIATIVE   4:05 PM   (40 min.)   Isidra Prater DO   [facility] 18    LAB CENTRAL   9:15 AM   (15 min.)   [facility] LAB DRAW   [facility]    RETURN ACTIVE TREATMENT   9:45 AM   (45 min.)   Rebeca Killian APRN CNP   [facility]    ONC INFUSION 4 HR (240 MIN)  10:30 AM   (240 min.)    ONC INFUSION NURSE   [facility] 19     20    RXHI RN CHEMO DC     (60 min.)   [facility] NURSE   [facility] 21       22     23     24     25     26     27     28       29     30     31 January 2025 Sunday Monday Tuesday Wednesday Thursday Friday Saturday                  1     2    LAB  CENTRAL   8:30 AM   (15 min.)   [facility] LAB DRAW   [facility]    ONC INFUSION 4 HR (240 MIN)   9:00 AM   (240 min.)   [facility] ONC INFUSION NURSE   [facility] 3     4       5     6     7     8     9     10     11       12     13     14     15    LAB CENTRAL   9:15 AM   (15 min.)   [facility] LAB DRAW   [facility]    RETURN ACTIVE TREATMENT   9:45 AM   (45 min.)   Rebeca Killian APRN CNP   [facility]    ONC INFUSION 4 HR (240 MIN)  10:30 AM   (240 min.)   [facility] ONC INFUSION NURSE   [facility] 16     17     18       19     20     21     22     23     24    CT CHEST/ABDOMEN/PELVIS W  11:30 AM   (20 min.)   [facility]   [facility] [place] 25       26     27     28     29    LAB CENTRAL   8:30 AM   (15 min.)    [facility] LAB DRAW   [facility]    ONC INFUSION 4 HR (240 MIN)   9:00 AM   (240 min.)    ONC INFUSION NURSE   [facility] 30     31    RETURN CCSL   2:45 PM   (30 min.)   Polo Snow MD   [facility]                     Recent Results (from the past 24 hours)   Comprehensive metabolic panel    Collection Time: 12/18/24  9:27 AM   Result Value Ref Range    Sodium 139 135 - 145 mmol/L    Potassium 3.5 3.4 - 5.3 mmol/L    Carbon Dioxide (CO2) 20 (L) 22 - 29 mmol/L    Anion Gap 13 7 - 15 mmol/L    Urea Nitrogen 13.3 6.0 - 20.0 mg/dL    Creatinine 0.85 0.67 - 1.17 mg/dL    GFR Estimate >90 >60 mL/min/1.73m2    Calcium 8.8 8.8 - 10.4 mg/dL    Chloride 106 98 - 107 mmol/L    Glucose 147 (H) 70 - 99 mg/dL    Alkaline Phosphatase 118 40 - 150 U/L    AST 21 0 - 45 U/L    ALT 6 0 - 70 U/L    Protein Total 6.6 6.4 - 8.3 g/dL    Albumin 3.8 3.5 - 5.2 g/dL    Bilirubin Total 0.3 <=1.2 mg/dL   Protein qualitative urine    Collection Time: 12/18/24  9:27 AM   Result Value  Ref Range    Protein Albumin Urine 10 (A) Negative mg/dL   CBC with platelets and differential    Collection Time: 12/18/24  9:27 AM   Result Value Ref Range    WBC Count 4.8 4.0 - 11.0 10e3/uL    RBC Count 4.59 4.40 - 5.90 10e6/uL    Hemoglobin 13.8 13.3 - 17.7 g/dL    Hematocrit 41.7 40.0 - 53.0 %    MCV 91 78 - 100 fL    MCH 30.1 26.5 - 33.0 pg    MCHC 33.1 31.5 - 36.5 g/dL    RDW 18.6 (H) 10.0 - 15.0 %    Platelet Count 132 (L) 150 - 450 10e3/uL   Manual Differential    Collection Time: 12/18/24  9:27 AM   Result Value Ref Range    % Neutrophils 64 %    % Lymphocytes 12 %    % Monocytes 19 %    % Eosinophils 5 %    % Basophils 0 %    Absolute Neutrophils 3.1 1.6 - 8.3 10e3/uL    Absolute Lymphocytes 0.6 (L) 0.8 - 5.3 10e3/uL    Absolute Monocytes 0.9 0.0 - 1.3 10e3/uL    Absolute Eosinophils 0.2 0.0 - 0.7 10e3/uL    Absolute Basophils 0.0 0.0 - 0.2 10e3/uL    RBC Morphology Confirmed RBC Indices     Platelet Assessment  Automated Count Confirmed. Platelet morphology is normal.     Automated Count Confirmed. Platelet morphology is normal.    Elliptocytes Slight (A) None Seen

## 2024-12-19 ENCOUNTER — HOME INFUSION BILLING (OUTPATIENT)
Dept: HOME HEALTH SERVICES | Facility: HOME HEALTH | Age: 49
End: 2024-12-19
Payer: COMMERCIAL

## 2024-12-24 ENCOUNTER — TELEPHONE (OUTPATIENT)
Dept: NURSING | Facility: CLINIC | Age: 49
End: 2024-12-24
Payer: COMMERCIAL

## 2024-12-24 NOTE — TELEPHONE ENCOUNTER
Patient calling for an appointment. Advised the clinics closed at noon. Patient states he needs to be seen for bronchitis. Refused triage. Advised patient of the open urgent cares. Patient ended the call.     DENIA NGO RN

## 2024-12-26 ENCOUNTER — ANCILLARY PROCEDURE (OUTPATIENT)
Dept: GENERAL RADIOLOGY | Facility: CLINIC | Age: 49
End: 2024-12-26
Attending: NURSE PRACTITIONER
Payer: COMMERCIAL

## 2024-12-26 ENCOUNTER — OFFICE VISIT (OUTPATIENT)
Dept: FAMILY MEDICINE | Facility: CLINIC | Age: 49
End: 2024-12-26
Payer: COMMERCIAL

## 2024-12-26 VITALS
TEMPERATURE: 97.6 F | DIASTOLIC BLOOD PRESSURE: 72 MMHG | HEIGHT: 71 IN | RESPIRATION RATE: 18 BRPM | SYSTOLIC BLOOD PRESSURE: 103 MMHG | HEART RATE: 120 BPM | OXYGEN SATURATION: 92 % | WEIGHT: 171.4 LBS | BODY MASS INDEX: 24 KG/M2

## 2024-12-26 DIAGNOSIS — R05.1 ACUTE COUGH: ICD-10-CM

## 2024-12-26 DIAGNOSIS — R06.2 WHEEZING: ICD-10-CM

## 2024-12-26 DIAGNOSIS — R00.0 TACHYCARDIA: ICD-10-CM

## 2024-12-26 DIAGNOSIS — J20.9 ACUTE BRONCHITIS WITH COEXISTING CONDITION REQUIRING PROPHYLACTIC TREATMENT: Primary | ICD-10-CM

## 2024-12-26 RX ORDER — AZITHROMYCIN 250 MG/1
TABLET, FILM COATED ORAL
Qty: 6 TABLET | Refills: 0 | Status: SHIPPED | OUTPATIENT
Start: 2024-12-26 | End: 2024-12-31

## 2024-12-26 RX ORDER — ALBUTEROL SULFATE 90 UG/1
2 INHALANT RESPIRATORY (INHALATION) EVERY 6 HOURS PRN
Qty: 18 G | Refills: 0 | Status: SHIPPED | OUTPATIENT
Start: 2024-12-26

## 2024-12-26 RX ORDER — BENZONATATE 100 MG/1
100 CAPSULE ORAL 3 TIMES DAILY PRN
Qty: 90 CAPSULE | Refills: 0 | Status: SHIPPED | OUTPATIENT
Start: 2024-12-26

## 2024-12-26 ASSESSMENT — ENCOUNTER SYMPTOMS: COUGH: 1

## 2024-12-26 ASSESSMENT — PAIN SCALES - GENERAL: PAINLEVEL_OUTOF10: NO PAIN (0)

## 2024-12-26 NOTE — PROGRESS NOTES
Assessment & Plan     Acute bronchitis with coexisting condition requiring prophylactic treatment  Acute cough  Patient reports cough that started a week ago.  No fever, chills or sore throat.  Productive with whitish phlegm.  Former smoker, quit 9 months ago.  Currently undergoing chemo.   - azithromycin (ZITHROMAX) 250 MG tablet; Take 2 tablets (500 mg) by mouth daily for 1 day, THEN 1 tablet (250 mg) daily for 4 days.  - XR Chest 2 Views; Future  - benzonatate (TESSALON) 100 MG capsule; Take 1 capsule (100 mg) by mouth 3 times daily as needed for cough.    Wheezing  - albuterol (PROAIR HFA/PROVENTIL HFA/VENTOLIN HFA) 108 (90 Base) MCG/ACT inhaler; Inhale 2 puffs into the lungs every 6 hours as needed for shortness of breath, wheezing or cough.    Tachycardia  Heart rate initially elevated though improved slightly during visit.  He denies any chest pain, shortness of breath.  He reports to me that his heart rate typically runs in the 100s.        America Owens is a 49 year old, presenting for the following health issues:  Cough      12/26/2024    11:01 AM   Additional Questions   Roomed by Jeannette     Cough    History of Present Illness       Reason for visit:  Bronchitis, same as 4 family members. Caughing , clear mucus,Shortness of breath, low oxygen. Car-t trial at the [facility]. Released July 11, unsucessful  Symptom onset:  3-7 days ago  Symptoms include:  Caugh, clear mucus, shortness of breath, low oxygen, fatigue. Passed 2 recent covid test  Symptom intensity:  Moderate  Symptom progression:  Staying the same  Had these symptoms before:  Yes  Has tried/received treatment for these symptoms:  Yes  Previous treatment was successful:  Yes  Prior treatment description:  Keflex 500 mg, years ago  What makes it worse:  Walking, stairs  What makes it better:  Not really   He is taking medications regularly.                 Review of Systems  Constitutional, HEENT, cardiovascular, pulmonary, gi  and gu systems are negative, except as otherwise noted.      Objective    There were no vitals taken for this visit.  There is no height or weight on file to calculate BMI.  Physical Exam   GENERAL: alert and no distress  HENT: ear canals and TM's normal, nose and mouth without ulcers or lesions  RESP:expiratory wheezes throughout  CV: regular rate and rhythm, normal S1 S2, no S3 or S4, no murmur, click or rub, no peripheral edema  MS: no gross musculoskeletal defects noted, no edema  PSYCH: mentation appears normal, affect normal/bright            Signed Electronically by: FLORA Hansen CNP

## 2024-12-31 ENCOUNTER — TELEPHONE (OUTPATIENT)
Dept: FAMILY MEDICINE | Facility: CLINIC | Age: 49
End: 2024-12-31
Payer: COMMERCIAL

## 2024-12-31 NOTE — TELEPHONE ENCOUNTER
Patient calling back. He is reporting that he finished his treatment course prescribed by Brynn yesterday and symptoms continue to be the same.     He states his chief complaint is cough and congestion. He states the albuterol and cough med is effective, but he is just not feeling better. Denies any fever, chest pain, or severe SOB/difficulty breathing. Declines being triage as he was just seen on 12/26. Pt thinks he may need more antibiotics.  Brynn is not in office today. RN advise pt he be evaluated in UC or ER. Pt declines, he does not want to wait.   Scheduled for appointment on 1/2. Reviewed reasons for call back and UC/ER precautions. Pt verbalized understanding and agrees with plan.         April Montalvo RN    [facility]

## 2025-01-02 ENCOUNTER — OFFICE VISIT (OUTPATIENT)
Dept: FAMILY MEDICINE | Facility: CLINIC | Age: 50
End: 2025-01-02
Payer: COMMERCIAL

## 2025-01-02 ENCOUNTER — HOME INFUSION BILLING (OUTPATIENT)
Dept: HOME HEALTH SERVICES | Facility: HOME HEALTH | Age: 50
End: 2025-01-02
Payer: COMMERCIAL

## 2025-01-02 VITALS
DIASTOLIC BLOOD PRESSURE: 74 MMHG | SYSTOLIC BLOOD PRESSURE: 113 MMHG | HEIGHT: 71 IN | BODY MASS INDEX: 24.69 KG/M2 | OXYGEN SATURATION: 91 % | HEART RATE: 119 BPM | RESPIRATION RATE: 22 BRPM | TEMPERATURE: 97.4 F | WEIGHT: 176.4 LBS

## 2025-01-02 DIAGNOSIS — J44.1 COPD EXACERBATION (H): ICD-10-CM

## 2025-01-02 DIAGNOSIS — J43.2 CENTRILOBULAR EMPHYSEMA (H): Primary | ICD-10-CM

## 2025-01-02 RX ORDER — PREDNISONE 20 MG/1
40 TABLET ORAL DAILY
Qty: 10 TABLET | Refills: 0 | Status: SHIPPED | OUTPATIENT
Start: 2025-01-02

## 2025-01-02 RX ORDER — IPRATROPIUM BROMIDE AND ALBUTEROL SULFATE 2.5; .5 MG/3ML; MG/3ML
3 SOLUTION RESPIRATORY (INHALATION) ONCE
Status: ACTIVE | OUTPATIENT
Start: 2025-01-02

## 2025-01-02 RX ORDER — FLUTICASONE PROPIONATE AND SALMETEROL 100; 50 UG/1; UG/1
1 POWDER RESPIRATORY (INHALATION) 2 TIMES DAILY
Qty: 60 EACH | Refills: 3 | Status: SHIPPED | OUTPATIENT
Start: 2025-01-02

## 2025-01-02 ASSESSMENT — PAIN SCALES - GENERAL: PAINLEVEL_OUTOF10: NO PAIN (0)

## 2025-01-02 ASSESSMENT — ENCOUNTER SYMPTOMS: COUGH: 1

## 2025-01-02 NOTE — PATIENT INSTRUCTIONS
At [facility], we strive to deliver an exceptional experience to you, every time we see you. If you receive a survey, please let us know what we are doing well and/or what we could improve upon, as we do value your feedback.  If you have MyChart, you can expect to receive results automatically within 24 hours of their completion.  Your provider will send a note interpreting your results as well.   If you do not have MyChart, you should receive your results in about a week by mail.    Your care team:                            Family Medicine Internal Medicine   MD Beto Plascencia, MD Arielle Subramanian, MD Klaus Alvarado, MD Bree Vogel, PAMaryluC    Robert De Luna, MD Pediatrics   Daniella Munoz, MD Patsy Renner, MD April Johnson, APRN CNP Radha Hidalgo APRN CNP   MD Manisha Perry, MD Brittani Scott, CNP     Cory Osei, CNP Same-Day Provider (No follow-up visits)   FLORA Hansen, DNP Pilar Edward, FLORA Lea, FNP, BC NGUYỄN PerlaC     Clinic hours: Monday - Thursday 7 am-6 pm; Fridays 7 am-5 pm.   Urgent care: Monday - Friday 10 am- 8 pm; Saturday and Sunday 9 am-5 pm.    Clinic: [phone]       [facility]: Monday - Thursday 8 am - 7 pm; Friday 8 am - 6 pm  [facility]: [phone]

## 2025-01-02 NOTE — PROGRESS NOTES
Assessment & Plan     Centrilobular emphysema (H)  SOB only with strenuous exercise. Productive cough most days. Spirometry done <1 yr ago. Not smoking.  - REVIEW OF HEALTH MAINTENANCE PROTOCOL ORDERS  - fluticasone-salmeterol (ADVAIR) 100-50 MCG/ACT inhaler; Inhale 1 puff into the lungs 2 times daily.    COPD exacerbation (H)  Pt with COPD presents with SOB, productive cough and wheezing x 2 weeks.  He was initially evaluated in the clinic on 12/26/2024 and treated with azithromycin but was hesitant to take systemic steroids due to his drug trial.  He was able to get a hold of a provider with his trial who okayed for him to take steroids.  No fevers or chills.  - predniSONE (DELTASONE) 20 MG tablet; Take 2 tablets (40 mg) by mouth daily.  - INHALATION/NEBULIZER TREATMENT, INITIAL  - ipratropium - albuterol 0.5 mg/2.5 mg/3 mL (DUONEB) neb solution 3 mL        America Owens is a 49 year old, presenting for the following health issues:  Cough      1/2/2025    11:11 AM   Additional Questions   Roomed by Andie   Accompanied by Chelsy- gely ffriend         1/2/2025    11:11 AM   Patient Reported Additional Medications   Patient reports taking the following new medications yes- antibiotic     Cough    History of Present Illness       Reason for visit:  Bronchitis, same as 4 family members. Caughing , clear mucus,Shortness of breath, low oxygen. Car-t trial at the [facility]. Released July 11, unsucessful  Symptom onset:  3-7 days ago  Symptoms include:  Caugh, clear mucus, shortness of breath, low oxygen, fatigue. Passed 2 recent covid test  Symptom intensity:  Moderate  Symptom progression:  Staying the same  Had these symptoms before:  Yes  Has tried/received treatment for these symptoms:  Yes  Previous treatment was successful:  Yes  Prior treatment description:  Keflex 500 mg, years ago  What makes it worse:  Walking, stairs  What makes it better:  Not really   He is taking medications regularly.    "      Acute Illness  Acute illness concerns: Cough & Congestion  Onset/Duration: 12/26/24  Symptoms:  Fever: No  Chills/Sweats: YES  Headache (location?): No  Sinus Pressure: YES  Conjunctivitis:  YES  Ear Pain: no  Rhinorrhea: YES-   Congestion: YES  Sore Throat: No  Cough: YES-productive of yellow sputum  Wheeze: YES-   Decreased Appetite: YES  Nausea: No  Vomiting: No  Diarrhea: No  Dysuria/Freq.: No  Dysuria or Hematuria: No  Fatigue/Achiness: YES  Sick/Strep Exposure: No  Therapies tried and outcome: None            Objective    /74 (BP Location: Left arm, Patient Position: Sitting, Cuff Size: Adult Large)   Pulse 119   Temp 97.4  F (36.3  C) (Temporal)   Resp 22   Ht 1.803 m (5' 11\")   Wt 80 kg (176 lb 6.4 oz)   SpO2 91%   BMI 24.60 kg/m    Body mass index is 24.6 kg/m .  Physical Exam   GENERAL: alert and no distress  RESP: expiratory wheezes throughout and inspiratory wheezes throughout  CV: regular rate and rhythm, normal S1 S2, no S3 or S4, no murmur, click or rub, no peripheral edema  MS: no gross musculoskeletal defects noted, no edema  PSYCH: mentation appears normal, affect normal/bright            Signed Electronically by: FLORA Hansen CNP    "

## 2025-01-08 NOTE — NURSING NOTE
"Oncology Rooming Note    November 29, 2021 12:58 PM   Roman Escalante is a 46 year old male who presents for:    Chief Complaint   Patient presents with     Oncology Clinic Visit     Pt is here for a New Eval for Rectal Cancer     Initial Vitals: Blood Pressure 115/74   Pulse 89   Temperature 98.2  F (36.8  C) (Oral)   Respiration 18   Height 1.81 m (5' 11.26\")   Weight 96.6 kg (212 lb 14.4 oz)   Oxygen Saturation 97%   Body Mass Index 29.48 kg/m   Estimated body mass index is 29.48 kg/m  as calculated from the following:    Height as of this encounter: 1.81 m (5' 11.26\").    Weight as of this encounter: 96.6 kg (212 lb 14.4 oz). Body surface area is 2.2 meters squared.  No Pain (0) Comment: Data Unavailable   No LMP for male patient.  Allergies reviewed: Yes  Medications reviewed: Yes    Medications: Medication refills not needed today.  Pharmacy name entered into EPIC: Data Unavailable    Clinical concerns: none       Aubree Melendez MA            "
50 yo M with PMhx MI 2020, THIAGO to Left anterior descending artery ( 2020) and THIAGO to RCA (3/2023) presents for acetylcholine study to R/O coronary spasm . She has been experiencing CP that radiates to her back . Her most recent cath was last week.  She does take NTG when experiencing CP but has been off it for 4 days. Patient has been off amlodipine.  No fever or chills.     12/19/24 Cardiac cath -   1.    Arterial Access - Right Femoral    2.    Diagnostic Coronary Angiography   3.    Left Heart Cath   4.    Ultrasound Guided Access

## 2025-01-09 ENCOUNTER — MYC REFILL (OUTPATIENT)
Dept: ONCOLOGY | Facility: CLINIC | Age: 50
End: 2025-01-09
Payer: COMMERCIAL

## 2025-01-09 DIAGNOSIS — C18.9 COLON CANCER METASTASIZED TO LIVER (H): ICD-10-CM

## 2025-01-09 DIAGNOSIS — C78.7 COLON CANCER METASTASIZED TO LIVER (H): ICD-10-CM

## 2025-01-09 DIAGNOSIS — G89.3 CANCER RELATED PAIN: ICD-10-CM

## 2025-01-09 RX ORDER — OXYCODONE HYDROCHLORIDE 10 MG/1
10-20 TABLET ORAL EVERY 4 HOURS PRN
Qty: 150 TABLET | Refills: 0 | Status: SHIPPED | OUTPATIENT
Start: 2025-01-09

## 2025-01-09 NOTE — TELEPHONE ENCOUNTER
Received [employer] message from patient requesting refill of oxycodone.     Last refill: 11/7/24  Last office visit: 12/17/24  Scheduled for follow up 2/18/25    Will route request to MD/ for review.     Reviewed [place]  Report.

## 2025-01-15 ENCOUNTER — ONCOLOGY VISIT (OUTPATIENT)
Dept: ONCOLOGY | Facility: CLINIC | Age: 50
End: 2025-01-15
Payer: COMMERCIAL

## 2025-01-15 ENCOUNTER — ANCILLARY PROCEDURE (OUTPATIENT)
Dept: GENERAL RADIOLOGY | Facility: CLINIC | Age: 50
End: 2025-01-15
Payer: COMMERCIAL

## 2025-01-15 ENCOUNTER — INFUSION THERAPY VISIT (OUTPATIENT)
Dept: ONCOLOGY | Facility: CLINIC | Age: 50
End: 2025-01-15
Payer: COMMERCIAL

## 2025-01-15 ENCOUNTER — HOME INFUSION BILLING (OUTPATIENT)
Dept: HOME HEALTH SERVICES | Facility: HOME HEALTH | Age: 50
End: 2025-01-15
Payer: COMMERCIAL

## 2025-01-15 VITALS
OXYGEN SATURATION: 92 % | TEMPERATURE: 97.7 F | DIASTOLIC BLOOD PRESSURE: 69 MMHG | SYSTOLIC BLOOD PRESSURE: 101 MMHG | WEIGHT: 175.2 LBS | HEART RATE: 114 BPM | BODY MASS INDEX: 24.44 KG/M2 | RESPIRATION RATE: 18 BRPM

## 2025-01-15 DIAGNOSIS — C78.00 RECTAL ADENOCARCINOMA METASTATIC TO LUNG (H): ICD-10-CM

## 2025-01-15 DIAGNOSIS — C20 RECTAL ADENOCARCINOMA METASTATIC TO LUNG (H): Primary | ICD-10-CM

## 2025-01-15 DIAGNOSIS — C78.00 RECTAL ADENOCARCINOMA METASTATIC TO LUNG (H): Primary | ICD-10-CM

## 2025-01-15 DIAGNOSIS — R05.1 ACUTE COUGH: ICD-10-CM

## 2025-01-15 DIAGNOSIS — R05.1 ACUTE COUGH: Primary | ICD-10-CM

## 2025-01-15 DIAGNOSIS — C20 RECTAL ADENOCARCINOMA METASTATIC TO LUNG (H): ICD-10-CM

## 2025-01-15 DIAGNOSIS — C18.9 COLON CANCER METASTASIZED TO BONE (H): ICD-10-CM

## 2025-01-15 DIAGNOSIS — C79.51 COLON CANCER METASTASIZED TO BONE (H): ICD-10-CM

## 2025-01-15 LAB
ALBUMIN SERPL BCG-MCNC: 3.6 G/DL (ref 3.5–5.2)
ALP SERPL-CCNC: 130 U/L (ref 40–150)
ALT SERPL W P-5'-P-CCNC: 25 U/L (ref 0–70)
ANION GAP SERPL CALCULATED.3IONS-SCNC: 13 MMOL/L (ref 7–15)
AST SERPL W P-5'-P-CCNC: 27 U/L (ref 0–45)
BASOPHILS # BLD MANUAL: 0.1 10E3/UL (ref 0–0.2)
BASOPHILS NFR BLD MANUAL: 1 %
BILIRUB SERPL-MCNC: 0.7 MG/DL
BUN SERPL-MCNC: 16.5 MG/DL (ref 6–20)
C PNEUM DNA SPEC QL NAA+PROBE: NOT DETECTED
CALCIUM SERPL-MCNC: 8.9 MG/DL (ref 8.8–10.4)
CHLORIDE SERPL-SCNC: 102 MMOL/L (ref 98–107)
CREAT SERPL-MCNC: 0.8 MG/DL (ref 0.67–1.17)
EGFRCR SERPLBLD CKD-EPI 2021: >90 ML/MIN/1.73M2
EOSINOPHIL # BLD MANUAL: 0.2 10E3/UL (ref 0–0.7)
EOSINOPHIL NFR BLD MANUAL: 2 %
ERYTHROCYTE [DISTWIDTH] IN BLOOD BY AUTOMATED COUNT: 14.7 % (ref 10–15)
FLUAV H1 2009 PAND RNA SPEC QL NAA+PROBE: NOT DETECTED
FLUAV H1 RNA SPEC QL NAA+PROBE: NOT DETECTED
FLUAV H3 RNA SPEC QL NAA+PROBE: NOT DETECTED
FLUAV RNA SPEC QL NAA+PROBE: NOT DETECTED
FLUBV RNA SPEC QL NAA+PROBE: NOT DETECTED
GLUCOSE SERPL-MCNC: 111 MG/DL (ref 70–99)
HADV DNA SPEC QL NAA+PROBE: NOT DETECTED
HCO3 SERPL-SCNC: 20 MMOL/L (ref 22–29)
HCOV PNL SPEC NAA+PROBE: NOT DETECTED
HCT VFR BLD AUTO: 38.5 % (ref 40–53)
HGB BLD-MCNC: 12.5 G/DL (ref 13.3–17.7)
HMPV RNA SPEC QL NAA+PROBE: NOT DETECTED
HPIV1 RNA SPEC QL NAA+PROBE: NOT DETECTED
HPIV2 RNA SPEC QL NAA+PROBE: NOT DETECTED
HPIV3 RNA SPEC QL NAA+PROBE: NOT DETECTED
HPIV4 RNA SPEC QL NAA+PROBE: NOT DETECTED
LYMPHOCYTES # BLD MANUAL: 0.2 10E3/UL (ref 0.8–5.3)
LYMPHOCYTES NFR BLD MANUAL: 2 %
M PNEUMO DNA SPEC QL NAA+PROBE: NOT DETECTED
MCH RBC QN AUTO: 29.8 PG (ref 26.5–33)
MCHC RBC AUTO-ENTMCNC: 32.5 G/DL (ref 31.5–36.5)
MCV RBC AUTO: 92 FL (ref 78–100)
MONOCYTES # BLD MANUAL: 1.1 10E3/UL (ref 0–1.3)
MONOCYTES NFR BLD MANUAL: 9 %
NEUTROPHILS # BLD MANUAL: 11.1 10E3/UL (ref 1.6–8.3)
NEUTROPHILS NFR BLD MANUAL: 86 %
PLAT MORPH BLD: ABNORMAL
PLATELET # BLD AUTO: 203 10E3/UL (ref 150–450)
POTASSIUM SERPL-SCNC: 4.1 MMOL/L (ref 3.4–5.3)
PROT SERPL-MCNC: 7.3 G/DL (ref 6.4–8.3)
RBC # BLD AUTO: 4.19 10E6/UL (ref 4.4–5.9)
RBC MORPH BLD: ABNORMAL
RSV RNA SPEC QL NAA+PROBE: DETECTED
RSV RNA SPEC QL NAA+PROBE: NOT DETECTED
RV+EV RNA SPEC QL NAA+PROBE: NOT DETECTED
SARS-COV-2 RNA RESP QL NAA+PROBE: NEGATIVE
SODIUM SERPL-SCNC: 135 MMOL/L (ref 135–145)
WBC # BLD AUTO: 12.8 10E3/UL (ref 4–11)

## 2025-01-15 PROCEDURE — 85007 BL SMEAR W/DIFF WBC COUNT: CPT | Performed by: STUDENT IN AN ORGANIZED HEALTH CARE EDUCATION/TRAINING PROGRAM

## 2025-01-15 PROCEDURE — 36415 COLL VENOUS BLD VENIPUNCTURE: CPT

## 2025-01-15 PROCEDURE — 96360 HYDRATION IV INFUSION INIT: CPT

## 2025-01-15 PROCEDURE — 87486 CHLMYD PNEUM DNA AMP PROBE: CPT

## 2025-01-15 PROCEDURE — 80053 COMPREHEN METABOLIC PANEL: CPT

## 2025-01-15 PROCEDURE — 250N000011 HC RX IP 250 OP 636

## 2025-01-15 PROCEDURE — 258N000003 HC RX IP 258 OP 636

## 2025-01-15 PROCEDURE — G0463 HOSPITAL OUTPT CLINIC VISIT: HCPCS | Mod: 25

## 2025-01-15 PROCEDURE — 87635 SARS-COV-2 COVID-19 AMP PRB: CPT

## 2025-01-15 PROCEDURE — G2211 COMPLEX E/M VISIT ADD ON: HCPCS

## 2025-01-15 PROCEDURE — 71046 X-RAY EXAM CHEST 2 VIEWS: CPT | Performed by: STUDENT IN AN ORGANIZED HEALTH CARE EDUCATION/TRAINING PROGRAM

## 2025-01-15 PROCEDURE — 99215 OFFICE O/P EST HI 40 MIN: CPT

## 2025-01-15 PROCEDURE — 87633 RESP VIRUS 12-25 TARGETS: CPT

## 2025-01-15 PROCEDURE — 87581 M.PNEUMON DNA AMP PROBE: CPT

## 2025-01-15 PROCEDURE — 85018 HEMOGLOBIN: CPT | Performed by: STUDENT IN AN ORGANIZED HEALTH CARE EDUCATION/TRAINING PROGRAM

## 2025-01-15 RX ORDER — ALBUTEROL SULFATE 0.83 MG/ML
2.5 SOLUTION RESPIRATORY (INHALATION)
Status: CANCELLED | OUTPATIENT
Start: 2025-01-15

## 2025-01-15 RX ORDER — HEPARIN SODIUM (PORCINE) LOCK FLUSH IV SOLN 100 UNIT/ML 100 UNIT/ML
5 SOLUTION INTRAVENOUS
Status: DISCONTINUED | OUTPATIENT
Start: 2025-01-15 | End: 2025-01-15 | Stop reason: HOSPADM

## 2025-01-15 RX ORDER — HEPARIN SODIUM (PORCINE) LOCK FLUSH IV SOLN 100 UNIT/ML 100 UNIT/ML
5 SOLUTION INTRAVENOUS ONCE
Status: COMPLETED | OUTPATIENT
Start: 2025-01-15 | End: 2025-01-15

## 2025-01-15 RX ORDER — DIPHENHYDRAMINE HYDROCHLORIDE 50 MG/ML
50 INJECTION INTRAMUSCULAR; INTRAVENOUS
Status: CANCELLED
Start: 2025-01-15

## 2025-01-15 RX ORDER — DIPHENHYDRAMINE HYDROCHLORIDE 50 MG/ML
25 INJECTION INTRAMUSCULAR; INTRAVENOUS
Start: 2025-01-15

## 2025-01-15 RX ORDER — HEPARIN SODIUM (PORCINE) LOCK FLUSH IV SOLN 100 UNIT/ML 100 UNIT/ML
5 SOLUTION INTRAVENOUS
Status: CANCELLED | OUTPATIENT
Start: 2025-01-15

## 2025-01-15 RX ORDER — HEPARIN SODIUM,PORCINE 10 UNIT/ML
5-20 VIAL (ML) INTRAVENOUS DAILY PRN
OUTPATIENT
Start: 2025-01-15

## 2025-01-15 RX ORDER — ONDANSETRON 2 MG/ML
8 INJECTION INTRAMUSCULAR; INTRAVENOUS ONCE
Start: 2025-01-22 | End: 2025-01-22

## 2025-01-15 RX ORDER — HEPARIN SODIUM,PORCINE 10 UNIT/ML
5-20 VIAL (ML) INTRAVENOUS DAILY PRN
Status: CANCELLED | OUTPATIENT
Start: 2025-01-15

## 2025-01-15 RX ORDER — GUAIFENESIN 600 MG/1
1200 TABLET, EXTENDED RELEASE ORAL 2 TIMES DAILY
Qty: 20 TABLET | Refills: 0 | Status: SHIPPED | OUTPATIENT
Start: 2025-01-15

## 2025-01-15 RX ORDER — ALBUTEROL SULFATE 90 UG/1
1-2 INHALANT RESPIRATORY (INHALATION)
Start: 2025-01-15

## 2025-01-15 RX ORDER — METHYLPREDNISOLONE SODIUM SUCCINATE 40 MG/ML
40 INJECTION INTRAMUSCULAR; INTRAVENOUS
Start: 2025-01-15

## 2025-01-15 RX ORDER — DIPHENHYDRAMINE HYDROCHLORIDE 50 MG/ML
50 INJECTION INTRAMUSCULAR; INTRAVENOUS
Start: 2025-01-15

## 2025-01-15 RX ORDER — EPINEPHRINE 1 MG/ML
0.3 INJECTION, SOLUTION INTRAMUSCULAR; SUBCUTANEOUS EVERY 5 MIN PRN
Status: CANCELLED | OUTPATIENT
Start: 2025-01-15

## 2025-01-15 RX ORDER — LEVOFLOXACIN 750 MG/1
750 TABLET, FILM COATED ORAL DAILY
Qty: 7 TABLET | Refills: 0 | Status: SHIPPED | OUTPATIENT
Start: 2025-01-15

## 2025-01-15 RX ORDER — HEPARIN SODIUM (PORCINE) LOCK FLUSH IV SOLN 100 UNIT/ML 100 UNIT/ML
5 SOLUTION INTRAVENOUS
OUTPATIENT
Start: 2025-01-15

## 2025-01-15 RX ORDER — DIPHENHYDRAMINE HYDROCHLORIDE 50 MG/ML
25 INJECTION INTRAMUSCULAR; INTRAVENOUS
Status: CANCELLED
Start: 2025-01-15

## 2025-01-15 RX ORDER — MEPERIDINE HYDROCHLORIDE 25 MG/ML
25 INJECTION INTRAMUSCULAR; INTRAVENOUS; SUBCUTANEOUS
OUTPATIENT
Start: 2025-01-15

## 2025-01-15 RX ORDER — ALBUTEROL SULFATE 0.83 MG/ML
2.5 SOLUTION RESPIRATORY (INHALATION)
OUTPATIENT
Start: 2025-01-15

## 2025-01-15 RX ORDER — METHYLPREDNISOLONE SODIUM SUCCINATE 40 MG/ML
40 INJECTION INTRAMUSCULAR; INTRAVENOUS
Status: CANCELLED
Start: 2025-01-15

## 2025-01-15 RX ORDER — MEPERIDINE HYDROCHLORIDE 25 MG/ML
25 INJECTION INTRAMUSCULAR; INTRAVENOUS; SUBCUTANEOUS
Status: CANCELLED | OUTPATIENT
Start: 2025-01-15

## 2025-01-15 RX ORDER — EPINEPHRINE 1 MG/ML
0.3 INJECTION, SOLUTION, CONCENTRATE INTRAVENOUS EVERY 5 MIN PRN
OUTPATIENT
Start: 2025-01-15

## 2025-01-15 RX ORDER — ALBUTEROL SULFATE 90 UG/1
1-2 INHALANT RESPIRATORY (INHALATION)
Status: CANCELLED
Start: 2025-01-15

## 2025-01-15 RX ADMIN — Medication 5 ML: at 09:16

## 2025-01-15 RX ADMIN — Medication 5 ML: at 12:22

## 2025-01-15 RX ADMIN — SODIUM CHLORIDE 1000 ML: 9 INJECTION, SOLUTION INTRAVENOUS at 11:08

## 2025-01-15 ASSESSMENT — PAIN SCALES - GENERAL: PAINLEVEL_OUTOF10: MODERATE PAIN (4)

## 2025-01-15 NOTE — PROGRESS NOTES
[facility] - Medical Oncology Follow Up Note  01/15/2025    Oncology History:   Patient developed rectal bleeding in 2020.  In January 2021 CT showed focal wall thickening in the upper rectum, multiple pulmonary nodules bilaterally suspicious for metastatic disease.  Underwent FNA of the right upper lobe lesion which was consistent with adenocarcinoma of the colon.  He was treated with FOLFOX from 2/20/2021 through 5/20/2021.  Avastin was added.  Had an infusion reaction to oxaliplatin 6/2021.  7/2021- 12/2021 was on 5-FU alone.  Developed progression.  12/2021- 9/2022 was on FOLFIRI.  11/2021 started Lonsurf. All of this was done with outside oncologist.     Met with Dr. Snow on 2/3/2023 to establish care. Recommended regorafenib.     Started regorafenib on 3/2/2023. Progression noted 5/19/23. Plan to start FOLFOX/Avastin and send out CARIS testing to evaluate for other treatment options. Cycle 1 was given with oxaliplatin desensitization, 5FU, Avastin held due to increased urine protein.     Following cycle 4, patient was admitted with hematuria and acute kidney injury.    Oxaliplatin was dropped with Cycle 5.     Y-90 radioembolization 9/7.    10/24/23 CT CAP with disease progression with notable growth in all pulmonary lesions. Lonsurf + bevacizumab started 10/27/23 with plans to bridge to clinical trial.   11/15/23 - stopped chemo for [facility] study.   Currently enrolled in [facility] CAR-T trial (completed cell harvest 1/23/2024, tentatively planning administration of CAR-T in April 2/12/24 - resumed Lonsurf, bevacizumab to bridge treatment until CAR-T therapy  3/20/24 - treatment changed to Fruquintinib due to intolerability of Lonsurf/bevacizumab  4/2/24- Presented to the ED with dehydration. Stopped Fruquintnib.   6/2024- cellular therapy transplant at [facility].   7/30/24- CT imaging demonstrating marked progression of pulmonary and hepatic lesions, clinical trial failed to generate T-cell graft. Multiple  discussions regarding plan of care, hospice vs additional lines of treatment. Patient decided he is not ready for hospice. He resumed treatment with FOLFIRI + bevacizumab on 8/30/24.   10/2024 - CT CAP with continued response, plan to continue FOLFIRI + bevacizumab with the addition of Zometa. Per his dentist, needs dental work completed prior to clearance. (Scheduled 1/2 and 1/21).       Interval History:     Took last cycle off for a break around New Years. Developed a respiratory illness the end of December and continues to have symptoms that are not improving. Denies any fever but has night sweats and chills every night. Continues to have a productive cough with sinus and chest congestion. Becomes short of breath easily with activity, no shortness of breath or chest pain at rest. He finished a course of antibiotics and steroids prescribed by his PCP approximately 5-7 days ago, hasn't noticed any improvement.   -Appetite is decreased. Eating 1, sometimes 2 meals per day. Denies nausea or abdominal pain.   -Bowel movements have been slow, 4-7 days between.   -Drinking 50-60 oz of fluids daily.   -pain is well controlled on current regimen. Around Nick he needed more PRN oxycodone but pain has improved and is hardly taking any lately.         Remainder of 12 point ROS reviewed and negative except as in interval history.         Current Outpatient Medications   Medication Sig Dispense Refill    albuterol (PROAIR HFA/PROVENTIL HFA/VENTOLIN HFA) 108 (90 Base) MCG/ACT inhaler Inhale 2 puffs into the lungs every 6 hours as needed for shortness of breath, wheezing or cough. 18 g 0    benzonatate (TESSALON) 100 MG capsule Take 1 capsule (100 mg) by mouth 3 times daily as needed for cough. 90 capsule 0    Chemo Kit For RN use only. Do not remove items from bag. Contents: 1  sodium chloride 0.9% flush, 4 medium gloves, 1 chemo gown, 1/4 chemo mat, 1 connector female, 1 chemo bag. [id number] kit 0    cyclobenzaprine  "(FLEXERIL) 5 MG tablet Take one tab (5mg) by mouth over 6-8 hours, as needed for spasms (Patient not taking: Reported on 12/4/2024) 45 tablet 2    Emergency Supply Kit, Central, Patient use for emergency only. Contents: 3 sodium chloride 0.9% flushes, 1 dressing kit, 1 microclave ext set 14\", 4 nitrile gloves (med), 6 alcohol prep pads, 1 bacitracin, 1 syringe (10 cc 20 G 1\"). Call [phone] to reorder. [id number] kit 0    fluorouracil (ADRUCIL) 2.5 GM/50ML SOLN injection       Fluorouracil (ADRUCIL) 4,610 mg in sodium chloride 0.9 % 241 mL via HOMEPUMP C-Series Infuse 4,610 mg at 5.2 mL/hr over 46 hours into the vein once for 1 dose. 117910 mL 0    fluticasone-salmeterol (ADVAIR) 100-50 MCG/ACT inhaler Inhale 1 puff into the lungs 2 times daily. 60 each 3    gabapentin (NEURONTIN) 100 MG capsule Take 100-200mg (1-2 capsules) up to twice during the day and 300mg (3 capsules) at bedtime. 210 capsule 0    ibuprofen (ADVIL/MOTRIN) 200 MG tablet Take 3 tablets (600 mg) by mouth every 8 hours as needed for moderate pain 100 tablet 0    loperamide (IMODIUM) 2 MG capsule 2 caps at 1st sign of diarrhea & 1 cap every 2hrs until 12hrs diarrhea free. During night, 2 caps at bedtime & 2 caps every 4hrs until AM (Patient not taking: Reported on 12/26/2024) 30 capsule 0    LORazepam (ATIVAN) 0.5 MG tablet Take 1 tablet (0.5 mg) by mouth every 6 hours as needed for anxiety (Patient not taking: Reported on 10/4/2024) 10 tablet 0    morphine (MS CONTIN) 15 MG CR tablet Take 1 tablet (15 mg) by mouth every 12 hours (Patient not taking: Reported on 12/4/2024) 60 tablet 0    NARCAN 4 MG/0.1ML nasal spray CALL 911. SPR CONTENTS OF ONE SPRAYER (0.1ML) INTO ONE NOSTRIL. REPEAT IN 2-3 MIN IF SYMPTOMS OF OPIOID EMERGENCY PERSIST, ALTERNATE NOSTRILS (Patient not taking: Reported on 12/26/2024)      OLANZapine (ZYPREXA) 2.5 MG tablet Take 1 tablet (2.5 mg) by mouth 2 times daily. 60 tablet 1    ondansetron (ZOFRAN ODT) 4 MG ODT tab Take 1 " tablet (4 mg) by mouth every 6 hours as needed for nausea. (Patient not taking: Reported on 12/4/2024) 30 tablet 3    Ostomy Supplies MISC 1 each daily 1 each 11    oxyCODONE IR (ROXICODONE) 10 MG tablet Take 1-2 tablets (10-20 mg) by mouth every 4 hours as needed for moderate to severe pain. 150 tablet 0    pegfilgrastim (NEULASTA) 6 MG/0.6ML injection Inject 0.6 mLs (6 mg) subcutaneously every 14 days. Administer 24 hours after chemotherapy disconnect 3.6 mL 0    Port Access Kit For nurse use only.  Do not remove items from bag.  Use for port access.  Do not place syringe on sterile field. [id number] kit 0    predniSONE (DELTASONE) 20 MG tablet Take 2 tablets (40 mg) by mouth daily. 10 tablet 0    prochlorperazine (COMPAZINE) 10 MG tablet Take 0.5 tablets (5 mg) by mouth every 6 hours as needed for nausea or vomiting. (Patient not taking: Reported on 11/1/2024) 30 tablet 2    prochlorperazine (COMPAZINE) 10 MG tablet Take 1 tablet (10 mg) by mouth every 6 hours as needed for nausea or vomiting (Patient not taking: Reported on 12/4/2024) 30 tablet 2    prochlorperazine (COMPAZINE) 5 MG tablet Take 1 tablet (5 mg) by mouth every 6 hours as needed for nausea or vomiting (Patient not taking: Reported on 11/1/2024) 30 tablet 3    sodium chloride, PF, 0.9% PF flush Inject 10 mLs into the vein as needed for line flush. Flush IV before and after med administration as directed and/or at least every 24 hours, or prior to deaccessing for no further use and/or at least every 4 weeks when not accessed. 263810 mL 0    sulfamethoxazole-trimethoprim (BACTRIM DS) 800-160 MG tablet  (Patient not taking: Reported on 12/26/2024)      tamsulosin (FLOMAX) 0.4 MG capsule       valACYclovir (VALTREX) 500 MG tablet  (Patient not taking: Reported on 12/26/2024)       Physical Exam:  General: The patient is a pleasant male in no acute distress.  /69   Pulse 114   Temp 97.7  F (36.5  C) (Oral)   Resp 18   Wt 79.5 kg (175 lb 3.2 oz)    SpO2 92%   BMI 24.44 kg/m    Wt Readings from Last 10 Encounters:   01/15/25 79.5 kg (175 lb 3.2 oz)   01/02/25 80 kg (176 lb 6.4 oz)   12/26/24 77.7 kg (171 lb 6.4 oz)   12/18/24 80.2 kg (176 lb 11.2 oz)   12/17/24 77.1 kg (170 lb)   12/06/24 77.1 kg (170 lb)   12/04/24 79.2 kg (174 lb 8 oz)   11/20/24 78.5 kg (173 lb)   11/08/24 74.8 kg (165 lb)   11/06/24 78.5 kg (173 lb)   HEENT: EOMI. Sclerae are anicteric. Oral mucosa pink and moist without lesions or thrush.   Lymph: Neck is supple with no lymphadenopathy in the cervical or supraclavicular areas.   Heart: Mildly tachycardic with regular rhythm.   Lungs: Crackles to the right lung throughout, left lung clear to auscultation throughout. Loose, productive cough. Normal work of breathing, mild shortness of breath with extended talking.   Abdomen: Bowel sounds present, soft, nontender with no palpable hepatosplenomegaly or masses. Ostomy is in place in left abdomen.   Extremities: No lower extremity edema noted bilaterally.   Neuro: Cranial nerves II through XII are grossly intact.  Skin: No rashes, petechiae, or bruising noted on exposed skin.      LABS  Most Recent 3 CBC's:  Recent Labs   Lab Test 01/15/25  0910 12/18/24  0927 12/04/24  0749   WBC 12.8* 4.8 6.2   HGB 12.5* 13.8 13.0*   MCV 92 91 90    132* 138*    Most Recent 3 BMP's:  Recent Labs   Lab Test 01/15/25  0910 12/18/24  0927 12/04/24  0749    139 140   POTASSIUM 4.1 3.5 3.9   CHLORIDE 102 106 106   CO2 20* 20* 23   BUN 16.5 13.3 16.4   CR 0.80 0.85 0.86   ANIONGAP 13 13 11   JEFERSON 8.9 8.8 8.9   * 147* 130*    Most Recent 2 LFT's:  Recent Labs   Lab Test 01/15/25  0910 12/18/24  0927   AST 27 21   ALT 25 6   ALKPHOS 130 118   BILITOTAL 0.7 0.3   I reviewed the above labs today.        ASSESSMENT AND PLAN   Metastatic rectal cancer  Most recently progressed after CAR-T clinical trial. He started on treatment with FOLFIRI + bevacizumab on 8/30/24. Tolerating treatment well with  improvement in energy, pain and breathing/shortness of breath. CT imaging 10/24 with continued response. He continues to tolerate FOLFIRI + bevacizumab well and denies any significant side effects from treatment. Took a cycle off at the beginning of the month for a break. Returns today for cycle 10. Due to prolonged respiratory illness that is not improving, will hold treatment today and return to clinic in 1 week for cycle 10.   -Discussed with Dr. Snow and Taylor Luis RN. Patient still planning to bridge treatment to clinical trial. Scheduled for CT scan and follow up with Dr. Snow in 2 weeks.     Cough, respiratory illness.   Started around 12/25 when other family members also had bronchitis. Was treated by PCP with a 5-day course of prednisone and Azithromycin. No improvement in symptoms, continues to frequent productive cough, chills/sweats, and shortness of breath with minimal activity. WBC slightly elevated today. Continue supportive cares pushing hydration, Mucinex and hot fluids/honey for cough. Chest Xray today with hazy bilateral opacities, will treat with Levaquin 750mg daily x7 days. Will give 1L NS IV bolus today to help with hydration.   -patient to notify triage with any concerns, new or worsening symptoms. Will have RNCC follow up with patient next week to ensure he is improving prior to chemo.        Nausea.   Continue IV Emend as premedication. Continue olanzapine at bedtime and Zofran and Compazine as needed.     Oxygen, fluid in lungs    Saturations WNL today, see above for acute illness management. Previously, walking oxygen test demonstrating oxygen saturations in the 80's on room air. Previously ordered home oxygen with activity. Did not get approved through insurance, subsequent walking tests with adequate oxygen saturations. Breathing overall has improved. Will continue to monitor. Echo on 9/5/24 showed a normal EF.     Weight loss  Previous weight loss despite efforts to increase  intake. Started THC gummies, appetite and weight have improved. Nutrition referral previously placed.     Rectal, perineum pain  Following with palliative care. No longer needing MS Contin, but remains on oxycodone prn, currently 10 mg 2-3 times per day. Pain fluctuates throughout chemo cycle, improves a few days after chemo and recurs a few days prior to next dose.     Anemia  Patient has microcytic anemia. Received IV iron and has had significant improvement in symptoms. Hemoglobin continues to improve.       The longitudinal plan of care for the diagnosis(es)/condition(s) as documented were addressed during this visit. Due to the added complexity in care, I will continue to support Roman in the subsequent management and with ongoing continuity of care.      45 minutes spent on the date of the encounter doing chart review, review of test results, interpretation of tests, patient visit, and documentation       FLORA Albright CNP

## 2025-01-15 NOTE — PATIENT INSTRUCTIONS
[facility] Triage and after hours / weekends / holidays:  [phone]    Please call the triage or after hours line if you experience a temperature greater than or equal to 100.4, shaking chills, have uncontrolled nausea, vomiting and/or diarrhea, dizziness, shortness of breath, chest pain, bleeding, unexplained bruising, or if you have any other new/concerning symptoms, questions or concerns.      If you are having any concerning symptoms or wish to speak to a provider before your next infusion visit, please call triage to notify them so we can adequately serve you.     If you need a refill on a narcotic prescription or other medication, please call before your infusion appointment.             January 2025 Sunday Monday Tuesday Wednesday Thursday Friday Saturday                  1     2    OFFICE VISIT  11:10 AM   (30 min.)   Brynn Hurley APRN CNP   [facility] 3     4       5     6     7     8     9     10     11       12     13     14     15    LAB CENTRAL   9:15 AM   (15 min.)   [facility] LAB DRAW   [facility]    RETURN ACTIVE TREATMENT   9:45 AM   (45 min.)   Rebeca Killian APRN CNP   [facility]    ONC INFUSION 4 HR (240 MIN)  10:30 AM   (240 min.)    ONC INFUSION NURSE   [facility]    XR GENERAL XRAY  10:45 AM   (20 min.)   [facility]   [facility] Xray [place] 16     17     18       19     20     21     22     23     24    CT CHEST/ABDOMEN/PELVIS W  11:30 AM   (20 min.)   [email or web address]   [facility] CT Clinic [place] 25       26     27     28     29    LAB CENTRAL   8:30 AM   (15 min.)   [facility] LAB DRAW   [facility]    ONC INFUSION 4 HR (240 MIN)   9:00 AM   (240 min.)    ONC INFUSION NURSE   [facility] 30     31    RETURN CCSL   2:45 PM   (30 min.)   Polo Snow MD M  [facility]                  February 2025 Sunday Monday Tuesday Wednesday Thursday Friday Saturday                                 1       2     3     4     5     6     7     8       9     10     11     12     13     14     15       16     17     18    RETURN CCSL   9:05 AM   (40 min.)   Isidra Prater DO M [facility] 19     20     21     22       23     24     25     26     27     28                         Recent Results (from the past 24 hours)   Comprehensive metabolic panel    Collection Time: 01/15/25  9:10 AM   Result Value Ref Range    Sodium 135 135 - 145 mmol/L    Potassium 4.1 3.4 - 5.3 mmol/L    Carbon Dioxide (CO2) 20 (L) 22 - 29 mmol/L    Anion Gap 13 7 - 15 mmol/L    Urea Nitrogen 16.5 6.0 - 20.0 mg/dL    Creatinine 0.80 0.67 - 1.17 mg/dL    GFR Estimate >90 >60 mL/min/1.73m2    Calcium 8.9 8.8 - 10.4 mg/dL    Chloride 102 98 - 107 mmol/L    Glucose 111 (H) 70 - 99 mg/dL    Alkaline Phosphatase 130 40 - 150 U/L    AST 27 0 - 45 U/L    ALT 25 0 - 70 U/L    Protein Total 7.3 6.4 - 8.3 g/dL    Albumin 3.6 3.5 - 5.2 g/dL    Bilirubin Total 0.7 <=1.2 mg/dL   CBC with platelets and differential    Collection Time: 01/15/25  9:10 AM   Result Value Ref Range    WBC Count 12.8 (H) 4.0 - 11.0 10e3/uL    RBC Count 4.19 (L) 4.40 - 5.90 10e6/uL    Hemoglobin 12.5 (L) 13.3 - 17.7 g/dL    Hematocrit 38.5 (L) 40.0 - 53.0 %    MCV 92 78 - 100 fL    MCH 29.8 26.5 - 33.0 pg    MCHC 32.5 31.5 - 36.5 g/dL    RDW 14.7 10.0 - 15.0 %    Platelet Count 203 150 - 450 10e3/uL   Manual Differential    Collection Time: 01/15/25  9:10 AM   Result Value Ref Range    % Neutrophils 86 %    % Lymphocytes 2 %    % Monocytes 9 %    % Eosinophils 2 %    % Basophils 1 %    Absolute Neutrophils 11.1 (H) 1.6 - 8.3 10e3/uL    Absolute Lymphocytes 0.2 (L) 0.8 - 5.3 10e3/uL    Absolute Monocytes 1.1 0.0 - 1.3 10e3/uL    Absolute Eosinophils 0.2 0.0 - 0.7 10e3/uL    Absolute  Basophils 0.1 0.0 - 0.2 10e3/uL    RBC Morphology Confirmed RBC Indices     Platelet Assessment  Automated Count Confirmed. Platelet morphology is normal.     Automated Count Confirmed. Platelet morphology is normal.

## 2025-01-15 NOTE — NURSING NOTE
"Oncology Rooming Note    January 15, 2025 9:56 AM   Roman Escalante is a 49 year old male who presents for:    Chief Complaint   Patient presents with    Port Draw     Labs drawn via port accessed by RN. VS taken.    Oncology Clinic Visit     Rectal adenocarcinoma metastatic to lung     Initial Vitals: /69   Pulse 114   Temp 97.7  F (36.5  C) (Oral)   Resp 18   Wt 79.5 kg (175 lb 3.2 oz)   SpO2 92%   BMI 24.44 kg/m   Estimated body mass index is 24.44 kg/m  as calculated from the following:    Height as of 1/2/25: 1.803 m (5' 11\").    Weight as of this encounter: 79.5 kg (175 lb 3.2 oz). Body surface area is 2 meters squared.  Moderate Pain (4) Comment: Data Unavailable   No LMP for male patient.  Allergies reviewed: Yes  Medications reviewed: Yes    Medications: Medication refills not needed today.  Pharmacy name entered into EPIC:    [facility] - [place], [place] - [address] [place] [address] [phone]  [facility] - [place], [place] - [address] [place] [address] DRUG STORE #[id number] [address]    Frailty Screening:   Is the patient here for a new oncology consult visit in cancer care? 2. No      Clinical concerns: None.       Karen Calles              "

## 2025-01-15 NOTE — LETTER
1/15/2025      Roman Escalante  [address]  [facility] [address]      Dear Colleague,    Thank you for referring your patient, Roman Escalante, to the [facility]. Please see a copy of my visit note below.      [facility] - Medical Oncology Follow Up Note  01/15/2025    Oncology History:   Patient developed rectal bleeding in 2020.  In January 2021 CT showed focal wall thickening in the upper rectum, multiple pulmonary nodules bilaterally suspicious for metastatic disease.  Underwent FNA of the right upper lobe lesion which was consistent with adenocarcinoma of the colon.  He was treated with FOLFOX from 2/20/2021 through 5/20/2021.  Avastin was added.  Had an infusion reaction to oxaliplatin 6/2021.  7/2021- 12/2021 was on 5-FU alone.  Developed progression.  12/2021- 9/2022 was on FOLFIRI.  11/2021 started Lonsurf. All of this was done with outside oncologist.     Met with Dr. Snow on 2/3/2023 to establish care. Recommended regorafenib.     Started regorafenib on 3/2/2023. Progression noted 5/19/23. Plan to start FOLFOX/Avastin and send out CARIS testing to evaluate for other treatment options. Cycle 1 was given with oxaliplatin desensitization, 5FU, Avastin held due to increased urine protein.     Following cycle 4, patient was admitted with hematuria and acute kidney injury.    Oxaliplatin was dropped with Cycle 5.     Y-90 radioembolization 9/7.    10/24/23 CT CAP with disease progression with notable growth in all pulmonary lesions. Lonsurf + bevacizumab started 10/27/23 with plans to bridge to clinical trial.   11/15/23 - stopped chemo for [facility] study.   Currently enrolled in [facility] CAR-T trial (completed cell harvest 1/23/2024, tentatively planning administration of CAR-T in April 2/12/24 - resumed Lonsurf, bevacizumab to bridge treatment until CAR-T therapy  3/20/24 - treatment changed to Fruquintinib due to intolerability of  Lonsurf/bevacizumab  4/2/24- Presented to the ED with dehydration. Stopped Fruquintnib.   6/2024- cellular therapy transplant at [facility].   7/30/24- CT imaging demonstrating marked progression of pulmonary and hepatic lesions, clinical trial failed to generate T-cell graft. Multiple discussions regarding plan of care, hospice vs additional lines of treatment. Patient decided he is not ready for hospice. He resumed treatment with FOLFIRI + bevacizumab on 8/30/24.   10/2024 - CT CAP with continued response, plan to continue FOLFIRI + bevacizumab with the addition of Zometa. Per his dentist, needs dental work completed prior to clearance. (Scheduled 1/2 and 1/21).       Interval History:     Took last cycle off for a break around New Years. Developed a respiratory illness the end of December and continues to have symptoms that are not improving. Denies any fever but has night sweats and chills every night. Continues to have a productive cough with sinus and chest congestion. Becomes short of breath easily with activity, no shortness of breath or chest pain at rest. He finished a course of antibiotics and steroids prescribed by his PCP approximately 5-7 days ago, hasn't noticed any improvement.   -Appetite is decreased. Eating 1, sometimes 2 meals per day. Denies nausea or abdominal pain.   -Bowel movements have been slow, 4-7 days between.   -Drinking 50-60 oz of fluids daily.   -pain is well controlled on current regimen. Around Nick he needed more PRN oxycodone but pain has improved and is hardly taking any lately.         Remainder of 12 point ROS reviewed and negative except as in interval history.         Current Outpatient Medications   Medication Sig Dispense Refill     albuterol (PROAIR HFA/PROVENTIL HFA/VENTOLIN HFA) 108 (90 Base) MCG/ACT inhaler Inhale 2 puffs into the lungs every 6 hours as needed for shortness of breath, wheezing or cough. 18 g 0     benzonatate (TESSALON) 100 MG capsule Take 1 capsule  "(100 mg) by mouth 3 times daily as needed for cough. 90 capsule 0     Chemo Kit For RN use only. Do not remove items from bag. Contents: 1  sodium chloride 0.9% flush, 4 medium gloves, 1 chemo gown, 1/4 chemo mat, 1 connector female, 1 chemo bag. [id number] kit 0     cyclobenzaprine (FLEXERIL) 5 MG tablet Take one tab (5mg) by mouth over 6-8 hours, as needed for spasms (Patient not taking: Reported on 12/4/2024) 45 tablet 2     Emergency Supply Kit, Central, Patient use for emergency only. Contents: 3 sodium chloride 0.9% flushes, 1 dressing kit, 1 microclave ext set 14\", 4 nitrile gloves (med), 6 alcohol prep pads, 1 bacitracin, 1 syringe (10 cc 20 G 1\"). Call [phone] to reorder. [id number] kit 0     fluorouracil (ADRUCIL) 2.5 GM/50ML SOLN injection        Fluorouracil (ADRUCIL) 4,610 mg in sodium chloride 0.9 % 241 mL via HOMEPUMP C-Series Infuse 4,610 mg at 5.2 mL/hr over 46 hours into the vein once for 1 dose. 740373 mL 0     fluticasone-salmeterol (ADVAIR) 100-50 MCG/ACT inhaler Inhale 1 puff into the lungs 2 times daily. 60 each 3     gabapentin (NEURONTIN) 100 MG capsule Take 100-200mg (1-2 capsules) up to twice during the day and 300mg (3 capsules) at bedtime. 210 capsule 0     ibuprofen (ADVIL/MOTRIN) 200 MG tablet Take 3 tablets (600 mg) by mouth every 8 hours as needed for moderate pain 100 tablet 0     loperamide (IMODIUM) 2 MG capsule 2 caps at 1st sign of diarrhea & 1 cap every 2hrs until 12hrs diarrhea free. During night, 2 caps at bedtime & 2 caps every 4hrs until AM (Patient not taking: Reported on 12/26/2024) 30 capsule 0     LORazepam (ATIVAN) 0.5 MG tablet Take 1 tablet (0.5 mg) by mouth every 6 hours as needed for anxiety (Patient not taking: Reported on 10/4/2024) 10 tablet 0     morphine (MS CONTIN) 15 MG CR tablet Take 1 tablet (15 mg) by mouth every 12 hours (Patient not taking: Reported on 12/4/2024) 60 tablet 0     NARCAN 4 MG/0.1ML nasal spray CALL 911. SPR CONTENTS OF ONE SPRAYER (0.1ML) " INTO ONE NOSTRIL. REPEAT IN 2-3 MIN IF SYMPTOMS OF OPIOID EMERGENCY PERSIST, ALTERNATE NOSTRILS (Patient not taking: Reported on 12/26/2024)       OLANZapine (ZYPREXA) 2.5 MG tablet Take 1 tablet (2.5 mg) by mouth 2 times daily. 60 tablet 1     ondansetron (ZOFRAN ODT) 4 MG ODT tab Take 1 tablet (4 mg) by mouth every 6 hours as needed for nausea. (Patient not taking: Reported on 12/4/2024) 30 tablet 3     Ostomy Supplies MISC 1 each daily 1 each 11     oxyCODONE IR (ROXICODONE) 10 MG tablet Take 1-2 tablets (10-20 mg) by mouth every 4 hours as needed for moderate to severe pain. 150 tablet 0     pegfilgrastim (NEULASTA) 6 MG/0.6ML injection Inject 0.6 mLs (6 mg) subcutaneously every 14 days. Administer 24 hours after chemotherapy disconnect 3.6 mL 0     Port Access Kit For nurse use only.  Do not remove items from bag.  Use for port access.  Do not place syringe on sterile field. [id number] kit 0     predniSONE (DELTASONE) 20 MG tablet Take 2 tablets (40 mg) by mouth daily. 10 tablet 0     prochlorperazine (COMPAZINE) 10 MG tablet Take 0.5 tablets (5 mg) by mouth every 6 hours as needed for nausea or vomiting. (Patient not taking: Reported on 11/1/2024) 30 tablet 2     prochlorperazine (COMPAZINE) 10 MG tablet Take 1 tablet (10 mg) by mouth every 6 hours as needed for nausea or vomiting (Patient not taking: Reported on 12/4/2024) 30 tablet 2     prochlorperazine (COMPAZINE) 5 MG tablet Take 1 tablet (5 mg) by mouth every 6 hours as needed for nausea or vomiting (Patient not taking: Reported on 11/1/2024) 30 tablet 3     sodium chloride, PF, 0.9% PF flush Inject 10 mLs into the vein as needed for line flush. Flush IV before and after med administration as directed and/or at least every 24 hours, or prior to deaccessing for no further use and/or at least every 4 weeks when not accessed. 395778 mL 0     sulfamethoxazole-trimethoprim (BACTRIM DS) 800-160 MG tablet  (Patient not taking: Reported on 12/26/2024)        tamsulosin (FLOMAX) 0.4 MG capsule        valACYclovir (VALTREX) 500 MG tablet  (Patient not taking: Reported on 12/26/2024)       Physical Exam:  General: The patient is a pleasant male in no acute distress.  /69   Pulse 114   Temp 97.7  F (36.5  C) (Oral)   Resp 18   Wt 79.5 kg (175 lb 3.2 oz)   SpO2 92%   BMI 24.44 kg/m    Wt Readings from Last 10 Encounters:   01/15/25 79.5 kg (175 lb 3.2 oz)   01/02/25 80 kg (176 lb 6.4 oz)   12/26/24 77.7 kg (171 lb 6.4 oz)   12/18/24 80.2 kg (176 lb 11.2 oz)   12/17/24 77.1 kg (170 lb)   12/06/24 77.1 kg (170 lb)   12/04/24 79.2 kg (174 lb 8 oz)   11/20/24 78.5 kg (173 lb)   11/08/24 74.8 kg (165 lb)   11/06/24 78.5 kg (173 lb)   HEENT: EOMI. Sclerae are anicteric. Oral mucosa pink and moist without lesions or thrush.   Lymph: Neck is supple with no lymphadenopathy in the cervical or supraclavicular areas.   Heart: Mildly tachycardic with regular rhythm.   Lungs: Crackles to the right lung throughout, left lung clear to auscultation throughout. Loose, productive cough. Normal work of breathing, mild shortness of breath with extended talking.   Abdomen: Bowel sounds present, soft, nontender with no palpable hepatosplenomegaly or masses. Ostomy is in place in left abdomen.   Extremities: No lower extremity edema noted bilaterally.   Neuro: Cranial nerves II through XII are grossly intact.  Skin: No rashes, petechiae, or bruising noted on exposed skin.      LABS  Most Recent 3 CBC's:  Recent Labs   Lab Test 01/15/25  0910 12/18/24  0927 12/04/24  0749   WBC 12.8* 4.8 6.2   HGB 12.5* 13.8 13.0*   MCV 92 91 90    132* 138*    Most Recent 3 BMP's:  Recent Labs   Lab Test 01/15/25  0910 12/18/24  0927 12/04/24  0749    139 140   POTASSIUM 4.1 3.5 3.9   CHLORIDE 102 106 106   CO2 20* 20* 23   BUN 16.5 13.3 16.4   CR 0.80 0.85 0.86   ANIONGAP 13 13 11   JEFERSON 8.9 8.8 8.9   * 147* 130*    Most Recent 2 LFT's:  Recent Labs   Lab Test 01/15/25  0910  12/18/24  0927   AST 27 21   ALT 25 6   ALKPHOS 130 118   BILITOTAL 0.7 0.3   I reviewed the above labs today.        ASSESSMENT AND PLAN   Metastatic rectal cancer  Most recently progressed after CAR-T clinical trial. He started on treatment with FOLFIRI + bevacizumab on 8/30/24. Tolerating treatment well with improvement in energy, pain and breathing/shortness of breath. CT imaging 10/24 with continued response. He continues to tolerate FOLFIRI + bevacizumab well and denies any significant side effects from treatment. Took a cycle off at the beginning of the month for a break. Returns today for cycle 10. Due to prolonged respiratory illness that is not improving, will hold treatment today and return to clinic in 1 week for cycle 10.   -Discussed with Dr. Snow and Taylor Luis RN. Patient still planning to bridge treatment to clinical trial. Scheduled for CT scan and follow up with Dr. Snow in 2 weeks.     Cough, respiratory illness.   Started around 12/25 when other family members also had bronchitis. Was treated by PCP with a 5-day course of prednisone and Azithromycin. No improvement in symptoms, continues to frequent productive cough, chills/sweats, and shortness of breath with minimal activity. WBC slightly elevated today. Continue supportive cares pushing hydration, Mucinex and hot fluids/honey for cough. Chest Xray today with hazy bilateral opacities, will treat with Levaquin 750mg daily x7 days. Will give 1L NS IV bolus today to help with hydration.   -patient to notify triage with any concerns, new or worsening symptoms. Will have RNCC follow up with patient next week to ensure he is improving prior to chemo.        Nausea.   Continue IV Emend as premedication. Continue olanzapine at bedtime and Zofran and Compazine as needed.     Oxygen, fluid in lungs    Saturations WNL today, see above for acute illness management. Previously, walking oxygen test demonstrating oxygen saturations in the 80's on room  air. Previously ordered home oxygen with activity. Did not get approved through insurance, subsequent walking tests with adequate oxygen saturations. Breathing overall has improved. Will continue to monitor. Echo on 9/5/24 showed a normal EF.     Weight loss  Previous weight loss despite efforts to increase intake. Started THC gummies, appetite and weight have improved. Nutrition referral previously placed.     Rectal, perineum pain  Following with palliative care. No longer needing MS Contin, but remains on oxycodone prn, currently 10 mg 2-3 times per day. Pain fluctuates throughout chemo cycle, improves a few days after chemo and recurs a few days prior to next dose.     Anemia  Patient has microcytic anemia. Received IV iron and has had significant improvement in symptoms. Hemoglobin continues to improve.       The longitudinal plan of care for the diagnosis(es)/condition(s) as documented were addressed during this visit. Due to the added complexity in care, I will continue to support Roman in the subsequent management and with ongoing continuity of care.      45 minutes spent on the date of the encounter doing chart review, review of test results, interpretation of tests, patient visit, and documentation       FLORA Albright CNP      Again, thank you for allowing me to participate in the care of your patient.        Sincerely,        FLORA Albright CNP    Electronically signed

## 2025-01-15 NOTE — NURSING NOTE
Chief Complaint   Patient presents with    Port Draw     Labs drawn via port accessed by RN. VS taken.     Port accessed with 20 g 3/4 inch power needle by RN, labs collected, line flushed with saline and heparin.  Vitals taken. Pt checked in for appointment(s). Patient unable to void for urine test, kit sent with patient if he is able to go during future appointments today.    Joe Syed RN

## 2025-01-15 NOTE — PROGRESS NOTES
Infusion Nursing Note:  Roman Escalante presents today for IVF, chemo and zometa HELD.    Patient seen by provider today: Yes: Rebeca Killian CNP   present during visit today: Not Applicable.    Note:     1/15/2025 1045 TORB Rebeca Killian CNP/Sarah Abraham,RN  -no chemo today  -no zometa today, patient still needs 1 more upcoming dental procedure appointment   -patient going to CXR and then will come for infusion   -give 1 liter NS today  -collect covid and respiratory panel swabs     No interventions for pain needed while in infusion today.     1/15/2025 1215 Written Communication: Rebeca Killian CNP/Sarah Abraham RN  -I sent levaquin and mucinex downstairs for him, he can go when he's done with you. Will you tell him I'm going to put him on the schedule for next week but also keep the week after that scheduled so if he's not ready next week we've got a backup scheduled. I'll have Nilda call him next week to make sure he's doing better before infusion       Intravenous Access:  Implanted Port.    Treatment Conditions:  Lab Results   Component Value Date    HGB 12.5 (L) 01/15/2025    WBC 12.8 (H) 01/15/2025    ANEU 11.1 (H) 01/15/2025    ANEUTAUTO 7.7 11/20/2024     01/15/2025        Lab Results   Component Value Date     01/15/2025    POTASSIUM 4.1 01/15/2025    MAG 2.0 04/24/2024    CR 0.80 01/15/2025    JEFERSON 8.9 01/15/2025    BILITOTAL 0.7 01/15/2025    ALBUMIN 3.6 01/15/2025    ALT 25 01/15/2025    AST 27 01/15/2025       Results reviewed, no treatment parameters, ok to proceed with treatment.      Post Infusion Assessment:  Patient tolerated infusion without incident.  Blood return noted pre and post infusion.  Site patent and intact, free from redness, edema or discomfort.  No evidence of extravasations.  Access discontinued per protocol.       Discharge Plan:   Patient to  levaquin and mucinex at discharge.  Discharge instructions reviewed with: Patient.  Patient and/or family verbalized  understanding of discharge instructions and all questions answered.  AVS to patient via [employer].  Patient will return in one week for next appointment, to be scheduled per above.  Patient aware of the plan.    Patient discharged in stable condition accompanied by: self.  Departure Mode: Ambulatory.      Sarah Abraham RN

## 2025-01-16 ENCOUNTER — TELEPHONE (OUTPATIENT)
Dept: FAMILY MEDICINE | Facility: CLINIC | Age: 50
End: 2025-01-16
Payer: COMMERCIAL

## 2025-01-16 DIAGNOSIS — J44.1 COPD EXACERBATION (H): Primary | ICD-10-CM

## 2025-01-16 RX ORDER — IPRATROPIUM BROMIDE AND ALBUTEROL SULFATE 2.5; .5 MG/3ML; MG/3ML
1 SOLUTION RESPIRATORY (INHALATION) EVERY 6 HOURS PRN
Qty: 90 ML | Refills: 3 | Status: SHIPPED | OUTPATIENT
Start: 2025-01-16

## 2025-01-16 NOTE — TELEPHONE ENCOUNTER
Sent teams message to MA to prepare the nebulizer for . Once this is complete TC will call patient and inform of placed orders.    Joseph Pritchett

## 2025-01-16 NOTE — TELEPHONE ENCOUNTER
Called patient and informed machine is ready for . Patient states he will come pick this up on 01/17/2025. Neb machine placed at  with instructions to have patient sign DME form. Patient may keep yellow copy and white copy should be returned to Patricia.    Joseph Pritchett

## 2025-01-16 NOTE — TELEPHONE ENCOUNTER
Order/Referral Request    Who is requesting: Patient     Orders being requested: Order for nebulizer (Neb solution can be sent to pended pharmacy)    Reason service is needed/diagnosis: order renewal    When are orders needed by: asap    Has this been discussed with Provider: Yes    Does patient have a preference on a Group/Provider/Facility? [facility]    Does patient have an appointment scheduled?: No    Where to send orders: Place orders within Epic    Could we send this information to you in [facility] or would you prefer to receive a phone call?:   Patient would prefer a phone call   Okay to leave a detailed message?: Yes at Cell number on file:    Telephone Information:   Mobile [phone]

## 2025-01-16 NOTE — TELEPHONE ENCOUNTER
Duonebs and DME ordered. Please print out DME rx and have pt . Thanks.    Brynn Hurley, APRN CNP on 1/16/2025 at 1:06 PM

## 2025-01-16 NOTE — TELEPHONE ENCOUNTER
This pt. Is scheduled for Outpatient surgery on 07/06/23 with Dr. Crawford at [facility] under MAC anesthesia Pro: Excision of face lesion, left [id number]  Dx: Skin lesion if face L98.9 pt. Expressed understanding     All this pt. Needs is CBC, Chem7, EKG, and H&P, also pt. States that he was just seen in the office not sure if you want to amend the note or bring the pt. Back into the office. please give pt. A call to schedule.    Was sent home with nebulizer tubing and mask, but does not have machine or solution.     Per chart review, duoneb was administered in clinic. Pt states he has been trying to get machine for a couple weeks.     Routing to provider to place neb machine DME order and send solution order to pended pharmacy. Pt would like to come to clinic to  neb machine (confirmed with lead MA that if order is placed pt can  machine at [facility] )    Routing to provider.     Cleo Wilson, RN

## 2025-01-20 ENCOUNTER — MYC MEDICAL ADVICE (OUTPATIENT)
Dept: ONCOLOGY | Facility: CLINIC | Age: 50
End: 2025-01-20
Payer: COMMERCIAL

## 2025-01-20 NOTE — TELEPHONE ENCOUNTER
Oncology Nurse Triage - Reporting Symptoms  Situation:   Roman reporting the following symptoms:    Background:   Treating Provider: Dr. Polo Snow    Date of last office visit: 1/15/24 with Rebeca Killian    Recent treatments: No--Held chemo and zometa on 1/15/24    Assessment  Onset of symptoms: 1/16/24   Fever--no fevers, 98.2 was his temperature today.  Night sweats--night sweats all the way through the night (has been going on for one month)  Chills--yes, shaking chills at times.   Gets SOB at rest and with exertion; has SOB all the time  It takes a while to recover from drinking cold water.   Has SOB at rest, coughing, blowing nose.  Oxygen is low, feels fatigued and tired.  O2 sats are normally 94 and 80 HR.  When he walks up a flight of stairs, sats are in the 50's, and it takes 25 min to recover.   Oxygen: He is on 4L O2 per nasal cannula at the time of the denice; pt measured O2 sat at rest, and it was at 86% and   Productive cough--yes. Lots of mucous, clear and on prescription grade mucinex and levoquin (RX by Rebeca last Wednesday.)  Sinus/chest congestion--yes, but mucinex loosens it up. Mostly coughing is the problem.. Slept 12 hours last night.  Diarrhea stools; not many stools  Eating/drinking? Appetite is bad.Trying to get as much fluid in as possible but does not feel like eating.    Recommendations:   Pt stated he was going to go to the ED d/t increased SOB, decreased O2 sats, fatigue and weakness.  This writer advised pt to go to the ED for evaluation of worsening SOB and fatigue.    Routed to Care Team.

## 2025-01-21 NOTE — TELEPHONE ENCOUNTER
"[facility]: Cancer Care                                                                                          11 am with Rebeca Killian LILLY, labs in clinic tomorrow. Will keep infusion appointment but may not give chemo. Pt stated a friend will bring him.    He stated he \"borrowed\" a friend's concentrator and has it on 10L. Asked him if he started low and ramped up, he stated he was so sob he just cranked it to 10L and it's keeping him oxygen at 90 sats right now. He did  duoneb medication yesterday and has been taking every 6 hours. Advised if he's able to decrease oxygen level safely, to attempt this in next 12 hours, otherwise he may not be able to get to the clinic without any oxygen with him. He does want to get certified for O2 tomorrow, Rebeca is aware. Advised if worsening symptoms at all to head to local ER, pt verbalized understanding.    Signature:  Virgen Nieto RN  "

## 2025-01-22 ENCOUNTER — HOSPITAL ENCOUNTER (INPATIENT)
Facility: CLINIC | Age: 50
End: 2025-01-22
Attending: EMERGENCY MEDICINE | Admitting: STUDENT IN AN ORGANIZED HEALTH CARE EDUCATION/TRAINING PROGRAM
Payer: COMMERCIAL

## 2025-01-22 ENCOUNTER — APPOINTMENT (OUTPATIENT)
Dept: CT IMAGING | Facility: CLINIC | Age: 50
End: 2025-01-22
Attending: PHYSICIAN ASSISTANT
Payer: COMMERCIAL

## 2025-01-22 ENCOUNTER — HOME INFUSION BILLING (OUTPATIENT)
Dept: HOME HEALTH SERVICES | Facility: HOME HEALTH | Age: 50
End: 2025-01-22
Payer: COMMERCIAL

## 2025-01-22 ENCOUNTER — DOCUMENTATION ONLY (OUTPATIENT)
Dept: ONCOLOGY | Facility: CLINIC | Age: 50
End: 2025-01-22

## 2025-01-22 ENCOUNTER — APPOINTMENT (OUTPATIENT)
Dept: LAB | Facility: CLINIC | Age: 50
End: 2025-01-22
Attending: STUDENT IN AN ORGANIZED HEALTH CARE EDUCATION/TRAINING PROGRAM
Payer: COMMERCIAL

## 2025-01-22 ENCOUNTER — ONCOLOGY VISIT (OUTPATIENT)
Dept: ONCOLOGY | Facility: CLINIC | Age: 50
End: 2025-01-22
Payer: COMMERCIAL

## 2025-01-22 VITALS
OXYGEN SATURATION: 93 % | DIASTOLIC BLOOD PRESSURE: 63 MMHG | TEMPERATURE: 97.4 F | HEART RATE: 124 BPM | SYSTOLIC BLOOD PRESSURE: 90 MMHG | RESPIRATION RATE: 28 BRPM

## 2025-01-22 DIAGNOSIS — J43.2 CENTRILOBULAR EMPHYSEMA (H): ICD-10-CM

## 2025-01-22 DIAGNOSIS — J96.00 ACUTE RESPIRATORY FAILURE (H): ICD-10-CM

## 2025-01-22 DIAGNOSIS — R09.02 HYPOXIA: Primary | ICD-10-CM

## 2025-01-22 DIAGNOSIS — R05.1 ACUTE COUGH: ICD-10-CM

## 2025-01-22 DIAGNOSIS — J16.8 FUNGAL PNEUMONIA: Primary | ICD-10-CM

## 2025-01-22 DIAGNOSIS — B49 FUNGAL PNEUMONIA: Primary | ICD-10-CM

## 2025-01-22 DIAGNOSIS — R06.02 SHORTNESS OF BREATH: ICD-10-CM

## 2025-01-22 DIAGNOSIS — C78.00 RECTAL ADENOCARCINOMA METASTATIC TO LUNG (H): ICD-10-CM

## 2025-01-22 DIAGNOSIS — C20 RECTAL ADENOCARCINOMA METASTATIC TO LUNG (H): ICD-10-CM

## 2025-01-22 DIAGNOSIS — Z86.19 HISTORY OF RSV INFECTION: ICD-10-CM

## 2025-01-22 DIAGNOSIS — Z85.118 HISTORY OF LUNG CANCER: ICD-10-CM

## 2025-01-22 LAB
ALBUMIN SERPL BCG-MCNC: 3.1 G/DL (ref 3.5–5.2)
ALP SERPL-CCNC: 116 U/L (ref 40–150)
ALT SERPL W P-5'-P-CCNC: 139 U/L (ref 0–70)
ANION GAP SERPL CALCULATED.3IONS-SCNC: 12 MMOL/L (ref 7–15)
AST SERPL W P-5'-P-CCNC: 43 U/L (ref 0–45)
ATRIAL RATE - MUSE: 98 BPM
BASOPHILS # BLD MANUAL: 0 10E3/UL (ref 0–0.2)
BASOPHILS NFR BLD MANUAL: 0 %
BILIRUB SERPL-MCNC: 0.7 MG/DL
BUN SERPL-MCNC: 14.3 MG/DL (ref 6–20)
C PNEUM DNA SPEC QL NAA+PROBE: NOT DETECTED
CALCIUM SERPL-MCNC: 8.7 MG/DL (ref 8.8–10.4)
CHLORIDE SERPL-SCNC: 104 MMOL/L (ref 98–107)
CREAT SERPL-MCNC: 0.73 MG/DL (ref 0.67–1.17)
DIASTOLIC BLOOD PRESSURE - MUSE: NORMAL MMHG
EGFRCR SERPLBLD CKD-EPI 2021: >90 ML/MIN/1.73M2
EOSINOPHIL # BLD MANUAL: 0.3 10E3/UL (ref 0–0.7)
EOSINOPHIL NFR BLD MANUAL: 4 %
ERYTHROCYTE [DISTWIDTH] IN BLOOD BY AUTOMATED COUNT: 15.5 % (ref 10–15)
FLUAV H1 2009 PAND RNA SPEC QL NAA+PROBE: NOT DETECTED
FLUAV H1 RNA SPEC QL NAA+PROBE: NOT DETECTED
FLUAV H3 RNA SPEC QL NAA+PROBE: NOT DETECTED
FLUAV RNA SPEC QL NAA+PROBE: NEGATIVE
FLUAV RNA SPEC QL NAA+PROBE: NOT DETECTED
FLUBV RNA RESP QL NAA+PROBE: NEGATIVE
FLUBV RNA SPEC QL NAA+PROBE: NOT DETECTED
GLUCOSE SERPL-MCNC: 94 MG/DL (ref 70–99)
HADV DNA SPEC QL NAA+PROBE: NOT DETECTED
HCO3 SERPL-SCNC: 20 MMOL/L (ref 22–29)
HCOV PNL SPEC NAA+PROBE: NOT DETECTED
HCT VFR BLD AUTO: 38.3 % (ref 40–53)
HGB BLD-MCNC: 12.7 G/DL (ref 13.3–17.7)
HMPV RNA SPEC QL NAA+PROBE: NOT DETECTED
HOLD SPECIMEN: NORMAL
HPIV1 RNA SPEC QL NAA+PROBE: NOT DETECTED
HPIV2 RNA SPEC QL NAA+PROBE: NOT DETECTED
HPIV3 RNA SPEC QL NAA+PROBE: NOT DETECTED
HPIV4 RNA SPEC QL NAA+PROBE: NOT DETECTED
INTERPRETATION ECG - MUSE: NORMAL
LACTATE SERPL-SCNC: 1.2 MMOL/L (ref 0.7–2)
LDH SERPL L TO P-CCNC: 433 U/L (ref 0–250)
LYMPHOCYTES # BLD MANUAL: 0.5 10E3/UL (ref 0.8–5.3)
LYMPHOCYTES NFR BLD MANUAL: 6 %
M PNEUMO DNA SPEC QL NAA+PROBE: NOT DETECTED
MCH RBC QN AUTO: 30.2 PG (ref 26.5–33)
MCHC RBC AUTO-ENTMCNC: 33.2 G/DL (ref 31.5–36.5)
MCV RBC AUTO: 91 FL (ref 78–100)
MONOCYTES # BLD MANUAL: 0.4 10E3/UL (ref 0–1.3)
MONOCYTES NFR BLD MANUAL: 5 %
NEUTROPHILS # BLD MANUAL: 6.7 10E3/UL (ref 1.6–8.3)
NEUTROPHILS NFR BLD MANUAL: 85 %
P AXIS - MUSE: 67 DEGREES
PLAT MORPH BLD: ABNORMAL
PLATELET # BLD AUTO: 158 10E3/UL (ref 150–450)
POTASSIUM SERPL-SCNC: 4.5 MMOL/L (ref 3.4–5.3)
PR INTERVAL - MUSE: 150 MS
PROCALCITONIN SERPL IA-MCNC: 0.24 NG/ML
PROT SERPL-MCNC: 6.2 G/DL (ref 6.4–8.3)
PROT SERPL-MCNC: 6.4 G/DL (ref 6.4–8.3)
QRS DURATION - MUSE: 80 MS
QT - MUSE: 384 MS
QTC - MUSE: 490 MS
R AXIS - MUSE: 73 DEGREES
RBC # BLD AUTO: 4.21 10E6/UL (ref 4.4–5.9)
RBC MORPH BLD: ABNORMAL
RHEUMATOID FACT SERPL-ACNC: <10 IU/ML
RSV RNA SPEC NAA+PROBE: NEGATIVE
RSV RNA SPEC QL NAA+PROBE: NOT DETECTED
RSV RNA SPEC QL NAA+PROBE: NOT DETECTED
RV+EV RNA SPEC QL NAA+PROBE: NOT DETECTED
SARS-COV-2 RNA RESP QL NAA+PROBE: NEGATIVE
SODIUM SERPL-SCNC: 136 MMOL/L (ref 135–145)
SYSTOLIC BLOOD PRESSURE - MUSE: NORMAL MMHG
T AXIS - MUSE: 52 DEGREES
TROPONIN T SERPL HS-MCNC: 90 NG/L
TROPONIN T SERPL HS-MCNC: 93 NG/L
VENTRICULAR RATE- MUSE: 98 BPM
WBC # BLD AUTO: 7.9 10E3/UL (ref 4–11)

## 2025-01-22 PROCEDURE — 99223 1ST HOSP IP/OBS HIGH 75: CPT | Mod: FS | Performed by: STUDENT IN AN ORGANIZED HEALTH CARE EDUCATION/TRAINING PROGRAM

## 2025-01-22 PROCEDURE — 71275 CT ANGIOGRAPHY CHEST: CPT

## 2025-01-22 PROCEDURE — 84155 ASSAY OF PROTEIN SERUM: CPT | Performed by: NURSE PRACTITIONER

## 2025-01-22 PROCEDURE — 87486 CHLMYD PNEUM DNA AMP PROBE: CPT | Performed by: PHYSICIAN ASSISTANT

## 2025-01-22 PROCEDURE — 250N000011 HC RX IP 250 OP 636: Performed by: NURSE PRACTITIONER

## 2025-01-22 PROCEDURE — 84155 ASSAY OF PROTEIN SERUM: CPT | Performed by: PHYSICIAN ASSISTANT

## 2025-01-22 PROCEDURE — 36415 COLL VENOUS BLD VENIPUNCTURE: CPT | Performed by: EMERGENCY MEDICINE

## 2025-01-22 PROCEDURE — 83605 ASSAY OF LACTIC ACID: CPT | Performed by: PHYSICIAN ASSISTANT

## 2025-01-22 PROCEDURE — 250N000013 HC RX MED GY IP 250 OP 250 PS 637: Performed by: NURSE PRACTITIONER

## 2025-01-22 PROCEDURE — 83615 LACTATE (LD) (LDH) ENZYME: CPT | Performed by: NURSE PRACTITIONER

## 2025-01-22 PROCEDURE — 87637 SARSCOV2&INF A&B&RSV AMP PRB: CPT | Performed by: PHYSICIAN ASSISTANT

## 2025-01-22 PROCEDURE — 250N000009 HC RX 250: Performed by: PHYSICIAN ASSISTANT

## 2025-01-22 PROCEDURE — 36415 COLL VENOUS BLD VENIPUNCTURE: CPT | Performed by: NURSE PRACTITIONER

## 2025-01-22 PROCEDURE — 99215 OFFICE O/P EST HI 40 MIN: CPT

## 2025-01-22 PROCEDURE — 87449 NOS EACH ORGANISM AG IA: CPT | Performed by: NURSE PRACTITIONER

## 2025-01-22 PROCEDURE — 87633 RESP VIRUS 12-25 TARGETS: CPT | Performed by: PHYSICIAN ASSISTANT

## 2025-01-22 PROCEDURE — 84484 ASSAY OF TROPONIN QUANT: CPT | Performed by: PHYSICIAN ASSISTANT

## 2025-01-22 PROCEDURE — 85007 BL SMEAR W/DIFF WBC COUNT: CPT | Performed by: PHYSICIAN ASSISTANT

## 2025-01-22 PROCEDURE — 250N000011 HC RX IP 250 OP 636: Performed by: PHYSICIAN ASSISTANT

## 2025-01-22 PROCEDURE — 86431 RHEUMATOID FACTOR QUANT: CPT | Performed by: NURSE PRACTITIONER

## 2025-01-22 PROCEDURE — 87581 M.PNEUMON DNA AMP PROBE: CPT | Performed by: PHYSICIAN ASSISTANT

## 2025-01-22 PROCEDURE — 83880 ASSAY OF NATRIURETIC PEPTIDE: CPT | Performed by: STUDENT IN AN ORGANIZED HEALTH CARE EDUCATION/TRAINING PROGRAM

## 2025-01-22 PROCEDURE — G2211 COMPLEX E/M VISIT ADD ON: HCPCS

## 2025-01-22 PROCEDURE — 71275 CT ANGIOGRAPHY CHEST: CPT | Mod: 26 | Performed by: RADIOLOGY

## 2025-01-22 PROCEDURE — 84145 PROCALCITONIN (PCT): CPT | Performed by: PHYSICIAN ASSISTANT

## 2025-01-22 PROCEDURE — 258N000003 HC RX IP 258 OP 636: Performed by: PHYSICIAN ASSISTANT

## 2025-01-22 PROCEDURE — 85014 HEMATOCRIT: CPT | Performed by: PHYSICIAN ASSISTANT

## 2025-01-22 PROCEDURE — 120N000002 HC R&B MED SURG/OB UMMC

## 2025-01-22 PROCEDURE — 82310 ASSAY OF CALCIUM: CPT | Performed by: PHYSICIAN ASSISTANT

## 2025-01-22 PROCEDURE — 82565 ASSAY OF CREATININE: CPT | Performed by: STUDENT IN AN ORGANIZED HEALTH CARE EDUCATION/TRAINING PROGRAM

## 2025-01-22 PROCEDURE — 99207 PR APP CREDIT; MD BILLING SHARED VISIT: CPT | Mod: FS | Performed by: NURSE PRACTITIONER

## 2025-01-22 PROCEDURE — 87040 BLOOD CULTURE FOR BACTERIA: CPT | Performed by: EMERGENCY MEDICINE

## 2025-01-22 PROCEDURE — 84075 ASSAY ALKALINE PHOSPHATASE: CPT | Performed by: STUDENT IN AN ORGANIZED HEALTH CARE EDUCATION/TRAINING PROGRAM

## 2025-01-22 PROCEDURE — 36415 COLL VENOUS BLD VENIPUNCTURE: CPT | Performed by: PHYSICIAN ASSISTANT

## 2025-01-22 PROCEDURE — G0463 HOSPITAL OUTPT CLINIC VISIT: HCPCS

## 2025-01-22 PROCEDURE — 82565 ASSAY OF CREATININE: CPT | Performed by: NURSE PRACTITIONER

## 2025-01-22 PROCEDURE — 86200 CCP ANTIBODY: CPT | Performed by: NURSE PRACTITIONER

## 2025-01-22 PROCEDURE — 87040 BLOOD CULTURE FOR BACTERIA: CPT | Performed by: PHYSICIAN ASSISTANT

## 2025-01-22 RX ORDER — ENOXAPARIN SODIUM 100 MG/ML
40 INJECTION SUBCUTANEOUS EVERY 24 HOURS
Status: DISCONTINUED | OUTPATIENT
Start: 2025-01-22 | End: 2025-01-25 | Stop reason: HOSPADM

## 2025-01-22 RX ORDER — BISACODYL 5 MG
5 TABLET, DELAYED RELEASE (ENTERIC COATED) ORAL DAILY PRN
Status: DISCONTINUED | OUTPATIENT
Start: 2025-01-22 | End: 2025-01-25 | Stop reason: HOSPADM

## 2025-01-22 RX ORDER — TAMSULOSIN HYDROCHLORIDE 0.4 MG/1
0.4 CAPSULE ORAL DAILY
Status: DISCONTINUED | OUTPATIENT
Start: 2025-01-23 | End: 2025-01-25 | Stop reason: HOSPADM

## 2025-01-22 RX ORDER — ALBUTEROL SULFATE 90 UG/1
2 INHALANT RESPIRATORY (INHALATION) EVERY 6 HOURS PRN
Status: DISCONTINUED | OUTPATIENT
Start: 2025-01-22 | End: 2025-01-25 | Stop reason: HOSPADM

## 2025-01-22 RX ORDER — GABAPENTIN 100 MG/1
100 CAPSULE ORAL 2 TIMES DAILY
Status: DISCONTINUED | OUTPATIENT
Start: 2025-01-23 | End: 2025-01-25 | Stop reason: HOSPADM

## 2025-01-22 RX ORDER — CALCIUM CARBONATE 500 MG/1
1000 TABLET, CHEWABLE ORAL 4 TIMES DAILY PRN
Status: DISCONTINUED | OUTPATIENT
Start: 2025-01-22 | End: 2025-01-25 | Stop reason: HOSPADM

## 2025-01-22 RX ORDER — GABAPENTIN 300 MG/1
300 CAPSULE ORAL AT BEDTIME
Status: DISCONTINUED | OUTPATIENT
Start: 2025-01-22 | End: 2025-01-25 | Stop reason: HOSPADM

## 2025-01-22 RX ORDER — FLUTICASONE FUROATE AND VILANTEROL 100; 25 UG/1; UG/1
1 POWDER RESPIRATORY (INHALATION) DAILY
Status: DISCONTINUED | OUTPATIENT
Start: 2025-01-23 | End: 2025-01-25

## 2025-01-22 RX ORDER — POLYETHYLENE GLYCOL 3350 17 G/17G
17 POWDER, FOR SOLUTION ORAL 2 TIMES DAILY PRN
Status: DISCONTINUED | OUTPATIENT
Start: 2025-01-22 | End: 2025-01-25 | Stop reason: HOSPADM

## 2025-01-22 RX ORDER — ACETAMINOPHEN 325 MG/1
650 TABLET ORAL EVERY 4 HOURS PRN
Status: DISCONTINUED | OUTPATIENT
Start: 2025-01-22 | End: 2025-01-25 | Stop reason: HOSPADM

## 2025-01-22 RX ORDER — GUAIFENESIN 600 MG/1
1200 TABLET, EXTENDED RELEASE ORAL 2 TIMES DAILY
Status: DISCONTINUED | OUTPATIENT
Start: 2025-01-22 | End: 2025-01-25 | Stop reason: HOSPADM

## 2025-01-22 RX ORDER — BISACODYL 5 MG
10 TABLET, DELAYED RELEASE (ENTERIC COATED) ORAL DAILY PRN
Status: DISCONTINUED | OUTPATIENT
Start: 2025-01-22 | End: 2025-01-25 | Stop reason: HOSPADM

## 2025-01-22 RX ORDER — ONDANSETRON 4 MG/1
4 TABLET, ORALLY DISINTEGRATING ORAL EVERY 6 HOURS PRN
Status: DISCONTINUED | OUTPATIENT
Start: 2025-01-22 | End: 2025-01-25 | Stop reason: HOSPADM

## 2025-01-22 RX ORDER — ONDANSETRON 2 MG/ML
4 INJECTION INTRAMUSCULAR; INTRAVENOUS EVERY 6 HOURS PRN
Status: DISCONTINUED | OUTPATIENT
Start: 2025-01-22 | End: 2025-01-25 | Stop reason: HOSPADM

## 2025-01-22 RX ORDER — LIDOCAINE 40 MG/G
CREAM TOPICAL
Status: DISCONTINUED | OUTPATIENT
Start: 2025-01-22 | End: 2025-01-25 | Stop reason: HOSPADM

## 2025-01-22 RX ORDER — IOPAMIDOL 755 MG/ML
61 INJECTION, SOLUTION INTRAVASCULAR ONCE
Status: COMPLETED | OUTPATIENT
Start: 2025-01-22 | End: 2025-01-22

## 2025-01-22 RX ADMIN — SODIUM CHLORIDE 1000 ML: 9 INJECTION, SOLUTION INTRAVENOUS at 14:27

## 2025-01-22 RX ADMIN — SODIUM CHLORIDE, PRESERVATIVE FREE 91 ML: 5 INJECTION INTRAVENOUS at 16:01

## 2025-01-22 RX ADMIN — GABAPENTIN 300 MG: 300 CAPSULE ORAL at 21:56

## 2025-01-22 RX ADMIN — GUAIFENESIN 1200 MG: 600 TABLET ORAL at 20:23

## 2025-01-22 RX ADMIN — ENOXAPARIN SODIUM 40 MG: 40 INJECTION SUBCUTANEOUS at 20:23

## 2025-01-22 RX ADMIN — IOPAMIDOL 61 ML: 755 INJECTION, SOLUTION INTRAVENOUS at 16:01

## 2025-01-22 ASSESSMENT — COLUMBIA-SUICIDE SEVERITY RATING SCALE - C-SSRS
6. HAVE YOU EVER DONE ANYTHING, STARTED TO DO ANYTHING, OR PREPARED TO DO ANYTHING TO END YOUR LIFE?: NO
2. HAVE YOU ACTUALLY HAD ANY THOUGHTS OF KILLING YOURSELF IN THE PAST MONTH?: NO
1. IN THE PAST MONTH, HAVE YOU WISHED YOU WERE DEAD OR WISHED YOU COULD GO TO SLEEP AND NOT WAKE UP?: NO

## 2025-01-22 ASSESSMENT — ACTIVITIES OF DAILY LIVING (ADL)
ADLS_ACUITY_SCORE: 56

## 2025-01-22 ASSESSMENT — PAIN SCALES - GENERAL: PAINLEVEL_OUTOF10: NO PAIN (0)

## 2025-01-22 NOTE — NURSING NOTE
"Oncology Rooming Note    January 22, 2025 11:33 AM   Roman Escalante is a 49 year old male who presents for:    Chief Complaint   Patient presents with    Oncology Clinic Visit     Colon cancer     Initial Vitals: BP (!) 89/61 (BP Location: Left arm, Patient Position: Sitting, Cuff Size: Adult Regular)   Pulse (!) 130   Temp 97.4  F (36.3  C) (Oral)   Resp 28   SpO2 (!) 83%  Estimated body mass index is 24.44 kg/m  as calculated from the following:    Height as of 1/2/25: 1.803 m (5' 11\").    Weight as of 1/15/25: 79.5 kg (175 lb 3.2 oz). There is no height or weight on file to calculate BSA.  No Pain (0) Comment: Data Unavailable   No LMP for male patient.  Allergies reviewed: Yes  Medications reviewed: Yes    Medications: Medication refills not needed today.  Pharmacy name entered into EPIC:    [facility] - [place], [place] - [address] [place] [address] [phone]  [facility] - [place], [place] - [address] [place] [address] DRUG STORE #[id number] [address]    Frailty Screening:   Is the patient here for a new oncology consult visit in cancer care? 2. No      Clinical concerns: Low sp02      Ashanti Tobias              "

## 2025-01-22 NOTE — Clinical Note
1/22/2025      Roman Escalante  [address]  [facility] [address]      Dear Colleague,    Thank you for referring your patient, Roman Escalante, to the [facility]. Please see a copy of my visit note below.      [facility] - Medical Oncology Follow Up Note  01/22/2025    Oncology History:   Patient developed rectal bleeding in 2020.  In January 2021 CT showed focal wall thickening in the upper rectum, multiple pulmonary nodules bilaterally suspicious for metastatic disease.  Underwent FNA of the right upper lobe lesion which was consistent with adenocarcinoma of the colon.  He was treated with FOLFOX from 2/20/2021 through 5/20/2021.  Avastin was added.  Had an infusion reaction to oxaliplatin 6/2021.  7/2021- 12/2021 was on 5-FU alone.  Developed progression.  12/2021- 9/2022 was on FOLFIRI.  11/2021 started Lonsurf. All of this was done with outside oncologist.     Met with Dr. Snow on 2/3/2023 to establish care. Recommended regorafenib.     Started regorafenib on 3/2/2023. Progression noted 5/19/23. Plan to start FOLFOX/Avastin and send out CARIS testing to evaluate for other treatment options. Cycle 1 was given with oxaliplatin desensitization, 5FU, Avastin held due to increased urine protein.     Following cycle 4, patient was admitted with hematuria and acute kidney injury.    Oxaliplatin was dropped with Cycle 5.     Y-90 radioembolization 9/7.    10/24/23 CT CAP with disease progression with notable growth in all pulmonary lesions. Lonsurf + bevacizumab started 10/27/23 with plans to bridge to clinical trial.   11/15/23 - stopped chemo for [facility] study.   Currently enrolled in [facility] CAR-T trial (completed cell harvest 1/23/2024, tentatively planning administration of CAR-T in April 2/12/24 - resumed Lonsurf, bevacizumab to bridge treatment until CAR-T therapy  3/20/24 - treatment changed to Fruquintinib due to intolerability of  Lonsurf/bevacizumab  4/2/24- Presented to the ED with dehydration. Stopped Fruquintnib.   6/2024- cellular therapy transplant at [facility].   7/30/24- CT imaging demonstrating marked progression of pulmonary and hepatic lesions, clinical trial failed to generate T-cell graft. Multiple discussions regarding plan of care, hospice vs additional lines of treatment. Patient decided he is not ready for hospice. He resumed treatment with FOLFIRI + bevacizumab on 8/30/24.   10/2024 - CT CAP with continued response, plan to continue FOLFIRI + bevacizumab with the addition of Zometa. Per his dentist, needs dental work completed prior to clearance. (Scheduled 1/2 and 1/21).   12/2024 chemo held for break over new Years per patient's choice.  1/15/25 chemo held due to ongoing respiratory illness, + RSV, also treated with levaquin for pneumonia on CXR, chemo deferred         Roman presents to clinic today for evaluation of increased shortness of breath, increased oxygen needs.     Interval History:     Was starting to feel better from respiratory illness last week. Completed antiboitics this morning.   6 days ago he came down with a GI illness and had severe vomiting for 1-2 days. No diarrhea. Since, he has not felt like he could recover with his fluid intake, energy or appetite. He also has had a harder time breathing.   -started to note lower saturations at home on Friday evening.   -borrowed an oxygen machine from a friend and has been on 4L at home. Saturations have been in the low 90's at best.   -presented to clinic today on room air with saturations in the low 80's, hypotensive and tachycardic.   -denies chest pain. Occassionally feels lightheaded  -has also had intermittent headaches which is abnormal for him  -energy level has been minimal. Hasn't had enough energy the past 1-2 days to sit up and drink an adequate amount of fluids.   -has not had any fevers, chills or body aches  -Continues to use nebs twice daily, loosens up  "congestion and helps him cough it out. Continues to have sinus congestion.   -pain has been well controlled lately. Has been consistently taking Gabapentin with improvement in neuropathy. Hasn't taken yesterday or today and has more burning pain in his feet.       Remainder of 12 point ROS reviewed and negative except as in interval history.         Current Outpatient Medications   Medication Sig Dispense Refill    albuterol (PROAIR HFA/PROVENTIL HFA/VENTOLIN HFA) 108 (90 Base) MCG/ACT inhaler Inhale 2 puffs into the lungs every 6 hours as needed for shortness of breath, wheezing or cough. 18 g 0    benzonatate (TESSALON) 100 MG capsule Take 1 capsule (100 mg) by mouth 3 times daily as needed for cough. 90 capsule 0    Chemo Kit For RN use only. Do not remove items from bag. Contents: 1  sodium chloride 0.9% flush, 4 medium gloves, 1 chemo gown, 1/4 chemo mat, 1 connector female, 1 chemo bag. [id number] kit 0    Emergency Supply Kit, Central, Patient use for emergency only. Contents: 3 sodium chloride 0.9% flushes, 1 dressing kit, 1 microclave ext set 14\", 4 nitrile gloves (med), 6 alcohol prep pads, 1 bacitracin, 1 syringe (10 cc 20 G 1\"). Call [phone] to reorder. [id number] kit 0    fluorouracil (ADRUCIL) 2.5 GM/50ML SOLN injection       Fluorouracil (ADRUCIL) 4,610 mg in sodium chloride 0.9 % 241 mL via HOMEPUMP C-Series Infuse 4,610 mg at 5.2 mL/hr over 46 hours into the vein once for 1 dose. 987334 mL 0    fluticasone-salmeterol (ADVAIR) 100-50 MCG/ACT inhaler Inhale 1 puff into the lungs 2 times daily. 60 each 3    gabapentin (NEURONTIN) 100 MG capsule Take 100-200mg (1-2 capsules) up to twice during the day and 300mg (3 capsules) at bedtime. 210 capsule 0    guaiFENesin (MUCINEX) 600 MG 12 hr tablet Take 2 tablets (1,200 mg) by mouth 2 times daily. 20 tablet 0    ibuprofen (ADVIL/MOTRIN) 200 MG tablet Take 3 tablets (600 mg) by mouth every 8 hours as needed for moderate pain 100 tablet 0    ipratropium - " albuterol 0.5 mg/2.5 mg/3 mL (DUONEB) 0.5-2.5 (3) MG/3ML neb solution Take 1 vial (3 mLs) by nebulization every 6 hours as needed for shortness of breath, wheezing or cough. 90 mL 3    levofloxacin (LEVAQUIN) 750 MG tablet Take 1 tablet (750 mg) by mouth daily. 7 tablet 0    OLANZapine (ZYPREXA) 2.5 MG tablet Take 1 tablet (2.5 mg) by mouth 2 times daily. 60 tablet 1    ondansetron (ZOFRAN ODT) 4 MG ODT tab Take 1 tablet (4 mg) by mouth every 6 hours as needed for nausea. 30 tablet 3    Ostomy Supplies MISC 1 each daily 1 each 11    oxyCODONE IR (ROXICODONE) 10 MG tablet Take 1-2 tablets (10-20 mg) by mouth every 4 hours as needed for moderate to severe pain. 150 tablet 0    pegfilgrastim (NEULASTA) 6 MG/0.6ML injection Inject 0.6 mLs (6 mg) subcutaneously every 14 days. Administer 24 hours after chemotherapy disconnect 3.6 mL 0    Port Access Kit For nurse use only.  Do not remove items from bag.  Use for port access.  Do not place syringe on sterile field. [id number] kit 0    predniSONE (DELTASONE) 20 MG tablet Take 2 tablets (40 mg) by mouth daily. 10 tablet 0    sodium chloride, PF, 0.9% PF flush Inject 10 mLs into the vein as needed for line flush. Flush IV before and after med administration as directed and/or at least every 24 hours, or prior to deaccessing for no further use and/or at least every 4 weeks when not accessed. 616012 mL 0    tamsulosin (FLOMAX) 0.4 MG capsule       cyclobenzaprine (FLEXERIL) 5 MG tablet Take one tab (5mg) by mouth over 6-8 hours, as needed for spasms (Patient not taking: Reported on 1/22/2025) 45 tablet 2    loperamide (IMODIUM) 2 MG capsule 2 caps at 1st sign of diarrhea & 1 cap every 2hrs until 12hrs diarrhea free. During night, 2 caps at bedtime & 2 caps every 4hrs until AM (Patient not taking: Reported on 10/9/2024) 30 capsule 0    LORazepam (ATIVAN) 0.5 MG tablet Take 1 tablet (0.5 mg) by mouth every 6 hours as needed for anxiety (Patient not taking: Reported on 1/22/2025) 10  tablet 0    morphine (MS CONTIN) 15 MG CR tablet Take 1 tablet (15 mg) by mouth every 12 hours (Patient not taking: Reported on 1/22/2025) 60 tablet 0    NARCAN 4 MG/0.1ML nasal spray CALL 911. SPR CONTENTS OF ONE SPRAYER (0.1ML) INTO ONE NOSTRIL. REPEAT IN 2-3 MIN IF SYMPTOMS OF OPIOID EMERGENCY PERSIST, ALTERNATE NOSTRILS (Patient not taking: Reported on 10/23/2024)      prochlorperazine (COMPAZINE) 10 MG tablet Take 0.5 tablets (5 mg) by mouth every 6 hours as needed for nausea or vomiting. (Patient not taking: Reported on 1/22/2025) 30 tablet 2    prochlorperazine (COMPAZINE) 10 MG tablet Take 1 tablet (10 mg) by mouth every 6 hours as needed for nausea or vomiting (Patient not taking: Reported on 12/4/2024) 30 tablet 2    prochlorperazine (COMPAZINE) 5 MG tablet Take 1 tablet (5 mg) by mouth every 6 hours as needed for nausea or vomiting (Patient not taking: Reported on 11/1/2024) 30 tablet 3    sulfamethoxazole-trimethoprim (BACTRIM DS) 800-160 MG tablet  (Patient not taking: Reported on 11/1/2024)      valACYclovir (VALTREX) 500 MG tablet  (Patient not taking: Reported on 10/4/2024)       Physical Exam:  General: The patient is a pleasant male in no acute distress.  BP 90/63 (BP Location: Right arm)   Pulse (!) 124   Temp 97.4  F (36.3  C) (Oral)   Resp 28   SpO2 93%   Wt Readings from Last 10 Encounters:   01/15/25 79.5 kg (175 lb 3.2 oz)   01/02/25 80 kg (176 lb 6.4 oz)   12/26/24 77.7 kg (171 lb 6.4 oz)   12/18/24 80.2 kg (176 lb 11.2 oz)   12/17/24 77.1 kg (170 lb)   12/06/24 77.1 kg (170 lb)   12/04/24 79.2 kg (174 lb 8 oz)   11/20/24 78.5 kg (173 lb)   11/08/24 74.8 kg (165 lb)   11/06/24 78.5 kg (173 lb)   HEENT: EOMI. Sclerae are anicteric. Oral mucosa pink and moist without lesions or thrush.   Lymph: Neck is supple with no lymphadenopathy in the cervical or supraclavicular areas.   Heart: Regular rhythm, tachycardic.   Lungs:  Clear to auscultation throughout bilaterally. Increased work of  breathing.  Loose, productive cough.   Abdomen: Bowel sounds present, soft, nontender with no palpable hepatosplenomegaly or masses. Ostomy is in place in left abdomen.   Extremities: No lower extremity edema noted bilaterally.   Neuro: Cranial nerves II through XII are grossly intact.  Skin: No rashes, petechiae, or bruising noted on exposed skin.      LABS  Most Recent 3 CBC's:  Recent Labs   Lab Test 01/15/25  0910 12/18/24  0927 12/04/24  0749   WBC 12.8* 4.8 6.2   HGB 12.5* 13.8 13.0*   MCV 92 91 90    132* 138*    Most Recent 3 BMP's:  Recent Labs   Lab Test 01/15/25  0910 12/18/24  0927 12/04/24  0749    139 140   POTASSIUM 4.1 3.5 3.9   CHLORIDE 102 106 106   CO2 20* 20* 23   BUN 16.5 13.3 16.4   CR 0.80 0.85 0.86   ANIONGAP 13 13 11   JEFERSON 8.9 8.8 8.9   * 147* 130*    Most Recent 2 LFT's:  Recent Labs   Lab Test 01/15/25  0910 12/18/24  0927   AST 27 21   ALT 25 6   ALKPHOS 130 118   BILITOTAL 0.7 0.3   I reviewed the above labs today.        ASSESSMENT AND PLAN   Metastatic rectal cancer  Most recently progressed after CAR-T clinical trial. He started on treatment with FOLFIRI + bevacizumab on 8/30/24. Tolerating treatment well with improvement in energy, pain and breathing/shortness of breath. CT imaging 10/24 with continued response. He continues to tolerate FOLFIRI + bevacizumab well and denies any significant side effects from treatment. Took a cycle off at the beginning of the month for a break. Chemo was deferred last week due to respiratory illness. Returns today for evaluation and consideration of resuming chemo. Increased respiratory distress today, see below. Continue to hold chemo. Patient will return to clinic next week for possible chemo with labs + LILLY follow up prior.  -Discussed with Dr. Snow and Taylor Luis, GUILLERMINA. Patient still planning to bridge treatment to clinical trial. Scheduled for CT scan and follow up with Dr. Snow 1/31.        Cough, respiratory illness.    Started around 12/25 when other family members also had bronchitis. Was treated by PCP with a 5-day course of prednisone and Azithromycin. No improvement in symptoms, continued to frequent productive cough, chills/sweats, and shortness of breath with minimal activity when evaluated in clinic last week. Positive for RSV 1/15. Was also treated for pneumonia with 7-day course of Levaquin.  Noticed improvements in symptoms/breathing within a day or two but then developed a GI infection and breathing worsened. Started noting lower oxygen saturations at home. Borrowed an oxygen machine from a friend, was on 4L at home with saturations in the low 90's. Continued on twice daily nebulizers prescribed by PCP. Presented to clinic today with hypotension, tachycardia and oxygen saturations in the low 80's on room air. Improvedto ~92-95% on 4-6L O2 via nasal canula. Concern for PE, dehydration or other acute pulmonary illness. Due to unstable vitals, patient transferred to ED via EMS for further evaluation/treatment.       Oxygen, fluid in lungs    Previously, walking oxygen test demonstrating oxygen saturations in the 80's on room air. Previously ordered home oxygen with activity. Did not get approved through insurance, subsequent walking tests with adequate oxygen saturations. Echo on 9/5/24 showed a normal EF.       Rectal, perineum pain  Following with palliative care. No longer needing MS Contin, but remains on oxycodone prn, currently 10 mg 2-3 times per day. Pain fluctuates throughout chemo cycle, improves a few days after chemo and recurs a few days prior to next dose.       Not discussed today:   Nausea.   Continue IV Emend as premedication. Continue olanzapine at bedtime and Zofran and Compazine as needed.     Anemia  Patient has microcytic anemia. Received IV iron and has had significant improvement in symptoms. Hemoglobin continues to improve.     Weight loss  Previous weight loss despite efforts to increase intake.  Started THC gummies, appetite and weight have improved. Nutrition referral previously placed.       The longitudinal plan of care for the diagnosis(es)/condition(s) as documented were addressed during this visit. Due to the added complexity in care, I will continue to support Roman in the subsequent management and with ongoing continuity of care.      FLORA Albright CNP        Again, thank you for allowing me to participate in the care of your patient.        Sincerely,        FLORA Albright CNP    Electronically signed

## 2025-01-22 NOTE — H&P
[facility]    History and Physical - Hospitalist Service, GOLD TEAM        Date of Admission:  1/22/2025    Assessment & Plan      Roman Escalante is a 49 year old man admitted on 1/22/2025. He has a history of colon cancer with mets to his lungs who is admitted with dyspnea and hypoxia.    #) Acute hypoxic respiratory failure - Presents with new oxygen needs to 6 lpm associated with dyspnea.  His respiratory panel was negative on admission, although he had a positive RSV test 1/15.  His CT chest shows no pulmonary embolism or evidence for pulmonary hypertension, but does show multifocal pulmonary masses and new diffuse ground glass attenuation throughout the lungs as well as a small right pleural effusion that appears to be new.  I see he was recently treated with prednisone and azithromycin for a respiratory infection one month ago, and completed a course of Levaquin the day of admission, all of which without any improvement.   He has a long history of smoking and stopped ~6 months ago.  The etiology of his new respiratory failure is unclear.    - Consult pulmonology for bronchoscopy (addendum - I spoke with their team and they have no room on the bronch schedule tomorrow so I will remove his NPO order and let him eat tomorrow)  - Consult IR for diagnostic thoracentesis.  His pleural effusion appears to be new.  - Please follow blood cultures  - Given his lack of response to antibiotics, I will hold of on further antibiotic treatment for now.  - TIMUR, RF, cyclic citrullinated peptide  - 1,3, Beta-D-glucan, histo, blasto  - Echocardiogram  - Continue Advair, albuterol    #) History of colon cancer - Diagnosed in 2020 and found to have adenocarcinoma.  He has been on multiple agents and appears to currently be on FOLFIRI + bevacizumab with Zometa.  - Consult oncology while inpatient             Diet: Regular Diet Adult    DVT Prophylaxis: Pneumatic Compression  Devices  Puri Catheter: Not present  Lines: PRESENT             Cardiac Monitoring: None  Code Status:  DNR, okay with intubation    Clinically Significant Risk Factors Present on Admission               # Hypoalbuminemia: Lowest albumin = 3.1 g/dL at 1/22/2025  2:31 PM, will monitor as appropriate     # Hypertension: Noted on problem list                      Disposition Plan     Medically Ready for Discharge: Anticipated in 5+ Days         The patient's care was discussed with the Attending Physician, Dr. Gregory .    FLORA Garcia [facility]  Hospitalist Service, [facility]  Securely message with Agent Panda (more info)  Text page via [facility] Paging/Directory   See signed in provider for up to date coverage information    ______________________________________________________________________    Chief Complaint   Hypoxia, dyspnea    History is obtained from the patient    History of Present Illness   Roman Escalante is a 49 year old man admitted on 1/22/2025. He has a history of colon cancer with mets to his lungs who is admitted with dyspnea and hypoxia.    The patient presents with new oxygen needs to 6 lpm associated with dyspnea ongoing for close to a month.  He was treated with azithromycin and prednisone one month ago for a respiratory infection and later tested positive for RSV on 1/15.  He was put on Levaquin which he finished today.  Unfortunately throughout this time his respiratory symptoms have only worsened.  He has been coughing up thick clear secretions and having night sweats.  He notes dyspnea when sitting up.      Past Medical History    Past Medical History:   Diagnosis Date    Cancer (H) 1/12/21    Colorectal cacer mast, to lungs, and liver    Essential (primary) hypertension 08/03/2021       Past Surgical History   Past Surgical History:   Procedure Laterality Date    ABDOMEN SURGERY      BIOPSY  1/21/21    Lung biopsy. Positive  "colorectal cancer in lungs    GI SURGERY  Ostomy placementnt 21    Stoma is an outie, bowel excretion only    IR LUNG BIOPSY MEDIASTINUM RIGHT  2021    IR SIRT (SELECTIVE INTERNAL RADIO THERAPY)  2023    IR VISCERAL ANGIOGRAM  2023    IR VISCERAL EMBOLIZATION  2023    NERVE BLOCK PERIPHERAL Bilateral 3/14/2024    Procedure: Bilateral pudendal nerve block with ultrasound;  Surgeon: Asha Mendoza MD;  Location: [facility] OR       Prior to Admission Medications   Prior to Admission Medications   Prescriptions Last Dose Informant Patient Reported? Taking?   Chemo Kit   No No   Sig: For RN use only. Do not remove items from bag. Contents: 1  sodium chloride 0.9% flush, 4 medium gloves, 1 chemo gown, 1/4 chemo mat, 1 connector female, 1 chemo bag.   Emergency Supply Kit, Central,   No No   Sig: Patient use for emergency only. Contents: 3 sodium chloride 0.9% flushes, 1 dressing kit, 1 microclave ext set 14\", 4 nitrile gloves (med), 6 alcohol prep pads, 1 bacitracin, 1 syringe (10 cc 20 G 1\"). Call [phone] to reorder.   Fluorouracil (ADRUCIL) 4,610 mg in sodium chloride 0.9 % 241 mL via HOMEPUMP C-Series   No No   Sig: Infuse 4,610 mg at 5.2 mL/hr over 46 hours into the vein once for 1 dose.   LORazepam (ATIVAN) 0.5 MG tablet   No No   Sig: Take 1 tablet (0.5 mg) by mouth every 6 hours as needed for anxiety   Patient not taking: Reported on 2025   NARCAN 4 MG/0.1ML nasal spray   Yes No   Sig: CALL 911. SPR CONTENTS OF ONE SPRAYER (0.1ML) INTO ONE NOSTRIL. REPEAT IN 2-3 MIN IF SYMPTOMS OF OPIOID EMERGENCY PERSIST, ALTERNATE NOSTRILS   Patient not taking: Reported on 10/23/2024   OLANZapine (ZYPREXA) 2.5 MG tablet   No No   Sig: Take 1 tablet (2.5 mg) by mouth 2 times daily.   Ostomy Supplies MISC  Self No No   Si each daily   Port Access Kit   No No   Sig: For nurse use only.  Do not remove items from bag.  Use for port access.  Do not place syringe on sterile field.   albuterol (PROAIR " HFA/PROVENTIL HFA/VENTOLIN HFA) 108 (90 Base) MCG/ACT inhaler   No No   Sig: Inhale 2 puffs into the lungs every 6 hours as needed for shortness of breath, wheezing or cough.   benzonatate (TESSALON) 100 MG capsule   No No   Sig: Take 1 capsule (100 mg) by mouth 3 times daily as needed for cough.   cyclobenzaprine (FLEXERIL) 5 MG tablet   No No   Sig: Take one tab (5mg) by mouth over 6-8 hours, as needed for spasms   Patient not taking: Reported on 2025   fluorouracil (ADRUCIL) 2.5 GM/50ML SOLN injection   Yes No   fluticasone-salmeterol (ADVAIR) 100-50 MCG/ACT inhaler   No No   Sig: Inhale 1 puff into the lungs 2 times daily.   gabapentin (NEURONTIN) 100 MG capsule   No No   Sig: Take 100-200mg (1-2 capsules) up to twice during the day and 300mg (3 capsules) at bedtime.   guaiFENesin (MUCINEX) 600 MG 12 hr tablet   No No   Sig: Take 2 tablets (1,200 mg) by mouth 2 times daily.   ibuprofen (ADVIL/MOTRIN) 200 MG tablet   No No   Sig: Take 3 tablets (600 mg) by mouth every 8 hours as needed for moderate pain   ipratropium - albuterol 0.5 mg/2.5 mg/3 mL (DUONEB) 0.5-2.5 (3) MG/3ML neb solution   No No   Sig: Take 1 vial (3 mLs) by nebulization every 6 hours as needed for shortness of breath, wheezing or cough.   levofloxacin (LEVAQUIN) 750 MG tablet   No No   Sig: Take 1 tablet (750 mg) by mouth daily.   loperamide (IMODIUM) 2 MG capsule   No No   Si caps at 1st sign of diarrhea & 1 cap every 2hrs until 12hrs diarrhea free. During night, 2 caps at bedtime & 2 caps every 4hrs until AM   Patient not taking: Reported on 10/9/2024   morphine (MS CONTIN) 15 MG CR tablet   No No   Sig: Take 1 tablet (15 mg) by mouth every 12 hours   Patient not taking: Reported on 2025   ondansetron (ZOFRAN ODT) 4 MG ODT tab   No No   Sig: Take 1 tablet (4 mg) by mouth every 6 hours as needed for nausea.   oxyCODONE IR (ROXICODONE) 10 MG tablet   No No   Sig: Take 1-2 tablets (10-20 mg) by mouth every 4 hours as needed for  moderate to severe pain.   pegfilgrastim (NEULASTA) 6 MG/0.6ML injection   No No   Sig: Inject 0.6 mLs (6 mg) subcutaneously every 14 days. Administer 24 hours after chemotherapy disconnect   predniSONE (DELTASONE) 20 MG tablet   No No   Sig: Take 2 tablets (40 mg) by mouth daily.   prochlorperazine (COMPAZINE) 10 MG tablet  Self No No   Sig: Take 1 tablet (10 mg) by mouth every 6 hours as needed for nausea or vomiting   Patient not taking: Reported on 12/4/2024   prochlorperazine (COMPAZINE) 10 MG tablet   No No   Sig: Take 0.5 tablets (5 mg) by mouth every 6 hours as needed for nausea or vomiting.   Patient not taking: Reported on 1/22/2025   prochlorperazine (COMPAZINE) 5 MG tablet  Self No No   Sig: Take 1 tablet (5 mg) by mouth every 6 hours as needed for nausea or vomiting   Patient not taking: Reported on 11/1/2024   sodium chloride, PF, 0.9% PF flush   No No   Sig: Inject 10 mLs into the vein as needed for line flush. Flush IV before and after med administration as directed and/or at least every 24 hours, or prior to deaccessing for no further use and/or at least every 4 weeks when not accessed.   sulfamethoxazole-trimethoprim (BACTRIM DS) 800-160 MG tablet   Yes No   Patient not taking: Reported on 11/1/2024   tamsulosin (FLOMAX) 0.4 MG capsule   Yes No   valACYclovir (VALTREX) 500 MG tablet   Yes No   Patient not taking: Reported on 10/4/2024      Facility-Administered Medications: None           Physical Exam   Vital Signs: Temp: 97.5  F (36.4  C) Temp src: Oral BP: 94/68 Pulse: 101   Resp: 16 SpO2: 97 % O2 Device: Nasal cannula Oxygen Delivery: 6 LPM    Physical Exam   Constitutional:   Well nourished, well developed, resting comfortably   Cardiovascular: Regular rate and rhythm without murmurs or gallops  Pulmonary/Chest: Clear to auscultation bilaterally, with no wheezes or retractions. No respiratory distress.  GI: Soft with good bowel sounds.  Non-tender, non-distended, with no guarding, no rebound,  no peritoneal signs.   Musculoskeletal:  No edema or clubbing   Skin: Skin is warm and dry. No rash noted.   Neurological: Alert and oriented to person, place, and time. Nonfocal exam  Psychiatric:  Normal mood and affect.      Medical Decision Making       35 MINUTES SPENT BY ME on the date of service doing chart review, history, exam, documentation & further activities per the note.      Data   CBC, BMP, CT scan, EKG reviewed

## 2025-01-22 NOTE — ED TRIAGE NOTES
BIBA from [facility] after staff were concerned for systolic BP in the 80's and low O2 sat requiring 6L per nasal cannula. Patient is currently being treated for colorectal cancer and also finished a course of antibiotics for pneumonia.

## 2025-01-22 NOTE — NURSING NOTE
Rapid Response Epic Documentation     Situation:      911 and RRT called to 2nd floor, room 2.43.      Objective:      Pt arrived at clinic short of breath, with O2 sats in the low 80's. Provider called 911.      Assessment:          RRT found pt sitting up A&O. O2 sats at 95% with 6 ltrs O2 by cannula. Pt was weak, O2 was helping with his shortness of breath. EMS arrived and the provider gave report.      BP:  90/63    Pulse: 121  Respiration: 22  SPO2: 95 % on 5 ltrs cannula.  Glucose: na mg/dl  Mental Status: Alert  CMS: Intact  Stroke Scale: Positive  EKG: Not Performed      Treatment:          Location:          Disposition:      Transfer to Emergency Room Via: 911    Protocol Used:     Shortness of Breathe

## 2025-01-22 NOTE — PROGRESS NOTES
[facility] - Medical Oncology Follow Up Note  01/22/2025    Oncology History:   Patient developed rectal bleeding in 2020.  In January 2021 CT showed focal wall thickening in the upper rectum, multiple pulmonary nodules bilaterally suspicious for metastatic disease.  Underwent FNA of the right upper lobe lesion which was consistent with adenocarcinoma of the colon.  He was treated with FOLFOX from 2/20/2021 through 5/20/2021.  Avastin was added.  Had an infusion reaction to oxaliplatin 6/2021.  7/2021- 12/2021 was on 5-FU alone.  Developed progression.  12/2021- 9/2022 was on FOLFIRI.  11/2021 started Lonsurf. All of this was done with outside oncologist.     Met with Dr. Snow on 2/3/2023 to establish care. Recommended regorafenib.     Started regorafenib on 3/2/2023. Progression noted 5/19/23. Plan to start FOLFOX/Avastin and send out CARIS testing to evaluate for other treatment options. Cycle 1 was given with oxaliplatin desensitization, 5FU, Avastin held due to increased urine protein.     Following cycle 4, patient was admitted with hematuria and acute kidney injury.    Oxaliplatin was dropped with Cycle 5.     Y-90 radioembolization 9/7.    10/24/23 CT CAP with disease progression with notable growth in all pulmonary lesions. Lonsurf + bevacizumab started 10/27/23 with plans to bridge to clinical trial.   11/15/23 - stopped chemo for [facility] study.   Currently enrolled in [facility] CAR-T trial (completed cell harvest 1/23/2024, tentatively planning administration of CAR-T in April 2/12/24 - resumed Lonsurf, bevacizumab to bridge treatment until CAR-T therapy  3/20/24 - treatment changed to Fruquintinib due to intolerability of Lonsurf/bevacizumab  4/2/24- Presented to the ED with dehydration. Stopped Fruquintnib.   6/2024- cellular therapy transplant at [facility].   7/30/24- CT imaging demonstrating marked progression of pulmonary and hepatic lesions, clinical trial failed to generate T-cell graft. Multiple  discussions regarding plan of care, hospice vs additional lines of treatment. Patient decided he is not ready for hospice. He resumed treatment with FOLFIRI + bevacizumab on 8/30/24.   10/2024 - CT CAP with continued response, plan to continue FOLFIRI + bevacizumab with the addition of Zometa. Per his dentist, needs dental work completed prior to clearance. (Scheduled 1/2 and 1/21).   12/2024 chemo held for break over new Years per patient's choice.  1/15/25 chemo held due to ongoing respiratory illness, + RSV, also treated with levaquin for pneumonia on CXR, chemo deferred         Roman presents to clinic today for evaluation of increased shortness of breath, increased oxygen needs.     Interval History:     Was starting to feel better from respiratory illness last week. Completed antiboitics this morning.   6 days ago he came down with a GI illness and had severe vomiting for 1-2 days. No diarrhea. Since, he has not felt like he could recover with his fluid intake, energy or appetite. He also has had a harder time breathing.   -started to note lower saturations at home on Friday evening.   -borrowed an oxygen machine from a friend and has been on 4L at home. Saturations have been in the low 90's at best.   -presented to clinic today on room air with saturations in the low 80's, hypotensive and tachycardic.   -denies chest pain. Occassionally feels lightheaded  -has also had intermittent headaches which is abnormal for him  -energy level has been minimal. Hasn't had enough energy the past 1-2 days to sit up and drink an adequate amount of fluids.   -has not had any fevers, chills or body aches  -Continues to use nebs twice daily, loosens up congestion and helps him cough it out. Continues to have sinus congestion.   -pain has been well controlled lately. Has been consistently taking Gabapentin with improvement in neuropathy. Hasn't taken yesterday or today and has more burning pain in his feet.       Remainder of  "12 point ROS reviewed and negative except as in interval history.         Current Outpatient Medications   Medication Sig Dispense Refill    albuterol (PROAIR HFA/PROVENTIL HFA/VENTOLIN HFA) 108 (90 Base) MCG/ACT inhaler Inhale 2 puffs into the lungs every 6 hours as needed for shortness of breath, wheezing or cough. 18 g 0    benzonatate (TESSALON) 100 MG capsule Take 1 capsule (100 mg) by mouth 3 times daily as needed for cough. 90 capsule 0    Chemo Kit For RN use only. Do not remove items from bag. Contents: 1  sodium chloride 0.9% flush, 4 medium gloves, 1 chemo gown, 1/4 chemo mat, 1 connector female, 1 chemo bag. [id number] kit 0    Emergency Supply Kit, Central, Patient use for emergency only. Contents: 3 sodium chloride 0.9% flushes, 1 dressing kit, 1 microclave ext set 14\", 4 nitrile gloves (med), 6 alcohol prep pads, 1 bacitracin, 1 syringe (10 cc 20 G 1\"). Call [phone] to reorder. [id number] kit 0    fluorouracil (ADRUCIL) 2.5 GM/50ML SOLN injection       Fluorouracil (ADRUCIL) 4,610 mg in sodium chloride 0.9 % 241 mL via HOMEPUMP C-Series Infuse 4,610 mg at 5.2 mL/hr over 46 hours into the vein once for 1 dose. 640251 mL 0    fluticasone-salmeterol (ADVAIR) 100-50 MCG/ACT inhaler Inhale 1 puff into the lungs 2 times daily. 60 each 3    gabapentin (NEURONTIN) 100 MG capsule Take 100-200mg (1-2 capsules) up to twice during the day and 300mg (3 capsules) at bedtime. 210 capsule 0    guaiFENesin (MUCINEX) 600 MG 12 hr tablet Take 2 tablets (1,200 mg) by mouth 2 times daily. 20 tablet 0    ibuprofen (ADVIL/MOTRIN) 200 MG tablet Take 3 tablets (600 mg) by mouth every 8 hours as needed for moderate pain 100 tablet 0    ipratropium - albuterol 0.5 mg/2.5 mg/3 mL (DUONEB) 0.5-2.5 (3) MG/3ML neb solution Take 1 vial (3 mLs) by nebulization every 6 hours as needed for shortness of breath, wheezing or cough. 90 mL 3    levofloxacin (LEVAQUIN) 750 MG tablet Take 1 tablet (750 mg) by mouth daily. 7 tablet 0    " OLANZapine (ZYPREXA) 2.5 MG tablet Take 1 tablet (2.5 mg) by mouth 2 times daily. 60 tablet 1    ondansetron (ZOFRAN ODT) 4 MG ODT tab Take 1 tablet (4 mg) by mouth every 6 hours as needed for nausea. 30 tablet 3    Ostomy Supplies MISC 1 each daily 1 each 11    oxyCODONE IR (ROXICODONE) 10 MG tablet Take 1-2 tablets (10-20 mg) by mouth every 4 hours as needed for moderate to severe pain. 150 tablet 0    pegfilgrastim (NEULASTA) 6 MG/0.6ML injection Inject 0.6 mLs (6 mg) subcutaneously every 14 days. Administer 24 hours after chemotherapy disconnect 3.6 mL 0    Port Access Kit For nurse use only.  Do not remove items from bag.  Use for port access.  Do not place syringe on sterile field. [id number] kit 0    predniSONE (DELTASONE) 20 MG tablet Take 2 tablets (40 mg) by mouth daily. 10 tablet 0    sodium chloride, PF, 0.9% PF flush Inject 10 mLs into the vein as needed for line flush. Flush IV before and after med administration as directed and/or at least every 24 hours, or prior to deaccessing for no further use and/or at least every 4 weeks when not accessed. [id number] mL 0    tamsulosin (FLOMAX) 0.4 MG capsule       cyclobenzaprine (FLEXERIL) 5 MG tablet Take one tab (5mg) by mouth over 6-8 hours, as needed for spasms (Patient not taking: Reported on 1/22/2025) 45 tablet 2    loperamide (IMODIUM) 2 MG capsule 2 caps at 1st sign of diarrhea & 1 cap every 2hrs until 12hrs diarrhea free. During night, 2 caps at bedtime & 2 caps every 4hrs until AM (Patient not taking: Reported on 10/9/2024) 30 capsule 0    LORazepam (ATIVAN) 0.5 MG tablet Take 1 tablet (0.5 mg) by mouth every 6 hours as needed for anxiety (Patient not taking: Reported on 1/22/2025) 10 tablet 0    morphine (MS CONTIN) 15 MG CR tablet Take 1 tablet (15 mg) by mouth every 12 hours (Patient not taking: Reported on 1/22/2025) 60 tablet 0    NARCAN 4 MG/0.1ML nasal spray CALL 911. SPR CONTENTS OF ONE SPRAYER (0.1ML) INTO ONE NOSTRIL. REPEAT IN 2-3 MIN IF SYMPTOMS  OF OPIOID EMERGENCY PERSIST, ALTERNATE NOSTRILS (Patient not taking: Reported on 10/23/2024)      prochlorperazine (COMPAZINE) 10 MG tablet Take 0.5 tablets (5 mg) by mouth every 6 hours as needed for nausea or vomiting. (Patient not taking: Reported on 1/22/2025) 30 tablet 2    prochlorperazine (COMPAZINE) 10 MG tablet Take 1 tablet (10 mg) by mouth every 6 hours as needed for nausea or vomiting (Patient not taking: Reported on 12/4/2024) 30 tablet 2    prochlorperazine (COMPAZINE) 5 MG tablet Take 1 tablet (5 mg) by mouth every 6 hours as needed for nausea or vomiting (Patient not taking: Reported on 11/1/2024) 30 tablet 3    sulfamethoxazole-trimethoprim (BACTRIM DS) 800-160 MG tablet  (Patient not taking: Reported on 11/1/2024)      valACYclovir (VALTREX) 500 MG tablet  (Patient not taking: Reported on 10/4/2024)       Physical Exam:  General: The patient is a pleasant male in no acute distress.  BP 90/63 (BP Location: Right arm)   Pulse (!) 124   Temp 97.4  F (36.3  C) (Oral)   Resp 28   SpO2 93%   Wt Readings from Last 10 Encounters:   01/15/25 79.5 kg (175 lb 3.2 oz)   01/02/25 80 kg (176 lb 6.4 oz)   12/26/24 77.7 kg (171 lb 6.4 oz)   12/18/24 80.2 kg (176 lb 11.2 oz)   12/17/24 77.1 kg (170 lb)   12/06/24 77.1 kg (170 lb)   12/04/24 79.2 kg (174 lb 8 oz)   11/20/24 78.5 kg (173 lb)   11/08/24 74.8 kg (165 lb)   11/06/24 78.5 kg (173 lb)   HEENT: EOMI. Sclerae are anicteric. Oral mucosa pink and moist without lesions or thrush.   Lymph: Neck is supple with no lymphadenopathy in the cervical or supraclavicular areas.   Heart: Regular rhythm, tachycardic.   Lungs:  Clear to auscultation throughout bilaterally. Increased work of breathing.  Loose, productive cough.   Abdomen: Bowel sounds present, soft, nontender with no palpable hepatosplenomegaly or masses. Ostomy is in place in left abdomen.   Extremities: No lower extremity edema noted bilaterally.   Neuro: Cranial nerves II through XII are grossly  intact.  Skin: No rashes, petechiae, or bruising noted on exposed skin.      LABS  Most Recent 3 CBC's:  Recent Labs   Lab Test 01/15/25  0910 12/18/24  0927 12/04/24  0749   WBC 12.8* 4.8 6.2   HGB 12.5* 13.8 13.0*   MCV 92 91 90    132* 138*    Most Recent 3 BMP's:  Recent Labs   Lab Test 01/15/25  0910 12/18/24  0927 12/04/24  0749    139 140   POTASSIUM 4.1 3.5 3.9   CHLORIDE 102 106 106   CO2 20* 20* 23   BUN 16.5 13.3 16.4   CR 0.80 0.85 0.86   ANIONGAP 13 13 11   JEFERSON 8.9 8.8 8.9   * 147* 130*    Most Recent 2 LFT's:  Recent Labs   Lab Test 01/15/25  0910 12/18/24  0927   AST 27 21   ALT 25 6   ALKPHOS 130 118   BILITOTAL 0.7 0.3   I reviewed the above labs today.        ASSESSMENT AND PLAN   Metastatic rectal cancer  Most recently progressed after CAR-T clinical trial. He started on treatment with FOLFIRI + bevacizumab on 8/30/24. Tolerating treatment well with improvement in energy, pain and breathing/shortness of breath. CT imaging 10/24 with continued response. He continues to tolerate FOLFIRI + bevacizumab well and denies any significant side effects from treatment. Took a cycle off at the beginning of the month for a break. Chemo was deferred last week due to respiratory illness. Returns today for evaluation and consideration of resuming chemo. Increased respiratory distress today, see below. Continue to hold chemo. Patient will return to clinic next week for possible chemo with labs + LILLY follow up prior.  -Discussed with Dr. Snow and Taylor Luis, RN. Patient still planning to bridge treatment to clinical trial. Scheduled for CT scan and follow up with Dr. Snow 1/31.        Cough, respiratory illness.   Started around 12/25 when other family members also had bronchitis. Was treated by PCP with a 5-day course of prednisone and Azithromycin. No improvement in symptoms, continued to frequent productive cough, chills/sweats, and shortness of breath with minimal activity when  evaluated in clinic last week. Positive for RSV 1/15. Was also treated for pneumonia with 7-day course of Levaquin.  Noticed improvements in symptoms/breathing within a day or two but then developed a GI infection and breathing worsened. Started noting lower oxygen saturations at home. Borrowed an oxygen machine from a friend, was on 4L at home with saturations in the low 90's. Continued on twice daily nebulizers prescribed by PCP. Presented to clinic today with hypotension, tachycardia and oxygen saturations in the low 80's on room air. Improvedto ~92-95% on 4-6L O2 via nasal canula. Concern for PE, dehydration or other acute pulmonary illness. Due to unstable vitals, patient transferred to ED via EMS for further evaluation/treatment.       Oxygen, fluid in lungs    Previously, walking oxygen test demonstrating oxygen saturations in the 80's on room air. Previously ordered home oxygen with activity. Did not get approved through insurance, subsequent walking tests with adequate oxygen saturations. Echo on 9/5/24 showed a normal EF.       Rectal, perineum pain  Following with palliative care. No longer needing MS Contin, but remains on oxycodone prn, currently 10 mg 2-3 times per day. Pain fluctuates throughout chemo cycle, improves a few days after chemo and recurs a few days prior to next dose.       Not discussed today:   Nausea.   Continue IV Emend as premedication. Continue olanzapine at bedtime and Zofran and Compazine as needed.     Anemia  Patient has microcytic anemia. Received IV iron and has had significant improvement in symptoms. Hemoglobin continues to improve.     Weight loss  Previous weight loss despite efforts to increase intake. Started THC gummies, appetite and weight have improved. Nutrition referral previously placed.       The longitudinal plan of care for the diagnosis(es)/condition(s) as documented were addressed during this visit. Due to the added complexity in care, I will continue to  support Roman in the subsequent management and with ongoing continuity of care.      FLORA Albright CNP

## 2025-01-22 NOTE — ED PROVIDER NOTES
ED Provider Note  [facility]      History     Chief Complaint   Patient presents with    Hypotension    Shortness of Breath     HPI  Roman Escalante is a 49 year old male with complex past medical history including history of colon cancer with mets to the lungs who presents the emergency room with concerns for dyspnea and hypoxia.  Per chart review patient was seen today in the [place] [facility] and noted to be hypoxic into the low 80s.  He was transferred to the emergency department for additional workup.    Per chart review patient was seen in the oncology clinic with concerns for ongoing respiratory symptoms.  Heunderwent respiratory swab which was positive for RSV.  He also underwent chest x-ray which showed increased mid and lower zone predominant streaky and hazy pulmonary opacities thought to be edema versus infection.  He notes he was put on levofloxacin at that time and finished his last dose today.    Patient tells me that since the end of December he has been dealing with subjective chills and cough.  He notes that particularly since the end of last week he has been feeling more ill.  He notes him and his wife dealt with some vomiting late Thursday night 2 they attribute to a foodborne illness which has resolved.  Patient notes he has had several bouts of liquid diarrhea from his ostomy since that time.  New for him however he notes new onset increasing cough dyspnea on exertion fast heart rate and oxygen requirement.  He normally is on room air notes that he has a friend who uses oxygen and put himself on 4 L nasal cannula at the end of last week maintaining saturations in the low 90s.  When he went to clinic he was found to be hypoxic and directed to come here for additional workup.  He notes no measured fevers.  He denies any hemoptysis notes his cough has been productive since the end of December.  He notes no chest pain or chest heaviness.  No abdominal pain no urinary  symptoms.  He notes he had a nebulizer around 1030 this morning.  He denies any cardiac history.  Per chart review patient underwent echocardiogram 9/5/2024 which showed left ventricular ejection fraction of 55 to 60%.  Patient states his last chemo was about 4 weeks ago.    Past Medical History  Past Medical History:   Diagnosis Date    Cancer (H) 1/12/21    Colorectal cacer mast, to lungs, and liver    Essential (primary) hypertension 08/03/2021     Past Surgical History:   Procedure Laterality Date    ABDOMEN SURGERY      BIOPSY  1/21/21    Lung biopsy. Positive colorectal cancer in lungs    GI SURGERY  Ostomy placementnt 2/1/21    Stoma is an outie, bowel excretion only    IR LUNG BIOPSY MEDIASTINUM RIGHT  01/19/2021    IR SIRT (SELECTIVE INTERNAL RADIO THERAPY)  8/29/2023    IR VISCERAL ANGIOGRAM  8/29/2023    IR VISCERAL EMBOLIZATION  9/7/2023    NERVE BLOCK PERIPHERAL Bilateral 3/14/2024    Procedure: Bilateral pudendal nerve block with ultrasound;  Surgeon: Asha Mendoza MD;  Location: [facility] OR     albuterol (PROAIR HFA/PROVENTIL HFA/VENTOLIN HFA) 108 (90 Base) MCG/ACT inhaler  benzonatate (TESSALON) 100 MG capsule  Chemo Kit  cyclobenzaprine (FLEXERIL) 5 MG tablet  Emergency Supply Kit, [facility],  fluorouracil (ADRUCIL) 2.5 GM/50ML SOLN injection  Fluorouracil (ADRUCIL) 4,610 mg in sodium chloride 0.9 % 241 mL via HOMEPUMP C-Series  fluticasone-salmeterol (ADVAIR) 100-50 MCG/ACT inhaler  gabapentin (NEURONTIN) 100 MG capsule  guaiFENesin (MUCINEX) 600 MG 12 hr tablet  ibuprofen (ADVIL/MOTRIN) 200 MG tablet  ipratropium - albuterol 0.5 mg/2.5 mg/3 mL (DUONEB) 0.5-2.5 (3) MG/3ML neb solution  levofloxacin (LEVAQUIN) 750 MG tablet  loperamide (IMODIUM) 2 MG capsule  LORazepam (ATIVAN) 0.5 MG tablet  morphine (MS CONTIN) 15 MG CR tablet  NARCAN 4 MG/0.1ML nasal spray  OLANZapine (ZYPREXA) 2.5 MG tablet  ondansetron (ZOFRAN ODT) 4 MG ODT tab  Ostomy Supplies MISC  oxyCODONE IR (ROXICODONE) 10 MG  tablet  pegfilgrastim (NEULASTA) 6 MG/0.6ML injection  Port Access Kit  predniSONE (DELTASONE) 20 MG tablet  prochlorperazine (COMPAZINE) 10 MG tablet  prochlorperazine (COMPAZINE) 10 MG tablet  prochlorperazine (COMPAZINE) 5 MG tablet  sodium chloride, PF, 0.9% PF flush  sulfamethoxazole-trimethoprim (BACTRIM DS) 800-160 MG tablet  tamsulosin (FLOMAX) 0.4 MG capsule  valACYclovir (VALTREX) 500 MG tablet      Allergies   Allergen Reactions    Oxaliplatin Shortness Of Breath, Nausea and Vomiting and Other (See Comments)     Patient had HSR to Oxaliplatin on cycle 8 of FOLFOX chemotherapy. Sent to ER for continued monitoring.  Patient had HSR to Oxaliplatin on cycle 8 of FOLFOX chemotherapy. Sent to ER for continued monitoring.      Vancomycin      Other Reaction(s): Red man syndrome    Blood-Group Specific Substance Other (See Comments)     Patient has reactivity Suggestive of a Warm auto antibody. Blood products may be delayed. Draw patient 24 hours prior to transfusion. Draw one red top and two purple top tubes for all type and screen orders.  Patient has reactivity Suggestive of a Warm auto antibody. Blood products may be delayed. Draw patient 24 hours prior to transfusion. Draw one red top and two purple top tubes for all type and screen orders.       Family History  No family history on file.  Social History   Social History     Tobacco Use    Smoking status: Former     Current packs/day: 0.00     Average packs/day: 0.5 packs/day for 13.3 years (6.6 ttl pk-yrs)     Types: Cigarettes, Other     Start date: 2010     Quit date: 2023     Years since quittin.4     Passive exposure: Past    Smokeless tobacco: Never   Vaping Use    Vaping status: Never Used   Substance Use Topics    Alcohol use: Not Currently     Comment: social    Drug use: Yes     Types: Marijuana     Comment: Keeps up my appetite, sleep, and help with neuropathy      A medically appropriate review of systems was performed with pertinent  positives and negatives noted in the HPI, and all other systems negative.    Physical Exam   BP: 99/65  Pulse: (!) 106  Temp: 97.5  F (36.4  C)  Resp: 16  SpO2: 99 %  Physical Exam      GENERAL APPEARANCE: The patient is well developed, well appearing, and in no acute distress.  Patient is on 4 L nasal cannula.  HEAD:  Normocephalic and atraumatic.   EENT: Voice normal.  NECK: Trachea is midline.No lymphadenopathy or tenderness.  LUNGS: Breath sounds are equal and clear bilaterally. No wheezes, rhonchi, or rales.  HEART: Regular rate and normal rhythm.   ABDOMEN: Soft, flat, and benign. No mass, tenderness, guarding, or rebound.Bowel sounds are present.  Ostomy present without significant output.  No tenderness to palpation of the abdomen or around ostomy site.  EXTREMITIES: No cyanosis, clubbing, or edema.  NEUROLOGIC: No focal sensory or motor deficits are noted.  PSYCHIATRIC: The patient is awake, alert.  Appropriate mood and affect.  SKIN: Warm, dry, and well perfused. Good turgor.      ED Course, Procedures, & Data        EKG 1/22/2025:    Sinus rhythm, ventricular rate 98 QTc 490.  My interpretation the rhythm is regular.  There is a P wave before QRS complex.  No visible ST elevation or depression to suggest ischemia.  There is T wave inversion in the septal leads which is new from prior.  August 29, 2023       Results for orders placed or performed during the hospital encounter of 01/22/25   CT Chest Pulmonary Embolism w Contrast     Status: None    Narrative    EXAM: CTA pulmonary angiogram, 1/22/2025 4:13 PM    HISTORY: Known history of lung cancer, recent RSV diagnosis, new  dyspnea on exertion hypoxia evaluate PE pneumonia    COMPARISON: X-ray 1/15/2025. CT CAP 1 10/18/2024, multiple priors.    TECHNIQUE: Volumetric CT images obtained through the chest with  contrast. Coronal and axial MIP reformatted images obtained.  Three-dimensional (3D) post-processed angiographic images were  reconstructed,  archived to PACS and used in interpretation of this  study.     CONTRAST: 61 mL isovue 370 IV.    FINDINGS:     Support devices:  -Right IJ central venous catheter tip terminates at the cavoatrial  junction.    Vascular:  There is good contrast opacification of the pulmonary arterial  vasculature. No pulmonary embolus.  Heart is normal size without  pericardial effusion. No evidence of right heart strain or elevated  right heart pressures. No thoracic aortic aneurysm.    Remaining Chest:  Partially visualized thyroid is unremarkable.  No axillary adenopathy. Numerous enlarged mediastinal and hilar lymph  nodes, similar to that seen on prior imaging.  Heart size normal, no pericardial effusion or thickening.    Central tracheobronchial tree is patent without significant debris.  Mild obstruction of the right lower lobe and medial left lower lobe  bronchi secondary to pulmonary mass. Multifocal pulmonary masses,  grossly similar to prior CT 10/18/2024, largest aspects within the  right lung base and medial infrahilar left lung. There are multiple  metastatic pulmonary masses. As a progress attenuation throughout,  with focal areas of interstitial fibrotic changes. Apical predominant  panlobular emphysema. Small right pleural effusion. No pneumothorax.    Upper Abdomen: Limited evaluation of the upper abdomen demonstrates  multiple hypoattenuating liver lesions, incompletely evaluated on  noncontrast exam, features represent previously identified metastatic  lesions. Granulomatous calcifications of the spleen. No acute findings  within the visualized upper abdomen.    Bones/Soft Tissues: Multiple osseous metastatic lesions, some increase  in prominence compared to prior, for example T8 vertebral body lesion  demonstrating increased lucency compared to prior CT.      Impression    IMPRESSION:  1. Exam is negative for acute pulmonary embolism. No evidence of right  heart strain or increased right heart pressures.   2.  Redemonstration of multifocal pulmonary masses in the context of  known metastatic lung cancer. Grossly similar multifocal distribution  as detailed above.  3. New diffuse groundglass attenuation throughout the lungs likely  representing some degree of inflammation, viral respiratory pattern  may appear this way given recent diagnosis of RSV, may also represent  mild degree of pulmonary edema.  4. Small right pleural effusion. No pneumothorax.  5. Multifocal hepatic and osseous lesions favored to represent  metastatic disease. Increased prominence of lucent T8 lesion.        In the event of a positive result for acute pulmonary embolism  resulting in right heart strain, consider calling the   [facility] hospital  for PERT (Pulmonary Embolism Response Team)  Activation?    PERT -- Pulmonary Embolism Response Team (Multidisciplinary team  including cardiology, interventional radiology, critical care,  hematology)    I have personally reviewed the examination and initial interpretation  and I agree with the findings.    JILLIAN AGUILAR MD         SYSTEM ID:  [id number]   Comprehensive metabolic panel     Status: Abnormal   Result Value Ref Range    Sodium 136 135 - 145 mmol/L    Potassium 4.5 3.4 - 5.3 mmol/L    Carbon Dioxide (CO2) 20 (L) 22 - 29 mmol/L    Anion Gap 12 7 - 15 mmol/L    Urea Nitrogen 14.3 6.0 - 20.0 mg/dL    Creatinine 0.73 0.67 - 1.17 mg/dL    GFR Estimate >90 >60 mL/min/1.73m2    Calcium 8.7 (L) 8.8 - 10.4 mg/dL    Chloride 104 98 - 107 mmol/L    Glucose 94 70 - 99 mg/dL    Alkaline Phosphatase 116 40 - 150 U/L    AST 43 0 - 45 U/L     (H) 0 - 70 U/L    Protein Total 6.4 6.4 - 8.3 g/dL    Albumin 3.1 (L) 3.5 - 5.2 g/dL    Bilirubin Total 0.7 <=1.2 mg/dL   Troponin T, High Sensitivity     Status: Abnormal   Result Value Ref Range    Troponin T, High Sensitivity 93 (H) <=22 ng/L   Procalcitonin     Status: Normal   Result Value Ref Range    Procalcitonin 0.24 <0.50 ng/mL   Influenza A/B, RSV  and SARS-CoV2 PCR (COVID-19) Nasopharyngeal     Status: Normal    Specimen: Nasopharyngeal; Swab   Result Value Ref Range    Influenza A PCR Negative Negative    Influenza B PCR Negative Negative    RSV PCR Negative Negative    SARS CoV2 PCR Negative Negative    Narrative    Testing was performed using the Xpert Xpress CoV2/Flu/RSV Assay on the Cepheid GeneXpert Instrument. This test should be ordered for the detection of SARS-CoV2, influenza, and RSV viruses in individuals with signs and symptoms of respiratory tract infection. This test is for in vitro diagnostic use under the [place] FDA for laboratories certified under CLIA to perform high or moderate complexity testing. This test has been [place] FDA cleared. A negative result does not rule out the presence of PCR inhibitors in the specimen or target RNA in concentration below the limit of detection for the assay. If only one viral target is positive but coinfection with multiple targets is suspected, the sample should be re-tested with another FDA cleared, approved, or authorized test, if coninfection would change clinical management. This test was validated by the [facility]. These laboratories are certified under the Clinical Laboratory Improvement Amendments of 1988 (CLIA-88) as qualified to perfom high complexity laboratory testing.   Lactic acid whole blood     Status: Normal   Result Value Ref Range    Lactic Acid 1.2 0.7 - 2.0 mmol/L   CBC with platelets and differential     Status: Abnormal   Result Value Ref Range    WBC Count 7.9 4.0 - 11.0 10e3/uL    RBC Count 4.21 (L) 4.40 - 5.90 10e6/uL    Hemoglobin 12.7 (L) 13.3 - 17.7 g/dL    Hematocrit 38.3 (L) 40.0 - 53.0 %    MCV 91 78 - 100 fL    MCH 30.2 26.5 - 33.0 pg    MCHC 33.2 31.5 - 36.5 g/dL    RDW 15.5 (H) 10.0 - 15.0 %    Platelet Count 158 150 - 450 10e3/uL   Troponin T, High Sensitivity     Status: Abnormal   Result Value Ref Range    Troponin T, High Sensitivity 90 (H) <=22 ng/L    Extra Tube     Status: None    Narrative    The following orders were created for panel order Extra Tube.  Procedure                               Abnormality         Status                     ---------                               -----------         ------                     Extra Purple Top Tube[[id number]]                            Final result                 Please view results for these tests on the individual orders.   Extra Purple Top Tube     Status: None   Result Value Ref Range    Hold Specimen JI    Manual Differential     Status: Abnormal   Result Value Ref Range    % Neutrophils 85 %    % Lymphocytes 6 %    % Monocytes 5 %    % Eosinophils 4 %    % Basophils 0 %    Absolute Neutrophils 6.7 1.6 - 8.3 10e3/uL    Absolute Lymphocytes 0.5 (L) 0.8 - 5.3 10e3/uL    Absolute Monocytes 0.4 0.0 - 1.3 10e3/uL    Absolute Eosinophils 0.3 0.0 - 0.7 10e3/uL    Absolute Basophils 0.0 0.0 - 0.2 10e3/uL    RBC Morphology Confirmed RBC Indices     Platelet Assessment  Automated Count Confirmed. Platelet morphology is normal.     Automated Count Confirmed. Platelet morphology is normal.   EKG 12 lead     Status: None   Result Value Ref Range    Systolic Blood Pressure  mmHg    Diastolic Blood Pressure  mmHg    Ventricular Rate 98 BPM    Atrial Rate 98 BPM    VA Interval 150 ms    QRS Duration 80 ms     ms    QTc 490 ms    P Axis 67 degrees    R AXIS 73 degrees    T Axis 52 degrees    Interpretation ECG       Sinus rhythm  T wave abnormality, consider anterior ischemia  QTcB >= 480 msec  Abnormal ECG  Unconfirmed report - interpretation of this ECG is computer generated - see medical record for final interpretation    Confirmed by - EMERGENCY ROOM, PHYSICIAN ([id number]),  KAYA COHEN ([id number]) on 1/22/2025 2:17:40 PM     Respiratory Panel PCR     Status: Normal    Specimen: Nasopharyngeal; Swab   Result Value Ref Range    Adenovirus Not Detected Not Detected    Coronavirus Not Detected Not Detected    Human  Metapneumovirus Not Detected Not Detected    Human Rhin/Enterovirus Not Detected Not Detected    Influenza A Not Detected Not Detected    Influenza A, H1 Not Detected Not Detected    Influenza A 2009 H1N1 Not Detected Not Detected    Influenza A, H3 Not Detected Not Detected    Influenza B Not Detected Not Detected    Parainfluenza Virus 1 Not Detected Not Detected    Parainfluenza Virus 2 Not Detected Not Detected    Parainfluenza Virus 3 Not Detected Not Detected    Parainfluenza Virus 4 Not Detected Not Detected    Respiratory Syncytial Virus A Not Detected Not Detected    Respiratory Syncytial Virus B Not Detected Not Detected    Chlamydia Pneumoniae Not Detected Not Detected    Mycoplasma Pneumoniae Not Detected Not Detected    Narrative    The [facility] Respiratory Panel is a qualitative nucleic acid, multiplex, in vitro diagnostic test for the simultaneous detection and identification of multiple respiratory viral and bacterial nucleic acids in nasopharyngeal swabs collected in viral transport media from individual exhibiting signs and symptoms of respiratory infection. The assay has received FDA approval for the testing of nasopharyngeal (NP) swabs only. This test is used for clinical purposes and should not be regarded as investigational or for research. This laboratory is certified under the Clinical Laboratory Improvement Amendments of 1988 (CLIA-88) as qualified to perform high complexity clinical laboratory testing.   CBC with platelets differential     Status: Abnormal    Narrative    The following orders were created for panel order CBC with platelets differential.  Procedure                               Abnormality         Status                     ---------                               -----------         ------                     CBC with platelets and d...[[id number]]  Abnormal            Final result               Manual Differential[[id number]]                                                          Manual Differential[[id number]]          Abnormal            Final result                 Please view results for these tests on the individual orders.     Medications   albuterol (PROVENTIL HFA/VENTOLIN HFA) inhaler (has no administration in time range)   fluticasone-vilanterol (BREO ELLIPTA) 100-25 MCG/ACT inhaler 1 puff (has no administration in time range)   gabapentin (NEURONTIN) capsule 300 mg (has no administration in time range)   gabapentin (NEURONTIN) capsule 100 mg (has no administration in time range)   guaiFENesin (MUCINEX) 12 hr tablet 1,200 mg (has no administration in time range)   tamsulosin (FLOMAX) capsule 0.4 mg (has no administration in time range)   lidocaine 1 % 0.1-1 mL (has no administration in time range)   lidocaine (LMX4) cream (has no administration in time range)   sodium chloride (PF) 0.9% PF flush 3 mL (has no administration in time range)   sodium chloride (PF) 0.9% PF flush 3 mL (has no administration in time range)   calcium carbonate (TUMS) chewable tablet 1,000 mg (has no administration in time range)   enoxaparin ANTICOAGULANT (LOVENOX) injection 40 mg (has no administration in time range)   acetaminophen (TYLENOL) tablet 650 mg (has no administration in time range)     Or   acetaminophen (TYLENOL) Suppository 650 mg (has no administration in time range)   bisacodyl (DULCOLAX) EC tablet 5 mg (has no administration in time range)     Or   bisacodyl (DULCOLAX) EC tablet 10 mg (has no administration in time range)   polyethylene glycol (MIRALAX) Packet 17 g (has no administration in time range)   ondansetron (ZOFRAN ODT) ODT tab 4 mg (has no administration in time range)     Or   ondansetron (ZOFRAN) injection 4 mg (has no administration in time range)   sodium chloride 0.9% BOLUS 1,000 mL (0 mLs Intravenous Stopped 1/22/25 1637)   CT saline (91 mLs Intravenous $Given 1/22/25 1601)   iopamidol (ISOVUE-370) solution 61 mL (61 mLs Intravenous $Given 1/22/25 1601)     Labs Ordered  and Resulted from Time of ED Arrival to Time of ED Departure   COMPREHENSIVE METABOLIC PANEL - Abnormal       Result Value    Sodium 136      Potassium 4.5      Carbon Dioxide (CO2) 20 (*)     Anion Gap 12      Urea Nitrogen 14.3      Creatinine 0.73      GFR Estimate >90      Calcium 8.7 (*)     Chloride 104      Glucose 94      Alkaline Phosphatase 116      AST 43       (*)     Protein Total 6.4      Albumin 3.1 (*)     Bilirubin Total 0.7     TROPONIN T, HIGH SENSITIVITY - Abnormal    Troponin T, High Sensitivity 93 (*)    CBC WITH PLATELETS AND DIFFERENTIAL - Abnormal    WBC Count 7.9      RBC Count 4.21 (*)     Hemoglobin 12.7 (*)     Hematocrit 38.3 (*)     MCV 91      MCH 30.2      MCHC 33.2      RDW 15.5 (*)     Platelet Count 158     TROPONIN T, HIGH SENSITIVITY - Abnormal    Troponin T, High Sensitivity 90 (*)    MANUAL DIFFERENTIAL - Abnormal    % Neutrophils 85      % Lymphocytes 6      % Monocytes 5      % Eosinophils 4      % Basophils 0      Absolute Neutrophils 6.7      Absolute Lymphocytes 0.5 (*)     Absolute Monocytes 0.4      Absolute Eosinophils 0.3      Absolute Basophils 0.0      RBC Morphology Confirmed RBC Indices      Platelet Assessment        Value: Automated Count Confirmed. Platelet morphology is normal.   PROCALCITONIN - Normal    Procalcitonin 0.24     INFLUENZA A/B, RSV AND SARS-COV2 PCR - Normal    Influenza A PCR Negative      Influenza B PCR Negative      RSV PCR Negative      SARS CoV2 PCR Negative     LACTIC ACID WHOLE BLOOD - Normal    Lactic Acid 1.2     RESPIRATORY PANEL PCR - Normal    Adenovirus Not Detected      Coronavirus Not Detected      Human Metapneumovirus Not Detected      Human Rhin/Enterovirus Not Detected      Influenza A Not Detected      Influenza A, H1 Not Detected      Influenza A 2009 H1N1 Not Detected      Influenza A, H3 Not Detected      Influenza B Not Detected      Parainfluenza Virus 1 Not Detected      Parainfluenza Virus 2 Not Detected       Parainfluenza Virus 3 Not Detected      Parainfluenza Virus 4 Not Detected      Respiratory Syncytial Virus A Not Detected      Respiratory Syncytial Virus B Not Detected      Chlamydia Pneumoniae Not Detected      Mycoplasma Pneumoniae Not Detected     1,3-BETA D GLUCAN FUNGITELL   RHEUMATOID FACTOR   CYCLIC CITRULLINATED PEPTIDE ANTIBODY IGG   BLOOD CULTURE   BLOOD CULTURE   RESPIRATORY AEROBIC BACTERIAL CULTURE     CT Chest Pulmonary Embolism w Contrast   Final Result   IMPRESSION:   1. Exam is negative for acute pulmonary embolism. No evidence of right   heart strain or increased right heart pressures.    2. Redemonstration of multifocal pulmonary masses in the context of   known metastatic lung cancer. Grossly similar multifocal distribution   as detailed above.   3. New diffuse groundglass attenuation throughout the lungs likely   representing some degree of inflammation, viral respiratory pattern   may appear this way given recent diagnosis of RSV, may also represent   mild degree of pulmonary edema.   4. Small right pleural effusion. No pneumothorax.   5. Multifocal hepatic and osseous lesions favored to represent   metastatic disease. Increased prominence of lucent T8 lesion.            In the event of a positive result for acute pulmonary embolism   resulting in right heart strain, consider calling the    [facility] hospital  for PERT (Pulmonary Embolism Response Team)   Activation?      PERT -- Pulmonary Embolism Response Team (Multidisciplinary team   including cardiology, interventional radiology, critical care,   hematology)      I have personally reviewed the examination and initial interpretation   and I agree with the findings.      JILLIAN AGUILAR MD            SYSTEM ID:  [id number]      Echocardiogram Complete    (Results Pending)          Critical Care Addendum  My initial assessment, based on my review of vital signs, focused history, physical exam, and 12 lead ECG analysis, established a  high suspicion that Roman Escalante has new hypoxia requiring supplemental oxygen , which requires immediate intervention, and therefore he is critically ill.     After the initial assessment, the care team initiated multiple lab tests, initiated IV fluid administration, and initiated intensive non-invasive respiratory support to provide stabilization care. Due to the critical nature of this patient, I reassessed physical exam, 12 lead ECG analysis, and respiratory status multiple times prior to his disposition.     Time also spent performing documentation.     Critical care time (excluding teaching time and procedures): 20 minutes.       Assessment & Plan    This is a medically complex 49-year-old male presenting with concerns for 6 days of increasing dyspnea on exertion and cough in the setting of recent RSV diagnosis and possible pneumonia on levofloxacin.  On presentation to department patient found to be tachycardic to 106, soft blood pressure 99/65, oxygenating 99% on initial 6 L nasal cannula.  On exam patient appears nontoxic, on my exam he has clear breath sounds and a soft abdomen.    With constellation of symptoms consider broad differential including viral URI, pneumonia, pulmonary embolism with his history of malignancy and new hypoxia.  Considered ACS as well in the differential.  With him reporting some vomiting last week and loose stools and poor intake over the last few days also considered electrolyte derangement, acute kidney injury, arrhythmia, amongst others.  Is he has also had some subjective fevers and chills since the end of December will expand infectious workup to include blood cultures, lactic acid procalcitonin routine laboratory studies, CT PE, troponin and EKG to further evaluate.  Patient given fluids that he has no history of CHF and presents with soft blood pressure, suspect component of hypovolemia with this with him reporting poor intake over the last few days.    Laboratory  workup reviewed.  Initial EKG without findings for STEMI does show some T wave inversion in the septal leads new from prior.  No other arrhythmia noted.  Laboratory workup shows no leukocytosis white count 7.9 here.  Patient mildly anemic hemoglobin 12.7 per chart review this is similar to prior.    Chemistry without clinically significant electrolyte disturbance.  Initial troponin elevated 93 delta troponin 90 with reassuring EKG and lack of chest pain suggest against ACS.  Respiratory swab here negative.  CT PE without findings for pulmonary embolism does show new groundglass opacities consistent with inflammation versus respiratory pattern.    With these test results at this time will defer any antibiotics without fever white count or other findings suggestive of bacterial infection.  With the new oxygen requirement in the setting of lung cancer do feel patient would benefit with admission for further management and he is agreeable to this.  He will be admitted to the medicine service.  Case discussed with the hospitalist.    Patient seen and discussed with attending physician , who agrees with my plan of care.    I have reviewed the nursing notes. I have reviewed the findings, diagnosis, plan and need for follow up with the patient.    New Prescriptions    No medications on file       Final diagnoses:   Acute respiratory failure (H)   History of lung cancer   History of RSV infection       ADELAIDA Márquez  [facility] EMERGENCY DEPARTMENT  1/22/2025     Danelle Guidry PA-C  01/22/25 [id number]     initiated IV fluid administration, and initiated intensive non-invasive respiratory support to provide stabilization care. Due to the critical nature of this patient, I reassessed physical exam, 12 lead ECG analysis, and respiratory status multiple times prior to his disposition.     Time also spent performing documentation.     Critical care time (excluding teaching time and procedures): 20 minutes.       Assessment & Plan    This is a medically complex 49-year-old male presenting with concerns for 6 days of increasing dyspnea on exertion and cough in the setting of recent RSV diagnosis and possible pneumonia on levofloxacin.  On presentation to department patient found to be tachycardic to 106, soft blood pressure 99/65, oxygenating 99% on initial 6 L nasal cannula.  On exam patient appears nontoxic, on my exam he has clear breath sounds and a soft abdomen.    With constellation of symptoms consider broad differential including viral URI, pneumonia, pulmonary embolism with his history of malignancy and new hypoxia.  Considered ACS as well in the differential.  With him reporting some vomiting last week and loose stools and poor intake over the last few days also considered electrolyte derangement, acute kidney injury, arrhythmia, amongst others.  Is he has also had some subjective fevers and chills since the end of December will expand infectious workup to include blood cultures, lactic acid procalcitonin routine laboratory studies, CT PE, troponin and EKG to further evaluate.  Patient given fluids that he has no history of CHF and presents with soft blood pressure, suspect component of hypovolemia with this with him reporting poor intake over the last few days.    Laboratory workup reviewed.  Initial EKG without findings for STEMI does show some T wave inversion in the septal leads new from prior.  No other arrhythmia noted.  Laboratory workup shows no leukocytosis white count 7.9 here.  Patient mildly anemic  hemoglobin 12.7 per chart review this is similar to prior.    Chemistry without clinically significant electrolyte disturbance.  Initial troponin elevated 93 delta troponin 90 with reassuring EKG and lack of chest pain suggest against ACS.  Respiratory swab here negative.  CT PE without findings for pulmonary embolism does show new groundglass opacities consistent with inflammation versus respiratory pattern.    With these test results at this time will defer any antibiotics without fever white count or other findings suggestive of bacterial infection.  With the new oxygen requirement in the setting of lung cancer do feel patient would benefit with admission for further management and he is agreeable to this.  He will be admitted to the medicine service.  Case discussed with the hospitalist.    Patient seen and discussed with attending physician , who agrees with my plan of care.    I have reviewed the nursing notes. I have reviewed the findings, diagnosis, plan and need for follow up with the patient.    Discharge Medication List as of 1/25/2025  7:30 PM        START taking these medications    Details   voriconazole (VFEND) 200 MG tablet Take 1 tablet (200 mg) by mouth every 12 hours., Disp-28 tablet, R-0, E-Prescribe             Final diagnoses:   Acute respiratory failure (H)   History of lung cancer   History of RSV infection       Danelle Guidry [facility] EMERGENCY DEPARTMENT  1/22/2025     Danelle Guidry, PA-ASAEL  01/22/25 1812    --    ED Attending Physician Attestation    I Star Sagastume MD, cared for this patient with the Advanced Practice Provider (LILLY). I personally provided a substantive portion of the care for this patient, including approving the care plan for the number and complexity of problems addressed and taking responsibility related to the risk of complications and/or morbidity or mortality of patient management. Please see the LILLY's documentation for full  details.          Star Sagastume MD  Emergency Medicine        Star Sagastume MD  02/03/25 [id number]

## 2025-01-23 ENCOUNTER — APPOINTMENT (OUTPATIENT)
Dept: CARDIOLOGY | Facility: CLINIC | Age: 50
End: 2025-01-23
Attending: NURSE PRACTITIONER
Payer: COMMERCIAL

## 2025-01-23 VITALS
SYSTOLIC BLOOD PRESSURE: 109 MMHG | RESPIRATION RATE: 18 BRPM | DIASTOLIC BLOOD PRESSURE: 78 MMHG | HEART RATE: 86 BPM | TEMPERATURE: 97.8 F | OXYGEN SATURATION: 93 %

## 2025-01-23 PROBLEM — C18.9 COLON CANCER METASTASIZED TO BONE (H): Status: ACTIVE | Noted: 2024-11-13

## 2025-01-23 PROBLEM — C79.51 COLON CANCER METASTASIZED TO BONE (H): Status: ACTIVE | Noted: 2024-11-13

## 2025-01-23 LAB
BACTERIA BLD CULT: NORMAL
BACTERIA BLD CULT: NORMAL
CCP AB SER IA-ACNC: 1.6 U/ML
GRAM STAIN RESULT: NORMAL
LVEF ECHO: NORMAL

## 2025-01-23 PROCEDURE — 258N000003 HC RX IP 258 OP 636: Performed by: STUDENT IN AN ORGANIZED HEALTH CARE EDUCATION/TRAINING PROGRAM

## 2025-01-23 PROCEDURE — 36415 COLL VENOUS BLD VENIPUNCTURE: CPT | Performed by: NURSE PRACTITIONER

## 2025-01-23 PROCEDURE — 87205 SMEAR GRAM STAIN: CPT | Performed by: STUDENT IN AN ORGANIZED HEALTH CARE EDUCATION/TRAINING PROGRAM

## 2025-01-23 PROCEDURE — 99222 1ST HOSP IP/OBS MODERATE 55: CPT | Performed by: HOSPITALIST

## 2025-01-23 PROCEDURE — 999N000157 HC STATISTIC RCP TIME EA 10 MIN

## 2025-01-23 PROCEDURE — 86038 ANTINUCLEAR ANTIBODIES: CPT | Performed by: NURSE PRACTITIONER

## 2025-01-23 PROCEDURE — 99232 SBSQ HOSP IP/OBS MODERATE 35: CPT | Performed by: STUDENT IN AN ORGANIZED HEALTH CARE EDUCATION/TRAINING PROGRAM

## 2025-01-23 PROCEDURE — 250N000011 HC RX IP 250 OP 636: Performed by: NURSE PRACTITIONER

## 2025-01-23 PROCEDURE — 86612 BLASTOMYCES ANTIBODY: CPT | Performed by: NURSE PRACTITIONER

## 2025-01-23 PROCEDURE — 120N000002 HC R&B MED SURG/OB UMMC

## 2025-01-23 PROCEDURE — 250N000013 HC RX MED GY IP 250 OP 250 PS 637: Performed by: NURSE PRACTITIONER

## 2025-01-23 PROCEDURE — 87070 CULTURE OTHR SPECIMN AEROBIC: CPT | Performed by: STUDENT IN AN ORGANIZED HEALTH CARE EDUCATION/TRAINING PROGRAM

## 2025-01-23 PROCEDURE — 250N000009 HC RX 250: Performed by: STUDENT IN AN ORGANIZED HEALTH CARE EDUCATION/TRAINING PROGRAM

## 2025-01-23 PROCEDURE — 93306 TTE W/DOPPLER COMPLETE: CPT

## 2025-01-23 PROCEDURE — 93306 TTE W/DOPPLER COMPLETE: CPT | Mod: 26 | Performed by: INTERNAL MEDICINE

## 2025-01-23 PROCEDURE — 94640 AIRWAY INHALATION TREATMENT: CPT

## 2025-01-23 PROCEDURE — 86635 COCCIDIOIDES ANTIBODY: CPT | Performed by: NURSE PRACTITIONER

## 2025-01-23 RX ORDER — PROCHLORPERAZINE MALEATE 10 MG
10 TABLET ORAL EVERY 6 HOURS PRN
Status: DISCONTINUED | OUTPATIENT
Start: 2025-01-23 | End: 2025-01-25 | Stop reason: HOSPADM

## 2025-01-23 RX ORDER — SODIUM CHLORIDE 9 MG/ML
INJECTION, SOLUTION INTRAVENOUS CONTINUOUS
Status: DISCONTINUED | OUTPATIENT
Start: 2025-01-23 | End: 2025-01-24

## 2025-01-23 RX ORDER — IPRATROPIUM BROMIDE AND ALBUTEROL SULFATE 2.5; .5 MG/3ML; MG/3ML
3 SOLUTION RESPIRATORY (INHALATION)
Status: DISCONTINUED | OUTPATIENT
Start: 2025-01-23 | End: 2025-01-25 | Stop reason: HOSPADM

## 2025-01-23 RX ORDER — CYCLOBENZAPRINE HCL 5 MG
5 TABLET ORAL EVERY 8 HOURS PRN
Status: DISCONTINUED | OUTPATIENT
Start: 2025-01-23 | End: 2025-01-25 | Stop reason: HOSPADM

## 2025-01-23 RX ORDER — OXYCODONE HYDROCHLORIDE 10 MG/1
10 TABLET ORAL EVERY 4 HOURS PRN
Status: DISCONTINUED | OUTPATIENT
Start: 2025-01-23 | End: 2025-01-25 | Stop reason: HOSPADM

## 2025-01-23 RX ADMIN — GUAIFENESIN 1200 MG: 600 TABLET ORAL at 07:48

## 2025-01-23 RX ADMIN — TAMSULOSIN HYDROCHLORIDE 0.4 MG: 0.4 CAPSULE ORAL at 07:48

## 2025-01-23 RX ADMIN — SODIUM CHLORIDE: 9 INJECTION, SOLUTION INTRAVENOUS at 18:19

## 2025-01-23 RX ADMIN — ACETAMINOPHEN 650 MG: 325 TABLET, FILM COATED ORAL at 07:48

## 2025-01-23 RX ADMIN — GABAPENTIN 300 MG: 300 CAPSULE ORAL at 22:51

## 2025-01-23 RX ADMIN — ENOXAPARIN SODIUM 40 MG: 40 INJECTION SUBCUTANEOUS at 20:40

## 2025-01-23 RX ADMIN — ACETAMINOPHEN 650 MG: 325 TABLET, FILM COATED ORAL at 14:11

## 2025-01-23 RX ADMIN — GABAPENTIN 100 MG: 100 CAPSULE ORAL at 14:11

## 2025-01-23 RX ADMIN — GABAPENTIN 100 MG: 100 CAPSULE ORAL at 07:48

## 2025-01-23 RX ADMIN — GUAIFENESIN 1200 MG: 600 TABLET ORAL at 20:40

## 2025-01-23 RX ADMIN — IPRATROPIUM BROMIDE AND ALBUTEROL SULFATE 3 ML: .5; 3 SOLUTION RESPIRATORY (INHALATION) at 19:42

## 2025-01-23 RX ADMIN — SODIUM CHLORIDE 500 ML: 9 INJECTION, SOLUTION INTRAVENOUS at 17:06

## 2025-01-23 ASSESSMENT — ACTIVITIES OF DAILY LIVING (ADL)
ADLS_ACUITY_SCORE: 56

## 2025-01-23 NOTE — MEDICATION SCRIBE - ADMISSION MEDICATION HISTORY
Medication Scribe Admission Medication History    Admission medication history is complete. The information provided in this note is only as accurate as the sources available at the time of the update.    Information Source(s): Patient via in-person    Pertinent Information: Roman reports taking only the prochlorperazine (COMPAZINE) 10 MG tablet instead of prochlorperazine (COMPAZINE) 5 MG tablet and the prochlorperazine (COMPAZINE) 10 MG tablet take 0.5 tablets (5 mg) every 6 hours as needed. He states that he finished the levofloxacin (LEVAQUIN) 750 MG tablet today. Per patient, he gets the fluorouracil (ADRUCIL) injection during his chemo treatment and he missed his last round of chemo because he was not feeling well.     Patient denies taking any other medications. Dispense report and outside medication reconciliation list have been reviewed.     Changes made to PTA medication list:  Added: None  Deleted:   benzonatate (TESSALON) 100 MG capsule   fluticasone-salmeterol (ADVAIR) 100-50 MCG/ACT inhaler   levofloxacin (LEVAQUIN) 750 MG tablet   loperamide (IMODIUM) 2 MG capsule   morphine (MS CONTIN) 15 MG CR tablet   OLANZapine (ZYPREXA) 2.5 MG tablet (last dispense 12/18/24)  predniSONE (DELTASONE) 20 MG tablet   prochlorperazine (COMPAZINE) 10 MG tablet   prochlorperazine (COMPAZINE) 5 MG tablet   sulfamethoxazole-trimethoprim (BACTRIM DS) 800-160 MG tablet   tamsulosin (FLOMAX) 0.4 MG capsule   valACYclovir (VALTREX) 500 MG tablet   Changed: None    Allergies reviewed with patient and updates made in EHR: yes    Medication History Completed By: Ace Donohue 1/22/2025 9:38 PM    PTA Med List   Medication Sig Note Last Dose/Taking    albuterol (PROAIR HFA/PROVENTIL HFA/VENTOLIN HFA) 108 (90 Base) MCG/ACT inhaler Inhale 2 puffs into the lungs every 6 hours as needed for shortness of breath, wheezing or cough.  Unknown    Chemo Kit For RN use only. Do not remove items from bag. Contents: 1  sodium chloride 0.9%  "flush, 4 medium gloves, 1 chemo gown, 1/4 chemo mat, 1 connector female, 1 chemo bag.  Taking As Needed    cyclobenzaprine (FLEXERIL) 5 MG tablet Take one tab (5mg) by mouth over 6-8 hours, as needed for spasms  Unknown    Emergency Supply Kit, Central, Patient use for emergency only. Contents: 3 sodium chloride 0.9% flushes, 1 dressing kit, 1 microclave ext set 14\", 4 nitrile gloves (med), 6 alcohol prep pads, 1 bacitracin, 1 syringe (10 cc 20 G 1\"). Call [phone] to reorder.  Unknown    fluorouracil (ADRUCIL) 2.5 GM/50ML SOLN injection   Unknown    Fluorouracil (ADRUCIL) 4,610 mg in sodium chloride 0.9 % 241 mL via HOMEPUMP C-Series Infuse 4,610 mg at 5.2 mL/hr over 46 hours into the vein once for 1 dose.  Unknown    gabapentin (NEURONTIN) 100 MG capsule Take 100-200mg (1-2 capsules) up to twice during the day and 300mg (3 capsules) at bedtime.  1/21/2025 Morning    guaiFENesin (MUCINEX) 600 MG 12 hr tablet Take 2 tablets (1,200 mg) by mouth 2 times daily.  Unknown    ibuprofen (ADVIL/MOTRIN) 200 MG tablet Take 3 tablets (600 mg) by mouth every 8 hours as needed for moderate pain  Unknown    ipratropium - albuterol 0.5 mg/2.5 mg/3 mL (DUONEB) 0.5-2.5 (3) MG/3ML neb solution Take 1 vial (3 mLs) by nebulization every 6 hours as needed for shortness of breath, wheezing or cough.  1/22/2025 Morning    LORazepam (ATIVAN) 0.5 MG tablet Take 1 tablet (0.5 mg) by mouth every 6 hours as needed for anxiety  Unknown    NARCAN 4 MG/0.1ML nasal spray  1/22/2025: Has not needed.  Taking    ondansetron (ZOFRAN ODT) 4 MG ODT tab Take 1 tablet (4 mg) by mouth every 6 hours as needed for nausea.  Unknown    Ostomy Supplies MISC 1 each daily  Unknown    oxyCODONE IR (ROXICODONE) 10 MG tablet Take 1-2 tablets (10-20 mg) by mouth every 4 hours as needed for moderate to severe pain.  1/19/2025    pegfilgrastim (NEULASTA) 6 MG/0.6ML injection Inject 0.6 mLs (6 mg) subcutaneously every 14 days. Administer 24 hours after " chemotherapy disconnect  Unknown    Port Access Kit For nurse use only.  Do not remove items from bag.  Use for port access.  Do not place syringe on sterile field.  Taking As Needed    prochlorperazine (COMPAZINE) 10 MG tablet Take 1 tablet (10 mg) by mouth every 6 hours as needed for nausea or vomiting  Unknown    sodium chloride, PF, 0.9% PF flush Inject 10 mLs into the vein as needed for line flush. Flush IV before and after med administration as directed and/or at least every 24 hours, or prior to deaccessing for no further use and/or at least every 4 weeks when not accessed.  Unknown

## 2025-01-23 NOTE — CONSULTS
[facility]   Oncology Consult Note    Name: Roman Escalante  MRN: [id number]  Date of Service: 01/23/2025  Admission Date: 1/22/2025  Hospital Day # 1      Diagnosis: Metastatic Rectal Cancer  Stage: IV    Primary Outpatient Oncologist: Edy      Assessment and Recommendations:   Roman Escalante is a 49 year old male with a past medical history that includes metastatic rectal cancer s/p numerous therapies including CAR-T trial currently on FOLFIRI+chelsea who presents with SOB. CTA Chest reveals bilateral ground glass opacities and stable pulmonary metastasis. Etiology of ground glass opacities is unclear-may be RSV or other atypical infection in an immunosuppressed patient. Lymphangitic spread of cancer is less likely based on appearance and cancer currently looks stable. There are rare cases of Irinotecan or Bevacizumab induced interstitial lung disease or pulmonary hemorrhage. I agree with bronchoscopy to assess etiology of his hypoxemia since it very well may be reversible and if pt is willing.    Patient has limited treatment options and has discussed bridging chemo vs hospice vs other clinical trial as options. He has not wanted to go the hospice route yet. Should his clinical status decline, we can discuss further GOC with the patient.     Plan:  -agree with bronchoscopy  -no inpatient cancer-directed treatment  -can further discuss GOC if patients clinical status declines      I will alert Dr. Snow of his admission.       Amber Ellis MD   of Medicine  Division of Hematology, Oncology and Transplantation    60 minutes were spent on the date of the encounter performing chart review, history and exam, documentation, and further activities as noted above.     --------------------------------------------------------------------------------    Oncology history: Extensive history from outpatient notes  Patient developed rectal bleeding in 2020.  In  January 2021 CT showed focal wall thickening in the upper rectum, multiple pulmonary nodules bilaterally suspicious for metastatic disease.  Underwent FNA of the right upper lobe lesion which was consistent with adenocarcinoma of the colon.  He was treated with FOLFOX from 2/20/2021 through 5/20/2021.  Avastin was added.  Had an infusion reaction to oxaliplatin 6/2021.  7/2021- 12/2021 was on 5-FU alone.  Developed progression.  12/2021- 9/2022 was on FOLFIRI.  11/2021 started Lonsurf. All of this was done with outside oncologist.      Met with Dr. Snow on 2/3/2023 to establish care. Recommended regorafenib.      Started regorafenib on 3/2/2023. Progression noted 5/19/23. Plan to start FOLFOX/Avastin and send out CARIS testing to evaluate for other treatment options. Cycle 1 was given with oxaliplatin desensitization, 5FU, Avastin held due to increased urine protein.      Following cycle 4, patient was admitted with hematuria and acute kidney injury.    Oxaliplatin was dropped with Cycle 5.      Y-90 radioembolization 9/7.     10/24/23 CT CAP with disease progression with notable growth in all pulmonary lesions. Lonsurf + bevacizumab started 10/27/23 with plans to bridge to clinical trial.   11/15/23 - stopped chemo for [facility] study.   Currently enrolled in [facility] CAR-T trial (completed cell harvest 1/23/2024, tentatively planning administration of CAR-T in April 2/12/24 - resumed Lonsurf, bevacizumab to bridge treatment until CAR-T therapy  3/20/24 - treatment changed to Fruquintinib due to intolerability of Lonsurf/bevacizumab  4/2/24- Presented to the ED with dehydration. Stopped Fruquintnib.   6/2024- cellular therapy transplant at [facility].   7/30/24- CT imaging demonstrating marked progression of pulmonary and hepatic lesions, clinical trial failed to generate T-cell graft. Multiple discussions regarding plan of care, hospice vs additional lines of treatment. Patient decided he is not ready for hospice. He resumed  treatment with FOLFIRI + bevacizumab on 8/30/24.   10/2024 - CT CAP with continued response, plan to continue FOLFIRI + bevacizumab with the addition of Zometa. Per his dentist, needs dental work completed prior to clearance. (Scheduled 1/2 and 1/21).       HPI:   Roman Escalante is a 49 year old male with a past medical history that includes metastatic rectal cancer s/p numerous therapies including CAR-T trial currently on FOLFIRI+reyna who presents with SOB.    In terms of his oncology history, please see detailed treatment history above. Most recently, he had undergone a clinical trial at the [facility] however he dveloped progression of disease in July 2024 with increased pulmonary and liver mets. He initiated FOLFIRI+Renya on 8/30/24 after multiple discussions about going on hospice vs further clinical trials. Last scans were in October and demonstrated continued response. He did take a break from chemo on the week on New Years because he developed a respiratory illness at the end of December. He reported night sweats, chills, productive cough and SOB with walking. He completed a course of prednisone/ azithromycin without improvement. He saw Oncology on 1/15/25 and CXR revealed hazy bilateral opacities. He was started on Levaquin 750 daily and given 1L NS. Of note oxygen sats were normal in clinic on 1/15/24 though he previously had walking O2 test with O2 sats down in 80s. He then developed some vomiting on 1/16 (both he and his wife) which resolved. He also had multiple bouts of liquid stool. He returned to clinic on 1/22 with saturations in low 80s. He reported having borrowed an oxygen machine from a friend and required 4L.     On admission, he underwent CTA Chest with redemonstrated multifocal pulmonary mets, no PE, and new diffuse ground glass opacities throughout the lungs. Small right pleural effusion and multifocal hepatic and osseous lesions. RVP came back positive for RSV-A.          Exam:   /64 (BP  Location: Right arm)   Pulse 95   Temp 98.5  F (36.9  C) (Oral)   Resp 18   SpO2 94%     Gen: laying in bed, mask on, tachypneic, speaking in full sentences    Lymph: No LAD  CV: RRR  Neuro: No focal deficits. Moving all extremities      Labs:   Infusion Therapy Visit on 01/15/2025   Component Date Value Ref Range Status    Sodium 01/15/2025 135  135 - 145 mmol/L Final    Potassium 01/15/2025 4.1  3.4 - 5.3 mmol/L Final    Carbon Dioxide (CO2) 01/15/2025 20 (L)  22 - 29 mmol/L Final    Anion Gap 01/15/2025 13  7 - 15 mmol/L Final    Urea Nitrogen 01/15/2025 16.5  6.0 - 20.0 mg/dL Final    Creatinine 01/15/2025 0.80  0.67 - 1.17 mg/dL Final    GFR Estimate 01/15/2025 >90  >60 mL/min/1.73m2 Final    eGFR calculated using 2021 CKD-EPI equation.    Calcium 01/15/2025 8.9  8.8 - 10.4 mg/dL Final    Reference intervals for this test were updated on 7/16/2024 to reflect our healthy population more accurately. There may be differences in the flagging of prior results with similar values performed with this method. Those prior results can be interpreted in the context of the updated reference intervals.    Chloride 01/15/2025 102  98 - 107 mmol/L Final    Glucose 01/15/2025 111 (H)  70 - 99 mg/dL Final    Alkaline Phosphatase 01/15/2025 130  40 - 150 U/L Final    AST 01/15/2025 27  0 - 45 U/L Final    ALT 01/15/2025 25  0 - 70 U/L Final    Protein Total 01/15/2025 7.3  6.4 - 8.3 g/dL Final    Albumin 01/15/2025 3.6  3.5 - 5.2 g/dL Final    Bilirubin Total 01/15/2025 0.7  <=1.2 mg/dL Final    WBC Count 01/15/2025 12.8 (H)  4.0 - 11.0 10e3/uL Final    RBC Count 01/15/2025 4.19 (L)  4.40 - 5.90 10e6/uL Final    Hemoglobin 01/15/2025 12.5 (L)  13.3 - 17.7 g/dL Final    Hematocrit 01/15/2025 38.5 (L)  40.0 - 53.0 % Final    MCV 01/15/2025 92  78 - 100 fL Final    MCH 01/15/2025 29.8  26.5 - 33.0 pg Final    MCHC 01/15/2025 32.5  31.5 - 36.5 g/dL Final    RDW 01/15/2025 14.7  10.0 - 15.0 % Final    Platelet Count 01/15/2025  203  150 - 450 10e3/uL Final    % Neutrophils 01/15/2025 86  % Final    % Lymphocytes 01/15/2025 2  % Final    % Monocytes 01/15/2025 9  % Final    % Eosinophils 01/15/2025 2  % Final    % Basophils 01/15/2025 1  % Final    Absolute Neutrophils 01/15/2025 11.1 (H)  1.6 - 8.3 10e3/uL Final    Absolute Lymphocytes 01/15/2025 0.2 (L)  0.8 - 5.3 10e3/uL Final    Absolute Monocytes 01/15/2025 1.1  0.0 - 1.3 10e3/uL Final    Absolute Eosinophils 01/15/2025 0.2  0.0 - 0.7 10e3/uL Final    Absolute Basophils 01/15/2025 0.1  0.0 - 0.2 10e3/uL Final    RBC Morphology 01/15/2025 Confirmed RBC Indices   Final    Platelet Assessment 01/15/2025 Automated Count Confirmed. Platelet morphology is normal.  Automated Count Confirmed. Platelet morphology is normal. Final    SARS CoV2 PCR 01/15/2025 Negative  Negative Final    NEGATIVE: SARS-CoV-2 (COVID-19) RNA not detected, presumed negative.        Imaging:   CTA Chest 1/22/25  Narrative & Impression   EXAM: CTA pulmonary angiogram, 1/22/2025 4:13 PM     HISTORY: Known history of lung cancer, recent RSV diagnosis, new  dyspnea on exertion hypoxia evaluate PE pneumonia     COMPARISON: X-ray 1/15/2025. CT CAP 1 10/18/2024, multiple priors.     TECHNIQUE: Volumetric CT images obtained through the chest with  contrast. Coronal and axial MIP reformatted images obtained.  Three-dimensional (3D) post-processed angiographic images were  reconstructed, archived to PACS and used in interpretation of this  study.      CONTRAST: 61 mL isovue 370 IV.     FINDINGS:      Support devices:  -Right IJ central venous catheter tip terminates at the cavoatrial  junction.     Vascular:  There is good contrast opacification of the pulmonary arterial  vasculature. No pulmonary embolus.  Heart is normal size without  pericardial effusion. No evidence of right heart strain or elevated  right heart pressures. No thoracic aortic aneurysm.     Remaining Chest:  Partially visualized thyroid is unremarkable.  No  axillary adenopathy. Numerous enlarged mediastinal and hilar lymph  nodes, similar to that seen on prior imaging.  Heart size normal, no pericardial effusion or thickening.     Central tracheobronchial tree is patent without significant debris.  Mild obstruction of the right lower lobe and medial left lower lobe  bronchi secondary to pulmonary mass. Multifocal pulmonary masses,  grossly similar to prior CT 10/18/2024, largest aspects within the  right lung base and medial infrahilar left lung. There are multiple  metastatic pulmonary masses. As a progress attenuation throughout,  with focal areas of interstitial fibrotic changes. Apical predominant  panlobular emphysema. Small right pleural effusion. No pneumothorax.     Upper Abdomen: Limited evaluation of the upper abdomen demonstrates  multiple hypoattenuating liver lesions, incompletely evaluated on  noncontrast exam, features represent previously identified metastatic  lesions. Granulomatous calcifications of the spleen. No acute findings  within the visualized upper abdomen.     Bones/Soft Tissues: Multiple osseous metastatic lesions, some increase  in prominence compared to prior, for example T8 vertebral body lesion  demonstrating increased lucency compared to prior CT.                                                                      IMPRESSION:  1. Exam is negative for acute pulmonary embolism. No evidence of right  heart strain or increased right heart pressures.   2. Redemonstration of multifocal pulmonary masses in the context of  known metastatic lung cancer. Grossly similar multifocal distribution  as detailed above.  3. New diffuse groundglass attenuation throughout the lungs likely  representing some degree of inflammation, viral respiratory pattern  may appear this way given recent diagnosis of RSV, may also represent  mild degree of pulmonary edema.  4. Small right pleural effusion. No pneumothorax.  5. Multifocal hepatic and osseous lesions  favored to represent  metastatic disease. Increased prominence of lucent T8 lesion.       Pathology:   No new pathology

## 2025-01-23 NOTE — PROGRESS NOTES
[facility]    Medicine Progress Note - Hospitalist Service, GOLD TEAM 10    Date of Admission:  1/22/2025    Assessment & Plan      Rmoan Escalante is a 49 year old man admitted on 1/22/2025. He has a history of colon cancer with mets to his lungs who is admitted with dyspnea and hypoxia.     Today:   - Appreciate interventional radiology consultation. Pleural effusion is too small to sample.   - Appreciate oncology consultation. In agreement with work-up for reversible causes of hypoxia. If clinical status declines they will participate in more goals of care discussions. Otherwise he will resume outpatient oncology follow-up.   - Appreciate pulmonology consultation. Planning for bronchoscopy tomorrow.   - IV fluids while NPO per patient preference. If he develops worsening hypoxia would pause.     #) Acute hypoxic respiratory failure - Presents with new oxygen needs to 6 lpm associated with dyspnea.  His respiratory panel was negative on admission, although he had a positive RSV test 1/15.  His CT chest shows no pulmonary embolism or evidence for pulmonary hypertension, but does show multifocal pulmonary masses and new diffuse ground glass attenuation throughout the lungs as well as a small right pleural effusion that appears to be new. He was recently treated with prednisone and azithromycin for a respiratory infection one month ago, and completed a course of Levaquin the day of admission without any improvement.   He has a long history of smoking and stopped ~6 months ago.  The etiology of his new respiratory failure is unclear.    - Appreciate pulmonology consultation. Anticipating bronchoscopy 1/24. NPO at midnight for this.   - Appreciate IR consultation. Not sufficient fluid for thoracentesis.   - Blood cultures pending  - Hold of on further antibiotic treatment for now pending pulmonology recommendations.  - TIMUR, RF, cyclic citrullinated peptide  - 1,3,  Beta-D-glucan, histo, blasto  - Echocardiogram with EF 50-55%, borderline right ventricular enlargement.   - Continue Advair, albuterol, Duo-Nebs  - IV hydration while NPO per patient preference. If hypoxia would worsen, would hold fluids.     #) History of colon cancer - Diagnosed in 2020 and found to have adenocarcinoma.  He has been on multiple agents and appears to currently be on FOLFIRI + bevacizumab with Zometa.  - Appreciate oncology consultation. If worsening status, they would further address goals of care. Otherwise they will follow-up outpatient.   - Okay to continue oxycodone, Zofran, Phenergan per home symptom control regimen.     Elevated ALT, low albumin  - Recheck CMP      Elevated but stable troponin  Echocardiogram with stable cardiac function. No cardiac symptoms currently.   - Trend troponin  - Low threshold to repeat ECG and troponin with any symptoms of chest pain or tightness        Diet: Combination Diet Regular Diet Adult    DVT Prophylaxis: Enoxaparin (Lovenox) SQ  Puri Catheter: Not present  Lines: PRESENT      Port a Cath 01/19/21 Single Lumen Right Chest wall-Site Assessment: WDL      Cardiac Monitoring: None  Code Status:   Per admission discussion, okay for pre-arrest intubation or cardioversion but no resuscitation    Clinically Significant Risk Factors Present on Admission               # Hypoalbuminemia: Lowest albumin = 3.1 g/dL at 1/22/2025  2:31 PM, will monitor as appropriate     # Hypertension: Noted on problem list                      Social Drivers of Health    Tobacco Use: Medium Risk (1/15/2025)    Patient History     Smoking Tobacco Use: Former     Smokeless Tobacco Use: Never     Passive Exposure: Past    [facility], [employer] [facility]    Social Connections          Disposition Plan     Medically Ready for Discharge: Anticipated in 2-4 Days             Екатерина Fulton MD  Hospitalist Service,  GOLD TEAM 10  [facility]  Securely message with [employer] (more info)  Text page via [employer] Paging/Directory   See signed in provider for up to date coverage information  ______________________________________________________________________    Interval History   No acute events overnight. Stable on low flow nasal cannula oxygen since admission. Wanting to be allowed to sleep in until 10:30am. Symptoms otherwise adequately controlled.     Physical Exam   Vital Signs: Temp: 98.5  F (36.9  C) Temp src: Oral BP: 100/64 Pulse: 95   Resp: 18 SpO2: 94 % O2 Device: Nasal cannula Oxygen Delivery: 4 LPM  Weight: 0 lbs 0 oz    Physical Exam  Vitals reviewed.   Constitutional:       Comments: Sleeping, awoke easily to voice but wanted to go back to sleep   HENT:      Head: Atraumatic.      Right Ear: External ear normal.      Left Ear: External ear normal.      Nose: No congestion.      Mouth/Throat:      Mouth: Mucous membranes are moist.   Eyes:      Conjunctiva/sclera: Conjunctivae normal.   Cardiovascular:      Rate and Rhythm: Normal rate and regular rhythm.      Pulses: Normal pulses.   Pulmonary:      Effort: Pulmonary effort is normal. No respiratory distress.      Breath sounds: No wheezing.      Comments: Wearing nasal cannula oxygen  Abdominal:      Palpations: Abdomen is soft.      Tenderness: There is no abdominal tenderness. There is no guarding.      Comments: Brown stool in ostomy bag   Musculoskeletal:      Right lower leg: No edema.      Left lower leg: No edema.   Skin:     General: Skin is warm.      Capillary Refill: Capillary refill takes less than 2 seconds.   Neurological:      General: No focal deficit present.   Psychiatric:      Comments: Wanting to rest and sleep           Medical Decision Making       45 MINUTES SPENT BY ME on the date of service doing chart review, history, exam, documentation & further activities per the note.      Data     I have  personally reviewed the following data over the past 24 hrs:    ALT: N/A AST: N/A AP: N/A TBILI: N/A   ALB: N/A TOT PROTEIN: N/A LIPASE: N/A     Trop: N/A BNP: N/A     Ferritin:  N/A % Retic:  N/A LDH:  N/A       Imaging results reviewed over the past 24 hrs:   Recent Results (from the past 24 hours)   Echocardiogram Complete   Result Value    LVEF  50-55% (borderline)    [facility]    [id number]  [id number]  [id number]  [id number]^CLIFF^MAXWELL^KATHARINA     [facility]  Echocardiography Laboratory  [address] [place] [address]     Name: MANUEL CANO  MRN: [id number]  : 1975  Study Date: 2025 08:08 AM  Age: 49 yrs  Gender: Male  Patient Location: [facility]  Reason For Study: Dyspnea  Ordering Physician: MAXWELL CORONADO  Performed By: Jomar Luna RDCS     BSA: 2.0 m2  Height: 71 in  Weight: 175 lb  HR: 90  BP: 100/64 mmHg  ______________________________________________________________________________  Procedure  Echocardiogram with two-dimensional, color and spectral Doppler.  ______________________________________________________________________________  Interpretation Summary  Left ventricular function is decreased. The ejection fraction is 50-55%  (borderline).  Global right ventricular function is mildly reduced. Borderline right  ventricular enlargement.  No significant valvular abnormalities present.     Compared to prior 24, RV appears larger and function is mildly reduced. LV  function appears similar.  ______________________________________________________________________________  Left Ventricle  Left ventricular wall thickness is normal. Left ventricular size is normal.  Left ventricular diastolic function is normal. Left ventricular function is  decreased. The ejection fraction is 50-55% (borderline). Abnormal non-specific  septal motion is present.     Right Ventricle  Borderline right ventricular enlargement. Global right ventricular  function is  mildly reduced.     Atria  Both atria appear normal. The atrial septum is intact as assessed by color  Doppler .     Mitral Valve  The mitral valve is normal. Trace mitral insufficiency is present.     Aortic Valve  Aortic valve is normal in structure and function. The aortic valve is  tricuspid.     Tricuspid Valve  The tricuspid valve is normal. Trace tricuspid insufficiency is present. The  peak velocity of the tricuspid regurgitant jet is not obtainable.     Pulmonic Valve  The pulmonic valve is normal. Trace pulmonic insufficiency is present.     Vessels  The aorta root is normal. The thoracic aorta is normal. The pulmonary artery  is normal. The inferior vena cava was normal in size with preserved  respiratory variability.     Pericardium  No pericardial effusion is present.     Miscellaneous  No significant valvular abnormalities present.     Compared to Previous Study  Compared to prior 24, RV appears larger and function is mildly reduced. LV  function appears similar.  ______________________________________________________________________________  MMode/2D Measurements & Calculations  IVSd: 0.71 cm  LVIDd: 4.4 cm  LVIDs: 2.8 cm  LVPWd: 0.83 cm  FS: 37.4 %  LV mass(C)d: 104.3 grams  LV mass(C)dI: 52.3 grams/m2  Ao root diam: 3.5 cm  asc Aorta Diam: 3.1 cm  LVOT diam: 2.3 cm  LVOT area: 4.0 cm2  Ao root diam index Ht(cm/m): 1.9  Ao root diam index BSA (cm/m2): 1.7  Asc Ao diam index BSA (cm/m2): 1.5  Asc Ao diam index Ht(cm/m): 1.7  LA Volume (BP): 32.7 ml     LA Volume Index (BP): 16.4 ml/m2  RWT: 0.38  TAPSE: 1.9 cm     Doppler Measurements & Calculations  MV E max frandy: 52.9 cm/sec  MV A max frandy: 68.3 cm/sec  MV E/A: 0.77  MV dec slope: 309.7 cm/sec2  MV dec time: 0.18 sec  E/E' av.0  Lateral E/e': 5.1  Medial E/e': 6.9  RV S Frandy: 12.3 cm/sec     ______________________________________________________________________________  Report approved by: Navya Prescott Dr on 2025 09:16  AM

## 2025-01-23 NOTE — PLAN OF CARE
Goal Outcome Evaluation:      Plan of Care Reviewed With: patient    Overall Patient Progress: no change    Pt is alert and oriented x4. Denies pain. Pt slept most of the shift. Has intermittent cough. VSS. Pt is voiding well. Has colostomy bag and pt is managing it himself. Pt is UAL. Will continue to monitor.

## 2025-01-23 NOTE — CONSULTS
[facility]   Pulmonary Consult Note  Roman Escalante MRN: [id number]      Date of Service: 01/23/25    We were consulted by Gold 10 for evaluation of whether or not this patient is a candidate for bronchoscopy with BAL.      Assessment and Plan:     Roman Escalante is a 49 year old male with a PMH significant for rectal adenocarcinoma with lung metastasis,  currently being treated with FOLFIRI and bevacizumab, and upper lobe predominant emphysema admitted for evaluation of acute on chronic hypoxemic respiratory failure in the setting of new bilateral ground glass opacities and stable pulmonary metastasis.    Discussion:   Given patients history of present illness noted below, immunosuppression d/t chemotherapy, and recent positive RSV A testing (01/15/25), most likely diagnosis for his acute on chronic hypoxemic respiratory failure is atypical/opportunistic infectious etiology due to the patient recently completing a course of 7 day course of Levaquin for suspected bacterial pneumonia superimposed on RSV A infection following continued complaints of dyspnea on exertion with increasing O2 requirements, elevated wbc to 12.8, and 01/15/25 CXR showing increased mid and lower lobe predominant streaky and hazy bilateral pulmonary opacities per radiology. At admission, repeat CBC showed wbc of 7.9 with procal of 0.42. Additionally, chest CT findings wrt the new bilateral ground glass opacities throughout the lungs is more consistent with viral or atypical pattern. Furthermore, patients immune status due to his aforementioned chemotherapy regimen creates opportunity for said infections. Another diagnosis that is being considered is drug induced pneumonitis due to lack of improvement of symptoms in the setting of abx treatment, negative RSV A on recheck, and similar presentation on CT. Progression of lung metastasis was also considered but is not likely given he stable metastasis found on recent chest CT imaging  and the acuity of presentation.     Pulmonary Problem List:  Acute on chronic hypoxemic respiratory failure   Pulmonary metastases, secondary to metastatic rectal cancer on FOLFIRI + chelsea    Recommendations:  - Recommend NPO at midnight for bronchoscopy with BAL at 12:30 pm   - Will plan to send infectious workup (cultures, galactomannan, PJP PCR, Nocardia culture, Actinomyces culture), cytology, cell count  - Recommend scheduled duonebs BID, ordered for you    Pulmonary will continue to follow. Please page/call with additional questions. Patient seen and staffed with Dr. Perlman.    Júnior Tapia, MS4    Fellow Attestation    I, Trang Hansen MD, was present with the medical/LILLY student who participated in the service and in the documentation of the note.  I have verified the history and personally performed the physical exam and medical decision making.  I agree with the assessment and plan of care as documented in the note.      Plan for bronchoscopy with BAL tomorrow, tentatively for 12:30PM, tentatively medial RML.   - NPO at midnight  - We will order bronch labs   - INR in AM, platelets were 158 today    Trang Hansen MD  PGY7  Date of Service (when I saw the patient): 01/23/25     History of Present Illness:     Roman Escalante is a 49 year old male who presented to [facility] on 1/22/2025 with 6 days of increasing SOB. During the last week of 2024, patient began having complaints of shortness of breath with exertion in addition to new clear sputum production following family festivities. Patient subsequently went to pcp on 12/26/24 and was diagnosed with acute bronchitis and treated with 5 day course of azithromycin, benzonatate and albuterol inhaler. Patient completed abx course and ultimately followed up with PCP on 01/02/25 due to symptoms not improving. Patient then treated for COPD exacerbation with short course of prednisone 40 mg daily, duonebs and Advair. Patient noted some improvement in shortness of  breath, but did continue to have cough with clear sputum production. Respiratory status began deteriorating once again prior to oncology follow up appointment on 01/15 where he was found to have RSV A, elevated WBC of 12.8, and chest xray concerning for increased bilateral pulmonary opacities. Patients chemotherapy was held, and was treated with 7 day course of Levaquin, with little improvement in shortness of breath prompting further workup in the ED after follow up oncology appointment on 01/22. During interview, patient was afebrile with SPO2 at high 90s on 4L NC. Initial workup in ED was extensive and did not uncover an acute cause such as PE, MI, or bacterial infection (procal 0.24 WBC 7.9 in the setting recent abx course) for the cause for patients shortness of breath. Patient endorsed cough with clear sputum production. Denied hemoptysis, chest pain, abdominal pain, dysuria, hematuria and new episodes of N/V following 2 prior episodes he and significant other had, which they attributed to food eaten at a party. Patient has a 30 pack year history per patient recently quitting 10 months ago.       Past Medical History:     Past Medical History:   Diagnosis Date    Cancer (H) 1/12/21    Colorectal cacer mast, to lungs, and liver    Essential (primary) hypertension 08/03/2021       Past Surgical History:   Procedure Laterality Date    ABDOMEN SURGERY      BIOPSY  1/21/21    Lung biopsy. Positive colorectal cancer in lungs    GI SURGERY  Ostomy placementnt 2/1/21    Stoma is an outie, bowel excretion only    IR LUNG BIOPSY MEDIASTINUM RIGHT  01/19/2021    IR SIRT (SELECTIVE INTERNAL RADIO THERAPY)  8/29/2023    IR VISCERAL ANGIOGRAM  8/29/2023    IR VISCERAL EMBOLIZATION  9/7/2023    NERVE BLOCK PERIPHERAL Bilateral 3/14/2024    Procedure: Bilateral pudendal nerve block with ultrasound;  Surgeon: Asha Mendoza MD;  Location: [facility] OR        Allergies:     Allergies   Allergen Reactions    Oxaliplatin Shortness Of  Breath, Nausea and Vomiting and Other (See Comments)     Patient had HSR to Oxaliplatin on cycle 8 of FOLFOX chemotherapy. Sent to ER for continued monitoring.  Patient had HSR to Oxaliplatin on cycle 8 of FOLFOX chemotherapy. Sent to ER for continued monitoring.      Vancomycin      Other Reaction(s): Red man syndrome    Blood-Group Specific Substance Other (See Comments)     Patient has reactivity Suggestive of a Warm auto antibody. Blood products may be delayed. Draw patient 24 hours prior to transfusion. Draw one red top and two purple top tubes for all type and screen orders.  Patient has reactivity Suggestive of a Warm auto antibody. Blood products may be delayed. Draw patient 24 hours prior to transfusion. Draw one red top and two purple top tubes for all type and screen orders.          Outpatient Medications:     Current Facility-Administered Medications   Medication Dose Route Frequency Provider Last Rate Last Admin    acetaminophen (TYLENOL) tablet 650 mg  650 mg Oral Q4H PRN Mariano Newton APRN CNP   650 mg at 01/23/25 1411    Or    acetaminophen (TYLENOL) Suppository 650 mg  650 mg Rectal Q4H PRN Mariano Newton APRN CNP        albuterol (PROVENTIL HFA/VENTOLIN HFA) inhaler  2 puff Inhalation Q6H PRN Mariano Newton APRN CNP        bisacodyl (DULCOLAX) EC tablet 5 mg  5 mg Oral Daily PRN Mariano Newton APRN CNP        Or    bisacodyl (DULCOLAX) EC tablet 10 mg  10 mg Oral Daily PRN Mariano Newton APRN CNP        calcium carbonate (TUMS) chewable tablet 1,000 mg  1,000 mg Oral 4x Daily PRN Mariano Newton APRN CNP        enoxaparin ANTICOAGULANT (LOVENOX) injection 40 mg  40 mg Subcutaneous Q24H Mariano Newton APRN CNP   40 mg at 01/22/25 2023    fluticasone-vilanterol (BREO ELLIPTA) 100-25 MCG/ACT inhaler 1 puff  1 puff Inhalation Daily Mariano Newton APRN CNP        gabapentin (NEURONTIN) capsule 100 mg  100 mg Oral BID Mariano Newton  "APRN CNP   100 mg at 01/23/25 1411    gabapentin (NEURONTIN) capsule 300 mg  300 mg Oral At Bedtime Mariano Newton APRN CNP   300 mg at 01/22/25 2156    guaiFENesin (MUCINEX) 12 hr tablet 1,200 mg  1,200 mg Oral BID Mariano Newton APRN CNP   1,200 mg at 01/23/25 0748    lidocaine (LMX4) cream   Topical Q1H PRN Mariano Newton APRN CNP        lidocaine 1 % 0.1-1 mL  0.1-1 mL Other Q1H PRN Mariano Newton APRN CNP        ondansetron (ZOFRAN ODT) ODT tab 4 mg  4 mg Oral Q6H PRN Mariano Newton APRN CNP        Or    ondansetron (ZOFRAN) injection 4 mg  4 mg Intravenous Q6H PRN Mariano Newton APRN CNP        polyethylene glycol (MIRALAX) Packet 17 g  17 g Oral BID PRN Mariano Newton APRN CNP        sodium chloride (PF) 0.9% PF flush 3 mL  3 mL Intracatheter Q8H Mariano Newton APRN CNP   3 mL at 01/23/25 1031    sodium chloride (PF) 0.9% PF flush 3 mL  3 mL Intracatheter q1 min prn Mariano Newton APRN CNP        tamsulosin (FLOMAX) capsule 0.4 mg  0.4 mg Oral Daily Mariano Newton APRN CNP   0.4 mg at 01/23/25 0748     Current Outpatient Medications   Medication Sig Dispense Refill    albuterol (PROAIR HFA/PROVENTIL HFA/VENTOLIN HFA) 108 (90 Base) MCG/ACT inhaler Inhale 2 puffs into the lungs every 6 hours as needed for shortness of breath, wheezing or cough. 18 g 0    Chemo Kit For RN use only. Do not remove items from bag. Contents: 1  sodium chloride 0.9% flush, 4 medium gloves, 1 chemo gown, 1/4 chemo mat, 1 connector female, 1 chemo bag. [id number] kit 0    cyclobenzaprine (FLEXERIL) 5 MG tablet Take one tab (5mg) by mouth over 6-8 hours, as needed for spasms 45 tablet 2    Emergency Supply Kit, Central, Patient use for emergency only. Contents: 3 sodium chloride 0.9% flushes, 1 dressing kit, 1 microclave ext set 14\", 4 nitrile gloves (med), 6 alcohol prep pads, 1 bacitracin, 1 syringe (10 cc 20 G 1\"). Call [phone] to reorder. [id number] kit 0 "    fluorouracil (ADRUCIL) 2.5 GM/50ML SOLN injection       gabapentin (NEURONTIN) 100 MG capsule Take 100-200mg (1-2 capsules) up to twice during the day and 300mg (3 capsules) at bedtime. 210 capsule 0    guaiFENesin (MUCINEX) 600 MG 12 hr tablet Take 2 tablets (1,200 mg) by mouth 2 times daily. 20 tablet 0    ibuprofen (ADVIL/MOTRIN) 200 MG tablet Take 3 tablets (600 mg) by mouth every 8 hours as needed for moderate pain 100 tablet 0    ipratropium - albuterol 0.5 mg/2.5 mg/3 mL (DUONEB) 0.5-2.5 (3) MG/3ML neb solution Take 1 vial (3 mLs) by nebulization every 6 hours as needed for shortness of breath, wheezing or cough. 90 mL 3    LORazepam (ATIVAN) 0.5 MG tablet Take 1 tablet (0.5 mg) by mouth every 6 hours as needed for anxiety 10 tablet 0    NARCAN 4 MG/0.1ML nasal spray       ondansetron (ZOFRAN ODT) 4 MG ODT tab Take 1 tablet (4 mg) by mouth every 6 hours as needed for nausea. 30 tablet 3    Ostomy Supplies MISC 1 each daily 1 each 11    oxyCODONE IR (ROXICODONE) 10 MG tablet Take 1-2 tablets (10-20 mg) by mouth every 4 hours as needed for moderate to severe pain. 150 tablet 0    pegfilgrastim (NEULASTA) 6 MG/0.6ML injection Inject 0.6 mLs (6 mg) subcutaneously every 14 days. Administer 24 hours after chemotherapy disconnect 3.6 mL 0    Port Access Kit For nurse use only.  Do not remove items from bag.  Use for port access.  Do not place syringe on sterile field. [id number] kit 0    prochlorperazine (COMPAZINE) 10 MG tablet Take 1 tablet (10 mg) by mouth every 6 hours as needed for nausea or vomiting 30 tablet 2    sodium chloride, PF, 0.9% PF flush Inject 10 mLs into the vein as needed for line flush. Flush IV before and after med administration as directed and/or at least every 24 hours, or prior to deaccessing for no further use and/or at least every 4 weeks when not accessed. [id number] mL 0             Family History:     No family history on file.       Social History:     Social History     Tobacco Use     Smoking status: Former     Current packs/day: 0.00     Average packs/day: 0.5 packs/day for 13.3 years (6.6 ttl pk-yrs)     Types: Cigarettes, Other     Start date: 2010     Quit date: 2023     Years since quittin.4     Passive exposure: Past    Smokeless tobacco: Never   Vaping Use    Vaping status: Never Used   Substance Use Topics    Alcohol use: Not Currently     Comment: social    Drug use: Yes     Types: Marijuana     Comment: Keeps up my appetite, sleep, and help with neuropathy        Physical Exam:     BP 97/68 (BP Location: Right arm)   Pulse 86   Temp 97.5  F (36.4  C) (Oral)   Resp 18   SpO2 95%     General: male in no acute distress  Lungs: Crackles noted bilaterally in upper airways, with clear breath sounds at bases while on 4L NC, no accessory muscle use  Extremities: No pitting edema, no clubbing or cyanosis  Neurologic: Patient moving all 4 extremities spontaneously, awake and alert     Data:     Labs (all laboratory studies reviewed by me): notable labs in HPI above.    2025 CT chest PE:  1. Exam is negative for acute pulmonary embolism. No evidence of right  heart strain or increased right heart pressures.   2. Redemonstration of multifocal pulmonary masses in the context of  known metastatic lung cancer. Grossly similar multifocal distribution  as detailed above.  3. New diffuse groundglass attenuation throughout the lungs likely  representing some degree of inflammation, viral respiratory pattern  may appear this way given recent diagnosis of RSV, may also represent  mild degree of pulmonary edema.  4. Small right pleural effusion. No pneumothorax.  5. Multifocal hepatic and osseous lesions favored to represent  metastatic disease. Increased prominence of lucent T8 lesion.    2025 Transthoracic echocardiogram:  Left ventricular function is decreased. The ejection fraction is 50-55%  (borderline).  Global right ventricular function is mildly reduced. Borderline  right  ventricular enlargement.  No significant valvular abnormalities present.     Compared to prior 9/5/24, RV appears larger and function is mildly reduced. LV  function appears similar.

## 2025-01-23 NOTE — CONSULTS
Interventional Radiology  [facility] Consult Service Note    Roman Escalante  [id number]    01/23/25   8:42 AM    Consult Requested:    Consult Reason? Need diagnostic thora of R pleural effusion in setting of new hypoxic respiratory failure    Patients clinical information/history? Hx malignancy, new hypox failure and new small pleural effusion    Interventional Radiology Adult/Peds IP Consult: Which location will this be scheduled at? [place]; When do you want the patient seen? Routine within 24 hours; Consult Reason? Need diagnostic thora of R pleural effusion in setting of new hypoxic... [[id number]]    Electronically signed by: Mariano Newton APRN CNP on 01/22/25 1810  Call Back # See treatment team    ===  Hx malignancy, new hypox failure and new small pleural effusion    CT Imaging 1/22/25  Known history of lung cancer, recent RSV diagnosis, new  dyspnea on exertion hypoxia evaluate PE pneumonia  4. Small right pleural effusion. No pneumothorax.        ===  Recommendations/Plan:    IR declines procedure.    Case and imaging discussed with IR Dr. Tj Felix    RIGHT effusion is very small and would be technically difficult, high risk, and unlikely to yield significant fluid. Aspiration would not likely be clinically helpful.    ===  Vitals:   /64 (BP Location: Right arm)   Pulse 95   Temp 98.5  F (36.9  C) (Oral)   Resp 18   SpO2 94%     Pertinent Labs:   Lab Results   Component Value Date    WBC 7.9 01/22/2025    WBC 12.8 (H) 01/15/2025    WBC 4.8 12/18/2024     Lab Results   Component Value Date    HGB 12.7 01/22/2025    HGB 12.5 01/15/2025    HGB 13.8 12/18/2024     Lab Results   Component Value Date     01/22/2025     01/15/2025     12/18/2024     Lab Results   Component Value Date    INR 1.30 (H) 01/28/2024    PTT 36 01/28/2024     Lab Results   Component Value Date    POTASSIUM 4.5 01/22/2025    POTASSIUM 4.0 04/19/2023        COVID-19 Antibody  Results, Testing for Immunity           No data to display              COVID-19 PCR Results          7/28/2023    10:03 4/2/2024    02:48 5/21/2024    09:45 1/15/2025    11:08 1/22/2025    14:31   COVID-19 PCR Results   SARS CoV2 PCR Negative  Negative  Negative  Negative  Negative          Fab Perdomo PA-C  Interventional Radiology  Pager: [phone]

## 2025-01-24 LAB
1,3 BETA GLUCAN SER-MCNC: >500 PG/ML
ALBUMIN SERPL BCG-MCNC: 2.9 G/DL (ref 3.5–5.2)
ALP SERPL-CCNC: 91 U/L (ref 40–150)
ALT SERPL W P-5'-P-CCNC: 69 U/L (ref 0–70)
ANION GAP SERPL CALCULATED.3IONS-SCNC: 10 MMOL/L (ref 7–15)
APPEARANCE FLD: ABNORMAL
AST SERPL W P-5'-P-CCNC: 25 U/L (ref 0–45)
BACTERIA SPEC CULT: NORMAL
BASOPHILS # BLD AUTO: 0 10E3/UL (ref 0–0.2)
BASOPHILS # BLD MANUAL: 0 10E3/UL (ref 0–0.2)
BASOPHILS NFR BLD AUTO: 1 %
BASOPHILS NFR BLD MANUAL: 0 %
BASOPHILS NFR FLD MANUAL: 1 %
BILIRUB SERPL-MCNC: 0.4 MG/DL
BUN SERPL-MCNC: 9.4 MG/DL (ref 6–20)
BURR CELLS BLD QL SMEAR: SLIGHT
CALCIUM SERPL-MCNC: 8.1 MG/DL (ref 8.8–10.4)
CELL COUNT BODY FLUID SOURCE: ABNORMAL
CHLORIDE SERPL-SCNC: 106 MMOL/L (ref 98–107)
COLOR FLD: ABNORMAL
CREAT SERPL-MCNC: 0.67 MG/DL (ref 0.67–1.17)
CREAT SERPL-MCNC: 0.69 MG/DL (ref 0.67–1.17)
EGFRCR SERPLBLD CKD-EPI 2021: >90 ML/MIN/1.73M2
EGFRCR SERPLBLD CKD-EPI 2021: >90 ML/MIN/1.73M2
EOSINOPHIL # BLD AUTO: 0.6 10E3/UL (ref 0–0.7)
EOSINOPHIL # BLD MANUAL: 0.6 10E3/UL (ref 0–0.7)
EOSINOPHIL NFR BLD AUTO: 10 %
EOSINOPHIL NFR BLD MANUAL: 10 %
EOSINOPHIL NFR FLD MANUAL: 4 %
ERYTHROCYTE [DISTWIDTH] IN BLOOD BY AUTOMATED COUNT: 15.4 % (ref 10–15)
GLUCOSE SERPL-MCNC: 108 MG/DL (ref 70–99)
GRAM STAIN RESULT: NORMAL
GRAM STAIN RESULT: NORMAL
HCO3 SERPL-SCNC: 20 MMOL/L (ref 22–29)
HCT VFR BLD AUTO: 35.6 % (ref 40–53)
HGB BLD-MCNC: 11.6 G/DL (ref 13.3–17.7)
IMM GRANULOCYTES # BLD: 0.6 10E3/UL
IMM GRANULOCYTES NFR BLD: 11 %
INR PPP: 1.13 (ref 0.85–1.15)
LYMPHOCYTES # BLD AUTO: 0.3 10E3/UL (ref 0.8–5.3)
LYMPHOCYTES # BLD MANUAL: 0.3 10E3/UL (ref 0.8–5.3)
LYMPHOCYTES NFR BLD AUTO: 6 %
LYMPHOCYTES NFR BLD MANUAL: 5 %
LYMPHOCYTES NFR FLD MANUAL: 63 %
MCH RBC QN AUTO: 29.5 PG (ref 26.5–33)
MCHC RBC AUTO-ENTMCNC: 32.6 G/DL (ref 31.5–36.5)
MCV RBC AUTO: 91 FL (ref 78–100)
MONOCYTES # BLD AUTO: 0.4 10E3/UL (ref 0–1.3)
MONOCYTES # BLD MANUAL: 0.3 10E3/UL (ref 0–1.3)
MONOCYTES NFR BLD AUTO: 8 %
MONOCYTES NFR BLD MANUAL: 6 %
MONOS+MACROS NFR FLD MANUAL: 18 %
NEUTROPHILS # BLD AUTO: 3.5 10E3/UL (ref 1.6–8.3)
NEUTROPHILS # BLD MANUAL: 4.3 10E3/UL (ref 1.6–8.3)
NEUTROPHILS NFR BLD AUTO: 64 %
NEUTROPHILS NFR BLD MANUAL: 79 %
NEUTS BAND NFR FLD MANUAL: 15 %
NRBC # BLD AUTO: 0 10E3/UL
NRBC BLD AUTO-RTO: 0 /100
NT-PROBNP SERPL-MCNC: 1720 PG/ML (ref 0–900)
OBSERVATION IMP: POSITIVE
PATH REPORT.COMMENTS IMP SPEC: NORMAL
PATH REPORT.FINAL DX SPEC: NORMAL
PATH REPORT.GROSS SPEC: NORMAL
PATH REPORT.RELEVANT HX SPEC: NORMAL
PLAT MORPH BLD: ABNORMAL
PLATELET # BLD AUTO: 154 10E3/UL (ref 150–450)
POLYCHROMASIA BLD QL SMEAR: SLIGHT
POTASSIUM SERPL-SCNC: 3.9 MMOL/L (ref 3.4–5.3)
PROT SERPL-MCNC: 5.8 G/DL (ref 6.4–8.3)
RBC # BLD AUTO: 3.93 10E6/UL (ref 4.4–5.9)
RBC MORPH BLD: ABNORMAL
SODIUM SERPL-SCNC: 136 MMOL/L (ref 135–145)
TROPONIN T SERPL HS-MCNC: 35 NG/L
WBC # BLD AUTO: 5.5 10E3/UL (ref 4–11)
WBC # FLD AUTO: 239 /UL

## 2025-01-24 PROCEDURE — 89051 BODY FLUID CELL COUNT: CPT | Performed by: INTERNAL MEDICINE

## 2025-01-24 PROCEDURE — 99233 SBSQ HOSP IP/OBS HIGH 50: CPT | Mod: GC | Performed by: INTERNAL MEDICINE

## 2025-01-24 PROCEDURE — 87496 CYTOMEG DNA AMP PROBE: CPT | Performed by: INTERNAL MEDICINE

## 2025-01-24 PROCEDURE — 87206 SMEAR FLUORESCENT/ACID STAI: CPT | Performed by: INTERNAL MEDICINE

## 2025-01-24 PROCEDURE — 86140 C-REACTIVE PROTEIN: CPT | Performed by: STUDENT IN AN ORGANIZED HEALTH CARE EDUCATION/TRAINING PROGRAM

## 2025-01-24 PROCEDURE — 88108 CYTOPATH CONCENTRATE TECH: CPT | Mod: TC | Performed by: INTERNAL MEDICINE

## 2025-01-24 PROCEDURE — 87070 CULTURE OTHR SPECIMN AEROBIC: CPT | Performed by: INTERNAL MEDICINE

## 2025-01-24 PROCEDURE — 99152 MOD SED SAME PHYS/QHP 5/>YRS: CPT | Performed by: INTERNAL MEDICINE

## 2025-01-24 PROCEDURE — 250N000011 HC RX IP 250 OP 636: Performed by: INTERNAL MEDICINE

## 2025-01-24 PROCEDURE — 84484 ASSAY OF TROPONIN QUANT: CPT | Performed by: STUDENT IN AN ORGANIZED HEALTH CARE EDUCATION/TRAINING PROGRAM

## 2025-01-24 PROCEDURE — 88312 SPECIAL STAINS GROUP 1: CPT | Mod: TC | Performed by: INTERNAL MEDICINE

## 2025-01-24 PROCEDURE — 99233 SBSQ HOSP IP/OBS HIGH 50: CPT | Performed by: STUDENT IN AN ORGANIZED HEALTH CARE EDUCATION/TRAINING PROGRAM

## 2025-01-24 PROCEDURE — 87081 CULTURE SCREEN ONLY: CPT | Performed by: INTERNAL MEDICINE

## 2025-01-24 PROCEDURE — 94640 AIRWAY INHALATION TREATMENT: CPT | Mod: 76

## 2025-01-24 PROCEDURE — 87101 SKIN FUNGI CULTURE: CPT | Performed by: INTERNAL MEDICINE

## 2025-01-24 PROCEDURE — 80053 COMPREHEN METABOLIC PANEL: CPT | Performed by: STUDENT IN AN ORGANIZED HEALTH CARE EDUCATION/TRAINING PROGRAM

## 2025-01-24 PROCEDURE — 31624 DX BRONCHOSCOPE/LAVAGE: CPT | Performed by: INTERNAL MEDICINE

## 2025-01-24 PROCEDURE — 250N000009 HC RX 250: Performed by: STUDENT IN AN ORGANIZED HEALTH CARE EDUCATION/TRAINING PROGRAM

## 2025-01-24 PROCEDURE — 250N000011 HC RX IP 250 OP 636: Performed by: NURSE PRACTITIONER

## 2025-01-24 PROCEDURE — 87116 MYCOBACTERIA CULTURE: CPT | Performed by: INTERNAL MEDICINE

## 2025-01-24 PROCEDURE — 85004 AUTOMATED DIFF WBC COUNT: CPT | Performed by: STUDENT IN AN ORGANIZED HEALTH CARE EDUCATION/TRAINING PROGRAM

## 2025-01-24 PROCEDURE — 88312 SPECIAL STAINS GROUP 1: CPT | Mod: 26 | Performed by: PATHOLOGY

## 2025-01-24 PROCEDURE — 87252 VIRUS INOCULATION TISSUE: CPT | Performed by: INTERNAL MEDICINE

## 2025-01-24 PROCEDURE — 85007 BL SMEAR W/DIFF WBC COUNT: CPT | Performed by: STUDENT IN AN ORGANIZED HEALTH CARE EDUCATION/TRAINING PROGRAM

## 2025-01-24 PROCEDURE — 94640 AIRWAY INHALATION TREATMENT: CPT

## 2025-01-24 PROCEDURE — 0B9C8ZX DRAINAGE OF RIGHT UPPER LUNG LOBE, VIA NATURAL OR ARTIFICIAL OPENING ENDOSCOPIC, DIAGNOSTIC: ICD-10-PCS | Performed by: INTERNAL MEDICINE

## 2025-01-24 PROCEDURE — 85610 PROTHROMBIN TIME: CPT | Performed by: STUDENT IN AN ORGANIZED HEALTH CARE EDUCATION/TRAINING PROGRAM

## 2025-01-24 PROCEDURE — 87798 DETECT AGENT NOS DNA AMP: CPT | Performed by: INTERNAL MEDICINE

## 2025-01-24 PROCEDURE — 120N000002 HC R&B MED SURG/OB UMMC

## 2025-01-24 PROCEDURE — 250N000009 HC RX 250: Performed by: INTERNAL MEDICINE

## 2025-01-24 PROCEDURE — 258N000003 HC RX IP 258 OP 636: Performed by: STUDENT IN AN ORGANIZED HEALTH CARE EDUCATION/TRAINING PROGRAM

## 2025-01-24 PROCEDURE — 999N000157 HC STATISTIC RCP TIME EA 10 MIN

## 2025-01-24 PROCEDURE — 88108 CYTOPATH CONCENTRATE TECH: CPT | Mod: 26 | Performed by: PATHOLOGY

## 2025-01-24 PROCEDURE — 36591 DRAW BLOOD OFF VENOUS DEVICE: CPT | Performed by: STUDENT IN AN ORGANIZED HEALTH CARE EDUCATION/TRAINING PROGRAM

## 2025-01-24 PROCEDURE — 250N000013 HC RX MED GY IP 250 OP 250 PS 637: Performed by: NURSE PRACTITIONER

## 2025-01-24 PROCEDURE — 87305 ASPERGILLUS AG IA: CPT | Performed by: INTERNAL MEDICINE

## 2025-01-24 PROCEDURE — 87102 FUNGUS ISOLATION CULTURE: CPT | Performed by: INTERNAL MEDICINE

## 2025-01-24 PROCEDURE — 87798 DETECT AGENT NOS DNA AMP: CPT | Performed by: STUDENT IN AN ORGANIZED HEALTH CARE EDUCATION/TRAINING PROGRAM

## 2025-01-24 PROCEDURE — 89050 BODY FLUID CELL COUNT: CPT | Performed by: INTERNAL MEDICINE

## 2025-01-24 PROCEDURE — 87205 SMEAR GRAM STAIN: CPT | Performed by: INTERNAL MEDICINE

## 2025-01-24 PROCEDURE — 85041 AUTOMATED RBC COUNT: CPT | Performed by: STUDENT IN AN ORGANIZED HEALTH CARE EDUCATION/TRAINING PROGRAM

## 2025-01-24 PROCEDURE — 99254 IP/OBS CNSLTJ NEW/EST MOD 60: CPT | Performed by: INTERNAL MEDICINE

## 2025-01-24 PROCEDURE — 36415 COLL VENOUS BLD VENIPUNCTURE: CPT | Performed by: STUDENT IN AN ORGANIZED HEALTH CARE EDUCATION/TRAINING PROGRAM

## 2025-01-24 RX ORDER — VORICONAZOLE 200 MG/1
400 TABLET, FILM COATED ORAL EVERY 12 HOURS SCHEDULED
Status: COMPLETED | OUTPATIENT
Start: 2025-01-24 | End: 2025-01-25

## 2025-01-24 RX ORDER — VORICONAZOLE 200 MG/1
200 TABLET, FILM COATED ORAL EVERY 12 HOURS SCHEDULED
Status: DISCONTINUED | OUTPATIENT
Start: 2025-01-25 | End: 2025-01-25

## 2025-01-24 RX ORDER — LIDOCAINE HYDROCHLORIDE 10 MG/ML
INJECTION, SOLUTION INFILTRATION; PERINEURAL PRN
Status: DISCONTINUED | OUTPATIENT
Start: 2025-01-24 | End: 2025-01-25 | Stop reason: HOSPADM

## 2025-01-24 RX ORDER — LIDOCAINE HYDROCHLORIDE 40 MG/ML
INJECTION, SOLUTION RETROBULBAR PRN
Status: DISCONTINUED | OUTPATIENT
Start: 2025-01-24 | End: 2025-01-25 | Stop reason: HOSPADM

## 2025-01-24 RX ORDER — FENTANYL CITRATE 50 UG/ML
INJECTION, SOLUTION INTRAMUSCULAR; INTRAVENOUS PRN
Status: DISCONTINUED | OUTPATIENT
Start: 2025-01-24 | End: 2025-01-24

## 2025-01-24 RX ORDER — SENNOSIDES 8.6 MG
8.6 TABLET ORAL 2 TIMES DAILY PRN
Status: DISCONTINUED | OUTPATIENT
Start: 2025-01-24 | End: 2025-01-25 | Stop reason: HOSPADM

## 2025-01-24 RX ORDER — VORICONAZOLE 200 MG/1
200 TABLET, FILM COATED ORAL EVERY 12 HOURS SCHEDULED
Status: DISCONTINUED | OUTPATIENT
Start: 2025-01-24 | End: 2025-01-24

## 2025-01-24 RX ADMIN — TAMSULOSIN HYDROCHLORIDE 0.4 MG: 0.4 CAPSULE ORAL at 08:27

## 2025-01-24 RX ADMIN — SODIUM CHLORIDE: 9 INJECTION, SOLUTION INTRAVENOUS at 04:49

## 2025-01-24 RX ADMIN — GUAIFENESIN 1200 MG: 600 TABLET ORAL at 19:42

## 2025-01-24 RX ADMIN — GABAPENTIN 100 MG: 100 CAPSULE ORAL at 08:27

## 2025-01-24 RX ADMIN — IPRATROPIUM BROMIDE AND ALBUTEROL SULFATE 3 ML: .5; 3 SOLUTION RESPIRATORY (INHALATION) at 08:02

## 2025-01-24 RX ADMIN — ENOXAPARIN SODIUM 40 MG: 40 INJECTION SUBCUTANEOUS at 19:42

## 2025-01-24 RX ADMIN — GABAPENTIN 100 MG: 100 CAPSULE ORAL at 13:46

## 2025-01-24 RX ADMIN — GUAIFENESIN 1200 MG: 600 TABLET ORAL at 08:27

## 2025-01-24 RX ADMIN — GABAPENTIN 300 MG: 300 CAPSULE ORAL at 21:33

## 2025-01-24 RX ADMIN — IPRATROPIUM BROMIDE AND ALBUTEROL SULFATE 3 ML: .5; 3 SOLUTION RESPIRATORY (INHALATION) at 20:17

## 2025-01-24 ASSESSMENT — ACTIVITIES OF DAILY LIVING (ADL)
ADLS_ACUITY_SCORE: 56

## 2025-01-24 NOTE — PROGRESS NOTES
Brief Oncology Note    Chart reviewed, patient not seen today.      Oncology updates:    Ddx should also include pulmonary tumor thrombotic microangiopathy (PTTM).  He has a known adenocarcinoma, and now presenting with rapidly progressive SOB and hypoxia. CT with overall stable metastases compared to October, but now with non-specific ground-glass opacities. Negative for PE.  Elevated LDH.  Additionally, he had an echo 6 months ago that was completely normal, and current echo now with non-specific abnormal septal motion, enlarged RV and reduced RV function (suggestive of possible pulmonary hypertension - though would need to better evaluate pulmonary artery pressure).  In cases of PTTM, pulmonary hypertension leading to R-sided heart failure can occur rapidly, and thus, evidence suggestive of elevated pulmonary pressures may not be evident on CT.  While he did have RSV positivity on 1/15, it is negative on current testing.    - If bronchoscopy is to be performed, would recommend obtaining transbronchial lung biopsy for pathology to assess for possible PTTM, if possible.  - Recommend further evaluation for possible pulmonary hypertension (estimated PA pressure on echo vs R heart cath w/ pulmonary wedge pressure)  - Could consider CT lung perfusion blood volume (PBV) to assess for pattern suggestive of small pulmonary arteriole occlusion (as would be seen in PTTM).  - Check fibrinogen, PTT, D-dimer, peripheral smear      Patient was staffed with Dr. Ellis.      Mary Anne Dietrich MD  Hematology/Oncology/BMT Fellow, PGY-5  Pager: [phone]

## 2025-01-24 NOTE — PROGRESS NOTES
Pt has been alert and oriented X4, calm and cooperative with some frustration at times but has not been combative or disrespectful. Pt had been on 5 LPM NC to keep O2 sats in the mid 90s, can drop to mid 80s on 5 L while asleep due to pt being a mouth breather. Pt just returned from bronchoscopy around 1330, need to remain NPO until 1415. Primary team was notified of pt's location and requested diet order. VSS with BP slightly soft.

## 2025-01-24 NOTE — PROGRESS NOTES
Neuro: A&Ox4, could be aggressive, but calls appropriately. Scheduled for Bronchoscopy today  Cardiac: ST/SR, Afebrile, VSS.   Respiratory: Lungs sound diminished bilat, dyspnea on exertion, frequent cough, de-sat to 80% with activity, takes time to recovers' and back up to 90s, on 6L of oxygen via NC  GI/: B Voiding spontaneously. No BM this shift.  Diet/appetite: NPO. Denies nausea.  Activity: Up with assist standby   Pain: Denies   Skin: No new deficits noted.  Lines:Port Cath with NS running at 100 mL/h   P: Continue to monitor and notify team with changes.

## 2025-01-24 NOTE — PROGRESS NOTES
[facility]   Pulmonary Progress Note  Roman Escalante MRN: [id number]      Date of Service: 01/24/25    We were consulted by Gold 10 for evaluation of whether or not this patient is a candidate for bronchoscopy with BAL.      Assessment and Plan:     Roman Escalante is a 49 year old male with a PMH significant for rectal adenocarcinoma with lung metastasis, currently being treated with FOLFIRI and bevacizumab, and upper lobe predominant emphysema admitted for evaluation of acute on chronic hypoxemic respiratory failure in the setting of new bilateral ground glass opacities and stable pulmonary metastasis. On 5 LPM NC with SPO2 in mid 90's that lowers to high 90's with conversation. Blood pressures in the low 90s/high 50s, and afebrile with wbc of 5.5 from 7.9. Fungal 1,3 TIMUR in process. Patient had bronchoscopy with BAL 01/24/25, for which the labs noted below were sent.    Pulmonary Problem List:  Acute on chronic hypoxemic respiratory failure   Pulmonary metastases, secondary to metastatic rectal cancer on FOLFIRI + chelsea      Recommendations:  - Infectious workup ordered: cultures, galactomannan, PJP PCR, Nocardia culture, Actinomyces culture, cytology, cell count, ordered for you  - Recommend continue scheduled duonebs BID, ordered for you    Pulmonary will continue. Please page/call with additional questions. Patient seen and staffed with Dr. Perlman.    Júnior Tapia, MS4      Trang Hansen MD  Internal Medicine-Pediatrics  Pulmonary/Critical Care Fellow - PGY7/FL2  [facility]  P: [phone]     Subjective:     No acute events overnight patient notes that his breathing is worsening when he is using the commode or with activity such as repositioning themself. Otherwise feels their breathing is similar to what it was the previous day. They are still having a cough and deny any hemoptysis. Prepared for the bronchoscopy.      Physical Exam:     BP 91/59 (BP Location: Right arm)   Pulse 86    Temp 97.9  F (36.6  C) (Oral)   Resp 18   SpO2 (!) 91%     General: Alert male in no acute distress, laying on side in bed  Lungs: Crackles noted bilaterally in upper airways, clear decreased breath sounds at bases, on 5L NC, no accessory muscle use  Extremities: No pitting edema, no clubbing or cyanosis  Neurologic: moving all 4 extremities spontaneously, awake and alert     Data:     Labs (all laboratory studies reviewed by me): notable labs in HPI above.    1/22/2025 CT chest PE:  1. Exam is negative for acute pulmonary embolism. No evidence of right  heart strain or increased right heart pressures.   2. Redemonstration of multifocal pulmonary masses in the context of  known metastatic lung cancer. Grossly similar multifocal distribution  as detailed above.  3. New diffuse groundglass attenuation throughout the lungs likely  representing some degree of inflammation, viral respiratory pattern  may appear this way given recent diagnosis of RSV, may also represent  mild degree of pulmonary edema.  4. Small right pleural effusion. No pneumothorax.  5. Multifocal hepatic and osseous lesions favored to represent  metastatic disease. Increased prominence of lucent T8 lesion.     1/22/2025 Transthoracic echocardiogram:  Left ventricular function is decreased. The ejection fraction is 50-55%  (borderline).  Global right ventricular function is mildly reduced. Borderline right  ventricular enlargement.  No significant valvular abnormalities present.     Compared to prior 9/5/24, RV appears larger and function is mildly reduced. LV  function appears similar.

## 2025-01-24 NOTE — PLAN OF CARE
Goal Outcome Evaluation:    A &O x4. Soft BP 97/68. Pt asymptomatic Primary team aware. Gave NS bolus 500ml x1. Dyspnea on exertion. On 4L NC sating 95%. Voiding in bedside commode. BM via colostomy loose brown stool. NPO at midnight for bronchoscopy at 12:30 pm tomorrow 1/23 per Pulm note. Gave tylenol for back pain. Up independently

## 2025-01-24 NOTE — PROGRESS NOTES
[facility]    Medicine Progress Note - Hospitalist Service, GOLD TEAM 10    Date of Admission:  1/22/2025    Assessment & Plan   Roman Escalante is a 49 year old man admitted on 1/22/2025. He has a history of colon cancer with mets to his lungs who is admitted with dyspnea and hypoxia.     Today:   - Underwent bronchoscopy with pulmonology. No overt infection visualized, many studies pending  - Beta d glucan positive. Pulmonology recommended infectious disease consult which was placed  - Hungry after bronchoscopy and wanting to resume oral intake. Stopped IV fluids, diet order placed     #) Acute hypoxic respiratory failure - Presented with new oxygen needs to 6 lpm associated with dyspnea.  His respiratory panel was negative on admission, although he had a positive RSV test 1/15.  His CT chest shows no pulmonary embolism or evidence for pulmonary hypertension, but does show multifocal pulmonary masses and new diffuse ground glass attenuation throughout the lungs as well as a small right pleural effusion that appears to be new. He was recently treated with prednisone and azithromycin for a respiratory infection one month ago, and completed a course of Levaquin the day of admission without any improvement.   He has a long history of smoking and stopped ~6 months ago. IR consulted on admission, effusion too small for sampling.   - Appreciate pulmonology consultation. Bronchoscopy completed 1/24. Infectious studies pending  - Blood cultures pending, no growth to date  - Beta d glucan positive but no overt fungus evident on bronchoscopy. Infectious disease consult placed.   - Hold of on further antibiotic treatment for now pending pulmonology and infectious disease recommendations.  - TIMUR, RF, cyclic citrullinated peptide   - Continue Advair, albuterol, Duo-Nebs    #) History of colon cancer - Diagnosed in 2020 and found to have adenocarcinoma.  He has been on multiple  agents and appears to currently be on FOLFIRI + bevacizumab with Zometa.  - Appreciate oncology consultation. If worsening status, they would further address goals of care. Otherwise they will follow-up outpatient.   - Okay to continue oxycodone, Zofran, Phenergan, stool softeners if needed per home symptom control regimen.   - Routine ostomy cares per home regimen    Elevated ALT, low albumin. ALT normalized.   - Monitor as needed    Elevated but stable troponin, now improving  EF 50-55%  Mild right ventricular enlargement  Echocardiogram with stable cardiac function. Mild right ventricular enlargement is most likely due to hypoxia from pulmonary process as above. No cardiac symptoms currently.   - Troponin improving  - Low threshold to repeat ECG and troponin with any symptoms of chest pain or tightness  - Addressing the hypoxia is the most appropriate acute intervention for the borderline right ventricular enlargement.     History of hypertension, currently normotensive  - Routine monitoring          Diet: Regular Diet Adult    DVT Prophylaxis: Enoxaparin (Lovenox) SQ  Puri Catheter: Not present  Lines: PRESENT      Port a Cath 01/19/21 Single Lumen Right Chest wall-Site Assessment: WDL      Cardiac Monitoring: None  Code Status:  Special code: no compressions, intubation, medications, and cardioversion okay    Clinically Significant Risk Factors               # Hypoalbuminemia: Lowest albumin = 2.9 g/dL at 1/24/2025  8:42 AM, will monitor as appropriate     # Hypertension: Noted on problem list     # Acute Hypoxic Respiratory Failure: Documented O2 saturation < 90%. Continue supplemental oxygen as needed                    Social Drivers of Health    Tobacco Use: Medium Risk (1/15/2025)    Patient History     Smoking Tobacco Use: Former     Smokeless Tobacco Use: Never     Passive Exposure: Past    [facility], [facility]    Social  Connections          Disposition Plan     Medically Ready for Discharge: Anticipated in 2-4 Days             Екатерина Fulton MD  Hospitalist Service, GOLD TEAM 10  [facility]  Securely message with [employer] (more info)  Text page via [employer] Paging/Directory   See signed in provider for up to date coverage information  ______________________________________________________________________    Interval History   No acute events overnight. Remains on nasal cannula oxygen. Underwent bronchoscopy today without complications.   Patient reported feeling okay since procedure, still needing oxygen. No abdominal pain. Stool output into ostomy bag is less than usual but also not eating due to procedure. No new pain or swelling. No fevers or chills. In agreement with waiting for test results to guide further treatment.     Physical Exam   Vital Signs: Temp: 97.8  F (36.6  C) Temp src: Oral BP: 98/68 Pulse: 83   Resp: 18 SpO2: (!) 90 % O2 Device: Nasal cannula Oxygen Delivery: 3 LPM  Weight: 0 lbs 0 oz    Physical Exam  Vitals reviewed.   Constitutional:       General: He is not in acute distress.     Appearance: He is not toxic-appearing.   HENT:      Head: Atraumatic.      Right Ear: External ear normal.      Left Ear: External ear normal.      Nose: Nose normal.      Mouth/Throat:      Mouth: Mucous membranes are moist.   Eyes:      General:         Right eye: No discharge.         Left eye: No discharge.   Cardiovascular:      Rate and Rhythm: Normal rate and regular rhythm.      Pulses: Normal pulses.   Pulmonary:      Comments: Breath sounds distant. No crackles. No cough during my encounter  Abdominal:      General: Bowel sounds are normal. There is no distension.      Palpations: Abdomen is soft.      Tenderness: There is no abdominal tenderness. There is no guarding.      Comments: Ostomy bag with brown stool   Musculoskeletal:      Right lower leg: No edema.      Left lower  leg: No edema.   Skin:     General: Skin is warm.      Capillary Refill: Capillary refill takes less than 2 seconds.      Findings: No rash.   Neurological:      General: No focal deficit present.      Mental Status: He is alert.   Psychiatric:         Behavior: Behavior normal.         Medical Decision Making       50 MINUTES SPENT BY ME on the date of service doing chart review, history, exam, documentation & further activities per the note.      Data     I have personally reviewed the following data over the past 24 hrs:    5.5  \   11.6 (L)   / 154     136 106 9.4 /  108 (H)   3.9 20 (L) 0.67 \     ALT: 69 AST: 25 AP: 91 TBILI: 0.4   ALB: 2.9 (L) TOT PROTEIN: 5.8 (L) LIPASE: N/A     Trop: 35 (H) BNP: N/A     INR:  1.13 PTT:  N/A   D-dimer:  N/A Fibrinogen:  N/A       Imaging results reviewed over the past 24 hrs:   No results found for this or any previous visit (from the past 24 hours).

## 2025-01-24 NOTE — PROGRESS NOTES
[facility]   Pulmonary Progress Note  Roman Escalante MRN: [id number]      Date of Service: 01/24/25    We were consulted by Gold 10 for evaluation for bronchoscopy in this immunosuppressed patient with new RUL infiltrates and groundglass opacities.      Assessment and Plan:     Roman Escalante is a 49-year-old male with a PMH significant for rectal adenocarcinoma with lung metastases (currently being treated with FOLFIRI and bevacizumab) and emphysema admitted for evaluation of acute on chronic hypoxemic respiratory failure, found on CT chest to have new RUL infiltrates now s/p bronchoscopy with BAL for further evaluation.     Pulmonary Problem List:  Acute on chronic hypoxemic respiratory failure, on ~5L NC   Pulmonary metastases, secondary to metastatic rectal cancer on FOLFIRI + chelsea  Upper lobe predominant centrilobular emphysema    Discussion:    Compared to CT chest in 10/18/2024, R-sided infiltrates are new. We lavaged the anterior RUL. There are some new nodular infiltrates on the L side as well. Differential includes infection or spread of malignancy. Less likely side effect of FOLFIRI or bevacizumab given appearance on chest CT.    Chest CT on left -- 10/18/2024  Chest CT on right -- 1/22/2025          Recommendations:  - Tolerated bronchoscopy today without complication, BAL of anterior RUL  - Continue inhalers for COPD    Pulmonary will sign off. Please page/call with additional questions. Patient seen and staffed with Dr. Perlman.    Júnior Tapia, MS4      Resident/Fellow Attestation  I, Trang Hansen MD, was present with the medical/LILLY student who participated in the service and in the documentation of the note.  I have verified the history and personally performed the physical exam and medical decision making.  I agree with the assessment and plan of care as documented in the note.      Trang Hansen MD  PGY7  Date of Service (when I saw the patient): 01/24/25      Subjective:      No acute events overnight patient notes that his breathing is worsening when he is using the commode or with activity such as repositioning themself. Otherwise feels their breathing is similar to what it was the previous day. They are still having a cough and deny any hemoptysis. Prepared for the bronchoscopy.      Physical Exam:     BP 98/68 (BP Location: Left arm)   Pulse 83   Temp 97.8  F (36.6  C) (Oral)   Resp 18   SpO2 (!) 90%     General: Alert male in no acute distress, laying on side in bed  Lungs: Crackles noted bilaterally in upper airways, clear decreased breath sounds at bases, on 5L NC, no accessory muscle use  Extremities: No pitting edema, no clubbing or cyanosis  Neurologic: moving all 4 extremities spontaneously, awake and alert     Data:     Labs (all laboratory studies reviewed by me): notable labs in HPI above.    1/22/2025 CT chest PE:  1. Exam is negative for acute pulmonary embolism. No evidence of right  heart strain or increased right heart pressures.   2. Redemonstration of multifocal pulmonary masses in the context of  known metastatic lung cancer. Grossly similar multifocal distribution  as detailed above.  3. New diffuse groundglass attenuation throughout the lungs likely  representing some degree of inflammation, viral respiratory pattern  may appear this way given recent diagnosis of RSV, may also represent  mild degree of pulmonary edema.  4. Small right pleural effusion. No pneumothorax.  5. Multifocal hepatic and osseous lesions favored to represent  metastatic disease. Increased prominence of lucent T8 lesion.     1/22/2025 Transthoracic echocardiogram:  Left ventricular function is decreased. The ejection fraction is 50-55%  (borderline).  Global right ventricular function is mildly reduced. Borderline right  ventricular enlargement.  No significant valvular abnormalities present.     Compared to prior 9/5/24, RV appears larger and function is mildly reduced. LV  function  appears similar.

## 2025-01-25 VITALS
HEART RATE: 87 BPM | TEMPERATURE: 98.3 F | RESPIRATION RATE: 18 BRPM | DIASTOLIC BLOOD PRESSURE: 73 MMHG | SYSTOLIC BLOOD PRESSURE: 93 MMHG | OXYGEN SATURATION: 100 %

## 2025-01-25 PROBLEM — J16.8 FUNGAL PNEUMONIA: Status: ACTIVE | Noted: 2025-01-25

## 2025-01-25 PROBLEM — B49 FUNGAL PNEUMONIA: Status: ACTIVE | Noted: 2025-01-25

## 2025-01-25 LAB
ALBUMIN SERPL BCG-MCNC: 3.1 G/DL (ref 3.5–5.2)
ALP SERPL-CCNC: 100 U/L (ref 40–150)
ALT SERPL W P-5'-P-CCNC: 131 U/L (ref 0–70)
ANA PAT SER IF-IMP: ABNORMAL
ANA SER QL IF: ABNORMAL
ANA TITR SER IF: ABNORMAL {TITER}
ANION GAP SERPL CALCULATED.3IONS-SCNC: 17 MMOL/L (ref 7–15)
ASPERGILLUS AB TITR SER CF: NORMAL {TITER}
AST SERPL W P-5'-P-CCNC: 46 U/L (ref 0–45)
B DERMAT AB SER-ACNC: 0.2 IV
BACTERIA BRONCH: NORMAL
BACTERIA SPT CULT: NORMAL
BASOPHILS # BLD MANUAL: 0 10E3/UL (ref 0–0.2)
BASOPHILS NFR BLD MANUAL: 0 %
BILIRUB SERPL-MCNC: 0.6 MG/DL
BUN SERPL-MCNC: 13.7 MG/DL (ref 6–20)
BURR CELLS BLD QL SMEAR: SLIGHT
CALCIUM SERPL-MCNC: 8.1 MG/DL (ref 8.8–10.4)
CHLORIDE SERPL-SCNC: 105 MMOL/L (ref 98–107)
COCCIDIOIDES AB TITR SER CF: NORMAL {TITER}
CREAT SERPL-MCNC: 0.69 MG/DL (ref 0.67–1.17)
CRP SERPL-MCNC: 85.7 MG/L
EGFRCR SERPLBLD CKD-EPI 2021: >90 ML/MIN/1.73M2
ELLIPTOCYTES BLD QL SMEAR: SLIGHT
EOSINOPHIL # BLD MANUAL: 1.1 10E3/UL (ref 0–0.7)
EOSINOPHIL NFR BLD MANUAL: 22 %
ERYTHROCYTE [DISTWIDTH] IN BLOOD BY AUTOMATED COUNT: 15.6 % (ref 10–15)
ERYTHROCYTE [SEDIMENTATION RATE] IN BLOOD BY WESTERGREN METHOD: 47 MM/HR (ref 0–20)
GALACTOMANNAN AG BAL QL: NEGATIVE
GALACTOMANNAN AG SPEC IA-ACNC: 0.06
GLUCOSE SERPL-MCNC: 150 MG/DL (ref 70–99)
GRAM STAIN RESULT: NORMAL
H CAPSUL MYC AB TITR SER CF: NORMAL {TITER}
H CAPSUL YST AB TITR SER CF: NORMAL {TITER}
HCO3 SERPL-SCNC: 16 MMOL/L (ref 22–29)
HCT VFR BLD AUTO: 37.9 % (ref 40–53)
HGB BLD-MCNC: 11.5 G/DL (ref 13.3–17.7)
LYMPHOCYTES # BLD MANUAL: 0.7 10E3/UL (ref 0.8–5.3)
LYMPHOCYTES NFR BLD MANUAL: 14 %
MCH RBC QN AUTO: 28.5 PG (ref 26.5–33)
MCHC RBC AUTO-ENTMCNC: 30.3 G/DL (ref 31.5–36.5)
MCV RBC AUTO: 94 FL (ref 78–100)
MONOCYTES # BLD MANUAL: 0.6 10E3/UL (ref 0–1.3)
MONOCYTES NFR BLD MANUAL: 12 %
NEUTROPHILS # BLD MANUAL: 2.5 10E3/UL (ref 1.6–8.3)
NEUTROPHILS NFR BLD MANUAL: 52 %
PLAT MORPH BLD: ABNORMAL
PLATELET # BLD AUTO: 160 10E3/UL (ref 150–450)
PLATELET # BLD AUTO: 164 10E3/UL (ref 150–450)
POTASSIUM SERPL-SCNC: 4.2 MMOL/L (ref 3.4–5.3)
PROT SERPL-MCNC: 6.2 G/DL (ref 6.4–8.3)
RBC # BLD AUTO: 4.03 10E6/UL (ref 4.4–5.9)
RBC MORPH BLD: ABNORMAL
SODIUM SERPL-SCNC: 138 MMOL/L (ref 135–145)
WBC # BLD AUTO: 4.8 10E3/UL (ref 4–11)

## 2025-01-25 PROCEDURE — 87385 HISTOPLASMA CAPSUL AG IA: CPT | Performed by: STUDENT IN AN ORGANIZED HEALTH CARE EDUCATION/TRAINING PROGRAM

## 2025-01-25 PROCEDURE — 36415 COLL VENOUS BLD VENIPUNCTURE: CPT | Performed by: STUDENT IN AN ORGANIZED HEALTH CARE EDUCATION/TRAINING PROGRAM

## 2025-01-25 PROCEDURE — 87305 ASPERGILLUS AG IA: CPT | Performed by: STUDENT IN AN ORGANIZED HEALTH CARE EDUCATION/TRAINING PROGRAM

## 2025-01-25 PROCEDURE — 99233 SBSQ HOSP IP/OBS HIGH 50: CPT | Mod: GC | Performed by: HOSPITALIST

## 2025-01-25 PROCEDURE — 250N000013 HC RX MED GY IP 250 OP 250 PS 637: Performed by: NURSE PRACTITIONER

## 2025-01-25 PROCEDURE — 250N000013 HC RX MED GY IP 250 OP 250 PS 637: Performed by: STUDENT IN AN ORGANIZED HEALTH CARE EDUCATION/TRAINING PROGRAM

## 2025-01-25 PROCEDURE — 86635 COCCIDIOIDES ANTIBODY: CPT | Performed by: STUDENT IN AN ORGANIZED HEALTH CARE EDUCATION/TRAINING PROGRAM

## 2025-01-25 PROCEDURE — 87040 BLOOD CULTURE FOR BACTERIA: CPT | Performed by: STUDENT IN AN ORGANIZED HEALTH CARE EDUCATION/TRAINING PROGRAM

## 2025-01-25 PROCEDURE — 94640 AIRWAY INHALATION TREATMENT: CPT | Mod: 76

## 2025-01-25 PROCEDURE — 999N000157 HC STATISTIC RCP TIME EA 10 MIN

## 2025-01-25 PROCEDURE — 250N000011 HC RX IP 250 OP 636: Performed by: STUDENT IN AN ORGANIZED HEALTH CARE EDUCATION/TRAINING PROGRAM

## 2025-01-25 PROCEDURE — 85007 BL SMEAR W/DIFF WBC COUNT: CPT | Performed by: STUDENT IN AN ORGANIZED HEALTH CARE EDUCATION/TRAINING PROGRAM

## 2025-01-25 PROCEDURE — 86612 BLASTOMYCES ANTIBODY: CPT | Performed by: STUDENT IN AN ORGANIZED HEALTH CARE EDUCATION/TRAINING PROGRAM

## 2025-01-25 PROCEDURE — 85652 RBC SED RATE AUTOMATED: CPT | Performed by: STUDENT IN AN ORGANIZED HEALTH CARE EDUCATION/TRAINING PROGRAM

## 2025-01-25 PROCEDURE — 85014 HEMATOCRIT: CPT | Performed by: STUDENT IN AN ORGANIZED HEALTH CARE EDUCATION/TRAINING PROGRAM

## 2025-01-25 PROCEDURE — 250N000011 HC RX IP 250 OP 636: Performed by: NURSE PRACTITIONER

## 2025-01-25 PROCEDURE — 94640 AIRWAY INHALATION TREATMENT: CPT

## 2025-01-25 PROCEDURE — 87449 NOS EACH ORGANISM AG IA: CPT | Performed by: STUDENT IN AN ORGANIZED HEALTH CARE EDUCATION/TRAINING PROGRAM

## 2025-01-25 PROCEDURE — 99233 SBSQ HOSP IP/OBS HIGH 50: CPT | Performed by: INTERNAL MEDICINE

## 2025-01-25 PROCEDURE — 250N000009 HC RX 250: Performed by: STUDENT IN AN ORGANIZED HEALTH CARE EDUCATION/TRAINING PROGRAM

## 2025-01-25 PROCEDURE — 87103 BLOOD FUNGUS CULTURE: CPT | Performed by: STUDENT IN AN ORGANIZED HEALTH CARE EDUCATION/TRAINING PROGRAM

## 2025-01-25 PROCEDURE — 99239 HOSP IP/OBS DSCHRG MGMT >30: CPT | Performed by: STUDENT IN AN ORGANIZED HEALTH CARE EDUCATION/TRAINING PROGRAM

## 2025-01-25 PROCEDURE — 85049 AUTOMATED PLATELET COUNT: CPT | Performed by: NURSE PRACTITIONER

## 2025-01-25 RX ORDER — VORICONAZOLE 200 MG/1
200 TABLET, FILM COATED ORAL EVERY 12 HOURS
Qty: 28 TABLET | Refills: 1 | Status: SHIPPED | OUTPATIENT
Start: 2025-01-25 | End: 2025-01-29

## 2025-01-25 RX ORDER — HEPARIN SODIUM,PORCINE 10 UNIT/ML
5-10 VIAL (ML) INTRAVENOUS
Status: DISCONTINUED | OUTPATIENT
Start: 2025-01-25 | End: 2025-01-25 | Stop reason: HOSPADM

## 2025-01-25 RX ORDER — NALOXONE HYDROCHLORIDE 0.4 MG/ML
0.2 INJECTION, SOLUTION INTRAMUSCULAR; INTRAVENOUS; SUBCUTANEOUS
Status: DISCONTINUED | OUTPATIENT
Start: 2025-01-25 | End: 2025-01-25 | Stop reason: HOSPADM

## 2025-01-25 RX ORDER — VORICONAZOLE 200 MG/1
200 TABLET, FILM COATED ORAL EVERY 12 HOURS
Qty: 28 TABLET | Refills: 0 | Status: SHIPPED | OUTPATIENT
Start: 2025-01-25 | End: 2025-01-25

## 2025-01-25 RX ORDER — HEPARIN SODIUM,PORCINE 10 UNIT/ML
5-10 VIAL (ML) INTRAVENOUS EVERY 24 HOURS
Status: DISCONTINUED | OUTPATIENT
Start: 2025-01-25 | End: 2025-01-25 | Stop reason: HOSPADM

## 2025-01-25 RX ORDER — NALOXONE HYDROCHLORIDE 0.4 MG/ML
0.4 INJECTION, SOLUTION INTRAMUSCULAR; INTRAVENOUS; SUBCUTANEOUS
Status: DISCONTINUED | OUTPATIENT
Start: 2025-01-25 | End: 2025-01-25 | Stop reason: HOSPADM

## 2025-01-25 RX ORDER — VORICONAZOLE 200 MG/1
200 TABLET, FILM COATED ORAL EVERY 12 HOURS SCHEDULED
Status: DISCONTINUED | OUTPATIENT
Start: 2025-01-25 | End: 2025-01-25 | Stop reason: HOSPADM

## 2025-01-25 RX ORDER — HEPARIN SODIUM (PORCINE) LOCK FLUSH IV SOLN 100 UNIT/ML 100 UNIT/ML
5-10 SOLUTION INTRAVENOUS
Status: DISCONTINUED | OUTPATIENT
Start: 2025-01-25 | End: 2025-01-25 | Stop reason: HOSPADM

## 2025-01-25 RX ADMIN — ENOXAPARIN SODIUM 40 MG: 40 INJECTION SUBCUTANEOUS at 19:12

## 2025-01-25 RX ADMIN — GABAPENTIN 100 MG: 100 CAPSULE ORAL at 13:33

## 2025-01-25 RX ADMIN — Medication 5 ML: at 19:13

## 2025-01-25 RX ADMIN — IPRATROPIUM BROMIDE AND ALBUTEROL SULFATE 3 ML: .5; 3 SOLUTION RESPIRATORY (INHALATION) at 19:39

## 2025-01-25 RX ADMIN — GABAPENTIN 100 MG: 100 CAPSULE ORAL at 08:02

## 2025-01-25 RX ADMIN — TAMSULOSIN HYDROCHLORIDE 0.4 MG: 0.4 CAPSULE ORAL at 08:02

## 2025-01-25 RX ADMIN — IPRATROPIUM BROMIDE AND ALBUTEROL SULFATE 3 ML: .5; 3 SOLUTION RESPIRATORY (INHALATION) at 08:20

## 2025-01-25 RX ADMIN — VORICONAZOLE 400 MG: 200 TABLET, FILM COATED ORAL at 00:47

## 2025-01-25 RX ADMIN — GUAIFENESIN 1200 MG: 600 TABLET ORAL at 19:13

## 2025-01-25 RX ADMIN — VORICONAZOLE 200 MG: 200 TABLET ORAL at 19:15

## 2025-01-25 RX ADMIN — GUAIFENESIN 1200 MG: 600 TABLET ORAL at 08:03

## 2025-01-25 RX ADMIN — VORICONAZOLE 400 MG: 200 TABLET, FILM COATED ORAL at 08:02

## 2025-01-25 ASSESSMENT — ACTIVITIES OF DAILY LIVING (ADL)
ADLS_ACUITY_SCORE: 56
DEPENDENT_IADLS:: INDEPENDENT

## 2025-01-25 NOTE — PROGRESS NOTES
Oxygen Documentation  I certify that this patient, Roman Escalante has been under my care (or a nurse practitioner or physican's assistant working with me). This is the face-to-face encounter for oxygen medical necessity.      At the time of this encounter, I have reviewed the qualifying testing and have determined that supplemental oxygen is reasonable and necessary and is expected to improve the patient's condition in a home setting.         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    If portability is ordered, is the patient mobile within the home? yes    Was this visit performed as a telehealth visit: No    Екатерина Fulton MD  01/25/25  5:19 PM

## 2025-01-25 NOTE — CONSULTS
GENERAL ID SERVICE CONSULTATION     Patient:  Roman Escalante   Date of birth 1975, Medical record number [id number]  Date of Visit:  01/24/2025  Date of Admission: 1/22/2025  Consult Requester:Екатерина Fulton MD            Assessment and Recommendations:   ASSESSMENT:  Elevated Fungitell assay in a cancer patient who is undergoing chemotherapy and CAR-T cell therapy and he is at risk for invasive fungal infections. CT chest does show mass like lesions in the lung bases but radiologist favors lung metastases form colorectal cancer over invasive fungal pneumonia.   Recommend startign antifungal therpay      RECOMMENDATION:  Check Aspergillus galactomannen antigen  Check fungal antibody panel  Check urine and blood Histoplasma antigens   Send routine and fungal blood cultures.   Follow-up on bronchoscopy BAL cultures.  Recommend starting voriconazole empirically while waiting for above work up for invasive fungal disease to come back. Voriconazole would provide good coverage for both Aspergillus and Candida. Start at 400 MG PO BID q 12 hours x 1 day then 200 mg PO Q 12 hours.     ID will follow with you and follow-up on above diagnostic tests and determine longer term treatment plan.       Attending Physician Attestation:  I have seen and evaluated Roman Escalante.  I have reviewed today's vital signs, medications, labs and imaging.         Taty Frazier MD  ID staff    ________________________________________________________________    Consult Question: Please advise regarding positive Fungitell assay.   Admission Diagnosis: Acute respiratory failure (H) [J96.00]  History of lung cancer [Z85.118]  History of RSV infection [Z86.19]         History of Present Illness:   Roman Escalante is a 49 year old male who presents with hypoxia and dyspnea. Recent viral respiratory tract infection in Dec. due to RSV. Had progressive SOB and hypoxia at home and he was self treating with oxygen at home. Without O2  supplementation his sats dropped to 77% he states. He borrowed a friends oxygen machine to use at home for 4-5 days but then decided to come to the ED to evaluation and oxygen support.     Past medical history is notable for colorectal cancer with mets to lungs and liver. S/P treatment with CAR-T cell therapy at [facility] to treat the colorectal cancer with mets.     Since admission on 1/22/2025, he underwent a bronchoscopy and BAL today. Preliminary results are non-revealing to date. No PJP or fungal organisms seen on cytology.              Review of Systems:   CONSTITUTIONAL:  No fevers or chills  EYES: negative for icterus  ENT:  negative for hearing loss, tinnitus and sore throat  RESPIRATORY:  negative for cough with sputum and dyspnea  CARDIOVASCULAR:  negative for chest pain, dyspnea  GASTROINTESTINAL:  negative for nausea, vomiting, diarrhea and constipation  GENITOURINARY:  negative for dysuria  HEME:  No easy bruising  INTEGUMENT:  negative for rash and pruritus  NEURO:  Negative for headache           Past Medical History:     Past Medical History:   Diagnosis Date    Cancer (H) 1/12/21    Colorectal cacer mast, to lungs, and liver    Essential (primary) hypertension 08/03/2021            Past Surgical History:     Past Surgical History:   Procedure Laterality Date    ABDOMEN SURGERY      BIOPSY  1/21/21    Lung biopsy. Positive colorectal cancer in lungs    GI SURGERY  Ostomy placementnt 2/1/21    Stoma is an outie, bowel excretion only    IR LUNG BIOPSY MEDIASTINUM RIGHT  01/19/2021    IR SIRT (SELECTIVE INTERNAL RADIO THERAPY)  8/29/2023    IR VISCERAL ANGIOGRAM  8/29/2023    IR VISCERAL EMBOLIZATION  9/7/2023    NERVE BLOCK PERIPHERAL Bilateral 3/14/2024    Procedure: Bilateral pudendal nerve block with ultrasound;  Surgeon: Asha Mendoza MD;  [place] [facility] [place]            Family History:   No family history on file.         Social History:     Social History     Tobacco Use    Smoking status: Former      Current packs/day: 0.00     Average packs/day: 0.5 packs/day for 13.3 years (6.6 ttl pk-yrs)     Types: Cigarettes, Other     Start date: 2010     Quit date: 2023     Years since quittin.4     Passive exposure: Past    Smokeless tobacco: Never   Substance Use Topics    Alcohol use: Not Currently     Comment: social     History   Sexual Activity    Sexual activity: Yes    Partners: Female    Birth control/ protection: Abstinence, Condom, Post-menopausal            Current Medications (antimicrobials listed in bold):     Current Facility-Administered Medications   Medication Dose Route Frequency Provider Last Rate Last Admin    enoxaparin ANTICOAGULANT (LOVENOX) injection 40 mg  40 mg Subcutaneous Q24H Mariano Newton APRN CNP   40 mg at 25 194    fluticasone-vilanterol (BREO ELLIPTA) 100-25 MCG/ACT inhaler 1 puff  1 puff Inhalation Daily Mariano Newton APRN CNP        gabapentin (NEURONTIN) capsule 100 mg  100 mg Oral BID Mariano Newton APRN CNP   100 mg at 25 1346    gabapentin (NEURONTIN) capsule 300 mg  300 mg Oral At Bedtime Mariano Newton APRN CNP   300 mg at 25 2133    guaiFENesin (MUCINEX) 12 hr tablet 1,200 mg  1,200 mg Oral BID Mariano Newton APRN CNP   1,200 mg at 25    ipratropium - albuterol 0.5 mg/2.5 mg/3 mL (DUONEB) neb solution 3 mL  3 mL Nebulization 2 times daily Екатерина Fulton MD   3 mL at 25    sodium chloride (PF) 0.9% PF flush 3 mL  3 mL Intracatheter Q8H Mariano Newton APRN CNP   3 mL at 25 180    tamsulosin (FLOMAX) capsule 0.4 mg  0.4 mg Oral Daily Mariano Newton APRN CNP   0.4 mg at 25 0827            Allergies:     Allergies   Allergen Reactions    Oxaliplatin Shortness Of Breath, Nausea and Vomiting and Other (See Comments)     Patient had HSR to Oxaliplatin on cycle 8 of FOLFOX chemotherapy. Sent to ER for continued monitoring.  Patient had HSR to Oxaliplatin on  cycle 8 of FOLFOX chemotherapy. Sent to ER for continued monitoring.      Vancomycin      Other Reaction(s): Red man syndrome    Blood-Group Specific Substance Other (See Comments)     Patient has reactivity Suggestive of a Warm auto antibody. Blood products may be delayed. Draw patient 24 hours prior to transfusion. Draw one red top and two purple top tubes for all type and screen orders.  Patient has reactivity Suggestive of a Warm auto antibody. Blood products may be delayed. Draw patient 24 hours prior to transfusion. Draw one red top and two purple top tubes for all type and screen orders.              Physical Exam:   Vitals were reviewed  Patient Vitals for the past 24 hrs:   BP Temp Temp src Pulse Resp SpO2   01/24/25 1800 -- -- -- -- -- 96 %   01/24/25 1700 -- -- -- -- -- 97 %   01/24/25 1521 98/68 97.8  F (36.6  C) Oral 83 18 (!) 90 %   01/24/25 1500 -- -- -- 86 -- --   01/24/25 1400 99/71 -- -- 87 21 97 %   01/24/25 1343 95/67 97.9  F (36.6  C) Oral 84 15 99 %   01/24/25 1320 (!) 87/73 -- -- 94 20 96 %   01/24/25 1315 (!) 89/62 -- -- 96 17 96 %   01/24/25 1310 100/66 -- -- (!) 111 23 (!) 89 %   01/24/25 1305 94/80 -- -- 88 14 (!) 91 %   01/24/25 1300 95/62 -- -- 81 15 93 %   01/24/25 1255 95/70 -- -- 88 24 93 %   01/24/25 1245 92/72 -- -- 88 18 98 %   01/24/25 1244 92/72 -- -- 78 19 97 %   01/24/25 1014 -- -- -- -- -- 96 %   01/24/25 0852 -- -- -- -- -- (!) 86 %   01/24/25 0844 110/73 98.2  F (36.8  C) Oral 104 18 (!) 89 %   01/24/25 0802 -- -- -- -- -- 93 %   01/24/25 0100 91/59 97.9  F (36.6  C) Oral 86 18 (!) 91 %   01/24/25 0000 -- -- -- 96 -- 93 %     Ranges for his vital signs:  Temp:  [97.8  F (36.6  C)-98.2  F (36.8  C)] 97.8  F (36.6  C)  Pulse:  [] 83  Resp:  [14-24] 18  BP: ()/(59-80) 98/68  SpO2:  [86 %-99 %] 96 %    Physical Examination:  GENERAL:  well-developed, well-nourished, in bed in no acute distress.   HEENT:  Head is normocephalic, atraumatic   EYES:  Eyes have anicteric  "sclerae without conjunctival injection or stigmata of endocarditis.    ENT:  Oropharynx is moist without exudates or ulcers. Tongue is midline  NECK:  Supple. No  Cervical lymphadenopathy  LUNGS:  Clear to auscultation bilateral.   CARDIOVASCULAR:  Regular rate and rhythm with no murmurs, gallops or rubs.  ABDOMEN:  Normal bowel sounds, soft, nontender. No appreciable hepatosplenomegaly  SKIN:  No acute rashes.  Line(s) are in place without any surrounding erythema or exudate. No stigmata of endocarditis.  NEUROLOGIC:  Grossly nonfocal. Active x4 extremities  EXTREMITIES: No edema.          Laboratory Data:     Inflammatory Markers    Recent Labs   Lab Test 01/28/24  1902   SED 38*       Hematology Studies    Recent Labs   Lab Test 01/24/25  0452 01/22/25  1431 01/15/25  0910 12/18/24  0927 12/04/24  0749 11/20/24  0932 07/28/23  0558 07/28/23  0016   WBC 5.5 7.9 12.8* 4.8 6.2 10.0   < > 46.5*   ANEU 4.3 6.7 11.1* 3.1 4.7  --   --  45.1*   AEOS 0.6 0.3 0.2 0.2 0.3  --   --  0.0   HGB 11.6* 12.7* 12.5* 13.8 13.0* 13.4   < > 11.3*   MCV 91 91 92 91 90 86   < > 88    158 203 132* 138* 156   < > 148*    < > = values in this interval not displayed.       Metabolic Studies     Recent Labs   Lab Test 01/24/25  0842 01/22/25  1431 01/15/25  0910 12/18/24  0927 12/04/24  0749    136 135 139 140   POTASSIUM 3.9 4.5 4.1 3.5 3.9   CHLORIDE 106 104 102 106 106   CO2 20* 20* 20* 20* 23   BUN 9.4 14.3 16.5 13.3 16.4   CR 0.67 0.73 0.80 0.85 0.86   GFRESTIMATED >90 >90 >90 >90 >90       Hepatic Studies    Recent Labs   Lab Test 01/24/25  0842 01/22/25  1431 01/15/25  0910 12/18/24  0927 12/04/24  0749 11/20/24  0932   BILITOTAL 0.4 0.7 0.7 0.3 0.2 0.2   ALKPHOS 91 116 130 118 125 150   ALBUMIN 2.9* 3.1* 3.6 3.8 3.9 4.1   AST 25 43 27 21 18 19   ALT 69 139* 25 6 8 9       Microbiology:  No results found for: \"CULT\"    Urine Studies    Recent Labs   Lab Test 04/26/24  1600 04/02/24  0552 07/29/23  1044 07/27/23  2158 "   LEUKEST Negative Negative Negative Negative   WBCU 0 1 1 15*       Virology:  Hepatitis B Testing   Recent Labs   Lab Test 11/14/23  1309   HBCAB Nonreactive     Narrative & Impression   EXAM: CTA pulmonary angiogram, 1/22/2025 4:13 PM     HISTORY: Known history of lung cancer, recent RSV diagnosis, new  dyspnea on exertion hypoxia evaluate PE pneumonia     COMPARISON: X-ray 1/15/2025. CT CAP 1 10/18/2024, multiple priors.     TECHNIQUE: Volumetric CT images obtained through the chest with  contrast. Coronal and axial MIP reformatted images obtained.  Three-dimensional (3D) post-processed angiographic images were  reconstructed, archived to PACS and used in interpretation of this  study.      CONTRAST: 61 mL isovue 370 IV.     FINDINGS:      Support devices:  -Right IJ central venous catheter tip terminates at the cavoatrial  junction.     Vascular:  There is good contrast opacification of the pulmonary arterial  vasculature. No pulmonary embolus.  Heart is normal size without  pericardial effusion. No evidence of right heart strain or elevated  right heart pressures. No thoracic aortic aneurysm.     Remaining Chest:  Partially visualized thyroid is unremarkable.  No axillary adenopathy. Numerous enlarged mediastinal and hilar lymph  nodes, similar to that seen on prior imaging.  Heart size normal, no pericardial effusion or thickening.     Central tracheobronchial tree is patent without significant debris.  Mild obstruction of the right lower lobe and medial left lower lobe  bronchi secondary to pulmonary mass. Multifocal pulmonary masses,  grossly similar to prior CT 10/18/2024, largest aspects within the  right lung base and medial infrahilar left lung. There are multiple  metastatic pulmonary masses. As a progress attenuation throughout,  with focal areas of interstitial fibrotic changes. Apical predominant  panlobular emphysema. Small right pleural effusion. No pneumothorax.     Upper Abdomen: Limited  evaluation of the upper abdomen demonstrates  multiple hypoattenuating liver lesions, incompletely evaluated on  noncontrast exam, features represent previously identified metastatic  lesions. Granulomatous calcifications of the spleen. No acute findings  within the visualized upper abdomen.     Bones/Soft Tissues: Multiple osseous metastatic lesions, some increase  in prominence compared to prior, for example T8 vertebral body lesion  demonstrating increased lucency compared to prior CT.                                                                      IMPRESSION:  1. Exam is negative for acute pulmonary embolism. No evidence of right  heart strain or increased right heart pressures.   2. Redemonstration of multifocal pulmonary masses in the context of  known metastatic lung cancer. Grossly similar multifocal distribution  as detailed above.  3. New diffuse groundglass attenuation throughout the lungs likely  representing some degree of inflammation, viral respiratory pattern  may appear this way given recent diagnosis of RSV, may also represent  mild degree of pulmonary edema.  4. Small right pleural effusion. No pneumothorax.  5. Multifocal hepatic and osseous lesions favored to represent  metastatic disease. Increased prominence of lucent T8 lesion.

## 2025-01-25 NOTE — PROGRESS NOTES
Hematology / Oncology  Daily Progress Note   Date of Service: 01/25/2025  Patient: Roman Escalante  MRN: [id number]  Admission Date: 1/22/2025  Hospital Day # 3  Cancer Diagnosis: metastatic rectal cancer  Primary Outpatient Oncologist: Dr. Snow  Current Treatment Plan: FOLFIRI + bevacizumab       Assessment & Plan:   Roman Escalante is a 49 year old male with past medical history significant for metastatic rectal cancer (status post multiple therapies, including recent CAR-T trial, currently on FOLFIRI plus bevacizumab), who presented with shortness of breath, and was admitted for new hypoxia.  CTA negative for PE, but with new bilateral groundglass opacities, and stable appearing pulmonary metastases.    # Metastatic rectal cancer  # Acute onset hypoxia/SOB  Etiology of new groundglass opacities of unclear, however, BAL from bronchoscopy strongly positive Fungitell and mild peripheral eosinophilia (on CBC today), suspicious for fungal infection versus potential PJP in this functionally immunocompromised individual.   Low suspicion for pulmonary metastasis causing acute hypoxia, given disease appears stable compared to CT chest from October.  Rare cases of irinotecan or bevacizumab induced interstitial lung disease or pulmonary hemorrhage, however, bronchoscopy did not seem consistent with DAH.    Alternatively, although additionally quite rare, differential includes pulmonary tumor thrombotic microangiopathy, given he has a known adenocarcinoma, presenting with rapidly progressive shortness of breath and hypoxia, despite overall stable pulmonary metastases and now with nonspecific groundglass opacities.  Additionally echo with nonspecific abnormal septal motion, enlarged RV and reduced RV function, trace tricuspid and pulmonic valve insufficiency, all potentially suggestive of elevated pulmonary artery pressures (and absence of any of these findings on echo 6 months ago).    Appreciate ID involvement  "and recs, given strongest suspicion at this time is for infectious etiology.     Plan/Recommendations:  - No acute inpatient oncologic interventions  - Consider addition of PJP PCR to BAL specimen, given patient is functionally immunosuppressed in the setting of metastatic cancer, and recent CAR-T and chemotherapy.       Oncologic History:  See outpatient notes and consult note from 1/23/25 for detailed oncologic hx        Patient was seen and plan of care was discussed with attending physician Dr. Ellis.      Mary Anne Dietrich MD  Hematology/Oncology/BMT Fellow, PGY-5  Pager: [phone]    ___________________________________________________________________    Subjective & Interval History:    Patient very irritated with remaining roomed in the emergency department.  States that he continues to be disturbed by loud noises, such as carts constantly being moved around, other patients having \"outbursts,\" etc. he states \"if I am not moved to a different room by 4 PM, I am leaving.\"   He denies any chest pain, cough, hemoptysis, abdominal pain, nausea/vomiting, headache, lightheadedness, or any new symptoms.  States that his only symptom is shortness of breath/chest tightness.  He is not on oxygen at baseline.        Physical Exam:    Blood pressure 99/67, pulse 86, temperature 98.5  F (36.9  C), temperature source Oral, resp. rate 18, SpO2 98%.    General: lying in bed, no acute distress  CV: RR; warm and well perfused.   Resp: On 6L O2 via NC. Regularly respiratory rate. No apparent use of accessory muscles.  GI: non-distended abdomen  MSK: warm and well-perfused  Skin: no rashes on limited exam, no jaundice      Labs & Studies: I personally reviewed the following studies:  ROUTINE LABS (Last four results):  CMP  Recent Labs   Lab 01/24/25  0842 01/22/25  1648 01/22/25  1431    138 136   POTASSIUM 3.9 4.2 4.5   CHLORIDE 106 105 104   CO2 20* 16* 20*   ANIONGAP 10 17* 12   * 150* 94   BUN 9.4 13.7 14.3 "   CR 0.67 0.69  0.69 0.73   GFRESTIMATED >90 >90  >90 >90   JEFERSON 8.1* 8.1* 8.7*   PROTTOTAL 5.8* 6.2*  6.2* 6.4   ALBUMIN 2.9* 3.1* 3.1*   BILITOTAL 0.4 0.6 0.7   ALKPHOS 91 100 116   AST 25 46* 43   ALT 69 131* 139*     CBC  Recent Labs   Lab 01/25/25  0057 01/24/25  0452 01/22/25  1431   WBC 4.8 5.5 7.9   RBC 4.03* 3.93* 4.21*   HGB 11.5* 11.6* 12.7*   HCT 37.9* 35.6* 38.3*   MCV 94 91 91   MCH 28.5 29.5 30.2   MCHC 30.3* 32.6 33.2   RDW 15.6* 15.4* 15.5*     164 154 158     INR  Recent Labs   Lab 01/24/25  0842   INR 1.13

## 2025-01-25 NOTE — DISCHARGE SUMMARY
[facility]  Hospitalist Discharge Summary      Date of Admission:  1/22/2025  Date of Discharge:  1/25/2025  Discharging Provider: Екатерина Fulton MD  Discharge Service: Hospitalist Service, GOLD TEAM 10    Discharge Diagnoses   Acute hypoxic respiratory failure, ongoing chronic oxygen needs due to infection and/or spread of malignancy  Positive beta d glucan test concerning for possible fungal pneumonia in immunosuppressed patient  Metastatic rectal cancer  Elevated ALT, low albumin  Elevated troponin, improved  Normocytic anemia, stable  Mild right ventricular enlargement consistent with right heart response to hypoxia  History of hypertension, currently normotensive    Clinically Significant Risk Factors          Follow-ups Needed After Discharge   Follow-up Appointments       Adult [facility] Follow-up and recommended labs and tests      Follow up with primary care provider, Buddy Smith, for hospital follow- up.  The following labs/tests are recommended: CMP to monitor for improvement. Coordinate with infectious disease to ensure that pending infectious tests are addressed and decision about duration of voriconazole (or appropriateness to stop) can be addressed.      Appointments on [place] and/or [facility] (with [facility] or [facility] provider or service). Call [phone] if you haven't heard regarding these appointments within 7 days of discharge.            Please note that many infectious studies remained pending at the time of discharge. The patient expressed a very strong desire to discharge on 1/25; otherwise he would have been kept for at least 1-3 more days of additional monitoring and adjustment of therapy pending test results. If all infectious studies result negative, then it is possible that worsening hypoxia is a result of his metastatic cancer.   He has close oncology follow-up scheduled but will also benefit from primary care and infectious  disease involvement for monitoring of respiratory status and infectious work-up.     Unresulted Labs Ordered in the Past 30 Days of this Admission       Date and Time Order Name Status Description    1/25/2025  8:09 AM Fungal Antibodies In process     1/24/2025 10:30 PM 1,3 Beta D glucan fungitell In process     1/24/2025 10:30 PM Aspergillus Galactomannan Antigen In process     1/24/2025 10:29 PM Histoplasma Capsulatum Antigen In process     1/24/2025 10:29 PM Histoplasma Galactomannan Antigen Quant by EIA, Urine In process     1/24/2025 10:29 PM Blood Culture Hand, Left Preliminary     1/24/2025 10:28 PM Blood Culture Special Organism Preliminary     1/24/2025  1:16 PM Acid-Fast Bacilli Culture and Stain In process     1/24/2025  1:16 PM Respiratory Aerobic Bacterial Culture Preliminary     1/24/2025  1:16 PM Cytomegalovirus Qualitative PCR Non Bld In process     1/24/2025  1:16 PM Pneumocystis jirovecil by PCR In process     1/24/2025  1:16 PM Aspergillus Galactomannan Agn Bronchial In process     1/24/2025  1:16 PM Viral Culture Respiratory Preliminary     1/24/2025  1:16 PM Fungal or Yeast Culture Routine Preliminary     1/24/2025  1:16 PM Nocardia species culture Preliminary     1/24/2025  1:16 PM Acid-Fast Bacilli Culture and Stain In process     1/24/2025  1:16 PM Actinomyces species culture Preliminary     1/23/2025  5:00 AM Fungal Antibodies In process     1/22/2025  3:09 PM Blood Culture Arm, Left Preliminary     1/22/2025 12:57 PM Blood Culture Peripheral Blood Preliminary         These results will be followed up by primary care, infectious disease, and oncology.     Discharge Disposition   Discharged to home  Condition at discharge: Fair. Significant oxygen needs new this presentation. Per patient preference discharging today and he agrees to return if unable to maintain oxygen saturation or worsening symptoms at home.     Hospital Course   Roman Escalante is a 49 year old man admitted on 1/22/2025. He  has a history of colon cancer with mets to his lungs who is admitted with dyspnea and hypoxia.     Today:   - Voriconazole started yesterday evening per pulmonology recommendations.   - Oxygen requirements remained 4-6 liters per minute  - Roman expressed a very strong desire to discharge home today (and in fact determination to do so with or without my approval). Based on his strong desire it is in his best interest to arrange for home oxygen and outpatient voriconazole with infectious disease follow-up. He already has close follow-up with oncology. He did agree to return precautions if unable to control hypoxia or symptoms at home. Given limited prognosis from a cancer perspective it is reasonable for him to choose to spend his time at home, understanding that it will be more challenging to have prompt responses to new lab results.      #) Acute hypoxic respiratory failure  # Persistent oxygen requirement due to metastatic cancer with possible superimposed fungal pneumonia   - Presented with new oxygen needs to 6 lpm associated with dyspnea.  His respiratory panel was negative on admission, although he had a positive RSV test 1/15.  His CT chest shows no pulmonary embolism or evidence for pulmonary hypertension, but does show multifocal pulmonary masses and new diffuse ground glass attenuation throughout the lungs as well as a small right pleural effusion. He was recently treated with prednisone and azithromycin for a respiratory infection one month ago, and completed a course of Levaquin the day of admission without any improvement.   He has a long history of smoking and stopped ~6 months ago. IR consulted on admission, effusion too small for sampling.   - Appreciate pulmonology consultation. Bronchoscopy completed 1/24. Infectious studies pending.   - Blood cultures pending, no growth to date  - Beta d glucan positive. Infectious disease recommended empiric voriconazole pending additional test results.    - TIMUR,  RF, cyclic citrullinated peptide   - Continue albuterol, Duo-Nebs  - Stop Advair due to interaction with voriconazole, no noted improvement with that inhaler per patient.     #) Metastatic rectal cancer - Diagnosed in 2020 and found to have adenocarcinoma.  He has been on multiple agents and appears to currently be on FOLFIRI + bevacizumab with Zometa.  - Appreciate oncology consultation. If worsening status, they would further address goals of care. Otherwise they will follow-up outpatient.   - Okay to continue Zofran, Phenergan, stool softeners if needed per home symptom control regimen.   - Routine ostomy cares per home regimen  - Patient agrees to hold oxycodone while on voriconazole due to drug interaction    Elevated ALT, low albumin. ALT normalized.   - Monitor as needed    Elevated but stable troponin, improved  EF 50-55%  Mild right ventricular enlargement  Echocardiogram with stable cardiac function. Mild right ventricular enlargement is most likely due to hypoxia from pulmonary process as above. No cardiac symptoms currently.   - Troponin improved  - Addressing the hypoxia is the most appropriate acute intervention for the borderline right ventricular enlargement.   - Patient agreed to return to care if he develops new chest pain or tightness.     History of hypertension, currently normotensive  - Routine monitoring    Consultations This Hospital Stay   PULMONARY GENERAL ADULT IP CONSULT  INTERVENTIONAL RADIOLOGY ADULT/PEDS IP CONSULT  ONCOLOGY ADULT IP CONSULT  INFECTIOUS DISEASE GENERAL ADULT IP CONSULT  CARE MANAGEMENT / SOCIAL WORK IP CONSULT    Code Status   Special Code    Time Spent on this Encounter   I, Екатерина Fulton MD, personally saw the patient today and spent greater than 30 minutes discharging this patient.       Екатерина Fulton MD  [facility] EMERGENCY DEPARTMENT  [address] [address]  Phone:  [phone]  ______________________________________________________________________    Physical Exam   Vital Signs: Temp: 98.5  F (36.9  C) Temp src: Oral BP: 99/67 Pulse: 86   Resp: 18 SpO2: 98 % O2 Device: Nasal cannula Oxygen Delivery: 6 LPM  Weight: 0 lbs 0 oz  Physical Exam  Vitals reviewed.   Constitutional:       General: He is not in acute distress.     Appearance: He is not toxic-appearing.   HENT:      Head: Atraumatic.      Right Ear: External ear normal.      Left Ear: External ear normal.      Nose: Nose normal.      Mouth/Throat:      Mouth: Mucous membranes are moist.   Eyes:      General:         Right eye: No discharge.         Left eye: No discharge.      Conjunctiva/sclera: Conjunctivae normal.   Cardiovascular:      Rate and Rhythm: Normal rate and regular rhythm.      Pulses: Normal pulses.   Pulmonary:      Comments: Wearing nasal cannula oxygen. No cough. No wheezing. Breath sounds distant.   Abdominal:      General: Bowel sounds are normal. There is no distension.      Palpations: Abdomen is soft.      Tenderness: There is no abdominal tenderness. There is no guarding.      Comments: Ostomy with brown stool   Musculoskeletal:      Right lower leg: No edema.      Left lower leg: No edema.   Skin:     General: Skin is warm.      Capillary Refill: Capillary refill takes less than 2 seconds.      Findings: No rash.   Neurological:      General: No focal deficit present.      Mental Status: He is alert.   Psychiatric:      Comments: Affect irritable, very determined to go home today with or without approval. Does express understanding of current medical conditions and agreement to return to care for worsening or uncontrolled symptoms.             Primary Care Physician   Buddy Smith    Discharge Orders      Adult Infectious Disease  Referral      Primary Care - Care Coordination Referral      Reason for your hospital stay    Dear Roman Escalante    You were hospitalized at  [facility] with worsening oxygenation of unclear cause. This was investigated with bronchoscopy and many tests for different types of infection. It is also possible that the changes from cancer are contributing to your oxygen needs. A preliminary test suggested possible fungal pneumonia so voriconazole was started pending further results. You continued to require oxygen throughout your stay at approximately stable levels. Due to your strong preference to go home today, we have made arrangements for home oxygen and continuing voriconazole pending further test results that will need to be reviewed with your patient team. If you develop fever, worsening shortness of breath, light headedness, chest pain or worsening symptoms, please seek medical attention.    We are suggesting the following medication changes:  - Continue voriconazole until you have further discussion about test results. Call the infectious disease clinic if you have symptoms from the medication.   - Due to drug interactions, we recommend avoiding the steroid inhaler and oxycodone while taking voriconazole.     Please get the following tests done:  - Follow-up CMP to monitor for improvement    Please set up an appointment with:  - Keep oncology appointment as scheduled  - Infectious disease  - Primary care    It was a pleasure meeting with you today. Thank you for allowing me and my team the privilege of caring for you today. You are the reason we are here, and I truly hope we provided you with the excellent service you deserve. Please let us know if there is anything else we can do for you so that we can be sure you are leaving satisfied with your care experience.      Take care!  Екатерина Fulton MD  Department of Medicine  [facility]     Activity    Your activity upon discharge: activity as tolerated. Recommend monitoring your oxygen with activity and resting as needed for shortness of breath or hypoxia.      Adult [facility] Follow-up and recommended labs and tests    Follow up with primary care provider, Buddy Smith, for hospital follow- up.  The following labs/tests are recommended: CMP to monitor for improvement. Coordinate with infectious disease to ensure that pending infectious tests are addressed and decision about duration of voriconazole (or appropriateness to stop) can be addressed.      Appointments on [place] and/or [facility] (with [facility] or [facility] provider or service). Call [phone] if you haven't heard regarding these appointments within 7 days of discharge.     Home Oxygen Order for DME - ONLY FOR DME     Oxygen Adult/Peds    Oxygen Documentation  I certify that this patient, Roman Escalante has been under my care (or a nurse practitioner or physican's assistant working with me). This is the face-to-face encounter for oxygen medical necessity.      At the time of this encounter, I have reviewed the qualifying testing and have determined that supplemental oxygen is reasonable and necessary and is expected to improve the patient's condition in a home setting.         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    If portability is ordered, is the patient mobile within the home? yes    Was this visit performed as a telehealth visit: No     Diet    Follow this diet upon discharge: No dietary restrictions placed this admission       Significant Results and Procedures   Most Recent 3 CBC's:  Recent Labs   Lab Test 01/25/25  0057 01/24/25  0452 01/22/25  1431   WBC 4.8 5.5 7.9   HGB 11.5* 11.6* 12.7*   MCV 94 91 91     164 154 158     Most Recent 3 BMP's:  Recent Labs   Lab Test 01/24/25  0842 01/22/25  1648 01/22/25  1431    138 136   POTASSIUM 3.9 4.2 4.5   CHLORIDE 106 105 104   CO2 20* 16* 20*   BUN 9.4 13.7 14.3   CR 0.67 0.69  0.69 0.73   ANIONGAP 10 17* 12   JEFERSON 8.1* 8.1* 8.7*   * 150* 94     Most Recent 2 LFT's:  Recent Labs   Lab  Test 01/24/25  0842 01/22/25  1648   AST 25 46*   ALT 69 131*   ALKPHOS 91 100   BILITOTAL 0.4 0.6   ,   Results for orders placed or performed during the hospital encounter of 01/22/25   CT Chest Pulmonary Embolism w Contrast    Narrative    EXAM: CTA pulmonary angiogram, 1/22/2025 4:13 PM    HISTORY: Known history of lung cancer, recent RSV diagnosis, new  dyspnea on exertion hypoxia evaluate PE pneumonia    COMPARISON: X-ray 1/15/2025. CT CAP 1 10/18/2024, multiple priors.    TECHNIQUE: Volumetric CT images obtained through the chest with  contrast. Coronal and axial MIP reformatted images obtained.  Three-dimensional (3D) post-processed angiographic images were  reconstructed, archived to PACS and used in interpretation of this  study.     CONTRAST: 61 mL isovue 370 IV.    FINDINGS:     Support devices:  -Right IJ central venous catheter tip terminates at the cavoatrial  junction.    Vascular:  There is good contrast opacification of the pulmonary arterial  vasculature. No pulmonary embolus.  Heart is normal size without  pericardial effusion. No evidence of right heart strain or elevated  right heart pressures. No thoracic aortic aneurysm.    Remaining Chest:  Partially visualized thyroid is unremarkable.  No axillary adenopathy. Numerous enlarged mediastinal and hilar lymph  nodes, similar to that seen on prior imaging.  Heart size normal, no pericardial effusion or thickening.    Central tracheobronchial tree is patent without significant debris.  Mild obstruction of the right lower lobe and medial left lower lobe  bronchi secondary to pulmonary mass. Multifocal pulmonary masses,  grossly similar to prior CT 10/18/2024, largest aspects within the  right lung base and medial infrahilar left lung. There are multiple  metastatic pulmonary masses. As a progress attenuation throughout,  with focal areas of interstitial fibrotic changes. Apical predominant  panlobular emphysema. Small right pleural effusion. No  pneumothorax.    Upper Abdomen: Limited evaluation of the upper abdomen demonstrates  multiple hypoattenuating liver lesions, incompletely evaluated on  noncontrast exam, features represent previously identified metastatic  lesions. Granulomatous calcifications of the spleen. No acute findings  within the visualized upper abdomen.    Bones/Soft Tissues: Multiple osseous metastatic lesions, some increase  in prominence compared to prior, for example T8 vertebral body lesion  demonstrating increased lucency compared to prior CT.      Impression    IMPRESSION:  1. Exam is negative for acute pulmonary embolism. No evidence of right  heart strain or increased right heart pressures.   2. Redemonstration of multifocal pulmonary masses in the context of  known metastatic lung cancer. Grossly similar multifocal distribution  as detailed above.  3. New diffuse groundglass attenuation throughout the lungs likely  representing some degree of inflammation, viral respiratory pattern  may appear this way given recent diagnosis of RSV, may also represent  mild degree of pulmonary edema.  4. Small right pleural effusion. No pneumothorax.  5. Multifocal hepatic and osseous lesions favored to represent  metastatic disease. Increased prominence of lucent T8 lesion.        In the event of a positive result for acute pulmonary embolism  resulting in right heart strain, consider calling the   [facility] hospital  for PERT (Pulmonary Embolism Response Team)  Activation?    PERT -- Pulmonary Embolism Response Team (Multidisciplinary team  including cardiology, interventional radiology, critical care,  hematology)    I have personally reviewed the examination and initial interpretation  and I agree with the findings.    JILLIAN AGUILAR MD         SYSTEM ID:  [id number]   Echocardiogram Complete     Value    LVEF  50-55% (borderline)    Narrative    [id number]  [id number]  [id number]  [id number]^CLIFF^MAXWELL^KATHARINA     [facility]  [facility]  Echocardiography Laboratory  [address] [place] [address]     Name: MANUEL CANO  MRN: [id number]  : 1975  Study Date: 2025 08:08 AM  Age: 49 yrs  Gender: Male  Patient Location: [facility]  Reason For Study: Dyspnea  Ordering Physician: MAXWELL CORONADO  Performed By: Jomar Luna BORA     BSA: 2.0 m2  Height: 71 in  Weight: 175 lb  HR: 90  BP: 100/64 mmHg  ______________________________________________________________________________  Procedure  Echocardiogram with two-dimensional, color and spectral Doppler.  ______________________________________________________________________________  Interpretation Summary  Left ventricular function is decreased. The ejection fraction is 50-55%  (borderline).  Global right ventricular function is mildly reduced. Borderline right  ventricular enlargement.  No significant valvular abnormalities present.     Compared to prior 24, RV appears larger and function is mildly reduced. LV  function appears similar.  ______________________________________________________________________________  Left Ventricle  Left ventricular wall thickness is normal. Left ventricular size is normal.  Left ventricular diastolic function is normal. Left ventricular function is  decreased. The ejection fraction is 50-55% (borderline). Abnormal non-specific  septal motion is present.     Right Ventricle  Borderline right ventricular enlargement. Global right ventricular function is  mildly reduced.     Atria  Both atria appear normal. The atrial septum is intact as assessed by color  Doppler .     Mitral Valve  The mitral valve is normal. Trace mitral insufficiency is present.     Aortic Valve  Aortic valve is normal in structure and function. The aortic valve is  tricuspid.     Tricuspid Valve  The tricuspid valve is normal. Trace tricuspid insufficiency is present. The  peak velocity of the tricuspid regurgitant jet is not obtainable.      Pulmonic Valve  The pulmonic valve is normal. Trace pulmonic insufficiency is present.     Vessels  The aorta root is normal. The thoracic aorta is normal. The pulmonary artery  is normal. The inferior vena cava was normal in size with preserved  respiratory variability.     Pericardium  No pericardial effusion is present.     Miscellaneous  No significant valvular abnormalities present.     Compared to Previous Study  Compared to prior 24, RV appears larger and function is mildly reduced. LV  function appears similar.  ______________________________________________________________________________  MMode/2D Measurements & Calculations  IVSd: 0.71 cm  LVIDd: 4.4 cm  LVIDs: 2.8 cm  LVPWd: 0.83 cm  FS: 37.4 %  LV mass(C)d: 104.3 grams  LV mass(C)dI: 52.3 grams/m2  Ao root diam: 3.5 cm  asc Aorta Diam: 3.1 cm  LVOT diam: 2.3 cm  LVOT area: 4.0 cm2  Ao root diam index Ht(cm/m): 1.9  Ao root diam index BSA (cm/m2): 1.7  Asc Ao diam index BSA (cm/m2): 1.5  Asc Ao diam index Ht(cm/m): 1.7  LA Volume (BP): 32.7 ml     LA Volume Index (BP): 16.4 ml/m2  RWT: 0.38  TAPSE: 1.9 cm     Doppler Measurements & Calculations  MV E max frandy: 52.9 cm/sec  MV A max frandy: 68.3 cm/sec  MV E/A: 0.77  MV dec slope: 309.7 cm/sec2  MV dec time: 0.18 sec  E/E' av.0  Lateral E/e': 5.1  Medial E/e': 6.9  RV S Frandy: 12.3 cm/sec     ______________________________________________________________________________  Report approved by: Navya Prescott Dr on 2025 09:16 AM             Discharge Medications   Current Discharge Medication List        START taking these medications    Details   voriconazole (VFEND) 200 MG tablet Take 1 tablet (200 mg) by mouth every 12 hours.  Qty: 28 tablet, Refills: 0    Associated Diagnoses: Fungal pneumonia           CONTINUE these medications which have NOT CHANGED    Details   albuterol (PROAIR HFA/PROVENTIL HFA/VENTOLIN HFA) 108 (90 Base) MCG/ACT inhaler Inhale 2 puffs into the lungs every 6 hours as  "needed for shortness of breath, wheezing or cough.  Qty: 18 g, Refills: 0    Comments: Pharmacy may dispense brand covered by insurance (Proair, or proventil or ventolin or generic albuterol inhaler)  Associated Diagnoses: Wheezing      Chemo Kit For RN use only. Do not remove items from bag. Contents: 1  sodium chloride 0.9% flush, 4 medium gloves, 1 chemo gown, 1/4 chemo mat, 1 connector female, 1 chemo bag.  Qty: [id number] kit, Refills: 0    Associated Diagnoses: Malignant tumor of rectum (H)      cyclobenzaprine (FLEXERIL) 5 MG tablet Take one tab (5mg) by mouth over 6-8 hours, as needed for spasms  Qty: 45 tablet, Refills: 2    Associated Diagnoses: Cancer associated pain; Colon cancer metastasized to liver (H)      Emergency Supply Kit, Central, Patient use for emergency only. Contents: 3 sodium chloride 0.9% flushes, 1 dressing kit, 1 microclave ext set 14\", 4 nitrile gloves (med), 6 alcohol prep pads, 1 bacitracin, 1 syringe (10 cc 20 G 1\"). Call [phone] to reorder.  Qty: [id number] kit, Refills: 0    Associated Diagnoses: Malignant tumor of rectum (H)      fluorouracil (ADRUCIL) 2.5 GM/50ML SOLN injection       gabapentin (NEURONTIN) 100 MG capsule Take 100-200mg (1-2 capsules) up to twice during the day and 300mg (3 capsules) at bedtime.  Qty: 210 capsule, Refills: 0    Associated Diagnoses: Neuropathy due to chemotherapeutic drug; Cancer associated pain      guaiFENesin (MUCINEX) 600 MG 12 hr tablet Take 2 tablets (1,200 mg) by mouth 2 times daily.  Qty: 20 tablet, Refills: 0    Associated Diagnoses: Acute cough      ibuprofen (ADVIL/MOTRIN) 200 MG tablet Take 3 tablets (600 mg) by mouth every 8 hours as needed for moderate pain  Qty: 100 tablet, Refills: 0    Associated Diagnoses: Cancer associated pain; Colon cancer metastasized to liver (H)      ipratropium - albuterol 0.5 mg/2.5 mg/3 mL (DUONEB) 0.5-2.5 (3) MG/3ML neb solution Take 1 vial (3 mLs) by nebulization every 6 hours as needed for shortness " of breath, wheezing or cough.  Qty: 90 mL, Refills: 3    Associated Diagnoses: COPD exacerbation (H)      LORazepam (ATIVAN) 0.5 MG tablet Take 1 tablet (0.5 mg) by mouth every 6 hours as needed for anxiety  Qty: 10 tablet, Refills: 0    Associated Diagnoses: Anxiety attack      NARCAN 4 MG/0.1ML nasal spray       ondansetron (ZOFRAN ODT) 4 MG ODT tab Take 1 tablet (4 mg) by mouth every 6 hours as needed for nausea.  Qty: 30 tablet, Refills: 3    Associated Diagnoses: Nausea      Ostomy Supplies MISC 1 each daily  Qty: 1 each, Refills: 11    Comments: N[facility] [place] belt  6412-V  Associated Diagnoses: Encounter for attention to colostomy (H); Parastomal hernia      pegfilgrastim (NEULASTA) 6 MG/0.6ML injection Inject 0.6 mLs (6 mg) subcutaneously every 14 days. Administer 24 hours after chemotherapy disconnect  Qty: 3.6 mL, Refills: 0    Comments: Due on day 4 of disconnect every 14 day cycle  Associated Diagnoses: Malignant tumor of rectum (H)      Port Access Kit For nurse use only.  Do not remove items from bag.  Use for port access.  Do not place syringe on sterile field.  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 Flush IV before and after med administration as directed and/or at least every 24 hours, or prior to deaccessing for no further use and/or at least every 4 weeks when not accessed.  Qty: [id number] mL, Refills: 0    Associated Diagnoses: Malignant tumor of rectum (H)           STOP taking these medications       Fluorouracil (ADRUCIL) 4,610 mg in sodium chloride 0.9 % 241 mL via HOMEPUMP C-Series Comments:   Reason for Stopping:         oxyCODONE IR (ROXICODONE)  10 MG tablet Comments:   Reason for Stopping:         tamsulosin (FLOMAX) 0.4 MG capsule Comments:   Reason for Stopping:             Allergies   Allergies   Allergen Reactions    Oxaliplatin Shortness Of Breath, Nausea and Vomiting and Other (See Comments)     Patient had HSR to Oxaliplatin on cycle 8 of FOLFOX chemotherapy. Sent to ER for continued monitoring.  Patient had HSR to Oxaliplatin on cycle 8 of FOLFOX chemotherapy. Sent to ER for continued monitoring.      Vancomycin      Other Reaction(s): Red man syndrome    Blood-Group Specific Substance Other (See Comments)     Patient has reactivity Suggestive of a Warm auto antibody. Blood products may be delayed. Draw patient 24 hours prior to transfusion. Draw one red top and two purple top tubes for all type and screen orders.  Patient has reactivity Suggestive of a Warm auto antibody. Blood products may be delayed. Draw patient 24 hours prior to transfusion. Draw one red top and two purple top tubes for all type and screen orders.

## 2025-01-25 NOTE — PROGRESS NOTES
[facility] ID Service: Follow Up Note    Patient:  Roman Escalante, Date of birth 1975, Medical record number [id number]  Date of Visit:  January 25, 2025         Assessment and Recommendations:     ASSESSMENT:  Elevated Fungitell assay in a cancer patient who is undergoing chemotherapy and CAR-T cell therapy and he is at risk for invasive fungal infections. CT chest does show mass like lesions in the lung bases but radiologist favors lung metastases form colorectal cancer over invasive fungal pneumonia.   Recommend startign antifungal therpay        RECOMMENDATIONS:  1. Continue empiric voriconazole  2. Follow up on pending fungal testing  - Aspergillus galactomannen antigen  - Fungal antibody panel  - urine and blood Histoplasma antigens   3. Follow routine and fungal blood cultures.   4. Follow-up on bronchoscopy BAL cultures  5. Noted that he will be getting discharged today   - I will help arrange a follow-up visit with ID, will try in the next 2 weeks   - I will follow up on above pending tests prior to his visit.   - Plan to continue voriconazole until next ID appointment, I will reach out to patient if needed    DISCUSSION: Patient is feeling overall better with O2 requirements at 4-6L. He is expressing a strong desire to be discharged. I note that it will likely take several more days for the above fungal tests to return, and think it is reasonable to follow these up as an outpatient. I note his overall limited prognosis regarding cancer, and defer to the patient if he would prefer to spend time at home. I will try to arrange follow-up with ID as soon as possible, and will follow the above results in the meantime. I would plan to continue voriconazole at discharge and this can be managed by ID going forward. He has demonstrated the ability to engage with providers in case he runs into problems, and I discussed possible side effects of voriconazole with the patient.       Recommendations were  discussed with the primary team.      ID will continue to follow.     Total time spent during this encounter (chart review, documentation, MDM, coordination of care): 50 minutes     Michael Young MD  Contact via [employer] or [email or web address] Paging/Directory         Interval History:     Roman Escalante is a 49 year old male who presents with hypoxia and dyspnea. Recent viral respiratory tract infection in Dec. due to RSV. Had progressive SOB and hypoxia at home and he was self treating with oxygen at home. Without O2 supplementation his sats dropped to 77% he states. He borrowed a friends oxygen machine to use at home for 4-5 days but then decided to come to the ED to evaluation and oxygen support.      Past medical history is notable for colorectal cancer with mets to lungs and liver. S/P treatment with CAR-T cell therapy at [facility] to treat the colorectal cancer with mets.      Since admission on 1/22/2025, he underwent a bronchoscopy and BAL today. Preliminary results are non-revealing to date. No PJP or fungal organisms seen on cytology.          Review of Systems:   CONSTITUTIONAL:  No fevers or chills  EYES: negative for icterus  ENT:  negative for oral lesions, hearing loss, tinnitus and sore throat  RESPIRATORY:  negative for cough and dyspnea  CARDIOVASCULAR:  negative for chest pain, palpitations  GASTROINTESTINAL:  negative for nausea, vomiting, diarrhea and constipation  GENITOURINARY:  negative for dysuria  HEME:  No easy bruising/bleeding  INTEGUMENT:  negative for rash and pruritus  NEURO:  Negative for headache         Current Antimicrobials            Physical Exam:   Ranges for vital signs:  Temp:  [97.8  F (36.6  C)-98.5  F (36.9  C)] 98.5  F (36.9  C)  Pulse:  [83-95] 86  Resp:  [18-21] 18  BP: ()/(67-73) 99/67  SpO2:  [90 %-98 %] 98 %    Intake/Output Summary (Last 24 hours) at 1/25/2025 1348  Last data filed at 1/25/2025 0112  Gross per 24 hour   Intake 480 ml   Output --   Net 480 ml     Exam:  GENERAL:   well-developed, well-nourished, sitting in bed in no acute distress.   ENT:  Head is normocephalic, atraumatic. Oropharynx is moist without exudates or ulcers.  EYES:  Eyes have anicteric sclerae.    NECK:  Supple.  LUNGS:  Clear to auscultation.  CARDIOVASCULAR:  Regular rate and rhythm with no murmurs, gallops or rubs.  ABDOMEN:  Normal bowel sounds, soft, nontender.  EXT: Extremities warm and without edema.  SKIN:  No acute rashes.  Line is in place without any surrounding erythema.  NEUROLOGIC:  Grossly nonfocal.         Laboratory Data:     Creatinine   Date Value Ref Range Status   01/24/2025 0.67 0.67 - 1.17 mg/dL Final   01/22/2025 0.69 0.67 - 1.17 mg/dL Final   01/22/2025 0.69 0.67 - 1.17 mg/dL Final   01/22/2025 0.73 0.67 - 1.17 mg/dL Final   01/15/2025 0.80 0.67 - 1.17 mg/dL Final     WBC Count   Date Value Ref Range Status   01/25/2025 4.8 4.0 - 11.0 10e3/uL Final   01/24/2025 5.5 4.0 - 11.0 10e3/uL Final   01/22/2025 7.9 4.0 - 11.0 10e3/uL Final   01/15/2025 12.8 (H) 4.0 - 11.0 10e3/uL Final   12/18/2024 4.8 4.0 - 11.0 10e3/uL Final     Hemoglobin   Date Value Ref Range Status   01/25/2025 11.5 (L) 13.3 - 17.7 g/dL Final     Platelet Count   Date Value Ref Range Status   01/25/2025 164 150 - 450 10e3/uL Final   01/25/2025 160 150 - 450 10e3/uL Final     Erythrocyte Sedimentation Rate   Date Value Ref Range Status   01/25/2025 47 (H) 0 - 20 mm/hr Final   01/28/2024 38 (H) 0 - 20 mm/hr Final     AST   Date Value Ref Range Status   01/24/2025 25 0 - 45 U/L Final   01/22/2025 46 (H) 0 - 45 U/L Final   01/22/2025 43 0 - 45 U/L Final   01/15/2025 27 0 - 45 U/L Final   12/18/2024 21 0 - 45 U/L Final     ALT   Date Value Ref Range Status   01/24/2025 69 0 - 70 U/L Final   01/22/2025 131 (H) 0 - 70 U/L Final   01/22/2025 139 (H) 0 - 70 U/L Final   01/15/2025 25 0 - 70 U/L Final   12/18/2024 6 0 - 70 U/L Final     Bilirubin Total   Date Value Ref Range Status   01/24/2025 0.4 <=1.2 mg/dL Final   01/22/2025 0.6  <=1.2 mg/dL Final   01/22/2025 0.7 <=1.2 mg/dL Final   01/15/2025 0.7 <=1.2 mg/dL Final   12/18/2024 0.3 <=1.2 mg/dL Final     Lab Results   Component Value Date     01/24/2025    BUN 9.4 01/24/2025    CO2 20 (L) 01/24/2025     Imaging:    EXAM: CTA pulmonary angiogram, 1/22/2025 4:13 PM     HISTORY: Known history of lung cancer, recent RSV diagnosis, new  dyspnea on exertion hypoxia evaluate PE pneumonia     COMPARISON: X-ray 1/15/2025. CT CAP 1 10/18/2024, multiple priors.     TECHNIQUE: Volumetric CT images obtained through the chest with  contrast. Coronal and axial MIP reformatted images obtained.  Three-dimensional (3D) post-processed angiographic images were  reconstructed, archived to PACS and used in interpretation of this  study.      CONTRAST: 61 mL isovue 370 IV.     FINDINGS:      Support devices:  -Right IJ central venous catheter tip terminates at the cavoatrial  junction.     Vascular:  There is good contrast opacification of the pulmonary arterial  vasculature. No pulmonary embolus.  Heart is normal size without  pericardial effusion. No evidence of right heart strain or elevated  right heart pressures. No thoracic aortic aneurysm.     Remaining Chest:  Partially visualized thyroid is unremarkable.  No axillary adenopathy. Numerous enlarged mediastinal and hilar lymph  nodes, similar to that seen on prior imaging.  Heart size normal, no pericardial effusion or thickening.     Central tracheobronchial tree is patent without significant debris.  Mild obstruction of the right lower lobe and medial left lower lobe  bronchi secondary to pulmonary mass. Multifocal pulmonary masses,  grossly similar to prior CT 10/18/2024, largest aspects within the  right lung base and medial infrahilar left lung. There are multiple  metastatic pulmonary masses. As a progress attenuation throughout,  with focal areas of interstitial fibrotic changes. Apical predominant  panlobular emphysema. Small right pleural  effusion. No pneumothorax.     Upper Abdomen: Limited evaluation of the upper abdomen demonstrates  multiple hypoattenuating liver lesions, incompletely evaluated on  noncontrast exam, features represent previously identified metastatic  lesions. Granulomatous calcifications of the spleen. No acute findings  within the visualized upper abdomen.     Bones/Soft Tissues: Multiple osseous metastatic lesions, some increase  in prominence compared to prior, for example T8 vertebral body lesion  demonstrating increased lucency compared to prior CT.                                                                      IMPRESSION:  1. Exam is negative for acute pulmonary embolism. No evidence of right  heart strain or increased right heart pressures.   2. Redemonstration of multifocal pulmonary masses in the context of  known metastatic lung cancer. Grossly similar multifocal distribution  as detailed above.  3. New diffuse groundglass attenuation throughout the lungs likely  representing some degree of inflammation, viral respiratory pattern  may appear this way given recent diagnosis of RSV, may also represent  mild degree of pulmonary edema.  4. Small right pleural effusion. No pneumothorax.  5. Multifocal hepatic and osseous lesions favored to represent  metastatic disease. Increased prominence of lucent T8 lesion.

## 2025-01-25 NOTE — PROGRESS NOTES
"Neuro: A&Ox4, frustrated, and could be aggressive, but calls appropriately.  Cardiac: ST/SR, Afebrile, VSS.           Respiratory: Lungs sound diminished bilat, dyspnea on exertion, frequent cough, de-sat to 80% with activity, takes time to recovers' and back up to 90s, on 5L of oxygen via NC  GI/:Active bowel sound, passing flatus, and Voiding spontaneously. No BM this shift.  Diet/appetite:Tolerating regular diet. Denies nausea.  Activity: Up independently  Pain: Denies   Skin: No new deficits noted.  Lines:Port Cath SL\"D  P: Continue to monitor and notify team with changes.              "

## 2025-01-25 NOTE — PLAN OF CARE
Goal Outcome Evaluation:         Pt's mood has improved significantly since returned from [facility] and was able to have a regular diet. After eating, pt has been sleeping most of the afternoon. Pt switched from Oxymask from procedure to NC for early dinner and still remained with NC. O2 sats has been in the mid 90s on 3-4 LPM NC at rest. Pt has not been up much. Declined Senna, Reported LBM was this afternoon but last time he emptied Colostomy bag was yesterday? C/o mild rectal pain this am but denied pain this afternoon. Pt has been alert and oriented, able to make needs known.

## 2025-01-25 NOTE — ED NOTES
History and Physical  [address], [address] [facility] [address] [phone]   Fax [phone]  Yoselyn Carpio  2024              62 y.o. Chief Complaint   Patient presents with    Annual Exam       No LMP recorded. Patient is postmenopausal.             Primary Care Physician: Elizabeth Obrien MD    The patient was seen and examined. She is here for her annual exam. States she has a hx of uterine prolapse and desires a hyst. States she is feeling vaginal pressure for past couple years. Recently had lung cancer and had her tx and now in remission. Her bowels are regular. There are no voiding complaints. She denies any bloating. She denies vaginal discharge and was counseled on STD's and the need for barrier contraception.      HPI : Yoselyn Carpio is a 62 y.o. female [id number]    Annual exam  Per patient uterine prolapse  Vaginal pressure  Desires hyst  Hx right ovarian cyst    no Bloating  no Early Satiety  no Unexplained weight change of more than 15 lbs  no  PMB  no  PCB  ________________________________________________________________________  OB History    Para Term  AB Living   3 2 2 0 1 2   SAB IAB Ectopic Molar Multiple Live Births   1 0 0 0 0 2      # Outcome Date GA Lbr Paramjit/2nd Weight Sex Delivery Anes PTL Lv   3 Term      Vag-Spont  N VINAYAK   2 Term      Vag-Spont  N VINAYAK   1 SAB              Past Medical History:   Diagnosis Date    Arthritis     Bronchitis, chronic (HCC)     Chronic back pain     Chronic cough     COPD (chronic obstructive pulmonary disease) (HCC)     Depression     Diverticulosis     Emphysema     Fibromyalgia     GERD (gastroesophageal reflux disease)     Hemorrhoid     internal and external, they bleed    History of cocaine abuse ([facility]     none since , used between  and      History of kidney infection     Hyperlipidemia     Hyperplastic colon polyp 11/15/2017    Hypertension     Lung cancer ([facility] [address] Patient resting in bed. Oxygen removed. Able to have light conversation for 8 minutes before sats dropped below 90%. Alrey Harper RN     non-small cell H3A lung cancer right upper lobe    Marijuana use     for pain and appetite use    Meningioma (HCC)     Neuropathy     Orbital fracture (HCC)     left side ,has  pin in side    Osteoarthritis     Osteopenia     Pulmonary nodule     left lung, watching this one    Renal calculi     Renal cyst     STD (sexually transmitted disease)     unsure of type    Uterine prolapse                                                                    Past Surgical History:   Procedure Laterality Date    BACK SURGERY  2011    thoracic laminectomy, T12, S1    CARDIAC CATHETERIZATION  10/24/14    Normal coronaries     COLONOSCOPY  04/05/2017    diverticulosis, ,poor prep    COLONOSCOPY  11/15/2017    flat polypoid lesion in the base of the cecum, about 1 cm.-hyperplastic    COLONOSCOPY  04/24/2018     diverticulosis.  Small polyp in the sigmoid colon excised with biopsy forceps    COLONOSCOPY N/A 8/2/2022    COLONOSCOPY WITH BIOPSY performed by Cathie Leon MD at [place] [address] Left     cleaned out infection    ENDOSCOPY, COLON, DIAGNOSTIC      ORBITAL FRACTURE SURGERY Left     had pin inserted on temple side    OR COLON CA SCRN NOT HI RSK IND N/A 4/5/2017    COLONOSCOPY performed by Cathie Leon MD at [facility] [place] DX W/JUICE Sims 1980 PFRMD N/A 4/24/2018    COLONOSCOPY performed by Cathie Leon MD at [address] N/A 11/15/2017    COLONOSCOPY POLYPECTOMY SNARE and saline injection performed by Cathie Leon MD at [facility] EGD TRANSORAL BIOPSY SINGLE/MULTIPLE N/A 10/6/2017    EGD BIOPSY performed by Dexter Guillen MD at [facility] EGD TRANSORAL BIOPSY SINGLE/MULTIPLE N/A 2/27/2018    EGD ESOPHAGOGASTRODUODENOSCOPY performed by Cathie Leon MD at [facility] ESOPHAGOGASTRODUODENOSCOPY TRANSORAL DIAGNOSTIC N/A 4/24/2018    EGD ESOPHAGOGASTRODUODENOSCOPY performed by Cathie Leon MD at [facility] OR    TONSILLECTOMY      TUBAL LIGATION      TUNNELED VENOUS PORT PLACEMENT Left     UPPER GASTROINTESTINAL ENDOSCOPY  10/06/2017    Dr Humza Cortez diverticulum gastric fundus, pathology inactive gastritis    UPPER GASTROINTESTINAL ENDOSCOPY  02/27/2018    retained food in the fundus of the stomach, poor peristalsis wide-open pylorus    UPPER GASTROINTESTINAL ENDOSCOPY  04/24/2018    no lesions seen that can account her CEA levels. UPPER GASTROINTESTINAL ENDOSCOPY N/A 3/10/2021    EGD BIOPSY AND PHOTOS performed by Dang Rodriguez MD at [address] [place] [address]     Family History   Problem Relation Age of Onset    Heart Disease Mother     Cancer Mother         breast and lung cancer    Diabetes Father     Heart Disease Father         Death due to MI    Cancer Paternal Grandfather         stomach cancer     Heart Disease Paternal Grandfather     Cancer Sister         ovarian cancer     Cancer Maternal Grandmother         female cancer     Social History     Socioeconomic History    Marital status:       Spouse name: Not on file    Number of children: Not on file    Years of education: Not on file    Highest education level: Not on file   Occupational History    Not on file   Tobacco Use    Smoking status: Some Days     Packs/day: 0.50     Years: 38.00     Pack years: 19.00     Types: Cigarettes    Smokeless tobacco: Never    Tobacco comments:     smokes 1 or 2 a day   Vaping Use    Vaping Use: Every day    Substances: Nicotine, Flavoring   Substance and Sexual Activity    Alcohol use: Not Currently     Alcohol/week: 0.0 standard drinks    Drug use: Yes     Types: Marijuana (Weed)     Comment: 5 joints a day marijuana for pain and appetite, she states that she stopped cocaine 2011    Sexual activity: Not Currently     Partners: Male     Birth control/protection: Post-menopausal   Other Topics Concern    Not on file   Social History Narrative    Not on file     Social Determinants of Health     Financial Resource Strain: Low Risk     Difficulty of Paying Living Expenses: Not hard at all   Food Insecurity: No Food Insecurity    Worried About [facility] in the Last Year: Never true    Ran Out of Food in the Last Year: Never true   Transportation Needs: No Transportation Needs    Lack of Transportation (Medical): No    Lack of Transportation (Non-Medical): No   Physical Activity: Not on file   Stress: Not on file   Social Connections: Not on file   Intimate Partner Violence: Not on file   Housing Stability: Unknown    Unable to Pay for Housing in the Last Year: No    Number of Places Lived in the Last Year: Not on file    Unstable Housing in the Last Year: No       MEDICATIONS:  Current Outpatient Medications   Medication Sig Dispense Refill    folic acid (FOLVITE) 1 MG tablet Take 1 mg by mouth in the morning. amLODIPine (NORVASC) 2.5 MG tablet amlodipine 2.5 mg tablet   take 1 tablet by mouth once daily  ( STOP CLONIDINE )      citalopram (CELEXA) 20 MG tablet Take 1 tablet by mouth in the morning. 30 tablet 1    traZODone (DESYREL) 50 MG tablet Take 1 tablet by mouth nightly 90 tablet 1    omeprazole (PRILOSEC) 20 MG delayed release capsule take 1 capsule by mouth twice a day 180 capsule 3    ibuprofen (ADVIL;MOTRIN) 600 MG tablet Take 1 tablet by mouth 4 times daily as needed for Pain 60 tablet 0    Misc.  Devices (STEP N REST II WALKER) MISC Use daily for balance problems 1 each 0    Ascorbic Acid (VITAMIN C) 1000 MG tablet Take 1,000 mg by mouth daily      benzonatate (TESSALON) 100 MG capsule Take 100 mg by mouth 3 times daily as needed for Cough      fluticasone (FLONASE) 50 MCG/ACT nasal spray 2 sprays by Nasal route daily 1 Bottle 0    ondansetron (ZOFRAN) 4 MG tablet Take 4 mg by mouth every 8 hours as needed for Nausea or Vomiting      dicyclomine (BENTYL) 10 MG capsule Take 1 capsule by mouth every 6 hours as needed (cramps) 20 capsule 0    lidocaine (LMX) 4 % cream Apply topically every 8 hrs as needed for pain 45 g 3    RA CALCIUM 600/VITAMIN D-3 600-400 MG-UNIT TABS take 1 tablet by mouth twice a day 90 tablet 3    umeclidinium-vilanterol (ANORO ELLIPTA) 62.5-25 MCG/INH AEPB inhaler Anoro Ellipta 62.5 mcg-25 mcg/actuation powder for inhalation   Inhale 1 puff every day by inhalation route.      lidocaine-prilocaine (EMLA) 2.5-2.5 % cream lidocaine-prilocaine 2.5 %-2.5 % topical cream   Apply to port site and cover 30-45 minutes prior to needle access      albuterol sulfate HFA (PROVENTIL HFA) 108 (90 Base) MCG/ACT inhaler Inhale 2 puffs into the lungs every 6 hours as needed for Wheezing or Shortness of Breath 1 Inhaler 11    Multiple Vitamin (MULTIVITAMIN PO) Take  by mouth daily. aspirin 81 MG EC tablet Take 81 mg by mouth daily. No current facility-administered medications for this visit. ALLERGIES:  Allergies as of 08/08/2022 - Fully Reviewed 08/08/2022   Allergen Reaction Noted    Lovastatin  03/05/2019    Statins Other (See Comments) 03/09/2021       Symptoms of decreased mood absent  Symptoms of anhedonia absent    **If either question is answered in a  positive fashion then complete the PHQ9 Scoring Evaluation and make the appropriate referral**      Immunization status: stated as current, but no records available. Gynecologic History:  Menarche: 13 yo  Menopause at 35 yo     No LMP recorded. Patient is postmenopausal.    Sexually Active: NO    STD History: yes     Permanent Sterilization: Yes TL   Reversible Birth Control: No        Hormone Replacement Exposure: No      Genetic Qualified Family History of Breast, Ovarian , Colon or Uterine Cancer: Yes mom breast cancer age 47/58  Sister ovarian cancer older in life     If YES see scanned worksheet.     Preventative Health Testing:    Health Maintenance:  Health Maintenance Due   Topic Date Due    Pneumococcal 0-64 years Vaccine (2 - PCV) 03/06/2018    Annual Wellness Visit (AWV)  08/17/2022       Date of Last Pap Smear: 11/2017 neg/neg  Abnormal Pap Smear History: denies  Colposcopy History:   Date of Last Mammogram: 4/22/2021 neg  Date of Last Colonoscopy: 2020  Date of Last Bone Density:      ________________________________________________________________________        REVIEW OF SYSTEMS:    yes   A minimum of an eleven point review of systems was completed. Review Of Systems (11 point):  Constitutional: No fever, chills or malaise; No weight change or fatigue  Head and Eyes: No vision changes, Headache, Dizziness or trauma in last 12 months  ENT ROS: No hearing, Tinnitis, sinus or taste problems  Hematological and Lymphatic ROS:No Lymphoma, Von Willebrand's, Hemophillia or Bleeding History  Psych ROS: No Depression, Homicidal thoughts,suicidal thoughts, or anxiety  Breast ROS: No breast abnormalities or lumps  Respiratory ROS: No SOB, Pneumoniae,Cough, or Pulmonary Embolism   Cardiovascular ROS: No Chest Pain with Exertion, Palpitations, Syncope, Edema, Arrhythmia  Gastrointestinal ROS: No Indigestion, Heartburn, Nausea, vomiting, Diarrhea, Constipation,or Bowel Changes; No Bloody Stools or melena  Genito-Urinary ROS: No Dysuria, Hematuria or Nocturia.  No Urinary Incontinence or Vaginal Discharge  Musculoskeletal ROS: No Arthralgia, Arthritis,Gout,Osteoporosis or Rheumatism  Neurological ROS: No CVA, Migraines, Epilepsy, Seizure Hx, or Limb Weakness  Dermatological ROS: No Rash, Itching, Hives, Mole Changes or Cancer                                                                                                                                                                                                                                  PHYSICAL Exam:     Constitutional:  Vitals:    08/08/22 1054   BP: 102/70   Site: Right Upper Arm   Position: Sitting   Cuff Size: Medium Adult   Weight: 103 lb (46.7 kg)   Height: 5' 6\" (1.676 m)       Chaperone for Intimate Exam  Chaperone was offered and accepted as part of the rooming process. Chaperone: gail ACOSTA          General Appearance: This  is a well Developed, well Nourished, well groomed female. Her BMI was reviewed. Nutritional decision making was discussed. Skin:  There was a Normal Inspection of the skin without rashes or lesions. There were no rashes. (Papular, Maculopapular, Hives, Pustular, Macular)     There were no lesions (Ulcers, Erythema, Abn. Appearing Nevi)            Lymphatic:  No Lymph Nodes were Palpable in the neck , axilla or groin.  0 # Of Lymph Nodes; Location ; Character [Normal]  [Shotty] [Tender] [Enlarged]     Neck and EENT:  The neck was supple. There were no masses   The thyroid was not enlarged and had no masses. Perrla, EOMI B/L, TMI B/L No Abnormalities. Throat inspected-No exudates or Masses, Nares Patent No Masses        Respiratory: The lungs were auscultated and found to be clear. There were no rales, rhonchi or wheezes. There was a good respiratory effort. Cardiovascular: The heart was in a regular rate and rhythm. . No S3 or S4. There was no murmur appreciated. Location, grade, and radiation are not applicable. Extremities: The patients extremities were without calf tenderness, edema, or varicosities. There was full range of motion in all four extremities. Pulses in all four extremities were appreciated and are 2/4. Abdomen: The abdomen was soft and non-tender. There were good bowel sounds in all quadrants and there was no guarding, rebound or rigidity. On evaluation there was no evidence of hepatosplenomegaly and there was no costal vertebral toyin tenderness bilaterally. No hernias were appreciated. Abdominal Scars: none    Psych:   The patient had a normal Orientation to: Time, Place, Person, and Situation  There is no Mood / Affect changes    Breast:  (Chest)  normal appearance, no masses or tenderness, Inspection negative, No nipple retraction or dimpling, No nipple discharge or bleeding, No axillary or supraclavicular adenopathy, Normal to palpation without dominant masses  Self breast exams were reviewed in detail. Literature was given. Pelvic Exam:  External genitalia: normal general appearance  Urinary system: urethral meatus normal  Vaginal: normal mucosa without prolapse or lesions  Cervix: normal appearance  Adnexa: normal bimanual exam  Uterus: normal single, nontender    Rectal Exam:  exam declined by patient          Musculosk:  Normal Gait and station was noted. Digits were evaluated without abnormal findings. Range of motion, stability and strength were evaluated and found to be appropriate for the patients age. ASSESSMENT:      62 y.o. Annual   Diagnosis Orders   1. Well female exam with routine gynecological exam  PAP SMEAR      2. Encounter for screening mammogram for malignant neoplasm of breast  TRAE DIGITAL SCREEN W OR WO CAD BILATERAL      3. Special screening examination for human papillomavirus (HPV)  PAP SMEAR      4.  Pelvic pain  US PELVIS COMPLETE    US NON OB TRANSVAGINAL             Chief Complaint   Patient presents with    Annual Exam          Past Medical History:   Diagnosis Date    Arthritis     Bronchitis, chronic (HCC)     Chronic back pain     Chronic cough     COPD (chronic obstructive pulmonary disease) ([address]     Depression     Diverticulosis     Emphysema     Fibromyalgia     GERD (gastroesophageal reflux disease)     Hemorrhoid     internal and external, they bleed    History of cocaine abuse ([address]     none since 2010, used between 2007 and 2009     History of kidney infection     Hyperlipidemia     Hyperplastic colon polyp 11/15/2017    Hypertension     Lung cancer ([address]     non-small cell H3A lung cancer right upper lobe    Marijuana use     for pain and appetite use    Meningioma (HCC)     Neuropathy     Orbital fracture (HCC)     left side ,has  pin in side    Osteoarthritis     Osteopenia     Pulmonary nodule     left lung, watching this one    Renal calculi Renal cyst     STD (sexually transmitted disease)     unsure of type    Uterine prolapse          Patient Active Problem List    Diagnosis Date Noted    Elevated CEA of 6.6 and OVA-1 of 4.5 10/30/2017     Priority: High    Tetrahydrocannabinol (THC) use disorder, mild, abuse 10/30/2017     Priority: High    Psychophysiological insomnia 07/20/2022     Priority: Medium    Valvular heart disease 07/20/2022     Priority: Medium    Superior mesenteric artery stenosis ([address] 04/18/2022    Vitamin D deficiency 01/18/2021    Anxiety 01/18/2021    Constipation 10/06/2020    Clostridioides difficile infection 06/08/2020    Gastroesophageal reflux disease without esophagitis 06/08/2020    Left chronic serous otitis media 06/08/2020    Clostridial gastroenteritis 01/28/2020    Mild protein-calorie malnutrition ([address] 10/29/2019    Malignant neoplasm of middle lobe of right lung ([address] 03/05/2019    Abnormal PET scan of colon 04/04/2018     3/27/2018      Mixed simple and mucopurulent chronic bronchitis ([address] 03/12/2018    Right ovarian cyst 03/12/2018    Osteopenia of multiple sites 02/01/2018    Meningioma ([address] 11/21/2017    Hyperplastic colon polyp 11/15/2017    Family history of breast cancer      mom @ 61      H/O tubal ligation     Chronic midline low back pain with bilateral sciatica 07/13/2017    DDD (degenerative disc disease), cervical 07/13/2017    History of diverticulitis 03/16/2017    Pulmonary nodule 09/22/2016    Chronic obstructive pulmonary disease ([address] 09/22/2016    Smoking 09/22/2016    Fibromyalgia 09/28/2015    Shoulder impingement 02/05/2013    Other affections of shoulder region, not elsewhere classified 07/10/2012    Degeneration of cervical intervertebral disc 07/10/2012    Spinal stenosis in cervical region 07/10/2012    Cervicalgia 07/10/2012    Carpal tunnel syndrome 07/10/2012    Lumbago 04/17/2012    Lumbar degenerative disc disease 04/17/2012    Chronic low back pain 01/05/2012    Knee pain, right 01/05/2012    Renal calculi     Diverticulosis     Prolapsed uterus     Hyperlipidemia     Depression     Tobacco abuse     Hemorrhoids, internal     Renal cyst     Centrilobular emphysema ([facility]      replace inactive diagnosis            Hereditary Breast, Ovarian, Colon and Uterine Cancer screening Done. Tobacco & Secondary smoke risks reviewed; instructed on cessation and avoidance      Counseling Completed:  Preventative Health Recommendations and Follow up. The patient was informed of the recommended preventative health recommendations. 1. Annuals every year; Cytology collections per prevailing guidelines. 2. Mammograms begin every year at 35 yo if no abnormalities are found and no family history. 3. Bone density studies every 2-3 years. Begin at 71 yo. If no fracture history or osteoporosis family history. (significant). 4. Colonoscopy begin at 40 yo. Repeat every ten years if negative and no family history. 5. Calcium of 2235-6493 mg/day in split dosing  6. Vitamin D 400-800 IU/day  7. All other preventative health recommendations will be managed by the patients Primary care physician. PLAN:  Return in about 4 weeks (around 9/5/2022) for follow up with physician discuss hyst .  Pap smear collected  Mammogram ordered  Pelvic u/s ordered  Repeat Annual every 1 year  Cervical Cytology Evaluation begins at 24years old. If Negative Cytology, Follow-up screening per current guidelines. Mammograms every 1 year. If 35 yo and last mammogram was negative. Calcium and Vitamin D dosing reviewed. Colonoscopy screening reviewed as well as onset for bone density testing. Birth control and barrier recommendations discussed. STD counseling and prevention reviewed. Gardisil counseling completed for all patients 10-35 yo. Routine health maintenance per patients PCP.   Orders Placed This Encounter   Procedures    TRAE DIGITAL SCREEN W OR WO CAD BILATERAL     Standing Status:   Future Standing Expiration Date:   8/8/2023     Order Specific Question:   Reason for exam:     Answer:   screening for malignant neoplasm of breast    US PELVIS COMPLETE     Standing Status:   Future     Standing Expiration Date:   11/8/2022     Order Specific Question:   Reason for exam:     Answer:   pelvic pain    US NON OB TRANSVAGINAL     Standing Status:   Future     Standing Expiration Date:   2/8/2023     Order Specific Question:   Reason for exam:     Answer:   pelvic pain    PAP SMEAR     Patient History:    No LMP recorded. Patient is postmenopausal.  OBGYN Status: Postmenopausal  Past Surgical History:  2011: BACK SURGERY      Comment:  thoracic laminectomy, T12, S1  10/24/14: CARDIAC CATHETERIZATION      Comment:  Normal coronaries   04/05/2017: COLONOSCOPY      Comment:  diverticulosis, ,poor prep  11/15/2017: COLONOSCOPY      Comment:  flat polypoid lesion in the base of the cecum, about 1                cm.-hyperplastic  04/24/2018: COLONOSCOPY      Comment:   diverticulosis. Small polyp in the sigmoid colon                excised with biopsy forceps  8/2/2022: COLONOSCOPY; N/A      Comment:  COLONOSCOPY WITH BIOPSY performed by Shant Britton MD at [facility]  No date: EAR SURGERY; Left      Comment:  cleaned out infection  No date: ENDOSCOPY, COLON, DIAGNOSTIC  No date: ORBITAL FRACTURE SURGERY;  Left      Comment:  had pin inserted on temple side  4/5/2017: NM COLON CA SCRN NOT HI RSK IND; N/A      Comment:  COLONOSCOPY performed by Marcial Schuster MD at [address] [place] [address]  4/24/2018: NM COLONOSCOPY FLX DX W/COLLJ SPEC WHEN PFRMD; N/A      Comment:  COLONOSCOPY performed by Marcial Schuster MD at [address] [place] [address]  11/15/2017: NM COLSC FLX W/REMOVAL LESION BY HOT BX FORCEPS; N/A      Comment:  COLONOSCOPY POLYPECTOMY SNARE and saline injection                performed by Marcial Schuster MD at [address]  10/6/2017: NM EGD TRANSORAL BIOPSY SINGLE/MULTIPLE; N/A Comment:  EGD BIOPSY performed by Erika Sheth MD at [facility]  2/27/2018: KS EGD TRANSORAL BIOPSY SINGLE/MULTIPLE; N/A      Comment:  EGD ESOPHAGOGASTRODUODENOSCOPY performed by Ranjan Kurtz MD at [facility] [address]  4/24/2018: KS ESOPHAGOGASTRODUODENOSCOPY TRANSORAL DIAGNOSTIC; N/A      Comment:  EGD ESOPHAGOGASTRODUODENOSCOPY performed by Ranjan Kurtz MD at [facility] [address]  No date: TONSILLECTOMY  No date: TUBAL LIGATION  No date: TUNNELED VENOUS PORT PLACEMENT; Left  10/06/2017: UPPER GASTROINTESTINAL ENDOSCOPY      Comment:  Dr Cleveland Heading diverticulum gastric fundus, pathology                inactive gastritis  02/27/2018: UPPER GASTROINTESTINAL ENDOSCOPY      Comment:  retained food in the fundus of the stomach, poor                peristalsis wide-open pylorus  04/24/2018: UPPER GASTROINTESTINAL ENDOSCOPY      Comment:  no lesions seen that can account her CEA levels. 3/10/2021: UPPER GASTROINTESTINAL ENDOSCOPY; N/A      Comment:  EGD BIOPSY AND PHOTOS performed by Susan Britton MD at [address] [place] [address] History    Tobacco Use      Smoking status: Some Days        Packs/day: 0.50        Years: 38.00        Pack years: 19        Types: Cigarettes      Smokeless tobacco: Never      Tobacco comments: smokes 1 or 2 a day       Standing Status:   Future     Standing Expiration Date:   2/8/2023     Order Specific Question:   Collection Type     Answer: Thin Prep     Order Specific Question:   Prior Abnormal Pap Test     Answer:   No     Order Specific Question:   Screening or Diagnostic     Answer:   Screening     Order Specific Question:   HPV Requested? Answer:   Yes     Order Specific Question:   High Risk Patient     Answer:   N/A           The patient, Nina Escobedo is a 62 y.o. female, was seen with a total time spent of 30 minutes for the visit on this date of service by the E/M provider.  The time component had both face to face and non face to face time spent in determining the total time component. Counseling and education regarding her diagnosis listed below and her options regarding those diagnoses were also included in determining her time component. Diagnosis Orders   1. Well female exam with routine gynecological exam  PAP SMEAR      2. Encounter for screening mammogram for malignant neoplasm of breast  TRAE DIGITAL SCREEN W OR WO CAD BILATERAL      3. Special screening examination for human papillomavirus (HPV)  PAP SMEAR      4. Pelvic pain  US PELVIS COMPLETE    US NON OB TRANSVAGINAL           The patient had her preventative health maintenance recommendations and follow-up reviewed with her at the completion of her visit.

## 2025-01-25 NOTE — ED NOTES
Ambulated with patient over 200 feet. 6L oxygen via NC. Sats remained between 92% and 94%. Patient states he felt good and did not become light headed. Very eager to go home. Arley Harper RN

## 2025-01-25 NOTE — CONSULTS
Care Management Initial Consult    General Information  Assessment completed with: Patient, VM-chart review, Roman  Type of CM/SW Visit: Initial Assessment    Primary Care Provider verified and updated as needed: Yes (Hailey Buddy Brain Bowen [phone] [address] [facility] [place] [address])   Readmission within the last 30 days: no previous admission in last 30 days      Reason for Consult: discharge planning, utilization management concerns (elevated readmission risk score)  Advance Care Planning: Advance Care Planning Reviewed: questions answered (POLST on file; no HCD: ACP education and documents provided)        Communication Assessment  Patient's communication style: spoken language (English or Bilingual)        Cognitive  Cognitive/Neuro/Behavioral: WDL                      Living Environment:   People in home: alone     Current living Arrangements: mobile home      Able to return to prior arrangements: yes  Living Arrangement Comments: Currently living with partner Chelsy at her home in [place], [place]    Family/Social Support:  Care provided by: self  Provides care for: no one  Marital Status: Single  Support system: Significant Other, Other (specify) (Extednded family-Aunt and cousin)       Chelsy Uriostegui  Description of Support System: Supportive, Involved    Support Assessment: Adequate family and caregiver support, Adequate social supports    Current Resources:   Patient receiving home care services: No      Community Resources: Financial/Insurance  Equipment currently used at home: none  Supplies currently used at home: None    Employment/Financial:  Employment Status: disabled, retired        Financial Concerns: none   Referral to Financial Worker: No     Does the patient's insurance plan have a 3 day qualifying hospital stay waiver?  Yes     Which insurance plan 3 day waiver is available? Alternative insurance waiver    Will the waiver be used for post-acute placement? No    Lifestyle &  Psychosocial Needs:  Social Drivers of Health     Food Insecurity: Low Risk  (2/2/2024)    Food Insecurity     Within the past 12 months, did you worry that your food would run out before you got money to buy more?: No     Within the past 12 months, did the food you bought just not last and you didn t have money to get more?: No   Depression: Not at risk (1/2/2025)    PHQ-2     PHQ-2 Score: 1   Housing Stability: Low Risk  (2/2/2024)    Housing Stability     Do you have housing? : Yes     Are you worried about losing your housing?: No   Tobacco Use: Medium Risk (1/15/2025)    Patient History     Smoking Tobacco Use: Former     Smokeless Tobacco Use: Never     Passive Exposure: Past   Financial Resource Strain: Low Risk  (2/2/2024)    Financial Resource Strain     Within the past 12 months, have you or your family members you live with been unable to get utilities (heat, electricity) when it was really needed?: No   Alcohol Use: Not At Risk (4/13/2022)    Received from [facility], [facility]    Alcohol Use     How often do you have a drink containing alcohol?: 3     How many drinks containing alcohol do you have on a typical day when you are drinking?: 0     How often do you have five or more drinks on one occasion?: 2   Transportation Needs: Low Risk  (2/2/2024)    Transportation Needs     Within the past 12 months, has lack of transportation kept you from medical appointments, getting your medicines, non-medical meetings or appointments, work, or from getting things that you need?: No   Physical Activity: Not on file   Interpersonal Safety: Low Risk  (12/26/2024)    Interpersonal Safety     Do you feel physically and emotionally safe where you currently live?: Yes     Within the past 12 months, have you been hit, slapped, kicked or otherwise physically hurt by someone?: No     Within the past 12 months, have you been humiliated or emotionally  "abused in other ways by your partner or ex-partner?: No   Stress: Not on file   Social Connections: Unknown (4/19/2023)    Received from [facility], [facility]    Social Connections     Frequency of Communication with Friends and Family: Not on file   Health Literacy: Not on file     Functional Status:  Prior to admission patient needed assistance:   Dependent ADLs:: Independent  Dependent IADLs:: Independent  Assesssment of Functional Status: At functional baseline    Mental Health Status:  Mental Health Status: No Current Concerns       Chemical Dependency Status:  Chemical Dependency Status: No Current Concerns           Values/Beliefs:  Spiritual, Cultural Beliefs, Shinto Practices, Values that affect care: no             Discussed  Partnership in Safe Discharge Planning  document with patient/family: No    Additional Information:    Per chart review: \"Roman Escalante is a 49 year old man admitted on 1/22/2025. He has a history of colon cancer with mets to his lungs who is admitted with dyspnea and hypoxia.\" (Mariano Newton, APRN CNP; 1/22/2025)    Care Management/Social Work Consult placed due to patient's complex medical history and elevated unplanned readmission risk score and for discharge planning. ALICIA performed chart review to begin assessment.     1315 SW met with Roman at bedside to complete assessment. SW introduced self and role, and explained the reason for the visit. Roman agreed to speak with SW.    Roman lives alone in a mobile home. He is currently staying with his partner Chelsy Uriostegui in her home ([address]; [place], [address]).  He uses no medical equipment and is independent in his I/ADLs. Roman retired in 2020 and was diagnosed with colon cancer shortly thereafter. His [facility] Khloe ([phone]) assists him with his medical care.     Roman identified Chelsy as his surrogate decision-maker but acknowledged " "that this has not been formalized. ALICIA provided Advanced Care Planning (ACP) education which included information on Health Care Directives (HCD), how an HCD could be made  into a legal document, and how SW could facilitate document upload into pt's EHR once it was either witnessed or notarized.  SW also provided blank HCD documents for Roman to review and/or complete.    Roman anticipates no needs at discharge other than home Oxygen. He is currently using a borrowed O2 concentrator and requires his own equipment. He reported that his bedside RN was going to complete the walk test in a short while, and ALICIA explained that SW would make arrangements to have his O2 delivered to his room prior to discharge.    SW provided emotional support via active and reflective listening; normalized and validated feelings and experiences; and provided a supportive non-anxious presence.  No additional questions or concerns were reported. SW provided availability and contact information and excused self from Roman's bedside.    Per conversation with Attending MD Fulton, MD will order Home O2 when walk test is complete      9222 ALICIA spoke with Patsy from  [facility] (direct: [phone]). Patsy reported that their  would be able to deliver 2 O2 tanks for transport, and the concentrator to bedside; and then \"portability\" O2 to the   [place] address on Monday. ALICIA provided pt's bedside phone for update.    1900 ALICIA met with pt who confirmed that he had spoken with Patsy and all arrangements were completed.    Next Steps:    No additional discharge needs at this time. SW/RNCC team will continue to monitor daily in interdisciplinary rounds and will intervene as needed. If immediate needs arise, please place SW/RN consult    SW/RNCC is no longer following. Please place a new consult should new needs arise.    GUILHERME John, LGSW  ED/OBS   [facility]  Phone: [phone]  Pager: " [phone]  Fax: [phone]     After hours [facility] [place] [place] [employer] and After Hours [facility] [place]     On-call pager, [phone], 4:00 pm to 8:30 pm

## 2025-01-25 NOTE — PROGRESS NOTES
Patient has been assessed for Home Oxygen needs. Oxygen readings:    *Pulse oximetry (SpO2) = 79 % and continuing to drop on room air at rest while awake.     *SpO2 improved to 92% on 4 liters/minute at rest.    *SpO2 = 85% and rapidly dropping on room air during activity/with exercise.    *SpO2 improved to 90% on 6 liters/minute during activity/with exercise.     Per verbal discussion with bedside RN and report of numbers visualized on his monitor today.      Екатерина Fulton MD  01/25/25  3:26 PM

## 2025-01-25 NOTE — PLAN OF CARE
Goal Outcome Evaluation:      Plan of Care Reviewed With: patient    Overall Patient Progress: improvingOverall Patient Progress: improving    Outcome Evaluation: discharge home with supplemental O2    GUILHERME John, LGSW  ED/OBS   [facility]  Phone: [phone]  Pager: [phone]  Fax: [phone]     After hours [place] and After Hours [employer] [place]     On-call pager, [phone], 4:00 pm to 8:30 pm

## 2025-01-25 NOTE — PLAN OF CARE
[id number]    Patient A&Ox4, on 4L nc, independent, regular diet, afebrile, intermittent tachycardic, OVSS, denies pain. Voiding spontaneously, no BM this shift. He changed his colostomy pouch this evening. Continue POC.

## 2025-01-26 LAB
1,3 BETA GLUCAN SER-MCNC: >500 PG/ML
ACID FAST STAIN (ARUP): NORMAL
ACID FAST STAIN (ARUP): NORMAL
BACTERIA BRONCH: NO GROWTH
BRONCHOSCOPY: NORMAL
CMV DNA SPEC QL NAA+PROBE: NOT DETECTED
OBSERVATION IMP: POSITIVE

## 2025-01-26 NOTE — ED NOTES
Port flushed with Heparin and de-accessed. Band aid placed. Patient tolerated well. Arley Harper RN

## 2025-01-27 ENCOUNTER — PATIENT OUTREACH (OUTPATIENT)
Dept: CARE COORDINATION | Facility: CLINIC | Age: 50
End: 2025-01-27
Payer: COMMERCIAL

## 2025-01-27 LAB
BACTERIA BLD CULT: NO GROWTH
BACTERIA BLD CULT: NO GROWTH
GALACTOMANNAN AG SERPL QL IA: NEGATIVE
GALACTOMANNAN AG SPEC IA-ACNC: 0.04
H CAPSUL AG UR QL IA: NOT DETECTED
H CAPSUL AG UR-MCNC: NOT DETECTED NG/ML
P JIROVECII DNA SPEC QL NAA+PROBE: DETECTED

## 2025-01-27 ASSESSMENT — ACTIVITIES OF DAILY LIVING (ADL): DEPENDENT_IADLS:: INDEPENDENT

## 2025-01-27 NOTE — PROGRESS NOTES
Clinic Care Coordination Contact  Transitions of Care Outreach  Chief Complaint   Patient presents with    Clinic Care Coordination - Post Hospital     RN CC- post hospital follow up       Most Recent Admission Date: 1/22/2025   Most Recent Admission Diagnosis: Acute respiratory failure (H) - J96.00  History of lung cancer - Z85.118  History of RSV infection - Z86.19     Most Recent Discharge Date: 1/25/2025   Most Recent Discharge Diagnosis: Acute respiratory failure (H) - J96.00  History of lung cancer - Z85.118  History of RSV infection - Z86.19  Fungal pneumonia - J16.8, B49  Centrilobular emphysema (H) - J43.2  Rectal adenocarcinoma metastatic to lung (H) - C20, C78.00     Transitions of Care Assessment    Discharge Assessment  How are you doing now that you are home?: Per patient report, he is doing well. has all medications. follow up 1/31 and 2/3. no questions or concerns  How are your symptoms? (Red Flag symptoms escalate to triage hotline per guidelines): Improved  Do you know how to contact your clinic care team if you have future questions or changes to your health status? : Yes  Does the patient have their discharge instructions? : Yes  Does the patient have questions regarding their discharge instructions? : No  Were you started on any new medications or were there changes to any of your previous medications? : Yes  Does the patient have all of their medications?: Yes  Do you have questions regarding any of your medications? : No  Do you have all of your needed medical supplies or equipment (DME)?  (i.e. oxygen tank, CPAP, cane, etc.): Yes         Post-op (Clinicians Only)  Did the patient have surgery or a procedure: No  Fever: No  Chills: No  Eating & Drinking: eating and drinking without complaints/concerns    Follow up Plan     Discharge Follow-Up  Discharge follow up appointment scheduled in alignment with recommended follow up timeframe or Transitions of Risk Category? (Low = within 30 days;  Moderate= within 14 days; High= within 7 days): Yes  Discharge Follow Up Appointment Date: 01/31/25  Discharge Follow Up Appointment Scheduled with?: Specialty Care Provider    Future Appointments   Date Time Provider Department Center   1/29/2025  8:30 AM  [facility] LAB DRAW [facility]   1/29/2025  9:00 AM  ONC INFUSION NURSE [facility]   1/31/2025  3:00 PM Polo Snow MD [facility]   2/3/2025 10:00 AM Buddy Hart MD [facility]   2/18/2025  9:20 AM Isidra Prater DO [facility]   2/19/2025  8:30 AM Taty Frazier MD [facility]       Outpatient Plan as outlined on AVS reviewed with patient.    For any urgent concerns, please contact our 24 hour nurse triage line: [phone] ([phone])       Patient declines care coordination at this time.    Corry Hernandez RN

## 2025-01-28 ENCOUNTER — APPOINTMENT (OUTPATIENT)
Dept: GENERAL RADIOLOGY | Facility: CLINIC | Age: 50
End: 2025-01-28
Attending: EMERGENCY MEDICINE
Payer: COMMERCIAL

## 2025-01-28 ENCOUNTER — TELEPHONE (OUTPATIENT)
Dept: INFECTIOUS DISEASES | Facility: CLINIC | Age: 50
End: 2025-01-28
Payer: COMMERCIAL

## 2025-01-28 ENCOUNTER — HOSPITAL ENCOUNTER (INPATIENT)
Facility: CLINIC | Age: 50
LOS: 1 days | Discharge: HOME | End: 2025-01-29
Attending: EMERGENCY MEDICINE | Admitting: STUDENT IN AN ORGANIZED HEALTH CARE EDUCATION/TRAINING PROGRAM
Payer: COMMERCIAL

## 2025-01-28 DIAGNOSIS — R06.02 SHORTNESS OF BREATH: Primary | ICD-10-CM

## 2025-01-28 DIAGNOSIS — B59 PNEUMONIA DUE TO PNEUMOCYSTIS JIROVECII, UNSPECIFIED LATERALITY, UNSPECIFIED PART OF LUNG (H): ICD-10-CM

## 2025-01-28 LAB
ALBUMIN SERPL BCG-MCNC: 3.3 G/DL (ref 3.5–5.2)
ALP SERPL-CCNC: 106 U/L (ref 40–150)
ALT SERPL W P-5'-P-CCNC: 27 U/L (ref 0–70)
ANION GAP SERPL CALCULATED.3IONS-SCNC: 11 MMOL/L (ref 7–15)
AST SERPL W P-5'-P-CCNC: 30 U/L (ref 0–45)
BASE EXCESS BLDV CALC-SCNC: 1.5 MMOL/L (ref -3–3)
BASOPHILS # BLD MANUAL: 0 10E3/UL (ref 0–0.2)
BASOPHILS NFR BLD MANUAL: 1 %
BILIRUB SERPL-MCNC: 0.2 MG/DL
BUN SERPL-MCNC: 11.2 MG/DL (ref 6–20)
CALCIUM SERPL-MCNC: 8.8 MG/DL (ref 8.8–10.4)
CHLORIDE SERPL-SCNC: 104 MMOL/L (ref 98–107)
CREAT SERPL-MCNC: 0.77 MG/DL (ref 0.67–1.17)
EGFRCR SERPLBLD CKD-EPI 2021: >90 ML/MIN/1.73M2
EOSINOPHIL # BLD MANUAL: 0.9 10E3/UL (ref 0–0.7)
EOSINOPHIL NFR BLD MANUAL: 18 %
ERYTHROCYTE [DISTWIDTH] IN BLOOD BY AUTOMATED COUNT: 16.1 % (ref 10–15)
GLUCOSE SERPL-MCNC: 112 MG/DL (ref 70–99)
HCO3 BLDV-SCNC: 27 MMOL/L (ref 21–28)
HCO3 SERPL-SCNC: 24 MMOL/L (ref 22–29)
HCT VFR BLD AUTO: 34.9 % (ref 40–53)
HGB BLD-MCNC: 11.2 G/DL (ref 13.3–17.7)
LACTATE SERPL-SCNC: 1 MMOL/L (ref 0.7–2)
LYMPHOCYTES # BLD MANUAL: 0.4 10E3/UL (ref 0.8–5.3)
LYMPHOCYTES NFR BLD MANUAL: 7 %
MCH RBC QN AUTO: 28.6 PG (ref 26.5–33)
MCHC RBC AUTO-ENTMCNC: 32.1 G/DL (ref 31.5–36.5)
MCV RBC AUTO: 89 FL (ref 78–100)
MONOCYTES # BLD MANUAL: 0.7 10E3/UL (ref 0–1.3)
MONOCYTES NFR BLD MANUAL: 14 %
MYELOCYTES # BLD MANUAL: 0 10E3/UL
MYELOCYTES NFR BLD MANUAL: 1 %
NEUTROPHILS # BLD MANUAL: 3 10E3/UL (ref 1.6–8.3)
NEUTROPHILS NFR BLD MANUAL: 59 %
O2/TOTAL GAS SETTING VFR VENT: 99 %
OXYHGB MFR BLDV: 65 % (ref 70–75)
PCO2 BLDV: 44 MM HG (ref 40–50)
PH BLDV: 7.39 [PH] (ref 7.32–7.43)
PLAT MORPH BLD: ABNORMAL
PLATELET # BLD AUTO: 228 10E3/UL (ref 150–450)
PO2 BLDV: 37 MM HG (ref 25–47)
POTASSIUM SERPL-SCNC: 4.2 MMOL/L (ref 3.4–5.3)
PROT SERPL-MCNC: 6.5 G/DL (ref 6.4–8.3)
RBC # BLD AUTO: 3.92 10E6/UL (ref 4.4–5.9)
RBC MORPH BLD: ABNORMAL
SAO2 % BLDV: 66.6 % (ref 70–75)
SODIUM SERPL-SCNC: 139 MMOL/L (ref 135–145)
WBC # BLD AUTO: 5 10E3/UL (ref 4–11)

## 2025-01-28 PROCEDURE — 250N000013 HC RX MED GY IP 250 OP 250 PS 637: Performed by: EMERGENCY MEDICINE

## 2025-01-28 PROCEDURE — 85027 COMPLETE CBC AUTOMATED: CPT | Performed by: EMERGENCY MEDICINE

## 2025-01-28 PROCEDURE — 99223 1ST HOSP IP/OBS HIGH 75: CPT | Performed by: STUDENT IN AN ORGANIZED HEALTH CARE EDUCATION/TRAINING PROGRAM

## 2025-01-28 PROCEDURE — 120N000002 HC R&B MED SURG/OB UMMC

## 2025-01-28 PROCEDURE — 999N000157 HC STATISTIC RCP TIME EA 10 MIN

## 2025-01-28 PROCEDURE — 83605 ASSAY OF LACTIC ACID: CPT | Performed by: EMERGENCY MEDICINE

## 2025-01-28 PROCEDURE — 250N000012 HC RX MED GY IP 250 OP 636 PS 637: Performed by: EMERGENCY MEDICINE

## 2025-01-28 PROCEDURE — 99285 EMERGENCY DEPT VISIT HI MDM: CPT | Mod: 25 | Performed by: EMERGENCY MEDICINE

## 2025-01-28 PROCEDURE — 71046 X-RAY EXAM CHEST 2 VIEWS: CPT

## 2025-01-28 PROCEDURE — 99207 PR APP CREDIT; MD BILLING SHARED VISIT: CPT | Mod: FS | Performed by: PHYSICIAN ASSISTANT

## 2025-01-28 PROCEDURE — 99285 EMERGENCY DEPT VISIT HI MDM: CPT | Performed by: EMERGENCY MEDICINE

## 2025-01-28 PROCEDURE — 85007 BL SMEAR W/DIFF WBC COUNT: CPT | Performed by: EMERGENCY MEDICINE

## 2025-01-28 PROCEDURE — 71046 X-RAY EXAM CHEST 2 VIEWS: CPT | Mod: 26 | Performed by: RADIOLOGY

## 2025-01-28 PROCEDURE — 80053 COMPREHEN METABOLIC PANEL: CPT | Performed by: EMERGENCY MEDICINE

## 2025-01-28 PROCEDURE — 94640 AIRWAY INHALATION TREATMENT: CPT

## 2025-01-28 PROCEDURE — 87040 BLOOD CULTURE FOR BACTERIA: CPT | Performed by: EMERGENCY MEDICINE

## 2025-01-28 PROCEDURE — 82805 BLOOD GASES W/O2 SATURATION: CPT | Performed by: EMERGENCY MEDICINE

## 2025-01-28 PROCEDURE — 84295 ASSAY OF SERUM SODIUM: CPT | Performed by: EMERGENCY MEDICINE

## 2025-01-28 PROCEDURE — 250N000013 HC RX MED GY IP 250 OP 250 PS 637: Performed by: PHYSICIAN ASSISTANT

## 2025-01-28 PROCEDURE — 36415 COLL VENOUS BLD VENIPUNCTURE: CPT | Performed by: EMERGENCY MEDICINE

## 2025-01-28 PROCEDURE — 250N000009 HC RX 250: Performed by: PHYSICIAN ASSISTANT

## 2025-01-28 PROCEDURE — 80051 ELECTROLYTE PANEL: CPT | Performed by: EMERGENCY MEDICINE

## 2025-01-28 RX ORDER — GABAPENTIN 300 MG/1
300 CAPSULE ORAL AT BEDTIME
Status: DISCONTINUED | OUTPATIENT
Start: 2025-01-28 | End: 2025-01-29 | Stop reason: HOSPADM

## 2025-01-28 RX ORDER — ENOXAPARIN SODIUM 100 MG/ML
40 INJECTION SUBCUTANEOUS EVERY 24 HOURS
Status: DISCONTINUED | OUTPATIENT
Start: 2025-01-29 | End: 2025-01-29 | Stop reason: HOSPADM

## 2025-01-28 RX ORDER — AMOXICILLIN 250 MG
2 CAPSULE ORAL 2 TIMES DAILY PRN
Status: DISCONTINUED | OUTPATIENT
Start: 2025-01-28 | End: 2025-01-29 | Stop reason: HOSPADM

## 2025-01-28 RX ORDER — IPRATROPIUM BROMIDE AND ALBUTEROL SULFATE 2.5; .5 MG/3ML; MG/3ML
1 SOLUTION RESPIRATORY (INHALATION) EVERY 6 HOURS PRN
Status: DISCONTINUED | OUTPATIENT
Start: 2025-01-28 | End: 2025-01-29

## 2025-01-28 RX ORDER — ONDANSETRON 4 MG/1
4 TABLET, ORALLY DISINTEGRATING ORAL EVERY 6 HOURS PRN
Status: DISCONTINUED | OUTPATIENT
Start: 2025-01-28 | End: 2025-01-29 | Stop reason: HOSPADM

## 2025-01-28 RX ORDER — CYCLOBENZAPRINE HCL 5 MG
5 TABLET ORAL EVERY 8 HOURS PRN
Status: DISCONTINUED | OUTPATIENT
Start: 2025-01-28 | End: 2025-01-29 | Stop reason: HOSPADM

## 2025-01-28 RX ORDER — LIDOCAINE 40 MG/G
CREAM TOPICAL
Status: DISCONTINUED | OUTPATIENT
Start: 2025-01-28 | End: 2025-01-29 | Stop reason: HOSPADM

## 2025-01-28 RX ORDER — PROCHLORPERAZINE MALEATE 10 MG
10 TABLET ORAL EVERY 6 HOURS PRN
Status: DISCONTINUED | OUTPATIENT
Start: 2025-01-28 | End: 2025-01-29 | Stop reason: HOSPADM

## 2025-01-28 RX ORDER — ALBUTEROL SULFATE 90 UG/1
2 INHALANT RESPIRATORY (INHALATION) EVERY 6 HOURS PRN
Status: DISCONTINUED | OUTPATIENT
Start: 2025-01-28 | End: 2025-01-29 | Stop reason: HOSPADM

## 2025-01-28 RX ORDER — AMOXICILLIN 250 MG
1 CAPSULE ORAL 2 TIMES DAILY PRN
Status: DISCONTINUED | OUTPATIENT
Start: 2025-01-28 | End: 2025-01-29 | Stop reason: HOSPADM

## 2025-01-28 RX ORDER — LORAZEPAM 0.5 MG/1
0.5 TABLET ORAL EVERY 6 HOURS PRN
Status: DISCONTINUED | OUTPATIENT
Start: 2025-01-28 | End: 2025-01-29 | Stop reason: HOSPADM

## 2025-01-28 RX ORDER — GUAIFENESIN 600 MG/1
1200 TABLET, EXTENDED RELEASE ORAL 2 TIMES DAILY
Status: DISCONTINUED | OUTPATIENT
Start: 2025-01-28 | End: 2025-01-29 | Stop reason: HOSPADM

## 2025-01-28 RX ORDER — GABAPENTIN 100 MG/1
100-200 CAPSULE ORAL 2 TIMES DAILY PRN
Status: DISCONTINUED | OUTPATIENT
Start: 2025-01-28 | End: 2025-01-29 | Stop reason: HOSPADM

## 2025-01-28 RX ORDER — PREDNISONE 20 MG/1
40 TABLET ORAL 2 TIMES DAILY WITH MEALS
Status: DISCONTINUED | OUTPATIENT
Start: 2025-01-28 | End: 2025-01-29 | Stop reason: HOSPADM

## 2025-01-28 RX ORDER — CALCIUM CARBONATE 500 MG/1
1000 TABLET, CHEWABLE ORAL 4 TIMES DAILY PRN
Status: DISCONTINUED | OUTPATIENT
Start: 2025-01-28 | End: 2025-01-29 | Stop reason: HOSPADM

## 2025-01-28 RX ORDER — SULFAMETHOXAZOLE AND TRIMETHOPRIM 800; 160 MG/1; MG/1
3 TABLET ORAL EVERY 8 HOURS
Status: DISCONTINUED | OUTPATIENT
Start: 2025-01-28 | End: 2025-01-29 | Stop reason: HOSPADM

## 2025-01-28 RX ADMIN — SULFAMETHOXAZOLE AND TRIMETHOPRIM 3 TABLET: 800; 160 TABLET ORAL at 20:03

## 2025-01-28 RX ADMIN — IPRATROPIUM BROMIDE AND ALBUTEROL SULFATE 3 ML: .5; 3 SOLUTION RESPIRATORY (INHALATION) at 22:52

## 2025-01-28 RX ADMIN — GABAPENTIN 300 MG: 300 CAPSULE ORAL at 23:39

## 2025-01-28 RX ADMIN — GUAIFENESIN 1200 MG: 600 TABLET ORAL at 23:39

## 2025-01-28 RX ADMIN — PREDNISONE 40 MG: 20 TABLET ORAL at 20:04

## 2025-01-28 ASSESSMENT — ACTIVITIES OF DAILY LIVING (ADL)
ADLS_ACUITY_SCORE: 56

## 2025-01-28 ASSESSMENT — COLUMBIA-SUICIDE SEVERITY RATING SCALE - C-SSRS
1. IN THE PAST MONTH, HAVE YOU WISHED YOU WERE DEAD OR WISHED YOU COULD GO TO SLEEP AND NOT WAKE UP?: NO
2. HAVE YOU ACTUALLY HAD ANY THOUGHTS OF KILLING YOURSELF IN THE PAST MONTH?: NO
6. HAVE YOU EVER DONE ANYTHING, STARTED TO DO ANYTHING, OR PREPARED TO DO ANYTHING TO END YOUR LIFE?: NO

## 2025-01-28 NOTE — TELEPHONE ENCOUNTER
General ID Service: Brief Note    Patient: Roman Escalante, Date of birth 1975, Medical record number [id number]  Date of Visit: January 28, 2025    I evaluated this patient in the hospital over the weekend after he had been admitted for acute on chronic hypoxic respiratory failure. ID was consulted for an elevated (1,3)-B-D-glucan test. He was overall stable in the hospital and asking to leave as soon as possible. He was ultimately discharged on 1/25/25 with several tests still pending, which I have been following. He received a bronchoscopy in hospital, and results have been starting to come back.  Late last evening, results of PJP PCR test came back positive. I think that PJP pneumonia could explain his elevated B-glucan test, and his clinical picture is consistent (metastatic rectal cancer on immunosuppression, recent imaging).         Assessment and Recommendations:     I have reached out to the patient today to discuss these findings, and have recommended he come in to the hospital for further evaluation and likely admission for PJP pneumonia. Roman was quite open to this, and is planning on coming into the ED this evening sometime after 6pm.    My recommendations:  1. Admit to medicine  2. Start high dose TMP-SMX (15-20 mg/kg/d in 3-4 divided doses) for PJP pneumonia  3. Start prednisone 40mg PO BID  4. I have instructed Roman to discontinue voriconazole, which had been receiving empirically prior to this  5. ID consult tomorrow  6. Feel free to reach out to me via [employer] anytime with any additional questions      Discussion: Roman Escalante is a 50 yo M with h/o notable for colorectal cancer with mets to lungs and liver. S/P treatment with CAR-T cell therapy at [facility] to treat the colorectal cancer with mets. He was recently admitted (1/22-1/25/25) with hypoxia and dyspnea. Recent viral respiratory tract infection in Dec. due to RSV. Had progressive SOB and hypoxia at home and he was self treating with  oxygen at home. Without O2 supplementation his sats dropped to 77% he states. He borrowed a friends oxygen machine to use at home for 4-5 days but then decided to come to the ED to evaluation and oxygen support. During admission recent admission on 1/22/2025, he underwent a bronchoscopy and BAL on 1/24. There were no PJP or fungal organisms seen on cytology. A fungal work-up was initiated and he was started on empiric voriconazole. Patient was feeling a bit better after a couple of days and was very insistent that he leave the hospital prior to results coming back and he was ultimately discharged after discussions with the patient, medicine and myself. The understanding was that we would follow these results and let him know if he needed to come back to the hospital.    Much of the fungal work-up has returned and is negative. However, results for PJP PCR from BAL came back late last night, and were positive. I do think that this- together with his elevated beta-glucan, underlying risk factors, positive symptoms, and chest CT that could be consistent with PJP pneumonia- represents a true pneumocystis pneumonia. I discussed with Roman that although he is adverse to hospitalizations, I think hospital admission to get treatment started is necessary to ensure that things don't progress and that treatment will ultimately improve his quality of life. The standard of care here is TMP-SMX (15-20 mg/kg/day divided in 3-4 doses, which translates roughly to 3DS tabs PO Q8hr. Of note, he tells me that he was previously receiving TMP-SMX prophylaxis up until about 3-4 months ago.  He states that he tolerated it well previously. Given his increased O2 needs, I do also believe that he meets the criteria for severe PJP pneumonia and would also plan to add adjunctive prednisone (40mg BID x 5 days, then 40mg daily x 5 days, then 20mg daily x 11 days). He can discontinue voriconazole, particularly since Roman tells me that he was  having some visual disturbance since starting taking this. My colleagues from ID will be happy to follow this patient in the hospital and I will send them a message.      Michael Young MD  Infectious diseases

## 2025-01-29 ENCOUNTER — HOME INFUSION BILLING (OUTPATIENT)
Dept: HOME HEALTH SERVICES | Facility: HOME HEALTH | Age: 50
End: 2025-01-29
Payer: COMMERCIAL

## 2025-01-29 VITALS
SYSTOLIC BLOOD PRESSURE: 102 MMHG | OXYGEN SATURATION: 99 % | HEIGHT: 71 IN | BODY MASS INDEX: 23.8 KG/M2 | WEIGHT: 170 LBS | RESPIRATION RATE: 18 BRPM | TEMPERATURE: 97.5 F | HEART RATE: 101 BPM | DIASTOLIC BLOOD PRESSURE: 73 MMHG

## 2025-01-29 LAB
ANION GAP SERPL CALCULATED.3IONS-SCNC: 11 MMOL/L (ref 7–15)
BUN SERPL-MCNC: 11.2 MG/DL (ref 6–20)
CALCIUM SERPL-MCNC: 8.8 MG/DL (ref 8.8–10.4)
CHLORIDE SERPL-SCNC: 106 MMOL/L (ref 98–107)
CREAT SERPL-MCNC: 0.8 MG/DL (ref 0.67–1.17)
EGFRCR SERPLBLD CKD-EPI 2021: >90 ML/MIN/1.73M2
ERYTHROCYTE [DISTWIDTH] IN BLOOD BY AUTOMATED COUNT: 16.3 % (ref 10–15)
GLUCOSE SERPL-MCNC: 157 MG/DL (ref 70–99)
HCO3 SERPL-SCNC: 21 MMOL/L (ref 22–29)
HCT VFR BLD AUTO: 34.4 % (ref 40–53)
HGB BLD-MCNC: 11 G/DL (ref 13.3–17.7)
MCH RBC QN AUTO: 28.6 PG (ref 26.5–33)
MCHC RBC AUTO-ENTMCNC: 32 G/DL (ref 31.5–36.5)
MCV RBC AUTO: 89 FL (ref 78–100)
PLATELET # BLD AUTO: 213 10E3/UL (ref 150–450)
POTASSIUM SERPL-SCNC: 3.8 MMOL/L (ref 3.4–5.3)
RBC # BLD AUTO: 3.85 10E6/UL (ref 4.4–5.9)
SCANNED LAB RESULT: NORMAL
SODIUM SERPL-SCNC: 138 MMOL/L (ref 135–145)
WBC # BLD AUTO: 3.3 10E3/UL (ref 4–11)

## 2025-01-29 PROCEDURE — 85027 COMPLETE CBC AUTOMATED: CPT | Performed by: PHYSICIAN ASSISTANT

## 2025-01-29 PROCEDURE — 82310 ASSAY OF CALCIUM: CPT | Performed by: PHYSICIAN ASSISTANT

## 2025-01-29 PROCEDURE — 250N000011 HC RX IP 250 OP 636: Performed by: PHYSICIAN ASSISTANT

## 2025-01-29 PROCEDURE — 94640 AIRWAY INHALATION TREATMENT: CPT | Mod: 76

## 2025-01-29 PROCEDURE — 80048 BASIC METABOLIC PNL TOTAL CA: CPT | Performed by: PHYSICIAN ASSISTANT

## 2025-01-29 PROCEDURE — 250N000009 HC RX 250: Performed by: STUDENT IN AN ORGANIZED HEALTH CARE EDUCATION/TRAINING PROGRAM

## 2025-01-29 PROCEDURE — 250N000012 HC RX MED GY IP 250 OP 636 PS 637: Performed by: PHYSICIAN ASSISTANT

## 2025-01-29 PROCEDURE — 999N000157 HC STATISTIC RCP TIME EA 10 MIN

## 2025-01-29 PROCEDURE — 250N000009 HC RX 250: Performed by: INTERNAL MEDICINE

## 2025-01-29 PROCEDURE — 36415 COLL VENOUS BLD VENIPUNCTURE: CPT | Performed by: PHYSICIAN ASSISTANT

## 2025-01-29 PROCEDURE — 250N000013 HC RX MED GY IP 250 OP 250 PS 637: Performed by: PHYSICIAN ASSISTANT

## 2025-01-29 PROCEDURE — 250N000011 HC RX IP 250 OP 636

## 2025-01-29 PROCEDURE — 94640 AIRWAY INHALATION TREATMENT: CPT

## 2025-01-29 PROCEDURE — 99238 HOSP IP/OBS DSCHRG MGMT 30/<: CPT | Mod: GC | Performed by: STUDENT IN AN ORGANIZED HEALTH CARE EDUCATION/TRAINING PROGRAM

## 2025-01-29 PROCEDURE — 82374 ASSAY BLOOD CARBON DIOXIDE: CPT | Performed by: PHYSICIAN ASSISTANT

## 2025-01-29 PROCEDURE — 99254 IP/OBS CNSLTJ NEW/EST MOD 60: CPT | Mod: GC | Performed by: INTERNAL MEDICINE

## 2025-01-29 RX ORDER — SULFAMETHOXAZOLE AND TRIMETHOPRIM 800; 160 MG/1; MG/1
1 TABLET ORAL
Qty: 12 TABLET | Refills: 1 | Status: SHIPPED | OUTPATIENT
Start: 2025-02-21 | End: 2025-04-18

## 2025-01-29 RX ORDER — HEPARIN SODIUM,PORCINE 10 UNIT/ML
5-10 VIAL (ML) INTRAVENOUS
Status: DISCONTINUED | OUTPATIENT
Start: 2025-01-29 | End: 2025-01-29 | Stop reason: HOSPADM

## 2025-01-29 RX ORDER — HEPARIN SODIUM,PORCINE 10 UNIT/ML
5-10 VIAL (ML) INTRAVENOUS EVERY 24 HOURS
Status: DISCONTINUED | OUTPATIENT
Start: 2025-01-29 | End: 2025-01-29 | Stop reason: HOSPADM

## 2025-01-29 RX ORDER — OMEPRAZOLE 40 MG/1
40 CAPSULE, DELAYED RELEASE ORAL DAILY
Qty: 30 CAPSULE | Refills: 0 | Status: SHIPPED | OUTPATIENT
Start: 2025-01-29 | End: 2025-02-28

## 2025-01-29 RX ORDER — HEPARIN SODIUM (PORCINE) LOCK FLUSH IV SOLN 100 UNIT/ML 100 UNIT/ML
5-10 SOLUTION INTRAVENOUS
Status: DISCONTINUED | OUTPATIENT
Start: 2025-01-29 | End: 2025-01-29 | Stop reason: HOSPADM

## 2025-01-29 RX ORDER — PREDNISONE 20 MG/1
TABLET ORAL
Qty: 41 TABLET | Refills: 0 | Status: SHIPPED | OUTPATIENT
Start: 2025-01-29 | End: 2025-02-19

## 2025-01-29 RX ORDER — SULFAMETHOXAZOLE AND TRIMETHOPRIM 800; 160 MG/1; MG/1
3 TABLET ORAL 3 TIMES DAILY
Qty: 189 TABLET | Refills: 0 | Status: SHIPPED | OUTPATIENT
Start: 2025-01-29 | End: 2025-02-19

## 2025-01-29 RX ORDER — IPRATROPIUM BROMIDE AND ALBUTEROL SULFATE 2.5; .5 MG/3ML; MG/3ML
1 SOLUTION RESPIRATORY (INHALATION)
Status: DISCONTINUED | OUTPATIENT
Start: 2025-01-29 | End: 2025-01-29 | Stop reason: HOSPADM

## 2025-01-29 RX ADMIN — IPRATROPIUM BROMIDE AND ALBUTEROL SULFATE 3 ML: .5; 3 SOLUTION RESPIRATORY (INHALATION) at 08:13

## 2025-01-29 RX ADMIN — HEPARIN SODIUM (PORCINE) LOCK FLUSH IV SOLN 100 UNIT/ML 5 ML: 100 SOLUTION at 17:10

## 2025-01-29 RX ADMIN — PREDNISONE 40 MG: 20 TABLET ORAL at 10:21

## 2025-01-29 RX ADMIN — IPRATROPIUM BROMIDE AND ALBUTEROL SULFATE 3 ML: .5; 3 SOLUTION RESPIRATORY (INHALATION) at 14:15

## 2025-01-29 RX ADMIN — ENOXAPARIN SODIUM 40 MG: 40 INJECTION SUBCUTANEOUS at 13:23

## 2025-01-29 RX ADMIN — GUAIFENESIN 1200 MG: 600 TABLET ORAL at 10:21

## 2025-01-29 RX ADMIN — SULFAMETHOXAZOLE AND TRIMETHOPRIM 3 TABLET: 800; 160 TABLET ORAL at 13:23

## 2025-01-29 RX ADMIN — SULFAMETHOXAZOLE AND TRIMETHOPRIM 3 TABLET: 800; 160 TABLET ORAL at 04:03

## 2025-01-29 ASSESSMENT — ACTIVITIES OF DAILY LIVING (ADL)
ADLS_ACUITY_SCORE: 56

## 2025-01-29 NOTE — PROGRESS NOTES
[facility]    Progress Note - Medicine Service, ROSSANA TEAM 4       Date of Admission:  1/28/2025    Assessment & Plan    Roman Escalante is a 49 year old male with a past medical history of colorectal cancer with lung and liver metastasis who was admitted 1/22 - 1/25 with dyspnea and hypoxia. During admission, underwent bronchoscopy with fungal work up largely negative save elevated 1,3 B-D glucan.  He was discharged home on empiric voriconazole while the rest of his fungal work up resulted.  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  He was instructed by his outpatient infectious disease team to return to the ED for ongoing treatment.    Changes today:  - ID consults, recs pending  - No changes     Subacute on Chronic Hypoxic Respiratory Failure 2/2 PJP - Patient with admission 1/22 - 1/25 for hypoxia and increased dyspnea in setting of recent RSV infection in December 2024. Initially on 1-2Ls in December to early January, was requiring 4Ls and hypoxic on ED presentation several days ago.  Pulmonology and Infectious disease were involved during his stay. Underwent bronchoscopy, ultimately discharged home on increased supplemental oxygen (4-6Ls) and empiric fungal coverage given positive 1,3 B-D glucan with other results pending.  His Pneumocystis jirovecii by PCR returned positive late yesterday, prompting his return for treatment.    -ID consult  -Continue TMP-SMX 15-20mg/kg/d in 3-4 divided doses   - Duonebs   -Continue prednisone 40mg BID  -stop voriconazole   -continue supplemental oxygen      Metastatic rectal Cancer - Diagnosed in 2020 and found to have adenocarcinoma. Now with metastasis to lungs and liver. He has been on multiple agents and now on FOLFIRI + bevacizumab with Zometa. IP consult on 1/25 without any IP oncological needs  -Oncology follow up as indicated OP        Diet: Combination Diet Regular Diet Adult    DVT  Prophylaxis: Enoxaparin (Lovenox) SQ  Puri Catheter: Not present  Fluids: None   Lines: PRESENT    { TIP  Link to Avatar to review     :[id number]}         Cardiac Monitoring: None  Code Status:  ***    Clinically Significant Risk Factors Present on Admission   { TIP  This section helps capture the illness of the patient on admission.     - Review diagnoses highlighted in blue; right click, edit & delete if not appropriate   - If blank, no additional diagnoses identified   :88237}            # Hypoalbuminemia: Lowest albumin = 3.3 g/dL at 1/28/2025  7:53 PM, will monitor as appropriate     # Hypertension: Noted on problem list               # Financial/Environmental Concerns:           Social Drivers of Health   Tobacco Use: Medium Risk (1/15/2025)    Patient History     Smoking Tobacco Use: Former     Smokeless Tobacco Use: Never     Passive Exposure: Past    Received from [facility] [facility]    Social Connections         Disposition Plan   {TIP  The patient's Medical Readiness for Discharge [MRD] has been documented today or is 'Ready Now'. Last Documentation- Anticipated in 2-4 Days   Use SmartList below if a change is needed.  :58559}  Medically Ready for Discharge: Anticipated Tomorrow         The patient's care was discussed with the Attending Physician, Dr. Mariscal .    Sarah Rahman MD  Medicine Service, [address]  Securely message with [email or web address] (more info)  Text page via [facility] Paging/Directory   See signed in provider for up to date coverage information  ______________________________________________________________________    Interval History   Admitted overnight, on stable O2 levels. Continues to have cough, but improving.     Physical Exam   Vital Signs: Temp: 97.6  F (36.4  C) Temp src: Oral BP: 96/63 Pulse: 100   Resp: 20 SpO2: 99 % O2 Device: Nasal cannula with humidification  Oxygen Delivery: 4 LPM  Weight: 170 lbs 0 oz    GENERAL: Alert and oriented x 3. No acute distress. Well-nourished, well appearing   HEENT: EOMI. Anicteric. Moist mucous membranes.   LUNGS: Clear to auscultation bilaterally. Normal respiratory effort with no accessory muscle use. On 6L O2 NC  CARDIOVASCULAR: Regular rate and rhythm. No murmur.  ABDOMEN: Soft, non-tender and non-distended. No palpable masses.  EXTREMITIES: No edema. Moving all extremities.   SKIN: No rashes or lesions on exposed skin   NEUROLOGIC: No focal neurological deficits. CN II-XII grossly intact, but not individually tested.  PSYCHIATRIC: Cooperative. Appropriate mood and affect.      Medical Decision Making   { TIP   MDM Calculator    MDM grid (w/ times)    Coding Support Chat  Billing is now based on time OR medical decision making complexity. Medical decision making included in the A&P does NOT need to be re-documented. Documented time is for the billing provider only (not including resident/fellow time).    :44676}    Please see A&P for additional details of medical decision making.      Data     I have personally reviewed the following data over the past 24 hrs:    3.3 (L)  \   11.0 (L)   / 213     138 106 11.2 /  157 (H)   3.8 21 (L) 0.80 \     ALT: 27 AST: 30 AP: 106 TBILI: 0.2   ALB: 3.3 (L) TOT PROTEIN: 6.5 LIPASE: N/A     Procal: N/A CRP: N/A Lactic Acid: 1.0         Imaging results reviewed over the past 24 hrs:   Recent Results (from the past 24 hours)   XR Chest 2 Views    Narrative    EXAM: XR CHEST 2 VIEWS  LOCATION: [facility]  DATE: 1/28/2025    INDICATION: sob  COMPARISON: CTA chest 1/22/2025 and older studies, chest x-ray 1/15/2025      Impression    IMPRESSION: Right IJ Port-A-Cath again noted. Innumerable pulmonary metastasis again noted, little changed. Emphysema is better appreciated on the CT. No effusions or new parenchymal disease. Heart is of normal size.

## 2025-01-29 NOTE — H&P
[facility]    History and Physical - Hospitalist Service, GOLD TEAM        Date of Admission:  1/28/2025    Assessment & Plan    Roman Escalante is a 49 year old male with a past medical history of colorectal cancer with lung and liver metastasis who was admitted 1/22 - 1/25 with dyspnea and hypoxia. During admission, underwent bronchoscopy with fungal work up largely negative save elevated 1,3 B-D glucan.  He was discharged home on empiric voriconazole while the rest of his fungal work up resulted.  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  He was instructed by his outpatient infectious disease team to return to the ED for ongoing treatment and monitoring as below:     Subacute on Chronic Hypoxic Respiratory Failure 2/2 PJP - Patient with admission 1/22 - 1/25 for hypoxia and increased dyspnea in setting of recent RSV infection in December 2024. Initially on 1-2Ls in December to early January, was requiring 4Ls and hypoxic on ED presentation several days ago.  Pulmonology and Infectious disease were involved during his stay. Underwent bronchoscopy, ultimately discharged home on increased supplemental oxygen (4-6Ls) and empiric fungal coverage given positive 1,3 B-D glucan with other results pending.  His Pneumocystis jirovecii by PCR returned positive late yesterday, prompting his return for treatment.    -Infectious disease following, will formally see in AM, please see telephone encounter 1/28 for ongoing information.   -Start TMP-SMX 15-20mg/kg/d in 3-4 divided doses   -start prednisone 40mg BID  -stop voriconazole   -continue supplemental oxygen     Hx of Colon Cancer - Diagnosed in 2020 and found to have adenocarcinoma. Now with metastasis to lungs and liver. He has been on multiple agents and now on FOLFIRI + bevacizumab with Zometa.   -Oncology follow up as indicated, please see oncology consult note 1/25                Diet:   regular adult diet   DVT Prophylaxis: Enoxaparin (Lovenox) SQ  Puri Catheter: Not present  Lines: PRESENT             Cardiac Monitoring: None  Code Status:  special code    Clinically Significant Risk Factors Present on Admission               # Hypoalbuminemia: Lowest albumin = 3.3 g/dL at 1/28/2025  7:53 PM, will monitor as appropriate     # Hypertension: Noted on problem list               # Financial/Environmental Concerns:           Disposition Plan     Medically Ready for Discharge: Anticipated in 2-4 Days         The patient's care was discussed with the Attending Physician, Dr. Gutierrez .    Evelyn Alcantar PA-C  Hospitalist Service, [facility]  Securely message with [employer] (more info)  Text page via [facility] Paging/Directory   See signed in provider for up to date coverage information    ______________________________________________________________________    Chief Complaint   Positive cultures    History is obtained from the patient and patient's chart    History of Present Illness   Roman Escalante is a 49 year old male with a PMH as listed above who presents to the ED with positive Pneumocystis jirovecii PCR.  Patient was recently admitted for hypoxia and dyspnea, worsening since an RSV infection in December.  Work up significant for new bilateral ground glass opacities.  Infectious work up with elevated fungal markers.  He underwent bronchoscopy and was discharged home with stable oxygen needs and empiric voriconazole to await rest of fungal results.  His Pneumocystis jirovecii by PCR returned positive yesterday evening, with infectious disease team reaching out and instructing patient to return to hospital.      Patient denies any increased oxygen needs, fevers, nausea, vomiting.       Past Medical History    Past Medical History:   Diagnosis Date    Cancer (H) 1/12/21    Colorectal cacer mast, to lungs, and liver    Essential (primary)  "hypertension 2021       Past Surgical History   Past Surgical History:   Procedure Laterality Date    ABDOMEN SURGERY      BIOPSY  21    Lung biopsy. Positive colorectal cancer in lungs    BRONCHOSCOPY (RIGID OR FLEXIBLE), DIAGNOSTIC N/A 2025    Procedure: BRONCHOSCOPY, WITH BRONCHOALVEOLAR LAVAGE;  Surgeon: Perlman, David Morris, MD;  Location:  GI    GI SURGERY  Ostomy placementnt 21    Stoma is an outie, bowel excretion only    IR LUNG BIOPSY MEDIASTINUM RIGHT  2021    IR SIRT (SELECTIVE INTERNAL RADIO THERAPY)  2023    IR VISCERAL ANGIOGRAM  2023    IR VISCERAL EMBOLIZATION  2023    NERVE BLOCK PERIPHERAL Bilateral 3/14/2024    Procedure: Bilateral pudendal nerve block with ultrasound;  Surgeon: Asha Mendoza MD;  [place] [facility] [place]       Prior to Admission Medications   Prior to Admission Medications   Prescriptions Last Dose Informant Patient Reported? Taking?   Chemo Kit   No No   Sig: For RN use only. Do not remove items from bag. Contents: 1  sodium chloride 0.9% flush, 4 medium gloves, 1 chemo gown, / chemo mat, 1 connector female, 1 chemo bag.   Emergency Supply Kit, Central,   No No   Sig: Patient use for emergency only. Contents: 3 sodium chloride 0.9% flushes, 1 dressing kit, 1 microclave ext set 14\", 4 nitrile gloves (med), 6 alcohol prep pads, 1 bacitracin, 1 syringe (10 cc 20 G 1\"). Call [phone] to reorder.   Fluorouracil (ADRUCIL) 4,610 mg in sodium chloride 0.9 % 241 mL via HOMEPUMP C-Series   No No   Sig: Infuse 4,610 mg at 5.2 mL/hr over 46 hours into the vein once for 1 dose.   LORazepam (ATIVAN) 0.5 MG tablet   No No   Sig: Take 1 tablet (0.5 mg) by mouth every 6 hours as needed for anxiety   NARCAN 4 MG/0.1ML nasal spray   Yes No   Ostomy Supplies MISC  Self No No   Si each daily   Port Access Kit   No No   Sig: For nurse use only.  Do not remove items from bag.  Use for port access.  Do not place syringe on sterile field.   albuterol " (PROAIR HFA/PROVENTIL HFA/VENTOLIN HFA) 108 (90 Base) MCG/ACT inhaler   No No   Sig: Inhale 2 puffs into the lungs every 6 hours as needed for shortness of breath, wheezing or cough.   cyclobenzaprine (FLEXERIL) 5 MG tablet   No No   Sig: Take one tab (5mg) by mouth over 6-8 hours, as needed for spasms   fluorouracil (ADRUCIL) 2.5 GM/50ML SOLN injection   Yes No   gabapentin (NEURONTIN) 100 MG capsule   No No   Sig: Take 100-200mg (1-2 capsules) up to twice during the day and 300mg (3 capsules) at bedtime.   guaiFENesin (MUCINEX) 600 MG 12 hr tablet   No No   Sig: Take 2 tablets (1,200 mg) by mouth 2 times daily.   ibuprofen (ADVIL/MOTRIN) 200 MG tablet   No No   Sig: Take 3 tablets (600 mg) by mouth every 8 hours as needed for moderate pain   ipratropium - albuterol 0.5 mg/2.5 mg/3 mL (DUONEB) 0.5-2.5 (3) MG/3ML neb solution   No No   Sig: Take 1 vial (3 mLs) by nebulization every 6 hours as needed for shortness of breath, wheezing or cough.   ondansetron (ZOFRAN ODT) 4 MG ODT tab   No No   Sig: Take 1 tablet (4 mg) by mouth every 6 hours as needed for nausea.   pegfilgrastim (NEULASTA) 6 MG/0.6ML injection   No No   Sig: Inject 0.6 mLs (6 mg) subcutaneously every 14 days. Administer 24 hours after chemotherapy disconnect   prochlorperazine (COMPAZINE) 10 MG tablet  Self No No   Sig: Take 1 tablet (10 mg) by mouth every 6 hours as needed for nausea or vomiting   sodium chloride, PF, 0.9% PF flush   No No   Sig: Inject 10 mLs into the vein as needed for line flush. Flush IV before and after med administration as directed and/or at least every 24 hours, or prior to deaccessing for no further use and/or at least every 4 weeks when not accessed.   voriconazole (VFEND) 200 MG tablet   No No   Sig: Take 1 tablet (200 mg) by mouth every 12 hours.      Facility-Administered Medications: None        Physical Exam   Vital Signs: Temp: 98.1  F (36.7  C) Temp src: Oral BP: 98/74 Pulse: 116   Resp: 20 SpO2: 99 % O2 Device:  Nasal cannula Oxygen Delivery: 6 LPM  Weight: 170 lbs 0 oz  GENERAL: Alert and oriented x 3. NAD. Ambulatory. Cooperative.   HEENT: Anicteric sclera. Mucous membranes moist. NC. AT.   CV: RRR. S1, S2. No murmurs appreciated.   RESPIRATORY: Effort normal on nasal cannula. Coarse lung sounds, no wheezes or rales.   GI: Abdomen soft and non distended with normoactive bowel sounds present in all quadrants. No tenderness  NEUROLOGICAL: No focal deficits. Moves all extremities.  EXTREMITIES: No peripheral edema.   SKIN: No jaundice. No rashes.      Medical Decision Making       75 MINUTES SPENT BY ME on the date of service doing chart review, history, exam, documentation & further activities per the note.      Data   Recent Labs   Lab 01/28/25  1953 01/25/25  0057 01/24/25  0842 01/24/25  0452 01/22/25  1648   WBC 5.0 4.8  --  5.5  --    HGB 11.2* 11.5*  --  11.6*  --    MCV 89 94  --  91  --     160  164  --  154  --    INR  --   --  1.13  --   --      --  136  --  138   POTASSIUM 4.2  --  3.9  --  4.2   CHLORIDE 104  --  106  --  105   CO2 24  --  20*  --  16*   BUN 11.2  --  9.4  --  13.7   CR 0.77  --  0.67  --  0.69  0.69   ANIONGAP 11  --  10  --  17*   JEFERSON 8.8  --  8.1*  --  8.1*   *  --  108*  --  150*   ALBUMIN 3.3*  --  2.9*  --  3.1*   PROTTOTAL 6.5  --  5.8*  --  6.2*  6.2*   BILITOTAL 0.2  --  0.4  --  0.6   ALKPHOS 106  --  91  --  100   ALT 27  --  69  --  131*   AST 30  --  25  --  46*

## 2025-01-29 NOTE — DISCHARGE SUMMARY
DISCHARGE NOTE    Patient discharged to home at 5:22 PM via ambulation on oxygen (pt uses oxygen at home). Accompanied by spouse and staff. Discharge instructions reviewed with patient, opportunity offered to ask questions- no questions at this time. Port de-accessed w/ 500units of heparin per provider order. Prescriptions filled and sent with patient upon discharge. All belongings sent with patient.    Sandra Maldonado RN

## 2025-01-29 NOTE — MEDICATION SCRIBE - ADMISSION MEDICATION HISTORY
"Medication Scribe Admission Medication History    Admission medication history is complete. The information provided in this note is only as accurate as the sources available at the time of the update.    Information Source(s): Patient, CareEverywhere/SureScripts, and [place]  via in-person    Pertinent Information: per pt, [facility], pt reported taking medications on PTA medication list as directed.     Changes made to PTA medication list:  Added: None  Deleted: None  Changed: None    Allergies reviewed with patient and updates made in EHR: yes    Medication History Completed By: Nancy Brothers 1/29/2025 5:39 AM    PTA Med List   Medication Sig Note Last Dose/Taking    albuterol (PROAIR HFA/PROVENTIL HFA/VENTOLIN HFA) 108 (90 Base) MCG/ACT inhaler Inhale 2 puffs into the lungs every 6 hours as needed for shortness of breath, wheezing or cough.  Past Week    Chemo Kit For RN use only. Do not remove items from bag. Contents: 1  sodium chloride 0.9% flush, 4 medium gloves, 1 chemo gown, 1/4 chemo mat, 1 connector female, 1 chemo bag.  Taking As Needed    cyclobenzaprine (FLEXERIL) 5 MG tablet Take one tab (5mg) by mouth over 6-8 hours, as needed for spasms  More than a month    Emergency Supply Kit, Central, Patient use for emergency only. Contents: 3 sodium chloride 0.9% flushes, 1 dressing kit, 1 microclave ext set 14\", 4 nitrile gloves (med), 6 alcohol prep pads, 1 bacitracin, 1 syringe (10 cc 20 G 1\"). Call [phone] to reorder.  Taking As Needed    gabapentin (NEURONTIN) 100 MG capsule Take 100-200mg (1-2 capsules) up to twice during the day and 300mg (3 capsules) at bedtime.  1/28/2025 at 11:00 AM    guaiFENesin (MUCINEX) 600 MG 12 hr tablet Take 2 tablets (1,200 mg) by mouth 2 times daily.  1/28/2025 Morning    ipratropium - albuterol 0.5 mg/2.5 mg/3 mL (DUONEB) 0.5-2.5 (3) MG/3ML neb solution Take 1 vial (3 mLs) by nebulization every 6 hours as needed for shortness of breath, wheezing or cough.  1/28/2025 " at  2:00 PM    LORazepam (ATIVAN) 0.5 MG tablet Take 1 tablet (0.5 mg) by mouth every 6 hours as needed for anxiety  More than a month    NARCAN 4 MG/0.1ML nasal spray   Taking    ondansetron (ZOFRAN ODT) 4 MG ODT tab Take 1 tablet (4 mg) by mouth every 6 hours as needed for nausea.  Past Month    Ostomy Supplies MISC 1 each daily  Taking    pegfilgrastim (NEULASTA) 6 MG/0.6ML injection Inject 0.6 mLs (6 mg) subcutaneously every 14 days. Administer 24 hours after chemotherapy disconnect 1/29/2025: Last used 6 wks ago Taking    Port Access Kit For nurse use only.  Do not remove items from bag.  Use for port access.  Do not place syringe on sterile field.  Taking As Needed    prochlorperazine (COMPAZINE) 10 MG tablet Take 1 tablet (10 mg) by mouth every 6 hours as needed for nausea or vomiting  Past Month    sodium chloride, PF, 0.9% PF flush Inject 10 mLs into the vein as needed for line flush. Flush IV before and after med administration as directed and/or at least every 24 hours, or prior to deaccessing for no further use and/or at least every 4 weeks when not accessed. 1/29/2025: Per RN use Taking As Needed    voriconazole (VFEND) 200 MG tablet Take 1 tablet (200 mg) by mouth every 12 hours.  1/28/2025 at  8:00 AM

## 2025-01-29 NOTE — ED TRIAGE NOTES
Patient presents to the ER because Dr Sellers from ID clinic telling him to come to ER. He was told to be admitted to the hospital for some kind of fungus in his lungs. Patient is satting at 73% on room air. Says he has been using 4L nasal cannula since his visit to the ER last week (for the same reason). Currently put patient on 6L

## 2025-01-29 NOTE — CONSULTS
General ID [facility] Service: Consult Note     Patient:  Roman Escalante, Date of birth 1975, Medical record number [id number]  Date of Visit:  01/29/2025  Reason for consult: PJP pneumonia management         Assessment and Recommendations:     ID Problem list:  PJP pneumonia  Acute hypoxic respiratory failure 2/2 PJP pneumonia on 4-6L oxygen therapy  Rectal adenocarcinoma with mets to lungs and liver (2020) s/p CAR-T immunotherapy (6-7/2024) on chemotherapy (last 12/18/2024 FOLFIRI and bevacizumab)    Discussion:  Extensive infectious workup during recent hospitalization (1/22-1/25) significant for positive PJP PCR on bronchoalveolar lavage (1/24/2025); testing otherwise unrevealing for other infectious etiology. PJP pneumonia is consistent with CT chest findings and patient was at increased risk in the setting of CAR-T immunotherapy without completion of planned 6 month course of PJP ppx. With diagnosis of PJP pneumonia, patient will need PJP prophylaxis with Bactrim DS as secondary prevention following completion of treatment course.    Recommendations:  Recommend Bactrim DS, 3 tablets TID for total 21 day course (1/29-2/19)  Recommend steroid taper (prednisone 40mg BID for 5 days, qd for 5 days, then 20mg every day for 11 days) for 21 day course  Following completion of 21 day treatment course, recommend indefinite PJP ppx with Bactrim DS MFW  Ok to discharge from ID perspective with home oxygen, per patient preference  Recommend outpatient BMP check on Friday (1/31) and Monday (2/3) to coincide with already scheduled outpatient appointments  Recommend outpatient ID follow-up (scheduled with Dr. Frazier on 2/19/2025)     Patient was discussed with ID attending Dr Dunlap. Recommendations given to primary team.    Thank you for allowing us to participate in the care of this patient. ID will continue to follow. Please see [facility] schedule for paging details if questions.    Zeny Alvarez MD PGY-2  Internal  [facility]  01/29/2025           History of Present Illness:   First developed symptoms of upper respiratory infection alongside family members on 12/25. His symptoms included chills with night sweats, shortness of breath with exertion, and productive cough. He visited PCP who diagnosed him with bronchitis and prescribed azithromycin x5 days (1/26-12/31) and prednisone x5 days. His symptoms persisted despite antibiotic therapy. On outpatient Oncology visit 1/15, his next round of chemotherapy was delayed due to ongoing symptoms of upper respiratory infection and he was prescribed levofloxacin x7 days (1/15-1/22). His symptoms did not improve despite levofloxacin therapy and he started to use supplemental oxygen device that he borrowed from a friend. With progressive symptoms, he decided to present to ED was admitted 1/22-1/25 with bilateral ground glass opacities at chest CT and had bronchoalveolar lavage on 1/24/2025 with lab tests significant for PJP pneumonia.     He notes that overnight, he has not had further chills. He continues to have shortness of breath with exertion, and he continues to have cough productive of clear sputum. He does not have nausea, vomiting, or chest pain.         Review of Systems:   ROS obtained, pertinent positives and negatives as above.       Past Medical History:     Past Medical History:   Diagnosis Date    Cancer (H) 1/12/21    Colorectal cacer mast, to lungs, and liver    Essential (primary) hypertension 08/03/2021     Past Surgical History:   Procedure Laterality Date    ABDOMEN SURGERY      BIOPSY  1/21/21    Lung biopsy. Positive colorectal cancer in lungs    BRONCHOSCOPY (RIGID OR FLEXIBLE), DIAGNOSTIC N/A 1/24/2025    Procedure: BRONCHOSCOPY, WITH BRONCHOALVEOLAR LAVAGE;  Surgeon: Perlman, David Morris, MD;  Location:  GI    GI SURGERY  Ostomy placementnt 2/1/21    Stoma is an outie, bowel excretion only    IR LUNG BIOPSY MEDIASTINUM RIGHT   2021    IR SIRT (SELECTIVE INTERNAL RADIO THERAPY)  2023    IR VISCERAL ANGIOGRAM  2023    IR VISCERAL EMBOLIZATION  2023    NERVE BLOCK PERIPHERAL Bilateral 3/14/2024    Procedure: Bilateral pudendal nerve block with ultrasound;  Surgeon: Asha Mendoza MD;  Location: [facility] OR     Otherwise as per HPI.      Allergies:      Allergies   Allergen Reactions    Oxaliplatin Shortness Of Breath, Nausea and Vomiting and Other (See Comments)     Patient had HSR to Oxaliplatin on cycle 8 of FOLFOX chemotherapy. Sent to ER for continued monitoring.  Patient had HSR to Oxaliplatin on cycle 8 of FOLFOX chemotherapy. Sent to ER for continued monitoring.      Vancomycin      Other Reaction(s): Red man syndrome    Blood-Group Specific Substance Other (See Comments)     Patient has reactivity Suggestive of a Warm auto antibody. Blood products may be delayed. Draw patient 24 hours prior to transfusion. Draw one red top and two purple top tubes for all type and screen orders.  Patient has reactivity Suggestive of a Warm auto antibody. Blood products may be delayed. Draw patient 24 hours prior to transfusion. Draw one red top and two purple top tubes for all type and screen orders.              Current Antimicrobials:   S/p azithromycin (-)  S/p levofloxacin (1/15-)  S/p Voriconazole (-)  Bactrim DS, 3 tablets TID (-)       Family History:   Reviewed and noncontributory.       Social History:     Social History     Tobacco Use    Smoking status: Former     Current packs/day: 0.00     Average packs/day: 0.5 packs/day for 13.3 years (6.6 ttl pk-yrs)     Types: Cigarettes, Other     Start date: 2010     Quit date: 2023     Years since quittin.4     Passive exposure: Past    Smokeless tobacco: Never   Vaping Use    Vaping status: Never Used   Substance Use Topics    Alcohol use: Not Currently     Comment: social    Drug use: Yes     Types: Marijuana     Comment: Keeps up my  appetite, sleep, and help with neuropathy     Otherwise as per HPI.         Physical Exam:   Ranges forvital signs:  Temp:  [97  F (36.1  C)-98.1  F (36.7  C)] 97.5  F (36.4  C)  Pulse:  [] 101  Resp:  [18-20] 18  BP: ()/(63-85) 102/73  SpO2:  [92 %-99 %] 99 %  GENERAL: Laying in bed, no acute distress  HEENT: Atraumatic, moist mucus membranes, PERRL  RESP: Unlabored breathing on 6L NC, no crackles or wheezes on auscultation  CARDIO: Regular rate and rhythm, extremities warm  NEURO: AAOx3, cranial nerves grossly intact, no focal deficits         Laboratory Data:     Inflammatory Markers    Recent Labs   Lab Test 01/25/25  1054 01/24/25  0842 01/28/24  1902 01/28/24  1814 07/30/23  0555 07/29/23  0610 07/28/23  1501   SED 47*  --  38*  --   --   --   --    CRPI  --  85.70*  --  155.00* 42.60* 84.70* 143.00*       Hematology Studies    Recent Labs   Lab Test 01/29/25  1030 01/28/25  1953 01/25/25  0057 01/24/25  0452 01/22/25  1431 01/15/25  0910 12/18/24  0927   WBC 3.3* 5.0 4.8 5.5 7.9 12.8* 4.8   ANEU  --  3.0 2.5 4.3 6.7 11.1* 3.1   AEOS  --  0.9* 1.1* 0.6 0.3 0.2 0.2   HGB 11.0* 11.2* 11.5* 11.6* 12.7* 12.5* 13.8   MCV 89 89 94 91 91 92 91    228 160  164 154 158 203 132*       Metabolic Studies     Recent Labs   Lab Test 01/29/25  1030 01/28/25 1953 01/24/25  0842 01/22/25  1648 01/22/25  1431    139 136 138 136   POTASSIUM 3.8 4.2 3.9 4.2 4.5   CHLORIDE 106 104 106 105 104   CO2 21* 24 20* 16* 20*   BUN 11.2 11.2 9.4 13.7 14.3   CR 0.80 0.77 0.67 0.69  0.69 0.73   GFRESTIMATED >90 >90 >90 >90  >90 >90       Hepatic Studies    Recent Labs   Lab Test 01/28/25  1953 01/24/25  0842 01/22/25  1648 01/22/25  1431 01/15/25  0910 12/18/24  0927   BILITOTAL 0.2 0.4 0.6 0.7 0.7 0.3   ALKPHOS 106 91 100 116 130 118   ALBUMIN 3.3* 2.9* 3.1* 3.1* 3.6 3.8   AST 30 25 46* 43 27 21   ALT 27 69 131* 139* 25 6       Microbiology:  Culture   Date Value Ref Range Status   01/28/2025 No growth after 12  "hours  Preliminary   01/28/2025 No growth after 12 hours  Preliminary   01/25/2025 No growth after 4 days  Preliminary   01/25/2025 No growth after 4 days  Preliminary   01/24/2025 No Actinomyces like species isolated after 1 day  Preliminary   01/24/2025 No growth after 5 days  Preliminary   01/24/2025 No growth after 5 days  Preliminary   01/24/2025 No Growth  Final   01/23/2025 3+ Normal Ashli  Final   01/22/2025 No Growth  Final   01/22/2025 No Growth  Final   08/09/2024 No Growth  Final   07/29/2023 No Growth  Final   07/29/2023 No Growth  Final   07/27/2023 No Growth  Final   07/27/2023 No Growth  Final   07/27/2023 No Growth  Final     GS Culture   Date Value Ref Range Status   01/24/2025 See corresponding culture for results  Final       Urine Studies    Recent Labs   Lab Test 04/26/24  1600 04/02/24  0552 07/29/23  1044 07/27/23  2158   LEUKEST Negative Negative Negative Negative   WBCU 0 1 1 15*       Vancomycin Levels  No lab results found.    Invalid input(s): \"VANCO\"    Hepatitis B Testing   Recent Labs   Lab Test 11/14/23  1309   HBCAB Nonreactive     Hepatitis C Testing   No results found for: \"HCVAB\", \"HQTG\", \"HCGENO\", \"HCPCR\", \"HQTRNA\", \"HEPRNA\"  Respiratory Virus Testing    No results found for: \"RS\", \"FLUAG\"           Imaging:     CT Chest (1/22/2025)  IMPRESSION:  1. Exam is negative for acute pulmonary embolism. No evidence of right  heart strain or increased right heart pressures.   2. Redemonstration of multifocal pulmonary masses in the context of  known metastatic lung cancer. Grossly similar multifocal distribution  as detailed above.  3. New diffuse groundglass attenuation throughout the lungs likely  representing some degree of inflammation, viral respiratory pattern  may appear this way given recent diagnosis of RSV, may also represent  mild degree of pulmonary edema.  4. Small right pleural effusion. No pneumothorax.  5. Multifocal hepatic and osseous lesions favored to " represent  metastatic disease. Increased prominence of lucent T8 lesion.

## 2025-01-29 NOTE — PLAN OF CARE
RN [id number]    Afebrile. VSS on 4.5 L NC w/ humidifier. A&Ox4. Pt ambulates independently-not out of bed this shift. C/o pain 3/10; declines intervention. Denies nausea. Regular diet. Port accessed.    No acute changes this shift.    Continue to monitor patient and intervene as needed. Continue to implement Plan of Care. Notify MD with any concerns or changes in patient status.     Sofia Hernadez RN

## 2025-01-29 NOTE — ED PROVIDER NOTES
ED Provider Note  [facility]      History     Chief Complaint   Patient presents with    Shortness of Breath     Patient referred by doctor Marlo from infectious disease clinic     HPI  Roman Escalante is a 49 year old male with history of colorectal cancer with lung and liver metastasis status post CAR-T cell therapy at [facility] who presents to the emergency department with referral from infectious disease for possible fungal infection in lungs. Patient reports that he is coming to the ED after a phone call from his doctor telling him that he needs to be hospitalized for possible fungal pneumonia. Patient denies fever, vomiting, and chills. Patient denies chest pain. Patient reports no shortness of breath since they increased his O2 to 6 L 3 days ago. Patient has not had chemo for his colorectal cancer for 6 weeks or more due to frequent hospitalizations of respiratory tract infections of various etiologies. Patient is not currently taking antibiotics.     Per chart review,  patient underwent bronchoscopy and bronchoalveolar lavage 1/24/2025. 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 Patient also had elevated beta-glucan levels and with his underlying risk factors, positive symptoms, and chest CT, ID believes it best to hospitalize the patient and begin treatment for PJP.     Past Medical History  Past Medical History:   Diagnosis Date    Cancer (H) 1/12/21    Colorectal cacer mast, to lungs, and liver    Essential (primary) hypertension 08/03/2021     Past Surgical History:   Procedure Laterality Date    ABDOMEN SURGERY      BIOPSY  1/21/21    Lung biopsy. Positive colorectal cancer in lungs    BRONCHOSCOPY (RIGID OR FLEXIBLE), DIAGNOSTIC N/A 1/24/2025    Procedure: BRONCHOSCOPY, WITH BRONCHOALVEOLAR LAVAGE;  Surgeon: Perlman, David Morris, MD;  Location:  GI    GI SURGERY  Ostomy placementnt 2/1/21    Stoma is an  outie, bowel excretion only    IR LUNG BIOPSY MEDIASTINUM RIGHT  01/19/2021    IR SIRT (SELECTIVE INTERNAL RADIO THERAPY)  8/29/2023    IR VISCERAL ANGIOGRAM  8/29/2023    IR VISCERAL EMBOLIZATION  9/7/2023    NERVE BLOCK PERIPHERAL Bilateral 3/14/2024    Procedure: Bilateral pudendal nerve block with ultrasound;  Surgeon: Asha Mendoza MD;  Location: [facility] OR     albuterol (PROAIR HFA/PROVENTIL HFA/VENTOLIN HFA) 108 (90 Base) MCG/ACT inhaler  Chemo Kit  cyclobenzaprine (FLEXERIL) 5 MG tablet  Emergency Supply Kit, [facility],  fluorouracil (ADRUCIL) 2.5 GM/50ML SOLN injection  [START ON 1/29/2025] Fluorouracil (ADRUCIL) 4,610 mg in sodium chloride 0.9 % 241 mL via HOMEPUMP C-Series  gabapentin (NEURONTIN) 100 MG capsule  guaiFENesin (MUCINEX) 600 MG 12 hr tablet  ibuprofen (ADVIL/MOTRIN) 200 MG tablet  ipratropium - albuterol 0.5 mg/2.5 mg/3 mL (DUONEB) 0.5-2.5 (3) MG/3ML neb solution  LORazepam (ATIVAN) 0.5 MG tablet  NARCAN 4 MG/0.1ML nasal spray  ondansetron (ZOFRAN ODT) 4 MG ODT tab  Ostomy Supplies MISC  pegfilgrastim (NEULASTA) 6 MG/0.6ML injection  Port Access Kit  prochlorperazine (COMPAZINE) 10 MG tablet  sodium chloride, PF, 0.9% PF flush  voriconazole (VFEND) 200 MG tablet      Allergies   Allergen Reactions    Oxaliplatin Shortness Of Breath, Nausea and Vomiting and Other (See Comments)     Patient had HSR to Oxaliplatin on cycle 8 of FOLFOX chemotherapy. Sent to ER for continued monitoring.  Patient had HSR to Oxaliplatin on cycle 8 of FOLFOX chemotherapy. Sent to ER for continued monitoring.      Vancomycin      Other Reaction(s): Red man syndrome    Blood-Group Specific Substance Other (See Comments)     Patient has reactivity Suggestive of a Warm auto antibody. Blood products may be delayed. Draw patient 24 hours prior to transfusion. Draw one red top and two purple top tubes for all type and screen orders.  Patient has reactivity Suggestive of a Warm auto antibody. Blood products may be delayed. Draw  "patient 24 hours prior to transfusion. Draw one red top and two purple top tubes for all type and screen orders.       Family History  No family history on file.  Social History   Social History     Tobacco Use    Smoking status: Former     Current packs/day: 0.00     Average packs/day: 0.5 packs/day for 13.3 years (6.6 ttl pk-yrs)     Types: Cigarettes, Other     Start date: 2010     Quit date: 2023     Years since quittin.4     Passive exposure: Past    Smokeless tobacco: Never   Vaping Use    Vaping status: Never Used   Substance Use Topics    Alcohol use: Not Currently     Comment: social    Drug use: Yes     Types: Marijuana     Comment: Keeps up my appetite, sleep, and help with neuropathy      ROS: 14 point ROS neg other than the symptoms noted above in the HPI.    Physical Exam   BP: 113/75  Pulse: 116  Temp: 98.1  F (36.7  C)  Resp: 20  Height: 180.3 cm (5' 11\")  Weight: 77.1 kg (170 lb)  SpO2: 99 %  Physical Exam  Physical Exam   Constitutional: oriented to person, place, and time. appears well-developed and well-nourished.   HENT:   Head: Normocephalic and atraumatic.   Neck: Normal range of motion.   Pulmonary/Chest: Effort normal. No respiratory distress. Clear lungs bilaterally.  Cardiac: No murmurs, rubs, gallops. RRR.  Abdominal: Abdomen soft, nontender, nondistended. No rebound tenderness.  MSK: Long bones without deformity or evidence of trauma.  No lower extremity edema.  Neurological: alert and oriented to person, place, and time.   Skin: Skin is warm and dry.   Psychiatric:  normal mood and affect.  behavior is normal. Thought content normal.     ED Course, Procedures, & Data      Procedures            Results for orders placed or performed during the hospital encounter of 25   XR Chest 2 Views     Status: None    Narrative    EXAM: XR CHEST 2 VIEWS  LOCATION: [facility]  DATE: 2025    INDICATION: sob  COMPARISON: CTA chest " 1/22/2025 and older studies, chest x-ray 1/15/2025      Impression    IMPRESSION: Right IJ Port-A-Cath again noted. Innumerable pulmonary metastasis again noted, little changed. Emphysema is better appreciated on the CT. No effusions or new parenchymal disease. Heart is of normal size.   Comprehensive metabolic panel     Status: Abnormal   Result Value Ref Range    Sodium 139 135 - 145 mmol/L    Potassium 4.2 3.4 - 5.3 mmol/L    Carbon Dioxide (CO2) 24 22 - 29 mmol/L    Anion Gap 11 7 - 15 mmol/L    Urea Nitrogen 11.2 6.0 - 20.0 mg/dL    Creatinine 0.77 0.67 - 1.17 mg/dL    GFR Estimate >90 >60 mL/min/1.73m2    Calcium 8.8 8.8 - 10.4 mg/dL    Chloride 104 98 - 107 mmol/L    Glucose 112 (H) 70 - 99 mg/dL    Alkaline Phosphatase 106 40 - 150 U/L    AST 30 0 - 45 U/L    ALT 27 0 - 70 U/L    Protein Total 6.5 6.4 - 8.3 g/dL    Albumin 3.3 (L) 3.5 - 5.2 g/dL    Bilirubin Total 0.2 <=1.2 mg/dL   Blood gas venous     Status: Abnormal   Result Value Ref Range    pH Venous 7.39 7.32 - 7.43    pCO2 Venous 44 40 - 50 mm Hg    pO2 Venous 37 25 - 47 mm Hg    Bicarbonate Venous 27 21 - 28 mmol/L    Base Excess/Deficit Venous 1.5 -3.0 - 3.0 mmol/L    FIO2 99     Oxyhemoglobin Venous 65 (L) 70 - 75 %    O2 Sat, Venous 66.6 (L) 70.0 - 75.0 %    Narrative    In healthy individuals, oxyhemoglobin (O2Hb) and oxygen saturation (SO2) are approximately equal. In the presence of dyshemoglobins, oxyhemoglobin can be considerably lower than oxygen saturation.   Lactic acid whole blood with 1x repeat in 2 hr when >2     Status: Normal   Result Value Ref Range    Lactic Acid, Initial 1.0 0.7 - 2.0 mmol/L   CBC with platelets and differential     Status: Abnormal   Result Value Ref Range    WBC Count 5.0 4.0 - 11.0 10e3/uL    RBC Count 3.92 (L) 4.40 - 5.90 10e6/uL    Hemoglobin 11.2 (L) 13.3 - 17.7 g/dL    Hematocrit 34.9 (L) 40.0 - 53.0 %    MCV 89 78 - 100 fL    MCH 28.6 26.5 - 33.0 pg    MCHC 32.1 31.5 - 36.5 g/dL    RDW 16.1 (H) 10.0 - 15.0  %    Platelet Count 228 150 - 450 10e3/uL   Manual Differential     Status: Abnormal   Result Value Ref Range    % Neutrophils 59 %    % Lymphocytes 7 %    % Monocytes 14 %    % Eosinophils 18 %    % Basophils 1 %    % Myelocytes 1 %    Absolute Neutrophils 3.0 1.6 - 8.3 10e3/uL    Absolute Lymphocytes 0.4 (L) 0.8 - 5.3 10e3/uL    Absolute Monocytes 0.7 0.0 - 1.3 10e3/uL    Absolute Eosinophils 0.9 (H) 0.0 - 0.7 10e3/uL    Absolute Basophils 0.0 0.0 - 0.2 10e3/uL    Absolute Myelocytes 0.0 <=0.0 10e3/uL    RBC Morphology Confirmed RBC Indices     Platelet Assessment  Automated Count Confirmed. Platelet morphology is normal.     Automated Count Confirmed. Platelet morphology is normal.   CBC with platelets differential     Status: Abnormal    Narrative    The following orders were created for panel order CBC with platelets differential.  Procedure                               Abnormality         Status                     ---------                               -----------         ------                     CBC with platelets and d...[[id number]]  Abnormal            Final result               Manual Differential[[id number]]          Abnormal            Final result                 Please view results for these tests on the individual orders.     Medications - No data to display  Labs Ordered and Resulted from Time of ED Arrival to Time of ED Departure - No data to display  No orders to display          Critical care was not performed.     Medical Decision Making  The patient's presentation was of high complexity (an acute health issue posing potential threat to life or bodily function).    The patient's evaluation involved:  review of external note(s) from 1 sources (infectious disease note from today)  strong consideration of a test (CT chest) that was ultimately deferred  ordering and/or review of 3+ test(s) in this encounter (see separate area of note for details)  discussion of management or test interpretation  with another health professional (hospitalist)    The patient's management necessitated high risk (a decision regarding hospitalization).    Assessment & Plan    Patient resenting with shortness of breath.  He does have confirmed pneumocystis history of patchy pneumonia.  He is normal oxygen on 6 L that he has been using at home.  He appears comfortable at rest but has significant shortness of breath with exertion.  Bactrim and steroids started here per infectious disease and he will be admitted to medicine.  New or worsening symptoms.    I have reviewed the nursing notes. I have reviewed the findings, diagnosis, plan and need for follow up with the patient.    New Prescriptions    No medications on file       Final diagnoses:   Pneumonia due to Pneumocystis jirovecii, unspecified laterality, unspecified part of lung (H)   I, Watson Godinez, am serving as a trained medical scribe to document services personally performed by Walt Villalta MD, based on the provider's statements to me.     IWalt MD, was physically present and have reviewed and verified the accuracy of this note documented by Watson Godinez.     Walt Villalta MD  [facility] EMERGENCY DEPARTMENT  1/28/2025     Walt Villalta MD  01/28/25 [id number]

## 2025-01-30 LAB
ASPERGILLUS AB TITR SER CF: NORMAL {TITER}
B DERMAT AB SER-ACNC: 0.1 IV
BACTERIA BLD CULT: NO GROWTH
BACTERIA BLD CULT: NORMAL
BACTERIA BRONCH: NORMAL
BACTERIA BRONCH: NORMAL
COCCIDIOIDES AB TITR SER CF: NORMAL {TITER}
H CAPSUL MYC AB TITR SER CF: NORMAL {TITER}
H CAPSUL YST AB TITR SER CF: NORMAL {TITER}

## 2025-01-30 NOTE — DISCHARGE SUMMARY
[facility]  Discharge Summary - Medicine & Pediatrics       Date of Admission:  1/28/2025  Date of Discharge:  1/29/25  Discharging Provider: Dr. Mariscal   Discharge Service: Medicine Service, ROSSANA TEAM 4    Discharge Diagnoses   Subacute on Chronic Hypoxic Respiratory Failure 2/2 PJP   Metastatic rectal Cancer     Clinically Significant Risk Factors          Follow-ups Needed After Discharge   Follow-up Appointments       Follow Up and recommended labs and tests      Follow up with oncologist with labs on Friday  Follow up on Monday 2/3 with PCP with labs  Infectious Disease follow with Dr. Frazier 2/19 8:30AM            BMP follow up with PCP     Unresulted Labs Ordered in the Past 30 Days of this Admission       Date and Time Order Name Status Description    1/28/2025  7:18 PM Blood Culture Arm, Left Preliminary     1/28/2025  7:18 PM Blood Culture Arm, Right Preliminary     1/25/2025  8:09 AM Fungal Antibodies In process     1/24/2025 10:29 PM Blood Culture Hand, Left Preliminary     1/24/2025 10:28 PM Blood Culture Special Organism Preliminary     1/24/2025  1:16 PM Viral Culture Respiratory Preliminary     1/24/2025  1:16 PM Fungal or Yeast Culture Routine Preliminary     1/24/2025  1:16 PM Nocardia species culture Preliminary     1/24/2025  1:16 PM Acid-Fast Bacilli Culture and Stain In process     1/24/2025  1:16 PM Actinomyces species culture Preliminary         These results will be followed up by PCP, ID     Discharge Disposition   Discharged to home  Condition at discharge: Stable    Hospital Course   Roman Escalante is a 49 year old male with a past medical history of colorectal cancer with lung and liver metastasis who was admitted 1/22 - 1/25 with dyspnea and hypoxia. During admission, underwent bronchoscopy with fungal work up largely negative save elevated 1,3 B-D glucan.  He was discharged home on empiric voriconazole while the rest of his fungal work up  resulted.  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 He was admitted for initiation of bactrim and steroids.      Subacute on Chronic Hypoxic Respiratory Failure 2/2 PJP   Patient with admission 1/22 - 1/25 for hypoxia and increased dyspnea in setting of recent RSV infection in December 2024. Initially on 1-2Ls in December to early January, was requiring 4Ls and hypoxic on ED presentation several days ago.  Pulmonology and Infectious disease were involved during his stay. Underwent bronchoscopy, ultimately discharged home on increased supplemental oxygen (4-6Ls) and empiric fungal coverage given positive 1,3 B-D glucan with other results pending.  His Pneumocystis jirovecii by PCR returned positive, returned to ED started on Bactrim, steroids, and ID consult. Remained on baseline oxygen during IP admission, pt has oxygen at home.   Recommend Bactrim DS, 3 tablets TID for total 21 day course (1/29-2/19)  Recommend steroid taper (prednisone 40mg BID for 5 days, qd for 5 days, then 20mg every day for 11 days) for 21 day course  Following completion of 21 day treatment course, recommend indefinite PJP ppx with Bactrim DS MFW  Recommend outpatient BMP check on Friday (1/31) and Monday (2/3) to coincide with already scheduled outpatient appointments (ordered and routed to PCP)   Recommend outpatient ID follow-up (scheduled with Dr. Frazier on 2/19/2025)   - Educated on travel recommendations and that he might require more oxygen when flying, he reports understanding.   - Continue home oxygen therapy, wean as tolerated at home      Metastatic rectal Cancer   Diagnosed in 2020 and found to have adenocarcinoma. Now with metastasis to lungs and liver. He has been on multiple agents and now on FOLFIRI + bevacizumab with Zometa. IP consult on 1/25 without any IP oncological needs  -Oncology follow up as indicated OP    Consultations This Hospital Stay   INFECTIOUS DISEASE GENERAL ADULT IP  CONSULT    Code Status   Prior       The patient was discussed with Dr. Davey Rahman MD  [facility] EMERGENCY DEPARTMENT  [address]  [place] [address]  Phone: [phone]  ______________________________________________________________________    Physical Exam   Vital Signs: Temp: 97.5  F (36.4  C) Temp src: Oral BP: 102/73 Pulse: 101   Resp: 18 SpO2: 99 % O2 Device: Nasal cannula with humidification Oxygen Delivery: 4 LPM  Weight: 170 lbs 0 oz  GENERAL: Alert and oriented x 3. No acute distress. Well-nourished. Well appearing   HEENT: EOMI. Anicteric. Moist mucous membranes. No scleral icterus.   LUNGS: Clear to auscultation bilaterally. Normal respiratory effort with no accessory muscle use. On 6L O2 via NC, no acute distress   CARDIOVASCULAR: Regular rate and rhythm. No murmur.  ABDOMEN: Soft, non-tender and non-distended. No palpable masses.  EXTREMITIES: No edema. Moving all extremities.   SKIN: No rashes or lesions on exposed skin.   NEUROLOGIC: No focal neurological deficits. CN II-XII grossly intact, but not individually tested.  PSYCHIATRIC: Cooperative. Appropriate mood and affect.        Primary Care Physician   Buddy Smith    Discharge Orders      Basic metabolic panel  (Ca, Cl, CO2, Creat, Gluc, K, Na, BUN)     Basic metabolic panel  (Ca, Cl, CO2, Creat, Gluc, K, Na, BUN)     Reason for your hospital stay    You were admitted for PJP pneumonia, you were started on antibiotics and steroids.     You will continue on steroids:   40 mg twice a day for 5 days  40 mg once a day for 5 day   20 mg once a day for 11 days   Then stop steroids    Please take omeprazole while on steroids, once a day.     You will also continue antibiotics.   Please take 3 tablets Bactrim three times a day for 21 days.   After you have completed the 21 day course please start taking bactrim 1 tablet on Monday, Wednesday, and Friday for PJP prophylaxis.     You will need to follow up  with Dr. Frazier from Infectious disease on 2/19/25 as previously recommended.     Please have a BMP (lab) drawn on Friday and on Monday with your PCP, please have your PCP follow up the results to ensure your kidney function is doing okay.     Continue to use your oxygen. Please be advised that with increasing altitude you may require more oxygen support (such as flying) therefore you may need more tanks during flying.     Activity    Your activity upon discharge: activity as tolerated     Follow Up and recommended labs and tests    Follow up with oncologist with labs on Friday  Follow up on Monday 2/3 with PCP with labs  Infectious Disease follow with Dr. Frazier 2/19 8:30AM     Oxygen Adult/Peds    Oxygen Documentation  I certify that this patient, Roman Escalante has been under my care (or a nurse practitioner or physican's assistant working with me). This is the face-to-face encounter for oxygen medical necessity.      At the time of this encounter, I have reviewed the qualifying testing and have determined that supplemental oxygen is reasonable and necessary and is expected to improve the patient's condition in a home setting.         Patient has continued oxygen desaturation due to Pneumonia J18.9.    If portability is ordered, is the patient mobile within the home? yes    Was this visit performed as a telehealth visit: No     Diet    Follow this diet upon discharge: Current Diet:Orders Placed This Encounter      Combination Diet Regular Diet Adult       Significant Results and Procedures   Results for orders placed or performed during the hospital encounter of 01/28/25   XR Chest 2 Views    Narrative    EXAM: XR CHEST 2 VIEWS  LOCATION: [facility]  DATE: 1/28/2025    INDICATION: sob  COMPARISON: CTA chest 1/22/2025 and older studies, chest x-ray 1/15/2025      Impression    IMPRESSION: Right IJ Port-A-Cath again noted. Innumerable pulmonary metastasis again noted,  little changed. Emphysema is better appreciated on the CT. No effusions or new parenchymal disease. Heart is of normal size.       Discharge Medications   Current Discharge Medication List        START taking these medications    Details   omeprazole (PRILOSEC) 40 MG DR capsule Take 1 capsule (40 mg) by mouth daily. Please take once daily while on steroids  Qty: 30 capsule, Refills: 0    Associated Diagnoses: Pneumonia due to Pneumocystis jirovecii, unspecified laterality, unspecified part of lung (H)      predniSONE (DELTASONE) 20 MG tablet Take 2 tablets (40 mg) by mouth 2 times daily (with meals) for 5 days, THEN 2 tablets (40 mg) daily for 5 days, THEN 1 tablet (20 mg) daily for 11 days.  Qty: 41 tablet, Refills: 0    Associated Diagnoses: Pneumonia due to Pneumocystis jirovecii, unspecified laterality, unspecified part of lung (H)      !! sulfamethoxazole-trimethoprim (BACTRIM DS) 800-160 MG tablet Take 3 tablets by mouth 3 times daily for 21 days.  Qty: 189 tablet, Refills: 0    Associated Diagnoses: Pneumonia due to Pneumocystis jirovecii, unspecified laterality, unspecified part of lung (H)      !! sulfamethoxazole-trimethoprim (BACTRIM DS) 800-160 MG tablet Take 1 tablet by mouth Every Mon, Wed, Fri Morning. Please start this prophylaxis dose of bactrim once you have completed your 21 days of bactrim treatment  Qty: 12 tablet, Refills: 1    Associated Diagnoses: Pneumonia due to Pneumocystis jirovecii, unspecified laterality, unspecified part of lung (H)       !! - Potential duplicate medications found. Please discuss with provider.        CONTINUE these medications which have NOT CHANGED    Details   albuterol (PROAIR HFA/PROVENTIL HFA/VENTOLIN HFA) 108 (90 Base) MCG/ACT inhaler Inhale 2 puffs into the lungs every 6 hours as needed for shortness of breath, wheezing or cough.  Qty: 18 g, Refills: 0    Comments: Pharmacy may dispense brand covered by insurance (Proair, or proventil or ventolin or generic  "albuterol inhaler)  Associated Diagnoses: Wheezing      Chemo Kit For RN use only. Do not remove items from bag. Contents: 1  sodium chloride 0.9% flush, 4 medium gloves, 1 chemo gown, 1/4 chemo mat, 1 connector female, 1 chemo bag.  Qty: [id number] kit, Refills: 0    Associated Diagnoses: Malignant tumor of rectum (H)      cyclobenzaprine (FLEXERIL) 5 MG tablet Take one tab (5mg) by mouth over 6-8 hours, as needed for spasms  Qty: 45 tablet, Refills: 2    Associated Diagnoses: Cancer associated pain; Colon cancer metastasized to liver (H)      Emergency Supply Kit, Central, Patient use for emergency only. Contents: 3 sodium chloride 0.9% flushes, 1 dressing kit, 1 microclave ext set 14\", 4 nitrile gloves (med), 6 alcohol prep pads, 1 bacitracin, 1 syringe (10 cc 20 G 1\"). Call [phone] to reorder.  Qty: [id number] kit, Refills: 0    Associated Diagnoses: Malignant tumor of rectum (H)      gabapentin (NEURONTIN) 100 MG capsule Take 100-200mg (1-2 capsules) up to twice during the day and 300mg (3 capsules) at bedtime.  Qty: 210 capsule, Refills: 0    Associated Diagnoses: Neuropathy due to chemotherapeutic drug; Cancer associated pain      guaiFENesin (MUCINEX) 600 MG 12 hr tablet Take 2 tablets (1,200 mg) by mouth 2 times daily.  Qty: 20 tablet, Refills: 0    Associated Diagnoses: Acute cough      ipratropium - albuterol 0.5 mg/2.5 mg/3 mL (DUONEB) 0.5-2.5 (3) MG/3ML neb solution Take 1 vial (3 mLs) by nebulization every 6 hours as needed for shortness of breath, wheezing or cough.  Qty: 90 mL, Refills: 3    Associated Diagnoses: COPD exacerbation (H)      LORazepam (ATIVAN) 0.5 MG tablet Take 1 tablet (0.5 mg) by mouth every 6 hours as needed for anxiety  Qty: 10 tablet, Refills: 0    Associated Diagnoses: Anxiety attack      NARCAN 4 MG/0.1ML nasal spray       ondansetron (ZOFRAN ODT) 4 MG ODT tab Take 1 tablet (4 mg) by mouth every 6 hours as needed for nausea.  Qty: 30 tablet, Refills: 3    Associated Diagnoses: " Nausea      Ostomy Supplies MISC 1 each daily  Qty: 1 each, Refills: 11    Comments: NU [place] belt  6412-V  Associated Diagnoses: Encounter for attention to colostomy (H); Parastomal hernia      pegfilgrastim (NEULASTA) 6 MG/0.6ML injection Inject 0.6 mLs (6 mg) subcutaneously every 14 days. Administer 24 hours after chemotherapy disconnect  Qty: 3.6 mL, Refills: 0    Comments: Due on day 4 of disconnect every 14 day cycle  Associated Diagnoses: Malignant tumor of rectum (H)      Port Access Kit For nurse use only.  Do not remove items from bag.  Use for port access.  Do not place syringe on sterile field.  Qty: [id number] kit, Refills: 0    Associated Diagnoses: Malignant tumor of rectum (H)      prochlorperazine (COMPAZINE) 10 MG tablet Take 1 tablet (10 mg) by mouth every 6 hours as needed for nausea or vomiting  Qty: 30 tablet, Refills: 2    Associated Diagnoses: Rectal adenocarcinoma metastatic to lung (H)      sodium chloride, PF, 0.9% PF flush Inject 10 mLs into the vein as needed for line flush. Flush IV before and after med administration as directed and/or at least every 24 hours, or prior to deaccessing for no further use and/or at least every 4 weeks when not accessed.  Qty: [id number] mL, Refills: 0    Associated Diagnoses: Malignant tumor of rectum (H)      fluorouracil (ADRUCIL) 2.5 GM/50ML SOLN injection       Fluorouracil (ADRUCIL) 4,610 mg in sodium chloride 0.9 % 241 mL via HOMEPUMP C-Series Infuse 4,610 mg at 5.2 mL/hr over 46 hours into the vein once for 1 dose.  Qty: [id number] mL, Refills: 0    Associated Diagnoses: Malignant tumor of rectum (H)      ibuprofen (ADVIL/MOTRIN) 200 MG tablet Take 3 tablets (600 mg) by mouth every 8 hours as needed for moderate pain  Qty: 100 tablet, Refills: 0    Associated Diagnoses: Cancer associated pain; Colon cancer metastasized to liver (H)           STOP taking these medications       voriconazole (VFEND) 200 MG tablet Comments:   Reason for Stopping:              Allergies   Allergies   Allergen Reactions    Oxaliplatin Shortness Of Breath, Nausea and Vomiting and Other (See Comments)     Patient had HSR to Oxaliplatin on cycle 8 of FOLFOX chemotherapy. Sent to ER for continued monitoring.  Patient had HSR to Oxaliplatin on cycle 8 of FOLFOX chemotherapy. Sent to ER for continued monitoring.      Vancomycin      Other Reaction(s): Red man syndrome    Blood-Group Specific Substance Other (See Comments)     Patient has reactivity Suggestive of a Warm auto antibody. Blood products may be delayed. Draw patient 24 hours prior to transfusion. Draw one red top and two purple top tubes for all type and screen orders.  Patient has reactivity Suggestive of a Warm auto antibody. Blood products may be delayed. Draw patient 24 hours prior to transfusion. Draw one red top and two purple top tubes for all type and screen orders.

## 2025-01-31 ENCOUNTER — LAB (OUTPATIENT)
Dept: LAB | Facility: CLINIC | Age: 50
End: 2025-01-31
Attending: STUDENT IN AN ORGANIZED HEALTH CARE EDUCATION/TRAINING PROGRAM
Payer: COMMERCIAL

## 2025-02-02 LAB
BACTERIA BLD CULT: NO GROWTH
BACTERIA BLD CULT: NO GROWTH

## 2025-02-03 ENCOUNTER — OFFICE VISIT (OUTPATIENT)
Dept: FAMILY MEDICINE | Facility: CLINIC | Age: 50
End: 2025-02-03
Payer: COMMERCIAL

## 2025-02-03 VITALS
HEART RATE: 109 BPM | BODY MASS INDEX: 24.92 KG/M2 | RESPIRATION RATE: 18 BRPM | DIASTOLIC BLOOD PRESSURE: 61 MMHG | TEMPERATURE: 97.2 F | OXYGEN SATURATION: 95 % | WEIGHT: 178 LBS | HEIGHT: 71 IN | SYSTOLIC BLOOD PRESSURE: 100 MMHG

## 2025-02-03 DIAGNOSIS — Z09 HOSPITAL DISCHARGE FOLLOW-UP: Primary | ICD-10-CM

## 2025-02-03 DIAGNOSIS — B59 PNEUMONIA DUE TO PNEUMOCYSTIS JIROVECII, UNSPECIFIED LATERALITY, UNSPECIFIED PART OF LUNG (H): ICD-10-CM

## 2025-02-03 LAB
ALBUMIN SERPL BCG-MCNC: 3.8 G/DL (ref 3.5–5.2)
ALP SERPL-CCNC: 89 U/L (ref 40–150)
ALT SERPL W P-5'-P-CCNC: 27 U/L (ref 0–70)
ANION GAP SERPL CALCULATED.3IONS-SCNC: 13 MMOL/L (ref 7–15)
AST SERPL W P-5'-P-CCNC: 43 U/L (ref 0–45)
BILIRUB SERPL-MCNC: <0.2 MG/DL
BUN SERPL-MCNC: 31.5 MG/DL (ref 6–20)
CALCIUM SERPL-MCNC: 8.9 MG/DL (ref 8.8–10.4)
CHLORIDE SERPL-SCNC: 98 MMOL/L (ref 98–107)
CREAT SERPL-MCNC: 1.01 MG/DL (ref 0.67–1.17)
EGFRCR SERPLBLD CKD-EPI 2021: 90 ML/MIN/1.73M2
ERYTHROCYTE [DISTWIDTH] IN BLOOD BY AUTOMATED COUNT: 16.8 % (ref 10–15)
FERRITIN SERPL-MCNC: 1549 NG/ML (ref 31–409)
GLUCOSE SERPL-MCNC: 92 MG/DL (ref 70–99)
HCO3 SERPL-SCNC: 19 MMOL/L (ref 22–29)
HCT VFR BLD AUTO: 37.8 % (ref 40–53)
HGB BLD-MCNC: 12.1 G/DL (ref 13.3–17.7)
HOLD SPECIMEN: NORMAL
IRON BINDING CAPACITY (ROCHE): 261 UG/DL (ref 240–430)
IRON SATN MFR SERPL: 15 % (ref 15–46)
IRON SERPL-MCNC: 39 UG/DL (ref 61–157)
MCH RBC QN AUTO: 27.8 PG (ref 26.5–33)
MCHC RBC AUTO-ENTMCNC: 32 G/DL (ref 31.5–36.5)
MCV RBC AUTO: 87 FL (ref 78–100)
PLATELET # BLD AUTO: 259 10E3/UL (ref 150–450)
POTASSIUM SERPL-SCNC: 4.8 MMOL/L (ref 3.4–5.3)
PROT SERPL-MCNC: 6.9 G/DL (ref 6.4–8.3)
RBC # BLD AUTO: 4.35 10E6/UL (ref 4.4–5.9)
SODIUM SERPL-SCNC: 130 MMOL/L (ref 135–145)
WBC # BLD AUTO: 7.6 10E3/UL (ref 4–11)

## 2025-02-03 PROCEDURE — 85027 COMPLETE CBC AUTOMATED: CPT | Performed by: FAMILY MEDICINE

## 2025-02-03 PROCEDURE — G2211 COMPLEX E/M VISIT ADD ON: HCPCS | Performed by: FAMILY MEDICINE

## 2025-02-03 PROCEDURE — 83550 IRON BINDING TEST: CPT | Performed by: FAMILY MEDICINE

## 2025-02-03 PROCEDURE — 80053 COMPREHEN METABOLIC PANEL: CPT | Performed by: FAMILY MEDICINE

## 2025-02-03 PROCEDURE — 83540 ASSAY OF IRON: CPT | Performed by: FAMILY MEDICINE

## 2025-02-03 PROCEDURE — 82728 ASSAY OF FERRITIN: CPT | Performed by: FAMILY MEDICINE

## 2025-02-03 PROCEDURE — 99214 OFFICE O/P EST MOD 30 MIN: CPT | Performed by: FAMILY MEDICINE

## 2025-02-03 PROCEDURE — 36415 COLL VENOUS BLD VENIPUNCTURE: CPT | Performed by: FAMILY MEDICINE

## 2025-02-03 NOTE — PROGRESS NOTES
Assessment & Plan       ICD-10-CM    1. Hospital discharge follow-up  Z09       2. Pneumonia due to Pneumocystis jirovecii, unspecified laterality, unspecified part of lung (H)  B59 Comprehensive metabolic panel     CBC with Platelets and Reflex to Iron Studies     Comprehensive metabolic panel     CBC with Platelets and Reflex to Iron Studies     Iron & Iron Binding Capacity     Ferritin            The longitudinal plan of care for the diagnosis(es)/condition(s) as documented were addressed during this visit. Due to the added complexity in care, I will continue to support Roman in the subsequent management and with ongoing continuity of care.     MED REC REQUIRED  Post Medication Reconciliation Status:       There are no Patient Instructions on file for this visit.    Subjective   Roman is a 49 year old, presenting for the following health issues:  Hospital F/U      2/3/2025    10:00 AM   Additional Questions   Roomed by Peri   Accompanied by christian         2/3/2025    10:00 AM   Patient Reported Additional Medications   Patient reports taking the following new medications none     [facility]         7/31/2023 1/27/2025   Post Discharge Outreach   Admission Date 7/28/2023    Reason for Admission hematuria    Discharge Date 7/30/2023    Discharge Diagnosis MICHELLE, pancytopenia, thrombocytopenia, skin and soft tissue infection    How are you doing now that you are home?  Per patient report, he is doing well. has all medications. follow up 1/31 and 2/3. no questions or concerns   How are your symptoms? (Red Flag symptoms escalate to triage hotline per guidelines)  Improved   Does the patient have their discharge instructions?   Yes   Does the patient have questions regarding their discharge instructions?   No   Were you started on any new medications or were there changes to any of your previous medications?   Yes   Does the patient have all of their medications?  Yes   Do you have questions regarding any of your medications?  "  No   Do you have all of your needed medical supplies or equipment (DME)?  (i.e. oxygen tank, CPAP, cane, etc.)  Yes   Discharge Follow Up Appointment Date  1/31/2025   Discharge Follow Up Appointment Scheduled with?  Specialty Care Provider       Hospital Follow-up Visit:    Hospital/Nursing Home/IP Rehab Facility: [facility]  Date of Admission: 1/28/2025  Date of Discharge: 1/29/2025  Reason(s) for Admission: Fungal infection pneumonia  Was the patient in the ICU or did the patient experience delirium during hospitalization?  No  Do you have any other stressors you would like to discuss with your provider? No    Problems taking medications regularly:  None  Medication changes since discharge: None  Problems adhering to non-medication therapy:  None    Summary of hospitalization:  [facility] hospital discharge summary reviewed  Diagnostic Tests/Treatments reviewed.  Follow up needed: none  Other Healthcare Providers Involved in Patient s Care:         None  Update since discharge: improved.       Patient has been well since discharge  No longer requires home oxygen  Plan of care communicated with patient                 Review of Systems  Constitutional, HEENT, cardiovascular, pulmonary, gi and gu systems are negative, except as otherwise noted.      Objective    /61   Pulse 109   Temp 97.2  F (36.2  C) (Temporal)   Resp 18   Ht 1.803 m (5' 11\")   Wt 80.7 kg (178 lb)   SpO2 95%   BMI 24.83 kg/m    Body mass index is 24.83 kg/m .  Physical Exam  Constitutional:       General: He is not in acute distress.     Appearance: Normal appearance. He is well-developed. He is not ill-appearing.   HENT:      Head: Normocephalic and atraumatic.      Right Ear: External ear normal.      Left Ear: External ear normal.      Nose: Nose normal.   Eyes:      General: No scleral icterus.     Extraocular Movements: Extraocular movements intact.      Conjunctiva/sclera: " Conjunctivae normal.   Cardiovascular:      Rate and Rhythm: Normal rate.   Pulmonary:      Effort: Pulmonary effort is normal.   Musculoskeletal:      Cervical back: Normal range of motion and neck supple.   Skin:     General: Skin is warm and dry.   Neurological:      Mental Status: He is alert and oriented to person, place, and time.   Psychiatric:         Behavior: Behavior normal.         Thought Content: Thought content normal.         Judgment: Judgment normal.                    Signed Electronically by: Buddy Smith MD     [PSH Reviewed and Updated] : past surgical history reviewed and updated

## 2025-02-06 LAB
BACTERIA BLD CULT: NORMAL
BACTERIA BRONCH: NORMAL
BACTERIA BRONCH: NORMAL

## 2025-02-08 LAB — BACTERIA BLD CULT: NO GROWTH

## 2025-02-10 DIAGNOSIS — C78.00 RECTAL ADENOCARCINOMA METASTATIC TO LUNG (H): Primary | ICD-10-CM

## 2025-02-10 DIAGNOSIS — C20 RECTAL ADENOCARCINOMA METASTATIC TO LUNG (H): Primary | ICD-10-CM

## 2025-02-10 RX ORDER — EPINEPHRINE 1 MG/ML
0.3 INJECTION, SOLUTION INTRAMUSCULAR; SUBCUTANEOUS EVERY 5 MIN PRN
OUTPATIENT
Start: 2025-03-07

## 2025-02-10 RX ORDER — LORAZEPAM 2 MG/ML
0.5 INJECTION INTRAMUSCULAR EVERY 4 HOURS PRN
OUTPATIENT
Start: 2025-03-05

## 2025-02-10 RX ORDER — HEPARIN SODIUM,PORCINE 10 UNIT/ML
5-20 VIAL (ML) INTRAVENOUS DAILY PRN
OUTPATIENT
Start: 2025-03-19

## 2025-02-10 RX ORDER — MEPERIDINE HYDROCHLORIDE 25 MG/ML
25 INJECTION INTRAMUSCULAR; INTRAVENOUS; SUBCUTANEOUS
OUTPATIENT
Start: 2025-03-19

## 2025-02-10 RX ORDER — MEPERIDINE HYDROCHLORIDE 25 MG/ML
25 INJECTION INTRAMUSCULAR; INTRAVENOUS; SUBCUTANEOUS
OUTPATIENT
Start: 2025-03-22

## 2025-02-10 RX ORDER — DIPHENHYDRAMINE HYDROCHLORIDE 50 MG/ML
50 INJECTION INTRAMUSCULAR; INTRAVENOUS
Start: 2025-03-22

## 2025-02-10 RX ORDER — ALBUTEROL SULFATE 0.83 MG/ML
2.5 SOLUTION RESPIRATORY (INHALATION)
OUTPATIENT
Start: 2025-04-05

## 2025-02-10 RX ORDER — MEPERIDINE HYDROCHLORIDE 25 MG/ML
25 INJECTION INTRAMUSCULAR; INTRAVENOUS; SUBCUTANEOUS
OUTPATIENT
Start: 2025-03-21

## 2025-02-10 RX ORDER — ONDANSETRON 2 MG/ML
8 INJECTION INTRAMUSCULAR; INTRAVENOUS ONCE
Start: 2025-02-10 | End: 2025-02-10

## 2025-02-10 RX ORDER — FLUOROURACIL 50 MG/ML
400 INJECTION, SOLUTION INTRAVENOUS ONCE
OUTPATIENT
Start: 2025-03-05

## 2025-02-10 RX ORDER — METHYLPREDNISOLONE SODIUM SUCCINATE 40 MG/ML
40 INJECTION INTRAMUSCULAR; INTRAVENOUS
Start: 2025-04-04

## 2025-02-10 RX ORDER — EPINEPHRINE 1 MG/ML
0.3 INJECTION, SOLUTION INTRAMUSCULAR; SUBCUTANEOUS EVERY 5 MIN PRN
OUTPATIENT
Start: 2025-04-02

## 2025-02-10 RX ORDER — METHYLPREDNISOLONE SODIUM SUCCINATE 40 MG/ML
40 INJECTION INTRAMUSCULAR; INTRAVENOUS
Start: 2025-03-05

## 2025-02-10 RX ORDER — ALBUTEROL SULFATE 0.83 MG/ML
2.5 SOLUTION RESPIRATORY (INHALATION)
OUTPATIENT
Start: 2025-03-19

## 2025-02-10 RX ORDER — METHYLPREDNISOLONE SODIUM SUCCINATE 40 MG/ML
40 INJECTION INTRAMUSCULAR; INTRAVENOUS
Start: 2025-03-07

## 2025-02-10 RX ORDER — ALBUTEROL SULFATE 0.83 MG/ML
2.5 SOLUTION RESPIRATORY (INHALATION)
OUTPATIENT
Start: 2025-03-07

## 2025-02-10 RX ORDER — ALBUTEROL SULFATE 0.83 MG/ML
2.5 SOLUTION RESPIRATORY (INHALATION)
OUTPATIENT
Start: 2025-03-05

## 2025-02-10 RX ORDER — DIPHENHYDRAMINE HYDROCHLORIDE 50 MG/ML
25 INJECTION INTRAMUSCULAR; INTRAVENOUS
Start: 2025-03-19

## 2025-02-10 RX ORDER — DIPHENHYDRAMINE HYDROCHLORIDE 50 MG/ML
25 INJECTION INTRAMUSCULAR; INTRAVENOUS
Start: 2025-03-21

## 2025-02-10 RX ORDER — ATROPINE SULFATE 0.4 MG/ML
0.4 AMPUL (ML) INJECTION
OUTPATIENT
Start: 2025-04-02

## 2025-02-10 RX ORDER — METHYLPREDNISOLONE SODIUM SUCCINATE 40 MG/ML
40 INJECTION INTRAMUSCULAR; INTRAVENOUS
Start: 2025-04-02

## 2025-02-10 RX ORDER — ALBUTEROL SULFATE 0.83 MG/ML
2.5 SOLUTION RESPIRATORY (INHALATION)
OUTPATIENT
Start: 2025-03-08

## 2025-02-10 RX ORDER — EPINEPHRINE 1 MG/ML
0.3 INJECTION, SOLUTION INTRAMUSCULAR; SUBCUTANEOUS EVERY 5 MIN PRN
OUTPATIENT
Start: 2025-04-04

## 2025-02-10 RX ORDER — DIPHENHYDRAMINE HYDROCHLORIDE 50 MG/ML
25 INJECTION INTRAMUSCULAR; INTRAVENOUS
Start: 2025-03-08

## 2025-02-10 RX ORDER — LORAZEPAM 2 MG/ML
0.5 INJECTION INTRAMUSCULAR EVERY 4 HOURS PRN
OUTPATIENT
Start: 2025-04-02

## 2025-02-10 RX ORDER — MEPERIDINE HYDROCHLORIDE 25 MG/ML
25 INJECTION INTRAMUSCULAR; INTRAVENOUS; SUBCUTANEOUS
OUTPATIENT
Start: 2025-04-02

## 2025-02-10 RX ORDER — EPINEPHRINE 1 MG/ML
0.3 INJECTION, SOLUTION INTRAMUSCULAR; SUBCUTANEOUS EVERY 5 MIN PRN
OUTPATIENT
Start: 2025-03-19

## 2025-02-10 RX ORDER — HEPARIN SODIUM (PORCINE) LOCK FLUSH IV SOLN 100 UNIT/ML 100 UNIT/ML
5 SOLUTION INTRAVENOUS
OUTPATIENT
Start: 2025-03-05

## 2025-02-10 RX ORDER — METHYLPREDNISOLONE SODIUM SUCCINATE 40 MG/ML
40 INJECTION INTRAMUSCULAR; INTRAVENOUS
Start: 2025-03-19

## 2025-02-10 RX ORDER — EPINEPHRINE 1 MG/ML
0.3 INJECTION, SOLUTION INTRAMUSCULAR; SUBCUTANEOUS EVERY 5 MIN PRN
OUTPATIENT
Start: 2025-03-08

## 2025-02-10 RX ORDER — ALBUTEROL SULFATE 90 UG/1
1-2 INHALANT RESPIRATORY (INHALATION)
Start: 2025-04-04

## 2025-02-10 RX ORDER — DIPHENHYDRAMINE HYDROCHLORIDE 50 MG/ML
50 INJECTION INTRAMUSCULAR; INTRAVENOUS
Start: 2025-03-19

## 2025-02-10 RX ORDER — MEPERIDINE HYDROCHLORIDE 25 MG/ML
25 INJECTION INTRAMUSCULAR; INTRAVENOUS; SUBCUTANEOUS
OUTPATIENT
Start: 2025-04-04

## 2025-02-10 RX ORDER — DIPHENHYDRAMINE HYDROCHLORIDE 50 MG/ML
50 INJECTION INTRAMUSCULAR; INTRAVENOUS
Start: 2025-04-04

## 2025-02-10 RX ORDER — HEPARIN SODIUM,PORCINE 10 UNIT/ML
5-20 VIAL (ML) INTRAVENOUS DAILY PRN
OUTPATIENT
Start: 2025-03-07

## 2025-02-10 RX ORDER — DIPHENHYDRAMINE HYDROCHLORIDE 50 MG/ML
50 INJECTION INTRAMUSCULAR; INTRAVENOUS
Start: 2025-04-05

## 2025-02-10 RX ORDER — MEPERIDINE HYDROCHLORIDE 25 MG/ML
25 INJECTION INTRAMUSCULAR; INTRAVENOUS; SUBCUTANEOUS
OUTPATIENT
Start: 2025-03-05

## 2025-02-10 RX ORDER — ALBUTEROL SULFATE 0.83 MG/ML
2.5 SOLUTION RESPIRATORY (INHALATION)
OUTPATIENT
Start: 2025-03-22

## 2025-02-10 RX ORDER — MEPERIDINE HYDROCHLORIDE 25 MG/ML
25 INJECTION INTRAMUSCULAR; INTRAVENOUS; SUBCUTANEOUS
OUTPATIENT
Start: 2025-03-07

## 2025-02-10 RX ORDER — ATROPINE SULFATE 0.4 MG/ML
0.4 AMPUL (ML) INJECTION
OUTPATIENT
Start: 2025-03-05

## 2025-02-10 RX ORDER — DIPHENHYDRAMINE HYDROCHLORIDE 50 MG/ML
50 INJECTION INTRAMUSCULAR; INTRAVENOUS
Start: 2025-03-08

## 2025-02-10 RX ORDER — DIPHENHYDRAMINE HYDROCHLORIDE 50 MG/ML
25 INJECTION INTRAMUSCULAR; INTRAVENOUS
Start: 2025-04-04

## 2025-02-10 RX ORDER — DIPHENHYDRAMINE HYDROCHLORIDE 50 MG/ML
25 INJECTION INTRAMUSCULAR; INTRAVENOUS
Start: 2025-03-07

## 2025-02-10 RX ORDER — MEPERIDINE HYDROCHLORIDE 25 MG/ML
25 INJECTION INTRAMUSCULAR; INTRAVENOUS; SUBCUTANEOUS
OUTPATIENT
Start: 2025-04-05

## 2025-02-10 RX ORDER — ALBUTEROL SULFATE 0.83 MG/ML
2.5 SOLUTION RESPIRATORY (INHALATION)
OUTPATIENT
Start: 2025-04-04

## 2025-02-10 RX ORDER — ONDANSETRON 2 MG/ML
8 INJECTION INTRAMUSCULAR; INTRAVENOUS ONCE
Start: 2025-02-19 | End: 2025-02-19

## 2025-02-10 RX ORDER — ATROPINE SULFATE 0.4 MG/ML
0.4 AMPUL (ML) INJECTION
OUTPATIENT
Start: 2025-03-19

## 2025-02-10 RX ORDER — DIPHENHYDRAMINE HYDROCHLORIDE 50 MG/ML
25 INJECTION INTRAMUSCULAR; INTRAVENOUS
Start: 2025-04-05

## 2025-02-10 RX ORDER — ALBUTEROL SULFATE 90 UG/1
1-2 INHALANT RESPIRATORY (INHALATION)
Start: 2025-03-05

## 2025-02-10 RX ORDER — ALBUTEROL SULFATE 90 UG/1
1-2 INHALANT RESPIRATORY (INHALATION)
Start: 2025-03-07

## 2025-02-10 RX ORDER — HEPARIN SODIUM,PORCINE 10 UNIT/ML
5-20 VIAL (ML) INTRAVENOUS DAILY PRN
OUTPATIENT
Start: 2025-04-02

## 2025-02-10 RX ORDER — EPINEPHRINE 1 MG/ML
0.3 INJECTION, SOLUTION INTRAMUSCULAR; SUBCUTANEOUS EVERY 5 MIN PRN
OUTPATIENT
Start: 2025-03-22

## 2025-02-10 RX ORDER — HEPARIN SODIUM (PORCINE) LOCK FLUSH IV SOLN 100 UNIT/ML 100 UNIT/ML
5 SOLUTION INTRAVENOUS
OUTPATIENT
Start: 2025-03-19

## 2025-02-10 RX ORDER — HEPARIN SODIUM (PORCINE) LOCK FLUSH IV SOLN 100 UNIT/ML 100 UNIT/ML
5 SOLUTION INTRAVENOUS
OUTPATIENT
Start: 2025-03-21

## 2025-02-10 RX ORDER — EPINEPHRINE 1 MG/ML
0.3 INJECTION, SOLUTION INTRAMUSCULAR; SUBCUTANEOUS EVERY 5 MIN PRN
OUTPATIENT
Start: 2025-03-05

## 2025-02-10 RX ORDER — ALBUTEROL SULFATE 90 UG/1
1-2 INHALANT RESPIRATORY (INHALATION)
Start: 2025-03-21

## 2025-02-10 RX ORDER — DIPHENHYDRAMINE HYDROCHLORIDE 50 MG/ML
25 INJECTION INTRAMUSCULAR; INTRAVENOUS
Start: 2025-03-05

## 2025-02-10 RX ORDER — DIPHENHYDRAMINE HYDROCHLORIDE 50 MG/ML
25 INJECTION INTRAMUSCULAR; INTRAVENOUS
Start: 2025-04-02

## 2025-02-10 RX ORDER — FLUOROURACIL 50 MG/ML
400 INJECTION, SOLUTION INTRAVENOUS ONCE
OUTPATIENT
Start: 2025-04-02

## 2025-02-10 RX ORDER — METHYLPREDNISOLONE SODIUM SUCCINATE 40 MG/ML
40 INJECTION INTRAMUSCULAR; INTRAVENOUS
Start: 2025-03-08

## 2025-02-10 RX ORDER — DIPHENHYDRAMINE HYDROCHLORIDE 50 MG/ML
50 INJECTION INTRAMUSCULAR; INTRAVENOUS
Start: 2025-03-21

## 2025-02-10 RX ORDER — ONDANSETRON 2 MG/ML
8 INJECTION INTRAMUSCULAR; INTRAVENOUS ONCE
Start: 2025-04-02 | End: 2025-04-02

## 2025-02-10 RX ORDER — ALBUTEROL SULFATE 90 UG/1
1-2 INHALANT RESPIRATORY (INHALATION)
Start: 2025-03-08

## 2025-02-10 RX ORDER — HEPARIN SODIUM,PORCINE 10 UNIT/ML
5-20 VIAL (ML) INTRAVENOUS DAILY PRN
OUTPATIENT
Start: 2025-04-04

## 2025-02-10 RX ORDER — HEPARIN SODIUM,PORCINE 10 UNIT/ML
5-20 VIAL (ML) INTRAVENOUS DAILY PRN
OUTPATIENT
Start: 2025-03-05

## 2025-02-10 RX ORDER — HEPARIN SODIUM (PORCINE) LOCK FLUSH IV SOLN 100 UNIT/ML 100 UNIT/ML
5 SOLUTION INTRAVENOUS
OUTPATIENT
Start: 2025-04-02

## 2025-02-10 RX ORDER — DIPHENHYDRAMINE HYDROCHLORIDE 50 MG/ML
50 INJECTION INTRAMUSCULAR; INTRAVENOUS
Start: 2025-04-02

## 2025-02-10 RX ORDER — ONDANSETRON 2 MG/ML
8 INJECTION INTRAMUSCULAR; INTRAVENOUS ONCE
Start: 2025-03-05 | End: 2025-03-05

## 2025-02-10 RX ORDER — EPINEPHRINE 1 MG/ML
0.3 INJECTION, SOLUTION INTRAMUSCULAR; SUBCUTANEOUS EVERY 5 MIN PRN
OUTPATIENT
Start: 2025-03-21

## 2025-02-10 RX ORDER — EPINEPHRINE 1 MG/ML
0.3 INJECTION, SOLUTION INTRAMUSCULAR; SUBCUTANEOUS EVERY 5 MIN PRN
OUTPATIENT
Start: 2025-04-05

## 2025-02-10 RX ORDER — HEPARIN SODIUM (PORCINE) LOCK FLUSH IV SOLN 100 UNIT/ML 100 UNIT/ML
5 SOLUTION INTRAVENOUS
OUTPATIENT
Start: 2025-04-04

## 2025-02-10 RX ORDER — ALBUTEROL SULFATE 90 UG/1
1-2 INHALANT RESPIRATORY (INHALATION)
Start: 2025-04-05

## 2025-02-10 RX ORDER — ALBUTEROL SULFATE 0.83 MG/ML
2.5 SOLUTION RESPIRATORY (INHALATION)
OUTPATIENT
Start: 2025-04-02

## 2025-02-10 RX ORDER — HEPARIN SODIUM,PORCINE 10 UNIT/ML
5-20 VIAL (ML) INTRAVENOUS DAILY PRN
OUTPATIENT
Start: 2025-03-21

## 2025-02-10 RX ORDER — DIPHENHYDRAMINE HYDROCHLORIDE 50 MG/ML
50 INJECTION INTRAMUSCULAR; INTRAVENOUS
Start: 2025-03-05

## 2025-02-10 RX ORDER — METHYLPREDNISOLONE SODIUM SUCCINATE 40 MG/ML
40 INJECTION INTRAMUSCULAR; INTRAVENOUS
Start: 2025-04-05

## 2025-02-10 RX ORDER — LORAZEPAM 2 MG/ML
0.5 INJECTION INTRAMUSCULAR EVERY 4 HOURS PRN
OUTPATIENT
Start: 2025-03-19

## 2025-02-10 RX ORDER — DIPHENHYDRAMINE HYDROCHLORIDE 50 MG/ML
25 INJECTION INTRAMUSCULAR; INTRAVENOUS
Start: 2025-03-22

## 2025-02-10 RX ORDER — MEPERIDINE HYDROCHLORIDE 25 MG/ML
25 INJECTION INTRAMUSCULAR; INTRAVENOUS; SUBCUTANEOUS
OUTPATIENT
Start: 2025-03-08

## 2025-02-10 RX ORDER — ONDANSETRON 2 MG/ML
8 INJECTION INTRAMUSCULAR; INTRAVENOUS ONCE
Start: 2025-03-19 | End: 2025-03-19

## 2025-02-10 RX ORDER — ALBUTEROL SULFATE 90 UG/1
1-2 INHALANT RESPIRATORY (INHALATION)
Start: 2025-03-19

## 2025-02-10 RX ORDER — ALBUTEROL SULFATE 0.83 MG/ML
2.5 SOLUTION RESPIRATORY (INHALATION)
OUTPATIENT
Start: 2025-03-21

## 2025-02-10 RX ORDER — METHYLPREDNISOLONE SODIUM SUCCINATE 40 MG/ML
40 INJECTION INTRAMUSCULAR; INTRAVENOUS
Start: 2025-03-22

## 2025-02-10 RX ORDER — HEPARIN SODIUM (PORCINE) LOCK FLUSH IV SOLN 100 UNIT/ML 100 UNIT/ML
5 SOLUTION INTRAVENOUS
OUTPATIENT
Start: 2025-03-07

## 2025-02-10 RX ORDER — ALBUTEROL SULFATE 90 UG/1
1-2 INHALANT RESPIRATORY (INHALATION)
Start: 2025-03-22

## 2025-02-10 RX ORDER — METHYLPREDNISOLONE SODIUM SUCCINATE 40 MG/ML
40 INJECTION INTRAMUSCULAR; INTRAVENOUS
Start: 2025-03-21

## 2025-02-10 RX ORDER — FLUOROURACIL 50 MG/ML
400 INJECTION, SOLUTION INTRAVENOUS ONCE
OUTPATIENT
Start: 2025-03-19

## 2025-02-10 RX ORDER — DIPHENHYDRAMINE HYDROCHLORIDE 50 MG/ML
50 INJECTION INTRAMUSCULAR; INTRAVENOUS
Start: 2025-03-07

## 2025-02-10 RX ORDER — ALBUTEROL SULFATE 90 UG/1
1-2 INHALANT RESPIRATORY (INHALATION)
Start: 2025-04-02

## 2025-02-13 LAB
BACTERIA BRONCH: NORMAL
BACTERIA BRONCH: NORMAL

## 2025-02-17 DIAGNOSIS — R06.2 WHEEZING: ICD-10-CM

## 2025-02-18 ENCOUNTER — TELEPHONE (OUTPATIENT)
Dept: WOUND CARE | Facility: CLINIC | Age: 50
End: 2025-02-18

## 2025-02-18 ENCOUNTER — VIRTUAL VISIT (OUTPATIENT)
Dept: PALLIATIVE CARE | Facility: CLINIC | Age: 50
End: 2025-02-18
Attending: STUDENT IN AN ORGANIZED HEALTH CARE EDUCATION/TRAINING PROGRAM
Payer: COMMERCIAL

## 2025-02-18 VITALS — BODY MASS INDEX: 24.5 KG/M2 | WEIGHT: 175 LBS | HEIGHT: 71 IN

## 2025-02-18 DIAGNOSIS — C78.7 COLON CANCER METASTASIZED TO LIVER (H): Primary | ICD-10-CM

## 2025-02-18 DIAGNOSIS — C18.9 COLON CANCER METASTASIZED TO LIVER (H): Primary | ICD-10-CM

## 2025-02-18 DIAGNOSIS — R10.30 LOWER ABDOMINAL PAIN: ICD-10-CM

## 2025-02-18 DIAGNOSIS — Z79.891 ENCOUNTER FOR LONG-TERM USE OF OPIATE ANALGESIC: ICD-10-CM

## 2025-02-18 DIAGNOSIS — G89.3 CANCER RELATED PAIN: ICD-10-CM

## 2025-02-18 DIAGNOSIS — Z51.5 ENCOUNTER FOR PALLIATIVE CARE: ICD-10-CM

## 2025-02-18 PROCEDURE — 98006 SYNCH AUDIO-VIDEO EST MOD 30: CPT | Performed by: STUDENT IN AN ORGANIZED HEALTH CARE EDUCATION/TRAINING PROGRAM

## 2025-02-18 PROCEDURE — G2211 COMPLEX E/M VISIT ADD ON: HCPCS | Performed by: STUDENT IN AN ORGANIZED HEALTH CARE EDUCATION/TRAINING PROGRAM

## 2025-02-18 RX ORDER — OXYCODONE HYDROCHLORIDE 10 MG/1
10-20 TABLET ORAL EVERY 4 HOURS PRN
Qty: 144 TABLET | Refills: 0 | Status: SHIPPED | OUTPATIENT
Start: 2025-02-18

## 2025-02-18 RX ORDER — ALBUTEROL SULFATE 90 UG/1
AEROSOL, METERED RESPIRATORY (INHALATION)
Qty: 18 G | Refills: 2 | Status: SHIPPED | OUTPATIENT
Start: 2025-02-18

## 2025-02-18 ASSESSMENT — PAIN SCALES - GENERAL: PAINLEVEL_OUTOF10: MODERATE PAIN (4)

## 2025-02-18 NOTE — NURSING NOTE
Patient confirms medications and allergies are accurate via patients echeck in completion, and or denies any changes since last reviewed/verified.     Sakshi Moura, Virtual Facilitator  Current patient location:  [place]     Is the patient currently in the state of [place]? YES    Visit mode: VIDEO    If the visit is dropped, the patient can be reconnected by:VIDEO VISIT: Text to cell phone:   Telephone Information:   Mobile [phone]       Will anyone else be joining the visit? NO  (If patient encounters technical issues they should call [phone]    Are changes needed to the allergy or medication list? No    Are refills needed on medications prescribed by this physician? YES Oxycodone 10 mg    Rooming Documentation:  Questionnaire(s) completed    Reason for visit: RECHECK    Sakshi Moura VVF

## 2025-02-18 NOTE — PATIENT INSTRUCTIONS
Recommendations:  -Continue gabapentin 100-200 mg twice during the day and 300 mg at bedtime.    -Okay to continue oxycodone 10 to 20 mg every four hours as needed. Refilled today. Currently needing about 6 tablets/day. Hoping chemo will help with pain, as this has been the case in the past.  -Continue THC as needed.     Follow up: 2 months      *Please do not make any changes to medications that we prescribe, including pain medicines, without talking to our team*    Reasons to Call    If you are having worsening/uncontrolled symptoms we want you to call!    You or your other physicians make any changes to medications we have prescribed.  -Please call for refills 4-5 days before you will run out of medication.    Important Phone Numbers, including: Refills, scheduling, and general questions     Palliative Care RN: Rebeca Urbina : [phone]  *For scheduling needs/follow up visits : [phone]  *After hours or on weekends- Will connect you with on call MD : [phone].

## 2025-02-18 NOTE — LETTER
"2/18/2025       RE: Roman Escalante  [address]  [facility] [address]     Dear Colleague,    Thank you for referring your patient, Roman Escalante, to the [facility] at [facility]. Please see a copy of my visit note below.    Palliative Care Progress Note    Patient Name: Roman Escalante  Primary Provider: Buddy Hart    Chief Complaint/Patient ID:   He has a PMHx of metastatic rectal cancer (mets to lung), currently enrolled in [facility] CAR-T trial (completed cell harvest 1/23/2024, tentatively planning administration of CAR-T in June).  He has been on multiple lines of systemic therapy, currently on Fruquintinib.     Last Palliative care appointment: 12/17/2024 with me.      Reviewed: Yes    ORT Score = 1 for history of alcohol abuse and his mother.     Social History: Has a couple of kids and a couple grandkids who lives in [place].  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 Has a girlfriend-she is a hospice RN.     Advanced Care Planning: Has not completed. Girlfriend would be his HCA.     Interim History:  Roman is seen today for follow up with Palliative Care via billable video visit.      Reviewed hospital discharge summary from assessment radiocolloid for 2 days acute on chronic hypoxic respiratory failure 2/2 PJP (note additional documentation that he had RSV in December that required oxygen, and then had first admission was 1/22-1/25).  Infectious disease involved for treating the PJP.    Glad to not be on oxygen. Still with some plugged up sinuses and a little productive cough- otherwise feeling OK.     Hasn't had chemo in a couple months. Pain in rectal area is present. Taking up to 6 tablets of 10mg oxycodone (3 doses of 2 tablets at a time). Rectal bleeding and mucus has been increasing- not the worst it's ever been.    Next CT scan scheduled for tomorrow. Will be having chemo " later this week and scheduled for every 2 weeks. Hoping this will also helo with pain. His name did come up for the Trial, but since he was sick, he was bumped down the list.    Physical Exam:   Constitutional: Alert, pleasant, no apparent distress. Sitting up in chair.  Eyes: Sclera non-icteric, no eye discharge.  ENT: No nasal discharge. Ears grossly normal.  Respiratory: Unlabored respirations. Speaking in full sentences.  Musculoskeletal: Extremities appear normal- no gross deformities noted. No edema noted on upper body.   Skin: No suspicious lesions or rashes on visible skin.  Neurologic: Clear speech, no aphasia. No facial droop.  Psychiatric: Mentation appears normal, appropriate attention. Affect normal/bright. Does not appear anxious or depressed.    Key Data Reviewed:  LABS:   Lab Results   Component Value Date    WBC 7.6 02/03/2025    HGB 12.1 (L) 02/03/2025    HCT 37.8 (L) 02/03/2025     02/03/2025     (L) 02/03/2025    POTASSIUM 4.8 02/03/2025    CHLORIDE 98 02/03/2025    CO2 19 (L) 02/03/2025    BUN 31.5 (H) 02/03/2025    CR 1.01 02/03/2025    GLC 92 02/03/2025    SED 47 (H) 01/25/2025    NTBNPI 1,720 (H) 01/22/2025    AST 43 02/03/2025    ALT 27 02/03/2025    ALKPHOS 89 02/03/2025    BILITOTAL <0.2 02/03/2025    INR 1.13 01/24/2025     IMAGING: CT chest PE 1/22/2025- IMPRESSION:  1. Exam is negative for acute pulmonary embolism. No evidence of right heart strain or increased right heart pressures.   2. Redemonstration of multifocal pulmonary masses in the context of known metastatic lung cancer. Grossly similar multifocal distribution as detailed above.  3. New diffuse groundglass attenuation throughout the lungs likely representing some degree of inflammation, viral respiratory pattern  may appear this way given recent diagnosis of RSV, may also represent mild degree of pulmonary edema.  4. Small right pleural effusion. No pneumothorax.  5. Multifocal hepatic and osseous lesions favored  to represent metastatic disease. Increased prominence of lucent T8 lesion.    Impression & Recommendations & Counseling:  Roman Escalante has a history of metastatic rectal cancer.     Had a bit of a scary go with the respiratory issues over the last 2 months. Thankfully doing better now on treatment for PJP. Looking forward to resuming chemo this week for symptomatic benefit.      Recommendations:  -Continue gabapentin 100-200 mg twice during the day and 300 mg at bedtime.    -Okay to continue oxycodone 10 to 20 mg every four hours as needed. Refilled today. Currently needing about 6 tablets/day. Hoping chemo will help with pain, as this has been the case in the past.  -Continue THC as needed.       Follow up: 2 months       Video-Visit Details  Video Start Time: 9:26AM  Video End Time: 9:40AM    Originating Location (pt. Location): Home     Distant Location (provider location):  Offsite- Personal Home      Platform used for Video Visit: Huey     Total time spent on day of encounter is 21 mins, including reviewing record, review of above studies, above visit with patient, symptomatic discussion as above, including medication adjustments/prescription management, and documentation.         The longitudinal plan of care for the diagnosis(es)/condition(s) as documented were addressed during this visit.?Due to the added complexity in care, I will continue to support Roman Escalante in the subsequent management and with the ongoing continuity of care.        Isidra Prater DO  Palliative Medicine   [id number]    Some chart documentation performed using Dragon Voice recognition Software. Although reviewed after completion, some words and grammatical errors may remain.      Again, thank you for allowing me to participate in the care of your patient.      Sincerely,    Isidra Prater DO

## 2025-02-18 NOTE — TELEPHONE ENCOUNTER
He states he has a new hernia below the stoma and another opening through which stool is draining. I will assess him in clinic and appt was made    . ----- Message from Emmy TAVAREZ sent at 2/18/2025 12:25 PM CST -----  Regarding: RE: fu appointment  Oh yes, ok to forward those to me directly.   I will get in touch with him  ----- Message -----  From: Virgen Nieto RN  Sent: 2/18/2025  11:47 AM CST  To: Emmy Alfonso RN  Subject: fu appointment                                   Ruiz Booth,    Please let me know if there is a pool I should be sending requests to instead of directly to you.     Roman wrote a mychart stating this:   I have a new hemeroge, herniation below my stoma. I have new care concerns.    I'm unsure if this is appropriate for seeing you for assessment or if he should see colorectal? He's pretty responsive on mychart if you wanted to find out a bit more what the concerns are.     Thank You,    Nilda Nieto  Nurse Care Coordinator   [phone]

## 2025-02-19 ENCOUNTER — OFFICE VISIT (OUTPATIENT)
Dept: INFECTIOUS DISEASES | Facility: CLINIC | Age: 50
End: 2025-02-19
Attending: INTERNAL MEDICINE
Payer: COMMERCIAL

## 2025-02-19 ENCOUNTER — VIRTUAL VISIT (OUTPATIENT)
Dept: ONCOLOGY | Facility: CLINIC | Age: 50
End: 2025-02-19
Attending: STUDENT IN AN ORGANIZED HEALTH CARE EDUCATION/TRAINING PROGRAM
Payer: COMMERCIAL

## 2025-02-19 ENCOUNTER — ANCILLARY PROCEDURE (OUTPATIENT)
Dept: CT IMAGING | Facility: CLINIC | Age: 50
End: 2025-02-19
Attending: STUDENT IN AN ORGANIZED HEALTH CARE EDUCATION/TRAINING PROGRAM
Payer: COMMERCIAL

## 2025-02-19 VITALS
BODY MASS INDEX: 25.1 KG/M2 | SYSTOLIC BLOOD PRESSURE: 123 MMHG | OXYGEN SATURATION: 92 % | DIASTOLIC BLOOD PRESSURE: 84 MMHG | HEART RATE: 97 BPM | WEIGHT: 180 LBS

## 2025-02-19 VITALS — HEIGHT: 71 IN | BODY MASS INDEX: 25.2 KG/M2 | WEIGHT: 180 LBS

## 2025-02-19 DIAGNOSIS — C18.9 PRIMARY ADENOCARCINOMA OF COLON (H): ICD-10-CM

## 2025-02-19 DIAGNOSIS — J16.8 FUNGAL PNEUMONIA: ICD-10-CM

## 2025-02-19 DIAGNOSIS — B49 FUNGAL PNEUMONIA: ICD-10-CM

## 2025-02-19 DIAGNOSIS — C18.9 COLON CANCER METASTASIZED TO BONE (H): Primary | ICD-10-CM

## 2025-02-19 DIAGNOSIS — B59 PNEUMOCYSTOSIS (H): Primary | ICD-10-CM

## 2025-02-19 DIAGNOSIS — C79.51 COLON CANCER METASTASIZED TO BONE (H): Primary | ICD-10-CM

## 2025-02-19 PROCEDURE — 71260 CT THORAX DX C+: CPT | Performed by: RADIOLOGY

## 2025-02-19 PROCEDURE — 74177 CT ABD & PELVIS W/CONTRAST: CPT | Performed by: RADIOLOGY

## 2025-02-19 PROCEDURE — G0463 HOSPITAL OUTPT CLINIC VISIT: HCPCS | Performed by: INTERNAL MEDICINE

## 2025-02-19 RX ORDER — IOPAMIDOL 755 MG/ML
88 INJECTION, SOLUTION INTRAVASCULAR ONCE
Status: COMPLETED | OUTPATIENT
Start: 2025-02-19 | End: 2025-02-19

## 2025-02-19 RX ORDER — HEPARIN SODIUM (PORCINE) LOCK FLUSH IV SOLN 100 UNIT/ML 100 UNIT/ML
5 SOLUTION INTRAVENOUS
Status: COMPLETED | OUTPATIENT
Start: 2025-02-19 | End: 2025-02-19

## 2025-02-19 RX ADMIN — IOPAMIDOL 88 ML: 755 INJECTION, SOLUTION INTRAVASCULAR at 07:38

## 2025-02-19 RX ADMIN — HEPARIN SODIUM (PORCINE) LOCK FLUSH IV SOLN 100 UNIT/ML 5 ML: 100 SOLUTION at 07:53

## 2025-02-19 ASSESSMENT — PAIN SCALES - GENERAL
PAINLEVEL_OUTOF10: NO PAIN (0)
PAINLEVEL_OUTOF10: MILD PAIN (2)

## 2025-02-19 NOTE — LETTER
2/19/2025       RE: Roman Escalante  [address]  [facility] [address]     Dear Colleague,    Thank you for referring your patient, Roman Escalante, to the [facility] INFECTIOUS DISEASE CLINIC [place] [facility]. Please see a copy of my visit note below.      Infectious Disease Clinic: PROGRESS NOTE     Patient:  Roman Escalante, Date of birth 1975, Medical record number [id number]  Date of Visit:  02/19/2025          Assessment and Recommendations:   Assessment:  Pneumocystis pneumonia, has responded to treatment with high dose Bactrim, nine tabs daily plus steroids. He now should have completed his 21 day of therapy today but he missed 2-3 days of the pills and has extra pills left.       Recommendations:  Continue high dose Bactrim 9 tabs/day x 3 more days, then start PJP prophylaxis with one Bactrim D tabs q Mon, Wed, Friday x a minimum of 6 months. Then reassess the immune system and the CD4 count prior to stopping the Bactrim prophylaxis.   Continue prednisone 20 mg po Q day x 3 days then stop it.   RTC 6 months or sooner prn.      I have reviewed today's Medications, Vital Signs, Labs, and EMR    Roman was seen today for recheck.    Diagnoses and all orders for this visit:    Pneumocystosis (H)  -     T cell subset profile; Future  -     CBC with platelets differential; Future    Fungal pneumonia  -     Adult Infectious Disease  Referral    Other orders  -     Adult Infectious Disease Clinic Follow-Up Order; Future        Taty Frazier MD  ID Staff Physician  [facility]  [phone]        Interval History:   2/19/2025 the patient presents for follow-up of PJP treatment . He was admitted to the hospital with Pneumocystis pneumonia and was seen by ID consult service and started on a 21 day course of high dose Bactrim plus prednisone burst and taper over 21 days. He is feeling better and has now been off oxygen x  "2 and 1/2 weeks.  He is having some nasal congestion and occasional productive cough. O2 sats at home are 89-95% off oxygen. With deep breathing they go up to %.     Recently traveled to [place] and back on vacation with no problems with his breathing. He thinks he missed some doses of his antibiotics and the steroids though and he has some extra pills left. He thinks he missed three days of the high dose bactrim because his prescription ran out quicker than he though it would, and it took a few days to get the refills.               Review of Systems:   CV: NEGATIVE for chest pain, palpitations or peripheral edema  C: NEGATIVE for fever, chills, change in weight  E/M: NEGATIVE for ear, mouth and throat problems  R: NEGATIVE for significant cough or SOB  Patient denies nausea, vomiting, diarrhea, and abdominal pain.          Current Medications       Current Outpatient Medications:      acetaminophen (TYLENOL) 500 MG tablet, Take 500-1,000 mg by mouth as needed for mild pain., Disp: , Rfl:      [START ON 2/20/2025] Chemo Kit, For RN use only. Do not remove items from bag. Contents: 1  sodium chloride 0.9% flush, 4 medium gloves, 1 chemo gown, 1/4 chemo mat, 1 connector female, 1 chemo bag., Disp: [id number] kit, Rfl: 0     cyclobenzaprine (FLEXERIL) 5 MG tablet, Take one tab (5mg) by mouth over 6-8 hours, as needed for spasms, Disp: 45 tablet, Rfl: 2     [START ON 2/20/2025] Emergency Supply Kit, [facility],, Patient use for emergency only. Contents: 3 sodium chloride 0.9% flushes, 1 dressing kit, 1 microclave ext set 14\", 4 nitrile gloves (med), 6 alcohol prep pads, 1 bacitracin, 1 syringe (10 cc 20 G 1\"). Call [phone] to reorder., Disp: [id number] kit, Rfl: 0     fluorouracil (ADRUCIL) 2.5 GM/50ML SOLN injection, , Disp: , Rfl:      [START ON 2/20/2025] Fluorouracil (ADRUCIL) 4,610 mg in sodium chloride 0.9 % 241 mL via HOMEPUMP C-Series, Infuse 4,610 mg at 5.2 mL/hr over 46 hours into the vein once for 1 dose., " Disp: 380777 mL, Rfl: 0     gabapentin (NEURONTIN) 100 MG capsule, Take 100-200mg (1-2 capsules) up to twice during the day and 300mg (3 capsules) at bedtime., Disp: 210 capsule, Rfl: 0     guaiFENesin (MUCINEX) 600 MG 12 hr tablet, Take 2 tablets (1,200 mg) by mouth 2 times daily., Disp: 20 tablet, Rfl: 0     ibuprofen (ADVIL/MOTRIN) 200 MG tablet, Take 3 tablets (600 mg) by mouth every 8 hours as needed for moderate pain, Disp: 100 tablet, Rfl: 0     ipratropium - albuterol 0.5 mg/2.5 mg/3 mL (DUONEB) 0.5-2.5 (3) MG/3ML neb solution, Take 1 vial (3 mLs) by nebulization every 6 hours as needed for shortness of breath, wheezing or cough., Disp: 90 mL, Rfl: 3     LORazepam (ATIVAN) 0.5 MG tablet, Take 1 tablet (0.5 mg) by mouth every 6 hours as needed for anxiety, Disp: 10 tablet, Rfl: 0     NARCAN 4 MG/0.1ML nasal spray, , Disp: , Rfl:      omeprazole (PRILOSEC) 40 MG DR capsule, Take 1 capsule (40 mg) by mouth daily. Please take once daily while on steroids, Disp: 30 capsule, Rfl: 0     ondansetron (ZOFRAN ODT) 4 MG ODT tab, Take 1 tablet (4 mg) by mouth every 6 hours as needed for nausea., Disp: 30 tablet, Rfl: 3     Ostomy Supplies MISC, 1 each daily, Disp: 1 each, Rfl: 11     oxyCODONE (ROXICODONE) 10 MG tablet, Take 1-2 tablets (10-20 mg) by mouth every 4 hours as needed for pain., Disp: 144 tablet, Rfl: 0     [START ON 2/20/2025] Port Access Kit, For nurse use only.  Do not remove items from bag.  Use for port access.  Do not place syringe on sterile field., Disp: [id number] kit, Rfl: 0     predniSONE (DELTASONE) 20 MG tablet, Take 2 tablets (40 mg) by mouth 2 times daily (with meals) for 5 days, THEN 2 tablets (40 mg) daily for 5 days, THEN 1 tablet (20 mg) daily for 11 days., Disp: 41 tablet, Rfl: 0     prochlorperazine (COMPAZINE) 10 MG tablet, Take 1 tablet (10 mg) by mouth every 6 hours as needed for nausea or vomiting, Disp: 30 tablet, Rfl: 2     [START ON 2/20/2025] sodium chloride, PF, 0.9% PF flush,  Inject 10 mLs into the vein as needed for line flush. Flush IV before and after med administration as directed and/or at least every 24 hours, or prior to deaccessing for no further use and/or at least every 4 weeks when not accessed., Disp: 088598 mL, Rfl: 0     sulfamethoxazole-trimethoprim (BACTRIM DS) 800-160 MG tablet, Take 3 tablets by mouth 3 times daily for 21 days., Disp: 189 tablet, Rfl: 0     [START ON 2/21/2025] sulfamethoxazole-trimethoprim (BACTRIM DS) 800-160 MG tablet, Take 1 tablet by mouth Every Mon, Wed, Fri Morning. Please start this prophylaxis dose of bactrim once you have completed your 21 days of bactrim treatment, Disp: 12 tablet, Rfl: 1     VENTOLIN  (90 Base) MCG/ACT inhaler, INHALE 2 PUFFS INTO THE LUNGS EVERY 6 HOURS AS NEEDED FOR SHORTNESS OF BREATH OR WHEEZING OR COUGH, Disp: 18 g, Rfl: 2     pegfilgrastim (NEULASTA) 6 MG/0.6ML injection, Inject 0.6 mLs (6 mg) subcutaneously every 14 days. Administer 24 hours after chemotherapy disconnect (Patient not taking: Reported on 1/31/2025), Disp: 3.6 mL, Rfl: 0  No current facility-administered medications for this visit.           Physical Exam:   Ranges for vital signs:    Pulse:  [97] 97  BP: (123)/(84) 123/84  SpO2:  [92 %] 92 %    General: Alert and Oriented, NAD  HEENT: Head: atraumatic, normocephalic   Oropharynx: No mucosal lesions   Neck: supple, no cervical adenopathy  Chest:  Crackles at the distal right lung base otherwise clear to auscultation  CV: Regular rate and rhythm, S1S2, without mumur  Abdomen: +BS, soft, non-tender, no hepatosplenomegaly, no masses, no guarding or rebound.  Lymph Nodes: No palpable adenopathy  Skin: No rash  Neuro: Alert and Oriented, CN II-XII intact, grossly non-focal , moves all 4 extremities with normal strength  Mood: Stable           Laboratory Data:     Office Visit on 02/03/2025   Component Date Value Ref Range Status     Sodium 02/03/2025 130 (L)  135 - 145 mmol/L Final     Potassium  02/03/2025 4.8  3.4 - 5.3 mmol/L Final     Carbon Dioxide (CO2) 02/03/2025 19 (L)  22 - 29 mmol/L Final     Anion Gap 02/03/2025 13  7 - 15 mmol/L Final     Urea Nitrogen 02/03/2025 31.5 (H)  6.0 - 20.0 mg/dL Final     Creatinine 02/03/2025 1.01  0.67 - 1.17 mg/dL Final     GFR Estimate 02/03/2025 90  >60 mL/min/1.73m2 Final    eGFR calculated using 2021 CKD-EPI equation.     Calcium 02/03/2025 8.9  8.8 - 10.4 mg/dL Final     Chloride 02/03/2025 98  98 - 107 mmol/L Final     Glucose 02/03/2025 92  70 - 99 mg/dL Final     Alkaline Phosphatase 02/03/2025 89  40 - 150 U/L Final     AST 02/03/2025 43  0 - 45 U/L Final     ALT 02/03/2025 27  0 - 70 U/L Final     Protein Total 02/03/2025 6.9  6.4 - 8.3 g/dL Final     Albumin 02/03/2025 3.8  3.5 - 5.2 g/dL Final     Bilirubin Total 02/03/2025 <0.2  <=1.2 mg/dL Final     WBC Count 02/03/2025 7.6  4.0 - 11.0 10e3/uL Final     RBC Count 02/03/2025 4.35 (L)  4.40 - 5.90 10e6/uL Final     Hemoglobin 02/03/2025 12.1 (L)  13.3 - 17.7 g/dL Final     Hematocrit 02/03/2025 37.8 (L)  40.0 - 53.0 % Final     MCV 02/03/2025 87  78 - 100 fL Final     MCH 02/03/2025 27.8  26.5 - 33.0 pg Final     MCHC 02/03/2025 32.0  31.5 - 36.5 g/dL Final     RDW 02/03/2025 16.8 (H)  10.0 - 15.0 % Final     Platelet Count 02/03/2025 259  150 - 450 10e3/uL Final     Hold Specimen 02/03/2025 JIC   Final     Iron 02/03/2025 39 (L)  61 - 157 ug/dL Final     Iron Binding Capacity 02/03/2025 261  240 - 430 ug/dL Final     Iron Sat Index 02/03/2025 15  15 - 46 % Final     Ferritin 02/03/2025 1,549 (H)  31 - 409 ng/mL Final        Culture data:  7-Day Micro Results       No results found for the last 168 hours.                  Imaging:   CT Chest/Abdomen/Pelvis w Contrast  Narrative: Chest abdomen pelvis CT with contrast    INDICATION primary adenocarcinoma of colon    COMPARISON: CT chest pulmonary angiogram 1/22/2025. Standard chest  abdomen pelvis CT with contrast 10/18/2024.    CONTRAST: 88 mL intravenous  Isovue-370 images obtained in the portal  venous phase.    CHEST: The included thyroid appears unremarkable. There is a 15 x 9 mm  lymph node just to the right of the proximal right brachiocephalic  artery and just to the left of the SVC which contains the right IJ  port catheter comment the tip of which is at the SVC/right atrial  junction. Prominent right lower paratracheal lymph node measures  approximately 21 x 17 mm and is similar to previous. Another lymph  node just to the right of the thoracic esophagus at the level of the  right lung apex (3/25) measures approximately 10 x 6 mm and is also  unchanged. The other lymph node as previously mentioned adjacent to  the SVC and right brachiocephalic artery is also unchanged. Other  smaller lymph nodes just below this lymph node are also similar. Large  subcarinal lymph node is also grossly similar and measures  approximately 43 x 25 mm. No aortopulmonary window adenopathy.  Subcentimeter left hilar lymph node just anterior to the proximal left  lower lobe bronchus is also unchanged measuring approximately 11 x 6  mm. Sub-5 mm short axis right hilar lymph nodes are also unchanged.  Left anterolateral low pericardial lymph node is new (3/253) measuring  approximately 16 x 7 mm. This is just above the left hemidiaphragm  medial aspect.  Other partially necrotic masses scattered about the right anterior  perihilar area are similar as well as right lateral perihilar area  which surrounds middle lobe bronchial structures as before all of the  lower lobe mass similar and a left perihilar posterior mass just  posterior to the left lower pulmonary vein (3/175) also unchanged.  This may actually be confluent with the other necrotic mass in the  posterior left lower lobe more distally and posteriorly with loss of  the previous air bronchograms in this likely necrotic metastatic  focus. Other small lymph nodes adjacent to the distal thoracic  esophagus lower in the mediastinum  are also similar, at the same  level, it measured the most prominent of which is adjacent to but not  invading the left atrium cardiac chamber.  Heart size normal. Thoracic aorta caliber normal. Pulmonary artery  caliber normal just under 3 cm. No pleural 4 pericardial effusion.  Bone detail in the chest shows multilevel degenerative changes in the  thoracic spine relatively mild to moderate. There is a lucent focus  slightly posteriorly  The inferior endplate of T8 vertebral body. No pedicle destruction or  sclerotic pedicle formation. This T8 finding it is clearly more  conspicuous than on prior of October 2024. This could represent a  lytic metastasis. This does not have the typical appearance of a  simple Schmorl's node. No discrete rib lesion.  Faint patchy sclerotic changes are present throughout T10 vertebral  body associate the inferior an slightly anterior aspect end this  appears similar, there is some mildly prominent degenerative change  with large anterior osteophytes and this could represent sequela of  degenerative change but certainly close attention on follow-up is  recommended to exclude a sclerotic metastatic focus.    Detail of the lungs initial some hyperinflation with apical scarring  similar to previous. Innumerable pulmonary nodules a redemonstrated  again most of which are consistent with metastatic disease. These  appear similar in overall size, the anterior right upper lobe nodule  abutting the right mediastinal border has some interval loss of air  bronchogram formation. There is growth of a left upper lobe nodule  (4/130) now measuring approximately 19 x 13 mm in the axial plane  previously 14 x 11 mm. Again noted is loss of some of the air  bronchogram formation throughout the left lower lobe paravertebral  metastatic mass with overall extent somewhat more increased  posteriorly. In the axial plane, this measures approximately 71 x 51  mm previously 68 x 49 mm interval growth of a  paravertebral right  lower lobe nodule also demonstrated previously measuring 3 mm now  measuring 6 mm (4/152). Interval growth of another left lower lobe  previous 1 mm nodule 2 current measurement of 5.5 mm. Major airway  evaluation shows nicely patent trachea and bilateral mainstem bronchi.  There is narrowing of right upper lobe segmental bronchi 2 the  anterior perihilar mass as well as narrowing of right middle lobe  distal lobar and mostly proximal segmental bronchi through that middle  lobe mass which again has some loss of air bronchogram formation more  distally. Numerous other nodules are very similar or minimally  increased as well. Another example is another left lower lobe nodule  abutting the oblique fissure measuring 14.5 mm in greatest dimension  previously 8.4 mm. Another lingular nodule also abutting the oblique  fissure is new and measures approximately 8 x 5.5 mm. Another nodule  above the left hemidiaphragm measures 8 mm in long axis previously 5  mm. Overall, moderately significant progression of the pulmonary  metastatic disease is observed.    ABDOMEN/PELVIS: Slight increase in the hepatic metastatic burden noted  with the largest mass showing one of the macro lobulations now  effacing the IVC in its intrahepatic portion which was not the case  previously. This previously measured approximately 95 x 86 mm  currently measuring 101 x 98 mm. There are some new metastatic foci  including in the superior portion of the lateral segment of the left  hepatic lobe (3/246) measuring 15 mm an another increased segment 6  lesion (3/265) measuring approximately 57 x 48 mm with similar  measurement technique of this same area measuring approximately 45 x  29 mm. Numerous other new segment 6 lesions medially are present as  well. Again, this is concerning for overall increased metastatic  burden to the liver. Gallbladder normal. Small calcified splenic  granulomas with normal accessory splenule formation  again observed.  Bilateral adrenals normal. Symmetric normal appearing nephrograms  bilaterally other than a small peripheral right renal cyst under a  centimeter. Pancreas normal. No dominant enlarged mesenteric lymph  nodes. IVC and renal vein anatomy normal. Abdominal aortic  atherosclerosis without aneurysm. Left lower quadrant colostomy again  present appearing similar with no visibly incarcerated loops of bowel.  Other residual: In the pelvis shows chronic wall thickening. Deep  pelvic adenopathy. No free air. No free fluid. Urinary bladder nicely  distended. Prostate is enlarged exerting mass effect upon the base of  the urinary bladder in the prostate does exhibit some Central type  calcifications. Overall in the abdomen and pelvis, there is increased  hepatic metastatic burden.  Bone detail in the abdomen and pelvis shows patchy sclerotic focus of  both L1 and L4 vertebral bodies also mildly present on the right at L5  although this latter finding is likely due to prominent degenerative  change. Comparison with the previous abdomen pelvis CT from October 2024 shows the L1 finding to be similar as the L4 finding is also  similar. Other rim sclerotic focus at L5 also similar. No bone  destruction. There are degenerative changes in the lower lumbar spine  especially along with disc space narrowing at L5-S1. No malalignment.  Possible healed pars defects at L5 are noted with areas of increased  sclerosis in this area but no actual lucency to indicate current bony  nonunion.  Impression: IMPRESSION:  1. Increased pulmonary metastatic burden with new and enlarging  pulmonary masses and nodules. Some of the possibly necrotic pulmonary  masses exhibit decreased internal air bronchogram formation with  perhaps slightly increased solid component/necrotic component.  2. New left anterior pericardial lymph node which could represent a  new metastatic focus in the lower chest. Other extensive  mediastinal  lymphadenopathy grossly unchanged.  3. Increased hepatic metastatic burden. This includes new and  enlarging masses scattered about the liver.  4. Left lower quadrant colostomy redemonstrated.  5. Small benign-appearing right renal cyst.   6. Similar sclerotic foci in the thoracolumbar spine with new  radiolucency in the T8 vertebral body, cannot exclude new metastatic  focus as this is not obviously atypical benign Schmorl's node.  7. Enlarged prostate.    JILLIAN AGUILAR MD         SYSTEM ID:  [id number]         Again, thank you for allowing me to participate in the care of your patient.      Sincerely,    Taty Frazier MD

## 2025-02-19 NOTE — NURSING NOTE
"Chief Complaint   Patient presents with    RECHECK     Vital signs:      BP: 123/84 Pulse: 97     SpO2: 92 %       Weight: 81.6 kg (180 lb)  Estimated body mass index is 25.1 kg/m  as calculated from the following:    Height as of 2/18/25: 1.803 m (5' 11\").    Weight as of this encounter: 81.6 kg (180 lb).      Ginger Finn CMA   2/19/2025 8:28 AM    "

## 2025-02-19 NOTE — PROGRESS NOTES
Infectious Disease Clinic: PROGRESS NOTE     Patient:  Roman Escalante, Date of birth 1975, Medical record number [id number]  Date of Visit:  02/19/2025          Assessment and Recommendations:   Assessment:  Pneumocystis pneumonia, has responded to treatment with high dose Bactrim, nine tabs daily plus steroids. He now should have completed his 21 day of therapy today but he missed 2-3 days of the pills and has extra pills left.       Recommendations:  Continue high dose Bactrim 9 tabs/day x 3 more days, then start PJP prophylaxis with one Bactrim D tabs q Mon, Wed, Friday x a minimum of 6 months. Then reassess the immune system and the CD4 count prior to stopping the Bactrim prophylaxis.   Continue prednisone 20 mg po Q day x 3 days then stop it.   RTC 6 months or sooner prn.      I have reviewed today's Medications, Vital Signs, Labs, and EMR    Roman was seen today for recheck.    Diagnoses and all orders for this visit:    Pneumocystosis (H)  -     T cell subset profile; Future  -     CBC with platelets differential; Future    Fungal pneumonia  -     Adult Infectious Disease  Referral    Other orders  -     Adult Infectious Disease Clinic Follow-Up Order; Future        Taty Frazier MD  ID Staff Physician  [facility]  [phone]        Interval History:   2/19/2025 the patient presents for follow-up of PJP treatment . He was admitted to the hospital with Pneumocystis pneumonia and was seen by ID consult service and started on a 21 day course of high dose Bactrim plus prednisone burst and taper over 21 days. He is feeling better and has now been off oxygen x 2 and 1/2 weeks.  He is having some nasal congestion and occasional productive cough. O2 sats at home are 89-95% off oxygen. With deep breathing they go up to %.     Recently traveled to [place] and back on vacation with no problems with his breathing. He thinks he missed some doses of his antibiotics and the steroids though  "and he has some extra pills left. He thinks he missed three days of the high dose bactrim because his prescription ran out quicker than he though it would, and it took a few days to get the refills.               Review of Systems:   CV: NEGATIVE for chest pain, palpitations or peripheral edema  C: NEGATIVE for fever, chills, change in weight  E/M: NEGATIVE for ear, mouth and throat problems  R: NEGATIVE for significant cough or SOB  Patient denies nausea, vomiting, diarrhea, and abdominal pain.          Current Medications       Current Outpatient Medications:     acetaminophen (TYLENOL) 500 MG tablet, Take 500-1,000 mg by mouth as needed for mild pain., Disp: , Rfl:     [START ON 2/20/2025] Chemo Kit, For RN use only. Do not remove items from bag. Contents: 1  sodium chloride 0.9% flush, 4 medium gloves, 1 chemo gown, 1/4 chemo mat, 1 connector female, 1 chemo bag., Disp: [id number] kit, Rfl: 0    cyclobenzaprine (FLEXERIL) 5 MG tablet, Take one tab (5mg) by mouth over 6-8 hours, as needed for spasms, Disp: 45 tablet, Rfl: 2    [START ON 2/20/2025] Emergency Supply Kit, [facility],, Patient use for emergency only. Contents: 3 sodium chloride 0.9% flushes, 1 dressing kit, 1 microclave ext set 14\", 4 nitrile gloves (med), 6 alcohol prep pads, 1 bacitracin, 1 syringe (10 cc 20 G 1\"). Call [phone] to reorder., Disp: [id number] kit, Rfl: 0    fluorouracil (ADRUCIL) 2.5 GM/50ML SOLN injection, , Disp: , Rfl:     [START ON 2/20/2025] Fluorouracil (ADRUCIL) 4,610 mg in sodium chloride 0.9 % 241 mL via HOMEPUMP C-Series, Infuse 4,610 mg at 5.2 mL/hr over 46 hours into the vein once for 1 dose., Disp: 032286 mL, Rfl: 0    gabapentin (NEURONTIN) 100 MG capsule, Take 100-200mg (1-2 capsules) up to twice during the day and 300mg (3 capsules) at bedtime., Disp: 210 capsule, Rfl: 0    guaiFENesin (MUCINEX) 600 MG 12 hr tablet, Take 2 tablets (1,200 mg) by mouth 2 times daily., Disp: 20 tablet, Rfl: 0    ibuprofen (ADVIL/MOTRIN) 200 " MG tablet, Take 3 tablets (600 mg) by mouth every 8 hours as needed for moderate pain, Disp: 100 tablet, Rfl: 0    ipratropium - albuterol 0.5 mg/2.5 mg/3 mL (DUONEB) 0.5-2.5 (3) MG/3ML neb solution, Take 1 vial (3 mLs) by nebulization every 6 hours as needed for shortness of breath, wheezing or cough., Disp: 90 mL, Rfl: 3    LORazepam (ATIVAN) 0.5 MG tablet, Take 1 tablet (0.5 mg) by mouth every 6 hours as needed for anxiety, Disp: 10 tablet, Rfl: 0    NARCAN 4 MG/0.1ML nasal spray, , Disp: , Rfl:     omeprazole (PRILOSEC) 40 MG DR capsule, Take 1 capsule (40 mg) by mouth daily. Please take once daily while on steroids, Disp: 30 capsule, Rfl: 0    ondansetron (ZOFRAN ODT) 4 MG ODT tab, Take 1 tablet (4 mg) by mouth every 6 hours as needed for nausea., Disp: 30 tablet, Rfl: 3    Ostomy Supplies MISC, 1 each daily, Disp: 1 each, Rfl: 11    oxyCODONE (ROXICODONE) 10 MG tablet, Take 1-2 tablets (10-20 mg) by mouth every 4 hours as needed for pain., Disp: 144 tablet, Rfl: 0    [START ON 2/20/2025] Port Access Kit, For nurse use only.  Do not remove items from bag.  Use for port access.  Do not place syringe on sterile field., Disp: [id number] kit, Rfl: 0    predniSONE (DELTASONE) 20 MG tablet, Take 2 tablets (40 mg) by mouth 2 times daily (with meals) for 5 days, THEN 2 tablets (40 mg) daily for 5 days, THEN 1 tablet (20 mg) daily for 11 days., Disp: 41 tablet, Rfl: 0    prochlorperazine (COMPAZINE) 10 MG tablet, Take 1 tablet (10 mg) by mouth every 6 hours as needed for nausea or vomiting, Disp: 30 tablet, Rfl: 2    [START ON 2/20/2025] sodium chloride, PF, 0.9% PF flush, Inject 10 mLs into the vein as needed for line flush. Flush IV before and after med administration as directed and/or at least every 24 hours, or prior to deaccessing for no further use and/or at least every 4 weeks when not accessed., Disp: 518473 mL, Rfl: 0    sulfamethoxazole-trimethoprim (BACTRIM DS) 800-160 MG tablet, Take 3 tablets by mouth 3 times  daily for 21 days., Disp: 189 tablet, Rfl: 0    [START ON 2/21/2025] sulfamethoxazole-trimethoprim (BACTRIM DS) 800-160 MG tablet, Take 1 tablet by mouth Every Mon, Wed, Fri Morning. Please start this prophylaxis dose of bactrim once you have completed your 21 days of bactrim treatment, Disp: 12 tablet, Rfl: 1    VENTOLIN  (90 Base) MCG/ACT inhaler, INHALE 2 PUFFS INTO THE LUNGS EVERY 6 HOURS AS NEEDED FOR SHORTNESS OF BREATH OR WHEEZING OR COUGH, Disp: 18 g, Rfl: 2    pegfilgrastim (NEULASTA) 6 MG/0.6ML injection, Inject 0.6 mLs (6 mg) subcutaneously every 14 days. Administer 24 hours after chemotherapy disconnect (Patient not taking: Reported on 1/31/2025), Disp: 3.6 mL, Rfl: 0  No current facility-administered medications for this visit.           Physical Exam:   Ranges for vital signs:    Pulse:  [97] 97  BP: (123)/(84) 123/84  SpO2:  [92 %] 92 %    General: Alert and Oriented, NAD  HEENT: Head: atraumatic, normocephalic   Oropharynx: No mucosal lesions   Neck: supple, no cervical adenopathy  Chest:  Crackles at the distal right lung base otherwise clear to auscultation  CV: Regular rate and rhythm, S1S2, without mumur  Abdomen: +BS, soft, non-tender, no hepatosplenomegaly, no masses, no guarding or rebound.  Lymph Nodes: No palpable adenopathy  Skin: No rash  Neuro: Alert and Oriented, CN II-XII intact, grossly non-focal , moves all 4 extremities with normal strength  Mood: Stable           Laboratory Data:     Office Visit on 02/03/2025   Component Date Value Ref Range Status    Sodium 02/03/2025 130 (L)  135 - 145 mmol/L Final    Potassium 02/03/2025 4.8  3.4 - 5.3 mmol/L Final    Carbon Dioxide (CO2) 02/03/2025 19 (L)  22 - 29 mmol/L Final    Anion Gap 02/03/2025 13  7 - 15 mmol/L Final    Urea Nitrogen 02/03/2025 31.5 (H)  6.0 - 20.0 mg/dL Final    Creatinine 02/03/2025 1.01  0.67 - 1.17 mg/dL Final    GFR Estimate 02/03/2025 90  >60 mL/min/1.73m2 Final    eGFR calculated using 2021 CKD-EPI  equation.    Calcium 02/03/2025 8.9  8.8 - 10.4 mg/dL Final    Chloride 02/03/2025 98  98 - 107 mmol/L Final    Glucose 02/03/2025 92  70 - 99 mg/dL Final    Alkaline Phosphatase 02/03/2025 89  40 - 150 U/L Final    AST 02/03/2025 43  0 - 45 U/L Final    ALT 02/03/2025 27  0 - 70 U/L Final    Protein Total 02/03/2025 6.9  6.4 - 8.3 g/dL Final    Albumin 02/03/2025 3.8  3.5 - 5.2 g/dL Final    Bilirubin Total 02/03/2025 <0.2  <=1.2 mg/dL Final    WBC Count 02/03/2025 7.6  4.0 - 11.0 10e3/uL Final    RBC Count 02/03/2025 4.35 (L)  4.40 - 5.90 10e6/uL Final    Hemoglobin 02/03/2025 12.1 (L)  13.3 - 17.7 g/dL Final    Hematocrit 02/03/2025 37.8 (L)  40.0 - 53.0 % Final    MCV 02/03/2025 87  78 - 100 fL Final    MCH 02/03/2025 27.8  26.5 - 33.0 pg Final    MCHC 02/03/2025 32.0  31.5 - 36.5 g/dL Final    RDW 02/03/2025 16.8 (H)  10.0 - 15.0 % Final    Platelet Count 02/03/2025 259  150 - 450 10e3/uL Final    Hold Specimen 02/03/2025 JIC   Final    Iron 02/03/2025 39 (L)  61 - 157 ug/dL Final    Iron Binding Capacity 02/03/2025 261  240 - 430 ug/dL Final    Iron Sat Index 02/03/2025 15  15 - 46 % Final    Ferritin 02/03/2025 1,549 (H)  31 - 409 ng/mL Final        Culture data:  7-Day Micro Results       No results found for the last 168 hours.                  Imaging:   CT Chest/Abdomen/Pelvis w Contrast  Narrative: Chest abdomen pelvis CT with contrast    INDICATION primary adenocarcinoma of colon    COMPARISON: CT chest pulmonary angiogram 1/22/2025. Standard chest  abdomen pelvis CT with contrast 10/18/2024.    CONTRAST: 88 mL intravenous Isovue-370 images obtained in the portal  venous phase.    CHEST: The included thyroid appears unremarkable. There is a 15 x 9 mm  lymph node just to the right of the proximal right brachiocephalic  artery and just to the left of the SVC which contains the right IJ  port catheter comment the tip of which is at the SVC/right atrial  junction. Prominent right lower paratracheal lymph  node measures  approximately 21 x 17 mm and is similar to previous. Another lymph  node just to the right of the thoracic esophagus at the level of the  right lung apex (3/25) measures approximately 10 x 6 mm and is also  unchanged. The other lymph node as previously mentioned adjacent to  the SVC and right brachiocephalic artery is also unchanged. Other  smaller lymph nodes just below this lymph node are also similar. Large  subcarinal lymph node is also grossly similar and measures  approximately 43 x 25 mm. No aortopulmonary window adenopathy.  Subcentimeter left hilar lymph node just anterior to the proximal left  lower lobe bronchus is also unchanged measuring approximately 11 x 6  mm. Sub-5 mm short axis right hilar lymph nodes are also unchanged.  Left anterolateral low pericardial lymph node is new (3/253) measuring  approximately 16 x 7 mm. This is just above the left hemidiaphragm  medial aspect.  Other partially necrotic masses scattered about the right anterior  perihilar area are similar as well as right lateral perihilar area  which surrounds middle lobe bronchial structures as before all of the  lower lobe mass similar and a left perihilar posterior mass just  posterior to the left lower pulmonary vein (3/175) also unchanged.  This may actually be confluent with the other necrotic mass in the  posterior left lower lobe more distally and posteriorly with loss of  the previous air bronchograms in this likely necrotic metastatic  focus. Other small lymph nodes adjacent to the distal thoracic  esophagus lower in the mediastinum are also similar, at the same  level, it measured the most prominent of which is adjacent to but not  invading the left atrium cardiac chamber.  Heart size normal. Thoracic aorta caliber normal. Pulmonary artery  caliber normal just under 3 cm. No pleural 4 pericardial effusion.  Bone detail in the chest shows multilevel degenerative changes in the  thoracic spine relatively mild  to moderate. There is a lucent focus  slightly posteriorly  The inferior endplate of T8 vertebral body. No pedicle destruction or  sclerotic pedicle formation. This T8 finding it is clearly more  conspicuous than on prior of October 2024. This could represent a  lytic metastasis. This does not have the typical appearance of a  simple Schmorl's node. No discrete rib lesion.  Faint patchy sclerotic changes are present throughout T10 vertebral  body associate the inferior an slightly anterior aspect end this  appears similar, there is some mildly prominent degenerative change  with large anterior osteophytes and this could represent sequela of  degenerative change but certainly close attention on follow-up is  recommended to exclude a sclerotic metastatic focus.    Detail of the lungs initial some hyperinflation with apical scarring  similar to previous. Innumerable pulmonary nodules a redemonstrated  again most of which are consistent with metastatic disease. These  appear similar in overall size, the anterior right upper lobe nodule  abutting the right mediastinal border has some interval loss of air  bronchogram formation. There is growth of a left upper lobe nodule  (4/130) now measuring approximately 19 x 13 mm in the axial plane  previously 14 x 11 mm. Again noted is loss of some of the air  bronchogram formation throughout the left lower lobe paravertebral  metastatic mass with overall extent somewhat more increased  posteriorly. In the axial plane, this measures approximately 71 x 51  mm previously 68 x 49 mm interval growth of a paravertebral right  lower lobe nodule also demonstrated previously measuring 3 mm now  measuring 6 mm (4/152). Interval growth of another left lower lobe  previous 1 mm nodule 2 current measurement of 5.5 mm. Major airway  evaluation shows nicely patent trachea and bilateral mainstem bronchi.  There is narrowing of right upper lobe segmental bronchi 2 the  anterior perihilar mass as  well as narrowing of right middle lobe  distal lobar and mostly proximal segmental bronchi through that middle  lobe mass which again has some loss of air bronchogram formation more  distally. Numerous other nodules are very similar or minimally  increased as well. Another example is another left lower lobe nodule  abutting the oblique fissure measuring 14.5 mm in greatest dimension  previously 8.4 mm. Another lingular nodule also abutting the oblique  fissure is new and measures approximately 8 x 5.5 mm. Another nodule  above the left hemidiaphragm measures 8 mm in long axis previously 5  mm. Overall, moderately significant progression of the pulmonary  metastatic disease is observed.    ABDOMEN/PELVIS: Slight increase in the hepatic metastatic burden noted  with the largest mass showing one of the macro lobulations now  effacing the IVC in its intrahepatic portion which was not the case  previously. This previously measured approximately 95 x 86 mm  currently measuring 101 x 98 mm. There are some new metastatic foci  including in the superior portion of the lateral segment of the left  hepatic lobe (3/246) measuring 15 mm an another increased segment 6  lesion (3/265) measuring approximately 57 x 48 mm with similar  measurement technique of this same area measuring approximately 45 x  29 mm. Numerous other new segment 6 lesions medially are present as  well. Again, this is concerning for overall increased metastatic  burden to the liver. Gallbladder normal. Small calcified splenic  granulomas with normal accessory splenule formation again observed.  Bilateral adrenals normal. Symmetric normal appearing nephrograms  bilaterally other than a small peripheral right renal cyst under a  centimeter. Pancreas normal. No dominant enlarged mesenteric lymph  nodes. IVC and renal vein anatomy normal. Abdominal aortic  atherosclerosis without aneurysm. Left lower quadrant colostomy again  present appearing similar with no  visibly incarcerated loops of bowel.  Other residual: In the pelvis shows chronic wall thickening. Deep  pelvic adenopathy. No free air. No free fluid. Urinary bladder nicely  distended. Prostate is enlarged exerting mass effect upon the base of  the urinary bladder in the prostate does exhibit some Central type  calcifications. Overall in the abdomen and pelvis, there is increased  hepatic metastatic burden.  Bone detail in the abdomen and pelvis shows patchy sclerotic focus of  both L1 and L4 vertebral bodies also mildly present on the right at L5  although this latter finding is likely due to prominent degenerative  change. Comparison with the previous abdomen pelvis CT from October 2024 shows the L1 finding to be similar as the L4 finding is also  similar. Other rim sclerotic focus at L5 also similar. No bone  destruction. There are degenerative changes in the lower lumbar spine  especially along with disc space narrowing at L5-S1. No malalignment.  Possible healed pars defects at L5 are noted with areas of increased  sclerosis in this area but no actual lucency to indicate current bony  nonunion.  Impression: IMPRESSION:  1. Increased pulmonary metastatic burden with new and enlarging  pulmonary masses and nodules. Some of the possibly necrotic pulmonary  masses exhibit decreased internal air bronchogram formation with  perhaps slightly increased solid component/necrotic component.  2. New left anterior pericardial lymph node which could represent a  new metastatic focus in the lower chest. Other extensive mediastinal  lymphadenopathy grossly unchanged.  3. Increased hepatic metastatic burden. This includes new and  enlarging masses scattered about the liver.  4. Left lower quadrant colostomy redemonstrated.  5. Small benign-appearing right renal cyst.   6. Similar sclerotic foci in the thoracolumbar spine with new  radiolucency in the T8 vertebral body, cannot exclude new metastatic  focus as this is not  obviously atypical benign Schmorl's node.  7. Enlarged prostate.    JILLIAN AGUILAR MD         SYSTEM ID:  [id number]

## 2025-02-19 NOTE — PROGRESS NOTES
[facility] - Medical Oncology TeleHealth Consult Note  2025    Patient Identifiers     Name: Roman Escalante  Preferred Address: [place]  Preferred Language: English  : 1975  Gender: male    Assessment and Plan     Mr. Roman Escalante is a 49 year old male former smoker (3-5 cigs/day) with a history of extensively pre-treated metastatic colorectal cancer s/p trial cellular therapy transplant (2024) most recently on FOLFIRI/Reyna (24  - 2024) interrupted for a bacterial pneumonia who returns by virtual visit for treatment resumption evaluation.    NGS: KRAS G12N, BRAFwt  Immuno: [facility]  Clinical Trial: , VSG-GP    I met with Roman by virtual visit to review his treatment resumption. He has been off of treatment for nearly two months due to a significant episode of bacterial pneumonia requiring hospitalization and prolonged antibiotic use. He is now off of antibiotics and oxygen and feeling well. He notes significant worsening of the rectal pain which is typically associated with cancer progression. Since this progression has occurred off of treatment, we will plan to resume his previously effective regimen of FOLFIRI/Reyna with consideration of expansion to FOLFOXIRI/Reyna if he tolerates treatment resumption. We will present his case at [facility] for further input into potential systemic therapies.    Our ultimate goal is enrollment in the VSV-GP clinical trial. If his labs remain steady and he continues to have no signs of infection, he would be an excellent candidate for this study. We are hopeful that a study slot will be available for him within the next 3-4 weeks. We will plan to continue systemic treatment as outlined above until we hear from the study team.    Resume FOLFIRI/Reyna with LILLY monitoring. Plan for VSV-GP enrollment if available. Plan for MD visit in 2 months with CT scans prior.     45 minutes spent on the date of the encounter doing chart review, review of test  results, interpretation of tests, patient visit, documentation, and discussion with other provider(s)     Polo Snow MD, PhD   of Medicine  Division of Hematology, Oncology and Transplantation  [facility]    -----------------------------------    Oncology Summary     Cancer Staging   Rectal adenocarcinoma metastatic to lung (H)  Staging form: Colon and Rectum, AJCC 8th Edition  - Clinical: Stage IVB (cTX, cNX, pM1b) - Signed by Polo Snow MD on 3/30/2023      Oncology History   Rectal adenocarcinoma metastatic to lung (H)   2/3/2023 Initial Diagnosis    Rectal adenocarcinoma metastatic to lung (H)     2/3/2023 - 3/31/2023 Chemotherapy    Oral ONC Colorectal Cancer - Regorafenib  Plan Provider: Polo Snow MD  Treatment goal: Palliative  Line of treatment: [No plan line of treatment]     3/30/2023 -  Cancer Staged    Staging form: Colon and Rectum, AJCC 8th Edition  - Clinical: Stage IVB (cTX, cNX, pM1b)     6/7/2023 - 10/3/2023 Chemotherapy    OP ONC Colorectal Cancer - Modified FOLFOX-6 / Bevacizumab  Plan Provider: Polo Snow MD  Treatment goal: Palliative  Line of treatment: [No plan line of treatment]     10/27/2023 - 3/11/2024 Chemotherapy    OP ONC Colorectal Cancer - Bevacizumab + Trifluridine/Tipiracil (Lonsurf)  Plan Provider: Polo Snow MD  Treatment goal: Palliative  Line of treatment: [No plan line of treatment]     3/27/2024 - 3/27/2024 Chemotherapy    Oral ONC Colorectal Cancer - Fruquintinib (Fruzaqla)  Plan Provider: Polo Snow MD  Treatment goal: Palliative  Line of treatment: [No plan line of treatment]     5/2024 -  Chemotherapy    [facility] Cellular Therapy cytoreductive conditioning and cellular transplant  - admitted for 5 weeks following transplant with ~30lbs weight loss     8/30/2024 -  Chemotherapy    OP ONC Colorectal Cancer - FOLFIRI / Bevacizumab  Plan Provider: Polo Snow MD  Treatment goal: Palliative  Line of treatment: [No plan line of  treatment]         Subjective/Interval Events     - having increased rectal pain which is consistent with his previous episodes of cancer progression  - no nausea, vomiting, or increased ostomy output    Objective Data     Lab data:  I have personally reviewed the lab data, notable for:    - Hgb 12.1  - no other notables    Radiology data:  I have personally reviewed the radiology data, notable for:  02/19/2025 CT C/A/P  IMPRESSION:  1. Increased pulmonary metastatic burden with new and enlarging  pulmonary masses and nodules. Some of the possibly necrotic pulmonary  masses exhibit decreased internal air bronchogram formation with  perhaps slightly increased solid component/necrotic component.  2. New left anterior pericardial lymph node which could represent a  new metastatic focus in the lower chest. Other extensive mediastinal  lymphadenopathy grossly unchanged.  3. Increased hepatic metastatic burden. This includes new and  enlarging masses scattered about the liver.  4. Left lower quadrant colostomy redemonstrated.  5. Small benign-appearing right renal cyst.   6. Similar sclerotic foci in the thoracolumbar spine with new  radiolucency in the T8 vertebral body, cannot exclude new metastatic  focus as this is not obviously atypical benign Schmorl's node.  7. Enlarged prostate.    Pathology and other data:  I have personally reviewed and interpreted the pathology data, notable for:    - no new data    Billing Stuff     Virtual Visit Details    Type of service:  Video Visit   Video Start Time:  10:05 AM  Video End Time: 10:15 AM    Originating Location (pt. Location): Home    Distant Location (provider location):  On-site  Platform used for Video Visit: Huey

## 2025-02-19 NOTE — LETTER
2025      Roman Escalante  [address]  [facility] [address]      Dear Colleague,    Thank you for referring your patient, Roman Escalante, to the [facility]. Please see a copy of my visit note below.    [facility] - Medical Oncology TeleHealth Consult Note  2025    Patient Identifiers     Name: Roman Escalante  Preferred Address: Roman  Preferred Language: English  : 1975  Gender: male    Assessment and Plan     Mr. Roman Escalante is a 49 year old male former smoker (3-5 cigs/day) with a history of extensively pre-treated metastatic colorectal cancer s/p trial cellular therapy transplant (2024) most recently on FOLFIRI/Reyna (24  - 2024) interrupted for a bacterial pneumonia who returns by virtual visit for treatment resumption evaluation.    NGS: KRAS G12N, BRAFwt  Immuno: [facility]  Clinical Trial: , VSG-GP    I met with Roman by virtual visit to review his treatment resumption. He has been off of treatment for nearly two months due to a significant episode of bacterial pneumonia requiring hospitalization and prolonged antibiotic use. He is now off of antibiotics and oxygen and feeling well. He notes significant worsening of the rectal pain which is typically associated with cancer progression. Since this progression has occurred off of treatment, we will plan to resume his previously effective regimen of FOLFIRI/Reyna with consideration of expansion to FOLFOXIRI/Reyna if he tolerates treatment resumption. We will present his case at [facility] for further input into potential systemic therapies.    Our ultimate goal is enrollment in the VSV-GP clinical trial. If his labs remain steady and he continues to have no signs of infection, he would be an excellent candidate for this study. We are hopeful that a study slot will be available for him within the next 3-4 weeks. We will plan to continue systemic treatment as outlined above until we  hear from the study team.    Resume FOLFIRI/Reyna with LILLY monitoring. Plan for VSV-GP enrollment if available. Plan for MD visit in 2 months with CT scans prior.     45 minutes spent on the date of the encounter doing chart review, review of test results, interpretation of tests, patient visit, documentation, and discussion with other provider(s)     Polo Snow MD, PhD   of Medicine  Division of Hematology, Oncology and Transplantation  [facility]    -----------------------------------    Oncology Summary     Cancer Staging   Rectal adenocarcinoma metastatic to lung (H)  Staging form: Colon and Rectum, AJCC 8th Edition  - Clinical: Stage IVB (cTX, cNX, pM1b) - Signed by Polo Snow MD on 3/30/2023      Oncology History   Rectal adenocarcinoma metastatic to lung (H)   2/3/2023 Initial Diagnosis    Rectal adenocarcinoma metastatic to lung (H)     2/3/2023 - 3/31/2023 Chemotherapy    Oral ONC Colorectal Cancer - Regorafenib  Plan Provider: Polo Snow MD  Treatment goal: Palliative  Line of treatment: [No plan line of treatment]     3/30/2023 -  Cancer Staged    Staging form: Colon and Rectum, AJCC 8th Edition  - Clinical: Stage IVB (cTX, cNX, pM1b)     6/7/2023 - 10/3/2023 Chemotherapy    OP ONC Colorectal Cancer - Modified FOLFOX-6 / Bevacizumab  Plan Provider: Polo Snow MD  Treatment goal: Palliative  Line of treatment: [No plan line of treatment]     10/27/2023 - 3/11/2024 Chemotherapy    OP ONC Colorectal Cancer - Bevacizumab + Trifluridine/Tipiracil (Lonsurf)  Plan Provider: Polo Snow MD  Treatment goal: Palliative  Line of treatment: [No plan line of treatment]     3/27/2024 - 3/27/2024 Chemotherapy    Oral ONC Colorectal Cancer - Fruquintinib (Fruzaqla)  Plan Provider: Polo Snow MD  Treatment goal: Palliative  Line of treatment: [No plan line of treatment]     5/2024 -  Chemotherapy    [facility] Cellular Therapy cytoreductive conditioning and cellular transplant  -  admitted for 5 weeks following transplant with ~30lbs weight loss     8/30/2024 -  Chemotherapy    OP ONC Colorectal Cancer - FOLFIRI / Bevacizumab  Plan Provider: Polo Snow MD  Treatment goal: Palliative  Line of treatment: [No plan line of treatment]         Subjective/Interval Events     - having increased rectal pain which is consistent with his previous episodes of cancer progression  - no nausea, vomiting, or increased ostomy output    Objective Data     Lab data:  I have personally reviewed the lab data, notable for:    - Hgb 12.1  - no other notables    Radiology data:  I have personally reviewed the radiology data, notable for:  02/19/2025 CT C/A/P  IMPRESSION:  1. Increased pulmonary metastatic burden with new and enlarging  pulmonary masses and nodules. Some of the possibly necrotic pulmonary  masses exhibit decreased internal air bronchogram formation with  perhaps slightly increased solid component/necrotic component.  2. New left anterior pericardial lymph node which could represent a  new metastatic focus in the lower chest. Other extensive mediastinal  lymphadenopathy grossly unchanged.  3. Increased hepatic metastatic burden. This includes new and  enlarging masses scattered about the liver.  4. Left lower quadrant colostomy redemonstrated.  5. Small benign-appearing right renal cyst.   6. Similar sclerotic foci in the thoracolumbar spine with new  radiolucency in the T8 vertebral body, cannot exclude new metastatic  focus as this is not obviously atypical benign Schmorl's node.  7. Enlarged prostate.    Pathology and other data:  I have personally reviewed and interpreted the pathology data, notable for:    - no new data    Billing Stuff     Virtual Visit Details    Type of service:  Video Visit   Video Start Time:  10:05 AM  Video End Time: 10:15 AM    Originating Location (pt. Location): Home    Distant Location (provider location):  On-site  Platform used for Video Visit: AmWell      Again,  thank you for allowing me to participate in the care of your patient.        Sincerely,        Polo Snow MD    Electronically signed

## 2025-02-19 NOTE — NURSING NOTE
Current patient location:  [address] [place] [id number]    Is the patient currently in the state of [place]? YES    Visit mode: VIDEO    If the visit is dropped, the patient can be reconnected by:VIDEO VISIT: Text to cell phone:   Telephone Information:   Mobile [phone]       Will anyone else be joining the visit? NO  (If patient encounters technical issues they should call [phone]    Are changes needed to the allergy or medication list? Pt stated no changes to allergies and Pt stated no med changes    Are refills needed on medications prescribed by this physician? NO    Rooming Documentation:  Not applicable    Reason for visit: VIDAL RODRIGUEZ

## 2025-02-19 NOTE — PATIENT INSTRUCTIONS
Take three more days of the Bactrim 9 tabs daily plus prednisone 1 tab daily. Then stop those medications and start BACTRIM DS one tablet every Monday, Wed. Friday.     Plan at least a 6 month course of Bactrim prophylaxis, then check CD4 count prior to stopping to see if it is > 250 .

## 2025-02-20 ENCOUNTER — INFUSION THERAPY VISIT (OUTPATIENT)
Dept: ONCOLOGY | Facility: CLINIC | Age: 50
End: 2025-02-20
Attending: STUDENT IN AN ORGANIZED HEALTH CARE EDUCATION/TRAINING PROGRAM
Payer: COMMERCIAL

## 2025-02-20 ENCOUNTER — HOME INFUSION BILLING (OUTPATIENT)
Dept: HOME HEALTH SERVICES | Facility: HOME HEALTH | Age: 50
End: 2025-02-20
Payer: COMMERCIAL

## 2025-02-20 ENCOUNTER — APPOINTMENT (OUTPATIENT)
Dept: LAB | Facility: CLINIC | Age: 50
End: 2025-02-20
Attending: STUDENT IN AN ORGANIZED HEALTH CARE EDUCATION/TRAINING PROGRAM
Payer: COMMERCIAL

## 2025-02-20 ENCOUNTER — HOME INFUSION (OUTPATIENT)
Dept: HOME HEALTH SERVICES | Facility: HOME HEALTH | Age: 50
End: 2025-02-20
Payer: COMMERCIAL

## 2025-02-20 VITALS
OXYGEN SATURATION: 92 % | TEMPERATURE: 97.5 F | BODY MASS INDEX: 24.63 KG/M2 | SYSTOLIC BLOOD PRESSURE: 114 MMHG | WEIGHT: 176.6 LBS | HEART RATE: 96 BPM | RESPIRATION RATE: 16 BRPM | DIASTOLIC BLOOD PRESSURE: 76 MMHG

## 2025-02-20 DIAGNOSIS — C20 RECTAL ADENOCARCINOMA METASTATIC TO LUNG (H): Primary | ICD-10-CM

## 2025-02-20 DIAGNOSIS — C78.00 RECTAL ADENOCARCINOMA METASTATIC TO LUNG (H): Primary | ICD-10-CM

## 2025-02-20 DIAGNOSIS — C18.9 COLON CANCER METASTASIZED TO BONE (H): ICD-10-CM

## 2025-02-20 DIAGNOSIS — Z79.899 ENCOUNTER FOR LONG-TERM (CURRENT) USE OF MEDICATIONS: ICD-10-CM

## 2025-02-20 DIAGNOSIS — C79.51 COLON CANCER METASTASIZED TO BONE (H): ICD-10-CM

## 2025-02-20 DIAGNOSIS — B59 PNEUMOCYSTOSIS (H): ICD-10-CM

## 2025-02-20 LAB
ALBUMIN SERPL BCG-MCNC: 3.9 G/DL (ref 3.5–5.2)
ALBUMIN UR-MCNC: NEGATIVE MG/DL
ALP SERPL-CCNC: 136 U/L (ref 40–150)
ALT SERPL W P-5'-P-CCNC: 16 U/L (ref 0–70)
ANION GAP SERPL CALCULATED.3IONS-SCNC: 14 MMOL/L (ref 7–15)
AST SERPL W P-5'-P-CCNC: 36 U/L (ref 0–45)
BACTERIA BRONCH: NORMAL
BACTERIA BRONCH: NORMAL
BASOPHILS # BLD AUTO: 0 10E3/UL (ref 0–0.2)
BASOPHILS NFR BLD AUTO: 0 %
BILIRUB SERPL-MCNC: 0.2 MG/DL
BUN SERPL-MCNC: 29.7 MG/DL (ref 6–20)
CALCIUM SERPL-MCNC: 9.1 MG/DL (ref 8.8–10.4)
CD3 CELLS # BLD: 320 CELLS/UL (ref 603–2990)
CD3 CELLS NFR BLD: 67 % (ref 49–84)
CD3+CD4+ CELLS # BLD: 83 CELLS/UL (ref 441–2156)
CD3+CD4+ CELLS NFR BLD: 17 % (ref 28–63)
CD3+CD4+ CELLS/CD3+CD8+ CLL BLD: 0.36 % (ref 1.4–2.6)
CD3+CD8+ CELLS # BLD: 233 CELLS/UL (ref 125–1312)
CD3+CD8+ CELLS NFR BLD: 49 % (ref 10–40)
CHLORIDE SERPL-SCNC: 98 MMOL/L (ref 98–107)
CREAT SERPL-MCNC: 1.21 MG/DL (ref 0.67–1.17)
EGFRCR SERPLBLD CKD-EPI 2021: 72 ML/MIN/1.73M2
EOSINOPHIL # BLD AUTO: 0.2 10E3/UL (ref 0–0.7)
EOSINOPHIL NFR BLD AUTO: 3 %
ERYTHROCYTE [DISTWIDTH] IN BLOOD BY AUTOMATED COUNT: 18.3 % (ref 10–15)
GLUCOSE SERPL-MCNC: 77 MG/DL (ref 70–99)
HCO3 SERPL-SCNC: 19 MMOL/L (ref 22–29)
HCT VFR BLD AUTO: 35.8 % (ref 40–53)
HGB BLD-MCNC: 11.7 G/DL (ref 13.3–17.7)
IMM GRANULOCYTES # BLD: 0 10E3/UL
IMM GRANULOCYTES NFR BLD: 1 %
LYMPHOCYTES # BLD AUTO: 0.5 10E3/UL (ref 0.8–5.3)
LYMPHOCYTES NFR BLD AUTO: 6 %
MCH RBC QN AUTO: 27 PG (ref 26.5–33)
MCHC RBC AUTO-ENTMCNC: 32.7 G/DL (ref 31.5–36.5)
MCV RBC AUTO: 83 FL (ref 78–100)
MONOCYTES # BLD AUTO: 0.8 10E3/UL (ref 0–1.3)
MONOCYTES NFR BLD AUTO: 10 %
NEUTROPHILS # BLD AUTO: 6.4 10E3/UL (ref 1.6–8.3)
NEUTROPHILS NFR BLD AUTO: 81 %
NRBC # BLD AUTO: 0 10E3/UL
NRBC BLD AUTO-RTO: 0 /100
PLATELET # BLD AUTO: 127 10E3/UL (ref 150–450)
POTASSIUM SERPL-SCNC: 5 MMOL/L (ref 3.4–5.3)
PROT SERPL-MCNC: 7.6 G/DL (ref 6.4–8.3)
RBC # BLD AUTO: 4.34 10E6/UL (ref 4.4–5.9)
SODIUM SERPL-SCNC: 131 MMOL/L (ref 135–145)
T CELL COMMENT: ABNORMAL
WBC # BLD AUTO: 7.9 10E3/UL (ref 4–11)

## 2025-02-20 PROCEDURE — 250N000011 HC RX IP 250 OP 636: Performed by: STUDENT IN AN ORGANIZED HEALTH CARE EDUCATION/TRAINING PROGRAM

## 2025-02-20 PROCEDURE — 258N000003 HC RX IP 258 OP 636: Performed by: STUDENT IN AN ORGANIZED HEALTH CARE EDUCATION/TRAINING PROGRAM

## 2025-02-20 PROCEDURE — 81003 URINALYSIS AUTO W/O SCOPE: CPT

## 2025-02-20 PROCEDURE — 250N000011 HC RX IP 250 OP 636

## 2025-02-20 PROCEDURE — 82565 ASSAY OF CREATININE: CPT

## 2025-02-20 PROCEDURE — 85049 AUTOMATED PLATELET COUNT: CPT

## 2025-02-20 PROCEDURE — 86359 T CELLS TOTAL COUNT: CPT

## 2025-02-20 PROCEDURE — 36591 DRAW BLOOD OFF VENOUS DEVICE: CPT

## 2025-02-20 PROCEDURE — 258N000003 HC RX IP 258 OP 636

## 2025-02-20 RX ORDER — ATROPINE SULFATE 0.4 MG/ML
0.4 AMPUL (ML) INJECTION
Status: COMPLETED | OUTPATIENT
Start: 2025-02-20 | End: 2025-02-20

## 2025-02-20 RX ORDER — ONDANSETRON 2 MG/ML
8 INJECTION INTRAMUSCULAR; INTRAVENOUS ONCE
Status: COMPLETED | OUTPATIENT
Start: 2025-02-20 | End: 2025-02-20

## 2025-02-20 RX ORDER — ZOLEDRONIC ACID 0.04 MG/ML
4 INJECTION, SOLUTION INTRAVENOUS ONCE
OUTPATIENT
Start: 2025-03-20 | End: 2025-03-20

## 2025-02-20 RX ORDER — EPINEPHRINE 1 MG/ML
0.3 INJECTION, SOLUTION, CONCENTRATE INTRAVENOUS EVERY 5 MIN PRN
OUTPATIENT
Start: 2025-03-20

## 2025-02-20 RX ORDER — ZOLEDRONIC ACID 0.04 MG/ML
4 INJECTION, SOLUTION INTRAVENOUS ONCE
Status: DISCONTINUED | OUTPATIENT
Start: 2025-02-20 | End: 2025-02-20

## 2025-02-20 RX ORDER — ALBUTEROL SULFATE 0.83 MG/ML
2.5 SOLUTION RESPIRATORY (INHALATION)
OUTPATIENT
Start: 2025-03-20

## 2025-02-20 RX ORDER — METHYLPREDNISOLONE SODIUM SUCCINATE 40 MG/ML
40 INJECTION INTRAMUSCULAR; INTRAVENOUS
Start: 2025-03-20

## 2025-02-20 RX ORDER — FLUOROURACIL 50 MG/ML
400 INJECTION, SOLUTION INTRAVENOUS ONCE
Status: COMPLETED | OUTPATIENT
Start: 2025-02-20 | End: 2025-02-20

## 2025-02-20 RX ORDER — DIPHENHYDRAMINE HYDROCHLORIDE 50 MG/ML
50 INJECTION INTRAMUSCULAR; INTRAVENOUS
Start: 2025-03-20

## 2025-02-20 RX ORDER — HEPARIN SODIUM,PORCINE 10 UNIT/ML
5-20 VIAL (ML) INTRAVENOUS DAILY PRN
OUTPATIENT
Start: 2025-03-20

## 2025-02-20 RX ORDER — HEPARIN SODIUM (PORCINE) LOCK FLUSH IV SOLN 100 UNIT/ML 100 UNIT/ML
5 SOLUTION INTRAVENOUS DAILY PRN
Status: DISCONTINUED | OUTPATIENT
Start: 2025-02-20 | End: 2025-02-20 | Stop reason: HOSPADM

## 2025-02-20 RX ORDER — DIPHENHYDRAMINE HYDROCHLORIDE 50 MG/ML
25 INJECTION INTRAMUSCULAR; INTRAVENOUS
Start: 2025-03-20

## 2025-02-20 RX ORDER — HEPARIN SODIUM (PORCINE) LOCK FLUSH IV SOLN 100 UNIT/ML 100 UNIT/ML
5 SOLUTION INTRAVENOUS
OUTPATIENT
Start: 2025-03-20

## 2025-02-20 RX ORDER — ALBUTEROL SULFATE 90 UG/1
1-2 INHALANT RESPIRATORY (INHALATION)
Start: 2025-03-20

## 2025-02-20 RX ORDER — MEPERIDINE HYDROCHLORIDE 25 MG/ML
25 INJECTION INTRAMUSCULAR; INTRAVENOUS; SUBCUTANEOUS
OUTPATIENT
Start: 2025-03-20

## 2025-02-20 RX ADMIN — IRINOTECAN HYDROCHLORIDE 340 MG: 20 INJECTION, SOLUTION INTRAVENOUS at 08:57

## 2025-02-20 RX ADMIN — ATROPINE SULFATE 0.4 MG: 0.4 INJECTION, SOLUTION INTRAVENOUS at 08:55

## 2025-02-20 RX ADMIN — ATROPINE SULFATE 0.4 MG: 0.4 INJECTION, SOLUTION INTRAVENOUS at 09:48

## 2025-02-20 RX ADMIN — FLUOROURACIL 770 MG: 50 INJECTION, SOLUTION INTRAVENOUS at 10:34

## 2025-02-20 RX ADMIN — SODIUM CHLORIDE 400 MG: 9 INJECTION, SOLUTION INTRAVENOUS at 08:16

## 2025-02-20 RX ADMIN — SODIUM CHLORIDE 250 ML: 9 INJECTION, SOLUTION INTRAVENOUS at 08:16

## 2025-02-20 RX ADMIN — FOSAPREPITANT: 150 INJECTION, POWDER, LYOPHILIZED, FOR SOLUTION INTRAVENOUS at 07:42

## 2025-02-20 RX ADMIN — HEPARIN 5 ML: 100 SYRINGE at 06:33

## 2025-02-20 RX ADMIN — ONDANSETRON 8 MG: 2 INJECTION INTRAMUSCULAR; INTRAVENOUS at 07:42

## 2025-02-20 RX ADMIN — LEUCOVORIN CALCIUM 750 MG: 350 INJECTION, POWDER, LYOPHILIZED, FOR SOLUTION INTRAMUSCULAR; INTRAVENOUS at 08:57

## 2025-02-20 ASSESSMENT — PAIN SCALES - GENERAL: PAINLEVEL_OUTOF10: NO PAIN (0)

## 2025-02-20 NOTE — PROGRESS NOTES
CHLOÉ:  Has been off chemo for a couple months following an admission for Pneumocystis pneumonia.  Will be on antibiotic ppx x 6 months.  He is off oxygen now.  Patient resuming chemotherapy via C series pump over 46hrs.  Order received to do video visit for his SO to disconnect chemo pump and deaccess port.  She is a hospice nurse and did this last year as well when they went out of town.  She will administer the Neulasta injection on Day 4 as well.  Delivery today to SO address between 4-8pm; pharmacy coordinator updated.

## 2025-02-20 NOTE — PROGRESS NOTES
Infusion Nursing Note:  Roman Escalante presents today for C10D1 Bevacizumab-bvzr/Irinotecan/Leucovorin/Fluorouracil Bolus/Fluorouracil Home Pump Connect/Zometa (zometa not given today).    Patient seen by provider today: No   present during visit today: Not Applicable.    Note: Had visit with Dr. Snow yesterday. Denied any new changes, questions, or concerns overnight. No fevers or chills.    Patient scheduled for zometa today. He was not aware he would be getting this. Discussed with patient. He stated he has a veneer on one of his front bottom teeth that needs to be replaced. He also has a cracked molar. He was scheduled to see the dentist back in January but the appt needed to be rescheduled. Appt now scheduled for March 8th.    Upon chart review, previous communication with Rebeca Killian CNP on 1/15/25 that pt need to have 1 more dental procedure prior to starting zometa.     Dr. Snow notified to see if zometa should be held until after dentist appt.    Per written communication with PHILIPPE Carlos/Ruth Espinal RN 02/20/25 @ 0902  - no zometa today    Atropine given pre and mid way through irinotecan.    Intravenous Access:  Implanted Port.    Treatment Conditions:   Latest Reference Range & Units 02/20/25 06:38   Sodium 135 - 145 mmol/L 131 (L)   Potassium 3.4 - 5.3 mmol/L 5.0   Chloride 98 - 107 mmol/L 98   Carbon Dioxide (CO2) 22 - 29 mmol/L 19 (L)   Urea Nitrogen 6.0 - 20.0 mg/dL 29.7 (H)   Creatinine 0.67 - 1.17 mg/dL 1.21 (H)   GFR Estimate >60 mL/min/1.73m2 72   Calcium 8.8 - 10.4 mg/dL 9.1   Anion Gap 7 - 15 mmol/L 14   Albumin 3.5 - 5.2 g/dL 3.9   Protein Total 6.4 - 8.3 g/dL 7.6   Alkaline Phosphatase 40 - 150 U/L 136   ALT 0 - 70 U/L 16   AST 0 - 45 U/L 36   Bilirubin Total <=1.2 mg/dL 0.2   Glucose 70 - 99 mg/dL 77   WBC 4.0 - 11.0 10e3/uL 7.9   Hemoglobin 13.3 - 17.7 g/dL 11.7 (L)   Hematocrit 40.0 - 53.0 % 35.8 (L)   Platelet Count 150 - 450 10e3/uL 127 (L)   RBC Count 4.40 -  5.90 10e6/uL 4.34 (L)   MCV 78 - 100 fL 83   MCH 26.5 - 33.0 pg 27.0   MCHC 31.5 - 36.5 g/dL 32.7   RDW 10.0 - 15.0 % 18.3 (H)   % Neutrophils % 81   % Lymphocytes % 6   % Monocytes % 10   % Eosinophils % 3   % Basophils % 0   Absolute Basophils 0.0 - 0.2 10e3/uL 0.0   Absolute Eosinophils 0.0 - 0.7 10e3/uL 0.2   Absolute Immature Granulocytes <=0.4 10e3/uL 0.0   Absolute Lymphocytes 0.8 - 5.3 10e3/uL 0.5 (L)   Absolute Monocytes 0.0 - 1.3 10e3/uL 0.8   % Immature Granulocytes % 1   Absolute Neutrophils 1.6 - 8.3 10e3/uL 6.4   Absolute NRBCs 10e3/uL 0.0   NRBCs per 100 WBC <1 /100 0     Results reviewed, labs MET treatment parameters, ok to proceed with treatment.      Post Infusion Assessment:  Patient tolerated infusion without incident.  Blood return noted pre and post infusion.  Site patent and intact, free from redness, edema or discomfort.  No evidence of extravasations.     Prior to discharge: Port is secured in place with tegaderm and flushed with 10cc NS with positive blood return noted.    Continuous home infusion Dosi-Fuser pump connected.    All connectors secured in place and clamps taped open.      Capillary element taped to pt's skin per protocol.  Pump Connection double checked with GUILLERMINA Mayo.  Patient instructed to call our clinic or [facility] with any questions or concerns at home.  Patient verbalized understanding.    Patient set up for pump disconnect at home with [facility] on Saturday 2/22 at 830am.        Discharge Plan:   Discharge instructions reviewed with: Patient.  Patient and/or family verbalized understanding of discharge instructions and all questions answered.  AVS to patient via CumuluxT.  Patient will return 3/5 for next appointment.   Patient discharged in stable condition accompanied by: self.  Departure Mode: Ambulatory.      Alana Espinal RN

## 2025-02-20 NOTE — PATIENT INSTRUCTIONS
[facility] Triage and after hours / weekends / holidays:  [phone] option 5, option 2    Please call the triage or after hours line if you experience a temperature greater than or equal to 100.4, shaking chills, have uncontrolled nausea, vomiting and/or diarrhea, dizziness, shortness of breath, chest pain, bleeding, unexplained bruising, or if you have any other new/concerning symptoms, questions or concerns.      If you are having any concerning symptoms or wish to speak to a provider before your next infusion visit, please call triage to notify your care team so we can adequately serve you.     If you need a refill on a narcotic prescription or other medication, please call before your infusion appointment.

## 2025-02-22 ENCOUNTER — HOME CARE VISIT (OUTPATIENT)
Dept: HOME HEALTH SERVICES | Facility: HOME HEALTH | Age: 50
End: 2025-02-22
Payer: COMMERCIAL

## 2025-02-22 NOTE — PROGRESS NOTES
Nursing Visit Note:  Nurse visit today for video visit for pump DC and port deaccess by wife for Roman SAMANO Boris.     present during visit today: Not Applicable.    Intravenous Access:  Implanted Port.    Patient seen today by [facility] for chemotherapy disconnect of  Fluorouracil.    IV Fluids administered: N/A    Neulasta OnPro/injection administered: N/A    Note: wife, who is a hospice nurse, gowned up in chemo gown, correctly disconnected 5-FU pump, flushed port with 20 cc of normal saline, and de-accessed port, all done via video visit with patient and wife. Pt will bring chemo bin home and keep in garage till next pump dc. Pt is feeling well    Saline administered: 20 (ml)    Supply Check:   Does the patient have all the supplies they need for the next visit?  Yes    Next visit plan: Per chemo regimen    Sakshi Joyner, RN 2/22/2025

## 2025-02-23 PROCEDURE — S9537 HT HEM HORM INJ DIEM: HCPCS

## 2025-02-24 ENCOUNTER — TELEPHONE (OUTPATIENT)
Dept: INFECTIOUS DISEASES | Facility: CLINIC | Age: 50
End: 2025-02-24
Payer: COMMERCIAL

## 2025-02-24 NOTE — TELEPHONE ENCOUNTER
EP called 2/24 to sched a 6 month follow up with Dr. Frazier via in-person per checkout notes from 2/19. Patient said he'll get labs done 1-2 weeks prior.

## 2025-03-03 ENCOUNTER — ALLIED HEALTH/NURSE VISIT (OUTPATIENT)
Dept: ONCOLOGY | Facility: CLINIC | Age: 50
End: 2025-03-03
Payer: COMMERCIAL

## 2025-03-03 DIAGNOSIS — C18.9 COLON CANCER METASTASIZED TO BONE (H): Primary | ICD-10-CM

## 2025-03-03 DIAGNOSIS — C79.51 COLON CANCER METASTASIZED TO BONE (H): Primary | ICD-10-CM

## 2025-03-03 NOTE — PROGRESS NOTES
[id number]     Writer received study results for Claudin-6 testing for this patient. His tumor tissue is  negative  for CLDN6 expression. Writer sent this information to the patient via [employer] and also notified her treating oncologist, Dr. Snow.

## 2025-03-05 ENCOUNTER — HOME INFUSION BILLING (OUTPATIENT)
Dept: HOME HEALTH SERVICES | Facility: HOME HEALTH | Age: 50
End: 2025-03-05
Payer: COMMERCIAL

## 2025-03-05 ENCOUNTER — DOCUMENTATION ONLY (OUTPATIENT)
Dept: HOME HEALTH SERVICES | Facility: HOME HEALTH | Age: 50
End: 2025-03-05
Payer: COMMERCIAL

## 2025-03-05 ENCOUNTER — INFUSION THERAPY VISIT (OUTPATIENT)
Dept: ONCOLOGY | Facility: CLINIC | Age: 50
End: 2025-03-05
Attending: STUDENT IN AN ORGANIZED HEALTH CARE EDUCATION/TRAINING PROGRAM
Payer: COMMERCIAL

## 2025-03-05 VITALS
OXYGEN SATURATION: 95 % | RESPIRATION RATE: 20 BRPM | WEIGHT: 176.5 LBS | DIASTOLIC BLOOD PRESSURE: 65 MMHG | BODY MASS INDEX: 24.62 KG/M2 | TEMPERATURE: 97.6 F | HEART RATE: 101 BPM | SYSTOLIC BLOOD PRESSURE: 95 MMHG

## 2025-03-05 DIAGNOSIS — G62.0 NEUROPATHY DUE TO CHEMOTHERAPEUTIC DRUG: ICD-10-CM

## 2025-03-05 DIAGNOSIS — C20 RECTAL ADENOCARCINOMA METASTATIC TO LUNG (H): Primary | ICD-10-CM

## 2025-03-05 DIAGNOSIS — F41.0 ANXIETY ATTACK: ICD-10-CM

## 2025-03-05 DIAGNOSIS — G89.3 CANCER ASSOCIATED PAIN: ICD-10-CM

## 2025-03-05 DIAGNOSIS — C78.00 RECTAL ADENOCARCINOMA METASTATIC TO LUNG (H): Primary | ICD-10-CM

## 2025-03-05 DIAGNOSIS — C78.7 COLON CANCER METASTASIZED TO LIVER (H): ICD-10-CM

## 2025-03-05 DIAGNOSIS — C18.9 COLON CANCER METASTASIZED TO LIVER (H): ICD-10-CM

## 2025-03-05 DIAGNOSIS — Z79.899 ENCOUNTER FOR LONG-TERM (CURRENT) USE OF MEDICATIONS: ICD-10-CM

## 2025-03-05 DIAGNOSIS — D50.9 MICROCYTIC ANEMIA: ICD-10-CM

## 2025-03-05 DIAGNOSIS — T45.1X5A NEUROPATHY DUE TO CHEMOTHERAPEUTIC DRUG: ICD-10-CM

## 2025-03-05 LAB
ALBUMIN SERPL BCG-MCNC: 3.7 G/DL (ref 3.5–5.2)
ALBUMIN UR-MCNC: 20 MG/DL
ALP SERPL-CCNC: 212 U/L (ref 40–150)
ALT SERPL W P-5'-P-CCNC: 13 U/L (ref 0–70)
ANION GAP SERPL CALCULATED.3IONS-SCNC: 13 MMOL/L (ref 7–15)
AST SERPL W P-5'-P-CCNC: 44 U/L (ref 0–45)
BASOPHILS # BLD AUTO: 0 10E3/UL (ref 0–0.2)
BASOPHILS NFR BLD AUTO: 0 %
BILIRUB SERPL-MCNC: 0.4 MG/DL
BUN SERPL-MCNC: 15.3 MG/DL (ref 6–20)
CALCIUM SERPL-MCNC: 8.8 MG/DL (ref 8.8–10.4)
CHLORIDE SERPL-SCNC: 100 MMOL/L (ref 98–107)
CREAT SERPL-MCNC: 1.13 MG/DL (ref 0.67–1.17)
EGFRCR SERPLBLD CKD-EPI 2021: 79 ML/MIN/1.73M2
EOSINOPHIL # BLD AUTO: 0.1 10E3/UL (ref 0–0.7)
EOSINOPHIL NFR BLD AUTO: 1 %
ERYTHROCYTE [DISTWIDTH] IN BLOOD BY AUTOMATED COUNT: 19.4 % (ref 10–15)
GLUCOSE SERPL-MCNC: 119 MG/DL (ref 70–99)
HCO3 SERPL-SCNC: 21 MMOL/L (ref 22–29)
HCT VFR BLD AUTO: 34.3 % (ref 40–53)
HGB BLD-MCNC: 10.8 G/DL (ref 13.3–17.7)
IMM GRANULOCYTES # BLD: 0.1 10E3/UL
IMM GRANULOCYTES NFR BLD: 1 %
LYMPHOCYTES # BLD AUTO: 0.4 10E3/UL (ref 0.8–5.3)
LYMPHOCYTES NFR BLD AUTO: 6 %
MCH RBC QN AUTO: 26.5 PG (ref 26.5–33)
MCHC RBC AUTO-ENTMCNC: 31.5 G/DL (ref 31.5–36.5)
MCV RBC AUTO: 84 FL (ref 78–100)
MONOCYTES # BLD AUTO: 0.7 10E3/UL (ref 0–1.3)
MONOCYTES NFR BLD AUTO: 11 %
NEUTROPHILS # BLD AUTO: 5.4 10E3/UL (ref 1.6–8.3)
NEUTROPHILS NFR BLD AUTO: 81 %
NRBC # BLD AUTO: 0 10E3/UL
NRBC BLD AUTO-RTO: 0 /100
PLAT MORPH BLD: NORMAL
PLATELET # BLD AUTO: 67 10E3/UL (ref 150–450)
POTASSIUM SERPL-SCNC: 4.5 MMOL/L (ref 3.4–5.3)
PROT SERPL-MCNC: 7.1 G/DL (ref 6.4–8.3)
RBC # BLD AUTO: 4.08 10E6/UL (ref 4.4–5.9)
RBC MORPH BLD: NORMAL
SODIUM SERPL-SCNC: 134 MMOL/L (ref 135–145)
WBC # BLD AUTO: 6.6 10E3/UL (ref 4–11)

## 2025-03-05 PROCEDURE — 36591 DRAW BLOOD OFF VENOUS DEVICE: CPT

## 2025-03-05 PROCEDURE — 250N000011 HC RX IP 250 OP 636: Performed by: STUDENT IN AN ORGANIZED HEALTH CARE EDUCATION/TRAINING PROGRAM

## 2025-03-05 PROCEDURE — 81003 URINALYSIS AUTO W/O SCOPE: CPT | Performed by: STUDENT IN AN ORGANIZED HEALTH CARE EDUCATION/TRAINING PROGRAM

## 2025-03-05 PROCEDURE — 96375 TX/PRO/DX INJ NEW DRUG ADDON: CPT

## 2025-03-05 PROCEDURE — G0498 CHEMO EXTEND IV INFUS W/PUMP: HCPCS

## 2025-03-05 PROCEDURE — 96415 CHEMO IV INFUSION ADDL HR: CPT

## 2025-03-05 PROCEDURE — 80053 COMPREHEN METABOLIC PANEL: CPT

## 2025-03-05 PROCEDURE — 96368 THER/DIAG CONCURRENT INF: CPT

## 2025-03-05 PROCEDURE — 258N000003 HC RX IP 258 OP 636: Performed by: STUDENT IN AN ORGANIZED HEALTH CARE EDUCATION/TRAINING PROGRAM

## 2025-03-05 PROCEDURE — 96376 TX/PRO/DX INJ SAME DRUG ADON: CPT

## 2025-03-05 PROCEDURE — 85004 AUTOMATED DIFF WBC COUNT: CPT | Performed by: STUDENT IN AN ORGANIZED HEALTH CARE EDUCATION/TRAINING PROGRAM

## 2025-03-05 PROCEDURE — 96411 CHEMO IV PUSH ADDL DRUG: CPT

## 2025-03-05 PROCEDURE — 96413 CHEMO IV INFUSION 1 HR: CPT

## 2025-03-05 RX ORDER — HEPARIN SODIUM (PORCINE) LOCK FLUSH IV SOLN 100 UNIT/ML 100 UNIT/ML
5 SOLUTION INTRAVENOUS ONCE
Status: COMPLETED | OUTPATIENT
Start: 2025-03-05 | End: 2025-03-05

## 2025-03-05 RX ORDER — ATROPINE SULFATE 0.4 MG/ML
0.4 AMPUL (ML) INJECTION
Status: COMPLETED | OUTPATIENT
Start: 2025-03-05 | End: 2025-03-05

## 2025-03-05 RX ORDER — GABAPENTIN 100 MG/1
CAPSULE ORAL
Qty: 210 CAPSULE | Refills: 1 | Status: SHIPPED | OUTPATIENT
Start: 2025-03-05

## 2025-03-05 RX ORDER — FLUOROURACIL 50 MG/ML
400 INJECTION, SOLUTION INTRAVENOUS ONCE
Status: COMPLETED | OUTPATIENT
Start: 2025-03-05 | End: 2025-03-05

## 2025-03-05 RX ORDER — LORAZEPAM 0.5 MG/1
0.5 TABLET ORAL EVERY 6 HOURS PRN
Qty: 30 TABLET | Refills: 0 | Status: SHIPPED | OUTPATIENT
Start: 2025-03-05

## 2025-03-05 RX ORDER — CYCLOBENZAPRINE HCL 5 MG
TABLET ORAL
Qty: 45 TABLET | Refills: 2 | Status: SHIPPED | OUTPATIENT
Start: 2025-03-05

## 2025-03-05 RX ORDER — ONDANSETRON 2 MG/ML
8 INJECTION INTRAMUSCULAR; INTRAVENOUS ONCE
Status: COMPLETED | OUTPATIENT
Start: 2025-03-05 | End: 2025-03-05

## 2025-03-05 RX ADMIN — ATROPINE SULFATE 0.4 MG: 0.4 INJECTION, SOLUTION INTRAVENOUS at 08:51

## 2025-03-05 RX ADMIN — HEPARIN 5 ML: 100 SYRINGE at 06:58

## 2025-03-05 RX ADMIN — ONDANSETRON 8 MG: 2 INJECTION INTRAMUSCULAR; INTRAVENOUS at 08:26

## 2025-03-05 RX ADMIN — FLUOROURACIL 770 MG: 50 INJECTION, SOLUTION INTRAVENOUS at 10:42

## 2025-03-05 RX ADMIN — LEUCOVORIN CALCIUM 750 MG: 50 INJECTION, POWDER, LYOPHILIZED, FOR SOLUTION INTRAMUSCULAR; INTRAVENOUS at 08:53

## 2025-03-05 RX ADMIN — FOSAPREPITANT: 150 INJECTION, POWDER, LYOPHILIZED, FOR SOLUTION INTRAVENOUS at 08:25

## 2025-03-05 RX ADMIN — ATROPINE SULFATE 0.4 MG: 0.4 INJECTION, SOLUTION INTRAVENOUS at 09:39

## 2025-03-05 RX ADMIN — IRINOTECAN HYDROCHLORIDE 340 MG: 20 INJECTION, SOLUTION INTRAVENOUS at 08:54

## 2025-03-05 ASSESSMENT — PAIN SCALES - GENERAL: PAINLEVEL_OUTOF10: MODERATE PAIN (5)

## 2025-03-05 NOTE — NURSING NOTE
Chief Complaint   Patient presents with    Port Draw     Labs drawn via port access by lab RN       Port blood draw with heparin flush by lab RN. Vitals taken and appointment arrived. BP 94/62 . Pt denies lightheadedness message sent to infusion. Pt unable to provide urine sample, provided specimen cup     Bee Casper RN

## 2025-03-05 NOTE — TELEPHONE ENCOUNTER
Received electronic request from pharmacy requesting refill of gabapentin.     Last refill: 12/18/24  Last office visit: 2/18/25  Scheduled for follow up 4/17/25     Will route request to MD/ for review.     Reviewed [place]  Report.

## 2025-03-05 NOTE — PROGRESS NOTES
[facility] d/c of Fluorouracil continuous infusion over 46 hours via C series pump.   Scheduled in [facility] for home disconnect on 3/7/25 at 8am.  No Neulasta OnPro/IV fluids needed at this visit.   Neulasta injection Day 4, SO administers.

## 2025-03-05 NOTE — PATIENT INSTRUCTIONS
[facility] Triage and after hours / weekends / holidays:  [phone]    Please call the triage or after hours line if you experience a temperature greater than or equal to 100.4, shaking chills, have uncontrolled nausea, vomiting and/or diarrhea, dizziness, shortness of breath, chest pain, bleeding, unexplained bruising, or if you have any other new/concerning symptoms, questions, or concerns.      If you are having any concerning symptoms or wish to speak to a provider before your next infusion visit, please call your care coordinator or triage to notify them so we can adequately serve you.     If you need a refill on a narcotic prescription or other medication, please call before your infusion appointment.

## 2025-03-05 NOTE — PROGRESS NOTES
"Infusion Nursing Note:  Roman Escalante presents today for Cycle 11 Day 1 bevacizumab-bvzr (HELD), irinotecan, leucovorin, fluorouracil bolus + home pump connect.    Patient seen by provider today: No   present during visit today: Not Applicable.    Note: Roman presents today feeling okay overall. Endorses low back pain, which is being managed adequately with PRN oxycodone. Declines intervention at this visit. Denies nausea/vomiting. Denies fevers/chills. Continues to have productive cough, unchanged. Using nebulizers and steam showers to help with symptoms. Reports intermittently blowing blood clots out of his nose, no bigger than nickel-sized. Bloody noses about once per week, but not difficult to stop. Short of breath with walking back to infusion chair today. Denies chest pain or dizziness/lightheadedness. Denies constipation/diarrhea. Notes that as of late, is only moving his bowels every 5-6 days, but stools are soft and he reports no discomfort in between BMs. Denies urinary issues. Endorses unchanged neuropathy.     TORB Dr. Snow/Parisa Lovell, RN [id number]  \"Potentially Bactrim related. Hold Reyna. Give the rest. Labs next week with potential transfusion\" - re: plts 67K.  Respiratory symptoms likely related to tumors in lungs, monitor for infection    Discussed above with Roman, reviewed infectious signs/symptoms that he should watch for. Reviewed bleeding precautions. Roman would prefer to have labs drawn locally and present for infusion if needed the following day. Scheduling requested.    Atropine given prior to and MCFP through irinotecan infusion per patient request and prior infusions.    Prior to discharge: Port is secured in place with tegaderm and flushed with 10cc NS with positive blood return noted.    Continuous home infusion C-series pump connected.    All connectors secured in place and clamps taped open.    Pump started, \"running\" noted on display (CADD): Not Applicable.   Capillary " element taped to pt's skin per protocol.  Pump Connection double checked with Zoltan Leo RN.  Patient instructed to call our clinic or [facility] with any questions or concerns at home.  Patient verbalized understanding.    Patient set up for pump disconnect + neulasta onpro at home with [facility] on 03/07 at 0800. IB sent to [facility] Coordinator with date/time of appointment.        Intravenous Access:  Implanted Port.    Treatment Conditions:     Latest Reference Range & Units 03/05/25 06:56   Sodium 135 - 145 mmol/L 134 (L)   Potassium 3.4 - 5.3 mmol/L 4.5   Chloride 98 - 107 mmol/L 100   Carbon Dioxide (CO2) 22 - 29 mmol/L 21 (L)   Urea Nitrogen 6.0 - 20.0 mg/dL 15.3   Creatinine 0.67 - 1.17 mg/dL 1.13   GFR Estimate >60 mL/min/1.73m2 79   Calcium 8.8 - 10.4 mg/dL 8.8   Anion Gap 7 - 15 mmol/L 13   Albumin 3.5 - 5.2 g/dL 3.7   Protein Total 6.4 - 8.3 g/dL 7.1   Alkaline Phosphatase 40 - 150 U/L 212 (H)   ALT 0 - 70 U/L 13   AST 0 - 45 U/L 44   Bilirubin Total <=1.2 mg/dL 0.4   Glucose 70 - 99 mg/dL 119 (H)   WBC 4.0 - 11.0 10e3/uL 6.6   Hemoglobin 13.3 - 17.7 g/dL 10.8 (L)   Hematocrit 40.0 - 53.0 % 34.3 (L)   Platelet Count 150 - 450 10e3/uL 67 (L)   RBC Count 4.40 - 5.90 10e6/uL 4.08 (L)   MCV 78 - 100 fL 84   MCH 26.5 - 33.0 pg 26.5   MCHC 31.5 - 36.5 g/dL 31.5   RDW 10.0 - 15.0 % 19.4 (H)   % Neutrophils % 81   % Lymphocytes % 6   % Monocytes % 11   % Eosinophils % 1   % Basophils % 0   Absolute Basophils 0.0 - 0.2 10e3/uL 0.0   Absolute Eosinophils 0.0 - 0.7 10e3/uL 0.1   Absolute Immature Granulocytes <=0.4 10e3/uL 0.1   Absolute Lymphocytes 0.8 - 5.3 10e3/uL 0.4 (L)   Absolute Monocytes 0.0 - 1.3 10e3/uL 0.7   % Immature Granulocytes % 1   Absolute Neutrophils 1.6 - 8.3 10e3/uL 5.4   Absolute NRBCs 10e3/uL 0.0   NRBCs per 100 WBC <1 /100 0   RBC Morphology  Confirmed RBC Indices   Platelet Morphology Automated Count Confirmed. Platelet morphology is normal.  Automated Count  Confirmed. Platelet morphology is normal.     Results reviewed, labs did NOT meet treatment parameters: Plts <75K, see TORB above.  Blood pressure 95/65.      Post Infusion Assessment:  Patient tolerated infusion without incident.  Blood return noted pre and post infusion.  Blood return noted during fluorouracil bolus administration every 2 cc.  Site patent and intact, free from redness, edema or discomfort.  No evidence of extravasations.       Discharge Plan:   Prescription refills given for gabapentin, ativan, flexeril, bactrim, zofran, compazine.  Discharge instructions reviewed with: Patient.  Patient and/or family verbalized understanding of discharge instructions and all questions answered.  AVS to patient via TelnicT.  Patient will return 03/19 for next infusion appointment.   Patient discharged in stable condition accompanied by: self.  Departure Mode: Ambulatory.      Parisa Lovell RN

## 2025-03-06 ENCOUNTER — HOME INFUSION BILLING (OUTPATIENT)
Dept: HOME HEALTH SERVICES | Facility: HOME HEALTH | Age: 50
End: 2025-03-06
Payer: COMMERCIAL

## 2025-03-10 ENCOUNTER — OFFICE VISIT (OUTPATIENT)
Dept: WOUND CARE | Facility: CLINIC | Age: 50
End: 2025-03-10
Payer: COMMERCIAL

## 2025-03-10 DIAGNOSIS — Z43.3 ENCOUNTER FOR ATTENTION TO COLOSTOMY (H): Primary | ICD-10-CM

## 2025-03-10 NOTE — PROGRESS NOTES
"[facility] Ostomy Assessment  Patient comes to clinic for consultation regarding ostomy issues.    Procedure: Colostomy  Dx related to ostomy:rectal cancer  Consulted per Dr Florecita Snow    Subjective:Ostomy care is provided by self   Patient's reason for visit: \"worried about stoma getting a little bigger and herniating\"     The quantity of ostomy supplies needed by a beneficiary is determined primarily by the type of ostomy, its location, its construction, and the condition of the skin surface surrounding the stoma.    Objective:  Type: Colostomy since 02/01/2021  Stoma: 32x35 mm  loop viable, pink-red, round and slightly protruberant  Location: right upper quadrant     Complications: none   Peristomal skin: intact and slight erythema around the stoma and barrier is showing no signs of leakage   Output: , semi-formed  per pt  Frequency of pouch change: twice weekly. This is  scheduled.    He is using [employer] Belt number: 6412-V    Current pouch system/supplies: Emmy  two piece soft convex, cut to fit, ostomy paste and skin prep  [employer] Belt number: 6412-V    Assessment: Patient is concerned about more of the stoma showing.  This appears to be the loop that is protruding a little bit more on the bottom.  He has no complications with leaking or ill feeling pouches.  The area was assessed and it is actually the loop.  He has a small wound at the 11 o'clock position.     Intervention/Plan: The current soft convex barriers are working well and he has no other questions.  Following recommendations were made.Coloplast samples for the soft convex two piece 96410, Pouch opaque  24371, Emmy pouch with filter transparent pouch 75125, beige 83245, Get a stoma wrap from [employer] for swimming    Return to clinic prn .      Alana Escobar NP  was available for supervision of care if needed or if questions should arise and regarding plan of care.  Emmy Alfonso RN  RN CWON      "

## 2025-03-10 NOTE — PATIENT INSTRUCTIONS
Coloplast samples for the soft convex two piece 96261  Pouch opaque  30724    Emmy pouch with filter transparent pouch 18209, beige [id number]    Get a stoma wrap from [facility] for swimming

## 2025-03-11 LAB
ABO + RH BLD: NORMAL
BLD GP AB SCN SERPL QL: NEGATIVE
SPECIMEN EXP DATE BLD: NORMAL

## 2025-03-12 ENCOUNTER — LAB (OUTPATIENT)
Dept: LAB | Facility: CLINIC | Age: 50
End: 2025-03-12
Payer: COMMERCIAL

## 2025-03-12 ENCOUNTER — NURSE TRIAGE (OUTPATIENT)
Dept: ONCOLOGY | Facility: CLINIC | Age: 50
End: 2025-03-12

## 2025-03-12 DIAGNOSIS — R04.0 BLEEDING NOSE: Primary | ICD-10-CM

## 2025-03-12 DIAGNOSIS — C20 RECTAL ADENOCARCINOMA METASTATIC TO LUNG (H): ICD-10-CM

## 2025-03-12 DIAGNOSIS — C78.00 RECTAL ADENOCARCINOMA METASTATIC TO LUNG (H): ICD-10-CM

## 2025-03-12 DIAGNOSIS — R04.0 BLEEDING NOSE: ICD-10-CM

## 2025-03-12 DIAGNOSIS — D50.9 MICROCYTIC ANEMIA: ICD-10-CM

## 2025-03-12 LAB
ALBUMIN SERPL BCG-MCNC: 3.9 G/DL (ref 3.5–5.2)
ALP SERPL-CCNC: 233 U/L (ref 40–150)
ALT SERPL W P-5'-P-CCNC: 20 U/L (ref 0–70)
ANION GAP SERPL CALCULATED.3IONS-SCNC: 12 MMOL/L (ref 7–15)
AST SERPL W P-5'-P-CCNC: 35 U/L (ref 0–45)
BILIRUB SERPL-MCNC: 0.4 MG/DL
BUN SERPL-MCNC: 15.3 MG/DL (ref 6–20)
CALCIUM SERPL-MCNC: 9.5 MG/DL (ref 8.8–10.4)
CHLORIDE SERPL-SCNC: 98 MMOL/L (ref 98–107)
CREAT SERPL-MCNC: 0.85 MG/DL (ref 0.67–1.17)
EGFRCR SERPLBLD CKD-EPI 2021: >90 ML/MIN/1.73M2
GLUCOSE SERPL-MCNC: 102 MG/DL (ref 70–99)
HCO3 SERPL-SCNC: 25 MMOL/L (ref 22–29)
INR PPP: 1.12 (ref 0.85–1.15)
POTASSIUM SERPL-SCNC: 4.3 MMOL/L (ref 3.4–5.3)
PROT SERPL-MCNC: 7.5 G/DL (ref 6.4–8.3)
SODIUM SERPL-SCNC: 135 MMOL/L (ref 135–145)

## 2025-03-12 PROCEDURE — 80053 COMPREHEN METABOLIC PANEL: CPT

## 2025-03-12 PROCEDURE — 86850 RBC ANTIBODY SCREEN: CPT

## 2025-03-12 PROCEDURE — 86900 BLOOD TYPING SEROLOGIC ABO: CPT

## 2025-03-12 PROCEDURE — 85025 COMPLETE CBC W/AUTO DIFF WBC: CPT

## 2025-03-12 PROCEDURE — 36415 COLL VENOUS BLD VENIPUNCTURE: CPT

## 2025-03-12 PROCEDURE — 85610 PROTHROMBIN TIME: CPT

## 2025-03-12 PROCEDURE — 86901 BLOOD TYPING SEROLOGIC RH(D): CPT

## 2025-03-12 NOTE — TELEPHONE ENCOUNTER
Oncology Nurse Triage    Situation:   Roman reporting the following symptoms:  - intermittent nose bleeds, going on for months. In las two days around 12times mainly with blowing nose. A few times has occurred spontaneously.    Background:   Treating Provider:   Dr. Snow    Date of last office visit: 2/19/2025 Dr. Snow    Recent Treatments:3/5/2025 Fluorouracil    Assessment:     Onset: intermittent bloody nose    Continues to have some blood/mucus with bowel movements. Sometimes clots or streak, but not more than what pt has experience in past couple years.     Ongoing shortness of breath with activity walking up stairs or to kitchen. Okay at rest.     Denies bleeding gums, teeth, bruises of unknown origin, or getting bigger, blood in urine.     Pt had infusion on 3/5 and recalls was suppose to get labs about 5 days after and has not had anything scheduled and today is 3/12.     Denies fever, chest pain, dizziness, headaches.     This writer discussed with patient that will have someone call to assist with scheduling lab appointments as ordered for today 3/12/2025.     Recommendations:   1430 Paged provider HANSA Goodman in addition to ABO T&S and CBC with Diff originally expected date for today 3/12,  also add on INR and CMP to lab appt plan STAT.    1434 Scheduling request sent to [facility] to call patient and assist with scheduling lab appointments. Pt is scheduled with local labs today for [place] at 3:00pm.

## 2025-03-13 ENCOUNTER — NURSE TRIAGE (OUTPATIENT)
Dept: ONCOLOGY | Facility: CLINIC | Age: 50
End: 2025-03-13
Payer: COMMERCIAL

## 2025-03-13 DIAGNOSIS — C79.51 COLON CANCER METASTASIZED TO BONE (H): ICD-10-CM

## 2025-03-13 DIAGNOSIS — R58 MUCOSAL BLEEDING: Primary | ICD-10-CM

## 2025-03-13 DIAGNOSIS — C18.9 COLON CANCER METASTASIZED TO BONE (H): ICD-10-CM

## 2025-03-13 LAB
BASOPHILS # BLD AUTO: 0.1 10E3/UL (ref 0–0.2)
BASOPHILS NFR BLD AUTO: 3 %
BURR CELLS BLD QL SMEAR: SLIGHT
DACRYOCYTES BLD QL SMEAR: SLIGHT
ELLIPTOCYTES BLD QL SMEAR: SLIGHT
EOSINOPHIL # BLD AUTO: 0.1 10E3/UL (ref 0–0.7)
EOSINOPHIL NFR BLD AUTO: 1 %
ERYTHROCYTE [DISTWIDTH] IN BLOOD BY AUTOMATED COUNT: 19.8 % (ref 10–15)
GIANT PLATELETS BLD QL SMEAR: SLIGHT
HCT VFR BLD AUTO: 37.1 % (ref 40–53)
HGB BLD-MCNC: 11.6 G/DL (ref 13.3–17.7)
IMM GRANULOCYTES # BLD: 0.2 10E3/UL
IMM GRANULOCYTES NFR BLD: 5 %
LYMPHOCYTES # BLD AUTO: 0.6 10E3/UL (ref 0.8–5.3)
LYMPHOCYTES NFR BLD AUTO: 12 %
MCH RBC QN AUTO: 26.1 PG (ref 26.5–33)
MCHC RBC AUTO-ENTMCNC: 31.3 G/DL (ref 31.5–36.5)
MCV RBC AUTO: 83 FL (ref 78–100)
MONOCYTES # BLD AUTO: 0.5 10E3/UL (ref 0–1.3)
MONOCYTES NFR BLD AUTO: 11 %
NEUTROPHILS # BLD AUTO: 3.2 10E3/UL (ref 1.6–8.3)
NEUTROPHILS NFR BLD AUTO: 68 %
NRBC # BLD AUTO: 0 10E3/UL
NRBC BLD AUTO-RTO: 0 /100
PLAT MORPH BLD: ABNORMAL
PLATELET # BLD AUTO: 46 10E3/UL (ref 150–450)
RBC # BLD AUTO: 4.45 10E6/UL (ref 4.4–5.9)
RBC MORPH BLD: ABNORMAL
WBC # BLD AUTO: 4.7 10E3/UL (ref 4–11)

## 2025-03-13 RX ORDER — EPINEPHRINE 1 MG/ML
0.3 INJECTION, SOLUTION INTRAMUSCULAR; SUBCUTANEOUS EVERY 5 MIN PRN
OUTPATIENT
Start: 2025-03-13

## 2025-03-13 RX ORDER — DIPHENHYDRAMINE HYDROCHLORIDE 50 MG/ML
50 INJECTION, SOLUTION INTRAMUSCULAR; INTRAVENOUS
Start: 2025-03-13

## 2025-03-13 RX ORDER — HEPARIN SODIUM (PORCINE) LOCK FLUSH IV SOLN 100 UNIT/ML 100 UNIT/ML
5 SOLUTION INTRAVENOUS
OUTPATIENT
Start: 2025-03-13

## 2025-03-13 RX ORDER — HEPARIN SODIUM,PORCINE 10 UNIT/ML
5-20 VIAL (ML) INTRAVENOUS DAILY PRN
OUTPATIENT
Start: 2025-03-13

## 2025-03-13 NOTE — TELEPHONE ENCOUNTER
DATE/TIME OF CALL RECEIVED FROM LAB:  03/13/25 at 2:28 PM   Critical LAB TEST:  Plt 46  Other labs: Hgb 11.6  Last LAB VALUE:  03/05/25 Plt 67, Hgb 10.8  PROVIDER NOTIFIED?: Yes  PROVIDER NAME:   DATE/TIME LAB VALUE REPORTED TO PROVIDER: 3/13/2025 1433  MECHANISM OF PROVIDER NOTIFICATION:  Hithru  PROVIDER RESPONSE:   5984  acknowledging critical result. Requesting pt be scheduled for plt transfusion since he is symptomatically thrombocytopenic. Okay for today or tomorrow.  will place orders.    1453 Call to pt and informed him of provider recommendation. Informed pt request has been sent to have him added to wait list for tomorrow.Reviewed red flag symptoms that would warrant seeking care in ER.  Pt verbalized understanding.

## 2025-03-13 NOTE — TELEPHONE ENCOUNTER
"[facility]: Cancer Care                                                                                          CBC still in process as of 10:03 AM 3/13/2025, writer called [place] lab and was told tech is \"investigating\" and will call writer back, direct extension given [phone] to call back.    Stated specimen was \"flagged\" and has to be redone at  lab, pickup will be 12:30 pm. Inquired about prelim and was told Hgb 11.7 and ptl 64.     No blood plan and parameters on file, discuss with provider.    Signature:  Virgen Nieto RN  "

## 2025-03-14 ENCOUNTER — ANCILLARY PROCEDURE (OUTPATIENT)
Dept: GENERAL RADIOLOGY | Facility: CLINIC | Age: 50
End: 2025-03-14
Attending: FAMILY MEDICINE
Payer: COMMERCIAL

## 2025-03-14 ENCOUNTER — OFFICE VISIT (OUTPATIENT)
Dept: FAMILY MEDICINE | Facility: CLINIC | Age: 50
End: 2025-03-14
Payer: COMMERCIAL

## 2025-03-14 VITALS
BODY MASS INDEX: 23.94 KG/M2 | DIASTOLIC BLOOD PRESSURE: 68 MMHG | OXYGEN SATURATION: 93 % | WEIGHT: 171 LBS | SYSTOLIC BLOOD PRESSURE: 99 MMHG | RESPIRATION RATE: 16 BRPM | HEIGHT: 71 IN | TEMPERATURE: 97.7 F | HEART RATE: 77 BPM

## 2025-03-14 DIAGNOSIS — R05.3 CHRONIC COUGH: ICD-10-CM

## 2025-03-14 DIAGNOSIS — J01.00 ACUTE NON-RECURRENT MAXILLARY SINUSITIS: Primary | ICD-10-CM

## 2025-03-14 DIAGNOSIS — J35.8 TONSIL STONE: ICD-10-CM

## 2025-03-14 PROCEDURE — 99214 OFFICE O/P EST MOD 30 MIN: CPT | Performed by: FAMILY MEDICINE

## 2025-03-14 PROCEDURE — 3078F DIAST BP <80 MM HG: CPT | Performed by: FAMILY MEDICINE

## 2025-03-14 PROCEDURE — 71046 X-RAY EXAM CHEST 2 VIEWS: CPT | Mod: TC | Performed by: STUDENT IN AN ORGANIZED HEALTH CARE EDUCATION/TRAINING PROGRAM

## 2025-03-14 PROCEDURE — G2211 COMPLEX E/M VISIT ADD ON: HCPCS | Performed by: FAMILY MEDICINE

## 2025-03-14 PROCEDURE — 3074F SYST BP LT 130 MM HG: CPT | Performed by: FAMILY MEDICINE

## 2025-03-14 RX ORDER — FLUTICASONE PROPIONATE 50 MCG
1 SPRAY, SUSPENSION (ML) NASAL DAILY
Qty: 16 G | Refills: 2 | Status: SHIPPED | OUTPATIENT
Start: 2025-03-14

## 2025-03-14 NOTE — PROGRESS NOTES
"  {PROVIDER CHARTING PREFERENCE:101078}    America Owens is a 49 year old, presenting for the following health issues:  RECHECK (Follow up infectious disease appointment)      3/14/2025    10:20 AM   Additional Questions   Roomed by Peri   Accompanied by christian         3/14/2025    10:20 AM   Patient Reported Additional Medications   Patient reports taking the following new medications none     History of Present Illness       Reason for visit:  Bloody noses, reuring cough, vacines    He eats 2-3 servings of fruits and vegetables daily.He consumes 1 sweetened beverage(s) daily.He exercises with enough effort to increase his heart rate 10 to 19 minutes per day.  He exercises with enough effort to increase his heart rate 6 days per week.   He is taking medications regularly.      Constant cough - productive  Rhinitis  Bloody noses - has been happening since using oxygen - has been off since february  Maxillary sinus pressure  Clear nasal drainage  1 bactrim M/W/Friday    Oral growth  Hard white object    {MA/LPN/RN Pre-Provider Visit Orders- hCG/UA/Strep (Optional):937787}  {SUPERLIST (Optional):881523}  {additonal problems for provider to add (Optional):381098}    {ROS Picklists (Optional):506742}      Objective    BP 99/68   Pulse 77   Temp 97.7  F (36.5  C) (Temporal)   Resp 16   Ht 1.803 m (5' 11\")   Wt 77.6 kg (171 lb)   SpO2 93%   BMI 23.85 kg/m    Body mass index is 23.85 kg/m .  Physical Exam   {Exam List (Optional):844565}    {Diagnostic Test Results (Optional):710506}        Signed Electronically by: Buddy Smith MD  {Email feedback regarding this note to [email or web address]   :860505}  " Exam  Constitutional:       General: He is not in acute distress.     Appearance: Normal appearance. He is well-developed. He is not ill-appearing.   HENT:      Head: Normocephalic and atraumatic.      Right Ear: External ear normal.      Left Ear: External ear normal.      Nose: Nose normal.   Eyes:      General: No scleral icterus.     Extraocular Movements: Extraocular movements intact.      Conjunctiva/sclera: Conjunctivae normal.   Cardiovascular:      Rate and Rhythm: Normal rate.   Pulmonary:      Effort: Pulmonary effort is normal.   Musculoskeletal:      Cervical back: Normal range of motion and neck supple.   Skin:     General: Skin is warm and dry.   Neurological:      Mental Status: He is alert and oriented to person, place, and time.   Psychiatric:         Behavior: Behavior normal.         Thought Content: Thought content normal.         Judgment: Judgment normal.                    Signed Electronically by: Buddy Smith MD

## 2025-03-14 NOTE — PROGRESS NOTES
Virtual Visit Details    Type of service:  Video Visit   Video Start Time: 10:03 AM  Video End Time:10:16 AM    Originating Location (pt. Location): Home  Distant Location (provider location):  Off-site  Platform used for Video Visit: Well      [facility] - Medical Oncology Follow Up Note  03/14/2025    Oncology History:   Patient developed rectal bleeding in 2020.  In January 2021 CT showed focal wall thickening in the upper rectum, multiple pulmonary nodules bilaterally suspicious for metastatic disease.  Underwent FNA of the right upper lobe lesion which was consistent with adenocarcinoma of the colon.  He was treated with FOLFOX from 2/20/2021 through 5/20/2021.  Avastin was added.  Had an infusion reaction to oxaliplatin 6/2021.  7/2021- 12/2021 was on 5-FU alone.  Developed progression.  12/2021- 9/2022 was on FOLFIRI.  11/2021 started Lonsurf. All of this was done with outside oncologist.     Met with Dr. Snow on 2/3/2023 to establish care. Recommended regorafenib.     Started regorafenib on 3/2/2023. Progression noted 5/19/23. Plan to start FOLFOX/Avastin and send out CARIS testing to evaluate for other treatment options. Cycle 1 was given with oxaliplatin desensitization, 5FU, Avastin held due to increased urine protein.     Following cycle 4, patient was admitted with hematuria and acute kidney injury.    Oxaliplatin was dropped with Cycle 5.     Y-90 radioembolization 9/7.    10/24/23 CT CAP with disease progression with notable growth in all pulmonary lesions. Lonsurf + bevacizumab started 10/27/23 with plans to bridge to clinical trial.   11/15/23 - stopped chemo for [facility] study.   Currently enrolled in [facility] CAR-T trial (completed cell harvest 1/23/2024, tentatively planning administration of CAR-T in April 2/12/24 - resumed Lonsurf, bevacizumab to bridge treatment until CAR-T therapy  3/20/24 - treatment changed to Fruquintinib due to intolerability of Lonsurf/bevacizumab  4/2/24-  Presented to the ED with dehydration. Stopped Fruquintnib.   6/2024- cellular therapy transplant at [facility].   7/30/24- CT imaging demonstrating marked progression of pulmonary and hepatic lesions, clinical trial failed to generate T-cell graft. Multiple discussions regarding plan of care, hospice vs additional lines of treatment. Patient decided he is not ready for hospice. He resumed treatment with FOLFIRI + bevacizumab on 8/30/24.   10/2024 - CT CAP with continued response, plan to continue FOLFIRI + bevacizumab with the addition of Zometa. Per his dentist, needs dental work completed prior to clearance. (Scheduled 1/2 and 1/21).   12/2024 chemo held for break over new Years per patient's choice.  1/15/25 chemo held due to ongoing respiratory illness, + RSV, also treated with levaquin for pneumonia on CXR, chemo deferred   1/22-1/25/25- hospitalized with suspicion for fungal pneumonia  1/28-1/29/25- hospitalized for PJP pneumonia, started on treatment with Bactrim and steroids  2/19/25 CT CAP shows disease progression, occurred while on treatment break for pneumonia, resumed same chemotherapy with FOLFIRI/chelsea on 2/20/25    Interval History:   -Energy is improving as of yesterday.   -Diarrhea resolved Saturday night. Now, eating a bit better. Took Imodium on Saturday x 2.   -Has not had more stools since then.   -Had severe rectal pain prior to resuming chemotherapy in February. Rectal pain has resolved, but still has back pain. Now taking oxycodone 1-2/day for the back pain.   -Has some abdominal gas and cramping, worse when had diarrhea.   -Nosebleeds have improved. Now has some spotting and clots, but not full nosebleeds.   -Will see dentist on Tuesday, 3/18, for final dental visit. Plan for veneer on front lower teeth and large filling on the left back side.     Current Outpatient Medications   Medication Sig Dispense Refill    acetaminophen (TYLENOL) 500 MG tablet Take 500-1,000 mg by mouth as needed for mild  "pain. (Patient not taking: Reported on 3/14/2025)      amoxicillin-clavulanate (AUGMENTIN) 875-125 MG tablet Take 1 tablet by mouth 2 times daily for 7 days. 14 tablet 0    Chemo Kit For RN use only. Do not remove items from bag. Contents: 1  sodium chloride 0.9% flush, 4 medium gloves, 1 chemo gown, 1/4 chemo mat, 1 connector female, 1 chemo bag. [id number] kit 0    cyclobenzaprine (FLEXERIL) 5 MG tablet Take one tab (5mg) by mouth over 6-8 hours, as needed for spasms 45 tablet 2    Emergency Supply Kit, Central, Patient use for emergency only. Contents: 3 sodium chloride 0.9% flushes, 1 dressing kit, 1 microclave ext set 14\", 4 nitrile gloves (med), 6 alcohol prep pads, 1 bacitracin, 1 syringe (10 cc 20 G 1\"). Call [phone] to reorder. [id number] kit 0    fluorouracil (ADRUCIL) 2.5 GM/50ML SOLN injection       Fluorouracil (ADRUCIL) 4,610 mg in sodium chloride 0.9 % 241 mL via HOMEPUMP C-Series Infuse 4,610 mg at 5.2 mL/hr over 46 hours into the vein once for 1 dose. 705339 mL 0    fluticasone (FLONASE) 50 MCG/ACT nasal spray Spray 1 spray into both nostrils daily. 16 g 2    gabapentin (NEURONTIN) 100 MG capsule TAKE 1 TO 2 CAPSULES (100-200 MG) BY MOUTH UP TO TWICE DURING THE DAY AND 3 CAPSULES (300 MG) AT BEDTIME. 210 capsule 1    guaiFENesin (MUCINEX) 600 MG 12 hr tablet Take 2 tablets (1,200 mg) by mouth 2 times daily. (Patient not taking: Reported on 3/5/2025) 20 tablet 0    ibuprofen (ADVIL/MOTRIN) 200 MG tablet Take 3 tablets (600 mg) by mouth every 8 hours as needed for moderate pain 100 tablet 0    ipratropium - albuterol 0.5 mg/2.5 mg/3 mL (DUONEB) 0.5-2.5 (3) MG/3ML neb solution Take 1 vial (3 mLs) by nebulization every 6 hours as needed for shortness of breath, wheezing or cough. 90 mL 3    LORazepam (ATIVAN) 0.5 MG tablet Take 1 tablet (0.5 mg) by mouth every 6 hours as needed for anxiety. 30 tablet 0    NARCAN 4 MG/0.1ML nasal spray  (Patient not taking: Reported on 3/5/2025)      ondansetron (ZOFRAN " ODT) 4 MG ODT tab Take 1 tablet (4 mg) by mouth every 6 hours as needed for nausea. 30 tablet 3    Ostomy Supplies MISC 1 each daily 1 each 11    oxyCODONE (ROXICODONE) 10 MG tablet Take 1-2 tablets (10-20 mg) by mouth every 4 hours as needed for pain. 144 tablet 0    pegfilgrastim (NEULASTA) 6 MG/0.6ML injection Inject 0.6 mLs (6 mg) subcutaneously every 14 days. Administer 24 hours after chemotherapy disconnect 3.6 mL 0    Port Access Kit For nurse use only.  Do not remove items from bag.  Use for port access.  Do not place syringe on sterile field. [id number] kit 0    prochlorperazine (COMPAZINE) 10 MG tablet Take 1 tablet (10 mg) by mouth every 6 hours as needed for nausea or vomiting 30 tablet 2    sodium chloride, PF, 0.9% PF flush Inject 10 mLs into the vein as needed for line flush. Flush IV before and after med administration as directed and/or at least every 24 hours, or prior to deaccessing for no further use and/or at least every 4 weeks when not accessed. 784313 mL 0    sulfamethoxazole-trimethoprim (BACTRIM DS) 800-160 MG tablet Take 1 tablet by mouth Every Mon, Wed, Fri Morning. Please start this prophylaxis dose of bactrim once you have completed your 21 days of bactrim treatment 12 tablet 1    VENTOLIN  (90 Base) MCG/ACT inhaler INHALE 2 PUFFS INTO THE LUNGS EVERY 6 HOURS AS NEEDED FOR SHORTNESS OF BREATH OR WHEEZING OR COUGH 18 g 2     Objective:  General: patient appears well in no acute distress, alert and oriented, speech clear and fluid  Skin: no visualized rash or lesions on visualized skin  Resp: Appears to be breathing comfortably without accessory muscle usage, speaking in full sentences, no audible wheezes or cough.  Psych: Coherent speech, normal rate and volume, able to articulate logical thoughts, able to abstract reason, no tangential thoughts, no hallucinations or delusions  Patient's affect is appropriate.    LABS  Most Recent 3 CBC's:  Recent Labs   Lab Test 03/12/25  1507  03/05/25  0656 02/20/25  0638   WBC 4.7 6.6 7.9   HGB 11.6* 10.8* 11.7*   MCV 83 84 83   PLT 46* 67* 127*    Most Recent 3 BMP's:  Recent Labs   Lab Test 03/14/25  0830 03/12/25  1507 03/05/25  0656   * 135 134*   POTASSIUM 4.2 4.3 4.5   CHLORIDE 101 98 100   CO2 20* 25 21*   BUN 16.3 15.3 15.3   CR 0.85 0.85 1.13   ANIONGAP 13 12 13   JEFERSON 8.8 9.5 8.8   * 102* 119*    Most Recent 2 LFT's:  Recent Labs   Lab Test 03/14/25  0830 03/12/25  1507   AST 36 35   ALT 21 20   ALKPHOS 202* 233*   BILITOTAL 0.3 0.4   I reviewed the above labs today.    ASSESSMENT AND PLAN   Metastatic rectal cancer  He started on treatment with FOLFIRI + bevacizumab on 8/30/24. Imaging in October 2024 showed improvement in his disease. Imaging in February 2025 showed disease progression, though this was in the setting of treatment break due to PJP pneumonia. Therefore he resumed the same chemotherapy with FOLFIRI/chelsea on 2/20/25. He is tolerating chemotherapy fairly well, though developed more significant thrombocytopenia, likely related to Bactrim. He will return to clinic later this week for consideration of cycle 12 FOLFIRI/chelsea with Neulasta support I have lowered his treatment parameter to give treatment if platelets at least 65,000. If lower than 65,000, will hold chemotherapy by 1 week and consider a dose reduction of his chemotherapy. Will plan to repeat imaging in mid-April.     Epistaxis  Secondary to bevacizumab, worsened with thrombocytopenia. Bevacizumab was held on 3/5/25. He was given a platelet transfusion on 3/14/24. His bleeding has improved. Will recheck his labs this week to determine the final plan for treatment, as noted above. Recommend nasal saline spray, Aquaphor or Vasleine at bedtime in the nares, and using a humidifier in bedroom at night.    Back pain  Secondary to bone mets. Following with palliative care, next in April. Managed with oxycodone prn, currently 1-2/day.     Iron deficient anemia  Treated  with 3 doses of Venofer, last on 10/4/24. Last check of iron studies in February 2025 unclear if anemia of chronic disease or mixed with recurrent iron deficiency. MCV has been trending down again. Will recheck iron studies including a soluble transferrin receptor.     Bone mets  Patient will obtain dental clearance prior to starting on treatment with Zometa.     Diarrhea  Resolved with Imodium. C.diff was negative. Question if he has a viral gastroenteritis.     PJP pneumonia  Following with ID. Completed course of high dose Bactrim, now on PJP prophylaxis with M/W/F Bactrim for a minimum of 6 months. Per ID, will then reassess the immune system and the CD4 count prior to stopping the Bactrim prophylaxis.     Radha Chapman PA-C  [facility]  [address] [place], [place] [address] [phone]    40 minutes spent on the date of the encounter doing chart review, review of test results, interpretation of tests, patient visit, and documentation     The longitudinal plan of care for the diagnosis(es)/condition(s) as documented were addressed during this visit. Due to the added complexity in care, I will continue to support Roman Escalante in the subsequent management and with ongoing continuity of care.    Addendum: Labs consistent with anemia of chronic disease. Will continue to monitor.

## 2025-03-17 ENCOUNTER — VIRTUAL VISIT (OUTPATIENT)
Dept: ONCOLOGY | Facility: CLINIC | Age: 50
End: 2025-03-17
Attending: STUDENT IN AN ORGANIZED HEALTH CARE EDUCATION/TRAINING PROGRAM
Payer: COMMERCIAL

## 2025-03-17 VITALS — WEIGHT: 175 LBS | BODY MASS INDEX: 24.5 KG/M2 | HEIGHT: 71 IN

## 2025-03-17 DIAGNOSIS — D50.0 IRON DEFICIENCY ANEMIA DUE TO CHRONIC BLOOD LOSS: ICD-10-CM

## 2025-03-17 DIAGNOSIS — C18.9 COLON CANCER METASTASIZED TO BONE (H): Primary | ICD-10-CM

## 2025-03-17 DIAGNOSIS — C79.51 COLON CANCER METASTASIZED TO BONE (H): Primary | ICD-10-CM

## 2025-03-17 LAB
FERRITIN SERPL-MCNC: 2280 NG/ML (ref 31–409)
IRON BINDING CAPACITY (ROCHE): 202 UG/DL (ref 240–430)
IRON SATN MFR SERPL: 16 % (ref 15–46)
IRON SERPL-MCNC: 33 UG/DL (ref 61–157)

## 2025-03-17 RX ORDER — FLUOROURACIL 50 MG/ML
400 INJECTION, SOLUTION INTRAVENOUS ONCE
Status: CANCELLED | OUTPATIENT
Start: 2025-03-19

## 2025-03-17 ASSESSMENT — PAIN SCALES - GENERAL: PAINLEVEL_OUTOF10: NO PAIN (0)

## 2025-03-17 NOTE — LETTER
3/17/2025      Roman Escalante  [address]  [facility] [address]      Dear Colleague,    Thank you for referring your patient, Roman Escalante, to the [facility]. Please see a copy of my visit note below.    Virtual Visit Details    Type of service:  Video Visit   Video Start Time: 10:03 AM  Video End Time:10:16 AM    Originating Location (pt. Location): Home  Distant Location (provider location):  Off-site  Platform used for Video Visit: [facility] - Medical Oncology Follow Up Note  03/14/2025    Oncology History:   Patient developed rectal bleeding in 2020.  In January 2021 CT showed focal wall thickening in the upper rectum, multiple pulmonary nodules bilaterally suspicious for metastatic disease.  Underwent FNA of the right upper lobe lesion which was consistent with adenocarcinoma of the colon.  He was treated with FOLFOX from 2/20/2021 through 5/20/2021.  Avastin was added.  Had an infusion reaction to oxaliplatin 6/2021.  7/2021- 12/2021 was on 5-FU alone.  Developed progression.  12/2021- 9/2022 was on FOLFIRI.  11/2021 started Lonsurf. All of this was done with outside oncologist.     Met with Dr. Snow on 2/3/2023 to establish care. Recommended regorafenib.     Started regorafenib on 3/2/2023. Progression noted 5/19/23. Plan to start FOLFOX/Avastin and send out CARIS testing to evaluate for other treatment options. Cycle 1 was given with oxaliplatin desensitization, 5FU, Avastin held due to increased urine protein.     Following cycle 4, patient was admitted with hematuria and acute kidney injury.    Oxaliplatin was dropped with Cycle 5.     Y-90 radioembolization 9/7.    10/24/23 CT CAP with disease progression with notable growth in all pulmonary lesions. Lonsurf + bevacizumab started 10/27/23 with plans to bridge to clinical trial.   11/15/23 - stopped chemo for [facility] study.   Currently enrolled in [facility] CAR-T trial (completed cell  harvest 1/23/2024, tentatively planning administration of CAR-T in April 2/12/24 - resumed Lonsurf, bevacizumab to bridge treatment until CAR-T therapy  3/20/24 - treatment changed to Fruquintinib due to intolerability of Lonsurf/bevacizumab  4/2/24- Presented to the ED with dehydration. Stopped Fruquintnib.   6/2024- cellular therapy transplant at [facility].   7/30/24- CT imaging demonstrating marked progression of pulmonary and hepatic lesions, clinical trial failed to generate T-cell graft. Multiple discussions regarding plan of care, hospice vs additional lines of treatment. Patient decided he is not ready for hospice. He resumed treatment with FOLFIRI + bevacizumab on 8/30/24.   10/2024 - CT CAP with continued response, plan to continue FOLFIRI + bevacizumab with the addition of Zometa. Per his dentist, needs dental work completed prior to clearance. (Scheduled 1/2 and 1/21).   12/2024 chemo held for break over new Years per patient's choice.  1/15/25 chemo held due to ongoing respiratory illness, + RSV, also treated with levaquin for pneumonia on CXR, chemo deferred   1/22-1/25/25- hospitalized with suspicion for fungal pneumonia  1/28-1/29/25- hospitalized for PJP pneumonia, started on treatment with Bactrim and steroids  2/19/25 CT CAP shows disease progression, occurred while on treatment break for pneumonia, resumed same chemotherapy with FOLFIRI/chelsea on 2/20/25    Interval History:   -Energy is improving as of yesterday.   -Diarrhea resolved Saturday night. Now, eating a bit better. Took Imodium on Saturday x 2.   -Has not had more stools since then.   -Had severe rectal pain prior to resuming chemotherapy in February. Rectal pain has resolved, but still has back pain. Now taking oxycodone 1-2/day for the back pain.   -Has some abdominal gas and cramping, worse when had diarrhea.   -Nosebleeds have improved. Now has some spotting and clots, but not full nosebleeds.   -Will see dentist on Tuesday, 3/18, for  "final dental visit. Plan for veneer on front lower teeth and large filling on the left back side.     Current Outpatient Medications   Medication Sig Dispense Refill     acetaminophen (TYLENOL) 500 MG tablet Take 500-1,000 mg by mouth as needed for mild pain. (Patient not taking: Reported on 3/14/2025)       amoxicillin-clavulanate (AUGMENTIN) 875-125 MG tablet Take 1 tablet by mouth 2 times daily for 7 days. 14 tablet 0     Chemo Kit For RN use only. Do not remove items from bag. Contents: 1  sodium chloride 0.9% flush, 4 medium gloves, 1 chemo gown, 1/4 chemo mat, 1 connector female, 1 chemo bag. [id number] kit 0     cyclobenzaprine (FLEXERIL) 5 MG tablet Take one tab (5mg) by mouth over 6-8 hours, as needed for spasms 45 tablet 2     Emergency Supply Kit, Central, Patient use for emergency only. Contents: 3 sodium chloride 0.9% flushes, 1 dressing kit, 1 microclave ext set 14\", 4 nitrile gloves (med), 6 alcohol prep pads, 1 bacitracin, 1 syringe (10 cc 20 G 1\"). Call [phone] to reorder. [id number] kit 0     fluorouracil (ADRUCIL) 2.5 GM/50ML SOLN injection        Fluorouracil (ADRUCIL) 4,610 mg in sodium chloride 0.9 % 241 mL via HOMEPUMP C-Series Infuse 4,610 mg at 5.2 mL/hr over 46 hours into the vein once for 1 dose. 341530 mL 0     fluticasone (FLONASE) 50 MCG/ACT nasal spray Spray 1 spray into both nostrils daily. 16 g 2     gabapentin (NEURONTIN) 100 MG capsule TAKE 1 TO 2 CAPSULES (100-200 MG) BY MOUTH UP TO TWICE DURING THE DAY AND 3 CAPSULES (300 MG) AT BEDTIME. 210 capsule 1     guaiFENesin (MUCINEX) 600 MG 12 hr tablet Take 2 tablets (1,200 mg) by mouth 2 times daily. (Patient not taking: Reported on 3/5/2025) 20 tablet 0     ibuprofen (ADVIL/MOTRIN) 200 MG tablet Take 3 tablets (600 mg) by mouth every 8 hours as needed for moderate pain 100 tablet 0     ipratropium - albuterol 0.5 mg/2.5 mg/3 mL (DUONEB) 0.5-2.5 (3) MG/3ML neb solution Take 1 vial (3 mLs) by nebulization every 6 hours as needed for " shortness of breath, wheezing or cough. 90 mL 3     LORazepam (ATIVAN) 0.5 MG tablet Take 1 tablet (0.5 mg) by mouth every 6 hours as needed for anxiety. 30 tablet 0     NARCAN 4 MG/0.1ML nasal spray  (Patient not taking: Reported on 3/5/2025)       ondansetron (ZOFRAN ODT) 4 MG ODT tab Take 1 tablet (4 mg) by mouth every 6 hours as needed for nausea. 30 tablet 3     Ostomy Supplies MISC 1 each daily 1 each 11     oxyCODONE (ROXICODONE) 10 MG tablet Take 1-2 tablets (10-20 mg) by mouth every 4 hours as needed for pain. 144 tablet 0     pegfilgrastim (NEULASTA) 6 MG/0.6ML injection Inject 0.6 mLs (6 mg) subcutaneously every 14 days. Administer 24 hours after chemotherapy disconnect 3.6 mL 0     Port Access Kit For nurse use only.  Do not remove items from bag.  Use for port access.  Do not place syringe on sterile field. [id number] kit 0     prochlorperazine (COMPAZINE) 10 MG tablet Take 1 tablet (10 mg) by mouth every 6 hours as needed for nausea or vomiting 30 tablet 2     sodium chloride, PF, 0.9% PF flush Inject 10 mLs into the vein as needed for line flush. Flush IV before and after med administration as directed and/or at least every 24 hours, or prior to deaccessing for no further use and/or at least every 4 weeks when not accessed. [id number] mL 0     sulfamethoxazole-trimethoprim (BACTRIM DS) 800-160 MG tablet Take 1 tablet by mouth Every Mon, Wed, Fri Morning. Please start this prophylaxis dose of bactrim once you have completed your 21 days of bactrim treatment 12 tablet 1     VENTOLIN  (90 Base) MCG/ACT inhaler INHALE 2 PUFFS INTO THE LUNGS EVERY 6 HOURS AS NEEDED FOR SHORTNESS OF BREATH OR WHEEZING OR COUGH 18 g 2     Objective:  General: patient appears well in no acute distress, alert and oriented, speech clear and fluid  Skin: no visualized rash or lesions on visualized skin  Resp: Appears to be breathing comfortably without accessory muscle usage, speaking in full sentences, no audible wheezes or  cough.  Psych: Coherent speech, normal rate and volume, able to articulate logical thoughts, able to abstract reason, no tangential thoughts, no hallucinations or delusions  Patient's affect is appropriate.    LABS  Most Recent 3 CBC's:  Recent Labs   Lab Test 03/12/25  1507 03/05/25  0656 02/20/25  0638   WBC 4.7 6.6 7.9   HGB 11.6* 10.8* 11.7*   MCV 83 84 83   PLT 46* 67* 127*    Most Recent 3 BMP's:  Recent Labs   Lab Test 03/14/25  0830 03/12/25  1507 03/05/25  0656   * 135 134*   POTASSIUM 4.2 4.3 4.5   CHLORIDE 101 98 100   CO2 20* 25 21*   BUN 16.3 15.3 15.3   CR 0.85 0.85 1.13   ANIONGAP 13 12 13   JEFERSON 8.8 9.5 8.8   * 102* 119*    Most Recent 2 LFT's:  Recent Labs   Lab Test 03/14/25  0830 03/12/25  1507   AST 36 35   ALT 21 20   ALKPHOS 202* 233*   BILITOTAL 0.3 0.4   I reviewed the above labs today.    ASSESSMENT AND PLAN   Metastatic rectal cancer  He started on treatment with FOLFIRI + bevacizumab on 8/30/24. Imaging in October 2024 showed improvement in his disease. Imaging in February 2025 showed disease progression, though this was in the setting of treatment break due to PJP pneumonia. Therefore he resumed the same chemotherapy with FOLFIRI/chelsea on 2/20/25. He is tolerating chemotherapy fairly well, though developed more significant thrombocytopenia, likely related to Bactrim. He will return to clinic later this week for consideration of cycle 12 FOLFIRI/chelsea with Neulasta support I have lowered his treatment parameter to give treatment if platelets at least 65,000. If lower than 65,000, will hold chemotherapy by 1 week and consider a dose reduction of his chemotherapy. Will plan to repeat imaging in mid-April.     Epistaxis  Secondary to bevacizumab, worsened with thrombocytopenia. Bevacizumab was held on 3/5/25. He was given a platelet transfusion on 3/14/24. His bleeding has improved. Will recheck his labs this week to determine the final plan for treatment, as noted above. Recommend  nasal saline spray, Aquaphor or Vasleine at bedtime in the nares, and using a humidifier in bedroom at night.    Back pain  Secondary to bone mets. Following with palliative care, next in April. Managed with oxycodone prn, currently 1-2/day.     Iron deficient anemia  Treated with 3 doses of Venofer, last on 10/4/24. Last check of iron studies in February 2025 unclear if anemia of chronic disease or mixed with recurrent iron deficiency. MCV has been trending down again. Will recheck iron studies including a soluble transferrin receptor.     Bone mets  Patient will obtain dental clearance prior to starting on treatment with Zometa.     Diarrhea  Resolved with Imodium. C.diff was negative. Question if he has a viral gastroenteritis.     PJP pneumonia  Following with ID. Completed course of high dose Bactrim, now on PJP prophylaxis with M/W/F Bactrim for a minimum of 6 months. Per ID, will then reassess the immune system and the CD4 count prior to stopping the Bactrim prophylaxis.     Radha Chapman PA-C  [facility] [place] [id number] [phone]    40 minutes spent on the date of the encounter doing chart review, review of test results, interpretation of tests, patient visit, and documentation     The longitudinal plan of care for the diagnosis(es)/condition(s) as documented were addressed during this visit. Due to the added complexity in care, I will continue to support Roman Escalante in the subsequent management and with ongoing continuity of care.        Again, thank you for allowing me to participate in the care of your patient.        Sincerely,        Radha Chapman PA-C    Electronically signed

## 2025-03-17 NOTE — NURSING NOTE
Current patient location:  [place]    Is the patient currently in the state of [place]? YES    Visit mode: VIDEO    If the visit is dropped, the patient can be reconnected by:VIDEO VISIT: Text to cell phone:   Telephone Information:   Mobile [phone]       Will anyone else be joining the visit? NO  (If patient encounters technical issues they should call [phone])    Are changes needed to the allergy or medication list? No    Are refills needed on medications prescribed by this physician? NO    Rooming Documentation:  Questionnaire(s) not done per department protocol    Reason for visit: VIDAL RODRIGUEZ

## 2025-03-17 NOTE — PATIENT INSTRUCTIONS
For nosebleeds, recommend nasal saline spray, Aquaphor or Vasleine at bedtime in the nares, and using a humidifier in bedroom at night.

## 2025-03-18 ENCOUNTER — HOME INFUSION (OUTPATIENT)
Dept: HOME HEALTH SERVICES | Facility: HOME HEALTH | Age: 50
End: 2025-03-18
Payer: COMMERCIAL

## 2025-03-18 DIAGNOSIS — C20 MALIGNANT TUMOR OF RECTUM (H): ICD-10-CM

## 2025-03-18 LAB — STFR SERPL-MCNC: 3.9 MG/L

## 2025-03-19 ENCOUNTER — DOCUMENTATION ONLY (OUTPATIENT)
Dept: HOME HEALTH SERVICES | Facility: HOME HEALTH | Age: 50
End: 2025-03-19
Payer: COMMERCIAL

## 2025-03-19 ENCOUNTER — HOME INFUSION BILLING (OUTPATIENT)
Dept: HOME HEALTH SERVICES | Facility: HOME HEALTH | Age: 50
End: 2025-03-19
Payer: COMMERCIAL

## 2025-03-19 ENCOUNTER — INFUSION THERAPY VISIT (OUTPATIENT)
Dept: ONCOLOGY | Facility: CLINIC | Age: 50
End: 2025-03-19
Attending: STUDENT IN AN ORGANIZED HEALTH CARE EDUCATION/TRAINING PROGRAM
Payer: COMMERCIAL

## 2025-03-19 ENCOUNTER — APPOINTMENT (OUTPATIENT)
Dept: LAB | Facility: CLINIC | Age: 50
End: 2025-03-19
Attending: STUDENT IN AN ORGANIZED HEALTH CARE EDUCATION/TRAINING PROGRAM
Payer: COMMERCIAL

## 2025-03-19 VITALS
SYSTOLIC BLOOD PRESSURE: 124 MMHG | RESPIRATION RATE: 16 BRPM | HEART RATE: 79 BPM | BODY MASS INDEX: 23.45 KG/M2 | OXYGEN SATURATION: 94 % | WEIGHT: 168.1 LBS | DIASTOLIC BLOOD PRESSURE: 79 MMHG | TEMPERATURE: 97.5 F

## 2025-03-19 DIAGNOSIS — Z79.899 ENCOUNTER FOR LONG-TERM (CURRENT) USE OF MEDICATIONS: ICD-10-CM

## 2025-03-19 DIAGNOSIS — C78.00 RECTAL ADENOCARCINOMA METASTATIC TO LUNG (H): Primary | ICD-10-CM

## 2025-03-19 DIAGNOSIS — C79.51 COLON CANCER METASTASIZED TO BONE (H): ICD-10-CM

## 2025-03-19 DIAGNOSIS — C18.9 COLON CANCER METASTASIZED TO BONE (H): ICD-10-CM

## 2025-03-19 DIAGNOSIS — C20 RECTAL ADENOCARCINOMA METASTATIC TO LUNG (H): Primary | ICD-10-CM

## 2025-03-19 LAB
ALBUMIN SERPL BCG-MCNC: 3.6 G/DL (ref 3.5–5.2)
ALBUMIN UR-MCNC: NEGATIVE MG/DL
ALP SERPL-CCNC: 251 U/L (ref 40–150)
ALT SERPL W P-5'-P-CCNC: 18 U/L (ref 0–70)
ANION GAP SERPL CALCULATED.3IONS-SCNC: 12 MMOL/L (ref 7–15)
AST SERPL W P-5'-P-CCNC: 44 U/L (ref 0–45)
BASOPHILS # BLD AUTO: 0 10E3/UL (ref 0–0.2)
BASOPHILS NFR BLD AUTO: 0 %
BILIRUB SERPL-MCNC: 0.2 MG/DL
BUN SERPL-MCNC: 13 MG/DL (ref 6–20)
CALCIUM SERPL-MCNC: 8.9 MG/DL (ref 8.8–10.4)
CHLORIDE SERPL-SCNC: 104 MMOL/L (ref 98–107)
CREAT SERPL-MCNC: 0.99 MG/DL (ref 0.67–1.17)
EGFRCR SERPLBLD CKD-EPI 2021: >90 ML/MIN/1.73M2
EOSINOPHIL # BLD AUTO: 0.1 10E3/UL (ref 0–0.7)
EOSINOPHIL NFR BLD AUTO: 1 %
ERYTHROCYTE [DISTWIDTH] IN BLOOD BY AUTOMATED COUNT: 20.7 % (ref 10–15)
GLUCOSE SERPL-MCNC: 111 MG/DL (ref 70–99)
HCO3 SERPL-SCNC: 20 MMOL/L (ref 22–29)
HCT VFR BLD AUTO: 32.4 % (ref 40–53)
HGB BLD-MCNC: 10.2 G/DL (ref 13.3–17.7)
IMM GRANULOCYTES # BLD: 0.1 10E3/UL
IMM GRANULOCYTES NFR BLD: 1 %
LYMPHOCYTES # BLD AUTO: 0.4 10E3/UL (ref 0.8–5.3)
LYMPHOCYTES NFR BLD AUTO: 6 %
MCH RBC QN AUTO: 26 PG (ref 26.5–33)
MCHC RBC AUTO-ENTMCNC: 31.5 G/DL (ref 31.5–36.5)
MCV RBC AUTO: 83 FL (ref 78–100)
MONOCYTES # BLD AUTO: 1.1 10E3/UL (ref 0–1.3)
MONOCYTES NFR BLD AUTO: 15 %
NEUTROPHILS # BLD AUTO: 5.2 10E3/UL (ref 1.6–8.3)
NEUTROPHILS NFR BLD AUTO: 76 %
NRBC # BLD AUTO: 0 10E3/UL
NRBC BLD AUTO-RTO: 0 /100
PLATELET # BLD AUTO: 144 10E3/UL (ref 150–450)
POTASSIUM SERPL-SCNC: 3.6 MMOL/L (ref 3.4–5.3)
PROT SERPL-MCNC: 6.5 G/DL (ref 6.4–8.3)
RBC # BLD AUTO: 3.92 10E6/UL (ref 4.4–5.9)
SODIUM SERPL-SCNC: 136 MMOL/L (ref 135–145)
WBC # BLD AUTO: 6.9 10E3/UL (ref 4–11)

## 2025-03-19 PROCEDURE — A4216 STERILE WATER/SALINE, 10 ML: HCPCS

## 2025-03-19 PROCEDURE — 82947 ASSAY GLUCOSE BLOOD QUANT: CPT

## 2025-03-19 PROCEDURE — 36591 DRAW BLOOD OFF VENOUS DEVICE: CPT | Performed by: STUDENT IN AN ORGANIZED HEALTH CARE EDUCATION/TRAINING PROGRAM

## 2025-03-19 PROCEDURE — S9330 HIT CONT CHEM DIEM: HCPCS

## 2025-03-19 PROCEDURE — 250N000011 HC RX IP 250 OP 636: Performed by: PHYSICIAN ASSISTANT

## 2025-03-19 PROCEDURE — 258N000003 HC RX IP 258 OP 636: Performed by: STUDENT IN AN ORGANIZED HEALTH CARE EDUCATION/TRAINING PROGRAM

## 2025-03-19 PROCEDURE — 250N000011 HC RX IP 250 OP 636: Performed by: STUDENT IN AN ORGANIZED HEALTH CARE EDUCATION/TRAINING PROGRAM

## 2025-03-19 PROCEDURE — 85014 HEMATOCRIT: CPT | Performed by: STUDENT IN AN ORGANIZED HEALTH CARE EDUCATION/TRAINING PROGRAM

## 2025-03-19 PROCEDURE — 82040 ASSAY OF SERUM ALBUMIN: CPT

## 2025-03-19 PROCEDURE — 258N000003 HC RX IP 258 OP 636: Performed by: PHYSICIAN ASSISTANT

## 2025-03-19 PROCEDURE — 85004 AUTOMATED DIFF WBC COUNT: CPT | Performed by: STUDENT IN AN ORGANIZED HEALTH CARE EDUCATION/TRAINING PROGRAM

## 2025-03-19 PROCEDURE — 81003 URINALYSIS AUTO W/O SCOPE: CPT | Performed by: STUDENT IN AN ORGANIZED HEALTH CARE EDUCATION/TRAINING PROGRAM

## 2025-03-19 RX ORDER — ATROPINE SULFATE 0.4 MG/ML
0.4 AMPUL (ML) INJECTION
Status: COMPLETED | OUTPATIENT
Start: 2025-03-19 | End: 2025-03-19

## 2025-03-19 RX ORDER — DIPHENHYDRAMINE HYDROCHLORIDE 50 MG/ML
50 INJECTION, SOLUTION INTRAMUSCULAR; INTRAVENOUS
Start: 2025-03-20

## 2025-03-19 RX ORDER — ALBUTEROL SULFATE 90 UG/1
1-2 INHALANT RESPIRATORY (INHALATION)
Start: 2025-03-20

## 2025-03-19 RX ORDER — ONDANSETRON 2 MG/ML
8 INJECTION INTRAMUSCULAR; INTRAVENOUS ONCE
Status: COMPLETED | OUTPATIENT
Start: 2025-03-19 | End: 2025-03-19

## 2025-03-19 RX ORDER — MEPERIDINE HYDROCHLORIDE 25 MG/ML
25 INJECTION INTRAMUSCULAR; INTRAVENOUS; SUBCUTANEOUS
OUTPATIENT
Start: 2025-03-20

## 2025-03-19 RX ORDER — FLUOROURACIL 50 MG/ML
400 INJECTION, SOLUTION INTRAVENOUS ONCE
Status: COMPLETED | OUTPATIENT
Start: 2025-03-19 | End: 2025-03-19

## 2025-03-19 RX ORDER — HEPARIN SODIUM (PORCINE) LOCK FLUSH IV SOLN 100 UNIT/ML 100 UNIT/ML
500 SOLUTION INTRAVENOUS ONCE
Status: COMPLETED | OUTPATIENT
Start: 2025-03-19 | End: 2025-03-19

## 2025-03-19 RX ORDER — HEPARIN SODIUM,PORCINE 10 UNIT/ML
5-20 VIAL (ML) INTRAVENOUS DAILY PRN
OUTPATIENT
Start: 2025-03-20

## 2025-03-19 RX ORDER — EPINEPHRINE 1 MG/ML
0.3 INJECTION, SOLUTION, CONCENTRATE INTRAVENOUS EVERY 5 MIN PRN
OUTPATIENT
Start: 2025-03-20

## 2025-03-19 RX ORDER — METHYLPREDNISOLONE SODIUM SUCCINATE 40 MG/ML
40 INJECTION INTRAMUSCULAR; INTRAVENOUS
Start: 2025-03-20

## 2025-03-19 RX ORDER — ZOLEDRONIC ACID 0.04 MG/ML
4 INJECTION, SOLUTION INTRAVENOUS ONCE
OUTPATIENT
Start: 2025-03-20 | End: 2025-03-20

## 2025-03-19 RX ORDER — HEPARIN SODIUM (PORCINE) LOCK FLUSH IV SOLN 100 UNIT/ML 100 UNIT/ML
5 SOLUTION INTRAVENOUS
OUTPATIENT
Start: 2025-03-20

## 2025-03-19 RX ORDER — LOPERAMIDE HYDROCHLORIDE 2 MG/1
2 TABLET ORAL 4 TIMES DAILY PRN
COMMUNITY

## 2025-03-19 RX ORDER — ALBUTEROL SULFATE 0.83 MG/ML
2.5 SOLUTION RESPIRATORY (INHALATION)
OUTPATIENT
Start: 2025-03-20

## 2025-03-19 RX ORDER — DIPHENHYDRAMINE HYDROCHLORIDE 50 MG/ML
25 INJECTION, SOLUTION INTRAMUSCULAR; INTRAVENOUS
Start: 2025-03-20

## 2025-03-19 RX ORDER — ZOLEDRONIC ACID 0.04 MG/ML
4 INJECTION, SOLUTION INTRAVENOUS ONCE
Status: COMPLETED | OUTPATIENT
Start: 2025-03-19 | End: 2025-03-19

## 2025-03-19 RX ADMIN — ONDANSETRON 8 MG: 2 INJECTION INTRAMUSCULAR; INTRAVENOUS at 07:56

## 2025-03-19 RX ADMIN — SODIUM CHLORIDE 400 MG: 9 INJECTION, SOLUTION INTRAVENOUS at 08:46

## 2025-03-19 RX ADMIN — FLUOROURACIL 795 MG: 50 INJECTION, SOLUTION INTRAVENOUS at 11:33

## 2025-03-19 RX ADMIN — SODIUM CHLORIDE 250 ML: 9 INJECTION, SOLUTION INTRAVENOUS at 07:56

## 2025-03-19 RX ADMIN — FOSAPREPITANT: 150 INJECTION, POWDER, LYOPHILIZED, FOR SOLUTION INTRAVENOUS at 08:02

## 2025-03-19 RX ADMIN — IRINOTECAN HYDROCHLORIDE 360 MG: 20 INJECTION, SOLUTION INTRAVENOUS at 09:58

## 2025-03-19 RX ADMIN — ATROPINE SULFATE 0.4 MG: 0.4 INJECTION, SOLUTION INTRAVENOUS at 09:53

## 2025-03-19 RX ADMIN — HEPARIN 500 UNITS: 100 SYRINGE at 06:33

## 2025-03-19 RX ADMIN — LEUCOVORIN CALCIUM 800 MG: 350 INJECTION, POWDER, LYOPHILIZED, FOR SOLUTION INTRAMUSCULAR; INTRAVENOUS at 09:57

## 2025-03-19 RX ADMIN — ZOLEDRONIC ACID 4 MG: 0.04 INJECTION, SOLUTION INTRAVENOUS at 09:27

## 2025-03-19 RX ADMIN — ATROPINE SULFATE 0.4 MG: 0.4 INJECTION, SOLUTION INTRAVENOUS at 10:47

## 2025-03-19 ASSESSMENT — PAIN SCALES - GENERAL: PAINLEVEL_OUTOF10: NO PAIN (0)

## 2025-03-19 NOTE — PROGRESS NOTES
Infusion Nursing Note:  Roman Escalante presents today for Cycle 12 Day 1 FOLFIRI + Bevacizumab + New Zometa.    Patient spoke with provider today: No    Treatment Conditions:    Component      Latest Ref Rng 3/19/2025  6:32 AM 3/19/2025  8:01 AM   WBC      4.0 - 11.0 10e3/uL 6.9     RBC Count      4.40 - 5.90 10e6/uL 3.92 (L)     Hemoglobin      13.3 - 17.7 g/dL 10.2 (L)     Hematocrit      40.0 - 53.0 % 32.4 (L)     MCV      78 - 100 fL 83     MCH      26.5 - 33.0 pg 26.0 (L)     MCHC      31.5 - 36.5 g/dL 31.5     RDW      10.0 - 15.0 % 20.7 (H)     Platelet Count      150 - 450 10e3/uL 144 (L)     % Neutrophils      % 76     % Lymphocytes      % 6     % Monocytes      % 15     % Eosinophils      % 1     % Basophils      % 0     % Immature Granulocytes      % 1     NRBCs per 100 WBC      <1 /100 0     Absolute Neutrophils      1.6 - 8.3 10e3/uL 5.2     Absolute Lymphocytes      0.8 - 5.3 10e3/uL 0.4 (L)     Absolute Monocytes      0.0 - 1.3 10e3/uL 1.1     Absolute Eosinophils      0.0 - 0.7 10e3/uL 0.1     Absolute Basophils      0.0 - 0.2 10e3/uL 0.0     Absolute Immature Granulocytes      <=0.4 10e3/uL 0.1     Absolute NRBCs      10e3/uL 0.0     Sodium      135 - 145 mmol/L 136     Potassium      3.4 - 5.3 mmol/L 3.6     Carbon Dioxide (CO2)      22 - 29 mmol/L 20 (L)     Anion Gap      7 - 15 mmol/L 12     Urea Nitrogen      6.0 - 20.0 mg/dL 13.0     Creatinine      0.67 - 1.17 mg/dL 0.99     GFR Estimate      >60 mL/min/1.73m2 >90     Calcium      8.8 - 10.4 mg/dL 8.9     Chloride      98 - 107 mmol/L 104     Glucose      70 - 99 mg/dL 111 (H)     Alkaline Phosphatase      40 - 150 U/L 251 (H)     AST      0 - 45 U/L 44     ALT      0 - 70 U/L 18     Protein Total      6.4 - 8.3 g/dL 6.5     Albumin      3.5 - 5.2 g/dL 3.6     Bilirubin Total      <=1.2 mg/dL 0.2     Protein Albumin Urine      Negative mg/dL  Negative       Legend:  (L) Low  (H) High    Note: Urine protein: negative  BP: 124/79.    Pt spoke  with Radha COTTO on Monday, 3/17/25.   Reports no changes since speaking with her.  Denies diarrhea, stated it resolved last Saturday.    Pt stated he had a dentist appointment yesterday and the Dentist gave him the OK to proceed with Zometa.    TORB 3/19/25 Radha COTTO / Josee Barker RN:  OK to give Zometa today as long as Dentist OK'd it. No dental clearance form needed today.    Atropine given prior to starting Irinotecan and 45 min into Irinotecan infusion.     Prior to discharge: Port is secured in place with tegaderm and flushed with 10cc NS with positive blood return noted.    Fluorouracil C-Series pump to infuse at 5.2 ml/hr for 46 hours.    Capillary element taped to pt's skin per protocol.  All connectors secured in place and clamps taped open.    Pt will be disconnected at home on Friday, 3/21/25 at 0800 (pt preferred time).    In-basket message sent to [facility] regarding pump disconnect time.  Neulasta on Day 4, (Saturday, 3/22/25) - [facility] notified.    Intravenous Access:  Implanted Port.    Post Infusion Assessment:  Patient tolerated infusion without incident.  Blood return noted pre and post infusion.      Discharge Plan:   Patient declined prescription refills.  Discharge instructions reviewed with: Patient.  Patient and/or family verbalized understanding of discharge instructions and all questions answered.  AVS to patient via SafelloT.  Patient will return 4/2/25 for next appointment.   Patient discharged in stable condition accompanied by: self.  Departure Mode: Ambulatory.    Josee Barker RN

## 2025-03-19 NOTE — PROGRESS NOTES
[facility] d/c of Fluorouracil continuous infusion over 46 hours via C series pump.   Scheduled in [facility] for home disconnect on 3/21/25 at 8am per pt request.  Neulasta injection on Day 4, SO administers.

## 2025-03-20 PROCEDURE — A9270 NON-COVERED ITEM OR SERVICE: HCPCS

## 2025-03-20 PROCEDURE — S9330 HIT CONT CHEM DIEM: HCPCS

## 2025-03-22 PROCEDURE — S9537 HT HEM HORM INJ DIEM: HCPCS

## 2025-03-25 LAB
ABO + RH BLD: NORMAL
BLD GP AB SCN SERPL QL: NEGATIVE
SPECIMEN EXP DATE BLD: NORMAL

## 2025-03-26 ENCOUNTER — NURSE TRIAGE (OUTPATIENT)
Dept: ONCOLOGY | Facility: CLINIC | Age: 50
End: 2025-03-26
Payer: COMMERCIAL

## 2025-03-26 ENCOUNTER — LAB (OUTPATIENT)
Dept: LAB | Facility: CLINIC | Age: 50
End: 2025-03-26
Payer: COMMERCIAL

## 2025-03-26 DIAGNOSIS — R19.7 DIARRHEA: Primary | ICD-10-CM

## 2025-03-26 DIAGNOSIS — R19.7 DIARRHEA: ICD-10-CM

## 2025-03-26 DIAGNOSIS — R04.0 NASAL BLEEDING: ICD-10-CM

## 2025-03-26 LAB
ALBUMIN SERPL BCG-MCNC: 4.1 G/DL (ref 3.5–5.2)
ALP SERPL-CCNC: 240 U/L (ref 40–150)
ALT SERPL W P-5'-P-CCNC: 20 U/L (ref 0–70)
ANION GAP SERPL CALCULATED.3IONS-SCNC: 13 MMOL/L (ref 7–15)
AST SERPL W P-5'-P-CCNC: 42 U/L (ref 0–45)
BASOPHILS # BLD AUTO: 0 10E3/UL (ref 0–0.2)
BASOPHILS NFR BLD AUTO: 1 %
BILIRUB SERPL-MCNC: 0.5 MG/DL
BLD PROD TYP BPU: NORMAL
BLOOD COMPONENT TYPE: NORMAL
BUN SERPL-MCNC: 11.2 MG/DL (ref 6–20)
CALCIUM SERPL-MCNC: 8.8 MG/DL (ref 8.8–10.4)
CHLORIDE SERPL-SCNC: 100 MMOL/L (ref 98–107)
CODING SYSTEM: NORMAL
CREAT SERPL-MCNC: 0.78 MG/DL (ref 0.67–1.17)
EGFRCR SERPLBLD CKD-EPI 2021: >90 ML/MIN/1.73M2
ELLIPTOCYTES BLD QL SMEAR: SLIGHT
EOSINOPHIL # BLD AUTO: 0.1 10E3/UL (ref 0–0.7)
EOSINOPHIL NFR BLD AUTO: 2 %
ERYTHROCYTE [DISTWIDTH] IN BLOOD BY AUTOMATED COUNT: 21.3 % (ref 10–15)
FRAGMENTS BLD QL SMEAR: ABNORMAL
GLUCOSE SERPL-MCNC: 96 MG/DL (ref 70–99)
HCO3 SERPL-SCNC: 22 MMOL/L (ref 22–29)
HCT VFR BLD AUTO: 39.4 % (ref 40–53)
HGB BLD-MCNC: 12.5 G/DL (ref 13.3–17.7)
IMM GRANULOCYTES # BLD: 0.1 10E3/UL
IMM GRANULOCYTES NFR BLD: 2 %
LYMPHOCYTES # BLD AUTO: 0.6 10E3/UL (ref 0.8–5.3)
LYMPHOCYTES NFR BLD AUTO: 8 %
MCH RBC QN AUTO: 26.2 PG (ref 26.5–33)
MCHC RBC AUTO-ENTMCNC: 31.7 G/DL (ref 31.5–36.5)
MCV RBC AUTO: 82 FL (ref 78–100)
MONOCYTES # BLD AUTO: 0.8 10E3/UL (ref 0–1.3)
MONOCYTES NFR BLD AUTO: 11 %
NEUTROPHILS # BLD AUTO: 5.6 10E3/UL (ref 1.6–8.3)
NEUTROPHILS NFR BLD AUTO: 77 %
NRBC # BLD AUTO: 0 10E3/UL
NRBC BLD AUTO-RTO: 0 /100
PLAT MORPH BLD: ABNORMAL
PLATELET # BLD AUTO: 87 10E3/UL (ref 150–450)
POTASSIUM SERPL-SCNC: 4.5 MMOL/L (ref 3.4–5.3)
PROT SERPL-MCNC: 7.2 G/DL (ref 6.4–8.3)
RBC # BLD AUTO: 4.78 10E6/UL (ref 4.4–5.9)
RBC MORPH BLD: ABNORMAL
SODIUM SERPL-SCNC: 135 MMOL/L (ref 135–145)
SPHEROCYTES BLD QL SMEAR: SLIGHT
UNIT ABO/RH: NORMAL
UNIT NUMBER: NORMAL
UNIT STATUS: NORMAL
UNIT TYPE ISBT: 6200
WBC # BLD AUTO: 7.2 10E3/UL (ref 4–11)

## 2025-03-26 PROCEDURE — 80053 COMPREHEN METABOLIC PANEL: CPT

## 2025-03-26 PROCEDURE — 86901 BLOOD TYPING SEROLOGIC RH(D): CPT

## 2025-03-26 PROCEDURE — 36415 COLL VENOUS BLD VENIPUNCTURE: CPT

## 2025-03-26 PROCEDURE — 85025 COMPLETE CBC W/AUTO DIFF WBC: CPT

## 2025-03-26 PROCEDURE — 86850 RBC ANTIBODY SCREEN: CPT

## 2025-03-26 PROCEDURE — 86900 BLOOD TYPING SEROLOGIC ABO: CPT

## 2025-03-26 RX ORDER — DIPHENHYDRAMINE HYDROCHLORIDE 50 MG/ML
50 INJECTION, SOLUTION INTRAMUSCULAR; INTRAVENOUS
Start: 2025-03-26

## 2025-03-26 RX ORDER — EPINEPHRINE 1 MG/ML
0.3 INJECTION, SOLUTION INTRAMUSCULAR; SUBCUTANEOUS EVERY 5 MIN PRN
OUTPATIENT
Start: 2025-03-26

## 2025-03-26 RX ORDER — HEPARIN SODIUM (PORCINE) LOCK FLUSH IV SOLN 100 UNIT/ML 100 UNIT/ML
5 SOLUTION INTRAVENOUS
OUTPATIENT
Start: 2025-03-26

## 2025-03-26 RX ORDER — HEPARIN SODIUM,PORCINE 10 UNIT/ML
5-20 VIAL (ML) INTRAVENOUS DAILY PRN
OUTPATIENT
Start: 2025-03-26

## 2025-03-26 NOTE — TELEPHONE ENCOUNTER
Oncology Nurse Triage    Situation:   Roman reporting the following symptoms:  -intermittent nose bleeds  -diarrhea  -chest hurts when coughs/sneezes, more on rib cage area on right side of chest.     Background:   Treating Provider:   Dr Snow    Date of last office visit: 2/19/2025 Dr. Snow    Recent Treatments:3/19/2025 fluorouracil, irinotecan    Assessment:     Onset: Pt stated similar symptoms to what happened about two weeks ago around 3/12/2025.     Intermittent nose bleeds does stop on own,occurs after blowing nose. Present about 3xdaily with blowing nose. Has sinus congestion with intermittent blood in mucus. Could also be dry nose from oxygen use from nasal cannula.   Denies bleeding gums/teeth, bruises of unknown origin or getting bigger, blood in stool or urine.     Bowel movements 6 to 7x in past 24hours. Diarrhea started yesterday. Pt has an ostomy. Initially started out as constipation then turned to oatmeal texture and now watery since yesterday. Stomach cramps present. Rating 6/10 at times. Still having gas.     About 3 to 4 days ago, Chest hurt episodes, rates 3/4 out of 10. Today feels more like constant today. Pt recalls being told may have fluid in lungs and wondering if that is a factor.     Pt lives in [facility], closest [place] [facility] is [facility].     Denies fever, SOB    Recommendations:   1224 Per Dr. Snow, TORB for CBC with Diff, CMP, and ABO T&S, appointment for possible blood/platelet transfusion and also okay to add on IVF NaCl 0.9% per generic infusion therapy plan.     1228 This writer spoke Roman who is in agreement with plan for labs today and transfusion/infusion appt for this week. Pt okay with getting infusion/transfusion at [facility] [address] [facility].     1233 Scheduling request sent to [facility] to call pt to assist with scheduling labs today.   Message IB sent to scheduling and charge nurse about adding on appt for infusion/transfusion this week.     1338 Pt is  scheduled for labs today at 2:00pm and then Infusion/transfusion tomorrow 3/27 at 7:30am.

## 2025-03-27 ENCOUNTER — INFUSION THERAPY VISIT (OUTPATIENT)
Dept: ONCOLOGY | Facility: CLINIC | Age: 50
End: 2025-03-27
Attending: STUDENT IN AN ORGANIZED HEALTH CARE EDUCATION/TRAINING PROGRAM
Payer: COMMERCIAL

## 2025-03-27 ENCOUNTER — ANCILLARY PROCEDURE (OUTPATIENT)
Dept: GENERAL RADIOLOGY | Facility: CLINIC | Age: 50
End: 2025-03-27
Payer: COMMERCIAL

## 2025-03-27 VITALS
OXYGEN SATURATION: 98 % | DIASTOLIC BLOOD PRESSURE: 72 MMHG | TEMPERATURE: 97.5 F | RESPIRATION RATE: 20 BRPM | SYSTOLIC BLOOD PRESSURE: 105 MMHG | HEART RATE: 67 BPM

## 2025-03-27 DIAGNOSIS — C18.9 COLON CANCER METASTASIZED TO BONE (H): ICD-10-CM

## 2025-03-27 DIAGNOSIS — R10.84 ABDOMINAL PAIN, GENERALIZED: ICD-10-CM

## 2025-03-27 DIAGNOSIS — C20 RECTAL ADENOCARCINOMA METASTATIC TO LUNG (H): Primary | ICD-10-CM

## 2025-03-27 DIAGNOSIS — C18.9 COLON CANCER METASTASIZED TO LIVER (H): ICD-10-CM

## 2025-03-27 DIAGNOSIS — C79.51 COLON CANCER METASTASIZED TO BONE (H): ICD-10-CM

## 2025-03-27 DIAGNOSIS — C78.7 COLON CANCER METASTASIZED TO LIVER (H): ICD-10-CM

## 2025-03-27 DIAGNOSIS — C20 RECTAL ADENOCARCINOMA METASTATIC TO LUNG (H): ICD-10-CM

## 2025-03-27 DIAGNOSIS — R58 MUCOSAL BLEEDING: Primary | ICD-10-CM

## 2025-03-27 DIAGNOSIS — C78.00 RECTAL ADENOCARCINOMA METASTATIC TO LUNG (H): ICD-10-CM

## 2025-03-27 DIAGNOSIS — C78.00 RECTAL ADENOCARCINOMA METASTATIC TO LUNG (H): Primary | ICD-10-CM

## 2025-03-27 LAB
ADV 40+41 DNA STL QL NAA+NON-PROBE: NEGATIVE
ASTRO TYP 1-8 RNA STL QL NAA+NON-PROBE: NEGATIVE
C CAYETANENSIS DNA STL QL NAA+NON-PROBE: NEGATIVE
CAMPYLOBACTER DNA SPEC NAA+PROBE: NEGATIVE
CRYPTOSP DNA STL QL NAA+NON-PROBE: NEGATIVE
E COLI O157 DNA STL QL NAA+NON-PROBE: NORMAL
E HISTOLYT DNA STL QL NAA+NON-PROBE: NEGATIVE
EAEC ASTA GENE ISLT QL NAA+PROBE: NEGATIVE
EC STX1+STX2 GENES STL QL NAA+NON-PROBE: NEGATIVE
EPEC EAE GENE STL QL NAA+NON-PROBE: NEGATIVE
ETEC LTA+ST1A+ST1B TOX ST NAA+NON-PROBE: NEGATIVE
G LAMBLIA DNA STL QL NAA+NON-PROBE: NEGATIVE
NOROVIRUS GI+II RNA STL QL NAA+NON-PROBE: NEGATIVE
P SHIGELLOIDES DNA STL QL NAA+NON-PROBE: NEGATIVE
RVA RNA STL QL NAA+NON-PROBE: NEGATIVE
SALMONELLA SP RPOD STL QL NAA+PROBE: NEGATIVE
SAPO I+II+IV+V RNA STL QL NAA+NON-PROBE: NEGATIVE
SHIGELLA SP+EIEC IPAH ST NAA+NON-PROBE: NEGATIVE
V CHOLERAE DNA SPEC QL NAA+PROBE: NEGATIVE
VIBRIO DNA SPEC NAA+PROBE: NEGATIVE
Y ENTEROCOL DNA STL QL NAA+PROBE: NEGATIVE

## 2025-03-27 PROCEDURE — 258N000003 HC RX IP 258 OP 636: Performed by: PHYSICIAN ASSISTANT

## 2025-03-27 PROCEDURE — 250N000011 HC RX IP 250 OP 636: Performed by: PHYSICIAN ASSISTANT

## 2025-03-27 PROCEDURE — 87507 IADNA-DNA/RNA PROBE TQ 12-25: CPT

## 2025-03-27 PROCEDURE — 250N000011 HC RX IP 250 OP 636

## 2025-03-27 PROCEDURE — 258N000003 HC RX IP 258 OP 636

## 2025-03-27 RX ORDER — HEPARIN SODIUM (PORCINE) LOCK FLUSH IV SOLN 100 UNIT/ML 100 UNIT/ML
5 SOLUTION INTRAVENOUS
OUTPATIENT
Start: 2025-03-27

## 2025-03-27 RX ORDER — ONDANSETRON 2 MG/ML
8 INJECTION INTRAMUSCULAR; INTRAVENOUS ONCE
Status: COMPLETED | OUTPATIENT
Start: 2025-03-27 | End: 2025-03-27

## 2025-03-27 RX ORDER — METHYLPREDNISOLONE SODIUM SUCCINATE 40 MG/ML
40 INJECTION INTRAMUSCULAR; INTRAVENOUS
Start: 2025-03-27

## 2025-03-27 RX ORDER — DIPHENHYDRAMINE HYDROCHLORIDE 50 MG/ML
25 INJECTION, SOLUTION INTRAMUSCULAR; INTRAVENOUS
Start: 2025-03-27

## 2025-03-27 RX ORDER — HEPARIN SODIUM (PORCINE) LOCK FLUSH IV SOLN 100 UNIT/ML 100 UNIT/ML
5 SOLUTION INTRAVENOUS
Status: DISCONTINUED | OUTPATIENT
Start: 2025-03-27 | End: 2025-03-27 | Stop reason: HOSPADM

## 2025-03-27 RX ORDER — DIPHENHYDRAMINE HYDROCHLORIDE 50 MG/ML
50 INJECTION, SOLUTION INTRAMUSCULAR; INTRAVENOUS
Start: 2025-03-27

## 2025-03-27 RX ORDER — HEPARIN SODIUM,PORCINE 10 UNIT/ML
5-20 VIAL (ML) INTRAVENOUS DAILY PRN
OUTPATIENT
Start: 2025-03-27

## 2025-03-27 RX ORDER — MEPERIDINE HYDROCHLORIDE 25 MG/ML
25 INJECTION INTRAMUSCULAR; INTRAVENOUS; SUBCUTANEOUS
OUTPATIENT
Start: 2025-03-27

## 2025-03-27 RX ORDER — ALBUTEROL SULFATE 90 UG/1
1-2 INHALANT RESPIRATORY (INHALATION)
Start: 2025-03-27

## 2025-03-27 RX ORDER — ALBUTEROL SULFATE 0.83 MG/ML
2.5 SOLUTION RESPIRATORY (INHALATION)
OUTPATIENT
Start: 2025-03-27

## 2025-03-27 RX ORDER — EPINEPHRINE 1 MG/ML
0.3 INJECTION, SOLUTION, CONCENTRATE INTRAVENOUS EVERY 5 MIN PRN
OUTPATIENT
Start: 2025-03-27

## 2025-03-27 RX ADMIN — HEPARIN 5 ML: 100 SYRINGE at 10:13

## 2025-03-27 RX ADMIN — SODIUM CHLORIDE 1000 ML: 0.9 INJECTION, SOLUTION INTRAVENOUS at 08:02

## 2025-03-27 RX ADMIN — SODIUM CHLORIDE 500 ML: 9 INJECTION, SOLUTION INTRAVENOUS at 09:31

## 2025-03-27 RX ADMIN — ONDANSETRON 8 MG: 2 INJECTION INTRAMUSCULAR; INTRAVENOUS at 08:23

## 2025-03-27 NOTE — PROGRESS NOTES
"Infusion Nursing Note:  Roman Escalante presents today for IV fluids and antiemetics.    Patient seen by provider today: Yes: as an add on by Erika Mc PA-C   present during visit today: Not Applicable.    Note: Patient arrived to clinic appearing very weak and stating he hasn't eaten or drank anything in the past 3 days. States Monday 3/24 he started having diarrhea and vomited in the evening. Has been having diarrhea since, hasn't taken anything for diarrhea. States he didn't know he could take imodium and can't find it at home. Had similar symptoms after his last round of chemo and did a c-diff test at that time. Today stool is yellow and watery. States whenever he attempts to eat or drink anything it goes right through him.  Tried to eat some cheese, crackers, and water last night but it didn't go well. Refused to try eating or drinking anything while in infusion today. Denies nausea but states he has vomited several times in the past few days. Reports  \"a ton of drainage from his sinuses, clear and sometimes blood tinged.\" When he blows his nose it sometimes makes him cough and starts a \"chain reaction\" that causes him to vomit. Took a dose of compazine a couple of days ago which \"helped for about 3 hours\" but hasn't taken any compazine or zofran since. States he has been having a lot of issues with his nose/sinuses \"ever since I was on oxygen.\" Reports about 3 nose bleeds per day for the last several days. They last about 5 minutes each and he frequently has blood tinged sputum. Not using flonase because it just \"makes things drip more.\"  States he has been feeling chilled the past 3 days but has been afebrile. Confirms he has been taking his temp and it's been in the 97-98 range.  Reports pain 2/10 at rest and 6/10 with activity (coughing, sneezing, turning over in bed). Pain is in his abdomen and it starts at his lower ribcage bilaterally and extends across his abdomen. The pain also started " on Monday 3/24.  Has not taken anything for pain at home. RN offered hot packs while in infusion and patient declined.  Radha Chapman updated:    3/27/2025 0815 TORB: Radha Chapman PA-C/Sarah Abraham RN  -ok to add zofan 8mg IV to patient's therapy plan and give today  -send stool for enteric panel  -update me once patient is nearing the end of his fluids    Near the end of the liter of IV fluids patient stated he wasn't feeling any better. He had emptied his ostomy bag on arrival and it was already filling up again.    3/27/2025 0925 Written Communication: Radha Chapman PA-C/Sarah Abraham RN  -ok to start another 500ml NS bolus IV  -I'm going to have Erika Mc come over and see him     Erika Mc saw patient in infusion and ordered an abdominal x-ray. She changed patient's visit with Radha Chapman to in person tomorrow and ordered labs to be drawn tomorrow. She requested RN check vitals again before patient discharged.     3/27/2025 1017 Written Communication: Erika Mc PA-C/Sarah Abraham RN  -ok to discharge patient  -send him to x-ray and then he can go home and I'll call him with the results   -can you talk to him about a bland diet/BRAT diet     Intravenous Access:  Implanted Port accessed in infusion     Treatment Conditions:  Lab Results   Component Value Date    HGB 12.5 (L) 03/26/2025    WBC 7.2 03/26/2025    ANEU 97.0 (H) 01/31/2025    ANEUTAUTO 5.6 03/26/2025    PLT 87 (L) 03/26/2025        Lab Results   Component Value Date     03/26/2025    POTASSIUM 4.5 03/26/2025    MAG 1.8 03/14/2025    CR 0.78 03/26/2025    JEFERSON 8.8 03/26/2025    BILITOTAL 0.5 03/26/2025    ALBUMIN 4.1 03/26/2025    ALT 20 03/26/2025    AST 42 03/26/2025       Results reviewed, ok to proceed with treatment.  Results reviewed, labs did NOT meet treatment parameters: for platelet transfusion today.      Post Infusion Assessment:  Patient tolerated infusion without incident. Patient appeared a little stronger at discharge when  ambulating down the [place].   Blood return noted pre and post infusion.  Site patent and intact, free from redness, edema or discomfort.  No evidence of extravasations.  Access discontinued per protocol.       Discharge Plan:   Patient declined prescription refills. Patient will get imodium OTC.   Discharge instructions reviewed with: Patient.  Patient and/or family verbalized understanding of discharge instructions and all questions answered.  AVS to patient via MYCHART.  Patient will return tomorrow for next appointment.   Patient discharged in stable condition accompanied by: self.  Departure Mode: Ambulatory.      Sarah Abraham RN

## 2025-03-27 NOTE — PATIENT INSTRUCTIONS
-Please try to follow a bland diet or a BRAT diet (bananas, rice, applesauce, toast)     [facility] Triage and after hours / weekends / holidays:  [phone]    Please call the triage or after hours line if you experience a temperature greater than or equal to 100.4, shaking chills, have uncontrolled nausea, vomiting and/or diarrhea, dizziness, shortness of breath, chest pain, bleeding, unexplained bruising, or if you have any other new/concerning symptoms, questions or concerns.      If you are having any concerning symptoms or wish to speak to a provider before your next infusion visit, please call triage to notify them so we can adequately serve you.     If you need a refill on a narcotic prescription or other medication, please call before your infusion appointment.             March 2025 Sunday Monday Tuesday Wednesday Thursday Friday Saturday                                 1       2     3     4     5    LAB CENTRAL   6:30 AM   (15 min.)   [facility] DRAW   [facility]    ONC INFUSION 4 HR (240 MIN)   7:00 AM   (240 min.)    ONC INFUSION NURSE   [facility] 6     7    RXHI RN CHEMO DC     (60 min.)   Angie Tay RN   [facility] 8       9     10    [facility] RETURN OSTOMY NURSE   7:15 AM   (60 min.)   Emmy Alfonso, RN   [facility] [place] 11     12    LAB   3:00 PM   (15 min.)   [facility]   [facility] 13     14    ONC INFUSION 2 HR (120 MIN)   7:30 AM   (120 min.)    ONC INFUSION NURSE   [facility]    OFFICE VISIT   9:40 AM   (30 min.)   Buddy Hart MD   [facility]    XR GENERAL XRAY  11:00 AM   (20 min.)   [id number]   [facility] 15       16     17    RETURN CCSL   9:45 AM   (45 min.)   Radha Chapman PA-C   [facility] 18     19    LAB CENTRAL    6:30 AM   (15 min.)    [facility] DRAW   [facility]    ONC INFUSION 5 HR (300 MIN)   7:00 AM   (300 min.)    ONC INFUSION NURSE   [facility] 20     21    RXHI RN CHEMO DC     (60 min.)   Ivette Byrd RN   [facility] 22       23     24     25     26    LAB   2:00 PM   (15 min.)   [facility] LAB   [facility] 27    ONC INFUSION 6 HR (360 MIN)   7:30 AM   (360 min.)    ONC INFUSION NURSE   [facility]    RETURN CCSL   9:00 AM   (45 min.)   Erika Mc PA-C   [facility]    XR GENERAL XRAY  10:15 AM   (20 min.)   [id number]   [facility] [place] 28    LAB CENTRAL   8:30 AM   (15 min.)   [facility] DRAW   [facility]    RETURN CCSL   8:45 AM   (45 min.)   Radha Chapman PA-C   [facility] 29       30     31 April 2025 Sunday Monday Tuesday Wednesday Thursday Friday Saturday             1     2    LAB CENTRAL   8:00 AM   (15 min.)   [facility] D[facility]   [facility]    ONC INFUSION 4 HR (240 MIN)   8:30 AM   (240 min.)    ONC INFUSION NURSE   [facility] 3     4     5       6     7     8     9     10     11     12       13     14    CT CHEST/ABDOMEN/PELVIS W   8:50 AM   (20 min.)   [id number]   [facility] [place] 15     16    LAB CENTRAL   7:30 AM   (15 min.)   [facility] DRAW   [facility]    DEV THERAPEUTICS ON TREATMENT   7:45 AM   (30 min.)   Polo Snow MD   [facility]    ONC INFUSION 5 HR (300 MIN)   8:30 AM   (300 min.)    ONC INFUSION NURSE   [facility] 17    RETURN PALLIATIVE   9:20 AM   (40 min.)   Isidra Prater DO   [facility]  [facility] 18     19       20     21     22     23     24     25     26       27     28     29     30                                   Recent Results (from the past 24 hours)   Comprehensive metabolic panel    Collection Time: 03/26/25  2:05 PM   Result Value Ref Range    Sodium 135 135 - 145 mmol/L    Potassium 4.5 3.4 - 5.3 mmol/L    Carbon Dioxide (CO2) 22 22 - 29 mmol/L    Anion Gap 13 7 - 15 mmol/L    Urea Nitrogen 11.2 6.0 - 20.0 mg/dL    Creatinine 0.78 0.67 - 1.17 mg/dL    GFR Estimate >90 >60 mL/min/1.73m2    Calcium 8.8 8.8 - 10.4 mg/dL    Chloride 100 98 - 107 mmol/L    Glucose 96 70 - 99 mg/dL    Alkaline Phosphatase 240 (H) 40 - 150 U/L    AST 42 0 - 45 U/L    ALT 20 0 - 70 U/L    Protein Total 7.2 6.4 - 8.3 g/dL    Albumin 4.1 3.5 - 5.2 g/dL    Bilirubin Total 0.5 <=1.2 mg/dL   CBC with platelets and differential    Collection Time: 03/26/25  2:05 PM   Result Value Ref Range    WBC Count 7.2 4.0 - 11.0 10e3/uL    RBC Count 4.78 4.40 - 5.90 10e6/uL    Hemoglobin 12.5 (L) 13.3 - 17.7 g/dL    Hematocrit 39.4 (L) 40.0 - 53.0 %    MCV 82 78 - 100 fL    MCH 26.2 (L) 26.5 - 33.0 pg    MCHC 31.7 31.5 - 36.5 g/dL    RDW 21.3 (H) 10.0 - 15.0 %    Platelet Count 87 (L) 150 - 450 10e3/uL    % Neutrophils 77 %    % Lymphocytes 8 %    % Monocytes 11 %    % Eosinophils 2 %    % Basophils 1 %    % Immature Granulocytes 2 %    NRBCs per 100 WBC 0 <1 /100    Absolute Neutrophils 5.6 1.6 - 8.3 10e3/uL    Absolute Lymphocytes 0.6 (L) 0.8 - 5.3 10e3/uL    Absolute Monocytes 0.8 0.0 - 1.3 10e3/uL    Absolute Eosinophils 0.1 0.0 - 0.7 10e3/uL    Absolute Basophils 0.0 0.0 - 0.2 10e3/uL    Absolute Immature Granulocytes 0.1 <=0.4 10e3/uL    Absolute NRBCs 0.0 10e3/uL   Adult Type and Screen    Collection Time: 03/26/25  2:05 PM   Result Value Ref Range    ABO/RH(D) A POS     Antibody Screen Negative Negative    SPECIMEN EXPIRATION DATE [id number]    RBC and Platelet Morphology    Collection Time: 03/26/25  2:05 PM    Result Value Ref Range    RBC Morphology Confirmed RBC Indices     Platelet Assessment  Automated Count Confirmed. Platelet morphology is normal.     Automated Count Confirmed. Platelet morphology is normal.    Elliptocytes Slight (A) None Seen    RBC Fragments Rare (A) None Seen    Spherocytes Slight (A) None Seen   Prepare pheresed platelets (unit)    Collection Time: 03/26/25  2:47 PM   Result Value Ref Range    Blood Component Type Platelets     Product Code [id number]     Unit Status Ready for issue     Unit Number [id number]     CODING SYSTEM [id number]

## 2025-03-27 NOTE — PROGRESS NOTES
Virtual Visit Details    Type of service:  Video Visit   Video Start Time: 10:03 AM  Video End Time:10:16 AM    Originating Location (pt. Location): Home  Distant Location (provider location):  Off-site  Platform used for Video Visit: [facility] - Medical Oncology Follow Up Note  03/27/2025    Oncology History:   Patient developed rectal bleeding in 2020.  In January 2021 CT showed focal wall thickening in the upper rectum, multiple pulmonary nodules bilaterally suspicious for metastatic disease.  Underwent FNA of the right upper lobe lesion which was consistent with adenocarcinoma of the colon.  He was treated with FOLFOX from 2/20/2021 through 5/20/2021.  Avastin was added.  Had an infusion reaction to oxaliplatin 6/2021.  7/2021- 12/2021 was on 5-FU alone.  Developed progression.  12/2021- 9/2022 was on FOLFIRI.  11/2021 started Lonsurf. All of this was done with outside oncologist.     Met with Dr. Snow on 2/3/2023 to establish care. Recommended regorafenib.     Started regorafenib on 3/2/2023. Progression noted 5/19/23. Plan to start FOLFOX/Avastin and send out CARIS testing to evaluate for other treatment options. Cycle 1 was given with oxaliplatin desensitization, 5FU, Avastin held due to increased urine protein.     Following cycle 4, patient was admitted with hematuria and acute kidney injury.    Oxaliplatin was dropped with Cycle 5.     Y-90 radioembolization 9/7.    10/24/23 CT CAP with disease progression with notable growth in all pulmonary lesions. Lonsurf + bevacizumab started 10/27/23 with plans to bridge to clinical trial.   11/15/23 - stopped chemo for [facility] study.   Currently enrolled in [facility] CAR-T trial (completed cell harvest 1/23/2024, tentatively planning administration of CAR-T in April 2/12/24 - resumed Lonsurf, bevacizumab to bridge treatment until CAR-T therapy  3/20/24 - treatment changed to Fruquintinib due to intolerability of Lonsurf/bevacizumab  4/2/24-  Presented to the ED with dehydration. Stopped Fruquintnib.   6/2024- cellular therapy transplant at [facility].   7/30/24- CT imaging demonstrating marked progression of pulmonary and hepatic lesions, clinical trial failed to generate T-cell graft. Multiple discussions regarding plan of care, hospice vs additional lines of treatment. Patient decided he is not ready for hospice. He resumed treatment with FOLFIRI + bevacizumab on 8/30/24.   10/2024 - CT CAP with continued response, plan to continue FOLFIRI + bevacizumab with the addition of Zometa. Per his dentist, needs dental work completed prior to clearance. (Scheduled 1/2 and 1/21).   12/2024 chemo held for break over new Years per patient's choice.  1/15/25 chemo held due to ongoing respiratory illness, + RSV, also treated with levaquin for pneumonia on CXR, chemo deferred   1/22-1/25/25- hospitalized with suspicion for fungal pneumonia  1/28-1/29/25- hospitalized for PJP pneumonia, started on treatment with Bactrim and steroids  2/19/25 CT CAP shows disease progression, occurred while on treatment break for pneumonia, resumed same chemotherapy with FOLFIRI/chelsea on 2/20/25    Interval History:   -Energy is improving as of yesterday.   -Diarrhea resolved Saturday night. Now, eating a bit better. Took Imodium on Saturday x 2.   -Has not had more stools since then.   -Had severe rectal pain prior to resuming chemotherapy in February. Rectal pain has resolved, but still has back pain. Now taking oxycodone 1-2/day for the back pain.   -Has some abdominal gas and cramping, worse when had diarrhea.   -Nosebleeds have improved. Now has some spotting and clots, but not full nosebleeds.   -Will see dentist on Tuesday, 3/18, for final dental visit. Plan for veneer on front lower teeth and large filling on the left back side.     Current Outpatient Medications   Medication Sig Dispense Refill    Chemo Kit For RN use only. Do not remove items from bag. Contents: 1  sodium chloride  "0.9% flush, 4 medium gloves, 1 chemo gown, 1/4 chemo mat, 1 connector female, 1 chemo bag. [id number] kit 0    Emergency Supply Kit, Central, Patient use for emergency only. Contents: 3 sodium chloride 0.9% flushes, 1 dressing kit, 1 microclave ext set 14\", 4 nitrile gloves (med), 6 alcohol prep pads, 1 bacitracin, 1 syringe (10 cc 20 G 1\"). Call [phone] to reorder. [id number] kit 0    fluorouracil (ADRUCIL) 2.5 GM/50ML SOLN injection       Fluorouracil (ADRUCIL) 4,775 mg in sodium chloride 0.9 % 241 mL via HOMEPUMP C-Series Infuse 4,775 mg at 5.2 mL/hr over 46 hours into the vein once for 1 dose. [id number] mL 0    fluticasone (FLONASE) 50 MCG/ACT nasal spray Spray 1 spray into both nostrils daily. 16 g 2    NARCAN 4 MG/0.1ML nasal spray       Ostomy Supplies MISC 1 each daily 1 each 11    oxyCODONE (ROXICODONE) 10 MG tablet Take 1-2 tablets (10-20 mg) by mouth every 4 hours as needed for pain. 144 tablet 0    pegfilgrastim (NEULASTA) 6 MG/0.6ML injection Inject 0.6 mLs (6 mg) subcutaneously every 14 days. Administer 24 hours after chemotherapy disconnect 3.6 mL 0    Port Access Kit For nurse use only.  Do not remove items from bag.  Use for port access.  Do not place syringe on sterile field. [id number] kit 0    prochlorperazine (COMPAZINE) 10 MG tablet Take 1 tablet (10 mg) by mouth every 6 hours as needed for nausea or vomiting 30 tablet 2    sodium chloride, PF, 0.9% PF flush Inject 10 mLs into the vein as needed for line flush. Flush IV before and after med administration as directed and/or at least every 24 hours, or prior to deaccessing for no further use and/or at least every 4 weeks when not accessed. 272212 mL 0    sulfamethoxazole-trimethoprim (BACTRIM DS) 800-160 MG tablet Take 1 tablet by mouth Every Mon, Wed, Fri Morning. Please start this prophylaxis dose of bactrim once you have completed your 21 days of bactrim treatment 12 tablet 1    acetaminophen (TYLENOL) 500 MG tablet Take 500-1,000 mg by mouth as " needed for mild pain. (Patient not taking: Reported on 3/27/2025)      cyclobenzaprine (FLEXERIL) 5 MG tablet Take one tab (5mg) by mouth over 6-8 hours, as needed for spasms 45 tablet 2    gabapentin (NEURONTIN) 100 MG capsule TAKE 1 TO 2 CAPSULES (100-200 MG) BY MOUTH UP TO TWICE DURING THE DAY AND 3 CAPSULES (300 MG) AT BEDTIME. (Patient not taking: Reported on 3/27/2025) 210 capsule 1    guaiFENesin (MUCINEX) 600 MG 12 hr tablet Take 2 tablets (1,200 mg) by mouth 2 times daily. (Patient not taking: Reported on 3/5/2025) 20 tablet 0    ibuprofen (ADVIL/MOTRIN) 200 MG tablet Take 3 tablets (600 mg) by mouth every 8 hours as needed for moderate pain (Patient not taking: Reported on 3/19/2025) 100 tablet 0    ipratropium - albuterol 0.5 mg/2.5 mg/3 mL (DUONEB) 0.5-2.5 (3) MG/3ML neb solution Take 1 vial (3 mLs) by nebulization every 6 hours as needed for shortness of breath, wheezing or cough. (Patient not taking: Reported on 3/27/2025) 90 mL 3    loperamide (IMODIUM A-D) 2 MG tablet Take 2 mg by mouth 4 times daily as needed for diarrhea. (Patient not taking: Reported on 3/27/2025)      LORazepam (ATIVAN) 0.5 MG tablet Take 1 tablet (0.5 mg) by mouth every 6 hours as needed for anxiety. (Patient not taking: Reported on 3/27/2025) 30 tablet 0    ondansetron (ZOFRAN ODT) 4 MG ODT tab Take 1 tablet (4 mg) by mouth every 6 hours as needed for nausea. (Patient not taking: Reported on 3/27/2025) 30 tablet 3    VENTOLIN  (90 Base) MCG/ACT inhaler INHALE 2 PUFFS INTO THE LUNGS EVERY 6 HOURS AS NEEDED FOR SHORTNESS OF BREATH OR WHEEZING OR COUGH (Patient not taking: Reported on 3/27/2025) 18 g 2     Objective:  General: patient appears well in no acute distress, alert and oriented, speech clear and fluid  Skin: no visualized rash or lesions on visualized skin  Resp: Appears to be breathing comfortably without accessory muscle usage, speaking in full sentences, no audible wheezes or cough.  Psych: Coherent speech,  normal rate and volume, able to articulate logical thoughts, able to abstract reason, no tangential thoughts, no hallucinations or delusions  Patient's affect is appropriate.    LABS  Most Recent 3 CBC's:  Recent Labs   Lab Test 03/26/25  1405 03/19/25  0632 03/12/25  1507   WBC 7.2 6.9 4.7   HGB 12.5* 10.2* 11.6*   MCV 82 83 83   PLT 87* 144* 46*    Most Recent 3 BMP's:  Recent Labs   Lab Test 03/26/25  1405 03/19/25  0632 03/14/25  0830    136 134*   POTASSIUM 4.5 3.6 4.2   CHLORIDE 100 104 101   CO2 22 20* 20*   BUN 11.2 13.0 16.3   CR 0.78 0.99 0.85   ANIONGAP 13 12 13   JEFERSON 8.8 8.9 8.8   GLC 96 111* 133*    Most Recent 2 LFT's:  Recent Labs   Lab Test 03/26/25  1405 03/19/25  0632   AST 42 44   ALT 20 18   ALKPHOS 240* 251*   BILITOTAL 0.5 0.2   I reviewed the above labs today.    ASSESSMENT AND PLAN   Metastatic rectal cancer  He started on treatment with FOLFIRI + bevacizumab on 8/30/24. Imaging in October 2024 showed improvement in his disease. Imaging in February 2025 showed disease progression, though this was in the setting of treatment break due to PJP pneumonia. Therefore he resumed the same chemotherapy with FOLFIRI/chelsea on 2/20/25. He is tolerating chemotherapy fairly well, though developed more significant thrombocytopenia, likely related to Bactrim. He will return to clinic later this week for consideration of cycle 12 FOLFIRI/chelsea with Neulasta support I have lowered his treatment parameter to give treatment if platelets at least 65,000. If lower than 65,000, will hold chemotherapy by 1 week and consider a dose reduction of his chemotherapy. Will plan to repeat imaging in mid-April.     Epistaxis  Secondary to bevacizumab, worsened with thrombocytopenia. Bevacizumab was held on 3/5/25. He was given a platelet transfusion on 3/14/24. His bleeding has improved. Will recheck his labs this week to determine the final plan for treatment, as noted above. Recommend nasal saline spray, Aquaphor or  Vasleine at bedtime in the nares, and using a humidifier in bedroom at night.    Back pain  Secondary to bone mets. Following with palliative care, next in April. Managed with oxycodone prn, currently 1-2/day.     Iron deficient anemia  Treated with 3 doses of Venofer, last on 10/4/24. Last check of iron studies in February 2025 unclear if anemia of chronic disease or mixed with recurrent iron deficiency. MCV has been trending down again. Will recheck iron studies including a soluble transferrin receptor.     Bone mets  Patient will obtain dental clearance prior to starting on treatment with Zometa.     Diarrhea  Resolved with Imodium. C.diff was negative. Question if he has a viral gastroenteritis.     PJP pneumonia  Following with ID. Completed course of high dose Bactrim, now on PJP prophylaxis with M/W/F Bactrim for a minimum of 6 months. Per ID, will then reassess the immune system and the CD4 count prior to stopping the Bactrim prophylaxis.     Radha Chapman PA-C  [facility]  [address] [place] [id number] [phone]    40 minutes spent on the date of the encounter doing chart review, review of test results, interpretation of tests, patient visit, and documentation     The longitudinal plan of care for the diagnosis(es)/condition(s) as documented were addressed during this visit. Due to the added complexity in care, I will continue to support Roman Escalante in the subsequent management and with ongoing continuity of care.    Addendum: Labs consistent with anemia of chronic disease. Will continue to monitor.

## 2025-03-27 NOTE — LETTER
3/27/2025      Roman Escalante  [address]  [facility] [address]      Dear Colleague,    Thank you for referring your patient, Roman Escalante, to the [facility]. Please see a copy of my visit note below.    [facility] - Medical Oncology Follow Up Note  03/27/2025    Oncology History:   Patient developed rectal bleeding in 2020.  In January 2021 CT showed focal wall thickening in the upper rectum, multiple pulmonary nodules bilaterally suspicious for metastatic disease.  Underwent FNA of the right upper lobe lesion which was consistent with adenocarcinoma of the colon.  He was treated with FOLFOX from 2/20/2021 through 5/20/2021.  Avastin was added.  Had an infusion reaction to oxaliplatin 6/2021.  7/2021- 12/2021 was on 5-FU alone.  Developed progression.  12/2021- 9/2022 was on FOLFIRI.  11/2021 started Lonsurf. All of this was done with outside oncologist.     Met with Dr. Snow on 2/3/2023 to establish care. Recommended regorafenib.     Started regorafenib on 3/2/2023. Progression noted 5/19/23. Plan to start FOLFOX/Avastin and send out CARIS testing to evaluate for other treatment options. Cycle 1 was given with oxaliplatin desensitization, 5FU, Avastin held due to increased urine protein.     Following cycle 4, patient was admitted with hematuria and acute kidney injury.    Oxaliplatin was dropped with Cycle 5.     Y-90 radioembolization 9/7.    10/24/23 CT CAP with disease progression with notable growth in all pulmonary lesions. Lonsurf + bevacizumab started 10/27/23 with plans to bridge to clinical trial.   11/15/23 - stopped chemo for [facility] study.   Currently enrolled in [facility] CAR-T trial (completed cell harvest 1/23/2024, tentatively planning administration of CAR-T in April 2/12/24 - resumed Lonsurf, bevacizumab to bridge treatment until CAR-T therapy  3/20/24 - treatment changed to Fruquintinib due to intolerability of Lonsurf/bevacizumab  4/2/24-  "Presented to the ED with dehydration. Stopped Fruquintnib.   6/2024- cellular therapy transplant at [facility], [place].   7/30/24- CT imaging demonstrating marked progression of pulmonary and hepatic lesions, clinical trial failed to generate T-cell graft. Multiple discussions regarding plan of care, hospice vs additional lines of treatment. Patient decided he is not ready for hospice. He resumed treatment with FOLFIRI + bevacizumab on 8/30/24.   10/2024 - CT CAP with continued response, plan to continue FOLFIRI + bevacizumab with the addition of Zometa. Per his dentist, needs dental work completed prior to clearance. (Scheduled 1/2 and 1/21).   12/2024 chemo held for break over new Years per patient's choice.  1/15/25 chemo held due to ongoing respiratory illness, + RSV, also treated with levaquin for pneumonia on CXR, chemo deferred   1/22-1/25/25- hospitalized with suspicion for fungal pneumonia  1/28-1/29/25- hospitalized for PJP pneumonia, started on treatment with Bactrim and steroids  2/19/25 CT CAP shows disease progression, occurred while on treatment break for pneumonia, resumed same chemotherapy with FOLFIRI/chelsea on 2/20/25    Interval History:   Brain is seen today for an acute add-on.  He reports that Monday, 3- he ate a cheeseburger from [employer] that was \"really gross\" by the evening around 6:30 PM he had an episode of vomiting after dinner.  About an hour later he had an episode of vomiting bile production only.  He then again had vomiting of bile last night around 11:30 PM.  He does not particularly feel nauseated but did try 1 tablet of Compazine on Tuesday 3- which did seem to settle his stomach for about 3 hours.  He has a history of chronic constipation at baseline and sometimes can go 5 to 6 days without a bowel movement.  This was his normal until about Monday morning where he had an episode of constipation and then the rest of his bowels seem to become more soft with each output and now is " "frequently emptying his bag. At baseline he empties his bag 1-2 times per week. Since March 27, 2025 at 12 am to now he has emptied his bag at least 5 times. He has not been taking stool softeners as he did not feel or Dulcolax helped him in the past. For his diarrhea he has not tried imodium as he was concerned if this is infectious it may be bad for him to take it as he was instructed not to do this until stool studies resulted in the past. He has a mild right frontal headache above the orbit the he feels is secondary to dehydration. He is hesitant to drink fluid orally as it \"goes right through and is more trouble\" and eating minimally. Last night he did try crackers and cheese but didn't feel it settled well. He has diffuse abdominal pain especially worsened after he coughs. His cough is improved over the past month and he remains on bactrim. No chest pain. He ongoing, unchanged shortness of breath and at times his lungs feel heavy- not new. He hasn't taken any tylenol or pain medication for mild headaches or abdominal pain. He has blood tinged, clear and thick mucous when he blows his nose. No neck stiffness.     He'd like to stay out of the hospital if able.     ROS: 10 point ROS neg other than the symptoms noted above in the HPI.    Current Outpatient Medications   Medication Sig Dispense Refill     acetaminophen (TYLENOL) 500 MG tablet Take 500-1,000 mg by mouth as needed for mild pain. (Patient not taking: Reported on 3/27/2025)       Chemo Kit For RN use only. Do not remove items from bag. Contents: 1  sodium chloride 0.9% flush, 4 medium gloves, 1 chemo gown, 1/4 chemo mat, 1 connector female, 1 chemo bag. [id number] kit 0     cyclobenzaprine (FLEXERIL) 5 MG tablet Take one tab (5mg) by mouth over 6-8 hours, as needed for spasms 45 tablet 2     Emergency Supply Kit, Central, Patient use for emergency only. Contents: 3 sodium chloride 0.9% flushes, 1 dressing kit, 1 microclave ext set 14\", 4 nitrile gloves " "(med), 6 alcohol prep pads, 1 bacitracin, 1 syringe (10 cc 20 G 1\"). Call [phone] to reorder. [id number] kit 0     fluorouracil (ADRUCIL) 2.5 GM/50ML SOLN injection        Fluorouracil (ADRUCIL) 4,775 mg in sodium chloride 0.9 % 241 mL via HOMEPUMP C-Series Infuse 4,775 mg at 5.2 mL/hr over 46 hours into the vein once for 1 dose. 793994 mL 0     fluticasone (FLONASE) 50 MCG/ACT nasal spray Spray 1 spray into both nostrils daily. 16 g 2     gabapentin (NEURONTIN) 100 MG capsule TAKE 1 TO 2 CAPSULES (100-200 MG) BY MOUTH UP TO TWICE DURING THE DAY AND 3 CAPSULES (300 MG) AT BEDTIME. (Patient not taking: Reported on 3/27/2025) 210 capsule 1     guaiFENesin (MUCINEX) 600 MG 12 hr tablet Take 2 tablets (1,200 mg) by mouth 2 times daily. (Patient not taking: Reported on 3/5/2025) 20 tablet 0     ibuprofen (ADVIL/MOTRIN) 200 MG tablet Take 3 tablets (600 mg) by mouth every 8 hours as needed for moderate pain (Patient not taking: Reported on 3/19/2025) 100 tablet 0     ipratropium - albuterol 0.5 mg/2.5 mg/3 mL (DUONEB) 0.5-2.5 (3) MG/3ML neb solution Take 1 vial (3 mLs) by nebulization every 6 hours as needed for shortness of breath, wheezing or cough. (Patient not taking: Reported on 3/27/2025) 90 mL 3     loperamide (IMODIUM A-D) 2 MG tablet Take 2 mg by mouth 4 times daily as needed for diarrhea. (Patient not taking: Reported on 3/27/2025)       LORazepam (ATIVAN) 0.5 MG tablet Take 1 tablet (0.5 mg) by mouth every 6 hours as needed for anxiety. (Patient not taking: Reported on 3/27/2025) 30 tablet 0     NARCAN 4 MG/0.1ML nasal spray        ondansetron (ZOFRAN ODT) 4 MG ODT tab Take 1 tablet (4 mg) by mouth every 6 hours as needed for nausea. (Patient not taking: Reported on 3/27/2025) 30 tablet 3     Ostomy Supplies MISC 1 each daily 1 each 11     oxyCODONE (ROXICODONE) 10 MG tablet Take 1-2 tablets (10-20 mg) by mouth every 4 hours as needed for pain. 144 tablet 0     pegfilgrastim (NEULASTA) 6 MG/0.6ML injection " Inject 0.6 mLs (6 mg) subcutaneously every 14 days. Administer 24 hours after chemotherapy disconnect 3.6 mL 0     Port Access Kit For nurse use only.  Do not remove items from bag.  Use for port access.  Do not place syringe on sterile field. [id number] kit 0     prochlorperazine (COMPAZINE) 10 MG tablet Take 1 tablet (10 mg) by mouth every 6 hours as needed for nausea or vomiting 30 tablet 2     sodium chloride, PF, 0.9% PF flush Inject 10 mLs into the vein as needed for line flush. Flush IV before and after med administration as directed and/or at least every 24 hours, or prior to deaccessing for no further use and/or at least every 4 weeks when not accessed. [id number] mL 0     sulfamethoxazole-trimethoprim (BACTRIM DS) 800-160 MG tablet Take 1 tablet by mouth Every Mon, Wed, Fri Morning. Please start this prophylaxis dose of bactrim once you have completed your 21 days of bactrim treatment 12 tablet 1     VENTOLIN  (90 Base) MCG/ACT inhaler INHALE 2 PUFFS INTO THE LUNGS EVERY 6 HOURS AS NEEDED FOR SHORTNESS OF BREATH OR WHEEZING OR COUGH (Patient not taking: Reported on 3/27/2025) 18 g 2     Objective:   3/27/2025  7:53 AM 3/27/2025  9:02 AM 3/27/2025  10:14 AM   Vitals      Systolic 109  106  105    Diastolic 78  70  72    Pulse 112  89  67    Respiration 20      Temp 97.5  F (36.4  C)      O2 100 %  98 %  98 %    Pain Score 6 (Moderate)        General: No acute distress, laying in infusion chair.   HEENT: Sclera anicteric. Oral mucosa pink and moist.  Heart: Regular, rate, and rhythm  Lungs: Clear to ascultation bilaterally  Abdomen: Positive bowel sounds. Soft, non-distended, + mild tenderness diffusely but most pronounced in the RUQ and epigastric region. Stoma visualized.    Extremities: no lower extremity edema  Neuro: Cranial nerves grossly intact  Rash: none  Vascular access: port      LABS  Most Recent 3 CBC's:  Recent Labs   Lab Test 03/26/25  1405 03/19/25  0632 03/12/25  1507   WBC 7.2 6.9 4.7   HGB  12.5* 10.2* 11.6*   MCV 82 83 83   PLT 87* 144* 46*    Most Recent 3 BMP's:  Recent Labs   Lab Test 03/26/25  1405 03/19/25  0632 03/14/25  0830    136 134*   POTASSIUM 4.5 3.6 4.2   CHLORIDE 100 104 101   CO2 22 20* 20*   BUN 11.2 13.0 16.3   CR 0.78 0.99 0.85   ANIONGAP 13 12 13   JEFERSON 8.8 8.9 8.8   GLC 96 111* 133*    Most Recent 2 LFT's:  Recent Labs   Lab Test 03/26/25  1405 03/19/25  0632   AST 42 44   ALT 20 18   ALKPHOS 240* 251*   BILITOTAL 0.5 0.2   I reviewed the above labs today.    ASSESSMENT AND PLAN   Metastatic rectal cancer  He started on treatment with FOLFIRI + bevacizumab on 8/30/24. Imaging in October 2024 showed improvement in his disease. Imaging in February 2025 showed disease progression, though this was in the setting of treatment break due to PJP pneumonia. Therefore he resumed the same chemotherapy with FOLFIRI/chelsea on 2/20/25. He has been tolerating chemotherapy fairly well, though developed more significant thrombocytopenia, likely related to Bactrim. Unfortunately, after cycle 12 FOLFIRI/chelsea on 3/19/25 he has since developed abdominal pain, nausea, and vomiting. He is afebrile in clinic but did present with tachycardia. 3/26/25 CBC and CMP reviewed which demonstrates mild thrombocytopenia that meeting the threshold to transfuse. He was given 1.5 L of NS in infusion with resolution of his tachycardia. I am suspicious he may have contracted a gastrointestinal bug from the food he purchased at the gas station given the timing of onset of his symptoms. Enteric panel is pending. Due to abdominal tenderness on exam and change in bowel habits we proceeded with an abdominal XR which shows a non-obstructive bowel gas pattern. I suspect he will improve with supportive cares with regular use of antiemetics, compazine and zofran, prn and restart imodium. Will await for result of enteric panel and if no-contraindication will start the imodium March 27, 2025. He is taking in minimal PO intake so  I do feel he needs close follow up with likely additional fluids tomorrow 3/28/25. He should continue to push PO fluids as well. We will recheck his CMP, Mag, and Phos along with a CBC to monitor his platelets. Recent C. Diff testing negative from 3/14/25.     Epistaxis  Secondary to bevacizumab, worsened with thrombocytopenia. Bevacizumab was held on 3/5/25. He was given a platelet transfusion on 3/14/24. His bleeding has improved. Will recheck his labs this week to determine the final plan for treatment, as noted above. Recommend nasal saline spray, Aquaphor or Vasleine at bedtime in the nares, and using a humidifier in bedroom at night.  - no significant nose bleeds March 27, 2025. Continue above recommended supportive cares.     Back pain  Secondary to bone mets. Following with palliative care, next in April. Managed with oxycodone prn, currently 1-2/day. No changes reported March 27, 2025.     Iron deficient anemia  Treated with 3 doses of Venofer, last on 10/4/24. Last check of iron studies in February 2025 unclear if anemia of chronic disease or mixed with recurrent iron deficiency. MCV has been trending down again. Will recheck iron studies including a soluble transferrin receptor.     Bone mets  Patient will obtain dental clearance prior to starting on treatment with Zometa. Not specifically discussed March 27, 2025.     PJP pneumonia  Following with ID. Completed course of high dose Bactrim, now on PJP prophylaxis with M/W/F Bactrim for a minimum of 6 months. Per ID, will then reassess the immune system and the CD4 count prior to stopping the Bactrim prophylaxis.     The longitudinal plan of care for the diagnosis(es)/condition(s) as documented were addressed during this visit. Due to the added complexity in care, I will continue to support Roman in the subsequent management and with ongoing continuity of care.    61 minutes spent on the date of the encounter doing chart review, review of test results,  interpretation of tests, patient visit, and documentation     Erika Mc PA-C      Again, thank you for allowing me to participate in the care of your patient.        Sincerely,        Erika Mc PA-C    Electronically signed

## 2025-03-27 NOTE — PROGRESS NOTES
[facility] - Medical Oncology Follow Up Note  03/27/2025    Oncology History:   Patient developed rectal bleeding in 2020.  In January 2021 CT showed focal wall thickening in the upper rectum, multiple pulmonary nodules bilaterally suspicious for metastatic disease.  Underwent FNA of the right upper lobe lesion which was consistent with adenocarcinoma of the colon.  He was treated with FOLFOX from 2/20/2021 through 5/20/2021.  Avastin was added.  Had an infusion reaction to oxaliplatin 6/2021.  7/2021- 12/2021 was on 5-FU alone.  Developed progression.  12/2021- 9/2022 was on FOLFIRI.  11/2021 started Lonsurf. All of this was done with outside oncologist.     Met with Dr. Snow on 2/3/2023 to establish care. Recommended regorafenib.     Started regorafenib on 3/2/2023. Progression noted 5/19/23. Plan to start FOLFOX/Avastin and send out CARIS testing to evaluate for other treatment options. Cycle 1 was given with oxaliplatin desensitization, 5FU, Avastin held due to increased urine protein.     Following cycle 4, patient was admitted with hematuria and acute kidney injury.    Oxaliplatin was dropped with Cycle 5.     Y-90 radioembolization 9/7.    10/24/23 CT CAP with disease progression with notable growth in all pulmonary lesions. Lonsurf + bevacizumab started 10/27/23 with plans to bridge to clinical trial.   11/15/23 - stopped chemo for [email or web address] study.   Currently enrolled in [facility] [[email or web address]] CAR-T trial (completed cell harvest 1/23/2024, tentatively planning administration of CAR-T in April 2/12/24 - resumed Lonsurf, bevacizumab to bridge treatment until CAR-T therapy  3/20/24 - treatment changed to Fruquintinib due to intolerability of Lonsurf/bevacizumab  4/2/24- Presented to the ED with dehydration. Stopped Fruquintnib.   6/2024- cellular therapy transplant at [facility] [[email or web address]   7/30/24- CT imaging demonstrating marked progression of pulmonary and hepatic lesions, clinical trial failed to generate T-cell graft. Multiple  "discussions regarding plan of care, hospice vs additional lines of treatment. Patient decided he is not ready for hospice. He resumed treatment with FOLFIRI + bevacizumab on 8/30/24.   10/2024 - CT CAP with continued response, plan to continue FOLFIRI + bevacizumab with the addition of Zometa. Per his dentist, needs dental work completed prior to clearance. (Scheduled 1/2 and 1/21).   12/2024 chemo held for break over new Years per patient's choice.  1/15/25 chemo held due to ongoing respiratory illness, + RSV, also treated with levaquin for pneumonia on CXR, chemo deferred   1/22-1/25/25- hospitalized with suspicion for fungal pneumonia  1/28-1/29/25- hospitalized for PJP pneumonia, started on treatment with Bactrim and steroids  2/19/25 CT CAP shows disease progression, occurred while on treatment break for pneumonia, resumed same chemotherapy with FOLFIRI/chelsea on 2/20/25    Interval History:   Brain is seen today for an acute add-on.  He reports that Monday, 3- he ate a cheeseburger from [employer] that was \"really gross\" by the evening around 6:30 PM he had an episode of vomiting after dinner.  About an hour later he had an episode of vomiting bile production only.  He then again had vomiting of bile last night around 11:30 PM.  He does not particularly feel nauseated but did try 1 tablet of Compazine on Tuesday 3- which did seem to settle his stomach for about 3 hours.  He has a history of chronic constipation at baseline and sometimes can go 5 to 6 days without a bowel movement.  This was his normal until about Monday morning where he had an episode of constipation and then the rest of his bowels seem to become more soft with each output and now is frequently emptying his bag. At baseline he empties his bag 1-2 times per week. Since March 27, 2025 at 12 am to now he has emptied his bag at least 5 times. He has not been taking stool softeners as he did not feel or Dulcolax helped him in the past. For " "his diarrhea he has not tried imodium as he was concerned if this is infectious it may be bad for him to take it as he was instructed not to do this until stool studies resulted in the past. He has a mild right frontal headache above the orbit the he feels is secondary to dehydration. He is hesitant to drink fluid orally as it \"goes right through and is more trouble\" and eating minimally. Last night he did try crackers and cheese but didn't feel it settled well. He has diffuse abdominal pain especially worsened after he coughs. His cough is improved over the past month and he remains on bactrim. No chest pain. He ongoing, unchanged shortness of breath and at times his lungs feel heavy- not new. He hasn't taken any tylenol or pain medication for mild headaches or abdominal pain. He has blood tinged, clear and thick mucous when he blows his nose. No neck stiffness.     He'd like to stay out of the hospital if able.     ROS: 10 point ROS neg other than the symptoms noted above in the HPI.    Current Outpatient Medications   Medication Sig Dispense Refill    acetaminophen (TYLENOL) 500 MG tablet Take 500-1,000 mg by mouth as needed for mild pain. (Patient not taking: Reported on 3/27/2025)      Chemo Kit For RN use only. Do not remove items from bag. Contents: 1  sodium chloride 0.9% flush, 4 medium gloves, 1 chemo gown, 1/4 chemo mat, 1 connector female, 1 chemo bag. [id number] kit 0    cyclobenzaprine (FLEXERIL) 5 MG tablet Take one tab (5mg) by mouth over 6-8 hours, as needed for spasms 45 tablet 2    Emergency Supply Kit, Central, Patient use for emergency only. Contents: 3 sodium chloride 0.9% flushes, 1 dressing kit, 1 microclave ext set 14\", 4 nitrile gloves (med), 6 alcohol prep pads, 1 bacitracin, 1 syringe (10 cc 20 G 1\"). Call [phone] to reorder. [id number] kit 0    fluorouracil (ADRUCIL) 2.5 GM/50ML SOLN injection       Fluorouracil (ADRUCIL) 4,775 mg in sodium chloride 0.9 % 241 mL via HOMEPUMP C-Series " Infuse 4,775 mg at 5.2 mL/hr over 46 hours into the vein once for 1 dose. [id number] mL 0    fluticasone (FLONASE) 50 MCG/ACT nasal spray Spray 1 spray into both nostrils daily. 16 g 2    gabapentin (NEURONTIN) 100 MG capsule TAKE 1 TO 2 CAPSULES (100-200 MG) BY MOUTH UP TO TWICE DURING THE DAY AND 3 CAPSULES (300 MG) AT BEDTIME. (Patient not taking: Reported on 3/27/2025) 210 capsule 1    guaiFENesin (MUCINEX) 600 MG 12 hr tablet Take 2 tablets (1,200 mg) by mouth 2 times daily. (Patient not taking: Reported on 3/5/2025) 20 tablet 0    ibuprofen (ADVIL/MOTRIN) 200 MG tablet Take 3 tablets (600 mg) by mouth every 8 hours as needed for moderate pain (Patient not taking: Reported on 3/19/2025) 100 tablet 0    ipratropium - albuterol 0.5 mg/2.5 mg/3 mL (DUONEB) 0.5-2.5 (3) MG/3ML neb solution Take 1 vial (3 mLs) by nebulization every 6 hours as needed for shortness of breath, wheezing or cough. (Patient not taking: Reported on 3/27/2025) 90 mL 3    loperamide (IMODIUM A-D) 2 MG tablet Take 2 mg by mouth 4 times daily as needed for diarrhea. (Patient not taking: Reported on 3/27/2025)      LORazepam (ATIVAN) 0.5 MG tablet Take 1 tablet (0.5 mg) by mouth every 6 hours as needed for anxiety. (Patient not taking: Reported on 3/27/2025) 30 tablet 0    NARCAN 4 MG/0.1ML nasal spray       ondansetron (ZOFRAN ODT) 4 MG ODT tab Take 1 tablet (4 mg) by mouth every 6 hours as needed for nausea. (Patient not taking: Reported on 3/27/2025) 30 tablet 3    Ostomy Supplies MISC 1 each daily 1 each 11    oxyCODONE (ROXICODONE) 10 MG tablet Take 1-2 tablets (10-20 mg) by mouth every 4 hours as needed for pain. 144 tablet 0    pegfilgrastim (NEULASTA) 6 MG/0.6ML injection Inject 0.6 mLs (6 mg) subcutaneously every 14 days. Administer 24 hours after chemotherapy disconnect 3.6 mL 0    Port Access Kit For nurse use only.  Do not remove items from bag.  Use for port access.  Do not place syringe on sterile field. [id number] kit 0     prochlorperazine (COMPAZINE) 10 MG tablet Take 1 tablet (10 mg) by mouth every 6 hours as needed for nausea or vomiting 30 tablet 2    sodium chloride, PF, 0.9% PF flush Inject 10 mLs into the vein as needed for line flush. Flush IV before and after med administration as directed and/or at least every 24 hours, or prior to deaccessing for no further use and/or at least every 4 weeks when not accessed. [id number] mL 0    sulfamethoxazole-trimethoprim (BACTRIM DS) 800-160 MG tablet Take 1 tablet by mouth Every Mon, Wed, Fri Morning. Please start this prophylaxis dose of bactrim once you have completed your 21 days of bactrim treatment 12 tablet 1    VENTOLIN  (90 Base) MCG/ACT inhaler INHALE 2 PUFFS INTO THE LUNGS EVERY 6 HOURS AS NEEDED FOR SHORTNESS OF BREATH OR WHEEZING OR COUGH (Patient not taking: Reported on 3/27/2025) 18 g 2     Objective:   3/27/2025  7:53 AM 3/27/2025  9:02 AM 3/27/2025  10:14 AM   Vitals      Systolic 109  106  105    Diastolic 78  70  72    Pulse 112  89  67    Respiration 20      Temp 97.5  F (36.4  C)      O2 100 %  98 %  98 %    Pain Score 6 (Moderate)        General: No acute distress, laying in infusion chair.   HEENT: Sclera anicteric. Oral mucosa pink and moist.  Heart: Regular, rate, and rhythm  Lungs: Clear to ascultation bilaterally  Abdomen: Positive bowel sounds. Soft, non-distended, + mild tenderness diffusely but most pronounced in the RUQ and epigastric region. Stoma visualized.    Extremities: no lower extremity edema  Neuro: Cranial nerves grossly intact  Rash: none  Vascular access: port      LABS  Most Recent 3 CBC's:  Recent Labs   Lab Test 03/26/25  1405 03/19/25  0632 03/12/25  1507   WBC 7.2 6.9 4.7   HGB 12.5* 10.2* 11.6*   MCV 82 83 83   PLT 87* 144* 46*    Most Recent 3 BMP's:  Recent Labs   Lab Test 03/26/25  1405 03/19/25  0632 03/14/25  0830    136 134*   POTASSIUM 4.5 3.6 4.2   CHLORIDE 100 104 101   CO2 22 20* 20*   BUN 11.2 13.0 16.3   CR 0.78 0.99  0.85   ANIONGAP 13 12 13   JEFERSON 8.8 8.9 8.8   GLC 96 111* 133*    Most Recent 2 LFT's:  Recent Labs   Lab Test 03/26/25  1405 03/19/25  0632   AST 42 44   ALT 20 18   ALKPHOS 240* 251*   BILITOTAL 0.5 0.2   I reviewed the above labs today.    ASSESSMENT AND PLAN   Metastatic rectal cancer  He started on treatment with FOLFIRI + bevacizumab on 8/30/24. Imaging in October 2024 showed improvement in his disease. Imaging in February 2025 showed disease progression, though this was in the setting of treatment break due to PJP pneumonia. Therefore he resumed the same chemotherapy with FOLFIRI/chelsea on 2/20/25. He has been tolerating chemotherapy fairly well, though developed more significant thrombocytopenia, likely related to Bactrim. Unfortunately, after cycle 12 FOLFIRI/chelsea on 3/19/25 he has since developed abdominal pain, nausea, and vomiting. He is afebrile in clinic but did present with tachycardia. 3/26/25 CBC and CMP reviewed which demonstrates mild thrombocytopenia that meeting the threshold to transfuse. He was given 1.5 L of NS in infusion with resolution of his tachycardia. I am suspicious he may have contracted a gastrointestinal bug from the food he purchased at the gas station given the timing of onset of his symptoms. Enteric panel is pending. Due to abdominal tenderness on exam and change in bowel habits we proceeded with an abdominal XR which shows a non-obstructive bowel gas pattern. I suspect he will improve with supportive cares with regular use of antiemetics, compazine and zofran, prn and restart imodium. Will await for result of enteric panel and if no-contraindication will start the imodium March 27, 2025. He is taking in minimal PO intake so I do feel he needs close follow up with likely additional fluids tomorrow 3/28/25. He should continue to push PO fluids as well. We will recheck his CMP, Mag, and Phos along with a CBC to monitor his platelets. Recent C. Diff testing negative from 3/14/25.      Epistaxis  Secondary to bevacizumab, worsened with thrombocytopenia. Bevacizumab was held on 3/5/25. He was given a platelet transfusion on 3/14/24. His bleeding has improved. Will recheck his labs this week to determine the final plan for treatment, as noted above. Recommend nasal saline spray, Aquaphor or Vasleine at bedtime in the nares, and using a humidifier in bedroom at night.  - no significant nose bleeds March 27, 2025. Continue above recommended supportive cares.     Back pain  Secondary to bone mets. Following with palliative care, next in April. Managed with oxycodone prn, currently 1-2/day. No changes reported March 27, 2025.     Iron deficient anemia  Treated with 3 doses of Venofer, last on 10/4/24. Last check of iron studies in February 2025 unclear if anemia of chronic disease or mixed with recurrent iron deficiency. MCV has been trending down again. Will recheck iron studies including a soluble transferrin receptor.     Bone mets  Patient will obtain dental clearance prior to starting on treatment with Zometa. Not specifically discussed March 27, 2025.     PJP pneumonia  Following with ID. Completed course of high dose Bactrim, now on PJP prophylaxis with M/W/F Bactrim for a minimum of 6 months. Per ID, will then reassess the immune system and the CD4 count prior to stopping the Bactrim prophylaxis.     The longitudinal plan of care for the diagnosis(es)/condition(s) as documented were addressed during this visit. Due to the added complexity in care, I will continue to support Roman in the subsequent management and with ongoing continuity of care.    61 minutes spent on the date of the encounter doing chart review, review of test results, interpretation of tests, patient visit, and documentation     Erika Mc PA-C

## 2025-03-28 ENCOUNTER — APPOINTMENT (OUTPATIENT)
Dept: LAB | Facility: CLINIC | Age: 50
End: 2025-03-28
Attending: PHYSICIAN ASSISTANT
Payer: COMMERCIAL

## 2025-04-01 ENCOUNTER — HOME INFUSION (OUTPATIENT)
Dept: HOME HEALTH SERVICES | Facility: HOME HEALTH | Age: 50
End: 2025-04-01
Payer: COMMERCIAL

## 2025-04-01 DIAGNOSIS — C20 MALIGNANT TUMOR OF RECTUM (H): ICD-10-CM

## 2025-04-01 DIAGNOSIS — C20 RECTAL ADENOCARCINOMA METASTATIC TO LUNG (H): Primary | ICD-10-CM

## 2025-04-01 DIAGNOSIS — C78.00 RECTAL ADENOCARCINOMA METASTATIC TO LUNG (H): Primary | ICD-10-CM

## 2025-04-02 ENCOUNTER — HOME INFUSION BILLING (OUTPATIENT)
Dept: HOME HEALTH SERVICES | Facility: HOME HEALTH | Age: 50
End: 2025-04-02
Payer: COMMERCIAL

## 2025-04-02 ENCOUNTER — DOCUMENTATION ONLY (OUTPATIENT)
Dept: HOME HEALTH SERVICES | Facility: HOME HEALTH | Age: 50
End: 2025-04-02
Payer: COMMERCIAL

## 2025-04-02 ENCOUNTER — INFUSION THERAPY VISIT (OUTPATIENT)
Dept: ONCOLOGY | Facility: CLINIC | Age: 50
End: 2025-04-02
Attending: STUDENT IN AN ORGANIZED HEALTH CARE EDUCATION/TRAINING PROGRAM
Payer: COMMERCIAL

## 2025-04-02 VITALS
OXYGEN SATURATION: 95 % | BODY MASS INDEX: 22.97 KG/M2 | TEMPERATURE: 97.6 F | HEART RATE: 98 BPM | DIASTOLIC BLOOD PRESSURE: 72 MMHG | SYSTOLIC BLOOD PRESSURE: 118 MMHG | WEIGHT: 164.7 LBS | RESPIRATION RATE: 18 BRPM

## 2025-04-02 DIAGNOSIS — C78.00 RECTAL ADENOCARCINOMA METASTATIC TO LUNG (H): Primary | ICD-10-CM

## 2025-04-02 DIAGNOSIS — C20 RECTAL ADENOCARCINOMA METASTATIC TO LUNG (H): Primary | ICD-10-CM

## 2025-04-02 LAB
ALBUMIN SERPL BCG-MCNC: 3.6 G/DL (ref 3.5–5.2)
ALBUMIN UR-MCNC: NEGATIVE MG/DL
ALP SERPL-CCNC: 221 U/L (ref 40–150)
ALT SERPL W P-5'-P-CCNC: 25 U/L (ref 0–70)
ANION GAP SERPL CALCULATED.3IONS-SCNC: 12 MMOL/L (ref 7–15)
AST SERPL W P-5'-P-CCNC: 52 U/L (ref 0–45)
BASOPHILS # BLD AUTO: 0 10E3/UL (ref 0–0.2)
BASOPHILS NFR BLD AUTO: 0 %
BILIRUB SERPL-MCNC: 0.3 MG/DL
BUN SERPL-MCNC: 16.9 MG/DL (ref 6–20)
CALCIUM SERPL-MCNC: 8.4 MG/DL (ref 8.8–10.4)
CHLORIDE SERPL-SCNC: 105 MMOL/L (ref 98–107)
CREAT SERPL-MCNC: 1.13 MG/DL (ref 0.67–1.17)
EGFRCR SERPLBLD CKD-EPI 2021: 79 ML/MIN/1.73M2
EOSINOPHIL # BLD AUTO: 0.1 10E3/UL (ref 0–0.7)
EOSINOPHIL NFR BLD AUTO: 1 %
ERYTHROCYTE [DISTWIDTH] IN BLOOD BY AUTOMATED COUNT: 21.7 % (ref 10–15)
GLUCOSE SERPL-MCNC: 99 MG/DL (ref 70–99)
HCO3 SERPL-SCNC: 22 MMOL/L (ref 22–29)
HCT VFR BLD AUTO: 31 % (ref 40–53)
HGB BLD-MCNC: 9.9 G/DL (ref 13.3–17.7)
IMM GRANULOCYTES # BLD: 0.1 10E3/UL
IMM GRANULOCYTES NFR BLD: 1 %
LYMPHOCYTES # BLD AUTO: 0.5 10E3/UL (ref 0.8–5.3)
LYMPHOCYTES NFR BLD AUTO: 5 %
MCH RBC QN AUTO: 26.5 PG (ref 26.5–33)
MCHC RBC AUTO-ENTMCNC: 31.9 G/DL (ref 31.5–36.5)
MCV RBC AUTO: 83 FL (ref 78–100)
MONOCYTES # BLD AUTO: 1.2 10E3/UL (ref 0–1.3)
MONOCYTES NFR BLD AUTO: 14 %
NEUTROPHILS # BLD AUTO: 6.9 10E3/UL (ref 1.6–8.3)
NEUTROPHILS NFR BLD AUTO: 79 %
NRBC # BLD AUTO: 0 10E3/UL
NRBC BLD AUTO-RTO: 0 /100
PHOSPHATE SERPL-MCNC: 2.7 MG/DL (ref 2.5–4.5)
PLATELET # BLD AUTO: 166 10E3/UL (ref 150–450)
POTASSIUM SERPL-SCNC: 3.9 MMOL/L (ref 3.4–5.3)
PROT SERPL-MCNC: 6.5 G/DL (ref 6.4–8.3)
RBC # BLD AUTO: 3.74 10E6/UL (ref 4.4–5.9)
SODIUM SERPL-SCNC: 139 MMOL/L (ref 135–145)
WBC # BLD AUTO: 8.8 10E3/UL (ref 4–11)

## 2025-04-02 PROCEDURE — 85018 HEMOGLOBIN: CPT | Performed by: STUDENT IN AN ORGANIZED HEALTH CARE EDUCATION/TRAINING PROGRAM

## 2025-04-02 PROCEDURE — A4216 STERILE WATER/SALINE, 10 ML: HCPCS

## 2025-04-02 PROCEDURE — 80053 COMPREHEN METABOLIC PANEL: CPT | Performed by: PHYSICIAN ASSISTANT

## 2025-04-02 PROCEDURE — 96368 THER/DIAG CONCURRENT INF: CPT

## 2025-04-02 PROCEDURE — 82040 ASSAY OF SERUM ALBUMIN: CPT | Performed by: PHYSICIAN ASSISTANT

## 2025-04-02 PROCEDURE — 258N000003 HC RX IP 258 OP 636: Performed by: STUDENT IN AN ORGANIZED HEALTH CARE EDUCATION/TRAINING PROGRAM

## 2025-04-02 PROCEDURE — 96417 CHEMO IV INFUS EACH ADDL SEQ: CPT

## 2025-04-02 PROCEDURE — 96375 TX/PRO/DX INJ NEW DRUG ADDON: CPT

## 2025-04-02 PROCEDURE — 36415 COLL VENOUS BLD VENIPUNCTURE: CPT | Performed by: STUDENT IN AN ORGANIZED HEALTH CARE EDUCATION/TRAINING PROGRAM

## 2025-04-02 PROCEDURE — 258N000003 HC RX IP 258 OP 636: Performed by: PHYSICIAN ASSISTANT

## 2025-04-02 PROCEDURE — 250N000011 HC RX IP 250 OP 636: Performed by: STUDENT IN AN ORGANIZED HEALTH CARE EDUCATION/TRAINING PROGRAM

## 2025-04-02 PROCEDURE — G0498 CHEMO EXTEND IV INFUS W/PUMP: HCPCS

## 2025-04-02 PROCEDURE — 81003 URINALYSIS AUTO W/O SCOPE: CPT | Performed by: STUDENT IN AN ORGANIZED HEALTH CARE EDUCATION/TRAINING PROGRAM

## 2025-04-02 PROCEDURE — 96413 CHEMO IV INFUSION 1 HR: CPT

## 2025-04-02 PROCEDURE — 96415 CHEMO IV INFUSION ADDL HR: CPT

## 2025-04-02 PROCEDURE — 85004 AUTOMATED DIFF WBC COUNT: CPT | Performed by: STUDENT IN AN ORGANIZED HEALTH CARE EDUCATION/TRAINING PROGRAM

## 2025-04-02 PROCEDURE — 96367 TX/PROPH/DG ADDL SEQ IV INF: CPT

## 2025-04-02 PROCEDURE — 96411 CHEMO IV PUSH ADDL DRUG: CPT

## 2025-04-02 PROCEDURE — 84100 ASSAY OF PHOSPHORUS: CPT | Performed by: PHYSICIAN ASSISTANT

## 2025-04-02 PROCEDURE — 96376 TX/PRO/DX INJ SAME DRUG ADON: CPT

## 2025-04-02 PROCEDURE — 250N000011 HC RX IP 250 OP 636: Performed by: PHYSICIAN ASSISTANT

## 2025-04-02 RX ORDER — ATROPINE SULFATE 0.4 MG/ML
0.4 AMPUL (ML) INJECTION
Status: COMPLETED | OUTPATIENT
Start: 2025-04-02 | End: 2025-04-02

## 2025-04-02 RX ORDER — FLUOROURACIL 50 MG/ML
400 INJECTION, SOLUTION INTRAVENOUS ONCE
Status: COMPLETED | OUTPATIENT
Start: 2025-04-02 | End: 2025-04-02

## 2025-04-02 RX ORDER — ONDANSETRON 2 MG/ML
8 INJECTION INTRAMUSCULAR; INTRAVENOUS ONCE
Status: COMPLETED | OUTPATIENT
Start: 2025-04-02 | End: 2025-04-02

## 2025-04-02 RX ORDER — HEPARIN SODIUM (PORCINE) LOCK FLUSH IV SOLN 100 UNIT/ML 100 UNIT/ML
5 SOLUTION INTRAVENOUS ONCE
Status: COMPLETED | OUTPATIENT
Start: 2025-04-02 | End: 2025-04-02

## 2025-04-02 RX ADMIN — SODIUM CHLORIDE 250 ML: 0.9 INJECTION, SOLUTION INTRAVENOUS at 09:33

## 2025-04-02 RX ADMIN — ATROPINE SULFATE 0.4 MG: 0.4 INJECTION, SOLUTION INTRAVENOUS at 11:07

## 2025-04-02 RX ADMIN — IRINOTECAN HYDROCHLORIDE 280 MG: 20 INJECTION, SOLUTION INTRAVENOUS at 10:18

## 2025-04-02 RX ADMIN — ATROPINE SULFATE 0.4 MG: 0.4 INJECTION, SOLUTION INTRAVENOUS at 10:16

## 2025-04-02 RX ADMIN — FOSAPREPITANT: 150 INJECTION, POWDER, LYOPHILIZED, FOR SOLUTION INTRAVENOUS at 09:13

## 2025-04-02 RX ADMIN — HEPARIN 5 ML: 100 SYRINGE at 08:07

## 2025-04-02 RX ADMIN — FLUOROURACIL 765 MG: 50 INJECTION, SOLUTION INTRAVENOUS at 12:00

## 2025-04-02 RX ADMIN — LEUCOVORIN CALCIUM 750 MG: 50 INJECTION, POWDER, LYOPHILIZED, FOR SOLUTION INTRAMUSCULAR; INTRAVENOUS at 10:18

## 2025-04-02 RX ADMIN — SODIUM CHLORIDE 400 MG: 9 INJECTION, SOLUTION INTRAVENOUS at 09:33

## 2025-04-02 RX ADMIN — ONDANSETRON 8 MG: 2 INJECTION INTRAMUSCULAR; INTRAVENOUS at 09:09

## 2025-04-02 ASSESSMENT — PAIN SCALES - GENERAL: PAINLEVEL_OUTOF10: NO PAIN (0)

## 2025-04-02 NOTE — PROGRESS NOTES
[facility]HI d/c of Fluorouracil continuous infusion over 46 hours via C series pump.   Scheduled in [facility] for home disconnect on 4/4/25 at 8:30am per pt request.  Neulasta injection on Day 4, SO administers.

## 2025-04-02 NOTE — PROGRESS NOTES
"Prior to discharge: Port is secured in place with tegaderm and flushed with 10cc NS with positive blood return noted.    Continuous home infusion Dosi-Fuser pump connected.    All connectors secured in place and clamps taped open.    Pump started, \"running\" noted on display (CADD): YES.   Capillary element taped to pt's skin per protocol.  Pump Connection double checked with Maria Esther Saucedo RN.  Patient instructed to call our clinic or [facility] with any questions or concerns at home.  Patient verbalized understanding.    Patient set up for pump disconnect at home with [facility] on 4/4 at 0830.        "

## 2025-04-02 NOTE — PATIENT INSTRUCTIONS
Contact Numbers    [facility] Main Line (for Scheduling/Triage/After Hours Nurse Line): [phone]    Please call the [facility] nurse triage or the after hours nurse line if you experience a temperature greater than or equal to 100.4, shaking chills, have uncontrolled nausea, vomiting and/or diarrhea, dizziness, lightheadedness, shortness of breath, chest pain, bleeding, unexplained bruising, or if you have any other new/concerning symptoms, questions or concerns.     If you are having any concerning symptoms or wish to speak to a provider before your next infusion visit, please call your care coordinator or triage to notify them so we can adequately serve you.     If you need any refills on medications (narcotics or other medications), please call before your infusion appointment.      April 2025 Sunday Monday Tuesday Wednesday Thursday Friday Saturday             1     2    LAB CENTRAL   8:00 AM   (15 min.)   [facility] LAB DRAW   [facility]    ONC INFUSION 4 HR (240 MIN)   8:30 AM   (240 min.)    ONC INFUSION NURSE   [facility] 3     4     5       6     7     8     9     10     11     12       13     14    CT CHEST/ABDOMEN/PELVIS W   8:50 AM   (20 min.)   BECT1   [facility] [place] 15     16    LAB CENTRAL   7:30 AM   (15 min.)   [facility] LAB DRAW   [facility]    DEV THERAPEUTICS ON TREATMENT   7:45 AM   (30 min.)   Polo Snow MD   [facility]    ONC INFUSION 5 HR (300 MIN)   8:30 AM   (300 min.)    ONC INFUSION NURSE   [facility] 17    RETURN PALLIATIVE   9:20 AM   (40 min.)   Isidra Prater DO   [facility] [place] 18     19       20     21     22     23     24     25     26       27     28     29     30                                May 2025      [place] Monday Tuesday Wednesday Thursday Friday Saturday                        1     2     3       4     5     6     7  Happy Birthday!     8     9     10       11     12     13     14    LAB CENTRAL   9:15 AM   (15 min.)   [facility] LAB DRAW   [facility]    ONC INFUSION 1 HR (60 MIN)  10:00 AM   (60 min.)    ONC INFUSION NURSE   [facility] 15     16     17       18     19     20     21     22     23     24       25     26     27     28     29     30     31                     Lab Results:  Recent Results (from the past 12 hours)   Phosphorus    Collection Time: 04/02/25  8:16 AM   Result Value Ref Range    Phosphorus 2.7 2.5 - 4.5 mg/dL   Comprehensive metabolic panel    Collection Time: 04/02/25  8:16 AM   Result Value Ref Range    Sodium 139 135 - 145 mmol/L    Potassium 3.9 3.4 - 5.3 mmol/L    Carbon Dioxide (CO2) 22 22 - 29 mmol/L    Anion Gap 12 7 - 15 mmol/L    Urea Nitrogen 16.9 6.0 - 20.0 mg/dL    Creatinine 1.13 0.67 - 1.17 mg/dL    GFR Estimate 79 >60 mL/min/1.73m2    Calcium 8.4 (L) 8.8 - 10.4 mg/dL    Chloride 105 98 - 107 mmol/L    Glucose 99 70 - 99 mg/dL    Alkaline Phosphatase 221 (H) 40 - 150 U/L    AST 52 (H) 0 - 45 U/L    ALT 25 0 - 70 U/L    Protein Total 6.5 6.4 - 8.3 g/dL    Albumin 3.6 3.5 - 5.2 g/dL    Bilirubin Total 0.3 <=1.2 mg/dL   Protein qualitative urine    Collection Time: 04/02/25  8:16 AM   Result Value Ref Range    Protein Albumin Urine Negative Negative mg/dL   CBC with platelets and differential    Collection Time: 04/02/25  8:16 AM   Result Value Ref Range    WBC Count 8.8 4.0 - 11.0 10e3/uL    RBC Count 3.74 (L) 4.40 - 5.90 10e6/uL    Hemoglobin 9.9 (L) 13.3 - 17.7 g/dL    Hematocrit 31.0 (L) 40.0 - 53.0 %    MCV 83 78 - 100 fL    MCH 26.5 26.5 - 33.0 pg    MCHC 31.9 31.5 - 36.5 g/dL    RDW 21.7 (H) 10.0 - 15.0 %    Platelet Count 166 150 - 450 10e3/uL    % Neutrophils 79 %    % Lymphocytes 5 %    % Monocytes 14 %    % Eosinophils 1 %    % Basophils 0 %    % Immature Granulocytes 1 %     NRBCs per 100 WBC 0 <1 /100    Absolute Neutrophils 6.9 1.6 - 8.3 10e3/uL    Absolute Lymphocytes 0.5 (L) 0.8 - 5.3 10e3/uL    Absolute Monocytes 1.2 0.0 - 1.3 10e3/uL    Absolute Eosinophils 0.1 0.0 - 0.7 10e3/uL    Absolute Basophils 0.0 0.0 - 0.2 10e3/uL    Absolute Immature Granulocytes 0.1 <=0.4 10e3/uL    Absolute NRBCs 0.0 10e3/uL

## 2025-04-02 NOTE — PROGRESS NOTES
Infusion Nursing Note:  Roman Escalante presents today for C13D2 Bevacizumab-bvzr, Irinotecan, Leucovorin, Fluorouracil bolus/pump connect.    Patient seen by provider today: No   present during visit today: Not Applicable.    Note: Patient presents to Infusion Clinic feeling well today. Denies fever, chills, pain, or any other symptoms of infection/illness. Patient wishes to proceed with treatment today.    Atropine given prior to and during Irinotecan infusion.     Intravenous Access:  Implanted port.     Treatment Conditions:  Lab Results   Component Value Date    HGB 9.9 (L) 04/02/2025    WBC 8.8 04/02/2025    ANEU 97.0 (H) 01/31/2025    ANEUTAUTO 6.9 04/02/2025     04/02/2025      Lab Results   Component Value Date     03/28/2025    POTASSIUM 4.1 03/28/2025    MAG 2.0 03/28/2025    CR 0.74 03/28/2025    JEFERSON 8.7 (L) 03/28/2025    BILITOTAL 0.3 03/28/2025    ALBUMIN 3.7 03/28/2025    ALT 23 03/28/2025    AST 40 03/28/2025   Results reviewed, labs MET treatment parameters, ok to proceed with treatment.  Urine negative for protein.  Blood pressure 118/72    Post Infusion Assessment:  Patient tolerated infusion without incident.  Blood return noted pre and post infusion.  Blood return noted during administration every 3 cc.  Site patent and intact, free from redness, edema or discomfort.  Access maintained per protocol.     Prior to discharge: Port is secured in place with tegaderm and flushed with 10cc NS with positive blood return noted.    Continuous home infusion C-Series connected.    All connectors secured in place and clamps taped open.    Capillary element taped to pt's skin per protocol.  Pump Connection completed by GUILLERMINA Gallo.  Patient instructed to call our clinic or [facility] with any questions or concerns at home.  Patient verbalized understanding.    Patient set up for pump disconnect at home with [facility] on 4/4 @ 0830 per patient request. Patient  receives Neulasta during disconnect.      Discharge Plan:   Patient declined prescription refills.  Discharge instructions reviewed with: Patient.  Patient and/or family verbalized understanding of discharge instructions and all questions answered.  AVS to patient via [employer].  Patient will return 4/16 for next appointment.   Patient discharged in stable condition accompanied by: self.  Departure Mode: Ambulatory.    Lilli Ramos RN

## 2025-04-03 PROCEDURE — S9330 HIT CONT CHEM DIEM: HCPCS

## 2025-04-04 PROCEDURE — S9330 HIT CONT CHEM DIEM: HCPCS

## 2025-04-05 PROCEDURE — S9537 HT HEM HORM INJ DIEM: HCPCS

## 2025-04-07 PROCEDURE — A9270 NON-COVERED ITEM OR SERVICE: HCPCS

## 2025-04-14 ENCOUNTER — ANCILLARY PROCEDURE (OUTPATIENT)
Dept: CT IMAGING | Facility: CLINIC | Age: 50
End: 2025-04-14
Attending: STUDENT IN AN ORGANIZED HEALTH CARE EDUCATION/TRAINING PROGRAM
Payer: COMMERCIAL

## 2025-04-14 DIAGNOSIS — C79.51 COLON CANCER METASTASIZED TO BONE (H): ICD-10-CM

## 2025-04-14 DIAGNOSIS — C18.9 COLON CANCER METASTASIZED TO BONE (H): ICD-10-CM

## 2025-04-14 PROCEDURE — 71260 CT THORAX DX C+: CPT | Mod: TC | Performed by: RADIOLOGY

## 2025-04-14 PROCEDURE — 74177 CT ABD & PELVIS W/CONTRAST: CPT | Mod: TC | Performed by: RADIOLOGY

## 2025-04-14 RX ORDER — IOPAMIDOL 755 MG/ML
101 INJECTION, SOLUTION INTRAVASCULAR ONCE
Status: COMPLETED | OUTPATIENT
Start: 2025-04-14 | End: 2025-04-14

## 2025-04-14 RX ADMIN — IOPAMIDOL 101 ML: 755 INJECTION, SOLUTION INTRAVASCULAR at 09:13

## 2025-04-15 ENCOUNTER — MYC REFILL (OUTPATIENT)
Dept: PALLIATIVE CARE | Facility: CLINIC | Age: 50
End: 2025-04-15
Payer: COMMERCIAL

## 2025-04-15 DIAGNOSIS — C18.9 COLON CANCER METASTASIZED TO LIVER (H): ICD-10-CM

## 2025-04-15 DIAGNOSIS — R10.30 LOWER ABDOMINAL PAIN: ICD-10-CM

## 2025-04-15 DIAGNOSIS — C78.7 COLON CANCER METASTASIZED TO LIVER (H): ICD-10-CM

## 2025-04-15 DIAGNOSIS — G89.3 CANCER RELATED PAIN: ICD-10-CM

## 2025-04-15 RX ORDER — OXYCODONE HYDROCHLORIDE 10 MG/1
10-20 TABLET ORAL EVERY 4 HOURS PRN
Qty: 150 TABLET | Refills: 0 | Status: SHIPPED | OUTPATIENT
Start: 2025-04-15

## 2025-04-15 NOTE — TELEPHONE ENCOUNTER
Received [employer] message from patient requesting refill of oxycodone.     Last refill: 2/20/25  Last office visit: 2/18/225  Scheduled for follow up 4/17/25     Will route request to MD/ for review.     Reviewed [place]  Report.

## 2025-04-16 ENCOUNTER — OFFICE VISIT (OUTPATIENT)
Dept: ONCOLOGY | Facility: CLINIC | Age: 50
End: 2025-04-16
Attending: STUDENT IN AN ORGANIZED HEALTH CARE EDUCATION/TRAINING PROGRAM
Payer: COMMERCIAL

## 2025-04-16 ENCOUNTER — APPOINTMENT (OUTPATIENT)
Dept: LAB | Facility: CLINIC | Age: 50
End: 2025-04-16
Attending: STUDENT IN AN ORGANIZED HEALTH CARE EDUCATION/TRAINING PROGRAM
Payer: COMMERCIAL

## 2025-04-16 ENCOUNTER — HOME INFUSION BILLING (OUTPATIENT)
Dept: HOME HEALTH SERVICES | Facility: HOME HEALTH | Age: 50
End: 2025-04-16
Payer: COMMERCIAL

## 2025-04-16 VITALS
DIASTOLIC BLOOD PRESSURE: 83 MMHG | RESPIRATION RATE: 18 BRPM | HEART RATE: 88 BPM | OXYGEN SATURATION: 93 % | BODY MASS INDEX: 22.94 KG/M2 | SYSTOLIC BLOOD PRESSURE: 127 MMHG | WEIGHT: 164.5 LBS | TEMPERATURE: 97.3 F

## 2025-04-16 DIAGNOSIS — C20 RECTAL ADENOCARCINOMA METASTATIC TO LUNG (H): Primary | ICD-10-CM

## 2025-04-16 DIAGNOSIS — C78.00 RECTAL ADENOCARCINOMA METASTATIC TO LUNG (H): Primary | ICD-10-CM

## 2025-04-16 PROCEDURE — G0463 HOSPITAL OUTPT CLINIC VISIT: HCPCS | Performed by: STUDENT IN AN ORGANIZED HEALTH CARE EDUCATION/TRAINING PROGRAM

## 2025-04-16 PROCEDURE — 250N000011 HC RX IP 250 OP 636: Performed by: STUDENT IN AN ORGANIZED HEALTH CARE EDUCATION/TRAINING PROGRAM

## 2025-04-16 RX ORDER — HEPARIN SODIUM (PORCINE) LOCK FLUSH IV SOLN 100 UNIT/ML 100 UNIT/ML
5 SOLUTION INTRAVENOUS ONCE
Status: COMPLETED | OUTPATIENT
Start: 2025-04-16 | End: 2025-04-16

## 2025-04-16 RX ADMIN — HEPARIN 3 ML: 100 SYRINGE at 08:04

## 2025-04-16 ASSESSMENT — PAIN SCALES - GENERAL: PAINLEVEL_OUTOF10: NO PAIN (0)

## 2025-04-16 NOTE — PROGRESS NOTES
[facility] - Medical Oncology Follow-Up Consult Note  2025    Patient Identifiers     Name: Roman Escalante  Preferred Address: [place]  Preferred Language: English  : 1975  Gender: male    Assessment and Plan     Mr. Roman Escalante is a 49 year old male former smoker (3-5 cigs/day) with a history of extensively pre-treated metastatic colorectal cancer s/p trial cellular therapy transplant (2024) most recently on FOLFIRI/Reyna (24  - 2024) interrupted for a bacterial pneumonia who returns to clinic for treatment response evaluation.    NGS: KRAS G12N, BRAFwt  Immuno: pMMR  Clinical Trial: VSG-GP    Roman's CT scans show good, though somewhat mixed, response.  He has reduction in his pulmonary lesions, with some growth in his hepatic lesions.  Our overall plan is to continue bridge treatment to trial enrollment.  In order to ensure stable clinical status until then, I will make small adjustments to his treatment as needed for appropriate disease control.  For now, given the findings on his CT scans, my preference would be to initiate FOLFOXIRI/Reyna chemotherapy.  However, it is unclear to me that he would tolerate all 4 medications simultaneously.  Therefore, we will plan to initiate FOLFOX/Reyna, with the consideration of irinotecan addition once tolerance has been established.    The reason to focus on oxaliplatin is his requirement for desensitization to this medication.  In addition, my hope is that a change from irinotecan to oxaliplatin will elicit the type of response necessary for persistent disease control.  We will plan for imaging reevaluation in approximately 2 months, or whenever trial screening activities occur.    Change treatment to FOLFOX/Reyna with oxaliplatin desensitization.  Consider expansion to FOLFOXIRI/Reyna after 2 cycles. LILLY monitoring throughout treatment.  Plan for MD visit in 2 months with CT scans prior.    45 minutes spent on the date of the encounter doing  chart review, review of test results, interpretation of tests, patient visit, and documentation     The longitudinal plan of care for the diagnosis(es)/condition(s) as documented were addressed during this visit. Due to the added complexity in care, I will continue to support Roman in the subsequent management and with ongoing continuity of care.    Polo Snow MD, PhD   of Medicine  Division of Hematology, Oncology and Transplantation  [facility]    -----------------------------------    Oncology Summary     Cancer Staging   Rectal adenocarcinoma metastatic to lung (H)  Staging form: Colon and Rectum, AJCC 8th Edition  - Clinical: Stage IVB (cTX, cNX, pM1b) - Signed by Polo Snow MD on 3/30/2023      Oncology History   Rectal adenocarcinoma metastatic to lung (H)   2/3/2023 Initial Diagnosis    Rectal adenocarcinoma metastatic to lung (H)     2/3/2023 - 3/31/2023 Chemotherapy    Oral ONC Colorectal Cancer - Regorafenib  Plan Provider: Polo Snow MD  Treatment goal: Palliative  Line of treatment: [No plan line of treatment]     3/30/2023 -  Cancer Staged    Staging form: Colon and Rectum, AJCC 8th Edition  - Clinical: Stage IVB (cTX, cNX, pM1b)     6/7/2023 - 10/3/2023 Chemotherapy    OP ONC Colorectal Cancer - Modified FOLFOX-6 / Bevacizumab  Plan Provider: Polo Snow MD  Treatment goal: Palliative  Line of treatment: [No plan line of treatment]     10/27/2023 - 3/11/2024 Chemotherapy    OP ONC Colorectal Cancer - Bevacizumab + Trifluridine/Tipiracil (Lonsurf)  Plan Provider: Polo Snow MD  Treatment goal: Palliative  Line of treatment: [No plan line of treatment]     3/27/2024 - 3/27/2024 Chemotherapy    Oral ONC Colorectal Cancer - Fruquintinib (Fruzaqla)  Plan Provider: Polo Snow MD  Treatment goal: Palliative  Line of treatment: [No plan line of treatment]     5/2024 -  Chemotherapy    [facility] Cellular Therapy cytoreductive conditioning and cellular transplant  -  admitted for 5 weeks following transplant with ~30lbs weight loss     8/30/2024 -  Chemotherapy    OP ONC Colorectal Cancer - FOLFIRI / Bevacizumab  Plan Provider: Polo Snow MD  Treatment goal: Palliative  Line of treatment: [No plan line of treatment]         Subjective/Interval Events     - ostomy output remains challenging with rapid filling and wide variation between straining and diarrhea. He prefers diarrhea.   - continues having the rectal pain for which he's taking oxycodone  - stable numbness and tingling in feet    Physical Exam     Vital signs: /83 (BP Location: Right arm, Patient Position: Sitting, Cuff Size: Adult Regular)   Pulse 88   Temp 97.3  F (36.3  C) (Oral)   Resp 18   Wt 74.6 kg (164 lb 8 oz)   SpO2 93%   BMI 22.94 kg/m      ECOG performance status:  0  Vascular access:  none    Physical Exam:  Exam performed, notable for:  - overall somewhat well appearing; stable exam from prior  - lungs CTAB; heart rate fast, regular rhythm  - no palpable abdominal pain; no HSM  - no BLE swelling    Objective Data     Lab data:  I have personally reviewed the lab data, notable for:    - Alk Phos 221  - AST/ALT 52/25    Radiology data:  I have personally reviewed the radiology data, notable for:  04/14/2025 CT C/A/P  IMPRESSION:  1.  Multiple bilateral pulmonary nodules and masses have decreased slightly in size.  2.  Mediastinal adenopathy has decreased slightly in size.  3.  Extensive hepatic metastases have overall increased slightly in size.  4.  Mild circumferential soft tissue thickening in the rectum has decreased in size, and is consistent with the patient's primary malignancy.  5.  Scattered areas of mixed lytic and sclerotic change in multiple lower thoracic and lumbar vertebral bodies are similar to the previous exam, and metastatic disease cannot be excluded.  6.  A small right pleural effusion is new since the previous exam.    Pathology and other data:  I have personally reviewed  and interpreted the pathology data, notable for:    - no new data

## 2025-04-16 NOTE — LETTER
2025      Roman Escalante  [address]  [facility] [address]      Dear Colleague,    Thank you for referring your patient, Roman Escalante, to the [facility]. Please see a copy of my visit note below.    [facility] - Medical Oncology Follow-Up Consult Note  2025    Patient Identifiers     Name: Roman Escalante  Preferred Address: [place]  Preferred Language: English  : 1975  Gender: male    Assessment and Plan     Mr. Roman Escalante is a 49 year old male former smoker (3-5 cigs/day) with a history of extensively pre-treated metastatic colorectal cancer s/p trial cellular therapy transplant (2024) most recently on FOLFIRI/Reyna (24  - 2024) interrupted for a bacterial pneumonia who returns to clinic for treatment response evaluation.    NGS: KRAS G12N, BRAFwt  Immuno: pMMR  Clinical Trial: VSG-GP    Roman's CT scans show good, though somewhat mixed, response.  He has reduction in his pulmonary lesions, with some growth in his hepatic lesions.  Our overall plan is to continue bridge treatment to trial enrollment.  In order to ensure stable clinical status until then, I will make small adjustments to his treatment as needed for appropriate disease control.  For now, given the findings on his CT scans, my preference would be to initiate FOLFOXIRI/Reyna chemotherapy.  However, it is unclear to me that he would tolerate all 4 medications simultaneously.  Therefore, we will plan to initiate FOLFOX/Reyna, with the consideration of irinotecan addition once tolerance has been established.    The reason to focus on oxaliplatin is his requirement for desensitization to this medication.  In addition, my hope is that a change from irinotecan to oxaliplatin will elicit the type of response necessary for persistent disease control.  We will plan for imaging reevaluation in approximately 2 months, or whenever trial screening activities occur.    Change  treatment to FOLFOX/Reyna with oxaliplatin desensitization.  Consider expansion to FOLFOXIRI/Reyna after 2 cycles. LILLY monitoring throughout treatment.  Plan for MD visit in 2 months with CT scans prior.    45 minutes spent on the date of the encounter doing chart review, review of test results, interpretation of tests, patient visit, and documentation     The longitudinal plan of care for the diagnosis(es)/condition(s) as documented were addressed during this visit. Due to the added complexity in care, I will continue to support Roman in the subsequent management and with ongoing continuity of care.    Polo Snow MD, PhD   of Medicine  Division of Hematology, Oncology and Transplantation  [facility]    -----------------------------------    Oncology Summary     Cancer Staging   Rectal adenocarcinoma metastatic to lung (H)  Staging form: Colon and Rectum, AJCC 8th Edition  - Clinical: Stage IVB (cTX, cNX, pM1b) - Signed by Polo Snow MD on 3/30/2023      Oncology History   Rectal adenocarcinoma metastatic to lung (H)   2/3/2023 Initial Diagnosis    Rectal adenocarcinoma metastatic to lung (H)     2/3/2023 - 3/31/2023 Chemotherapy    Oral ONC Colorectal Cancer - Regorafenib  Plan Provider: Polo Snow MD  Treatment goal: Palliative  Line of treatment: [No plan line of treatment]     3/30/2023 -  Cancer Staged    Staging form: Colon and Rectum, AJCC 8th Edition  - Clinical: Stage IVB (cTX, cNX, pM1b)     6/7/2023 - 10/3/2023 Chemotherapy    OP ONC Colorectal Cancer - Modified FOLFOX-6 / Bevacizumab  Plan Provider: Polo Snow MD  Treatment goal: Palliative  Line of treatment: [No plan line of treatment]     10/27/2023 - 3/11/2024 Chemotherapy    OP ONC Colorectal Cancer - Bevacizumab + Trifluridine/Tipiracil (Lonsurf)  Plan Provider: Polo Snow MD  Treatment goal: Palliative  Line of treatment: [No plan line of treatment]     3/27/2024 - 3/27/2024 Chemotherapy    Oral ONC  Colorectal Cancer - Fruquintinib (Fruzaqla)  Plan Provider: Polo Snow MD  Treatment goal: Palliative  Line of treatment: [No plan line of treatment]     5/2024 -  Chemotherapy    [facility] Cellular Therapy cytoreductive conditioning and cellular transplant  - admitted for 5 weeks following transplant with ~30lbs weight loss     8/30/2024 -  Chemotherapy    OP ONC Colorectal Cancer - FOLFIRI / Bevacizumab  Plan Provider: Polo Snow MD  Treatment goal: Palliative  Line of treatment: [No plan line of treatment]         Subjective/Interval Events     - ostomy output remains challenging with rapid filling and wide variation between straining and diarrhea. He prefers diarrhea.   - continues having the rectal pain for which he's taking oxycodone  - stable numbness and tingling in feet    Physical Exam     Vital signs: /83 (BP Location: Right arm, Patient Position: Sitting, Cuff Size: Adult Regular)   Pulse 88   Temp 97.3  F (36.3  C) (Oral)   Resp 18   Wt 74.6 kg (164 lb 8 oz)   SpO2 93%   BMI 22.94 kg/m      ECOG performance status:  0  Vascular access:  none    Physical Exam:  Exam performed, notable for:  - overall somewhat well appearing; stable exam from prior  - lungs CTAB; heart rate fast, regular rhythm  - no palpable abdominal pain; no HSM  - no BLE swelling    Objective Data     Lab data:  I have personally reviewed the lab data, notable for:    - Alk Phos 221  - AST/ALT 52/25    Radiology data:  I have personally reviewed the radiology data, notable for:  04/14/2025 CT C/A/P  IMPRESSION:  1.  Multiple bilateral pulmonary nodules and masses have decreased slightly in size.  2.  Mediastinal adenopathy has decreased slightly in size.  3.  Extensive hepatic metastases have overall increased slightly in size.  4.  Mild circumferential soft tissue thickening in the rectum has decreased in size, and is consistent with the patient's primary malignancy.  5.  Scattered areas of mixed lytic and sclerotic  change in multiple lower thoracic and lumbar vertebral bodies are similar to the previous exam, and metastatic disease cannot be excluded.  6.  A small right pleural effusion is new since the previous exam.    Pathology and other data:  I have personally reviewed and interpreted the pathology data, notable for:    - no new data      Again, thank you for allowing me to participate in the care of your patient.        Sincerely,        Polo Snow MD    Electronically signed normal

## 2025-04-16 NOTE — NURSING NOTE
"Oncology Rooming Note    April 16, 2025 8:07 AM   Roman Escalante is a 49 year old male who presents for:    Chief Complaint   Patient presents with    Oncology Clinic Visit     Rectal adenocarcinoma metastatic to lung     Initial Vitals: /83 (BP Location: Right arm, Patient Position: Sitting, Cuff Size: Adult Regular)   Pulse 88   Temp 97.3  F (36.3  C) (Oral)   Resp 18   Wt 74.6 kg (164 lb 8 oz)   SpO2 93%   BMI 22.94 kg/m   Estimated body mass index is 22.94 kg/m  as calculated from the following:    Height as of 3/17/25: 1.803 m (5' 11\").    Weight as of this encounter: 74.6 kg (164 lb 8 oz). Body surface area is 1.93 meters squared.  No Pain (0) Comment: Data Unavailable   No LMP for male patient.  Allergies reviewed: Yes  Medications reviewed: Yes    Medications: Medication refills not needed today.  Pharmacy name entered into [employer]:    [facility] - [place], [place] - [address] [place] [address] [address] [facility] [place], [place] - [address] [place] [address] DRUG STORE #[id number] [address]    Frailty Screening:   Is the patient here for a new oncology consult visit in cancer care? 2. No    PHQ9:  Did this patient require a PHQ9?: No      Clinical concerns: Patient is wondering if he can take a week off of his treatments since they are making him feel sicker.       Karen Calles              "

## 2025-04-16 NOTE — NURSING NOTE
Chief Complaint   Patient presents with    Oncology Clinic Visit     Rectal adenocarcinoma metastatic to lung    Port Draw     Labs drawn via port by RN in lab. VS taken.      Labs drawn via port by RN. Port accessed with 20g flat needle. Flushed with saline and heparin. Pt tolerated well. Vitals taken. Pt checked into next appt.     Tanya Quiroz RN

## 2025-04-17 ENCOUNTER — VIRTUAL VISIT (OUTPATIENT)
Dept: ONCOLOGY | Facility: CLINIC | Age: 50
End: 2025-04-17
Attending: STUDENT IN AN ORGANIZED HEALTH CARE EDUCATION/TRAINING PROGRAM
Payer: COMMERCIAL

## 2025-04-17 VITALS — WEIGHT: 164 LBS | BODY MASS INDEX: 22.96 KG/M2 | HEIGHT: 71 IN

## 2025-04-17 DIAGNOSIS — C78.00 RECTAL ADENOCARCINOMA METASTATIC TO LUNG (H): Primary | ICD-10-CM

## 2025-04-17 DIAGNOSIS — Z79.891 ENCOUNTER FOR LONG-TERM USE OF OPIATE ANALGESIC: ICD-10-CM

## 2025-04-17 DIAGNOSIS — G89.3 CANCER RELATED PAIN: ICD-10-CM

## 2025-04-17 DIAGNOSIS — R53.0 NEOPLASTIC MALIGNANT RELATED FATIGUE: ICD-10-CM

## 2025-04-17 DIAGNOSIS — C78.7 METASTASES TO THE LIVER (H): ICD-10-CM

## 2025-04-17 DIAGNOSIS — G62.0 PERIPHERAL NEUROPATHY DUE TO CHEMOTHERAPY: ICD-10-CM

## 2025-04-17 DIAGNOSIS — C20 RECTAL ADENOCARCINOMA METASTATIC TO LUNG (H): Primary | ICD-10-CM

## 2025-04-17 DIAGNOSIS — T45.1X5A PERIPHERAL NEUROPATHY DUE TO CHEMOTHERAPY: ICD-10-CM

## 2025-04-17 DIAGNOSIS — Z51.5 ENCOUNTER FOR PALLIATIVE CARE: ICD-10-CM

## 2025-04-17 ASSESSMENT — PAIN SCALES - GENERAL: PAINLEVEL_OUTOF10: NO PAIN (0)

## 2025-04-17 NOTE — NURSING NOTE
Current patient location:  [place]    Is the patient currently in the state of [place]? YES    Visit mode: VIDEO    If the visit is dropped, the patient can be reconnected by:VIDEO VISIT: Text to cell phone:   Telephone Information:   Mobile [phone]       Will anyone else be joining the visit? NO  (If patient encounters technical issues they should call [phone])    Are changes needed to the allergy or medication list? No    Are refills needed on medications prescribed by this physician? NO    Rooming Documentation:  Questionnaire(s) not done per department protocol    Reason for visit: VIDAL RODRIGUEZ

## 2025-04-17 NOTE — PATIENT INSTRUCTIONS
Recommendations:  -Continue gabapentin 100-200 mg twice during the day and 300 mg at bedtime.  Reviewed that there is room to increase if needed with treatment change.  -Okay to continue oxycodone 10 to 20 mg every four hours as needed.  Currently needing about 2 tablets/day.   -Continue THC as needed.   -Continue getting outside and going for walks as able.    Follow up: 2 months      *Please do not make any changes to medications that we prescribe, including pain medicines, without talking to our team*    Reasons to Call    If you are having worsening/uncontrolled symptoms we want you to call!    You or your other physicians make any changes to medications we have prescribed.  -Please call for refills 4-5 days before you will run out of medication.    Important Phone Numbers, including: Refills, scheduling, and general questions     Palliative Care RN: Rebeca Urbina : [phone]  *For scheduling needs/follow up visits : [phone]  *After hours or on weekends- Will connect you with on call MD : [phone].

## 2025-04-17 NOTE — LETTER
"4/17/2025      Roman Escalante  [address]  [facility] [address]      Dear Colleague,    Thank you for referring your patient, Roman Escalante, to the [facility]. Please see a copy of my visit note below.    Palliative Care Progress Note    Patient Name: Roman Escalante  Primary Provider: Buddy Hart    Chief Complaint/Patient ID:   He has a PMHx of metastatic rectal cancer (mets to lung), completed [facility] CAR-T trial in June 2024.  He has been on multiple lines of systemic therapy, currently on FOLFIRI/Reyna with plans to transition back to FOLFOX with oxaliplatin desensitization.    Last Palliative care appointment: 2/18/2025 with me     Reviewed: Yes    ORT Score = 1 for history of alcohol abuse and his mother.     Social History: Has a couple of kids and a couple grandkids who lives in [place].  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 Has a girlfriend-she is a hospice RN.     Advanced Care Planning: Has not completed. Girlfriend would be his HCA.     Interim History:  Roman is seen today for follow up with Palliative Care via billable video visit.      Reviewed oncology note from 3/28-there had been disease progression while on treatment break in February, so he resumed chemo with FOLFIRI/Reyna.  Plan to dose reduce irinotecan.  Note from yesterday 4/16 not completely finished, though noted mention of transitioning to FOLFOX with oxaliplatin desensitization 7.    Took a break off chemo this week. Started having some side effects from the 5FU (vomiting and diarrhea for multiple days). Lost a bit of weight. Doing better now- feeling a little stronger and has gained a couple pounds. Knows there is plan to go back to oxaliplatin regimen. Says that was the \"one thing that seemed to really hit it\".  States his overall goal is to be able to get to another clinical trial soon.    Pain was bad with the diarrhea. Was up to needing 6 tablets of 10 " mg oxycodone per day. Now back down to 1 tablet twice daily on average.     Still taking gabapentin 100 to 200 mg twice during the day and 300 mg at night.  Generally neuropathy has been well-controlled.  States he can tell if he eats more sugar or salt, as his feet will be more aggravated.    Still using cannabis gummies. Not smoking any THC now.  Says the Gummies are sufficient for his appetite.    Overall feels good. Has been outside quite a bit already.  Looking forward to doing more as the weather gets warmer.    Physical Exam:   Constitutional: Alert, pleasant, no apparent distress. Sitting up in chair.  Eyes: Sclera non-icteric, no eye discharge.  ENT: No nasal discharge. Ears grossly normal.  Respiratory: Unlabored respirations. Speaking in full sentences.  Musculoskeletal: Extremities appear normal- no gross deformities noted. No edema noted on upper body.   Skin: No suspicious lesions or rashes on visible skin.  Neurologic: Clear speech, no aphasia. No facial droop.  Psychiatric: Mentation appears normal, appropriate attention. Affect normal/bright. Does not appear anxious or depressed.    Key Data Reviewed:  LABS:   Lab Results   Component Value Date    WBC 7.9 04/16/2025    HGB 10.3 (L) 04/16/2025    HCT 33.5 (L) 04/16/2025     04/16/2025     04/16/2025    POTASSIUM 4.6 04/16/2025    CHLORIDE 103 04/16/2025    CO2 22 04/16/2025    BUN 13.5 04/16/2025    CR 1.00 04/16/2025    GLC 97 04/16/2025    SED 47 (H) 01/25/2025    NTBNPI 1,720 (H) 01/22/2025    AST 40 04/16/2025    ALT 14 04/16/2025    ALKPHOS 234 (H) 04/16/2025    BILITOTAL 0.3 04/16/2025    INR 1.12 03/12/2025     IMAGING: CT CAP 4/14/25- IMPRESSION:  1.  Multiple bilateral pulmonary nodules and masses have decreased slightly in size.  2.  Mediastinal adenopathy has decreased slightly in size.  3.  Extensive hepatic metastases have overall increased slightly in size.  4.  Mild circumferential soft tissue thickening in the rectum has  decreased in size, and is consistent with the patient's primary malignancy.  5.  Scattered areas of mixed lytic and sclerotic change in multiple lower thoracic and lumbar vertebral bodies are similar to the previous exam, and metastatic disease cannot be excluded.  6.  A small right pleural effusion is new since the previous exam.    Impression & Recommendations & Counseling:  Roman Escalante has a history of metastatic rectal cancer.     Started having side effects with the 5-FU regimen.  Current plan is to return to FOLFOX with oxaliplatin desensitization.  He is hopeful this will go well, as he thought the oxaliplatin really made a difference on his tumor in the past.  We did talk about neuropathy changes with resuming the oxaliplatin and asked him to let me know if things become more uncomfortable.    Recommendations:  -Continue gabapentin 100-200 mg twice during the day and 300 mg at bedtime.  Reviewed that there is room to increase if needed with treatment change.  -Okay to continue oxycodone 10 to 20 mg every four hours as needed.  Currently needing about 2 tablets/day.   -Continue THC as needed.   -Continue getting outside and going for walks as able.      Follow up: 2 months      Video-Visit Details  Video Start Time: 9:07AM  Video End Time: 9:24AM    Originating Location (pt. Location): Home     Distant Location (provider location): [facility]      Platform used for Video Visit: [employer]     Total time spent on day of encounter is 30 mins, including reviewing record, review of above studies, above visit with patient, symptomatic discussion as above, including medication adjustments/prescription management, and documentation.       The longitudinal plan of care for the diagnosis(es)/condition(s) as documented were addressed during thi the s visit.?Due to the added complexity in care, I will continue to support Roman Escalante in the subsequent management and with the ongoing continuity  of care.        Isidra Prater DO  Palliative Medicine   [id number]    Some chart documentation performed using Dragon Voice recognition Software. Although reviewed after completion, some words and grammatical errors may remain.      Again, thank you for allowing me to participate in the care of your patient.        Sincerely,        Isidra Prater DO    Electronically signed

## 2025-04-17 NOTE — PROGRESS NOTES
"Palliative Care Progress Note    Patient Name: Roman Escalante  Primary Provider: Buddy Hart    Chief Complaint/Patient ID:   He has a PMHx of metastatic rectal cancer (mets to lung), completed NIH CAR-T trial in June 2024.  He has been on multiple lines of systemic therapy, currently on FOLFIRI/Reyna with plans to transition back to FOLFOX with oxaliplatin desensitization.    Last Palliative care appointment: 2/18/2025 with me     Reviewed: Yes    ORT Score = 1 for history of alcohol abuse and his mother.     Social History: Has a couple of kids and a couple grandkids who lives in [place].  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 Has a girlfriend-she is a hospice RN.     Advanced Care Planning: Has not completed. Girlfriend would be his HCA.     Interim History:  Roman is seen today for follow up with Palliative Care via billable video visit.      Reviewed oncology note from 3/28-there had been disease progression while on treatment break in February, so he resumed chemo with FOLFIRI/Reyna.  Plan to dose reduce irinotecan.  Note from yesterday 4/16 not completely finished, though noted mention of transitioning to FOLFOX with oxaliplatin desensitization 7.    Took a break off chemo this week. Started having some side effects from the 5FU (vomiting and diarrhea for multiple days). Lost a bit of weight. Doing better now- feeling a little stronger and has gained a couple pounds. Knows there is plan to go back to oxaliplatin regimen. Says that was the \"one thing that seemed to really hit it\".  States his overall goal is to be able to get to another clinical trial soon.    Pain was bad with the diarrhea. Was up to needing 6 tablets of 10 mg oxycodone per day. Now back down to 1 tablet twice daily on average.     Still taking gabapentin 100 to 200 mg twice during the day and 300 mg at night.  Generally neuropathy has been well-controlled.  States he can tell if he eats more sugar or " salt, as his feet will be more aggravated.    Still using cannabis gummies. Not smoking any THC now.  Says the Gummies are sufficient for his appetite.    Overall feels good. Has been outside quite a bit already.  Looking forward to doing more as the weather gets warmer.    Physical Exam:   Constitutional: Alert, pleasant, no apparent distress. Sitting up in chair.  Eyes: Sclera non-icteric, no eye discharge.  ENT: No nasal discharge. Ears grossly normal.  Respiratory: Unlabored respirations. Speaking in full sentences.  Musculoskeletal: Extremities appear normal- no gross deformities noted. No edema noted on upper body.   Skin: No suspicious lesions or rashes on visible skin.  Neurologic: Clear speech, no aphasia. No facial droop.  Psychiatric: Mentation appears normal, appropriate attention. Affect normal/bright. Does not appear anxious or depressed.    Key Data Reviewed:  LABS:   Lab Results   Component Value Date    WBC 7.9 04/16/2025    HGB 10.3 (L) 04/16/2025    HCT 33.5 (L) 04/16/2025     04/16/2025     04/16/2025    POTASSIUM 4.6 04/16/2025    CHLORIDE 103 04/16/2025    CO2 22 04/16/2025    BUN 13.5 04/16/2025    CR 1.00 04/16/2025    GLC 97 04/16/2025    SED 47 (H) 01/25/2025    NTBNPI 1,720 (H) 01/22/2025    AST 40 04/16/2025    ALT 14 04/16/2025    ALKPHOS 234 (H) 04/16/2025    BILITOTAL 0.3 04/16/2025    INR 1.12 03/12/2025     IMAGING: CT CAP 4/14/25- IMPRESSION:  1.  Multiple bilateral pulmonary nodules and masses have decreased slightly in size.  2.  Mediastinal adenopathy has decreased slightly in size.  3.  Extensive hepatic metastases have overall increased slightly in size.  4.  Mild circumferential soft tissue thickening in the rectum has decreased in size, and is consistent with the patient's primary malignancy.  5.  Scattered areas of mixed lytic and sclerotic change in multiple lower thoracic and lumbar vertebral bodies are similar to the previous exam, and metastatic disease  cannot be excluded.  6.  A small right pleural effusion is new since the previous exam.    Impression & Recommendations & Counseling:  Roman Escalante has a history of metastatic rectal cancer.     Started having side effects with the 5-FU regimen.  Current plan is to return to FOLFOX with oxaliplatin desensitization.  He is hopeful this will go well, as he thought the oxaliplatin really made a difference on his tumor in the past.  We did talk about neuropathy changes with resuming the oxaliplatin and asked him to let me know if things become more uncomfortable.    Recommendations:  -Continue gabapentin 100-200 mg twice during the day and 300 mg at bedtime.  Reviewed that there is room to increase if needed with treatment change.  -Okay to continue oxycodone 10 to 20 mg every four hours as needed.  Currently needing about 2 tablets/day.   -Continue THC as needed.   -Continue getting outside and going for walks as able.      Follow up: 2 months      Video-Visit Details  Video Start Time: 9:07AM  Video End Time: 9:24AM    Originating Location (pt. Location): Home     Distant Location (provider location): [facility] the [facility]      Platform used for Video Visit: 2CODE Online     Total time spent on day of encounter is 30 mins, including reviewing record, review of above studies, above visit with patient, symptomatic discussion as above, including medication adjustments/prescription management, and documentation.       The longitudinal plan of care for the diagnosis(es)/condition(s) as documented were addressed during thi the s visit.?Due to the added complexity in care, I will continue to support Roman Escalante in the subsequent management and with the ongoing continuity of care.        Isidra Prater,   Palliative Medicine   [facility] [id number]    Some chart documentation performed using Dragon Voice recognition Software. Although reviewed after completion, some words and grammatical errors may  remain.

## 2025-04-17 NOTE — LETTER
"4/17/2025      Roman Escalante  [address]  [facility] [address]      Dear Colleague,    Thank you for referring your patient, Roman Escalante, to the [facility]. Please see a copy of my visit note below.    Palliative Care Progress Note    Patient Name: Roman Escalante  Primary Provider: Buddy Hart    Chief Complaint/Patient ID:   He has a PMHx of metastatic rectal cancer (mets to lung), completed NIH CAR-T trial in June 2024.  He has been on multiple lines of systemic therapy, currently on FOLFIRI/Reyna with plans to transition back to FOLFOX with oxaliplatin desensitization.    Last Palliative care appointment: 2/18/2025 with me     Reviewed: Yes    ORT Score = 1 for history of alcohol abuse and his mother.     Social History: Has a couple of kids and a couple grandkids who lives in [place].  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 Has a girlfriend-she is a hospice RN.     Advanced Care Planning: Has not completed. Girlfriend would be his HCA.     Interim History:  Roman is seen today for follow up with Palliative Care via billable video visit.      Reviewed oncology note from 3/28-there had been disease progression while on treatment break in February, so he resumed chemo with FOLFIRI/Reyna.  Plan to dose reduce irinotecan.  Note from yesterday 4/16 not completely finished, though noted mention of transitioning to FOLFOX with oxaliplatin desensitization 7.    Took a break off chemo this week. Started having some side effects from the 5FU (vomiting and diarrhea for multiple days). Lost a bit of weight. Doing better now- feeling a little stronger and has gained a couple pounds. Knows there is plan to go back to oxaliplatin regimen. Says that was the \"one thing that seemed to really hit it\".  States his overall goal is to be able to get to another clinical trial soon.    Pain was bad with the diarrhea. Was up to needing 6 tablets of 10 " mg oxycodone per day. Now back down to 1 tablet twice daily on average.     Still taking gabapentin 100 to 200 mg twice during the day and 300 mg at night.  Generally neuropathy has been well-controlled.  States he can tell if he eats more sugar or salt, as his feet will be more aggravated.    Still using cannabis gummies. Not smoking any THC now.  Says the Gummies are sufficient for his appetite.    Overall feels good. Has been outside quite a bit already.  Looking forward to doing more as the weather gets warmer.    Physical Exam:   Constitutional: Alert, pleasant, no apparent distress. Sitting up in chair.  Eyes: Sclera non-icteric, no eye discharge.  ENT: No nasal discharge. Ears grossly normal.  Respiratory: Unlabored respirations. Speaking in full sentences.  Musculoskeletal: Extremities appear normal- no gross deformities noted. No edema noted on upper body.   Skin: No suspicious lesions or rashes on visible skin.  Neurologic: Clear speech, no aphasia. No facial droop.  Psychiatric: Mentation appears normal, appropriate attention. Affect normal/bright. Does not appear anxious or depressed.    Key Data Reviewed:  LABS:   Lab Results   Component Value Date    WBC 7.9 04/16/2025    HGB 10.3 (L) 04/16/2025    HCT 33.5 (L) 04/16/2025     04/16/2025     04/16/2025    POTASSIUM 4.6 04/16/2025    CHLORIDE 103 04/16/2025    CO2 22 04/16/2025    BUN 13.5 04/16/2025    CR 1.00 04/16/2025    GLC 97 04/16/2025    SED 47 (H) 01/25/2025    NTBNPI 1,720 (H) 01/22/2025    AST 40 04/16/2025    ALT 14 04/16/2025    ALKPHOS 234 (H) 04/16/2025    BILITOTAL 0.3 04/16/2025    INR 1.12 03/12/2025     IMAGING: CT CAP 4/14/25- IMPRESSION:  1.  Multiple bilateral pulmonary nodules and masses have decreased slightly in size.  2.  Mediastinal adenopathy has decreased slightly in size.  3.  Extensive hepatic metastases have overall increased slightly in size.  4.  Mild circumferential soft tissue thickening in the rectum has  decreased in size, and is consistent with the patient's primary malignancy.  5.  Scattered areas of mixed lytic and sclerotic change in multiple lower thoracic and lumbar vertebral bodies are similar to the previous exam, and metastatic disease cannot be excluded.  6.  A small right pleural effusion is new since the previous exam.    Impression & Recommendations & Counseling:  Roman Escalante has a history of metastatic rectal cancer.     Started having side effects with the 5-FU regimen.  Current plan is to return to FOLFOX with oxaliplatin desensitization.  He is hopeful this will go well, as he thought the oxaliplatin really made a difference on his tumor in the past.  We did talk about neuropathy changes with resuming the oxaliplatin and asked him to let me know if things become more uncomfortable.    Recommendations:  -Continue gabapentin 100-200 mg twice during the day and 300 mg at bedtime.  Reviewed that there is room to increase if needed with treatment change.  -Okay to continue oxycodone 10 to 20 mg every four hours as needed.  Currently needing about 2 tablets/day.   -Continue THC as needed.   -Continue getting outside and going for walks as able.      Follow up: 2 months      Video-Visit Details  Video Start Time: 9:07AM  Video End Time: 9:24AM    Originating Location (pt. Location): Home     Distant Location (provider location): [facility]      Platform used for Video Visit: [employer]     Total time spent on day of encounter is 30 mins, including reviewing record, review of above studies, above visit with patient, symptomatic discussion as above, including medication adjustments/prescription management, and documentation.       The longitudinal plan of care for the diagnosis(es)/condition(s) as documented were addressed during thi the s visit.?Due to the added complexity in care, I will continue to support Roman Escalante in the subsequent management and with the ongoing continuity  of care.        Isidra Prater DO  Palliative Medicine   [id number]    Some chart documentation performed using Dragon Voice recognition Software. Although reviewed after completion, some words and grammatical errors may remain.      Again, thank you for allowing me to participate in the care of your patient.        Sincerely,        Isidra Prater DO    Electronically signed

## 2025-04-21 ENCOUNTER — MYC MEDICAL ADVICE (OUTPATIENT)
Dept: FAMILY MEDICINE | Facility: CLINIC | Age: 50
End: 2025-04-21

## 2025-04-21 ENCOUNTER — OFFICE VISIT (OUTPATIENT)
Dept: FAMILY MEDICINE | Facility: CLINIC | Age: 50
End: 2025-04-21
Payer: COMMERCIAL

## 2025-04-21 ENCOUNTER — NURSE TRIAGE (OUTPATIENT)
Dept: FAMILY MEDICINE | Facility: CLINIC | Age: 50
End: 2025-04-21

## 2025-04-21 VITALS
DIASTOLIC BLOOD PRESSURE: 75 MMHG | HEIGHT: 71 IN | HEART RATE: 121 BPM | OXYGEN SATURATION: 95 % | SYSTOLIC BLOOD PRESSURE: 104 MMHG | RESPIRATION RATE: 18 BRPM | BODY MASS INDEX: 22.12 KG/M2 | TEMPERATURE: 98.7 F | WEIGHT: 158 LBS

## 2025-04-21 DIAGNOSIS — R05.1 ACUTE COUGH: Primary | ICD-10-CM

## 2025-04-21 DIAGNOSIS — R09.82 POST-NASAL DRAINAGE: ICD-10-CM

## 2025-04-21 PROCEDURE — 1126F AMNT PAIN NOTED NONE PRSNT: CPT | Performed by: FAMILY MEDICINE

## 2025-04-21 PROCEDURE — 3074F SYST BP LT 130 MM HG: CPT | Performed by: FAMILY MEDICINE

## 2025-04-21 PROCEDURE — 99214 OFFICE O/P EST MOD 30 MIN: CPT | Performed by: FAMILY MEDICINE

## 2025-04-21 PROCEDURE — 3078F DIAST BP <80 MM HG: CPT | Performed by: FAMILY MEDICINE

## 2025-04-21 RX ORDER — IPRATROPIUM BROMIDE 42 UG/1
2 SPRAY, METERED NASAL 4 TIMES DAILY
Qty: 15 ML | Refills: 0 | Status: SHIPPED | OUTPATIENT
Start: 2025-04-21

## 2025-04-21 ASSESSMENT — ENCOUNTER SYMPTOMS: COUGH: 1

## 2025-04-21 ASSESSMENT — PAIN SCALES - GENERAL: PAINLEVEL_OUTOF10: NO PAIN (0)

## 2025-04-21 NOTE — TELEPHONE ENCOUNTER
"Nurse Triage SBAR    Is this a 2nd Level Triage? NO - appointment scheduled in clinic today for evaluation    Situation: Cold symptoms with cough, congestion, and SOB since Wednesday last week (4/16/25)    Background:      States he had fungal PNA 12/20/24 - 2/20/25 and concerned as he's having the same symptoms now as he did when he was sick with the fungal infection.      Hx of metastatic colon cancer to the lung and liver.  Due to restart next round of chemotherapy treatment 4/30/25.      Assessment:     States SOB has gotten worse since last Wednesday.  SOB occurs on exertion such as going up stairs and/or walking > 50 ft; otherwise, no SOB at rest or walking short distances.      SpO2 at rest is 95-97%, but on exertion drops to 87-89%.  HR also increases to 115-125 on exertion, at rest 80-90's.      Denies chest pain aside from \"muscular pain\" from coughing.    Afebrile, 98.7 last couple of days    Coughing up clear to \"barely yellow\" sputum.  Blowing yellow \"mucus\" out of his nose.  No nosebleeds.    Taking Mucinex and day/nighttime cold medication    Patient speaking in full sentences.    Protocol Recommended Disposition:   Go To Office Now    Recommendation:      Appointment scheduled with Dr. Cardenas at the [facility] this afternoon at 1:30pm for evaluation.           Julieta Fisher RN  [facility]       Reason for Disposition   MILD difficulty breathing (e.g., minimal/no SOB at rest, SOB with walking, pulse <100) and still present when not coughing    Additional Information   Negative: Bluish (or gray) lips or face   Negative: SEVERE difficulty breathing (e.g., struggling for each breath, speaks in single words)   Negative: Rapid onset of cough and has hives   Negative: Coughing started suddenly after medicine, an allergic food or bee sting   Negative: Difficulty breathing after exposure to flames, smoke, or fumes   Negative: Sounds like a life-threatening emergency to the " triager   Negative: Previous asthma attacks and this feels like asthma attack   Negative: Dry cough (non-productive; no sputum or minimal clear sputum) and within 14 days of COVID-19 Exposure   Negative: MODERATE difficulty breathing (e.g., speaks in phrases, SOB even at rest, pulse 100-120) and still present when not coughing   Negative: Chest pain present when not coughing   Negative: Passed out (e.g., fainted, lost consciousness, blacked out and was not responding)   Negative: Patient sounds very sick or weak to the triager    Protocols used: Cough-A-OH

## 2025-04-21 NOTE — PROGRESS NOTES
Assessment & Plan     Acute cough  Cough is acute, has no fever or shortness of breath, sats are normal.  Discussed the symptoms and his concerns about fungal pneumonia at length, likely a benign process given recent onset, possibly viral with postnasal drainage causing the cough.  Discussed using an anticholinergic nasal spray to control the drainage; cautioned about possibility of nosebleeds; will keepnostrils moist.  Update in 3 days or sooner if develops new symptoms or worsens  - ipratropium (ATROVENT) 0.06 % nasal spray  Dispense: 15 mL; Refill: 0    Post-nasal drainage  Will try Atrovent short term  - ipratropium (ATROVENT) 0.06 % nasal spray  Dispense: 15 mL; Refill: 0    Follow-up    Follow-up Visit   Expected date: Apr 24, 2025      Follow Up Appointment Details:     Follow-up with whom?: PCP    Follow-Up for what?: Acute Issue Recheck    How?: In Person or Virtual                 Subjective   Roman is a 49 year old, presenting for the following health issues:    Cough (X 6 days)  Associated with runny nose  No fever or chills.  Has nasal spray: Flonase  When coughs, sits on toliet nose starts running, has PND and with throat clearing  Been taking Mucinex as well as day and night cold capsules.    States he had fungal PNA 12/20/24 - 2/20/25 and concerned as he's having the same symptoms now as he did when he was sick with the fungal infection.     Hx of metastatic colon cancer to the lung and liver.  Due to restart next round of chemotherapy treatment 4/30/25.        4/21/2025     1:37 PM   Additional Questions   Roomed by Annamaria     Cough    History of Present Illness       Reason for visit:  Congestio cough boodwork check for fungal pnamonia   He is taking medications regularly.      Review of Systems  Constitutional, cardiovascular, gi and gu systems are negative, except as otherwise noted.      Objective    /75   Pulse (!) 121   Temp 98.7  F (37.1  C) (Temporal)   Resp 18   Ht 1.803 m (5'  "10.98\")   Wt 71.7 kg (158 lb)   SpO2 95%   BMI 22.05 kg/m    Body mass index is 22.05 kg/m .  Physical Exam   GENERAL: alert and no distress  HENT: Nostrils moist  NECK: no adenopathy, no asymmetry, masses, or scars  RESP: Coughing on and off, lungs clear to auscultation - no rales, rhonchi or wheezes  CV: regular rate and rhythm, no murmur, click or rub, no peripheral edema  MS: no gross musculoskeletal defects noted, no edema        Signed Electronically by: Kevin Cardenas MD            "

## 2025-04-23 ENCOUNTER — PATIENT OUTREACH (OUTPATIENT)
Dept: CARE COORDINATION | Facility: CLINIC | Age: 50
End: 2025-04-23
Payer: COMMERCIAL

## 2025-04-24 ENCOUNTER — PATIENT OUTREACH (OUTPATIENT)
Dept: CARE COORDINATION | Facility: CLINIC | Age: 50
End: 2025-04-24
Payer: COMMERCIAL

## 2025-04-25 ENCOUNTER — ANCILLARY PROCEDURE (OUTPATIENT)
Dept: GENERAL RADIOLOGY | Facility: CLINIC | Age: 50
End: 2025-04-25
Attending: FAMILY MEDICINE
Payer: COMMERCIAL

## 2025-04-25 DIAGNOSIS — J20.9 ACUTE BRONCHITIS, UNSPECIFIED ORGANISM: ICD-10-CM

## 2025-04-25 PROCEDURE — 71046 X-RAY EXAM CHEST 2 VIEWS: CPT | Mod: TC | Performed by: RADIOLOGY

## 2025-04-29 ENCOUNTER — HOME INFUSION (OUTPATIENT)
Dept: HOME HEALTH SERVICES | Facility: HOME HEALTH | Age: 50
End: 2025-04-29
Payer: COMMERCIAL

## 2025-04-29 DIAGNOSIS — C78.00 RECTAL ADENOCARCINOMA METASTATIC TO LUNG (H): Primary | ICD-10-CM

## 2025-04-29 DIAGNOSIS — C20 RECTAL ADENOCARCINOMA METASTATIC TO LUNG (H): Primary | ICD-10-CM

## 2025-04-29 RX ORDER — PROCHLORPERAZINE MALEATE 10 MG
10 TABLET ORAL EVERY 6 HOURS PRN
Qty: 30 TABLET | Refills: 2 | Status: SHIPPED | OUTPATIENT
Start: 2025-04-29

## 2025-04-29 RX ORDER — DEXAMETHASONE 4 MG/1
8 TABLET ORAL DAILY
Qty: 4 TABLET | Refills: 2 | Status: SHIPPED | OUTPATIENT
Start: 2025-04-29

## 2025-04-29 RX ORDER — ONDANSETRON 8 MG/1
8 TABLET, FILM COATED ORAL EVERY 8 HOURS PRN
Qty: 30 TABLET | Refills: 2 | Status: SHIPPED | OUTPATIENT
Start: 2025-04-29

## 2025-04-30 ENCOUNTER — ONCOLOGY VISIT (OUTPATIENT)
Dept: ONCOLOGY | Facility: CLINIC | Age: 50
End: 2025-04-30
Attending: STUDENT IN AN ORGANIZED HEALTH CARE EDUCATION/TRAINING PROGRAM
Payer: COMMERCIAL

## 2025-04-30 ENCOUNTER — ANCILLARY PROCEDURE (OUTPATIENT)
Dept: GENERAL RADIOLOGY | Facility: CLINIC | Age: 50
End: 2025-04-30
Payer: COMMERCIAL

## 2025-04-30 ENCOUNTER — HOME INFUSION BILLING (OUTPATIENT)
Dept: HOME HEALTH SERVICES | Facility: HOME HEALTH | Age: 50
End: 2025-04-30
Payer: COMMERCIAL

## 2025-04-30 ENCOUNTER — DOCUMENTATION ONLY (OUTPATIENT)
Dept: HOME HEALTH SERVICES | Facility: HOME HEALTH | Age: 50
End: 2025-04-30
Payer: COMMERCIAL

## 2025-04-30 ENCOUNTER — APPOINTMENT (OUTPATIENT)
Dept: LAB | Facility: CLINIC | Age: 50
End: 2025-04-30
Attending: STUDENT IN AN ORGANIZED HEALTH CARE EDUCATION/TRAINING PROGRAM
Payer: COMMERCIAL

## 2025-04-30 VITALS
WEIGHT: 163 LBS | RESPIRATION RATE: 16 BRPM | SYSTOLIC BLOOD PRESSURE: 99 MMHG | HEART RATE: 95 BPM | TEMPERATURE: 97.3 F | DIASTOLIC BLOOD PRESSURE: 67 MMHG | BODY MASS INDEX: 22.75 KG/M2 | OXYGEN SATURATION: 95 %

## 2025-04-30 DIAGNOSIS — C18.9 COLON CANCER METASTASIZED TO BONE (H): ICD-10-CM

## 2025-04-30 DIAGNOSIS — C78.00 RECTAL ADENOCARCINOMA METASTATIC TO LUNG (H): Primary | ICD-10-CM

## 2025-04-30 DIAGNOSIS — K59.00 CONSTIPATION, UNSPECIFIED CONSTIPATION TYPE: ICD-10-CM

## 2025-04-30 DIAGNOSIS — C20 RECTAL ADENOCARCINOMA METASTATIC TO LUNG (H): Primary | ICD-10-CM

## 2025-04-30 DIAGNOSIS — C79.51 COLON CANCER METASTASIZED TO BONE (H): ICD-10-CM

## 2025-04-30 LAB
ALBUMIN SERPL BCG-MCNC: 3.3 G/DL (ref 3.5–5.2)
ALBUMIN UR-MCNC: 10 MG/DL
ALP SERPL-CCNC: 243 U/L (ref 40–150)
ALT SERPL W P-5'-P-CCNC: 25 U/L (ref 0–70)
ANION GAP SERPL CALCULATED.3IONS-SCNC: 11 MMOL/L (ref 7–15)
AST SERPL W P-5'-P-CCNC: 61 U/L (ref 0–45)
BASOPHILS # BLD AUTO: 0 10E3/UL (ref 0–0.2)
BASOPHILS NFR BLD AUTO: 0 %
BILIRUB SERPL-MCNC: 0.5 MG/DL
BUN SERPL-MCNC: 16.9 MG/DL (ref 6–20)
CALCIUM SERPL-MCNC: 8.7 MG/DL (ref 8.8–10.4)
CHLORIDE SERPL-SCNC: 98 MMOL/L (ref 98–107)
CREAT SERPL-MCNC: 0.72 MG/DL (ref 0.67–1.17)
CREAT SERPL-MCNC: 0.72 MG/DL (ref 0.67–1.17)
EGFRCR SERPLBLD CKD-EPI 2021: >90 ML/MIN/1.73M2
EGFRCR SERPLBLD CKD-EPI 2021: >90 ML/MIN/1.73M2
EOSINOPHIL # BLD AUTO: 0.2 10E3/UL (ref 0–0.7)
EOSINOPHIL NFR BLD AUTO: 1 %
ERYTHROCYTE [DISTWIDTH] IN BLOOD BY AUTOMATED COUNT: 22.3 % (ref 10–15)
GLUCOSE SERPL-MCNC: 104 MG/DL (ref 70–99)
HCO3 SERPL-SCNC: 22 MMOL/L (ref 22–29)
HCT VFR BLD AUTO: 31 % (ref 40–53)
HGB BLD-MCNC: 9.7 G/DL (ref 13.3–17.7)
IMM GRANULOCYTES # BLD: 0.2 10E3/UL
IMM GRANULOCYTES NFR BLD: 1 %
LYMPHOCYTES # BLD AUTO: 0.7 10E3/UL (ref 0.8–5.3)
LYMPHOCYTES NFR BLD AUTO: 4 %
MCH RBC QN AUTO: 25.9 PG (ref 26.5–33)
MCHC RBC AUTO-ENTMCNC: 31.3 G/DL (ref 31.5–36.5)
MCV RBC AUTO: 83 FL (ref 78–100)
MONOCYTES # BLD AUTO: 1.6 10E3/UL (ref 0–1.3)
MONOCYTES NFR BLD AUTO: 10 %
NEUTROPHILS # BLD AUTO: 13.4 10E3/UL (ref 1.6–8.3)
NEUTROPHILS NFR BLD AUTO: 84 %
NRBC # BLD AUTO: 0 10E3/UL
NRBC BLD AUTO-RTO: 0 /100
PLAT MORPH BLD: NORMAL
PLATELET # BLD AUTO: 262 10E3/UL (ref 150–450)
POTASSIUM SERPL-SCNC: 4.4 MMOL/L (ref 3.4–5.3)
PROT SERPL-MCNC: 6.9 G/DL (ref 6.4–8.3)
RBC # BLD AUTO: 3.75 10E6/UL (ref 4.4–5.9)
RBC MORPH BLD: NORMAL
SODIUM SERPL-SCNC: 131 MMOL/L (ref 135–145)
WBC # BLD AUTO: 16 10E3/UL (ref 4–11)

## 2025-04-30 PROCEDURE — S9330 HIT CONT CHEM DIEM: HCPCS

## 2025-04-30 PROCEDURE — 250N000011 HC RX IP 250 OP 636: Mod: JZ | Performed by: STUDENT IN AN ORGANIZED HEALTH CARE EDUCATION/TRAINING PROGRAM

## 2025-04-30 PROCEDURE — 82040 ASSAY OF SERUM ALBUMIN: CPT

## 2025-04-30 PROCEDURE — 258N000003 HC RX IP 258 OP 636: Performed by: STUDENT IN AN ORGANIZED HEALTH CARE EDUCATION/TRAINING PROGRAM

## 2025-04-30 PROCEDURE — 81003 URINALYSIS AUTO W/O SCOPE: CPT | Performed by: STUDENT IN AN ORGANIZED HEALTH CARE EDUCATION/TRAINING PROGRAM

## 2025-04-30 PROCEDURE — 74018 RADEX ABDOMEN 1 VIEW: CPT | Mod: GC | Performed by: RADIOLOGY

## 2025-04-30 PROCEDURE — 82565 ASSAY OF CREATININE: CPT | Performed by: STUDENT IN AN ORGANIZED HEALTH CARE EDUCATION/TRAINING PROGRAM

## 2025-04-30 PROCEDURE — A4216 STERILE WATER/SALINE, 10 ML: HCPCS

## 2025-04-30 PROCEDURE — 85025 COMPLETE CBC W/AUTO DIFF WBC: CPT | Performed by: STUDENT IN AN ORGANIZED HEALTH CARE EDUCATION/TRAINING PROGRAM

## 2025-04-30 PROCEDURE — 36415 COLL VENOUS BLD VENIPUNCTURE: CPT | Performed by: STUDENT IN AN ORGANIZED HEALTH CARE EDUCATION/TRAINING PROGRAM

## 2025-04-30 RX ORDER — DIPHENHYDRAMINE HYDROCHLORIDE 50 MG/ML
50 INJECTION, SOLUTION INTRAMUSCULAR; INTRAVENOUS ONCE
Status: COMPLETED | OUTPATIENT
Start: 2025-04-30 | End: 2025-04-30

## 2025-04-30 RX ORDER — HEPARIN SODIUM (PORCINE) LOCK FLUSH IV SOLN 100 UNIT/ML 100 UNIT/ML
5 SOLUTION INTRAVENOUS
Status: DISCONTINUED | OUTPATIENT
Start: 2025-04-30 | End: 2025-04-30 | Stop reason: HOSPADM

## 2025-04-30 RX ORDER — ZOLEDRONIC ACID 0.04 MG/ML
4 INJECTION, SOLUTION INTRAVENOUS ONCE
Status: COMPLETED | OUTPATIENT
Start: 2025-04-30 | End: 2025-04-30

## 2025-04-30 RX ORDER — MEPERIDINE HYDROCHLORIDE 25 MG/ML
25 INJECTION INTRAMUSCULAR; INTRAVENOUS; SUBCUTANEOUS
OUTPATIENT
Start: 2025-05-15

## 2025-04-30 RX ORDER — HEPARIN SODIUM,PORCINE 10 UNIT/ML
5-20 VIAL (ML) INTRAVENOUS DAILY PRN
OUTPATIENT
Start: 2025-05-15

## 2025-04-30 RX ORDER — ZOLEDRONIC ACID 0.04 MG/ML
4 INJECTION, SOLUTION INTRAVENOUS ONCE
OUTPATIENT
Start: 2025-05-15 | End: 2025-05-15

## 2025-04-30 RX ORDER — HEPARIN SODIUM (PORCINE) LOCK FLUSH IV SOLN 100 UNIT/ML 100 UNIT/ML
5 SOLUTION INTRAVENOUS
OUTPATIENT
Start: 2025-05-15

## 2025-04-30 RX ORDER — ALBUTEROL SULFATE 0.83 MG/ML
2.5 SOLUTION RESPIRATORY (INHALATION)
OUTPATIENT
Start: 2025-05-15

## 2025-04-30 RX ORDER — DIPHENHYDRAMINE HYDROCHLORIDE 50 MG/ML
25 INJECTION, SOLUTION INTRAMUSCULAR; INTRAVENOUS ONCE
Status: COMPLETED | OUTPATIENT
Start: 2025-04-30 | End: 2025-04-30

## 2025-04-30 RX ORDER — EPINEPHRINE 1 MG/ML
0.3 INJECTION, SOLUTION, CONCENTRATE INTRAVENOUS EVERY 5 MIN PRN
OUTPATIENT
Start: 2025-05-15

## 2025-04-30 RX ORDER — METHYLPREDNISOLONE SODIUM SUCCINATE 40 MG/ML
40 INJECTION INTRAMUSCULAR; INTRAVENOUS
Start: 2025-05-15

## 2025-04-30 RX ORDER — ALBUTEROL SULFATE 90 UG/1
1-2 INHALANT RESPIRATORY (INHALATION)
Start: 2025-05-15

## 2025-04-30 RX ORDER — DIPHENHYDRAMINE HYDROCHLORIDE 50 MG/ML
50 INJECTION, SOLUTION INTRAMUSCULAR; INTRAVENOUS
Start: 2025-05-15

## 2025-04-30 RX ORDER — FLUOROURACIL 50 MG/ML
400 INJECTION, SOLUTION INTRAVENOUS ONCE
Status: COMPLETED | OUTPATIENT
Start: 2025-04-30 | End: 2025-04-30

## 2025-04-30 RX ORDER — DIPHENHYDRAMINE HYDROCHLORIDE 50 MG/ML
25 INJECTION, SOLUTION INTRAMUSCULAR; INTRAVENOUS
Start: 2025-05-15

## 2025-04-30 RX ADMIN — OXALIPLATIN 1.6 MG: 5 INJECTION, SOLUTION INTRAVENOUS at 13:03

## 2025-04-30 RX ADMIN — ZOLEDRONIC ACID 4 MG: 0.04 INJECTION, SOLUTION INTRAVENOUS at 17:36

## 2025-04-30 RX ADMIN — OXALIPLATIN 0.02 MG: 5 INJECTION, SOLUTION INTRAVENOUS at 10:57

## 2025-04-30 RX ADMIN — SODIUM CHLORIDE 400 MG: 9 INJECTION, SOLUTION INTRAVENOUS at 09:38

## 2025-04-30 RX ADMIN — DIPHENHYDRAMINE HYDROCHLORIDE 25 MG: 50 INJECTION INTRAMUSCULAR; INTRAVENOUS at 14:37

## 2025-04-30 RX ADMIN — OXALIPLATIN 146 MG: 5 INJECTION, SOLUTION INTRAVENOUS at 15:17

## 2025-04-30 RX ADMIN — DEXAMETHASONE SODIUM PHOSPHATE 10 MG: 10 INJECTION, SOLUTION INTRAMUSCULAR; INTRAVENOUS at 14:44

## 2025-04-30 RX ADMIN — FLUOROURACIL 770 MG: 50 INJECTION, SOLUTION INTRAVENOUS at 17:54

## 2025-04-30 RX ADMIN — Medication 5 ML: at 06:55

## 2025-04-30 RX ADMIN — LEUCOVORIN CALCIUM 700 MG: 350 INJECTION, POWDER, LYOPHILIZED, FOR SOLUTION INTRAMUSCULAR; INTRAVENOUS at 15:10

## 2025-04-30 RX ADMIN — DEXAMETHASONE SODIUM PHOSPHATE: 10 INJECTION, SOLUTION INTRAMUSCULAR; INTRAVENOUS at 09:01

## 2025-04-30 RX ADMIN — FOSAPREPITANT 150 MG: 150 INJECTION, POWDER, LYOPHILIZED, FOR SOLUTION INTRAVENOUS at 08:30

## 2025-04-30 RX ADMIN — DIPHENHYDRAMINE HYDROCHLORIDE 50 MG: 50 INJECTION INTRAMUSCULAR; INTRAVENOUS at 09:28

## 2025-04-30 RX ADMIN — FAMOTIDINE 20 MG: 10 INJECTION, SOLUTION INTRAVENOUS at 09:33

## 2025-04-30 RX ADMIN — OXALIPLATIN 0.16 MG: 5 INJECTION, SOLUTION INTRAVENOUS at 12:00

## 2025-04-30 RX ADMIN — OXALIPLATIN 16 MG: 5 INJECTION, SOLUTION INTRAVENOUS at 14:05

## 2025-04-30 ASSESSMENT — PAIN SCALES - GENERAL: PAINLEVEL_OUTOF10: NO PAIN (0)

## 2025-04-30 NOTE — PROGRESS NOTES
[facility] - Medical Oncology Follow Up Note  04/30/2025    Oncology History:   Patient developed rectal bleeding in 2020.  In January 2021 CT showed focal wall thickening in the upper rectum, multiple pulmonary nodules bilaterally suspicious for metastatic disease.  Underwent FNA of the right upper lobe lesion which was consistent with adenocarcinoma of the colon.  He was treated with FOLFOX from 2/20/2021 through 5/20/2021.  Avastin was added.  Had an infusion reaction to oxaliplatin 6/2021.  7/2021- 12/2021 was on 5-FU alone.  Developed progression.  12/2021- 9/2022 was on FOLFIRI.  11/2021 started Lonsurf. All of this was done with outside oncologist.     Met with Dr. Snow on 2/3/2023 to establish care. Recommended regorafenib.     Started regorafenib on 3/2/2023. Progression noted 5/19/23. Plan to start FOLFOX/Avastin and send out CARIS testing to evaluate for other treatment options. Cycle 1 was given with oxaliplatin desensitization, 5FU, Avastin held due to increased urine protein.     Following cycle 4, patient was admitted with hematuria and acute kidney injury.    Oxaliplatin was dropped with Cycle 5.     Y-90 radioembolization 9/7.    10/24/23 CT CAP with disease progression with notable growth in all pulmonary lesions. Lonsurf + bevacizumab started 10/27/23 with plans to bridge to clinical trial.   11/15/23 - stopped chemo for [facility] study.   Currently enrolled in [facility] CAR-T trial (completed cell harvest 1/23/2024, tentatively planning administration of CAR-T in April 2/12/24 - resumed Lonsurf, bevacizumab to bridge treatment until CAR-T therapy  3/20/24 - treatment changed to Fruquintinib due to intolerability of Lonsurf/bevacizumab  4/2/24- Presented to the ED with dehydration. Stopped Fruquintnib.   6/2024- cellular therapy transplant at [facility].   7/30/24- CT imaging demonstrating marked progression of pulmonary and hepatic lesions, clinical trial failed to generate T-cell graft. Multiple  discussions regarding plan of care, hospice vs additional lines of treatment. Patient decided he is not ready for hospice. He resumed treatment with FOLFIRI + bevacizumab on 8/30/24.   10/2024 - CT CAP with continued response, plan to continue FOLFIRI + bevacizumab with the addition of Zometa. Per his dentist, needs dental work completed prior to clearance. (Scheduled 1/2 and 1/21).   12/2024 chemo held for break over new Years per patient's choice.  1/15/25 chemo held due to ongoing respiratory illness, + RSV, also treated with levaquin for pneumonia on CXR, chemo deferred   1/22-1/25/25- hospitalized with suspicion for fungal pneumonia  1/28-1/29/25- hospitalized for PJP pneumonia, started on treatment with Bactrim and steroids  2/19/25 CT CAP shows disease progression, occurred while on treatment break for pneumonia, resumed same chemotherapy with FOLFIRI/chelsea on 2/20/25  *** CT CAP       Interval History:     -drinking 60-70 oz of fluids daily   -bowels have been slow over the last week, took one imodium prior and has been slow since, has had some liquid stool, had small hard stools after mag citrate and suppository, abdominal pain x2 days prior to mag/suppository  -vomiting 2-3 times in the last week, no nausea, just a couple minutes warning that he's going to vomit   -stools in the last 3 days have been a few ounces of liquid, today had some firmer stool in the ostomy   -appetite is stable, some days decreased  -hasn't been taking anything consistently for bowels, took 2 softeners the last 3 days   -mag citrate, smooth move tea and fast acting suppository are what works for him   -abdominal pain has subsided     -breathing is improving, stillg etting winded easily   -cough still, not as much - ribs are sore from coughing  -shortness of breath, coughing and felt ill before going to primary on Friday         ***   Patient reports feeling much better today.  He started on Imodium last night and has not had any  "further stools.  He reports that his fluid intake remains fair, but he thinks he can push more fluids orally.  He was able to eat some toast, banana, and a burger from [employer] last night as well as some strawberries.  This morning, he ate bananas, eggs, and strawberries.  He seemed to tolerate all of this well and denies any current nausea.  He denies other concerns.    Current Outpatient Medications   Medication Sig Dispense Refill    acetaminophen (TYLENOL) 500 MG tablet Take 500-1,000 mg by mouth as needed for mild pain.      azithromycin (ZITHROMAX) 250 MG tablet Take 2 tablets (500 mg) by mouth daily for 1 day, THEN 1 tablet (250 mg) daily for 4 days. 6 tablet 0    budesonide-formoterol (SYMBICORT/BREYNA) 160-4.5 MCG/ACT Inhaler Inhale 2 puffs into the lungs 2 times daily. 10.2 g 1    Chemo Kit For RN use only. Do not remove items from bag. Contents: 1  sodium chloride 0.9% flush, 4 medium gloves, 1 chemo gown, 1/4 chemo mat, 1 connector female, 1 chemo bag. (Patient not taking: Reported on 4/25/2025) [id number] kit 0    cyclobenzaprine (FLEXERIL) 5 MG tablet Take one tab (5mg) by mouth over 6-8 hours, as needed for spasms 45 tablet 2    dexAMETHasone (DECADRON) 4 MG tablet Take 2 tablets (8 mg) by mouth daily. Take for 2 days, starting the day after chemo. Take with food. 4 tablet 2    Emergency Supply Kit, Central, Patient use for emergency only. Contents: 3 sodium chloride 0.9% flushes, 1 dressing kit, 1 microclave ext set 14\", 4 nitrile gloves (med), 6 alcohol prep pads, 1 bacitracin, 1 syringe (10 cc 20 G 1\"). Call [phone] to reorder. (Patient not taking: Reported on 4/25/2025) [id number] kit 0    fluorouracil (ADRUCIL) 2.5 GM/50ML SOLN injection  (Patient not taking: Reported on 4/25/2025)      Fluorouracil (ADRUCIL) 4,630 mg in sodium chloride 0.9 % 241 mL via HOMEPUMP C-Series Infuse 4,630 mg at 5.2 mL/hr over 46 hours into the vein once for 1 dose. [id number] mL 0    fluticasone (FLONASE) 50 MCG/ACT nasal " spray Spray 1 spray into both nostrils daily. 16 g 2    fluticasone (FLONASE) 50 MCG/ACT nasal spray Spray 1 spray into both nostrils daily. 16 g 2    gabapentin (NEURONTIN) 100 MG capsule TAKE 1 TO 2 CAPSULES (100-200 MG) BY MOUTH UP TO TWICE DURING THE DAY AND 3 CAPSULES (300 MG) AT BEDTIME. 210 capsule 1    guaiFENesin (MUCINEX) 600 MG 12 hr tablet Take 2 tablets (1,200 mg) by mouth 2 times daily. 20 tablet 0    ibuprofen (ADVIL/MOTRIN) 200 MG tablet Take 3 tablets (600 mg) by mouth every 8 hours as needed for moderate pain (Patient not taking: Reported on 4/21/2025) 100 tablet 0    ipratropium (ATROVENT) 0.06 % nasal spray Spray 2 sprays into both nostrils 4 times daily. 15 mL 0    ipratropium - albuterol 0.5 mg/2.5 mg/3 mL (DUONEB) 0.5-2.5 (3) MG/3ML neb solution Take 1 vial (3 mLs) by nebulization every 6 hours as needed for shortness of breath, wheezing or cough. (Patient not taking: Reported on 4/21/2025) 90 mL 3    loperamide (IMODIUM A-D) 2 MG tablet Take 2 mg by mouth 4 times daily as needed for diarrhea.      LORazepam (ATIVAN) 0.5 MG tablet Take 1 tablet (0.5 mg) by mouth every 6 hours as needed for anxiety. (Patient not taking: Reported on 3/27/2025) 30 tablet 0    NARCAN 4 MG/0.1ML nasal spray  (Patient not taking: Reported on 4/21/2025)      OLANZapine (ZYPREXA) 2.5 MG tablet Take 1 tablet (2.5 mg) by mouth 2 times daily as needed (nausea). 60 tablet 1    ondansetron (ZOFRAN ODT) 4 MG ODT tab Take 1 tablet (4 mg) by mouth every 6 hours as needed for nausea. 30 tablet 3    ondansetron (ZOFRAN) 8 MG tablet Take 1 tablet (8 mg) by mouth every 8 hours as needed for nausea (vomiting). 30 tablet 2    Ostomy Supplies MISC 1 each daily 1 each 11    oxyCODONE IR (ROXICODONE) 10 MG tablet Take 1-2 tablets (10-20 mg) by mouth every 4 hours as needed for pain. 150 tablet 0    [START ON 5/2/2025] pegfilgrastim (NEULASTA ONPRO KIT) 6 MG/0.6ML injection Inject 0.6 mLs (6 mg) subcutaneously every 2 weeks as needed  (at the time of chemotherapy disconnect per treatment plan). 15 mL 0    Port Access Kit For nurse use only.  Do not remove items from bag.  Use for port access.  Do not place syringe on sterile field. [id number] kit 0    potassium phosphate, monobasic, (K-PHOS) 500 MG tablet Take 1 tablet (500 mg) by mouth 2 times daily. (Patient not taking: Reported on 4/25/2025) 14 tablet 0    prochlorperazine (COMPAZINE) 10 MG tablet Take 1 tablet (10 mg) by mouth every 6 hours as needed for nausea or vomiting. 30 tablet 2    prochlorperazine (COMPAZINE) 10 MG tablet Take 1 tablet (10 mg) by mouth every 6 hours as needed for nausea or vomiting 30 tablet 2    sodium chloride, PF, 0.9% PF flush Inject 10 mLs into the vein as needed for line flush. Flush IV before and after med administration as directed and/or at least every 24 hours, or prior to deaccessing for no further use and/or at least every 4 weeks when not accessed. [id number] mL 0    sulfamethoxazole-trimethoprim (BACTRIM DS) 800-160 MG tablet Take 1 tablet by mouth Every Mon, Wed, Fri Morning. 36 tablet 1    VENTOLIN  (90 Base) MCG/ACT inhaler INHALE 2 PUFFS INTO THE LUNGS EVERY 6 HOURS AS NEEDED FOR SHORTNESS OF BREATH OR WHEEZING OR COUGH (Patient not taking: Reported on 4/25/2025) 18 g 2     Physical Exam:  General: The patient is a pleasant male in no acute distress.  There were no vitals taken for this visit.  Wt Readings from Last 10 Encounters:   04/30/25 73.9 kg (163 lb)   04/25/25 74.8 kg (164 lb 12.8 oz)   04/21/25 71.7 kg (158 lb)   04/17/25 74.4 kg (164 lb)   04/16/25 74.6 kg (164 lb 8 oz)   04/04/25 75 kg (165 lb 5.5 oz)   04/02/25 74.7 kg (164 lb 11.2 oz)   03/28/25 73.2 kg (161 lb 4.8 oz)   03/21/25 76.2 kg (168 lb)   03/19/25 76.2 kg (168 lb 1.6 oz)   HEENT: EOMI. Sclerae are anicteric.   Lungs: Breathing is not labored.   Neuro: Cranial nerves II through XII are grossly intact.  Skin: No rashes, petechiae, or bruising noted on exposed skin.    LABS  Most  Recent 3 CBC's:  Recent Labs   Lab Test 04/30/25  0656 04/16/25  0803 04/02/25  0816   WBC 16.0* 7.9 8.8   HGB 9.7* 10.3* 9.9*   MCV 83 85 83    154 166    Most Recent 3 BMP's:  Recent Labs   Lab Test 04/30/25  0656 04/16/25  0803 04/02/25  0816 03/28/25  0833   NA  --  135 139 135   POTASSIUM  --  4.6 3.9 4.1   CHLORIDE  --  103 105 104   CO2  --  22 22 20*   BUN  --  13.5 16.9 11.0   CR 0.72 1.00 1.13 0.74   ANIONGAP  --  10 12 11   JEFERSON  --  8.7* 8.4* 8.7*   GLC  --  97 99 132*    Most Recent 2 LFT's:  Recent Labs   Lab Test 04/16/25  0803 04/02/25  0816   AST 40 52*   ALT 14 25   ALKPHOS 234* 221*   BILITOTAL 0.3 0.3   I reviewed the above labs today.    ASSESSMENT AND PLAN   Metastatic rectal cancer  He started on treatment with FOLFIRI + bevacizumab on 8/30/24. Imaging in October 2024 showed improvement in his disease. Imaging in February 2025 showed disease progression, though this was in the setting of treatment break due to PJP pneumonia. Therefore he resumed the same chemotherapy with FOLFIRI/chelsea on 2/20/25. He has been tolerating chemotherapy fairly well, though developed more significant thrombocytopenia, likely related to Bactrim. Since resuming chemotherapy in February 2025, he has had more difficulty with diarrhea. Work up for infectious causes has been negative, so I think this is likely chemotherapy related. I will plan to reduce the dose of irinotecan by 20% for his next cycle of chemotherapy next week. Will repeat imaging in mid-April and he will see Dr. Snow to review.     Nausea  Secondary to chemotherapy. Recommend taking Compazine and Zofran as needed. Will also add back in Zyprexa bid prn.    Diarrhea  Secondary to chemotherapy. Infectious work up was negative. Will use Imodium prn and will decrease irinotecan by 20% given recurrent concerns with dehydration.     Hypophosphatemia  Likely due to recent diarrhea and decreased po intake. Will start Kphos 500 mg bid x 7 days. Will  recheck next week.     Epistaxis  Secondary to bevacizumab, worsened with thrombocytopenia. Bevacizumab was held on 3/5/25. He was given a platelet transfusion on 3/14/24. His bleeding has improved. Recommend nasal saline spray several times/day. He reports no benefit from using Vaseline in the nares or a humidifier in bedroom at night.    Back pain  Secondary to bone mets. Following with palliative care, next in April. Managed with oxycodone prn, currently 1-2/day. No changes reported March 27, 2025.     Iron deficient anemia  Treated with 3 doses of Venofer, last on 10/4/24. Last check of iron studies in February 2025 unclear if anemia of chronic disease or mixed with recurrent iron deficiency. MCV has been trending down again. Iron studies in mid-March 2025 are consistent with anemia of chronic disease. Will continue to monitor.     Bone mets  Patient will obtain dental clearance prior to starting on treatment with Zometa. Not specifically discussed today.     PJP pneumonia  Following with ID. Completed course of high dose Bactrim, now on PJP prophylaxis with M/W/F Bactrim for a minimum of 6 months (August 2025). Per ID, will then reassess the immune system and the CD4 count prior to stopping the Bactrim prophylaxis. Bactrim was refilled today.     Radha Chapman PA-C  [facility]  [address] [place] [id number] [phone]    30 minutes spent on the date of the encounter doing chart review, review of test results, interpretation of tests, patient visit, and documentation     The longitudinal plan of care for the diagnosis(es)/condition(s) as documented were addressed during this visit. Due to the added complexity in care, I will continue to support Roamn Escalante in the subsequent management and with ongoing continuity of care.

## 2025-04-30 NOTE — PROGRESS NOTES
Infusion Nursing Note:  Roman Escalante presents today for Cycle 1 Day 1 FOLFOX and Bevacizumab (Oxalipatin Desensitization and Zometa.    Patient seen and examined in infusion by Rebeca Killian      Note: Pt states he was recently treated for bronchitis/pneumonia and has finished the antibx. He has a lingering cough and some SOB that is improving. Currently afebrile.His  WBC is 16 and his ANC is 13.4. He has been constipated. He took mg citrate on Sunday and vomited. He has used suppositories but has only has liquid stool. He has been vomiting periodically and the antiemitics are not working because  it comes on so quick. He says zofran does not work for him. He has no appetite and was only to able he crackers on Sunday although he did have a large lunch yesterday.    The above information was discussed with Rebeca Killian who ultimately came to infusion to examine patient today.   Rebeca stated it was ok to to start today's treatment prior to her seeing pt.  An abdominal xray was done and pt is full of stool.  Per Rebeca pt can take Mag Citrate daily x3.  IF he is unabl to have a stool in that time he will need to call clinic. Pt verbalzed an understandinf for this information.    Fluorouacil continuous CADD pump connected at 1801.  Positive blood return from port.  Fluorouaracil to infuse over 46 hours at 5.2 cc/hour.  Fluorouracil will be disconnected on 5/2/25 at 1200 (time requested by pt) by [facility].  An inbasket was sent to [facility] with pump disconnect time.    Prior to discharge. Linda LIN RN and Ruth ISSA RN verified that connections were secured with tape, clamps were taped open, and  temperature regulator was secured to skin.    Zometa due today.    Pt's girlfriend administers neulasta injection 24 hours after pump is disconnected.  He stated this is coming in the mail.  He take claritin for the bone pain and has no questions or concerns regarding neulasta        Intravenous  Access:  Implanted Port accessed in infusion.   Positive blood return pre and post bevacizumab.  Positive blood return pre and post oxaliplatin.  Fluorouracil Bolus infused into free flowing NS.    Positive blood return q2cc.  Port site without redness or swelling.      Treatment Conditions:  Component      Latest Ref Rng 4/30/2025  6:56 AM   WBC      4.0 - 11.0 10e3/uL 16.0 (H)    RBC Count      4.40 - 5.90 10e6/uL 3.75 (L)    Hemoglobin      13.3 - 17.7 g/dL 9.7 (L)    Hematocrit      40.0 - 53.0 % 31.0 (L)    MCV      78 - 100 fL 83    MCH      26.5 - 33.0 pg 25.9 (L)    MCHC      31.5 - 36.5 g/dL 31.3 (L)    RDW      10.0 - 15.0 % 22.3 (H)    Platelet Count      150 - 450 10e3/uL 262    % Neutrophils      % 84    % Lymphocytes      % 4    % Monocytes      % 10    % Eosinophils      % 1    % Basophils      % 0    % Immature Granulocytes      % 1    NRBC/W      <1 /100 0    Absolute Neutrophil      1.6 - 8.3 10e3/uL 13.4 (H)    Absolute Lymphocytes      0.8 - 5.3 10e3/uL 0.7 (L)    Absolute Monocytes      0.0 - 1.3 10e3/uL 1.6 (H)    Absolute Eosinophils      0.0 - 0.7 10e3/uL 0.2    Absolute Basophils      0.0 - 0.2 10e3/uL 0.0    Absolute Immature Granulocytes      <=0.4 10e3/uL 0.2    Absolute NRBCs      10e3/uL 0.0    Sodium      135 - 145 mmol/L 131 (L)    Potassium      3.4 - 5.3 mmol/L 4.4    Carbon Dioxide (CO2)      22 - 29 mmol/L 22    Anion Gap      7 - 15 mmol/L 11    Urea Nitrogen      6.0 - 20.0 mg/dL 16.9    Creatinine      0.67 - 1.17 mg/dL 0.72    GFR Estimate      >60 mL/min/1.73m2 >90    Calcium      8.8 - 10.4 mg/dL 8.7 (L)    Chloride      98 - 107 mmol/L 98    Glucose      70 - 99 mg/dL 104 (H)    Alkaline Phosphatase      40 - 150 U/L 243 (H)    AST      0 - 45 U/L 61 (H)    ALT      0 - 70 U/L 25    Protein Total      6.4 - 8.3 g/dL 6.9    Albumin      3.5 - 5.2 g/dL 3.3 (L)    Bilirubin Total      <=1.2 mg/dL 0.5    RBC Morphology Confirmed RBC Indices    Platelet Morphology      Automated  Count Confirmed. Platelet morphology is normal.  Automated Count Confirmed. Platelet morphology is normal.    Protein Albumin Urine      Negative mg/dL        Results reviewed, labs MET treatment parameters, ok to proceed with treatment.        Post Infusion Assessment:  Patient tolerated infusion without incident.       Discharge Plan:   Prescription refills given for decadron (pt will pick this up from the pharmacy after discharge).   Patient will return 5/14/25 for cycle 2  Patient discharged in stable condition accompanied by: self.  Departure Mode: Ambulatory.      Neisha Soto RN

## 2025-04-30 NOTE — Clinical Note
4/30/2025      Roman Escalante  [address]  [facility] [address]      Dear Colleague,    Thank you for referring your patient, Roman Escalante, to the [facility]. Please see a copy of my visit note below.    [facility] - Medical Oncology Follow Up Note  04/30/2025    Oncology History:   Patient developed rectal bleeding in 2020.  In January 2021 CT showed focal wall thickening in the upper rectum, multiple pulmonary nodules bilaterally suspicious for metastatic disease.  Underwent FNA of the right upper lobe lesion which was consistent with adenocarcinoma of the colon.  He was treated with FOLFOX from 2/20/2021 through 5/20/2021.  Avastin was added.  Had an infusion reaction to oxaliplatin 6/2021.  7/2021- 12/2021 was on 5-FU alone.  Developed progression.  12/2021- 9/2022 was on FOLFIRI.  11/2021 started Lonsurf. All of this was done with outside oncologist.     Met with Dr. Snow on 2/3/2023 to establish care. Recommended regorafenib.     Started regorafenib on 3/2/2023. Progression noted 5/19/23. Plan to start FOLFOX/Avastin and send out CARIS testing to evaluate for other treatment options. Cycle 1 was given with oxaliplatin desensitization, 5FU, Avastin held due to increased urine protein.     Following cycle 4, patient was admitted with hematuria and acute kidney injury.    Oxaliplatin was dropped with Cycle 5.     Y-90 radioembolization 9/7.    10/24/23 CT CAP with disease progression with notable growth in all pulmonary lesions. Lonsurf + bevacizumab started 10/27/23 with plans to bridge to clinical trial.   11/15/23 - stopped chemo for [facility] study.   Currently enrolled in [facility] CAR-T trial (completed cell harvest 1/23/2024, tentatively planning administration of CAR-T in April 2/12/24 - resumed Lonsurf, bevacizumab to bridge treatment until CAR-T therapy  3/20/24 - treatment changed to Fruquintinib due to intolerability of Lonsurf/bevacizumab  4/2/24-  Presented to the ED with dehydration. Stopped Fruquintnib.   6/2024- cellular therapy transplant at [facility].   7/30/24- CT imaging demonstrating marked progression of pulmonary and hepatic lesions, clinical trial failed to generate T-cell graft. Multiple discussions regarding plan of care, hospice vs additional lines of treatment. Patient decided he is not ready for hospice. He resumed treatment with FOLFIRI + bevacizumab on 8/30/24.   10/2024 - CT CAP with continued response, plan to continue FOLFIRI + bevacizumab with the addition of Zometa. Per his dentist, needs dental work completed prior to clearance. (Scheduled 1/2 and 1/21).   12/2024 chemo held for break over new Years per patient's choice.  1/15/25 chemo held due to ongoing respiratory illness, + RSV, also treated with levaquin for pneumonia on CXR, chemo deferred   1/22-1/25/25- hospitalized with suspicion for fungal pneumonia  1/28-1/29/25- hospitalized for PJP pneumonia, started on treatment with Bactrim and steroids  2/19/25 CT CAP shows disease progression, occurred while on treatment break for pneumonia, resumed same chemotherapy with FOLFIRI/chelsea on 2/20/25  *** CT CAP       Interval History:     -drinking 60-70 oz of fluids daily   -bowels have been slow over the last week, took one imodium prior and has been slow since, has had some liquid stool, had small hard stools after mag citrate and suppository, abdominal pain x2 days prior to mag/suppository  -vomiting 2-3 times in the last week, no nausea, just a couple minutes warning that he's going to vomit   -stools in the last 3 days have been a few ounces of liquid, today had some firmer stool in the ostomy   -appetite is stable, some days decreased  -hasn't been taking anything consistently for bowels, took 2 softeners the last 3 days   -mag citrate, smooth move tea and fast acting suppository are what works for him   -abdominal pain has subsided     -breathing is improving, stillg etting winded easily  "  -cough still, not as much - ribs are sore from coughing  -shortness of breath, coughing and felt ill before going to [facility] on Friday         ***   Patient reports feeling much better today.  He started on Imodium last night and has not had any further stools.  He reports that his fluid intake remains fair, but he thinks he can push more fluids orally.  He was able to eat some toast, banana, and a burger from [employer] last night as well as some strawberries.  This morning, he ate bananas, eggs, and strawberries.  He seemed to tolerate all of this well and denies any current nausea.  He denies other concerns.    Current Outpatient Medications   Medication Sig Dispense Refill    acetaminophen (TYLENOL) 500 MG tablet Take 500-1,000 mg by mouth as needed for mild pain.      azithromycin (ZITHROMAX) 250 MG tablet Take 2 tablets (500 mg) by mouth daily for 1 day, THEN 1 tablet (250 mg) daily for 4 days. 6 tablet 0    budesonide-formoterol (SYMBICORT/BREYNA) 160-4.5 MCG/ACT Inhaler Inhale 2 puffs into the lungs 2 times daily. 10.2 g 1    Chemo Kit For RN use only. Do not remove items from bag. Contents: 1  sodium chloride 0.9% flush, 4 medium gloves, 1 chemo gown, 1/4 chemo mat, 1 connector female, 1 chemo bag. (Patient not taking: Reported on 4/25/2025) [id number] kit 0    cyclobenzaprine (FLEXERIL) 5 MG tablet Take one tab (5mg) by mouth over 6-8 hours, as needed for spasms 45 tablet 2    dexAMETHasone (DECADRON) 4 MG tablet Take 2 tablets (8 mg) by mouth daily. Take for 2 days, starting the day after chemo. Take with food. 4 tablet 2    Emergency Supply Kit, Central, Patient use for emergency only. Contents: 3 sodium chloride 0.9% flushes, 1 dressing kit, 1 microclave ext set 14\", 4 nitrile gloves (med), 6 alcohol prep pads, 1 bacitracin, 1 syringe (10 cc 20 G 1\"). Call [phone] to reorder. (Patient not taking: Reported on 4/25/2025) [id number] kit 0    fluorouracil (ADRUCIL) 2.5 GM/50ML SOLN injection  (Patient not " taking: Reported on 4/25/2025)      Fluorouracil (ADRUCIL) 4,630 mg in sodium chloride 0.9 % 241 mL via HOMEPUMP C-Series Infuse 4,630 mg at 5.2 mL/hr over 46 hours into the vein once for 1 dose. [id number] mL 0    fluticasone (FLONASE) 50 MCG/ACT nasal spray Spray 1 spray into both nostrils daily. 16 g 2    fluticasone (FLONASE) 50 MCG/ACT nasal spray Spray 1 spray into both nostrils daily. 16 g 2    gabapentin (NEURONTIN) 100 MG capsule TAKE 1 TO 2 CAPSULES (100-200 MG) BY MOUTH UP TO TWICE DURING THE DAY AND 3 CAPSULES (300 MG) AT BEDTIME. 210 capsule 1    guaiFENesin (MUCINEX) 600 MG 12 hr tablet Take 2 tablets (1,200 mg) by mouth 2 times daily. 20 tablet 0    ibuprofen (ADVIL/MOTRIN) 200 MG tablet Take 3 tablets (600 mg) by mouth every 8 hours as needed for moderate pain (Patient not taking: Reported on 4/21/2025) 100 tablet 0    ipratropium (ATROVENT) 0.06 % nasal spray Spray 2 sprays into both nostrils 4 times daily. 15 mL 0    ipratropium - albuterol 0.5 mg/2.5 mg/3 mL (DUONEB) 0.5-2.5 (3) MG/3ML neb solution Take 1 vial (3 mLs) by nebulization every 6 hours as needed for shortness of breath, wheezing or cough. (Patient not taking: Reported on 4/21/2025) 90 mL 3    loperamide (IMODIUM A-D) 2 MG tablet Take 2 mg by mouth 4 times daily as needed for diarrhea.      LORazepam (ATIVAN) 0.5 MG tablet Take 1 tablet (0.5 mg) by mouth every 6 hours as needed for anxiety. (Patient not taking: Reported on 3/27/2025) 30 tablet 0    NARCAN 4 MG/0.1ML nasal spray  (Patient not taking: Reported on 4/21/2025)      OLANZapine (ZYPREXA) 2.5 MG tablet Take 1 tablet (2.5 mg) by mouth 2 times daily as needed (nausea). 60 tablet 1    ondansetron (ZOFRAN ODT) 4 MG ODT tab Take 1 tablet (4 mg) by mouth every 6 hours as needed for nausea. 30 tablet 3    ondansetron (ZOFRAN) 8 MG tablet Take 1 tablet (8 mg) by mouth every 8 hours as needed for nausea (vomiting). 30 tablet 2    Ostomy Supplies MISC 1 each daily 1 each 11    oxyCODONE IR  (ROXICODONE) 10 MG tablet Take 1-2 tablets (10-20 mg) by mouth every 4 hours as needed for pain. 150 tablet 0    [START ON 5/2/2025] pegfilgrastim (NEULASTA ONPRO KIT) 6 MG/0.6ML injection Inject 0.6 mLs (6 mg) subcutaneously every 2 weeks as needed (at the time of chemotherapy disconnect per treatment plan). 15 mL 0    Port Access Kit For nurse use only.  Do not remove items from bag.  Use for port access.  Do not place syringe on sterile field. [id number] kit 0    potassium phosphate, monobasic, (K-PHOS) 500 MG tablet Take 1 tablet (500 mg) by mouth 2 times daily. (Patient not taking: Reported on 4/25/2025) 14 tablet 0    prochlorperazine (COMPAZINE) 10 MG tablet Take 1 tablet (10 mg) by mouth every 6 hours as needed for nausea or vomiting. 30 tablet 2    prochlorperazine (COMPAZINE) 10 MG tablet Take 1 tablet (10 mg) by mouth every 6 hours as needed for nausea or vomiting 30 tablet 2    sodium chloride, PF, 0.9% PF flush Inject 10 mLs into the vein as needed for line flush. Flush IV before and after med administration as directed and/or at least every 24 hours, or prior to deaccessing for no further use and/or at least every 4 weeks when not accessed. 019153 mL 0    sulfamethoxazole-trimethoprim (BACTRIM DS) 800-160 MG tablet Take 1 tablet by mouth Every Mon, Wed, Fri Morning. 36 tablet 1    VENTOLIN  (90 Base) MCG/ACT inhaler INHALE 2 PUFFS INTO THE LUNGS EVERY 6 HOURS AS NEEDED FOR SHORTNESS OF BREATH OR WHEEZING OR COUGH (Patient not taking: Reported on 4/25/2025) 18 g 2     Physical Exam:  General: The patient is a pleasant male in no acute distress.  There were no vitals taken for this visit.  Wt Readings from Last 10 Encounters:   04/30/25 73.9 kg (163 lb)   04/25/25 74.8 kg (164 lb 12.8 oz)   04/21/25 71.7 kg (158 lb)   04/17/25 74.4 kg (164 lb)   04/16/25 74.6 kg (164 lb 8 oz)   04/04/25 75 kg (165 lb 5.5 oz)   04/02/25 74.7 kg (164 lb 11.2 oz)   03/28/25 73.2 kg (161 lb 4.8 oz)   03/21/25 76.2 kg (168  lb)   03/19/25 76.2 kg (168 lb 1.6 oz)   HEENT: EOMI. Sclerae are anicteric.   Lungs: Breathing is not labored.   Neuro: Cranial nerves II through XII are grossly intact.  Skin: No rashes, petechiae, or bruising noted on exposed skin.    LABS  Most Recent 3 CBC's:  Recent Labs   Lab Test 04/30/25  0656 04/16/25  0803 04/02/25  0816   WBC 16.0* 7.9 8.8   HGB 9.7* 10.3* 9.9*   MCV 83 85 83    154 166    Most Recent 3 BMP's:  Recent Labs   Lab Test 04/30/25  0656 04/16/25  0803 04/02/25  0816 03/28/25  0833   NA  --  135 139 135   POTASSIUM  --  4.6 3.9 4.1   CHLORIDE  --  103 105 104   CO2  --  22 22 20*   BUN  --  13.5 16.9 11.0   CR 0.72 1.00 1.13 0.74   ANIONGAP  --  10 12 11   JEFERSON  --  8.7* 8.4* 8.7*   GLC  --  97 99 132*    Most Recent 2 LFT's:  Recent Labs   Lab Test 04/16/25  0803 04/02/25  0816   AST 40 52*   ALT 14 25   ALKPHOS 234* 221*   BILITOTAL 0.3 0.3   I reviewed the above labs today.    ASSESSMENT AND PLAN   Metastatic rectal cancer  He started on treatment with FOLFIRI + bevacizumab on 8/30/24. Imaging in October 2024 showed improvement in his disease. Imaging in February 2025 showed disease progression, though this was in the setting of treatment break due to PJP pneumonia. Therefore he resumed the same chemotherapy with FOLFIRI/chelsea on 2/20/25. He has been tolerating chemotherapy fairly well, though developed more significant thrombocytopenia, likely related to Bactrim. Since resuming chemotherapy in February 2025, he has had more difficulty with diarrhea. Work up for infectious causes has been negative, so I think this is likely chemotherapy related. I will plan to reduce the dose of irinotecan by 20% for his next cycle of chemotherapy next week. Will repeat imaging in mid-April and he will see Dr. Snow to review.     Nausea  Secondary to chemotherapy. Recommend taking Compazine and Zofran as needed. Will also add back in Zyprexa bid prn.    Diarrhea  Secondary to chemotherapy. Infectious  work up was negative. Will use Imodium prn and will decrease irinotecan by 20% given recurrent concerns with dehydration.     Hypophosphatemia  Likely due to recent diarrhea and decreased po intake. Will start Kphos 500 mg bid x 7 days. Will recheck next week.     Epistaxis  Secondary to bevacizumab, worsened with thrombocytopenia. Bevacizumab was held on 3/5/25. He was given a platelet transfusion on 3/14/24. His bleeding has improved. Recommend nasal saline spray several times/day. He reports no benefit from using Vaseline in the nares or a humidifier in bedroom at night.    Back pain  Secondary to bone mets. Following with palliative care, next in April. Managed with oxycodone prn, currently 1-2/day. No changes reported March 27, 2025.     Iron deficient anemia  Treated with 3 doses of Venofer, last on 10/4/24. Last check of iron studies in February 2025 unclear if anemia of chronic disease or mixed with recurrent iron deficiency. MCV has been trending down again. Iron studies in mid-March 2025 are consistent with anemia of chronic disease. Will continue to monitor.     Bone mets  Patient will obtain dental clearance prior to starting on treatment with Zometa. Not specifically discussed today.     PJP pneumonia  Following with ID. Completed course of high dose Bactrim, now on PJP prophylaxis with M/W/F Bactrim for a minimum of 6 months (August 2025). Per ID, will then reassess the immune system and the CD4 count prior to stopping the Bactrim prophylaxis. Bactrim was refilled today.     Radha Chapman PA-C  [facility]  [address] [place], [place] [address] [phone]    30 minutes spent on the date of the encounter doing chart review, review of test results, interpretation of tests, patient visit, and documentation     The longitudinal plan of care for the diagnosis(es)/condition(s) as documented were addressed during this visit. Due to the added complexity in care, I will continue to support  Roman Escalante in the subsequent management and with ongoing continuity of care.      Again, thank you for allowing me to participate in the care of your patient.        Sincerely,        FLORA Albright CNP    Electronically signed

## 2025-04-30 NOTE — NURSING NOTE
Chief Complaint   Patient presents with    Port Draw     Vitals taken, port accessed labs drawn, heparin locked, checked into next appt     BP 99/67 (BP Location: Left arm, Patient Position: Sitting, Cuff Size: Adult Regular)   Pulse 95   Temp 97.3  F (36.3  C) (Oral)   Resp 16   Wt 73.9 kg (163 lb)   SpO2 95%   BMI 22.75 kg/m    Zoltan Madrid RN on 4/30/2025 at 6:48 AM

## 2025-04-30 NOTE — PROGRESS NOTES
[facility]HI d/c of Fluorouracil continuous infusion over 46 hours via C series pump.   Scheduled in [facility] for home disconnect on 5/2/25 at 12pm per pt request.  Neulasta injection on Day, SO administers

## 2025-05-01 ENCOUNTER — HOME INFUSION BILLING (OUTPATIENT)
Dept: HOME HEALTH SERVICES | Facility: HOME HEALTH | Age: 50
End: 2025-05-01
Payer: COMMERCIAL

## 2025-05-01 PROCEDURE — S9330 HIT CONT CHEM DIEM: HCPCS

## 2025-05-02 PROCEDURE — S9537 HT HEM HORM INJ DIEM: HCPCS

## 2025-05-02 PROCEDURE — A9270 NON-COVERED ITEM OR SERVICE: HCPCS

## 2025-05-20 ENCOUNTER — MYC REFILL (OUTPATIENT)
Dept: PALLIATIVE CARE | Facility: CLINIC | Age: 50
End: 2025-05-20
Payer: COMMERCIAL

## 2025-05-20 DIAGNOSIS — C78.7 COLON CANCER METASTASIZED TO LIVER (H): ICD-10-CM

## 2025-05-20 DIAGNOSIS — R10.30 LOWER ABDOMINAL PAIN: ICD-10-CM

## 2025-05-20 DIAGNOSIS — C18.9 COLON CANCER METASTASIZED TO LIVER (H): ICD-10-CM

## 2025-05-20 DIAGNOSIS — G89.3 CANCER RELATED PAIN: ICD-10-CM

## 2025-05-20 RX ORDER — OXYCODONE HYDROCHLORIDE 10 MG/1
10-20 TABLET ORAL EVERY 4 HOURS PRN
Qty: 150 TABLET | Refills: 0 | Status: SHIPPED | OUTPATIENT
Start: 2025-05-20

## 2025-05-20 NOTE — TELEPHONE ENCOUNTER
Received [employer] message from patient requesting refill of oxycodone.     Last refill: 4/16/25  Last office visit: 4/17/25  Scheduled for follow up 6/18/25     Will route request to MD/ for review.     Reviewed [place]  Report.

## 2025-05-21 ENCOUNTER — MYC MEDICAL ADVICE (OUTPATIENT)
Dept: ONCOLOGY | Facility: CLINIC | Age: 50
End: 2025-05-21

## 2025-05-21 ENCOUNTER — HOME INFUSION BILLING (OUTPATIENT)
Dept: HOME HEALTH SERVICES | Facility: HOME HEALTH | Age: 50
End: 2025-05-21
Payer: COMMERCIAL

## 2025-05-21 ENCOUNTER — TELEPHONE (OUTPATIENT)
Dept: ONCOLOGY | Facility: CLINIC | Age: 50
End: 2025-05-21
Payer: COMMERCIAL

## 2025-05-21 NOTE — TELEPHONE ENCOUNTER
PA Initiation    Medication: OXYCODONE HCL 10 MG PO TABS  Insurance Company: [employer] - Phone [phone] Fax [phone]  Pharmacy Filling the Rx: [facility] [place], [place] - [address] [place] [address] [id number]  Filling Pharmacy Phone:    Filling Pharmacy Fax:    Start Date: 5/21/2025          Thank you,    Katherine Sharma  Oncology Pharmacy Liaison JASON [email or web address]  Phone: [phone]  Fax: [phone]

## 2025-05-22 NOTE — TELEPHONE ENCOUNTER
"Oncology Nurse Triage - Constipation  Situation-   Roman  reporting constipation and pain    0800: Called patient to follow up on symptoms sent through a [employer] message on 5/21/25. Left a voicemail message requesting the patient call [phone], option 5, option 2 and speak with any Triage nurse available.     0834: Brain called back to report that he is doing much better today. He is taking 2 oz warm V8 with some mag citrate Q 2H. He states he thinks he will be okay by 4 p.m. today. States he was very cramped up yesterday, but that has improved today, and he isn't having much cramping. Stool through ostomy is still liquid, but he thinks solid stool will pass today. He states that this has happened numerous times. He has used miralax in the past and this is \"hit or miss\" for him, so he hasn't used it. He has been pushing fluids--water and Gatorade as much as he can, and hasn't eaten in 2 days.     Pt has been working with [place] in scheduling and has missed apt rescheduled.     Pt states he has no other needs at this time.    Pt will call back if he has further concerns or sx.    Routed to Care Team.   "

## 2025-05-22 NOTE — TELEPHONE ENCOUNTER
Prior Authorization Approval    Medication: OXYCODONE HCL 10 MG PO TABS  Authorization Effective Date: 5/22/2025  Authorization Expiration Date: 5/22/2026  Approved Dose/Quantity: 150 per 13  Reference #: [id number]   Insurance Company: [employer] - Phone [phone] Fax [phone]  Expected CoPay: $    CoPay Card Available:      Financial Assistance Needed:   Which Pharmacy is filling the prescription: [facility] [address] 7[address]  Pharmacy Notified: yes  Patient Notified: yes        Thank you,    Katherine Sharma  Oncology Pharmacy Liaison II  [email or web address]  Phone: [phone]  Fax: [phone]

## 2025-06-04 ENCOUNTER — VIRTUAL VISIT (OUTPATIENT)
Dept: ONCOLOGY | Facility: CLINIC | Age: 50
End: 2025-06-04
Attending: PHYSICIAN ASSISTANT
Payer: COMMERCIAL

## 2025-06-04 VITALS — HEIGHT: 71 IN | BODY MASS INDEX: 21.7 KG/M2 | WEIGHT: 155 LBS

## 2025-06-04 DIAGNOSIS — C20 RECTAL ADENOCARCINOMA METASTATIC TO LUNG (H): ICD-10-CM

## 2025-06-04 DIAGNOSIS — C20 RECTAL ADENOCARCINOMA METASTATIC TO LUNG (H): Primary | ICD-10-CM

## 2025-06-04 DIAGNOSIS — K62.89 RECTAL PAIN: ICD-10-CM

## 2025-06-04 DIAGNOSIS — C78.00 RECTAL ADENOCARCINOMA METASTATIC TO LUNG (H): Primary | ICD-10-CM

## 2025-06-04 DIAGNOSIS — C78.00 RECTAL ADENOCARCINOMA METASTATIC TO LUNG (H): ICD-10-CM

## 2025-06-04 DIAGNOSIS — R33.9 URINARY RETENTION: ICD-10-CM

## 2025-06-04 DIAGNOSIS — R11.2 NAUSEA AND VOMITING, UNSPECIFIED VOMITING TYPE: ICD-10-CM

## 2025-06-04 PROCEDURE — 1126F AMNT PAIN NOTED NONE PRSNT: CPT | Performed by: PHYSICIAN ASSISTANT

## 2025-06-04 PROCEDURE — 98007 SYNCH AUDIO-VIDEO EST HI 40: CPT | Performed by: PHYSICIAN ASSISTANT

## 2025-06-04 PROCEDURE — G2211 COMPLEX E/M VISIT ADD ON: HCPCS | Mod: 95 | Performed by: PHYSICIAN ASSISTANT

## 2025-06-04 RX ORDER — DIPHENHYDRAMINE HYDROCHLORIDE 50 MG/ML
25 INJECTION, SOLUTION INTRAMUSCULAR; INTRAVENOUS ONCE
Status: CANCELLED | OUTPATIENT
Start: 2025-06-04

## 2025-06-04 RX ORDER — FLUOROURACIL 50 MG/ML
400 INJECTION, SOLUTION INTRAVENOUS ONCE
Start: 2025-06-09

## 2025-06-04 ASSESSMENT — PAIN SCALES - GENERAL: PAINLEVEL_OUTOF10: NO PAIN (0)

## 2025-06-04 NOTE — NURSING NOTE
Current patient location: [address] [place] [id number]    Is the patient currently in the state of [place]? YES    Visit mode: VIDEO    If the visit is dropped, the patient can be reconnected by:VIDEO VISIT: Text to cell phone:   Telephone Information:   Mobile [phone]       Will anyone else be joining the visit? NO  (If patient encounters technical issues they should call [phone]    Are changes needed to the allergy or medication list? No    Are refills needed on medications prescribed by this physician? NO    Rooming Documentation:  Questionnaire(s) completed    Reason for visit: RECHECK    Arti RIVERAF

## 2025-06-04 NOTE — PROGRESS NOTES
Virtual Visit Details    Type of service:  Video Visit   Video Start Time: 12:40 PM  Video End Time:1:07 PM    Originating Location (pt. Location): Home  Distant Location (provider location):  On-site  Platform used for Video Visit: [facility] - Medical Oncology Follow Up Note  06/04/2025    Reason for Visit: follow up rectal adenocarcinoma, monitoring toxicities associated with chemotherapy     Oncology History:   Patient developed rectal bleeding in 2020.  In January 2021 CT showed focal wall thickening in the upper rectum, multiple pulmonary nodules bilaterally suspicious for metastatic disease.  Underwent FNA of the right upper lobe lesion which was consistent with adenocarcinoma of the colon.  He was treated with FOLFOX from 2/20/2021 through 5/20/2021.  Avastin was added.  Had an infusion reaction to oxaliplatin 6/2021.  7/2021- 12/2021 was on 5-FU alone.  Developed progression.  12/2021- 9/2022 was on FOLFIRI.  11/2021 started Lonsurf. All of this was done with outside oncologist.     Met with Dr. Snow on 2/3/2023 to establish care. Recommended regorafenib.     Started regorafenib on 3/2/2023. Progression noted 5/19/23. Plan to start FOLFOX/Avastin and send out CARIS testing to evaluate for other treatment options. Cycle 1 was given with oxaliplatin desensitization, 5FU, Avastin held due to increased urine protein.     Following cycle 4, patient was admitted with hematuria and acute kidney injury.    Oxaliplatin was dropped with Cycle 5.     Y-90 radioembolization 9/7.    10/24/23 CT CAP with disease progression with notable growth in all pulmonary lesions. Lonsurf + bevacizumab started 10/27/23 with plans to bridge to clinical trial.   11/15/23 - stopped chemo for [facility] study.   Currently enrolled in [facility] CAR-T trial (completed cell harvest 1/23/2024, tentatively planning administration of CAR-T in April 2/12/24 - resumed Lonsurf, bevacizumab to bridge treatment until CAR-T  therapy  3/20/24 - treatment changed to Fruquintinib due to intolerability of Lonsurf/bevacizumab  4/2/24- Presented to the ED with dehydration. Stopped Fruquintnib.   6/2024- cellular therapy transplant at [facility].   7/30/24- CT imaging demonstrating marked progression of pulmonary and hepatic lesions, clinical trial failed to generate T-cell graft. Multiple discussions regarding plan of care, hospice vs additional lines of treatment. Patient decided he is not ready for hospice. He resumed treatment with FOLFIRI + bevacizumab on 8/30/24.   10/2024 - CT CAP with continued response, plan to continue FOLFIRI + bevacizumab with the addition of Zometa. Per his dentist, needs dental work completed prior to clearance. (Scheduled 1/2 and 1/21).   12/2024 chemo held for break over new Years per patient's choice.  1/15/25 chemo held due to ongoing respiratory illness, + RSV, also treated with levaquin for pneumonia on CXR, chemo deferred   1/22-1/25/25- hospitalized with suspicion for fungal pneumonia  1/28-1/29/25- hospitalized for PJP pneumonia, started on treatment with Bactrim and steroids  2/19/25 CT CAP shows disease progression, occurred while on treatment break for pneumonia, resumed same chemotherapy with FOLFIRI/chelsea on 2/20/25 4/14/25 CT CAP with reduction in his pulmonary lesions, with some growth in his hepatic lesions. Plan to continue treatment as a bridge to clinical trial. Plan for treatment with FOLFOX (oxaliplatin desensitization) + bevacizumab.     Presents today as an add on for evaluation prior to receiving FOLFOX + bevacizumab. Switched to oxaliplatin densensitization on 4/30/25.     Interval History:   -Last dose of oxaliplatin, had increased nausea and pain.   -Was taking 4-6 oxycodone/day, temporarily decreased to 2-4/day, now back up to 4-6/day for rectal pain.   -Stools are doing better with MiraLax daily. Still goes up to 3-4 days without stools. Also, occasionally takes magnesium citrate.   -Went on  a camping trip recently and had some back pain after sleeping on a bad mattress.   -Drinking 80-90 oz fluid/day.   -Had nausea and vomiting after last cycle of chemo. Took Rolaids and Tums with some relief. Also, stopped orange juice due to concern for it worsening acid reflux.   -Eating better since the middle of last week.   -Has ongoing blood in stools with associated mucus. Denies any nosebleeds.   -Breathing is getting stronger.   -Denies cold sensitivity. Denies neuropathy in fingers or toes. Has right forearm numbness that is intermittent with muscle contraction that started about a week after the last dose of oxaliplatin.   -Has intermittent tingling above right ear, now occurring about 4-9 times per week that lasts about 10-15 minutes, resolves on its own. Denies any headaches. Denies any vision changes. Denies any lightheadedness.   -Back pain is better with the addition of Claritin around the time of Neulasta.   -Has pain from the spine lesion as well, managed with Tylenol and oxycodone.      Current Outpatient Medications   Medication Sig Dispense Refill    budesonide-formoterol (SYMBICORT/BREYNA) 160-4.5 MCG/ACT Inhaler Inhale 2 puffs into the lungs 2 times daily. 10.2 g 1    acetaminophen (TYLENOL) 500 MG tablet Take 500-1,000 mg by mouth as needed for mild pain.      Chemo Kit For RN use only. Do not remove items from bag. Contents: 1  sodium chloride 0.9% flush, 4 medium gloves, 1 chemo gown, 1/4 chemo mat, 1 connector female, 1 chemo bag. (Patient not taking: Reported on 4/25/2025) [id number] kit 0    cyclobenzaprine (FLEXERIL) 5 MG tablet Take one tab (5mg) by mouth over 6-8 hours, as needed for spasms 45 tablet 2    dexAMETHasone (DECADRON) 4 MG tablet Take 2 tablets (8 mg) by mouth daily. Take for 2 days, starting the day after chemo. Take with food. 4 tablet 2    Emergency Supply Kit, Central, Patient use for emergency only. Contents: 3 sodium chloride 0.9% flushes, 1 dressing kit, 1 microclave ext  "set 14\", 4 nitrile gloves (med), 6 alcohol prep pads, 1 bacitracin, 1 syringe (10 cc 20 G 1\"). Call [phone] to reorder. (Patient not taking: Reported on 4/25/2025) [id number] kit 0    fluorouracil (ADRUCIL) 2.5 GM/50ML SOLN injection  (Patient not taking: Reported on 4/25/2025)      [START ON 6/5/2025] Fluorouracil (ADRUCIL) 4,630 mg in sodium chloride 0.9 % 241 mL via HOMEPUMP C-Series Infuse 4,630 mg at 5.2 mL/hr over 46 hours into the vein once for 1 dose. [id number] mL 0    fluticasone (FLONASE) 50 MCG/ACT nasal spray Spray 1 spray into both nostrils daily. 16 g 2    fluticasone (FLONASE) 50 MCG/ACT nasal spray Spray 1 spray into both nostrils daily. 16 g 2    gabapentin (NEURONTIN) 100 MG capsule TAKE 1 TO 2 CAPSULES (100-200 MG) BY MOUTH UP TO TWICE DURING THE DAY AND 3 CAPSULES (300 MG) AT BEDTIME. 210 capsule 1    guaiFENesin (MUCINEX) 600 MG 12 hr tablet Take 2 tablets (1,200 mg) by mouth 2 times daily. 20 tablet 0    ibuprofen (ADVIL/MOTRIN) 200 MG tablet Take 3 tablets (600 mg) by mouth every 8 hours as needed for moderate pain (Patient not taking: Reported on 4/21/2025) 100 tablet 0    ipratropium (ATROVENT) 0.06 % nasal spray Spray 2 sprays into both nostrils 4 times daily. 15 mL 0    ipratropium - albuterol 0.5 mg/2.5 mg/3 mL (DUONEB) 0.5-2.5 (3) MG/3ML neb solution Take 1 vial (3 mLs) by nebulization every 6 hours as needed for shortness of breath, wheezing or cough. (Patient not taking: Reported on 4/21/2025) 90 mL 3    loperamide (IMODIUM A-D) 2 MG tablet Take 2 mg by mouth 4 times daily as needed for diarrhea.      LORazepam (ATIVAN) 0.5 MG tablet Take 1 tablet (0.5 mg) by mouth every 6 hours as needed for anxiety. (Patient not taking: Reported on 3/27/2025) 30 tablet 0    NARCAN 4 MG/0.1ML nasal spray  (Patient not taking: Reported on 4/21/2025)      OLANZapine (ZYPREXA) 2.5 MG tablet Take 1 tablet (2.5 mg) by mouth 2 times daily as needed (nausea). 60 tablet 1    ondansetron (ZOFRAN ODT) 4 MG ODT " tab Take 1 tablet (4 mg) by mouth every 6 hours as needed for nausea. 30 tablet 3    ondansetron (ZOFRAN) 8 MG tablet Take 1 tablet (8 mg) by mouth every 8 hours as needed for nausea (vomiting). 30 tablet 2    Ostomy Supplies MISC 1 each daily 1 each 11    oxyCODONE IR (ROXICODONE) 10 MG tablet Take 1-2 tablets (10-20 mg) by mouth every 4 hours as needed for pain. 150 tablet 0    pegfilgrastim (NEULASTA ONPRO KIT) 6 MG/0.6ML injection Inject 0.6 mLs (6 mg) subcutaneously every 2 weeks as needed (at the time of chemotherapy disconnect per treatment plan). 15 mL 0    Port Access Kit For nurse use only.  Do not remove items from bag.  Use for port access.  Do not place syringe on sterile field. [id number] kit 0    potassium phosphate, monobasic, (K-PHOS) 500 MG tablet Take 1 tablet (500 mg) by mouth 2 times daily. (Patient not taking: Reported on 4/25/2025) 14 tablet 0    prochlorperazine (COMPAZINE) 10 MG tablet Take 1 tablet (10 mg) by mouth every 6 hours as needed for nausea or vomiting. 30 tablet 2    prochlorperazine (COMPAZINE) 10 MG tablet Take 1 tablet (10 mg) by mouth every 6 hours as needed for nausea or vomiting 30 tablet 2    sodium chloride, PF, 0.9% PF flush Inject 10 mLs into the vein as needed for line flush. Flush IV before and after med administration as directed and/or at least every 24 hours, or prior to deaccessing for no further use and/or at least every 4 weeks when not accessed. [id number] mL 0    sulfamethoxazole-trimethoprim (BACTRIM DS) 800-160 MG tablet Take 1 tablet by mouth Every Mon, Wed, Fri Morning. 36 tablet 1    VENTOLIN  (90 Base) MCG/ACT inhaler INHALE 2 PUFFS INTO THE LUNGS EVERY 6 HOURS AS NEEDED FOR SHORTNESS OF BREATH OR WHEEZING OR COUGH (Patient not taking: Reported on 4/25/2025) 18 g 2     Objective:  General: patient appears well in no acute distress, alert and oriented, speech clear and fluid  Skin: no visualized rash or lesions on visualized skin  Resp: Appears to be  breathing comfortably without accessory muscle usage, speaking in full sentences, no audible wheezes or cough.  Psych: Coherent speech, normal rate and volume, able to articulate logical thoughts, able to abstract reason, no tangential thoughts, no hallucinations or delusions  Patient's affect is appropriate.    LABS  Most Recent 3 CBC's:  Recent Labs   Lab Test 04/30/25  0656 04/16/25  0803 04/02/25  0816   WBC 16.0* 7.9 8.8   HGB 9.7* 10.3* 9.9*   MCV 83 85 83    154 166    Most Recent 3 BMP's:  Recent Labs   Lab Test 04/30/25  0656 04/16/25  0803 04/02/25  0816   * 135 139   POTASSIUM 4.4 4.6 3.9   CHLORIDE 98 103 105   CO2 22 22 22   BUN 16.9 13.5 16.9   CR 0.72  0.72 1.00 1.13   ANIONGAP 11 10 12   JEFERSON 8.7* 8.7* 8.4*   * 97 99    Most Recent 2 LFT's:  Recent Labs   Lab Test 04/30/25  0656 04/16/25  0803   AST 61* 40   ALT 25 14   ALKPHOS 243* 234*   BILITOTAL 0.5 0.3   I reviewed the above labs today.    ASSESSMENT AND PLAN   Metastatic rectal cancer  He started on treatment with FOLFIRI + bevacizumab on 8/30/24. Imaging in October 2024 showed improvement in his disease. Imaging in February 2025 showed disease progression, though this was in the setting of treatment break due to PJP pneumonia. Therefore he resumed the same chemotherapy with FOLFIRI/chelsea on 2/20/25. Imaging April 2025 with mixed response. Plan to continue treatment as a bridge to clinical trial. I will check with the trial team for updates. Treatment changed to FOLFOX + bevacizumab with oxaliplatin desensitization. Given concern for increased neurotoxicity from oxaliplatin, will decrease oxaliplatin dose by 20%. Ok to proceed with treatment as scheduled tomorrow. Plan for repeat imaging and follow up with Dr. Snow in 2 months, scheduled in June.    Nausea  Secondary to chemotherapy. Recommend taking Compazine and Zofran as needed. Continue olanzapine.     Constipation  Recommend increasing MiraLax from qday to bid.      Bone mets  Continue Zometa monthly.     PJP pneumonia  Following with ID. Completed course of high dose Bactrim, now on PJP prophylaxis with M/W/F Bactrim for a minimum of 6 months (August 2025). Per ID, will then reassess the immune system and the CD4 count prior to stopping the Bactrim prophylaxis.     Back pain  Secondary to bone mets. Following with palliative care. Managed with oxycodone prn.    Iron deficient anemia  Treated with 3 doses of Venofer, last on 10/4/24. Iron studies in February 2025 unclear if anemia of chronic disease or mixed with recurrent iron deficiency. MCV has been trending down again. Iron studies in mid-March 2025 are consistent with anemia of chronic disease. Will continue to monitor.     Muscle cramping  Will trial magnesium oxide 400 mg once/day.    Radha Chapman PA-C  [facility]  [address] [place] [address] [id number] [phone]    40 minutes spent on the date of the encounter doing chart review, review of test results, interpretation of tests, patient visit, and documentation     The longitudinal plan of care for the diagnosis(es)/condition(s) as documented were addressed during this visit. Due to the added complexity in care, I will continue to support Roman Escalante in the subsequent management and with ongoing continuity of care.

## 2025-06-04 NOTE — LETTER
6/4/2025      Roman Escalante  [address]  [facility] [address]      Dear Colleague,    Thank you for referring your patient, Roman Escalante, to the [facility]. Please see a copy of my visit note below.    Virtual Visit Details    Type of service:  Video Visit   Video Start Time: 12:40 PM  Video End Time:1:07 PM    Originating Location (pt. Location): Home  Distant Location (provider location):  On-site  Platform used for Video Visit: [facility] - Medical Oncology Follow Up Note  06/04/2025    Reason for Visit: follow up rectal adenocarcinoma, monitoring toxicities associated with chemotherapy     Oncology History:   Patient developed rectal bleeding in 2020.  In January 2021 CT showed focal wall thickening in the upper rectum, multiple pulmonary nodules bilaterally suspicious for metastatic disease.  Underwent FNA of the right upper lobe lesion which was consistent with adenocarcinoma of the colon.  He was treated with FOLFOX from 2/20/2021 through 5/20/2021.  Avastin was added.  Had an infusion reaction to oxaliplatin 6/2021.  7/2021- 12/2021 was on 5-FU alone.  Developed progression.  12/2021- 9/2022 was on FOLFIRI.  11/2021 started Lonsurf. All of this was done with outside oncologist.     Met with Dr. Snow on 2/3/2023 to establish care. Recommended regorafenib.     Started regorafenib on 3/2/2023. Progression noted 5/19/23. Plan to start FOLFOX/Avastin and send out CARIS testing to evaluate for other treatment options. Cycle 1 was given with oxaliplatin desensitization, 5FU, Avastin held due to increased urine protein.     Following cycle 4, patient was admitted with hematuria and acute kidney injury.    Oxaliplatin was dropped with Cycle 5.     Y-90 radioembolization 9/7.    10/24/23 CT CAP with disease progression with notable growth in all pulmonary lesions. Lonsurf + bevacizumab started 10/27/23 with plans to bridge to clinical trial.    11/15/23 - stopped chemo for [facility] study.   Currently enrolled in [facility] CAR-T trial (completed cell harvest 1/23/2024, tentatively planning administration of CAR-T in April 2/12/24 - resumed Lonsurf, bevacizumab to bridge treatment until CAR-T therapy  3/20/24 - treatment changed to Fruquintinib due to intolerability of Lonsurf/bevacizumab  4/2/24- Presented to the ED with dehydration. Stopped Fruquintnib.   6/2024- cellular therapy transplant at [facility].   7/30/24- CT imaging demonstrating marked progression of pulmonary and hepatic lesions, clinical trial failed to generate T-cell graft. Multiple discussions regarding plan of care, hospice vs additional lines of treatment. Patient decided he is not ready for hospice. He resumed treatment with FOLFIRI + bevacizumab on 8/30/24.   10/2024 - CT CAP with continued response, plan to continue FOLFIRI + bevacizumab with the addition of Zometa. Per his dentist, needs dental work completed prior to clearance. (Scheduled 1/2 and 1/21).   12/2024 chemo held for break over new Years per patient's choice.  1/15/25 chemo held due to ongoing respiratory illness, + RSV, also treated with levaquin for pneumonia on CXR, chemo deferred   1/22-1/25/25- hospitalized with suspicion for fungal pneumonia  1/28-1/29/25- hospitalized for PJP pneumonia, started on treatment with Bactrim and steroids  2/19/25 CT CAP shows disease progression, occurred while on treatment break for pneumonia, resumed same chemotherapy with FOLFIRI/chelsea on 2/20/25 4/14/25 CT CAP with reduction in his pulmonary lesions, with some growth in his hepatic lesions. Plan to continue treatment as a bridge to clinical trial. Plan for treatment with FOLFOX (oxaliplatin desensitization) + bevacizumab.     Presents today as an add on for evaluation prior to receiving FOLFOX + bevacizumab. Switched to oxaliplatin densensitization on 4/30/25.     Interval History:   -Last dose of oxaliplatin, had increased nausea and pain.   -Was  taking 4-6 oxycodone/day, temporarily decreased to 2-4/day, now back up to 4-6/day for rectal pain.   -Stools are doing better with MiraLax daily. Still goes up to 3-4 days without stools. Also, occasionally takes magnesium citrate.   -Went on a camping trip recently and had some back pain after sleeping on a bad mattress.   -Drinking 80-90 oz fluid/day.   -Had nausea and vomiting after last cycle of chemo. Took Rolaids and Tums with some relief. Also, stopped orange juice due to concern for it worsening acid reflux.   -Eating better since the middle of last week.   -Has ongoing blood in stools with associated mucus. Denies any nosebleeds.   -Breathing is getting stronger.   -Denies cold sensitivity. Denies neuropathy in fingers or toes. Has right forearm numbness that is intermittent with muscle contraction that started about a week after the last dose of oxaliplatin.   -Has intermittent tingling above right ear, now occurring about 4-9 times per week that lasts about 10-15 minutes, resolves on its own. Denies any headaches. Denies any vision changes. Denies any lightheadedness.   -Back pain is better with the addition of Claritin around the time of Neulasta.   -Has pain from the spine lesion as well, managed with Tylenol and oxycodone.      Current Outpatient Medications   Medication Sig Dispense Refill     budesonide-formoterol (SYMBICORT/BREYNA) 160-4.5 MCG/ACT Inhaler Inhale 2 puffs into the lungs 2 times daily. 10.2 g 1     acetaminophen (TYLENOL) 500 MG tablet Take 500-1,000 mg by mouth as needed for mild pain.       Chemo Kit For RN use only. Do not remove items from bag. Contents: 1  sodium chloride 0.9% flush, 4 medium gloves, 1 chemo gown, 1/4 chemo mat, 1 connector female, 1 chemo bag. (Patient not taking: Reported on 4/25/2025) 488907 kit 0     cyclobenzaprine (FLEXERIL) 5 MG tablet Take one tab (5mg) by mouth over 6-8 hours, as needed for spasms 45 tablet 2     dexAMETHasone (DECADRON) 4 MG tablet Take  "2 tablets (8 mg) by mouth daily. Take for 2 days, starting the day after chemo. Take with food. 4 tablet 2     Emergency Supply Kit, Central, Patient use for emergency only. Contents: 3 sodium chloride 0.9% flushes, 1 dressing kit, 1 microclave ext set 14\", 4 nitrile gloves (med), 6 alcohol prep pads, 1 bacitracin, 1 syringe (10 cc 20 G 1\"). Call [phone] to reorder. (Patient not taking: Reported on 4/25/2025) [id number] kit 0     fluorouracil (ADRUCIL) 2.5 GM/50ML SOLN injection  (Patient not taking: Reported on 4/25/2025)       [START ON 6/5/2025] Fluorouracil (ADRUCIL) 4,630 mg in sodium chloride 0.9 % 241 mL via HOMEPUMP C-Series Infuse 4,630 mg at 5.2 mL/hr over 46 hours into the vein once for 1 dose. 058586 mL 0     fluticasone (FLONASE) 50 MCG/ACT nasal spray Spray 1 spray into both nostrils daily. 16 g 2     fluticasone (FLONASE) 50 MCG/ACT nasal spray Spray 1 spray into both nostrils daily. 16 g 2     gabapentin (NEURONTIN) 100 MG capsule TAKE 1 TO 2 CAPSULES (100-200 MG) BY MOUTH UP TO TWICE DURING THE DAY AND 3 CAPSULES (300 MG) AT BEDTIME. 210 capsule 1     guaiFENesin (MUCINEX) 600 MG 12 hr tablet Take 2 tablets (1,200 mg) by mouth 2 times daily. 20 tablet 0     ibuprofen (ADVIL/MOTRIN) 200 MG tablet Take 3 tablets (600 mg) by mouth every 8 hours as needed for moderate pain (Patient not taking: Reported on 4/21/2025) 100 tablet 0     ipratropium (ATROVENT) 0.06 % nasal spray Spray 2 sprays into both nostrils 4 times daily. 15 mL 0     ipratropium - albuterol 0.5 mg/2.5 mg/3 mL (DUONEB) 0.5-2.5 (3) MG/3ML neb solution Take 1 vial (3 mLs) by nebulization every 6 hours as needed for shortness of breath, wheezing or cough. (Patient not taking: Reported on 4/21/2025) 90 mL 3     loperamide (IMODIUM A-D) 2 MG tablet Take 2 mg by mouth 4 times daily as needed for diarrhea.       LORazepam (ATIVAN) 0.5 MG tablet Take 1 tablet (0.5 mg) by mouth every 6 hours as needed for anxiety. (Patient not taking: Reported " on 3/27/2025) 30 tablet 0     NARCAN 4 MG/0.1ML nasal spray  (Patient not taking: Reported on 4/21/2025)       OLANZapine (ZYPREXA) 2.5 MG tablet Take 1 tablet (2.5 mg) by mouth 2 times daily as needed (nausea). 60 tablet 1     ondansetron (ZOFRAN ODT) 4 MG ODT tab Take 1 tablet (4 mg) by mouth every 6 hours as needed for nausea. 30 tablet 3     ondansetron (ZOFRAN) 8 MG tablet Take 1 tablet (8 mg) by mouth every 8 hours as needed for nausea (vomiting). 30 tablet 2     Ostomy Supplies MISC 1 each daily 1 each 11     oxyCODONE IR (ROXICODONE) 10 MG tablet Take 1-2 tablets (10-20 mg) by mouth every 4 hours as needed for pain. 150 tablet 0     pegfilgrastim (NEULASTA ONPRO KIT) 6 MG/0.6ML injection Inject 0.6 mLs (6 mg) subcutaneously every 2 weeks as needed (at the time of chemotherapy disconnect per treatment plan). 15 mL 0     Port Access Kit For nurse use only.  Do not remove items from bag.  Use for port access.  Do not place syringe on sterile field. [id number] kit 0     potassium phosphate, monobasic, (K-PHOS) 500 MG tablet Take 1 tablet (500 mg) by mouth 2 times daily. (Patient not taking: Reported on 4/25/2025) 14 tablet 0     prochlorperazine (COMPAZINE) 10 MG tablet Take 1 tablet (10 mg) by mouth every 6 hours as needed for nausea or vomiting. 30 tablet 2     prochlorperazine (COMPAZINE) 10 MG tablet Take 1 tablet (10 mg) by mouth every 6 hours as needed for nausea or vomiting 30 tablet 2     sodium chloride, PF, 0.9% PF flush Inject 10 mLs into the vein as needed for line flush. Flush IV before and after med administration as directed and/or at least every 24 hours, or prior to deaccessing for no further use and/or at least every 4 weeks when not accessed. 314225 mL 0     sulfamethoxazole-trimethoprim (BACTRIM DS) 800-160 MG tablet Take 1 tablet by mouth Every Mon, Wed, Fri Morning. 36 tablet 1     VENTOLIN  (90 Base) MCG/ACT inhaler INHALE 2 PUFFS INTO THE LUNGS EVERY 6 HOURS AS NEEDED FOR SHORTNESS OF  BREATH OR WHEEZING OR COUGH (Patient not taking: Reported on 4/25/2025) 18 g 2     Objective:  General: patient appears well in no acute distress, alert and oriented, speech clear and fluid  Skin: no visualized rash or lesions on visualized skin  Resp: Appears to be breathing comfortably without accessory muscle usage, speaking in full sentences, no audible wheezes or cough.  Psych: Coherent speech, normal rate and volume, able to articulate logical thoughts, able to abstract reason, no tangential thoughts, no hallucinations or delusions  Patient's affect is appropriate.    LABS  Most Recent 3 CBC's:  Recent Labs   Lab Test 04/30/25  0656 04/16/25  0803 04/02/25  0816   WBC 16.0* 7.9 8.8   HGB 9.7* 10.3* 9.9*   MCV 83 85 83    154 166    Most Recent 3 BMP's:  Recent Labs   Lab Test 04/30/25  0656 04/16/25  0803 04/02/25  0816   * 135 139   POTASSIUM 4.4 4.6 3.9   CHLORIDE 98 103 105   CO2 22 22 22   BUN 16.9 13.5 16.9   CR 0.72  0.72 1.00 1.13   ANIONGAP 11 10 12   JEFERSON 8.7* 8.7* 8.4*   * 97 99    Most Recent 2 LFT's:  Recent Labs   Lab Test 04/30/25  0656 04/16/25  0803   AST 61* 40   ALT 25 14   ALKPHOS 243* 234*   BILITOTAL 0.5 0.3   I reviewed the above labs today.    ASSESSMENT AND PLAN   Metastatic rectal cancer  He started on treatment with FOLFIRI + bevacizumab on 8/30/24. Imaging in October 2024 showed improvement in his disease. Imaging in February 2025 showed disease progression, though this was in the setting of treatment break due to PJP pneumonia. Therefore he resumed the same chemotherapy with FOLFIRI/chelsea on 2/20/25. Imaging April 2025 with mixed response. Plan to continue treatment as a bridge to clinical trial. I will check with the trial team for updates. Treatment changed to FOLFOX + bevacizumab with oxaliplatin desensitization. Given concern for increased neurotoxicity from oxaliplatin, will decrease oxaliplatin dose by 20%. Ok to proceed with treatment as scheduled tomorrow.  Plan for repeat imaging and follow up with Dr. Snow in 2 months, scheduled in June.    Nausea  Secondary to chemotherapy. Recommend taking Compazine and Zofran as needed. Continue olanzapine.     Constipation  Recommend increasing MiraLax from qday to bid.     Bone mets  Continue Zometa monthly.     PJP pneumonia  Following with ID. Completed course of high dose Bactrim, now on PJP prophylaxis with M/W/F Bactrim for a minimum of 6 months (August 2025). Per ID, will then reassess the immune system and the CD4 count prior to stopping the Bactrim prophylaxis.     Back pain  Secondary to bone mets. Following with palliative care. Managed with oxycodone prn.    Iron deficient anemia  Treated with 3 doses of Venofer, last on 10/4/24. Iron studies in February 2025 unclear if anemia of chronic disease or mixed with recurrent iron deficiency. MCV has been trending down again. Iron studies in mid-March 2025 are consistent with anemia of chronic disease. Will continue to monitor.     Muscle cramping  Will trial magnesium oxide 400 mg once/day.    Radha Chapman PA-C  [facility] [place] [address]  [phone]    40 minutes spent on the date of the encounter doing chart review, review of test results, interpretation of tests, patient visit, and documentation     The longitudinal plan of care for the diagnosis(es)/condition(s) as documented were addressed during this visit. Due to the added complexity in care, I will continue to support Roman Escalante in the subsequent management and with ongoing continuity of care.    Again, thank you for allowing me to participate in the care of your patient.        Sincerely,        Radha Chapman PA-C    Electronically signed

## 2025-06-05 ENCOUNTER — INFUSION THERAPY VISIT (OUTPATIENT)
Dept: ONCOLOGY | Facility: CLINIC | Age: 50
End: 2025-06-05
Attending: STUDENT IN AN ORGANIZED HEALTH CARE EDUCATION/TRAINING PROGRAM
Payer: COMMERCIAL

## 2025-06-05 ENCOUNTER — HOME INFUSION BILLING (OUTPATIENT)
Dept: HOME HEALTH SERVICES | Facility: HOME HEALTH | Age: 50
End: 2025-06-05
Payer: COMMERCIAL

## 2025-06-05 ENCOUNTER — ANCILLARY PROCEDURE (OUTPATIENT)
Dept: CT IMAGING | Facility: CLINIC | Age: 50
End: 2025-06-05
Attending: PHYSICIAN ASSISTANT
Payer: COMMERCIAL

## 2025-06-05 ENCOUNTER — APPOINTMENT (OUTPATIENT)
Dept: LAB | Facility: CLINIC | Age: 50
End: 2025-06-05
Attending: STUDENT IN AN ORGANIZED HEALTH CARE EDUCATION/TRAINING PROGRAM
Payer: COMMERCIAL

## 2025-06-05 VITALS
OXYGEN SATURATION: 96 % | HEART RATE: 109 BPM | DIASTOLIC BLOOD PRESSURE: 67 MMHG | SYSTOLIC BLOOD PRESSURE: 98 MMHG | WEIGHT: 161.9 LBS | RESPIRATION RATE: 16 BRPM | BODY MASS INDEX: 22.58 KG/M2 | TEMPERATURE: 98 F

## 2025-06-05 DIAGNOSIS — K62.89 RECTAL PAIN: ICD-10-CM

## 2025-06-05 DIAGNOSIS — B59 PNEUMONIA DUE TO PNEUMOCYSTIS JIROVECII, UNSPECIFIED LATERALITY, UNSPECIFIED PART OF LUNG (H): ICD-10-CM

## 2025-06-05 DIAGNOSIS — C78.00 RECTAL ADENOCARCINOMA METASTATIC TO LUNG (H): Primary | ICD-10-CM

## 2025-06-05 DIAGNOSIS — R33.9 URINARY RETENTION: ICD-10-CM

## 2025-06-05 DIAGNOSIS — C79.51 COLON CANCER METASTASIZED TO BONE (H): Primary | ICD-10-CM

## 2025-06-05 DIAGNOSIS — C18.9 COLON CANCER METASTASIZED TO BONE (H): Primary | ICD-10-CM

## 2025-06-05 DIAGNOSIS — R11.2 NAUSEA AND VOMITING, UNSPECIFIED VOMITING TYPE: ICD-10-CM

## 2025-06-05 DIAGNOSIS — R11.0 NAUSEA: ICD-10-CM

## 2025-06-05 DIAGNOSIS — C78.00 RECTAL ADENOCARCINOMA METASTATIC TO LUNG (H): ICD-10-CM

## 2025-06-05 DIAGNOSIS — C20 RECTAL ADENOCARCINOMA METASTATIC TO LUNG (H): Primary | ICD-10-CM

## 2025-06-05 DIAGNOSIS — C20 RECTAL ADENOCARCINOMA METASTATIC TO LUNG (H): ICD-10-CM

## 2025-06-05 DIAGNOSIS — G89.3 CANCER RELATED PAIN: ICD-10-CM

## 2025-06-05 LAB
ALBUMIN SERPL BCG-MCNC: 3.3 G/DL (ref 3.5–5.2)
ALBUMIN UR-MCNC: 20 MG/DL
ALBUMIN UR-MCNC: NEGATIVE MG/DL
ALP SERPL-CCNC: 287 U/L (ref 40–150)
ALT SERPL W P-5'-P-CCNC: 19 U/L (ref 0–70)
ANION GAP SERPL CALCULATED.3IONS-SCNC: 12 MMOL/L (ref 7–15)
APPEARANCE UR: CLEAR
AST SERPL W P-5'-P-CCNC: 70 U/L (ref 0–45)
BASOPHILS # BLD AUTO: 0 10E3/UL (ref 0–0.2)
BASOPHILS NFR BLD AUTO: 0 %
BILIRUB SERPL-MCNC: 0.6 MG/DL
BILIRUB UR QL STRIP: NEGATIVE
BUN SERPL-MCNC: 22.7 MG/DL (ref 6–20)
CALCIUM SERPL-MCNC: 8.9 MG/DL (ref 8.8–10.4)
CHLORIDE SERPL-SCNC: 95 MMOL/L (ref 98–107)
COLOR UR AUTO: YELLOW
CREAT SERPL-MCNC: 1.23 MG/DL (ref 0.67–1.17)
CREAT SERPL-MCNC: 1.31 MG/DL (ref 0.67–1.17)
CREAT SERPL-MCNC: 1.31 MG/DL (ref 0.67–1.17)
EGFRCR SERPLBLD CKD-EPI 2021: 65 ML/MIN/1.73M2
EGFRCR SERPLBLD CKD-EPI 2021: 65 ML/MIN/1.73M2
EGFRCR SERPLBLD CKD-EPI 2021: 71 ML/MIN/1.73M2
EOSINOPHIL # BLD AUTO: 0.1 10E3/UL (ref 0–0.7)
EOSINOPHIL NFR BLD AUTO: 1 %
ERYTHROCYTE [DISTWIDTH] IN BLOOD BY AUTOMATED COUNT: 19.9 % (ref 10–15)
GLUCOSE SERPL-MCNC: 117 MG/DL (ref 70–99)
GLUCOSE UR STRIP-MCNC: NEGATIVE MG/DL
HCO3 SERPL-SCNC: 23 MMOL/L (ref 22–29)
HCT VFR BLD AUTO: 26.7 % (ref 40–53)
HGB BLD-MCNC: 8.5 G/DL (ref 13.3–17.7)
HGB UR QL STRIP: NEGATIVE
IMM GRANULOCYTES # BLD: 0.2 10E3/UL
IMM GRANULOCYTES NFR BLD: 2 %
KETONES UR STRIP-MCNC: NEGATIVE MG/DL
LEUKOCYTE ESTERASE UR QL STRIP: NEGATIVE
LYMPHOCYTES # BLD AUTO: 0.4 10E3/UL (ref 0.8–5.3)
LYMPHOCYTES NFR BLD AUTO: 5 %
MCH RBC QN AUTO: 26.2 PG (ref 26.5–33)
MCHC RBC AUTO-ENTMCNC: 31.8 G/DL (ref 31.5–36.5)
MCV RBC AUTO: 82 FL (ref 78–100)
MONOCYTES # BLD AUTO: 1.2 10E3/UL (ref 0–1.3)
MONOCYTES NFR BLD AUTO: 12 %
MUCOUS THREADS #/AREA URNS LPF: PRESENT /LPF
NEUTROPHILS # BLD AUTO: 7.6 10E3/UL (ref 1.6–8.3)
NEUTROPHILS NFR BLD AUTO: 80 %
NITRATE UR QL: NEGATIVE
NRBC # BLD AUTO: 0 10E3/UL
NRBC BLD AUTO-RTO: 0 /100
PH UR STRIP: 5.5 [PH] (ref 5–7)
PLATELET # BLD AUTO: 155 10E3/UL (ref 150–450)
POTASSIUM SERPL-SCNC: 4.1 MMOL/L (ref 3.4–5.3)
PROT SERPL-MCNC: 6.5 G/DL (ref 6.4–8.3)
RBC # BLD AUTO: 3.24 10E6/UL (ref 4.4–5.9)
RBC URINE: 1 /HPF
SODIUM SERPL-SCNC: 130 MMOL/L (ref 135–145)
SP GR UR STRIP: 1.01 (ref 1–1.03)
UROBILINOGEN UR STRIP-MCNC: NORMAL MG/DL
WBC # BLD AUTO: 9.5 10E3/UL (ref 4–11)
WBC URINE: 1 /HPF

## 2025-06-05 PROCEDURE — 74177 CT ABD & PELVIS W/CONTRAST: CPT | Mod: GC | Performed by: RADIOLOGY

## 2025-06-05 PROCEDURE — 81003 URINALYSIS AUTO W/O SCOPE: CPT | Performed by: PHYSICIAN ASSISTANT

## 2025-06-05 PROCEDURE — 36591 DRAW BLOOD OFF VENOUS DEVICE: CPT | Performed by: PHYSICIAN ASSISTANT

## 2025-06-05 PROCEDURE — 81003 URINALYSIS AUTO W/O SCOPE: CPT | Performed by: STUDENT IN AN ORGANIZED HEALTH CARE EDUCATION/TRAINING PROGRAM

## 2025-06-05 PROCEDURE — 258N000003 HC RX IP 258 OP 636

## 2025-06-05 PROCEDURE — 80053 COMPREHEN METABOLIC PANEL: CPT

## 2025-06-05 PROCEDURE — 82565 ASSAY OF CREATININE: CPT | Performed by: PHYSICIAN ASSISTANT

## 2025-06-05 PROCEDURE — 85014 HEMATOCRIT: CPT | Performed by: PHYSICIAN ASSISTANT

## 2025-06-05 PROCEDURE — 250N000011 HC RX IP 250 OP 636: Performed by: STUDENT IN AN ORGANIZED HEALTH CARE EDUCATION/TRAINING PROGRAM

## 2025-06-05 PROCEDURE — 258N000003 HC RX IP 258 OP 636: Performed by: STUDENT IN AN ORGANIZED HEALTH CARE EDUCATION/TRAINING PROGRAM

## 2025-06-05 RX ORDER — DIPHENHYDRAMINE HYDROCHLORIDE 50 MG/ML
50 INJECTION, SOLUTION INTRAMUSCULAR; INTRAVENOUS
Start: 2025-06-05

## 2025-06-05 RX ORDER — HEPARIN SODIUM (PORCINE) LOCK FLUSH IV SOLN 100 UNIT/ML 100 UNIT/ML
5 SOLUTION INTRAVENOUS
OUTPATIENT
Start: 2025-06-09

## 2025-06-05 RX ORDER — DIPHENHYDRAMINE HYDROCHLORIDE 50 MG/ML
25 INJECTION, SOLUTION INTRAMUSCULAR; INTRAVENOUS
Start: 2025-06-05

## 2025-06-05 RX ORDER — HEPARIN SODIUM (PORCINE) LOCK FLUSH IV SOLN 100 UNIT/ML 100 UNIT/ML
500 SOLUTION INTRAVENOUS ONCE
Status: COMPLETED | OUTPATIENT
Start: 2025-06-05 | End: 2025-06-05

## 2025-06-05 RX ORDER — DIPHENHYDRAMINE HYDROCHLORIDE 50 MG/ML
50 INJECTION, SOLUTION INTRAMUSCULAR; INTRAVENOUS ONCE
Status: DISCONTINUED | OUTPATIENT
Start: 2025-06-05 | End: 2025-06-05

## 2025-06-05 RX ORDER — METHYLPREDNISOLONE SODIUM SUCCINATE 40 MG/ML
40 INJECTION INTRAMUSCULAR; INTRAVENOUS
Start: 2025-06-05

## 2025-06-05 RX ORDER — HEPARIN SODIUM (PORCINE) LOCK FLUSH IV SOLN 100 UNIT/ML 100 UNIT/ML
5 SOLUTION INTRAVENOUS
OUTPATIENT
Start: 2025-06-05

## 2025-06-05 RX ORDER — LORATADINE 10 MG/1
10 TABLET ORAL DAILY
COMMUNITY

## 2025-06-05 RX ORDER — HEPARIN SODIUM (PORCINE) LOCK FLUSH IV SOLN 100 UNIT/ML 100 UNIT/ML
5 SOLUTION INTRAVENOUS
Status: DISCONTINUED | OUTPATIENT
Start: 2025-06-05 | End: 2025-06-05

## 2025-06-05 RX ORDER — DIPHENHYDRAMINE HYDROCHLORIDE 50 MG/ML
50 INJECTION, SOLUTION INTRAMUSCULAR; INTRAVENOUS ONCE
OUTPATIENT
Start: 2025-06-09

## 2025-06-05 RX ORDER — IOPAMIDOL 755 MG/ML
85 INJECTION, SOLUTION INTRAVASCULAR ONCE
Status: COMPLETED | OUTPATIENT
Start: 2025-06-05 | End: 2025-06-05

## 2025-06-05 RX ORDER — ALBUTEROL SULFATE 0.83 MG/ML
2.5 SOLUTION RESPIRATORY (INHALATION)
OUTPATIENT
Start: 2025-06-05

## 2025-06-05 RX ORDER — HEPARIN SODIUM,PORCINE 10 UNIT/ML
5-20 VIAL (ML) INTRAVENOUS DAILY PRN
OUTPATIENT
Start: 2025-06-05

## 2025-06-05 RX ORDER — ALBUTEROL SULFATE 90 UG/1
1-2 INHALANT RESPIRATORY (INHALATION)
Start: 2025-06-05

## 2025-06-05 RX ORDER — DIPHENHYDRAMINE HYDROCHLORIDE 50 MG/ML
25 INJECTION, SOLUTION INTRAMUSCULAR; INTRAVENOUS ONCE
OUTPATIENT
Start: 2025-06-09

## 2025-06-05 RX ORDER — DIPHENHYDRAMINE HYDROCHLORIDE 50 MG/ML
25 INJECTION, SOLUTION INTRAMUSCULAR; INTRAVENOUS ONCE
Status: DISCONTINUED | OUTPATIENT
Start: 2025-06-05 | End: 2025-06-05

## 2025-06-05 RX ORDER — EPINEPHRINE 1 MG/ML
0.3 INJECTION, SOLUTION, CONCENTRATE INTRAVENOUS EVERY 5 MIN PRN
OUTPATIENT
Start: 2025-06-05

## 2025-06-05 RX ORDER — MEPERIDINE HYDROCHLORIDE 25 MG/ML
25 INJECTION INTRAMUSCULAR; INTRAVENOUS; SUBCUTANEOUS
OUTPATIENT
Start: 2025-06-05

## 2025-06-05 RX ADMIN — Medication 5 ML: at 11:18

## 2025-06-05 RX ADMIN — HEPARIN SODIUM (PORCINE) LOCK FLUSH IV SOLN 100 UNIT/ML 500 UNITS: 100 SOLUTION at 13:56

## 2025-06-05 RX ADMIN — FOSAPREPITANT 150 MG: 150 INJECTION, POWDER, LYOPHILIZED, FOR SOLUTION INTRAVENOUS at 08:59

## 2025-06-05 RX ADMIN — SODIUM CHLORIDE 1000 ML: 0.9 INJECTION, SOLUTION INTRAVENOUS at 08:56

## 2025-06-05 RX ADMIN — IOPAMIDOL 85 ML: 755 INJECTION, SOLUTION INTRAVASCULAR at 13:44

## 2025-06-05 RX ADMIN — Medication 500 UNITS: at 08:15

## 2025-06-05 ASSESSMENT — PAIN SCALES - GENERAL: PAINLEVEL_OUTOF10: NO PAIN (0)

## 2025-06-05 NOTE — DISCHARGE INSTRUCTIONS

## 2025-06-05 NOTE — PROGRESS NOTES
[facility] - Medical Oncology Follow Up Note  06/05/2025    Reason for Visit: follow up rectal adenocarcinoma, monitoring toxicities associated with chemotherapy     Oncology History:   Patient developed rectal bleeding in 2020.  In January 2021 CT showed focal wall thickening in the upper rectum, multiple pulmonary nodules bilaterally suspicious for metastatic disease.  Underwent FNA of the right upper lobe lesion which was consistent with adenocarcinoma of the colon.  He was treated with FOLFOX from 2/20/2021 through 5/20/2021.  Avastin was added.  Had an infusion reaction to oxaliplatin 6/2021.  7/2021- 12/2021 was on 5-FU alone.  Developed progression.  12/2021- 9/2022 was on FOLFIRI.  11/2021 started Lonsurf. All of this was done with outside oncologist.     Met with Dr. Snow on 2/3/2023 to establish care. Recommended regorafenib.     Started regorafenib on 3/2/2023. Progression noted 5/19/23. Plan to start FOLFOX/Avastin and send out CARIS testing to evaluate for other treatment options. Cycle 1 was given with oxaliplatin desensitization, 5FU, Avastin held due to increased urine protein.     Following cycle 4, patient was admitted with hematuria and acute kidney injury.    Oxaliplatin was dropped with Cycle 5.     Y-90 radioembolization 9/7.    10/24/23 CT CAP with disease progression with notable growth in all pulmonary lesions. Lonsurf + bevacizumab started 10/27/23 with plans to bridge to clinical trial.   11/15/23 - stopped chemo for [facility] study.   Currently enrolled in [facility] CAR-T trial (completed cell harvest 1/23/2024, tentatively planning administration of CAR-T in April 2/12/24 - resumed Lonsurf, bevacizumab to bridge treatment until CAR-T therapy  3/20/24 - treatment changed to Fruquintinib due to intolerability of Lonsurf/bevacizumab  4/2/24- Presented to the ED with dehydration. Stopped Fruquintnib.   6/2024- cellular therapy transplant at [facility].   7/30/24- CT imaging demonstrating marked  progression of pulmonary and hepatic lesions, clinical trial failed to generate T-cell graft. Multiple discussions regarding plan of care, hospice vs additional lines of treatment. Patient decided he is not ready for hospice. He resumed treatment with FOLFIRI + bevacizumab on 8/30/24.   10/2024 - CT CAP with continued response, plan to continue FOLFIRI + bevacizumab with the addition of Zometa. Per his dentist, needs dental work completed prior to clearance. (Scheduled 1/2 and 1/21).   12/2024 chemo held for break over new Years per patient's choice.  1/15/25 chemo held due to ongoing respiratory illness, + RSV, also treated with levaquin for pneumonia on CXR, chemo deferred   1/22-1/25/25- hospitalized with suspicion for fungal pneumonia  1/28-1/29/25- hospitalized for PJP pneumonia, started on treatment with Bactrim and steroids  2/19/25 CT CAP shows disease progression, occurred while on treatment break for pneumonia, resumed same chemotherapy with FOLFIRI/chelsea on 2/20/25 4/14/25 CT CAP with reduction in his pulmonary lesions, with some growth in his hepatic lesions. Plan to continue treatment as a bridge to clinical trial. Plan for treatment with FOLFOX (oxaliplatin desensitization) + bevacizumab.     Presents today as an add on for acute evaluation prior to receiving FOLFOX + bevacizumab. Switched to oxaliplatin densensitization on 4/30/25. He is seen in infusion and is unaccompanied.     Interval History:   -He is seen today in infusion after presenting today with several issues.  -He vomiting this morning as he was getting out of his car on arrival to clinic.  He had been pushing fluids this am prior to coming in for treatment as he feels he does better when is well-hydrated.  This did include water, Gatorade and tomato juice.  He did get IVF and emend today prior to my visit with him and is feeling better now with no nausea or further vomiting.   -He also was reporting constipation lasting for longer that is  typical for him in his virtual visit yesterday with TAMI Garcia.  This note is reviewed prior to my visit today.  She recommended MiraLax twice daily which he started last night and took a dose this am as well and was able to move his bowels for soft stool this am while in infusion.  He denies abdominal pain today.   -He does have some mucous and small amt of blood per rectum per his report but this is not new.   -He also reports rectal pain this am, not unusual for him but he has been needing to take more oxycodone recently, including when he was on a fishing trip and sleeping in a camper on a new mattress for him which was causing an increase in his back pain.   -He reports that he feels his urine has been darker for about the last 9 days.  He was able to void today but feels it takes longer than usual.  No burning or pain with urination.  -Denies and shortness of breath or cough.  He is still having shortness of breath with exertion and using his meds appropriately.  He has not been taking Bactrim prophylaxis for about a month, he believes.      Current Outpatient Medications   Medication Sig Dispense Refill    acetaminophen (TYLENOL) 500 MG tablet Take 500-1,000 mg by mouth as needed for mild pain.      budesonide-formoterol (SYMBICORT/BREYNA) 160-4.5 MCG/ACT Inhaler Inhale 2 puffs into the lungs 2 times daily. 10.2 g 1    Chemo Kit For RN use only. Do not remove items from bag. Contents: 1  sodium chloride 0.9% flush, 4 medium gloves, 1 chemo gown, 1/4 chemo mat, 1 connector female, 1 chemo bag. [id number] kit 0    cyclobenzaprine (FLEXERIL) 5 MG tablet Take one tab (5mg) by mouth over 6-8 hours, as needed for spasms 45 tablet 2    dexAMETHasone (DECADRON) 4 MG tablet Take 2 tablets (8 mg) by mouth daily. Take for 2 days, starting the day after chemo. Take with food. 4 tablet 2    Emergency Supply Kit, Central, Patient use for emergency only. Contents: 3 sodium chloride 0.9% flushes, 1 dressing kit, 1 microclave  "ext set 14\", 4 nitrile gloves (med), 6 alcohol prep pads, 1 bacitracin, 1 syringe (10 cc 20 G 1\"). Call [phone] to reorder. [id number] kit 0    fluorouracil (ADRUCIL) 2.5 GM/50ML SOLN injection       Fluorouracil (ADRUCIL) 4,630 mg in sodium chloride 0.9 % 241 mL via HOMEPUMP C-Series Infuse 4,630 mg at 5.2 mL/hr over 46 hours into the vein once for 1 dose. [id number] mL 0    fluticasone (FLONASE) 50 MCG/ACT nasal spray Spray 1 spray into both nostrils daily. 16 g 2    fluticasone (FLONASE) 50 MCG/ACT nasal spray Spray 1 spray into both nostrils daily. 16 g 2    gabapentin (NEURONTIN) 100 MG capsule TAKE 1 TO 2 CAPSULES (100-200 MG) BY MOUTH UP TO TWICE DURING THE DAY AND 3 CAPSULES (300 MG) AT BEDTIME. 210 capsule 1    guaiFENesin (MUCINEX) 600 MG 12 hr tablet Take 2 tablets (1,200 mg) by mouth 2 times daily. (Patient not taking: Reported on 6/5/2025) 20 tablet 0    ibuprofen (ADVIL/MOTRIN) 200 MG tablet Take 3 tablets (600 mg) by mouth every 8 hours as needed for moderate pain (Patient not taking: Reported on 4/21/2025) 100 tablet 0    ipratropium (ATROVENT) 0.06 % nasal spray Spray 2 sprays into both nostrils 4 times daily. 15 mL 0    ipratropium - albuterol 0.5 mg/2.5 mg/3 mL (DUONEB) 0.5-2.5 (3) MG/3ML neb solution Take 1 vial (3 mLs) by nebulization every 6 hours as needed for shortness of breath, wheezing or cough. 90 mL 3    loperamide (IMODIUM A-D) 2 MG tablet Take 2 mg by mouth 4 times daily as needed for diarrhea. (Patient not taking: Reported on 6/5/2025)      loratadine (CLARITIN) 10 MG tablet Take 10 mg by mouth daily.      LORazepam (ATIVAN) 0.5 MG tablet Take 1 tablet (0.5 mg) by mouth every 6 hours as needed for anxiety. 30 tablet 0    NARCAN 4 MG/0.1ML nasal spray  (Patient not taking: Reported on 4/21/2025)      OLANZapine (ZYPREXA) 2.5 MG tablet Take 1 tablet (2.5 mg) by mouth 2 times daily as needed (nausea). 60 tablet 1    ondansetron (ZOFRAN ODT) 4 MG ODT tab Take 1 tablet (4 mg) by mouth every " 6 hours as needed for nausea. 30 tablet 3    ondansetron (ZOFRAN) 8 MG tablet Take 1 tablet (8 mg) by mouth every 8 hours as needed for nausea (vomiting). 30 tablet 2    Ostomy Supplies MISC 1 each daily 1 each 11    oxyCODONE IR (ROXICODONE) 10 MG tablet Take 1-2 tablets (10-20 mg) by mouth every 4 hours as needed for pain. 150 tablet 0    pegfilgrastim (NEULASTA ONPRO KIT) 6 MG/0.6ML injection Inject 0.6 mLs (6 mg) subcutaneously every 2 weeks as needed (at the time of chemotherapy disconnect per treatment plan). 15 mL 0    Port Access Kit For nurse use only.  Do not remove items from bag.  Use for port access.  Do not place syringe on sterile field. [id number] kit 0    potassium phosphate, monobasic, (K-PHOS) 500 MG tablet Take 1 tablet (500 mg) by mouth 2 times daily. (Patient not taking: Reported on 4/21/2025) 14 tablet 0    prochlorperazine (COMPAZINE) 10 MG tablet Take 1 tablet (10 mg) by mouth every 6 hours as needed for nausea or vomiting. 30 tablet 2    prochlorperazine (COMPAZINE) 10 MG tablet Take 1 tablet (10 mg) by mouth every 6 hours as needed for nausea or vomiting 30 tablet 2    sodium chloride, PF, 0.9% PF flush Inject 10 mLs into the vein as needed for line flush. Flush IV before and after med administration as directed and/or at least every 24 hours, or prior to deaccessing for no further use and/or at least every 4 weeks when not accessed. [id number] mL 0    sulfamethoxazole-trimethoprim (BACTRIM DS) 800-160 MG tablet Take 1 tablet by mouth Every Mon, Wed, Fri Morning. (Patient not taking: Reported on 6/5/2025) 36 tablet 1    VENTOLIN  (90 Base) MCG/ACT inhaler INHALE 2 PUFFS INTO THE LUNGS EVERY 6 HOURS AS NEEDED FOR SHORTNESS OF BREATH OR WHEEZING OR COUGH 18 g 2     Objective:  Physical Exam:  General: The patient is a pleasant male in no acute distress.  Vitals done in infusion and reviewed.   Wt Readings from Last 10 Encounters:   06/05/25 73.4 kg (161 lb 14.4 oz)   06/04/25 70.3 kg (155 lb)    04/30/25 73.9 kg (163 lb)   04/25/25 74.8 kg (164 lb 12.8 oz)   04/21/25 71.7 kg (158 lb)   04/17/25 74.4 kg (164 lb)   04/16/25 74.6 kg (164 lb 8 oz)   04/04/25 75 kg (165 lb 5.5 oz)   04/02/25 74.7 kg (164 lb 11.2 oz)   03/28/25 73.2 kg (161 lb 4.8 oz)   HEENT: EOMI. Sclerae are anicteric.   Heart: Regular rate and rhythm.   Lungs: Clear to auscultation bilaterally.   Abdomen: Bowel sounds present, hypoactive, abd soft, nontender with visible ostomy bag.  No tenderness to palpation over abdomen or bladder.  No guarding, moving around the bed well and sits and stands without issues. Stool in pouch is soft, yellow-brown.  Extremities: 1+ pitting lower extremity edema noted bilaterally.   Neuro: Cranial nerves II through XII are grossly intact. .  Skin: No rashes, petechiae, or bruising noted on exposed skin.    LABS  Most Recent 3 CBC's:  Recent Labs   Lab Test 06/05/25  0814 04/30/25  0656 04/16/25  0803   WBC 9.5 16.0* 7.9   HGB 8.5* 9.7* 10.3*   MCV 82 83 85    262 154    Most Recent 3 BMP's:  Recent Labs   Lab Test 06/05/25  1000 06/05/25  0814 04/30/25  0656 04/16/25  0803   NA  --  130* 131* 135   POTASSIUM  --  4.1 4.4 4.6   CHLORIDE  --  95* 98 103   CO2  --  23 22 22   BUN  --  22.7* 16.9 13.5   CR 1.23* 1.31*  1.31* 0.72  0.72 1.00   ANIONGAP  --  12 11 10   JEFERSON  --  8.9 8.7* 8.7*   GLC  --  117* 104* 97    Most Recent 2 LFT's:  Recent Labs   Lab Test 06/05/25  0814 04/30/25  0656   AST 70* 61*   ALT 19 25   ALKPHOS 287* 243*   BILITOTAL 0.6 0.5   I reviewed the above labs today.    ASSESSMENT AND PLAN   Metastatic rectal cancer  He started on treatment with FOLFIRI + bevacizumab on 8/30/24. Imaging in October 2024 showed improvement in his disease. Imaging in February 2025 showed disease progression, though this was in the setting of treatment break due to PJP pneumonia. Therefore he resumed the same chemotherapy with FOLFIRI/chelsea on 2/20/25. Imaging April 2025 with mixed response. Plan to  continue treatment as a bridge to clinical trial. I will check with the trial team for updates. Treatment changed to FOLFOX + bevacizumab with oxaliplatin desensitization. Given concern for increased neurotoxicity from oxaliplatin, will decrease oxaliplatin dose by 20%.   -In the setting of nausea and vomiting, decreased urinary output, increasing rectal pain, will obtain start CT today which was ordered earlier by TAMI Garcia.    -Will hold chemotherapy today.  -Will obtain CT as above as well as a UA.   -He does have follow up later this month with a scan and to see Dr. Snow.  Will message team once we have CT results re: plan going forward.     MICHELLE  -Creatinine 1.31 this am on arrival, and post-1 liter IVF this is 1.23.  -Creatinine clearance is 79.   -Per his report, he feels his urine has been darker and has been feeling it is taking longer to void, although he does feel that he is emptying his bladder and has no obvious distention or discomfort on exam.  -Will obtain CT as above.  Will denice him with results today or tomorrow and he is aware of this.   ADDENDUM 5:50 PM  IMPRESSION:   1. Increased size of extensive hepatic metastases. Hepatomegaly.  2. Increased soft tissue thickening about the rectum.  3. Numerous pulmonary nodules. The largest are overall stable in size.  4. No hydronephrosis or hydroureter.   The bladder is normally filled  and not distended.  5. Moderate colonic stool burden.  6. Unchanged bone metastases.    Called Roman and reviewed the above and he has not immediate questions.  Message to team regarding next steps and Roman is aware that we will be in touch.  He will continue Miralax BID for a few more days./ CL    Nausea  Secondary to chemotherapy. Recommend taking Compazine and Zofran as needed. Continue olanzapine.   -He prefers the ondansetron ODT with his next refill.      Constipation  Recommend increasing MiraLax from qday to bid at his visit yesterday and he started last night  and did have more output from his stoma this morning.  -Continue Miralax BID as he is increasing oxycodone use.     Bone mets  Continue Zometa monthly.     PJP pneumonia  Following with ID. Completed course of high dose Bactrim, now on PJP prophylaxis with M/W/F Bactrim for a minimum of 6 months (August 2025). Per ID, will then reassess the immune system and the CD4 count prior to stopping the Bactrim prophylaxis.  Note from 2.19 reviewed today.   -Lungs are CTA and he feels his cough is also resolved.   -Creatinine clearance today is 79.  At this point, he could resume and since he reports he has not taken it for the last month it is likely not the cause of his MICHELLE.     Back pain  Secondary to bone mets. Following with palliative care. Managed with oxycodone prn.  -Palliative care follow-up scheduled for 6/18.     Iron deficient anemia  Treated with 3 doses of Venofer, last on 10/4/24. Iron studies in February 2025 unclear if anemia of chronic disease or mixed with recurrent iron deficiency. MCV has been trending down again. Iron studies in mid-March 2025 are consistent with anemia of chronic disease. Will continue to monitor.   -Hgb 8.5 today.  No s/s bleeding or black tarry stools.   -Not discussed with patient today at this acute visit; will message primary team.     Muscle cramping  Will trial magnesium oxide 400 mg once/day.  -He picked this up today and will start this evening.     Edith Tavares, FLORA, CNP  [facility] [id number] [place], [place] [address] [phone]    50 minutes spent on the date of the encounter doing chart review, review of test results, interpretation of tests, patient visit, and documentation.

## 2025-06-05 NOTE — LETTER
6/5/2025      Roman Escalante  [address]  [facility] [address]      Dear Colleague,    Thank you for referring your patient, Roman Escalante, to the [facility]. Please see a copy of my visit note below.    [facility] - Medical Oncology Follow Up Note  06/05/2025    Reason for Visit: follow up rectal adenocarcinoma, monitoring toxicities associated with chemotherapy     Oncology History:   Patient developed rectal bleeding in 2020.  In January 2021 CT showed focal wall thickening in the upper rectum, multiple pulmonary nodules bilaterally suspicious for metastatic disease.  Underwent FNA of the right upper lobe lesion which was consistent with adenocarcinoma of the colon.  He was treated with FOLFOX from 2/20/2021 through 5/20/2021.  Avastin was added.  Had an infusion reaction to oxaliplatin 6/2021.  7/2021- 12/2021 was on 5-FU alone.  Developed progression.  12/2021- 9/2022 was on FOLFIRI.  11/2021 started Lonsurf. All of this was done with outside oncologist.     Met with Dr. Snow on 2/3/2023 to establish care. Recommended regorafenib.     Started regorafenib on 3/2/2023. Progression noted 5/19/23. Plan to start FOLFOX/Avastin and send out CARIS testing to evaluate for other treatment options. Cycle 1 was given with oxaliplatin desensitization, 5FU, Avastin held due to increased urine protein.     Following cycle 4, patient was admitted with hematuria and acute kidney injury.    Oxaliplatin was dropped with Cycle 5.     Y-90 radioembolization 9/7.    10/24/23 CT CAP with disease progression with notable growth in all pulmonary lesions. Lonsurf + bevacizumab started 10/27/23 with plans to bridge to clinical trial.   11/15/23 - stopped chemo for [facility] study.   Currently enrolled in [facility] CAR-T trial (completed cell harvest 1/23/2024, tentatively planning administration of CAR-T in April 2/12/24 - resumed Lonsurf, bevacizumab to bridge treatment until CAR-T  therapy  3/20/24 - treatment changed to Fruquintinib due to intolerability of Lonsurf/bevacizumab  4/2/24- Presented to the ED with dehydration. Stopped Fruquintnib.   6/2024- cellular therapy transplant at [facility].   7/30/24- CT imaging demonstrating marked progression of pulmonary and hepatic lesions, clinical trial failed to generate T-cell graft. Multiple discussions regarding plan of care, hospice vs additional lines of treatment. Patient decided he is not ready for hospice. He resumed treatment with FOLFIRI + bevacizumab on 8/30/24.   10/2024 - CT CAP with continued response, plan to continue FOLFIRI + bevacizumab with the addition of Zometa. Per his dentist, needs dental work completed prior to clearance. (Scheduled 1/2 and 1/21).   12/2024 chemo held for break over new Years per patient's choice.  1/15/25 chemo held due to ongoing respiratory illness, + RSV, also treated with levaquin for pneumonia on CXR, chemo deferred   1/22-1/25/25- hospitalized with suspicion for fungal pneumonia  1/28-1/29/25- hospitalized for PJP pneumonia, started on treatment with Bactrim and steroids  2/19/25 CT CAP shows disease progression, occurred while on treatment break for pneumonia, resumed same chemotherapy with FOLFIRI/chelsea on 2/20/25 4/14/25 CT CAP with reduction in his pulmonary lesions, with some growth in his hepatic lesions. Plan to continue treatment as a bridge to clinical trial. Plan for treatment with FOLFOX (oxaliplatin desensitization) + bevacizumab.     Presents today as an add on for acute evaluation prior to receiving FOLFOX + bevacizumab. Switched to oxaliplatin densensitization on 4/30/25. He is seen in infusion and is unaccompanied.     Interval History:   -He is seen today in infusion after presenting today with several issues.  -He vomiting this morning as he was getting out of his car on arrival to clinic.  He had been pushing fluids this am prior to coming in for treatment as he feels he does better when  is well-hydrated.  This did include water, Gatorade and tomato juice.  He did get IVF and emend today prior to my visit with him and is feeling better now with no nausea or further vomiting.   -He also was reporting constipation lasting for longer that is typical for him in his virtual visit yesterday with TAMI Garcia.  This note is reviewed prior to my visit today.  She recommended MiraLax twice daily which he started last night and took a dose this am as well and was able to move his bowels for soft stool this am while in infusion.  He denies abdominal pain today.   -He does have some mucous and small amt of blood per rectum per his report but this is not new.   -He also reports rectal pain this am, not unusual for him but he has been needing to take more oxycodone recently, including when he was on a fishing trip and sleeping in a camper on a new mattress for him which was causing an increase in his back pain.   -He reports that he feels his urine has been darker for about the last 9 days.  He was able to void today but feels it takes longer than usual.  No burning or pain with urination.  -Denies and shortness of breath or cough.  He is still having shortness of breath with exertion and using his meds appropriately.  He has not been taking Bactrim prophylaxis for about a month, he believes.      Current Outpatient Medications   Medication Sig Dispense Refill     acetaminophen (TYLENOL) 500 MG tablet Take 500-1,000 mg by mouth as needed for mild pain.       budesonide-formoterol (SYMBICORT/BREYNA) 160-4.5 MCG/ACT Inhaler Inhale 2 puffs into the lungs 2 times daily. 10.2 g 1     Chemo Kit For RN use only. Do not remove items from bag. Contents: 1  sodium chloride 0.9% flush, 4 medium gloves, 1 chemo gown, 1/4 chemo mat, 1 connector female, 1 chemo bag. [id number] kit 0     cyclobenzaprine (FLEXERIL) 5 MG tablet Take one tab (5mg) by mouth over 6-8 hours, as needed for spasms 45 tablet 2     dexAMETHasone (DECADRON) 4  "MG tablet Take 2 tablets (8 mg) by mouth daily. Take for 2 days, starting the day after chemo. Take with food. 4 tablet 2     Emergency Supply Kit, Central, Patient use for emergency only. Contents: 3 sodium chloride 0.9% flushes, 1 dressing kit, 1 microclave ext set 14\", 4 nitrile gloves (med), 6 alcohol prep pads, 1 bacitracin, 1 syringe (10 cc 20 G 1\"). Call [phone] to reorder. [id number] kit 0     fluorouracil (ADRUCIL) 2.5 GM/50ML SOLN injection        Fluorouracil (ADRUCIL) 4,630 mg in sodium chloride 0.9 % 241 mL via HOMEPUMP C-Series Infuse 4,630 mg at 5.2 mL/hr over 46 hours into the vein once for 1 dose. 669924 mL 0     fluticasone (FLONASE) 50 MCG/ACT nasal spray Spray 1 spray into both nostrils daily. 16 g 2     fluticasone (FLONASE) 50 MCG/ACT nasal spray Spray 1 spray into both nostrils daily. 16 g 2     gabapentin (NEURONTIN) 100 MG capsule TAKE 1 TO 2 CAPSULES (100-200 MG) BY MOUTH UP TO TWICE DURING THE DAY AND 3 CAPSULES (300 MG) AT BEDTIME. 210 capsule 1     guaiFENesin (MUCINEX) 600 MG 12 hr tablet Take 2 tablets (1,200 mg) by mouth 2 times daily. (Patient not taking: Reported on 6/5/2025) 20 tablet 0     ibuprofen (ADVIL/MOTRIN) 200 MG tablet Take 3 tablets (600 mg) by mouth every 8 hours as needed for moderate pain (Patient not taking: Reported on 4/21/2025) 100 tablet 0     ipratropium (ATROVENT) 0.06 % nasal spray Spray 2 sprays into both nostrils 4 times daily. 15 mL 0     ipratropium - albuterol 0.5 mg/2.5 mg/3 mL (DUONEB) 0.5-2.5 (3) MG/3ML neb solution Take 1 vial (3 mLs) by nebulization every 6 hours as needed for shortness of breath, wheezing or cough. 90 mL 3     loperamide (IMODIUM A-D) 2 MG tablet Take 2 mg by mouth 4 times daily as needed for diarrhea. (Patient not taking: Reported on 6/5/2025)       loratadine (CLARITIN) 10 MG tablet Take 10 mg by mouth daily.       LORazepam (ATIVAN) 0.5 MG tablet Take 1 tablet (0.5 mg) by mouth every 6 hours as needed for anxiety. 30 tablet 0 "     NARCAN 4 MG/0.1ML nasal spray  (Patient not taking: Reported on 4/21/2025)       OLANZapine (ZYPREXA) 2.5 MG tablet Take 1 tablet (2.5 mg) by mouth 2 times daily as needed (nausea). 60 tablet 1     ondansetron (ZOFRAN ODT) 4 MG ODT tab Take 1 tablet (4 mg) by mouth every 6 hours as needed for nausea. 30 tablet 3     ondansetron (ZOFRAN) 8 MG tablet Take 1 tablet (8 mg) by mouth every 8 hours as needed for nausea (vomiting). 30 tablet 2     Ostomy Supplies MISC 1 each daily 1 each 11     oxyCODONE IR (ROXICODONE) 10 MG tablet Take 1-2 tablets (10-20 mg) by mouth every 4 hours as needed for pain. 150 tablet 0     pegfilgrastim (NEULASTA ONPRO KIT) 6 MG/0.6ML injection Inject 0.6 mLs (6 mg) subcutaneously every 2 weeks as needed (at the time of chemotherapy disconnect per treatment plan). 15 mL 0     Port Access Kit For nurse use only.  Do not remove items from bag.  Use for port access.  Do not place syringe on sterile field. [id number] kit 0     potassium phosphate, monobasic, (K-PHOS) 500 MG tablet Take 1 tablet (500 mg) by mouth 2 times daily. (Patient not taking: Reported on 4/21/2025) 14 tablet 0     prochlorperazine (COMPAZINE) 10 MG tablet Take 1 tablet (10 mg) by mouth every 6 hours as needed for nausea or vomiting. 30 tablet 2     prochlorperazine (COMPAZINE) 10 MG tablet Take 1 tablet (10 mg) by mouth every 6 hours as needed for nausea or vomiting 30 tablet 2     sodium chloride, PF, 0.9% PF flush Inject 10 mLs into the vein as needed for line flush. Flush IV before and after med administration as directed and/or at least every 24 hours, or prior to deaccessing for no further use and/or at least every 4 weeks when not accessed. [id number] mL 0     sulfamethoxazole-trimethoprim (BACTRIM DS) 800-160 MG tablet Take 1 tablet by mouth Every Mon, Wed, Fri Morning. (Patient not taking: Reported on 6/5/2025) 36 tablet 1     VENTOLIN  (90 Base) MCG/ACT inhaler INHALE 2 PUFFS INTO THE LUNGS EVERY 6 HOURS AS NEEDED  FOR SHORTNESS OF BREATH OR WHEEZING OR COUGH 18 g 2     Objective:  Physical Exam:  General: The patient is a pleasant male in no acute distress.  Vitals done in infusion and reviewed.   Wt Readings from Last 10 Encounters:   06/05/25 73.4 kg (161 lb 14.4 oz)   06/04/25 70.3 kg (155 lb)   04/30/25 73.9 kg (163 lb)   04/25/25 74.8 kg (164 lb 12.8 oz)   04/21/25 71.7 kg (158 lb)   04/17/25 74.4 kg (164 lb)   04/16/25 74.6 kg (164 lb 8 oz)   04/04/25 75 kg (165 lb 5.5 oz)   04/02/25 74.7 kg (164 lb 11.2 oz)   03/28/25 73.2 kg (161 lb 4.8 oz)   HEENT: EOMI. Sclerae are anicteric.   Heart: Regular rate and rhythm.   Lungs: Clear to auscultation bilaterally.   Abdomen: Bowel sounds present, hypoactive, abd soft, nontender with visible ostomy bag.  No tenderness to palpation over abdomen or bladder.  No guarding, moving around the bed well and sits and stands without issues. Stool in pouch is soft, yellow-brown.  Extremities: 1+ pitting lower extremity edema noted bilaterally.   Neuro: Cranial nerves II through XII are grossly intact. .  Skin: No rashes, petechiae, or bruising noted on exposed skin.    LABS  Most Recent 3 CBC's:  Recent Labs   Lab Test 06/05/25  0814 04/30/25  0656 04/16/25  0803   WBC 9.5 16.0* 7.9   HGB 8.5* 9.7* 10.3*   MCV 82 83 85    262 154    Most Recent 3 BMP's:  Recent Labs   Lab Test 06/05/25  1000 06/05/25  0814 04/30/25  0656 04/16/25  0803   NA  --  130* 131* 135   POTASSIUM  --  4.1 4.4 4.6   CHLORIDE  --  95* 98 103   CO2  --  23 22 22   BUN  --  22.7* 16.9 13.5   CR 1.23* 1.31*  1.31* 0.72  0.72 1.00   ANIONGAP  --  12 11 10   JEFERSON  --  8.9 8.7* 8.7*   GLC  --  117* 104* 97    Most Recent 2 LFT's:  Recent Labs   Lab Test 06/05/25  0814 04/30/25  0656   AST 70* 61*   ALT 19 25   ALKPHOS 287* 243*   BILITOTAL 0.6 0.5   I reviewed the above labs today.    ASSESSMENT AND PLAN   Metastatic rectal cancer  He started on treatment with FOLFIRI + bevacizumab on 8/30/24. Imaging in October  2024 showed improvement in his disease. Imaging in February 2025 showed disease progression, though this was in the setting of treatment break due to PJP pneumonia. Therefore he resumed the same chemotherapy with FOLFIRI/chelsea on 2/20/25. Imaging April 2025 with mixed response. Plan to continue treatment as a bridge to clinical trial. I will check with the trial team for updates. Treatment changed to FOLFOX + bevacizumab with oxaliplatin desensitization. Given concern for increased neurotoxicity from oxaliplatin, will decrease oxaliplatin dose by 20%.   -In the setting of nausea and vomiting, decreased urinary output, increasing rectal pain, will obtain start CT today which was ordered earlier by TAMI Garcia.    -Will hold chemotherapy today.  -Will obtain CT as above as well as a UA.   -He does have follow up later this month with a scan and to see Dr. Snow.  Will message team once we have CT results re: plan going forward.     MICHELLE  -Creatinine 1.31 this am on arrival, and post-1 liter IVF this is 1.23.  -Creatinine clearance is 79.   -Per his report, he feels his urine has been darker and has been feeling it is taking longer to void, although he does feel that he is emptying his bladder and has no obvious distention or discomfort on exam.  -Will obtain CT as above.  Will denice him with results today or tomorrow and he is aware of this.     Nausea  Secondary to chemotherapy. Recommend taking Compazine and Zofran as needed. Continue olanzapine.   -He prefers the ondansetron ODT with his next refill.      Constipation  Recommend increasing MiraLax from qday to bid at his visit yesterday and he started last night and did have more output from his stoma this morning.  -Continue Miralax BID as he is increasing oxycodone use.     Bone mets  Continue Zometa monthly.     PJP pneumonia  Following with ID. Completed course of high dose Bactrim, now on PJP prophylaxis with M/W/F Bactrim for a minimum of 6 months (August 2025).  Per ID, will then reassess the immune system and the CD4 count prior to stopping the Bactrim prophylaxis.  Note from 2.19 reviewed today.   -Lungs are CTA and he feels his cough is also resolved.   -Creatinine clearance today is 79.  At this point, he could resume and since he reports he has not taken it for the last month it is likely not the cause of his MICHELLE.     Back pain  Secondary to bone mets. Following with palliative care. Managed with oxycodone prn.  -Palliative care follow-up scheduled for 6/18.     Iron deficient anemia  Treated with 3 doses of Venofer, last on 10/4/24. Iron studies in February 2025 unclear if anemia of chronic disease or mixed with recurrent iron deficiency. MCV has been trending down again. Iron studies in mid-March 2025 are consistent with anemia of chronic disease. Will continue to monitor.   -Hgb 8.5 today.  No s/s bleeding or black tarry stools.   -Not discussed with patient today at this acute visit; will message primary team.     Muscle cramping  Will trial magnesium oxide 400 mg once/day.  -He picked this up today and will start this evening.     FLORA Mcclain, CNP  [facility] [address] [place] [address] [phone]    50 minutes spent on the date of the encounter doing chart review, review of test results, interpretation of tests, patient visit, and documentation.    Again, thank you for allowing me to participate in the care of your patient.        Sincerely,        FLORA Albarado CNP    Electronically signed

## 2025-06-05 NOTE — PROGRESS NOTES
"Infusion Nursing Note:  Roman Escalante presents today for Cycle 2, day 1 avastin, oxaliplatin desensitization, fluorouracil bolus and pump connect, zometa---HELD, IV fluids given   Patient seen by provider today: No  Patient had a visit with Radha Chapman PA-C yesterday   present during visit today: Not Applicable.    Note: Patient sates she had about 36 ounces of fluid to drink this morning plus drank some V8 juice. He threw up getting out of the car when he arrived to clinic. Took a compazine with relief of nausea. Requested some IV fluids with infusion today. States he throws up once in awhile at home, \"once every 10 days or so.\"  States the vomiting caused a lot of pain/pressure in his rectum. Pain 8/10 on arrival. Took a dose of oxycodone from home prior to arriving and an additional dose in infusion. Unable to sit comfortably in the infusion chair despite pillows under his butt. Provided a bed for comfort.  Pain still 7/10 in his rectum after second dose of oxycodone. States this is more pain this his usual. Had a good bowel movement 3 days ago and some smaller bowel movements 2 days ago. Taking miralax and stool softeners for constipation. Confirms he is passing gas. States the stools have been more formed, not \"leaking around\" like they were before. RN started antiemetics before CMP resulted. Creatine came back elevated:    6/5/2025 Written Communication: Radha Chapman PA-C/Sarah Abraham RN  -[id number] ok to give a liter of IV fluids today   -0904 Let's check a UA and get a recheck of his creatinine after the IV fluids     Unable to give UA after IV fluids. Had a difficult time providing urine for the protein check prior to IVF. Stated he was really pushing and straining to get the urine out which isn't normal for him. Having bloody mucus from rectum but says that isn't new. He thought he was going to have some stool out his ostomy but it retreated. Creatine recheck came back at 1.23    6/5/2025 Written " Communication: Radha Chapman PA-C/Sarah Abraham,RN  -1047 His last chemo was over a month ago, so I don't think these are chemo side effects. I think we should probably hold chemo, scan him, and see what's going on.  -1059 Let's skip the Zometa for now.   -I'll order a CT and have Edith see him today     Patient seen by Edith Tavares in infusion. Pain subsided and he started passing stool more freely through his ostomy.    6/5/2025 1300 Verbal Communication: Edith Jeffery CNP/Sarah Abraham,RN  -ok for patient to go to CT now and then discharge home. I'll call him with the results and get him rescheduled for chemo depending on the results.       Intravenous Access:  Implanted Port.    Treatment Conditions:  Lab Results   Component Value Date    HGB 8.5 (L) 06/05/2025    WBC 9.5 06/05/2025    ANEU 7.6 06/05/2025     06/05/2025        Lab Results   Component Value Date     (L) 06/05/2025    POTASSIUM 4.1 06/05/2025    MAG 2.0 03/28/2025    CR 1.31 (H) 06/05/2025    CR 1.31 (H) 06/05/2025    JEFERSON 8.9 06/05/2025    BILITOTAL 0.6 06/05/2025    ALBUMIN 3.3 (L) 06/05/2025    ALT 19 06/05/2025    AST 70 (H) 06/05/2025       Results reviewed, labs MET treatment parameters, ok to proceed with treatment.  Urine protein: 20.  Blood Pressure: 98/67.      Post Infusion Assessment:  Patient tolerated infusion without incident.  Blood return noted pre and post infusion.  Site patent and intact, free from redness, edema or discomfort.  No evidence of extravasations.  Port flushed and left accessed for CT, to be de-accessed after scan.       Discharge Plan:   Prescription refills given for dexamethasone, zofran ODT, OTC magnesium.  Discharge instructions reviewed with: Patient.  Patient and/or family verbalized understanding of discharge instructions and all questions answered.  AVS to patient via [facility].  Patient will return 6/20 for next appointment.  Other follow up to be scheduled by LILLY pending results of  today's CT scan.   Patient discharged in stable condition accompanied by: self.  Departure Mode: Ambulatory.      Sarah Abraham RN

## 2025-06-05 NOTE — PATIENT INSTRUCTIONS
[facility] Triage and after hours / weekends / holidays:  [phone]    Please call the triage or after hours line if you experience a temperature greater than or equal to 100.4, shaking chills, have uncontrolled nausea, vomiting and/or diarrhea, dizziness, shortness of breath, chest pain, bleeding, unexplained bruising, or if you have any other new/concerning symptoms, questions or concerns.      If you are having any concerning symptoms or wish to speak to a provider before your next infusion visit, please call triage to notify them so we can adequately serve you.     If you need a refill on a narcotic prescription or other medication, please call before your infusion appointment.           June 2025 Sunday Monday Tuesday Wednesday Thursday Friday Saturday   1     2     3     4    Return Patient  12:00 PM   (45 min.)   Radha Chapman PA-C   [facility] 5    Lab Central   7:15 AM   (15 min.)   [facility]   [facility]    Infusion 480   8:00 AM   (480 min.)    ONC INFUSION NURSE   [facility]    Return Patient  12:45 PM   (45 min.)   Edith Tavares APRN CNP   [facility]    CT ABDOMEN PELVIS W CONTRAST   1:30 PM   (20 min.)   [facility] [place] [address] [facility] Clinic Index 6     7       8     9     10     11     12     13     14       15     16     17     18    CT CHEST/ABDOMEN/PELVIS W CONTRAST   7:30 AM   (20 min.)   [facility] [place] [address] CT Clinic Index    Return Palliative  10:40 AM   (40 min.)   Isidra Prater DO   [facility] 19     20    Lab Central   7:45 AM   (15 min.)   [facility]   [facility]    Return Patient   8:15 AM   (30 min.)   Polo Snow MD   [facility]    Infusion 360  10:30 AM   (360 min.)   [facility] ONC  [facility] 21 22     23     24     25     26     27     28       29     30                                            July 2025 Sunday Monday Tuesday Wednesday Thursday Friday Saturday             1     2     3     4     5       6     7     8     9     10     11     12       13     14     15     16     17     18     19       20     21     22     23     24     25     26       27     28     29     30     31                                  Recent Results (from the past 24 hours)   Creatinine    Collection Time: 06/05/25  8:14 AM   Result Value Ref Range    Creatinine 1.31 (H) 0.67 - 1.17 mg/dL    GFR Estimate 65 >60 mL/min/1.73m2   CBC with platelets and differential    Collection Time: 06/05/25  8:14 AM   Result Value Ref Range    WBC Count 9.5 4.0 - 11.0 10e3/uL    RBC Count 3.24 (L) 4.40 - 5.90 10e6/uL    Hemoglobin 8.5 (L) 13.3 - 17.7 g/dL    Hematocrit 26.7 (L) 40.0 - 53.0 %    MCV 82 78 - 100 fL    MCH 26.2 (L) 26.5 - 33.0 pg    MCHC 31.8 31.5 - 36.5 g/dL    RDW 19.9 (H) 10.0 - 15.0 %    Platelet Count 155 150 - 450 10e3/uL    % Neutrophils 80 %    % Lymphocytes 5 %    % Monocytes 12 %    % Eosinophils 1 %    % Basophils 0 %    % Immature Granulocytes 2 %    NRBCs per 100 WBC 0 <1 /100    Absolute Neutrophils 7.6 1.6 - 8.3 10e3/uL    Absolute Lymphocytes 0.4 (L) 0.8 - 5.3 10e3/uL    Absolute Monocytes 1.2 0.0 - 1.3 10e3/uL    Absolute Eosinophils 0.1 0.0 - 0.7 10e3/uL    Absolute Basophils 0.0 0.0 - 0.2 10e3/uL    Absolute Immature Granulocytes 0.2 <=0.4 10e3/uL    Absolute NRBCs 0.0 10e3/uL   Comprehensive metabolic panel    Collection Time: 06/05/25  8:14 AM   Result Value Ref Range    Sodium 130 (L) 135 - 145 mmol/L    Potassium 4.1 3.4 - 5.3 mmol/L    Carbon Dioxide (CO2) 23 22 - 29 mmol/L    Anion Gap 12 7 - 15 mmol/L    Urea Nitrogen 22.7 (H) 6.0 - 20.0 mg/dL    Creatinine 1.31 (H) 0.67 - 1.17 mg/dL    GFR Estimate 65 >60 mL/min/1.73m2    Calcium 8.9  8.8 - 10.4 mg/dL    Chloride 95 (L) 98 - 107 mmol/L    Glucose 117 (H) 70 - 99 mg/dL    Alkaline Phosphatase 287 (H) 40 - 150 U/L    AST 70 (H) 0 - 45 U/L    ALT 19 0 - 70 U/L    Protein Total 6.5 6.4 - 8.3 g/dL    Albumin 3.3 (L) 3.5 - 5.2 g/dL    Bilirubin Total 0.6 <=1.2 mg/dL   Protein qualitative urine    Collection Time: 06/05/25  8:56 AM   Result Value Ref Range    Protein Albumin Urine 20 (A) Negative mg/dL   Creatinine    Collection Time: 06/05/25 10:00 AM   Result Value Ref Range    Creatinine 1.23 (H) 0.67 - 1.17 mg/dL    GFR Estimate 71 >60 mL/min/1.73m2   UA with Microscopic reflex to Culture    Collection Time: 06/05/25 11:59 AM    Specimen: Urine, Midstream   Result Value Ref Range    Color Urine Yellow Colorless, Straw, Light Yellow, Yellow    Appearance Urine Clear Clear    Glucose Urine Negative Negative mg/dL    Bilirubin Urine Negative Negative    Ketones Urine Negative Negative mg/dL    Specific Gravity Urine 1.012 1.003 - 1.035    Blood Urine Negative Negative    pH Urine 5.5 5.0 - 7.0    Protein Albumin Urine Negative Negative mg/dL    Urobilinogen Urine Normal Normal mg/dL    Nitrite Urine Negative Negative    Leukocyte Esterase Urine Negative Negative    Mucus Urine Present (A) None Seen /LPF    RBC Urine 1 <=2 /HPF    WBC Urine 1 <=5 /HPF

## 2025-06-09 ENCOUNTER — PATIENT OUTREACH (OUTPATIENT)
Dept: ONCOLOGY | Facility: CLINIC | Age: 50
End: 2025-06-09
Payer: COMMERCIAL

## 2025-06-09 DIAGNOSIS — C78.00 RECTAL ADENOCARCINOMA METASTATIC TO LUNG (H): Primary | ICD-10-CM

## 2025-06-09 DIAGNOSIS — C20 RECTAL ADENOCARCINOMA METASTATIC TO LUNG (H): Primary | ICD-10-CM

## 2025-06-09 NOTE — TELEPHONE ENCOUNTER
"[facility]: Cancer Care                                                                                          Pt called reporting increased BLE swelling, \"burning in feet\" and rectal pain. C2 5fu/Reyna was held 6/9 d/t vomiting, elevated creatinine.    Pt is asking for lasix, stated he was taking this when he was on folfox before. He remembers the BLE swelling, denies one leg worse than the other. Denies redness, warmth, acute pain in one leg. Has been elevating legs all weekend, has not used compression stockings. Does state urine is getting lighter, pushing 75-80 oz fluids a day. Last BM Friday, state this is normal for him. Continues to have mucous and blood per rectum when urinating or coughing, not worse with BM.    Reviewed with pt care team aware of CT abd/pelvis results, was scheduled for restaging scan CT CAP 6/18. Dr. Snow may need CT chest to complete staging. Pt agreeable to lab work today if needed, [facility] preferred for labs and pharmacy.    Signature:  Virgen Nieto RN  "

## 2025-06-10 ENCOUNTER — LAB (OUTPATIENT)
Dept: LAB | Facility: CLINIC | Age: 50
End: 2025-06-10
Payer: COMMERCIAL

## 2025-06-10 DIAGNOSIS — B59 PNEUMOCYSTOSIS (H): Primary | ICD-10-CM

## 2025-06-10 DIAGNOSIS — B59 PNEUMOCYSTOSIS (H): ICD-10-CM

## 2025-06-10 LAB
BASOPHILS # BLD AUTO: 0 10E3/UL (ref 0–0.2)
BASOPHILS NFR BLD AUTO: 0 %
EOSINOPHIL # BLD AUTO: 0 10E3/UL (ref 0–0.7)
EOSINOPHIL NFR BLD AUTO: 0 %
ERYTHROCYTE [DISTWIDTH] IN BLOOD BY AUTOMATED COUNT: 19.6 % (ref 10–15)
HCT VFR BLD AUTO: 31.8 % (ref 40–53)
HGB BLD-MCNC: 9.5 G/DL (ref 13.3–17.7)
IMM GRANULOCYTES # BLD: 0.2 10E3/UL
IMM GRANULOCYTES NFR BLD: 2 %
LYMPHOCYTES # BLD AUTO: 0.2 10E3/UL (ref 0.8–5.3)
LYMPHOCYTES NFR BLD AUTO: 2 %
MCH RBC QN AUTO: 25.4 PG (ref 26.5–33)
MCHC RBC AUTO-ENTMCNC: 29.9 G/DL (ref 31.5–36.5)
MCV RBC AUTO: 85 FL (ref 78–100)
MONOCYTES # BLD AUTO: 0.7 10E3/UL (ref 0–1.3)
MONOCYTES NFR BLD AUTO: 8 %
NEUTROPHILS # BLD AUTO: 8.2 10E3/UL (ref 1.6–8.3)
NEUTROPHILS NFR BLD AUTO: 88 %
PLATELET # BLD AUTO: 161 10E3/UL (ref 150–450)
RBC # BLD AUTO: 3.74 10E6/UL (ref 4.4–5.9)
WBC # BLD AUTO: 9.4 10E3/UL (ref 4–11)

## 2025-06-10 PROCEDURE — 85025 COMPLETE CBC W/AUTO DIFF WBC: CPT

## 2025-06-10 PROCEDURE — 86360 T CELL ABSOLUTE COUNT/RATIO: CPT

## 2025-06-10 PROCEDURE — 36415 COLL VENOUS BLD VENIPUNCTURE: CPT

## 2025-06-10 PROCEDURE — 86359 T CELLS TOTAL COUNT: CPT

## 2025-06-10 NOTE — CONFIDENTIAL NOTE
Patient's TMP/SMX was stopped one month ago. He needs to recheck the CD4 count and if it is low then he needs to restart PJP prophylaxis. Orders for labs placed by me.   He prefers the [facility] pharmacy if he needs to restart Bactrim or another medication for PJP prophylaxis.     Taty Frazier MD

## 2025-06-10 NOTE — TELEPHONE ENCOUNTER
[facility]: Cancer Care                                                                                          Per Radha Chapman PA: lasix not recommended with current MICHELLE, recommend bilateral compression stockings for swelling and elevation. Resume chemo this week if pt feels up to it, change upcoming CT scan to chest only to complete restaging, see Dr. Snow as scheduled 6/20.    Pt stated he had labs today for Dr. Frazier (cbc, t cell) and unless swelling is better or kidney values better he does not want to come in for chemo this week. He will check labs on 6/18 with CT chest, order entered. Standing CMP to be collected 6/18.    Signature:  Virgen Nieto RN

## 2025-06-11 ENCOUNTER — ALLIED HEALTH/NURSE VISIT (OUTPATIENT)
Dept: ONCOLOGY | Facility: CLINIC | Age: 50
End: 2025-06-11
Payer: COMMERCIAL

## 2025-06-11 DIAGNOSIS — C78.7 COLON CANCER METASTASIZED TO LIVER (H): ICD-10-CM

## 2025-06-11 DIAGNOSIS — R10.30 LOWER ABDOMINAL PAIN: ICD-10-CM

## 2025-06-11 DIAGNOSIS — C18.9 COLON CANCER METASTASIZED TO LIVER (H): ICD-10-CM

## 2025-06-11 DIAGNOSIS — C78.00 RECTAL ADENOCARCINOMA METASTATIC TO LUNG (H): Primary | ICD-10-CM

## 2025-06-11 DIAGNOSIS — G89.3 CANCER RELATED PAIN: ICD-10-CM

## 2025-06-11 DIAGNOSIS — C20 RECTAL ADENOCARCINOMA METASTATIC TO LUNG (H): Primary | ICD-10-CM

## 2025-06-11 LAB
CD3 CELLS # BLD: 108 CELLS/UL (ref 603–2990)
CD3 CELLS NFR BLD: 55 % (ref 49–84)
CD3+CD4+ CELLS # BLD: 43 CELLS/UL (ref 441–2156)
CD3+CD4+ CELLS NFR BLD: 22 % (ref 28–63)
CD3+CD4+ CELLS/CD3+CD8+ CLL BLD: 0.68 % (ref 1.4–2.6)
CD3+CD8+ CELLS # BLD: 63 CELLS/UL (ref 125–1312)
CD3+CD8+ CELLS NFR BLD: 32 % (ref 10–40)
T CELL COMMENT: ABNORMAL

## 2025-06-11 RX ORDER — OXYCODONE HYDROCHLORIDE 10 MG/1
10-20 TABLET ORAL EVERY 4 HOURS PRN
Qty: 150 TABLET | Refills: 0 | Status: SHIPPED | OUTPATIENT
Start: 2025-06-13

## 2025-06-11 NOTE — TELEPHONE ENCOUNTER
Received [employer] message from patient requesting refill of oxycodone.     Last refill: 5/21/25  Last office visit: 4/17/25  Scheduled for follow up 6/18/25     Will route request to MD/ for review.     Reviewed [place]  Report.

## 2025-06-11 NOTE — PROGRESS NOTES
Spoke with Roman on the phone to give him a clinical trial update.  I let him know he is still being considered for Phase I clinical trials through [facility].  I talked about the biggest barrier to enrollment at this time is his lung infection status.  We talked about Bactirm and how the use of antibiotics, even prophylaxis, can make him ineligible for clinical trial.  We talked about why and he was very understanding and acknowledged the concern about that.  I told him I was looking toward July or August for enrollment on either:    Phase I VSV-GP clinical trial ([id number]): This is a phase I dose-escalation trial of the oncolytic virus VSV-GP [id number] as monotherapy (part 1) or in combination with checkpoint inhibitor Ezabenlimab (immune checkpoint inhibitor; part 2). VSV-GP is administered intratumorally, intravenously, or both depending on the arm of the study. VSV-GP is administered every 3 weeks (with an additional day 4 cycle 1) for a total of 4 cycles; for part 2 ezabenlimab will be administered each cycle for up to 12 months. This trial requires at least 1 accessible lesion for biopsy, with some arms requiring 2 accessible lesions (one for injection, one for biopsy).   This trial involves an overnight stay at [facility] ([address] [place] [address]) for the first 3 cycles.  The fourth cycle can be done as an outpatient, but still on campus at the Clinical Research Unit in the [facility].   The main side effects are flu-like symptoms and cytokine release syndrome (CRS).      Phase I TORL-1-23 ([id number]):  This is a phase I study of the monoclonal antibody TORL-1-23 against a protein called Claudin6 (CLDN-6).  This is a 2 step emrollment.  First sending archived tissue to lab for CLDN-6 testing. Tumor expression of CLDN-6 required for most cohorts, although currently there is CLDN-6 negative cohort. The therapy is administered via IV every 3 weeks for up to 36 cycles (~2 years).     This product is administered in the Clinical Research Unit, which is located on campus.  While this is an outpatient therapy, the infusion days are full days, several hours.  There is also a Day 4 visit and then weekly visits in between infusions for the first few cycles.  Side effects include fatigue, joint pain, and pneumonitis.    I assured Roman I will continue to watch his progress and will continue to update Dr. Snow about trial availability.

## 2025-06-12 DIAGNOSIS — B59 PNEUMONIA DUE TO PNEUMOCYSTIS JIROVECII, UNSPECIFIED LATERALITY, UNSPECIFIED PART OF LUNG (H): ICD-10-CM

## 2025-06-12 DIAGNOSIS — B59 PNEUMOCYSTOSIS (H): Primary | ICD-10-CM

## 2025-06-12 RX ORDER — SULFAMETHOXAZOLE AND TRIMETHOPRIM 800; 160 MG/1; MG/1
1 TABLET ORAL
Qty: 12 TABLET | Refills: 5 | Status: SHIPPED | OUTPATIENT
Start: 2025-06-13

## 2025-06-18 ENCOUNTER — ANCILLARY PROCEDURE (OUTPATIENT)
Dept: CT IMAGING | Facility: CLINIC | Age: 50
End: 2025-06-18
Attending: STUDENT IN AN ORGANIZED HEALTH CARE EDUCATION/TRAINING PROGRAM
Payer: COMMERCIAL

## 2025-06-18 ENCOUNTER — LAB (OUTPATIENT)
Dept: LAB | Facility: CLINIC | Age: 50
End: 2025-06-18
Attending: STUDENT IN AN ORGANIZED HEALTH CARE EDUCATION/TRAINING PROGRAM
Payer: COMMERCIAL

## 2025-06-18 ENCOUNTER — VIRTUAL VISIT (OUTPATIENT)
Dept: ONCOLOGY | Facility: CLINIC | Age: 50
End: 2025-06-18
Attending: STUDENT IN AN ORGANIZED HEALTH CARE EDUCATION/TRAINING PROGRAM
Payer: COMMERCIAL

## 2025-06-18 DIAGNOSIS — Z79.899 ENCOUNTER FOR LONG-TERM (CURRENT) USE OF MEDICATIONS: ICD-10-CM

## 2025-06-18 DIAGNOSIS — C20 RECTAL ADENOCARCINOMA METASTATIC TO LUNG (H): Primary | ICD-10-CM

## 2025-06-18 DIAGNOSIS — Z79.891 ENCOUNTER FOR LONG-TERM USE OF OPIATE ANALGESIC: ICD-10-CM

## 2025-06-18 DIAGNOSIS — G89.3 CANCER RELATED PAIN: ICD-10-CM

## 2025-06-18 DIAGNOSIS — C78.00 RECTAL ADENOCARCINOMA METASTATIC TO LUNG (H): Primary | ICD-10-CM

## 2025-06-18 DIAGNOSIS — C18.9 COLON CANCER METASTASIZED TO BONE (H): ICD-10-CM

## 2025-06-18 DIAGNOSIS — C78.00 RECTAL ADENOCARCINOMA METASTATIC TO LUNG (H): ICD-10-CM

## 2025-06-18 DIAGNOSIS — R63.0 DECREASED APPETITE: ICD-10-CM

## 2025-06-18 DIAGNOSIS — C20 RECTAL ADENOCARCINOMA METASTATIC TO LUNG (H): ICD-10-CM

## 2025-06-18 DIAGNOSIS — Z51.5 ENCOUNTER FOR PALLIATIVE CARE: ICD-10-CM

## 2025-06-18 DIAGNOSIS — C79.51 COLON CANCER METASTASIZED TO BONE (H): ICD-10-CM

## 2025-06-18 LAB
ALBUMIN SERPL BCG-MCNC: 3 G/DL (ref 3.5–5.2)
ALP SERPL-CCNC: 640 U/L (ref 40–150)
ALT SERPL W P-5'-P-CCNC: 44 U/L (ref 0–70)
ANION GAP SERPL CALCULATED.3IONS-SCNC: 12 MMOL/L (ref 7–15)
AST SERPL W P-5'-P-CCNC: 164 U/L (ref 0–45)
BILIRUB SERPL-MCNC: 0.8 MG/DL
BUN SERPL-MCNC: 25.1 MG/DL (ref 6–20)
CALCIUM SERPL-MCNC: 8.1 MG/DL (ref 8.8–10.4)
CHLORIDE SERPL-SCNC: 96 MMOL/L (ref 98–107)
CREAT SERPL-MCNC: 1.18 MG/DL (ref 0.67–1.17)
EGFRCR SERPLBLD CKD-EPI 2021: 74 ML/MIN/1.73M2
GLUCOSE SERPL-MCNC: 111 MG/DL (ref 70–99)
HCO3 SERPL-SCNC: 19 MMOL/L (ref 22–29)
POTASSIUM SERPL-SCNC: 4.9 MMOL/L (ref 3.4–5.3)
PROT SERPL-MCNC: 6.5 G/DL (ref 6.4–8.3)
SODIUM SERPL-SCNC: 127 MMOL/L (ref 135–145)

## 2025-06-18 PROCEDURE — 250N000011 HC RX IP 250 OP 636: Performed by: STUDENT IN AN ORGANIZED HEALTH CARE EDUCATION/TRAINING PROGRAM

## 2025-06-18 PROCEDURE — 71260 CT THORAX DX C+: CPT | Performed by: STUDENT IN AN ORGANIZED HEALTH CARE EDUCATION/TRAINING PROGRAM

## 2025-06-18 PROCEDURE — 36591 DRAW BLOOD OFF VENOUS DEVICE: CPT

## 2025-06-18 PROCEDURE — 82310 ASSAY OF CALCIUM: CPT

## 2025-06-18 RX ORDER — HEPARIN SODIUM (PORCINE) LOCK FLUSH IV SOLN 100 UNIT/ML 100 UNIT/ML
500 SOLUTION INTRAVENOUS ONCE
Status: COMPLETED | OUTPATIENT
Start: 2025-06-18 | End: 2025-06-18

## 2025-06-18 RX ORDER — HEPARIN SODIUM (PORCINE) LOCK FLUSH IV SOLN 100 UNIT/ML 100 UNIT/ML
5 SOLUTION INTRAVENOUS DAILY PRN
Status: DISCONTINUED | OUTPATIENT
Start: 2025-06-18 | End: 2025-06-24 | Stop reason: HOSPADM

## 2025-06-18 RX ORDER — IOPAMIDOL 755 MG/ML
79 INJECTION, SOLUTION INTRAVASCULAR ONCE
Status: COMPLETED | OUTPATIENT
Start: 2025-06-18 | End: 2025-06-18

## 2025-06-18 RX ADMIN — Medication 5 ML: at 07:37

## 2025-06-18 RX ADMIN — IOPAMIDOL 79 ML: 755 INJECTION, SOLUTION INTRAVASCULAR at 07:57

## 2025-06-18 RX ADMIN — HEPARIN SODIUM (PORCINE) LOCK FLUSH IV SOLN 100 UNIT/ML 500 UNITS: 100 SOLUTION at 07:50

## 2025-06-18 NOTE — LETTER
"6/18/2025      Roman Escalante  [address]  [place] [address]      Dear Colleague,    Thank you for referring your patient, Roman Escalante, to the [facility]. Please see a copy of my visit note below.    Palliative Care Progress Note    Patient Name: Roman Escalante  Primary Provider: Buddy Hart    Chief Complaint/Patient ID:   He has a PMHx of metastatic rectal cancer (mets to lung), completed NIH CAR-T trial in June 2024.  He has been on multiple lines of systemic therapy, currently on FOLFOX with oxaliplatin desensitization.    Last Palliative care appointment: 4/17/2025 with me     Reviewed: Yes    ORT Score = 1 for history of alcohol abuse in his mother.     Social History: Has a couple of kids and a couple grandkids who lives in [place].  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 Has a girlfriend-she is a hospice RN.     Advanced Care Planning: Has not completed. Girlfriend would be his HCA.     Interim History:  Roman is seen today for follow up with Palliative Care via billable video visit.      Reviewed oncology note from 6/5.  Given side effects, plan to hold chemotherapy that day.    Hasn't had chemo for 6-7 weeks now.    Had sent [employer] message last week that pain was more pronounced again. Located in lower back, a couple vertebra over the waistline.    Can't wait more than 4.5 hours without the oxycodone. Average 80-100mg daily. Doesn't feel the current pills work as well as in the past- maybe a different .     He'd like to wait on adding back in a long acting agent right now until after he has chemotherapy.    THC pills at night, every other night.    Still doing multiple small meals a day.  He is able to eat a \"piece of pizza here, a cold sandwich there\".    Using dulcolax liquid as needed for constipation. Still using miralax some too.    Physical Exam:   Constitutional: Alert, pleasant, no " apparent distress. Sitting up in chair.  Eyes: Sclera non-icteric, no eye discharge.  ENT: No nasal discharge. Ears grossly normal.  Respiratory: Unlabored respirations. Speaking in full sentences.  Musculoskeletal: Extremities appear normal- no gross deformities noted. No edema noted on upper body.   Skin: No suspicious lesions or rashes on visible skin.  Neurologic: Clear speech, no aphasia. No facial droop.  Psychiatric: Mentation appears normal, appropriate attention. Affect normal/bright. Does not appear anxious or depressed.    Key Data Reviewed:  LABS:   Lab Results   Component Value Date    WBC 9.4 06/10/2025    HGB 9.5 (L) 06/10/2025    HCT 31.8 (L) 06/10/2025     06/10/2025     (L) 06/18/2025    POTASSIUM 4.9 06/18/2025    CHLORIDE 96 (L) 06/18/2025    CO2 19 (L) 06/18/2025    BUN 25.1 (H) 06/18/2025    CR 1.18 (H) 06/18/2025     (H) 06/18/2025    SED 47 (H) 01/25/2025    NTBNPI 1,720 (H) 01/22/2025     (H) 06/18/2025    ALT 44 06/18/2025    ALKPHOS 640 (H) 06/18/2025    BILITOTAL 0.8 06/18/2025    INR 1.12 03/12/2025     IMAGING: CT AP 6/5/2025- IMPRESSION:   1. Increased size of extensive hepatic metastases. Hepatomegaly.  2. Increased soft tissue thickening about the rectum.  3. Numerous pulmonary nodules. The largest are overall stable in size.  4. No hydronephrosis or hydroureter.   The bladder is normally filled  and not distended.  5. Moderate colonic stool burden.  6. Unchanged bone metastases.    CT chest 6/18/2025- Impression:  1.  Increased size and number of pulmonary nodules and masses  suggesting worsening of metastatic disease.  2. Mediastinal lymphadenopathy is similar.  3. Ill-defined hepatic metastasis are also increased in size and  number.  4. Osseous lesions are more conspicuous.    Impression & Recommendations & Counseling:  Roman Escalante has a history of metastatic rectal cancer.     Has not had chemo in 6 to 7 weeks, and he has been having more side  effects, which has historically been his pattern.  Currently usually between 80 and 100 mg of oxycodone daily.  Based on this, we did discuss that adding in a long-acting agent again would be appropriate, however he would like to wait and see how he does after chemotherapy.  He was previously on MS Contin 15 mg twice daily, however current oxycodone needs to stay where they are, I would likely start 30 mg twice a day.  Reviewed being cautious with the THC supplement that he is taking at night, as this sounds fairly strong.    Recommendations:  -Continue gabapentin 100-200 mg twice during the day and 300 mg at bedtime.    -Okay to continue oxycodone 10 to 20 mg every four hours as needed.    -We did discuss adding a long-acting medication right now would be appropriate, however, he would like to wait. He will reach out if pain does not improve after next chemo infusion.  -Continue THC as needed.   -Continue getting outside and going for walks as able.      Follow up: 5 weeks      Video-Visit Details  Video Start Time: 10:41AM  Video End Time: 11:01AM    Originating Location (pt. Location): Home     Distant Location (provider location):  Offsite- Personal Home     Platform used for Video Visit: Huey     Total time spent on day of encounter is 28 mins, including reviewing record, review of above studies, above visit with patient, symptomatic discussion as above, including medication adjustments/prescription management, and documentation.         The longitudinal plan of care for the diagnosis(es)/condition(s) as documented were addressed during thi the s visit.?Due to the added complexity in care, I will continue to support Roman Escalante in the subsequent management and with the ongoing continuity of care.        Isidra Prater,   Palliative Medicine   [id number]    Some chart documentation performed using Dragon Voice recognition Software. Although reviewed after completion, some words and  grammatical errors may remain.      Again, thank you for allowing me to participate in the care of your patient.        Sincerely,        Isidra Prater, DO    Electronically signed

## 2025-06-18 NOTE — NURSING NOTE
Chief Complaint   Patient presents with    Port Draw     Labs drawn via port by RN in lab.      Labs drawn via Port accessed using 20g flat needle. Line flushed and Heparin locked.     Jalyn Torres RN

## 2025-06-18 NOTE — NURSING NOTE
Current patient location: [place], [place]    Is the patient currently in the state of [place]? YES    Visit mode: VIDEO    If the visit is dropped, the patient can be reconnected by:VIDEO VISIT: Text to cell phone:   Telephone Information:   Mobile [phone]       Will anyone else be joining the visit? NO  (If patient encounters technical issues they should call [phone])    Are changes needed to the allergy or medication list? Pt stated no changes to allergies and Pt stated no med changes    Are refills needed on medications prescribed by this physician? NO    Rooming Documentation:  Questionnaire(s) completed    Reason for visit: RECHECK    Levi RODRIGUEZ

## 2025-06-18 NOTE — DISCHARGE INSTRUCTIONS

## 2025-06-18 NOTE — LETTER
"6/18/2025      Roman Escalante  [address]  [place] [facility] [place] [place] [address]      Dear Colleague,    Thank you for referring your patient, Roman Escalante, to the [facility]. Please see a copy of my visit note below.    Palliative Care Progress Note    Patient Name: Roman Escalante  Primary Provider: Buddy Hart    Chief Complaint/Patient ID:   He has a PMHx of metastatic rectal cancer (mets to lung), completed NIH CAR-T trial in June 2024.  He has been on multiple lines of systemic therapy, currently on FOLFOX with oxaliplatin desensitization.    Last Palliative care appointment: 4/17/2025 with me     Reviewed: Yes    ORT Score = 1 for history of alcohol abuse in his mother.     Social History: Has a couple of kids and a couple grandkids who lives in [place].  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 Has a girlfriend-she is a hospice RN.     Advanced Care Planning: Has not completed. Girlfriend would be his HCA.     Interim History:  Roman is seen today for follow up with Palliative Care via billable video visit.      Reviewed oncology note from 6/5.  Given side effects, plan to hold chemotherapy that day.    Hasn't had chemo for 6-7 weeks now.    Had sent [employer] message last week that pain was more pronounced again. Located in lower back, a couple vertebra over the waistline.    Can't wait more than 4.5 hours without the oxycodone. Average 80-100mg daily. Doesn't feel the current pills work as well as in the past- maybe a different .     He'd like to wait on adding back in a long acting agent right now until after he has chemotherapy.    THC pills at night, every other night.    Still doing multiple small meals a day.  He is able to eat a \"piece of pizza here, a cold sandwich there\".    Using dulcolax liquid as needed for constipation. Still using miralax some too.    Physical Exam:   Constitutional: Alert, pleasant, no " apparent distress. Sitting up in chair.  Eyes: Sclera non-icteric, no eye discharge.  ENT: No nasal discharge. Ears grossly normal.  Respiratory: Unlabored respirations. Speaking in full sentences.  Musculoskeletal: Extremities appear normal- no gross deformities noted. No edema noted on upper body.   Skin: No suspicious lesions or rashes on visible skin.  Neurologic: Clear speech, no aphasia. No facial droop.  Psychiatric: Mentation appears normal, appropriate attention. Affect normal/bright. Does not appear anxious or depressed.    Key Data Reviewed:  LABS:   Lab Results   Component Value Date    WBC 9.4 06/10/2025    HGB 9.5 (L) 06/10/2025    HCT 31.8 (L) 06/10/2025     06/10/2025     (L) 06/18/2025    POTASSIUM 4.9 06/18/2025    CHLORIDE 96 (L) 06/18/2025    CO2 19 (L) 06/18/2025    BUN 25.1 (H) 06/18/2025    CR 1.18 (H) 06/18/2025     (H) 06/18/2025    SED 47 (H) 01/25/2025    NTBNPI 1,720 (H) 01/22/2025     (H) 06/18/2025    ALT 44 06/18/2025    ALKPHOS 640 (H) 06/18/2025    BILITOTAL 0.8 06/18/2025    INR 1.12 03/12/2025     IMAGING: CT AP 6/5/2025- IMPRESSION:   1. Increased size of extensive hepatic metastases. Hepatomegaly.  2. Increased soft tissue thickening about the rectum.  3. Numerous pulmonary nodules. The largest are overall stable in size.  4. No hydronephrosis or hydroureter.   The bladder is normally filled  and not distended.  5. Moderate colonic stool burden.  6. Unchanged bone metastases.    CT chest 6/18/2025- Impression:  1.  Increased size and number of pulmonary nodules and masses  suggesting worsening of metastatic disease.  2. Mediastinal lymphadenopathy is similar.  3. Ill-defined hepatic metastasis are also increased in size and  number.  4. Osseous lesions are more conspicuous.    Impression & Recommendations & Counseling:  Roman Escalante has a history of metastatic rectal cancer.     Has not had chemo in 6 to 7 weeks, and he has been having more side  effects, which has historically been his pattern.  Currently usually between 80 and 100 mg of oxycodone daily.  Based on this, we did discuss that adding in a long-acting agent again would be appropriate, however he would like to wait and see how he does after chemotherapy.  He was previously on MS Contin 15 mg twice daily, however current oxycodone needs to stay where they are, I would likely start 30 mg twice a day.  Reviewed being cautious with the THC supplement that he is taking at night, as this sounds fairly strong.    Recommendations:  -Continue gabapentin 100-200 mg twice during the day and 300 mg at bedtime.    -Okay to continue oxycodone 10 to 20 mg every four hours as needed.    -We did discuss adding a long-acting medication right now would be appropriate, however, he would like to wait. He will reach out if pain does not improve after next chemo infusion.  -Continue THC as needed.   -Continue getting outside and going for walks as able.      Follow up: 5 weeks      Video-Visit Details  Video Start Time: 10:41AM  Video End Time: 11:01AM    Originating Location (pt. Location): Home     Distant Location (provider location):  Offsite- Personal Home     Platform used for Video Visit: Huey     Total time spent on day of encounter is 28 mins, including reviewing record, review of above studies, above visit with patient, symptomatic discussion as above, including medication adjustments/prescription management, and documentation.         The longitudinal plan of care for the diagnosis(es)/condition(s) as documented were addressed during thi the s visit.?Due to the added complexity in care, I will continue to support Roman Escalante in the subsequent management and with the ongoing continuity of care.        Isidra Prater,   Palliative Medicine   [id number]    Some chart documentation performed using Dragon Voice recognition Software. Although reviewed after completion, some words and  grammatical errors may remain.      Again, thank you for allowing me to participate in the care of your patient.        Sincerely,        Isidra Prater, DO    Electronically signed

## 2025-06-18 NOTE — PATIENT INSTRUCTIONS
Recommendations:  -Continue gabapentin 100-200 mg twice during the day and 300 mg at bedtime.    -Okay to continue oxycodone 10 to 20 mg every four hours as needed.    -We did discuss adding a long-acting medication right now would be appropriate.  Agreed to wait based on your request.  Please reach out if pain does not improve after next chemo infusion.  -Continue THC as needed.   -Continue getting outside and going for walks as able.    Follow up: 5 weeks      *Please do not make any changes to medications that we prescribe, including pain medicines, without talking to our team*    Reasons to Call    If you are having worsening/uncontrolled symptoms we want you to call!    You or your other physicians make any changes to medications we have prescribed.  -Please call for refills 4-5 days before you will run out of medication.    Important Phone Numbers, including: Refills, scheduling, and general questions     Palliative Care RN: Rebeca Urbina : [phone]  *For scheduling needs/follow up visits : [phone]  *After hours or on weekends- Will connect you with on call MD : [phone].

## 2025-06-18 NOTE — PROGRESS NOTES
"Palliative Care Progress Note    Patient Name: Roman Escalante  Primary Provider: Buddy Hart    Chief Complaint/Patient ID:   He has a PMHx of metastatic rectal cancer (mets to lung), completed NIH CAR-T trial in June 2024.  He has been on multiple lines of systemic therapy, currently on FOLFOX with oxaliplatin desensitization.    Last Palliative care appointment: 4/17/2025 with me     Reviewed: Yes    ORT Score = 1 for history of alcohol abuse in his mother.     Social History: Has a couple of kids and a couple grandkids who lives in [place].  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 Has a girlfriend-she is a hospice RN.     Advanced Care Planning: Has not completed. Girlfriend would be his HCA.     Interim History:  Roman is seen today for follow up with Palliative Care via billable video visit.      Reviewed oncology note from 6/5.  Given side effects, plan to hold chemotherapy that day.    Hasn't had chemo for 6-7 weeks now.    Had sent [employer] message last week that pain was more pronounced again. Located in lower back, a couple vertebra over the waistline.    Can't wait more than 4.5 hours without the oxycodone. Average 80-100mg daily. Doesn't feel the current pills work as well as in the past- maybe a different .     He'd like to wait on adding back in a long acting agent right now until after he has chemotherapy.    THC pills at night, every other night.    Still doing multiple small meals a day.  He is able to eat a \"piece of pizza here, a cold sandwich there\".    Using dulcolax liquid as needed for constipation. Still using miralax some too.    Physical Exam:   Constitutional: Alert, pleasant, no apparent distress. Sitting up in chair.  Eyes: Sclera non-icteric, no eye discharge.  ENT: No nasal discharge. Ears grossly normal.  Respiratory: Unlabored respirations. Speaking in full sentences.  Musculoskeletal: Extremities appear normal- no gross " deformities noted. No edema noted on upper body.   Skin: No suspicious lesions or rashes on visible skin.  Neurologic: Clear speech, no aphasia. No facial droop.  Psychiatric: Mentation appears normal, appropriate attention. Affect normal/bright. Does not appear anxious or depressed.    Key Data Reviewed:  LABS:   Lab Results   Component Value Date    WBC 9.4 06/10/2025    HGB 9.5 (L) 06/10/2025    HCT 31.8 (L) 06/10/2025     06/10/2025     (L) 06/18/2025    POTASSIUM 4.9 06/18/2025    CHLORIDE 96 (L) 06/18/2025    CO2 19 (L) 06/18/2025    BUN 25.1 (H) 06/18/2025    CR 1.18 (H) 06/18/2025     (H) 06/18/2025    SED 47 (H) 01/25/2025    NTBNPI 1,720 (H) 01/22/2025     (H) 06/18/2025    ALT 44 06/18/2025    ALKPHOS 640 (H) 06/18/2025    BILITOTAL 0.8 06/18/2025    INR 1.12 03/12/2025     IMAGING: CT AP 6/5/2025- IMPRESSION:   1. Increased size of extensive hepatic metastases. Hepatomegaly.  2. Increased soft tissue thickening about the rectum.  3. Numerous pulmonary nodules. The largest are overall stable in size.  4. No hydronephrosis or hydroureter.   The bladder is normally filled  and not distended.  5. Moderate colonic stool burden.  6. Unchanged bone metastases.    CT chest 6/18/2025- Impression:  1.  Increased size and number of pulmonary nodules and masses  suggesting worsening of metastatic disease.  2. Mediastinal lymphadenopathy is similar.  3. Ill-defined hepatic metastasis are also increased in size and  number.  4. Osseous lesions are more conspicuous.    Impression & Recommendations & Counseling:  Roman Escalante has a history of metastatic rectal cancer.     Has not had chemo in 6 to 7 weeks, and he has been having more side effects, which has historically been his pattern.  Currently usually between 80 and 100 mg of oxycodone daily.  Based on this, we did discuss that adding in a long-acting agent again would be appropriate, however he would like to wait and see how he does  after chemotherapy.  He was previously on MS Contin 15 mg twice daily, however current oxycodone needs to stay where they are, I would likely start 30 mg twice a day.  Reviewed being cautious with the THC supplement that he is taking at night, as this sounds fairly strong.    Recommendations:  -Continue gabapentin 100-200 mg twice during the day and 300 mg at bedtime.    -Okay to continue oxycodone 10 to 20 mg every four hours as needed.    -We did discuss adding a long-acting medication right now would be appropriate, however, he would like to wait. He will reach out if pain does not improve after next chemo infusion.  -Continue THC as needed.   -Continue getting outside and going for walks as able.      Follow up: 5 weeks      Video-Visit Details  Video Start Time: 10:41AM  Video End Time: 11:01AM    Originating Location (pt. Location): Home     Distant Location (provider location):  Offsite- Personal Home     Platform used for Video Visit: Huey     Total time spent on day of encounter is 28 mins, including reviewing record, review of above studies, above visit with patient, symptomatic discussion as above, including medication adjustments/prescription management, and documentation.         The longitudinal plan of care for the diagnosis(es)/condition(s) as documented were addressed during thi the s visit.?Due to the added complexity in care, I will continue to support Roman Escalante in the subsequent management and with the ongoing continuity of care.        Isidra Prater,   Palliative Medicine   AMCOM ID [id number]    Some chart documentation performed using Dragon Voice recognition Software. Although reviewed after completion, some words and grammatical errors may remain.

## 2025-06-19 DIAGNOSIS — C78.00 RECTAL ADENOCARCINOMA METASTATIC TO LUNG (H): ICD-10-CM

## 2025-06-19 DIAGNOSIS — C20 RECTAL ADENOCARCINOMA METASTATIC TO LUNG (H): ICD-10-CM

## 2025-06-19 DIAGNOSIS — C20 MALIGNANT TUMOR OF RECTUM (H): Primary | ICD-10-CM

## 2025-06-20 ENCOUNTER — APPOINTMENT (OUTPATIENT)
Dept: LAB | Facility: CLINIC | Age: 50
End: 2025-06-20
Attending: STUDENT IN AN ORGANIZED HEALTH CARE EDUCATION/TRAINING PROGRAM
Payer: COMMERCIAL

## 2025-06-20 ENCOUNTER — HOME INFUSION BILLING (OUTPATIENT)
Dept: HOME HEALTH SERVICES | Facility: HOME HEALTH | Age: 50
End: 2025-06-20
Payer: COMMERCIAL

## 2025-06-23 DIAGNOSIS — J20.9 ACUTE BRONCHITIS, UNSPECIFIED ORGANISM: ICD-10-CM

## 2025-06-23 DIAGNOSIS — G62.0 NEUROPATHY DUE TO CHEMOTHERAPEUTIC DRUG: ICD-10-CM

## 2025-06-23 DIAGNOSIS — G89.3 CANCER ASSOCIATED PAIN: ICD-10-CM

## 2025-06-23 DIAGNOSIS — T45.1X5A NEUROPATHY DUE TO CHEMOTHERAPEUTIC DRUG: ICD-10-CM

## 2025-06-23 RX ORDER — BUDESONIDE AND FORMOTEROL FUMARATE DIHYDRATE 160; 4.5 UG/1; UG/1
2 AEROSOL RESPIRATORY (INHALATION) 2 TIMES DAILY
Qty: 10.2 G | Refills: 1 | Status: SHIPPED | OUTPATIENT
Start: 2025-06-23

## 2025-06-23 RX ORDER — GABAPENTIN 100 MG/1
CAPSULE ORAL
Qty: 210 CAPSULE | Refills: 1 | Status: SHIPPED | OUTPATIENT
Start: 2025-06-23

## 2025-06-23 NOTE — TELEPHONE ENCOUNTER
Received [employer] message from patient requesting refill of gabapentin.     Last refill: 3/10/25  Last office visit: 6/18/25  Scheduled for follow up per check out request     Will route request to MD/DO for review.     Reviewed [place]  Report.

## 2025-06-25 ENCOUNTER — ALLIED HEALTH/NURSE VISIT (OUTPATIENT)
Dept: ONCOLOGY | Facility: CLINIC | Age: 50
End: 2025-06-25
Payer: COMMERCIAL

## 2025-06-25 DIAGNOSIS — Z00.6 EXAMINATION OF PARTICIPANT IN CLINICAL TRIAL: Primary | ICD-10-CM

## 2025-06-26 ENCOUNTER — TELEPHONE (OUTPATIENT)
Dept: SURGERY | Facility: CLINIC | Age: 50
End: 2025-06-26
Payer: COMMERCIAL

## 2025-06-26 NOTE — TELEPHONE ENCOUNTER
[facility] Call Center    Phone Message    May a detailed message be left on voicemail: yes     Reason for Call: Other: Franc from [employer] is calling about a form that was sent in May and has yet to receive anything back yet so just following up. Please give a call back to discuss.       Action Taken: Message routed to:  [facility] colon & rectal     Travel Screening: Not Applicable     Date of Service:

## 2025-07-02 ENCOUNTER — OFFICE VISIT (OUTPATIENT)
Dept: ONCOLOGY | Facility: CLINIC | Age: 50
End: 2025-07-02
Attending: STUDENT IN AN ORGANIZED HEALTH CARE EDUCATION/TRAINING PROGRAM
Payer: COMMERCIAL

## 2025-07-02 ENCOUNTER — LAB (OUTPATIENT)
Dept: LAB | Facility: CLINIC | Age: 50
End: 2025-07-02
Attending: STUDENT IN AN ORGANIZED HEALTH CARE EDUCATION/TRAINING PROGRAM
Payer: COMMERCIAL

## 2025-07-02 ENCOUNTER — ALLIED HEALTH/NURSE VISIT (OUTPATIENT)
Dept: ONCOLOGY | Facility: CLINIC | Age: 50
End: 2025-07-02
Payer: COMMERCIAL

## 2025-07-02 VITALS
DIASTOLIC BLOOD PRESSURE: 70 MMHG | RESPIRATION RATE: 18 BRPM | HEART RATE: 120 BPM | OXYGEN SATURATION: 93 % | WEIGHT: 163.3 LBS | SYSTOLIC BLOOD PRESSURE: 106 MMHG | TEMPERATURE: 98.1 F | BODY MASS INDEX: 22.78 KG/M2

## 2025-07-02 DIAGNOSIS — F41.0 ANXIETY ATTACK: ICD-10-CM

## 2025-07-02 DIAGNOSIS — Z00.6 EXAMINATION OF PARTICIPANT IN CLINICAL TRIAL: Primary | ICD-10-CM

## 2025-07-02 DIAGNOSIS — R11.0 NAUSEA: ICD-10-CM

## 2025-07-02 LAB
ALBUMIN SERPL BCG-MCNC: 3 G/DL (ref 3.5–5.2)
ALBUMIN UR-MCNC: 10 MG/DL
ALP SERPL-CCNC: 842 U/L (ref 40–150)
ALT SERPL W P-5'-P-CCNC: 63 U/L (ref 0–70)
AMYLASE SERPL-CCNC: 9 U/L (ref 28–100)
ANION GAP SERPL CALCULATED.3IONS-SCNC: 13 MMOL/L (ref 7–15)
APPEARANCE UR: ABNORMAL
AST SERPL W P-5'-P-CCNC: 218 U/L (ref 0–45)
ATRIAL RATE - MUSE: 89 BPM
BASOPHILS # BLD AUTO: 0 10E3/UL (ref 0–0.2)
BASOPHILS NFR BLD AUTO: 0 %
BILIRUB SERPL-MCNC: 5.2 MG/DL
BILIRUB UR QL STRIP: ABNORMAL
BUN SERPL-MCNC: 33 MG/DL (ref 6–20)
CALCIUM SERPL-MCNC: 8.4 MG/DL (ref 8.8–10.4)
CHLORIDE SERPL-SCNC: 92 MMOL/L (ref 98–107)
COLOR UR AUTO: ABNORMAL
CREAT SERPL-MCNC: 1.81 MG/DL (ref 0.67–1.17)
DIASTOLIC BLOOD PRESSURE - MUSE: NORMAL MMHG
EGFRCR SERPLBLD CKD-EPI 2021: 44 ML/MIN/1.73M2
EOSINOPHIL # BLD AUTO: 0.1 10E3/UL (ref 0–0.7)
EOSINOPHIL NFR BLD AUTO: 1 %
ERYTHROCYTE [DISTWIDTH] IN BLOOD BY AUTOMATED COUNT: 25.2 % (ref 10–15)
GLUCOSE SERPL-MCNC: 84 MG/DL (ref 70–99)
GLUCOSE UR STRIP-MCNC: NEGATIVE MG/DL
HCO3 SERPL-SCNC: 19 MMOL/L (ref 22–29)
HCT VFR BLD AUTO: 32.2 % (ref 40–53)
HGB BLD-MCNC: 10 G/DL (ref 13.3–17.7)
HGB UR QL STRIP: NEGATIVE
HYALINE CASTS: 7 /LPF
IMM GRANULOCYTES # BLD: 0.6 10E3/UL
IMM GRANULOCYTES NFR BLD: 5 %
INR PPP: 1.4 (ref 0.85–1.15)
INTERPRETATION ECG - MUSE: NORMAL
KETONES UR STRIP-MCNC: ABNORMAL MG/DL
LDH SERPL L TO P-CCNC: 2078 U/L (ref 0–250)
LEUKOCYTE ESTERASE UR QL STRIP: NEGATIVE
LIPASE SERPL-CCNC: 9 U/L (ref 13–60)
LYMPHOCYTES # BLD AUTO: 0.3 10E3/UL (ref 0.8–5.3)
LYMPHOCYTES NFR BLD AUTO: 3 %
MAGNESIUM SERPL-MCNC: 2.2 MG/DL (ref 1.7–2.3)
MCH RBC QN AUTO: 25.3 PG (ref 26.5–33)
MCHC RBC AUTO-ENTMCNC: 31.1 G/DL (ref 31.5–36.5)
MCV RBC AUTO: 82 FL (ref 78–100)
MONOCYTES # BLD AUTO: 0.8 10E3/UL (ref 0–1.3)
MONOCYTES NFR BLD AUTO: 7 %
MUCOUS THREADS #/AREA URNS LPF: PRESENT /LPF
NEUTROPHILS # BLD AUTO: 10 10E3/UL (ref 1.6–8.3)
NEUTROPHILS NFR BLD AUTO: 85 %
NITRATE UR QL: NEGATIVE
NRBC # BLD AUTO: 0 10E3/UL
NRBC BLD AUTO-RTO: 0 /100
P AXIS - MUSE: 63 DEGREES
PH UR STRIP: 5.5 [PH] (ref 5–7)
PHOSPHATE SERPL-MCNC: 3.2 MG/DL (ref 2.5–4.5)
PLATELET # BLD AUTO: 82 10E3/UL (ref 150–450)
POTASSIUM SERPL-SCNC: 5.7 MMOL/L (ref 3.4–5.3)
PR INTERVAL - MUSE: 154 MS
PROT SERPL-MCNC: 6.4 G/DL (ref 6.4–8.3)
PROTHROMBIN TIME: 17.3 SECONDS (ref 11.8–14.8)
QRS DURATION - MUSE: 84 MS
QT - MUSE: 360 MS
QTC - MUSE: 438 MS
R AXIS - MUSE: 57 DEGREES
RBC # BLD AUTO: 3.95 10E6/UL (ref 4.4–5.9)
RBC URINE: <1 /HPF
SODIUM SERPL-SCNC: 124 MMOL/L (ref 135–145)
SP GR UR STRIP: 1.02 (ref 1–1.03)
SYSTOLIC BLOOD PRESSURE - MUSE: NORMAL MMHG
T AXIS - MUSE: 72 DEGREES
UROBILINOGEN UR STRIP-MCNC: NORMAL MG/DL
VENTRICULAR RATE- MUSE: 89 BPM
WBC # BLD AUTO: 11.7 10E3/UL (ref 4–11)
WBC URINE: 2 /HPF

## 2025-07-02 PROCEDURE — 83615 LACTATE (LD) (LDH) ENZYME: CPT | Performed by: STUDENT IN AN ORGANIZED HEALTH CARE EDUCATION/TRAINING PROGRAM

## 2025-07-02 PROCEDURE — 85004 AUTOMATED DIFF WBC COUNT: CPT | Performed by: STUDENT IN AN ORGANIZED HEALTH CARE EDUCATION/TRAINING PROGRAM

## 2025-07-02 PROCEDURE — G0463 HOSPITAL OUTPT CLINIC VISIT: HCPCS | Performed by: STUDENT IN AN ORGANIZED HEALTH CARE EDUCATION/TRAINING PROGRAM

## 2025-07-02 PROCEDURE — 83735 ASSAY OF MAGNESIUM: CPT | Performed by: STUDENT IN AN ORGANIZED HEALTH CARE EDUCATION/TRAINING PROGRAM

## 2025-07-02 PROCEDURE — 83690 ASSAY OF LIPASE: CPT | Performed by: STUDENT IN AN ORGANIZED HEALTH CARE EDUCATION/TRAINING PROGRAM

## 2025-07-02 PROCEDURE — 36415 COLL VENOUS BLD VENIPUNCTURE: CPT | Performed by: STUDENT IN AN ORGANIZED HEALTH CARE EDUCATION/TRAINING PROGRAM

## 2025-07-02 PROCEDURE — 80051 ELECTROLYTE PANEL: CPT | Performed by: STUDENT IN AN ORGANIZED HEALTH CARE EDUCATION/TRAINING PROGRAM

## 2025-07-02 PROCEDURE — 81001 URINALYSIS AUTO W/SCOPE: CPT | Performed by: STUDENT IN AN ORGANIZED HEALTH CARE EDUCATION/TRAINING PROGRAM

## 2025-07-02 PROCEDURE — 85610 PROTHROMBIN TIME: CPT | Performed by: STUDENT IN AN ORGANIZED HEALTH CARE EDUCATION/TRAINING PROGRAM

## 2025-07-02 PROCEDURE — 93005 ELECTROCARDIOGRAM TRACING: CPT

## 2025-07-02 PROCEDURE — 84100 ASSAY OF PHOSPHORUS: CPT | Performed by: STUDENT IN AN ORGANIZED HEALTH CARE EDUCATION/TRAINING PROGRAM

## 2025-07-02 PROCEDURE — 82150 ASSAY OF AMYLASE: CPT | Performed by: STUDENT IN AN ORGANIZED HEALTH CARE EDUCATION/TRAINING PROGRAM

## 2025-07-02 PROCEDURE — 81003 URINALYSIS AUTO W/O SCOPE: CPT | Performed by: STUDENT IN AN ORGANIZED HEALTH CARE EDUCATION/TRAINING PROGRAM

## 2025-07-02 RX ORDER — LORAZEPAM 0.5 MG/1
0.5 TABLET ORAL EVERY 6 HOURS PRN
Qty: 30 TABLET | Refills: 0 | Status: SHIPPED | OUTPATIENT
Start: 2025-07-02

## 2025-07-02 RX ORDER — HEPARIN SODIUM (PORCINE) LOCK FLUSH IV SOLN 100 UNIT/ML 100 UNIT/ML
5 SOLUTION INTRAVENOUS ONCE
Status: ACTIVE | OUTPATIENT
Start: 2025-07-02

## 2025-07-02 RX ORDER — ONDANSETRON 8 MG/1
8 TABLET, ORALLY DISINTEGRATING ORAL EVERY 8 HOURS PRN
Qty: 60 TABLET | Refills: 3 | Status: SHIPPED | OUTPATIENT
Start: 2025-07-02

## 2025-07-02 ASSESSMENT — PAIN SCALES - GENERAL: PAINLEVEL_OUTOF10: MILD PAIN (3)

## 2025-07-02 NOTE — NURSING NOTE
Chief Complaint   Patient presents with    Lab Only     Labs drawn via  by RN. Urine collected.     Bhavana Li RN

## 2025-07-02 NOTE — LETTER
2025      Roman Escalante  [address]  [place] [address]      Dear Colleague,    Thank you for referring your patient, Roman Escalante, to the [facility]. Please see a copy of my visit note below.    N/A    [facility] - Medical Oncology Follow-Up Consult Note  2025    Patient Identifiers     Name: Roman Escalante  Preferred Address: [place]  Preferred Language: English  : 1975  Gender: male    Assessment and Plan     Mr. Roman Escalante is a 50 year old male former smoker (3-5 cigs/day) with a history of extensively pre-treated metastatic colorectal cancer s/p trial cellular therapy transplant (2024) most recently on FOLFIRI/Reyna (24  - 2024) interrupted for a bacterial pneumonia planned for FOLFOX/Reyna with treatment again interrupted due to clinical decompensation.     NGS: KRAS G12N, BRAFwt  Immuno: pMMR  Clinical Trial: VSG-GP    Roman returns to clinic to discuss enrollment in the TORL clinical trial. He has been eager for trial enrollment for months, though his clinical course has been challenged by recurrent pneumonia infections and a brief O2 requirement. These events disrupted clinical trial evaluation, so we were only recently able to have him prescreened for this study, with promising results.     Unfortunately, we do not have recent labs as of today, so we will draw them now. This raises significant concerns given the progression in his disease which we have observed over the past few weeks/months. I am deeply concerned that these values will have changed in the past few weeks and potentially further derail trial eligibility. If Roman is not eligible for this study, or if he should come off of it for any reason, I recommend hospice as there are no meaningful systemic treatment options. I reviewed this recommendation with him extensively, and he was able to clearly outline our plan.    Proceed with TORL consent and screening tasks  today, with subsequent follow-up managed per trial protocol.    45 minutes spent on the date of the encounter doing chart review, review of test results, interpretation of tests, patient visit, documentation, and discussion with other provider(s)     The longitudinal plan of care for the diagnosis(es)/condition(s) as documented were addressed during this visit. Due to the added complexity in care, I will continue to support Roman in the subsequent management and with ongoing continuity of care.    Polo Snow MD, PhD   of Medicine  Division of Hematology, Oncology and Transplantation  [facility]    -----------------------------------    Oncology Summary     Cancer Staging   Rectal adenocarcinoma metastatic to lung (H)  Staging form: Colon and Rectum, AJCC 8th Edition  - Clinical: Stage IVB (cTX, cNX, pM1b) - Signed by Polo Snow MD on 3/30/2023      Oncology History   Rectal adenocarcinoma metastatic to lung (H)   2/3/2023 Initial Diagnosis    Rectal adenocarcinoma metastatic to lung (H)     2/3/2023 - 3/31/2023 Chemotherapy    Oral ONC Colorectal Cancer - Regorafenib  Plan Provider: Polo Snow MD  Treatment goal: Palliative  Line of treatment: [No plan line of treatment]     3/30/2023 -  Cancer Staged    Staging form: Colon and Rectum, AJCC 8th Edition  - Clinical: Stage IVB (cTX, cNX, pM1b)     6/7/2023 - 10/3/2023 Chemotherapy    OP ONC Colorectal Cancer - Modified FOLFOX-6 / Bevacizumab  Plan Provider: Polo Snow MD  Treatment goal: Palliative  Line of treatment: [No plan line of treatment]     10/27/2023 - 3/11/2024 Chemotherapy    OP ONC Colorectal Cancer - Bevacizumab + Trifluridine/Tipiracil (Lonsurf)  Plan Provider: Polo Snow MD  Treatment goal: Palliative  Line of treatment: [No plan line of treatment]     3/27/2024 - 3/27/2024 Chemotherapy    Oral ONC Colorectal Cancer - Fruquintinib (Fruzaqla)  Plan Provider: Polo Snow MD  Treatment goal:  Palliative  Line of treatment: [No plan line of treatment]     5/2024 -  Chemotherapy    [facility] Cellular Therapy cytoreductive conditioning and cellular transplant  - admitted for 5 weeks following transplant with ~30lbs weight loss     8/30/2024 - 4/5/2025 Chemotherapy    OP ONC Colorectal Cancer - FOLFIRI / Bevacizumab  Plan Provider: Polo Snow MD  Treatment goal: Palliative  Line of treatment: [No plan line of treatment]     4/30/2025 -  Chemotherapy    OP ONC mFOLFOX-6 + Bevacizumab + Oxaliplatin Desensitization (14 Day)  Plan Provider: Polo Snow MD  Treatment goal: Other  Line of treatment: [No plan line of treatment]         Subjective/Interval Events     - Roman's labs returned markedly abnormal on clinical trial workup last week, making him ineligible for study.   - His clinical status remains stable since our last conversation    Physical Exam     Vital signs: /70 (BP Location: Right arm, Patient Position: Sitting, Cuff Size: Adult Regular)   Pulse 120   Temp 98.1  F (36.7  C) (Oral)   Resp 18   Wt 74.1 kg (163 lb 4.8 oz)   SpO2 93%   BMI 22.78 kg/m      ECOG performance status:  3  Vascular access:  R chest port    Physical Exam:  Exam performed, notable for:  - ill appearing; largely stable exam though markedly worse than 1-2 months prior    Objective Data     Lab data:  I have personally reviewed the lab data, notable for:    - WBC 13.0  - Hgb 9.8  - Plt 62  - Tbili 10.0  - AST//62    Radiology data:  I have personally reviewed the radiology data, notable for:  07/08/2025 CT Abd/Pelvis  IMPRESSION:   1. No acute intra-abdominal pathology.  2. Progressive hepatic and pulmonary metastatic disease since  6/5/2025.   3. New intrahepatic biliary dilation the left hepatic lobe, likely  related to mass effect exerted upon the central left hepatic duct by  hepatic metastases.  4. Anasarca with increased small volume ascites and trace bilateral  pleural effusions.   5. Stable osseous  metastases.    Pathology and other data:  I have personally reviewed and interpreted the pathology data, notable for:    - no new data      Again, thank you for allowing me to participate in the care of your patient.        Sincerely,        Polo Snow MD    Electronically signed

## 2025-07-02 NOTE — NURSING NOTE
EKG was performed today per order written by Polo Snow.  Name and  verified with patient. Patient tolerated well without incident. File transmitted to chart.    Laura Rossi LPN on 2025 at 1:43 PM

## 2025-07-02 NOTE — PROGRESS NOTES
[id number]: Informed Consent Note     The consent form, including purpose, risks and benefits, was reviewed with Roman Escalante, and all questions were answered before he signed the consent form. The patient understands that the study involves an active treatment phase as well as a post-treatment follow up phase.     Present during the discussion were Dr Snow, Alana De Jesus, Christi Benavides, and Roman Escalante. A copy of the signed form was provided to the patient. No procedures specific to this study were performed prior to the patient signing the consent form.    Consent version:  Local version Oct 7, 2024  Approved by the IRB 2/28/2025  Protocol v7, Sept 26, 2024     RACE AND ETHNICITY:  I discussed with Roman Escalante that the National [employer] [place] (NCI) has asked that information on race and ethnicity be obtained from NCI-sponsored studies' participants whenever possible and that the purpose for this request is to assist the NCI in evaluating whether persons of all races and ethnicity are given the opportunity to participate in new cancer treatment options. It was explained that the information will be sent to the NCI in a way in which he cannot be identified. It was also explained that providing this information is voluntary.  Roman Escalante provided the following responses:  Ethnicity: non-  Race: White    Reproductive Evaluation     Subject name: Roman Escalante   I discussed with the patient that in order to participate in this study he must agree to use effective contraception during therapy and continuing 4 months after last infusion. Examples of effective contraception were provided, including hormonal contraception, intrauterine devices, and double barrier contraception. He agreed to use effective contraception per the study's requirements.  Alana De Jesus RN  Form 503.03.03 (Version 1)     Effective date: 01JUL2018     Next Review Date: 01JUL2020   Alana De Jesus RN

## 2025-07-02 NOTE — NURSING NOTE
"Oncology Rooming Note    July 2, 2025 12:07 PM   Roman Escalante is a 50 year old male who presents for:    Chief Complaint   Patient presents with    Oncology Clinic Visit     Rectal adenocarcinoma metastatic to lung     Initial Vitals: /70 (BP Location: Right arm, Patient Position: Sitting, Cuff Size: Adult Regular)   Pulse 120   Temp 98.1  F (36.7  C) (Oral)   Resp 18   Wt 74.1 kg (163 lb 4.8 oz)   SpO2 93%   BMI 22.78 kg/m   Estimated body mass index is 22.78 kg/m  as calculated from the following:    Height as of 6/4/25: 1.803 m (5' 11\").    Weight as of this encounter: 74.1 kg (163 lb 4.8 oz). Body surface area is 1.93 meters squared.  Mild Pain (3) Comment: Data Unavailable   No LMP for male patient.  Allergies reviewed: Yes  Medications reviewed: Yes    Medications: MEDICATION REFILLS NEEDED TODAY. Provider was notified.  Pharmacy name entered into [facility]: [facility] [place] - [address]    Frailty Screening:   Is the patient here for a new oncology consult visit in cancer care? 2. No    PHQ9:  Did this patient require a PHQ9?: No      Clinical concerns:  P: 120, O2: fluctuated between 77% and 94%; increased when he did his breathing exercises.    Refill: Lorazepam, Zofran (pt said they might have been called in but he never picked them up).      Savanna Chavez            "

## 2025-07-06 LAB
ATRIAL RATE - MUSE: 89 BPM
DIASTOLIC BLOOD PRESSURE - MUSE: NORMAL MMHG
INTERPRETATION ECG - MUSE: NORMAL
P AXIS - MUSE: 63 DEGREES
PR INTERVAL - MUSE: 154 MS
QRS DURATION - MUSE: 84 MS
QT - MUSE: 360 MS
QTC - MUSE: 438 MS
R AXIS - MUSE: 57 DEGREES
SYSTOLIC BLOOD PRESSURE - MUSE: NORMAL MMHG
T AXIS - MUSE: 72 DEGREES
VENTRICULAR RATE- MUSE: 89 BPM

## 2025-07-07 ENCOUNTER — ONCOLOGY VISIT (OUTPATIENT)
Dept: ONCOLOGY | Facility: CLINIC | Age: 50
End: 2025-07-07
Attending: STUDENT IN AN ORGANIZED HEALTH CARE EDUCATION/TRAINING PROGRAM
Payer: COMMERCIAL

## 2025-07-07 ENCOUNTER — PATIENT OUTREACH (OUTPATIENT)
Dept: CARE COORDINATION | Facility: CLINIC | Age: 50
End: 2025-07-07
Payer: COMMERCIAL

## 2025-07-07 ENCOUNTER — APPOINTMENT (OUTPATIENT)
Dept: LAB | Facility: CLINIC | Age: 50
End: 2025-07-07
Attending: STUDENT IN AN ORGANIZED HEALTH CARE EDUCATION/TRAINING PROGRAM
Payer: COMMERCIAL

## 2025-07-07 VITALS
BODY MASS INDEX: 22.76 KG/M2 | HEART RATE: 109 BPM | WEIGHT: 163.2 LBS | DIASTOLIC BLOOD PRESSURE: 77 MMHG | OXYGEN SATURATION: 94 % | RESPIRATION RATE: 18 BRPM | TEMPERATURE: 98.2 F | SYSTOLIC BLOOD PRESSURE: 110 MMHG

## 2025-07-07 DIAGNOSIS — R10.30 LOWER ABDOMINAL PAIN: ICD-10-CM

## 2025-07-07 DIAGNOSIS — G89.3 CANCER RELATED PAIN: ICD-10-CM

## 2025-07-07 DIAGNOSIS — C78.7 COLON CANCER METASTASIZED TO LIVER (H): Primary | ICD-10-CM

## 2025-07-07 DIAGNOSIS — C18.9 COLON CANCER METASTASIZED TO LIVER (H): Primary | ICD-10-CM

## 2025-07-07 PROCEDURE — 99215 OFFICE O/P EST HI 40 MIN: CPT

## 2025-07-07 PROCEDURE — G0463 HOSPITAL OUTPT CLINIC VISIT: HCPCS

## 2025-07-07 RX ORDER — OXYCODONE HYDROCHLORIDE 10 MG/1
10-20 TABLET ORAL EVERY 4 HOURS PRN
Qty: 100 TABLET | Refills: 0 | Status: ON HOLD | OUTPATIENT
Start: 2025-07-07 | End: 2025-07-10

## 2025-07-07 NOTE — PROGRESS NOTES
Social Work Note  [facility]    Consulted on patient with RN Nilda Early regarding POA, as patient will be needing to complete forms and signature, due to patient transitioning into hospice. SW provided Nilda with POA paperwork and direction on next steps to provide to patient/spouse.    GUILHERME German, [facility]  Clinical , Adult Oncology  [facility]   [address] [place] [id number]  [email or web address]  Office Phone: [phone]  Support Groups at [facility]: Social Work Services for Cancer Patients ([email or web address])

## 2025-07-07 NOTE — LETTER
7/7/2025      Roman Escalante  [address]  [facility] [id number]      Dear Colleague,    Thank you for referring your patient, Roman Escalante, to the [facility]. Please see a copy of my visit note below.    [facility] - Medical Oncology Follow Up Note  Jul 7, 2025      Reason for Visit: follow up rectal adenocarcinoma    Oncology History:   Patient developed rectal bleeding in 2020.  In January 2021 CT showed focal wall thickening in the upper rectum, multiple pulmonary nodules bilaterally suspicious for metastatic disease.  Underwent FNA of the right upper lobe lesion which was consistent with adenocarcinoma of the colon.  He was treated with FOLFOX from 2/20/2021 through 5/20/2021.  Avastin was added.  Had an infusion reaction to oxaliplatin 6/2021.  7/2021- 12/2021 was on 5-FU alone.  Developed progression.  12/2021- 9/2022 was on FOLFIRI.  11/2021 started Lonsurf. All of this was done with outside oncologist.     Met with Dr. Snow on 2/3/2023 to establish care. Recommended regorafenib.     Started regorafenib on 3/2/2023. Progression noted 5/19/23. Plan to start FOLFOX/Avastin and send out CARIS testing to evaluate for other treatment options. Cycle 1 was given with oxaliplatin desensitization, 5FU, Avastin held due to increased urine protein.     Following cycle 4, patient was admitted with hematuria and acute kidney injury.    Oxaliplatin was dropped with Cycle 5.     Y-90 radioembolization 9/7.    10/24/23 CT CAP with disease progression with notable growth in all pulmonary lesions. Lonsurf + bevacizumab started 10/27/23 with plans to bridge to clinical trial.   11/15/23 - stopped chemo for [facility] study.   Currently enrolled in [facility] CAR-T trial (completed cell harvest 1/23/2024, tentatively planning administration of CAR-T in April 2/12/24 - resumed Lonsurf, bevacizumab to bridge treatment until CAR-T therapy  3/20/24 - treatment changed to Fruquintinib  due to intolerability of Lonsurf/bevacizumab  4/2/24- Presented to the ED with dehydration. Stopped Fruquintnib.   6/2024- cellular therapy transplant at [facility].   7/30/24- CT imaging demonstrating marked progression of pulmonary and hepatic lesions, clinical trial failed to generate T-cell graft. Multiple discussions regarding plan of care, hospice vs additional lines of treatment. Patient decided he is not ready for hospice. He resumed treatment with FOLFIRI + bevacizumab on 8/30/24.   10/2024 - CT CAP with continued response, plan to continue FOLFIRI + bevacizumab with the addition of Zometa. Per his dentist, needs dental work completed prior to clearance. (Scheduled 1/2 and 1/21).   12/2024 chemo held for break over new Years per patient's choice.  1/15/25 chemo held due to ongoing respiratory illness, + RSV, also treated with levaquin for pneumonia on CXR, chemo deferred   1/22-1/25/25- hospitalized with suspicion for fungal pneumonia  1/28-1/29/25- hospitalized for PJP pneumonia, started on treatment with Bactrim and steroids  2/19/25 CT CAP shows disease progression, occurred while on treatment break for pneumonia, resumed same chemotherapy with FOLFIRI/chelsea on 2/20/25 4/14/25 CT CAP with reduction in his pulmonary lesions, with some growth in his hepatic lesions. Plan to continue treatment as a bridge to clinical trial. Plan for treatment with FOLFOX (oxaliplatin desensitization) + bevacizumab.   4/30/25 Cycle 1 FOLFOX (oxaliplatin desensitization) + bevacizumab  5/2025 rescheduled chemo per patient preference for travel  6/5/25 CT Abd/pelv with increased hepatic metastases, increased soft tissue thickening about the rectum, numerous stable pulmonary nodules, unchanged bone mets   6/18/25 CT Chest with increased size of pulmonary nodules and masses when compared to April 2025 scan       Interval History:     Roman presents today for follow up accompanied by his significant other, Chelsy.     Bryanna reports over  "the last five days Roman has had significant decline. She has noted mild intermittent confusion at times  -he has had an increase in right flank/back pain, taking oxycodone with relief  -he is also become jaundice over the last few days   -minimal oral intake   -urinating once or twice daily, urine is dark brown. Has burning/discomfort with urination. Each time he urinates he also passes clots per rectum which is also painful   -continues to have LE swelling from knee down. Laying in bed most of the day and keeping his legs elevated which has greatly improved his swelling in his legs, but then increases the pressure in his abdomen  -chronic shortness of breath with activity is slightly worse. Doing minimal activity at home and stops to rest frequently for several minutes to recover his breath.           Current Outpatient Medications   Medication Sig Dispense Refill     acetaminophen (TYLENOL) 500 MG tablet Take 500-1,000 mg by mouth as needed for mild pain.       Chemo Kit For RN use only. Do not remove items from bag. Contents: 1  sodium chloride 0.9% flush, 4 medium gloves, 1 chemo gown, 1/4 chemo mat, 1 connector female, 1 chemo bag. [id number] kit 0     cyclobenzaprine (FLEXERIL) 5 MG tablet Take one tab (5mg) by mouth over 6-8 hours, as needed for spasms 45 tablet 2     dexAMETHasone (DECADRON) 4 MG tablet Take 2 tablets (8 mg) by mouth daily. Take for 2 days, starting the day after chemo. Take with food. 4 tablet 2     Emergency Supply Kit, Central, Patient use for emergency only. Contents: 3 sodium chloride 0.9% flushes, 1 dressing kit, 1 microclave ext set 14\", 4 nitrile gloves (med), 6 alcohol prep pads, 1 bacitracin, 1 syringe (10 cc 20 G 1\"). Call [phone] to reorder. [id number] kit 0     fluorouracil (ADRUCIL) 2.5 GM/50ML SOLN injection        fluticasone (FLONASE) 50 MCG/ACT nasal spray Spray 1 spray into both nostrils daily. 16 g 2     fluticasone (FLONASE) 50 MCG/ACT nasal spray Spray 1 spray into both " nostrils daily. 16 g 2     gabapentin (NEURONTIN) 100 MG capsule TAKE 1 TO 2 CAPSULES (100-200 MG) BY MOUTH UP TO TWICE DURING THE DAY AND 3 CAPSULES (300 MG) AT BEDTIME. 210 capsule 1     guaiFENesin (MUCINEX) 600 MG 12 hr tablet Take 2 tablets (1,200 mg) by mouth 2 times daily. (Patient not taking: Reported on 6/5/2025) 20 tablet 0     ibuprofen (ADVIL/MOTRIN) 200 MG tablet Take 3 tablets (600 mg) by mouth every 8 hours as needed for moderate pain (Patient not taking: Reported on 4/21/2025) 100 tablet 0     ipratropium (ATROVENT) 0.06 % nasal spray Spray 2 sprays into both nostrils 4 times daily. 15 mL 0     ipratropium - albuterol 0.5 mg/2.5 mg/3 mL (DUONEB) 0.5-2.5 (3) MG/3ML neb solution Take 1 vial (3 mLs) by nebulization every 6 hours as needed for shortness of breath, wheezing or cough. 90 mL 3     loperamide (IMODIUM A-D) 2 MG tablet Take 2 mg by mouth 4 times daily as needed for diarrhea. (Patient not taking: Reported on 7/2/2025)       loratadine (CLARITIN) 10 MG tablet Take 10 mg by mouth daily.       LORazepam (ATIVAN) 0.5 MG tablet Take 1 tablet (0.5 mg) by mouth every 6 hours as needed for anxiety. 30 tablet 0     NARCAN 4 MG/0.1ML nasal spray  (Patient not taking: Reported on 4/21/2025)       OLANZapine (ZYPREXA) 2.5 MG tablet Take 1 tablet (2.5 mg) by mouth 2 times daily as needed (nausea). 60 tablet 1     ondansetron (ZOFRAN ODT) 8 MG ODT tab Take 1 tablet (8 mg) by mouth every 8 hours as needed for nausea. 60 tablet 3     ondansetron (ZOFRAN) 8 MG tablet Take 1 tablet (8 mg) by mouth every 8 hours as needed for nausea (vomiting). 30 tablet 2     Ostomy Supplies MISC 1 each daily 1 each 11     oxyCODONE IR (ROXICODONE) 10 MG tablet Take 1-2 tablets (10-20 mg) by mouth every 4 hours as needed for pain. 150 tablet 0     pegfilgrastim (NEULASTA ONPRO KIT) 6 MG/0.6ML injection Inject 0.6 mLs (6 mg) subcutaneously every 2 weeks as needed (at the time of chemotherapy disconnect per treatment plan). 15 mL  0     Port Access Kit For nurse use only.  Do not remove items from bag.  Use for port access.  Do not place syringe on sterile field. [id number] kit 0     potassium phosphate, monobasic, (K-PHOS) 500 MG tablet Take 1 tablet (500 mg) by mouth 2 times daily. (Patient not taking: Reported on 7/2/2025) 14 tablet 0     prochlorperazine (COMPAZINE) 10 MG tablet Take 1 tablet (10 mg) by mouth every 6 hours as needed for nausea or vomiting. 30 tablet 2     prochlorperazine (COMPAZINE) 10 MG tablet Take 1 tablet (10 mg) by mouth every 6 hours as needed for nausea or vomiting 30 tablet 2     sodium chloride, PF, 0.9% PF flush Inject 10 mLs into the vein as needed for line flush. Flush IV before and after med administration as directed and/or at least every 24 hours, or prior to deaccessing for no further use and/or at least every 4 weeks when not accessed. [id number] mL 0     sulfamethoxazole-trimethoprim (BACTRIM DS) 800-160 MG tablet Take 1 tablet by mouth Every Mon, Wed, Fri Morning. 12 tablet 5     SYMBICORT 160-4.5 MCG/ACT inhaler INHALE TWO PUFFS BY MOUTH TWICE A DAY 10.2 g 1     VENTOLIN  (90 Base) MCG/ACT inhaler INHALE 2 PUFFS INTO THE LUNGS EVERY 6 HOURS AS NEEDED FOR SHORTNESS OF BREATH OR WHEEZING OR COUGH 18 g 2         Physical Exam:  General: The patient is a pleasant male in no acute distress. Jaundiced.   /77 (BP Location: Right arm, Patient Position: Sitting, Cuff Size: Adult Small)   Pulse 109   Temp 98.2  F (36.8  C) (Oral)   Resp 18   Wt 74 kg (163 lb 3.2 oz)   SpO2 94%   BMI 22.76 kg/m    Wt Readings from Last 10 Encounters:   07/07/25 74 kg (163 lb 3.2 oz)   07/02/25 74.1 kg (163 lb 4.8 oz)   06/20/25 74.1 kg (163 lb 6.4 oz)   06/05/25 73.4 kg (161 lb 14.4 oz)   06/04/25 70.3 kg (155 lb)   04/30/25 73.9 kg (163 lb)   04/25/25 74.8 kg (164 lb 12.8 oz)   04/21/25 71.7 kg (158 lb)   04/17/25 74.4 kg (164 lb)   04/16/25 74.6 kg (164 lb 8 oz)   HEENT: EOMI. Sclerae are icteric. Oral mucosa is  dry, no lesions or thrush.  Lymph: Neck is supple with no lymphadenopathy in the cervical or supraclavicular areas.   Heart: Regular rate and rhythm.   Lungs: Clear to auscultation bilaterally.   Abdomen: Bowel sounds present, soft, nontender with no palpable hepatosplenomegaly or masses.   Extremities: 2+ lower extremity edema noted bilaterally from knees through feet. No redness or warmth.   Neuro: Cranial nerves II through XII are grossly intact. Alert and oriented x3, loses train of thought multiple times during visit   Skin: No rashes, petechiae, or bruising noted on exposed skin.      LABS  Most Recent 3 CBC's:  Recent Labs   Lab Test 07/02/25  1354 06/20/25  0803 06/10/25  1404   WBC 11.7* 9.9 9.4   HGB 10.0* 8.6* 9.5*   MCV 82 81 85   PLT 82* 127* 161    Most Recent 3 BMP's:  Recent Labs   Lab Test 07/02/25  1354 06/20/25  0803 06/18/25  0742   * 129* 127*   POTASSIUM 5.7* 4.7 4.9   CHLORIDE 92* 97* 96*   CO2 19* 19* 19*   BUN 33.0* 20.3* 25.1*   CR 1.81* 1.00 1.18*   ANIONGAP 13 13 12   JEFERSON 8.4* 8.1* 8.1*   GLC 84 92 111*    Most Recent 2 LFT's:  Recent Labs   Lab Test 07/02/25  1354 06/20/25  0803   * 123*   ALT 63 38   ALKPHOS 842* 612*   BILITOTAL 5.2* 1.1   I reviewed the above labs today.    ASSESSMENT AND PLAN   Metastatic rectal cancer  He started on treatment with FOLFIRI + bevacizumab on 8/30/24. Imaging in October 2024 showed improvement in his disease. Imaging in February 2025 showed disease progression, though this was in the setting of treatment break due to PJP pneumonia. Therefore he resumed the same chemotherapy with FOLFIRI/chelsea on 2/20/25. Imaging April 2025 with mixed response. Started FOLFOX + bevacizumab with oxaliplatin desensitization April 2025. CT imaging June 2025 with progression. I discussed plan of care with Dr. Snwo today. Per Dr. Snow, discussions last week centered around supportive/hospice cares if clinical trial wasn't an option, lab results weren't  available until after that visit. Roman presents today accompanied by Chelsy, both reporting clinical decline for Roman over the last 5 days. He is now weaker, eating minimal, and severely jaundiced. Roman expressed desire to go to the ED tomorrow morning for 24 hours of monitoring, IVFs and any potential interventions to help him feel better prior to him enrolling in hospice. He and Chelsy express understanding that Roman is likely dying and they know he may not have long left to live. I discussed enrolling in hospice today, but he would like to present to the ED tomorrow first. I voiced my concern that he may not have days left, especially wihtout prompt intervention to support his kidneys and correct other labs of concern from last week and that I think he should present to the ED today if he wishes to have more aggressive cares rather than hospice. He is only willing to present to the ED tomorrow. He was agreeable to rechecking labs in the clinic today, but is not willing to go to ED today, so will hold off on labs until he is agreeable to aggressive interventions/treatment.   -Per Chelsy, referral order for hospice is not needed as she works for their preferred hospice company and arrangements can quickly be made to enroll in hospice when he is ready   -Chelsy and Roman asked if paperwork was on file to for Chelsy to be his healthcare decision alternate, no paperwork on file. POLST on file. Will have social work/RNCC follow up for assistance      MICHELLE  -Creatinine 1.8 last week, suspect worse today due to limited urine output. Potentially secondary to malnutrition, dehydration, progressive disease, and/or renal failure secondary to liver disease.      Dysuria.   -urinating once or twice per day with dysuria and dark urine, secondary to hyperbilirubinemia. Recommend collecting a UA today but Roman is unable to provide a sample. He would like to provide a sample tomorrow in the ED. Vital signs and WBC # reassuring.      Back pain  Secondary to bone mets, increased over the past few days in right low back/flank. Following with palliative care. Managed with oxycodone prn, refilled today.         Not discussed today:     Nausea  Secondary to chemotherapy. Recommend taking Compazine and Zofran as needed. Continue olanzapine.   -He prefers the ondansetron ODT with his next refill.      Constipation  Recommend increasing MiraLax from qday to bid at his visit yesterday and he started last night and did have more output from his stoma this morning.  -Continue Miralax BID as he is increasing oxycodone use.     Bone mets  Continue Zometa monthly.     PJP pneumonia  Following with ID. Completed course of high dose Bactrim, now on PJP prophylaxis with M/W/F Bactrim for a minimum of 6 months (August 2025). Per ID, will then reassess the immune system and the CD4 count prior to stopping the Bactrim prophylaxis.  Note from 2.19 reviewed today.   -Lungs are CTA and he feels his cough is also resolved.   -Creatinine clearance today is 79.  At this point, he could resume and since he reports he has not taken it for the last month it is likely not the cause of his MICHELLE.       Iron deficient anemia  Treated with 3 doses of Venofer, last on 10/4/24. Iron studies in February 2025 unclear if anemia of chronic disease or mixed with recurrent iron deficiency. MCV has been trending down again. Iron studies in mid-March 2025 are consistent with anemia of chronic disease. Will continue to monitor.   -Hgb 8.5 today.  No s/s bleeding or black tarry stools.   -Not discussed with patient today at this acute visit; will message primary team.     Muscle cramping  Will trial magnesium oxide 400 mg once/day.  -He picked this up today and will start this evening.       The longitudinal plan of care for the diagnosis(es)/condition(s) as documented were addressed during this visit. Due to the added complexity in care, I will continue to support Roman in the  subsequent management and with ongoing continuity of care.      FLORA Albright CNP      Again, thank you for allowing me to participate in the care of your patient.        Sincerely,        FLORA Albright CNP    Electronically signed

## 2025-07-07 NOTE — PROGRESS NOTES
[facility] - Medical Oncology Follow Up Note  Jul 7, 2025      Reason for Visit: follow up rectal adenocarcinoma    Oncology History:   Patient developed rectal bleeding in 2020.  In January 2021 CT showed focal wall thickening in the upper rectum, multiple pulmonary nodules bilaterally suspicious for metastatic disease.  Underwent FNA of the right upper lobe lesion which was consistent with adenocarcinoma of the colon.  He was treated with FOLFOX from 2/20/2021 through 5/20/2021.  Avastin was added.  Had an infusion reaction to oxaliplatin 6/2021.  7/2021- 12/2021 was on 5-FU alone.  Developed progression.  12/2021- 9/2022 was on FOLFIRI.  11/2021 started Lonsurf. All of this was done with outside oncologist.     Met with Dr. Snow on 2/3/2023 to establish care. Recommended regorafenib.     Started regorafenib on 3/2/2023. Progression noted 5/19/23. Plan to start FOLFOX/Avastin and send out CARIS testing to evaluate for other treatment options. Cycle 1 was given with oxaliplatin desensitization, 5FU, Avastin held due to increased urine protein.     Following cycle 4, patient was admitted with hematuria and acute kidney injury.    Oxaliplatin was dropped with Cycle 5.     Y-90 radioembolization 9/7.    10/24/23 CT CAP with disease progression with notable growth in all pulmonary lesions. Lonsurf + bevacizumab started 10/27/23 with plans to bridge to clinical trial.   11/15/23 - stopped chemo for [facility] study.   Currently enrolled in [facility] CAR-T trial (completed cell harvest 1/23/2024, tentatively planning administration of CAR-T in April 2/12/24 - resumed Lonsurf, bevacizumab to bridge treatment until CAR-T therapy  3/20/24 - treatment changed to Fruquintinib due to intolerability of Lonsurf/bevacizumab  4/2/24- Presented to the ED with dehydration. Stopped Fruquintnib.   6/2024- cellular therapy transplant at [facility].   7/30/24- CT imaging demonstrating marked progression of pulmonary and hepatic lesions,  clinical trial failed to generate T-cell graft. Multiple discussions regarding plan of care, hospice vs additional lines of treatment. Patient decided he is not ready for hospice. He resumed treatment with FOLFIRI + bevacizumab on 8/30/24.   10/2024 - CT CAP with continued response, plan to continue FOLFIRI + bevacizumab with the addition of Zometa. Per his dentist, needs dental work completed prior to clearance. (Scheduled 1/2 and 1/21).   12/2024 chemo held for break over new Years per patient's choice.  1/15/25 chemo held due to ongoing respiratory illness, + RSV, also treated with levaquin for pneumonia on CXR, chemo deferred   1/22-1/25/25- hospitalized with suspicion for fungal pneumonia  1/28-1/29/25- hospitalized for PJP pneumonia, started on treatment with Bactrim and steroids  2/19/25 CT CAP shows disease progression, occurred while on treatment break for pneumonia, resumed same chemotherapy with FOLFIRI/chelsea on 2/20/25 4/14/25 CT CAP with reduction in his pulmonary lesions, with some growth in his hepatic lesions. Plan to continue treatment as a bridge to clinical trial. Plan for treatment with FOLFOX (oxaliplatin desensitization) + bevacizumab.   4/30/25 Cycle 1 FOLFOX (oxaliplatin desensitization) + bevacizumab  5/2025 rescheduled chemo per patient preference for travel  6/5/25 CT Abd/pelv with increased hepatic metastases, increased soft tissue thickening about the rectum, numerous stable pulmonary nodules, unchanged bone mets   6/18/25 CT Chest with increased size of pulmonary nodules and masses when compared to April 2025 scan       Interval History:     Roman presents today for follow up accompanied by his significant other, Chelsy.     -Chelsy reports over the last five days Roman has had significant decline. She has noted mild intermittent confusion at times  -he has had an increase in right flank/back pain, taking oxycodone with relief  -he is also become jaundice over the last few days   -minimal  "oral intake   -urinating once or twice daily, urine is dark brown. Has burning/discomfort with urination. Each time he urinates he also passes clots per rectum which is also painful   -continues to have LE swelling from knee down. Laying in bed most of the day and keeping his legs elevated which has greatly improved his swelling in his legs, but then increases the pressure in his abdomen  -chronic shortness of breath with activity is slightly worse. Doing minimal activity at home and stops to rest frequently for several minutes to recover his breath.           Current Outpatient Medications   Medication Sig Dispense Refill    acetaminophen (TYLENOL) 500 MG tablet Take 500-1,000 mg by mouth as needed for mild pain.      Chemo Kit For RN use only. Do not remove items from bag. Contents: 1  sodium chloride 0.9% flush, 4 medium gloves, 1 chemo gown, 1/4 chemo mat, 1 connector female, 1 chemo bag. [id number] kit 0    cyclobenzaprine (FLEXERIL) 5 MG tablet Take one tab (5mg) by mouth over 6-8 hours, as needed for spasms 45 tablet 2    dexAMETHasone (DECADRON) 4 MG tablet Take 2 tablets (8 mg) by mouth daily. Take for 2 days, starting the day after chemo. Take with food. 4 tablet 2    Emergency Supply Kit, Central, Patient use for emergency only. Contents: 3 sodium chloride 0.9% flushes, 1 dressing kit, 1 microclave ext set 14\", 4 nitrile gloves (med), 6 alcohol prep pads, 1 bacitracin, 1 syringe (10 cc 20 G 1\"). Call [phone] to reorder. [id number] kit 0    fluorouracil (ADRUCIL) 2.5 GM/50ML SOLN injection       fluticasone (FLONASE) 50 MCG/ACT nasal spray Spray 1 spray into both nostrils daily. 16 g 2    fluticasone (FLONASE) 50 MCG/ACT nasal spray Spray 1 spray into both nostrils daily. 16 g 2    gabapentin (NEURONTIN) 100 MG capsule TAKE 1 TO 2 CAPSULES (100-200 MG) BY MOUTH UP TO TWICE DURING THE DAY AND 3 CAPSULES (300 MG) AT BEDTIME. 210 capsule 1    guaiFENesin (MUCINEX) 600 MG 12 hr tablet Take 2 tablets (1,200 mg) " by mouth 2 times daily. (Patient not taking: Reported on 6/5/2025) 20 tablet 0    ibuprofen (ADVIL/MOTRIN) 200 MG tablet Take 3 tablets (600 mg) by mouth every 8 hours as needed for moderate pain (Patient not taking: Reported on 4/21/2025) 100 tablet 0    ipratropium (ATROVENT) 0.06 % nasal spray Spray 2 sprays into both nostrils 4 times daily. 15 mL 0    ipratropium - albuterol 0.5 mg/2.5 mg/3 mL (DUONEB) 0.5-2.5 (3) MG/3ML neb solution Take 1 vial (3 mLs) by nebulization every 6 hours as needed for shortness of breath, wheezing or cough. 90 mL 3    loperamide (IMODIUM A-D) 2 MG tablet Take 2 mg by mouth 4 times daily as needed for diarrhea. (Patient not taking: Reported on 7/2/2025)      loratadine (CLARITIN) 10 MG tablet Take 10 mg by mouth daily.      LORazepam (ATIVAN) 0.5 MG tablet Take 1 tablet (0.5 mg) by mouth every 6 hours as needed for anxiety. 30 tablet 0    NARCAN 4 MG/0.1ML nasal spray  (Patient not taking: Reported on 4/21/2025)      OLANZapine (ZYPREXA) 2.5 MG tablet Take 1 tablet (2.5 mg) by mouth 2 times daily as needed (nausea). 60 tablet 1    ondansetron (ZOFRAN ODT) 8 MG ODT tab Take 1 tablet (8 mg) by mouth every 8 hours as needed for nausea. 60 tablet 3    ondansetron (ZOFRAN) 8 MG tablet Take 1 tablet (8 mg) by mouth every 8 hours as needed for nausea (vomiting). 30 tablet 2    Ostomy Supplies MISC 1 each daily 1 each 11    oxyCODONE IR (ROXICODONE) 10 MG tablet Take 1-2 tablets (10-20 mg) by mouth every 4 hours as needed for pain. 150 tablet 0    pegfilgrastim (NEULASTA ONPRO KIT) 6 MG/0.6ML injection Inject 0.6 mLs (6 mg) subcutaneously every 2 weeks as needed (at the time of chemotherapy disconnect per treatment plan). 15 mL 0    Port Access Kit For nurse use only.  Do not remove items from bag.  Use for port access.  Do not place syringe on sterile field. [id number] kit 0    potassium phosphate, monobasic, (K-PHOS) 500 MG tablet Take 1 tablet (500 mg) by mouth 2 times daily. (Patient not  taking: Reported on 7/2/2025) 14 tablet 0    prochlorperazine (COMPAZINE) 10 MG tablet Take 1 tablet (10 mg) by mouth every 6 hours as needed for nausea or vomiting. 30 tablet 2    prochlorperazine (COMPAZINE) 10 MG tablet Take 1 tablet (10 mg) by mouth every 6 hours as needed for nausea or vomiting 30 tablet 2    sodium chloride, PF, 0.9% PF flush Inject 10 mLs into the vein as needed for line flush. Flush IV before and after med administration as directed and/or at least every 24 hours, or prior to deaccessing for no further use and/or at least every 4 weeks when not accessed. 565945 mL 0    sulfamethoxazole-trimethoprim (BACTRIM DS) 800-160 MG tablet Take 1 tablet by mouth Every Mon, Wed, Fri Morning. 12 tablet 5    SYMBICORT 160-4.5 MCG/ACT inhaler INHALE TWO PUFFS BY MOUTH TWICE A DAY 10.2 g 1    VENTOLIN  (90 Base) MCG/ACT inhaler INHALE 2 PUFFS INTO THE LUNGS EVERY 6 HOURS AS NEEDED FOR SHORTNESS OF BREATH OR WHEEZING OR COUGH 18 g 2         Physical Exam:  General: The patient is a pleasant male in no acute distress. Jaundiced.   /77 (BP Location: Right arm, Patient Position: Sitting, Cuff Size: Adult Small)   Pulse 109   Temp 98.2  F (36.8  C) (Oral)   Resp 18   Wt 74 kg (163 lb 3.2 oz)   SpO2 94%   BMI 22.76 kg/m    Wt Readings from Last 10 Encounters:   07/07/25 74 kg (163 lb 3.2 oz)   07/02/25 74.1 kg (163 lb 4.8 oz)   06/20/25 74.1 kg (163 lb 6.4 oz)   06/05/25 73.4 kg (161 lb 14.4 oz)   06/04/25 70.3 kg (155 lb)   04/30/25 73.9 kg (163 lb)   04/25/25 74.8 kg (164 lb 12.8 oz)   04/21/25 71.7 kg (158 lb)   04/17/25 74.4 kg (164 lb)   04/16/25 74.6 kg (164 lb 8 oz)   HEENT: EOMI. Sclerae are icteric. Oral mucosa is dry, no lesions or thrush.  Lymph: Neck is supple with no lymphadenopathy in the cervical or supraclavicular areas.   Heart: Regular rate and rhythm.   Lungs: Clear to auscultation bilaterally.   Abdomen: Bowel sounds present, soft, nontender with no palpable  hepatosplenomegaly or masses.   Extremities: 2+ lower extremity edema noted bilaterally from knees through feet. No redness or warmth.   Neuro: Cranial nerves II through XII are grossly intact. Alert and oriented x3, loses train of thought multiple times during visit   Skin: No rashes, petechiae, or bruising noted on exposed skin.      LABS  Most Recent 3 CBC's:  Recent Labs   Lab Test 07/02/25  1354 06/20/25  0803 06/10/25  1404   WBC 11.7* 9.9 9.4   HGB 10.0* 8.6* 9.5*   MCV 82 81 85   PLT 82* 127* 161    Most Recent 3 BMP's:  Recent Labs   Lab Test 07/02/25  1354 06/20/25  0803 06/18/25  0742   * 129* 127*   POTASSIUM 5.7* 4.7 4.9   CHLORIDE 92* 97* 96*   CO2 19* 19* 19*   BUN 33.0* 20.3* 25.1*   CR 1.81* 1.00 1.18*   ANIONGAP 13 13 12   JEFERSON 8.4* 8.1* 8.1*   GLC 84 92 111*    Most Recent 2 LFT's:  Recent Labs   Lab Test 07/02/25  1354 06/20/25  0803   * 123*   ALT 63 38   ALKPHOS 842* 612*   BILITOTAL 5.2* 1.1   I reviewed the above labs today.    ASSESSMENT AND PLAN   Metastatic rectal cancer  He started on treatment with FOLFIRI + bevacizumab on 8/30/24. Imaging in October 2024 showed improvement in his disease. Imaging in February 2025 showed disease progression, though this was in the setting of treatment break due to PJP pneumonia. Therefore he resumed the same chemotherapy with FOLFIRI/chelsea on 2/20/25. Imaging April 2025 with mixed response. Started FOLFOX + bevacizumab with oxaliplatin desensitization April 2025. CT imaging June 2025 with progression. I discussed plan of care with Dr. Snow today. Per Dr. Snow, discussions last week centered around supportive/hospice cares if clinical trial wasn't an option, lab results weren't available until after that visit. Roman presents today accompanied by Chelsy, both reporting clinical decline for Roman over the last 5 days. He is now weaker, eating minimal, and severely jaundiced. Roman expressed desire to go to the ED tomorrow morning for 24  hours of monitoring, IVFs and any potential interventions to help him feel better prior to him enrolling in hospice. He and Chelsy express understanding that Roman is likely dying and they know he may not have long left to live. I discussed enrolling in hospice today, but he would like to present to the ED tomorrow first. I voiced my concern that he may not have days left, especially wihtout prompt intervention to support his kidneys and correct other labs of concern from last week and that I think he should present to the ED today if he wishes to have more aggressive cares rather than hospice. He is only willing to present to the ED tomorrow. He was agreeable to rechecking labs in the clinic today, but is not willing to go to ED today, so will hold off on labs until he is agreeable to aggressive interventions/treatment.   -Per Chelsy, referral order for hospice is not needed as she works for their preferred hospice company and arrangements can quickly be made to enroll in hospice when he is ready   -Chelsy and Roman asked if paperwork was on file to for Chelsy to be his healthcare decision alternate, no paperwork on file. POLST on file. Will have social work/RNCC follow up for assistance      MICHELLE  -Creatinine 1.8 last week, suspect worse today due to limited urine output. Potentially secondary to malnutrition, dehydration, progressive disease, and/or renal failure secondary to liver disease.      Dysuria.   -urinating once or twice per day with dysuria and dark urine, secondary to hyperbilirubinemia. Recommend collecting a UA today but Roman is unable to provide a sample. He would like to provide a sample tomorrow in the ED. Vital signs and WBC # reassuring.     Back pain  Secondary to bone mets, increased over the past few days in right low back/flank. Following with palliative care. Managed with oxycodone prn, refilled today.         Not discussed today:     Nausea  Secondary to chemotherapy. Recommend taking  Compazine and Zofran as needed. Continue olanzapine.   -He prefers the ondansetron ODT with his next refill.      Constipation  Recommend increasing MiraLax from qday to bid at his visit yesterday and he started last night and did have more output from his stoma this morning.  -Continue Miralax BID as he is increasing oxycodone use.     Bone mets  Continue Zometa monthly.     PJP pneumonia  Following with ID. Completed course of high dose Bactrim, now on PJP prophylaxis with M/W/F Bactrim for a minimum of 6 months (August 2025). Per ID, will then reassess the immune system and the CD4 count prior to stopping the Bactrim prophylaxis.  Note from 2.19 reviewed today.   -Lungs are CTA and he feels his cough is also resolved.   -Creatinine clearance today is 79.  At this point, he could resume and since he reports he has not taken it for the last month it is likely not the cause of his MICHELLE.       Iron deficient anemia  Treated with 3 doses of Venofer, last on 10/4/24. Iron studies in February 2025 unclear if anemia of chronic disease or mixed with recurrent iron deficiency. MCV has been trending down again. Iron studies in mid-March 2025 are consistent with anemia of chronic disease. Will continue to monitor.   -Hgb 8.5 today.  No s/s bleeding or black tarry stools.   -Not discussed with patient today at this acute visit; will message primary team.     Muscle cramping  Will trial magnesium oxide 400 mg once/day.  -He picked this up today and will start this evening.       The longitudinal plan of care for the diagnosis(es)/condition(s) as documented were addressed during this visit. Due to the added complexity in care, I will continue to support Roman in the subsequent management and with ongoing continuity of care.      FLORA Albright CNP

## 2025-07-07 NOTE — NURSING NOTE
"Oncology Rooming Note    July 7, 2025 2:46 PM   Roman Escalante is a 50 year old male who presents for:    Chief Complaint   Patient presents with    Oncology Clinic Visit     Rectal adenocarcinoma metastatic to lung     Initial Vitals: There were no vitals taken for this visit. Estimated body mass index is 22.78 kg/m  as calculated from the following:    Height as of 6/4/25: 1.803 m (5' 11\").    Weight as of 7/2/25: 74.1 kg (163 lb 4.8 oz). There is no height or weight on file to calculate BSA.  Data Unavailable Comment: Data Unavailable   No LMP for male patient.  Allergies reviewed: Yes  Medications reviewed: Yes    Medications: Medication refills not needed today.  Pharmacy name entered into [employer]: [facility] - [address] [place] [address]    Frailty Screening:   Is the patient here for a new oncology consult visit in cancer care? 2. No    PHQ9:  Did this patient require a PHQ9?: No      Clinical concerns: Patient notes sharp pain in his right flank. He is only urinating twice a day and his urine is brown. He has had painful urination. His skin and eyes are become more jaundiced.       Karen Calles              "

## 2025-07-08 ENCOUNTER — HOSPITAL ENCOUNTER (INPATIENT)
Facility: CLINIC | Age: 50
LOS: 2 days | Discharge: HOME | End: 2025-07-10
Attending: EMERGENCY MEDICINE | Admitting: STUDENT IN AN ORGANIZED HEALTH CARE EDUCATION/TRAINING PROGRAM
Payer: COMMERCIAL

## 2025-07-08 ENCOUNTER — APPOINTMENT (OUTPATIENT)
Dept: CT IMAGING | Facility: CLINIC | Age: 50
End: 2025-07-08
Attending: EMERGENCY MEDICINE
Payer: COMMERCIAL

## 2025-07-08 ENCOUNTER — APPOINTMENT (OUTPATIENT)
Dept: ULTRASOUND IMAGING | Facility: CLINIC | Age: 50
End: 2025-07-08
Payer: COMMERCIAL

## 2025-07-08 ENCOUNTER — APPOINTMENT (OUTPATIENT)
Dept: GENERAL RADIOLOGY | Facility: CLINIC | Age: 50
End: 2025-07-08
Payer: COMMERCIAL

## 2025-07-08 ENCOUNTER — APPOINTMENT (OUTPATIENT)
Dept: GENERAL RADIOLOGY | Facility: CLINIC | Age: 50
End: 2025-07-08
Attending: EMERGENCY MEDICINE
Payer: COMMERCIAL

## 2025-07-08 DIAGNOSIS — B96.20 E COLI BACTEREMIA: ICD-10-CM

## 2025-07-08 DIAGNOSIS — T45.1X5A NEUROPATHY DUE TO CHEMOTHERAPEUTIC DRUG: ICD-10-CM

## 2025-07-08 DIAGNOSIS — R78.81 E COLI BACTEREMIA: ICD-10-CM

## 2025-07-08 DIAGNOSIS — G62.0 NEUROPATHY DUE TO CHEMOTHERAPEUTIC DRUG: ICD-10-CM

## 2025-07-08 DIAGNOSIS — G89.3 CANCER ASSOCIATED PAIN: ICD-10-CM

## 2025-07-08 DIAGNOSIS — A41.9 SEPSIS WITH ACUTE RENAL FAILURE WITHOUT SEPTIC SHOCK, DUE TO UNSPECIFIED ORGANISM, UNSPECIFIED ACUTE RENAL FAILURE TYPE (H): ICD-10-CM

## 2025-07-08 DIAGNOSIS — K72.10 CHRONIC LIVER FAILURE WITHOUT HEPATIC COMA (H): ICD-10-CM

## 2025-07-08 DIAGNOSIS — C18.9 COLON CANCER METASTASIZED TO BONE (H): Primary | ICD-10-CM

## 2025-07-08 DIAGNOSIS — R65.20 SEPSIS WITH ACUTE RENAL FAILURE WITHOUT SEPTIC SHOCK, DUE TO UNSPECIFIED ORGANISM, UNSPECIFIED ACUTE RENAL FAILURE TYPE (H): ICD-10-CM

## 2025-07-08 DIAGNOSIS — C79.51 COLON CANCER METASTASIZED TO BONE (H): Primary | ICD-10-CM

## 2025-07-08 DIAGNOSIS — N17.9 SEPSIS WITH ACUTE RENAL FAILURE WITHOUT SEPTIC SHOCK, DUE TO UNSPECIFIED ORGANISM, UNSPECIFIED ACUTE RENAL FAILURE TYPE (H): ICD-10-CM

## 2025-07-08 LAB
ALBUMIN SERPL BCG-MCNC: 2.8 G/DL (ref 3.5–5.2)
ALP SERPL-CCNC: 735 U/L (ref 40–150)
ALT SERPL W P-5'-P-CCNC: 60 U/L (ref 0–70)
AMMONIA PLAS-SCNC: 37 UMOL/L (ref 16–60)
ANION GAP SERPL CALCULATED.3IONS-SCNC: 12 MMOL/L (ref 7–15)
ANION GAP SERPL CALCULATED.3IONS-SCNC: 15 MMOL/L (ref 7–15)
AST SERPL W P-5'-P-CCNC: 264 U/L (ref 0–45)
BASE EXCESS BLDV CALC-SCNC: -6 MMOL/L (ref -3–3)
BASOPHILS # BLD AUTO: 0 10E3/UL (ref 0–0.2)
BASOPHILS NFR BLD AUTO: 0 %
BILIRUB SERPL-MCNC: 9.8 MG/DL
BILIRUBIN DIRECT (ROCHE PRO & PURE): 7.56 MG/DL (ref 0–0.45)
BUN SERPL-MCNC: 44.7 MG/DL (ref 6–20)
BUN SERPL-MCNC: 45.9 MG/DL (ref 6–20)
CALCIUM SERPL-MCNC: 7.6 MG/DL (ref 8.8–10.4)
CALCIUM SERPL-MCNC: 7.8 MG/DL (ref 8.8–10.4)
CHLORIDE SERPL-SCNC: 93 MMOL/L (ref 98–107)
CHLORIDE SERPL-SCNC: 96 MMOL/L (ref 98–107)
CREAT SERPL-MCNC: 1.98 MG/DL (ref 0.67–1.17)
CREAT SERPL-MCNC: 2 MG/DL (ref 0.67–1.17)
EGFRCR SERPLBLD CKD-EPI 2021: 39 ML/MIN/1.73M2
EGFRCR SERPLBLD CKD-EPI 2021: 40 ML/MIN/1.73M2
EOSINOPHIL # BLD AUTO: 0.1 10E3/UL (ref 0–0.7)
EOSINOPHIL NFR BLD AUTO: 1 %
ERYTHROCYTE [DISTWIDTH] IN BLOOD BY AUTOMATED COUNT: 26.5 % (ref 10–15)
GLUCOSE SERPL-MCNC: 104 MG/DL (ref 70–99)
GLUCOSE SERPL-MCNC: 94 MG/DL (ref 70–99)
HCO3 BLDV-SCNC: 20 MMOL/L (ref 21–28)
HCO3 SERPL-SCNC: 18 MMOL/L (ref 22–29)
HCO3 SERPL-SCNC: 18 MMOL/L (ref 22–29)
HCT VFR BLD AUTO: 30.8 % (ref 40–53)
HGB BLD-MCNC: 9.6 G/DL (ref 13.3–17.7)
IMM GRANULOCYTES # BLD: 0.7 10E3/UL
IMM GRANULOCYTES NFR BLD: 5 %
INR PPP: 1.55 (ref 0.85–1.15)
LACTATE BLD-SCNC: 1.4 MMOL/L (ref 0.7–2)
LIPASE SERPL-CCNC: 8 U/L (ref 13–60)
LYMPHOCYTES # BLD AUTO: 0.3 10E3/UL (ref 0.8–5.3)
LYMPHOCYTES NFR BLD AUTO: 3 %
MAGNESIUM SERPL-MCNC: 2.2 MG/DL (ref 1.7–2.3)
MCH RBC QN AUTO: 25.7 PG (ref 26.5–33)
MCHC RBC AUTO-ENTMCNC: 31.2 G/DL (ref 31.5–36.5)
MCV RBC AUTO: 83 FL (ref 78–100)
MONOCYTES # BLD AUTO: 0.9 10E3/UL (ref 0–1.3)
MONOCYTES NFR BLD AUTO: 7 %
MRSA DNA SPEC QL NAA+PROBE: NEGATIVE
NEUTROPHILS # BLD AUTO: 10.4 10E3/UL (ref 1.6–8.3)
NEUTROPHILS NFR BLD AUTO: 84 %
NRBC # BLD AUTO: 0 10E3/UL
NRBC BLD AUTO-RTO: 0 /100
PCO2 BLDV: 38 MM HG (ref 40–50)
PH BLDV: 7.32 [PH] (ref 7.32–7.43)
PLATELET # BLD AUTO: 71 10E3/UL (ref 150–450)
PO2 BLDV: 39 MM HG (ref 25–47)
POTASSIUM SERPL-SCNC: 4.9 MMOL/L (ref 3.4–5.3)
POTASSIUM SERPL-SCNC: 5.1 MMOL/L (ref 3.4–5.3)
PROCALCITONIN SERPL IA-MCNC: 15.7 NG/ML
PROT SERPL-MCNC: 6 G/DL (ref 6.4–8.3)
PROTHROMBIN TIME: 18.8 SECONDS (ref 11.8–14.8)
RBC # BLD AUTO: 3.73 10E6/UL (ref 4.4–5.9)
SA TARGET DNA: NEGATIVE
SAO2 % BLDV: 70 % (ref 70–75)
SODIUM SERPL-SCNC: 126 MMOL/L (ref 135–145)
SODIUM SERPL-SCNC: 126 MMOL/L (ref 135–145)
WBC # BLD AUTO: 12.5 10E3/UL (ref 4–11)

## 2025-07-08 PROCEDURE — 71046 X-RAY EXAM CHEST 2 VIEWS: CPT | Mod: 26 | Performed by: RADIOLOGY

## 2025-07-08 PROCEDURE — 84145 PROCALCITONIN (PCT): CPT | Performed by: EMERGENCY MEDICINE

## 2025-07-08 PROCEDURE — 71045 X-RAY EXAM CHEST 1 VIEW: CPT

## 2025-07-08 PROCEDURE — 93005 ELECTROCARDIOGRAM TRACING: CPT | Performed by: EMERGENCY MEDICINE

## 2025-07-08 PROCEDURE — 258N000003 HC RX IP 258 OP 636: Performed by: EMERGENCY MEDICINE

## 2025-07-08 PROCEDURE — 87641 MR-STAPH DNA AMP PROBE: CPT

## 2025-07-08 PROCEDURE — 99223 1ST HOSP IP/OBS HIGH 75: CPT | Mod: GC | Performed by: STUDENT IN AN ORGANIZED HEALTH CARE EDUCATION/TRAINING PROGRAM

## 2025-07-08 PROCEDURE — 82248 BILIRUBIN DIRECT: CPT | Performed by: DIETITIAN, REGISTERED

## 2025-07-08 PROCEDURE — 93010 ELECTROCARDIOGRAM REPORT: CPT | Performed by: EMERGENCY MEDICINE

## 2025-07-08 PROCEDURE — 85610 PROTHROMBIN TIME: CPT | Performed by: EMERGENCY MEDICINE

## 2025-07-08 PROCEDURE — 85025 COMPLETE CBC W/AUTO DIFF WBC: CPT | Performed by: EMERGENCY MEDICINE

## 2025-07-08 PROCEDURE — 99255 IP/OBS CONSLTJ NEW/EST HI 80: CPT | Performed by: DIETITIAN, REGISTERED

## 2025-07-08 PROCEDURE — 71045 X-RAY EXAM CHEST 1 VIEW: CPT | Mod: 26 | Performed by: RADIOLOGY

## 2025-07-08 PROCEDURE — 83690 ASSAY OF LIPASE: CPT | Performed by: EMERGENCY MEDICINE

## 2025-07-08 PROCEDURE — 71046 X-RAY EXAM CHEST 2 VIEWS: CPT

## 2025-07-08 PROCEDURE — 250N000011 HC RX IP 250 OP 636

## 2025-07-08 PROCEDURE — 83735 ASSAY OF MAGNESIUM: CPT | Performed by: EMERGENCY MEDICINE

## 2025-07-08 PROCEDURE — 82803 BLOOD GASES ANY COMBINATION: CPT

## 2025-07-08 PROCEDURE — 120N000002 HC R&B MED SURG/OB UMMC

## 2025-07-08 PROCEDURE — 74176 CT ABD & PELVIS W/O CONTRAST: CPT

## 2025-07-08 PROCEDURE — 82310 ASSAY OF CALCIUM: CPT

## 2025-07-08 PROCEDURE — 82140 ASSAY OF AMMONIA: CPT | Performed by: EMERGENCY MEDICINE

## 2025-07-08 PROCEDURE — 87154 CUL TYP ID BLD PTHGN 6+ TRGT: CPT | Performed by: EMERGENCY MEDICINE

## 2025-07-08 PROCEDURE — 99285 EMERGENCY DEPT VISIT HI MDM: CPT | Mod: 25 | Performed by: EMERGENCY MEDICINE

## 2025-07-08 PROCEDURE — 76705 ECHO EXAM OF ABDOMEN: CPT

## 2025-07-08 PROCEDURE — 87040 BLOOD CULTURE FOR BACTERIA: CPT | Performed by: EMERGENCY MEDICINE

## 2025-07-08 PROCEDURE — 74176 CT ABD & PELVIS W/O CONTRAST: CPT | Mod: 26 | Performed by: RADIOLOGY

## 2025-07-08 PROCEDURE — 36415 COLL VENOUS BLD VENIPUNCTURE: CPT | Performed by: EMERGENCY MEDICINE

## 2025-07-08 PROCEDURE — 258N000003 HC RX IP 258 OP 636

## 2025-07-08 PROCEDURE — 96360 HYDRATION IV INFUSION INIT: CPT | Performed by: EMERGENCY MEDICINE

## 2025-07-08 PROCEDURE — 76705 ECHO EXAM OF ABDOMEN: CPT | Mod: 26 | Performed by: RADIOLOGY

## 2025-07-08 PROCEDURE — 99291 CRITICAL CARE FIRST HOUR: CPT | Mod: 25 | Performed by: EMERGENCY MEDICINE

## 2025-07-08 PROCEDURE — 99291 CRITICAL CARE FIRST HOUR: CPT | Performed by: EMERGENCY MEDICINE

## 2025-07-08 PROCEDURE — 250N000011 HC RX IP 250 OP 636: Performed by: EMERGENCY MEDICINE

## 2025-07-08 PROCEDURE — 250N000013 HC RX MED GY IP 250 OP 250 PS 637

## 2025-07-08 PROCEDURE — 80053 COMPREHEN METABOLIC PANEL: CPT | Performed by: EMERGENCY MEDICINE

## 2025-07-08 PROCEDURE — 36415 COLL VENOUS BLD VENIPUNCTURE: CPT

## 2025-07-08 RX ORDER — POLYETHYLENE GLYCOL 3350 17 G/17G
17 POWDER, FOR SOLUTION ORAL 2 TIMES DAILY PRN
Status: DISCONTINUED | OUTPATIENT
Start: 2025-07-08 | End: 2025-07-10 | Stop reason: HOSPADM

## 2025-07-08 RX ORDER — AMOXICILLIN 250 MG
1 CAPSULE ORAL 2 TIMES DAILY PRN
Status: CANCELLED | OUTPATIENT
Start: 2025-07-08

## 2025-07-08 RX ORDER — ONDANSETRON 4 MG/1
4 TABLET, ORALLY DISINTEGRATING ORAL EVERY 6 HOURS PRN
Status: DISCONTINUED | OUTPATIENT
Start: 2025-07-08 | End: 2025-07-10 | Stop reason: HOSPADM

## 2025-07-08 RX ORDER — OXYCODONE HYDROCHLORIDE 10 MG/1
20 TABLET ORAL EVERY 4 HOURS PRN
Status: DISCONTINUED | OUTPATIENT
Start: 2025-07-08 | End: 2025-07-08

## 2025-07-08 RX ORDER — OLANZAPINE 2.5 MG/1
2.5 TABLET, FILM COATED ORAL 2 TIMES DAILY PRN
Status: DISCONTINUED | OUTPATIENT
Start: 2025-07-08 | End: 2025-07-10 | Stop reason: HOSPADM

## 2025-07-08 RX ORDER — OXYCODONE HYDROCHLORIDE 10 MG/1
10-20 TABLET ORAL EVERY 4 HOURS PRN
Status: DISCONTINUED | OUTPATIENT
Start: 2025-07-08 | End: 2025-07-08

## 2025-07-08 RX ORDER — ALBUTEROL SULFATE 90 UG/1
1-2 INHALANT RESPIRATORY (INHALATION) EVERY 6 HOURS PRN
Status: DISCONTINUED | OUTPATIENT
Start: 2025-07-08 | End: 2025-07-10 | Stop reason: HOSPADM

## 2025-07-08 RX ORDER — LORAZEPAM 0.5 MG/1
0.5 TABLET ORAL EVERY 6 HOURS PRN
Status: DISCONTINUED | OUTPATIENT
Start: 2025-07-08 | End: 2025-07-10 | Stop reason: HOSPADM

## 2025-07-08 RX ORDER — ACETAMINOPHEN 325 MG/1
650 TABLET ORAL EVERY 4 HOURS PRN
Status: DISCONTINUED | OUTPATIENT
Start: 2025-07-08 | End: 2025-07-10 | Stop reason: HOSPADM

## 2025-07-08 RX ORDER — IPRATROPIUM BROMIDE AND ALBUTEROL SULFATE 2.5; .5 MG/3ML; MG/3ML
1 SOLUTION RESPIRATORY (INHALATION) EVERY 6 HOURS PRN
Status: DISCONTINUED | OUTPATIENT
Start: 2025-07-08 | End: 2025-07-10 | Stop reason: HOSPADM

## 2025-07-08 RX ORDER — PROCHLORPERAZINE MALEATE 10 MG
10 TABLET ORAL EVERY 6 HOURS PRN
Status: DISCONTINUED | OUTPATIENT
Start: 2025-07-08 | End: 2025-07-10 | Stop reason: HOSPADM

## 2025-07-08 RX ORDER — CALCIUM CARBONATE 500 MG/1
1000 TABLET, CHEWABLE ORAL 4 TIMES DAILY PRN
Status: CANCELLED | OUTPATIENT
Start: 2025-07-08

## 2025-07-08 RX ORDER — ACETAMINOPHEN 650 MG/1
650 SUPPOSITORY RECTAL EVERY 4 HOURS PRN
Status: DISCONTINUED | OUTPATIENT
Start: 2025-07-08 | End: 2025-07-10 | Stop reason: HOSPADM

## 2025-07-08 RX ORDER — AMOXICILLIN 250 MG
1 CAPSULE ORAL 2 TIMES DAILY PRN
Status: DISCONTINUED | OUTPATIENT
Start: 2025-07-08 | End: 2025-07-10

## 2025-07-08 RX ORDER — LIDOCAINE 40 MG/G
CREAM TOPICAL
Status: CANCELLED | OUTPATIENT
Start: 2025-07-08

## 2025-07-08 RX ORDER — ONDANSETRON 4 MG/1
4 TABLET, ORALLY DISINTEGRATING ORAL EVERY 6 HOURS PRN
Status: CANCELLED | OUTPATIENT
Start: 2025-07-08

## 2025-07-08 RX ORDER — AMOXICILLIN 250 MG
2 CAPSULE ORAL 2 TIMES DAILY PRN
Status: CANCELLED | OUTPATIENT
Start: 2025-07-08

## 2025-07-08 RX ORDER — HEPARIN SODIUM 5000 [USP'U]/.5ML
5000 INJECTION, SOLUTION INTRAVENOUS; SUBCUTANEOUS EVERY 8 HOURS
Status: DISCONTINUED | OUTPATIENT
Start: 2025-07-08 | End: 2025-07-09

## 2025-07-08 RX ORDER — PIPERACILLIN SODIUM, TAZOBACTAM SODIUM 4; .5 G/20ML; G/20ML
4.5 INJECTION, POWDER, LYOPHILIZED, FOR SOLUTION INTRAVENOUS ONCE
Status: COMPLETED | OUTPATIENT
Start: 2025-07-08 | End: 2025-07-08

## 2025-07-08 RX ORDER — ONDANSETRON 2 MG/ML
4 INJECTION INTRAMUSCULAR; INTRAVENOUS EVERY 6 HOURS PRN
Status: DISCONTINUED | OUTPATIENT
Start: 2025-07-08 | End: 2025-07-10 | Stop reason: HOSPADM

## 2025-07-08 RX ORDER — BUDESONIDE AND FORMOTEROL FUMARATE DIHYDRATE 160; 4.5 UG/1; UG/1
2 AEROSOL RESPIRATORY (INHALATION) 2 TIMES DAILY
Status: DISCONTINUED | OUTPATIENT
Start: 2025-07-08 | End: 2025-07-10 | Stop reason: HOSPADM

## 2025-07-08 RX ORDER — PIPERACILLIN SODIUM, TAZOBACTAM SODIUM 4; .5 G/20ML; G/20ML
4.5 INJECTION, POWDER, LYOPHILIZED, FOR SOLUTION INTRAVENOUS EVERY 6 HOURS
Status: DISCONTINUED | OUTPATIENT
Start: 2025-07-08 | End: 2025-07-10 | Stop reason: HOSPADM

## 2025-07-08 RX ORDER — FLUTICASONE PROPIONATE 50 MCG
1 SPRAY, SUSPENSION (ML) NASAL DAILY
Status: DISCONTINUED | OUTPATIENT
Start: 2025-07-08 | End: 2025-07-10 | Stop reason: HOSPADM

## 2025-07-08 RX ORDER — LORATADINE 10 MG/1
10 TABLET ORAL DAILY
Status: DISCONTINUED | OUTPATIENT
Start: 2025-07-09 | End: 2025-07-10 | Stop reason: HOSPADM

## 2025-07-08 RX ORDER — PROCHLORPERAZINE MALEATE 10 MG
10 TABLET ORAL EVERY 6 HOURS PRN
Status: DISCONTINUED | OUTPATIENT
Start: 2025-07-08 | End: 2025-07-08

## 2025-07-08 RX ORDER — ONDANSETRON 2 MG/ML
4 INJECTION INTRAMUSCULAR; INTRAVENOUS EVERY 6 HOURS PRN
Status: CANCELLED | OUTPATIENT
Start: 2025-07-08

## 2025-07-08 RX ORDER — OXYCODONE HYDROCHLORIDE 10 MG/1
10 TABLET ORAL EVERY 4 HOURS PRN
Status: DISCONTINUED | OUTPATIENT
Start: 2025-07-08 | End: 2025-07-10

## 2025-07-08 RX ORDER — LIDOCAINE 40 MG/G
CREAM TOPICAL
Status: DISCONTINUED | OUTPATIENT
Start: 2025-07-08 | End: 2025-07-10 | Stop reason: HOSPADM

## 2025-07-08 RX ORDER — CALCIUM CARBONATE 500 MG/1
1000 TABLET, CHEWABLE ORAL 4 TIMES DAILY PRN
Status: DISCONTINUED | OUTPATIENT
Start: 2025-07-08 | End: 2025-07-10 | Stop reason: HOSPADM

## 2025-07-08 RX ORDER — BISACODYL 10 MG
10 SUPPOSITORY, RECTAL RECTAL DAILY PRN
Status: DISCONTINUED | OUTPATIENT
Start: 2025-07-08 | End: 2025-07-10 | Stop reason: HOSPADM

## 2025-07-08 RX ORDER — GABAPENTIN 100 MG/1
100-200 CAPSULE ORAL 2 TIMES DAILY
Status: DISCONTINUED | OUTPATIENT
Start: 2025-07-08 | End: 2025-07-08

## 2025-07-08 RX ORDER — IPRATROPIUM BROMIDE 42 UG/1
2 SPRAY, METERED NASAL 4 TIMES DAILY
Status: DISCONTINUED | OUTPATIENT
Start: 2025-07-08 | End: 2025-07-10 | Stop reason: HOSPADM

## 2025-07-08 RX ORDER — POLYETHYLENE GLYCOL 3350 17 G/17G
17 POWDER, FOR SOLUTION ORAL 2 TIMES DAILY PRN
Status: CANCELLED | OUTPATIENT
Start: 2025-07-08

## 2025-07-08 RX ORDER — GABAPENTIN 100 MG/1
100 CAPSULE ORAL AT BEDTIME
Status: DISCONTINUED | OUTPATIENT
Start: 2025-07-08 | End: 2025-07-10 | Stop reason: HOSPADM

## 2025-07-08 RX ORDER — SULFAMETHOXAZOLE AND TRIMETHOPRIM 800; 160 MG/1; MG/1
1 TABLET ORAL
Status: DISCONTINUED | OUTPATIENT
Start: 2025-07-09 | End: 2025-07-09

## 2025-07-08 RX ORDER — AMOXICILLIN 250 MG
2 CAPSULE ORAL 2 TIMES DAILY PRN
Status: DISCONTINUED | OUTPATIENT
Start: 2025-07-08 | End: 2025-07-10

## 2025-07-08 RX ORDER — CYCLOBENZAPRINE HCL 5 MG
5 TABLET ORAL EVERY 6 HOURS
Status: DISCONTINUED | OUTPATIENT
Start: 2025-07-08 | End: 2025-07-10 | Stop reason: HOSPADM

## 2025-07-08 RX ADMIN — SODIUM CHLORIDE, SODIUM LACTATE, POTASSIUM CHLORIDE, AND CALCIUM CHLORIDE 500 ML: .6; .31; .03; .02 INJECTION, SOLUTION INTRAVENOUS at 09:38

## 2025-07-08 RX ADMIN — BUDESONIDE AND FORMOTEROL FUMARATE DIHYDRATE 2 PUFF: 160; 4.5 AEROSOL RESPIRATORY (INHALATION) at 19:11

## 2025-07-08 RX ADMIN — SODIUM CHLORIDE 500 ML: 0.9 INJECTION, SOLUTION INTRAVENOUS at 14:07

## 2025-07-08 RX ADMIN — IPRATROPIUM BROMIDE 2 SPRAY: 42 SPRAY, METERED NASAL at 17:16

## 2025-07-08 RX ADMIN — PIPERACILLIN AND TAZOBACTAM 4.5 G: 4; .5 INJECTION, POWDER, LYOPHILIZED, FOR SOLUTION INTRAVENOUS at 11:36

## 2025-07-08 RX ADMIN — PIPERACILLIN AND TAZOBACTAM 4.5 G: 4; .5 INJECTION, POWDER, LYOPHILIZED, FOR SOLUTION INTRAVENOUS at 23:13

## 2025-07-08 RX ADMIN — IPRATROPIUM BROMIDE 2 SPRAY: 42 SPRAY, METERED NASAL at 19:13

## 2025-07-08 RX ADMIN — FLUTICASONE PROPIONATE 1 SPRAY: 50 SPRAY, METERED NASAL at 17:13

## 2025-07-08 RX ADMIN — SODIUM CHLORIDE, SODIUM LACTATE, POTASSIUM CHLORIDE, AND CALCIUM CHLORIDE 1000 ML: .6; .31; .03; .02 INJECTION, SOLUTION INTRAVENOUS at 15:24

## 2025-07-08 RX ADMIN — GABAPENTIN 100 MG: 100 CAPSULE ORAL at 21:05

## 2025-07-08 RX ADMIN — PIPERACILLIN AND TAZOBACTAM 4.5 G: 4; .5 INJECTION, POWDER, LYOPHILIZED, FOR SOLUTION INTRAVENOUS at 17:13

## 2025-07-08 ASSESSMENT — ACTIVITIES OF DAILY LIVING (ADL)
ADLS_ACUITY_SCORE: 58
ADLS_ACUITY_SCORE: 58
ADLS_ACUITY_SCORE: 56
ADLS_ACUITY_SCORE: 58
ADLS_ACUITY_SCORE: 56
ADLS_ACUITY_SCORE: 58

## 2025-07-08 ASSESSMENT — COLUMBIA-SUICIDE SEVERITY RATING SCALE - C-SSRS
2. HAVE YOU ACTUALLY HAD ANY THOUGHTS OF KILLING YOURSELF IN THE PAST MONTH?: NO
1. IN THE PAST MONTH, HAVE YOU WISHED YOU WERE DEAD OR WISHED YOU COULD GO TO SLEEP AND NOT WAKE UP?: NO
6. HAVE YOU EVER DONE ANYTHING, STARTED TO DO ANYTHING, OR PREPARED TO DO ANYTHING TO END YOUR LIFE?: NO

## 2025-07-08 NOTE — CONSULTS
Gastroenterology Consultation      Date of Admission:  7/8/2025  Reason for Admission: Sepsis, MICHELLE, elevated LFTs  Date of Consult  7/8/2025   Requesting Physician:  Cameron Samson MD           ASSESSMENT AND RECOMMENDATIONS:   Assessment:  50 year old male with a PMH of former methamphetamine and tobacco use disorders, metastatic colorectal adenocarcinoma (dx initially in 2021) and s/p diverting loop colostomy for near obstructing rectal cancer now with known mets to liver, lung and bone receiving clinical trial chemotherapy (FOLFOX + bevacizumab with oxaliplatin desensitization since April 2025) and noted to have progression of disease despite this. Presented to [facility] ED 7/8/2025 on recommendation of Oncology clinic for worsening confusion, hematuria and dark urine/jaundice with LFT recheck and CT imaging c/f worsening pulm and liver mets, biliary obstruction and cholangitis.    #. Metastatic colorectal adenocarcinoma (including liver mets)  #. Elevated LFTs with hyperbilirubinemia  #. MICHELLE  #. Sepsis - c/f biliary obstruction and cholangitis  #. Thrombocytopenia  Presents with reports of 1 week of malaise/weakness, increasing confusion, R flank/back pain, N/V, decreased PO, jaundice/dark urine and decreased urine and ostomy outputs.  Denies any pruritus.  Unclear if any fevers. , T.bili 9.8 (no direct),  and normal ALT (previously only had mild AP elevation mid-higher 200s, AST low-mid 100's but normal T.bili mid June).  Lipase normal.  WBC 12.5, Procal 15.7 with BC and UA/UC pending.  CT AP (non-contrast d/t MICHELLE) with new intrahepatic (left lobe) ductal dilation likely r/t mass effect from worsening hepatic mets to central left hepatic duct.  Presentation c/f cholangitis. Only has received 500 mL IVF thus far in ED, started on Zosyn. INR 1.55, plts 71.    Noted recent discussions with Oncology team about very poor prognosis (estimated may only have days left) with repeat CT in June showing  "increased mets, thickening in rectum, increased size of pulm nodules and considering hospice but had wanted to continue at that time, recheck lab rechecks/options for clinical trial.    RECOMMENDATIONS:  - Ongoing GOC discussions and if procedures c/w wishes and prognosis.  - Fluid resuscitation per ED/medicine team for sepsis (30 ml/kg) and monitor for response (BP/UOP/etc).    - If not responsive to appropriate fluids, admit to ICU for initiation of pressors to stabilize prior to considering proceed with urgent/emergent ERCP   *Please notify Panc/bili GI team if/when this is warranted.  -IV Zosyn, monitor BC and remainder of infectious workup.  - Keep NPO.   - Do not begin any AC.  -Trend CBC (WBC and plts)  - Trend CMP (LFT) daily.  - Recheck INR in AM (goal <1.8)  - Analgesia/Antiemetics per primary team.    Discussed with ED (Dr. Ramirez) and accepting primary medicine team (Dr. Mckeon) and patient.    Gastroenterology follow up recommendations:   TBD pending clinical course.    Thank you for involving us in this patient's care. Please do not hesitate to contact the GI service with any questions or concerns.     Pt seen and care plan discussed with Dr. Almaraz, GI staff physician.    Overall time spent on the date of this encounter preparing to see the patient (including chart review of available notes, clinical status events, imaging and labs); obtaining and/or reviewing separately obtained history; ordering medications, tests or procedures; communicating with other health care professionals; and documenting the above clinical information in the electronic medical record was 90 minutes.      Savanna Trotter PA-C, RD, [facility]  Inpatient Gastroenterology Service  [facility]  Pager: *[phone]  Text Page     -------------------------------------------------------------------------------------------------------------------       Reason for Consultation:   \"Concern for ascending " "cholangitis\"         History of Present Illness:   Roman Escalante is a 50 year old male with a PMH of former methamphetamine and tobacco use, metastatic colorectal adenocarcinoma (dx initially in 2021, mets to liver, lung and bone) s/p diverting loop colostomy for near obstructing rectal cancer (2021) now on clinical trial chemotherapy (FOLFOX + bevacizumab with oxaliplatin desensitization since April 2025) but with progression of disease despite this. Now presents on recommendation of Oncology clinic for worsening confusion, hematuria and dark urine/jaundice with LFT recheck and imaging c/f biliary obstruction and cholangitis.    Patient seen and examined at 13:30. History is obtained from RN, chart, medicine team, consulting ED provider and patient who is poor historian (no family/SO currently at bedside) Reports 1 week of worsening weakness/malaise, poor PO, increasing confusion, R back/flank pain, N/V and jaundice (dark urine, yellowing of eyes but denied any itching/pruritus).  Also noticed change in quality of his colostomy outputs as well as decreases in volume of colostomy and UOPs.  Unclear if any fevers/chills.   Had been discussing hospice pending serial lab checks and clinical trial options with Oncology team.    Previous Procedures:  No prior ERCP found in EMR, pt denied any prior ERCP.    COLONOSCOPY - HIM SCAN (01/12/2021 12:00 AM)               Past Medical History:   Reviewed and edited as appropriate  Past Medical History:   Diagnosis Date    Cancer (H) 1/12/21    Colorectal cacer mast, to lungs, and liver    Essential (primary) hypertension 08/03/2021            Past Surgical History:   Reviewed and edited as appropriate   Past Surgical History:   Procedure Laterality Date    ABDOMEN SURGERY      BIOPSY  1/21/21    Lung biopsy. Positive colorectal cancer in lungs    BRONCHOSCOPY (RIGID OR FLEXIBLE), DIAGNOSTIC N/A 1/24/2025    Procedure: BRONCHOSCOPY, WITH BRONCHOALVEOLAR LAVAGE;  Surgeon: " Perlman, David Morris, MD;  Location:  GI    GI SURGERY  Ostomy placementnt 2/1/21    Stoma is an outie, bowel excretion only    IR LUNG BIOPSY MEDIASTINUM RIGHT  01/19/2021    IR SIRT (SELECTIVE INTERNAL RADIO THERAPY)  8/29/2023    IR VISCERAL ANGIOGRAM  8/29/2023    IR VISCERAL EMBOLIZATION  9/7/2023    NERVE BLOCK PERIPHERAL Bilateral 3/14/2024    Procedure: Bilateral pudendal nerve block with ultrasound;  Surgeon: Asha Mendoza MD;  [place] [facility] [place]              Social History:   , SO (Chelsy)  Lives in [place], [place]         Family History:   Patient's family history is reviewed today and is non-contributory    No family history on file.          Allergies:   Reviewed and edited as appropriate     Allergies   Allergen Reactions    Oxaliplatin Shortness Of Breath, Other (See Comments) and Nausea and Vomiting     Patient had HSR to Oxaliplatin on cycle 8 of FOLFOX chemotherapy. Sent to ER for continued monitoring.    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 Blood products may be delayed. Draw patient 24 hours prior to transfusion. Draw one red top and two purple top tubes for all type and screen orders.            Medications:     Current Facility-Administered Medications   Medication Dose Route Frequency Provider Last Rate Last Admin    lactated ringers BOLUS 500 mL  500 mL Intravenous Once Sarah Patnoja  mL/hr at 07/08/25 1212 Restarted at 07/08/25 1212    pharmacy alert - intermittent dosing  1 each Other See Admin Instructions Sarah Pantoja MD         Current Outpatient Medications   Medication Sig Dispense Refill    Chemo Kit For RN use only. Do not remove items from bag. Contents: 1  sodium chloride 0.9% flush, 4 medium gloves, 1 chemo gown, 1/4 chemo mat, 1 connector female, 1 chemo bag. [id number] kit 0    cyclobenzaprine (FLEXERIL) 5 MG tablet Take one tab  "(5mg) by mouth over 6-8 hours, as needed for spasms 45 tablet 2    dexAMETHasone (DECADRON) 4 MG tablet Take 2 tablets (8 mg) by mouth daily. Take for 2 days, starting the day after chemo. Take with food. 4 tablet 2    Emergency Supply Kit, Central, Patient use for emergency only. Contents: 3 sodium chloride 0.9% flushes, 1 dressing kit, 1 microclave ext set 14\", 4 nitrile gloves (med), 6 alcohol prep pads, 1 bacitracin, 1 syringe (10 cc 20 G 1\"). Call [phone] to reorder. [id number] kit 0    fluorouracil (ADRUCIL) 2.5 GM/50ML SOLN injection       fluticasone (FLONASE) 50 MCG/ACT nasal spray Spray 1 spray into both nostrils daily. 16 g 2    gabapentin (NEURONTIN) 100 MG capsule TAKE 1 TO 2 CAPSULES (100-200 MG) BY MOUTH UP TO TWICE DURING THE DAY AND 3 CAPSULES (300 MG) AT BEDTIME. 210 capsule 1    ipratropium (ATROVENT) 0.06 % nasal spray Spray 2 sprays into both nostrils 4 times daily. 15 mL 0    ipratropium - albuterol 0.5 mg/2.5 mg/3 mL (DUONEB) 0.5-2.5 (3) MG/3ML neb solution Take 1 vial (3 mLs) by nebulization every 6 hours as needed for shortness of breath, wheezing or cough. 90 mL 3    loratadine (CLARITIN) 10 MG tablet Take 10 mg by mouth daily.      LORazepam (ATIVAN) 0.5 MG tablet Take 1 tablet (0.5 mg) by mouth every 6 hours as needed for anxiety. 30 tablet 0    NARCAN 4 MG/0.1ML nasal spray       OLANZapine (ZYPREXA) 2.5 MG tablet Take 1 tablet (2.5 mg) by mouth 2 times daily as needed (nausea). 60 tablet 1    ondansetron (ZOFRAN ODT) 8 MG ODT tab Take 1 tablet (8 mg) by mouth every 8 hours as needed for nausea. 60 tablet 3    Ostomy Supplies MISC 1 each daily 1 each 11    oxyCODONE IR (ROXICODONE) 10 MG tablet Take 1-2 tablets (10-20 mg) by mouth every 4 hours as needed for pain. 100 tablet 0    pegfilgrastim (NEULASTA ONPRO KIT) 6 MG/0.6ML injection Inject 0.6 mLs (6 mg) subcutaneously every 2 weeks as needed (at the time of chemotherapy disconnect per treatment plan). 15 mL 0    Port Access Kit For " nurse use only.  Do not remove items from bag.  Use for port access.  Do not place syringe on sterile field. [id number] kit 0    prochlorperazine (COMPAZINE) 10 MG tablet Take 1 tablet (10 mg) by mouth every 6 hours as needed for nausea or vomiting. 30 tablet 2    sodium chloride, PF, 0.9% PF flush Inject 10 mLs into the vein as needed for line flush. Flush IV before and after med administration as directed and/or at least every 24 hours, or prior to deaccessing for no further use and/or at least every 4 weeks when not accessed. 197672 mL 0    sulfamethoxazole-trimethoprim (BACTRIM DS) 800-160 MG tablet Take 1 tablet by mouth Every Mon, Wed, Fri Morning. 12 tablet 5    SYMBICORT 160-4.5 MCG/ACT inhaler INHALE TWO PUFFS BY MOUTH TWICE A DAY 10.2 g 1    VENTOLIN  (90 Base) MCG/ACT inhaler INHALE 2 PUFFS INTO THE LUNGS EVERY 6 HOURS AS NEEDED FOR SHORTNESS OF BREATH OR WHEEZING OR COUGH 18 g 2             Review of Systems:     A complete review of systems was performed and is negative except as noted in the HPI           Physical Exam:   Temp: 98  F (36.7  C) Temp src: Oral BP: 96/74 Pulse: 94   Resp: 18 SpO2: 100 % O2 Device: None (Room air)    Wt:   Wt Readings from Last 10 Encounters:   07/07/25 74 kg (163 lb 3.2 oz)   07/02/25 74.1 kg (163 lb 4.8 oz)   06/20/25 74.1 kg (163 lb 6.4 oz)   06/05/25 73.4 kg (161 lb 14.4 oz)   06/04/25 70.3 kg (155 lb)   04/30/25 73.9 kg (163 lb)   04/25/25 74.8 kg (164 lb 12.8 oz)   04/21/25 71.7 kg (158 lb)   04/17/25 74.4 kg (164 lb)   04/16/25 74.6 kg (164 lb 8 oz)      General: Cachectic and jaundiced male who intermittently answers questions appropriately.    HEENT: Head is AT/NC. Sclera icteric. Temporal wasting.  Oropharynx is clear, moist and w/o exudate or lesions. No thrush. Good dentition.  Lungs: Non-labored breathing on RA.   Heart: Borderline tachycardic on monitor, regular rhythm  Abdomen: Soft, non-distended, mildly tender to palpation in RUQ but no rebound or  peritoneal signs. Colostomy with healthy pink stoma, small amount of liquid tan outputs in ostomy bag.  Extremities: WWP, no pedal edema.  MSK: no gross deformity, severe temporal/clavicular and UE/LE muscle wasting  Skin: Jaundice.  Neurologic: Grossly non-focal.    Psych: mood appropriate to situation           Data:   Labs and imaging below were independently reviewed and interpreted    LAB WORK:    BMP  Recent Labs   Lab 07/08/25  0933 07/02/25  1354   * 124*   POTASSIUM 4.9 5.7*   CHLORIDE 93* 92*   JEFERSON 7.8* 8.4*   CO2 18* 19*   BUN 45.9* 33.0*   CR 2.00* 1.81*   * 84     CBC  Recent Labs   Lab 07/08/25  0933 07/02/25  1354   WBC 12.5* 11.7*   RBC 3.73* 3.95*   HGB 9.6* 10.0*   HCT 30.8* 32.2*   MCV 83 82   MCH 25.7* 25.3*   MCHC 31.2* 31.1*   RDW 26.5* 25.2*   PLT 71* 82*     INR  Recent Labs   Lab 07/08/25  0933 07/02/25  1354   INR 1.55* 1.40*     LFTs  Recent Labs   Lab 07/08/25  0933 07/02/25  1354   ALKPHOS 735* 842*   * 218*   ALT 60 63   BILITOTAL 9.8* 5.2*   PROTTOTAL 6.0* 6.4   ALBUMIN 2.8* 3.0*      PANC  Recent Labs   Lab 07/08/25  0933 07/02/25  1354   LIPASE 8* 9*   AMYLASE  --  9*       IMAGING:  (Personally reviewed)    Results for orders placed or performed during the hospital encounter of 07/08/25   CT Abdomen Pelvis w/o Contrast    Impression    IMPRESSION:   1. No acute intra-abdominal pathology.  2. Progressive hepatic and pulmonary metastatic disease since  6/5/2025.   3. New intrahepatic biliary dilation the left hepatic lobe, likely  related to mass effect exerted upon the central left hepatic duct by  hepatic metastases.  4. Anasarca with increased small volume ascites and trace bilateral  pleural effusions.   5. Stable osseous metastases.    LOYDA STEARNS,          SYSTEM ID:  [id number]   XR Chest 2 Views    Impression    IMPRESSION: Numerous pulmonary nodules or masses consistent with  neoplastic disease.    JILLIAN AGUILAR MD         SYSTEM ID:  [id number]             =======================================================================

## 2025-07-08 NOTE — PROGRESS NOTES
Meet with Mr Escalante and his family given the initial request for an emergent ERCP. It appears his immediate clinical course has stabilized with fluid resuscitation and he is now assigned a floor bed. Review of imaging suggests impressive metastatic lesions to the liver which may explain his liver study abnormalities. Of course, sepsis is suspected based on presentation and laboratories and there is no obvious other culprit. Therefore, an ERCP in the coming 24h seems reasonable. However this will require intubation and I warned him that he would possible need ICU care afterwards and there is the chance that he would not ever get extubated, in particular as his established physicians gave him days to live. He did say that we understood my concerns and at this moment would want to organize and ERCP. We will observe the patients overnight course and reassess in the morning. The medicine team may reach out to me with any changes in the interim. Of note, he is eating dinner which will now complicate any urgent need to intubate.      Peewee Almaraz MD PhD FACG AUNG AGUILERA  Professor of Medicine, Surgery and Pediatrics  Interventional and Therapeutic Endoscopy  Chief, Division of Gastroenterology and Hepatology and Nutrition  GI Service Line Medical Director    [facility] [address], [place] [address]    New Consultations  [phone]  Procedure Scheduling [phone]  Clinical Nurse Coordinator [phone]  Clinical Fax   [phone]  Administrative   [phone]  Administrative Fax  [phone]           Patient up to the bathroom.

## 2025-07-08 NOTE — ED PROVIDER NOTES
"    [facility] ([facility])    7/08/25       ED PROVIDER NOTE   Vertical Triage A  History     Chief Complaint   Patient presents with    Hematuria     The history is provided by medical records and the patient. History limited by: Mild confusion.     Roman Escalante is a 50 year old male with history of colon cancer with metastasis to liver and lung previously on clinical trial (FOLFOX + bevacizumab with oxaliplatin desensitization since April 2025) complicated by progression who presents to the Emergency Department with right lower quadrant abdominal pain, right-sided flank pain, changes in urination, changes in stoma output, and shortness of breath, nausea and vomiting developing over the past couple of weeks.  Patient is a somewhat limited historian, has difficulty giving specific details but does present history to the best of his ability.  Patient states that his urine has started to look different, has become darker, more discolored and smelly.  He also has noticed pain in his right lower quadrant radiating towards his flank.  He has been taking 2-6 tablets of 10 mg oxycodone a day for this pain.  He told triage nurse that he had 3 oxy prior to arrival.  He states that his stool output has changed, \"it has not been cooperating.\" No bloody stoma output.  He reports having had some nausea, vomiting.  He had a subjective fever last week.  He also has been feeling more short of breath but really cannot specify how long he has had this.  He was placed on 2L NC for comfort. He has never needed supplemental O2 in the past but is feeling so poorly that he would like to be admitted and have supplemental oxygen while he is here.  \"I have been doing this now for 4 years, I don't expect miracles to happen.\"  When asked if he has any chest pain, he states \"maybe a little but nothing to concern me.\"     Per [facility] records, patient is in the process of transitioning to hospice.  He was seen in " "oncology clinic yesterday, was accompanied by his significant other Chelsy who reported that he had been declining significantly over the past 5 days with intermittent confusion.  He also appeared to be increasingly jaundiced with dark brown urine. \"He is now weaker, eating minimal, and severely jaundiced. Roman expressed desire to go to the ED tomorrow morning for 24 hours of monitoring, IVFs and any potential interventions to help him feel better prior to him enrolling in hospice. He and Chelsy express understanding that Roman is likely dying and they know he may not have long left to live. I discussed enrolling in hospice today, but he would like to present to the ED tomorrow first. I voiced my concern that he may not have days left...\"    Past Medical History  Past Medical History:   Diagnosis Date    Cancer (H) 1/12/21    Colorectal cacer mast, to lungs, and liver    Essential (primary) hypertension 08/03/2021     Past Surgical History:   Procedure Laterality Date    ABDOMEN SURGERY      BIOPSY  1/21/21    Lung biopsy. Positive colorectal cancer in lungs    BRONCHOSCOPY (RIGID OR FLEXIBLE), DIAGNOSTIC N/A 1/24/2025    Procedure: BRONCHOSCOPY, WITH BRONCHOALVEOLAR LAVAGE;  Surgeon: Perlman, David Morris, MD;  Location:  GI    GI SURGERY  Ostomy placementnt 2/1/21    Stoma is an outie, bowel excretion only    IR LUNG BIOPSY MEDIASTINUM RIGHT  01/19/2021    IR SIRT (SELECTIVE INTERNAL RADIO THERAPY)  8/29/2023    IR VISCERAL ANGIOGRAM  8/29/2023    IR VISCERAL EMBOLIZATION  9/7/2023    NERVE BLOCK PERIPHERAL Bilateral 3/14/2024    Procedure: Bilateral pudendal nerve block with ultrasound;  Surgeon: Asha Mendoza MD;  Location: [facility] OR     acetaminophen (TYLENOL) 500 MG tablet  Chemo Kit  cyclobenzaprine (FLEXERIL) 5 MG tablet  dexAMETHasone (DECADRON) 4 MG tablet  Emergency Supply Kit, [facility],  fluorouracil (ADRUCIL) 2.5 GM/50ML SOLN injection  fluticasone (FLONASE) 50 MCG/ACT nasal spray  fluticasone (FLONASE) " 50 MCG/ACT nasal spray  gabapentin (NEURONTIN) 100 MG capsule  guaiFENesin (MUCINEX) 600 MG 12 hr tablet  ibuprofen (ADVIL/MOTRIN) 200 MG tablet  ipratropium (ATROVENT) 0.06 % nasal spray  ipratropium - albuterol 0.5 mg/2.5 mg/3 mL (DUONEB) 0.5-2.5 (3) MG/3ML neb solution  loperamide (IMODIUM A-D) 2 MG tablet  loratadine (CLARITIN) 10 MG tablet  LORazepam (ATIVAN) 0.5 MG tablet  NARCAN 4 MG/0.1ML nasal spray  OLANZapine (ZYPREXA) 2.5 MG tablet  ondansetron (ZOFRAN ODT) 8 MG ODT tab  ondansetron (ZOFRAN) 8 MG tablet  Ostomy Supplies MISC  oxyCODONE IR (ROXICODONE) 10 MG tablet  pegfilgrastim (NEULASTA ONPRO KIT) 6 MG/0.6ML injection  Port Access Kit  potassium phosphate, monobasic, (K-PHOS) 500 MG tablet  prochlorperazine (COMPAZINE) 10 MG tablet  prochlorperazine (COMPAZINE) 10 MG tablet  sodium chloride, PF, 0.9% PF flush  sulfamethoxazole-trimethoprim (BACTRIM DS) 800-160 MG tablet  SYMBICORT 160-4.5 MCG/ACT inhaler  VENTOLIN  (90 Base) MCG/ACT inhaler      Allergies   Allergen Reactions    Oxaliplatin Shortness Of Breath, Other (See Comments) and Nausea and Vomiting     Patient had HSR to Oxaliplatin on cycle 8 of FOLFOX chemotherapy. Sent to ER for continued monitoring.    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 Blood products may be delayed. Draw patient 24 hours prior to transfusion. Draw one red top and two purple top tubes for all type and screen orders.     Family History  No family history on file.  Social History   Social History     Tobacco Use    Smoking status: Former     Current packs/day: 0.00     Average packs/day: 0.5 packs/day for 13.3 years (6.6 ttl pk-yrs)     Types: Cigarettes, Other     Start date: 2010     Quit date: 2023     Years since quittin.9     Passive exposure: Past    Smokeless tobacco: Never   Vaping Use    Vaping status: Never Used   Substance Use Topics     Alcohol use: Not Currently     Comment: social    Drug use: Yes     Types: Marijuana     Comment: Keeps up my appetite, sleep, and help with neuropathy      A medically appropriate review of systems was performed with pertinent positives and negatives noted in the HPI, and all other systems negative.    Physical Exam   BP: 102/69  Pulse: 110  Temp: 98  F (36.7  C)  Resp: 18  SpO2: 92 %    Wt Readings from Last 10 Encounters:   07/07/25 74 kg (163 lb 3.2 oz)   07/02/25 74.1 kg (163 lb 4.8 oz)   06/20/25 74.1 kg (163 lb 6.4 oz)   06/05/25 73.4 kg (161 lb 14.4 oz)   06/04/25 70.3 kg (155 lb)   04/30/25 73.9 kg (163 lb)   04/25/25 74.8 kg (164 lb 12.8 oz)   04/21/25 71.7 kg (158 lb)   04/17/25 74.4 kg (164 lb)   04/16/25 74.6 kg (164 lb 8 oz)     Physical Exam  General: patient is drowsy but arouses to verbal stimuli, able to answer questions appropriately   head: atraumatic and normocephalic   EENT: Tacky mucus membranes without tonsillar erythema or exudates, pupils round and reactive, icteric sclera  Neck: supple without meningismus  Cardiovascular: regular rate and rhythm, extremities warm and well perfused  Pulmonary: lungs clear to auscultation bilaterally   Abdomen: soft, mildly tender, distended abdomen  Musculoskeletal: normal range of motion   Neurological: Drowsy but oriented, no facial droop, no slurring of speech, moving all extremities symmetrically, no asterixis   skin: warm, dry      ED Course, Procedures, & Data      Procedures            EKG Interpretation:      Interpreted by Sarah Pantoja MD  Time reviewed: 0909  Symptoms at time of EKG: weakness   Rhythm: sinus tachycardia  Rate: Tachycardia  Axis: Normal  Ectopy: none  Conduction: normal  ST Segments/ T Waves: No acute ischemic changes  Q Waves: none  Comparison to prior: Unchanged    Clinical Impression: sinus tachycardia          The patient has signs of sepsis   Sepsis ED evaluation   The patient has signs of sepsis as evidenced  by:  1. Presence of 2 SIRS criteria, suspected infection, AND  2. Organ dysfunction: Acute encephalopathy due to sepsis    Sepsis Care Initiation: Starting at 1030AM on 07/08/25, until specified. Prior to this documentation, sepsis, severe sepsis, or septic shock was NOT thought to be a significant cause of illness. This order represents the first time infection was seriously considered to be affecting the patient.    Lactic Acid Results:  Recent Labs   Lab Test 07/08/25  0935 01/28/25  1953 01/22/25  1431   LACT 1.4 1.0 1.2       3 Hour Bundle 6 Hour Bundle (Reassessment)   Blood Cultures before IV Antibiotics: Yes  Antibiotics given: see below  Prehospital fluid volume (mL):                     Total fluids given (ED +Pre-hospital):  Full 30 mL/kg bolus intentionally NOT administered to this patient due to ESRD. The target volume to infuse in this patient is 1000 and concern for hypervolemia. The target volume to infuse in this patient is 1000.   Repeat Lactic Acid Level: Ordered by reflex for 2 hours after initial lactic acid collection.  Vasopressors: MAP>65 after initial IVF bolus, will continue to monitor fluid status and vital signs.  Repeat perfusion exam: I attest to having performed a repeat sepsis exam and assessment of perfusion at 1400 PM.   BMI Readings from Last 1 Encounters:   07/08/25 22.76 kg/m        Anti-infectives (From admission through now)      Start     Dose/Rate Route Frequency Ordered Stop    07/08/25 1730  piperacillin-tazobactam (ZOSYN) 4.5 g vial to attach to  mL bag         4.5 g  over 30 Minutes Intravenous EVERY 6 HOURS 07/08/25 1523      07/08/25 1055  piperacillin-tazobactam (ZOSYN) 4.5 g vial to attach to  mL bag         4.5 g  over 30 Minutes Intravenous ONCE 07/08/25 1050 07/08/25 1211              IV Antibiotics given and/or elevated Lactate of 1.4 and no sepsis note found - Delete this reminder and enter the sepsis note or '.edcms' before signing chart.>>>      Results for orders placed or performed during the hospital encounter of 07/08/25   CT Abdomen Pelvis w/o Contrast    Impression    IMPRESSION:   1. No acute intra-abdominal pathology.  2. Progressive hepatic and pulmonary metastatic disease since  6/5/2025.   3. New intrahepatic biliary dilation the left hepatic lobe, likely  related to mass effect exerted upon the central left hepatic duct by  hepatic metastases.  4. Anasarca with increased small volume ascites and trace bilateral  pleural effusions.   5. Stable osseous metastases.    LOYDA STEARNS,          SYSTEM ID:  [id number]   INR   Result Value Ref Range    INR 1.55 (H) 0.85 - 1.15    PT 18.8 (H) 11.8 - 14.8 Seconds   Comprehensive metabolic panel   Result Value Ref Range    Sodium 126 (L) 135 - 145 mmol/L    Potassium 4.9 3.4 - 5.3 mmol/L    Carbon Dioxide (CO2) 18 (L) 22 - 29 mmol/L    Anion Gap 15 7 - 15 mmol/L    Urea Nitrogen 45.9 (H) 6.0 - 20.0 mg/dL    Creatinine 2.00 (H) 0.67 - 1.17 mg/dL    GFR Estimate 39 (L) >60 mL/min/1.73m2    Calcium 7.8 (L) 8.8 - 10.4 mg/dL    Chloride 93 (L) 98 - 107 mmol/L    Glucose 104 (H) 70 - 99 mg/dL    Alkaline Phosphatase 735 (H) 40 - 150 U/L     (H) 0 - 45 U/L    ALT 60 0 - 70 U/L    Protein Total 6.0 (L) 6.4 - 8.3 g/dL    Albumin 2.8 (L) 3.5 - 5.2 g/dL    Bilirubin Total 9.8 (H) <=1.2 mg/dL   Result Value Ref Range    Lipase 8 (L) 13 - 60 U/L   Result Value Ref Range    Procalcitonin 15.70 (HH) <0.50 ng/mL   Result Value Ref Range    Ammonia 37 16 - 60 umol/L   Result Value Ref Range    Magnesium 2.2 1.7 - 2.3 mg/dL   CBC with platelets and differential   Result Value Ref Range    WBC Count 12.5 (H) 4.0 - 11.0 10e3/uL    RBC Count 3.73 (L) 4.40 - 5.90 10e6/uL    Hemoglobin 9.6 (L) 13.3 - 17.7 g/dL    Hematocrit 30.8 (L) 40.0 - 53.0 %    MCV 83 78 - 100 fL    MCH 25.7 (L) 26.5 - 33.0 pg    MCHC 31.2 (L) 31.5 - 36.5 g/dL    RDW 26.5 (H) 10.0 - 15.0 %    Platelet Count 71 (L) 150 - 450 10e3/uL    %  Neutrophils 84 %    % Lymphocytes 3 %    % Monocytes 7 %    % Eosinophils 1 %    % Basophils 0 %    % Immature Granulocytes 5 %    NRBCs per 100 WBC 0 <1 /100    Absolute Neutrophils 10.4 (H) 1.6 - 8.3 10e3/uL    Absolute Lymphocytes 0.3 (L) 0.8 - 5.3 10e3/uL    Absolute Monocytes 0.9 0.0 - 1.3 10e3/uL    Absolute Eosinophils 0.1 0.0 - 0.7 10e3/uL    Absolute Basophils 0.0 0.0 - 0.2 10e3/uL    Absolute Immature Granulocytes 0.7 (H) <=0.4 10e3/uL    Absolute NRBCs 0.0 10e3/uL   iStat Gases (lactate) venous, POCT   Result Value Ref Range    Lactic Acid POCT 1.4 0.7 - 2.0 mmol/L    Bicarbonate Venous POCT 20 (L) 21 - 28 mmol/L    O2 Sat, Venous POCT 70 70 - 75 %    pCO2 Venous POCT 38 (L) 40 - 50 mm Hg    pH Venous POCT 7.32 7.32 - 7.43    pO2 Venous POCT 39 25 - 47 mm Hg    Base Excess/Deficit (+/-) POCT -6.0 (L) -3.0 - 3.0 mmol/L   EKG 12-lead, tracing only   Result Value Ref Range    Systolic Blood Pressure  mmHg    Diastolic Blood Pressure  mmHg    Ventricular Rate 101 BPM    Atrial Rate 101 BPM    MD Interval 152 ms    QRS Duration 82 ms     ms    QTc 440 ms    P Axis 62 degrees    R AXIS 49 degrees    T Axis 64 degrees    Interpretation ECG       Sinus tachycardia  Nonspecific T wave abnormality  Abnormal ECG       Medications   lactated ringers BOLUS 500 mL (0 mLs Intravenous Paused 7/8/25 1136)   pharmacy alert - intermittent dosing (has no administration in time range)   piperacillin-tazobactam (ZOSYN) 4.5 g vial to attach to  mL bag ( Intravenous Restarted 7/8/25 1212)          Critical Care Addendum  My initial assessment, based on my review of nursing observations, review of vital signs, focused history, physical exam, and discussion with GI, IM, established a high suspicion that Roman Escalante has sepsis with indication for early goal-directed therapy, which requires immediate intervention, and therefore he is critically ill.     After the initial assessment, the care team initiated multiple  lab tests, initiated IV fluid administration, initiated medication therapy with zosyn, and consulted with GI to provide stabilization care. Due to the critical nature of this patient, I reassessed nursing observations, vital signs, physical exam, and mental status multiple times prior to his disposition.     Time also spent performing documentation, reviewing test results, discussion with consultants, and coordination of care.     Critical care time (excluding teaching time and procedures): 30 minutes.       Assessment & Plan    Roman Escalante is a 50 year old male with history of colon cancer with metastasis to liver and lung on clinical trial, who presents to the Emergency Department with right lower quadrant abdominal pain, right-sided flank pain, changes in urination, changes in stoma output, and shortness of breath, nausea and vomiting developing over the past couple of weeks.  He is noted to be tachycardic with soft blood pressures, afebrile.  He is hypoxic to 92% on room air and typically is not requiring any supplemental oxygen.  He was placed on 2 L standard Nasal cannula with improvement in symptoms.  Broad differential diagnosis was considered includes but is not limited to acute renal failure, pyelonephritis, obstructing nephropathy, intra-abdominal infection including ascending cholangitis or fistula formation.  His laboratory studies are notable for a significantly elevated procalcitonin of 15.7.  He has a leukocytosis of 12.5, lactate is within normal limits.  He was empirically covered with Zosyn.  Has a history of red man syndrome with vancomycin.  The remainder of his labs are notable for a hyponatremia with sodium down to 126 and uptrending total bilirubin to 9.8.  Additionally his creatinine is significantly increasing now up to 2.  He was given gentle IV fluids.  He did go for a CT scan of the abdomen which shows no acute pathology.  He does have new intrahepatic biliary dilatation of the left  hepatic lobe likely secondary to mass effect of his known metastases.  Additionally has increased ascites.  GI was consulted for consideration of potential stent placement and concerns for possible cholangitis.  Chest x-ray shows metastatic disease without evidence of pneumonia.  Patient will be admitted to medicine for ongoing management.    I have reviewed the nursing notes. I have reviewed the findings, diagnosis, plan and need for follow up with the patient.    New Prescriptions    No medications on file       Final diagnoses:   Sepsis with acute renal failure without septic shock, due to unspecified organism, unspecified acute renal failure type (H)   Chronic liver failure without hepatic coma (H)     I, Margie Torres, am serving as a trained medical scribe to document services personally performed by Sarah Arango MD based on the provider's statements to me on July 8, 2025.  This document has been checked and approved by the attending provider.    I, Sarah Arango MD, was physically present and have reviewed and verified the accuracy of this note documented by Margie Torres, medical scribe.      Sarah Arango MD   [facility] EMERGENCY DEPARTMENT  7/8/2025        Sarah Arango MD  07/10/25 0731

## 2025-07-08 NOTE — H&P
Resident/Fellow Attestation   I, Yanira Wellington DO, was present with the medical/LILLY student who participated in the service and in the documentation of the note.  I have verified the history and personally performed the physical exam and medical decision making.  I agree with the assessment and plan of care as documented in the note.      Patient appears acutely ill and cachectic. He voices understanding of the poor prognosis with his worsening disease burden, but voices desire to continue living for as long as possible. Clearly states that he would want treatment of any reversible causes of illness. Discussed with GI and they do not feel ERCP is needed emergently this evening. Will keep him NPO for now in the event that he decompensates overnight and necessitates surgical intervention.    Yanira Wellington DO  PGY1  Date of Service (when I saw the patient): 07/08/25    [facility]    History and Physical - Medicine Service, MARO[facility] TEAM 1       Date of Admission:  7/8/2025    Assessment & Plan      Roman Escalante is a 50 year old male admitted on 7/8/2025. He has a history of colon cancer with metastasis to liver and lung previously on clinical trial (FOLFOX + bevacizumab with oxaliplatin desensitization since April 2025) and is admitted for confusion for the past 5 days. He was placed on 2L NC for comfort. Chest x-ray showed numerous pulmonary nodules or masses consistent with neoplastic disease. UA, and MRSA MSSA PCR Nasal swab were ordered to rule out other infectious etiology. LR and saline 500mL ordered for fluid resuscitation with concern sepsis. Started on Zosyn. GI Hepatology was consulted for concern of cholangitis.    # Metastatic colorectal adenocarcinoma (including liver mets)  # Elevated LFTs with hyperbilirubinemia  # Sepsis, unclear source c/f biliary obstruction and cholangitis   # Prerenal MICHELLE   # Toxic Metabolic Encephalopathy   Concern for sepsis with  unclear cause, concern for biliary obstruction and cholangitis, versus possible UTI. Patient also with malnutrition, dehydration, progressively increasing disease burden. Urinating once or twice per day with dysuria and dark urine. He started on treatment with FOLFIRI + bevacizumab on 8/30/24. Imaging in October 2024 showed improvement in his disease. Imaging in February 2025 showed disease progression, though this was in the setting of treatment break due to PJP pneumonia. Therefore he resumed the same chemotherapy with FOLFIRI/chelsea on 2/20/25. Imaging April 2025 with mixed response. Started FOLFOX + bevacizumab with oxaliplatin desensitization April 2025. CT imaging June 2025 with progression. Per Oncology follow up note 7/7/2025, Roman expressed desire to go to the ED for 24 hours of monitoring, IVFs and any potential interventions to help him feel better prior to him enrolling in hospice. Presents 7/8/2025 with reports of 1 week of malaise/weakness, increasing confusion, R flank/back pain, N/V, decreased PO, jaundice/dark urine and decreased urine and ostomy outputs.  Denies any pruritus. , T.bili 9.8 (no direct),  and normal ALT (previously only had mild AP elevation mid-higher 200s, AST low-mid 100's but normal T.bili mid June). Lipase normal. WBC 12.5, Procal 15.7 with BC and UA/UC pending. CT AP (non-contrast d/t MICHELLE) with new intrahepatic (left lobe) ductal dilation likely r/t mass effect from worsening hepatic mets to central left hepatic duct.  Presentation c/f cholangitis.   - UA pending   - Bladder scan to evaluate for fluid retention, proceed with catheter if retaining, high suspicion with increasing confusion   - RUQ Abdominal US for rule out cholangitis, showed intrahepatic and extrahepatic biliary dilatation concerning for new mass effect  - Hold PTA Bactrim for PJP prophylaxis   - Decrease oxycodone dose to 5-10mg q4h PRN.  -Ongoing GOC discussions and if procedures c/w wishes and  prognosis.  -Fluid resuscitation    -IV Zosyn, monitor BC and remainder of infectious workup.  -Keep NPO pending GI intervention with ERCP   -Do not begin any AC pending ERCP  -Trend CBC and CMP (LFT) daily.  - Per Chelsy (patient's wife), referral order for hospice is not needed as she works for their preferred hospice company and arrangements can quickly be made to enroll in hospice when he is ready     Back pain  Bone mets  - Continue Zometa monthly. Following with palliative care.   - Managed with oxycodone PRN       Nausea  - Taking Compazine and Zofran as needed. Continue olanzapine  - Prefers the ondansetron ODT     PJP pneumonia  - Following with ID. Completed course of high dose Bactrim, now on PJP prophylaxis with M/W/F Bactrim for a minimum of 6 months (August 2025). Per ID, will then reassess the immune system and the CD4 count prior to stopping the Bactrim prophylaxis.  Note from 2/19/2025 reviewed.   - Hold Bactrim in the setting of sepsis    - Chest x-ray showed numerous pulmonary nodules or masses consistent with  neoplastic disease.      Chronic normocytic Iron deficiency anemia  Treated with 3 doses of Venofer, last on 10/4/24. Iron studies in February 2025 unclear if anemia of chronic disease or mixed with recurrent iron deficiency. Iron studies in mid-March 2025 are consistent with anemia of chronic disease. Will continue to monitor. No s/s bleeding or black tarry stools.   - Continue to monitor with CBC daily.           Diet: NPO for Medical/Clinical Reasons Except for: Meds  DVT Prophylaxis: holding   Puri Catheter: Not present  Fluids: per sepsis guidelines (30 mL/kg)  Lines: PRESENT        Port a Cath 01/19/21 Single Lumen Right Chest wall-Site Assessment: WDL      Cardiac Monitoring: None  Code Status: DNR/DNI    Clinically Significant Risk Factors Present on Admission         # Hyponatremia: Lowest Na = 126 mmol/L in last 2 days, will monitor as appropriate  # Hypochloremia: Lowest Cl = 93  "mmol/L in last 2 days, will monitor as appropriate      # Hypoalbuminemia: Lowest albumin = 2.8 g/dL at 7/8/2025  9:33 AM, will monitor as appropriate    # Coagulation Defect: INR = 1.55 (Ref range: 0.85 - 1.15) and/or PTT = N/A, will monitor for bleeding  # Thrombocytopenia: Lowest platelets = 71 in last 2 days, will monitor for bleeding  # Acute Kidney Injury, unspecified: based on a >150% or 0.3 mg/dL increase in last creatinine compared to past 90 day average, will monitor renal function  # Hypertension: Noted on problem list      # Anemia: based on hgb <11           # Financial/Environmental Concerns:           Disposition Plan      Expected Discharge Date: 07/12/2025                The patient's care was discussed with the Attending Physician, Dr. Samson.      Lazarus Mccann  Medical Student  Medicine Service, [facility] TEAM [address] [place] [address]  Securely message with Vocera (more info)  Text page via [facility] Paging/Directory   See signed in provider for up to date coverage information  ______________________________________________________________________    Chief Complaint   Confusion    History is obtained from the patient    History of Present Illness   Roman Escalante is a 50 year old male who has a history of colon cancer s/p resection (feb 2021) with metastasis to liver and lung previously on clinical trial (FOLFOX + bevacizumab with oxaliplatin desensitization since April 2025) is admitted for confusion over the last 5 days. Patient first noted darker urine starting last week. Feels that kidneys are \"giving out\". He reports a significant decrease in food and fluid intake over the past week that is making him feel more fatigued with associated nausea, vomiting, constipation and abdominal pain that is located in the middle of his back. His ostomy bag has decreased output. Denies diarrhea, pruritus, and dyspnea. He reports that he took lorazepam this morning before " coming in which he normally takes at night. Patient is a somewhat limited historian, has difficulty giving specific details but does present history to the best of his ability. He also has been feeling more short of breath but cannot specify how long he has had this. He was placed on 2L NC for comfort. He has never needed supplemental O2 in the past but is feeling so poorly that he would like to be admitted and have supplemental oxygen while he is here. Patient is in process of transitioning to hospice and is aware of the severity of his condition. He understands that he is likely dying, but is open to undergoing treatment for reversible causes of his acute illness.     Past Medical History    Past Medical History:   Diagnosis Date    Cancer (H) 1/12/21    Colorectal cacer mast, to lungs, and liver    Essential (primary) hypertension 08/03/2021       Past Surgical History   Past Surgical History:   Procedure Laterality Date    ABDOMEN SURGERY      BIOPSY  1/21/21    Lung biopsy. Positive colorectal cancer in lungs    BRONCHOSCOPY (RIGID OR FLEXIBLE), DIAGNOSTIC N/A 1/24/2025    Procedure: BRONCHOSCOPY, WITH BRONCHOALVEOLAR LAVAGE;  Surgeon: Perlman, David Morris, MD;  Location:  GI    GI SURGERY  Ostomy placementnt 2/1/21    Stoma is an outie, bowel excretion only    IR LUNG BIOPSY MEDIASTINUM RIGHT  01/19/2021    IR SIRT (SELECTIVE INTERNAL RADIO THERAPY)  8/29/2023    IR VISCERAL ANGIOGRAM  8/29/2023    IR VISCERAL EMBOLIZATION  9/7/2023    NERVE BLOCK PERIPHERAL Bilateral 3/14/2024    Procedure: Bilateral pudendal nerve block with ultrasound;  Surgeon: Asha Mendoza MD;  Location: [facility] OR       Prior to Admission Medications   Prior to Admission Medications   Prescriptions Last Dose Informant Patient Reported? Taking?   Chemo Kit   No Yes   Sig: For RN use only. Do not remove items from bag. Contents: 1  sodium chloride 0.9% flush, 4 medium gloves, 1 chemo gown, 1/4 chemo mat, 1 connector female, 1 chemo bag.  "  Emergency Supply Kit, Central,   No Yes   Sig: Patient use for emergency only. Contents: 3 sodium chloride 0.9% flushes, 1 dressing kit, 1 microclave ext set 14\", 4 nitrile gloves (med), 6 alcohol prep pads, 1 bacitracin, 1 syringe (10 cc 20 G 1\"). Call [phone] to reorder.   LORazepam (ATIVAN) 0.5 MG tablet Past Week  No Yes   Sig: Take 1 tablet (0.5 mg) by mouth every 6 hours as needed for anxiety.   NARCAN 4 MG/0.1ML nasal spray  Self, Other Yes Yes   OLANZapine (ZYPREXA) 2.5 MG tablet Unknown  No Yes   Sig: Take 1 tablet (2.5 mg) by mouth 2 times daily as needed (nausea).   Ostomy Supplies MISC  Self, Other No Yes   Si each daily   Port Access Kit   No Yes   Sig: For nurse use only.  Do not remove items from bag.  Use for port access.  Do not place syringe on sterile field.   SYMBICORT 160-4.5 MCG/ACT inhaler   No Yes   Sig: INHALE TWO PUFFS BY MOUTH TWICE A DAY   VENTOLIN  (90 Base) MCG/ACT inhaler Unknown  No Yes   Sig: INHALE 2 PUFFS INTO THE LUNGS EVERY 6 HOURS AS NEEDED FOR SHORTNESS OF BREATH OR WHEEZING OR COUGH   cyclobenzaprine (FLEXERIL) 5 MG tablet 2025  No Yes   Sig: Take one tab (5mg) by mouth over 6-8 hours, as needed for spasms   dexAMETHasone (DECADRON) 4 MG tablet   No Yes   Sig: Take 2 tablets (8 mg) by mouth daily. Take for 2 days, starting the day after chemo. Take with food.   fluorouracil (ADRUCIL) 2.5 GM/50ML SOLN injection  Self, Other Yes Yes   fluticasone (FLONASE) 50 MCG/ACT nasal spray 2025  No Yes   Sig: Spray 1 spray into both nostrils daily.   gabapentin (NEURONTIN) 100 MG capsule 2025 Bedtime  No Yes   Sig: TAKE 1 TO 2 CAPSULES (100-200 MG) BY MOUTH UP TO TWICE DURING THE DAY AND 3 CAPSULES (300 MG) AT BEDTIME.   ipratropium (ATROVENT) 0.06 % nasal spray   No Yes   Sig: Spray 2 sprays into both nostrils 4 times daily.   ipratropium - albuterol 0.5 mg/2.5 mg/3 mL (DUONEB) 0.5-2.5 (3) MG/3ML neb solution Past Month Self, Other No Yes   Sig: Take 1 vial " (3 mLs) by nebulization every 6 hours as needed for shortness of breath, wheezing or cough.   loratadine (CLARITIN) 10 MG tablet Unknown  Yes Yes   Sig: Take 10 mg by mouth daily.   ondansetron (ZOFRAN ODT) 8 MG ODT tab Unknown  No Yes   Sig: Take 1 tablet (8 mg) by mouth every 8 hours as needed for nausea.   oxyCODONE IR (ROXICODONE) 10 MG tablet 2025 Morning  No Yes   Sig: Take 1-2 tablets (10-20 mg) by mouth every 4 hours as needed for pain.   pegfilgrastim (NEULASTA ONPRO KIT) 6 MG/0.6ML injection   No Yes   Sig: Inject 0.6 mLs (6 mg) subcutaneously every 2 weeks as needed (at the time of chemotherapy disconnect per treatment plan).   prochlorperazine (COMPAZINE) 10 MG tablet Unknown  No Yes   Sig: Take 1 tablet (10 mg) by mouth every 6 hours as needed for nausea or vomiting.   sodium chloride, PF, 0.9% PF flush   No Yes   Sig: Inject 10 mLs into the vein as needed for line flush. Flush IV before and after med administration as directed and/or at least every 24 hours, or prior to deaccessing for no further use and/or at least every 4 weeks when not accessed.   sulfamethoxazole-trimethoprim (BACTRIM DS) 800-160 MG tablet 2025  No Yes   Sig: Take 1 tablet by mouth Every Mon, Wed, Fri Morning.      Facility-Administered Medications: None        Review of Systems    The 10 point Review of Systems is negative other than noted in the HPI or here.    Social History   I have reviewed this patient's social history and updated it with pertinent information if needed.  Social History     Tobacco Use    Smoking status: Former     Current packs/day: 0.00     Average packs/day: 0.5 packs/day for 13.3 years (6.6 ttl pk-yrs)     Types: Cigarettes, Other     Start date: 2010     Quit date: 2023     Years since quittin.9     Passive exposure: Past    Smokeless tobacco: Never   Vaping Use    Vaping status: Never Used   Substance Use Topics    Alcohol use: Not Currently     Comment: social    Drug use: Yes      Types: Marijuana     Comment: Keeps up my appetite, sleep, and help with neuropathy         Family History         Allergies   Allergies   Allergen Reactions    Oxaliplatin Shortness Of Breath, Other (See Comments) and Nausea and Vomiting     Patient had HSR to Oxaliplatin on cycle 8 of FOLFOX chemotherapy. Sent to ER for continued monitoring.    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 Blood products may be delayed. Draw patient 24 hours prior to transfusion. Draw one red top and two purple top tubes for all type and screen orders.        Physical Exam   Vital Signs: Temp: 97.7  F (36.5  C) Temp src: Oral BP: (!) 86/68 Pulse: 100   Resp: 20 SpO2: 95 % O2 Device: None (Room air)    Weight: 0 lbs 0 oz    General: Cachectic and jaundiced male who intermittently answers questions appropriately.    HEENT: Head is AT/NC. Sclera icteric. Temporal wasting. Oropharynx is clear, moist and w/o exudate or lesions. No thrush. Good dentition.  Lungs: Non-labored breathing on RA. Diffuse crackles bilaterally.  Heart: Borderline tachycardic on monitor, regular rhythm  Abdomen: Soft, non-distended, mildly tender to palpation in RUQ but no rebound or peritoneal signs. Colostomy with healthy pink stoma, small amount of liquid tan outputs in ostomy bag.  Extremities: WWP, no pedal edema.  MSK: no gross deformity, severe temporal/clavicular and UE/LE muscle wasting  Skin: Jaundice.  Neurologic: Grossly non-focal. Oriented to person, place, time, and situation.    Psych: mood appropriate to situation    Medical Decision Making       Please see A&P for additional details of medical decision making.  MANAGEMENT DISCUSSED with the following over the past 24 hours: Dr. Samson   NOTE(S)/MEDICAL RECORDS REVIEWED over the past 24 hours:    60 MINUTES SPENT BY ME on the date of service doing chart review, history, exam, documentation &  further activities per the note.      Data     I have personally reviewed the following data over the past 24 hrs:    12.5 (H)  \   9.6 (L)   / 71 (L)     126 (L) 96 (L) 44.7 (H) /  94   5.1 18 (L) 1.98 (H) \     ALT: 60 AST: 264 (H) AP: 735 (H) TBILI: 9.8 (H)   ALB: 2.8 (L) TOT PROTEIN: 6.0 (L) LIPASE: 8 (L)     Procal: 15.70 (HH) CRP: N/A Lactic Acid: 1.4       INR:  1.55 (H) PTT:  N/A   D-dimer:  N/A Fibrinogen:  N/A       Imaging results reviewed over the past 24 hrs:   Recent Results (from the past 24 hours)   CT Abdomen Pelvis w/o Contrast    Narrative    EXAMINATION: CT ABDOMEN PELVIS W/O CONTRAST, 7/8/2025 11:15 AM    TECHNIQUE:  Helical CT images from the lung bases through the pubic  symphysis were obtained without IV contrast.     COMPARISON: 6/5/2025    HISTORY: abdominal pain, h/o rectal CA    FINDINGS:    Abdomen and pelvis: The liver is enlarged by numerous hepatic  metastases which are not well delineated on this noncontrast CT,  though several are likely increased in size since 6/5/2025, for  example in hepatic segment 2 measuring 7.0 cm (series 4 image 41),  previously 4.6 cm, and in the caudate lobe measuring 6.4 cm (series 4  image 75), previously 4.7 cm. Normal gallbladder. New intrahepatic  biliary dilation in the left hepatic lobe. No extrahepatic biliary  dilatation. Normal pancreas. Calcified granulomas in the nonenlarged  spleen. Normal adrenal glands and renal cortices. No hydronephrosis,  hydroureter, or urinary tract stone. Normal bladder. Dystrophic  calcifications in the mildly enlarged prostate. Symmetric seminal  vesicles. Small volume ascites. No free air. No bowel obstruction or  inflammation. Moderate colonic stool burden. Left abdominal diverting  colostomy. Normal appendix. Known rectal masses are not well  visualized on these noncontrast images. No abdominal aortic aneurysm.  No abdominal or pelvic lymphadenopathy.    Lung bases:  Trace bilateral pleural effusions, right  greater than  left. Left lower lobe wedge resection changes. Increased size of  several metastatic pulmonary nodules and masses in the lung bases, the  largest measuring 6.8 x 7.7 cm in the right lower lobe, previously 5.9  x 6.9 cm. Partially visualized right hilar lymphadenopathy.  Centrilobular and paraseptal emphysematous change.    Bones and soft tissues: Numerous mixed lytic and sclerotic lesions in  the spine, pelvis, and anterolateral right sixth rib, not significant  change since prior. Anasarca.      Impression    IMPRESSION:   1. No acute intra-abdominal pathology.  2. Progressive hepatic and pulmonary metastatic disease since  6/5/2025.   3. New intrahepatic biliary dilation the left hepatic lobe, likely  related to mass effect exerted upon the central left hepatic duct by  hepatic metastases.  4. Anasarca with increased small volume ascites and trace bilateral  pleural effusions.   5. Stable osseous metastases.    LOYDA STEARNS DO         SYSTEM ID:  [id number]   XR Chest 2 Views    Narrative    Chest 2 views    INDICATION: Sepsis    COMPARISON: CT 6/18/2025    Heart size and shape normal. Bilateral pulmonary nodules and masses  especially in the left perihilar an right lower lung zones are again  noted as seen on the CT. Right IJ catheter tip in the proximal right  atrium. No costophrenic angle blunting on either side of suspicion.      Impression    IMPRESSION: Numerous pulmonary nodules or masses consistent with  neoplastic disease.    JILLIAN AGUILAR MD         SYSTEM ID:  [id number]   XR Chest Port 1 View    Narrative    Exam: XR CHEST PORT 1 VIEW, 7/8/2025 3:03 PM    Comparison: 7/82025 at 12:39 PM    History: sepsis broad workup, please rule out infectious etiology    Findings:  Portable AP view of semiupright chest. Right chest wall Port-A-Cath  with tip at the right atrium. Trachea is midline. Cardiomediastinal  silhouette is within normal limits. No significant change bilateral  pulmonary  nodules and masses, the largest appear in the right lower  lung zone and left perihilar space. No pneumothorax or pleural  effusion. The visualized upper abdomen is unremarkable. No acute  osseous abnormalities.      Impression    Impression: Numerous pulmonary nodules and masses corresponding to  those seen on CT. No new focal consolidation.    I have personally reviewed the examination and initial interpretation  and I agree with the findings.    JILLIAN AGUILAR MD         SYSTEM ID:  [id number]   US Abdomen Limited    Narrative    EXAMINATION: Limited Abdominal Ultrasound, 7/8/2025 3:45 PM     COMPARISON: CT 7/8/2025.    HISTORY: Hyperbilirubinemia    FINDINGS:   Fluid: No ascites or pleural effusions.    Liver: Measures 22.2 cm. Heterogenous appearance of the liver with  multiple hyper and hypoechoic foci consistent with known metastatic  liver disease. Retrograde flow in the main portal vein.    Gallbladder: No wall thickening, pericholecystic fluid, reported  sonographic Pace's sign or  cholelithiasis.    Bile Ducts: Left intrahepatic biliary duct measures 8 mm. Common bile  duct measures 7 mm in diameter.    Pancreas: Partially visualized, echogenic foci within the pancreatic  head immediately adjacent to the splenic vein measures 1.3 x 1.0 cm.    Kidney: The right kidney measures 10.4 cm long. No hydronephrosis,   renal calculi, cystic lesion, or mass.    Aorta and IVC: The visualized portions of the aorta and IVC are  unremarkable.      Impression    IMPRESSION:   1.  Intrahepatic and extrahepatic biliary dilatation concerning for  new mass effect given the patient's known metastatic disease.  2.  Echogenic foci within the pancreatic head, not previously  visualized, concerning for metastatic disease.  3.  Hepatomegaly with numerous metastases.  4.  New retrograde flow of portal vein suggestive of portal  hypertension.    I have personally reviewed the examination and initial interpretation  and I agree  with the findings.    HILDA LUNA MD         SYSTEM ID:  [id number]     NOTE: Data reviewed over the past 24 hrs contributes toward MDM complexity

## 2025-07-08 NOTE — PROGRESS NOTES
Critical procalcitonin 15.70 at 1044. Sarah Ochoa MD notified at 1046. No new orders obtained at this time. Will continue to monitor.

## 2025-07-08 NOTE — ED TRIAGE NOTES
Patient ambulatory to triage with c/o blood in urine. Patient has a sample of urine with him. Urine is cloudy and blood tinged. Symptoms X 1 week. Patient drowsy in triage responds when spoken to. States he had 3 oxy prior to arrival. Placed on 2L NC for comfort.     Triage Assessment (Adult)       Row Name 07/08/25 0854          Triage Assessment    Airway WDL WDL        Respiratory WDL    Respiratory WDL WDL        Skin Circulation/Temperature WDL    Skin Circulation/Temperature WDL WDL        Cardiac WDL    Cardiac WDL X;rhythm     Pulse Rate & Regularity tachycardic        Peripheral/Neurovascular WDL    Peripheral Neurovascular WDL WDL        Cognitive/Neuro/Behavioral WDL    Cognitive/Neuro/Behavioral WDL WDL

## 2025-07-08 NOTE — MEDICATION SCRIBE - ADMISSION MEDICATION HISTORY
"Medication Scribe Admission Medication History    Admission medication history is complete. The information provided in this note is only as accurate as the sources available at the time of the update.    Information Source(s): Patient, Family member, Caregiver, Patient's pharmacy, Clinic records, Hospital records, and CareEverywhere/SureScripts via phone    Pertinent Information:     Called and spoke to Chelsy talbot spouse. She was able to go through medication list with me as Roman was unable to communicate.   Removed tylenol, ibuprofen, loperamide and K-phos as patient is no longer taking these medications.   Removed duplicated medications on ondansetron as patient takes odt, and compazine as it was on twice.   Caleb stated that a lot of the medications patient does not take as he is not on chemo and hasn't been in awhile.   Pta med list has been updated with current medications     Changes made to PTA medication list:  Added: None  Deleted: tylenol,mucinex, ibuprofen, loperamide,k-phos, compazine duplicate, fluticasone duplicate  Changed: None    Allergies reviewed with patient and updates made in EHR: yes    Medication History Completed By: Annamaria Hawley 7/8/2025 12:32 PM    PTA Med List   Medication Sig Note Last Dose/Taking    Chemo Kit For RN use only. Do not remove items from bag. Contents: 1  sodium chloride 0.9% flush, 4 medium gloves, 1 chemo gown, 1/4 chemo mat, 1 connector female, 1 chemo bag.  Taking As Needed    cyclobenzaprine (FLEXERIL) 5 MG tablet Take one tab (5mg) by mouth over 6-8 hours, as needed for spasms  7/7/2025    dexAMETHasone (DECADRON) 4 MG tablet Take 2 tablets (8 mg) by mouth daily. Take for 2 days, starting the day after chemo. Take with food. 7/8/2025: Only takes during chemo has not taken in months  Taking    Emergency Supply Kit, Central, Patient use for emergency only. Contents: 3 sodium chloride 0.9% flushes, 1 dressing kit, 1 microclave ext set 14\", 4 nitrile gloves (med), 6 " "alcohol prep pads, 1 bacitracin, 1 syringe (10 cc 20 G 1\"). Call [phone] to reorder.  Taking As Needed    fluorouracil (ADRUCIL) 2.5 GM/50ML SOLN injection   Taking    fluticasone (FLONASE) 50 MCG/ACT nasal spray Spray 1 spray into both nostrils daily.  7/7/2025    gabapentin (NEURONTIN) 100 MG capsule TAKE 1 TO 2 CAPSULES (100-200 MG) BY MOUTH UP TO TWICE DURING THE DAY AND 3 CAPSULES (300 MG) AT BEDTIME.  7/7/2025 Bedtime    ipratropium (ATROVENT) 0.06 % nasal spray Spray 2 sprays into both nostrils 4 times daily. 7/8/2025: Has on hand Taking    ipratropium - albuterol 0.5 mg/2.5 mg/3 mL (DUONEB) 0.5-2.5 (3) MG/3ML neb solution Take 1 vial (3 mLs) by nebulization every 6 hours as needed for shortness of breath, wheezing or cough.  Past Month    loratadine (CLARITIN) 10 MG tablet Take 10 mg by mouth daily.  Unknown    LORazepam (ATIVAN) 0.5 MG tablet Take 1 tablet (0.5 mg) by mouth every 6 hours as needed for anxiety.  Past Week    NARCAN 4 MG/0.1ML nasal spray  7/8/2025: Has on hand Taking    OLANZapine (ZYPREXA) 2.5 MG tablet Take 1 tablet (2.5 mg) by mouth 2 times daily as needed (nausea).  Unknown    ondansetron (ZOFRAN ODT) 8 MG ODT tab Take 1 tablet (8 mg) by mouth every 8 hours as needed for nausea.  Unknown    Ostomy Supplies MISC 1 each daily  Taking    oxyCODONE IR (ROXICODONE) 10 MG tablet Take 1-2 tablets (10-20 mg) by mouth every 4 hours as needed for pain. 7/8/2025: Took 20mg this morning before arriving at er 7/8/25 7/8/2025 Morning    pegfilgrastim (NEULASTA ONPRO KIT) 6 MG/0.6ML injection Inject 0.6 mLs (6 mg) subcutaneously every 2 weeks as needed (at the time of chemotherapy disconnect per treatment plan).  Taking As Needed    Port Access Kit For nurse use only.  Do not remove items from bag.  Use for port access.  Do not place syringe on sterile field.  Taking As Needed    prochlorperazine (COMPAZINE) 10 MG tablet Take 1 tablet (10 mg) by mouth every 6 hours as needed for nausea or " vomiting.  Unknown    sodium chloride, PF, 0.9% PF flush Inject 10 mLs into the vein as needed for line flush. Flush IV before and after med administration as directed and/or at least every 24 hours, or prior to deaccessing for no further use and/or at least every 4 weeks when not accessed.  Taking As Needed    sulfamethoxazole-trimethoprim (BACTRIM DS) 800-160 MG tablet Take 1 tablet by mouth Every Mon, Wed, Fri Morning.  7/7/2025    SYMBICORT 160-4.5 MCG/ACT inhaler INHALE TWO PUFFS BY MOUTH TWICE A DAY 7/8/2025: Has on hand, has not used  Taking    VENTOLIN  (90 Base) MCG/ACT inhaler INHALE 2 PUFFS INTO THE LUNGS EVERY 6 HOURS AS NEEDED FOR SHORTNESS OF BREATH OR WHEEZING OR COUGH 7/8/2025: Has with him  Unknown

## 2025-07-09 LAB
ALBUMIN SERPL BCG-MCNC: 2.8 G/DL (ref 3.5–5.2)
ALBUMIN UR-MCNC: 30 MG/DL
ALP SERPL-CCNC: 772 U/L (ref 40–150)
ALT SERPL W P-5'-P-CCNC: 62 U/L (ref 0–70)
ANION GAP SERPL CALCULATED.3IONS-SCNC: 14 MMOL/L (ref 7–15)
APPEARANCE UR: ABNORMAL
AST SERPL W P-5'-P-CCNC: 278 U/L (ref 0–45)
ATRIAL RATE - MUSE: 101 BPM
BILIRUB SERPL-MCNC: 10 MG/DL
BILIRUB UR QL STRIP: ABNORMAL
BUN SERPL-MCNC: 47.4 MG/DL (ref 6–20)
CALCIUM SERPL-MCNC: 8 MG/DL (ref 8.8–10.4)
CHLORIDE SERPL-SCNC: 96 MMOL/L (ref 98–107)
COLOR UR AUTO: ABNORMAL
CREAT SERPL-MCNC: 2.44 MG/DL (ref 0.67–1.17)
DIASTOLIC BLOOD PRESSURE - MUSE: NORMAL MMHG
EGFRCR SERPLBLD CKD-EPI 2021: 31 ML/MIN/1.73M2
ERYTHROCYTE [DISTWIDTH] IN BLOOD BY AUTOMATED COUNT: 27 % (ref 10–15)
GLUCOSE SERPL-MCNC: 96 MG/DL (ref 70–99)
GLUCOSE UR STRIP-MCNC: NEGATIVE MG/DL
HCO3 SERPL-SCNC: 18 MMOL/L (ref 22–29)
HCT VFR BLD AUTO: 32.3 % (ref 40–53)
HGB BLD-MCNC: 9.8 G/DL (ref 13.3–17.7)
HGB UR QL STRIP: NEGATIVE
HYALINE CASTS: 3 /LPF
INR PPP: 1.45 (ref 0.85–1.15)
INTERPRETATION ECG - MUSE: NORMAL
KETONES UR STRIP-MCNC: NEGATIVE MG/DL
LEUKOCYTE ESTERASE UR QL STRIP: NEGATIVE
MAGNESIUM SERPL-MCNC: 2.3 MG/DL (ref 1.7–2.3)
MCH RBC QN AUTO: 25.3 PG (ref 26.5–33)
MCHC RBC AUTO-ENTMCNC: 30.3 G/DL (ref 31.5–36.5)
MCV RBC AUTO: 84 FL (ref 78–100)
NITRATE UR QL: NEGATIVE
P AXIS - MUSE: 62 DEGREES
PH UR STRIP: 5.5 [PH] (ref 5–7)
PHOSPHATE SERPL-MCNC: 5.1 MG/DL (ref 2.5–4.5)
PLATELET # BLD AUTO: 62 10E3/UL (ref 150–450)
POTASSIUM SERPL-SCNC: 5.2 MMOL/L (ref 3.4–5.3)
PR INTERVAL - MUSE: 152 MS
PROT SERPL-MCNC: 6.2 G/DL (ref 6.4–8.3)
PROTHROMBIN TIME: 17.9 SECONDS (ref 11.8–14.8)
QRS DURATION - MUSE: 82 MS
QT - MUSE: 340 MS
QTC - MUSE: 440 MS
R AXIS - MUSE: 49 DEGREES
RBC # BLD AUTO: 3.87 10E6/UL (ref 4.4–5.9)
RBC URINE: <1 /HPF
SODIUM SERPL-SCNC: 128 MMOL/L (ref 135–145)
SP GR UR STRIP: 1.02 (ref 1–1.03)
SYSTOLIC BLOOD PRESSURE - MUSE: NORMAL MMHG
T AXIS - MUSE: 64 DEGREES
URATE SERPL-MCNC: 13 MG/DL (ref 3.4–7)
UROBILINOGEN UR STRIP-MCNC: 3 MG/DL
VENTRICULAR RATE- MUSE: 101 BPM
WBC # BLD AUTO: 13 10E3/UL (ref 4–11)
WBC URINE: 1 /HPF

## 2025-07-09 PROCEDURE — 250N000013 HC RX MED GY IP 250 OP 250 PS 637

## 2025-07-09 PROCEDURE — 258N000003 HC RX IP 258 OP 636

## 2025-07-09 PROCEDURE — 85610 PROTHROMBIN TIME: CPT | Performed by: DIETITIAN, REGISTERED

## 2025-07-09 PROCEDURE — 84550 ASSAY OF BLOOD/URIC ACID: CPT

## 2025-07-09 PROCEDURE — 83735 ASSAY OF MAGNESIUM: CPT | Performed by: STUDENT IN AN ORGANIZED HEALTH CARE EDUCATION/TRAINING PROGRAM

## 2025-07-09 PROCEDURE — 85027 COMPLETE CBC AUTOMATED: CPT | Performed by: STUDENT IN AN ORGANIZED HEALTH CARE EDUCATION/TRAINING PROGRAM

## 2025-07-09 PROCEDURE — 250N000011 HC RX IP 250 OP 636

## 2025-07-09 PROCEDURE — 99418 PROLNG IP/OBS E/M EA 15 MIN: CPT | Performed by: DIETITIAN, REGISTERED

## 2025-07-09 PROCEDURE — 80053 COMPREHEN METABOLIC PANEL: CPT | Performed by: STUDENT IN AN ORGANIZED HEALTH CARE EDUCATION/TRAINING PROGRAM

## 2025-07-09 PROCEDURE — 84100 ASSAY OF PHOSPHORUS: CPT

## 2025-07-09 PROCEDURE — 120N000002 HC R&B MED SURG/OB UMMC

## 2025-07-09 PROCEDURE — 99233 SBSQ HOSP IP/OBS HIGH 50: CPT | Performed by: DIETITIAN, REGISTERED

## 2025-07-09 PROCEDURE — 81001 URINALYSIS AUTO W/SCOPE: CPT

## 2025-07-09 PROCEDURE — 250N000013 HC RX MED GY IP 250 OP 250 PS 637: Performed by: STUDENT IN AN ORGANIZED HEALTH CARE EDUCATION/TRAINING PROGRAM

## 2025-07-09 PROCEDURE — 99233 SBSQ HOSP IP/OBS HIGH 50: CPT | Mod: GC | Performed by: STUDENT IN AN ORGANIZED HEALTH CARE EDUCATION/TRAINING PROGRAM

## 2025-07-09 PROCEDURE — 36415 COLL VENOUS BLD VENIPUNCTURE: CPT | Performed by: STUDENT IN AN ORGANIZED HEALTH CARE EDUCATION/TRAINING PROGRAM

## 2025-07-09 PROCEDURE — 99254 IP/OBS CNSLTJ NEW/EST MOD 60: CPT | Mod: GC | Performed by: STUDENT IN AN ORGANIZED HEALTH CARE EDUCATION/TRAINING PROGRAM

## 2025-07-09 RX ORDER — NALOXONE HYDROCHLORIDE 0.4 MG/ML
0.4 INJECTION, SOLUTION INTRAMUSCULAR; INTRAVENOUS; SUBCUTANEOUS
Status: DISCONTINUED | OUTPATIENT
Start: 2025-07-09 | End: 2025-07-10

## 2025-07-09 RX ORDER — NALOXONE HYDROCHLORIDE 0.4 MG/ML
0.2 INJECTION, SOLUTION INTRAMUSCULAR; INTRAVENOUS; SUBCUTANEOUS
Status: DISCONTINUED | OUTPATIENT
Start: 2025-07-09 | End: 2025-07-10

## 2025-07-09 RX ADMIN — IPRATROPIUM BROMIDE 2 SPRAY: 42 SPRAY, METERED NASAL at 12:21

## 2025-07-09 RX ADMIN — BUDESONIDE AND FORMOTEROL FUMARATE DIHYDRATE 2 PUFF: 160; 4.5 AEROSOL RESPIRATORY (INHALATION) at 20:28

## 2025-07-09 RX ADMIN — PIPERACILLIN AND TAZOBACTAM 4.5 G: 4; .5 INJECTION, POWDER, LYOPHILIZED, FOR SOLUTION INTRAVENOUS at 17:02

## 2025-07-09 RX ADMIN — LORATADINE 10 MG: 10 TABLET ORAL at 07:46

## 2025-07-09 RX ADMIN — IPRATROPIUM BROMIDE 2 SPRAY: 42 SPRAY, METERED NASAL at 08:21

## 2025-07-09 RX ADMIN — PIPERACILLIN AND TAZOBACTAM 4.5 G: 4; .5 INJECTION, POWDER, LYOPHILIZED, FOR SOLUTION INTRAVENOUS at 11:23

## 2025-07-09 RX ADMIN — IPRATROPIUM BROMIDE 2 SPRAY: 42 SPRAY, METERED NASAL at 15:47

## 2025-07-09 RX ADMIN — ACETAMINOPHEN 650 MG: 325 TABLET ORAL at 20:27

## 2025-07-09 RX ADMIN — SODIUM CHLORIDE, SODIUM LACTATE, POTASSIUM CHLORIDE, AND CALCIUM CHLORIDE 1000 ML: .6; .31; .03; .02 INJECTION, SOLUTION INTRAVENOUS at 12:56

## 2025-07-09 RX ADMIN — OXYCODONE HYDROCHLORIDE 10 MG: 10 TABLET ORAL at 08:42

## 2025-07-09 RX ADMIN — FLUTICASONE PROPIONATE 1 SPRAY: 50 SPRAY, METERED NASAL at 08:21

## 2025-07-09 RX ADMIN — BUDESONIDE AND FORMOTEROL FUMARATE DIHYDRATE 2 PUFF: 160; 4.5 AEROSOL RESPIRATORY (INHALATION) at 08:21

## 2025-07-09 RX ADMIN — HYDROMORPHONE HYDROCHLORIDE 3 MG: 2 TABLET ORAL at 15:56

## 2025-07-09 RX ADMIN — IPRATROPIUM BROMIDE 2 SPRAY: 42 SPRAY, METERED NASAL at 20:28

## 2025-07-09 RX ADMIN — PIPERACILLIN AND TAZOBACTAM 4.5 G: 4; .5 INJECTION, POWDER, LYOPHILIZED, FOR SOLUTION INTRAVENOUS at 05:12

## 2025-07-09 RX ADMIN — HYDROMORPHONE HYDROCHLORIDE 3 MG: 2 TABLET ORAL at 20:27

## 2025-07-09 ASSESSMENT — ACTIVITIES OF DAILY LIVING (ADL)
ADLS_ACUITY_SCORE: 60
ADLS_ACUITY_SCORE: 58
ADLS_ACUITY_SCORE: 60
ADLS_ACUITY_SCORE: 60
ADLS_ACUITY_SCORE: 58
ADLS_ACUITY_SCORE: 60
DEPENDENT_IADLS:: CLEANING;COOKING;LAUNDRY;SHOPPING;MEAL PREPARATION;MEDICATION MANAGEMENT;MONEY MANAGEMENT;TRANSPORTATION;INCONTINENCE
ADLS_ACUITY_SCORE: 60
ADLS_ACUITY_SCORE: 58
ADLS_ACUITY_SCORE: 60
ADLS_ACUITY_SCORE: 60
ADLS_ACUITY_SCORE: 58
ADLS_ACUITY_SCORE: 60
ADLS_ACUITY_SCORE: 60
ADLS_ACUITY_SCORE: 58
ADLS_ACUITY_SCORE: 60
ADLS_ACUITY_SCORE: 58
ADLS_ACUITY_SCORE: 58
ADLS_ACUITY_SCORE: 60
ADLS_ACUITY_SCORE: 60

## 2025-07-09 NOTE — PROGRESS NOTES
GASTROENTEROLOGY PROGRESS NOTE  *SIGN OFF NOTE*    Date of Admission: 7/8/2025  Reason for Admission: Sepsis    ASSESSMENT:  50 year old male with a PMH of former methamphetamine and tobacco use disorders, metastatic colorectal adenocarcinoma (dx initially in 2021) and s/p diverting loop colostomy for near obstructing rectal cancer now with known mets to liver, lung and bone receiving clinical trial chemotherapy (FOLFOX + bevacizumab with oxaliplatin desensitization since April 2025) and noted to have progression of disease despite this. Presented to [facility] ED 7/8/2025 on recommendation of Oncology clinic for worsening confusion, hematuria and dark urine/jaundice with LFT recheck and CT imaging c/f worsening pulm and liver mets, biliary obstruction and cholangitis.     #. Metastatic colorectal adenocarcinoma (including liver mets)  #. Elevated LFTs with hyperbilirubinemia  #. MICHELLE  #. Sepsis - c/f biliary obstruction and cholangitis  #. Thrombocytopenia  Presents with reports of 1 week of malaise/weakness, increasing confusion, R flank/back pain, N/V, decreased PO, jaundice/dark urine and decreased urine and ostomy outputs.  Denies any pruritus.  Unclear if any fevers. , T.bili 9.8 (no direct),  and normal ALT (previously only had mild AP elevation mid-higher 200s, AST low-mid 100's but normal T.bili mid June).  Lipase normal.  WBC 12.5, Procal 15.7 with UA negative and BC NGTD.  CT AP (non-contrast d/t MICHELLE) with new intrahepatic (left lobe) ductal dilation likely r/t mass effect from worsening hepatic mets to central left hepatic duct.  Presentation c/f cholangitis that has now stabilized after fluid resuscitation and IV abx but no improvements in MICHELLE (BUN/Cr) at this time.     Noted recent discussions with Oncology team about very poor prognosis (estimated may only have days left) with repeat CT in June showing increased mets, thickening in rectum, increased size of pulm nodules and considering  hospice but had wanted to continue at that time, recheck lab rechecks/options for clinical trial.  Attempted to reach out to pt's primary oncologist (Dr. Snow) without response at the time of this note.  Alternatively, met with inpatient oncology team at bedside to discuss prognosis.  Given current acute issues, oncology described patient is nearing end of life with expectancy c/w previous discussions.  Even if do pursue ERCP, would only likely be temporary fix given progression of disease and anticipate will develop additional biliary obstructions r/t tumor burden.  Patient described that his goals are to prolong life in order to get home and have his family come from [place] before he passes.  He expressed desire to continue with conservative management (abx), not pursue ERCP and once arrangement can be made move towards hospice/comfort cares with discharge home.    RECOMMENDATIONS:  -No plans to proceed with ERCP based on patient's GOC/prognosis and wishes per above.  -Regular diet.  -Continue abx until approp to stop per hospice/comfort care protocols.     The patient was discussed and plan agreed upon with GI staff, Dr. Grayson.    GI Follow up (we will arrange):  None warranted.    The inpatient gastroenterology service will sign off at this time. Thank you for allowing us to participate in the care of this patient. Please do not hesitate to page the GI service with any questions or concerns.      Overall time spent on the date of this encounter preparing to see the patient (including chart review of available notes, clinical status events, imaging and labs); obtaining interim history/subjective data; ordering and/or coordinating medications, tests or procedures; ordering medications, tests or procedures; communicating with other health care professionals; and documenting the above clinical information in the electronic medical record was 65 minutes.     Savanna Trotter PA-C, RD, [facility]  Inpatient  "Gastroenterology Service  [employer]  Pager: *[phone]  Text Page     _______________________________________________________________      Subjective: Nursing notes and 24hr events reviewed. NAEO.    Patient seen and examined at  12:30-13:00 with oncology fellow also present at bedside. Patient reports feels somewhat better after IVF, abx without significant abd pain, no fevers/chills, no pruritus.    Patient initially wanted to proceed with ERCP as he believed this is his only option to be able to meet his described goal of prolonging life in order to get home and have his family come from [place] before he passes.  However, if this goal can be met with conservative management, then his request was to forgo ERCP and make arrangements to move towards hospice/comfort cares with discharge home.    ROS:   4 pt ROS negative unless noted in subjective.     Objective:  Blood pressure 95/65, pulse 96, temperature 97.7  F (36.5  C), temperature source Oral, resp. rate 16, height 1.803 m (5' 11\"), SpO2 97%.    General: Cachectic and jaundiced male who is more awake/alert and interactive today.  HEENT: Head is AT/NC. Sclera icteric. Severe temporal wasting.  Buccal prominence. .  Lungs: Non-labored breathing on RA.   Heart: Borderline tachycardic on monitor, regular rhythm  Abdomen: Soft, non-distended, mildly tender to palpation in RUQ but no rebound or peritoneal signs. Colostomy with healthy pink stoma, small amount of liquid tan outputs in ostomy bag.  Extremities: WWP, no pedal edema.  MSK: no gross deformity, severe temporal/clavicular and UE/LE muscle wasting  Skin: Jaundice.  Neurologic: Grossly non-focal.    Psych: mood appropriate to situation    PROCEDURES:  None.    LABS:  BMP  Recent Labs   Lab 07/09/25  0547 07/08/25  1638 07/08/25  0933   * 126* 126*   POTASSIUM 5.2 5.1 4.9   CHLORIDE 96* 96* 93*   JEFERSON 8.0* 7.6* 7.8*   CO2 18* 18* 18*   BUN 47.4* 44.7* 45.9*   CR 2.44* 1.98* " 2.00*   GLC 96 94 104*     CBC  Recent Labs   Lab 07/09/25  0547 07/08/25  0933   WBC 13.0* 12.5*   RBC 3.87* 3.73*   HGB 9.8* 9.6*   HCT 32.3* 30.8*   MCV 84 83   MCH 25.3* 25.7*   MCHC 30.3* 31.2*   RDW 27.0* 26.5*   PLT 62* 71*     INR  Recent Labs   Lab 07/09/25  0547 07/08/25  0933   INR 1.45* 1.55*     LFTs  Recent Labs   Lab 07/09/25  0547 07/08/25  0933   ALKPHOS 772* 735*   * 264*   ALT 62 60   BILITOTAL 10.0* 9.8*   PROTTOTAL 6.2* 6.0*   ALBUMIN 2.8* 2.8*      PANC  Recent Labs   Lab 07/08/25  0933   LIPASE 8*         IMAGING:  (Personally reviewed)  Results for orders placed or performed during the hospital encounter of 07/08/25   CT Abdomen Pelvis w/o Contrast    Impression    IMPRESSION:   1. No acute intra-abdominal pathology.  2. Progressive hepatic and pulmonary metastatic disease since  6/5/2025.   3. New intrahepatic biliary dilation the left hepatic lobe, likely  related to mass effect exerted upon the central left hepatic duct by  hepatic metastases.  4. Anasarca with increased small volume ascites and trace bilateral  pleural effusions.   5. Stable osseous metastases.    LOYDA STEARNS DO         SYSTEM ID:  [id number]   XR Chest 2 Views    Impression    IMPRESSION: Numerous pulmonary nodules or masses consistent with  neoplastic disease.    JILLIAN AGUILAR MD         SYSTEM ID:  [id number]   XR Chest Port 1 View    Impression    Impression: Numerous pulmonary nodules and masses corresponding to  those seen on CT. No new focal consolidation.    I have personally reviewed the examination and initial interpretation  and I agree with the findings.    JILLIAN AGUILAR MD         SYSTEM ID:  [id number]   US Abdomen Limited    Impression    IMPRESSION:   1.  Intrahepatic and extrahepatic biliary dilatation concerning for  new mass effect given the patient's known metastatic disease.  2.  Echogenic foci within the pancreatic head, not previously  visualized, concerning for metastatic  disease.  3.  Hepatomegaly with numerous metastases.  4.  New retrograde flow of portal vein suggestive of portal  hypertension.    I have personally reviewed the examination and initial interpretation  and I agree with the findings.    HILDA LUNA MD         SYSTEM ID:  [id number]

## 2025-07-09 NOTE — CONSULTS
Oncology  Consult Note   Date of Service: 07/09/2025    Patient: Roman Escalante  MRN: [id number]  Admission Date: 7/8/2025  Hospital Day # 1  Cancer Diagnosis: Metastatic Rectal Adenocarcinoma   Primary Outpatient Oncologist: Dr. Snow  Current Treatment Plan: FOLFOX/Reyna      Reason for Consult: 51 yo M with progressive metastatic colorectal cancer, admitted with biliary obstruction, possible cholangitis. Would like assistance with prognosis prior to procedures.       Assessment & Plan:   Roman Escalante is a 50 year old male medical history of   metastatic rectal cancer (mets to liver and lung, most recently on FOLFOX/Reyna) that has been progressively confused for the past week. He now seems to have progression of his liver/bone metastases along with signs/symptoms consistent with biliary obstruction and cholangitis. He also has an MICHELLE. Heme/onc was consulted in regards to patient's prognosis with or without procedure. We discussed in detail with the patient that if his current acute issues are untreated, that his likely prognosis would be days. With procedure, it may only give him a few extra weeks. We discussed what was meaningful for him and about risks/benefits of possible procedure with GI in room (such as mechanical ventilation and risk of not being extubated). After discussion, the patient decided he would like to pursue hospice. His significant other works for hospice and can reportedly get him enrolled quickly.        # MICHELLE  # Biliary obstruction/Cholangitis  #Metastatic rectal cancer with worsening pulmonary and hepatic metastases    Recommendations:   - Discussed with patient (and will discuss with significant other today) regarding our recommendation of a comfort-based approach  - Regarding ERCP, we discussed risk of this procedure and that it may only give him a few more weeks of life expectancy as compared to days to maybe 1 week if his current acute conditions are untreated (MICHELLE/impending  cholangitis, liver decompensation/failure).   - After discussion, he is agreeable to pursuing a comfort-based approach  -  Patient previously not on supplemental O2, could consider evaluating for possible palliative thoracentesis although likely to not have enough to remove give trace bilateral pleural effusions yesterday  - Will contact his primary oncologist that patient pursuing comfort-based approach, his SO works for hospice.     Thank you for this consult and the opportunity to treat this pleasant patient. We will continue to follow this patient. Please do not hesitate to page with any questions or concerns.    Patient was seen and plan of care was discussed with attending physician Dr. Morris. Attestation to follow.     Luis Vences DO   Hematology/Oncology/BMT Fellow PGY6      ==============================================================================      History of Present Illness:    Roman Escalante is a 50 year old male with a medical history of metastatic rectal cancer (mets to liver and lung, most recently on FOLFOX/Reyna) that has been progressively confused for the past week. He also has noted darker urine and decrease in PO intake as it would give him n/v, constipation. He denies fevers, chills.     He doesn't feel dyspneic and denies significant itching. He states he doesn't use supplemental O2 at home.   He recalls having conversations about hospice. His girlfriend will be arriving later this afternoon. His children live in [place].    Oncologic History:  Patient developed rectal bleeding in 2020.  In January 2021 CT showed focal wall thickening in the upper rectum, multiple pulmonary nodules bilaterally suspicious for metastatic disease.  Underwent FNA of the right upper lobe lesion which was consistent with adenocarcinoma of the colon.  He was treated with FOLFOX from 2/20/2021 through 5/20/2021.  Avastin was added.  Had an infusion reaction to oxaliplatin 6/2021.  7/2021- 12/2021 was on  5-FU alone.  Developed progression.  12/2021- 9/2022 was on FOLFIRI.  11/2021 started Lonsurf. All of this was done with outside oncologist.      Met with Dr. Snow on 2/3/2023 to establish care. Recommended regorafenib.      Started regorafenib on 3/2/2023. Progression noted 5/19/23. Plan to start FOLFOX/Avastin and send out CARIS testing to evaluate for other treatment options. Cycle 1 was given with oxaliplatin desensitization, 5FU, Avastin held due to increased urine protein.      Following cycle 4, patient was admitted with hematuria and acute kidney injury.    Oxaliplatin was dropped with Cycle 5.      Y-90 radioembolization 9/7.     10/24/23 CT CAP with disease progression with notable growth in all pulmonary lesions. Lonsurf + bevacizumab started 10/27/23 with plans to bridge to clinical trial.   11/15/23 - stopped chemo for [facility] study.   Currently enrolled in [facility] CAR-T trial (completed cell harvest 1/23/2024, tentatively planning administration of CAR-T in April 2/12/24 - resumed Lonsurf, bevacizumab to bridge treatment until CAR-T therapy  3/20/24 - treatment changed to Fruquintinib due to intolerability of Lonsurf/bevacizumab  4/2/24- Presented to the ED with dehydration. Stopped Fruquintnib.   6/2024- cellular therapy transplant at [facility].   7/30/24- CT imaging demonstrating marked progression of pulmonary and hepatic lesions, clinical trial failed to generate T-cell graft. Multiple discussions regarding plan of care, hospice vs additional lines of treatment. Patient decided he is not ready for hospice. He resumed treatment with FOLFIRI + bevacizumab on 8/30/24.   10/2024 - CT CAP with continued response, plan to continue FOLFIRI + bevacizumab with the addition of Zometa. Per his dentist, needs dental work completed prior to clearance. (Scheduled 1/2 and 1/21).   12/2024 chemo held for break over new Years per patient's choice.  1/15/25 chemo held due to ongoing respiratory illness, + RSV, also treated  with levaquin for pneumonia on CXR, chemo deferred   1/22-1/25/25- hospitalized with suspicion for fungal pneumonia  1/28-1/29/25- hospitalized for PJP pneumonia, started on treatment with Bactrim and steroids  2/19/25 CT CAP shows disease progression, occurred while on treatment break for pneumonia, resumed same chemotherapy with FOLFIRI/chelsea on 2/20/25 4/14/25 CT CAP with reduction in his pulmonary lesions, with some growth in his hepatic lesions. Plan to continue treatment as a bridge to clinical trial. Plan for treatment with FOLFOX (oxaliplatin desensitization) + bevacizumab.   4/30/25 Cycle 1 FOLFOX (oxaliplatin desensitization) + bevacizumab  5/2025 rescheduled chemo per patient preference for travel  6/5/25 CT Abd/pelv with increased hepatic metastases, increased soft tissue thickening about the rectum, numerous stable pulmonary nodules, unchanged bone mets   6/18/25 CT Chest with increased size of pulmonary nodules and masses when compared to April 2025 scan        Review of Systems:  A comprehensive ROS was performed and found to be negative or non-contributory with the exception of that noted in the HPI above.    Past Medical History:  Past Medical History:   Diagnosis Date    Cancer (H) 1/12/21    Colorectal cacer mast, to lungs, and liver    Essential (primary) hypertension 08/03/2021       Past Surgical History:  Past Surgical History:   Procedure Laterality Date    ABDOMEN SURGERY      BIOPSY  1/21/21    Lung biopsy. Positive colorectal cancer in lungs    BRONCHOSCOPY (RIGID OR FLEXIBLE), DIAGNOSTIC N/A 1/24/2025    Procedure: BRONCHOSCOPY, WITH BRONCHOALVEOLAR LAVAGE;  Surgeon: Perlman, David Morris, MD;  Location:  GI    GI SURGERY  Ostomy placementnt 2/1/21    Stoma is an outie, bowel excretion only    IR LUNG BIOPSY MEDIASTINUM RIGHT  01/19/2021    IR SIRT (SELECTIVE INTERNAL RADIO THERAPY)  8/29/2023    IR VISCERAL ANGIOGRAM  8/29/2023    IR VISCERAL EMBOLIZATION  9/7/2023    NERVE BLOCK  PERIPHERAL Bilateral 3/14/2024    Procedure: Bilateral pudendal nerve block with ultrasound;  Surgeon: Asha Menodza MD;  Location: [facility] OR       Social History:  Social History     Socioeconomic History    Marital status: Single     Spouse name: None    Number of children: None    Years of education: None    Highest education level: None   Tobacco Use    Smoking status: Former     Current packs/day: 0.00     Average packs/day: 0.5 packs/day for 13.3 years (6.6 ttl pk-yrs)     Types: Cigarettes, Other     Start date: 2010     Quit date: 2023     Years since quittin.9     Passive exposure: Past    Smokeless tobacco: Never   Vaping Use    Vaping status: Never Used   Substance and Sexual Activity    Alcohol use: Not Currently     Comment: social    Drug use: Yes     Types: Marijuana     Comment: Keeps up my appetite, sleep, and help with neuropathy    Sexual activity: Yes     Partners: Female     Birth control/protection: Abstinence, Condom, Post-menopausal   Other Topics Concern    Parent/sibling w/ CABG, MI or angioplasty before 65F 55M? No     Social Drivers of Health     Financial Resource Strain: Low Risk  (2024)    Financial Resource Strain     Within the past 12 months, have you or your family members you live with been unable to get utilities (heat, electricity) when it was really needed?: No   Food Insecurity: Low Risk  (2024)    Food Insecurity     Within the past 12 months, did you worry that your food would run out before you got money to buy more?: No     Within the past 12 months, did the food you bought just not last and you didn t have money to get more?: No   Transportation Needs: Low Risk  (2024)    Transportation Needs     Within the past 12 months, has lack of transportation kept you from medical appointments, getting your medicines, non-medical meetings or appointments, work, or from getting things that you need?: No    [facility]    Social Connections   Interpersonal Safety: Low Risk  (12/26/2024)    Interpersonal Safety     Do you feel physically and emotionally safe where you currently live?: Yes     Within the past 12 months, have you been hit, slapped, kicked or otherwise physically hurt by someone?: No     Within the past 12 months, have you been humiliated or emotionally abused in other ways by your partner or ex-partner?: No   Housing Stability: Low Risk  (2/2/2024)    Housing Stability     Do you have housing? : Yes     Are you worried about losing your housing?: No        Family History  No family history on file.    Outpatient Medications:  Current Facility-Administered Medications   Medication Dose Route Frequency Provider Last Rate Last Admin    acetaminophen (TYLENOL) tablet 650 mg  650 mg Oral Q4H PRN Cameron Samson MD        Or    acetaminophen (TYLENOL) Suppository 650 mg  650 mg Rectal Q4H PRN Cameron Samson MD        albuterol (PROVENTIL HFA/VENTOLIN HFA) inhaler  1-2 puff Inhalation Q6H PRN Yanira Wellington DO        bisacodyl (DULCOLAX) suppository 10 mg  10 mg Rectal Daily PRN Cameron Samson MD        budesonide-formoterol (SYMBICORT/BREYNA) 160-4.5 MCG/ACT inhaler 2 puff  2 puff Inhalation BID Yanira Wellington DO   2 puff at 07/09/25 0821    calcium carbonate (TUMS) chewable tablet 1,000 mg  1,000 mg Oral 4x Daily PRN Caemron Samson MD        [Held by provider] cyclobenzaprine (FLEXERIL) tablet 5 mg  5 mg Oral Q6H Yanira Wellington DO        fluticasone (FLONASE) 50 MCG/ACT spray 1 spray  1 spray Both Nostrils Daily Yanira Wellington DO   1 spray at 07/09/25 0821    gabapentin (NEURONTIN) capsule 100 mg  100 mg Oral At Bedtime Yanira Wellington DO   100 mg at 07/08/25 2105    [Held by provider] heparin ANTICOAGULANT injection 5,000 Units  5,000 Units Subcutaneous Q8H Cameron Samson MD        ipratropium (ATROVENT) 0.06 % spray 2 spray  2 spray Both Nostrils 4x Daily Yanira Wellington DO   2 spray at 07/09/25 0821     ipratropium - albuterol 0.5 mg/2.5 mg (3mg)/3 mL (DUONEB) neb solution 3 mL  1 vial Nebulization Q6H PRN Yanira Wellington DO        lidocaine (LMX4) cream   Topical Q1H PRN Cameron Samson MD        lidocaine 1 % 0.1-1 mL  0.1-1 mL Other Q1H PRN Cameron Samson MD        loratadine (CLARITIN) tablet 10 mg  10 mg Oral Daily Yanira Wellington DO   10 mg at 07/09/25 0746    [Held by provider] LORazepam (ATIVAN) tablet 0.5 mg  0.5 mg Oral Q6H PRN Yanira Wellington DO        melatonin tablet 5 mg  5 mg Oral At Bedtime PRN Cameron Samson MD        OLANZapine (zyPREXA) tablet 2.5 mg  2.5 mg Oral BID PRN Yanira Wellington DO        ondansetron (ZOFRAN ODT) ODT tab 4 mg  4 mg Oral Q6H PRN Cameron Samson MD        Or    ondansetron (ZOFRAN) injection 4 mg  4 mg Intravenous Q6H PRN Cameron Samson MD        oxyCODONE IR (ROXICODONE) tablet 10 mg  10 mg Oral Q4H PRN Cameron Samson MD   10 mg at 07/09/25 0842    Patient is already receiving anticoagulation with heparin, enoxaparin (LOVENOX), warfarin (COUMADIN)  or other anticoagulant medication   Does not apply Continuous PRN Yanira Wellington DO        piperacillin-tazobactam (ZOSYN) 4.5 g vial to attach to  mL bag  4.5 g Intravenous Q6H Yanira Wellington DO 0 mL/hr at 07/09/25 0556 4.5 g at 07/09/25 1123    polyethylene glycol (MIRALAX) Packet 17 g  17 g Oral BID PRN Cameron Samson MD        prochlorperazine (COMPAZINE) injection 10 mg  10 mg Intravenous Q6H PRN Cameron Samson MD        Or    prochlorperazine (COMPAZINE) tablet 10 mg  10 mg Oral Q6H PRN Cameron Samson MD        senna-docusate (SENOKOT-S/PERICOLACE) 8.6-50 MG per tablet 1 tablet  1 tablet Oral BID PRN Cameron Samson MD        Or    senna-docusate (SENOKOT-S/PERICOLACE) 8.6-50 MG per tablet 2 tablet  2 tablet Oral BID PRN Cameron Samson MD        sodium chloride (PF) 0.9% PF flush 3 mL  3 mL Intracatheter Q8H [facility] Cameron Samson MD   3 mL at 07/08/25 1525    sodium chloride (PF) 0.9% PF flush 3 mL  3 mL  Intracatheter q1 min prn Cameron Samson MD        [Held by provider] sulfamethoxazole-trimethoprim (BACTRIM DS) 800-160 MG per tablet 1 tablet  1 tablet Oral Q Mon Wed Fri AM Yanira Wellington DO         Current Outpatient Medications   Medication Sig Dispense Refill    cyclobenzaprine (FLEXERIL) 5 MG tablet Take one tab (5mg) by mouth over 6-8 hours, as needed for spasms 45 tablet 2    dexAMETHasone (DECADRON) 4 MG tablet Take 2 tablets (8 mg) by mouth daily. Take for 2 days, starting the day after chemo. Take with food. 4 tablet 2    fluorouracil (ADRUCIL) 2.5 GM/50ML SOLN injection       fluticasone (FLONASE) 50 MCG/ACT nasal spray Spray 1 spray into both nostrils daily. 16 g 2    gabapentin (NEURONTIN) 100 MG capsule TAKE 1 TO 2 CAPSULES (100-200 MG) BY MOUTH UP TO TWICE DURING THE DAY AND 3 CAPSULES (300 MG) AT BEDTIME. 210 capsule 1    ipratropium (ATROVENT) 0.06 % nasal spray Spray 2 sprays into both nostrils 4 times daily. 15 mL 0    ipratropium - albuterol 0.5 mg/2.5 mg/3 mL (DUONEB) 0.5-2.5 (3) MG/3ML neb solution Take 1 vial (3 mLs) by nebulization every 6 hours as needed for shortness of breath, wheezing or cough. 90 mL 3    loratadine (CLARITIN) 10 MG tablet Take 10 mg by mouth daily.      LORazepam (ATIVAN) 0.5 MG tablet Take 1 tablet (0.5 mg) by mouth every 6 hours as needed for anxiety. 30 tablet 0    NARCAN 4 MG/0.1ML nasal spray       OLANZapine (ZYPREXA) 2.5 MG tablet Take 1 tablet (2.5 mg) by mouth 2 times daily as needed (nausea). 60 tablet 1    ondansetron (ZOFRAN ODT) 8 MG ODT tab Take 1 tablet (8 mg) by mouth every 8 hours as needed for nausea. 60 tablet 3    Ostomy Supplies MISC 1 each daily 1 each 11    oxyCODONE IR (ROXICODONE) 10 MG tablet Take 1-2 tablets (10-20 mg) by mouth every 4 hours as needed for pain. 100 tablet 0    prochlorperazine (COMPAZINE) 10 MG tablet Take 1 tablet (10 mg) by mouth every 6 hours as needed for nausea or vomiting. 30 tablet 2    sulfamethoxazole-trimethoprim  "(BACTRIM DS) 800-160 MG tablet Take 1 tablet by mouth Every Mon, Wed, Fri Morning. 12 tablet 5    SYMBICORT 160-4.5 MCG/ACT inhaler INHALE TWO PUFFS BY MOUTH TWICE A DAY 10.2 g 1    VENTOLIN  (90 Base) MCG/ACT inhaler INHALE 2 PUFFS INTO THE LUNGS EVERY 6 HOURS AS NEEDED FOR SHORTNESS OF BREATH OR WHEEZING OR COUGH 18 g 2        Physical Exam:    Blood pressure 95/75, pulse 96, temperature 97.9  F (36.6  C), temperature source Oral, resp. rate 16, height 1.803 m (5' 11\"), SpO2 97%.  HEENT: EOMI. Sclerae are icteric. Temporal wasting present. Oral mucosa is dry, no lesions or thrush.  Heart: Regular rate and rhythm.   Lungs: on 2L NC, without signs of respiratory distress   Abdomen:  Colostomy in place   Extremities: 2+ lower extremity edema noted bilaterally from knees through feet. No redness or warmth.   Neuro: Cranial nerves II through XII are grossly intact. Alert and oriented x3   Skin: jaundiced     Labs & Studies: I personally reviewed the following studies:  ROUTINE LABS (Last four results):  CMP  Recent Labs   Lab 07/09/25  0547 07/08/25  1638 07/08/25  0933 07/02/25  1354   * 126* 126* 124*   POTASSIUM 5.2 5.1 4.9 5.7*   CHLORIDE 96* 96* 93* 92*   CO2 18* 18* 18* 19*   ANIONGAP 14 12 15 13   GLC 96 94 104* 84   BUN 47.4* 44.7* 45.9* 33.0*   CR 2.44* 1.98* 2.00* 1.81*   GFRESTIMATED 31* 40* 39* 44*   JEFERSON 8.0* 7.6* 7.8* 8.4*   MAG 2.3  --  2.2 2.2   PHOS 5.1*  --   --  3.2   PROTTOTAL 6.2*  --  6.0* 6.4   ALBUMIN 2.8*  --  2.8* 3.0*   BILITOTAL 10.0*  --  9.8* 5.2*   ALKPHOS 772*  --  735* 842*   *  --  264* 218*   ALT 62  --  60 63     CBC  Recent Labs   Lab 07/09/25  0547 07/08/25  0933 07/02/25  1354   WBC 13.0* 12.5* 11.7*   RBC 3.87* 3.73* 3.95*   HGB 9.8* 9.6* 10.0*   HCT 32.3* 30.8* 32.2*   MCV 84 83 82   MCH 25.3* 25.7* 25.3*   MCHC 30.3* 31.2* 31.1*   RDW 27.0* 26.5* 25.2*   PLT 62* 71* 82*     INR  Recent Labs   Lab 07/09/25  0547 07/08/25  0933 07/02/25  1354   INR 1.45* 1.55* " 1.40*         Imaging:    US Abdomen Limited  Narrative: EXAMINATION: Limited Abdominal Ultrasound, 7/8/2025 3:45 PM     COMPARISON: CT 7/8/2025.    HISTORY: Hyperbilirubinemia    FINDINGS:   Fluid: No ascites or pleural effusions.    Liver: Measures 22.2 cm. Heterogenous appearance of the liver with  multiple hyper and hypoechoic foci consistent with known metastatic  liver disease. Retrograde flow in the main portal vein.    Gallbladder: No wall thickening, pericholecystic fluid, reported  sonographic Pace's sign or  cholelithiasis.    Bile Ducts: Left intrahepatic biliary duct measures 8 mm. Common bile  duct measures 7 mm in diameter.    Pancreas: Partially visualized, echogenic foci within the pancreatic  head immediately adjacent to the splenic vein measures 1.3 x 1.0 cm.    Kidney: The right kidney measures 10.4 cm long. No hydronephrosis,   renal calculi, cystic lesion, or mass.    Aorta and IVC: The visualized portions of the aorta and IVC are  unremarkable.  Impression: IMPRESSION:   1.  Intrahepatic and extrahepatic biliary dilatation concerning for  new mass effect given the patient's known metastatic disease.  2.  Echogenic foci within the pancreatic head, not previously  visualized, concerning for metastatic disease.  3.  Hepatomegaly with numerous metastases.  4.  New retrograde flow of portal vein suggestive of portal  hypertension.    I have personally reviewed the examination and initial interpretation  and I agree with the findings.    HILDA LUNA MD         SYSTEM ID:  [id number]  XR Chest Port 1 View  Narrative: Exam: XR CHEST PORT 1 VIEW, 7/8/2025 3:03 PM    Comparison: 7/82025 at 12:39 PM    History: sepsis broad workup, please rule out infectious etiology    Findings:  Portable AP view of semiupright chest. Right chest wall Port-A-Cath  with tip at the right atrium. Trachea is midline. Cardiomediastinal  silhouette is within normal limits. No significant change bilateral  pulmonary  nodules and masses, the largest appear in the right lower  lung zone and left perihilar space. No pneumothorax or pleural  effusion. The visualized upper abdomen is unremarkable. No acute  osseous abnormalities.  Impression: Impression: Numerous pulmonary nodules and masses corresponding to  those seen on CT. No new focal consolidation.    I have personally reviewed the examination and initial interpretation  and I agree with the findings.    JILLIAN AGUILAR MD         SYSTEM ID:  [id number]  XR Chest 2 Views  Narrative: Chest 2 views    INDICATION: Sepsis    COMPARISON: CT 6/18/2025    Heart size and shape normal. Bilateral pulmonary nodules and masses  especially in the left perihilar an right lower lung zones are again  noted as seen on the CT. Right IJ catheter tip in the proximal right  atrium. No costophrenic angle blunting on either side of suspicion.  Impression: IMPRESSION: Numerous pulmonary nodules or masses consistent with  neoplastic disease.    JILLIAN AGUILAR MD         SYSTEM ID:  [id number]  CT Abdomen Pelvis w/o Contrast  Narrative: EXAMINATION: CT ABDOMEN PELVIS W/O CONTRAST, 7/8/2025 11:15 AM    TECHNIQUE:  Helical CT images from the lung bases through the pubic  symphysis were obtained without IV contrast.     COMPARISON: 6/5/2025    HISTORY: abdominal pain, h/o rectal CA    FINDINGS:    Abdomen and pelvis: The liver is enlarged by numerous hepatic  metastases which are not well delineated on this noncontrast CT,  though several are likely increased in size since 6/5/2025, for  example in hepatic segment 2 measuring 7.0 cm (series 4 image 41),  previously 4.6 cm, and in the caudate lobe measuring 6.4 cm (series 4  image 75), previously 4.7 cm. Normal gallbladder. New intrahepatic  biliary dilation in the left hepatic lobe. No extrahepatic biliary  dilatation. Normal pancreas. Calcified granulomas in the nonenlarged  spleen. Normal adrenal glands and renal cortices. No  hydronephrosis,  hydroureter, or urinary tract stone. Normal bladder. Dystrophic  calcifications in the mildly enlarged prostate. Symmetric seminal  vesicles. Small volume ascites. No free air. No bowel obstruction or  inflammation. Moderate colonic stool burden. Left abdominal diverting  colostomy. Normal appendix. Known rectal masses are not well  visualized on these noncontrast images. No abdominal aortic aneurysm.  No abdominal or pelvic lymphadenopathy.    Lung bases:  Trace bilateral pleural effusions, right greater than  left. Left lower lobe wedge resection changes. Increased size of  several metastatic pulmonary nodules and masses in the lung bases, the  largest measuring 6.8 x 7.7 cm in the right lower lobe, previously 5.9  x 6.9 cm. Partially visualized right hilar lymphadenopathy.  Centrilobular and paraseptal emphysematous change.    Bones and soft tissues: Numerous mixed lytic and sclerotic lesions in  the spine, pelvis, and anterolateral right sixth rib, not significant  change since prior. Anasarca.  Impression: IMPRESSION:   1. No acute intra-abdominal pathology.  2. Progressive hepatic and pulmonary metastatic disease since  6/5/2025.   3. New intrahepatic biliary dilation the left hepatic lobe, likely  related to mass effect exerted upon the central left hepatic duct by  hepatic metastases.  4. Anasarca with increased small volume ascites and trace bilateral  pleural effusions.   5. Stable osseous metastases.    LOYDA STEARNS DO         SYSTEM ID:  [id number]

## 2025-07-09 NOTE — PLAN OF CARE
"Shift: 2377-4555    BP 95/65 (BP Location: Right arm, Patient Position: Semi-Morin's, Cuff Size: Adult Small)   Pulse 96   Temp 97.7  F (36.5  C) (Oral)   Resp 16   Ht 1.803 m (5' 11\")   SpO2 97%   BMI 22.76 kg/m      Summary:  Neuro: A&Ox4.   Cardiac: Afebrile, VSS.   Respiratory: 2 LPM nc, dyspnea with exertion   GI/: Bladder scan done with 136 mL. Colostomy bag intact  Diet/appetite: NPO. Denies nausea   Activity: Up with one assist    Pain: C/o lower back pain, managed with PRN medications and repositioning   Skin: No new deficits noted.  Lines: R Port  "

## 2025-07-09 NOTE — ED NOTES
Gave 1000 mL bolus of LR. Pt not urinated. Bladder scan for 293. Pt O2 sats drop to 76-81. Increased O2 up to 3 L NC. Pt lungs sound a little more wheezey. 3+ pitting edema. Paged team to notify them of the changes.

## 2025-07-09 NOTE — PLAN OF CARE
Goal Outcome Evaluation:      Plan of Care Reviewed With: patient, significant other    Overall Patient Progress: no changeOverall Patient Progress: no change     Pt and family anticipate discharge home w/ hospice

## 2025-07-09 NOTE — PROGRESS NOTES
Resident/Fellow Attestation   I, Yanira Wellington DO, was present with the medical/LILLY student who participated in the service and in the documentation of the note.  I have verified the history and personally performed the physical exam and medical decision making.  I agree with the assessment and plan of care as documented in the note.      Discussed goals of care with this patient and he confirmed that his goal is to go home on hospice care. He does not wish to pursue further treatment or management with ERCP. We will give him dilaudid for pain, as well as Ativan and other comfort cares here.    Yanira Wellington DO  PGY1  Date of Service (when I saw the patient): 07/09/25    [facility]    Progress Note - Medicine Service, MAROON TEAM 1       Date of Admission:  7/8/2025    Assessment & Plan   Roman Escalante is a 50 year old male admitted on 7/8/2025. He has a history of colon cancer with metastasis to liver and lung previously on clinical trial (FOLFOX + bevacizumab with oxaliplatin desensitization since April 2025) and is admitted for confusion for the 1 week prior to admission. He was placed on 2L NC for comfort. Chest x-ray showed numerous pulmonary nodules or masses consistent with neoplastic disease. UA, and MRSA MSSA PCR Nasal swab were negative for infectious etiology. LR and saline ordered for fluid resuscitation with concern sepsis. Started on Zosyn. GI Hepatology was consulted for concern of cholangitis. Oncology was also contacted to discuss end of life timeline. After discussion with oncology, he is agreeable to pursuing a comfort-based approach. GI will not proceed with ERCP.     # Metastatic colorectal adenocarcinoma (including liver mets)  # Elevated LFTs with hyperbilirubinemia  # Sepsis, unclear source c/f biliary obstruction and cholangitis   # Prerenal MICHELLE   # Toxic Metabolic Encephalopathy  # Tumor Lysis Syndrome    Concern for sepsis with unclear cause,  concern for biliary obstruction and cholangitis, versus possible UTI. Patient also with malnutrition, dehydration, progressively increasing disease burden. Urinating once or twice per day with dysuria and dark urine. He started on treatment with FOLFIRI + bevacizumab on 8/30/24. Imaging in October 2024 showed improvement in his disease. Imaging in February 2025 showed disease progression, though this was in the setting of treatment break due to PJP pneumonia. Therefore he resumed the same chemotherapy with FOLFIRI/chelsea on 2/20/25. Imaging April 2025 with mixed response. Started FOLFOX + bevacizumab with oxaliplatin desensitization April 2025. CT imaging June 2025 with progression. Per Oncology follow up note 7/7/2025, Roman expressed desire to go to the ED for 24 hours of monitoring, IVFs and any potential interventions to help him feel better prior to him enrolling in hospice. Presents 7/8/2025 with reports of 1 week of malaise/weakness, increasing confusion, R flank/back pain, N/V, decreased PO, jaundice/dark urine and decreased urine and ostomy outputs.  Denies any pruritus. , T.bili 9.8 (no direct),  and normal ALT (previously only had mild AP elevation mid-higher 200s, AST low-mid 100's but normal T.bili mid June). Lipase normal. WBC 12.5, Procal 15.7 with BC and UA/UC pending. CT AP (non-contrast d/t MICHELLE) with new intrahepatic (left lobe) ductal dilation likely r/t mass effect from worsening hepatic mets to central left hepatic duct.  Presentation c/f cholangitis. Phosphorus and uric acid labs suggested tumor lysis syndrome (both elevated to 5.1 and 13.0 respectively).  - UA negative for infectious etiology   - RUQ Abdominal US for rule out cholangitis, showed intrahepatic and extrahepatic biliary dilatation concerning for new mass effect  - Hold PTA Bactrim for PJP prophylaxis   - Decrease oxycodone dose to 5-10mg q4h PRN.  -Ongoing GOC discussions and if procedures c/w wishes and  prognosis.  -Fluid resuscitation    -IV Zosyn, monitor BC and remainder of infectious workup. Patient confirmed that he would like to continue antibiotics. Plan to discharge him on Augmentin.  -Trend CBC and CMP (LFT) daily.  Per Oncology:  - Regarding ERCP, discussed risk of this procedure and that it may only give him a few more weeks of life expectancy as compared to days to maybe 1 week if his current acute conditions are untreated (MICHELLE/impending cholangitis, liver decompensation/failure).   - After discussion, he is agreeable to pursuing a comfort-based approach with hospice care  - Patient previously not on supplemental O2, will perform overnight pulse oximetry; plan to discharge to home with oxygen for comfort.   - Will contact his primary oncologist that patient pursuing comfort-based approach  - Per Chelsy (patient's wife), referral order for hospice is not needed as she works for their preferred hospice company and arrangements can quickly be made to enroll in hospice when he is ready.    Back pain  Bone mets  - Continue Zometa monthly.  - palliative care consulted, appreciate recommendations   - Switching from PTA oxycodone to Dilaudid 3mg PRN for pain control     Nausea  - Taking Compazine and Zofran as needed. Continue olanzapine  - Prefers the ondansetron ODT     PJP pneumonia  - Following with ID. Completed course of high dose Bactrim, now on PJP prophylaxis with M/W/F Bactrim for a minimum of 6 months (August 2025). Per ID, will then reassess the immune system and the CD4 count prior to stopping the Bactrim prophylaxis.  Note from 2/19/2025 reviewed.   - Hold Bactrim in the setting of sepsis    - Chest x-ray showed numerous pulmonary nodules or masses consistent with  neoplastic disease.      Chronic normocytic Iron deficiency anemia  Treated with 3 doses of Venofer, last on 10/4/24. Iron studies in February 2025 unclear if anemia of chronic disease or mixed with recurrent iron deficiency. Iron  studies in mid-March 2025 are consistent with anemia of chronic disease. Will continue to monitor. No s/s bleeding or black tarry stools.   - Continue to monitor with CBC daily.           Diet: Regular Diet Adult    DVT Prophylaxis: Holding  Puri Catheter: Not present  Fluids: Per sepsis guidelines (30mL/kg)  Lines: PRESENT      Port a Cath 01/19/21 Single Lumen Right Chest wall-Site Assessment: WDL      Cardiac Monitoring: None  Code Status: No CPR- Do NOT Intubate      Clinically Significant Risk Factors         # Hyponatremia: Lowest Na = 126 mmol/L in last 2 days, will monitor as appropriate  # Hypochloremia: Lowest Cl = 93 mmol/L in last 2 days, will monitor as appropriate      # Hypoalbuminemia: Lowest albumin = 2.8 g/dL at 7/9/2025  5:47 AM, will monitor as appropriate    # Coagulation Defect: INR = 1.45 (Ref range: 0.85 - 1.15) and/or PTT = N/A, will monitor for bleeding  # Thrombocytopenia: Lowest platelets = 62 in last 2 days, will monitor for bleeding  # Acute Kidney Injury, unspecified: based on a >150% or 0.3 mg/dL increase in last creatinine compared to past 90 day average, will monitor renal function  # Hypertension: Noted on problem list                # Financial/Environmental Concerns:           Social Drivers of Health   Tobacco Use: Medium Risk (7/8/2025)    Patient History     Smoking Tobacco Use: Former     Smokeless Tobacco Use: Never     Passive Exposure: Past    Received from [employer] [facility]    Social Connections         Disposition Plan     Medically Ready for Discharge: Anticipated in 2-4 Days     The patient's care was discussed with the Attending Physician, Dr. Samson.    [facility]  Medical Student  Medicine Service, [facility] TEAM 1  [facility]  Securely message with WebTV (more info)  Text page via [facility] Paging/Directory   See signed in provider for up to date coverage  information  ______________________________________________________________________    Interval History   Patient reports improvement in fatigue, abdominal pain, and SOB from day of admission. Patient was also able to give a urine sample today. He reports some increased pain in the back and requested more pain medications. He was open to discussing further plans with GI. No new concerns today and he is in agreement with the plan today.    Physical Exam   Vital Signs: Temp: 97.9  F (36.6  C) Temp src: Oral BP: 96/78 Pulse: 105   Resp: 18 SpO2: 93 % O2 Device: Nasal cannula Oxygen Delivery: 3 LPM  Weight: 0 lbs 0 oz    General: Cachectic and jaundiced male who answers questions appropriately.    HEENT: Head is AT/NC. Sclera icteric. Temporal wasting. Oropharynx is clear, moist and w/o exudate or lesions. No thrush. Good dentition.  Lungs: Non-labored breathing on RA. Diffuse crackles bilaterally.  Heart: Borderline tachycardic on monitor, regular rhythm  Abdomen: Soft, non-distended, mildly tender to palpation in RUQ but no rebound or peritoneal signs. Colostomy with healthy pink stoma, small amount of liquid tan outputs in ostomy bag.  Extremities: WWP, no pedal edema.  MSK: no gross deformity, severe temporal/clavicular and UE/LE muscle wasting  Skin: Jaundice.  Neurologic: Grossly non-focal. Oriented to person, place, time, and situation.    Psych: mood appropriate to situation    Medical Decision Making       Please see A&P for additional details of medical decision making.  MANAGEMENT DISCUSSED with the following over the past 24 hours: Dr. Samson   NOTE(S)/MEDICAL RECORDS REVIEWED over the past 24 hours:    60 MINUTES SPENT BY ME on the date of service doing chart review, history, exam, documentation & further activities per the note.      Data     I have personally reviewed the following data over the past 24 hrs:    13.0 (H)  \   9.8 (L)   / 62 (L)     128 (L) 96 (L) 47.4 (H) /  96   5.2 18 (L) 2.44 (H) \      ALT: 62 AST: 278 (H) AP: 772 (H) TBILI: 10.0 (H)   ALB: 2.8 (L) TOT PROTEIN: 6.2 (L) LIPASE: N/A     INR:  1.45 (H) PTT:  N/A   D-dimer:  N/A Fibrinogen:  N/A       Imaging results reviewed over the past 24 hrs:   No results found for this or any previous visit (from the past 24 hours).    NOTE: Data reviewed over the past 24 hrs contributes toward MDM complexity

## 2025-07-09 NOTE — ED NOTES
"[id number]    BP 96/78 (BP Location: Right arm, Patient Position: Semi-Morin's, Cuff Size: Adult Small)   Pulse 105   Temp 97.9  F (36.6  C) (Oral)   Resp 18   Ht 1.803 m (5' 11\")   SpO2 93%   BMI 22.76 kg/m            Neuro: Aox4. Can be forgetful at times   Cardiac:  tachy            Respiratory: expiratory wheezes. Increased O2 rate due to sats dropping to 72%. Team aware   GI/: has not voided. Bladder scan for 293  Diet/appetite: general diet   Activity:  SB  Pain: PRN dilaudid   Skin: jaundice. 3+ pitting edema.     Plan: discharge home tomorrow on hospice   "

## 2025-07-09 NOTE — CONSULTS
Care Management Initial Consult    General Information  Assessment completed with: Patient, Spouse or significant other, VM-chart review, Ann Marie  Type of CM/SW Visit: Initial Assessment  Primary Care Provider verified and updated as needed: Yes   Readmission within the last 30 days: no previous admission in last 30 days      Reason for Consult: discharge planning  Advance Care Planning: Advance Care Planning Reviewed: questions answered, verified with patient (POA paperwork gathered from partner, scanned to [employer] for review and upload to chart)          Communication Assessment  Patient's communication style: spoken language (English or Bilingual)             Cognitive  Cognitive/Neuro/Behavioral: WDL  Level of Consciousness: alert  Arousal Level: opens eyes spontaneously  Orientation: oriented x 4  Mood/Behavior: calm, cooperative  Best Language: 0 - No aphasia  Speech: clear, logical, spontaneous    Living Environment:   People in home: significant other  Ann Marie  Current living Arrangements: house      Able to return to prior arrangements: yes       Family/Social Support:  Care provided by: self, spouse/significant other  Provides care for: no one, unable/limited ability to care for self  Marital Status: Lives with Significant Other  Support system: Partner    Chelsy     Description of Support System: Involved, Supportive    Support Assessment: Adequate family and caregiver support    Current Resources:   Patient receiving home care services: No  Community Resources: None  Equipment currently used at home: none  Supplies currently used at home: None    Employment/Financial:  Employment Status: disabled     Financial Concerns: none   Referral to Financial Worker: No       Does the patient's insurance plan have a 3 day qualifying hospital stay waiver?  No    Lifestyle & Psychosocial Needs:  Social Drivers of Health     Food Insecurity: Low Risk  (2/2/2024)    Food Insecurity     Within  the past 12 months, did you worry that your food would run out before you got money to buy more?: No     Within the past 12 months, did the food you bought just not last and you didn t have money to get more?: No   Depression: Not at risk (1/2/2025)    PHQ-2     PHQ-2 Score: 1   Housing Stability: Low Risk  (2/2/2024)    Housing Stability     Do you have housing? : Yes     Are you worried about losing your housing?: No   Tobacco Use: Medium Risk (7/8/2025)    Patient History     Smoking Tobacco Use: Former     Smokeless Tobacco Use: Never     Passive Exposure: Past   Financial Resource Strain: Low Risk  (2/2/2024)    Financial Resource Strain     Within the past 12 months, have you or your family members you live with been unable to get utilities (heat, electricity) when it was really needed?: No   Alcohol Use: Not At Risk (4/13/2022)    [facility]    Alcohol Use     How often do you have a drink containing alcohol?: 3     How many drinks containing alcohol do you have on a typical day when you are drinking?: 0     How often do you have five or more drinks on one occasion?: 2   Transportation Needs: Low Risk  (2/2/2024)    Transportation Needs     Within the past 12 months, has lack of transportation kept you from medical appointments, getting your medicines, non-medical meetings or appointments, work, or from getting things that you need?: No   Physical Activity: Not on file   Interpersonal Safety: Low Risk  (12/26/2024)    Interpersonal Safety     Do you feel physically and emotionally safe where you currently live?: Yes     Within the past 12 months, have you been hit, slapped, kicked or otherwise physically hurt by someone?: No     Within the past 12 months, have you been humiliated or emotionally abused in other ways by your partner or ex-partner?: No   Stress: Not on file   Social Connections: Unknown (4/19/2023)    Received from [facility]    Social Connections     Frequency of Communication with Friends and Family: Not on file   Health Literacy: Not on file       Functional Status:  Prior to admission patient needed assistance:   Dependent ADLs:: Bathing, Dressing, Eating, Grooming, Transfers, Positioning, Wheelchair-with assist, Toileting  Dependent IADLs:: Cleaning, Cooking, Laundry, Shopping, Meal Preparation, Medication Management, Money Management, Transportation, Incontinence  Assesssment of Functional Status: Not at baseline with ADL Functioning    Mental Health Status:  Mental Health Status: No Current Concerns       Chemical Dependency Status:  Chemical Dependency Status: No Current Concerns             Values/Beliefs:  Spiritual, Cultural Beliefs, Adventist Practices, Values that affect care: no               Discussed  Partnership in Safe Discharge Planning  document with patient/family: No    Additional Information:    Care Management Assessment completed for discharge planning/hospice. ALICIA met with patient and his significant other, Chelsy, at bedside to complete CMA. As conversation centered primarily around hospice discharge, much of CMA information was gathered from chart review.    ALICIA began conversation confirming with patient and Chelsy that discharge plan is home with hospice. Chelsy told ALICIA she took time from work to be with patient 24/7 until he passes. Chelsy is employed by [employer] and would like a referral placed with their services; this was completed by ALICIA. Patient will need a hospital bed and oxygen.    ALICIA updated patient's address from his [place] address to one in [place]. Transport can either come through the back of the home with no steps or through the front with four steps down.     Chelsy provided ALICIA with patient's POA paperwork - this was scanned and sent to [employer] for review and upload to chart. Patient and Chelsy are not .     Pending hospice referral:    [facility]  P: [phone]  P: [phone] - Liaison   F: [phone]    Next Steps:   - follow for acceptance to [facility]  - arrange GUILHERME Colon  Weekend Covering   [facility] Acute Care Management  Searchable on Vocera: Adult SW After Hours On Call

## 2025-07-10 VITALS
OXYGEN SATURATION: 94 % | RESPIRATION RATE: 18 BRPM | SYSTOLIC BLOOD PRESSURE: 92 MMHG | HEART RATE: 102 BPM | HEIGHT: 71 IN | BODY MASS INDEX: 22.76 KG/M2 | TEMPERATURE: 97.5 F | DIASTOLIC BLOOD PRESSURE: 66 MMHG

## 2025-07-10 PROBLEM — B96.20 E COLI BACTEREMIA: Status: ACTIVE | Noted: 2025-07-10

## 2025-07-10 PROBLEM — R78.81 E COLI BACTEREMIA: Status: ACTIVE | Noted: 2025-07-10

## 2025-07-10 PROBLEM — N17.9 SEPSIS WITH ACUTE RENAL FAILURE WITHOUT SEPTIC SHOCK, DUE TO UNSPECIFIED ORGANISM, UNSPECIFIED ACUTE RENAL FAILURE TYPE (H): Status: RESOLVED | Noted: 2025-07-08 | Resolved: 2025-07-10

## 2025-07-10 PROBLEM — A41.9 SEPSIS WITH ACUTE RENAL FAILURE WITHOUT SEPTIC SHOCK, DUE TO UNSPECIFIED ORGANISM, UNSPECIFIED ACUTE RENAL FAILURE TYPE (H): Status: RESOLVED | Noted: 2025-07-08 | Resolved: 2025-07-10

## 2025-07-10 PROBLEM — R65.20 SEPSIS WITH ACUTE RENAL FAILURE WITHOUT SEPTIC SHOCK, DUE TO UNSPECIFIED ORGANISM, UNSPECIFIED ACUTE RENAL FAILURE TYPE (H): Status: RESOLVED | Noted: 2025-07-08 | Resolved: 2025-07-10

## 2025-07-10 LAB
ACB COMPLEX DNA BLD POS QL NAA+NON-PROBE: NOT DETECTED
B FRAGILIS DNA BLD POS QL NAA+NON-PROBE: NOT DETECTED
BACTERIA SPEC CULT: ABNORMAL
BACTERIA SPEC CULT: ABNORMAL
BACTERIA SPEC CULT: NORMAL
BLACTX-M ISLT/SPM QL: NOT DETECTED
BLAIMP ISLT/SPM QL: NOT DETECTED
BLAKPC ISLT/SPM QL: NOT DETECTED
BLAOXA-48-LIKE ISLT/SPM QL: NOT DETECTED
BLAVIM ISLT/SPM QL: NOT DETECTED
C ALBICANS DNA BLD POS QL NAA+NON-PROBE: NOT DETECTED
C AURIS DNA BLD POS QL NAA+NON-PROBE: NOT DETECTED
C GATTII+NEOFOR DNA BLD POS QL NAA+N-PRB: NOT DETECTED
C GLABRATA DNA BLD POS QL NAA+NON-PROBE: NOT DETECTED
C KRUSEI DNA BLD POS QL NAA+NON-PROBE: NOT DETECTED
C PARAP DNA BLD POS QL NAA+NON-PROBE: NOT DETECTED
C TROPICLS DNA BLD POS QL NAA+NON-PROBE: NOT DETECTED
COLISTIN RES MCR-1 ISLT/SPM QL: NOT DETECTED
E CLOAC COMP DNA BLD POS NAA+NON-PROBE: NOT DETECTED
E COLI DNA BLD POS QL NAA+NON-PROBE: DETECTED
E FAECALIS DNA BLD POS QL NAA+NON-PROBE: NOT DETECTED
E FAECIUM DNA BLD POS QL NAA+NON-PROBE: NOT DETECTED
GP B STREP DNA BLD POS QL NAA+NON-PROBE: NOT DETECTED
HAEM INFLU DNA BLD POS QL NAA+NON-PROBE: NOT DETECTED
K OXYTOCA DNA BLD POS QL NAA+NON-PROBE: NOT DETECTED
KLEBSIELLA SP DNA BLD POS QL NAA+NON-PRB: NOT DETECTED
KLEBSIELLA SP DNA BLD POS QL NAA+NON-PRB: NOT DETECTED
L MONOCYTOG DNA BLD POS QL NAA+NON-PROBE: NOT DETECTED
N MEN DNA BLD POS QL NAA+NON-PROBE: NOT DETECTED
NDM: NOT DETECTED
P AERUGINOSA DNA BLD POS NAA+NON-PROBE: NOT DETECTED
PROTEUS SP DNA BLD POS QL NAA+NON-PROBE: NOT DETECTED
S AUREUS DNA BLD POS QL NAA+NON-PROBE: NOT DETECTED
S AUREUS+CONS DNA BLD POS NAA+NON-PROBE: NOT DETECTED
S EPIDERMIDIS DNA BLD POS QL NAA+NON-PRB: NOT DETECTED
S LUGDUNENSIS DNA BLD POS QL NAA+NON-PRB: NOT DETECTED
S MALTOPHILIA DNA BLD POS QL NAA+NON-PRB: NOT DETECTED
S MARCESCENS DNA BLD POS NAA+NON-PROBE: NOT DETECTED
S PNEUM DNA BLD POS QL NAA+NON-PROBE: NOT DETECTED
S PYO DNA BLD POS QL NAA+NON-PROBE: NOT DETECTED
SALMONELLA DNA BLD POS QL NAA+NON-PROBE: NOT DETECTED
STREPTOCOCCUS DNA BLD POS NAA+NON-PROBE: NOT DETECTED

## 2025-07-10 PROCEDURE — 999N000157 HC STATISTIC RCP TIME EA 10 MIN

## 2025-07-10 PROCEDURE — 94762 N-INVAS EAR/PLS OXIMTRY CONT: CPT

## 2025-07-10 PROCEDURE — 250N000013 HC RX MED GY IP 250 OP 250 PS 637

## 2025-07-10 PROCEDURE — 250N000011 HC RX IP 250 OP 636

## 2025-07-10 PROCEDURE — 99239 HOSP IP/OBS DSCHRG MGMT >30: CPT | Performed by: STUDENT IN AN ORGANIZED HEALTH CARE EDUCATION/TRAINING PROGRAM

## 2025-07-10 RX ORDER — SENNOSIDES 8.6 MG
1 TABLET ORAL 2 TIMES DAILY PRN
Status: DISCONTINUED | OUTPATIENT
Start: 2025-07-10 | End: 2025-07-10 | Stop reason: HOSPADM

## 2025-07-10 RX ORDER — CEFPODOXIME PROXETIL 200 MG/1
200 TABLET, FILM COATED ORAL DAILY
Qty: 7 TABLET | Refills: 0 | Status: ACTIVE | OUTPATIENT
Start: 2025-07-10 | End: 2025-07-10

## 2025-07-10 RX ORDER — NALOXONE HYDROCHLORIDE 0.4 MG/ML
0.2 INJECTION, SOLUTION INTRAMUSCULAR; INTRAVENOUS; SUBCUTANEOUS
Status: DISCONTINUED | OUTPATIENT
Start: 2025-07-10 | End: 2025-07-10 | Stop reason: HOSPADM

## 2025-07-10 RX ORDER — HEPARIN SODIUM (PORCINE) LOCK FLUSH IV SOLN 100 UNIT/ML 100 UNIT/ML
5-10 SOLUTION INTRAVENOUS
Status: DISCONTINUED | OUTPATIENT
Start: 2025-07-10 | End: 2025-07-10 | Stop reason: HOSPADM

## 2025-07-10 RX ORDER — GABAPENTIN 100 MG/1
100 CAPSULE ORAL AT BEDTIME
Qty: 30 CAPSULE | Refills: 0 | Status: ACTIVE | OUTPATIENT
Start: 2025-07-10

## 2025-07-10 RX ORDER — NALOXONE HYDROCHLORIDE 0.4 MG/ML
0.1 INJECTION, SOLUTION INTRAMUSCULAR; INTRAVENOUS; SUBCUTANEOUS
Status: DISCONTINUED | OUTPATIENT
Start: 2025-07-10 | End: 2025-07-10 | Stop reason: HOSPADM

## 2025-07-10 RX ORDER — HYDROMORPHONE HYDROCHLORIDE 4 MG/1
4 TABLET ORAL EVERY 4 HOURS PRN
Qty: 40 TABLET | Refills: 0 | Status: SHIPPED | OUTPATIENT
Start: 2025-07-10 | End: 2025-07-17

## 2025-07-10 RX ORDER — HEPARIN SODIUM,PORCINE 10 UNIT/ML
5-10 VIAL (ML) INTRAVENOUS
Status: DISCONTINUED | OUTPATIENT
Start: 2025-07-10 | End: 2025-07-10 | Stop reason: HOSPADM

## 2025-07-10 RX ORDER — BISACODYL 10 MG
10 SUPPOSITORY, RECTAL RECTAL
Status: DISCONTINUED | OUTPATIENT
Start: 2025-07-13 | End: 2025-07-10 | Stop reason: HOSPADM

## 2025-07-10 RX ORDER — MINERAL OIL/HYDROPHIL PETROLAT
OINTMENT (GRAM) TOPICAL
Status: DISCONTINUED | OUTPATIENT
Start: 2025-07-10 | End: 2025-07-10 | Stop reason: HOSPADM

## 2025-07-10 RX ORDER — HYDROMORPHONE HYDROCHLORIDE 4 MG/1
4 TABLET ORAL EVERY 4 HOURS PRN
Refills: 0 | Status: DISCONTINUED | OUTPATIENT
Start: 2025-07-10 | End: 2025-07-10 | Stop reason: HOSPADM

## 2025-07-10 RX ORDER — HEPARIN SODIUM,PORCINE 10 UNIT/ML
5-10 VIAL (ML) INTRAVENOUS EVERY 24 HOURS
Status: DISCONTINUED | OUTPATIENT
Start: 2025-07-10 | End: 2025-07-10 | Stop reason: HOSPADM

## 2025-07-10 RX ORDER — CARBOXYMETHYLCELLULOSE SODIUM 5 MG/ML
1-2 SOLUTION/ DROPS OPHTHALMIC
Status: DISCONTINUED | OUTPATIENT
Start: 2025-07-10 | End: 2025-07-10 | Stop reason: HOSPADM

## 2025-07-10 RX ADMIN — BUDESONIDE AND FORMOTEROL FUMARATE DIHYDRATE 2 PUFF: 160; 4.5 AEROSOL RESPIRATORY (INHALATION) at 07:46

## 2025-07-10 RX ADMIN — PIPERACILLIN AND TAZOBACTAM 4.5 G: 4; .5 INJECTION, POWDER, LYOPHILIZED, FOR SOLUTION INTRAVENOUS at 11:07

## 2025-07-10 RX ADMIN — HYDROMORPHONE HYDROCHLORIDE 4 MG: 4 TABLET ORAL at 09:07

## 2025-07-10 RX ADMIN — OXYCODONE HYDROCHLORIDE 10 MG: 10 TABLET ORAL at 07:42

## 2025-07-10 RX ADMIN — IPRATROPIUM BROMIDE 2 SPRAY: 42 SPRAY, METERED NASAL at 07:46

## 2025-07-10 RX ADMIN — PIPERACILLIN AND TAZOBACTAM 4.5 G: 4; .5 INJECTION, POWDER, LYOPHILIZED, FOR SOLUTION INTRAVENOUS at 06:14

## 2025-07-10 RX ADMIN — Medication 10 ML: at 12:27

## 2025-07-10 RX ADMIN — LORATADINE 10 MG: 10 TABLET ORAL at 07:42

## 2025-07-10 RX ADMIN — FLUTICASONE PROPIONATE 1 SPRAY: 50 SPRAY, METERED NASAL at 07:46

## 2025-07-10 RX ADMIN — PIPERACILLIN AND TAZOBACTAM 4.5 G: 4; .5 INJECTION, POWDER, LYOPHILIZED, FOR SOLUTION INTRAVENOUS at 01:00

## 2025-07-10 ASSESSMENT — ACTIVITIES OF DAILY LIVING (ADL)
ADLS_ACUITY_SCORE: 60
ADLS_ACUITY_SCORE: 34

## 2025-07-10 NOTE — PROGRESS NOTES
Overnight Oximetry Study was done and file uploaded to patients chart (). Comments from study can be found in upload.    Radha Osborne RT on 7/10/2025 at 8:11 AM

## 2025-07-10 NOTE — PROGRESS NOTES
Care Management Follow Up    Length of Stay (days): 2  Expected Discharge Date: 07/10/2025    Concerns to be Addressed: Transportation      Additional Information:  CHW delegated by ALICIA to schedule transport stretcher ride from [facility] for patients discharge today. Patient will be discharging home to [id number] [place] [address].  window is scheduled for [id number].    Brea Pretty  5A/5C Community Health Worker  [facility] Acute Care Management  Phone: [phone]

## 2025-07-10 NOTE — PROGRESS NOTES
Antimicrobial Stewardship Team Note    Antimicrobial Stewardship Program - A joint venture between [facility] and  Physicians to optimize antibiotic management.  NOT a formal consult - Restricted Antimicrobial Review     Patient: Roman Escalante  MRN: [id number]  Allergies: Oxaliplatin, Vancomycin, and Blood-group specific substance    Brief Summary: Roman Escalante is a 50 year old male with history of colon cancer with metastasis to liver and lung previously on clinical trial (FOLFOX + bevacizumab with oxaliplatin desensitization since April 2025) complicated by progression who presented to the ED on 7/8/2025 with 5 days of intermittent confusion and malaise/weakness.    History of Present Illness: Patient reported urine started to become darker and smelly as well as changes in stoma output, no blood. Endorsed right lower quadrant abdominal pain radiating towards right flank for which he was taking oxycodone. Reports having subjective fever, nausea, and vomiting the week prior. Has some shortness of breath, placed on 2L NC for comfort. Afebrile with all other vitals within normal limits on presentation. Labs notable for mild leukocytosis (WBC 12.5), elevated Scr 2 (baseline 1.2-1.3), procalcitonin 15.7, and elevated LFTs (, alk phos 735, and Tbili 9.8; ALT wnl). UA unremarkable with 1 WBC and negative leukocyte esterase. CT abdomen/pelvis showed progressive hepatic and pulmonary metastatic disease since 6/5/2025 and new intrahepatic biliary dilation the left hepatic lobe, likely related to mass effect exerted upon the central left hepatic duct by hepatic metastases. Abdominal US consistent with intrahepatic and extrahepatic biliary dilatation concerning for new mass effect. Echogenic foci within the pancreatic head, not previously visualized, concerning for metastatic disease and hepatomegaly with numerous metastases.    Patient was started on empiric Zosyn for possible cholangitis and GI  was consulted. No plans for ERCP based on patient's GOC/prognosis and wishes to pursue hospice. Primary team planning to discharge patient on Augmentin. Blood culture positive in 1 out of 4 bottles with E coli, no resistance gene on [employer] BCID2 panel. Nares MRSA PCR negative.            Active Anti-infective Medications   (From admission, onward)                 Start     Stop    07/08/25 1730  piperacillin-tazobactam  4.5 g,   Intravenous,   EVERY 6 HOURS        Sepsis       --                  Assessment: E coli bacteremia secondary to cholangitis    Patient presented with right lower quadrant abdominal pain radiating to right flank with associated nausea and vomiting found to have biliary obstruction in the setting of worsening hepatic metastases. This has been complicated by E coli bacteremia from a GI translocation due to the biliary obstruction. No reports of urinary symptoms and UA unremarkable for pyuria with no findings of UTI on imaging. Patient remains afebrile with mild tachycardia and on oxygen for comfort. Ongoing MICHELLE despite IV fluids and IV antibiotic is consistent with tumor lysis syndrome due to elevated phosphorus and uric acid. Patient is currently on day 3 of empiric Zosyn with no plans for ERCP for source control as patient is planning to transition to hospice and discharged on Augmentin to treat cholangitis. However, given E coli bacteremia, we recommend oral step down to an agent with high oral bioavailability such as fluoroquinolone (ciprofloxacin or levofloxacin) or Bactrim based on final susceptibility report. Patient was on Bactrim PTA for PJP prophylaxis thus may not be an optimal agent for E coli bacteremia. Reasonable to consider Augmentin if susceptible given transient E coli bacteremia from GI translocation in the setting of cholangitis. Consider a total of 7-10 days of therapy for cholangitis and E coli bacteremia.    Recommendations:  Continue Zosyn pending E coli susceptibility    If susceptible, consider levofloxacin 750 mg PO q48 hours (adjusted for CrCl ~37 mL/min) or Augmentin 875 mg BID to complete a total of 7-10 days of therapy    Pharmacy took the following actions: Electronic note created, Called/paged provider.    Discussed with ID Staff: FEDERICO Tubbs MD, MS  Shauna KIAH Alvares, PharmD, BCIDP  Pager: [phone]    Vital Signs/Clinical Features:  Vitals         07/08 0700 07/09 0659 07/09 0700  07/10 0659 07/10 0700  07/10 1123   Most Recent      Temp ( F) 97.7 -  98    97.7 -  98      97.5     97.5 (36.4) 07/10 0755    Pulse 84 -  110    96 -  114       102 07/10 0454    Resp 18 -  20    16 -  20      18     18 07/10 0757    BP 86/68 -  102/69    90/62 -  121/98      92/66     92/66 07/10 0757    SpO2 (%) 90 -  100    90 -  97      94     94 07/10 0755            Labs  Estimated Creatinine Clearance: 37.1 mL/min (A) (based on SCr of 2.44 mg/dL (H)).  Recent Labs   Lab Test 06/18/25  0742 06/20/25  0803 07/02/25  1354 07/08/25  0933 07/08/25  1638 07/09/25  0547   CR 1.18* 1.00 1.81* 2.00* 1.98* 2.44*       Recent Labs   Lab Test 04/30/25  0656 06/05/25  0814 06/10/25  1404 06/20/25  0803 07/02/25  1354 07/08/25  0933 07/09/25  0547   WBC 16.0* 9.5 9.4 9.9 11.7* 12.5* 13.0*   ANEU 13.4* 7.6 8.2 8.0 10.0* 10.4*  --    ALYM 0.7* 0.4* 0.2* 0.3* 0.3* 0.3*  --    CLIFF 1.6* 1.2 0.7 1.0 0.8 0.9  --    AEOS 0.2 0.1 0.0 0.1 0.1 0.1  --    HGB 9.7* 8.5* 9.5* 8.6* 10.0* 9.6* 9.8*   HCT 31.0* 26.7* 31.8* 27.5* 32.2* 30.8* 32.3*   MCV 83 82 85 81 82 83 84    155 161 127* 82* 71* 62*       Recent Labs   Lab Test 06/05/25  0814 06/18/25  0742 06/20/25  0803 07/02/25  1354 07/08/25  0933 07/09/25  0547   BILITOTAL 0.6 0.8 1.1 5.2* 9.8* 10.0*   ALKPHOS 287* 640* 612* 842* 735* 772*   ALBUMIN 3.3* 3.0* 3.0* 3.0* 2.8* 2.8*   AST 70* 164* 123* 218* 264* 278*   ALT 19 44 38 63 60 62       Recent Labs   Lab Test 07/28/23  0016 07/28/23  1501 07/29/23  0610 07/29/23  1007 07/29/23  1324  07/30/23  0555 01/28/24  1814 01/28/24  1902 01/22/25  1431 01/24/25  0842 01/25/25  1054 01/28/25  1953 07/08/25  0933 07/08/25  0935   PCAL  --  134.30* 99.97*  --   --  59.60* 0.18  --  0.24  --   --   --  15.70*  --    LACT 2.0  --   --  2.6* 1.0  --   --   --  1.2  --   --  1.0  --  1.4   CRPI  --  143.00* 84.70*  --   --  42.60* 155.00*  --   --  85.70*  --   --   --   --    SED  --   --   --   --   --   --   --  38*  --   --  47*  --   --   --              Culture Results:  7-Day Micro Results       Procedure Component Value Units Date/Time    MRSA MSSA PCR, Nasal Swab [[id number]] Collected: 07/08/25 1443    Order Status: Completed Lab Status: Final result Updated: 07/08/25 1745    Specimen: Swab from Nares, Bilateral      MRSA Target DNA Negative     SA Target DNA Negative    Narrative:      The [employer]  Xpert SA Nasal Complete assay performed in the GeneXpert  Dx System is a qualitative in vitro diagnostic test designed for rapid detection of Staphylococcus aureus (SA) and methicillin-resistant Staphylococcus aureus (MRSA) from nasal swabs in patients at risk for nasal colonization. The test utilizes automated real-time polymerase chain reaction (PCR) to detect MRSA/SA DNA. The Xpert SA Nasal Complete assay is intended to aid in the prevention and control of MRSA/SA infections in healthcare settings. The assay is not intended to diagnose, guide or monitor treatment for MRSA/SA infections, or provide results of susceptibility to methicillin. A negative result does not preclude MRSA/SA nasal colonization.     Blood Culture Peripheral blood (BC) Foot, Left [[id number]]  (Normal) Collected: 07/08/25 0922    Order Status: Completed Lab Status: Preliminary result Updated: 07/10/25 1016    Specimen: Peripheral blood (BC) from Foot, Left      Culture No growth after 2 days    Blood Culture Peripheral blood (BC) Foot, Right [[id number]]  (Abnormal) Collected: 07/08/25 0951    Order Status: Completed Lab  Status: Preliminary result Updated: 07/10/25 0315    Specimen: Peripheral blood (BC) from Foot, Right      Culture Positive on the 2nd day of incubation      Gram negative bacilli     Comment: 1 of 2 bottles       Blood Culture ID Panel, PCR [[id number]]  (Abnormal) Collected: 07/08/25 0916    Order Status: Completed Lab Status: Final result Updated: 07/10/25 0434    Specimen: Peripheral blood (BC) from Foot, Right      Enterococcus faecalis Not Detected     Enterococcus faecium Not Detected     Listeria monocytogenes Not Detected     Staphylococcus species Not Detected     Staphylococcus aureus Not Detected     Staphylococcus epidermidis Not Detected     Staphylococcus lugdunensis Not Detected     Streptococcus species Not Detected     Streptococcus agalactiae Not Detected     Streptococcus pneumoniae Not Detected     Streptococcus pyogenes Not Detected     A. baumannii complex Not Detected     Bacteroides fragilis Not Detected     Enterobacter cloacae complex Not Detected     Escherichia coli Detected     Comment: Escherichia coli detected by [employer] BCID2 assay. Final identification and antimicrobial susceptibility testing will be verified by standard methods. The BCID2 assay will not distinguish E. coli from Shigella species. Specimens containing Shigella species or E. coli will be reported as E. coli detected.        Klebsiella aerogenes Not Detected     Klebsiella oxytoca Not Detected     Klebsiella pneumoniae group Not Detected     Proteus species Not Detected     Salmonella species Not Detected     Serratia marcescens Not Detected     Haemophilus influenzae Not Detected     Neisseria meningitidis Not Detected     Pseudomonas aeruginosa Not Detected     Stenotrophomonas maltophilia Not Detected     CTX-M Not Detected     IMP Not Detected     KPC Not Detected     mcr-1 Not Detected     NDM Not Detected     OXA-48 like Not Detected     VIM Not Detected     Candida albicans Not Detected     Candida auris Not  Detected     Candida glabrata Not Detected     Candida krusei Not Detected     Candida parapsilosis Not Detected     Candida tropicalis Not Detected     Cryptococcus neoformans/gattii Not Detected    Narrative:      Assay performed using the FDA-cleared [employer] Blood Culture Identification 2 (BCID2) Panel, a multiplexed nucleic acid test for the detection and identification of multiple bacterial and yeast nucleic acids and select genetic determinants associated with antimicrobial resistance.    A negative BCID2 result does not exclude the possibility of bloodstream infection. Positive results do not rule out co-infection with organisms not included in the [employer] BCID2 Panel. Results are intended to aid in the diagnosis of illness and are meant to be used in conjunction with other clinical findings.  This test has been verified and performed by the [facility] at [facility]. This laboratory is certified under the Clinical Laboratory Improvement Amendments of 1988 (CLIA-88) as qualified to perform high complexity clinical laboratory testing.      Blood Culture Peripheral blood (BC)     Order Status: Canceled Lab Status: No result     Specimen: Peripheral blood (BC)     Blood Culture Peripheral blood (BC)     Order Status: Canceled Lab Status: No result     Specimen: Peripheral blood (BC)             Recent Labs   Lab Test 04/02/24  0552 04/26/24  1600 06/05/25  1159 07/02/25  1354 07/09/25  0546   URINEPH 6.5 6.5 5.5 5.5 5.5   NITRITE Negative Negative Negative Negative Negative   LEUKEST Negative Negative Negative Negative Negative   WBCU 1 0 1 2 1             Recent Labs   Lab Test 01/22/25  1431   IFLUA Not Detected   FLUAH1 Not Detected   FLUAH3 Not Detected   VW0641 Not Detected   IFLUB Not Detected   RSVA Not Detected   RSVB Not Detected   PIV1 Not Detected   PIV2 Not Detected   PIV3 Not Detected   HMPV Not Detected       Recent Labs   Lab Test 03/14/25  1731   CDBPCT  Negative       Imaging: US Abdomen Limited  Result Date: 7/8/2025  EXAMINATION: Limited Abdominal Ultrasound, 7/8/2025 3:45 PM COMPARISON: CT 7/8/2025. HISTORY: Hyperbilirubinemia FINDINGS: Fluid: No ascites or pleural effusions. Liver: Measures 22.2 cm. Heterogenous appearance of the liver with multiple hyper and hypoechoic foci consistent with known metastatic liver disease. Retrograde flow in the main portal vein. Gallbladder: No wall thickening, pericholecystic fluid, reported sonographic Pace's sign or  cholelithiasis. Bile Ducts: Left intrahepatic biliary duct measures 8 mm. Common bile duct measures 7 mm in diameter. Pancreas: Partially visualized, echogenic foci within the pancreatic head immediately adjacent to the splenic vein measures 1.3 x 1.0 cm. Kidney: The right kidney measures 10.4 cm long. No hydronephrosis, renal calculi, cystic lesion, or mass. Aorta and IVC: The visualized portions of the aorta and IVC are unremarkable.     IMPRESSION: 1.  Intrahepatic and extrahepatic biliary dilatation concerning for new mass effect given the patient's known metastatic disease. 2.  Echogenic foci within the pancreatic head, not previously visualized, concerning for metastatic disease. 3.  Hepatomegaly with numerous metastases. 4.  New retrograde flow of portal vein suggestive of portal hypertension. I have personally reviewed the examination and initial interpretation and I agree with the findings. HILDA LUNA MD   SYSTEM ID:  [id number]    XR Chest Port 1 View  Result Date: 7/8/2025  Exam: XR CHEST PORT 1 VIEW, 7/8/2025 3:03 PM Comparison: 7/82025 at 12:39 PM History: sepsis broad workup, please rule out infectious etiology Findings: Portable AP view of semiupright chest. Right chest wall Port-A-Cath with tip at the right atrium. Trachea is midline. Cardiomediastinal silhouette is within normal limits. No significant change bilateral pulmonary nodules and masses, the largest appear in the right lower lung  zone and left perihilar space. No pneumothorax or pleural effusion. The visualized upper abdomen is unremarkable. No acute osseous abnormalities.     Impression: Numerous pulmonary nodules and masses corresponding to those seen on CT. No new focal consolidation. I have personally reviewed the examination and initial interpretation and I agree with the findings. JILLIAN AGUILAR MD   SYSTEM ID:  [id number]    XR Chest 2 Views  Result Date: 7/8/2025  Chest 2 views INDICATION: Sepsis COMPARISON: CT 6/18/2025 Heart size and shape normal. Bilateral pulmonary nodules and masses especially in the left perihilar an right lower lung zones are again noted as seen on the CT. Right IJ catheter tip in the proximal right atrium. No costophrenic angle blunting on either side of suspicion.     IMPRESSION: Numerous pulmonary nodules or masses consistent with neoplastic disease. JILLIAN AGUILAR MD   SYSTEM ID:  [id number]    CT Abdomen Pelvis w/o Contrast  Result Date: 7/8/2025  EXAMINATION: CT ABDOMEN PELVIS W/O CONTRAST, 7/8/2025 11:15 AM TECHNIQUE:  Helical CT images from the lung bases through the pubic symphysis were obtained without IV contrast. COMPARISON: 6/5/2025 HISTORY: abdominal pain, h/o rectal CA FINDINGS: Abdomen and pelvis: The liver is enlarged by numerous hepatic metastases which are not well delineated on this noncontrast CT, though several are likely increased in size since 6/5/2025, for example in hepatic segment 2 measuring 7.0 cm (series 4 image 41), previously 4.6 cm, and in the caudate lobe measuring 6.4 cm (series 4 image 75), previously 4.7 cm. Normal gallbladder. New intrahepatic biliary dilation in the left hepatic lobe. No extrahepatic biliary dilatation. Normal pancreas. Calcified granulomas in the nonenlarged spleen. Normal adrenal glands and renal cortices. No hydronephrosis, hydroureter, or urinary tract stone. Normal bladder. Dystrophic calcifications in the mildly enlarged prostate. Symmetric  seminal vesicles. Small volume ascites. No free air. No bowel obstruction or inflammation. Moderate colonic stool burden. Left abdominal diverting colostomy. Normal appendix. Known rectal masses are not well visualized on these noncontrast images. No abdominal aortic aneurysm. No abdominal or pelvic lymphadenopathy. Lung bases:  Trace bilateral pleural effusions, right greater than left. Left lower lobe wedge resection changes. Increased size of several metastatic pulmonary nodules and masses in the lung bases, the largest measuring 6.8 x 7.7 cm in the right lower lobe, previously 5.9 x 6.9 cm. Partially visualized right hilar lymphadenopathy. Centrilobular and paraseptal emphysematous change. Bones and soft tissues: Numerous mixed lytic and sclerotic lesions in the spine, pelvis, and anterolateral right sixth rib, not significant change since prior. Anasarca.     IMPRESSION: 1. No acute intra-abdominal pathology. 2. Progressive hepatic and pulmonary metastatic disease since 6/5/2025. 3. New intrahepatic biliary dilation the left hepatic lobe, likely related to mass effect exerted upon the central left hepatic duct by hepatic metastases. 4. Anasarca with increased small volume ascites and trace bilateral pleural effusions. 5. Stable osseous metastases. LOYDA STEARNS DO   SYSTEM ID:  [id number]

## 2025-07-10 NOTE — PLAN OF CARE
"Nursing Plan of Care Note - Transfer  Shift: [id number]    BP (!) 121/98 (BP Location: Right arm, Cuff Size: Adult Regular)   Pulse 105   Temp 97.7  F (36.5  C) (Oral)   Resp 18   Ht 1.803 m (5' 11\")   SpO2 97%   BMI 22.76 kg/m      Transferred to:   Via: bed  Reason for transfer: admission  Family: Aware of transfer  Belongings: Packed and sent with patient  Chart: Delivered with pt to next unit  Medications: Meds sent to new unit with patient  Report given to: Kiki     General: A&Ox4. Able to follow commands and make needs known. Afebrile.  Pain: 8-10/10 back pain, given PRN dilaudid and tylenol with minimal improvement  Cardiac/Resp: Tachy. Expiratory wheezes. On 4L via NC.  /GI: Abdomen firm. Patient voided 200, icteric and cloudy. Colostomy with light brown watery output. Tolerating regular diet.  Access/infusions: R chest wall port - SL    Activity: Independent/SBA.   Plan: Overnight oximetry study started by RT, page to reconnect. Continue with plan of care. Notify primary team with changes.         "

## 2025-07-10 NOTE — PROGRESS NOTES
Nursing Focus: Discharge    D: Patient discharged to home at 1330. Patient stable and accompanied by daughter.    I: Discharge prescriptions sent to discharge pharmacy to be filled. All discharge medications and instructions reviewed with daughter by virtual RN. Patient instructed to call clinic triage nurse if he experiences a fever >100.4, uncontrolled nausea, vomiting, diarrhea, or pain; or experiences any signs or symptoms of bleeding. Other phone numbers to call with questions or concerns after discharge reviewed. Port de-accessed. Education completed.    A: Daughter verbalized understanding of discharge medications and instructions. RN picked up medications at discharge pharmacy.     P: Patient to follow-up in clinic on August 1 in clinic.

## 2025-07-10 NOTE — PROGRESS NOTES
Care Management Discharge Note    Discharge Date: 07/10/2025     Discharge Disposition: Home, Hospice - [place] Ph:[phone]    Discharge Services: Transportation Services ([facility] [phone] stretcher transport [id number])    Discharge DME: None    Discharge Transportation: health plan transportation ([facility] [phone] stretcher transport [id number])    Private pay costs discussed: Not applicable    Does the patient's insurance plan have a 3 day qualifying hospital stay waiver?  No    PAS Confirmation Code:  NA  Patient/family educated on Medicare website which has current facility and service quality ratings: no    Education Provided on the Discharge Plan: Yes  Persons Notified of Discharge Plans: provider, bedside and charge rn, pt, pt spouse, receiving hospice agency, [facility] stretcher transport  Patient/Family in Agreement with the Plan: yes    Handoff Referral Completed: No, handoff not indicated or clinically appropriate    Additional Information:  Per vamsi 1 provider, pt is med ready for discharge home with hospice ([place] Ph:[phone]) today. SW met with pt and pt spouse at bedside to confirm comfort meds and oral abx would be available at the discharge pharmacy. Hospice agency confirmed they would bring DME (hospital bed and O2) to the pt's home ahead of discharge today. SW delegated transport setup to CHW. CHW confirmed stretcher transport with a pickup window 1240-1325h. Bedside aware and confirmed would contact pt spouse with transport time. SW sent discharge orders to hospice agency.    Linda Banerjee MSW, MPH, LGSW,  [facility]  5A ([phone]) 5C ([phone])   [facility] Acute [facility]  Phone: [phone]  Searchable by name on Vocera: Linda Banerjee

## 2025-07-10 NOTE — PLAN OF CARE
Goal Outcome Evaluation:    Plan of Care Reviewed With: patient, significant other    Overall Patient Progress: no change        Outcome Evaluation:     Admitted/transferred from: ED  2 RN full   skin assessment completed by Kiki DUPONT RN and Heather RN.  Skin assessment finding: skin intact, no problems   Interventions/actions:        Shift: [id number]  VS: Stable on O2, afebrile  Neuro: A&Ox4  Respiratory: 4L via nc  Pain/Nausea: Denies nausea, back pain   Diet: Diet  Lines/Drains: R Port, Colostomy bag  GI/: Colostomy has brown watery output  Mobility: SBA  Plan: Continue POC

## 2025-07-11 NOTE — DISCHARGE SUMMARY
[facility]  Hospitalist Discharge Summary      Date of Admission:  7/8/2025  Date of Discharge:  7/10/2025  1:44 PM  Discharging Provider: Cameron Samson MD  Discharge Service: Medicine Service, ROSSANA TEAM 1    Discharge Diagnoses   Metastatic colorectal adenocarcinoma  Liver metastases, bone metastases  Elevated LFTs with hyperbilirubinemia  Sepsis due to E. Coli bacteremia and cholangitis  Biliary obstruction  Prerenal MICHELLE   Toxic Metabolic Encephalopathy  Tumor Lysis Syndrome   Back pain  Nausea  Chronic normocytic Iron deficiency anemia     Clinically Significant Risk Factors          Follow-ups Needed After Discharge   No specific follow-ups are needed. Patient will discharge to home with home hospice cares.    Unresulted Labs Ordered in the Past 30 Days of this Admission       Date and Time Order Name Status Description    7/8/2025  9:02 AM Blood Culture Peripheral blood (BC) Foot, Left Preliminary     7/8/2025  9:02 AM Blood Culture Peripheral blood (BC) Foot, Right Preliminary         These results will be followed up by hospital medicine team.    Discharge Disposition   Discharged to home - with hospice ([place] Ph:[phone])  Condition at discharge: Poor    Hospital Course   Roman Escalante is a 50 year old male admitted on 7/8/2025. He has a history of colon cancer with metastasis to liver and lung previously on clinical trial (FOLFOX + bevacizumab with oxaliplatin desensitization since April 2025) and was admitted due to symptoms of confusion for 1 week prior to admission. He was placed on 2L NC for comfort. Chest x-ray showed numerous pulmonary nodules or masses consistent with neoplastic disease. He presented with signs of sepsis, hypotension, leukocytosis, elevated procalcitonin, and cholestatic liver injury pattern consistent with cholangitis. Clinical status initially improved following IV fluids and antibiotics. After discussion with GI, oncology,  and patient - risks of ERCP for cholangitis outweigh any potential benefit as metastatic colorectal cancer will continue to result in complications. Patient also had worsening MICHELLE despite adequate fluid resuscitation for sepsis, this is most likely due to tumor lysis syndrome. There are no additional treatment options for Roman's progressive metastatic disease. Roman has elected to continue with comfort cares and enroll in hospice at home. See below for more detailed summary.    Metastatic colorectal adenocarcinoma (including liver mets)  Sepsis due to E. Coli bacteremia and cholangitis  Biliary obstruction  Prerenal MICHELLE   Toxic Metabolic Encephalopathy  Tumor Lysis Syndrome    He was started on treatment for colon cancer with FOLFIRI + bevacizumab on 8/30/24. Imaging in October 2024 showed improvement in his disease. Imaging in February 2025 showed disease progression, though this was in the setting of treatment break due to PJP pneumonia. Therefore he resumed the same chemotherapy with FOLFIRI/[place] on 2/20/25. Imaging April 2025 with mixed response. Started FOLFOX + bevacizumab with oxaliplatin desensitization April 2025. CT imaging June 2025 with progression. Per Oncology follow up note 7/7/2025, Roman expressed desire to go to the ED for 24 hours of monitoring, IVFs and any potential interventions to help him feel better prior to him enrolling in hospice. Presented 7/8/2025 with reports of 1 week of malaise/weakness, increasing confusion, R flank/back pain, N/V, decreased PO, jaundice/dark urine and decreased urine and ostomy outputs. Denies any pruritus. , T.bili 9.8,  and normal ALT (previously only had mild AP elevation mid-higher 200s, AST low-mid 100's but normal T.bili mid June). Lipase normal. WBC 12.5, Procal 15.7 with BC and UA/UC pending. CT AP (non-contrast d/t MICHELLE) with new intrahepatic (left lobe) ductal dilation likely r/t mass effect from worsening hepatic mets to central left hepatic  duct.  Presentation c/f cholangitis. Phosphorus and uric acid labs suggested tumor lysis syndrome (both elevated to 5.1 and 13.0 respectively). Patient received IV fluids and IV zosyn for sepsis.  Per Oncology and GI:  - Regarding ERCP, discussed risk of this procedure and that it may only give him a few more weeks of life expectancy as compared to days to maybe 1 week if his current acute conditions are untreated (MICHELLE/impending cholangitis, liver decompensation/failure).   - After discussion, he is agreeable to pursuing a comfort-based approach with hospice care at home. Prognosis is poor, estimate Roman has few days to short weeks.  -Continue oral augmentin 875 mg twice daily for 7 more days. We discussed with Roman as cause of obstruction and infection has not been addressed, sepsis may recur.  -Patient to discharge with home oxygen which is new, currently requiring 3-4 LPM for comfort.    Back pain  Bone mets  - Switching from PTA oxycodone to Dilaudid 4mg PRN for pain control     Nausea  - Taking Compazine and Zofran as needed. Continue olanzapine  - Prefers the ondansetron ODT    Consultations This Hospital Stay   GI HEPATOLOGY ADULT IP CONSULT  CARE MANAGEMENT / SOCIAL WORK IP CONSULT  ONCOLOGY ADULT IP CONSULT  INTERNAL MEDICINE PROCEDURE TEAM ADULT IP CONSULT - THORACENTESIS  CARE MANAGEMENT / SOCIAL WORK IP CONSULT  NURSING TO CONSULT FOR VIRTUAL CARE IP    Code Status   DNR/DNI    Time Spent on this Encounter   I, Cameron Samson MD, personally saw the patient today and spent greater than 30 minutes discharging this patient.       Cameron Samson MD  [facility] 5A ONCOLOGY  [address] [facility] [address]  Phone: [phone]  ______________________________________________________________________    Physical Exam   Vital Signs:                    Weight: 0 lbs 0 oz    General: Cachectic and jaundiced male, appears fatigued, answers questions appropriately  HEENT: Head is AT/NC. Sclera icteric.  Temporal wasting. Oropharynx is clear, moist and w/o exudate or lesions.   Lungs: Non-labored breathing on 4L NC. Diffuse crackles bilaterally.  Heart: Borderline tachycardic on monitor, regular rhythm  Abdomen: Soft, non-distended, mildly tender to palpation in RUQ but no rebound or peritoneal signs. Colostomy with healthy pink stoma, small amount of liquid tan outputs in ostomy bag.  Extremities: WWP, no pedal edema.  MSK: no gross deformity, UE/LE muscle wasting  Skin: Jaundice.  Neurologic: Grossly non-focal. Oriented to person, place, time, and situation.    Psych: mood appropriate to situation         Primary Care Physician   Buddy Smith    Discharge Orders      Primary Care - Care Coordination Referral      Hospice Referral      Reason for your hospital stay    Roman was hospitalized due to abdominal pain. His cancer is progressing and liver is not draining properly. He was treated for infection. There are no more good treatment options for Roman and he has elected to continue with hospice cares. Continue taking oral dilaudid medication as needed for pain control. Ok to continue taking ativan and muscle relaxers as needed. You were prescribed 7 more days of cefpodoxime antibiotic for comfort - if you start to have side effects or upset stomach, it is ok to stop taking this.     Activity    Your activity upon discharge: activity as tolerated     Tubes and Drains    Current Tubes and Drains:     CVC Line  Duration           Port a Cath 01/19/21 Single Lumen Right Chest wall 1633 days        Drain  Duration           Colostomy -- days              NO Follow-up Care Needed    NO follow-up care needed for this patient. Home hospice.     Diet    Follow this diet upon discharge:       Regular Diet Adult       Significant Results and Procedures   7-Day Micro Results       Collected Updated Procedure Result Status      07/08/2025 1443 07/08/2025 1745 MRSA MSSA PCR, Nasal Swab [[id number]]    Swab from Nares,  Bilateral    Final result Component Value   MRSA Target DNA Negative   SA Target DNA Negative            07/08/2025 0922 07/11/2025 1016 Blood Culture Peripheral blood (BC) Foot, Left [[id number]]   Peripheral blood (BC) from Foot, Left    Preliminary result Component Value   Culture No growth after 3 days  [P]                07/08/2025 0916 07/11/2025 0636 Blood Culture Peripheral blood (BC) Foot, Right [[id number]]    (Abnormal)   Peripheral blood (BC) from Foot, Right    Preliminary result Component Value   Culture Positive on the 2nd day of incubation  [P]     Escherichia coli  [P]     1 of 2 bottles        Susceptibility        Escherichia coli      LETTY      Ampicillin <=2 ug/mL Susceptible      Ampicillin/ Sulbactam <=2 ug/mL Susceptible      Cefepime <=0.12 ug/mL Susceptible      Ceftazidime <=0.5 ug/mL Susceptible      Ceftriaxone <=0.25 ug/mL Susceptible      Ciprofloxacin 0.5 ug/mL Intermediate      Gentamicin >=16 ug/mL Resistant      Levofloxacin 1 ug/mL Intermediate      Meropenem <=0.25 ug/mL Susceptible      Piperacillin/Tazobactam <=4 ug/mL Susceptible      Trimethoprim/Sulfamethoxazole >16/304 ug/mL Resistant                           07/08/2025 0916 07/10/2025 0434 Blood Culture ID Panel, PCR [[id number]]    (Abnormal)   Peripheral blood (BC) from Foot, Right    Final result Component Value   Enterococcus faecalis Not Detected   Enterococcus faecium Not Detected   Listeria monocytogenes Not Detected   Staphylococcus species Not Detected   Staphylococcus aureus Not Detected   Staphylococcus epidermidis Not Detected   Staphylococcus lugdunensis Not Detected   Streptococcus species Not Detected   Streptococcus agalactiae Not Detected   Streptococcus pneumoniae Not Detected   Streptococcus pyogenes Not Detected   A. baumannii complex Not Detected   Bacteroides fragilis Not Detected   Enterobacter cloacae complex Not Detected   Escherichia coli Detected   Escherichia coli detected by [employer] BCID2  assay. Final identification and antimicrobial susceptibility testing will be verified by standard methods. The BCID2 assay will not distinguish E. coli from Shigella species. Specimens containing Shigella species or E. coli will be reported as E. coli detected.   Klebsiella aerogenes Not Detected   Klebsiella oxytoca Not Detected   Klebsiella pneumoniae group Not Detected   Proteus species Not Detected   Salmonella species Not Detected   Serratia marcescens Not Detected   Haemophilus influenzae Not Detected   Neisseria meningitidis Not Detected   Pseudomonas aeruginosa Not Detected   Stenotrophomonas maltophilia Not Detected   CTX-M Not Detected   IMP Not Detected   KPC Not Detected   mcr-1 Not Detected   NDM Not Detected   OXA-48 like Not Detected   VIM Not Detected   Candida albicans Not Detected   Candida auris Not Detected   Candida glabrata Not Detected   Caroline krusei Not Detected   Candida parapsilosis Not Detected   Candida tropicalis Not Detected   Cryptococcus neoformans/gattii Not Detected                ,   Results for orders placed or performed during the hospital encounter of 07/08/25   CT Abdomen Pelvis w/o Contrast    Narrative    EXAMINATION: CT ABDOMEN PELVIS W/O CONTRAST, 7/8/2025 11:15 AM    TECHNIQUE:  Helical CT images from the lung bases through the pubic  symphysis were obtained without IV contrast.     COMPARISON: 6/5/2025    HISTORY: abdominal pain, h/o rectal CA    FINDINGS:    Abdomen and pelvis: The liver is enlarged by numerous hepatic  metastases which are not well delineated on this noncontrast CT,  though several are likely increased in size since 6/5/2025, for  example in hepatic segment 2 measuring 7.0 cm (series 4 image 41),  previously 4.6 cm, and in the caudate lobe measuring 6.4 cm (series 4  image 75), previously 4.7 cm. Normal gallbladder. New intrahepatic  biliary dilation in the left hepatic lobe. No extrahepatic biliary  dilatation. Normal pancreas. Calcified granulomas  in the nonenlarged  spleen. Normal adrenal glands and renal cortices. No hydronephrosis,  hydroureter, or urinary tract stone. Normal bladder. Dystrophic  calcifications in the mildly enlarged prostate. Symmetric seminal  vesicles. Small volume ascites. No free air. No bowel obstruction or  inflammation. Moderate colonic stool burden. Left abdominal diverting  colostomy. Normal appendix. Known rectal masses are not well  visualized on these noncontrast images. No abdominal aortic aneurysm.  No abdominal or pelvic lymphadenopathy.    Lung bases:  Trace bilateral pleural effusions, right greater than  left. Left lower lobe wedge resection changes. Increased size of  several metastatic pulmonary nodules and masses in the lung bases, the  largest measuring 6.8 x 7.7 cm in the right lower lobe, previously 5.9  x 6.9 cm. Partially visualized right hilar lymphadenopathy.  Centrilobular and paraseptal emphysematous change.    Bones and soft tissues: Numerous mixed lytic and sclerotic lesions in  the spine, pelvis, and anterolateral right sixth rib, not significant  change since prior. Anasarca.      Impression    IMPRESSION:   1. No acute intra-abdominal pathology.  2. Progressive hepatic and pulmonary metastatic disease since  6/5/2025.   3. New intrahepatic biliary dilation the left hepatic lobe, likely  related to mass effect exerted upon the central left hepatic duct by  hepatic metastases.  4. Anasarca with increased small volume ascites and trace bilateral  pleural effusions.   5. Stable osseous metastases.    LOYDA STEARNS DO         SYSTEM ID:  [id number]   XR Chest 2 Views    Narrative    Chest 2 views    INDICATION: Sepsis    COMPARISON: CT 6/18/2025    Heart size and shape normal. Bilateral pulmonary nodules and masses  especially in the left perihilar an right lower lung zones are again  noted as seen on the CT. Right IJ catheter tip in the proximal right  atrium. No costophrenic angle blunting on either side of  suspicion.      Impression    IMPRESSION: Numerous pulmonary nodules or masses consistent with  neoplastic disease.    JILLIAN AGUILAR MD         SYSTEM ID:  [id number]   XR Chest Port 1 View    Narrative    Exam: XR CHEST PORT 1 VIEW, 7/8/2025 3:03 PM    Comparison: 7/82025 at 12:39 PM    History: sepsis broad workup, please rule out infectious etiology    Findings:  Portable AP view of semiupright chest. Right chest wall Port-A-Cath  with tip at the right atrium. Trachea is midline. Cardiomediastinal  silhouette is within normal limits. No significant change bilateral  pulmonary nodules and masses, the largest appear in the right lower  lung zone and left perihilar space. No pneumothorax or pleural  effusion. The visualized upper abdomen is unremarkable. No acute  osseous abnormalities.      Impression    Impression: Numerous pulmonary nodules and masses corresponding to  those seen on CT. No new focal consolidation.    I have personally reviewed the examination and initial interpretation  and I agree with the findings.    JILLIAN AGUILAR MD         SYSTEM ID:  [id number]   US Abdomen Limited    Narrative    EXAMINATION: Limited Abdominal Ultrasound, 7/8/2025 3:45 PM     COMPARISON: CT 7/8/2025.    HISTORY: Hyperbilirubinemia    FINDINGS:   Fluid: No ascites or pleural effusions.    Liver: Measures 22.2 cm. Heterogenous appearance of the liver with  multiple hyper and hypoechoic foci consistent with known metastatic  liver disease. Retrograde flow in the main portal vein.    Gallbladder: No wall thickening, pericholecystic fluid, reported  sonographic Pace's sign or  cholelithiasis.    Bile Ducts: Left intrahepatic biliary duct measures 8 mm. Common bile  duct measures 7 mm in diameter.    Pancreas: Partially visualized, echogenic foci within the pancreatic  head immediately adjacent to the splenic vein measures 1.3 x 1.0 cm.    Kidney: The right kidney measures 10.4 cm long. No hydronephrosis,   renal  calculi, cystic lesion, or mass.    Aorta and IVC: The visualized portions of the aorta and IVC are  unremarkable.      Impression    IMPRESSION:   1.  Intrahepatic and extrahepatic biliary dilatation concerning for  new mass effect given the patient's known metastatic disease.  2.  Echogenic foci within the pancreatic head, not previously  visualized, concerning for metastatic disease.  3.  Hepatomegaly with numerous metastases.  4.  New retrograde flow of portal vein suggestive of portal  hypertension.    I have personally reviewed the examination and initial interpretation  and I agree with the findings.    HILDA LUNA MD         SYSTEM ID:  [id number]       Discharge Medications      Review of your medicines        START taking        Dose / Directions   amoxicillin-clavulanate 875-125 MG tablet  Commonly known as: AUGMENTIN  Used for: E coli bacteremia      Dose: 1 tablet  Take 1 tablet by mouth 2 times daily for 7 days.  Quantity: 14 tablet  Refills: 0     HYDROmorphone 4 MG tablet  Commonly known as: DILAUDID  Used for: Cancer associated pain      Dose: 4 mg  Take 1 tablet (4 mg) by mouth every 4 hours as needed for moderate to severe pain.  Quantity: 40 tablet  Refills: 0            CHANGE how you take these medications        Dose / Directions   gabapentin 100 MG capsule  Commonly known as: NEURONTIN  This may have changed:   how much to take  how to take this  when to take this  additional instructions  Used for: Cancer associated pain      Dose: 100 mg  Take 1 capsule (100 mg) by mouth at bedtime.  Quantity: 30 capsule  Refills: 0            CONTINUE these medicines which have NOT CHANGED        Dose / Directions   cyclobenzaprine 5 MG tablet  Commonly known as: FLEXERIL  Used for: Cancer associated pain, Colon cancer metastasized to liver (H)      Take one tab (5mg) by mouth over 6-8 hours, as needed for spasms  Quantity: 45 tablet  Refills: 2     ipratropium - albuterol 0.5 mg/2.5 mg (3mg)/3 mL  0.5-2.5 (3) MG/3ML neb solution  Commonly known as: DUONEB  Used for: COPD exacerbation (H)      Dose: 1 vial  Take 1 vial (3 mLs) by nebulization every 6 hours as needed for shortness of breath, wheezing or cough.  Quantity: 90 mL  Refills: 3     ipratropium 0.06 % nasal spray  Commonly known as: ATROVENT  Used for: Acute cough, Post-nasal drainage      Dose: 2 spray  Spray 2 sprays into both nostrils 4 times daily.  Quantity: 15 mL  Refills: 0     LORazepam 0.5 MG tablet  Commonly known as: ATIVAN  Used for: Anxiety attack      Dose: 0.5 mg  Take 1 tablet (0.5 mg) by mouth every 6 hours as needed for anxiety.  Quantity: 30 tablet  Refills: 0     Narcan 4 MG/0.1ML nasal spray  Generic drug: naloxone      Refills: 0     OLANZapine 2.5 MG tablet  Commonly known as: zyPREXA  Used for: Colon cancer metastasized to liver (H), Chemotherapy induced nausea and vomiting      Dose: 2.5 mg  Take 1 tablet (2.5 mg) by mouth 2 times daily as needed (nausea).  Quantity: 60 tablet  Refills: 1     ondansetron 8 MG ODT tab  Commonly known as: ZOFRAN ODT  Used for: Nausea      Dose: 8 mg  Take 1 tablet (8 mg) by mouth every 8 hours as needed for nausea.  Quantity: 60 tablet  Refills: 3     Ostomy Supplies Misc  Used for: Encounter for attention to colostomy (H), Parastomal hernia      Dose: 1 each  1 each daily  Quantity: 1 each  Refills: 11     prochlorperazine 10 MG tablet  Commonly known as: COMPAZINE  Used for: Rectal adenocarcinoma metastatic to lung (H)      Dose: 10 mg  Take 1 tablet (10 mg) by mouth every 6 hours as needed for nausea or vomiting.  Quantity: 30 tablet  Refills: 2     Symbicort 160-4.5 MCG/ACT inhaler  Used for: Acute bronchitis, unspecified organism  Generic drug: budesonide-formoterol      Dose: 2 puff  INHALE TWO PUFFS BY MOUTH TWICE A DAY  Quantity: 10.2 g  Refills: 1     Ventolin  (90 Base) MCG/ACT inhaler  Used for: Wheezing  Generic drug: albuterol      INHALE 2 PUFFS INTO THE LUNGS EVERY 6 HOURS  AS NEEDED FOR SHORTNESS OF BREATH OR WHEEZING OR COUGH  Quantity: 18 g  Refills: 2            STOP taking      dexAMETHasone 4 MG tablet  Commonly known as: DECADRON        fluorouracil 2.5 GM/50ML Soln injection  Commonly known as: ADRUCIL        fluticasone 50 MCG/ACT nasal spray  Commonly known as: FLONASE        loratadine 10 MG tablet  Commonly known as: CLARITIN        oxyCODONE IR 10 MG tablet  Commonly known as: ROXICODONE        sulfamethoxazole-trimethoprim 800-160 MG tablet  Commonly known as: BACTRIM DS                  Where to get your medicines        These medications were sent to [facility] - [place], [place] - [address] [address]      Phone: [phone]   amoxicillin-clavulanate 875-125 MG tablet  gabapentin 100 MG capsule  HYDROmorphone 4 MG tablet       Allergies   Allergies   Allergen Reactions    Oxaliplatin Shortness Of Breath, Other (See Comments) and Nausea and Vomiting     Patient had HSR to Oxaliplatin on cycle 8 of FOLFOX chemotherapy. Sent to ER for continued monitoring.    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 Blood products may be delayed. Draw patient 24 hours prior to transfusion. Draw one red top and two purple top tubes for all type and screen orders.

## 2025-07-13 LAB
BACTERIA SPEC CULT: ABNORMAL
BACTERIA SPEC CULT: ABNORMAL
BACTERIA SPEC CULT: NO GROWTH

## 2025-07-23 NOTE — PROGRESS NOTES
[facility] - Medical Oncology Follow-Up Consult Note  2025    Patient Identifiers     Name: Roman Escalante  Preferred Address: [place]  Preferred Language: English  : 1975  Gender: male    Assessment and Plan     Mr. Roman Escalante is a 50 year old male former smoker (3-5 cigs/day) with a history of extensively pre-treated metastatic colorectal cancer s/p trial cellular therapy transplant (2024) most recently on FOLFIRI/Reyna (24  - 2024) interrupted for a bacterial pneumonia planned for FOLFOX/Reyna with treatment again interrupted due to clinical decompensation.     NGS: KRAS G12N, BRAFwt  Immuno: pMMR  Clinical Trial: VSG-GP    Roman returns to clinic to discuss enrollment in the TORL clinical trial. He has been eager for trial enrollment for months, though his clinical course has been challenged by recurrent pneumonia infections and a brief O2 requirement. These events disrupted clinical trial evaluation, so we were only recently able to have him prescreened for this study, with promising results.     Unfortunately, we do not have recent labs as of today, so we will draw them now. This raises significant concerns given the progression in his disease which we have observed over the past few weeks/months. I am deeply concerned that these values will have changed in the past few weeks and potentially further derail trial eligibility. If Roman is not eligible for this study, or if he should come off of it for any reason, I recommend hospice as there are no meaningful systemic treatment options. I reviewed this recommendation with him extensively, and he was able to clearly outline our plan.    Proceed with TORL consent and screening tasks today, with subsequent follow-up managed per trial protocol.    45 minutes spent on the date of the encounter doing chart review, review of test results, interpretation of tests, patient visit, documentation, and discussion with other provider(s)      The longitudinal plan of care for the diagnosis(es)/condition(s) as documented were addressed during this visit. Due to the added complexity in care, I will continue to support Roman in the subsequent management and with ongoing continuity of care.    Polo Snow MD, PhD   of Medicine  Division of Hematology, Oncology and Transplantation  [facility]    -----------------------------------    Oncology Summary     Cancer Staging   Rectal adenocarcinoma metastatic to lung (H)  Staging form: Colon and Rectum, AJCC 8th Edition  - Clinical: Stage IVB (cTX, cNX, pM1b) - Signed by Polo Snow MD on 3/30/2023      Oncology History   Rectal adenocarcinoma metastatic to lung (H)   2/3/2023 Initial Diagnosis    Rectal adenocarcinoma metastatic to lung (H)     2/3/2023 - 3/31/2023 Chemotherapy    Oral ONC Colorectal Cancer - Regorafenib  Plan Provider: Polo Snow MD  Treatment goal: Palliative  Line of treatment: [No plan line of treatment]     3/30/2023 -  Cancer Staged    Staging form: Colon and Rectum, AJCC 8th Edition  - Clinical: Stage IVB (cTX, cNX, pM1b)     6/7/2023 - 10/3/2023 Chemotherapy    OP ONC Colorectal Cancer - Modified FOLFOX-6 / Bevacizumab  Plan Provider: Polo Snow MD  Treatment goal: Palliative  Line of treatment: [No plan line of treatment]     10/27/2023 - 3/11/2024 Chemotherapy    OP ONC Colorectal Cancer - Bevacizumab + Trifluridine/Tipiracil (Lonsurf)  Plan Provider: Polo Snow MD  Treatment goal: Palliative  Line of treatment: [No plan line of treatment]     3/27/2024 - 3/27/2024 Chemotherapy    Oral ONC Colorectal Cancer - Fruquintinib (Fruzaqla)  Plan Provider: Polo Snow MD  Treatment goal: Palliative  Line of treatment: [No plan line of treatment]     5/2024 -  Chemotherapy    [facility] Cellular Therapy cytoreductive conditioning and cellular transplant  - admitted for 5 weeks following transplant with ~30lbs weight loss     8/30/2024 - 4/5/2025  Chemotherapy    OP ONC Colorectal Cancer - FOLFIRI / Bevacizumab  Plan Provider: Polo Snow MD  Treatment goal: Palliative  Line of treatment: [No plan line of treatment]     4/30/2025 -  Chemotherapy    OP ONC mFOLFOX-6 + Bevacizumab + Oxaliplatin Desensitization (14 Day)  Plan Provider: Polo Snow MD  Treatment goal: Other  Line of treatment: [No plan line of treatment]         Subjective/Interval Events     - Roman's labs returned markedly abnormal on clinical trial workup last week, making him ineligible for study.   - His clinical status remains stable since our last conversation    Physical Exam     Vital signs: /70 (BP Location: Right arm, Patient Position: Sitting, Cuff Size: Adult Regular)   Pulse 120   Temp 98.1  F (36.7  C) (Oral)   Resp 18   Wt 74.1 kg (163 lb 4.8 oz)   SpO2 93%   BMI 22.78 kg/m      ECOG performance status:  3  Vascular access:  R chest port    Physical Exam:  Exam performed, notable for:  - ill appearing; largely stable exam though markedly worse than 1-2 months prior    Objective Data     Lab data:  I have personally reviewed the lab data, notable for:    - WBC 13.0  - Hgb 9.8  - Plt 62  - Tbili 10.0  - AST//62    Radiology data:  I have personally reviewed the radiology data, notable for:  07/08/2025 CT Abd/Pelvis  IMPRESSION:   1. No acute intra-abdominal pathology.  2. Progressive hepatic and pulmonary metastatic disease since  6/5/2025.   3. New intrahepatic biliary dilation the left hepatic lobe, likely  related to mass effect exerted upon the central left hepatic duct by  hepatic metastases.  4. Anasarca with increased small volume ascites and trace bilateral  pleural effusions.   5. Stable osseous metastases.    Pathology and other data:  I have personally reviewed and interpreted the pathology data, notable for:    - no new data

## 2025-07-24 ENCOUNTER — ALLIED HEALTH/NURSE VISIT (OUTPATIENT)
Dept: ONCOLOGY | Facility: CLINIC | Age: 50
End: 2025-07-24
Payer: COMMERCIAL

## 2025-07-24 DIAGNOSIS — Z00.6 CLINICAL TRIAL PARTICIPANT: Primary | ICD-10-CM

## 2025-07-24 NOTE — NURSING NOTE
Per public obituary, patient passed away on 7/12/2025.     [email or web address]

## (undated) DEVICE — NDL SPINAL 22GA 3.5" QUINCKE 405181

## (undated) DEVICE — TRAY PAIN INJECTION 97A 640

## (undated) DEVICE — TUBING EXTENSION SET MICROBORE 6" LL 2N1194

## (undated) DEVICE — PROBE COVER INTRAOPERATIVE 5"X96" PC1308

## (undated) DEVICE — PREP CHLORAPREP W/ORANGE TINT 10.5ML 260715

## (undated) DEVICE — GLOVE PROTEXIS POWDER FREE SMT 6.5  2D72PT65X

## (undated) DEVICE — NDL 22GA 1.5"

## (undated) RX ORDER — HEPARIN SODIUM (PORCINE) LOCK FLUSH IV SOLN 100 UNIT/ML 100 UNIT/ML
SOLUTION INTRAVENOUS
Status: DISPENSED
Start: 2023-10-24

## (undated) RX ORDER — FENTANYL CITRATE 50 UG/ML
INJECTION, SOLUTION INTRAMUSCULAR; INTRAVENOUS
Status: DISPENSED
Start: 2023-09-07

## (undated) RX ORDER — LIDOCAINE HYDROCHLORIDE 40 MG/ML
SOLUTION TOPICAL
Status: DISPENSED
Start: 2025-01-24

## (undated) RX ORDER — HEPARIN SODIUM (PORCINE) LOCK FLUSH IV SOLN 100 UNIT/ML 100 UNIT/ML
SOLUTION INTRAVENOUS
Status: DISPENSED
Start: 2024-03-27

## (undated) RX ORDER — SODIUM CHLORIDE 9 MG/ML
INJECTION, SOLUTION INTRAVENOUS
Status: DISPENSED
Start: 2023-09-07

## (undated) RX ORDER — LIDOCAINE HYDROCHLORIDE 10 MG/ML
INJECTION, SOLUTION EPIDURAL; INFILTRATION; INTRACAUDAL; PERINEURAL
Status: DISPENSED
Start: 2023-09-07

## (undated) RX ORDER — HEPARIN SODIUM (PORCINE) LOCK FLUSH IV SOLN 100 UNIT/ML 100 UNIT/ML
SOLUTION INTRAVENOUS
Status: DISPENSED
Start: 2024-10-18

## (undated) RX ORDER — FENTANYL CITRATE 50 UG/ML
INJECTION, SOLUTION INTRAMUSCULAR; INTRAVENOUS
Status: DISPENSED
Start: 2023-08-29

## (undated) RX ORDER — HEPARIN SODIUM (PORCINE) LOCK FLUSH IV SOLN 100 UNIT/ML 100 UNIT/ML
SOLUTION INTRAVENOUS
Status: DISPENSED
Start: 2023-05-12

## (undated) RX ORDER — SIMETHICONE 40MG/0.6ML
SUSPENSION, DROPS(FINAL DOSAGE FORM)(ML) ORAL
Status: DISPENSED
Start: 2023-08-30

## (undated) RX ORDER — HEPARIN SODIUM (PORCINE) LOCK FLUSH IV SOLN 100 UNIT/ML 100 UNIT/ML
SOLUTION INTRAVENOUS
Status: DISPENSED
Start: 2023-11-20

## (undated) RX ORDER — HEPARIN SODIUM 200 [USP'U]/100ML
INJECTION, SOLUTION INTRAVENOUS
Status: DISPENSED
Start: 2023-09-07

## (undated) RX ORDER — HEPARIN SODIUM (PORCINE) LOCK FLUSH IV SOLN 100 UNIT/ML 100 UNIT/ML
SOLUTION INTRAVENOUS
Status: DISPENSED
Start: 2025-02-19

## (undated) RX ORDER — HEPARIN SODIUM (PORCINE) LOCK FLUSH IV SOLN 100 UNIT/ML 100 UNIT/ML
SOLUTION INTRAVENOUS
Status: DISPENSED
Start: 2023-08-29

## (undated) RX ORDER — HEPARIN SODIUM (PORCINE) LOCK FLUSH IV SOLN 100 UNIT/ML 100 UNIT/ML
SOLUTION INTRAVENOUS
Status: DISPENSED
Start: 2024-07-30

## (undated) RX ORDER — HEPARIN SODIUM 200 [USP'U]/100ML
INJECTION, SOLUTION INTRAVENOUS
Status: DISPENSED
Start: 2023-08-29

## (undated) RX ORDER — HEPARIN SODIUM (PORCINE) LOCK FLUSH IV SOLN 100 UNIT/ML 100 UNIT/ML
SOLUTION INTRAVENOUS
Status: DISPENSED
Start: 2025-06-18

## (undated) RX ORDER — FENTANYL CITRATE 50 UG/ML
INJECTION, SOLUTION INTRAMUSCULAR; INTRAVENOUS
Status: DISPENSED
Start: 2025-01-24

## (undated) RX ORDER — AMPICILLIN AND SULBACTAM 2; 1 G/1; G/1
INJECTION, POWDER, FOR SOLUTION INTRAMUSCULAR; INTRAVENOUS
Status: DISPENSED
Start: 2023-09-07

## (undated) RX ORDER — PANTOPRAZOLE SODIUM 40 MG/10ML
INJECTION, POWDER, LYOPHILIZED, FOR SOLUTION INTRAVENOUS
Status: DISPENSED
Start: 2023-09-07

## (undated) RX ORDER — HEPARIN SODIUM (PORCINE) LOCK FLUSH IV SOLN 100 UNIT/ML 100 UNIT/ML
SOLUTION INTRAVENOUS
Status: DISPENSED
Start: 2023-09-07

## (undated) RX ORDER — HEPARIN SODIUM (PORCINE) LOCK FLUSH IV SOLN 100 UNIT/ML 100 UNIT/ML
SOLUTION INTRAVENOUS
Status: DISPENSED
Start: 2025-06-05

## (undated) RX ORDER — SODIUM CHLORIDE 9 MG/ML
INJECTION, SOLUTION INTRAVENOUS
Status: DISPENSED
Start: 2023-08-29

## (undated) RX ORDER — LIDOCAINE HYDROCHLORIDE 10 MG/ML
INJECTION, SOLUTION EPIDURAL; INFILTRATION; INTRACAUDAL; PERINEURAL
Status: DISPENSED
Start: 2023-08-29

## (undated) RX ORDER — HEPARIN SODIUM (PORCINE) LOCK FLUSH IV SOLN 100 UNIT/ML 100 UNIT/ML
SOLUTION INTRAVENOUS
Status: DISPENSED
Start: 2024-10-04